# Patient Record
Sex: FEMALE | Race: OTHER | HISPANIC OR LATINO | URBAN - METROPOLITAN AREA
[De-identification: names, ages, dates, MRNs, and addresses within clinical notes are randomized per-mention and may not be internally consistent; named-entity substitution may affect disease eponyms.]

---

## 2018-10-27 ENCOUNTER — EMERGENCY (EMERGENCY)
Facility: HOSPITAL | Age: 48
LOS: 1 days | Discharge: HOME | End: 2018-10-27
Attending: EMERGENCY MEDICINE
Payer: MEDICAID

## 2018-10-27 VITALS
SYSTOLIC BLOOD PRESSURE: 110 MMHG | HEART RATE: 103 BPM | RESPIRATION RATE: 22 BRPM | OXYGEN SATURATION: 100 % | DIASTOLIC BLOOD PRESSURE: 58 MMHG | TEMPERATURE: 98 F

## 2018-10-27 LAB
ALBUMIN SERPL ELPH-MCNC: 3.7 G/DL — SIGNIFICANT CHANGE UP (ref 3.3–5)
ALP SERPL-CCNC: 56 U/L — SIGNIFICANT CHANGE UP (ref 40–120)
ALT FLD-CCNC: 17 U/L — SIGNIFICANT CHANGE UP (ref 10–45)
ANION GAP SERPL CALC-SCNC: 13 MMOL/L — SIGNIFICANT CHANGE UP (ref 5–17)
APTT BLD: 30.7 SEC — SIGNIFICANT CHANGE UP (ref 27.5–37.4)
AST SERPL-CCNC: 15 U/L — SIGNIFICANT CHANGE UP (ref 10–40)
BASOPHILS # BLD AUTO: 0 K/UL — SIGNIFICANT CHANGE UP (ref 0–0.2)
BASOPHILS NFR BLD AUTO: 0.3 % — SIGNIFICANT CHANGE UP (ref 0–2)
BILIRUB SERPL-MCNC: 0.2 MG/DL — SIGNIFICANT CHANGE UP (ref 0.2–1.2)
BUN SERPL-MCNC: 9 MG/DL — SIGNIFICANT CHANGE UP (ref 7–23)
CALCIUM SERPL-MCNC: 8.9 MG/DL — SIGNIFICANT CHANGE UP (ref 8.4–10.5)
CHLORIDE SERPL-SCNC: 97 MMOL/L — SIGNIFICANT CHANGE UP (ref 96–108)
CO2 SERPL-SCNC: 21 MMOL/L — LOW (ref 22–31)
CREAT SERPL-MCNC: 0.73 MG/DL — SIGNIFICANT CHANGE UP (ref 0.5–1.3)
EOSINOPHIL # BLD AUTO: 0.3 K/UL — SIGNIFICANT CHANGE UP (ref 0–0.5)
EOSINOPHIL NFR BLD AUTO: 3.3 % — SIGNIFICANT CHANGE UP (ref 0–6)
GLUCOSE SERPL-MCNC: 101 MG/DL — HIGH (ref 70–99)
HCT VFR BLD CALC: 40.2 % — SIGNIFICANT CHANGE UP (ref 34.5–45)
HGB BLD-MCNC: 12.8 G/DL — SIGNIFICANT CHANGE UP (ref 11.5–15.5)
INR BLD: 1.03 RATIO — SIGNIFICANT CHANGE UP (ref 0.88–1.16)
LYMPHOCYTES # BLD AUTO: 2.9 K/UL — SIGNIFICANT CHANGE UP (ref 1–3.3)
LYMPHOCYTES # BLD AUTO: 27.7 % — SIGNIFICANT CHANGE UP (ref 13–44)
MAGNESIUM SERPL-MCNC: 2.9 MG/DL — HIGH (ref 1.6–2.6)
MAGNESIUM SERPL-MCNC: >8 MG/DL — CRITICAL HIGH (ref 1.6–2.6)
MCHC RBC-ENTMCNC: 30.3 PG — SIGNIFICANT CHANGE UP (ref 27–34)
MCHC RBC-ENTMCNC: 31.8 GM/DL — LOW (ref 32–36)
MCV RBC AUTO: 95.3 FL — SIGNIFICANT CHANGE UP (ref 80–100)
MONOCYTES # BLD AUTO: 1.1 K/UL — HIGH (ref 0–0.9)
MONOCYTES NFR BLD AUTO: 10.8 % — SIGNIFICANT CHANGE UP (ref 2–14)
NEUTROPHILS # BLD AUTO: 6 K/UL — SIGNIFICANT CHANGE UP (ref 1.8–7.4)
NEUTROPHILS NFR BLD AUTO: 57.9 % — SIGNIFICANT CHANGE UP (ref 43–77)
PLATELET # BLD AUTO: 270 K/UL — SIGNIFICANT CHANGE UP (ref 150–400)
POTASSIUM SERPL-MCNC: 4 MMOL/L — SIGNIFICANT CHANGE UP (ref 3.5–5.3)
POTASSIUM SERPL-SCNC: 4 MMOL/L — SIGNIFICANT CHANGE UP (ref 3.5–5.3)
PROT SERPL-MCNC: 6.6 G/DL — SIGNIFICANT CHANGE UP (ref 6–8.3)
PROTHROM AB SERPL-ACNC: 11.1 SEC — SIGNIFICANT CHANGE UP (ref 9.8–12.7)
RBC # BLD: 4.22 M/UL — SIGNIFICANT CHANGE UP (ref 3.8–5.2)
RBC # FLD: 14 % — SIGNIFICANT CHANGE UP (ref 10.3–14.5)
SODIUM SERPL-SCNC: 131 MMOL/L — LOW (ref 135–145)
WBC # BLD: 10.4 K/UL — SIGNIFICANT CHANGE UP (ref 3.8–10.5)
WBC # FLD AUTO: 10.4 K/UL — SIGNIFICANT CHANGE UP (ref 3.8–10.5)

## 2018-10-27 PROCEDURE — 71045 X-RAY EXAM CHEST 1 VIEW: CPT | Mod: 26

## 2018-10-27 PROCEDURE — 99220: CPT

## 2018-10-27 RX ORDER — SODIUM CHLORIDE 9 MG/ML
1000 INJECTION INTRAMUSCULAR; INTRAVENOUS; SUBCUTANEOUS ONCE
Qty: 0 | Refills: 0 | Status: COMPLETED | OUTPATIENT
Start: 2018-10-27 | End: 2018-10-27

## 2018-10-27 RX ORDER — MAGNESIUM SULFATE 500 MG/ML
2 VIAL (ML) INJECTION ONCE
Qty: 0 | Refills: 0 | Status: COMPLETED | OUTPATIENT
Start: 2018-10-27 | End: 2018-10-27

## 2018-10-27 RX ORDER — IPRATROPIUM/ALBUTEROL SULFATE 18-103MCG
3 AEROSOL WITH ADAPTER (GRAM) INHALATION ONCE
Qty: 0 | Refills: 0 | Status: COMPLETED | OUTPATIENT
Start: 2018-10-27 | End: 2018-10-27

## 2018-10-27 RX ADMIN — Medication 60 MILLIGRAM(S): at 21:47

## 2018-10-27 RX ADMIN — Medication 3 MILLILITER(S): at 22:32

## 2018-10-27 RX ADMIN — Medication 50 GRAM(S): at 21:24

## 2018-10-27 RX ADMIN — SODIUM CHLORIDE 1000 MILLILITER(S): 9 INJECTION INTRAMUSCULAR; INTRAVENOUS; SUBCUTANEOUS at 21:47

## 2018-10-27 NOTE — ED PROVIDER NOTE - MEDICAL DECISION MAKING DETAILS
LINDEN: 47 y/o female with hx of asthma, never been intubated, multiple exacerbations recently, subject to secondhand smoke, p/w worsening wheezing and SOB over past week, no CP, no fever, on exam pt's lung exam with no movement, tight, pt speaking words with difficulty pulse ox above 95%, 1)duo neb 2)CXR 3)steroids 4)frequent reevalutations

## 2018-10-27 NOTE — ED PROVIDER NOTE - OBJECTIVE STATEMENT
49 y/o female hx of asthma who presents to the ED for SOB. patient reports she has asthma exacerbations at least once per month but is not on chronic steroids. patient states she moved from [place] and has allergies and since that time her asthma has been much worse. on home inhalers. states she has noted an exacerbation over the last week, today became much worse. no recent cough or illness. no sick contacts. patient states she has been hospitalized many times for asthmabut never intubated. has used bipap once before but states it makes her feel worse. 49 y/o female hx of asthma who presents to the ED for SOB. patient reports she has asthma exacerbations at least once per month but is not on chronic steroids. patient states she moved from [place] and has allergies and since that time her asthma has been much worse. on home inhalers. states she has noted an exacerbation over the last week, today became much worse. no recent cough or illness. no sick contacts. patient states she has been hospitalized many times for asthma but never intubated. has used bipap once before but states it makes her feel worse. no chest pain, no hx pe, not on ocps. feels same as all asthma exacerbations. quit smoking but  still smokes

## 2018-10-27 NOTE — ED PROVIDER NOTE - PROGRESS NOTE DETAILS
refusing bipap as well as more nebs, states that mag typically works for her - Nenita Reyes PA-C do not think Mg is accurate, drawn from above the line after magnesium infusion started - Nenita Reyes PA-C

## 2018-10-27 NOTE — ED PROVIDER NOTE - ATTENDING CONTRIBUTION TO CARE
Attending MD Purcell;:   I personally have seen and examined this patient.  Physician assistant note reviewed and agree on plan of care and except where noted.  See MDM for details.

## 2018-10-27 NOTE — ED ADULT NURSE NOTE - OBJECTIVE STATEMENT
48y female presents to ED complaining of SOB. Pt is a/ox3 coming via EMS from urgent care. Pt has hx of asthma, went to urgent care for increasing SOB not relieved with at home albuterol. Pt given 60mg of PO prednisone and 3 Duonebs prior to arrival. PT present tachypneic with audible wheezes. PT states over the last 3 days shes been having cold symptoms, with coughing and sneezing, progressively worsening. Pt breathing spontaneous and labored with wheezes diffuse to auscultation bilaterally. Pt skin is warm, dry and intact with no edema present. Pt abdomen soft, nontender, nondistended. Pt PERRL, with equal strength bilaterally in upper and lower extremities with full sensation. Pt denies chest pain, denies n/v/d, denies fever/chills and cough, denies dysuria, denies numbness/tingling and weakness.

## 2018-10-28 VITALS
RESPIRATION RATE: 18 BRPM | SYSTOLIC BLOOD PRESSURE: 100 MMHG | DIASTOLIC BLOOD PRESSURE: 66 MMHG | TEMPERATURE: 98 F | HEART RATE: 83 BPM | OXYGEN SATURATION: 95 %

## 2018-10-28 PROCEDURE — 99285 EMERGENCY DEPT VISIT HI MDM: CPT | Mod: 25

## 2018-10-28 PROCEDURE — 80053 COMPREHEN METABOLIC PANEL: CPT

## 2018-10-28 PROCEDURE — 99217: CPT

## 2018-10-28 PROCEDURE — 83735 ASSAY OF MAGNESIUM: CPT

## 2018-10-28 PROCEDURE — 87486 CHLMYD PNEUM DNA AMP PROBE: CPT

## 2018-10-28 PROCEDURE — 96375 TX/PRO/DX INJ NEW DRUG ADDON: CPT

## 2018-10-28 PROCEDURE — 96374 THER/PROPH/DIAG INJ IV PUSH: CPT

## 2018-10-28 PROCEDURE — G0378: CPT

## 2018-10-28 PROCEDURE — 87798 DETECT AGENT NOS DNA AMP: CPT

## 2018-10-28 PROCEDURE — 87581 M.PNEUMON DNA AMP PROBE: CPT

## 2018-10-28 PROCEDURE — 94640 AIRWAY INHALATION TREATMENT: CPT

## 2018-10-28 PROCEDURE — 85730 THROMBOPLASTIN TIME PARTIAL: CPT

## 2018-10-28 PROCEDURE — 85610 PROTHROMBIN TIME: CPT

## 2018-10-28 PROCEDURE — 87633 RESP VIRUS 12-25 TARGETS: CPT

## 2018-10-28 PROCEDURE — 85027 COMPLETE CBC AUTOMATED: CPT

## 2018-10-28 PROCEDURE — 71045 X-RAY EXAM CHEST 1 VIEW: CPT

## 2018-10-28 RX ORDER — BUDESONIDE AND FORMOTEROL FUMARATE DIHYDRATE 160; 4.5 UG/1; UG/1
2 AEROSOL RESPIRATORY (INHALATION)
Qty: 0 | Refills: 0 | Status: DISCONTINUED | OUTPATIENT
Start: 2018-10-28 | End: 2018-10-31

## 2018-10-28 RX ORDER — SUCRALFATE 1 G
1 TABLET ORAL ONCE
Qty: 0 | Refills: 0 | Status: COMPLETED | OUTPATIENT
Start: 2018-10-28 | End: 2018-10-28

## 2018-10-28 RX ORDER — SODIUM CHLORIDE 9 MG/ML
3 INJECTION INTRAMUSCULAR; INTRAVENOUS; SUBCUTANEOUS EVERY 8 HOURS
Qty: 0 | Refills: 0 | Status: DISCONTINUED | OUTPATIENT
Start: 2018-10-28 | End: 2018-10-31

## 2018-10-28 RX ORDER — PANTOPRAZOLE SODIUM 20 MG/1
40 TABLET, DELAYED RELEASE ORAL ONCE
Qty: 0 | Refills: 0 | Status: COMPLETED | OUTPATIENT
Start: 2018-10-28 | End: 2018-10-28

## 2018-10-28 RX ORDER — IPRATROPIUM/ALBUTEROL SULFATE 18-103MCG
3 AEROSOL WITH ADAPTER (GRAM) INHALATION EVERY 4 HOURS
Qty: 0 | Refills: 0 | Status: DISCONTINUED | OUTPATIENT
Start: 2018-10-28 | End: 2018-10-31

## 2018-10-28 RX ADMIN — Medication 3 MILLILITER(S): at 02:06

## 2018-10-28 RX ADMIN — Medication 1 GRAM(S): at 04:22

## 2018-10-28 RX ADMIN — Medication 3 MILLILITER(S): at 05:56

## 2018-10-28 RX ADMIN — PANTOPRAZOLE SODIUM 40 MILLIGRAM(S): 20 TABLET, DELAYED RELEASE ORAL at 02:06

## 2018-10-28 RX ADMIN — SODIUM CHLORIDE 3 MILLILITER(S): 9 INJECTION INTRAMUSCULAR; INTRAVENOUS; SUBCUTANEOUS at 05:11

## 2018-10-28 RX ADMIN — Medication 100 MILLIGRAM(S): at 01:08

## 2018-10-28 NOTE — ED CDU PROVIDER INITIAL DAY NOTE - DETAILS
49 y/o female PMHx asthma (never intubated) on Flovent 2puffs BID, GERD, former 1/2ppd x20 year smoker presented to the ED for worsening SOBx6 days.   *ASTHMA EXACERBATION   -continuous monitoring with pulse ox and freq re-evals  -incentive spirometer   -duonebs q4 hours   -discussed case with Dr. Marlene Purcell

## 2018-10-28 NOTE — ED CDU PROVIDER INITIAL DAY NOTE - OBJECTIVE STATEMENT
49 y/o female PMHx asthma (never intubated) on Flovent 2puffs BID, GERD, former 1/2ppd x20 year smoker presented to the ED for worsening SOBx6 days. Patient stated symptoms occurred when she had sinus pressure with green drainage a few days ago and thick white clear sputum. URI type infections typically offset her asthma exacerbations.  had similar URI symptoms 3 weeks ago. Patient was using neb treatments ever 4 hours during the week with some relief of symptoms however today patient was using neb treatments every 2 hours which prompted pt to present to ED. Patient also reports JOHN but denied CP or SOB at rest. Feels feverish intermittently however has not taken temperature. Patient stated she gets monthly asthma exacerbations (not on chronic steroids) and was admitted 1 month ago at [facility]. Has had many admissions for asthma exacerbations and required BiPAp. Patient also reported since she reported from [place] allergies have been much worse and increased freq of asthma exacerbations. Patient former smoker however  currently smokes. Has outpatient pulm appt next month. Patient denied CP, fever, chills, headache, N/V/D, urinary symptoms, SOB rest, intubations, dizziness, lightheadedness, syncope, LOC, fall, OCPs    ED course: Labs unremarkable including negative RVP and CXR was negative. Patient received 1L IVF, duonebs, IV solumedrol 60mg and MagSulFate while in ED. Patient transferred to CDU for further management including duonebs, steroids and incentive spirometry    PMD: Dr. Savage Galena

## 2018-10-28 NOTE — ED CDU PROVIDER INITIAL DAY NOTE - MEDICAL DECISION MAKING DETAILS
47 y/o female with hx of asthma, never been intubated, multiple exacerbations recently, subject to secondhand smoke, p/w worsening wheezing and SOB over past week, no CP, no fever, on exam pt's lung exam with no movement, tight, pt speaking words with difficulty pulse ox above 95%, in the ED pt with improving symptoms, however still with wheezing, pt given Magnesium, and transferred to CDU for further close monitoring, more treatment as necessary.

## 2018-10-28 NOTE — ED CDU PROVIDER DISPOSITION NOTE - CLINICAL COURSE
49 y/o female PMHx asthma (never intubated) on Flovent 2puffs BID, GERD, former 1/2ppd x20 year smoker presented to the ED for worsening SOBx6 days. Patient stated symptoms occurred when she had sinus pressure with green drainage a few days ago and thick white clear sputum. URI type infections typically offset her asthma exacerbations.  had similar URI symptoms 3 weeks ago. Patient was using neb treatments ever 4 hours during the week with some relief of symptoms however today patient was using neb treatments every 2 hours which prompted pt to present to ED. Patient also reports JOHN but denied CP or SOB at rest. Feels feverish intermittently however has not taken temperature. Patient stated she gets monthly asthma exacerbations (not on chronic steroids) and was admitted 1 month ago at [facility]. Has had many admissions for asthma exacerbations and required BiPAp. Patient also reported since she reported from [place] allergies have been much worse and increased freq of asthma exacerbations. Patient former smoker however  currently smokes. Has outpatient pulm appt next month. Patient denied CP, fever, chills, headache, N/V/D, urinary symptoms, SOB rest, intubations, dizziness, lightheadedness, syncope, LOC, fall, OCPs    ED course: Labs unremarkable including negative RVP and CXR was negative. Patient received 1L IVF, duonebs, IV solumedrol 60mg and MagSulFate while in ED. Patient transferred to CDU for further management including duonebs, steroids and incentive spirometry. Patient received q4 duonebs and tessalon pearls for cough with improvement of symptoms. Patient discharged with five day course of prednisone with PMD and pulm f/u per ED attending Dr. Emanuel 49 y/o female PMHx asthma (never intubated) on Flovent 2puffs BID, GERD, former 1/2ppd x20 year smoker presented to the ED for worsening SOBx6 days. Patient stated symptoms occurred when she had sinus pressure with green drainage a few days ago and thick white clear sputum. URI type infections typically offset her asthma exacerbations.  had similar URI symptoms 3 weeks ago. Patient was using neb treatments ever 4 hours during the week with some relief of symptoms however today patient was using neb treatments every 2 hours which prompted pt to present to ED. Patient also reports OJHN but denied CP or SOB at rest. Feels feverish intermittently however has not taken temperature. Patient stated she gets monthly asthma exacerbations (not on chronic steroids) and was admitted 1 month ago at [facility]. Has had many admissions for asthma exacerbations and required BiPAp. Patient also reported since she reported from [place] allergies have been much worse and increased freq of asthma exacerbations. Patient former smoker however  currently smokes. Has outpatient pulm appt next month. Patient denied CP, fever, chills, headache, N/V/D, urinary symptoms, SOB rest, intubations, dizziness, lightheadedness, syncope, LOC, fall, OCPs    ED course: Labs unremarkable including negative RVP and CXR was negative. Patient received 1L IVF, duonebs, IV solumedrol 60mg and MagSulFate while in ED. Patient transferred to CDU for further management including duonebs, steroids and incentive spirometry. Patient received q4 duonebs and tessalon pearls for cough with improvement of symptoms. In AM patient felt significantly improved and discussed with her need to start on long acting b-agonist and combo with inhaled steroid. patient has appointment with her pulmonologist this month and will discuss starting advair again with him. Patient discharged with five day course of prednisone as well as augmentin with PMD and pulm f/u per ED attending Dr. Emanuel

## 2018-10-28 NOTE — ED CDU PROVIDER INITIAL DAY NOTE - PROGRESS NOTE DETAILS
SAVITA Quintero: Patient stated she takes PPI for GERD and missed dose today. Will give protonix for symptoms SAVITA Quintero: At 3:35AM on 10/28/18 patient noted to desat with good wave form to 82% while on 2L nasal cannula. Assessed patient at bedside who was sleeping in NAD. Sat pt upward in bed and increased nasal cannula to 4L with improvement of SPo2 99%. Patient continues to have diffuse expiratory wheezing. Decreased nasal cannula back down to 2L and will reassess. Patient requesting additional antacid as she is experiencing GERD symptoms. Will give maalox and carafate. Will reassess SAVITA Quintero: At 3:35AM on 10/28/18 patient noted to desat with good wave form to 82% while on 2L nasal cannula. Assessed patient at bedside who was sleeping in NAD. Sat pt upward in bed and increased nasal cannula to 4L with improvement of SPo2 99%. Patient continues to have diffuse expiratory wheezing. Decreased nasal cannula back down to 2L and will reassess. Patient requesting additional antacid as she is experiencing GERD symptoms and feels GERD worsens her asthma. Will give maalox and carafate. Will reassess CDU PROGRESS NOTE SAVITA MATHEW: Pt resting comfortably, feeling well without complaint. NAD, VSS. O2 sat 96% on RA while sleeping. SAVITA Hsu: Patient seen and evaluated with Dr. Emanuel. Feeling significantly improved. Reports that she has an appointment with her pulmonologist coming up, and that she had issue with her insurance company and this was why her Advair was discontinued. Reports Advair was very effective for her, and she will discuss with her pulmonologist to get her back on it. Patient feels sinus infection was cause of her symptoms, will rx augmentin for this and prednisone for her asthma exacerbation. will give advair here for patient to take and use until her follow-up. Patient's lungs with very mild diffuse expiratory wheeze and adequately moving air, stating that she feels significant improved. Stable for d/c at this time. -Donna Hsu PA-C pt feeling much better - able to take deep breath and not coughing.  pt states exac by sinusitis. pt was taking Advair previously which controlled asthma, but insurance would not pay when changed plans.  P/E - alert, nad, good air entry bilat w/ faint end expiratory wheezing. Pt improved,  will provide pt with Advair and pt stable for D/C.

## 2018-10-28 NOTE — ED ADULT NURSE REASSESSMENT NOTE - NS ED NURSE REASSESS COMMENT FT1
report taken from Brandy VÁSQUEZ. pt to receive additional nebs and tessalon pearls.
Received pt from FAHAD Carvalho (jose luis), received pt alert and responsive, oriented x4, denies any respiratory distress, SOB, or difficulty breathing. Pt transferred to CDU for asthma exacerbation, +wheezing noted on ascultation. Pt currently states improvement. Placed on continuous o2 monitoring 96% on 2L o2 via nasal cannula. Pending duoneb treatments q4 as per order.  IV in place, patent and free of signs of infiltration, pt denies chest pain or palpitations, V/S stable, pt afebrile, pt denies pain at this time. Pt educated on unit and unit rules, instructed patient to notify RN of any needed assistance, Pt verbalizes understanding, Call bell placed within reach. Safety maintained. Will continue to monitor.

## 2018-10-28 NOTE — ED CDU PROVIDER INITIAL DAY NOTE - PHYSICAL EXAMINATION
GEN: Well Appearing, Nontoxic, NAD  HEENT: NC/AT, Symm Facies. PERRL, EOMI, MMM, posterior pharynx clear  CV: No JVD/Bruits or stridor;  +S1S2, RRR w/o m/g/r  RESP: mild diffuse expiratory wheeze. moving air adequately  ABD: Soft, nt/nd, +BS. No guarding/rebound. No RUQ tender, no CVAT  EXT/MSK: No lower extremity edema or calf tenderness. WWP, palpable pulses. FROMx4  SKIN: No erythema, lesions or rash  Neuro: Grossly intact, AOX3 with normal speech, CN II-XII intact; Sensation intact, motor 5/5 throughout. Gait normal

## 2018-10-28 NOTE — ED CDU PROVIDER DISPOSITION NOTE - PLAN OF CARE
Follow up with your Primary Care Physician within the next 2-3 days  Bring a copy of your test results with you to your appointment  Continue your current medication regimen  Return to the Emergency Room if you experience new or worsening symptoms   Patient to complete course of prednisone (steroids) for asthma exacerbation Follow up with your Primary Care Physician within the next 2-3 days  Bring a copy of your test results with you to your appointment  Continue your current medication regimen along with taking prednisone once daily for 4 more days and augmentin twice daily for 7 days  Return to the Emergency Room if you experience new or worsening symptoms

## 2018-10-28 NOTE — ED ADULT NURSE REASSESSMENT NOTE - COMFORT CARE
darkened lights/warm blanket provided/ambulated to bathroom/plan of care explained/po fluids offered/repositioned

## 2018-11-11 ENCOUNTER — EMERGENCY (EMERGENCY)
Facility: HOSPITAL | Age: 48
LOS: 1 days | Discharge: HOME | End: 2018-11-11
Attending: EMERGENCY MEDICINE
Payer: MEDICAID

## 2018-11-11 VITALS
HEART RATE: 83 BPM | DIASTOLIC BLOOD PRESSURE: 62 MMHG | HEIGHT: 59 IN | RESPIRATION RATE: 20 BRPM | WEIGHT: 149.91 LBS | OXYGEN SATURATION: 97 % | TEMPERATURE: 98 F | SYSTOLIC BLOOD PRESSURE: 105 MMHG

## 2018-11-11 VITALS
SYSTOLIC BLOOD PRESSURE: 112 MMHG | OXYGEN SATURATION: 98 % | DIASTOLIC BLOOD PRESSURE: 76 MMHG | TEMPERATURE: 97 F | HEART RATE: 77 BPM | RESPIRATION RATE: 18 BRPM

## 2018-11-11 PROBLEM — K21.9 GASTRO-ESOPHAGEAL REFLUX DISEASE WITHOUT ESOPHAGITIS: Chronic | Status: ACTIVE | Noted: 2018-10-28

## 2018-11-11 PROBLEM — J45.909 UNSPECIFIED ASTHMA, UNCOMPLICATED: Chronic | Status: ACTIVE | Noted: 2018-10-27

## 2018-11-11 LAB
ALBUMIN SERPL ELPH-MCNC: 4.1 G/DL — SIGNIFICANT CHANGE UP (ref 3.3–5)
ALP SERPL-CCNC: 59 U/L — SIGNIFICANT CHANGE UP (ref 40–120)
ALT FLD-CCNC: 18 U/L — SIGNIFICANT CHANGE UP (ref 10–45)
ANION GAP SERPL CALC-SCNC: 16 MMOL/L — SIGNIFICANT CHANGE UP (ref 5–17)
APPEARANCE UR: CLEAR — SIGNIFICANT CHANGE UP
AST SERPL-CCNC: 17 U/L — SIGNIFICANT CHANGE UP (ref 10–40)
BACTERIA # UR AUTO: NEGATIVE — SIGNIFICANT CHANGE UP
BASOPHILS # BLD AUTO: 0 K/UL — SIGNIFICANT CHANGE UP (ref 0–0.2)
BASOPHILS NFR BLD AUTO: 0.5 % — SIGNIFICANT CHANGE UP (ref 0–2)
BILIRUB SERPL-MCNC: 0.3 MG/DL — SIGNIFICANT CHANGE UP (ref 0.2–1.2)
BILIRUB UR-MCNC: NEGATIVE — SIGNIFICANT CHANGE UP
BUN SERPL-MCNC: 11 MG/DL — SIGNIFICANT CHANGE UP (ref 7–23)
CALCIUM SERPL-MCNC: 10.2 MG/DL — SIGNIFICANT CHANGE UP (ref 8.4–10.5)
CHLORIDE SERPL-SCNC: 100 MMOL/L — SIGNIFICANT CHANGE UP (ref 96–108)
CO2 SERPL-SCNC: 22 MMOL/L — SIGNIFICANT CHANGE UP (ref 22–31)
COLOR SPEC: SIGNIFICANT CHANGE UP
CREAT SERPL-MCNC: 1.13 MG/DL — SIGNIFICANT CHANGE UP (ref 0.5–1.3)
DIFF PNL FLD: NEGATIVE — SIGNIFICANT CHANGE UP
EOSINOPHIL # BLD AUTO: 0.2 K/UL — SIGNIFICANT CHANGE UP (ref 0–0.5)
EOSINOPHIL NFR BLD AUTO: 2.8 % — SIGNIFICANT CHANGE UP (ref 0–6)
EPI CELLS # UR: 1 /HPF — SIGNIFICANT CHANGE UP
GLUCOSE SERPL-MCNC: 90 MG/DL — SIGNIFICANT CHANGE UP (ref 70–99)
GLUCOSE UR QL: NEGATIVE — SIGNIFICANT CHANGE UP
HCT VFR BLD CALC: 40.9 % — SIGNIFICANT CHANGE UP (ref 34.5–45)
HGB BLD-MCNC: 13.5 G/DL — SIGNIFICANT CHANGE UP (ref 11.5–15.5)
HYALINE CASTS # UR AUTO: 1 /LPF — SIGNIFICANT CHANGE UP (ref 0–2)
KETONES UR-MCNC: NEGATIVE — SIGNIFICANT CHANGE UP
LEUKOCYTE ESTERASE UR-ACNC: NEGATIVE — SIGNIFICANT CHANGE UP
LIDOCAIN IGE QN: 49 U/L — SIGNIFICANT CHANGE UP (ref 7–60)
LYMPHOCYTES # BLD AUTO: 2.9 K/UL — SIGNIFICANT CHANGE UP (ref 1–3.3)
LYMPHOCYTES # BLD AUTO: 35.6 % — SIGNIFICANT CHANGE UP (ref 13–44)
MCHC RBC-ENTMCNC: 30.5 PG — SIGNIFICANT CHANGE UP (ref 27–34)
MCHC RBC-ENTMCNC: 33.1 GM/DL — SIGNIFICANT CHANGE UP (ref 32–36)
MCV RBC AUTO: 92.1 FL — SIGNIFICANT CHANGE UP (ref 80–100)
MONOCYTES # BLD AUTO: 1 K/UL — HIGH (ref 0–0.9)
MONOCYTES NFR BLD AUTO: 12.1 % — SIGNIFICANT CHANGE UP (ref 2–14)
NEUTROPHILS # BLD AUTO: 4 K/UL — SIGNIFICANT CHANGE UP (ref 1.8–7.4)
NEUTROPHILS NFR BLD AUTO: 48.9 % — SIGNIFICANT CHANGE UP (ref 43–77)
NITRITE UR-MCNC: NEGATIVE — SIGNIFICANT CHANGE UP
PH UR: 7 — SIGNIFICANT CHANGE UP (ref 5–8)
PLATELET # BLD AUTO: 267 K/UL — SIGNIFICANT CHANGE UP (ref 150–400)
POTASSIUM SERPL-MCNC: 4.1 MMOL/L — SIGNIFICANT CHANGE UP (ref 3.5–5.3)
POTASSIUM SERPL-SCNC: 4.1 MMOL/L — SIGNIFICANT CHANGE UP (ref 3.5–5.3)
PROT SERPL-MCNC: 7.3 G/DL — SIGNIFICANT CHANGE UP (ref 6–8.3)
PROT UR-MCNC: NEGATIVE — SIGNIFICANT CHANGE UP
RBC # BLD: 4.44 M/UL — SIGNIFICANT CHANGE UP (ref 3.8–5.2)
RBC # FLD: 14.3 % — SIGNIFICANT CHANGE UP (ref 10.3–14.5)
RBC CASTS # UR COMP ASSIST: 3 /HPF — SIGNIFICANT CHANGE UP (ref 0–4)
SODIUM SERPL-SCNC: 138 MMOL/L — SIGNIFICANT CHANGE UP (ref 135–145)
SP GR SPEC: 1.01 — SIGNIFICANT CHANGE UP (ref 1.01–1.02)
UROBILINOGEN FLD QL: NEGATIVE — SIGNIFICANT CHANGE UP
WBC # BLD: 8.2 K/UL — SIGNIFICANT CHANGE UP (ref 3.8–10.5)
WBC # FLD AUTO: 8.2 K/UL — SIGNIFICANT CHANGE UP (ref 3.8–10.5)
WBC UR QL: 0 /HPF — SIGNIFICANT CHANGE UP (ref 0–5)

## 2018-11-11 PROCEDURE — 74177 CT ABD & PELVIS W/CONTRAST: CPT | Mod: 26

## 2018-11-11 PROCEDURE — 85027 COMPLETE CBC AUTOMATED: CPT

## 2018-11-11 PROCEDURE — 96376 TX/PRO/DX INJ SAME DRUG ADON: CPT

## 2018-11-11 PROCEDURE — 99284 EMERGENCY DEPT VISIT MOD MDM: CPT | Mod: 25

## 2018-11-11 PROCEDURE — 83690 ASSAY OF LIPASE: CPT

## 2018-11-11 PROCEDURE — 99284 EMERGENCY DEPT VISIT MOD MDM: CPT

## 2018-11-11 PROCEDURE — 74177 CT ABD & PELVIS W/CONTRAST: CPT

## 2018-11-11 PROCEDURE — 96374 THER/PROPH/DIAG INJ IV PUSH: CPT | Mod: XU

## 2018-11-11 PROCEDURE — 80053 COMPREHEN METABOLIC PANEL: CPT

## 2018-11-11 PROCEDURE — 81001 URINALYSIS AUTO W/SCOPE: CPT

## 2018-11-11 RX ORDER — IBUPROFEN 200 MG
1 TABLET ORAL
Qty: 60 | Refills: 0 | OUTPATIENT
Start: 2018-11-11 | End: 2018-11-24

## 2018-11-11 RX ORDER — KETOROLAC TROMETHAMINE 30 MG/ML
15 SYRINGE (ML) INJECTION ONCE
Qty: 0 | Refills: 0 | Status: DISCONTINUED | OUTPATIENT
Start: 2018-11-11 | End: 2018-11-11

## 2018-11-11 RX ORDER — ACETAMINOPHEN 500 MG
2 TABLET ORAL
Qty: 120 | Refills: 0 | OUTPATIENT
Start: 2018-11-11 | End: 2018-11-24

## 2018-11-11 RX ORDER — ACETAMINOPHEN 500 MG
975 TABLET ORAL ONCE
Qty: 0 | Refills: 0 | Status: COMPLETED | OUTPATIENT
Start: 2018-11-11 | End: 2018-11-11

## 2018-11-11 RX ORDER — SODIUM CHLORIDE 9 MG/ML
1000 INJECTION INTRAMUSCULAR; INTRAVENOUS; SUBCUTANEOUS ONCE
Qty: 0 | Refills: 0 | Status: COMPLETED | OUTPATIENT
Start: 2018-11-11 | End: 2018-11-11

## 2018-11-11 RX ADMIN — Medication 975 MILLIGRAM(S): at 22:10

## 2018-11-11 RX ADMIN — Medication 15 MILLIGRAM(S): at 18:32

## 2018-11-11 RX ADMIN — Medication 15 MILLIGRAM(S): at 21:33

## 2018-11-11 RX ADMIN — SODIUM CHLORIDE 1000 MILLILITER(S): 9 INJECTION INTRAMUSCULAR; INTRAVENOUS; SUBCUTANEOUS at 18:28

## 2018-11-11 RX ADMIN — SODIUM CHLORIDE 1000 MILLILITER(S): 9 INJECTION INTRAMUSCULAR; INTRAVENOUS; SUBCUTANEOUS at 18:11

## 2018-11-11 RX ADMIN — Medication 1 TABLET(S): at 22:10

## 2018-11-11 NOTE — ED PROVIDER NOTE - ATTENDING CONTRIBUTION TO CARE
VITALS: reviewed  GEN: NAD, A & O x 4  HEAD/EYES: NCAT, PERRL, EOMI, anicteric sclerae, no conjunctival pallor  ENT: mucus membranes moist, oropharynx WNL, trachea midline, no JVD  RESP: lungs CTA with equal breath sounds bilaterally, chest wall nontender and atraumatic  CV: heart with reg rhythm S1, S2, no murmur; distal pulses intact and symmetric bilaterally  GI: normoactive bowel sounds. the abdomen is soft, nondistended, LLQ>LUQ moderate tenderness with mild rebound, no guarding, there are no palpable masses  : no CVAT  MSK: extremities atraumatic and nontender, no edema, no asymmetry. the back is without midline or lateral tenderness, there is no spinal deformity or stepoff and the back is ranged painlessly. the neck has no midline tenderness, deformity, or stepoff, and is ranged painlessly.  SKIN: warm, dry, no rash, no bruising, no cyanosis. color appropriate for ethnicity  PSYCH: Affect appropriate     MDM: Likely recurrent diverticulitis, well appearing. give 2 weeks w/out will get CT to r/o perforation or abscess.

## 2018-11-11 NOTE — ED ADULT NURSE REASSESSMENT NOTE - NS ED NURSE REASSESS COMMENT FT1
pt in no acute distress, asking to eat, awaiting CT results and pt is aware she cant eat until the results are in, VSS.

## 2018-11-11 NOTE — ED ADULT NURSE REASSESSMENT NOTE - NS ED NURSE REASSESS COMMENT FT1
1732 pt given 500 ccc bolus as ordered and temp 100.2  Pt has a wound on the right grat toe unknown if source of infection micaela

## 2018-11-11 NOTE — ED ADULT NURSE NOTE - OBJECTIVE STATEMENT
pt has had abd for 2 weeks pt was constipated and took laxatives with good results pt has divetcultis but has not had flare up in over 20 years Pt has some diarrheas now but pt also to [place] for the pain.  pt states she had fever yesterday IVL placed and bloods  heldo until MD evaluates pt Leonides

## 2018-11-11 NOTE — ED ADULT NURSE REASSESSMENT NOTE - NS ED NURSE REASSESS COMMENT FT1
pt called back and stated she removed her own IV and placed it in bathroom garbage prior to leaving. IV visible in garbage.

## 2018-11-11 NOTE — ED PROVIDER NOTE - OBJECTIVE STATEMENT
48yF with h/o of diverticulitis (last episode ~20 yrs prior) and asthma presents with intermittent sharp abd pain of 2 week duration. Patient states that she presented today due to worsening condition and new onset subjective fevers and nausea. Patient denies chest pain, sob, urinary or bowel dysfunction

## 2018-11-11 NOTE — ED ADULT NURSE REASSESSMENT NOTE - NS ED NURSE REASSESS COMMENT FT1
patient discharged by ED resident Liz Pelletier with RN not present at time of discharged. per MD pt was told to wait for IV to be removed. Pt left before IV could be removed. phone call placed to pts home using number in chart. charge RN aware

## 2018-11-11 NOTE — ED PROVIDER NOTE - PHYSICAL EXAMINATION
Gen: AAOx3, non-toxic  Head: NCAT  HEENT: EOMI, oral mucosa moist, normal conjunctiva  Lung: CTAB, no respiratory distress, speaking in full sentences  CV: RRR, no murmurs, rubs or gallops  Abd: soft, voluntary guarding, LLQ ttp,  no CVA tenderness  MSK: no visible deformities  Neuro: No focal sensory or motor deficits  Skin: Warm, well perfused, no rash  Psych: normal affect.   ~Efraín Ortez M.D. Resident

## 2018-11-11 NOTE — ED PROVIDER NOTE - MEDICAL DECISION MAKING DETAILS
48yF with h/o of diverticulitis and asthma presents with intermittent sharp abd pain of 2 week duration with LLQ ttp on exam concerning for diverticulitis  Plan: Labs, UA, Ct abd/pelvis, lipase, pain management, reassess

## 2018-11-11 NOTE — ED PROVIDER NOTE - PLAN OF CARE
You were seen and evaluated in the emergency department for abdominal pain. You had a thorough evaluation including an exam, labs and imaging. You were given medications for comfort. Your workup showed uncomplicated diverticulitis. You were started on antibiotics. Please read the attached information sheets as they will provide useful information regarding your condition.    It is important to understand that while your workup was reassuring, no workup can completely exclude all concerning conditions. Therfore, please return if you develop worsening or concerning new symptoms including worsening pain, pain that spreads to new places, inability to tolerate an oral diet, new and worsening fevers and chills, weakness, inability to pass stool or flatulent gas, those included on the attached information sheets, or other findings concerning to you. Please take all your medications as prescribed.    Please be sure to follow up with your regular doctor in the next 2 days.

## 2018-11-11 NOTE — ED PROVIDER NOTE - PROGRESS NOTE DETAILS
ATTENDING MD Kyaw: CT with uncompilcated diverticulitis. Pain improved. Tolerating PO. Will Dc w/augmentin and refer for PCP f/u. Liz Pelletier M.D. Resident  The patient was re-examined after interventions and is feeling much better. Vitals signs are within normal limits, pt is at baseline mental status, ambulating, tolerating PO, and pain is well controlled. Patient was given verbal and written discharge instructions and return precautions, and a hard copy of all results from tests completed in the ED. Pt verbalized understanding and agreed to outpatient follow up with her primary care doctor. Patient signed discharge paperwork and was educated about the importance of getting her IV line removed, and was informed that her nurse would come and to remove her IV. Liz Pelletier M.D. Resident  Patient left before nurse removed IV. Nurse called and left message on Pt's cellphone. Liz Pelletier M.D. Resident  Nurse states patient called back and states she removed the IV and discarded in the trashcan near her bed. Nurse checked the specific trashcan pt was referring to and confirmed the presence of a discarded IV.

## 2018-11-11 NOTE — ED PROVIDER NOTE - CARE PLAN
Principal Discharge DX:	Acute diverticulitis  Assessment and plan of treatment:	You were seen and evaluated in the emergency department for abdominal pain. You had a thorough evaluation including an exam, labs and imaging. You were given medications for comfort. Your workup showed uncomplicated diverticulitis. You were started on antibiotics. Please read the attached information sheets as they will provide useful information regarding your condition.    It is important to understand that while your workup was reassuring, no workup can completely exclude all concerning conditions. Therfore, please return if you develop worsening or concerning new symptoms including worsening pain, pain that spreads to new places, inability to tolerate an oral diet, new and worsening fevers and chills, weakness, inability to pass stool or flatulent gas, those included on the attached information sheets, or other findings concerning to you. Please take all your medications as prescribed.    Please be sure to follow up with your regular doctor in the next 2 days.

## 2018-11-11 NOTE — ED PROVIDER NOTE - NS ED ROS FT
GENERAL: +fever or chills, EYES: no change in vision, HEENT: no trouble swallowing or speaking, CARDIAC: no chest pain, PULMONARY: no cough or SOB,   GI: +abdominal pain, no nausea, no vomiting, no diarrhea or constipation, : No changes in urination, SKIN: no rashes, NEURO: no headache,  MSK: No joint pain ~Efraín Ortez M.D. Resident

## 2018-11-13 ENCOUNTER — INPATIENT (INPATIENT)
Facility: HOSPITAL | Age: 48
LOS: 3 days | Discharge: HOME | DRG: 189 | End: 2018-11-17
Attending: HOSPITALIST | Admitting: HOSPITALIST
Payer: MEDICAID

## 2018-11-13 VITALS
HEART RATE: 89 BPM | RESPIRATION RATE: 16 BRPM | TEMPERATURE: 98 F | OXYGEN SATURATION: 97 % | DIASTOLIC BLOOD PRESSURE: 58 MMHG | WEIGHT: 164.91 LBS | HEIGHT: 68 IN | SYSTOLIC BLOOD PRESSURE: 132 MMHG

## 2018-11-13 RX ORDER — IPRATROPIUM/ALBUTEROL SULFATE 18-103MCG
3 AEROSOL WITH ADAPTER (GRAM) INHALATION ONCE
Qty: 0 | Refills: 0 | Status: COMPLETED | OUTPATIENT
Start: 2018-11-13 | End: 2018-11-13

## 2018-11-13 RX ORDER — SODIUM CHLORIDE 9 MG/ML
1000 INJECTION INTRAMUSCULAR; INTRAVENOUS; SUBCUTANEOUS ONCE
Qty: 0 | Refills: 0 | Status: COMPLETED | OUTPATIENT
Start: 2018-11-13 | End: 2018-11-13

## 2018-11-13 RX ORDER — MAGNESIUM SULFATE 500 MG/ML
2 VIAL (ML) INJECTION ONCE
Qty: 0 | Refills: 0 | Status: COMPLETED | OUTPATIENT
Start: 2018-11-13 | End: 2018-11-13

## 2018-11-13 RX ADMIN — Medication 3 MILLILITER(S): at 23:35

## 2018-11-13 RX ADMIN — SODIUM CHLORIDE 1000 MILLILITER(S): 9 INJECTION INTRAMUSCULAR; INTRAVENOUS; SUBCUTANEOUS at 23:55

## 2018-11-13 RX ADMIN — Medication 3 MILLILITER(S): at 22:51

## 2018-11-13 RX ADMIN — SODIUM CHLORIDE 1000 MILLILITER(S): 9 INJECTION INTRAMUSCULAR; INTRAVENOUS; SUBCUTANEOUS at 22:55

## 2018-11-13 RX ADMIN — Medication 0.5 MILLIGRAM(S): at 23:28

## 2018-11-13 RX ADMIN — Medication 50 GRAM(S): at 23:31

## 2018-11-13 RX ADMIN — Medication 125 MILLIGRAM(S): at 22:54

## 2018-11-13 NOTE — ED ADULT TRIAGE NOTE - CHIEF COMPLAINT QUOTE
Asthma exacerbation went to walk in today got a shot of Solumedrol did albutrol at home, [place] duo neb from EMS

## 2018-11-13 NOTE — ED ADULT TRIAGE NOTE - NS ED NURSE BANDS TYPE
Late Entry-    ERP at bedside.    EMS IV found not working.  New IV established- pt very hard stick. Lab called for blood draw.   Lab at bedside.   Name band;

## 2018-11-13 NOTE — ED ADULT NURSE NOTE - OBJECTIVE STATEMENT
48 yr old female BIB EMS from urgent care with asthma exacerbation and cough. Pt received steroid IM prior to arrival with no relief. Pt in respiartory distress; tripod and using accessory muscles.

## 2018-11-13 NOTE — ED PROVIDER NOTE - OBJECTIVE STATEMENT
47yo female bib ems with asthma exacerbation. pt started wheezing and was using her nebulizer at home, was seen at urgent care and given IM shot of steroids with no improvemnet, no hx of intubations, no smoking, no fever, chills, pt has been on augmentin for 2 days

## 2018-11-13 NOTE — ED ADULT NURSE NOTE - CHIEF COMPLAINT QUOTE
Asthma exacerbation went to walk in today got a shot of Solumedrol did albutrol at home, [place] duo neb from EMS

## 2018-11-13 NOTE — ED PROVIDER NOTE - CRITICAL CARE PROVIDED
additional history taking/direct patient care (not related to procedure)/interpretation of diagnostic studies/consultation with other physicians

## 2018-11-13 NOTE — ED ADULT NURSE NOTE - NSIMPLEMENTINTERV_GEN_ALL_ED
Implemented All Universal Safety Interventions:  [place] to call system. Call bell, personal items and telephone within reach. Instruct patient to call for assistance. Room bathroom lighting operational. Non-slip footwear when patient is off stretcher. Physically safe environment: no spills, clutter or unnecessary equipment. Stretcher in lowest position, wheels locked, appropriate side rails in place.

## 2018-11-14 DIAGNOSIS — Z29.9 ENCOUNTER FOR PROPHYLACTIC MEASURES, UNSPECIFIED: ICD-10-CM

## 2018-11-14 DIAGNOSIS — K21.9 GASTRO-ESOPHAGEAL REFLUX DISEASE WITHOUT ESOPHAGITIS: ICD-10-CM

## 2018-11-14 DIAGNOSIS — K57.92 DIVERTICULITIS OF INTESTINE, PART UNSPECIFIED, WITHOUT PERFORATION OR ABSCESS WITHOUT BLEEDING: ICD-10-CM

## 2018-11-14 DIAGNOSIS — J45.901 UNSPECIFIED ASTHMA WITH (ACUTE) EXACERBATION: ICD-10-CM

## 2018-11-14 DIAGNOSIS — J96.00 ACUTE RESPIRATORY FAILURE, UNSPECIFIED WHETHER WITH HYPOXIA OR HYPERCAPNIA: ICD-10-CM

## 2018-11-14 DIAGNOSIS — J20.9 ACUTE BRONCHITIS, UNSPECIFIED: ICD-10-CM

## 2018-11-14 DIAGNOSIS — J45.51 SEVERE PERSISTENT ASTHMA WITH (ACUTE) EXACERBATION: ICD-10-CM

## 2018-11-14 DIAGNOSIS — J45.902 UNSPECIFIED ASTHMA WITH STATUS ASTHMATICUS: ICD-10-CM

## 2018-11-14 LAB
ALBUMIN SERPL ELPH-MCNC: 3.3 G/DL — SIGNIFICANT CHANGE UP (ref 3.3–5)
ALP SERPL-CCNC: 71 U/L — SIGNIFICANT CHANGE UP (ref 30–120)
ALT FLD-CCNC: 35 U/L DA — SIGNIFICANT CHANGE UP (ref 10–60)
ANION GAP SERPL CALC-SCNC: 9 MMOL/L — SIGNIFICANT CHANGE UP (ref 5–17)
AST SERPL-CCNC: 34 U/L — SIGNIFICANT CHANGE UP (ref 10–40)
BASE EXCESS BLDA CALC-SCNC: -2.4 MMOL/L — LOW (ref -2–2)
BASOPHILS # BLD AUTO: 0.02 K/UL — SIGNIFICANT CHANGE UP (ref 0–0.2)
BASOPHILS NFR BLD AUTO: 0.1 % — SIGNIFICANT CHANGE UP (ref 0–2)
BILIRUB SERPL-MCNC: 0.2 MG/DL — SIGNIFICANT CHANGE UP (ref 0.2–1.2)
BLOOD GAS COMMENTS: SIGNIFICANT CHANGE UP
BLOOD GAS COMMENTS: SIGNIFICANT CHANGE UP
BLOOD GAS SOURCE: SIGNIFICANT CHANGE UP
BUN SERPL-MCNC: 4 MG/DL — LOW (ref 7–23)
CALCIUM SERPL-MCNC: 8.6 MG/DL — SIGNIFICANT CHANGE UP (ref 8.4–10.5)
CHLORIDE SERPL-SCNC: 105 MMOL/L — SIGNIFICANT CHANGE UP (ref 96–108)
CO2 SERPL-SCNC: 27 MMOL/L — SIGNIFICANT CHANGE UP (ref 22–31)
CREAT SERPL-MCNC: 0.85 MG/DL — SIGNIFICANT CHANGE UP (ref 0.5–1.3)
EOSINOPHIL # BLD AUTO: 0.03 K/UL — SIGNIFICANT CHANGE UP (ref 0–0.5)
EOSINOPHIL NFR BLD AUTO: 0.2 % — SIGNIFICANT CHANGE UP (ref 0–6)
GLUCOSE SERPL-MCNC: 124 MG/DL — HIGH (ref 70–99)
HCO3 BLDA-SCNC: 22 MMOL/L — SIGNIFICANT CHANGE UP (ref 21–29)
HCT VFR BLD CALC: 37.4 % — SIGNIFICANT CHANGE UP (ref 34.5–45)
HGB BLD-MCNC: 12.1 G/DL — SIGNIFICANT CHANGE UP (ref 11.5–15.5)
HOROWITZ INDEX BLDA+IHG-RTO: 30 — SIGNIFICANT CHANGE UP
IMM GRANULOCYTES NFR BLD AUTO: 0.4 % — SIGNIFICANT CHANGE UP (ref 0–1.5)
LACTATE SERPL-SCNC: 1.9 MMOL/L — SIGNIFICANT CHANGE UP (ref 0.7–2)
LACTATE SERPL-SCNC: 2.3 MMOL/L — HIGH (ref 0.7–2)
LYMPHOCYTES # BLD AUTO: 0.64 K/UL — LOW (ref 1–3.3)
LYMPHOCYTES # BLD AUTO: 4.7 % — LOW (ref 13–44)
MCHC RBC-ENTMCNC: 31.2 PG — SIGNIFICANT CHANGE UP (ref 27–34)
MCHC RBC-ENTMCNC: 32.4 GM/DL — SIGNIFICANT CHANGE UP (ref 32–36)
MCV RBC AUTO: 96.4 FL — SIGNIFICANT CHANGE UP (ref 80–100)
MONOCYTES # BLD AUTO: 0.46 K/UL — SIGNIFICANT CHANGE UP (ref 0–0.9)
MONOCYTES NFR BLD AUTO: 3.4 % — SIGNIFICANT CHANGE UP (ref 2–14)
NEUTROPHILS # BLD AUTO: 12.44 K/UL — HIGH (ref 1.8–7.4)
NEUTROPHILS NFR BLD AUTO: 91.2 % — HIGH (ref 43–77)
PCO2 BLDA: 48 MMHG — HIGH (ref 32–46)
PH BLD: 7.3 — LOW (ref 7.35–7.45)
PLATELET # BLD AUTO: 254 K/UL — SIGNIFICANT CHANGE UP (ref 150–400)
PO2 BLDA: 75 MMHG — SIGNIFICANT CHANGE UP (ref 74–108)
POTASSIUM SERPL-MCNC: 4.4 MMOL/L — SIGNIFICANT CHANGE UP (ref 3.5–5.3)
POTASSIUM SERPL-SCNC: 4.4 MMOL/L — SIGNIFICANT CHANGE UP (ref 3.5–5.3)
PROT SERPL-MCNC: 7.5 G/DL — SIGNIFICANT CHANGE UP (ref 6–8.3)
RAPID RVP RESULT: SIGNIFICANT CHANGE UP
RBC # BLD: 3.88 M/UL — SIGNIFICANT CHANGE UP (ref 3.8–5.2)
RBC # FLD: 14.3 % — SIGNIFICANT CHANGE UP (ref 10.3–14.5)
SAO2 % BLDA: 94 % — SIGNIFICANT CHANGE UP (ref 92–96)
SODIUM SERPL-SCNC: 141 MMOL/L — SIGNIFICANT CHANGE UP (ref 135–145)
WBC # BLD: 13.64 K/UL — HIGH (ref 3.8–10.5)
WBC # FLD AUTO: 13.64 K/UL — HIGH (ref 3.8–10.5)

## 2018-11-14 PROCEDURE — 12345: CPT | Mod: NC

## 2018-11-14 PROCEDURE — 99291 CRITICAL CARE FIRST HOUR: CPT

## 2018-11-14 PROCEDURE — 71045 X-RAY EXAM CHEST 1 VIEW: CPT | Mod: 26

## 2018-11-14 PROCEDURE — 99223 1ST HOSP IP/OBS HIGH 75: CPT | Mod: AI

## 2018-11-14 RX ORDER — ENOXAPARIN SODIUM 100 MG/ML
40 INJECTION SUBCUTANEOUS DAILY
Qty: 0 | Refills: 0 | Status: DISCONTINUED | OUTPATIENT
Start: 2018-11-14 | End: 2018-11-17

## 2018-11-14 RX ORDER — MONTELUKAST 4 MG/1
10 TABLET, CHEWABLE ORAL AT BEDTIME
Qty: 0 | Refills: 0 | Status: DISCONTINUED | OUTPATIENT
Start: 2018-11-14 | End: 2018-11-17

## 2018-11-14 RX ORDER — SODIUM CHLORIDE 9 MG/ML
1000 INJECTION INTRAMUSCULAR; INTRAVENOUS; SUBCUTANEOUS ONCE
Qty: 0 | Refills: 0 | Status: COMPLETED | OUTPATIENT
Start: 2018-11-14 | End: 2018-11-14

## 2018-11-14 RX ORDER — AZITHROMYCIN 500 MG/1
500 TABLET, FILM COATED ORAL EVERY 24 HOURS
Qty: 0 | Refills: 0 | Status: DISCONTINUED | OUTPATIENT
Start: 2018-11-14 | End: 2018-11-16

## 2018-11-14 RX ORDER — IPRATROPIUM/ALBUTEROL SULFATE 18-103MCG
3 AEROSOL WITH ADAPTER (GRAM) INHALATION EVERY 6 HOURS
Qty: 0 | Refills: 0 | Status: DISCONTINUED | OUTPATIENT
Start: 2018-11-14 | End: 2018-11-17

## 2018-11-14 RX ORDER — BUDESONIDE AND FORMOTEROL FUMARATE DIHYDRATE 160; 4.5 UG/1; UG/1
2 AEROSOL RESPIRATORY (INHALATION)
Qty: 0 | Refills: 0 | Status: DISCONTINUED | OUTPATIENT
Start: 2018-11-14 | End: 2018-11-17

## 2018-11-14 RX ORDER — ACETAMINOPHEN 500 MG
650 TABLET ORAL EVERY 6 HOURS
Qty: 0 | Refills: 0 | Status: DISCONTINUED | OUTPATIENT
Start: 2018-11-14 | End: 2018-11-17

## 2018-11-14 RX ORDER — AMPICILLIN SODIUM AND SULBACTAM SODIUM 250; 125 MG/ML; MG/ML
INJECTION, POWDER, FOR SUSPENSION INTRAMUSCULAR; INTRAVENOUS
Qty: 0 | Refills: 0 | Status: DISCONTINUED | OUTPATIENT
Start: 2018-11-14 | End: 2018-11-16

## 2018-11-14 RX ORDER — AMPICILLIN SODIUM AND SULBACTAM SODIUM 250; 125 MG/ML; MG/ML
1.5 INJECTION, POWDER, FOR SUSPENSION INTRAMUSCULAR; INTRAVENOUS ONCE
Qty: 0 | Refills: 0 | Status: COMPLETED | OUTPATIENT
Start: 2018-11-14 | End: 2018-11-14

## 2018-11-14 RX ORDER — ALBUTEROL 90 UG/1
2.5 AEROSOL, METERED ORAL EVERY 4 HOURS
Qty: 0 | Refills: 0 | Status: DISCONTINUED | OUTPATIENT
Start: 2018-11-14 | End: 2018-11-17

## 2018-11-14 RX ORDER — AZITHROMYCIN 500 MG/1
500 TABLET, FILM COATED ORAL ONCE
Qty: 0 | Refills: 0 | Status: COMPLETED | OUTPATIENT
Start: 2018-11-14 | End: 2018-11-14

## 2018-11-14 RX ORDER — FAMOTIDINE 10 MG/ML
20 INJECTION INTRAVENOUS
Qty: 0 | Refills: 0 | Status: DISCONTINUED | OUTPATIENT
Start: 2018-11-14 | End: 2018-11-17

## 2018-11-14 RX ORDER — AMPICILLIN SODIUM AND SULBACTAM SODIUM 250; 125 MG/ML; MG/ML
1.5 INJECTION, POWDER, FOR SUSPENSION INTRAMUSCULAR; INTRAVENOUS EVERY 6 HOURS
Qty: 0 | Refills: 0 | Status: DISCONTINUED | OUTPATIENT
Start: 2018-11-14 | End: 2018-11-16

## 2018-11-14 RX ORDER — LACTOBACILLUS ACIDOPHILUS 100MM CELL
1 CAPSULE ORAL
Qty: 0 | Refills: 0 | Status: DISCONTINUED | OUTPATIENT
Start: 2018-11-14 | End: 2018-11-17

## 2018-11-14 RX ADMIN — Medication 3 MILLILITER(S): at 07:02

## 2018-11-14 RX ADMIN — Medication 650 MILLIGRAM(S): at 13:00

## 2018-11-14 RX ADMIN — AMPICILLIN SODIUM AND SULBACTAM SODIUM 100 GRAM(S): 250; 125 INJECTION, POWDER, FOR SUSPENSION INTRAMUSCULAR; INTRAVENOUS at 15:45

## 2018-11-14 RX ADMIN — BUDESONIDE AND FORMOTEROL FUMARATE DIHYDRATE 2 PUFF(S): 160; 4.5 AEROSOL RESPIRATORY (INHALATION) at 08:28

## 2018-11-14 RX ADMIN — BUDESONIDE AND FORMOTEROL FUMARATE DIHYDRATE 2 PUFF(S): 160; 4.5 AEROSOL RESPIRATORY (INHALATION) at 21:27

## 2018-11-14 RX ADMIN — ALBUTEROL 2.5 MILLIGRAM(S): 90 AEROSOL, METERED ORAL at 02:14

## 2018-11-14 RX ADMIN — Medication 3 MILLILITER(S): at 18:53

## 2018-11-14 RX ADMIN — Medication 650 MILLIGRAM(S): at 12:22

## 2018-11-14 RX ADMIN — MONTELUKAST 10 MILLIGRAM(S): 4 TABLET, CHEWABLE ORAL at 21:28

## 2018-11-14 RX ADMIN — Medication 3 MILLILITER(S): at 12:46

## 2018-11-14 RX ADMIN — Medication 1 TABLET(S): at 16:46

## 2018-11-14 RX ADMIN — Medication 80 MILLIGRAM(S): at 23:13

## 2018-11-14 RX ADMIN — Medication 80 MILLIGRAM(S): at 05:40

## 2018-11-14 RX ADMIN — Medication 2 GRAM(S): at 00:31

## 2018-11-14 RX ADMIN — AZITHROMYCIN 255 MILLIGRAM(S): 500 TABLET, FILM COATED ORAL at 01:38

## 2018-11-14 RX ADMIN — Medication 80 MILLIGRAM(S): at 17:29

## 2018-11-14 RX ADMIN — AMPICILLIN SODIUM AND SULBACTAM SODIUM 100 GRAM(S): 250; 125 INJECTION, POWDER, FOR SUSPENSION INTRAMUSCULAR; INTRAVENOUS at 03:26

## 2018-11-14 RX ADMIN — AMPICILLIN SODIUM AND SULBACTAM SODIUM 100 GRAM(S): 250; 125 INJECTION, POWDER, FOR SUSPENSION INTRAMUSCULAR; INTRAVENOUS at 21:27

## 2018-11-14 RX ADMIN — Medication 3 MILLILITER(S): at 23:56

## 2018-11-14 RX ADMIN — SODIUM CHLORIDE 1000 MILLILITER(S): 9 INJECTION INTRAMUSCULAR; INTRAVENOUS; SUBCUTANEOUS at 02:52

## 2018-11-14 RX ADMIN — Medication 1 TABLET(S): at 08:28

## 2018-11-14 RX ADMIN — Medication 1 TABLET(S): at 12:19

## 2018-11-14 RX ADMIN — Medication 80 MILLIGRAM(S): at 12:19

## 2018-11-14 RX ADMIN — FAMOTIDINE 20 MILLIGRAM(S): 10 INJECTION INTRAVENOUS at 17:30

## 2018-11-14 RX ADMIN — Medication 30 MILLILITER(S): at 23:14

## 2018-11-14 RX ADMIN — AMPICILLIN SODIUM AND SULBACTAM SODIUM 100 GRAM(S): 250; 125 INJECTION, POWDER, FOR SUSPENSION INTRAMUSCULAR; INTRAVENOUS at 08:28

## 2018-11-14 NOTE — H&P ADULT - PROBLEM SELECTOR PLAN 2
IMPROVE VTE Individual Risk Assessment          RISK                                                          Points  [  ] Previous VTE                                                 3  [  ] Thrombophilia                                              2  [  ] Lower limb paralysis                                    2        (unable to hold up >15 seconds)    [  ] Current Cancer                                             2         (within 6 months)  [  ] Immobilization > 24 hrs                              1  [  ] ICU/CCU stay > 24 hours                            1  [  ] Age > 60                                                        1    IMPROVE VTE Score  0    - lovenox for DVT ppx - already resolving, finish course of abx

## 2018-11-14 NOTE — PROVIDER CONTACT NOTE (EICU) - SITUATION
Case discussed with ICU PA, will treat for acute COPD exacerbation with steroids, nebs and zithromax, along with antibiotics to complete a course for diverticulitis.

## 2018-11-14 NOTE — H&P ADULT - NSHPSOCIALHISTORY_GEN_ALL_CORE
+ former smoker (quit 1 month ago, was a 1/2ppd)  + occasional etoh use  - denies any illegal drug use

## 2018-11-14 NOTE — H&P ADULT - NSHPPHYSICALEXAM_GEN_ALL_CORE
PHYSICAL EXAM:  Vital Signs Last 24 Hrs  T(C): 36.7 (13 Nov 2018 22:23), Max: 36.7 (13 Nov 2018 22:23)  T(F): 98.1 (13 Nov 2018 22:23), Max: 98.1 (13 Nov 2018 22:23)  HR: 128 (13 Nov 2018 23:25) (89 - 128)  BP: 130/60 (13 Nov 2018 23:00) (130/60 - 132/58)  BP(mean): --  RR: 24 (13 Nov 2018 23:20) (16 - 26)  SpO2: 99% (13 Nov 2018 23:25) (89% - 99%)    GENERAL:     obese female in NAD, on BiPAP  HEAD:     atraumatic, normocephalic  EYES:     EOMI, conjunctiva and sclera clear  ENMT:     no tonsillar erythema or exudates or enlargement, no oral lesions, moist mucous membranes, good dentition  NECK:     supple, no JVD  RESPIRATORY:     diffuse wheezing, exp > insp  CARDIOVASCULAR:     tachycardic, no murmurs or rubs or gallops, 2+ peripheral pulses  GASTROINTESTINAL:     soft, nontender, nondistended, no hepatosplenomegaly palpated, bowel sounds present  EXTREMITIES:     no clubbing or cyanosis or edema  MUSCULOSKELETAL:     no joint pain or swelling or deformities  NERVOUS SYSTEM:     motor strength intact with 5/5 B/L upper and lower extremities, no gross sensory deficits  SKIN:     no rashes or lesions  PSYCH:     appropriate, alert and orientated x3, good concentration

## 2018-11-14 NOTE — H&P ADULT - HISTORY OF PRESENT ILLNESS
48F with asthma with multiple ICU hospitalizations but no intubations, former smoker (quit a month ago), who presents with SOB.  Patient says her symptoms started about a week ago but has progressively gotten worse.  Associated with a cough and subjective low grade temps.  Denied any sputum production.  Denied any chest pain.  Patient did have some abdominal pain and actually went to [place] ED about 3 days ago for the pain and was diagnosed with diverticulitis and sent home with Augmentin.  Today her breathing worsened and despite using her nebulizer, went to urgent care for evaluation.   At urgent care, patient received steroids but her breathing still did not improve and she came here.  In the ED, she received 3 duonebs, 2mg of magnesium, 125mg of solumedrol and was placed on a BiPAP.  Currently patient says her breathing has improved.  Also her abdomen has also improved as well.  Of note, patient has not received her flu vaccination.

## 2018-11-14 NOTE — H&P ADULT - PROBLEM SELECTOR PLAN 1
- admit to [facility] (patient still wheezing despite 3 nebs and is currently requiring BiPAP)  - gina PRN  - pulm consult  - cont with steroids  - will start empiric abx given recent fevers, cough  - f/u RVP

## 2018-11-14 NOTE — H&P ADULT - NSHPLABSRESULTS_GEN_ALL_CORE
LABS:  pending LABS:                        12.1   13.64<H> )-----------( 254      ( 14 Nov 2018 02:02 )             37.4     141    |  105    |  4<L>   ----------------------------<  124<H>    14 Nov 2018 02:02  4.4     |  27     |  0.85         Ca 8.6           14 Nov 2018 02:02        TPro  7.5    /  Alb  3.3    /  TBili  0.2    /  DBili  x      /  AST  34     /  ALT  35     /  AlkPhos  71     14 Nov 2018 02:02

## 2018-11-14 NOTE — H&P ADULT - PROBLEM SELECTOR PLAN 3
IMPROVE VTE Individual Risk Assessment          RISK                                                          Points  [  ] Previous VTE                                                 3  [  ] Thrombophilia                                              2  [  ] Lower limb paralysis                                    2        (unable to hold up >15 seconds)    [  ] Current Cancer                                             2         (within 6 months)  [  ] Immobilization > 24 hrs                              1  [  ] ICU/CCU stay > 24 hours                            1  [  ] Age > 60                                                        1    IMPROVE VTE Score  0    - lovenox for DVT ppx

## 2018-11-14 NOTE — CONSULT NOTE ADULT - SUBJECTIVE AND OBJECTIVE BOX
PULMONARY/CRITICAL CARE        Patient is a 48y old  Female who presents with a chief complaint of SOB (14 Nov 2018 02:30)    BRIEF HOSPITAL COURSE: ***  PMHx Asthma  since childhood, no intubations, (former smoker 1/2PPD quit 1 month ago), Recent Diverticulitis.  Presents to ED with worsening dyspnea over the past week with productive cough of clear to yellow sputum and low grade fevers 100.2.  Patient was seen at urgent care and received IM steroid injection and worsened prompting ED visit.  Additionally patient was seen 3 days ago at [facility] for abdominal pain which she states was attributed to diverticulitis and was Rx'ed Augmentin.  pain has since resolved.  Patient denies any associated CP, N/V  diarrhea or UTI symptoms.    Events last 24 hours:  In ED found to be in moderate respiratory distress with Wheeze. Recieved Steroids, Multiple nebs and magnesium with some relief. Now on bipap, feels better. Yellow sputum. No fever, chills sweats  Had recent URTI      PAST MEDICAL & SURGICAL HISTORY:  GERD (gastroesophageal reflux disease)  Asthma  No significant past surgical history    Allergies    Levaquin (Short breath)  Morphine IR (Short breath)    Intolerances      FAMILY HISTORY and Social: [employer] , denies etoh. Smoked 1/2 ppd for 15 yrs until recently. No fam hx lung problems, vte      Review of Systems:  CONSTITUTIONAL: No fever, chills, or fatigue  EYES: No eye pain, visual disturbances, or discharge  ENMT:  No difficulty hearing, tinnitus, vertigo; No sinus or throat pain  NECK: No pain or stiffness  RESPIRATORY: has  cough, wheezing, no chills or hemoptysis; has shortness of breath  CARDIOVASCULAR: No chest pain, palpitations, dizziness, or leg swelling  GASTROINTESTINAL: had  abdominal pain. No nausea, vomiting, or hematemesis; No diarrhea or constipation. No melena or hematochezia.  GENITOURINARY: No dysuria, frequency, hematuria, or incontinence  NEUROLOGICAL: No headaches, memory loss, loss of strength, numbness, or tremors  SKIN: No itching, burning, rashes, or lesions   MUSCULOSKELETAL: No joint pain or swelling; No muscle, back, or extremity pain  PSYCHIATRIC: No depression, anxiety, mood swings, or difficulty sleeping      Medications:  ampicillin/sulbactam  IVPB 1.5 Gram(s) IV Intermittent every 6 hours  ampicillin/sulbactam  IVPB      azithromycin  IVPB 500 milliGRAM(s) IV Intermittent every 24 hours      ALBUTerol    0.083% 2.5 milliGRAM(s) Nebulizer every 4 hours PRN  ALBUTerol/ipratropium for Nebulization 3 milliLiter(s) Nebulizer every 6 hours  guaiFENesin    Syrup 200 milliGRAM(s) Oral every 8 hours PRN  montelukast 10 milliGRAM(s) Oral at bedtime    acetaminophen   Tablet .. 650 milliGRAM(s) Oral every 6 hours PRN      enoxaparin Injectable 40 milliGRAM(s) SubCutaneous daily        methylPREDNISolone sodium succinate Injectable 80 milliGRAM(s) IV Push every 6 hours                  ICU Vital Signs Last 24 Hrs  T(C): 37 (14 Nov 2018 04:30), Max: 37 (14 Nov 2018 04:30)  T(F): 98.6 (14 Nov 2018 04:30), Max: 98.6 (14 Nov 2018 04:30)  HR: 95 (14 Nov 2018 07:05) (89 - 128)  BP: 109/56 (14 Nov 2018 06:00) (104/60 - 132/58)  BP(mean): 71 (14 Nov 2018 06:00) (69 - 80)  ABP: --  ABP(mean): --  RR: 26 (14 Nov 2018 06:00) (16 - 31)  SpO2: 98% (14 Nov 2018 07:05) (89% - 99%)    Vital Signs Last 24 Hrs  T(C): 37 (14 Nov 2018 04:30), Max: 37 (14 Nov 2018 04:30)  T(F): 98.6 (14 Nov 2018 04:30), Max: 98.6 (14 Nov 2018 04:30)  HR: 95 (14 Nov 2018 07:05) (89 - 128)  BP: 109/56 (14 Nov 2018 06:00) (104/60 - 132/58)  BP(mean): 71 (14 Nov 2018 06:00) (69 - 80)  RR: 26 (14 Nov 2018 06:00) (16 - 31)  SpO2: 98% (14 Nov 2018 07:05) (89% - 99%)    ABG - ( 14 Nov 2018 01:23 )  pH, Arterial: x     pH, Blood: 7.30  /  pCO2: 48    /  pO2: 75    / HCO3: 22    / Base Excess: -2.4  /  SaO2: 94                  I&O's Detail    13 Nov 2018 07:01  -  14 Nov 2018 07:00  --------------------------------------------------------  IN:    IV PiggyBack: 50 mL    Sodium Chloride 0.9% IV Bolus: 1000 mL  Total IN: 1050 mL    OUT:  Total OUT: 0 mL    Total NET: 1050 mL            LABS:                        12.1   13.64 )-----------( 254      ( 14 Nov 2018 02:02 )             37.4     11-14    141  |  105  |  4<L>  ----------------------------<  124<H>  4.4   |  27  |  0.85    Ca    8.6      14 Nov 2018 02:02    TPro  7.5  /  Alb  3.3  /  TBili  0.2  /  DBili  x   /  AST  34  /  ALT  35  /  AlkPhos  71  11-14          CAPILLARY BLOOD GLUCOSE            CULTURES:      Physical Examination:    General: alert,conversant on bipap    HEENT: Pupils equal, reactive to light.  Symmetric.    PULM: few wheezes, rhonchi bilat good excursion, no change percussion, fremitus    CVS: Regular rate and rhythm, no murmurs, rubs, or gallops    ABD: Soft, nondistended, nontender, normoactive bowel sounds, no masses    EXT: No edema, nontender    SKIN: Warm and well perfused, no rashes noted.    NEURO: Alert, oriented, interactive, nonfocal    RADIOLOGY: *** CXR ok    CRITICAL CARE TIME SPENT: ***

## 2018-11-14 NOTE — CONSULT NOTE ADULT - ASSESSMENT
Pt. with hx stable asthma admitted for Acute Resp. Failure due to status asthmaticus, acute bronchitis. Improved on bipap.  Recent Diverticulitis  Gerd  Was smoker

## 2018-11-14 NOTE — H&P ADULT - ASSESSMENT
48F with asthma with multiple ICU hospitalizations but no intubations, former smoker (quit a month ago), who presents with SOB, consistent with an asthma exacerbation.

## 2018-11-14 NOTE — H&P ADULT - NSHPREVIEWOFSYSTEMS_GEN_ALL_CORE
REVIEW OF SYSTEMS:  CONSTITUTIONAL:    +subjective fevers  EYES:    no eye pain or visual disturbances or discharge  ENMT:     no difficulty hearing or tinnitus or vertigo, no sinus or throat pain  NECK:    no pain or stiffness  RESPIRATORY:    +cough, +wheezing, +SOB  CARDIOVASCULAR:    no chest pain or palpitations or dizziness or leg swelling  GASTROINTESTINAL:    +abdominal pain, no nausea or vomiting or hematemesis, no diarrhea or constipation. no melena or hematochezia.  GENITOURINARY:    no dysuria or frequency or hematuria or incontinence  NEUROLOGICAL:    no headaches or memory loss or loss of strength or numbness or tremors  SKIN:    no itching or burning or rashes or lesions   LYMPH NODES:    no enlarged glands  ENDOCRINE:    no heat or cold intolerance, no hair loss, no polydipsia or polyuria  MUSCULOSKELETAL:    no joint pain or swelling, no muscle or back or extremity pain  PSYCHIATRIC:    no depression or anxiety or mood swings or difficulty sleeping  HEME/LYMPH:    no easy bruising or bleeding gums  ALLERGY AND IMMUNOLOGIC:    no hives or eczema

## 2018-11-15 DIAGNOSIS — N94.9 UNSPECIFIED CONDITION ASSOCIATED WITH FEMALE GENITAL ORGANS AND MENSTRUAL CYCLE: ICD-10-CM

## 2018-11-15 DIAGNOSIS — K42.9 UMBILICAL HERNIA WITHOUT OBSTRUCTION OR GANGRENE: ICD-10-CM

## 2018-11-15 LAB
ANION GAP SERPL CALC-SCNC: 11 MMOL/L — SIGNIFICANT CHANGE UP (ref 5–17)
BUN SERPL-MCNC: 8 MG/DL — SIGNIFICANT CHANGE UP (ref 7–23)
CALCIUM SERPL-MCNC: 8.8 MG/DL — SIGNIFICANT CHANGE UP (ref 8.4–10.5)
CHLORIDE SERPL-SCNC: 108 MMOL/L — SIGNIFICANT CHANGE UP (ref 96–108)
CO2 SERPL-SCNC: 23 MMOL/L — SIGNIFICANT CHANGE UP (ref 22–31)
CREAT SERPL-MCNC: 0.84 MG/DL — SIGNIFICANT CHANGE UP (ref 0.5–1.3)
GLUCOSE SERPL-MCNC: 130 MG/DL — HIGH (ref 70–99)
HCT VFR BLD CALC: 32.1 % — LOW (ref 34.5–45)
HGB BLD-MCNC: 10.4 G/DL — LOW (ref 11.5–15.5)
MAGNESIUM SERPL-MCNC: 2.2 MG/DL — SIGNIFICANT CHANGE UP (ref 1.6–2.6)
MCHC RBC-ENTMCNC: 30.4 PG — SIGNIFICANT CHANGE UP (ref 27–34)
MCHC RBC-ENTMCNC: 32.4 GM/DL — SIGNIFICANT CHANGE UP (ref 32–36)
MCV RBC AUTO: 93.9 FL — SIGNIFICANT CHANGE UP (ref 80–100)
NRBC # BLD: 0 /100 WBCS — SIGNIFICANT CHANGE UP (ref 0–0)
PLATELET # BLD AUTO: 234 K/UL — SIGNIFICANT CHANGE UP (ref 150–400)
POTASSIUM SERPL-MCNC: 3.9 MMOL/L — SIGNIFICANT CHANGE UP (ref 3.5–5.3)
POTASSIUM SERPL-SCNC: 3.9 MMOL/L — SIGNIFICANT CHANGE UP (ref 3.5–5.3)
RBC # BLD: 3.42 M/UL — LOW (ref 3.8–5.2)
RBC # FLD: 14.5 % — SIGNIFICANT CHANGE UP (ref 10.3–14.5)
SODIUM SERPL-SCNC: 142 MMOL/L — SIGNIFICANT CHANGE UP (ref 135–145)
WBC # BLD: 16.81 K/UL — HIGH (ref 3.8–10.5)
WBC # FLD AUTO: 16.81 K/UL — HIGH (ref 3.8–10.5)

## 2018-11-15 PROCEDURE — 99233 SBSQ HOSP IP/OBS HIGH 50: CPT

## 2018-11-15 RX ADMIN — FAMOTIDINE 20 MILLIGRAM(S): 10 INJECTION INTRAVENOUS at 17:28

## 2018-11-15 RX ADMIN — Medication 3 MILLILITER(S): at 20:29

## 2018-11-15 RX ADMIN — BUDESONIDE AND FORMOTEROL FUMARATE DIHYDRATE 2 PUFF(S): 160; 4.5 AEROSOL RESPIRATORY (INHALATION) at 05:11

## 2018-11-15 RX ADMIN — AMPICILLIN SODIUM AND SULBACTAM SODIUM 100 GRAM(S): 250; 125 INJECTION, POWDER, FOR SUSPENSION INTRAMUSCULAR; INTRAVENOUS at 09:11

## 2018-11-15 RX ADMIN — MONTELUKAST 10 MILLIGRAM(S): 4 TABLET, CHEWABLE ORAL at 21:32

## 2018-11-15 RX ADMIN — AZITHROMYCIN 255 MILLIGRAM(S): 500 TABLET, FILM COATED ORAL at 01:38

## 2018-11-15 RX ADMIN — Medication 3 MILLILITER(S): at 07:12

## 2018-11-15 RX ADMIN — Medication 3 MILLILITER(S): at 13:34

## 2018-11-15 RX ADMIN — FAMOTIDINE 20 MILLIGRAM(S): 10 INJECTION INTRAVENOUS at 05:07

## 2018-11-15 RX ADMIN — Medication 650 MILLIGRAM(S): at 01:15

## 2018-11-15 RX ADMIN — BUDESONIDE AND FORMOTEROL FUMARATE DIHYDRATE 2 PUFF(S): 160; 4.5 AEROSOL RESPIRATORY (INHALATION) at 17:28

## 2018-11-15 RX ADMIN — Medication 80 MILLIGRAM(S): at 05:08

## 2018-11-15 RX ADMIN — Medication 30 MILLILITER(S): at 19:57

## 2018-11-15 RX ADMIN — Medication 650 MILLIGRAM(S): at 00:41

## 2018-11-15 RX ADMIN — Medication 1 TABLET(S): at 12:26

## 2018-11-15 RX ADMIN — AMPICILLIN SODIUM AND SULBACTAM SODIUM 100 GRAM(S): 250; 125 INJECTION, POWDER, FOR SUSPENSION INTRAMUSCULAR; INTRAVENOUS at 15:33

## 2018-11-15 RX ADMIN — AMPICILLIN SODIUM AND SULBACTAM SODIUM 100 GRAM(S): 250; 125 INJECTION, POWDER, FOR SUSPENSION INTRAMUSCULAR; INTRAVENOUS at 21:32

## 2018-11-15 RX ADMIN — Medication 1 TABLET(S): at 17:28

## 2018-11-15 RX ADMIN — Medication 1 TABLET(S): at 09:11

## 2018-11-15 RX ADMIN — AMPICILLIN SODIUM AND SULBACTAM SODIUM 100 GRAM(S): 250; 125 INJECTION, POWDER, FOR SUSPENSION INTRAMUSCULAR; INTRAVENOUS at 03:06

## 2018-11-16 DIAGNOSIS — K63.5 POLYP OF COLON: ICD-10-CM

## 2018-11-16 LAB
ANION GAP SERPL CALC-SCNC: 8 MMOL/L — SIGNIFICANT CHANGE UP (ref 5–17)
BUN SERPL-MCNC: 13 MG/DL — SIGNIFICANT CHANGE UP (ref 7–23)
CALCIUM SERPL-MCNC: 8.9 MG/DL — SIGNIFICANT CHANGE UP (ref 8.4–10.5)
CHLORIDE SERPL-SCNC: 107 MMOL/L — SIGNIFICANT CHANGE UP (ref 96–108)
CO2 SERPL-SCNC: 28 MMOL/L — SIGNIFICANT CHANGE UP (ref 22–31)
CREAT SERPL-MCNC: 0.86 MG/DL — SIGNIFICANT CHANGE UP (ref 0.5–1.3)
GLUCOSE SERPL-MCNC: 84 MG/DL — SIGNIFICANT CHANGE UP (ref 70–99)
HCT VFR BLD CALC: 31.2 % — LOW (ref 34.5–45)
HGB BLD-MCNC: 10.1 G/DL — LOW (ref 11.5–15.5)
MCHC RBC-ENTMCNC: 30.2 PG — SIGNIFICANT CHANGE UP (ref 27–34)
MCHC RBC-ENTMCNC: 32.4 GM/DL — SIGNIFICANT CHANGE UP (ref 32–36)
MCV RBC AUTO: 93.4 FL — SIGNIFICANT CHANGE UP (ref 80–100)
NRBC # BLD: 0 /100 WBCS — SIGNIFICANT CHANGE UP (ref 0–0)
PLATELET # BLD AUTO: 228 K/UL — SIGNIFICANT CHANGE UP (ref 150–400)
POTASSIUM SERPL-MCNC: 3.9 MMOL/L — SIGNIFICANT CHANGE UP (ref 3.5–5.3)
POTASSIUM SERPL-SCNC: 3.9 MMOL/L — SIGNIFICANT CHANGE UP (ref 3.5–5.3)
RBC # BLD: 3.34 M/UL — LOW (ref 3.8–5.2)
RBC # FLD: 14.7 % — HIGH (ref 10.3–14.5)
SODIUM SERPL-SCNC: 143 MMOL/L — SIGNIFICANT CHANGE UP (ref 135–145)
WBC # BLD: 12.79 K/UL — HIGH (ref 3.8–10.5)
WBC # FLD AUTO: 12.79 K/UL — HIGH (ref 3.8–10.5)

## 2018-11-16 PROCEDURE — 99233 SBSQ HOSP IP/OBS HIGH 50: CPT

## 2018-11-16 RX ORDER — FLUTICASONE PROPIONATE 50 MCG
1 SPRAY, SUSPENSION NASAL
Qty: 0 | Refills: 0 | Status: DISCONTINUED | OUTPATIENT
Start: 2018-11-16 | End: 2018-11-17

## 2018-11-16 RX ORDER — AZITHROMYCIN 500 MG/1
500 TABLET, FILM COATED ORAL DAILY
Qty: 0 | Refills: 0 | Status: DISCONTINUED | OUTPATIENT
Start: 2018-11-17 | End: 2018-11-17

## 2018-11-16 RX ADMIN — Medication 1 TABLET(S): at 08:26

## 2018-11-16 RX ADMIN — AMPICILLIN SODIUM AND SULBACTAM SODIUM 100 GRAM(S): 250; 125 INJECTION, POWDER, FOR SUSPENSION INTRAMUSCULAR; INTRAVENOUS at 08:26

## 2018-11-16 RX ADMIN — Medication 1 TABLET(S): at 13:08

## 2018-11-16 RX ADMIN — Medication 1 SPRAY(S): at 10:14

## 2018-11-16 RX ADMIN — Medication 3 MILLILITER(S): at 06:16

## 2018-11-16 RX ADMIN — BUDESONIDE AND FORMOTEROL FUMARATE DIHYDRATE 2 PUFF(S): 160; 4.5 AEROSOL RESPIRATORY (INHALATION) at 18:06

## 2018-11-16 RX ADMIN — Medication 1 TABLET(S): at 17:12

## 2018-11-16 RX ADMIN — FAMOTIDINE 20 MILLIGRAM(S): 10 INJECTION INTRAVENOUS at 06:04

## 2018-11-16 RX ADMIN — Medication 50 MILLIGRAM(S): at 06:04

## 2018-11-16 RX ADMIN — AZITHROMYCIN 255 MILLIGRAM(S): 500 TABLET, FILM COATED ORAL at 01:00

## 2018-11-16 RX ADMIN — AMPICILLIN SODIUM AND SULBACTAM SODIUM 100 GRAM(S): 250; 125 INJECTION, POWDER, FOR SUSPENSION INTRAMUSCULAR; INTRAVENOUS at 03:10

## 2018-11-16 RX ADMIN — Medication 650 MILLIGRAM(S): at 07:15

## 2018-11-16 RX ADMIN — FAMOTIDINE 20 MILLIGRAM(S): 10 INJECTION INTRAVENOUS at 17:12

## 2018-11-16 RX ADMIN — MONTELUKAST 10 MILLIGRAM(S): 4 TABLET, CHEWABLE ORAL at 21:36

## 2018-11-16 RX ADMIN — Medication 3 MILLILITER(S): at 20:18

## 2018-11-16 RX ADMIN — BUDESONIDE AND FORMOTEROL FUMARATE DIHYDRATE 2 PUFF(S): 160; 4.5 AEROSOL RESPIRATORY (INHALATION) at 06:05

## 2018-11-16 RX ADMIN — Medication 3 MILLILITER(S): at 13:17

## 2018-11-16 RX ADMIN — Medication 1 SPRAY(S): at 17:14

## 2018-11-16 RX ADMIN — Medication 650 MILLIGRAM(S): at 06:17

## 2018-11-16 NOTE — DIETITIAN INITIAL EVALUATION ADULT. - NS AS NUTRI INTERV ED CONTENT
Nutrition relationship to health/disease/fiber in relation to diverticulitis/diverticulosis/Purpose of the nutrition education

## 2018-11-16 NOTE — DIETITIAN INITIAL EVALUATION ADULT. - OTHER INFO
49 yo F seen as per [facility] LOS policy. Pt presents c cough, low grade fever, SOB consistent c asthma exacerbation c PMHx of asthma and GERD. As per hosptalist note today, pt still c wheezing although improved. Pt recently [facility] she was dx c diverticulitis and sent home on Augmentin.  Pt reports appetite PTA was good. States consumes a southern diet (pig feet, tripe, fresh vegetables). Denies recent weight changes or constipation. Pt reports she has not had a diverticulitis flare up in 20 years. Discussed high fiber foods to further prevent flare ups; pt states foods like oatmeal and other whole grains trigger GERD which exacerbates her asthma so she stays away from them. Pt currently tolerating regular diet consuming >75% of meals/snacks provided. Pt offers no nutrition related concerns at this time. Skin intact, NKFA.

## 2018-11-17 VITALS
OXYGEN SATURATION: 94 % | DIASTOLIC BLOOD PRESSURE: 47 MMHG | SYSTOLIC BLOOD PRESSURE: 109 MMHG | RESPIRATION RATE: 22 BRPM | HEART RATE: 73 BPM

## 2018-11-17 LAB
ANION GAP SERPL CALC-SCNC: 9 MMOL/L — SIGNIFICANT CHANGE UP (ref 5–17)
BUN SERPL-MCNC: 11 MG/DL — SIGNIFICANT CHANGE UP (ref 7–23)
CALCIUM SERPL-MCNC: 8.6 MG/DL — SIGNIFICANT CHANGE UP (ref 8.4–10.5)
CHLORIDE SERPL-SCNC: 108 MMOL/L — SIGNIFICANT CHANGE UP (ref 96–108)
CO2 SERPL-SCNC: 28 MMOL/L — SIGNIFICANT CHANGE UP (ref 22–31)
CREAT SERPL-MCNC: 0.84 MG/DL — SIGNIFICANT CHANGE UP (ref 0.5–1.3)
GLUCOSE SERPL-MCNC: 81 MG/DL — SIGNIFICANT CHANGE UP (ref 70–99)
HCT VFR BLD CALC: 31.7 % — LOW (ref 34.5–45)
HGB BLD-MCNC: 10.3 G/DL — LOW (ref 11.5–15.5)
MCHC RBC-ENTMCNC: 30.3 PG — SIGNIFICANT CHANGE UP (ref 27–34)
MCHC RBC-ENTMCNC: 32.5 GM/DL — SIGNIFICANT CHANGE UP (ref 32–36)
MCV RBC AUTO: 93.2 FL — SIGNIFICANT CHANGE UP (ref 80–100)
NRBC # BLD: 0 /100 WBCS — SIGNIFICANT CHANGE UP (ref 0–0)
PLATELET # BLD AUTO: 242 K/UL — SIGNIFICANT CHANGE UP (ref 150–400)
POTASSIUM SERPL-MCNC: 3.5 MMOL/L — SIGNIFICANT CHANGE UP (ref 3.5–5.3)
POTASSIUM SERPL-SCNC: 3.5 MMOL/L — SIGNIFICANT CHANGE UP (ref 3.5–5.3)
RBC # BLD: 3.4 M/UL — LOW (ref 3.8–5.2)
RBC # FLD: 14.6 % — HIGH (ref 10.3–14.5)
SODIUM SERPL-SCNC: 145 MMOL/L — SIGNIFICANT CHANGE UP (ref 135–145)
WBC # BLD: 10.15 K/UL — SIGNIFICANT CHANGE UP (ref 3.8–10.5)
WBC # FLD AUTO: 10.15 K/UL — SIGNIFICANT CHANGE UP (ref 3.8–10.5)

## 2018-11-17 PROCEDURE — 99239 HOSP IP/OBS DSCHRG MGMT >30: CPT

## 2018-11-17 RX ORDER — AZITHROMYCIN 500 MG/1
1 TABLET, FILM COATED ORAL
Qty: 1 | Refills: 0 | OUTPATIENT
Start: 2018-11-17

## 2018-11-17 RX ORDER — BUDESONIDE AND FORMOTEROL FUMARATE DIHYDRATE 160; 4.5 UG/1; UG/1
2 AEROSOL RESPIRATORY (INHALATION)
Qty: 10.2 | Refills: 0 | OUTPATIENT
Start: 2018-11-17

## 2018-11-17 RX ORDER — MONTELUKAST 4 MG/1
1 TABLET, CHEWABLE ORAL
Qty: 30 | Refills: 0 | OUTPATIENT
Start: 2018-11-17

## 2018-11-17 RX ORDER — FAMOTIDINE 10 MG/ML
1 INJECTION INTRAVENOUS
Qty: 30 | Refills: 0 | OUTPATIENT
Start: 2018-11-17

## 2018-11-17 RX ORDER — ACETAMINOPHEN 500 MG
2 TABLET ORAL
Qty: 84 | Refills: 0 | OUTPATIENT
Start: 2018-11-17 | End: 2018-11-30

## 2018-11-17 RX ORDER — ALBUTEROL 90 UG/1
2 AEROSOL, METERED ORAL
Qty: 7 | Refills: 0 | OUTPATIENT
Start: 2018-11-17 | End: 2018-12-16

## 2018-11-17 RX ADMIN — Medication 1 TABLET(S): at 08:09

## 2018-11-17 RX ADMIN — Medication 50 MILLIGRAM(S): at 06:09

## 2018-11-17 RX ADMIN — BUDESONIDE AND FORMOTEROL FUMARATE DIHYDRATE 2 PUFF(S): 160; 4.5 AEROSOL RESPIRATORY (INHALATION) at 06:10

## 2018-11-17 RX ADMIN — Medication 1 TABLET(S): at 06:09

## 2018-11-17 RX ADMIN — Medication 3 MILLILITER(S): at 00:34

## 2018-11-17 RX ADMIN — Medication 1 SPRAY(S): at 06:09

## 2018-11-17 RX ADMIN — Medication 3 MILLILITER(S): at 07:52

## 2018-11-17 RX ADMIN — FAMOTIDINE 20 MILLIGRAM(S): 10 INJECTION INTRAVENOUS at 06:12

## 2018-11-17 NOTE — PROGRESS NOTE ADULT - ASSESSMENT
48 year old female with Acute hypoxic respiratory failure requiring NIV with Bipap.    Asthma / COPD exacerbation - Recent Diverticulitis  Flare    NEURO -  Stable    PULM - NIV with Bipap for WOB               Steroids / Nebs               Leukotrienen Antagonist              ABX               Pul to consult in AM     CV - HDS     GI - NPO while on Bipap         Then Regular diet     RENAL - Recieved IVF in ED                lactate elevated secondary to mod degree of resp distress    ID - F/U Cultures          Azithro  will add Unasyn to finish Augmentin course for divertic     DVT - PPX       Case D/W admitting Hospitalist and eICU Dr Potter
48F with asthma with multiple ICU hospitalizations but no intubations, former smoker (quit a month ago), who presents with SOB, consistent with an asthma exacerbation.
Pt. with hx stable asthma admitted for Acute Resp. Failure due to status asthmaticus, acute bronchitis. Improved today, still wheezing today.   Recent Diverticulitis  Gerd  Was smoker
Pt. with hx stable asthma admitted for Acute Resp. Failure due to status asthmaticus, acute bronchitis. Improved today, still wheezing today.   Recent Diverticulitis  Gerd  Was smoker  Can discharge pt. to see me in office Tuesday.  Taper steroids slowly.
Pt. with hx stable asthma admitted for Acute Resp. Failure due to status asthmaticus, acute bronchitis. Improved today, off bipap.  Recent Diverticulitis  Gerd  Was smoker

## 2018-11-17 NOTE — PROGRESS NOTE ADULT - PROBLEM SELECTOR PLAN 5
Antibiotics
- VTE PPx - Lovenox
- hx of multiple polyps 5 years ago on colonscopy, she mentioned this to me today  - advised patient to f/u with her PCP to schedule repeat colonoscopy as soon as possible, she voiced understanding and noted that she is due for one
Antibiotics--po
Antibiotics--po

## 2018-11-17 NOTE — DISCHARGE NOTE ADULT - CARE PLAN
Principal Discharge DX:	Acute bronchitis  Goal:	get better  Assessment and plan of treatment:	finish abx course  Secondary Diagnosis:	Diverticulitis  Assessment and plan of treatment:	finish Augmentin course last dose 11/22/18  Secondary Diagnosis:	Severe persistent asthma with exacerbation  Assessment and plan of treatment:	f/up PULM within 3-5 days of discharge  Secondary Diagnosis:	GERD (gastroesophageal reflux disease)

## 2018-11-17 NOTE — PROGRESS NOTE ADULT - PROBLEM SELECTOR PROBLEM 1
Acute respiratory failure
Severe persistent asthma with exacerbation

## 2018-11-17 NOTE — PROGRESS NOTE ADULT - PROBLEM SELECTOR PLAN 3
ENOCHN
- VTE PPx - Lovenox
- patient was told about this a few years ago  - was already planning to see Gynecologist next month  - informed patient of findings from CT and potential need for ultrasound or MRI, she voiced understanding and agreed to get referral from her PCP for a gynecologist as soon as possible
- patient was told about this a few years ago  - was already planning to see Gynecologist next month  - informed patient of findings from CT and potential need for ultrasound or MRI, she voiced understanding and agreed to get referral from her PCP for a gynecologist as soon as possible
ENOCHN
ENOCHN

## 2018-11-17 NOTE — DISCHARGE NOTE ADULT - MEDICATION SUMMARY - MEDICATIONS TO TAKE
I will START or STAY ON the medications listed below when I get home from the hospital:    predniSONE 10 mg oral tablet  -- 4 tab(s) oral - by mouth once a day x 4 days  2 tab(s) oral - by mouth once a day x 4 days  1 tab(s) oral - by mouth once a day x 4 days  -- It is very important that you take or use this exactly as directed.  Do not skip doses or discontinue unless directed by your doctor.  Obtain medical advice before taking any non-prescription drugs as some may affect the action of this medication.  Take with food or milk.    -- Indication: For Asthma with acute exacerbation    acetaminophen 500 mg oral tablet  -- 2 tab(s) by mouth every 8 hours, As Needed  for fever or pain   -- This product contains acetaminophen.  Do not use  with any other product containing acetaminophen to prevent possible liver damage.    -- Indication: For Pain    budesonide-formoterol 160 mcg-4.5 mcg/inh inhalation aerosol  -- 2 puff(s) inhaled 2 times a day   -- Indication: For Asthma      Proventil HFA 90 mcg/inh inhalation aerosol  -- 2 puff(s) inhaled 4 times a day, As Needed -for shortness of breath and/or wheezing   -- For inhalation only.  It is very important that you take or use this exactly as directed.  Do not skip doses or discontinue unless directed by your doctor.  Obtain medical advice before taking any non-prescription drugs as some may affect the action of this medication.  Shake well before use.    -- Indication: For Asthma       famotidine 20 mg oral tablet  -- 1 tab(s) by mouth 2 times a day  -- Indication: For GERD (gastroesophageal reflux disease)    montelukast 10 mg oral tablet  -- 1 tab(s) by mouth once a day (at bedtime)  -- Indication: For Asthma      azithromycin 500 mg oral tablet  -- 1 tab(s) by mouth once a day. last dose 11/18/18  -- Indication: For bronchitis    amoxicillin-clavulanate 875 mg-125 mg oral tablet  -- 1 tab(s) by mouth 2 times a day. Last dose 11/22/2018  -- Indication: For Diverticulitis

## 2018-11-17 NOTE — DISCHARGE NOTE ADULT - CARE PROVIDER_API CALL
Bryan Triana), Critical Care Medicine; Internal Medicine; Pulmonary Disease  [address] [place] [address]  Phone: [phone]  Fax: [phone]    Kevyn Spann), Gastroenterology  [address] [place] [address]  Phone: [phone]  Fax: [phone]    irma shah  PCP in [place]  Phone: (   )    -  Fax: (   )    -

## 2018-11-17 NOTE — PROGRESS NOTE ADULT - PROBLEM SELECTOR PROBLEM 2
Status asthmaticus
Diverticulitis
Status asthmaticus
Status asthmaticus

## 2018-11-17 NOTE — PROGRESS NOTE ADULT - PROBLEM SELECTOR PROBLEM 4
GERD (gastroesophageal reflux disease)
Umbilical hernia without obstruction and without gangrene
Umbilical hernia without obstruction and without gangrene

## 2018-11-17 NOTE — DISCHARGE NOTE ADULT - PROVIDER TOKENS
TOKEN:'[id number]',TOKEN:'[id number]',FREE:[LAST:[pablo],FIRST:[ed],PHONE:[(   )    -],FAX:[(   )    -],ADDRESS:[PCP in [place]]]

## 2018-11-17 NOTE — DISCHARGE NOTE ADULT - PATIENT PORTAL LINK FT
You can access the [facility] Patient Portal, offered by [facility], by registering with the following website: [email or web address]

## 2018-11-17 NOTE — DISCHARGE NOTE ADULT - MEDICATION SUMMARY - MEDICATIONS TO STOP TAKING
I will STOP taking the medications listed below when I get home from the hospital:    ibuprofen 600 mg oral tablet  -- 1 tab(s) by mouth every 6 hours, As Needed for pain  -- Do not take this drug if you are pregnant.  It is very important that you take or use this exactly as directed.  Do not skip doses or discontinue unless directed by your doctor.  May cause drowsiness or dizziness.  Obtain medical advice before taking any non-prescription drugs as some may affect the action of this medication.  Take with food or milk.

## 2018-11-17 NOTE — PROGRESS NOTE ADULT - NSHPATTENDINGPLANDISCUSS_GEN_ALL_CORE
patient at bedside
nursing and PA in detail
patient at bedside
nursing  in detail
nursing and PA in detail
patient at bedside

## 2018-11-17 NOTE — PROGRESS NOTE ADULT - PROBLEM SELECTOR PLAN 1
Transfer to 1 E
- Acute Resp Failure requiring BIPAP, now being tried on nasal cannula  - still with significant dyspnea with speaking and wheezing on physical exam  - gina q6  - pulm consult appreciated  - cont with steroids  - empiric Azithro given recent fevers, cough  - RVP neg  - Needs outpatient f/u with Pulm upon discharge  - quit smoking 1 month ago
- Acute Resp Failure requiring BIPAP, stable on nasal cannula  - still with significant dyspnea with speaking and wheezing on physical exam, but improved vs. yesterday  - duonebs q6  - pulm f/u appreciated  - taper steroids  - empiric Azithro given recent fevers, cough  - RVP neg  - Needs outpatient f/u with Pulm upon discharge  - quit smoking 1 month ago
- Acute Resp Failure requiring BIPAP, stable on nasal cannula  - still with wheezing, planning for discharge tomorrow  - duonebs q6  - pulm f/u appreciated  - taper steroids  - empiric Azithro given recent fevers, cough - change to PO starting tomorrow, 2 more doses for total 5 days  - RVP neg  - Needs outpatient f/u with Pulm upon discharge  - quit smoking 1 month ago - encouraged abstinence  - patient notes that her nebulizer machine is 10years old, gave her printed Rx for a new nebulizer machine  - added trial of flonase for possible UACS
Discharge home
Transfer to 1 E

## 2018-11-17 NOTE — DISCHARGE NOTE ADULT - HOSPITAL COURSE
48F with asthma with multiple ICU hospitalizations but no intubations, former smoker (quit a month ago), who presents with SOB.  Patient says her symptoms started about a week ago but has progressively gotten worse.  Associated with a cough and subjective low grade temps.  Denied any sputum production.  Denied any chest pain.  Patient did have some abdominal pain and actually went to [place] ED about 3 days ago for the pain and was diagnosed with diverticulitis and sent home with Augmentin.  Today her breathing worsened and despite using her nebulizer, went to urgent care for evaluation.   At urgent care, patient received steroids but her breathing still did not improve and she came here.  In the ED, she received 3 duonebs, 2mg of magnesium, 125mg of solumedrol and was placed on a BiPAP.  Currently patient says her breathing has improved.  Also her abdomen has also improved as well.  Of note, patient has not received her flu vaccination.      Clinically improved during hospital stay.  No abdominal or diarrhea.  Able to walk without SOB.  Has environmental triggers for asthma such as dust, mold etc.    No SOB or accessory muscle use  lungs - scant expiratory wheeze  RRR  Abdomen soft  no edema    Counseled to avoid asthma triggers.  Improve compliance with preventive measures.

## 2018-11-17 NOTE — PROGRESS NOTE ADULT - ATTENDING COMMENTS
Can transfer to 1 E.  If stable, dc Friday, to followup with me in office  Reviewed all xrays and labs.  Will need GYN eval of CT abd.
Can transfer to 1 E.  If stable, dc Saturday, to followup with me in office  Reviewed all xrays and labs.  Will need GYN eval of CT abd.
Discharge Saturday, to followup with me in office  Reviewed all xrays and labs.  Will need GYN eval of CT abd.

## 2018-11-17 NOTE — PROGRESS NOTE ADULT - PROBLEM SELECTOR PROBLEM 5
Diverticulitis
Polyp of colon, unspecified part of colon, unspecified type
Preventive measure

## 2018-11-17 NOTE — PROGRESS NOTE ADULT - SUBJECTIVE AND OBJECTIVE BOX
INTERVAL HPI/OVERNIGHT EVENTS:   Patient seen and examined.  Feeling better overall, but notes that she is still wheezing.  C/O dry cough, notes that she has post-nasal drip and she usually lets it "run its course".  LLQ pain now minimal, tolerating diet, soft stools.  No blood in stool or black stools recently or currently.  Good appetite, no changes in weight.  No fevers, chills, sweats, dizziness, HA, changes in vision, cp, palpitations, nausea, vomiting, dysuria, focal weakness, or calf pain.     REVIEW OF SYSTEMS:  See HPI,  all others negative    PHYSICAL EXAM:  Vital Signs Last 24 Hrs  T(C): 36.5 (16 Nov 2018 07:43), Max: 37 (15 Nov 2018 20:01)  T(F): 97.7 (16 Nov 2018 07:43), Max: 98.6 (15 Nov 2018 20:01)  HR: 88 (16 Nov 2018 08:45) (74 - 107)  BP: 103/51 (16 Nov 2018 08:45) (100/58 - 121/59)  BP(mean): 66 (16 Nov 2018 08:45) (60 - 81)  RR: 24 (16 Nov 2018 08:45) (20 - 26)  SpO2: 95% (16 Nov 2018 08:45) (94% - 100%)    GENERAL:  well-groomed, well-developed, awake, alert, oriented x 3, fluent and coherent speech, can speak in full sentences  EYES: EOMI, PERRLA, conjunctiva and sclera clear  ENMT: No tonsillar erythema, exudates, or enlargement; Moist mucous membranes, Good dentition, No lesions  NECK: Supple, No JVD, No Cervical LAD   NERVOUS SYSTEM:  Good concentration; Moving all 4 extremities; No gross sensory deficits, No facial droop  CHEST/LUNG: Decreased air entry b/l with diffuse wheezing - decreased wheezing vs. yesterday and improved air entry  HEART: Regular rate and rhythm; No murmurs, rubs, or gallops  ABDOMEN: Soft, Nontender, Nondistended, Bowel sounds present, No palpable masses or organomegaly, No bruits  EXTREMITIES:  2+ Peripheral Pulses, No clubbing, cyanosis, trace b/l LE edema, no calf tenderness    LABS:                                   10.1   12.79 )-----------( 228      ( 16 Nov 2018 07:02 )             31.2     11-16    143  |  107  |  13  ----------------------------<  84  3.9   |  28  |  0.86    Ca    8.9      16 Nov 2018 07:02  Mg     2.2     11-15
INTERVAL HPI/OVERNIGHT EVENTS:   Patient seen and examined.  Feeling better.  LLQ pain now minimal, tolerating diet, soft stools.  No blood in stool or black stools recently or currently.  Good appetite, no changes in weight.  No fevers, chills, sweats, dizziness, HA, changes in vision, cp, palpitations, nausea, vomiting, dysuria, focal weakness, or calf pain.     REVIEW OF SYSTEMS:  See HPI,  all others negative    PHYSICAL EXAM:  Vital Signs Last 24 Hrs  T(C): 36.7 (15 Nov 2018 08:35), Max: 36.9 (14 Nov 2018 20:03)  T(F): 98.1 (15 Nov 2018 08:35), Max: 98.5 (14 Nov 2018 20:03)  HR: 101 (15 Nov 2018 08:00) (83 - 107)  BP: 97/71 (15 Nov 2018 08:00) (97/71 - 126/52)  BP(mean): 77 (15 Nov 2018 08:00) (68 - 90)  RR: 19 (15 Nov 2018 08:00) (19 - 29)  SpO2: 94% (15 Nov 2018 08:00) (94% - 100%)    GENERAL:  well-groomed, well-developed, awake, alert, oriented x 3, fluent and coherent speech, can speak in longer sentences today  EYES: EOMI, PERRLA, conjunctiva and sclera clear  ENMT: No tonsillar erythema, exudates, or enlargement; Moist mucous membranes, Good dentition, No lesions  NECK: Supple, No JVD, No Cervical LAD   NERVOUS SYSTEM:  Good concentration; Moving all 4 extremities; No gross sensory deficits, No facial droop  CHEST/LUNG: Decreased air entry b/l with diffuse wheezing - decreased wheezing vs. yesterday and improved air entry  HEART: Regular rate and rhythm; No murmurs, rubs, or gallops  ABDOMEN: Soft, Nontender, Nondistended, Bowel sounds present, No palpable masses or organomegaly, No bruits  EXTREMITIES:  2+ Peripheral Pulses, No clubbing, cyanosis, or edema    LABS:                                   10.4   16.81 )-----------( 234      ( 15 Nov 2018 06:53 )             32.1     11-15    142  |  108  |  8   ----------------------------<  130<H>  3.9   |  23  |  0.84    Ca    8.8      15 Nov 2018 06:53  Mg     2.2     11-15    TPro  7.5  /  Alb  3.3  /  TBili  0.2  /  DBili  x   /  AST  34  /  ALT  35  /  AlkPhos  71  11-14    < from: CT Abdomen and Pelvis w/ IV Cont (11.11.18 @ 19:18) >  ABDOMINAL WALL: Tiny fat-containing umbilical hernia.    < end of copied text >  < from: CT Abdomen and Pelvis w/ IV Cont (11.11.18 @ 19:18) >  cute mid to distal descending colonic diverticulitis. No collection.    Enlarged cystic left adnexa may represent hydrosalpinx versus a cystic   ovarian lesion. Nonemergent ultrasound or pelvic MRI recommended if there   are no contraindications.    < end of copied text >
INTERVAL HPI/OVERNIGHT EVENTS:   Patient seen and examined.  Reports feeling slightly better but still short of breath.  Still has mild LLQ pain and soft brown stools (recent diverticulitis) - but improving.  No fevers, chills, sweats, dizziness, HA, changes in vision, cp, palpitations, nausea, vomiting, dysuria, focal weakness, or calf pain.   No recent long drives or flights (>= 3 hours).  No known sick contacts.    REVIEW OF SYSTEMS:  See HPI,  all others negative    PHYSICAL EXAM:  Vital Signs Last 24 Hrs  T(C): 36.8 (14 Nov 2018 08:11), Max: 37 (14 Nov 2018 04:30)  T(F): 98.2 (14 Nov 2018 08:11), Max: 98.6 (14 Nov 2018 04:30)  HR: 89 (14 Nov 2018 08:00) (89 - 128)  BP: 114/57 (14 Nov 2018 08:00) (104/60 - 132/58)  BP(mean): 73 (14 Nov 2018 08:00) (69 - 80)  RR: 22 (14 Nov 2018 08:00) (16 - 31)  SpO2: 96% (14 Nov 2018 08:00) (89% - 99%)    GENERAL:  well-groomed, well-developed, awake, alert, oriented x 3, fluent and coherent speech, short of breath after speaking a few words  EYES: EOMI, PERRLA, conjunctiva and sclera clear  ENMT: No tonsillar erythema, exudates, or enlargement; Moist mucous membranes, Good dentition, No lesions  NECK: Supple, No JVD, No Cervical LAD, No thyromegaly, No thyroid nodules felt  NERVOUS SYSTEM:  Good concentration; Moving all 4 extremities; No gross sensory deficits, No facial droop  CHEST/LUNG: Decreased air entry b/l with diffuse wheezing  HEART: Regular rate and rhythm; No murmurs, rubs, or gallops  ABDOMEN: Soft, Nontender, Nondistended, Bowel sounds present, No palpable masses or organomegaly, No bruits  EXTREMITIES:  2+ Peripheral Pulses, No clubbing, cyanosis, or edema  LYMPH: No lymphadenopathy    LABS:                        12.1   13.64 )-----------( 254      ( 14 Nov 2018 02:02 )             37.4     14 Nov 2018 02:02    141    |  105    |  4      ----------------------------<  124    4.4     |  27     |  0.85     Ca    8.6        14 Nov 2018 02:02    TPro  7.5    /  Alb  3.3    /  TBili  0.2    /  DBili  x      /  AST  34     /  ALT  35     /  AlkPhos  71     14 Nov 2018 02:02      Rapid RVP Result: [facility]: The [employer] RVP Rapid uses polymerase chain reaction (PCR) and melt  curve analysis to screen for adenovirus; coronavirus HKU1, NL63, 229E,  OC43; human metapneumovirus (hMPV); human enterovirus/rhinovirus  (Entero/RV); influenza A; influenza A/H1;influenza A/H3; influenza  A/H1-2009; influenza B; parainfluenza viruses 1, 2, 3, 4; respiratory  syncytial virus; Bordetella pertussis; Mycoplasma pneumoniae; and  Chlamydophila pneumoniae. (11.14.18 @ 06:11)
PULMONARY/CRITICAL CARE      INTERVAL HPI/OVERNIGHT EVENTS:  Doing better, less cough, wheeze. Ambulated. No fever. .  48y FemaleHPI:  48F with asthma with multiple ICU hospitalizations but no intubations, former smoker (quit a month ago), who presents with SOB.  Patient says her symptoms started about a week ago but has progressively gotten worse.  Associated with a cough and subjective low grade temps.  Denied any sputum production.  Denied any chest pain.  Patient did have some abdominal pain and actually went to [place] ED about 3 days ago for the pain and was diagnosed with diverticulitis and sent home with Augmentin.  Today her breathing worsened and despite using her nebulizer, went to urgent care for evaluation.   At urgent care, patient received steroids but her breathing still did not improve and she came here.  In the ED, she received 3 duonebs, 2mg of magnesium, 125mg of solumedrol and was placed on a BiPAP.  Currently patient says her breathing has improved.  Also her abdomen has also improved as well.  Of note, patient has not received her flu vaccination. (14 Nov 2018 00:59)        PAST MEDICAL & SURGICAL HISTORY:  GERD (gastroesophageal reflux disease)  Asthma  No significant past surgical history        ICU Vital Signs Last 24 Hrs  T(C): 36.7 (17 Nov 2018 08:01), Max: 36.9 (17 Nov 2018 04:02)  T(F): 98 (17 Nov 2018 08:01), Max: 98.5 (17 Nov 2018 04:02)  HR: 73 (17 Nov 2018 08:12) (71 - 88)  BP: 109/47 (17 Nov 2018 08:12) (97/44 - 116/45)  BP(mean): 65 (17 Nov 2018 08:12) (65 - 66)  ABP: --  ABP(mean): --  RR: 22 (17 Nov 2018 08:12) (15 - 24)  SpO2: 94% (17 Nov 2018 08:12) (94% - 99%)        LABS:                        10.3   10.15 )-----------( 242      ( 17 Nov 2018 06:35 )             31.7     11-17    145  |  108  |  11  ----------------------------<  81  3.5   |  28  |  0.84    Ca    8.6      17 Nov 2018 06:35          REVIEW OF SYSTEMS:    CONSTITUTIONAL: No fever, weight loss, or fatigue  EYES: No eye pain, visual disturbances, or discharge  ENMT:  No difficulty hearing, tinnitus, vertigo; No sinus or throat pain  NECK: No pain or stiffness  BREASTS: No pain, masses, or nipple discharge  RESPIRATORY: some cough, wheezing, no chills or hemoptysis; mild shortness of breath  CARDIOVASCULAR: No chest pain, palpitations, dizziness, or leg swelling  GASTROINTESTINAL: No abdominal or epigastric pain. No nausea, vomiting, or hematemesis; No diarrhea or constipation. No melena or hematochezia. Has reflux        PHYSICAL EXAM:    GENERAL: NAD, well-groomed, well-developed, NAD  HEAD:  Atraumatic, Normocephalic  EYES: EOMI, PERRLA, conjunctiva and sclera clear  ENMT: No tonsillar erythema, exudates, or enlargement; Moist mucous membranes, Good dentition, No lesions  NECK: Supple, No JVD, Normal thyroid  NERVOUS SYSTEM:  Alert & Oriented X3, Good concentration; Motor Strength 5/5 B/L upper and lower extremities  CHEST/LUNG: few bilat, rhonchi, wheezing, good excursion, no rubs  HEART: Regular rate and rhythm; No murmurs, rubs, or gallops  ABDOMEN: Soft, Nontender, Nondistended; Bowel sounds present  EXTREMITIES:  2+ Peripheral Pulses, No clubbing, cyanosis, or edema  LYMPH: No lymphadenopathy noted  SKIN: No rashes or lesions    vanco through     RADIOLOGY & ADDITIONAL STUDIES:      CRITICAL CARE TIME SPENT:
PULMONARY/CRITICAL CARE      INTERVAL HPI/OVERNIGHT EVENTS: Still wheezing, congested. . No fever.    48y FemaleHPI:  48F with asthma with multiple ICU hospitalizations but no intubations, former smoker (quit a month ago), who presents with SOB.  Patient says her symptoms started about a week ago but has progressively gotten worse.  Associated with a cough and subjective low grade temps.  Denied any sputum production.  Denied any chest pain.  Patient did have some abdominal pain and actually went to [place] ED about 3 days ago for the pain and was diagnosed with diverticulitis and sent home with Augmentin.  Today her breathing worsened and despite using her nebulizer, went to urgent care for evaluation.   At urgent care, patient received steroids but her breathing still did not improve and she came here.  In the ED, she received 3 duonebs, 2mg of magnesium, 125mg of solumedrol and was placed on a BiPAP.  Currently patient says her breathing has improved.  Also her abdomen has also improved as well.  Of note, patient has not received her flu vaccination. (14 Nov 2018 00:59)        PAST MEDICAL & SURGICAL HISTORY:  GERD (gastroesophageal reflux disease)  Asthma  No significant past surgical history        ICU Vital Signs Last 24 Hrs  T(C): 36.9 (16 Nov 2018 04:00), Max: 37 (15 Nov 2018 20:01)  T(F): 98.5 (16 Nov 2018 04:00), Max: 98.6 (15 Nov 2018 20:01)  HR: 74 (16 Nov 2018 06:39) (74 - 107)  BP: 101/44 (16 Nov 2018 06:39) (97/71 - 121/59)  BP(mean): 60 (16 Nov 2018 06:39) (60 - 81)  ABP: --  ABP(mean): --  RR: 20 (16 Nov 2018 06:39) (19 - 26)  SpO2: 98% (16 Nov 2018 06:39) (94% - 100%)    Qtts:     I&O's Summary    14 Nov 2018 07:01  -  15 Nov 2018 07:00  --------------------------------------------------------  IN: 1080 mL / OUT: 0 mL / NET: 1080 mL    15 Nov 2018 07:01  -  16 Nov 2018 06:57  --------------------------------------------------------  IN: 1920 mL / OUT: 0 mL / NET: 1920 mL        REVIEW OF SYSTEMS:    CONSTITUTIONAL: No fever, weight loss, or fatigue  EYES: No eye pain, visual disturbances, or discharge  ENMT:  No difficulty hearing, tinnitus, vertigo; No sinus or throat pain  NECK: No pain or stiffness  BREASTS: No pain, masses, or nipple discharge  RESPIRATORY: some cough, wheezing, no chills or hemoptysis; mild shortness of breath  CARDIOVASCULAR: No chest pain, palpitations, dizziness, or leg swelling  GASTROINTESTINAL: No abdominal or epigastric pain. No nausea, vomiting, or hematemesis; No diarrhea or constipation. No melena or hematochezia. Has reflux        PHYSICAL EXAM:    GENERAL: NAD, well-groomed, well-developed, NAD  HEAD:  Atraumatic, Normocephalic  EYES: EOMI, PERRLA, conjunctiva and sclera clear  ENMT: No tonsillar erythema, exudates, or enlargement; Moist mucous membranes, Good dentition, No lesions  NECK: Supple, No JVD, Normal thyroid  NERVOUS SYSTEM:  Alert & Oriented X3, Good concentration; Motor Strength 5/5 B/L upper and lower extremities  CHEST/LUNG: few bilat, rhonchi, wheezing, good excursion, no rubs  HEART: Regular rate and rhythm; No murmurs, rubs, or gallops  ABDOMEN: Soft, Nontender, Nondistended; Bowel sounds present  EXTREMITIES:  2+ Peripheral Pulses, No clubbing, cyanosis, or edema  LYMPH: No lymphadenopathy noted  SKIN: No rashes or lesions          LABS:                        10.4   16.81 )-----------( 234      ( 15 Nov 2018 06:53 )             32.1     11-15    142  |  108  |  8   ----------------------------<  130<H>  3.9   |  23  |  0.84    Ca    8.8      15 Nov 2018 06:53  Mg     2.2     11-15            vanco through     RADIOLOGY & ADDITIONAL STUDIES:< from: Xray Chest 1 View- PORTABLE-Urgent (11.14.18 @ 00:12) >  EXAM:  XR CHEST PORTABLE URGENT 1V                                  PROCEDURE DATE:  11/14/2018          INTERPRETATION:  CLINICAL STATEMENT: Chest pain.    TECHNIQUE: AP view of the chest.    COMPARISON: 10/27/2018    FINDINGS/  IMPRESSION:  No new consolidation or pleural effusion.    Heart size within normal limits.          < end of copied text >        CRITICAL CARE TIME SPENT:
Patient is a 48y old  Female who presents with a chief complaint of SOB (14 Nov 2018 00:59)      BRIEF HOSPITAL COURSE: 48 year old female PMHx Asthma (former smoker 1/2PPD quit 1 month ago), Diverticulitis.  Presents to ED with worsening dyspnea over the past week with productive cough of clear to yellow sputum and low grade fevers 100.2.  Patient was seen at urgent care and received IM steroid injection and worsened prompting ED visit.  Additionally patient was seen 3 days ago at [facility] for abdominal pain which she states was attributed to diverticulitis and was Rx'ed Augmentin.  pain has since resolved.  Patient denies any associated CP, N/V  diarrhea or UTI symptoms.    Events last 24 hours:  In ED found to be in moderate respiratory distress with Wheeze. Recieved Steroids, Multiple nebs and magnesium with some relief.    PAST MEDICAL & SURGICAL HISTORY:  GERD (gastroesophageal reflux disease)  Asthma  No significant past surgical history      Review of Systems:  CONSTITUTIONAL: (+)  fever, (+) chills, No fatigue  EYES: No eye pain, visual disturbances, or discharge  ENMT:  No difficulty hearing, tinnitus, vertigo; No sinus or throat pain  NECK: No pain or stiffness  RESPIRATORY: (+)  cough, (+) wheezing, No  hemoptysis; (+) shortness of breath  CARDIOVASCULAR: No chest pain, palpitations, dizziness, or leg swelling  GASTROINTESTINAL: No abdominal or epigastric pain. No nausea, vomiting, or hematemesis; No diarrhea or constipation. No melena or hematochezia.  GENITOURINARY: No dysuria, frequency, hematuria, or incontinence  NEUROLOGICAL: No headaches, memory loss, loss of strength, numbness, or tremors  SKIN: No itching, burning, rashes, or lesions   MUSCULOSKELETAL: No joint pain or swelling; No muscle, back, or extremity pain  PSYCHIATRIC: No depression, anxiety, mood swings, or difficulty sleeping      Medications:  ampicillin/sulbactam  IVPB 1.5 Gram(s) IV Intermittent every 6 hours  ampicillin/sulbactam  IVPB      azithromycin  IVPB 500 milliGRAM(s) IV Intermittent every 24 hours  ALBUTerol    0.083% 2.5 milliGRAM(s) Nebulizer every 4 hours PRN  ALBUTerol/ipratropium for Nebulization 3 milliLiter(s) Nebulizer every 6 hours  guaiFENesin    Syrup 200 milliGRAM(s) Oral every 8 hours PRN  montelukast 10 milliGRAM(s) Oral at bedtime  acetaminophen   Tablet .. 650 milliGRAM(s) Oral every 6 hours PRN  enoxaparin Injectable 40 milliGRAM(s) SubCutaneous daily  methylPREDNISolone sodium succinate Injectable 80 milliGRAM(s) IV Push every 6 hours            ICU Vital Signs Last 24 Hrs  T(C): 36.8 (14 Nov 2018 02:58), Max: 36.8 (14 Nov 2018 02:58)  T(F): 98.2 (14 Nov 2018 02:58), Max: 98.2 (14 Nov 2018 02:58)  HR: 105 (14 Nov 2018 04:00) (89 - 128)  BP: 114/55 (14 Nov 2018 04:00) (114/55 - 132/58)  BP(mean): 69 (14 Nov 2018 04:00) (69 - 80)  ABP: --  ABP(mean): --  RR: 26 (14 Nov 2018 04:00) (16 - 31)  SpO2: 98% (14 Nov 2018 04:00) (89% - 99%)      ABG - ( 14 Nov 2018 01:23 )  pH, Arterial: x     pH, Blood: 7.30  /  pCO2: 48    /  pO2: 75    / HCO3: 22    / Base Excess: -2.4  /  SaO2: 94                  I&O's Detail    13 Nov 2018 07:01  -  14 Nov 2018 04:56  --------------------------------------------------------  IN:    IV PiggyBack: 50 mL    Sodium Chloride 0.9% IV Bolus: 1000 mL  Total IN: 1050 mL    OUT:  Total OUT: 0 mL    Total NET: 1050 mL            LABS:                        12.1   13.64 )-----------( 254      ( 14 Nov 2018 02:02 )             37.4     11-14    141  |  105  |  4<L>  ----------------------------<  124<H>  4.4   |  27  |  0.85    Ca    8.6      14 Nov 2018 02:02    TPro  7.5  /  Alb  3.3  /  TBili  0.2  /  DBili  x   /  AST  34  /  ALT  35  /  AlkPhos  71  11-14          CAPILLARY BLOOD GLUCOSE                Physical Examination:    General:  Awake.  Alert, oriented, interactive, nonfocal Tolerating Bipap well     HEENT: Pupils equal, reactive to light.  Symmetric. No JVD.    PULM: Good AE on Bipap, + Wheeze bilaterally, no significant sputum production    CVS: S1, S2 tachy, no murmurs, rubs, or gallops    ABD: Soft, nondistended, nontender, normoactive bowel sounds, no masses    EXT: No edema, nontender    SKIN: Warm and well perfused, no rashes noted.        RADIOLOGY:  Cxr clear - official read pend     CRITICAL CARE TIME SPENT: 40 minutes   (Assessing presenting problems of acute illness, which pose high probability of life threatening deterioration or end organ damage/dysfunction, as well as medical decision making including initiating plan of care, reviewing data, reviewing radiologic exams, discussing with multidisciplinary team,  discussing goals of care with patient/family, and writing this note.  Non-inclusive of procedures performed)
PULMONARY/CRITICAL CARE      INTERVAL HPI/OVERNIGHT EVENTS: Pt. much improved, less sob, wheeze. On nasal O2. Ambulating.    48y FemaleHPI:  48F with asthma with multiple ICU hospitalizations but no intubations, former smoker (quit a month ago), who presents with SOB.  Patient says her symptoms started about a week ago but has progressively gotten worse.  Associated with a cough and subjective low grade temps.  Denied any sputum production.  Denied any chest pain.  Patient did have some abdominal pain and actually went to [place] ED about 3 days ago for the pain and was diagnosed with diverticulitis and sent home with Augmentin.  Today her breathing worsened and despite using her nebulizer, went to urgent care for evaluation.   At urgent care, patient received steroids but her breathing still did not improve and she came here.  In the ED, she received 3 duonebs, 2mg of magnesium, 125mg of solumedrol and was placed on a BiPAP.  Currently patient says her breathing has improved.  Also her abdomen has also improved as well.  Of note, patient has not received her flu vaccination. (14 Nov 2018 00:59)        PAST MEDICAL & SURGICAL HISTORY:  GERD (gastroesophageal reflux disease)  Asthma  No significant past surgical history        ICU Vital Signs Last 24 Hrs  T(C): 36.6 (15 Nov 2018 04:03), Max: 36.9 (14 Nov 2018 20:03)  T(F): 97.8 (15 Nov 2018 04:03), Max: 98.5 (14 Nov 2018 20:03)  HR: 92 (15 Nov 2018 07:37) (83 - 107)  BP: 119/53 (15 Nov 2018 06:00) (107/57 - 126/52)  BP(mean): 73 (15 Nov 2018 06:00) (68 - 90)  ABP: --  ABP(mean): --  RR: 29 (15 Nov 2018 06:00) (19 - 29)  SpO2: 94% (15 Nov 2018 07:37) (94% - 100%)    Qtts:     I&O's Summary    14 Nov 2018 07:01  -  15 Nov 2018 07:00  --------------------------------------------------------  IN: 1080 mL / OUT: 0 mL / NET: 1080 mL            REVIEW OF SYSTEMS:    CONSTITUTIONAL: No fever, weight loss, or fatigue  EYES: No eye pain, visual disturbances, or discharge  ENMT:  No difficulty hearing, tinnitus, vertigo; No sinus or throat pain  NECK: No pain or stiffness  BREASTS: No pain, masses, or nipple discharge  RESPIRATORY: some cough, wheezing, no chills or hemoptysis; mild shortness of breath  CARDIOVASCULAR: No chest pain, palpitations, dizziness, or leg swelling  GASTROINTESTINAL: No abdominal or epigastric pain. No nausea, vomiting, or hematemesis; No diarrhea or constipation. No melena or hematochezia.  GENITOURINARY: No dysuria, frequency, hematuria, or incontinence  NEUROLOGICAL: No headaches, memory loss, loss of strength, numbness, or tremors  SKIN: No itching, burning, rashes, or lesions   LYMPH NODES: No enlarged glands  ENDOCRINE: No heat or cold intolerance; No hair loss  MUSCULOSKELETAL: No joint pain or swelling; No muscle, back, or extremity pain, no calf tenderness  PSYCHIATRIC: No depression, anxiety, mood swings, or difficulty sleeping  HEME/LYMPH: No easy bruising, or bleeding gums  ALLERGY AND IMMUNOLOGIC: No hives or eczema      PHYSICAL EXAM:    GENERAL: NAD, well-groomed, well-developed, NAD  HEAD:  Atraumatic, Normocephalic  EYES: EOMI, PERRLA, conjunctiva and sclera clear  ENMT: No tonsillar erythema, exudates, or enlargement; Moist mucous membranes, Good dentition, No lesions  NECK: Supple, No JVD, Normal thyroid  NERVOUS SYSTEM:  Alert & Oriented X3, Good concentration; Motor Strength 5/5 B/L upper and lower extremities  CHEST/LUNG: few bilat, rhonchi, wheezing, good excursion, no rubs  HEART: Regular rate and rhythm; No murmurs, rubs, or gallops  ABDOMEN: Soft, Nontender, Nondistended; Bowel sounds present  EXTREMITIES:  2+ Peripheral Pulses, No clubbing, cyanosis, or edema  LYMPH: No lymphadenopathy noted  SKIN: No rashes or lesions        LABS:                        10.4   16.81 )-----------( 234      ( 15 Nov 2018 06:53 )             32.1     11-14    141  |  105  |  4<L>  ----------------------------<  124<H>  4.4   |  27  |  0.85    Ca    8.6      14 Nov 2018 02:02    TPro  7.5  /  Alb  3.3  /  TBili  0.2  /  DBili  x   /  AST  34  /  ALT  35  /  AlkPhos  71  11-14        ABG - ( 14 Nov 2018 01:23 )  pH, Arterial: x     pH, Blood: 7.30  /  pCO2: 48    /  pO2: 75    / HCO3: 22    / Base Excess: -2.4  /  SaO2: 94                vanco through     RADIOLOGY & ADDITIONAL STUDIES:    < from: Xray Chest 1 View- PORTABLE-Urgent (11.14.18 @ 00:12) >  EXAM:  XR CHEST PORTABLE URGENT 1V                                  PROCEDURE DATE:  11/14/2018          INTERPRETATION:  CLINICAL STATEMENT: Chest pain.    TECHNIQUE: AP view of the chest.    COMPARISON: 10/27/2018    FINDINGS/  IMPRESSION:  No new consolidation or pleural effusion.    Heart size within normal limits.                    ROSA MARIA NELSON M.D., ATTENDING RADIOLOGIST  This document has been electronically signed. Nov 14 2018  7:17AM              < end of copied text >  < from: CT Abdomen and Pelvis w/ IV Cont (11.11.18 @ 19:18) >  EXAM:  CT ABDOMEN AND PELVIS IC                            PROCEDURE DATE:  11/11/2018            INTERPRETATION:  CLINICAL INFORMATION: Left lower quadrant abdominal   pain. Evaluate for diverticulitis.    COMPARISON: None.    PROCEDURE:   CT of the Abdomen and Pelvis was performed with intravenous contrast.   Intravenous contrast: 90 ml Omnipaque 350. 10 ml discarded.  Oral contrast: None.  Sagittal and coronal reformats were performed.    FINDINGS:    LOWER CHEST: Within normal limits.    LIVER: Within normal limits.  BILE DUCTS: Normal caliber.  GALLBLADDER: Contracted  SPLEEN: Within normal limits.  PANCREAS: Within normal limits.  ADRENALS: Within normal limits.  KIDNEYS/URETERS: Within normal limits.    BLADDER: Within normal limits.  REPRODUCTIVE ORGANS: Enlarged cystic left adnexa measuring 3.4 x 7.1 x   3.4 cm    BOWEL: Diffuse colonic diverticulosis. Mid to distal descending colonic   wall thickening adjacent to a diverticuli with surrounding fat stranding   consistent with acute diverticulitis. No bowel obstruction. Appendix is   normal.  PERITONEUM: No ascites. No intra-abdominal abscess formation or   perforation.  VESSELS:  Within normal limits.  RETROPERITONEUM: No lymphadenopathy.    ABDOMINAL WALL: Tiny fat-containing umbilical hernia.  BONES: Disc space narrowing at L5-S1    IMPRESSION:     Acute mid to distal descending colonic diverticulitis. No collection.    Enlarged cystic left adnexa may represent hydrosalpinx versus a cystic   ovarian lesion. Nonemergent ultrasound or pelvic MRI recommended if there   are no contraindications.                EDI LOPEZ M.D., RADIOLOGY RESIDENT  This document has been electronically signed.  JANE FAUST M.D., ATTENDING RADIOLOGIST  This document has been electronically signed. Nov 11 2018  9:22PM              < end of copied text >    CRITICAL CARE TIME SPENT:

## 2018-11-17 NOTE — DISCHARGE NOTE ADULT - PLAN OF CARE
get better finish abx course finish Augmentin course last dose 11/22/18 f/up PULM within 3-5 days of discharge

## 2018-11-17 NOTE — DISCHARGE NOTE ADULT - MEDICATION SUMMARY - MEDICATIONS TO CHANGE
I will SWITCH the dose or number of times a day I take the medications listed below when I get home from the hospital:    Augmentin 875 mg-125 mg oral tablet  -- 1 tab(s) by mouth 3 times a day x 10 days   -- Finish all this medication unless otherwise directed by prescriber.  Take with food or milk.    acetaminophen 500 mg oral tablet  -- 2 tab(s) by mouth every 6 hours, As Needed for fever or pain  -- This product contains acetaminophen.  Do not use  with any other product containing acetaminophen to prevent possible liver damage.

## 2018-11-17 NOTE — PROGRESS NOTE ADULT - PROBLEM SELECTOR PLAN 2
Taper steroids  Symbicort  Check pf
- improving  - Day 3/10 today of antibiotics (was on augmentin at home started on 11/13)  - IV Unasyn
- improving  - Day 4/10 today of antibiotics (was on augmentin at home started on 11/13)  - IV Unasyn
- improving  - Day 5/10 today of antibiotics (was on augmentin at home started on 11/13)  - change to PO Augmentin - 9 more doses
Taper steroids  Symbicort  Check pf  Would consider observing until Saturday since still wheezing
Taper steroids  Symbicort  Check pf  Would consider observing until Saturday since still wheezing

## 2018-11-17 NOTE — PROGRESS NOTE ADULT - PROBLEM SELECTOR PLAN 4
pepcid
- tiny umbilical hernia containing fat  - asymptomatic
- tiny umbilical hernia containing fat  - asymptomatic  - she is planning to have liposuction in December, called and left message with her surgeon's office Dr. Kenn Wyman informing them of this finding
pepcid
pepcid

## 2018-11-17 NOTE — DISCHARGE NOTE ADULT - ADDITIONAL INSTRUCTIONS
f/up PULM within 3-5 days of discharge.  See GI doctor within 10-14 days to schedule colonoscopy.  See GYN doctor for routine eval of adnexal cyst

## 2018-11-19 LAB
CULTURE RESULTS: SIGNIFICANT CHANGE UP
CULTURE RESULTS: SIGNIFICANT CHANGE UP
SPECIMEN SOURCE: SIGNIFICANT CHANGE UP
SPECIMEN SOURCE: SIGNIFICANT CHANGE UP

## 2019-01-09 PROCEDURE — 96365 THER/PROPH/DIAG IV INF INIT: CPT

## 2019-01-09 PROCEDURE — 96375 TX/PRO/DX INJ NEW DRUG ADDON: CPT

## 2019-01-09 PROCEDURE — 87581 M.PNEUMON DNA AMP PROBE: CPT

## 2019-01-09 PROCEDURE — 71045 X-RAY EXAM CHEST 1 VIEW: CPT

## 2019-01-09 PROCEDURE — 96361 HYDRATE IV INFUSION ADD-ON: CPT

## 2019-01-09 PROCEDURE — 83605 ASSAY OF LACTIC ACID: CPT

## 2019-01-09 PROCEDURE — 87633 RESP VIRUS 12-25 TARGETS: CPT

## 2019-01-09 PROCEDURE — 94660 CPAP INITIATION&MGMT: CPT

## 2019-01-09 PROCEDURE — 87798 DETECT AGENT NOS DNA AMP: CPT

## 2019-01-09 PROCEDURE — 87486 CHLMYD PNEUM DNA AMP PROBE: CPT

## 2019-01-09 PROCEDURE — 87040 BLOOD CULTURE FOR BACTERIA: CPT

## 2019-01-09 PROCEDURE — 80048 BASIC METABOLIC PNL TOTAL CA: CPT

## 2019-01-09 PROCEDURE — 85027 COMPLETE CBC AUTOMATED: CPT

## 2019-01-09 PROCEDURE — 83735 ASSAY OF MAGNESIUM: CPT

## 2019-01-09 PROCEDURE — 36415 COLL VENOUS BLD VENIPUNCTURE: CPT

## 2019-01-09 PROCEDURE — 99291 CRITICAL CARE FIRST HOUR: CPT | Mod: 25

## 2019-01-09 PROCEDURE — 82803 BLOOD GASES ANY COMBINATION: CPT

## 2019-01-09 PROCEDURE — 94760 N-INVAS EAR/PLS OXIMETRY 1: CPT

## 2019-01-09 PROCEDURE — 80053 COMPREHEN METABOLIC PANEL: CPT

## 2019-01-09 PROCEDURE — 94640 AIRWAY INHALATION TREATMENT: CPT

## 2019-01-25 ENCOUNTER — OUTPATIENT (OUTPATIENT)
Dept: OUTPATIENT SERVICES | Facility: HOSPITAL | Age: 49
LOS: 1 days | End: 2019-01-25
Payer: MEDICAID

## 2019-01-25 VITALS
TEMPERATURE: 98 F | HEART RATE: 88 BPM | OXYGEN SATURATION: 95 % | SYSTOLIC BLOOD PRESSURE: 99 MMHG | WEIGHT: 190.04 LBS | HEIGHT: 60 IN | DIASTOLIC BLOOD PRESSURE: 65 MMHG | RESPIRATION RATE: 14 BRPM

## 2019-01-25 DIAGNOSIS — Z01.818 ENCOUNTER FOR OTHER PREPROCEDURAL EXAMINATION: ICD-10-CM

## 2019-01-25 DIAGNOSIS — K42.0 UMBILICAL HERNIA WITH OBSTRUCTION, WITHOUT GANGRENE: ICD-10-CM

## 2019-01-25 DIAGNOSIS — Z98.890 OTHER SPECIFIED POSTPROCEDURAL STATES: Chronic | ICD-10-CM

## 2019-01-25 DIAGNOSIS — Z41.9 ENCOUNTER FOR PROCEDURE FOR PURPOSES OTHER THAN REMEDYING HEALTH STATE, UNSPECIFIED: Chronic | ICD-10-CM

## 2019-01-25 LAB
ALBUMIN SERPL ELPH-MCNC: 3.6 G/DL — SIGNIFICANT CHANGE UP (ref 3.3–5)
ALP SERPL-CCNC: 91 U/L — SIGNIFICANT CHANGE UP (ref 40–120)
ALT FLD-CCNC: 40 U/L — SIGNIFICANT CHANGE UP (ref 12–78)
ANION GAP SERPL CALC-SCNC: 6 MMOL/L — SIGNIFICANT CHANGE UP (ref 5–17)
APPEARANCE UR: CLEAR — SIGNIFICANT CHANGE UP
AST SERPL-CCNC: 22 U/L — SIGNIFICANT CHANGE UP (ref 15–37)
BILIRUB SERPL-MCNC: 0.2 MG/DL — SIGNIFICANT CHANGE UP (ref 0.2–1.2)
BILIRUB UR-MCNC: NEGATIVE — SIGNIFICANT CHANGE UP
BUN SERPL-MCNC: 12 MG/DL — SIGNIFICANT CHANGE UP (ref 7–23)
CALCIUM SERPL-MCNC: 9.2 MG/DL — SIGNIFICANT CHANGE UP (ref 8.5–10.1)
CHLORIDE SERPL-SCNC: 105 MMOL/L — SIGNIFICANT CHANGE UP (ref 96–108)
CO2 SERPL-SCNC: 30 MMOL/L — SIGNIFICANT CHANGE UP (ref 22–31)
COLOR SPEC: YELLOW — SIGNIFICANT CHANGE UP
CREAT SERPL-MCNC: 1 MG/DL — SIGNIFICANT CHANGE UP (ref 0.5–1.3)
DIFF PNL FLD: ABNORMAL
GLUCOSE SERPL-MCNC: 98 MG/DL — SIGNIFICANT CHANGE UP (ref 70–99)
GLUCOSE UR QL: NEGATIVE — SIGNIFICANT CHANGE UP
HCG UR QL: NEGATIVE — SIGNIFICANT CHANGE UP
HCT VFR BLD CALC: 38.3 % — SIGNIFICANT CHANGE UP (ref 34.5–45)
HGB BLD-MCNC: 12.2 G/DL — SIGNIFICANT CHANGE UP (ref 11.5–15.5)
KETONES UR-MCNC: NEGATIVE — SIGNIFICANT CHANGE UP
LEUKOCYTE ESTERASE UR-ACNC: NEGATIVE — SIGNIFICANT CHANGE UP
MCHC RBC-ENTMCNC: 29.9 PG — SIGNIFICANT CHANGE UP (ref 27–34)
MCHC RBC-ENTMCNC: 31.9 GM/DL — LOW (ref 32–36)
MCV RBC AUTO: 93.9 FL — SIGNIFICANT CHANGE UP (ref 80–100)
NITRITE UR-MCNC: NEGATIVE — SIGNIFICANT CHANGE UP
NRBC # BLD: 0 /100 WBCS — SIGNIFICANT CHANGE UP (ref 0–0)
PH UR: 6 — SIGNIFICANT CHANGE UP (ref 5–8)
PLATELET # BLD AUTO: 287 K/UL — SIGNIFICANT CHANGE UP (ref 150–400)
POTASSIUM SERPL-MCNC: 4.2 MMOL/L — SIGNIFICANT CHANGE UP (ref 3.5–5.3)
POTASSIUM SERPL-SCNC: 4.2 MMOL/L — SIGNIFICANT CHANGE UP (ref 3.5–5.3)
PROT SERPL-MCNC: 7.8 G/DL — SIGNIFICANT CHANGE UP (ref 6–8.3)
PROT UR-MCNC: NEGATIVE — SIGNIFICANT CHANGE UP
RBC # BLD: 4.08 M/UL — SIGNIFICANT CHANGE UP (ref 3.8–5.2)
RBC # FLD: 15.7 % — HIGH (ref 10.3–14.5)
SODIUM SERPL-SCNC: 141 MMOL/L — SIGNIFICANT CHANGE UP (ref 135–145)
SP GR SPEC: 1.02 — SIGNIFICANT CHANGE UP (ref 1.01–1.02)
UROBILINOGEN FLD QL: NEGATIVE — SIGNIFICANT CHANGE UP
WBC # BLD: 9.26 K/UL — SIGNIFICANT CHANGE UP (ref 3.8–10.5)
WBC # FLD AUTO: 9.26 K/UL — SIGNIFICANT CHANGE UP (ref 3.8–10.5)

## 2019-01-25 PROCEDURE — 81025 URINE PREGNANCY TEST: CPT

## 2019-01-25 PROCEDURE — G0463: CPT

## 2019-01-25 PROCEDURE — 80053 COMPREHEN METABOLIC PANEL: CPT

## 2019-01-25 PROCEDURE — 85027 COMPLETE CBC AUTOMATED: CPT

## 2019-01-25 PROCEDURE — 36415 COLL VENOUS BLD VENIPUNCTURE: CPT

## 2019-01-25 PROCEDURE — 93005 ELECTROCARDIOGRAM TRACING: CPT

## 2019-01-25 PROCEDURE — 81001 URINALYSIS AUTO W/SCOPE: CPT

## 2019-01-25 PROCEDURE — 93010 ELECTROCARDIOGRAM REPORT: CPT | Mod: NC

## 2019-01-25 PROCEDURE — 87086 URINE CULTURE/COLONY COUNT: CPT

## 2019-01-25 RX ORDER — RANITIDINE HYDROCHLORIDE 150 MG/1
1 TABLET, FILM COATED ORAL
Qty: 0 | Refills: 0 | COMMUNITY

## 2019-01-25 NOTE — H&P PST ADULT - ASSESSMENT
48 year old female with Umbilical Hernia with obstruction without gangrene - scheduled for Repair Umbilical Hernia with Dr Ulises Garcia on 2/1/19 -

## 2019-01-25 NOTE — H&P PST ADULT - PROBLEM SELECTOR PLAN 1
PST Labs; CBC< CMP, UcG, U/A, Urine Culture, EKG - Pt has appointment for medical clearance this afternoon@ 3:30 - Pt instructed to stop any NSAIDS/Herbal Supplements one week prior to procedure - May take Tylenol as needed for pain - Pre-OP Instructions as well as pre-op wash instructions given to pt with understanding verbalized

## 2019-01-25 NOTE — H&P PST ADULT - HISTORY OF PRESENT ILLNESS
48 year old female presents for PST prior to Repair Umbilical Hernia with Dr Ulises Garcia on 2/1/19 - pt notes "I have been having tenderness around my naval for past few months." Pt notes she had gone to hospital for Diverticulitis they did a CT scan and founf the hernia - pt was then sent for consult with Dr Ulises Garcia - sukhjinder

## 2019-01-25 NOTE — H&P PST ADULT - PMH
Asthma    GERD (gastroesophageal reflux disease)    Umbilical hernia with obstruction, without gangrene Asthma    Diverticulitis    Dry eyes, bilateral    GERD (gastroesophageal reflux disease)    Umbilical hernia with obstruction, without gangrene

## 2019-01-25 NOTE — H&P PST ADULT - NSANTHOSAYNRD_GEN_A_CORE
No. ROSALINA screening performed.  STOP BANG Legend: 0-2 = LOW Risk; 3-4 = INTERMEDIATE Risk; 5-8 = HIGH Risk

## 2019-01-26 LAB
CULTURE RESULTS: SIGNIFICANT CHANGE UP
SPECIMEN SOURCE: SIGNIFICANT CHANGE UP

## 2019-12-09 NOTE — CONSULT NOTE ADULT - PROBLEM SELECTOR PROBLEM 4
"Chief Complaint   Patient presents with     RECHECK     Follow up weight management.       Vitals:    12/09/19 1120   BP: (!) 159/59   BP Location: Left arm   Patient Position: Sitting   Cuff Size: Adult Large   Pulse: 75   Temp: 97.8  F (36.6  C)   TempSrc: Oral   SpO2: 98%   Weight: 104.5 kg (230 lb 6.4 oz)   Height: 1.632 m (5' 4.25\")       Body mass index is 39.24 kg/m .                            MARIO OJEDA, EMT    " GERD (gastroesophageal reflux disease)

## 2020-02-21 NOTE — ED ADULT TRIAGE NOTE - SPO2 (%)
Impression: Blepharochalasis left lower eyelid: H02.35. OS. Plan: Discussed diagnosis in detail with patient. Discussed treatment options, recommend lid wipes and AFT's as needed.
Impression: Nexdtve age-related mclr degn, bilateral, intermed dry stage: H35.3132. OU. Plan: OCT ordered and performed today. Discussed diagnosis with patient, OCT demonstrates the lack of SRF or CME. Treatment is not recommended at this time but we will continue to monitor frequently. The patient was advised to continue AREDS multi-vitamins and monitor vision one eye at a time. Call immediately with any vision changes. Patient agrees with plan.
Impression: Vitreous degeneration, right eye: H43.811. OD. Plan: OCT ordered and performed today. Discussed diagnosis with patient. The clinical exam is consistent with Posterior Vitreous Detachment. Fortunately, no retinal breaks were identified. The warning signs and symptoms of retinal breaks/detachment, including worsening flashes and new onset of floaters and development of a shadow/curtain in the peripheral field were reviewed. The patient understands to call immediately if any of these symptoms are experienced.
97

## 2022-02-15 ENCOUNTER — HISTORICAL (OUTPATIENT)
Dept: ADMINISTRATIVE | Facility: HOSPITAL | Age: 52
End: 2022-02-15

## 2022-02-22 ENCOUNTER — HISTORICAL (OUTPATIENT)
Dept: ADMINISTRATIVE | Facility: HOSPITAL | Age: 52
End: 2022-02-22

## 2022-02-22 ENCOUNTER — HOSPITAL ENCOUNTER (OUTPATIENT)
Dept: EMERGENCY MEDICINE | Facility: HOSPITAL | Age: 52
End: 2022-02-23
Attending: FAMILY MEDICINE

## 2022-02-22 LAB
ABS NEUT (OLG): 9.78 (ref 2.1–9.2)
ALBUMIN SERPL-MCNC: 3.5 G/DL (ref 3.5–5)
ALBUMIN/GLOB SERPL: 1 {RATIO} (ref 1.1–2)
ALP SERPL-CCNC: 63 U/L (ref 40–150)
ALT SERPL-CCNC: 18 U/L (ref 0–55)
AST SERPL-CCNC: 16 U/L (ref 5–34)
BASOPHILS # BLD AUTO: 0 10*3/UL (ref 0–0.2)
BASOPHILS NFR BLD AUTO: 0 %
BILIRUB SERPL-MCNC: 0.1 MG/DL
BILIRUBIN DIRECT+TOT PNL SERPL-MCNC: 0 (ref 0–0.8)
BILIRUBIN DIRECT+TOT PNL SERPL-MCNC: 0.1 (ref 0–0.5)
BUN SERPL-MCNC: 10.4 MG/DL (ref 9.8–20.1)
CALCIUM SERPL-MCNC: 9.9 MG/DL (ref 8.7–10.5)
CHLORIDE SERPL-SCNC: 105 MMOL/L (ref 98–107)
CO2 SERPL-SCNC: 29 MMOL/L (ref 22–29)
CREAT SERPL-MCNC: 0.88 MG/DL (ref 0.55–1.02)
ERYTHROCYTE [DISTWIDTH] IN BLOOD BY AUTOMATED COUNT: 15.1 % (ref 11.5–14.5)
FLAG2 (OHS): 80
FLAG3 (OHS): 80
FLAGS (OHS): 70
GLOBULIN SER-MCNC: 3.5 G/DL (ref 2.4–3.5)
GLUCOSE SERPL-MCNC: 180 MG/DL (ref 74–100)
HCT VFR BLD AUTO: 39.2 % (ref 35–46)
HEMOLYSIS INTERF INDEX SERPL-ACNC: 17
HGB BLD-MCNC: 12.8 G/DL (ref 12–16)
ICTERIC INTERF INDEX SERPL-ACNC: 1
IMM GRANULOCYTES # BLD AUTO: 0.07 10*3/UL
IMM GRANULOCYTES NFR BLD AUTO: 1 %
LIPEMIC INTERF INDEX SERPL-ACNC: 9
LOW EVENT # SUSPECT FLAG (OHS): 70
LYMPHOCYTES # BLD AUTO: 0.5 10*3/UL (ref 0.6–4.6)
LYMPHOCYTES NFR BLD AUTO: 5 %
MANUAL DIFF? (OHS): NO
MCH RBC QN AUTO: 32.7 PG (ref 26–34)
MCHC RBC AUTO-ENTMCNC: 32.7 G/DL (ref 31–37)
MCV RBC AUTO: 100 FL (ref 80–100)
MO BLASTS SUSPECT FLAG (OHS): 40
MONOCYTES # BLD AUTO: 0.2 10*3/UL (ref 0.1–1.3)
MONOCYTES NFR BLD AUTO: 2 %
NEUTROPHILS # BLD AUTO: 9.78 10*3/UL (ref 2.1–9.2)
NEUTROPHILS NFR BLD AUTO: 92 %
NRBC BLD AUTO-RTO: 0 % (ref 0–0.2)
PLATELET # BLD AUTO: 216 10*3/UL (ref 130–400)
PLATELET CLUMPS SUSPECT FLAG (OHS): 150
PMV BLD AUTO: 11 FL (ref 7.4–10.4)
POTASSIUM SERPL-SCNC: 3.9 MMOL/L (ref 3.5–5.1)
PROT SERPL-MCNC: 7 G/DL (ref 6.4–8.3)
RBC # BLD AUTO: 3.92 10*6/UL (ref 4–5.2)
SARS-COV-2 AG RESP QL IA.RAPID: NEGATIVE
SODIUM SERPL-SCNC: 141 MMOL/L (ref 136–145)
WBC # SPEC AUTO: 10.6 10*3/UL (ref 4.5–11)

## 2022-03-15 ENCOUNTER — HISTORICAL (OUTPATIENT)
Dept: ADMINISTRATIVE | Facility: HOSPITAL | Age: 52
End: 2022-03-15

## 2022-03-30 ENCOUNTER — HISTORICAL (OUTPATIENT)
Dept: ADMINISTRATIVE | Facility: HOSPITAL | Age: 52
End: 2022-03-30

## 2022-04-04 NOTE — ED ADULT NURSE NOTE - CHPI ED NUR SYMPTOMS POS
DC Summary faxed to [facility] at this time.      WHEEZING/COUGH/SHORTNESS OF BREATH/DYSPNEA ON EXERTION/CHEST CONGESTION/DIFFICULTY BREATHING

## 2022-04-16 ENCOUNTER — HOSPITAL ENCOUNTER (OUTPATIENT)
Dept: MEDSURG UNIT | Facility: HOSPITAL | Age: 52
End: 2022-04-17
Attending: STUDENT IN AN ORGANIZED HEALTH CARE EDUCATION/TRAINING PROGRAM | Admitting: STUDENT IN AN ORGANIZED HEALTH CARE EDUCATION/TRAINING PROGRAM

## 2022-04-16 ENCOUNTER — HISTORICAL (OUTPATIENT)
Dept: ADMINISTRATIVE | Facility: HOSPITAL | Age: 52
End: 2022-04-16

## 2022-04-16 LAB
ABS NEUT (OLG): 2.33 (ref 2.1–9.2)
ALBUMIN SERPL-MCNC: 3.8 G/DL (ref 3.5–5)
ALBUMIN/GLOB SERPL: 0.9 {RATIO} (ref 1.1–2)
ALP SERPL-CCNC: 58 U/L (ref 40–150)
ALT SERPL-CCNC: 18 U/L (ref 0–55)
AMYLASE SERPL-CCNC: 30 U/L (ref 25–125)
APPEARANCE, UA: CLEAR
AST SERPL-CCNC: 22 U/L (ref 5–34)
BACTERIA SPEC CULT: NORMAL
BASOPHILS # BLD AUTO: 0 10*3/UL (ref 0–0.2)
BASOPHILS NFR BLD AUTO: 0 %
BILIRUB SERPL-MCNC: 0.6 MG/DL
BILIRUB UR QL STRIP: NEGATIVE
BILIRUBIN DIRECT+TOT PNL SERPL-MCNC: 0.2 (ref 0–0.5)
BILIRUBIN DIRECT+TOT PNL SERPL-MCNC: 0.4 (ref 0–0.8)
BUN SERPL-MCNC: 4.5 MG/DL (ref 9.8–20.1)
CALCIUM SERPL-MCNC: 10.4 MG/DL (ref 8.7–10.5)
CHLORIDE SERPL-SCNC: 106 MMOL/L (ref 98–107)
CO2 SERPL-SCNC: 24 MMOL/L (ref 22–29)
COLOR UR: NORMAL
CREAT SERPL-MCNC: 0.82 MG/DL (ref 0.55–1.02)
EOSINOPHIL # BLD AUTO: 0.4 10*3/UL (ref 0–0.9)
EOSINOPHIL NFR BLD AUTO: 6 %
ERYTHROCYTE [DISTWIDTH] IN BLOOD BY AUTOMATED COUNT: 13.2 % (ref 11.5–14.5)
FLAG2 (OHS): 70
FLAG3 (OHS): 80
FLAGS (OHS): 70
GLOBULIN SER-MCNC: 4.2 G/DL (ref 2.4–3.5)
GLUCOSE (UA): NORMAL
GLUCOSE SERPL-MCNC: 95 MG/DL (ref 74–100)
HCT VFR BLD AUTO: 46.8 % (ref 35–46)
HEMOLYSIS INTERF INDEX SERPL-ACNC: 28
HGB BLD-MCNC: 15 G/DL (ref 12–16)
HGB UR QL STRIP: NORMAL
HYALINE CASTS #/AREA URNS LPF: NORMAL /[LPF]
ICTERIC INTERF INDEX SERPL-ACNC: 1
IMM GRANULOCYTES # BLD AUTO: 0.01 10*3/UL
IMM GRANULOCYTES NFR BLD AUTO: 0 %
IMM. NE 2 SUSPECT FLAG (OHS): 60
KETONES UR QL STRIP: NORMAL
LEUKOCYTE ESTERASE UR QL STRIP: NEGATIVE
LIPASE SERPL-CCNC: 11 U/L
LIPEMIC INTERF INDEX SERPL-ACNC: 5
LOW EVENT # SUSPECT FLAG (OHS): 70
LYMPHOCYTES # BLD AUTO: 3.4 10*3/UL (ref 0.6–4.6)
LYMPHOCYTES NFR BLD AUTO: 49 %
MANUAL DIFF? (OHS): NO
MCH RBC QN AUTO: 31.4 PG (ref 26–34)
MCHC RBC AUTO-ENTMCNC: 32.1 G/DL (ref 31–37)
MCV RBC AUTO: 97.9 FL (ref 80–100)
MO BLASTS SUSPECT FLAG (OHS): 70
MONOCYTES # BLD AUTO: 0.8 10*3/UL (ref 0.1–1.3)
MONOCYTES NFR BLD AUTO: 12 %
MUCOUS THREADS URNS QL MICRO: NORMAL
NEUTROPHILS # BLD AUTO: 2.33 10*3/UL (ref 2.1–9.2)
NEUTROPHILS NFR BLD AUTO: 33 %
NITRITE UR QL STRIP: NEGATIVE
NRBC BLD AUTO-RTO: 0 % (ref 0–0.2)
PH UR STRIP: 5 [PH] (ref 4.5–8)
PLATELET # BLD AUTO: 230 10*3/UL (ref 130–400)
PLATELET CLUMPS SUSPECT FLAG (OHS): 60
PMV BLD AUTO: 11 FL (ref 7.4–10.4)
POTASSIUM SERPL-SCNC: 3.9 MMOL/L (ref 3.5–5.1)
PROT SERPL-MCNC: 8 G/DL (ref 6.4–8.3)
PROT UR QL STRIP: NEGATIVE
RBC # BLD AUTO: 4.78 10*6/UL (ref 4–5.2)
RBC #/AREA URNS HPF: NORMAL /[HPF] (ref 0–5)
SARS-COV-2 AG RESP QL IA.RAPID: NEGATIVE
SODIUM SERPL-SCNC: 142 MMOL/L (ref 136–145)
SP GR UR STRIP: 1.02 (ref 1–1.03)
SQUAMOUS EPITHELIAL, UA: NORMAL
UROBILINOGEN UR STRIP-ACNC: NORMAL
WBC # SPEC AUTO: 7.1 10*3/UL (ref 4.5–11)
WBC #/AREA URNS HPF: NORMAL /[HPF] (ref 0–5)

## 2022-04-17 LAB
ABS NEUT (OLG): 13.31 (ref 2.1–9.2)
ALBUMIN SERPL-MCNC: 3.5 G/DL (ref 3.5–5)
ALBUMIN/GLOB SERPL: 0.9 {RATIO} (ref 1.1–2)
ALP SERPL-CCNC: 51 U/L (ref 40–150)
ALT SERPL-CCNC: 17 U/L (ref 0–55)
AST SERPL-CCNC: 17 U/L (ref 5–34)
BASOPHILS # BLD AUTO: 0 10*3/UL (ref 0–0.2)
BASOPHILS NFR BLD AUTO: 0 %
BILIRUB SERPL-MCNC: 0.3 MG/DL
BILIRUBIN DIRECT+TOT PNL SERPL-MCNC: 0.1 (ref 0–0.5)
BILIRUBIN DIRECT+TOT PNL SERPL-MCNC: 0.2 (ref 0–0.8)
BUN SERPL-MCNC: 5.6 MG/DL (ref 9.8–20.1)
CALCIUM SERPL-MCNC: 10.1 MG/DL (ref 8.7–10.5)
CHLORIDE SERPL-SCNC: 107 MMOL/L (ref 98–107)
CO2 SERPL-SCNC: 22 MMOL/L (ref 22–29)
CREAT SERPL-MCNC: 0.79 MG/DL (ref 0.55–1.02)
ERYTHROCYTE [DISTWIDTH] IN BLOOD BY AUTOMATED COUNT: 13.2 % (ref 11.5–14.5)
FLAG2 (OHS): 70
FLAG3 (OHS): 80
FLAGS (OHS): 70
GLOBULIN SER-MCNC: 4.1 G/DL (ref 2.4–3.5)
GLUCOSE SERPL-MCNC: 170 MG/DL (ref 74–100)
HCT VFR BLD AUTO: 41.8 % (ref 35–46)
HEMOLYSIS INTERF INDEX SERPL-ACNC: 20
HGB BLD-MCNC: 13.5 G/DL (ref 12–16)
ICTERIC INTERF INDEX SERPL-ACNC: 0
IMM GRANULOCYTES # BLD AUTO: 0.07 10*3/UL
IMM GRANULOCYTES NFR BLD AUTO: 0 %
IMM. NE 1 SUSPECT FLAG (OHS): 20
IMM. NE 2 SUSPECT FLAG (OHS): 10
LIPEMIC INTERF INDEX SERPL-ACNC: 7
LOW EVENT # SUSPECT FLAG (OHS): 70
LYMPHOCYTES # BLD AUTO: 1.1 10*3/UL (ref 0.6–4.6)
LYMPHOCYTES NFR BLD AUTO: 7 %
MANUAL DIFF? (OHS): NO
MCH RBC QN AUTO: 31.7 PG (ref 26–34)
MCHC RBC AUTO-ENTMCNC: 32.3 G/DL (ref 31–37)
MCV RBC AUTO: 98.1 FL (ref 80–100)
MO BLASTS SUSPECT FLAG (OHS): 40
MONOCYTES # BLD AUTO: 1 10*3/UL (ref 0.1–1.3)
MONOCYTES NFR BLD AUTO: 6 %
NEUTROPHILS # BLD AUTO: 13.31 10*3/UL (ref 2.1–9.2)
NEUTROPHILS NFR BLD AUTO: 86 %
NRBC BLD AUTO-RTO: 0 % (ref 0–0.2)
PLATELET # BLD AUTO: 232 10*3/UL (ref 130–400)
PLATELET CLUMPS SUSPECT FLAG (OHS): 60
PMV BLD AUTO: 11 FL (ref 7.4–10.4)
POTASSIUM SERPL-SCNC: 3.9 MMOL/L (ref 3.5–5.1)
PROT SERPL-MCNC: 7.6 G/DL (ref 6.4–8.3)
RBC # BLD AUTO: 4.26 10*6/UL (ref 4–5.2)
SODIUM SERPL-SCNC: 138 MMOL/L (ref 136–145)
WBC # SPEC AUTO: 15.5 10*3/UL (ref 4.5–11)

## 2022-04-27 ENCOUNTER — HISTORICAL (OUTPATIENT)
Dept: ADMINISTRATIVE | Facility: HOSPITAL | Age: 52
End: 2022-04-27
Payer: COMMERCIAL

## 2022-05-05 ENCOUNTER — HOSPITAL ENCOUNTER (EMERGENCY)
Facility: HOSPITAL | Age: 52
Discharge: HOME | End: 2022-05-05
Attending: FAMILY MEDICINE
Payer: MEDICAID

## 2022-05-05 VITALS
DIASTOLIC BLOOD PRESSURE: 73 MMHG | SYSTOLIC BLOOD PRESSURE: 124 MMHG | OXYGEN SATURATION: 96 % | RESPIRATION RATE: 20 BRPM | HEART RATE: 107 BPM

## 2022-05-05 DIAGNOSIS — R06.00 DYSPNEA: Primary | ICD-10-CM

## 2022-05-05 DIAGNOSIS — J45.51 SEVERE PERSISTENT ASTHMA WITH ACUTE EXACERBATION: ICD-10-CM

## 2022-05-05 LAB
ALBUMIN SERPL-MCNC: 3.4 GM/DL (ref 3.5–5)
ALBUMIN/GLOB SERPL: 1 RATIO (ref 1.1–2)
ALP SERPL-CCNC: 59 UNIT/L (ref 40–150)
ALT SERPL-CCNC: 11 UNIT/L (ref 0–55)
AST SERPL-CCNC: 12 UNIT/L (ref 5–34)
BASOPHILS # BLD AUTO: 0.02 X10(3)/MCL (ref 0–0.2)
BASOPHILS NFR BLD AUTO: 0.2 %
BILIRUBIN DIRECT+TOT PNL SERPL-MCNC: 0.2 MG/DL (ref 0–0.5)
BILIRUBIN DIRECT+TOT PNL SERPL-MCNC: 0.3 MG/DL (ref 0–0.8)
BILIRUBIN DIRECT+TOT PNL SERPL-MCNC: 0.5 MG/DL
BUN SERPL-MCNC: 9.7 MG/DL (ref 9.8–20.1)
CALCIUM SERPL-MCNC: 9.4 MG/DL (ref 8.4–10.2)
CHLORIDE SERPL-SCNC: 107 MMOL/L (ref 98–107)
CO2 SERPL-SCNC: 26 MMOL/L (ref 22–29)
CREAT SERPL-MCNC: 0.76 MG/DL (ref 0.55–1.02)
EOSINOPHIL # BLD AUTO: 0.2 X10(3)/MCL (ref 0–0.9)
EOSINOPHIL NFR BLD AUTO: 2.3 %
ERYTHROCYTE [DISTWIDTH] IN BLOOD BY AUTOMATED COUNT: 12.8 % (ref 11.5–17)
GLOBULIN SER-MCNC: 3.4 GM/DL (ref 2.4–3.5)
GLUCOSE SERPL-MCNC: 132 MG/DL (ref 74–100)
HCT VFR BLD AUTO: 39.1 % (ref 37–47)
HGB BLD-MCNC: 12.3 GM/DL (ref 12–16)
IMM GRANULOCYTES # BLD AUTO: 0.02 X10(3)/MCL (ref 0–0.02)
IMM GRANULOCYTES NFR BLD AUTO: 0.2 % (ref 0–0.43)
LYMPHOCYTES # BLD AUTO: 2.89 X10(3)/MCL (ref 0.6–4.6)
LYMPHOCYTES NFR BLD AUTO: 32.8 %
MCH RBC QN AUTO: 31.5 PG (ref 27–31)
MCHC RBC AUTO-ENTMCNC: 31.5 MG/DL (ref 33–36)
MCV RBC AUTO: 100 FL (ref 80–94)
MONOCYTES # BLD AUTO: 0.63 X10(3)/MCL (ref 0.1–1.3)
MONOCYTES NFR BLD AUTO: 7.2 %
NEUTROPHILS # BLD AUTO: 5 X10(3)/MCL (ref 2.1–9.2)
NEUTROPHILS NFR BLD AUTO: 57.3 %
NRBC BLD AUTO-RTO: 0 %
PLATELET # BLD AUTO: 236 X10(3)/MCL (ref 130–400)
PMV BLD AUTO: 11.4 FL (ref 9.4–12.4)
POTASSIUM SERPL-SCNC: 3.6 MMOL/L (ref 3.5–5.1)
PROT SERPL-MCNC: 6.8 GM/DL (ref 6.4–8.3)
RBC # BLD AUTO: 3.91 X10(6)/MCL (ref 4.2–5.4)
SODIUM SERPL-SCNC: 140 MMOL/L (ref 136–145)
TROPONIN I SERPL-MCNC: <0.01 NG/ML (ref 0–0.04)
WBC # SPEC AUTO: 8.8 X10(3)/MCL (ref 4.5–11.5)

## 2022-05-05 PROCEDURE — 94640 AIRWAY INHALATION TREATMENT: CPT

## 2022-05-05 PROCEDURE — 80053 COMPREHEN METABOLIC PANEL: CPT | Performed by: STUDENT IN AN ORGANIZED HEALTH CARE EDUCATION/TRAINING PROGRAM

## 2022-05-05 PROCEDURE — 25000242 PHARM REV CODE 250 ALT 637 W/ HCPCS: Performed by: FAMILY MEDICINE

## 2022-05-05 PROCEDURE — 99284 EMERGENCY DEPT VISIT MOD MDM: CPT | Mod: 25

## 2022-05-05 PROCEDURE — 84484 ASSAY OF TROPONIN QUANT: CPT | Performed by: STUDENT IN AN ORGANIZED HEALTH CARE EDUCATION/TRAINING PROGRAM

## 2022-05-05 PROCEDURE — 63600175 PHARM REV CODE 636 W HCPCS: Performed by: STUDENT IN AN ORGANIZED HEALTH CARE EDUCATION/TRAINING PROGRAM

## 2022-05-05 PROCEDURE — 36415 COLL VENOUS BLD VENIPUNCTURE: CPT | Performed by: STUDENT IN AN ORGANIZED HEALTH CARE EDUCATION/TRAINING PROGRAM

## 2022-05-05 PROCEDURE — 93005 ELECTROCARDIOGRAM TRACING: CPT

## 2022-05-05 PROCEDURE — 85025 COMPLETE CBC W/AUTO DIFF WBC: CPT | Performed by: STUDENT IN AN ORGANIZED HEALTH CARE EDUCATION/TRAINING PROGRAM

## 2022-05-05 PROCEDURE — 96372 THER/PROPH/DIAG INJ SC/IM: CPT | Performed by: STUDENT IN AN ORGANIZED HEALTH CARE EDUCATION/TRAINING PROGRAM

## 2022-05-05 RX ORDER — IPRATROPIUM BROMIDE AND ALBUTEROL SULFATE 2.5; .5 MG/3ML; MG/3ML
3 SOLUTION RESPIRATORY (INHALATION) EVERY 6 HOURS PRN
Qty: 75 ML | Refills: 2 | Status: ON HOLD | OUTPATIENT
Start: 2022-05-05 | End: 2022-06-29 | Stop reason: HOSPADM

## 2022-05-05 RX ORDER — TERBUTALINE SULFATE 1 MG/ML
0.25 INJECTION SUBCUTANEOUS EVERY 20 MIN
Status: COMPLETED | OUTPATIENT
Start: 2022-05-05 | End: 2022-05-05

## 2022-05-05 RX ORDER — IPRATROPIUM BROMIDE AND ALBUTEROL SULFATE 2.5; .5 MG/3ML; MG/3ML
3 SOLUTION RESPIRATORY (INHALATION)
Status: COMPLETED | OUTPATIENT
Start: 2022-05-05 | End: 2022-05-05

## 2022-05-05 RX ORDER — PREDNISONE 20 MG/1
60 TABLET ORAL DAILY
Qty: 15 TABLET | Refills: 0 | Status: SHIPPED | OUTPATIENT
Start: 2022-05-05 | End: 2022-05-10

## 2022-05-05 RX ADMIN — IPRATROPIUM BROMIDE AND ALBUTEROL SULFATE 3 ML: 2.5; .5 SOLUTION RESPIRATORY (INHALATION) at 05:05

## 2022-05-05 RX ADMIN — TERBUTALINE SULFATE 0.25 MG: 1 INJECTION SUBCUTANEOUS at 05:05

## 2022-05-05 RX ADMIN — TERBUTALINE SULFATE 0.25 MG: 1 INJECTION SUBCUTANEOUS at 04:05

## 2022-05-05 NOTE — ED PROVIDER NOTES
Encounter Date: 5/5/2022       History     Chief Complaint   Patient presents with    Shortness of Breath     Reports via [facility]. Patient reports SOB for the last few hours. Hx of asthma. [facility] gave duo neb, solumedrol and 2g of mag enroute. Arrived on 15L via NRB. Audible wheezing. Patient speaking in short sentences.      This is a 52-year-old  female with past medical history of asthma, COPD, diverticulitis presents for shortness of breaths and dyspnea.  Patient stated that this started approximately in the afternoon yesterday, that despite the use of her nebulized albuterol dyspnea did not get better.  Patient states that her asthma is not controlled patient nightly awakenings due to shortness breath and asthma.  Patient says she is compliant medications, she takes Symbicort, Singulair, and nebulized albuterol as needed.  Patient's recent transplant for [place], patient states that the humidity has made her asthma worse.  Patient says she moved here in November, and has required a hospitalization for asthma exacerbation.  Patient is a half a pack a day smoker.  Patient was brought to the ED, given Solu-Medrol, and 2 g of magnesium drip.        Review of patient's allergies indicates:   Allergen Reactions    Flagyl [metronidazole]     Opioids - morphine analogues      History reviewed. No pertinent past medical history.  History reviewed. No pertinent surgical history.  History reviewed. No pertinent family history.     Review of Systems   Constitutional: Negative for chills, diaphoresis and fever.   Respiratory: Positive for cough, shortness of breath and wheezing.    Cardiovascular: Negative for chest pain and palpitations.   Gastrointestinal: Negative for constipation, diarrhea, nausea and vomiting.   Genitourinary: Negative for dysuria and hematuria.   Musculoskeletal: Negative for back pain.   Skin: Negative for rash.   Neurological: Negative for weakness.   Hematological: Does not  bruise/bleed easily.       Physical Exam     Initial Vitals [05/05/22 0347]   BP Pulse Resp Temp SpO2   124/73 101 (!) 34 -- (!) 93 %      MAP       --         Physical Exam    Constitutional: She appears well-developed and well-nourished. No distress.   HENT:   Mouth/Throat: Oropharynx is clear and moist and mucous membranes are normal.   Eyes: Conjunctivae and EOM are normal. Pupils are equal, round, and reactive to light.   Neck: No stridor present. No JVD present.   Cardiovascular: Normal rate, regular rhythm, S1 normal, S2 normal, normal heart sounds, intact distal pulses and normal pulses.   Pulmonary/Chest: Accessory muscle usage present. Tachypnea noted. She has wheezes in the right upper field, the right middle field, the right lower field, the left upper field, the left middle field and the left lower field.     Neurological: She is alert and oriented to person, place, and time. She has normal strength. No cranial nerve deficit.   Skin: Skin is warm, dry and intact.   Psychiatric: She has a normal mood and affect. Her speech is normal.         ED Course   Procedures  Labs Reviewed   COMPREHENSIVE METABOLIC PANEL - Abnormal; Notable for the following components:       Result Value    Glucose Level 132 (*)     Blood Urea Nitrogen 9.7 (*)     Albumin Level 3.4 (*)     Albumin/Globulin Ratio 1.0 (*)     All other components within normal limits   CBC WITH DIFFERENTIAL - Abnormal; Notable for the following components:    RBC 3.91 (*)     .0 (*)     MCH 31.5 (*)     MCHC 31.5 (*)     IG# 0.02 (*)     All other components within normal limits   TROPONIN I - Normal   CBC W/ AUTO DIFFERENTIAL    Narrative:     The following orders were created for panel order CBC auto differential.  Procedure                               Abnormality         Status                     ---------                               -----------         ------                     CBC with Differential[[id number]]        Abnormal             Final result                 Please view results for these tests on the individual orders.     EKG Readings: (Independently Interpreted)   Rhythm: Normal Sinus Rhythm.     ECG Results          EKG 12-lead (Final result)  Result time 05/06/22 08:54:19    Final result by Interface, Lab In [facility] (05/06/22 08:54:19)                 Narrative:    Test Reason : R06.00,    Vent. Rate : 092 BPM     Atrial Rate : 092 BPM     P-R Int : 132 ms          QRS Dur : 064 ms      QT Int : 364 ms       P-R-T Axes : 071 061 044 degrees     QTc Int : 450 ms    Normal sinus rhythm  Normal ECG  No previous ECGs available  Confirmed by Sarah Portillo MD ([id number]) on 5/6/2022 8:54:07 AM    Referred By:             Confirmed By:Sarah Portillo MD                            Imaging Results    None          Medications   terbutaline injection 0.25 mg (0.25 mg Subcutaneous Given 5/5/22 4546)   albuterol-ipratropium 2.5 mg-0.5 mg/3 mL nebulizer solution 3 mL (3 mLs Nebulization Given 5/5/22 1424)     Medical Decision Making:   Initial Assessment:   Patient was given duo nebs and put on a NRB. Terbutaline will be given and labs along with EKG will be ordered  Differential Diagnosis:   COPD   Asthma Exacerbation   Clinical Tests:   Lab Tests: Reviewed       <> Summary of Lab: Benign labs   ED Management:  Patient had clinical response to the terbutaline.   Patient is stable for discharge with a prednisone 60mg for 5 days, along with a LAMA in addition to her symbicort.               Attending Attestation:   Physician Attestation Statement for Resident:  As the supervising MD   Physician Attestation Statement: I have personally seen and examined this patient.   I agree with the above history. -:   As the supervising MD I agree with the above PE.    As the supervising MD I agree with the above treatment, course, plan, and disposition.   -: 52-year-old male presents emergency room with complaints of asthma with associated shortness breath that  began this afternoon.  Patient arrives via ambulance, and received Solu-Medrol 125 IV and 2 g of magnesium sulfate by EMS.    On physical examination, patient has moderate expiratory wheezing, slightly tachypneic, no acute distress.  Cardiovascular:  S1-S2 regular rate and rhythm.  On laboratory evaluation, no acute abnormalities appreciated.    After receiving additional treatment in the emergency room, patient was feeling significantly improved.  Patient will be discharged on prednisone, and will follow up with primary care physician.  ER precautions given for any acute worsening.                         Clinical Impression:       Asthma exacerbation  COPD        ED Disposition Condition    Discharge Stable        ED Prescriptions     Medication Sig Dispense Start Date End Date Auth. Provider    umeclidinium (INCRUSE ELLIPTA) 62.5 mcg/actuation inhalation capsule Inhale 62.5 mcg into the lungs once daily. Controller 30 each 2022  Kelly Miranda MD    predniSONE (DELTASONE) 20 MG tablet () Take 3 tablets (60 mg total) by mouth once daily. for 5 days 15 tablet 2022 5/10/2022 Kelly Miranda MD    albuterol-ipratropium (DUO-NEB) 2.5 mg-0.5 mg/3 mL nebulizer solution Take 3 mLs by nebulization every 6 (six) hours as needed for Wheezing. Rescue 75 mL 2022 Kelly Miranda MD        Follow-up Information     Follow up With Specialties Details Why Contact Info    [facility] - Emergency Dept Emergency Medicine  If symptoms worsen [address] [facility] [id number] [phone]           Kelly Miranda MD  Resident  [phone]       Alek Garcia MD  [phone]

## 2022-05-05 NOTE — Clinical Note
"Niya Justicee" Sajan was seen and treated in our emergency department on 5/5/2022.  She may return to work on 05/09/2022.       If you have any questions or concerns, please don't hesitate to call.      Kelly Miranda MD"

## 2022-05-14 NOTE — ED PROVIDER NOTES
Patient:   Niya Moeller             MRN: [id number]            FIN: [id number]               Age:   51 years     Sex:  Female     :  1970   Associated Diagnoses:   Acute asthma exacerbation   Author:   Arvind Crowley MD      Basic Information   Additional information: Chief Complaint from Nursing Triage Note : Chief Complaint   2022 17:28 CST      Chief Complaint           Pt reports productive cough with SOB x 2 days. Pt has wheezing bilaterally.  .      History of Present Illness   Chief complaint: Shortness of breath  Patient arrived with: Self  History obtained from: Patient    Subjective:  51-year-old female with a history of asthma, GERD, diverticulosis the presents to the emergency department for shortness of breath.  Patient says she has been having shortness of breath for approximately 2 days, this is very similar to previous episodes of asthma exacerbation.  She said this was triggered at home when the heater was turned up too high.  Complains of mild cough.  Denies chest pain.  Denies fever, chills, sweats, sore throat, runny nose, abdominal pain, nausea, vomiting, diarrhea, dysuria, hematuria.  Patient normally takes albuterol and Symbicort which help control her symptoms but she recently moved to [place] and has been having worsening symptoms because of weather fluctuations.  Patient has never needed to be intubated in the past.    PMH: listed above  PSH: None  SH: Smokes 1 pack/day.  Denies EtOH or recreational drug use.    Allergies: Flagyl, Levaquin, morphine    Review of Symptoms:  General: No fevers. No sweats.  ENT: No ear pain. No cough.  Eyes: No vision changes. No eye pain.   Cardiovascular: No chest pain. No heart palpitations.   Respiratory: + shortness of breath. No labored respiration.  Gastrointestinal: No vomiting. No diarrhea.  Genitourinary: No urinary frequency. No abnormal discharge.   Musculoskeletal: No muscle pain. No back pain  Skin: No rash. No color  changes.   Neurological: No headache. No weakness.     Objective:  Vital Signs (last 24 hrs)_____  Last Charted___________  Temp Oral     36.8 DegC  (FEB 22 17:28)  Heart Rate Peripheral   H 106bpm  (FEB 22 18:56)  Resp Rate         H 28br/min  (FEB 22 18:56)  SBP      116 mmHg  (FEB 22 17:30)  DBP      78 mmHg  (FEB 22 17:30)  SpO2      94 %  (FEB 22 18:56)      Physical exam:  General: Awake. Oriented. Non-toxic appearance.  HENT: No obvious scalp deformities. Normal neck ROM.  Eyes: Pupils are equal, round, and reactive. Clear conjunctiva.   Cardiovascular: Normal rhythm. No lower extremity edema.   Respiratory: Diffuse wheezing bilaterally.  Non-labored respiration.  Gastrointestinal: Abdomen nontender to palpation. No distention.  Musculoskeletal: No signs of obvious deformity. Normal range of motion.  Skin: Warm. Dry.  Neurological: Patient at baseline. Normal speech and comprehension.   Psychological: Normal mood. Normal behavior.     Images:  Reviewed radiology report  Accession: [id number]  Order: XR Chest 2 Views  Report Dt/Tm: 02/22/2022 18:34  Report:      History:  Cough     Reference:  15 February 2022     Findings:  PA and lateral views of the chest were obtained. Heart and mediastinum  within normal limits. The lungs are clear. No pneumothorax or  significant effusion.     Impression:   No acute cardiopulmonary abnormality.      Records:  Nursing records and triage records reviewed  Prior records reviewed    Critical Care time:  Upon my evaluation, this patient had a high probability of imminent or life-threatening deterioration due to asthma exacerbation, which required my direct attention, intervention, and personal management.    I have personally provided 35 minutes of critical care time exclusive of time spent on separately billed procedures. Time includes review of chart, history and physical exam of the patient, review of laboratory data, review of radiology results, discussion with  consultants, and monitoring for potential decompensation. Interventions were performed as documented above.     MDM:  Presents to the emergency department for shortness of breath similar to previous episodes of asthma exacerbation.  Patient has nontoxic-appearing.  Afebrile, tachycardic to 106, normotensive, saturating well on room air.  Patient had diffuse wheezing bilaterally with no signs of respiratory distress.  Will get DuoNeb and steroids for symptomatic management.  Will get chest x-ray for evaluation of infiltrates.    On reevaluation patient says she was starting to feel better but not back to normal.  Patient's lung sounds were improved with continued to have diffuse wheezing.  Patient will be started on magnesium and a continuous albuterol nebulizer.    Patient will be admitted to the hospital for further management secondary to asthma exacerbation.  Discussed with internal medicine team.    Diagnosis:  Asthma    Arvind Crowley M.D.  Emergency Medicine Physician     (Please note that this chart was completed via voice to text dictation. There may be typographical errors or substitutions that are unintentional, or uncorrected. Every attempt was made to proofread the chart prior to completion. If there are any questions, please contact the physician for final clarification).        Health Status   Allergies:    Allergic Reactions (All)  Severity Not Documented  Flagyl- Hives.  Levaquin Leva-Riccardo- Hives.  LevoFLOXacin- Itching..      Past Medical/ Family/ Social History   Medical history:    No active or resolved past medical history items have been selected or recorded..   Surgical history:    denies..   Family history:    No family history items have been selected or recorded..   Social history:    Social & Psychosocial Habits    Tobacco  12/26/2021  Use: 5-9 cigarettes (between 1    Patient Wants Consult For Cessation Counseling No    01/07/2022  Use: 5-9 cigarettes (between 1    Patient Wants Consult For  Cessation Counseling No    01/15/2022  Use: 4 or less cigarettes(less    Patient Wants Consult For Cessation Counseling No    01/15/2022  Use: 5-9 cigarettes (between 1    Patient Wants Consult For Cessation Counseling No    01/19/2022  Use: Former smoker, quit more    Patient Wants Consult For Cessation Counseling N/A    02/15/2022  Use: 5-9 cigarettes (between 1    Type: Cigars    Patient Wants Consult For Cessation Counseling No    02/22/2022  Use: 10 or more cigarettes (1/    Patient Wants Consult For Cessation Counseling No    Abuse/Neglect  12/26/2021  SHX Any signs of abuse or neglect No    01/07/2022  SHX Any signs of abuse or neglect No    01/15/2022  SHX Any signs of abuse or neglect No    01/19/2022  SHX Any signs of abuse or neglect No    02/15/2022  SHX Any signs of abuse or neglect No    Feels unsafe at home: No    Safe place to go: Yes    02/22/2022  SHX Any signs of abuse or neglect No    Feels unsafe at home: No    Safe place to go: Yes  .   Problem list:    Active Problems (2)  Obesity   Tobacco user   .      Physical Examination               Vital Signs      Vital Signs (last 24 hrs)_____  Last Charted___________  Temp Oral     36.8 DegC  (FEB 22 17:28)  Heart Rate Peripheral   H 106bpm  (FEB 22 18:56)  Resp Rate         H 28br/min  (FEB 22 18:56)  SBP      116 mmHg  (FEB 22 17:30)  DBP      78 mmHg  (FEB 22 17:30)  SpO2      94 %  (FEB 22 18:56)  .      Impression and Plan   Diagnosis   Acute asthma exacerbation ([id number] J45.901)   Plan   Condition: Stable.    Disposition: Admit to Inpatient Unit.

## 2022-05-14 NOTE — DISCHARGE SUMMARY
Admit and Discharge Dates  Admit Date: 04/16/2022  Discharge Date: 04/17/2022  Physicians  Attending Physician - Isha Jimenez DO  Admitting Physician - Isha Jimenez DO  Primary Care Physician - No PCP, No  Discharge Diagnosis  1.?Asthma with acute exacerbation?J45.901  2.?Acute diverticulitis?K57.92  3.?Diverticulosis?K57.90  4.?Tobacco user?Z72.0  Immunizations  No immunizations recorded for this visit.  Admission Information  Ms. Moeller?is a?52-year-old lady with a past medical history significant for diverticulitis,?asthma, and GERD, who presents to the ED?with?increased SOB, DUMONT,?and wheezing x2 weeks that acutely worsened yesterday despite home albuterol neb and inhaler therapy. She reports that her SOB started about 2 weeks ago, prompting her to use ~4?duoneb or albuterol inhaler txs per day, bringing relief until yesterday when she was unable to get?attain relief. She also endorses chills, nausea, and fatigue; Denies headache, diarrhea, abd pain, melena, hematochezia, hemoptysis, ?Of note, she denies ever having been intubated and states that she has multiple triggers including but not limited to weather changes, pet?dander, pollen, dust, and her GERD. She reports her last hospitalization was about 6 months for asthma.?Aside from this complaint, patient also endorses having?longstanding hx of diverticulitis and has recently had diverticulitis that has been resistant to antibiotic therapy including Augmentin, Flagyl and Cipro, and recently?cefdinir. She has been complaining of LLQ tenderness and epigastric pain she describes as GERD, and was recently here on 3/30 to be treated with?Zosyn for diverticulitis but left?AMA.? Patient states she?has had?several flares of her diverticulitis over the last several years while she was living in [place], with several hospitalizations?requiring long?courses of IV antibiotics.? She reports she had?3 bowel perforations, but had refused surgery on several occasions.?  Patient was referred to surgery here after her initial ED visit on the 13th, has an appointment with them on May 10. ?For her?acute asthma exacerbation, the patient was?given?Solu-Medrol IM by EMS?along with a breathing treatment.  Hospital Course  While Under care at [facility], Ms. Niya Moeller was treated for acute asthma exacerbation, acute on chronic diverticulitis, and GERD.?For acute asthma exacerbation, patient was treated with DuoNeb, Magnesium, Solu-Medrol 80mg TID x2 days, and x3 terbutaline treatments. She initially required 2L nasal canula while saturating 91% and over x2 days progressed to room air saturating 96-98% at rest and upon ambulation. Initially, breath sounds resulted in poor air movement and expiratory wheezes however prior to discharge this was dramatically improved. In regards to her chronic diverticulitis, CT abdomen (3/30) showed acute sigmoid diverticulitis and failed OPAT abx. During this hospitalizations, patient was treated for x2 days w/ IV Zosyn with tremendous of LLQ pain. During her stay, she did not experience any fever, hematochezia, or melena. A leukocytosis ensued on hospitalization day #2, though likely s/t steroid administration. D/t?epigastric pain, lipase was obtained and was negative.?Patient endorsed chronic GERD and dysphagia to solids for many years. She was prescribed Omeprazole and instructed to follow up w/ GI clinic for both chronic diverticulitis and upper GI symptoms. She was prescribed?Augmentin 875-125mg every 8 hours for x8 days. Patient was also provided w/ prescription for Symbicort and Prednisone 40mg Daily x5 days.  Time Spent on discharge  > 30 minutes  Objective  Vitals & Measurements  T: 36.6 ??C (Oral)  HR: 90 bpm (Peripheral)?  RR: 18 r/min?  BP: 112/68S  pO2: 95%, on room air  Physical Exam  General: pleasant, well-developed -American female, in no acute distress  Eye: PERRL, EOMI  HENT: NC/AT, moist mucus membranes  Neck: Supple, nontender, no  JVD  Respiratory:?Improved air?movement throughout posterior auscultation, no appreciated end-expiratory wheezes.  Cardiovascular: Regular rate and rhythm, no murmurs, rubs, or gallops. No peripheral edema. 2+ radial and DP pulses  Gastrointestinal:?Mild tenderness to epigastric/LLQ regions, improved in interval. No guarding or rebound, nondistended, normoactive bowel sounds  Musculoskeletal: full range of motion of all extremities,?no obvious deformities  Integumentary: no rashes or skin lesions present  Neurologic: Alert and oriented x3. Normal speech. No focal neurological deficits appreciated.  Psychiatric:?Calm, cooperative  Patient Discharge Condition  Patient was clinically, medically, and hemodynamically stable at time of discharge.  Discharge Disposition  Ms. Niya Moeller?is being discharged?home?.  ?   Activity:?Return to normal activity as tolerated.  Diet:?Regular diet, with emphasis on avoiding foods that may flare diverticulosis/diverticulitis  ?   Medications:  -Rx provided for?Augmentin 875-125mg, every 8 hours,?for?x8 days?  -Rx provided for?Prednisone 40 mg, daily,?for?5 days?  -Rx provided for?Symbicort 160-4.5mcg, x2 puffs BID  -Rx provided for Omeprazole 40 mg, daily  ?  -Continue other medications as previously prescribed  ?   Follow Ups:  -To be seen in [facility] with [facility]?4 in?1.5 weeks  -Referred to GI Clinic  -Referred to Pulmonary Clinic  ?  ?   The above information was discussed with?Ms. Moeller?in clear terms. She?was?able to repeat the instructions to me in?her own words. All questions answered. ED precautions provided.  ?  Attending Physician Addendum  Pt discussed?on rounds?with medicine resident  Agree with above Assessment and Plan   Discharge Medication Reconciliation  Prescribed  amoxicillin-clavulanate (Augmentin 875 mg-125 mg oral tablet)?1 tab(s), Oral, q8hr  budesonide-formoterol (Symbicort 160 mcg-4.5 mcg/inh inhalation aerosol)?2 puff(s), Oral, BID  cetirizine  (cetirizine 10 mg oral tablet)?10 mg, Oral, Daily  omeprazole (omeprazole 40 mg oral DR capsule)?40 mg, Oral,  predniSONE (prednisONE 20 mg oral tablet)?40 mg, Oral, Once  Continue  albuterol (ProAir HFA 90 mcg/inh inhalation aerosol with adapter)?2 puff(s), INH, q4hr, PRN for wheezing  albuterol (albuterol 0.083% inhalation solution)?2.5 mg, INH, q6hr, PRN for wheezing  dicyclomine (dicyclomine 10 mg oral capsule)?10 mg, Oral, QID  Discontinue  acetaminophen-HYDROcodone (acetaminophen-hydrocodone 325 mg-10 mg oral tablet)?1 tab(s), Oral, TID  amoxicillin-clavulanate (amoxicillin-clavulanate 875 mg-125 mg oral tablet)?875 mg, Oral, q12hr  azithromycin (azithromycin 250 mg oral tablet)?250 mg, Oral, As Directed  bacitracin-polymyxin B topical (Polysporin 500 units-10,000 units/g topical ointment), TOP, BID  cefdinir (cefdinir 300 mg oral capsule)?300 mg, Oral, BID  cetirizine ophthalmic (Zerviate 0.24% ophthalmic solution)?1 drop(s), OPTH, BID  ciprofloxacin (ciprofloxacin 500 mg oral tablet)?500 mg, Oral, BID  diclofenac (diclofenac sodium 75 mg oral delayed release tablet)?75 mg, Oral, BID  dicyclomine (dicyclomine 20 mg oral tablet)?20 mg, Oral, QID  diphenhydrAMINE (diphenhydrAMINE 25 mg oral tablet)?25 mg, Oral, TID  fluticasone nasal (fluticasone 50 mcg/inh nasal spray)?1 spray(s), Both Nostrils, BID  ketorolac (ketorolac 10 mg oral tablet)?10 mg, Oral, QID  loratadine (loratadine 10 mg oral tablet)?10 mg, Oral, Daily  metroNIDAZOLE (metronidazole 500 mg oral tablet)?500 mg, Oral, TID  montelukast (Singulair 10 mg oral TABLET)?See Instructions  omeprazole (omeprazole 10 mg oral DR capsule (pt. own))?10 mg, Oral, Daily  ondansetron (ondansetron 4 mg oral tablet, disintegrating)?4 mg, Oral, q6hr  polymyxin B-trimethoprim ophthalmic (polymyxin B-trimethoprim 10,000 units-1 mg/mL ophthalmic solution)?1 drop(s), OPTH, q3hr  predniSONE (prednisONE 10 mg oral tablet)?10 mg, Oral, Daily  prednisoLONE ophthalmic  (prednisoLONE acetate 1% ophthalmic suspension)?1 drop(s), QID  Education and Orders Provided  Asthma Attack  Asthma, Adult, Easy-to-Read  Diverticulitis, Easy-to-Read  Gastroesophageal Reflux Disease, Adult, Easy-to-Read  Discharge - 04/17/22 12:08:00 CDT, Home?  Follow up  Follow-up with PCP in 3 to 5 days  Report to Emergency Department if symptoms return or worsen  [facility]  ????Office will call w/follow up appointment  [facility]  ????Office will call w/follow up appointment  Keep scheduled appointment  ????Follow up with [facility]  Car Seat Challenge  No Qualifying Data

## 2022-05-14 NOTE — ED PROVIDER NOTES
Patient:   Niya Moeller             MRN: [id number]            FIN: [id number]               Age:   52 years     Sex:  Female     :  1970   Associated Diagnoses:   COPD with exacerbation   Author:   Caleb Jefferson MD      Basic Information   Time seen: Date & time 2022 02:45:00.   History source: Patient.   Arrival mode: Ambulance.   History limitation: None.   Additional information: Chief Complaint from Nursing Triage Note : Chief Complaint   2022 2:24 CDT       Chief Complaint           To ED per EMS states asthma worsening despite use of home meds.  Presents to ED on 2lnc with Sats 99%.  .      History of Present Illness   The patient presents with difficulty breathing, wheezing and respiratory problem.  The onset was 6  hours ago and gradual.  The course/duration of symptoms is improving.  Degree at onset mild.  Degree at present moderate.  There are Relieving factorss including oxygen and beta-agonist.  Risk factors consist of asthma, smoking and Unquantified COPD.  Prior episodes: occasional.  Therapy today: beta-agonist (albuterol, inhaler, nebulizer).  Associated symptoms: cough, nausea, vomiting, abdominal pain, denies fever, denies chills and denies hemoptysis.        Review of Systems   Constitutional symptoms:  No fever, no chills.    Eye symptoms:  Vision unchanged.   Respiratory symptoms:  Shortness of breath, cough, wheezing, No hemoptysis,    Cardiovascular symptoms:  Tachycardia.      Health Status   Allergies:    Allergic Reactions (Selected)  Severity Not Documented  Flagyl- Hives.  Levaquin Leva-Riccardo- Hives.  Morphine- Swelling.  .   Medications:  (Selected)   Inpatient Medications  Ordered  Fioricet oral tablet: 1 tab(s), form: Tab, Oral, Once-NOW, first dose 21 18:43:00 CST, stop date 21 18:43:00 CST, STAT  Protonix: 40 mg, form: Injection, IV Slow, Now, first dose 22 2:54:00 CDT, stop date 22 2:54:00 CDT, STAT  Singulair 10 mg oral TABLET:  See Instructions, form: Tab, Oral, Once, first dose 02/22/22 22:20:00 CST, stop date 02/22/22 22:20:00 CST, STAT  Solumedrol IV push / IM: 125 mg, form: Injection, IM, Once-NOW, first dose 02/15/22 11:45:00 CST, stop date 02/15/22 11:45:00 CST, STAT  magnesium sulfate (for CRI): 2 gm, form: Infusion, IV Piggyback, Once, Infuse over: 20 minute(s), first dose 04/16/22 2:49:00 CDT, stop date 04/16/22 2:49:00 CDT, STAT  terbutaline 1 mg/mL injectable solution: 0.25 mg, form: Injection, Subcutaneous, q20min PRN for shortness of breath or wheezing, Order duration: 3 dose(s), first dose 04/16/22 2:50:00 CDT, stop date Limited # of times, STAT  Prescriptions  Prescribed  ProAir HFA 90 mcg/inh inhalation aerosol with adapter: 2 puff(s), INH, q4hr, PRN PRN for wheezing, # 1 EA, 4 Refill(s), Pharmacy: [facility], 149, cm, Height/Length Dosing, 03/30/22 13:49:00 CDT, 73, kg, Weight Dosing, 03/30/22 13:49:00 CDT  Singulair 10 mg oral TABLET: See Instructions, 1 tab(s) Oral Daily, # 30 tab(s), 2 Refill(s), Pharmacy: [id number], 150, cm, Height/Length Dosing, 01/15/22 18:11:00 CST, 67, kg, Weight Dosing, 01/15/22 18:11:00 CST  albuterol 0.083% inhalation solution: 2.5 mg = 3 mL, INH, q6hr, PRN PRN for wheezing, # 30 EA, 3 Refill(s), Pharmacy: [facility], 149, cm, Height/Length Dosing, 03/30/22 13:49:00 CDT, 73, kg, Weight Dosing, 03/30/22 13:49:00 CDT  .      Past Medical/ Family/ Social History   Family history: Not significant.   Social history: Alcohol use: Occasionally, Tobacco use: Regularly, Drug use: Denies.      Physical Examination               Vital Signs   Vital Signs   4/16/2022 2:24 CDT       Temperature Temporal Artery               36.7 DegC                             Peripheral Pulse Rate     118 bpm  HI                             Respiratory Rate          28 br/min  HI                             SpO2                      95 %                              Oxygen Therapy            Nasal cannula                             Oxygen Flow Rate          2 L/min                             Systolic Blood Pressure   155 mmHg  HI                             Diastolic Blood Pressure  105 mmHg  HI  .   General:  Alert, mild distress.    Skin:  Warm, dry, intact.    Head:  Normocephalic, atraumatic.    Eye:  Extraocular movements are intact, vision unchanged.    Cardiovascular:  Tachycardia.   Respiratory:  Symmetrical chest wall expansion, Respirations: Tachypneic, labored, Breath sounds: Wheezes present, Cough: Dry, Retractions: None.    Chest wall:  No tenderness.   Musculoskeletal:  Normal ROM, normal strength.    Neurological:  Alert and oriented to person, place, time, and situation.      Medical Decision Making   Differential Diagnosis:  Pneumonia, chronic obstructive pulmonary disease, asthma, influenza, anxiety.    Documents reviewed:  Emergency department nurses' notes.   Orders  Launch Order Profile (Selected)   Inpatient Orders  Completed  Aerosol Treatment: 04/16/22 2:37:00 CDT, 04/16/22 2:37:00 CDT, [id number]  Automated Diff: Stat collect, 04/16/22 3:06:00 CDT, Blood, Collected, Once, Stop date 04/16/22 3:06:00 CDT, Lab Collect, Print Label By Order Location, 04/16/22 2:47:00 CDT  CBC w/ Auto Diff: Stat collect, 04/16/22 2:47:00 CDT, Blood, Once, Stop date 04/16/22 2:48:00 CDT, Lab Collect, Print Label By Order Location, 04/16/22 2:47:00 CDT  CMP: Stat collect, 04/16/22 2:47:00 CDT, Blood, Stop date 04/16/22 2:48:00 CDT, Lab Collect, 04/16/22 2:47:00 CDT  DuoNeb 0.5 mg-2.5 mg/3 mL inhalation solution: 9 mL, form: Soln-Inh, NEB, Now, first dose 04/16/22 2:37:00 CDT, stop date 04/16/22 2:37:00 CDT, STAT  EKG Adult 12 Lead: 04/16/22 2:52:00 CDT, Stat, Once, 04/16/22 2:52:00 CDT, -1, -1, 04/16/22 2:52:00 CDT  Estimated Glomerular Filtration Rate: Stat collect, 04/16/22 3:06:00 CDT, Blood, Collected, Stop date 04/16/22 3:06:00 CDT, Lab Collect, 04/16/22 2:47:00  CDT  Peripheral IV Insertion: 04/16/22 2:48:00 CDT  Protonix: 40 mg, form: Injection, IV Slow, Now, first dose 04/16/22 2:54:00 CDT, stop date 04/16/22 2:54:00 CDT, STAT  magnesium sulfate (for CRI): 2 gm, form: Infusion, IV Piggyback, Once, Infuse over: 20 minute(s), first dose 04/16/22 2:49:00 CDT, stop date 04/16/22 2:49:00 CDT, STAT  terbutaline 1 mg/mL injectable solution: 0.25 mg, form: Injection, Subcutaneous, q20min PRN for shortness of breath or wheezing, Order duration: 3 dose(s), first dose 04/16/22 2:50:00 CDT, stop date Limited # of times, STAT  .   Electrocardiogram:  Time 4/16/2022 03:13:00, rate 115, normal FL & QRS intervals, EP Interp.    Results review:  Lab results : Lab View   4/16/2022 7:34 CDT       COVID-19 Rapid            NEGATIVE    4/16/2022 5:01 CDT       UA Appear                 Clear                             UA Color                  Light-Yellow                             UA Spec Grav              1.021                             UA Bili                   Negative                             UA pH                     5.0 /uL                             UA Urobilinogen           Normal /HPF                             UA Blood                  2+ /HPF                             UA Glucose                1+ /uL                             UA Ketones                Trace /uL                             UA Protein                Negative /uL                             UA Nitrite                Negative                             UA Leuk Est               Negative                             UA WBC                    0-5 /HPF                             UA RBC                    0-5 /HPF                             UA Mucous                 Occ                             UA Bacteria               None /HPF                             UA Squam Epithelial       Few /HPF                             UA Hyal Cast              None /LPF    4/16/2022 3:06 CDT       Sodium Lvl                 142 mmol/L                             Potassium Lvl             3.9 mmol/L                             Chloride                  106 mmol/L                             CO2                       24 mmol/L                             Calcium Lvl               10.4 mg/dL                             Glucose Lvl               95 mg/dL                             BUN                       4.5 mg/dL  LOW                             Creatinine                0.82 mg/dL                             eGFR-AA                   93                             eGFR-ELIZA                  77 mL/min/1.73 m2  LOW                             Amylase Lvl               30 unit/L                             Bili Total                0.6 mg/dL                             Bili Direct               0.2 mg/dL                             Bili Indirect             0.40 mg/dL                             AST                       22 unit/L                             ALT                       18 unit/L                             Alk Phos                  58 unit/L                             Total Protein             8.0 gm/dL                             Albumin Lvl               3.8 gm/dL                             Globulin                  4.2 gm/dL  HI                             A/G Ratio                 0.9 ratio  LOW                             Lipase Lvl                11 U/L                             WBC                       7.1 x10(3)/mcL                             RBC                       4.78 x10(6)/mcL                             Hgb                       15.0 gm/dL                             Hct                       46.8 %  HI                             Platelet                  230 x10(3)/mcL                             MCV                       97.9 fL                             MCH                       31.4 pg                             MCHC                      32.1 gm/dL                             RDW                        13.2 %                             MPV                       11.0 fL  HI                             Abs Neut                  2.33 x10(3)/mcL                             Neutro Auto               33 %  NA                             Lymph Auto                49 %  NA                             Mono Auto                 12 %  NA                             Eos Auto                  6 %  NA                             Abs Eos                   0.4 x10(3)/mcL                             Basophil Auto             0 %  NA                             Abs Neutro                2.33 x10(3)/mcL                             Abs Lymph                 3.4 x10(3)/mcL                             Abs Mono                  0.8 x10(3)/mcL                             Abs Baso                  0.0 x10(3)/mcL                             NRBC%                     0.0 %                             IG%                       0 %  NA                             IG#                       0.010  NA        Results review::     .       Chest X-Ray:  Time reported 4/16/2022 04:00:00, no acute disease process, interpretation by Emergency Physician.       Reexamination/ Reevaluation   Vital signs   results included from flowsheet : Vital Signs   4/16/2022 5:07 CDT       Peripheral Pulse Rate     124 bpm  HI                             Respiratory Rate          24 br/min                             SpO2                      95 %                             Oxygen Therapy            Nasal cannula                             Oxygen Flow Rate          2 L/min     Course: Stable., Tolerating food and drink..   Assessment: exam unchanged.   Interventions: Order Profile (Selected)   Inpatient Orders  Completed  Aerosol Treatment: 04/16/22 2:37:00 CDT, 04/16/22 2:37:00 CDT, [id number]  Automated Diff: Stat collect, 04/16/22 3:06:00 CDT, Blood, Collected, Once, Stop date 04/16/22 3:06:00 CDT, Lab Collect, Print Label By Order Location,  04/16/22 2:47:00 CDT  CBC w/ Auto Diff: Stat collect, 04/16/22 2:47:00 CDT, Blood, Once, Stop date 04/16/22 2:48:00 CDT, Lab Collect, Print Label By Order Location, 04/16/22 2:47:00 CDT  CMP: Stat collect, 04/16/22 2:47:00 CDT, Blood, Stop date 04/16/22 2:48:00 CDT, Lab Collect, 04/16/22 2:47:00 CDT  DuoNeb 0.5 mg-2.5 mg/3 mL inhalation solution: 9 mL, form: Soln-Inh, NEB, Now, first dose 04/16/22 2:37:00 CDT, stop date 04/16/22 2:37:00 CDT, STAT  EKG Adult 12 Lead: 04/16/22 2:52:00 CDT, Stat, Once, 04/16/22 2:52:00 CDT, -1, -1, 04/16/22 2:52:00 CDT  Estimated Glomerular Filtration Rate: Stat collect, 04/16/22 3:06:00 CDT, Blood, Collected, Stop date 04/16/22 3:06:00 CDT, Lab Collect, 04/16/22 2:47:00 CDT  Peripheral IV Insertion: 04/16/22 2:48:00 CDT  Protonix: 40 mg, form: Injection, IV Slow, Now, first dose 04/16/22 2:54:00 CDT, stop date 04/16/22 2:54:00 CDT, STAT  magnesium sulfate (for CRI): 2 gm, form: Infusion, IV Piggyback, Once, Infuse over: 20 minute(s), first dose 04/16/22 2:49:00 CDT, stop date 04/16/22 2:49:00 CDT, STAT  terbutaline 1 mg/mL injectable solution: 0.25 mg, form: Injection, Subcutaneous, q20min PRN for shortness of breath or wheezing, Order duration: 3 dose(s), first dose 04/16/22 2:50:00 CDT, stop date Limited # of times, STAT  .      Impression and Plan   Diagnosis   COPD with exacerbation (LTC60-KN J44.1)      Calls-Consults   -  4/16/2022 05:09:00 , Internal Medicine, consult.    Plan   Condition: Stable.    Disposition: Admit time  4/16/2022 05:09:00, Place in Observation Telemetry Unit,  place in Observation Unit    Counseled    Patient was given the following educational materials: Asthma Attack, Chronic Obstructive Pulmonary Disease.        Addendum      Teaching-Supervisory Addendum-Brief   I participated in the following activities of this patients care: the medical history, the physical exam, medical decision making.   I personally performed: the medical history, the physical  exam, the medical decision making.   The case was discussed with: the resident.   Evaluation and management service: I agree with the evaluation and management decisions made in this patient's care.   Results interpretation: I agree with the study interpretation in this patient's care.   Notes: I have seen and evaluated the patient and performed my own independent history and physical examination.  I have reviewed the residents documentation and agree with above.  Patient is a 52-year-old female presented emergency room by ambulance with complaints of shortness of breath that have been ongoing for the last 6 hours.  History of asthma and COPD.  Patient also reports having nonspecific abdominal cramping concerning her that her diverticulitis is acting up as well as having gastroesophageal reflux.  She denies fever chills.  Denies productive sputum.    On physical examination, mild distress, tachypneic, expiratory wheezing bilaterally without crackles.  S1-S2 tachycardia with regular rhythm, no murmurs.  No peripheral edema.  Abdomen soft, mild generalized tenderness without rebound or guarding.    Medical decision making: Laboratory evaluation, white blood cell count was 7.1, no acute laboratory abnormalities appreciated.  On chest x-ray, no acute abnormalities appreciated.  On evaluation of patient, she has received 3 duo nebs here in the emergency room along with 3 doses of terbutaline, and 2 g of magnesium sulfate IV.  Patient had received a DuoNeb via EMS in route as well as 125 of IM Solu-Medrol.  Patient is jittery from the medication, but still remains tachypneic with expiratory wheezing.  Currently saturating 95 to 96% on 2 L of oxygen.  Trial of removing oxygen resulted in patient becoming more tachypneic.  Due to persistent wheezing, patient will be placed in observation for COPD exacerbation.  Discussed with patient and agreeable to admission.    - Alek Garcia MD.

## 2022-05-20 NOTE — HISTORICAL [facility] CERNER
This is a historical note converted from aJmilah. Formatting and pictures may have been removed.  Please reference Jamilah for original formatting and attached multimedia. Chief Complaint  To ED per EMS states asthma worsening despite use of home meds. ?Presents to ED on 2lnc with Sats 99%.  History of Present Illness  Ms. Moeller?is a?52-year-old lady with a past medical history significant for diverticulitis,?asthma, and GERD, who presents to the ED?with?increased SOB, DUMONT,?and wheezing x2 weeks that acutely worsened yesterday despite home albuterol neb and inhaler therapy. She reports that her SOB started about 2 weeks ago, prompting her to use ~4?duoneb or albuterol inhaler txs per day, bringing relief until yesterday when she was unable to get?attain relief. She also endorses chills, nausea, and fatigue; Denies headache, diarrhea, abd pain, melena, hematochezia, hemoptysis, ?Of note, she denies ever having been intubated and states that she has multiple triggers including but not limited to weather changes, pet?dander, pollen, dust, and her GERD. She reports her last hospitalization was about 6 months for asthma.?Aside from this complaint, patient also endorses having?longstanding hx of diverticulitis and has recently had diverticulitis that has been resistant to antibiotic therapy including Augmentin, Flagyl and Cipro, and recently?cefdinir. She has been complaining of LLQ tenderness and epigastric pain she describes as GERD, and was recently here on 3/30 to be treated with?Zosyn for diverticulitis but left?AMA.? Patient states she?has had?several flares of her diverticulitis over the last several years while she was living in [place], with several hospitalizations?requiring long?courses of IV antibiotics.? She reports she had?3 bowel perforations, but had refused surgery on several occasions.? Patient was referred to surgery here after her initial ED visit on the 13th, has an appointment with them on May 10.  ?For her?acute asthma exacerbation, the patient was?given?Solu-Medrol IM by EMS?along with a breathing treatment.  ?   In the ED, the patient was found to be?tachypneic?and tachycardic?on 2 L nasal cannula. ?She received?DuoNeb,?terbutaline, magnesium, and Singulair in the ED, when attempted weening of 2L NC she decompensated and became very tachypneic and tachycardic. She was placed back on 2L NC and when I examined her she was found to be tachypneic, tachycardic and exhibiting insp/expiratory wheezes. We will be admitting her for acute asthma exacerbation as well as diverticulitis.  ?  ?   PMH:?Diverticulitis, asthma, GERD  PSH:?Liposuction  Allergies:?Flagyl?(rash),?Levaquin (rash),?morphine (airway swelling)  Family history: Gout, diabetes, hypertension  Social history: Smokes 1/2 ppd x 26 y/o, ETOH drinks 4-5 drinks/month, no illicit drugs  ?   Home Medications (25) Active  acetaminophen-hydrocodone 325 mg-10 mg oral tablet?1 tab(s), Oral, TID  albuterol 0.083% inhalation solution?2.5 mg = 3 mL, PRN, INH, q6hr  amoxicillin-clavulanate 875 mg-125 mg oral tablet?875 mg, Oral, q12hr  azithromycin 250 mg oral tablet?250 mg = 1 tab(s), Oral, As Directed  cefdinir 300 mg oral capsule?300 mg = 1 cap(s), Oral, BID  ciprofloxacin 500 mg oral tablet?500 mg = 1 tab(s), Oral, BID  diclofenac sodium 75 mg oral delayed release tablet?75 mg = 1 tab(s), Oral, BID  dicyclomine 10 mg oral capsule?10 mg = 1 cap(s), Oral, QID  dicyclomine 20 mg oral tablet?20 mg = 1 tab(s), Oral, QID  diphenhydrAMINE 25 mg oral tablet?25 mg = 1 tab(s), Oral, TID  fluticasone 50 mcg/inh nasal spray?1 spray(s), Both Nostrils, BID  ketorolac 10 mg oral tablet?10 mg = 1 tab(s), Oral, QID  loratadine 10 mg oral tablet?10 mg = 1 tab(s), Oral, Daily  metronidazole 500 mg oral tablet?500 mg = 1 tab(s), Oral, TID  omeprazole 10 mg oral DR capsule (pt. own)?10 mg = 1 cap(s), Oral, Daily  omeprazole 40 mg oral DR capsule?40 mg = 1 cap(s), Oral,  BID  ondansetron 4 mg oral tablet, disintegrating?4 mg = 1 tab(s), Oral, q6hr  polymyxin B-trimethoprim 10,000 units-1 mg/mL ophthalmic solution?1 drop(s), OPTH, q3hr  Polysporin 500 units-10,000 units/g topical ointment?, TOP, BID  prednisoLONE acetate 1% ophthalmic suspension?1 drop(s), QID  prednisONE 10 mg oral tablet?10 mg = 1 tab(s), Oral, Daily  ProAir HFA 90 mcg/inh inhalation aerosol with adapter?2 puff(s), PRN, INH, q4hr  Singulair 10 mg oral TABLET?See Instructions  Symbicort 160 mcg-4.5 mcg/inh inhalation aerosol?2 puff(s), Oral, BID  Zerviate 0.24% ophthalmic solution?1 drop(s), OPTH, BID  Review of Systems  14 pt ROS negative unless mentioned above  Physical Exam  Vitals & Measurements  T:?36.8? ?C (Oral)? TMIN:?36.7? ?C (Temporal Artery)? TMAX:?36.8? ?C (Oral)? HR:?105(Peripheral)? RR:?26? BP:?138/73? SpO2:?95%?  General:?Ill-appearing?-American lady in mild respiratory distress  Eye: PERRL, EOMI  HENT: NC/AT, moist mucus membranes  Neck: Supple, nontender, no JVD  Respiratory:?Diffuse inspiratory/expiratory wheezes bilaterally, mild respiratory distress with accessory muscle use on 2L NC  Cardiovascular:?Regular rate and rhythm, no murmurs, rubs, or gallops. No peripheral edema?2+ radial and DP pulses  Gastrointestinal: Moderate diffuse tenderness to palpation (worse in LLQ) with no guarding or rebound, nondistended, normal active BS  Musculoskeletal: full range of motion of all extremities/spine without limitation or discomfort, no obvious deformities  Integumentary: no rashes or skin lesions present  Neurologic:?Alert and oriented x3. Normal speech. No gross deficits  Psychiatric:?Anxious, cooperative  ?  Assessment/Plan  Acute Asthma Exacerbation  -Patient having?worsening shortness of breath?x2 weeks, acutely worsened x1 day  -Home regimen:?Singulair?10 mg,?Symbicort 160-4.5?twice daily,?albuterol nebs and?inhaler?PRN SOB/wheezing  -Duonebs q4h scheduled, Solu-Medrol 80 mg TID, cetirizine  10 mg daily  -Titrate O2?to keep sats?>?92%  ?  Diverticulitis?with failed OPAT  -Patient has had?diverticulitis?resistant to?Augmentin,?Cipro and Flagyl,?most recently cefdinir  -Left lower quadrant pain?worse with palpation,?denies?hematochezia, melena, fever; no leukocytosis on CBC  -Recently and made from[facility]on 3/31 after 1 day of admission for diverticulitis  -Start Zosyn?4.5 g?every 8 hour  -CT abdomen from 4/30 showed: Findings of acute sigmoid diverticulitis with mild surrounding inflammatory changes. No organized fluid collection or definite free air  -Consider repeat imaging?today,?defer to primary team  ?  GERD  -Home regimen of omeprazole 40?mg daily is nonformulary  -Start Protonix?40 mg PO daily  ?  Lines: PIV  O2: 2 L nasal cannula  Diet: Clears  DVT PPx: Lovenox  GI PPx:?Protonix  ABx:?Zosyn?day 1  ?  Dispo: Admit to tele obs w/pulse ox for acute asthma exacerbation and diverticulitis requiring IV abx.  ?  ?  Upper Level Addendum: Patient seen and examined, agree with plan above. This is a 52 year AA female who presents with SOB which she attributes to asthma. States she gets hospitalized usually 4 times per year for asthma attacks but has never been intubated for an exacerbation, however, is open to being on a ventilator should she succumb to respiratory Failure. Also complains of abdominal discomfort in epigastric and left lower quadrant area, that?s been ongoing since 03/22 secondary to diverticulitis which was treated outpatient with Augmentin and other oral regimens which she states didn?t help.?In the ED, the patient was found to be tachypneic and tachycardic on 2 L nasal cannula. She received DuoNeb, terbutaline, magnesium, and Singulair in the ED, when attempted weening of 2L NC she decompensated and became very tachypneic and tachycardic.?XR chest unremarkable. Medicine consulted, will admit for asthma exacerbation and acute on chronic diverticulitis. Started on Solumedrol, DuoNebs and  Zosyn for broad coverage. Lipase wnl, amylase pending. On exam AOx3, wheezing throughout both lung fields, no crackles heard, S1/S2 RRR, abdomen TTP in epigastric and LLQ, soft, non-distended, no peripheral edema.?  ?  Jose Hung MD, [facility] IM Resident?  ?  Attending Physician Addendum  Pt seen and Discussed with medicine residents on morning rounds  I am familiar with the pt  On ausc this morning- minimal wheezing- air entry is good  Agree with above Assessment and Plan  ?   Problem List/Past Medical History  Ongoing  Acute diverticulitis  Asthma  Obesity  Historical  No qualifying data  Procedure/Surgical History  brazilian butt lift  denies   Medications  Inpatient  cetirizine 10 mg oral tablet, 10 mg= 1 tab(s), Oral, Daily  DuoNeb 0.5 mg-2.5 mg/3 mL inhalation solution, 3 mL, NEB, [id number]  Fioricet oral tablet, 1 tab(s), Oral, Once-NOW  Lovenox, 40 mg= 0.4 mL, Subcutaneous, Daily  Protonix, 40 mg= 1 tab(s), Oral, Daily  Singulair 10 mg oral TABLET, See Instructions, Oral, Once  Solumedrol IV push / IM, 80 mg= 2 mL, IV Push, [id number]  Solumedrol IV push / IM, 125 mg= 2 mL, IM, Once-NOW  Zosyn 4.5 gm (for IVPB), 4.5 gm= 100 mL, IV Piggyback, q8hr  Home  acetaminophen-hydrocodone 325 mg-10 mg oral tablet, 1 tab(s), Oral, TID  albuterol 0.083% inhalation solution, 2.5 mg= 3 mL, INH, q6hr, PRN, 3 refills  amoxicillin-clavulanate 875 mg-125 mg oral tablet, 875 mg, Oral, q12hr,? ?Not Taking, Completed Rx  azithromycin 250 mg oral tablet, 250 mg= 1 tab(s), Oral, As Directed,? ?Not Taking, Completed Rx  cefdinir 300 mg oral capsule, 300 mg= 1 cap(s), Oral, BID  ciprofloxacin 500 mg oral tablet, 500 mg= 1 tab(s), Oral, BID,? ?Not Taking, Completed Rx  diclofenac sodium 75 mg oral delayed release tablet, 75 mg= 1 tab(s), Oral, BID,? ?Not Taking, Completed Rx  dicyclomine 10 mg oral capsule, 10 mg= 1 cap(s), Oral, QID  dicyclomine 20 mg oral tablet, 20 mg= 1 tab(s), Oral, QID  diphenhydrAMINE 25 mg oral tablet, 25 mg= 1  tab(s), Oral, TID,? ?Not Taking, Completed Rx  fluticasone 50 mcg/inh nasal spray, 1 spray(s), Both Nostrils, BID,? ?Not Taking, Completed Rx  ketorolac 10 mg oral tablet, 10 mg= 1 tab(s), Oral, QID,? ?Not Taking, Completed Rx  loratadine 10 mg oral tablet, 10 mg= 1 tab(s), Oral, Daily,? ?Not Taking, Completed Rx  metronidazole 500 mg oral tablet, 500 mg= 1 tab(s), Oral, TID,? ?Not Taking, Completed Rx  omeprazole 10 mg oral DR capsule (pt. own), 10 mg= 1 cap(s), Oral, Daily,? ?Not Taking, Completed Rx  omeprazole 40 mg oral DR capsule, 40 mg= 1 cap(s), Oral, BID,? ?Not Taking, Completed Rx  ondansetron 4 mg oral tablet, disintegrating, 4 mg= 1 tab(s), Oral, q6hr,? ?Not Taking, Completed Rx  polymyxin B-trimethoprim 10,000 units-1 mg/mL ophthalmic solution, 1 drop(s), OPTH, q3hr,? ?Not Taking, Completed Rx  Polysporin 500 units-10,000 units/g topical ointment, TOP, BID,? ?Not Taking, Completed Rx  prednisoLONE acetate 1% ophthalmic suspension, 1 drop(s), QID,? ?Not Taking, Completed Rx  prednisONE 10 mg oral tablet, 10 mg= 1 tab(s), Oral, Daily  ProAir HFA 90 mcg/inh inhalation aerosol with adapter, 2 puff(s), INH, q4hr, PRN, 4 refills  Singulair 10 mg oral TABLET, See Instructions, 2 refills  Symbicort 160 mcg-4.5 mcg/inh inhalation aerosol, 2 puff(s), Oral, BID  Zerviate 0.24% ophthalmic solution, 1 drop(s), OPTH, BID,? ?Not Taking, Completed Rx  Allergies  Flagyl?(Hives)  Levaquin Leva-Riccardo?(Hives)  levoFLOXacin?(Itching)  morphine?(SWELLING)  Social History  Abuse/Neglect  No, No, Yes, 04/16/2022  Alcohol  Current, Liquor, 1-2 times per month, Alcohol use interferes with work or home: No. Others hurt by drinking: No. Household alcohol concerns: No., 03/30/2022  Employment/School  Employed, Highest education level: University degree(s)., 03/30/2022  Exercise  Exercise duration: 75. Exercise frequency: Daily. Exercise type: Weight lifting, Cardio., 03/30/2022  Home/Environment  Lives with Alone. Living situation:  Home/Independent. Respiratory treatments, Single family house, 03/30/2022  Nutrition/Health  Regular, Good, 03/30/2022  Sexual  Sexually active: No. Gender Identity Identifies as female., 03/30/2022  Spiritual/Cultural  None, Yes, 03/30/2022  Substance Use  Never, 03/13/2022  Tobacco  Former smoker, quit more than 30 days ago, No, 04/16/2022  Lab Results  Labs Last 24 Hours?  ?Chemistry? Hematology/Coagulation?   Sodium Lvl: 142 mmol/L (04/16/22 03:06:00) WBC: 7.1 x10(3)/mcL (04/16/22 03:06:00)   Potassium Lvl: 3.9 mmol/L (04/16/22 03:06:00) RBC: 4.78 x10(6)/mcL (04/16/22 03:06:00)   Chloride: 106 mmol/L (04/16/22 03:06:00) Hgb: 15 gm/dL (04/16/22 03:06:00)   CO2: 24 mmol/L (04/16/22 03:06:00) Hct:?46.8 %?High (04/16/22 03:06:00)   Calcium Lvl: 10.4 mg/dL (04/16/22 03:06:00) Platelet: 230 x10(3)/mcL (04/16/22 03:06:00)   Glucose Lvl: 95 mg/dL (04/16/22 03:06:00) MCV: 97.9 fL (04/16/22 03:06:00)   BUN:?4.5 mg/dL?Low (04/16/22 03:06:00) MCH: 31.4 pg (04/16/22 03:06:00)   Creatinine: 0.82 mg/dL (04/16/22 03:06:00) MCHC: 32.1 gm/dL (04/16/22 03:06:00)   eGFR-AA: 93 (04/16/22 03:06:00) RDW: 13.2 % (04/16/22 03:06:00)   eGFR-ELIZA:?77 mL/min/1.73 m2?Low (04/16/22 03:06:00) MPV:?11 fL?High (04/16/22 03:06:00)   Amylase Lvl: 30 unit/L (04/16/22 03:06:00) Abs Neut: 2.33 x10(3)/mcL (04/16/22 03:06:00)   Bili Total: 0.6 mg/dL (04/16/22 03:06:00) Neutro Auto: 33 % (04/16/22 03:06:00)   Bili Direct: 0.2 mg/dL (04/16/22 03:06:00) Lymph Auto: 49 % (04/16/22 03:06:00)   Bili Indirect: 0.4 mg/dL (04/16/22 03:06:00) Mono Auto: 12 % (04/16/22 03:06:00)   AST: 22 unit/L (04/16/22 03:06:00) Eos Auto: 6 % (04/16/22 03:06:00)   ALT: 18 unit/L (04/16/22 03:06:00) Abs Eos: 0.4 x10(3)/mcL (04/16/22 03:06:00)   Alk Phos: 58 unit/L (04/16/22 03:06:00) Basophil Auto: 0 % (04/16/22 03:06:00)   Total Protein: 8 gm/dL (04/16/22 03:06:00) Abs Neutro: 2.33 x10(3)/mcL (04/16/22 03:06:00)   Albumin Lvl: 3.8 gm/dL (04/16/22 03:06:00) Abs Lymph: 3.4  x10(3)/mcL (04/16/22 03:06:00)   Globulin:?4.2 gm/dL?High (04/16/22 03:06:00) Abs Mono: 0.8 x10(3)/mcL (04/16/22 03:06:00)   A/G Ratio:?0.9 ratio?Low (04/16/22 03:06:00) Abs Baso: 0 x10(3)/mcL (04/16/22 03:06:00)   Lipase Lvl: 11 U/L (04/16/22 03:06:00) NRBC%: 0.0 (04/16/22 03:06:00)    IG%: 0 % (04/16/22 03:06:00)    IG#: 0.01 (04/16/22 03:06:00)

## 2022-05-20 NOTE — HISTORICAL [facility] CERNER
This is a historical note converted from Jamilah. Formatting and pictures may have been removed.  Please reference Jamilah for original formatting and attached multimedia. Chief Complaint  Pt reports productive cough with SOB x 2 days. Pt has wheezing bilaterally.  History of Present Illness  Ms. Niya Moeller is a 51-year-old?female?with PMH of asthma, tobacco use, and anxiety?who presented for?productive cough and worsening shortness of breath x2 days.?She was recently prescribed ciprofloxacin?which she has been taking and reports the cough has been improving.?However she noticed?that her shortness of breath was worsening.?Reports that she had been out of her?asthma medications as she recently moved from [place]?and?has an upcoming appointment with?PCP Dr. Carlisle will?Sylvia on 3/30/2022. She also reports a history?of anxiety?which she takes?Xanax for and has been out of that as well.?Upon my examination patient reports that she feels great after the breathing treatment was given?duo nebs and prednisone 50 in the ED along with?magnesium sulfate.?She reports that?she gets?more anxious and tachycardic?after DuoNeb treatment?although it does make her feel?like she can breathe better.?She was also asking if she could get a?prednisone taper?because that has helped her in the past.?Reports that she feels great?and that she would like?to go home at this time. Advised. Advised that she should not drive home,?she reports that she will take an Uber?back home. She lives with her roommate?and understands that if there is any worsening of the breathing?to come back?to the ED.  Review of Systems  14 pt ROSnegative unless mentioned above. She denies any associated chest pain, palpitation. Just reports baseline?increase in anxiety after DuoNeb treatment which causes her heart rate to be elevated.  Physical Exam  Vitals & Measurements  T:?36.8? ?C (Oral)? HR:?102(Peripheral)? HR:?102(Monitored)? RR:?26? BP:?120/65?  SpO2:?96%?  HT:?160?cm? WT:?70?kg?  General:?AAO x 4  Eye:??Pupils are equal, Normal conjunctiva.??  HENT:??Normocephalic, atraumatic,? no rhinorrhea, PERRLA  Neck:??Supple, no thyromegaly, no LAD  Cardiovascular: Sinus tachycardia per EKG, + S1/S2,?no murmurs, rubs or gallops.?No peripheral edema  Pulmonary: CTAB, normal work of breathing, bilateral wheezing much improved,?no obstruction noted in the upper airway  Gastrointestinal:??Soft, NTTP  Musculoskeletal:??No swelling, No deformity  Integumentary:??Warm, No rash,?no clubbing??  Psychiatric: Anxious appearing  Neuro: No FND  ?   Labs Last 24 Hours?  ?Chemistry? Hematology/Coagulation?   Sodium Lvl: 141 mmol/L (02/22/22 21:30:00) WBC: 10.6 x10(3)/mcL (02/22/22 21:30:00)   Potassium Lvl: 3.9 mmol/L (02/22/22 21:30:00) RBC:?3.92 x10(6)/mcL?Low (02/22/22 21:30:00)   Chloride: 105 mmol/L (02/22/22 21:30:00) Hgb: 12.8 gm/dL (02/22/22 21:30:00)   CO2: 29 mmol/L (02/22/22 21:30:00) Hct: 39.2 % (02/22/22 21:30:00)   Calcium Lvl: 9.9 mg/dL (02/22/22 21:30:00) Platelet: 216 x10(3)/mcL (02/22/22 21:30:00)   Glucose Lvl:?180 mg/dL?High (02/22/22 21:30:00) MCV: 100 fL (02/22/22 21:30:00)   BUN: 10.4 mg/dL (02/22/22 21:30:00) MCH: 32.7 pg (02/22/22 21:30:00)   Creatinine: 0.88 mg/dL (02/22/22 21:30:00) MCHC: 32.7 gm/dL (02/22/22 21:30:00)   Est Creat Clearance Ser: 61.14 mL/min (02/22/22 22:14:59) RDW:?15.1 %?High (02/22/22 21:30:00)   eGFR-AA:?86?Low (02/22/22 21:30:00) MPV:?11 fL?High (02/22/22 21:30:00)   eGFR-ELIZA:?71 mL/min/1.73 m2?Low (02/22/22 21:30:00) Abs Neut:?9.78 x10(3)/mcL?High (02/22/22 21:30:00)   Bili Total: 0.1 mg/dL (02/22/22 21:30:00) Neutro Auto: 92 % (02/22/22 21:30:00)   Bili Direct: 0.1 mg/dL (02/22/22 21:30:00) Lymph Auto: 5 % (02/22/22 21:30:00)   Bili Indirect: 0 mg/dL (02/22/22 21:30:00) Mono Auto: 2 % (02/22/22 21:30:00)   AST: 16 unit/L (02/22/22 21:30:00) Basophil Auto: 0 % (02/22/22 21:30:00)   ALT: 18 unit/L (02/22/22 21:30:00) Abs Neutro:?9.78  x10(3)/mcL?High (02/22/22 21:30:00)   Alk Phos: 63 unit/L (02/22/22 21:30:00) Abs Lymph:?0.5 x10(3)/mcL?Low (02/22/22 21:30:00)   Total Protein: 7 gm/dL (02/22/22 21:30:00) Abs Mono: 0.2 x10(3)/mcL (02/22/22 21:30:00)   Albumin Lvl: 3.5 gm/dL (02/22/22 21:30:00) Abs Baso: 0 x10(3)/mcL (02/22/22 21:30:00)   Globulin: 3.5 gm/dL (02/22/22 21:30:00) NRBC%: 0.0 (02/22/22 21:30:00)   A/G Ratio:?1 ratio?Low (02/22/22 21:30:00) IG%: 1 % (02/22/22 21:30:00)   ? IG#: 0.07 (02/22/22 21:30:00)   ?  ?   Accession:?[id number]  Order:?XR Chest 2 Views  Report Dt/Tm:?02/22/2022 18:34  Report:?  ?  History:  Cough  ?  Reference:  15 February 2022  ?  Findings:  PA and lateral views of the chest were obtained. Heart and mediastinum  within normal limits. The lungs are clear. No pneumothorax or  significant effusion.  ?  Impression:?  No acute cardiopulmonary abnormality.  ?  ?  ?  ?  ?  Assessment/Plan  Acute asthma exacerbation secondary to?running out of medications  -CXR without any acute abnormalities or concern for pneumonia, satting 97% on room air  -Given albuterol nebulizer 10 mg and DuoNeb treatment 9 mL, prednisone 50 mg and magnesium sulfate 2 g.  -Patient reports she feels great especially after getting magnesium sulfate treatment, and her wheezing did improve  -She reports that she feels good enough to go home and requesting refills at this time  -Refills provided to [facility] for albuterol inhaler, Symbicort 2 puffs twice daily 160 mg, duo nebs treatment 3 mL every 6 hours, prednisone taper  -Advised to complete the remaining ciprofloxacin tablets that she has brought with her  -Advised to return to the ED if she experiencing worsening of symptoms  -Advised to keep appointment with PCP 3/30/2022, enough refills provided until that visit  -Gave?1?Xanax 0.25 mg?tablet?which improved improved her anxiety and heart rate  ?  Anxiety  -Advised to follow-up with her PCP outpatient. Patient reports that she does not  use?anxiety medication daily and only uses as needed  -Previously following PCP in [place]  ?  Chronic tobacco use  -Advise?cessation as this may be contributing to her anxiety and worsening shortness of breath   Problem List/Past Medical History  Ongoing  Obesity  Historical  No qualifying data  Procedure/Surgical History  denies   Medications  albuterol 0.083% inhalation solution, 2.5 mg= 3 mL, INH, q6hr, PRN  albuterol 0.083% inhalation solution, 2.5 mg= 3 mL, INH, q6hr, PRN, 3 refills  ciprofloxacin 500 mg oral tablet, 500 mg= 1 tab(s), Oral, BID  Claritin 24 Hour Allergy 10 mg oral tablet, 10 mg= 1 tab(s), Oral, Daily,? ?Not Taking, Completed Rx  Dextrose 50% and Water (50 mL vial/syringe), 12.5 gm= 25 mL, IV Push, Once, PRN  Dextrose 50% and Water (50 mL vial/syringe), 12.5 gm= 25 mL, IV Push, As Directed, PRN  Dextrose 50% and Water (50 mL vial/syringe), 25 gm= 50 mL, IV Push, As Directed, PRN  Dextrose 50% in Water intravenous solution, 12.5 gm= 25 mL, IV Push, As Directed, PRN  dicyclomine 20 mg oral tablet, 20 mg= 1 tab(s), Oral, QID  DuoNeb 0.5 mg-2.5 mg/3 mL inhalation solution, 3 mL, NEB, q6hr Resp  Fioricet oral tablet, 1 tab(s), Oral, Once-NOW  Flonase 50 mcg/inh nasal spray, 1 spray(s), Nasal, BID,? ?Not Taking, Completed Rx  glucagon recombinant 1 mg injection, 1 mg= 1 EA, IM, q10min, PRN  glucagon recombinant 1 mg injection, 1 mg= 1 EA, IM, q10min, PRN  glucose 40% oral gel, 15 gm= 1 tube(s), Oral, As Directed, PRN  insulin lispro 100 units/mL subcutaneous injection, 2-14 units, Subcutaneous, As Directed, PRN  omeprazole 10 mg oral DR capsule (pt. own), 10 mg= 1 cap(s), Oral, Daily  predniSONE, 50 mg= 1 tab(s), Oral, Daily  prednisONE 10 mg oral tablet, 20 mg= 2 tab(s), Oral, Daily  prednisONE 20 mg oral tablet, 10 mg= 0.5 tab(s), Oral, BID  ProAir HFA 90 mcg/inh inhalation aerosol with adapter, 2 puff(s), INH, q4hr, PRN, 4 refills  Protonix, 40 mg= 1 EA, IV Slow, Daily  Singulair 10 mg oral  TABLET, See Instructions, 2 refills,? ?Not taking  Singulair 10 mg oral TABLET, See Instructions, Oral, Once  Solumedrol IV push / IM, 125 mg= 2 mL, IM, Once-NOW  Symbicort 160 mcg-4.5 mcg/inh inhalation aerosol, 2 puff(s), INH, BID, 3 refills  Allergies  Flagyl?(Hives)  Levaquin Leva-Riccardo?(Hives)  levoFLOXacin?(Itching)  Social History  Abuse/Neglect  No, No, Yes, 02/22/2022  No, No, Yes, 02/15/2022  No, 01/19/2022  No, 01/15/2022  No, 01/07/2022  No, 12/26/2021  Tobacco  10 or more cigarettes (1/2 pack or more)/day in last 30 days, No, 02/22/2022  5-9 cigarettes (between 1/4 to 1/2 pack)/day in last 30 days, Cigars, No, 02/15/2022  Former smoker, quit more than 30 days ago, N/A, 01/19/2022  5-9 cigarettes (between 1/4 to 1/2 pack)/day in last 30 days, No, 01/15/2022  4 or less cigarettes(less than 1/4 pack)/day in last 30 days, No, 01/15/2022  5-9 cigarettes (between 1/4 to 1/2 pack)/day in last 30 days, No, 01/07/2022  5-9 cigarettes (between 1/4 to 1/2 pack)/day in last 30 days, No, 12/26/2021  Health Maintenance  Health Maintenance  ???Pending?(in the next year)  ??? ??OverDue  ??? ? ? ?Smoking Cessation due??01/01/22??Variable frequency  ??? ??Due?  ??? ? ? ?Alcohol Misuse Screening due??01/02/22??and every 1??year(s)  ??? ? ? ?ADL Screening due??02/22/22??and every 1??year(s)  ??? ? ? ?Tetanus Vaccine due??02/22/22??and every 10??year(s)  ??? ??Due In Future?  ??? ? ? ?Obesity Screening not due until??01/01/23??and every 1??year(s)  ???Satisfied?(in the past 1 year)  ??? ??Satisfied?  ??? ? ? ?Blood Pressure Screening on??02/22/22.??Satisfied by Cesar Lawson RN  ??? ? ? ?Body Mass Index Check on??01/19/22.??Satisfied by Sid Paulson RN.  ??? ? ? ?Diabetes Screening on??02/22/22.??Satisfied by Lunaa Aguayo  ??? ? ? ?Obesity Screening on??01/19/22.??Satisfied by Sid Paulson RN  ?      Case discussed with the resident. Chart reviewed. Care provided is reasonable and necessary, agree with plan as  outlined above. Follow up with PCP in March.

## 2022-05-29 ENCOUNTER — HOSPITAL ENCOUNTER (EMERGENCY)
Facility: HOSPITAL | Age: 52
Discharge: HOME | End: 2022-05-29
Attending: STUDENT IN AN ORGANIZED HEALTH CARE EDUCATION/TRAINING PROGRAM
Payer: MEDICAID

## 2022-05-29 VITALS
DIASTOLIC BLOOD PRESSURE: 64 MMHG | SYSTOLIC BLOOD PRESSURE: 115 MMHG | HEART RATE: 97 BPM | RESPIRATION RATE: 20 BRPM | TEMPERATURE: 98 F | OXYGEN SATURATION: 100 %

## 2022-05-29 DIAGNOSIS — J45.901 MODERATE ASTHMA WITH EXACERBATION, UNSPECIFIED WHETHER PERSISTENT: Primary | ICD-10-CM

## 2022-05-29 DIAGNOSIS — R07.9 CHEST PAIN: ICD-10-CM

## 2022-05-29 PROCEDURE — 94761 N-INVAS EAR/PLS OXIMETRY MLT: CPT

## 2022-05-29 PROCEDURE — 94640 AIRWAY INHALATION TREATMENT: CPT | Mod: XB

## 2022-05-29 PROCEDURE — 96374 THER/PROPH/DIAG INJ IV PUSH: CPT

## 2022-05-29 PROCEDURE — 96372 THER/PROPH/DIAG INJ SC/IM: CPT | Mod: 59 | Performed by: STUDENT IN AN ORGANIZED HEALTH CARE EDUCATION/TRAINING PROGRAM

## 2022-05-29 PROCEDURE — 25000242 PHARM REV CODE 250 ALT 637 W/ HCPCS: Performed by: STUDENT IN AN ORGANIZED HEALTH CARE EDUCATION/TRAINING PROGRAM

## 2022-05-29 PROCEDURE — 96375 TX/PRO/DX INJ NEW DRUG ADDON: CPT

## 2022-05-29 PROCEDURE — 63600175 PHARM REV CODE 636 W HCPCS: Performed by: STUDENT IN AN ORGANIZED HEALTH CARE EDUCATION/TRAINING PROGRAM

## 2022-05-29 PROCEDURE — 99285 EMERGENCY DEPT VISIT HI MDM: CPT | Mod: 25

## 2022-05-29 RX ORDER — IPRATROPIUM BROMIDE AND ALBUTEROL SULFATE 2.5; .5 MG/3ML; MG/3ML
3 SOLUTION RESPIRATORY (INHALATION)
Status: COMPLETED | OUTPATIENT
Start: 2022-05-29 | End: 2022-05-29

## 2022-05-29 RX ORDER — PREDNISONE 20 MG/1
40 TABLET ORAL DAILY
Qty: 10 TABLET | Refills: 0 | Status: SHIPPED | OUTPATIENT
Start: 2022-05-29 | End: 2022-06-03

## 2022-05-29 RX ORDER — TERBUTALINE SULFATE 1 MG/ML
0.25 INJECTION SUBCUTANEOUS ONCE
Status: COMPLETED | OUTPATIENT
Start: 2022-05-29 | End: 2022-05-29

## 2022-05-29 RX ORDER — MAGNESIUM SULFATE HEPTAHYDRATE 40 MG/ML
2 INJECTION, SOLUTION INTRAVENOUS
Status: COMPLETED | OUTPATIENT
Start: 2022-05-29 | End: 2022-05-29

## 2022-05-29 RX ORDER — METHYLPREDNISOLONE SOD SUCC 125 MG
125 VIAL (EA) INJECTION
Status: COMPLETED | OUTPATIENT
Start: 2022-05-29 | End: 2022-05-29

## 2022-05-29 RX ORDER — IPRATROPIUM BROMIDE AND ALBUTEROL SULFATE 2.5; .5 MG/3ML; MG/3ML
3 SOLUTION RESPIRATORY (INHALATION) EVERY 4 HOURS PRN
Qty: 75 ML | Refills: 0 | Status: ON HOLD | OUTPATIENT
Start: 2022-05-29 | End: 2022-06-29 | Stop reason: SDUPTHER

## 2022-05-29 RX ADMIN — METHYLPREDNISOLONE SODIUM SUCCINATE 125 MG: 125 INJECTION, POWDER, FOR SOLUTION INTRAMUSCULAR; INTRAVENOUS at 02:05

## 2022-05-29 RX ADMIN — TERBUTALINE SULFATE 0.25 MG: 1 INJECTION SUBCUTANEOUS at 02:05

## 2022-05-29 RX ADMIN — IPRATROPIUM BROMIDE AND ALBUTEROL SULFATE 3 ML: 2.5; .5 SOLUTION RESPIRATORY (INHALATION) at 03:05

## 2022-05-29 RX ADMIN — MAGNESIUM SULFATE 2 G: 2 INJECTION INTRAVENOUS at 02:05

## 2022-05-30 NOTE — ED PROVIDER NOTES
Encounter Date: 5/29/2022       History     Chief Complaint   Patient presents with    Shortness of Breath     Patient presents with c/o SOB since last pm. Hx. asthma. Audible wheezing. Reports no relief from treatments at .     52-year-old female presents to ED for shortness of breath.  reports history of asthma.  states she has had to come to the ER previous and didn't want her symptoms to get this bad before presenting so came early. She is requesting her typical cocktail of magnesium, albuterol and steroids.  states this typically improves her symptoms which she usually feels improved enough to go home with a steroid taper.  denies any chest pain.  No pleuritic component.  No fever. endorses significant wheezing.  No HEENT complaints or recent sick contacts.  No other complaints at this time.        Review of patient's allergies indicates:   Allergen Reactions    Levofloxacin Hives and Itching    Metronidazole Hives    Codeine     Latex     Morphine     Opioids - morphine analogues      History reviewed. No pertinent past medical history.  History reviewed. No pertinent surgical history.  History reviewed. No pertinent family history.     Review of Systems   Constitutional: Negative for chills, diaphoresis and fever.   HENT: Negative for congestion, rhinorrhea, sinus pain and sore throat.    Eyes: Negative for pain, discharge and itching.   Respiratory: Positive for shortness of breath and wheezing. Negative for cough, chest tightness and stridor.    Cardiovascular: Negative for chest pain and palpitations.   Gastrointestinal: Negative for abdominal pain, nausea and vomiting.   Genitourinary: Negative for dysuria, flank pain and hematuria.   Musculoskeletal: Negative for back pain and myalgias.   Skin: Negative for color change and rash.   Neurological: Negative for dizziness, weakness and headaches.   Psychiatric/Behavioral: Negative for confusion. The patient is not hyperactive.        Physical Exam      Initial Vitals [05/29/22 1352]   BP Pulse Resp Temp SpO2   113/60 86 (!) 22 98.1 °F (36.7 °C) 98 %      MAP       --         Physical Exam    Vitals reviewed.  Constitutional: She appears well-developed and well-nourished. She is not diaphoretic. No distress.   HENT:   Head: Normocephalic and atraumatic.   Eyes: Conjunctivae and EOM are normal. Pupils are equal, round, and reactive to light.   Neck: Neck supple. No tracheal deviation present.   Normal range of motion.  Cardiovascular: Normal rate, regular rhythm, normal heart sounds and intact distal pulses.   Pulmonary/Chest: No accessory muscle usage. Tachypnea noted. No respiratory distress. She has no decreased breath sounds. She has wheezes. She has no rhonchi. She has no rales. She exhibits no tenderness.   Abdominal: Abdomen is soft. There is no abdominal tenderness. There is no rebound and no guarding.   Musculoskeletal:         General: Normal range of motion.      Cervical back: Normal range of motion and neck supple.     Neurological: She is alert and oriented to person, place, and time. She has normal strength. GCS score is 15. GCS eye subscore is 4. GCS verbal subscore is 5. GCS motor subscore is 6.   Skin: Skin is warm and dry. Capillary refill takes less than 2 seconds. No rash noted.   Psychiatric: She has a normal mood and affect. Her behavior is normal. Judgment and thought content normal.         ED Course   Procedures  Labs Reviewed - No data to display  EKG Readings: (Independently Interpreted)   Initial Reading: No STEMI. Rhythm: Normal Sinus Rhythm. Ectopy: No Ectopy. Conduction: Normal. Axis: Normal. Clinical Impression: Normal Sinus Rhythm       Imaging Results          X-Ray Chest AP Portable (Final result)  Result time 05/29/22 15:02:03    Final result by Genesis Iraheta MD (05/29/22 15:02:03)                 Impression:      No acute cardiopulmonary abnormality.      Electronically signed by: Genesis  Esequiel  Date:    05/29/2022  Time:    15:02             Narrative:    EXAMINATION:  XR CHEST AP PORTABLE    CLINICAL HISTORY:  Chest Pain;    TECHNIQUE:  Single frontal view of the chest was performed.    COMPARISON:  04/16/2022    FINDINGS:  LINES AND TUBES: None    MEDIASTINUM AND LIDIA: The cardiac silhouette is normal.    LUNGS: No lobar consolidation. No edema.    PLEURA:No pleural effusion. No pneumothorax.    BONES: No acute osseous abnormality.                                 Medications   methylPREDNISolone sodium succinate injection 125 mg (125 mg Intravenous Given 5/29/22 1419)   albuterol-ipratropium 2.5 mg-0.5 mg/3 mL nebulizer solution 3 mL (3 mLs Nebulization Given 5/29/22 1511)   magnesium sulfate 2g in water 50mL IVPB (premix) (2 g Intravenous New Bag 5/29/22 1450)   terbutaline injection 0.25 mg (0.25 mg Subcutaneous Given 5/29/22 1450)     Medical Decision Making:   Clinical Tests:   Radiological Study: Reviewed and Ordered  Medical Tests: Reviewed and Ordered  ED Management:  Pt with hx of what appears to be poorly controlled asthma presents in gradual onset exacerbation. Reports compliance with her meds. Here moderate diffuse wheeze otherwise vitals stable. Mild tachypnea but no O2 requirement / tachycardia. Otherwise exam benign. No labs drawn but CXR obtained and neg acute. Given asthma cocktail and pt felt complete resolution. Observed for period of time and remained stable. Will d/c with short course steroids and pt requires close PCP follow up for medication adjustment and possible pulm referral if unable to control exacerbations. D/c stable with strict return precautions. (zmora)                      Clinical Impression:   Final diagnoses:  [R07.9] Chest pain  [J45.901] Moderate asthma with exacerbation, unspecified whether persistent (Primary)          ED Disposition Condition    Discharge Stable        ED Prescriptions     Medication Sig Dispense Start Date End Date Auth. Provider     albuterol-ipratropium (DUO-NEB) 2.5 mg-0.5 mg/3 mL nebulizer solution Take 3 mLs by nebulization every 4 (four) hours as needed for Wheezing. Rescue 75 mL 2022 Wilver Ibrahim MD    predniSONE (DELTASONE) 20 MG tablet () Take 2 tablets (40 mg total) by mouth once daily. for 5 days 10 tablet 2022 6/3/2022 Wilver Ibrahim MD        Follow-up Information     Follow up With Specialties Details Why Contact Info    [facility] - Emergency Dept Emergency Medicine  As needed, If symptoms worsen [address] [facility] [address] [phone]    PCP  Schedule an appointment as soon as possible for a visit in 3 days             Wilver Ibrahim MD  [phone]

## 2022-06-02 DIAGNOSIS — Z12.4 SCREENING FOR CERVICAL CANCER: Primary | ICD-10-CM

## 2022-06-13 ENCOUNTER — HOSPITAL ENCOUNTER (EMERGENCY)
Facility: HOSPITAL | Age: 52
Discharge: HOME | End: 2022-06-13
Attending: INTERNAL MEDICINE
Payer: MEDICAID

## 2022-06-13 VITALS
OXYGEN SATURATION: 97 % | WEIGHT: 161.25 LBS | HEART RATE: 88 BPM | RESPIRATION RATE: 20 BRPM | TEMPERATURE: 98 F | BODY MASS INDEX: 31.66 KG/M2 | SYSTOLIC BLOOD PRESSURE: 125 MMHG | HEIGHT: 60 IN | DIASTOLIC BLOOD PRESSURE: 53 MMHG

## 2022-06-13 DIAGNOSIS — K57.92 DIVERTICULITIS: ICD-10-CM

## 2022-06-13 DIAGNOSIS — R06.2 WHEEZING: ICD-10-CM

## 2022-06-13 DIAGNOSIS — J45.909 ASTHMA, UNSPECIFIED ASTHMA SEVERITY, UNSPECIFIED WHETHER COMPLICATED, UNSPECIFIED WHETHER PERSISTENT: Primary | ICD-10-CM

## 2022-06-13 LAB
ALBUMIN SERPL-MCNC: 3.7 GM/DL (ref 3.5–5)
ALBUMIN/GLOB SERPL: 0.9 RATIO (ref 1.1–2)
ALP SERPL-CCNC: 70 UNIT/L (ref 40–150)
ALT SERPL-CCNC: 22 UNIT/L (ref 0–55)
AST SERPL-CCNC: 18 UNIT/L (ref 5–34)
BASOPHILS # BLD AUTO: 0.01 X10(3)/MCL (ref 0–0.2)
BASOPHILS NFR BLD AUTO: 0.1 %
BILIRUBIN DIRECT+TOT PNL SERPL-MCNC: 0.3 MG/DL
BUN SERPL-MCNC: 11.5 MG/DL (ref 9.8–20.1)
CALCIUM SERPL-MCNC: 10.4 MG/DL (ref 8.4–10.2)
CHLORIDE SERPL-SCNC: 103 MMOL/L (ref 98–107)
CO2 SERPL-SCNC: 27 MMOL/L (ref 22–29)
CREAT SERPL-MCNC: 0.77 MG/DL (ref 0.55–1.02)
EOSINOPHIL # BLD AUTO: 0.01 X10(3)/MCL (ref 0–0.9)
EOSINOPHIL NFR BLD AUTO: 0.1 %
ERYTHROCYTE [DISTWIDTH] IN BLOOD BY AUTOMATED COUNT: 13.6 % (ref 11.5–17)
GLOBULIN SER-MCNC: 4 GM/DL (ref 2.4–3.5)
GLUCOSE SERPL-MCNC: 148 MG/DL (ref 74–100)
HCT VFR BLD AUTO: 45.4 % (ref 37–47)
HGB BLD-MCNC: 14.5 GM/DL (ref 12–16)
IMM GRANULOCYTES # BLD AUTO: 0.03 X10(3)/MCL (ref 0–0.02)
IMM GRANULOCYTES NFR BLD AUTO: 0.4 % (ref 0–0.43)
LYMPHOCYTES # BLD AUTO: 1.33 X10(3)/MCL (ref 0.6–4.6)
LYMPHOCYTES NFR BLD AUTO: 16.4 %
MCH RBC QN AUTO: 32 PG (ref 27–31)
MCHC RBC AUTO-ENTMCNC: 31.9 MG/DL (ref 33–36)
MCV RBC AUTO: 100.2 FL (ref 80–94)
MONOCYTES # BLD AUTO: 0.21 X10(3)/MCL (ref 0.1–1.3)
MONOCYTES NFR BLD AUTO: 2.6 %
NEUTROPHILS # BLD AUTO: 6.5 X10(3)/MCL (ref 2.1–9.2)
NEUTROPHILS NFR BLD AUTO: 80.4 %
NRBC BLD AUTO-RTO: 0 %
PLATELET # BLD AUTO: 228 X10(3)/MCL (ref 130–400)
PMV BLD AUTO: 10.7 FL (ref 9.4–12.4)
POTASSIUM SERPL-SCNC: 4.6 MMOL/L (ref 3.5–5.1)
PROT SERPL-MCNC: 7.7 GM/DL (ref 6.4–8.3)
RBC # BLD AUTO: 4.53 X10(6)/MCL (ref 4.2–5.4)
SODIUM SERPL-SCNC: 141 MMOL/L (ref 136–145)
WBC # SPEC AUTO: 8.1 X10(3)/MCL (ref 4.5–11.5)

## 2022-06-13 PROCEDURE — 94640 AIRWAY INHALATION TREATMENT: CPT | Mod: XB

## 2022-06-13 PROCEDURE — 80053 COMPREHEN METABOLIC PANEL: CPT | Performed by: STUDENT IN AN ORGANIZED HEALTH CARE EDUCATION/TRAINING PROGRAM

## 2022-06-13 PROCEDURE — 63600175 PHARM REV CODE 636 W HCPCS: Performed by: INTERNAL MEDICINE

## 2022-06-13 PROCEDURE — 36415 COLL VENOUS BLD VENIPUNCTURE: CPT | Performed by: STUDENT IN AN ORGANIZED HEALTH CARE EDUCATION/TRAINING PROGRAM

## 2022-06-13 PROCEDURE — 25000003 PHARM REV CODE 250: Performed by: STUDENT IN AN ORGANIZED HEALTH CARE EDUCATION/TRAINING PROGRAM

## 2022-06-13 PROCEDURE — 25000242 PHARM REV CODE 250 ALT 637 W/ HCPCS: Performed by: INTERNAL MEDICINE

## 2022-06-13 PROCEDURE — 96374 THER/PROPH/DIAG INJ IV PUSH: CPT

## 2022-06-13 PROCEDURE — 25000242 PHARM REV CODE 250 ALT 637 W/ HCPCS: Performed by: STUDENT IN AN ORGANIZED HEALTH CARE EDUCATION/TRAINING PROGRAM

## 2022-06-13 PROCEDURE — 85025 COMPLETE CBC W/AUTO DIFF WBC: CPT | Performed by: STUDENT IN AN ORGANIZED HEALTH CARE EDUCATION/TRAINING PROGRAM

## 2022-06-13 PROCEDURE — 63600175 PHARM REV CODE 636 W HCPCS: Performed by: STUDENT IN AN ORGANIZED HEALTH CARE EDUCATION/TRAINING PROGRAM

## 2022-06-13 PROCEDURE — 99285 EMERGENCY DEPT VISIT HI MDM: CPT | Mod: 25

## 2022-06-13 PROCEDURE — 87040 BLOOD CULTURE FOR BACTERIA: CPT | Performed by: STUDENT IN AN ORGANIZED HEALTH CARE EDUCATION/TRAINING PROGRAM

## 2022-06-13 RX ORDER — IPRATROPIUM BROMIDE AND ALBUTEROL SULFATE 2.5; .5 MG/3ML; MG/3ML
3 SOLUTION RESPIRATORY (INHALATION)
Status: DISCONTINUED | OUTPATIENT
Start: 2022-06-13 | End: 2022-06-13

## 2022-06-13 RX ORDER — AMOXICILLIN AND CLAVULANATE POTASSIUM 875; 125 MG/1; MG/1
1 TABLET, FILM COATED ORAL 2 TIMES DAILY
Qty: 14 TABLET | Refills: 0 | Status: ON HOLD | OUTPATIENT
Start: 2022-06-13 | End: 2022-06-29 | Stop reason: HOSPADM

## 2022-06-13 RX ORDER — IPRATROPIUM BROMIDE AND ALBUTEROL SULFATE 2.5; .5 MG/3ML; MG/3ML
3 SOLUTION RESPIRATORY (INHALATION)
Status: DISCONTINUED | OUTPATIENT
Start: 2022-06-13 | End: 2022-06-13 | Stop reason: HOSPADM

## 2022-06-13 RX ORDER — PSYLLIUM SEED
1.6 PACKET (EA) ORAL 2 TIMES DAILY
Qty: 283 G | Refills: 0 | Status: ON HOLD | OUTPATIENT
Start: 2022-06-13 | End: 2022-06-29 | Stop reason: HOSPADM

## 2022-06-13 RX ORDER — KETOROLAC TROMETHAMINE 30 MG/ML
30 INJECTION, SOLUTION INTRAMUSCULAR; INTRAVENOUS
Status: DISCONTINUED | OUTPATIENT
Start: 2022-06-13 | End: 2022-06-13 | Stop reason: HOSPADM

## 2022-06-13 RX ORDER — KETOROLAC TROMETHAMINE 30 MG/ML
30 INJECTION, SOLUTION INTRAMUSCULAR; INTRAVENOUS
Status: DISCONTINUED | OUTPATIENT
Start: 2022-06-13 | End: 2022-06-13

## 2022-06-13 RX ORDER — METHYLPREDNISOLONE SOD SUCC 125 MG
125 VIAL (EA) INJECTION
Status: COMPLETED | OUTPATIENT
Start: 2022-06-13 | End: 2022-06-13

## 2022-06-13 RX ADMIN — IPRATROPIUM BROMIDE AND ALBUTEROL SULFATE 3 ML: 2.5; .5 SOLUTION RESPIRATORY (INHALATION) at 04:06

## 2022-06-13 RX ADMIN — IPRATROPIUM BROMIDE AND ALBUTEROL SULFATE 3 ML: 2.5; .5 SOLUTION RESPIRATORY (INHALATION) at 05:06

## 2022-06-13 RX ADMIN — METHYLPREDNISOLONE SODIUM SUCCINATE 125 MG: 125 INJECTION, POWDER, FOR SOLUTION INTRAMUSCULAR; INTRAVENOUS at 07:06

## 2022-06-13 RX ADMIN — PIPERACILLIN AND TAZOBACTAM 4.5 G: 4; .5 INJECTION, POWDER, LYOPHILIZED, FOR SOLUTION INTRAVENOUS; PARENTERAL at 06:06

## 2022-06-13 NOTE — ED PROVIDER NOTES
Encounter Date: 6/13/2022       History     Chief Complaint   Patient presents with    Abdominal Pain    Asthma     Pt presents to ed with c/o abd pain and asthma flare-up. Pt states Hx diverticulitis and asthma.      52 year old female with pmh of asthma, diverticulitis presented tot her ED complainig of sob ,wheezing and generalized abdominal pain which started 3 days ago. She stated that it has been progressively getting worse. She has been using her inhaler at home continously all day. Denies any fever, chills,cough,  nausea, vomiting, blood in the stool, blood in the urine, at this time. Patient stated she also had  bowel perforations in the past and she refused any surgical intervention.     The history is provided by the patient.     Review of patient's allergies indicates:   Allergen Reactions    Levofloxacin Hives and Itching    Metronidazole Hives    Codeine     Latex     Morphine     Opioids - morphine analogues      History reviewed. No pertinent past medical history.  History reviewed. No pertinent surgical history.  History reviewed. No pertinent family history.     Review of Systems   Constitutional: Negative for activity change, chills, diaphoresis, fatigue and fever.   Respiratory: Positive for apnea, shortness of breath and wheezing. Negative for cough, choking, chest tightness and stridor.    Cardiovascular: Negative for chest pain, palpitations and leg swelling.   Gastrointestinal: Positive for abdominal pain and constipation. Negative for abdominal distention, anal bleeding, blood in stool, diarrhea, nausea and vomiting.   Endocrine: Negative for polyphagia and polyuria.   Genitourinary: Negative for dyspareunia, flank pain and menstrual problem.   Musculoskeletal: Negative for arthralgias.   Neurological: Negative for dizziness, syncope, weakness, light-headedness, numbness and headaches.   Psychiatric/Behavioral: Negative for agitation and confusion.       Physical Exam     Initial  Vitals [06/13/22 0204]   BP Pulse Resp Temp SpO2   127/64 90 18 98.2 °F (36.8 °C) 100 %      MAP       --         Physical Exam    Nursing note and vitals reviewed.  Constitutional: She appears well-developed and well-nourished. She is not diaphoretic. No distress.   HENT:   Head: Normocephalic and atraumatic.   Eyes: Conjunctivae are normal.   Neck:   Normal range of motion.  Cardiovascular: Normal rate, regular rhythm and normal heart sounds. Exam reveals no friction rub.    No murmur heard.  Pulmonary/Chest: Tachypnea noted. No apnea and no bradypnea. No respiratory distress. She has wheezes. She has no rhonchi. She has no rales. She exhibits no tenderness.   Abdominal: Abdomen is soft. Bowel sounds are normal. She exhibits no distension and no mass. There is abdominal tenderness. There is no rebound and no guarding.   Musculoskeletal:         General: Normal range of motion.      Cervical back: Normal range of motion.     Neurological: She is alert and oriented to person, place, and time.   Psychiatric: She has a normal mood and affect.         ED Course   Procedures  Labs Reviewed   COMPREHENSIVE METABOLIC PANEL - Abnormal; Notable for the following components:       Result Value    Glucose Level 148 (*)     Calcium Level Total 10.4 (*)     Globulin 4.0 (*)     Albumin/Globulin Ratio 0.9 (*)     All other components within normal limits   CBC WITH DIFFERENTIAL - Abnormal; Notable for the following components:    .2 (*)     MCH 32.0 (*)     MCHC 31.9 (*)     IG# 0.03 (*)     All other components within normal limits   BLOOD CULTURE OLG   BLOOD CULTURE OLG   CBC W/ AUTO DIFFERENTIAL    Narrative:     The following orders were created for panel order CBC auto differential.  Procedure                               Abnormality         Status                     ---------                               -----------         ------                     CBC with Differential[[id number]]        Abnormal             Final result                 Please view results for these tests on the individual orders.          Imaging Results          CT Abdomen Pelvis  Without Contrast (Preliminary result)  Result time 06/13/22 05:51:41    Preliminary result by Gregor Pritchett MD (06/13/22 05:51:41)                 Narrative:    START OF REPORT:  Technique: CT of the abdomen and pelvis was performed with axial images as well as sagittal and coronal reconstruction images without intravenous contrast.    Comparison: Comparison is with study dated â2022-03-30 17:04:02â.    Clinical History: Pt presents to ed with c/o abd pain and asthma flare-up. Pt states Hx diverticulitis and asthma.    Dosage Information: Automated Exposure Control was utilized.    Findings:  Lines and Tubes: None.  Thorax:  Lungs: The visualized lung bases appear unremarkable.  Pleura: No effusions or thickening. No pneumothorax is seen in the visualized lung bases.  Abdomen:  Abdominal Wall: There is a subtle uncomplicated umbilical hernia which contains mesenteric fat.  Liver: The liver appears unremarkable.  Biliary System: No extrahepatic biliary duct dilatation is seen.  Gallbladder: The gallbladder appears unremarkable.  Pancreas: The pancreas appears unremarkable.  Spleen: The spleen appears unremarkable.  Adrenals: The adrenal glands appear unremarkable.  Kidneys: The kidneys appear unremarkable with no stones cysts masses or hydronephrosis.  Aorta: The abdominal aorta appears unremarkable.  IVC: Unremarkable.  Bowel:  Esophagus: The visualized esophagus appears unremarkable.  Stomach: The stomach appears unremarkable.  Duodenum: Unremarkable appearing duodenum.  Small Bowel: The small bowel appears unremarkable.  Colon: Nondistended. There are numerous scattered diverticula scattered throughout the colon most pronounced in the distal descending and proximal sigmoid colon where there is also suggestion of wall thickening and some subtle pericolonic fat  stranding best seen on images 37 through 45 of coronal series 6.  Appendix: The appendix appears unremarkable and is seen on âImage 57, Series 2â through âImage 54, Series 2â.  Peritoneum: No intraperitoneal free air or ascites is seen.    Pelvis:  Bladder: The bladder appears unremarkable.  Female:  Uterus: The uterus appears unremarkable.  Ovaries: No adnexal masses are seen.    Bony structures:  Dorsal Spine: There is spondylosis of the visualized spine.  Bony Pelvis: The visualized bony structures of the pelvis appear unremarkable.      Impression:  1. There are numerous scattered diverticula scattered throughout the colon most pronounced in the distal descending and proximal sigmoid colon where there is also suggestion of wall thickening and some subtle pericolonic fat stranding best seen on images 37 through 45 of coronal series 6. These findings are consistent with diverticulitis affecting the colon at the descending sigmoid junction somewhat similar to the prior study. Correlate with clinical and laboratory findings as regards additional evaluation and follow-up.  2. Details and other findings as discussed above.                                 X-Ray Chest PA And Lateral (In process)                  Medications   albuterol-ipratropium 2.5 mg-0.5 mg/3 mL nebulizer solution 3 mL (3 mLs Nebulization Given 6/13/22 0554)   albuterol-ipratropium 2.5 mg-0.5 mg/3 mL nebulizer solution 3 mL (3 mLs Nebulization Given 6/13/22 0553)   ketorolac injection 30 mg (30 mg Intravenous Not Given 6/13/22 0545)   piperacillin-tazobactam (ZOSYN) 4.5 g in sodium chloride 0.9 % 100 mL IVPB (MB+) (4.5 g Intravenous New Bag 6/13/22 0646)   methylPREDNISolone sodium succinate injection 125 mg (125 mg Intravenous Given 6/13/22 0733)                          Clinical Impression:   Final diagnoses:  [J45.909] Asthma, unspecified asthma severity, unspecified whether complicated, unspecified whether persistent (Primary)  [K57.92]  Diverticulitis     Disposition: Discharge with Augmentin for diverticulitis, metamucil for constipation             ED Disposition Condition    Discharge Stable        ED Prescriptions     Medication Sig Dispense Start Date End Date Auth. Provider    amoxicillin-clavulanate 875-125mg (AUGMENTIN) 875-125 mg per tablet Take 1 tablet by mouth 2 (two) times daily. 14 tablet 6/13/2022  Alvaro Gaston DO    psyllium husk (METAMUCIL) 3.4 gram/5.4 gram Powd Take 1.6 g by mouth 2 (two) times a day. 283 g 6/13/2022  Alvaro Gaston DO        Follow-up Information     Follow up With Specialties Details Why Contact [facility] - ED  In 1 week If symptoms worsen [address] [facility] [address]    [facility] - Emergency Dept Emergency Medicine  If symptoms worsen [address] [facility] [address] [phone]           Alvaro Gaston DO  Resident  06/13/22 0658       Alfonso Reyes MD  06/13/22 0776

## 2022-06-13 NOTE — ED PROVIDER NOTES
Encounter Date: 6/13/2022       History     Chief Complaint   Patient presents with    Abdominal Pain    Asthma     Pt presents to ed with c/o abd pain and asthma flare-up. Pt states Hx diverticulitis and asthma.      HPI  Review of patient's allergies indicates:   Allergen Reactions    Levofloxacin Hives and Itching    Metronidazole Hives    Codeine     Latex     Morphine     Opioids - morphine analogues      History reviewed. No pertinent past medical history.  History reviewed. No pertinent surgical history.  History reviewed. No pertinent family history.     Review of Systems    Physical Exam     Initial Vitals [06/13/22 0204]   BP Pulse Resp Temp SpO2   127/64 90 18 98.2 °F (36.8 °C) 100 %      MAP       --         Physical Exam    ED Course   Procedures  Labs Reviewed   COMPREHENSIVE METABOLIC PANEL - Abnormal; Notable for the following components:       Result Value    Glucose Level 148 (*)     Calcium Level Total 10.4 (*)     Globulin 4.0 (*)     Albumin/Globulin Ratio 0.9 (*)     All other components within normal limits   CBC WITH DIFFERENTIAL - Abnormal; Notable for the following components:    .2 (*)     MCH 32.0 (*)     MCHC 31.9 (*)     IG# 0.03 (*)     All other components within normal limits   BLOOD CULTURE [facility]LG   BLOOD CULTURE [facility]LG   CBC W/ AUTO DIFFERENTIAL    Narrative:     The following orders were created for panel order CBC auto differential.  Procedure                               Abnormality         Status                     ---------                               -----------         ------                     CBC with Differential[[id number]]        Abnormal            Final result                 Please view results for these tests on the individual orders.          Imaging Results          CT Abdomen Pelvis  Without Contrast (Preliminary result)  Result time 06/13/22 05:51:41    Preliminary result by Gregor Pritchett MD (06/13/22 05:51:41)                 Narrative:    START  OF REPORT:  Technique: CT of the abdomen and pelvis was performed with axial images as well as sagittal and coronal reconstruction images without intravenous contrast.    Comparison: Comparison is with study dated â2022-03-30 17:04:02â.    Clinical History: Pt presents to ed with c/o abd pain and asthma flare-up. Pt states Hx diverticulitis and asthma.    Dosage Information: Automated Exposure Control was utilized.    Findings:  Lines and Tubes: None.  Thorax:  Lungs: The visualized lung bases appear unremarkable.  Pleura: No effusions or thickening. No pneumothorax is seen in the visualized lung bases.  Abdomen:  Abdominal Wall: There is a subtle uncomplicated umbilical hernia which contains mesenteric fat.  Liver: The liver appears unremarkable.  Biliary System: No extrahepatic biliary duct dilatation is seen.  Gallbladder: The gallbladder appears unremarkable.  Pancreas: The pancreas appears unremarkable.  Spleen: The spleen appears unremarkable.  Adrenals: The adrenal glands appear unremarkable.  Kidneys: The kidneys appear unremarkable with no stones cysts masses or hydronephrosis.  Aorta: The abdominal aorta appears unremarkable.  IVC: Unremarkable.  Bowel:  Esophagus: The visualized esophagus appears unremarkable.  Stomach: The stomach appears unremarkable.  Duodenum: Unremarkable appearing duodenum.  Small Bowel: The small bowel appears unremarkable.  Colon: Nondistended. There are numerous scattered diverticula scattered throughout the colon most pronounced in the distal descending and proximal sigmoid colon where there is also suggestion of wall thickening and some subtle pericolonic fat stranding best seen on images 37 through 45 of coronal series 6.  Appendix: The appendix appears unremarkable and is seen on âImage 57, Series 2â through âImage 54, Series 2â.  Peritoneum: No intraperitoneal free air or ascites is seen.    Pelvis:  Bladder: The bladder appears unremarkable.  Female:  Uterus:  The uterus appears unremarkable.  Ovaries: No adnexal masses are seen.    Bony structures:  Dorsal Spine: There is spondylosis of the visualized spine.  Bony Pelvis: The visualized bony structures of the pelvis appear unremarkable.      Impression:  1. There are numerous scattered diverticula scattered throughout the colon most pronounced in the distal descending and proximal sigmoid colon where there is also suggestion of wall thickening and some subtle pericolonic fat stranding best seen on images 37 through 45 of coronal series 6. These findings are consistent with diverticulitis affecting the colon at the descending sigmoid junction somewhat similar to the prior study. Correlate with clinical and laboratory findings as regards additional evaluation and follow-up.  2. Details and other findings as discussed above.                                 X-Ray Chest PA And Lateral (In process)                  Medications   albuterol-ipratropium 2.5 mg-0.5 mg/3 mL nebulizer solution 3 mL (3 mLs Nebulization Given 6/13/22 0554)   albuterol-ipratropium 2.5 mg-0.5 mg/3 mL nebulizer solution 3 mL (3 mLs Nebulization Given 6/13/22 0553)   ketorolac injection 30 mg (30 mg Intravenous Not Given 6/13/22 0545)   methylPREDNISolone sodium succinate injection 125 mg (has no administration in time range)   piperacillin-tazobactam (ZOSYN) 4.5 g in sodium chloride 0.9 % 100 mL IVPB (MB+) (4.5 g Intravenous New Bag 6/13/22 0646)      7:20 AM:  At this time pt stable, she is requesting steroids for her asthma. I explained to her that the use of systemic steroids with an ongoing bowel infection may worse her infection due to the immunosuppression steroids enhance. Pt still requesting steroids because make her asthma better, despite understanding her diverticulitis may worsen, I will provide 1 dose in the ED due to her demands, she understood and agree to receive a dose.          Attending Attestation:   Physician Attestation Statement for  Resident:  As the supervising MD   Physician Attestation Statement: I have personally seen and examined this patient.   I agree with the above history. -:   As the supervising MD I agree with the above PE.    As the supervising MD I agree with the above treatment, course, plan, and disposition.  I have reviewed and agree with the residents interpretation of the following: lab data, x-rays and CT scans.            Attending ED Notes:   I performed a face to face evaluation of the patient Niya Moeller and my history reveal diverticulosis and asthma, presents with wheezing and LLQ abdominal pain the physical exam was remarkable for B/L wheezing and LLQ tenderness with guarding.  I reviewed the history, physical exam and diagnostic studies performed by the resident Dr. JAS Gaston and I concur with the diagnosis and assessment. This case was initially evaluated by Dr. Gaston  under my supervision and I agree with the documentation and plan.    Total teaching time: 15 min                 Clinical Impression:   Final diagnoses:  [J45.909] Asthma, unspecified asthma severity, unspecified whether complicated, unspecified whether persistent (Primary)  [K57.92] Diverticulitis          ED Disposition Condition    Discharge Stable        ED Prescriptions     Medication Sig Dispense Start Date End Date Auth. Provider    amoxicillin-clavulanate 875-125mg (AUGMENTIN) 875-125 mg per tablet Take 1 tablet by mouth 2 (two) times daily. 14 tablet 6/13/2022  Alvaor Gaston DO    psyllium husk (METAMUCIL) 3.4 gram/5.4 gram Powd Take 1.6 g by mouth 2 (two) times a day. 283 g 6/13/2022  Alvaro Gaston DO        Follow-up Information     Follow up With Specialties Details Why Contact [facility] - ED  In 1 week If symptoms worsen [address] [facility] [address]    [facility] - Emergency Dept Emergency Medicine  If symptoms worsen [address] [facility]  [address]  [phone]           Alfonso Reyes MD  06/13/22 0729

## 2022-06-18 LAB
BACTERIA BLD CULT: NORMAL
BACTERIA BLD CULT: NORMAL

## 2022-06-20 ENCOUNTER — HOSPITAL ENCOUNTER (EMERGENCY)
Facility: HOSPITAL | Age: 52
Discharge: LEFT BEFORE DISCHARGE | End: 2022-06-20
Payer: MEDICAID

## 2022-06-20 ENCOUNTER — HOSPITAL ENCOUNTER (OUTPATIENT)
Facility: HOSPITAL | Age: 52
Discharge: LEFT BEFORE DISCHARGE | End: 2022-06-20
Attending: FAMILY MEDICINE | Admitting: INTERNAL MEDICINE
Payer: MEDICAID

## 2022-06-20 VITALS
BODY MASS INDEX: 31.12 KG/M2 | DIASTOLIC BLOOD PRESSURE: 81 MMHG | OXYGEN SATURATION: 100 % | SYSTOLIC BLOOD PRESSURE: 124 MMHG | WEIGHT: 158.5 LBS | HEART RATE: 102 BPM | RESPIRATION RATE: 20 BRPM | TEMPERATURE: 98 F | HEIGHT: 60 IN

## 2022-06-20 VITALS
BODY MASS INDEX: 32.25 KG/M2 | DIASTOLIC BLOOD PRESSURE: 60 MMHG | WEIGHT: 160 LBS | HEART RATE: 103 BPM | RESPIRATION RATE: 22 BRPM | HEIGHT: 59 IN | OXYGEN SATURATION: 92 % | TEMPERATURE: 98 F | SYSTOLIC BLOOD PRESSURE: 93 MMHG

## 2022-06-20 DIAGNOSIS — R07.9 CHEST PAIN: ICD-10-CM

## 2022-06-20 DIAGNOSIS — R06.2 WHEEZING: Primary | ICD-10-CM

## 2022-06-20 DIAGNOSIS — R06.02 SOB (SHORTNESS OF BREATH): ICD-10-CM

## 2022-06-20 DIAGNOSIS — J45.902: ICD-10-CM

## 2022-06-20 LAB
ALBUMIN SERPL-MCNC: 3.9 GM/DL (ref 3.5–5)
ALBUMIN/GLOB SERPL: 1.1 RATIO (ref 1.1–2)
ALP SERPL-CCNC: 60 UNIT/L (ref 40–150)
ALT SERPL-CCNC: 21 UNIT/L (ref 0–55)
AST SERPL-CCNC: 21 UNIT/L (ref 5–34)
BASOPHILS # BLD AUTO: 0.03 X10(3)/MCL (ref 0–0.2)
BASOPHILS NFR BLD AUTO: 0.4 %
BILIRUBIN DIRECT+TOT PNL SERPL-MCNC: 0.2 MG/DL
BNP BLD-MCNC: <10 PG/ML
BUN SERPL-MCNC: 11.5 MG/DL (ref 9.8–20.1)
CALCIUM SERPL-MCNC: 10.5 MG/DL (ref 8.4–10.2)
CHLORIDE SERPL-SCNC: 105 MMOL/L (ref 98–107)
CO2 SERPL-SCNC: 25 MMOL/L (ref 22–29)
CREAT SERPL-MCNC: 0.89 MG/DL (ref 0.55–1.02)
EOSINOPHIL # BLD AUTO: 0.24 X10(3)/MCL (ref 0–0.9)
EOSINOPHIL NFR BLD AUTO: 3.2 %
ERYTHROCYTE [DISTWIDTH] IN BLOOD BY AUTOMATED COUNT: 13.4 % (ref 11.5–17)
FLUAV AG UPPER RESP QL IA.RAPID: NOT DETECTED
FLUBV AG UPPER RESP QL IA.RAPID: NOT DETECTED
GLOBULIN SER-MCNC: 3.7 GM/DL (ref 2.4–3.5)
GLUCOSE SERPL-MCNC: 76 MG/DL (ref 74–100)
HCT VFR BLD AUTO: 47.2 % (ref 37–47)
HGB BLD-MCNC: 14.9 GM/DL (ref 12–16)
IMM GRANULOCYTES # BLD AUTO: 0.02 X10(3)/MCL (ref 0–0.02)
IMM GRANULOCYTES NFR BLD AUTO: 0.3 % (ref 0–0.43)
LYMPHOCYTES # BLD AUTO: 3.51 X10(3)/MCL (ref 0.6–4.6)
LYMPHOCYTES NFR BLD AUTO: 46.2 %
MCH RBC QN AUTO: 32 PG (ref 27–31)
MCHC RBC AUTO-ENTMCNC: 31.6 MG/DL (ref 33–36)
MCV RBC AUTO: 101.3 FL (ref 80–94)
MONOCYTES # BLD AUTO: 0.75 X10(3)/MCL (ref 0.1–1.3)
MONOCYTES NFR BLD AUTO: 9.9 %
NEUTROPHILS # BLD AUTO: 3.1 X10(3)/MCL (ref 2.1–9.2)
NEUTROPHILS NFR BLD AUTO: 40 %
NRBC BLD AUTO-RTO: 0 %
PLATELET # BLD AUTO: 261 X10(3)/MCL (ref 130–400)
PMV BLD AUTO: 11 FL (ref 9.4–12.4)
POTASSIUM SERPL-SCNC: 4.4 MMOL/L (ref 3.5–5.1)
PROT SERPL-MCNC: 7.6 GM/DL (ref 6.4–8.3)
RBC # BLD AUTO: 4.66 X10(6)/MCL (ref 4.2–5.4)
SARS-COV-2 RNA RESP QL NAA+PROBE: NOT DETECTED
SODIUM SERPL-SCNC: 144 MMOL/L (ref 136–145)
TROPONIN I SERPL-MCNC: <0.01 NG/ML (ref 0–0.04)
WBC # SPEC AUTO: 7.6 X10(3)/MCL (ref 4.5–11.5)

## 2022-06-20 PROCEDURE — 63600175 PHARM REV CODE 636 W HCPCS

## 2022-06-20 PROCEDURE — 96376 TX/PRO/DX INJ SAME DRUG ADON: CPT

## 2022-06-20 PROCEDURE — 85025 COMPLETE CBC W/AUTO DIFF WBC: CPT | Performed by: FAMILY MEDICINE

## 2022-06-20 PROCEDURE — 25000242 PHARM REV CODE 250 ALT 637 W/ HCPCS: Performed by: INTERNAL MEDICINE

## 2022-06-20 PROCEDURE — G0378 HOSPITAL OBSERVATION PER HR: HCPCS

## 2022-06-20 PROCEDURE — 96375 TX/PRO/DX INJ NEW DRUG ADDON: CPT

## 2022-06-20 PROCEDURE — 63600175 PHARM REV CODE 636 W HCPCS: Performed by: FAMILY MEDICINE

## 2022-06-20 PROCEDURE — 25000003 PHARM REV CODE 250: Performed by: STUDENT IN AN ORGANIZED HEALTH CARE EDUCATION/TRAINING PROGRAM

## 2022-06-20 PROCEDURE — 94640 AIRWAY INHALATION TREATMENT: CPT | Mod: XB

## 2022-06-20 PROCEDURE — 93005 ELECTROCARDIOGRAM TRACING: CPT

## 2022-06-20 PROCEDURE — 84484 ASSAY OF TROPONIN QUANT: CPT | Performed by: FAMILY MEDICINE

## 2022-06-20 PROCEDURE — 83880 ASSAY OF NATRIURETIC PEPTIDE: CPT | Performed by: FAMILY MEDICINE

## 2022-06-20 PROCEDURE — 99283 EMERGENCY DEPT VISIT LOW MDM: CPT | Mod: 25,27

## 2022-06-20 PROCEDURE — 99285 EMERGENCY DEPT VISIT HI MDM: CPT | Mod: 25

## 2022-06-20 PROCEDURE — 63600175 PHARM REV CODE 636 W HCPCS: Performed by: STUDENT IN AN ORGANIZED HEALTH CARE EDUCATION/TRAINING PROGRAM

## 2022-06-20 PROCEDURE — 25000242 PHARM REV CODE 250 ALT 637 W/ HCPCS: Performed by: STUDENT IN AN ORGANIZED HEALTH CARE EDUCATION/TRAINING PROGRAM

## 2022-06-20 PROCEDURE — 87636 SARSCOV2 & INF A&B AMP PRB: CPT | Performed by: INTERNAL MEDICINE

## 2022-06-20 PROCEDURE — 96365 THER/PROPH/DIAG IV INF INIT: CPT

## 2022-06-20 PROCEDURE — 96372 THER/PROPH/DIAG INJ SC/IM: CPT

## 2022-06-20 PROCEDURE — 80053 COMPREHEN METABOLIC PANEL: CPT | Performed by: FAMILY MEDICINE

## 2022-06-20 PROCEDURE — 36415 COLL VENOUS BLD VENIPUNCTURE: CPT | Performed by: FAMILY MEDICINE

## 2022-06-20 RX ORDER — FLUTICASONE FUROATE AND VILANTEROL 100; 25 UG/1; UG/1
1 POWDER RESPIRATORY (INHALATION) DAILY
Status: DISCONTINUED | OUTPATIENT
Start: 2022-06-20 | End: 2022-06-21 | Stop reason: HOSPADM

## 2022-06-20 RX ORDER — PREDNISONE 10 MG/1
30 TABLET ORAL 2 TIMES DAILY
Status: DISCONTINUED | OUTPATIENT
Start: 2022-06-21 | End: 2022-06-20

## 2022-06-20 RX ORDER — ONDANSETRON 4 MG/1
8 TABLET, ORALLY DISINTEGRATING ORAL EVERY 8 HOURS PRN
Status: DISCONTINUED | OUTPATIENT
Start: 2022-06-20 | End: 2022-06-21 | Stop reason: HOSPADM

## 2022-06-20 RX ORDER — METHYLPREDNISOLONE SOD SUCC 125 MG
125 VIAL (EA) INJECTION
Status: COMPLETED | OUTPATIENT
Start: 2022-06-20 | End: 2022-06-20

## 2022-06-20 RX ORDER — ENOXAPARIN SODIUM 100 MG/ML
40 INJECTION SUBCUTANEOUS EVERY 24 HOURS
Status: DISCONTINUED | OUTPATIENT
Start: 2022-06-20 | End: 2022-06-21 | Stop reason: HOSPADM

## 2022-06-20 RX ORDER — SODIUM CHLORIDE 0.9 % (FLUSH) 0.9 %
10 SYRINGE (ML) INJECTION
Status: DISCONTINUED | OUTPATIENT
Start: 2022-06-20 | End: 2022-06-21 | Stop reason: HOSPADM

## 2022-06-20 RX ORDER — IPRATROPIUM BROMIDE AND ALBUTEROL SULFATE 2.5; .5 MG/3ML; MG/3ML
3 SOLUTION RESPIRATORY (INHALATION)
Status: COMPLETED | OUTPATIENT
Start: 2022-06-20 | End: 2022-06-20

## 2022-06-20 RX ORDER — TALC
6 POWDER (GRAM) TOPICAL NIGHTLY PRN
Status: DISCONTINUED | OUTPATIENT
Start: 2022-06-20 | End: 2022-06-21 | Stop reason: HOSPADM

## 2022-06-20 RX ORDER — NICOTINE 7MG/24HR
1 PATCH, TRANSDERMAL 24 HOURS TRANSDERMAL
Status: DISCONTINUED | OUTPATIENT
Start: 2022-06-20 | End: 2022-06-21 | Stop reason: HOSPADM

## 2022-06-20 RX ORDER — FLUTICASONE FUROATE AND VILANTEROL 100; 25 UG/1; UG/1
1 POWDER RESPIRATORY (INHALATION) DAILY
Status: DISCONTINUED | OUTPATIENT
Start: 2022-06-21 | End: 2022-06-20

## 2022-06-20 RX ORDER — TERBUTALINE SULFATE 1 MG/ML
INJECTION SUBCUTANEOUS
Status: COMPLETED
Start: 2022-06-20 | End: 2022-06-20

## 2022-06-20 RX ORDER — MONTELUKAST SODIUM 5 MG/1
10 TABLET, CHEWABLE ORAL DAILY
Status: DISCONTINUED | OUTPATIENT
Start: 2022-06-20 | End: 2022-06-21 | Stop reason: HOSPADM

## 2022-06-20 RX ORDER — PANTOPRAZOLE SODIUM 40 MG/1
40 TABLET, DELAYED RELEASE ORAL DAILY
Status: DISCONTINUED | OUTPATIENT
Start: 2022-06-20 | End: 2022-06-21 | Stop reason: HOSPADM

## 2022-06-20 RX ORDER — DICYCLOMINE HYDROCHLORIDE 10 MG/1
10 CAPSULE ORAL EVERY 6 HOURS PRN
Status: DISCONTINUED | OUTPATIENT
Start: 2022-06-20 | End: 2022-06-21 | Stop reason: HOSPADM

## 2022-06-20 RX ORDER — IPRATROPIUM BROMIDE AND ALBUTEROL SULFATE 2.5; .5 MG/3ML; MG/3ML
3 SOLUTION RESPIRATORY (INHALATION) EVERY 4 HOURS
Status: DISCONTINUED | OUTPATIENT
Start: 2022-06-20 | End: 2022-06-21 | Stop reason: HOSPADM

## 2022-06-20 RX ORDER — IPRATROPIUM BROMIDE AND ALBUTEROL SULFATE 2.5; .5 MG/3ML; MG/3ML
9 SOLUTION RESPIRATORY (INHALATION)
Status: DISPENSED | OUTPATIENT
Start: 2022-06-20 | End: 2022-06-20

## 2022-06-20 RX ORDER — ACETAMINOPHEN 325 MG/1
650 TABLET ORAL EVERY 6 HOURS PRN
Status: DISCONTINUED | OUTPATIENT
Start: 2022-06-20 | End: 2022-06-21 | Stop reason: HOSPADM

## 2022-06-20 RX ORDER — MAGNESIUM SULFATE HEPTAHYDRATE 40 MG/ML
2 INJECTION, SOLUTION INTRAVENOUS
Status: COMPLETED | OUTPATIENT
Start: 2022-06-20 | End: 2022-06-20

## 2022-06-20 RX ORDER — TERBUTALINE SULFATE 1 MG/ML
0.25 INJECTION SUBCUTANEOUS ONCE
Status: COMPLETED | OUTPATIENT
Start: 2022-06-20 | End: 2022-06-20

## 2022-06-20 RX ORDER — BENZONATATE 100 MG/1
100 CAPSULE ORAL 3 TIMES DAILY PRN
Status: DISCONTINUED | OUTPATIENT
Start: 2022-06-20 | End: 2022-06-21 | Stop reason: HOSPADM

## 2022-06-20 RX ORDER — METHYLPREDNISOLONE SOD SUCC 125 MG
60 VIAL (EA) INJECTION EVERY 8 HOURS
Status: DISCONTINUED | OUTPATIENT
Start: 2022-06-20 | End: 2022-06-21 | Stop reason: HOSPADM

## 2022-06-20 RX ADMIN — IPRATROPIUM BROMIDE AND ALBUTEROL SULFATE 3 ML: 2.5; .5 SOLUTION RESPIRATORY (INHALATION) at 08:06

## 2022-06-20 RX ADMIN — PANTOPRAZOLE SODIUM 40 MG: 40 TABLET, DELAYED RELEASE ORAL at 11:06

## 2022-06-20 RX ADMIN — IPRATROPIUM BROMIDE AND ALBUTEROL SULFATE 3 ML: 2.5; .5 SOLUTION RESPIRATORY (INHALATION) at 11:06

## 2022-06-20 RX ADMIN — FLUTICASONE FUROATE AND VILANTEROL TRIFENATATE 1 PUFF: 100; 25 POWDER RESPIRATORY (INHALATION) at 11:06

## 2022-06-20 RX ADMIN — TERBUTALINE SULFATE 0.25 MG: 1 INJECTION SUBCUTANEOUS at 07:06

## 2022-06-20 RX ADMIN — DICYCLOMINE HYDROCHLORIDE 10 MG: 10 CAPSULE ORAL at 05:06

## 2022-06-20 RX ADMIN — MONTELUKAST SODIUM 10 MG: 5 TABLET, CHEWABLE ORAL at 11:06

## 2022-06-20 RX ADMIN — IPRATROPIUM BROMIDE AND ALBUTEROL SULFATE 3 ML: 2.5; .5 SOLUTION RESPIRATORY (INHALATION) at 07:06

## 2022-06-20 RX ADMIN — METHYLPREDNISOLONE SODIUM SUCCINATE 60 MG: 125 INJECTION, POWDER, FOR SOLUTION INTRAMUSCULAR; INTRAVENOUS at 01:06

## 2022-06-20 RX ADMIN — MAGNESIUM SULFATE HEPTAHYDRATE 2 G: 40 INJECTION, SOLUTION INTRAVENOUS at 06:06

## 2022-06-20 RX ADMIN — TERBUTALINE SULFATE 0.25 MG: 1 INJECTION, SOLUTION SUBCUTANEOUS at 07:06

## 2022-06-20 RX ADMIN — IPRATROPIUM BROMIDE AND ALBUTEROL SULFATE 3 ML: 2.5; .5 SOLUTION RESPIRATORY (INHALATION) at 03:06

## 2022-06-20 RX ADMIN — METHYLPREDNISOLONE SODIUM SUCCINATE 125 MG: 125 INJECTION, POWDER, FOR SOLUTION INTRAMUSCULAR; INTRAVENOUS at 05:06

## 2022-06-20 NOTE — H&P
[facility] Medicine Wards History & Physical Note     Resident Team: [facility] Medicine List 1  Attending Physician: Deepti Hernandez MD  Resident: Dr. Regan      Date of Admit: 6/20/2022    Chief Complaint     Shortness of Breath      Subjective:      History of Present Illness:  Niya Moeller is a 52 y.o.  female who with a history of Asthma, GERD, Diverticulitis, and Tobacco user who presented to the ED on 6/20/2022  with a primary complaint of Shortness of Breath  She reports she has been dealing with SOB for the past month but in the last few days, has felt like her SOB has been worsening. She states she has used her rescue inhaler and breathing treatment without any relief. She states she had a similar episode around 6/13 and was seen in the ED. Of note patient does not have a PCP. She denies fever, chills, cough, chest pain, abdominal pain, and N/V.       Past Medical History:  Past Medical History:   Diagnosis Date    Asthma     Diverticulosis     GERD (gastroesophageal reflux disease)        Past Surgical History:  No past surgical history on file.    Family History:  No family history on file.    Social History:       Allergies:  Review of patient's allergies indicates:   Allergen Reactions    Levofloxacin Hives and Itching    Metronidazole Hives    Codeine     Latex     Morphine     Opioids - morphine analogues        Home Medications:  Prior to Admission medications    Medication Sig Start Date End Date Taking? Authorizing Provider   albuterol-ipratropium (DUO-NEB) 2.5 mg-0.5 mg/3 mL nebulizer solution Take 3 mLs by nebulization every 6 (six) hours as needed for Wheezing. Rescue 5/5/22 5/5/23  Kelly Miranda MD   albuterol-ipratropium (DUO-NEB) 2.5 mg-0.5 mg/3 mL nebulizer solution Take 3 mLs by nebulization every 4 (four) hours as needed for Wheezing. Rescue 5/29/22 5/29/23  Wilver Ibrahim MD   amoxicillin-clavulanate 875-125mg (AUGMENTIN) 875-125 mg per tablet Take 1 tablet by mouth 2 (two) times  "daily. 22   Alvaro Alek, DO   psyllium husk (METAMUCIL) 3.4 gram/5.4 gram Powd Take 1.6 g by mouth 2 (two) times a day. 22   Alvaro Alek, DO   umeclidinium (INCRUSE ELLIPTA) 62.5 mcg/actuation inhalation capsule Inhale 62.5 mcg into the lungs once daily. Controller 22   Kelly Miranda MD         Review of Systems:  See HPI.         Objective:   Last 24 Hour Vital Signs:  BP  Min: 93/60  Max: 118/81  Temp  Av.1 °F (36.7 °C)  Min: 97.7 °F (36.5 °C)  Max: 98.4 °F (36.9 °C)  Pulse  Av.4  Min: 100  Max: 103  Resp  Av.9  Min: 18  Max: 22  SpO2  Av.5 %  Min: 92 %  Max: 95 %  Height  Av' 11.5" (151.1 cm)  Min: 4' 11.06" (150 cm)  Max: 5' (152.4 cm)  Weight  Av.2 kg (159 lb 4.1 oz)  Min: 71.9 kg (158 lb 8.2 oz)  Max: 72.6 kg (160 lb)  Body mass index is 30.96 kg/m².  No intake/output data recorded.    Physical Examination:  General: The patient is pleasant and cooperative. Laying comfortably in bed. Prefers for the room to be dark. Lights were kept off. On RA satting at 92%.  Head: Skull is normocephalic and atraumatic.  Eyes: Pupils are equal, round, and reactive to light. Extraocular movements are intact and equal. Anicteric sclera.  Chest: Respirations are non-labored. Expiratory wheezing heard throughout lung fields.   Cardiovascular: Regular rate and rhythm. +2 peripheral pulses.  Abdomen: Soft, non-distended, non-tender, with positive bowel sounds.   Extremities: Moves all extremities equally and symmetrically. BUE and BLE strength: 5/5.  Neurological: Alert and oriented x 3. No focal neurologic deficits. Answered all questions appropriately. No slurred speech. No sensory deficits.   Skin: Warm to touch. Normal turgor.       Laboratory:  Most Recent Data:  CBC:   Lab Results   Component Value Date    WBC 7.6 2022    HGB 14.9 2022    HCT 47.2 (H) 2022     2022    .3 (H) 2022    RDW 13.4 2022     WBC Differential:   Recent " Labs   Lab 06/20/22  0607   WBC 7.6   HGB 14.9   HCT 47.2*      .3*     BMP:   Lab Results   Component Value Date     06/20/2022    K 4.4 06/20/2022    CO2 25 06/20/2022    BUN 11.5 06/20/2022    CREATININE 0.89 06/20/2022    CALCIUM 10.5 (H) 06/20/2022    MG 2.10 03/31/2022    PHOS 3.3 03/31/2022     LFTs:   Lab Results   Component Value Date    ALBUMIN 3.9 06/20/2022    BILITOT 0.2 06/20/2022    AST 21 06/20/2022    ALKPHOS 60 06/20/2022    ALT 21 06/20/2022     Coags: No results found for: INR, PROTIME, PTT  FLP:   Lab Results   Component Value Date    CHOL 147 03/31/2022    HDL 51 03/31/2022    TRIG 61 03/31/2022     DM:   Lab Results   Component Value Date    HGBA1C 5.4 03/31/2022    CREATININE 0.89 06/20/2022     Thyroid:   Lab Results   Component Value Date    TSH 0.1176 03/31/2022      Anemia: No results found for: IRON, TIBC, FERRITIN, SATURATEDIRO  No results found for: EYFXINSI06  No results found for: FOLATE     Cardiac:   Lab Results   Component Value Date    TROPONINI <0.010 06/20/2022    BNP <10.0 06/20/2022     Urinalysis:   Lab Results   Component Value Date    COLORU Light-Yellow 04/16/2022    PHUA 6.0 03/30/2022    SPECGRAV 1.034 (H) 08/21/2008    NITRITE Negative 04/16/2022    KETONESU Trace 04/16/2022    UROBILINOGEN Normal 04/16/2022    WBCUA 0-5 04/16/2022       Trended Lab Data:  Recent Labs   Lab 06/20/22  0607   WBC 7.6   HGB 14.9   HCT 47.2*      .3*   RDW 13.4      K 4.4   CO2 25   BUN 11.5   CREATININE 0.89   ALBUMIN 3.9   BILITOT 0.2   AST 21   ALKPHOS 60   ALT 21       Trended Cardiac Data:  Recent Labs   Lab 06/20/22  0606 06/20/22  0607   TROPONINI <0.010  --    BNP  --  <10.0       Microbiology Data:  Microbiology Results (last 7 days)     ** No results found for the last 168 hours. **             Radiology:  Imaging Results          X-Ray Chest PA And Lateral (Final result)  Result time 06/20/22 06:43:00    Final result by Juan Leavitt,  MD (06/20/22 06:43:00)                 Impression:      No acute cardiopulmonary process.      Electronically signed by: Juan Leavitt  Date:    06/20/2022  Time:    06:43             Narrative:    EXAMINATION:  XR CHEST PA AND LATERAL    CLINICAL HISTORY:  Chest pain, unspecified    TECHNIQUE:  Two views of the chest    COMPARISON:  06/13/2022    FINDINGS:  No focal opacification, pleural effusion, or pneumothorax.    The cardiomediastinal silhouette is within normal limits.    No acute osseous abnormality.                              EKG: Sinus tachycardia   Otherwise normal ECG   When compared with ECG of 29-MAY-2022 13:56,   Nonspecific T wave abnormality now evident in Inferior leads   Nonspecific T wave abnormality now evident in Lateral leads      Assessment & Plan:     Asthma exacerbation  -Received Solumedrol 125 mg while in ED, Duo-neb, and IV magnesium  -Continue Solu-medrol 60 mg TID  -During prior encounters, it was recommended for patient to start either Advair or Symbicort. Patient had not been compliant with either medication  -Will start trial of Breo to see how patient tolerated medications while inpatient    -Contin pulse ox; keep o2 sats >92%  -Benzonatate PRN for cough   -Incentive spirometry   -Nursing communication regarding CBG level. If CBG >180, will start low dose insulin sliding scale     Tobacco use  -Spent extensive time educating patient on the harmful effects on tobacco on the body. Recommend tobacco cessation going further   -Nicotine patch PRN as needed        CODE STATUS: FULL  Access: Peripheral IV   Antibiotics: None   Diet: Regular   DVT Prophylaxis: Lovenox  GI Prophylaxis: PPI  Fluids: None       Disposition: Admitted for observation. Continue to treat asthma exacerbation. Started on Breo, and if patient tolerated medicine well will send home with medication at time of discharge.         Lana Regan MD  [facility] HO-2

## 2022-06-20 NOTE — ED PROVIDER NOTES
Name: Niya Moeller   Age: 52 y.o.  Sex: female    Chief complaint:   Chief Complaint   Patient presents with    Shortness of Breath      Patient arrived with: Private  History obtained from: Patient    Subjective:   52-year-old female a past medical history of asthma, GERD, diverticulosis that presented to the emergency department for shortness of breath.  Patient said her symptoms started approximately 1 week ago and have gotten progressively worse since that time.  The symptoms are very typical for her asthma exacerbation.  Patient also started to have chest pain which was atypical for her asthma exacerbation.  Chest pain is midsternal, tightness like sensation, waxes and wanes, nonradiating.  She is using albuterol, DuoNeb, Symbicort which is been minimally helpful.  Denies fever, chills, sweats, cough, sore throat, runny nose, abdominal pain, nausea, vomiting, diarrhea, dysuria    Past Medical History:   Diagnosis Date    Asthma     Diverticulosis     GERD (gastroesophageal reflux disease)      No past surgical history on file.  Social History     Socioeconomic History    Marital status: Single     Review of patient's allergies indicates:   Allergen Reactions    Levofloxacin Hives and Itching    Metronidazole Hives    Codeine     Latex     Morphine     Opioids - morphine analogues         Review of Systems   Constitutional: Positive for chills. Negative for diaphoresis and fever.   HENT: Negative for congestion and sore throat.    Eyes: Negative for pain and discharge.   Respiratory: Positive for shortness of breath. Negative for cough.    Cardiovascular: Positive for chest pain. Negative for palpitations.   Gastrointestinal: Negative for diarrhea and vomiting.   Genitourinary: Negative for dysuria and hematuria.   Musculoskeletal: Negative for back pain and myalgias.   Skin: Negative for itching and rash.   Neurological: Negative for weakness and headaches.          Objective:     Vitals:     06/20/22 0513   BP: (!) 110/59   Pulse: 103   Resp: 20   Temp: 97.7 °F (36.5 °C)        Physical Exam  Constitutional:       Appearance: She is not toxic-appearing.   HENT:      Head: Normocephalic and atraumatic.   Cardiovascular:      Rate and Rhythm: Regular rhythm.      Pulses: Normal pulses.   Pulmonary:      Effort: Pulmonary effort is normal.      Breath sounds: Examination of the right-upper field reveals decreased breath sounds and wheezing. Examination of the left-upper field reveals decreased breath sounds and wheezing. Examination of the right-middle field reveals decreased breath sounds and wheezing. Examination of the left-middle field reveals decreased breath sounds and wheezing. Examination of the right-lower field reveals decreased breath sounds and wheezing. Examination of the left-lower field reveals decreased breath sounds and wheezing. Decreased breath sounds and wheezing present.   Abdominal:      General: Abdomen is flat. Bowel sounds are normal.      Palpations: Abdomen is soft.   Musculoskeletal:         General: No deformity. Normal range of motion.      Cervical back: Normal range of motion and neck supple.      Right lower leg: No edema.      Left lower leg: No edema.   Skin:     General: Skin is warm and dry.   Neurological:      General: No focal deficit present.      Mental Status: She is alert and oriented to person, place, and time.   Psychiatric:         Mood and Affect: Mood normal.         Behavior: Behavior normal.          Records:  Nursing records and triage records reviewed  Prior records reviewed    Medical decision making:   Presented to the emergency department for shortness of breath and chest pain.  Patient is stable and nontoxic appearing.  Afebrile, tachycardic to 103, normotensive, saturating 92% on room air.  Patient decreased breath sounds bilaterally with minimal amount of wheezing.  There is no signs of respiratory distress, patient was able to speak in full  sentences with no signs of active retractions.  Will get cardiac labs for further evaluation.  Patient will receive IV methylprednisone, DuoNeb x3, IV magnesium for symptomatic management.    ED Course as of 06/20/22 0702 Mon Jun 20, 2022   0700 CBC and CMP or thin normal limits.    Patient will be signed out to Dr. Healy for re-evaluation and troponin.  [RK]      ED Course User Index  [RK] Arvind Crowley MD          EKG:  ECG Results          EKG 12-lead (Preliminary result)  Result time 06/20/22 06:30:30    ED Interpretation by Arvind Crowley MD (06/20/22 06:30:30, [facility], Emergency Medicine)    Normal sinus rhythm at a rate of 96, no signs of ST elevation or depression, normal axis, normal intervals with a QTC of 469                               Procedures       Diagnosis:  Final diagnoses:  [R07.9] Chest pain  [R06.02] SOB (shortness of breath)          Arvind Crowley M.D.  Emergency Medicine Physician     (Please note that this chart was completed via voice to text dictation. There may be typographical errors or substitutions that are unintentional, or uncorrected. Every attempt was made to proofread the chart prior to completion. If there are any questions, please contact the physician for final clarification).       Arvind Crowley MD  06/20/22 0702      07:35 AM:  Evaluation at this time reveal moderate B/L wheezing, will order terbutaline and 2 more duo nebs, CXR and labs unremarkable, will F/U for final dispo     Alfonso Reyes MD  06/20/22 0798    08:40:  At this time pt with mild improvement, still in bronchospasm after multiple nebs, mag and terbutaline for which will admit.       Alfonso Reyes MD  06/20/22 0846    EKG:  Sinus tachycardia at 106 bpm, Normal axis, Normal intervals, No ischemia       Alfonso Reyes MD  06/20/22 0857

## 2022-06-21 NOTE — DISCHARGE SUMMARY
On Call Note/ AMA     MD called at 2140 about patient wish to leave AMA. Patient reports that she has bills to pay. Nursing explained risks of leaving. Patient signed AMA form. Reported that she would be back through ED after taking care of business. Patient informed that she can return to ED at any time.   MD arrived on floor at 2150 where patient had already left signing AMA form.     Carolina Bailey MD  [facility] HO-2

## 2022-06-21 NOTE — PT/OT/SLP PROGRESS
Occupational Therapy      Patient Name:  Niya Moeller   MRN:  [id number]    Patient not seen for OT eval secondary to pt leaving AMA.    6/21/2022

## 2022-06-28 ENCOUNTER — HOSPITAL ENCOUNTER (OUTPATIENT)
Facility: HOSPITAL | Age: 52
Discharge: HOME | End: 2022-06-29
Attending: INTERNAL MEDICINE | Admitting: INTERNAL MEDICINE
Payer: MEDICAID

## 2022-06-28 DIAGNOSIS — J45.909 ASTHMA: ICD-10-CM

## 2022-06-28 DIAGNOSIS — R06.02 SOB (SHORTNESS OF BREATH): ICD-10-CM

## 2022-06-28 LAB
EST. AVERAGE GLUCOSE BLD GHB EST-MCNC: 105.4 MG/DL
HBA1C MFR BLD: 5.3 %
SARS-COV-2 RDRP RESP QL NAA+PROBE: NEGATIVE

## 2022-06-28 PROCEDURE — 63600175 PHARM REV CODE 636 W HCPCS: Performed by: INTERNAL MEDICINE

## 2022-06-28 PROCEDURE — G0378 HOSPITAL OBSERVATION PER HR: HCPCS

## 2022-06-28 PROCEDURE — 96375 TX/PRO/DX INJ NEW DRUG ADDON: CPT

## 2022-06-28 PROCEDURE — 87635 SARS-COV-2 COVID-19 AMP PRB: CPT | Performed by: STUDENT IN AN ORGANIZED HEALTH CARE EDUCATION/TRAINING PROGRAM

## 2022-06-28 PROCEDURE — 94761 N-INVAS EAR/PLS OXIMETRY MLT: CPT

## 2022-06-28 PROCEDURE — 36415 COLL VENOUS BLD VENIPUNCTURE: CPT | Performed by: INTERNAL MEDICINE

## 2022-06-28 PROCEDURE — 25000003 PHARM REV CODE 250: Performed by: STUDENT IN AN ORGANIZED HEALTH CARE EDUCATION/TRAINING PROGRAM

## 2022-06-28 PROCEDURE — 99285 EMERGENCY DEPT VISIT HI MDM: CPT | Mod: 25

## 2022-06-28 PROCEDURE — 96372 THER/PROPH/DIAG INJ SC/IM: CPT | Performed by: INTERNAL MEDICINE

## 2022-06-28 PROCEDURE — 25000242 PHARM REV CODE 250 ALT 637 W/ HCPCS: Performed by: STUDENT IN AN ORGANIZED HEALTH CARE EDUCATION/TRAINING PROGRAM

## 2022-06-28 PROCEDURE — 27000221 HC OXYGEN, UP TO 24 HOURS

## 2022-06-28 PROCEDURE — 83036 HEMOGLOBIN GLYCOSYLATED A1C: CPT | Performed by: STUDENT IN AN ORGANIZED HEALTH CARE EDUCATION/TRAINING PROGRAM

## 2022-06-28 PROCEDURE — 25000242 PHARM REV CODE 250 ALT 637 W/ HCPCS: Performed by: INTERNAL MEDICINE

## 2022-06-28 PROCEDURE — 96365 THER/PROPH/DIAG IV INF INIT: CPT

## 2022-06-28 PROCEDURE — 94640 AIRWAY INHALATION TREATMENT: CPT | Mod: XB

## 2022-06-28 PROCEDURE — 94640 AIRWAY INHALATION TREATMENT: CPT

## 2022-06-28 RX ORDER — SODIUM CHLORIDE 0.9 % (FLUSH) 0.9 %
10 SYRINGE (ML) INJECTION
Status: DISCONTINUED | OUTPATIENT
Start: 2022-06-28 | End: 2022-06-29 | Stop reason: HOSPADM

## 2022-06-28 RX ORDER — PANTOPRAZOLE SODIUM 40 MG/1
40 TABLET, DELAYED RELEASE ORAL DAILY
Status: DISCONTINUED | OUTPATIENT
Start: 2022-06-28 | End: 2022-06-29

## 2022-06-28 RX ORDER — PANTOPRAZOLE SODIUM 40 MG/1
40 TABLET, DELAYED RELEASE ORAL DAILY
Status: DISCONTINUED | OUTPATIENT
Start: 2022-06-29 | End: 2022-06-28

## 2022-06-28 RX ORDER — TERBUTALINE SULFATE 1 MG/ML
0.25 INJECTION SUBCUTANEOUS ONCE
Status: COMPLETED | OUTPATIENT
Start: 2022-06-28 | End: 2022-06-28

## 2022-06-28 RX ORDER — DOCUSATE SODIUM 100 MG/1
100 CAPSULE, LIQUID FILLED ORAL DAILY
Status: DISCONTINUED | OUTPATIENT
Start: 2022-06-29 | End: 2022-06-29 | Stop reason: HOSPADM

## 2022-06-28 RX ORDER — IPRATROPIUM BROMIDE AND ALBUTEROL SULFATE 2.5; .5 MG/3ML; MG/3ML
3 SOLUTION RESPIRATORY (INHALATION)
Status: COMPLETED | OUTPATIENT
Start: 2022-06-28 | End: 2022-06-28

## 2022-06-28 RX ORDER — TALC
6 POWDER (GRAM) TOPICAL NIGHTLY PRN
Status: DISCONTINUED | OUTPATIENT
Start: 2022-06-28 | End: 2022-06-29 | Stop reason: HOSPADM

## 2022-06-28 RX ORDER — IPRATROPIUM BROMIDE AND ALBUTEROL SULFATE 2.5; .5 MG/3ML; MG/3ML
3 SOLUTION RESPIRATORY (INHALATION) EVERY 4 HOURS
Status: DISCONTINUED | OUTPATIENT
Start: 2022-06-28 | End: 2022-06-29 | Stop reason: HOSPADM

## 2022-06-28 RX ORDER — MONTELUKAST SODIUM 5 MG/1
5 TABLET, CHEWABLE ORAL DAILY
Status: DISCONTINUED | OUTPATIENT
Start: 2022-06-28 | End: 2022-06-29

## 2022-06-28 RX ORDER — IPRATROPIUM BROMIDE AND ALBUTEROL SULFATE 2.5; .5 MG/3ML; MG/3ML
9 SOLUTION RESPIRATORY (INHALATION)
Status: COMPLETED | OUTPATIENT
Start: 2022-06-28 | End: 2022-06-28

## 2022-06-28 RX ORDER — METHYLPREDNISOLONE SOD SUCC 125 MG
80 VIAL (EA) INJECTION EVERY 6 HOURS
Status: DISCONTINUED | OUTPATIENT
Start: 2022-06-29 | End: 2022-06-29 | Stop reason: HOSPADM

## 2022-06-28 RX ORDER — MAGNESIUM SULFATE 1 G/100ML
1 INJECTION INTRAVENOUS
Status: COMPLETED | OUTPATIENT
Start: 2022-06-28 | End: 2022-06-28

## 2022-06-28 RX ORDER — ACETAMINOPHEN 325 MG/1
650 TABLET ORAL EVERY 8 HOURS PRN
Status: DISCONTINUED | OUTPATIENT
Start: 2022-06-28 | End: 2022-06-29 | Stop reason: HOSPADM

## 2022-06-28 RX ORDER — DICYCLOMINE HYDROCHLORIDE 10 MG/1
10 CAPSULE ORAL EVERY 6 HOURS PRN
Status: DISCONTINUED | OUTPATIENT
Start: 2022-06-28 | End: 2022-06-29 | Stop reason: HOSPADM

## 2022-06-28 RX ORDER — ENOXAPARIN SODIUM 100 MG/ML
40 INJECTION SUBCUTANEOUS EVERY 24 HOURS
Status: DISCONTINUED | OUTPATIENT
Start: 2022-06-28 | End: 2022-06-29 | Stop reason: HOSPADM

## 2022-06-28 RX ORDER — METHYLPREDNISOLONE SOD SUCC 125 MG
125 VIAL (EA) INJECTION
Status: DISCONTINUED | OUTPATIENT
Start: 2022-06-28 | End: 2022-06-28

## 2022-06-28 RX ADMIN — DICYCLOMINE HYDROCHLORIDE 10 MG: 10 CAPSULE ORAL at 09:06

## 2022-06-28 RX ADMIN — IPRATROPIUM BROMIDE AND ALBUTEROL SULFATE 3 ML: 2.5; .5 SOLUTION RESPIRATORY (INHALATION) at 05:06

## 2022-06-28 RX ADMIN — MONTELUKAST SODIUM 5 MG: 5 TABLET, CHEWABLE ORAL at 06:06

## 2022-06-28 RX ADMIN — IPRATROPIUM BROMIDE AND ALBUTEROL SULFATE 3 ML: 2.5; .5 SOLUTION RESPIRATORY (INHALATION) at 07:06

## 2022-06-28 RX ADMIN — MAGNESIUM SULFATE IN DEXTROSE 1 G: 10 INJECTION, SOLUTION INTRAVENOUS at 03:06

## 2022-06-28 RX ADMIN — IPRATROPIUM BROMIDE AND ALBUTEROL SULFATE 3 ML: 2.5; .5 SOLUTION RESPIRATORY (INHALATION) at 11:06

## 2022-06-28 RX ADMIN — Medication 6 MG: at 09:06

## 2022-06-28 RX ADMIN — METHYLPREDNISOLONE SODIUM SUCCINATE 80 MG: 125 INJECTION, POWDER, FOR SOLUTION INTRAMUSCULAR; INTRAVENOUS at 11:06

## 2022-06-28 RX ADMIN — IPRATROPIUM BROMIDE AND ALBUTEROL SULFATE 9 ML: 2.5; .5 SOLUTION RESPIRATORY (INHALATION) at 03:06

## 2022-06-28 RX ADMIN — TERBUTALINE SULFATE 0.25 MG: 1 INJECTION, SOLUTION SUBCUTANEOUS at 05:06

## 2022-06-28 RX ADMIN — PANTOPRAZOLE SODIUM 40 MG: 40 TABLET, DELAYED RELEASE ORAL at 10:06

## 2022-06-28 NOTE — ED PROVIDER NOTES
Encounter Date: 6/28/2022       History     Chief Complaint   Patient presents with    Shortness of Breath     PT IN /AASI W CO SOB W COUGH AND WHEEZING SINCE THIS AM.  PT HX OF ASTHMA. RECEIVED NEB.  AND SOLUMEDRAL IN ROUTE. PT UPSETIN TRIAGE, FUSSING OVER WAIT.  REFUSED EKG OBTAINED.       Presents with asthma. States recently admitted but signed out AMA. Stated with her shortness of breath at home today. No fever or sick contacts    The history is provided by the patient.   Shortness of Breath  This is a recurrent problem. The average episode lasts 1 day. The problem occurs frequently.The current episode started yesterday. The problem has not changed since onset.Associated symptoms include wheezing. Pertinent negatives include no fever, no headaches, no rhinorrhea, no sore throat, no ear pain, no cough, no sputum production, no hemoptysis, no orthopnea, no chest pain, no vomiting, no abdominal pain, no rash and no leg swelling. The problem's precipitants include pollens. She has tried leukotriene antagonists, inhaled steroids and beta-agonist inhalers for the symptoms. The treatment provided mild relief. She has had prior hospitalizations. She has had prior ED visits. She has had no prior ICU admissions. Associated medical issues include asthma.     Review of patient's allergies indicates:   Allergen Reactions    Levofloxacin Hives and Itching    Metronidazole Hives    Codeine     Latex     Morphine     Opioids - morphine analogues      Past Medical History:   Diagnosis Date    Asthma     Diverticulosis     GERD (gastroesophageal reflux disease)      No past surgical history on file.  No family history on file.  Social History     Tobacco Use    Smoking status: Never Smoker    Smokeless tobacco: Never Used     Review of Systems   Constitutional: Negative for appetite change, chills and fever.   HENT: Negative for dental problem, ear pain, rhinorrhea and sore throat.    Eyes: Negative for visual  disturbance.   Respiratory: Positive for shortness of breath and wheezing. Negative for cough, hemoptysis and sputum production.    Cardiovascular: Negative for chest pain, palpitations, orthopnea and leg swelling.   Gastrointestinal: Negative for abdominal pain, blood in stool, diarrhea and vomiting.   Genitourinary: Negative for dysuria, pelvic pain and vaginal discharge.   Musculoskeletal: Negative for back pain and myalgias.   Skin: Negative for rash.   Neurological: Negative for weakness and headaches.   Psychiatric/Behavioral: Negative for sleep disturbance.   All other systems reviewed and are negative.      Physical Exam     Initial Vitals [06/28/22 1506]   BP Pulse Resp Temp SpO2   (!) 103/40 102 (!) 22 98.1 °F (36.7 °C) (!) 94 %      MAP       --         Physical Exam    Nursing note and vitals reviewed.  Constitutional: She appears well-developed and well-nourished. She appears distressed (Mild).   HENT:   Head: Normocephalic and atraumatic.   Mouth/Throat: Oropharynx is clear and moist.   Eyes: Conjunctivae are normal. Pupils are equal, round, and reactive to light.   Neck: Neck supple.   Normal range of motion.  Cardiovascular: Normal rate, regular rhythm, normal heart sounds and intact distal pulses.   Pulmonary/Chest: She is in respiratory distress (Mild). She has wheezes.   Abdominal: Abdomen is soft. Bowel sounds are normal. She exhibits no distension. There is no abdominal tenderness. There is no rebound and no guarding.   Musculoskeletal:         General: No edema. Normal range of motion.      Cervical back: Normal range of motion and neck supple.     Neurological: She is alert and oriented to person, place, and time. She has normal strength.   Skin: Skin is warm and dry. No rash noted.   Psychiatric: Her behavior is normal.         ED Course   Procedures  Labs Reviewed   SARS-COV-2 RNA AMPLIFICATION, QUAL - Normal   RESPIRATORY CULTURE (OLG)   HEMOGLOBIN A1C   EXTRA TUBES    Narrative:     The  following orders were created for panel order EXTRA TUBES.  Procedure                               Abnormality         Status                     ---------                               -----------         ------                     Light Blue Top Hold[[id number]]                              In process                 Light Green Top Hold[[id number]]                             In process                 Gold Top Hold[[id number]]                                    In process                   Please view results for these tests on the individual orders.   LIGHT BLUE TOP HOLD   LIGHT GREEN TOP HOLD   GOLD TOP HOLD          Imaging Results          X-Ray Chest 1 View (Final result)  Result time 06/28/22 15:45:37    Final result by Genesis Iraheta MD (06/28/22 15:45:37)                 Impression:      No acute cardiopulmonary abnormality.      Electronically signed by: Genesis Iraheta  Date:    06/28/2022  Time:    15:45             Narrative:    EXAMINATION:  XR CHEST 1 VIEW    CLINICAL HISTORY:  Unspecified asthma, uncomplicated    TECHNIQUE:  Single frontal view of the chest was performed.    COMPARISON:  06/20/2022    FINDINGS:  LINES AND TUBES: EKG/telemetry leads overlie the chest.    MEDIASTINUM AND LIDIA: The cardiac silhouette is normal.    LUNGS: No lobar consolidation. No edema.    PLEURA:No pleural effusion. No pneumothorax.    BONES: No acute osseous abnormality.                                 Medications   albuterol-ipratropium 2.5 mg-0.5 mg/3 mL nebulizer solution 9 mL (9 mLs Nebulization Given 6/28/22 1515)   magnesium sulfate in dextrose IVPB (premix) 1 g (0 g Intravenous Stopped 6/28/22 1633)   albuterol-ipratropium 2.5 mg-0.5 mg/3 mL nebulizer solution 3 mL (3 mLs Nebulization Given 6/28/22 1706)   terbutaline injection 0.25 mg (0.25 mg Subcutaneous Given 6/28/22 1706)      16:45:  At this time pt with mild wheezing, pulse Ox at RA 88%; pt eating in the room in no distress. Will continue  treatment and will F/U for final dispo                    Clinical Impression:   Final diagnoses:  [J45.909] Asthma  [R06.02] SOB (shortness of breath)          ED Disposition Condition    Observation               Alfonso Reyes MD  06/29/22 4758

## 2022-06-28 NOTE — H&P
[facility]   History & Physical Note     Resident Team: [facility] Medicine List 2  Attending Physician: Alfonso Haney*  Resident: Dr. Natalee Carrero     Date of Admit: 6/28/2022    Chief Complaint     Shortness of Breath (PT IN /AASI W CO SOB W COUGH AND WHEEZING SINCE THIS AM.  PT HX OF ASTHMA. RECEIVED NEB.  AND SOLUMEDRAL IN ROUTE. PT UPSETIN TRIAGE, FUSSING OVER WAIT.  REFUSED EKG OBTAINED.  )       Subjective:      History of Present Illness:  Niya Moeller is a 52 y.o.  female who with a history of Asthma, undiagnosed Psychiatric disorder, GERD, Diverticulitis, and Tobacco user who presented to the ED on 6/28/2022  with a primary complaint of Shortness of Breath. She typically gets asthma exacerbations very often during the summer time. Patient has noticed that her asthma symptoms has worsened in the past week with no relief with use of home rescue inhalers. Denies URI symptoms, fevers, chills. However, she stated having lower quadrant pain that has been ongoing and hasn't had a bowel movement in 5 days. Denies hematochezia. Patient is a chronic smoker whom smokes about 1-2 packs per day.     In the ED. She required 1 liter of supplemental oxygen, and received several rounds of Duoneb treatments and magnesium sulfate. Patient has improved wheezing after breathing treatments.     Past Medical History:  Past Medical History:   Diagnosis Date    Asthma     Diverticulosis     GERD (gastroesophageal reflux disease)        Past Surgical History:  No past surgical history on file.    Family History:  No family history on file.    Social History:  Social History     Tobacco Use    Smoking status: Never Smoker    Smokeless tobacco: Never Used       Allergies:  Review of patient's allergies indicates:   Allergen Reactions    Levofloxacin Hives and Itching    Metronidazole Hives    Codeine     Latex     Morphine     Opioids - morphine analogues        Home Medications:  Prior to Admission  medications    Medication Sig Start Date End Date Taking? Authorizing Provider   albuterol-ipratropium (DUO-NEB) 2.5 mg-0.5 mg/3 mL nebulizer solution Take 3 mLs by nebulization every 6 (six) hours as needed for Wheezing. Rescue 5/5/22 5/5/23  Kelly Miranda MD   albuterol-ipratropium (DUO-NEB) 2.5 mg-0.5 mg/3 mL nebulizer solution Take 3 mLs by nebulization every 4 (four) hours as needed for Wheezing. Rescue 5/29/22 5/29/23  Wilver Ibrahim MD   amoxicillin-clavulanate 875-125mg (AUGMENTIN) 875-125 mg per tablet Take 1 tablet by mouth 2 (two) times daily. 6/13/22   Alvaro Gaston DO   psyllium husk (METAMUCIL) 3.4 gram/5.4 gram Powd Take 1.6 g by mouth 2 (two) times a day. 6/13/22   Alvaro Gaston DO   umeclidinium (INCRUSE ELLIPTA) 62.5 mcg/actuation inhalation capsule Inhale 62.5 mcg into the lungs once daily. Controller 5/5/22   Kelly Miranda MD         Review of Systems:  Pertinent items are noted in HPI.       Objective:     Vital Signs (Most Recent):  Temp: 98.1 °F (36.7 °C) (06/28/22 1506)  Pulse: (!) 118 (06/28/22 1730)  Resp: (!) 21 (06/28/22 1730)  BP: (!) 139/52 (06/28/22 1730)  SpO2: (!) 91 % (06/28/22 1730) Vital Signs (24h Range):  Temp:  [98.1 °F (36.7 °C)] 98.1 °F (36.7 °C)  Pulse:  [] 118  Resp:  [18-22] 21  SpO2:  [88 %-96 %] 91 %  BP: (103-139)/(40-74) 139/52       Physical Examination:  Gen appearance: Resting comfortably with head of bed elevated.   CV: S1/S2, No murmurs, rubs, or gallops  Resp: + Nasal canula in place, Diffused wheezing throughout upper, mid, and lower lung fields  Abdomen: Soft, non -distended. Left lower quadrant tenderness.   Ext: No edema, 2 + DP and PT pulses bilaterally.   Neuro: CN II-XII intact, no focal deficits, EOMI  Skin: No rash    Laboratory:  Most Recent Data:  Recent Lab Results       06/28/22  1755        ID NOW COVID-19, (ELIZA) Negative               Microbiology Data:  Collected Respiratory culture    Radiology:  Imaging Results          X-Ray  Chest 1 View (Final result)  Result time 06/28/22 15:45:37    Final result by Genesis Iraheta MD (06/28/22 15:45:37)                 Impression:      No acute cardiopulmonary abnormality.      Electronically signed by: Genesis Iraheta  Date:    06/28/2022  Time:    15:45             Narrative:    EXAMINATION:  XR CHEST 1 VIEW    CLINICAL HISTORY:  Unspecified asthma, uncomplicated    TECHNIQUE:  Single frontal view of the chest was performed.    COMPARISON:  06/20/2022    FINDINGS:  LINES AND TUBES: EKG/telemetry leads overlie the chest.    MEDIASTINUM AND LIDIA: The cardiac silhouette is normal.    LUNGS: No lobar consolidation. No edema.    PLEURA:No pleural effusion. No pneumothorax.    BONES: No acute osseous abnormality.                                   Lines/Drains/Airways     Peripheral Intravenous Line  Duration                Peripheral IV - Single Lumen 06/28/22 1526 20 G Left Hand <1 day                 Assessment & Plan:   Asthma Exacerbation  - Duonebs x2 and IV Magnesium given  - Ordered scheduled Duonebs q 4 hour and breathing treatments  - Ordered Montelukast 10 mg daily  - Bedside pulmonary spirometry once RT  - To collect respiratory culture through sputum  - Monitor vital signs closely including O2 sats. Continuous oxygen status.    Hx of Diverticulosis  Diverticulitis  Constipation  - Docusate 100 mg daily  - Dicyclomine 10 mg capsule q 6 hours PRN for abdominal cramps/pain    GERD  - Protonix 40 mg PO daily       CODE STATUS: Full Code  Access: Peripheral  Antibiotics: none  Diet: Regular  DVT Prophylaxis: Lovenox  GI Prophylaxis: proton pump inhibitor per orders  Fluids: none      Disposition: Admit to medicine floors for management of Asthma Exacerbation

## 2022-06-29 ENCOUNTER — TELEPHONE (OUTPATIENT)
Dept: HEPATOLOGY | Facility: HOSPITAL | Age: 52
End: 2022-06-29
Payer: MEDICAID

## 2022-06-29 VITALS
BODY MASS INDEX: 31.55 KG/M2 | RESPIRATION RATE: 20 BRPM | WEIGHT: 160.69 LBS | TEMPERATURE: 98 F | HEIGHT: 60 IN | OXYGEN SATURATION: 99 % | DIASTOLIC BLOOD PRESSURE: 64 MMHG | HEART RATE: 91 BPM | SYSTOLIC BLOOD PRESSURE: 127 MMHG

## 2022-06-29 PROBLEM — J45.51 SEVERE PERSISTENT ASTHMA WITH EXACERBATION: Status: ACTIVE | Noted: 2022-06-29

## 2022-06-29 PROBLEM — K21.9 GERD (GASTROESOPHAGEAL REFLUX DISEASE): Chronic | Status: ACTIVE | Noted: 2022-06-29

## 2022-06-29 PROBLEM — E66.9 OBESITY: Status: ACTIVE | Noted: 2022-06-29

## 2022-06-29 LAB
ALBUMIN SERPL-MCNC: 3.6 GM/DL (ref 3.5–5)
ALBUMIN/GLOB SERPL: 1 RATIO (ref 1.1–2)
ALP SERPL-CCNC: 71 UNIT/L (ref 40–150)
ALT SERPL-CCNC: 24 UNIT/L (ref 0–55)
AST SERPL-CCNC: 18 UNIT/L (ref 5–34)
BASOPHILS # BLD AUTO: 0.01 X10(3)/MCL (ref 0–0.2)
BASOPHILS NFR BLD AUTO: 0.2 %
BILIRUBIN DIRECT+TOT PNL SERPL-MCNC: 0.3 MG/DL
BUN SERPL-MCNC: 12.7 MG/DL (ref 9.8–20.1)
CALCIUM SERPL-MCNC: 9.9 MG/DL (ref 8.4–10.2)
CHLORIDE SERPL-SCNC: 104 MMOL/L (ref 98–107)
CO2 SERPL-SCNC: 24 MMOL/L (ref 22–29)
CREAT SERPL-MCNC: 0.87 MG/DL (ref 0.55–1.02)
EOSINOPHIL # BLD AUTO: 0 X10(3)/MCL (ref 0–0.9)
EOSINOPHIL NFR BLD AUTO: 0 %
ERYTHROCYTE [DISTWIDTH] IN BLOOD BY AUTOMATED COUNT: 13.6 % (ref 11.5–17)
GLOBULIN SER-MCNC: 3.7 GM/DL (ref 2.4–3.5)
GLUCOSE SERPL-MCNC: 151 MG/DL (ref 74–100)
HCT VFR BLD AUTO: 42.1 % (ref 37–47)
HGB BLD-MCNC: 13.7 GM/DL (ref 12–16)
IMM GRANULOCYTES # BLD AUTO: 0.04 X10(3)/MCL (ref 0–0.02)
IMM GRANULOCYTES NFR BLD AUTO: 1 % (ref 0–0.43)
LYMPHOCYTES # BLD AUTO: 1 X10(3)/MCL (ref 0.6–4.6)
LYMPHOCYTES NFR BLD AUTO: 24.3 %
MCH RBC QN AUTO: 32 PG (ref 27–31)
MCHC RBC AUTO-ENTMCNC: 32.5 MG/DL (ref 33–36)
MCV RBC AUTO: 98.4 FL (ref 80–94)
MONOCYTES # BLD AUTO: 0.21 X10(3)/MCL (ref 0.1–1.3)
MONOCYTES NFR BLD AUTO: 5.1 %
NEUTROPHILS # BLD AUTO: 2.9 X10(3)/MCL (ref 2.1–9.2)
NEUTROPHILS NFR BLD AUTO: 69.4 %
NRBC BLD AUTO-RTO: 0 %
PLATELET # BLD AUTO: 236 X10(3)/MCL (ref 130–400)
PMV BLD AUTO: 11.3 FL (ref 9.4–12.4)
POTASSIUM SERPL-SCNC: 4.2 MMOL/L (ref 3.5–5.1)
PROT SERPL-MCNC: 7.3 GM/DL (ref 6.4–8.3)
RBC # BLD AUTO: 4.28 X10(6)/MCL (ref 4.2–5.4)
SODIUM SERPL-SCNC: 138 MMOL/L (ref 136–145)
WBC # SPEC AUTO: 4.1 X10(3)/MCL (ref 4.5–11.5)

## 2022-06-29 PROCEDURE — 94640 AIRWAY INHALATION TREATMENT: CPT | Mod: XB

## 2022-06-29 PROCEDURE — 99900035 HC TECH TIME PER 15 MIN (STAT)

## 2022-06-29 PROCEDURE — 87070 CULTURE OTHR SPECIMN AEROBIC: CPT | Performed by: STUDENT IN AN ORGANIZED HEALTH CARE EDUCATION/TRAINING PROGRAM

## 2022-06-29 PROCEDURE — G0378 HOSPITAL OBSERVATION PER HR: HCPCS

## 2022-06-29 PROCEDURE — 25000242 PHARM REV CODE 250 ALT 637 W/ HCPCS: Performed by: STUDENT IN AN ORGANIZED HEALTH CARE EDUCATION/TRAINING PROGRAM

## 2022-06-29 PROCEDURE — 96376 TX/PRO/DX INJ SAME DRUG ADON: CPT

## 2022-06-29 PROCEDURE — 94761 N-INVAS EAR/PLS OXIMETRY MLT: CPT

## 2022-06-29 PROCEDURE — 80053 COMPREHEN METABOLIC PANEL: CPT | Performed by: STUDENT IN AN ORGANIZED HEALTH CARE EDUCATION/TRAINING PROGRAM

## 2022-06-29 PROCEDURE — 85025 COMPLETE CBC W/AUTO DIFF WBC: CPT | Performed by: STUDENT IN AN ORGANIZED HEALTH CARE EDUCATION/TRAINING PROGRAM

## 2022-06-29 PROCEDURE — 25000003 PHARM REV CODE 250: Performed by: STUDENT IN AN ORGANIZED HEALTH CARE EDUCATION/TRAINING PROGRAM

## 2022-06-29 PROCEDURE — 63600175 PHARM REV CODE 636 W HCPCS: Performed by: STUDENT IN AN ORGANIZED HEALTH CARE EDUCATION/TRAINING PROGRAM

## 2022-06-29 PROCEDURE — 27000221 HC OXYGEN, UP TO 24 HOURS

## 2022-06-29 PROCEDURE — 94799 UNLISTED PULMONARY SVC/PX: CPT

## 2022-06-29 PROCEDURE — 36415 COLL VENOUS BLD VENIPUNCTURE: CPT | Performed by: STUDENT IN AN ORGANIZED HEALTH CARE EDUCATION/TRAINING PROGRAM

## 2022-06-29 RX ORDER — METHYLPREDNISOLONE 4 MG/1
TABLET ORAL
Qty: 21 EACH | Refills: 0 | Status: ON HOLD | OUTPATIENT
Start: 2022-06-29 | End: 2022-07-06 | Stop reason: HOSPADM

## 2022-06-29 RX ORDER — FLUTICASONE FUROATE AND VILANTEROL 200; 25 UG/1; UG/1
1 POWDER RESPIRATORY (INHALATION) DAILY
Qty: 60 EACH | Refills: 5 | Status: ON HOLD | OUTPATIENT
Start: 2022-06-29 | End: 2022-07-03 | Stop reason: CLARIF

## 2022-06-29 RX ORDER — METHYLPREDNISOLONE 4 MG/1
TABLET ORAL
Qty: 21 EACH | Refills: 0 | OUTPATIENT
Start: 2022-06-29 | End: 2022-07-20

## 2022-06-29 RX ORDER — MONTELUKAST SODIUM 10 MG/1
10 TABLET ORAL DAILY
Qty: 30 TABLET | Refills: 5 | Status: SHIPPED | OUTPATIENT
Start: 2022-06-29 | End: 2023-02-15

## 2022-06-29 RX ORDER — PANTOPRAZOLE SODIUM 40 MG/1
40 TABLET, DELAYED RELEASE ORAL DAILY
Qty: 30 TABLET | Refills: 5 | Status: SHIPPED | OUTPATIENT
Start: 2022-06-30 | End: 2023-01-04

## 2022-06-29 RX ORDER — METHYLPREDNISOLONE 4 MG/1
TABLET ORAL
Qty: 5 EACH | Refills: 0 | Status: SHIPPED | OUTPATIENT
Start: 2022-06-29 | End: 2022-06-29

## 2022-06-29 RX ORDER — MONTELUKAST SODIUM 10 MG/1
10 TABLET ORAL DAILY
Status: ON HOLD | COMMUNITY
End: 2022-06-29 | Stop reason: SDUPTHER

## 2022-06-29 RX ORDER — IPRATROPIUM BROMIDE AND ALBUTEROL SULFATE 2.5; .5 MG/3ML; MG/3ML
3 SOLUTION RESPIRATORY (INHALATION) EVERY 4 HOURS PRN
Qty: 75 ML | Refills: 0 | Status: ON HOLD | OUTPATIENT
Start: 2022-06-29 | End: 2022-09-04 | Stop reason: SDUPTHER

## 2022-06-29 RX ORDER — PANTOPRAZOLE SODIUM 40 MG/1
40 TABLET, DELAYED RELEASE ORAL DAILY
Status: DISCONTINUED | OUTPATIENT
Start: 2022-06-30 | End: 2022-06-29 | Stop reason: HOSPADM

## 2022-06-29 RX ORDER — MONTELUKAST SODIUM 5 MG/1
10 TABLET, CHEWABLE ORAL DAILY
Status: DISCONTINUED | OUTPATIENT
Start: 2022-06-29 | End: 2022-06-29 | Stop reason: HOSPADM

## 2022-06-29 RX ORDER — ALBUTEROL SULFATE 90 UG/1
2 AEROSOL, METERED RESPIRATORY (INHALATION) EVERY 4 HOURS PRN
Qty: 18 G | Refills: 0 | Status: SHIPPED | OUTPATIENT
Start: 2022-06-29 | End: 2022-08-15 | Stop reason: SDUPTHER

## 2022-06-29 RX ORDER — CALCIUM CARBONATE 200(500)MG
500 TABLET,CHEWABLE ORAL DAILY PRN
Status: DISCONTINUED | OUTPATIENT
Start: 2022-06-29 | End: 2022-06-29 | Stop reason: HOSPADM

## 2022-06-29 RX ORDER — MONTELUKAST SODIUM 5 MG/1
10 TABLET, CHEWABLE ORAL DAILY
Qty: 60 TABLET | Refills: 0 | Status: ON HOLD | OUTPATIENT
Start: 2022-06-30 | End: 2022-07-03 | Stop reason: CLARIF

## 2022-06-29 RX ADMIN — ANTACID TABLETS 500 MG: 500 TABLET, CHEWABLE ORAL at 01:06

## 2022-06-29 RX ADMIN — METHYLPREDNISOLONE SODIUM SUCCINATE 80 MG: 125 INJECTION, POWDER, FOR SOLUTION INTRAMUSCULAR; INTRAVENOUS at 04:06

## 2022-06-29 RX ADMIN — IPRATROPIUM BROMIDE AND ALBUTEROL SULFATE 3 ML: 2.5; .5 SOLUTION RESPIRATORY (INHALATION) at 07:06

## 2022-06-29 RX ADMIN — DOCUSATE SODIUM 100 MG: 100 CAPSULE, LIQUID FILLED ORAL at 09:06

## 2022-06-29 RX ADMIN — ACETAMINOPHEN 650 MG: 325 TABLET ORAL at 12:06

## 2022-06-29 RX ADMIN — IPRATROPIUM BROMIDE AND ALBUTEROL SULFATE 3 ML: 2.5; .5 SOLUTION RESPIRATORY (INHALATION) at 03:06

## 2022-06-29 RX ADMIN — MONTELUKAST SODIUM 10 MG: 5 TABLET, CHEWABLE ORAL at 09:06

## 2022-06-29 RX ADMIN — METHYLPREDNISOLONE SODIUM SUCCINATE 80 MG: 125 INJECTION, POWDER, FOR SOLUTION INTRAMUSCULAR; INTRAVENOUS at 12:06

## 2022-06-29 RX ADMIN — IPRATROPIUM BROMIDE AND ALBUTEROL SULFATE 3 ML: 2.5; .5 SOLUTION RESPIRATORY (INHALATION) at 11:06

## 2022-06-29 NOTE — PLAN OF CARE
Problem: Adult Inpatient Plan of Care  Goal: Plan of Care Review  6/28/2022 2228 by Maryse Reyna RN  Outcome: Ongoing, Progressing  6/28/2022 2225 by Maryse Reyna RN  Outcome: Ongoing, Progressing  Goal: Patient-Specific Goal (Individualized)  6/28/2022 2228 by Maryse Reyna RN  Outcome: Ongoing, Progressing  6/28/2022 2225 by Maryse Reyna RN  Outcome: Ongoing, Progressing  Goal: Absence of Hospital-Acquired Illness or Injury  6/28/2022 2228 by Maryse Reyna RN  Outcome: Ongoing, Progressing  6/28/2022 2225 by Maryse Reyna RN  Outcome: Ongoing, Progressing  Goal: Optimal Comfort and Wellbeing  6/28/2022 2228 by Maryse Reyna RN  Outcome: Ongoing, Progressing  6/28/2022 2225 by Maryse Reyna RN  Outcome: Ongoing, Progressing  Goal: Readiness for Transition of Care  6/28/2022 2228 by Maryse Reyna RN  Outcome: Ongoing, Progressing  6/28/2022 2225 by Maryse Reyna RN  Outcome: Ongoing, Progressing     Problem: Asthma Exacerbation  Goal: Asthma Symptom Relief  6/28/2022 2228 by Maryse Reyna RN  Outcome: Ongoing, Progressing  6/28/2022 2225 by Maryse Reyna RN  Outcome: Ongoing, Progressing     Problem: Gas Exchange Impaired  Goal: Optimal Gas Exchange  6/28/2022 2228 by Maryse Reyna RN  Outcome: Ongoing, Progressing  6/28/2022 2225 by Maryse Reyna RN  Outcome: Ongoing, Progressing

## 2022-06-29 NOTE — TELEPHONE ENCOUNTER
06.29.22    Meds to bed prior to discharge    Discharge medications sent to [facility]    JYZ

## 2022-06-29 NOTE — DISCHARGE SUMMARY
[facility] Medicine Discharge Summary    Admitting Physician: Hayes Thompson MD  Attending Physician: Hayes Thompson MD  Date of Admit: 6/28/2022  Date of Discharge: 6/29/2022    Condition: Stable  Outcome: Condition has improved and patient is now ready for discharge.  DISPOSITION: Home or Self Care    Discharge Diagnoses     Patient Active Problem List   Diagnosis    Obesity    Severe persistent asthma with exacerbation    GERD (gastroesophageal reflux disease)       Principal Problem:  Severe persistent asthma with exacerbation    Consultants and Procedures     Consultants:  IP CONSULT TO RESPIRATORY CARE    Procedures:   * No surgery found *     Brief Admission History   Niya Moeller is a 52 y.o.  female who with a history of Asthma, undiagnosed Psychiatric disorder, GERD, Diverticuliosis, and Tobacco user who presented to the ED on 6/28/2022  with a primary complaint of Shortness of Breath. She typically gets asthma exacerbations very often during the summer time. Patient has noticed that her asthma symptoms has worsened in the past week with no relief with use of home rescue inhalers. Denies URI symptoms, fevers, chills. However, she stated having lower quadrant pain that has been ongoing and hasn't had a bowel movement in 5 days. Denies hematochezia. Patient is a chronic smoker whom smokes about 1-2 packs per day. Patient recently moved here from [place] so she hasn't established a PCP yet. No known Pulmonary function test in our system.      In the ED. She required 1 liter of supplemental oxygen, and received several rounds of Duoneb treatments and magnesium sulfate. Patient has improved wheezing after breathing treatments.     Hospital Course with Pertinent Findings   Patient admitted to Medicine for management of Asthma Exacerbation. She was started on Scheduled Duoneb and breathing treatments. Patient received 1x IV Magnesium Sulfate.Patient was also started on Montelukast 10 mg daily. On  second hospital day, she stated that her symptoms returned back to baseline. Patient was off of supplemental oxygen prior to discharge. Patient was also managed for her GERD symptoms.     Discharge physical exam:  Vitals  BP: 127/64  Temp: 98.1 °F (36.7 °C)  Temp src: Oral  Pulse: 91  Resp: 20  SpO2: 99 %  Height: 5' (152.4 cm)  Weight: 72.9 kg (160 lb 11.5 oz)    Gen appearance: Resting comfortably with head of bed elevated breathing comfortably on room air.   CV: S1/S2, No murmurs, rubs, or gallops  Resp: Improved - diffuse wheezing throughout upper, mid, and lower lung fields  Abdomen: Soft, non -distended.   Ext: No edema, 2 + DP and PT pulses bilaterally.   Neuro: CN II-XII intact, no focal deficits, EOMI  Skin: No rash    TIME SPENT ON DISCHARGE: 60 minutes    Discharge Medications        Medication List      START taking these medications    albuterol 90 mcg/actuation inhaler  Commonly known as: PROVENTIL HFA  Inhale 2 puffs into the lungs every 4 (four) hours as needed for Wheezing. Rescue     fluticasone furoate-vilanteroL 200-25 mcg/dose Dsdv diskus inhaler  Commonly known as: BREO ELLIPTA  Inhale 1 puff into the lungs once daily. Controller     pantoprazole 40 MG tablet  Commonly known as: PROTONIX  Take 1 tablet (40 mg total) by mouth once daily.  Start taking on: June 30, 2022        CHANGE how you take these medications    albuterol-ipratropium 2.5 mg-0.5 mg/3 mL nebulizer solution  Commonly known as: DUO-NEB  Take 3 mLs by nebulization every 4 (four) hours as needed for Wheezing or Shortness of Breath. Rescue  What changed:   · reasons to take this  · Another medication with the same name was removed. Continue taking this medication, and follow the directions you see here.     * montelukast 10 mg tablet  Commonly known as: SINGULAIR  Take 1 tablet (10 mg total) by mouth once daily at 6am.  What changed: Another medication with the same name was added. Make sure you understand how and when to take  each.     * montelukast 5 MG chewable tablet  Commonly known as: SINGULAIR  Take 2 tablets (10 mg total) by mouth once daily.  Start taking on: June 30, 2022  What changed: You were already taking a medication with the same name, and this prescription was added. Make sure you understand how and when to take each.         * This list has 2 medication(s) that are the same as other medications prescribed for you. Read the directions carefully, and ask your doctor or other care provider to review them with you.            STOP taking these medications    amoxicillin-clavulanate 875-125mg 875-125 mg per tablet  Commonly known as: AUGMENTIN     MetamuciL 3.4 gram/5.4 gram Powd  Generic drug: psyllium husk     umeclidinium 62.5 mcg/actuation inhalation capsule  Commonly known as: INCRUSE ELLIPTA           Where to Get Your Medications      These medications were sent to [address] [address]    Phone: [phone]   · albuterol 90 mcg/actuation inhaler  · albuterol-ipratropium 2.5 mg-0.5 mg/3 mL nebulizer solution  · fluticasone furoate-vilanteroL 200-25 mcg/dose Dsdv diskus inhaler  · montelukast 10 mg tablet  · montelukast 5 MG chewable tablet  · pantoprazole 40 MG tablet         Discharge Information:   Patient was hemodynamically stable for discharge on Hospital day 2    - Sending patient home on Proventil HFA 90 mcg/actuation inhaler. 2 puffs every 4 hours PRN, Brep-Ellipta 200-25 mcg/dose DxDV diskus inhaler 1 puff once daily, Montelukast 10 mg daily, Medrol-dose Riccardo and Protonix 40 mg once daily for GERD.   - Patient will follow-up with Dr. Zackary Boyce in [facility]  - Please consider to order a Pulmonary Function test

## 2022-06-29 NOTE — PLAN OF CARE
Problem: Adult Inpatient Plan of Care  Goal: Plan of Care Review  Outcome: Ongoing, Progressing  Goal: Patient-Specific Goal (Individualized)  Outcome: Ongoing, Progressing  Goal: Absence of Hospital-Acquired Illness or Injury  Outcome: Ongoing, Progressing  Goal: Optimal Comfort and Wellbeing  Outcome: Ongoing, Progressing  Goal: Readiness for Transition of Care  Outcome: Ongoing, Progressing     Problem: Asthma Exacerbation  Goal: Asthma Symptom Relief  Outcome: Ongoing, Progressing     Problem: Gas Exchange Impaired  Goal: Optimal Gas Exchange  Outcome: Ongoing, Progressing

## 2022-07-01 ENCOUNTER — TELEPHONE (OUTPATIENT)
Dept: HEPATOLOGY | Facility: HOSPITAL | Age: 52
End: 2022-07-01
Payer: MEDICAID

## 2022-07-01 LAB
BACTERIA SPEC CULT: ABNORMAL
GRAM STN SPEC: ABNORMAL
GRAM STN SPEC: ABNORMAL

## 2022-07-01 NOTE — TELEPHONE ENCOUNTER
07/01/22    Resp culture grew gram positive cocci  Plan to treat with Z-taylor x 5 days    Attempted to call x 2 with no answer   contact guard

## 2022-07-02 ENCOUNTER — HOSPITAL ENCOUNTER (EMERGENCY)
Facility: HOSPITAL | Age: 52
Discharge: HOME | End: 2022-07-02
Attending: FAMILY MEDICINE
Payer: MEDICAID

## 2022-07-02 VITALS
WEIGHT: 158.75 LBS | DIASTOLIC BLOOD PRESSURE: 59 MMHG | HEIGHT: 59 IN | TEMPERATURE: 99 F | RESPIRATION RATE: 16 BRPM | SYSTOLIC BLOOD PRESSURE: 106 MMHG | HEART RATE: 75 BPM | BODY MASS INDEX: 32 KG/M2 | OXYGEN SATURATION: 96 %

## 2022-07-02 DIAGNOSIS — R10.31 ABDOMINAL PAIN, BILATERAL LOWER QUADRANT: Primary | ICD-10-CM

## 2022-07-02 DIAGNOSIS — R10.32 ABDOMINAL PAIN, BILATERAL LOWER QUADRANT: Primary | ICD-10-CM

## 2022-07-02 LAB
ALBUMIN SERPL-MCNC: 3.4 GM/DL (ref 3.5–5)
ALBUMIN/GLOB SERPL: 0.9 RATIO (ref 1.1–2)
ALP SERPL-CCNC: 69 UNIT/L (ref 40–150)
ALT SERPL-CCNC: 25 UNIT/L (ref 0–55)
APPEARANCE UR: CLEAR
AST SERPL-CCNC: 17 UNIT/L (ref 5–34)
BACTERIA #/AREA URNS AUTO: ABNORMAL /HPF
BASOPHILS # BLD AUTO: 0.02 X10(3)/MCL (ref 0–0.2)
BASOPHILS NFR BLD AUTO: 0.2 %
BILIRUB UR QL STRIP.AUTO: NEGATIVE MG/DL
BILIRUBIN DIRECT+TOT PNL SERPL-MCNC: 0.2 MG/DL
BUN SERPL-MCNC: 11.4 MG/DL (ref 9.8–20.1)
CALCIUM SERPL-MCNC: 10 MG/DL (ref 8.4–10.2)
CHLORIDE SERPL-SCNC: 106 MMOL/L (ref 98–107)
CO2 SERPL-SCNC: 26 MMOL/L (ref 22–29)
COLOR UR AUTO: ABNORMAL
CREAT SERPL-MCNC: 0.77 MG/DL (ref 0.55–1.02)
EOSINOPHIL # BLD AUTO: 0.1 X10(3)/MCL (ref 0–0.9)
EOSINOPHIL NFR BLD AUTO: 1.1 %
ERYTHROCYTE [DISTWIDTH] IN BLOOD BY AUTOMATED COUNT: 13.4 % (ref 11.5–17)
GLOBULIN SER-MCNC: 3.7 GM/DL (ref 2.4–3.5)
GLUCOSE SERPL-MCNC: 108 MG/DL (ref 74–100)
GLUCOSE UR QL STRIP.AUTO: NORMAL MG/DL
HCT VFR BLD AUTO: 44.8 % (ref 37–47)
HGB BLD-MCNC: 14.4 GM/DL (ref 12–16)
HYALINE CASTS #/AREA URNS LPF: ABNORMAL /LPF
IMM GRANULOCYTES # BLD AUTO: 0.03 X10(3)/MCL (ref 0–0.04)
IMM GRANULOCYTES NFR BLD AUTO: 0.3 %
KETONES UR QL STRIP.AUTO: NEGATIVE MG/DL
LEUKOCYTE ESTERASE UR QL STRIP.AUTO: NEGATIVE UNIT/L
LIPASE SERPL-CCNC: 22 U/L
LYMPHOCYTES # BLD AUTO: 2.29 X10(3)/MCL (ref 0.6–4.6)
LYMPHOCYTES NFR BLD AUTO: 25.8 %
MAGNESIUM SERPL-MCNC: 2.2 MG/DL (ref 1.6–2.6)
MCH RBC QN AUTO: 31.9 PG (ref 27–31)
MCHC RBC AUTO-ENTMCNC: 32.1 MG/DL (ref 33–36)
MCV RBC AUTO: 99.1 FL (ref 80–94)
MONOCYTES # BLD AUTO: 0.78 X10(3)/MCL (ref 0.1–1.3)
MONOCYTES NFR BLD AUTO: 8.8 %
MUCOUS THREADS URNS QL MICRO: ABNORMAL /LPF
NEUTROPHILS # BLD AUTO: 5.7 X10(3)/MCL (ref 2.1–9.2)
NEUTROPHILS NFR BLD AUTO: 63.8 %
NITRITE UR QL STRIP.AUTO: NEGATIVE
NRBC BLD AUTO-RTO: 0 %
PH UR STRIP.AUTO: 5.5 [PH]
PLATELET # BLD AUTO: 226 X10(3)/MCL (ref 130–400)
PMV BLD AUTO: 11 FL (ref 7.4–10.4)
POTASSIUM SERPL-SCNC: 4.1 MMOL/L (ref 3.5–5.1)
PROT SERPL-MCNC: 7.1 GM/DL (ref 6.4–8.3)
PROT UR QL STRIP.AUTO: NEGATIVE MG/DL
RBC # BLD AUTO: 4.52 X10(6)/MCL (ref 4.2–5.4)
RBC #/AREA URNS AUTO: ABNORMAL /HPF
RBC UR QL AUTO: NEGATIVE UNIT/L
SODIUM SERPL-SCNC: 140 MMOL/L (ref 136–145)
SP GR UR STRIP.AUTO: 1.01
SQUAMOUS #/AREA URNS LPF: ABNORMAL /HPF
UROBILINOGEN UR STRIP-ACNC: NORMAL MG/DL
WBC # SPEC AUTO: 8.9 X10(3)/MCL (ref 4.5–11.5)
WBC #/AREA URNS AUTO: ABNORMAL /HPF

## 2022-07-02 PROCEDURE — 81001 URINALYSIS AUTO W/SCOPE: CPT | Performed by: FAMILY MEDICINE

## 2022-07-02 PROCEDURE — 25000003 PHARM REV CODE 250: Performed by: FAMILY MEDICINE

## 2022-07-02 PROCEDURE — 36415 COLL VENOUS BLD VENIPUNCTURE: CPT | Performed by: FAMILY MEDICINE

## 2022-07-02 PROCEDURE — 96375 TX/PRO/DX INJ NEW DRUG ADDON: CPT

## 2022-07-02 PROCEDURE — 83690 ASSAY OF LIPASE: CPT | Performed by: FAMILY MEDICINE

## 2022-07-02 PROCEDURE — 96361 HYDRATE IV INFUSION ADD-ON: CPT

## 2022-07-02 PROCEDURE — 99284 EMERGENCY DEPT VISIT MOD MDM: CPT | Mod: 25

## 2022-07-02 PROCEDURE — 83735 ASSAY OF MAGNESIUM: CPT | Performed by: FAMILY MEDICINE

## 2022-07-02 PROCEDURE — 85025 COMPLETE CBC W/AUTO DIFF WBC: CPT | Performed by: FAMILY MEDICINE

## 2022-07-02 PROCEDURE — 96374 THER/PROPH/DIAG INJ IV PUSH: CPT

## 2022-07-02 PROCEDURE — 63600175 PHARM REV CODE 636 W HCPCS: Performed by: FAMILY MEDICINE

## 2022-07-02 PROCEDURE — 80053 COMPREHEN METABOLIC PANEL: CPT | Performed by: FAMILY MEDICINE

## 2022-07-02 RX ORDER — HYDROCODONE BITARTRATE AND ACETAMINOPHEN 5; 325 MG/1; MG/1
1 TABLET ORAL EVERY 6 HOURS PRN
Qty: 12 TABLET | Refills: 0 | Status: ON HOLD | OUTPATIENT
Start: 2022-07-02 | End: 2022-07-30 | Stop reason: HOSPADM

## 2022-07-02 RX ORDER — KETOROLAC TROMETHAMINE 30 MG/ML
30 INJECTION, SOLUTION INTRAMUSCULAR; INTRAVENOUS
Status: COMPLETED | OUTPATIENT
Start: 2022-07-02 | End: 2022-07-02

## 2022-07-02 RX ORDER — HYDROMORPHONE HYDROCHLORIDE 1 MG/ML
1 INJECTION, SOLUTION INTRAMUSCULAR; INTRAVENOUS; SUBCUTANEOUS
Status: COMPLETED | OUTPATIENT
Start: 2022-07-02 | End: 2022-07-02

## 2022-07-02 RX ORDER — ONDANSETRON 4 MG/1
8 TABLET, ORALLY DISINTEGRATING ORAL 2 TIMES DAILY
Qty: 10 TABLET | Refills: 0 | Status: SHIPPED | OUTPATIENT
Start: 2022-07-02 | End: 2023-03-08

## 2022-07-02 RX ORDER — ONDANSETRON 2 MG/ML
4 INJECTION INTRAMUSCULAR; INTRAVENOUS
Status: COMPLETED | OUTPATIENT
Start: 2022-07-02 | End: 2022-07-02

## 2022-07-02 RX ADMIN — ONDANSETRON 4 MG: 2 INJECTION INTRAMUSCULAR; INTRAVENOUS at 05:07

## 2022-07-02 RX ADMIN — KETOROLAC TROMETHAMINE 30 MG: 30 INJECTION, SOLUTION INTRAMUSCULAR at 03:07

## 2022-07-02 RX ADMIN — HYDROMORPHONE HYDROCHLORIDE 1 MG: 1 INJECTION, SOLUTION INTRAMUSCULAR; INTRAVENOUS; SUBCUTANEOUS at 03:07

## 2022-07-02 RX ADMIN — SODIUM CHLORIDE 1000 ML: 9 INJECTION, SOLUTION INTRAVENOUS at 02:07

## 2022-07-02 NOTE — ED PROVIDER NOTES
Encounter Date: 7/2/2022       History     Chief Complaint   Patient presents with    Abdominal Pain     Co diverticulitis typw low abd pain and nausea and vomiting started last night      52-year-old female presents to ED with complaint of lower abdominal pain.Onset yesterday.  Denies radiation. symptoms include nausea vomiting.  Denies chest pain, shortness of breath, headache, fever, chills, weakness, dizziness.  Patient reports history of diverticulitis.      The history is provided by the patient. No  was used.     Review of patient's allergies indicates:   Allergen Reactions    Levofloxacin Hives and Itching    Metronidazole Hives    Codeine     Latex     Morphine     Opioids - morphine analogues      Past Medical History:   Diagnosis Date    Asthma     Diverticulosis     GERD (gastroesophageal reflux disease)      No past surgical history on file.  No family history on file.  Social History     Tobacco Use    Smoking status: Never Smoker    Smokeless tobacco: Never Used     Review of Systems   Constitutional: Negative for chills, fatigue and fever.   HENT: Negative for congestion.    Respiratory: Negative for cough, shortness of breath and wheezing.    Gastrointestinal: Positive for abdominal pain, nausea and vomiting. Negative for constipation.   Genitourinary: Negative for dysuria.   Musculoskeletal: Negative for myalgias.   Skin: Negative for rash.   Neurological: Negative for dizziness, weakness and headaches.   All other systems reviewed and are negative.      Physical Exam     Initial Vitals [07/02/22 1304]   BP Pulse Resp Temp SpO2   101/70 75 18 98.6 °F (37 °C) 98 %      MAP       --         Physical Exam    Nursing note and vitals reviewed.  Constitutional: She appears well-developed and well-nourished.   HENT:   Head: Normocephalic and atraumatic.   Eyes: Conjunctivae are normal.   Cardiovascular: Normal heart sounds and intact distal pulses.   Pulmonary/Chest: Breath  sounds normal.   Abdominal: Abdomen is soft. Bowel sounds are normal. She exhibits no distension. There is abdominal tenderness (LLQ ). There is no rebound and no guarding.   Musculoskeletal:         General: Normal range of motion.     Neurological: She is alert and oriented to person, place, and time. Gait normal.   Skin: Skin is warm and dry.   Psychiatric: She has a normal mood and affect. Her behavior is normal. Judgment and thought content normal.         ED Course   Procedures  Labs Reviewed   COMPREHENSIVE METABOLIC PANEL - Abnormal; Notable for the following components:       Result Value    Glucose Level 108 (*)     Albumin Level 3.4 (*)     Globulin 3.7 (*)     Albumin/Globulin Ratio 0.9 (*)     All other components within normal limits   URINALYSIS - Abnormal; Notable for the following components:    Color, UA Light-Yellow (*)     Bacteria, UA Trace (*)     Squamous Epithelial Cells, UA Trace (*)     Mucous, UA Trace (*)     All other components within normal limits   CBC WITH DIFFERENTIAL - Abnormal; Notable for the following components:    MCV 99.1 (*)     MCH 31.9 (*)     MCHC 32.1 (*)     MPV 11.0 (*)     All other components within normal limits   MAGNESIUM - Normal   LIPASE - Normal   CBC W/ AUTO DIFFERENTIAL    Narrative:     The following orders were created for panel order CBC auto differential.  Procedure                               Abnormality         Status                     ---------                               -----------         ------                     CBC with Differential[[id number]]        Abnormal            Final result                 Please view results for these tests on the individual orders.   EXTRA TUBES    Narrative:     The following orders were created for panel order EXTRA TUBES.  Procedure                               Abnormality         Status                     ---------                               -----------         ------                     Light Blue Top  Hold[[id number]]                              In process                 Gold Top Hold[[id number]]                                    In process                   Please view results for these tests on the individual orders.   LIGHT BLUE TOP HOLD   GOLD TOP HOLD          Imaging Results          X-Ray Chest AP Portable (Final result)  Result time 07/02/22 14:33:18    Final result by Genesis Iraheta MD (07/02/22 14:33:18)                 Impression:      No acute cardiopulmonary abnormality.      Electronically signed by: Genesis Iraheta  Date:    07/02/2022  Time:    14:33             Narrative:    EXAMINATION:  XR CHEST AP PORTABLE    CLINICAL HISTORY:  Abdominal pain;    TECHNIQUE:  Single frontal view of the chest was performed.    COMPARISON:  06/28/2022    FINDINGS:  LINES AND TUBES: None    MEDIASTINUM AND LIDIA: The cardiac silhouette is normal.    LUNGS: No lobar consolidation. No edema.    PLEURA:No pleural effusion. No pneumothorax.    BONES: No acute osseous abnormality.                               X-Ray Abdomen Flat And Erect (Final result)  Result time 07/02/22 14:33:21    Final result by Marilou Brown MD (07/02/22 14:33:21)                 Impression:      Nonobstructive, nonspecific bowel gas pattern.      Electronically signed by: Marilou Brown  Date:    07/02/2022  Time:    14:33             Narrative:    EXAMINATION:  XR ABDOMEN FLAT AND ERECT    CLINICAL HISTORY:  Abdominal pain;;    COMPARISON:  03/30/2022    FINDINGS:  No dilated loops of bowel or air-fluid levels.    No evidence of free intraperitoneal air.    No acute bony pathology.    Soft tissues within normal limits.    Lung bases clear.                                 Medications   ondansetron injection 4 mg (has no administration in time range)   sodium chloride 0.9% bolus 1,000 mL (0 mLs Intravenous Stopped 7/2/22 1525)   HYDROmorphone injection 1 mg (1 mg Intravenous Given 7/2/22 1502)   ketorolac injection 30 mg (30 mg  Intravenous Given 7/2/22 1502)     Medical Decision Making:   ED Management:  Notes- patient tolerated p.o. challenge with no difficulty.  Patient with no vomiting in the ED.  Patient resting comfortably in hospital bed.  No acute abdomen on physical exam.    Manual Vital signs done by MD upon discharge-blood pressure 125/65, heart rate 96, respiratory-20 100% oxygen on room air,             ED Course as of 07/02/22 1732   Sat Jul 02, 2022   1539 Reviewed labs. Reexamined patient.   Pt resting comfortably in hospital bed.  [AK]   1720 Reviewed medical records- pt with recent CT scan in 06/13/22.  Pt currently not ill or septic appearing. Pt with no acute abdomen. Pt reports she is feeling better with treatment in ED.  shared medical decision-making with patient regarding CT scan.  Patient declines at this time. [AK]   1728 Reviewed allergy with patient at bedside.  Patient reports taking Norco with no allergic reaction or side effect.  Patient received Dilaudid in the ED with no allergic reaction or side effect. [AK]      ED Course User Index  [AK] Franco Brown MD             Clinical Impression:   Final diagnoses:  [R10.31, R10.32] Abdominal pain, bilateral lower quadrant (Primary)          ED Disposition Condition    Discharge Stable        ED Prescriptions     Medication Sig Dispense Start Date End Date Auth. Provider    ondansetron (ZOFRAN-ODT) 4 MG TbDL Take 2 tablets (8 mg total) by mouth 2 (two) times daily. 10 tablet 7/2/2022  Franco Brown MD    HYDROcodone-acetaminophen (NORCO) 5-325 mg per tablet Take 1 tablet by mouth every 6 (six) hours as needed for Pain. 12 tablet 7/2/2022  Franco Brown MD        Follow-up Information     Follow up With Specialties Details Why Contact [facility] - ED   As needed, If symptoms worsen [address] [facility] [id number]    Follow-up with your PCP in 2-3 days for further evaluation.  It is your responsibility to call and  make an appointment as soon as possible               Franco Bronw MD  07/02/22 8044

## 2022-07-03 ENCOUNTER — HOSPITAL ENCOUNTER (EMERGENCY)
Facility: HOSPITAL | Age: 52
Discharge: HOME | End: 2022-07-03
Attending: INTERNAL MEDICINE
Payer: MEDICAID

## 2022-07-03 ENCOUNTER — HOSPITAL ENCOUNTER (OUTPATIENT)
Facility: HOSPITAL | Age: 52
Discharge: HOME | End: 2022-07-06
Attending: STUDENT IN AN ORGANIZED HEALTH CARE EDUCATION/TRAINING PROGRAM | Admitting: INTERNAL MEDICINE
Payer: MEDICAID

## 2022-07-03 VITALS
WEIGHT: 143.31 LBS | TEMPERATURE: 98 F | BODY MASS INDEX: 28.13 KG/M2 | RESPIRATION RATE: 18 BRPM | HEART RATE: 79 BPM | OXYGEN SATURATION: 95 % | HEIGHT: 60 IN | DIASTOLIC BLOOD PRESSURE: 81 MMHG | SYSTOLIC BLOOD PRESSURE: 147 MMHG

## 2022-07-03 DIAGNOSIS — R10.9 ABDOMINAL PAIN, UNSPECIFIED ABDOMINAL LOCATION: ICD-10-CM

## 2022-07-03 DIAGNOSIS — K52.9 COLITIS: Primary | ICD-10-CM

## 2022-07-03 DIAGNOSIS — R19.7 DIARRHEA, UNSPECIFIED TYPE: ICD-10-CM

## 2022-07-03 DIAGNOSIS — K57.92 DIVERTICULITIS: Primary | ICD-10-CM

## 2022-07-03 DIAGNOSIS — R11.2 NAUSEA AND VOMITING, INTRACTABILITY OF VOMITING NOT SPECIFIED, UNSPECIFIED VOMITING TYPE: ICD-10-CM

## 2022-07-03 LAB
ALBUMIN SERPL-MCNC: 3.6 GM/DL (ref 3.5–5)
ALBUMIN/GLOB SERPL: 1.1 RATIO (ref 1.1–2)
ALP SERPL-CCNC: 68 UNIT/L (ref 40–150)
ALT SERPL-CCNC: 24 UNIT/L (ref 0–55)
APPEARANCE UR: CLEAR
AST SERPL-CCNC: 26 UNIT/L (ref 5–34)
BACTERIA #/AREA URNS AUTO: NORMAL /HPF
BASOPHILS # BLD AUTO: 0.01 X10(3)/MCL (ref 0–0.2)
BASOPHILS NFR BLD AUTO: 0.1 %
BILIRUB UR QL STRIP.AUTO: NEGATIVE MG/DL
BILIRUBIN DIRECT+TOT PNL SERPL-MCNC: 0.4 MG/DL
BUN SERPL-MCNC: 7.5 MG/DL (ref 9.8–20.1)
CALCIUM SERPL-MCNC: 9.6 MG/DL (ref 8.4–10.2)
CHLORIDE SERPL-SCNC: 107 MMOL/L (ref 98–107)
CO2 SERPL-SCNC: 25 MMOL/L (ref 22–29)
COLOR UR AUTO: YELLOW
CREAT SERPL-MCNC: 0.79 MG/DL (ref 0.55–1.02)
CRP SERPL-MCNC: 15.6 MG/L
EOSINOPHIL # BLD AUTO: 0.24 X10(3)/MCL (ref 0–0.9)
EOSINOPHIL NFR BLD AUTO: 3 %
ERYTHROCYTE [DISTWIDTH] IN BLOOD BY AUTOMATED COUNT: 13.6 % (ref 11.5–17)
ERYTHROCYTE [SEDIMENTATION RATE] IN BLOOD: 19 MM/HR (ref 0–20)
GLOBULIN SER-MCNC: 3.2 GM/DL (ref 2.4–3.5)
GLUCOSE SERPL-MCNC: 82 MG/DL (ref 74–100)
GLUCOSE UR QL STRIP.AUTO: NEGATIVE MG/DL
HCT VFR BLD AUTO: 44.1 % (ref 37–47)
HGB BLD-MCNC: 14.5 GM/DL (ref 12–16)
IMM GRANULOCYTES # BLD AUTO: 0.02 X10(3)/MCL (ref 0–0.04)
IMM GRANULOCYTES NFR BLD AUTO: 0.3 %
KETONES UR QL STRIP.AUTO: NEGATIVE MG/DL
LEUKOCYTE ESTERASE UR QL STRIP.AUTO: NEGATIVE UNIT/L
LIPASE SERPL-CCNC: 15 U/L
LYMPHOCYTES # BLD AUTO: 2.72 X10(3)/MCL (ref 0.6–4.6)
LYMPHOCYTES NFR BLD AUTO: 34 %
MCH RBC QN AUTO: 32.4 PG (ref 27–31)
MCHC RBC AUTO-ENTMCNC: 32.9 MG/DL (ref 33–36)
MCV RBC AUTO: 98.7 FL (ref 80–94)
MONOCYTES # BLD AUTO: 0.73 X10(3)/MCL (ref 0.1–1.3)
MONOCYTES NFR BLD AUTO: 9.1 %
NEUTROPHILS # BLD AUTO: 4.3 X10(3)/MCL (ref 2.1–9.2)
NEUTROPHILS NFR BLD AUTO: 53.5 %
NITRITE UR QL STRIP.AUTO: NEGATIVE
NRBC BLD AUTO-RTO: 0 %
PH UR STRIP.AUTO: 5.5 [PH]
PLATELET # BLD AUTO: 228 X10(3)/MCL (ref 130–400)
PMV BLD AUTO: 11.7 FL (ref 7.4–10.4)
POTASSIUM SERPL-SCNC: 4.8 MMOL/L (ref 3.5–5.1)
PROT SERPL-MCNC: 6.8 GM/DL (ref 6.4–8.3)
PROT UR QL STRIP.AUTO: NEGATIVE MG/DL
RBC # BLD AUTO: 4.47 X10(6)/MCL (ref 4.2–5.4)
RBC #/AREA URNS AUTO: <5 /HPF
RBC UR QL AUTO: NEGATIVE UNIT/L
SODIUM SERPL-SCNC: 141 MMOL/L (ref 136–145)
SP GR UR STRIP.AUTO: >=1.04 (ref 1–1.03)
SQUAMOUS #/AREA URNS AUTO: <5 /HPF
UROBILINOGEN UR STRIP-ACNC: 0.2 MG/DL
WBC # SPEC AUTO: 8 X10(3)/MCL (ref 4.5–11.5)
WBC #/AREA URNS AUTO: <5 /HPF

## 2022-07-03 PROCEDURE — 63600175 PHARM REV CODE 636 W HCPCS: Performed by: PHYSICIAN ASSISTANT

## 2022-07-03 PROCEDURE — 94640 AIRWAY INHALATION TREATMENT: CPT

## 2022-07-03 PROCEDURE — 96372 THER/PROPH/DIAG INJ SC/IM: CPT | Performed by: INTERNAL MEDICINE

## 2022-07-03 PROCEDURE — 36415 COLL VENOUS BLD VENIPUNCTURE: CPT | Performed by: STUDENT IN AN ORGANIZED HEALTH CARE EDUCATION/TRAINING PROGRAM

## 2022-07-03 PROCEDURE — 99283 EMERGENCY DEPT VISIT LOW MDM: CPT | Mod: 25,27

## 2022-07-03 PROCEDURE — 25000003 PHARM REV CODE 250: Performed by: STUDENT IN AN ORGANIZED HEALTH CARE EDUCATION/TRAINING PROGRAM

## 2022-07-03 PROCEDURE — 96375 TX/PRO/DX INJ NEW DRUG ADDON: CPT

## 2022-07-03 PROCEDURE — 81001 URINALYSIS AUTO W/SCOPE: CPT | Performed by: STUDENT IN AN ORGANIZED HEALTH CARE EDUCATION/TRAINING PROGRAM

## 2022-07-03 PROCEDURE — 99285 EMERGENCY DEPT VISIT HI MDM: CPT | Mod: 25

## 2022-07-03 PROCEDURE — 96361 HYDRATE IV INFUSION ADD-ON: CPT

## 2022-07-03 PROCEDURE — G0378 HOSPITAL OBSERVATION PER HR: HCPCS

## 2022-07-03 PROCEDURE — 96365 THER/PROPH/DIAG IV INF INIT: CPT

## 2022-07-03 PROCEDURE — 25000003 PHARM REV CODE 250: Performed by: INTERNAL MEDICINE

## 2022-07-03 PROCEDURE — 63600175 PHARM REV CODE 636 W HCPCS: Performed by: INTERNAL MEDICINE

## 2022-07-03 PROCEDURE — 96366 THER/PROPH/DIAG IV INF ADDON: CPT

## 2022-07-03 PROCEDURE — 96376 TX/PRO/DX INJ SAME DRUG ADON: CPT

## 2022-07-03 PROCEDURE — 63600175 PHARM REV CODE 636 W HCPCS: Performed by: STUDENT IN AN ORGANIZED HEALTH CARE EDUCATION/TRAINING PROGRAM

## 2022-07-03 PROCEDURE — 85651 RBC SED RATE NONAUTOMATED: CPT | Performed by: PHYSICIAN ASSISTANT

## 2022-07-03 PROCEDURE — 25000003 PHARM REV CODE 250: Performed by: PHYSICIAN ASSISTANT

## 2022-07-03 PROCEDURE — 25000242 PHARM REV CODE 250 ALT 637 W/ HCPCS: Performed by: STUDENT IN AN ORGANIZED HEALTH CARE EDUCATION/TRAINING PROGRAM

## 2022-07-03 PROCEDURE — 83690 ASSAY OF LIPASE: CPT | Performed by: STUDENT IN AN ORGANIZED HEALTH CARE EDUCATION/TRAINING PROGRAM

## 2022-07-03 PROCEDURE — 85025 COMPLETE CBC W/AUTO DIFF WBC: CPT | Performed by: STUDENT IN AN ORGANIZED HEALTH CARE EDUCATION/TRAINING PROGRAM

## 2022-07-03 PROCEDURE — 25500020 PHARM REV CODE 255: Performed by: STUDENT IN AN ORGANIZED HEALTH CARE EDUCATION/TRAINING PROGRAM

## 2022-07-03 PROCEDURE — 25000242 PHARM REV CODE 250 ALT 637 W/ HCPCS: Performed by: NURSE PRACTITIONER

## 2022-07-03 PROCEDURE — 36415 COLL VENOUS BLD VENIPUNCTURE: CPT | Performed by: PHYSICIAN ASSISTANT

## 2022-07-03 PROCEDURE — 80053 COMPREHEN METABOLIC PANEL: CPT | Performed by: STUDENT IN AN ORGANIZED HEALTH CARE EDUCATION/TRAINING PROGRAM

## 2022-07-03 PROCEDURE — 11000001 HC ACUTE MED/SURG PRIVATE ROOM

## 2022-07-03 PROCEDURE — 86140 C-REACTIVE PROTEIN: CPT | Performed by: PHYSICIAN ASSISTANT

## 2022-07-03 RX ORDER — HYDROMORPHONE HYDROCHLORIDE 2 MG/ML
0.5 INJECTION, SOLUTION INTRAMUSCULAR; INTRAVENOUS; SUBCUTANEOUS EVERY 6 HOURS PRN
Status: DISCONTINUED | OUTPATIENT
Start: 2022-07-03 | End: 2022-07-03

## 2022-07-03 RX ORDER — ACETAMINOPHEN 325 MG/1
650 TABLET ORAL EVERY 6 HOURS PRN
Status: DISCONTINUED | OUTPATIENT
Start: 2022-07-03 | End: 2022-07-06 | Stop reason: HOSPADM

## 2022-07-03 RX ORDER — IPRATROPIUM BROMIDE AND ALBUTEROL SULFATE 2.5; .5 MG/3ML; MG/3ML
3 SOLUTION RESPIRATORY (INHALATION)
Status: COMPLETED | OUTPATIENT
Start: 2022-07-03 | End: 2022-07-03

## 2022-07-03 RX ORDER — KETOROLAC TROMETHAMINE 30 MG/ML
30 INJECTION, SOLUTION INTRAMUSCULAR; INTRAVENOUS
Status: ACTIVE | OUTPATIENT
Start: 2022-07-03 | End: 2022-07-03

## 2022-07-03 RX ORDER — DICYCLOMINE HYDROCHLORIDE 10 MG/ML
20 INJECTION INTRAMUSCULAR 4 TIMES DAILY PRN
Status: DISCONTINUED | OUTPATIENT
Start: 2022-07-03 | End: 2022-07-04

## 2022-07-03 RX ORDER — AMOXICILLIN AND CLAVULANATE POTASSIUM 875; 125 MG/1; MG/1
1 TABLET, FILM COATED ORAL 2 TIMES DAILY
Qty: 14 TABLET | Refills: 0 | Status: SHIPPED | OUTPATIENT
Start: 2022-07-03 | End: 2022-07-03 | Stop reason: ALTCHOICE

## 2022-07-03 RX ORDER — ONDANSETRON 2 MG/ML
4 INJECTION INTRAMUSCULAR; INTRAVENOUS EVERY 6 HOURS PRN
Status: DISCONTINUED | OUTPATIENT
Start: 2022-07-03 | End: 2022-07-06 | Stop reason: HOSPADM

## 2022-07-03 RX ORDER — KETOROLAC TROMETHAMINE 10 MG/1
10 TABLET, FILM COATED ORAL EVERY 6 HOURS
Qty: 20 TABLET | Refills: 0 | Status: SHIPPED | OUTPATIENT
Start: 2022-07-03 | End: 2022-07-08

## 2022-07-03 RX ORDER — AMOXICILLIN AND CLAVULANATE POTASSIUM 875; 125 MG/1; MG/1
1 TABLET, FILM COATED ORAL 2 TIMES DAILY
Qty: 28 TABLET | Refills: 0 | Status: SHIPPED | OUTPATIENT
Start: 2022-07-03 | End: 2022-07-06 | Stop reason: SDUPTHER

## 2022-07-03 RX ORDER — HYDROMORPHONE HYDROCHLORIDE 2 MG/ML
1 INJECTION, SOLUTION INTRAMUSCULAR; INTRAVENOUS; SUBCUTANEOUS EVERY 4 HOURS PRN
Status: DISCONTINUED | OUTPATIENT
Start: 2022-07-03 | End: 2022-07-05

## 2022-07-03 RX ORDER — KETOROLAC TROMETHAMINE 30 MG/ML
15 INJECTION, SOLUTION INTRAMUSCULAR; INTRAVENOUS EVERY 6 HOURS
Status: DISCONTINUED | OUTPATIENT
Start: 2022-07-04 | End: 2022-07-05

## 2022-07-03 RX ORDER — IPRATROPIUM BROMIDE AND ALBUTEROL SULFATE 2.5; .5 MG/3ML; MG/3ML
3 SOLUTION RESPIRATORY (INHALATION) EVERY 6 HOURS PRN
Status: DISCONTINUED | OUTPATIENT
Start: 2022-07-03 | End: 2022-07-04

## 2022-07-03 RX ORDER — KETOROLAC TROMETHAMINE 30 MG/ML
30 INJECTION, SOLUTION INTRAMUSCULAR; INTRAVENOUS
Status: COMPLETED | OUTPATIENT
Start: 2022-07-03 | End: 2022-07-03

## 2022-07-03 RX ORDER — SODIUM CHLORIDE, SODIUM LACTATE, POTASSIUM CHLORIDE, CALCIUM CHLORIDE 600; 310; 30; 20 MG/100ML; MG/100ML; MG/100ML; MG/100ML
INJECTION, SOLUTION INTRAVENOUS CONTINUOUS
Status: DISCONTINUED | OUTPATIENT
Start: 2022-07-03 | End: 2022-07-06 | Stop reason: HOSPADM

## 2022-07-03 RX ORDER — KETOROLAC TROMETHAMINE 30 MG/ML
30 INJECTION, SOLUTION INTRAMUSCULAR; INTRAVENOUS
Status: DISCONTINUED | OUTPATIENT
Start: 2022-07-03 | End: 2022-07-03 | Stop reason: HOSPADM

## 2022-07-03 RX ADMIN — IOPAMIDOL 100 ML: 755 INJECTION, SOLUTION INTRAVENOUS at 06:07

## 2022-07-03 RX ADMIN — PIPERACILLIN AND TAZOBACTAM 4.5 G: 4; .5 INJECTION, POWDER, LYOPHILIZED, FOR SOLUTION INTRAVENOUS; PARENTERAL at 11:07

## 2022-07-03 RX ADMIN — IPRATROPIUM BROMIDE AND ALBUTEROL SULFATE 3 ML: 2.5; .5 SOLUTION RESPIRATORY (INHALATION) at 09:07

## 2022-07-03 RX ADMIN — IPRATROPIUM BROMIDE AND ALBUTEROL SULFATE 3 ML: 2.5; .5 SOLUTION RESPIRATORY (INHALATION) at 08:07

## 2022-07-03 RX ADMIN — ONDANSETRON 4 MG: 2 INJECTION INTRAMUSCULAR; INTRAVENOUS at 10:07

## 2022-07-03 RX ADMIN — HYDROMORPHONE HYDROCHLORIDE 1 MG: 2 INJECTION, SOLUTION INTRAMUSCULAR; INTRAVENOUS; SUBCUTANEOUS at 08:07

## 2022-07-03 RX ADMIN — SODIUM CHLORIDE, POTASSIUM CHLORIDE, SODIUM LACTATE AND CALCIUM CHLORIDE: 600; 310; 30; 20 INJECTION, SOLUTION INTRAVENOUS at 10:07

## 2022-07-03 RX ADMIN — PIPERACILLIN SODIUM AND TAZOBACTAM SODIUM 4.5 G: 4; .5 INJECTION, POWDER, LYOPHILIZED, FOR SOLUTION INTRAVENOUS at 08:07

## 2022-07-03 RX ADMIN — HYDROMORPHONE HYDROCHLORIDE 0.5 MG: 2 INJECTION, SOLUTION INTRAMUSCULAR; INTRAVENOUS; SUBCUTANEOUS at 03:07

## 2022-07-03 RX ADMIN — KETOROLAC TROMETHAMINE 30 MG: 30 INJECTION, SOLUTION INTRAMUSCULAR; INTRAVENOUS at 07:07

## 2022-07-03 RX ADMIN — SODIUM CHLORIDE 1000 ML: 9 INJECTION, SOLUTION INTRAVENOUS at 07:07

## 2022-07-03 RX ADMIN — SODIUM CHLORIDE, POTASSIUM CHLORIDE, SODIUM LACTATE AND CALCIUM CHLORIDE: 600; 310; 30; 20 INJECTION, SOLUTION INTRAVENOUS at 11:07

## 2022-07-03 NOTE — ED PROVIDER NOTES
Encounter Date: 7/3/2022       History     Chief Complaint   Patient presents with    Abdominal Pain     Pt presents to ed with reports of ab pain x2 weeks. Pt states Hx of diverticulitis. Pt states was seen in Ed yesterday morning for same complaint.       Presents with abdominal pain, states Hx of diverticulitis. Evaluated yesterday with same symptoms and discharge home.    The history is provided by the patient.   Abdominal Pain  The current episode started yesterday. The onset of the illness was gradual. The problem has been gradually worsening. The abdominal pain is located in the LLQ. The abdominal pain does not radiate. The severity of the abdominal pain is 8/10. The abdominal pain is relieved by nothing. The other symptoms of the illness do not include fever, melena, shortness of breath, nausea, vomiting or dysuria.   The patient states that she believes she is currently not pregnant. The patient has not had a change in bowel habit. Symptoms associated with the illness do not include chills or back pain.     Review of patient's allergies indicates:   Allergen Reactions    Levofloxacin Hives and Itching    Metronidazole Hives    Codeine     Latex     Morphine     Opioids - morphine analogues      Past Medical History:   Diagnosis Date    Asthma     Diverticulosis     GERD (gastroesophageal reflux disease)      History reviewed. No pertinent surgical history.  History reviewed. No pertinent family history.  Social History     Tobacco Use    Smoking status: Never Smoker    Smokeless tobacco: Never Used     Review of Systems   Constitutional: Negative for chills and fever.   HENT: Negative for sore throat.    Respiratory: Positive for wheezing. Negative for shortness of breath.    Cardiovascular: Negative for chest pain.   Gastrointestinal: Positive for abdominal pain. Negative for melena, nausea and vomiting.   Genitourinary: Negative for dysuria.   Musculoskeletal: Negative for back pain.   Skin:  Negative for rash.   Neurological: Negative for weakness.   Hematological: Does not bruise/bleed easily.   All other systems reviewed and are negative.      Physical Exam     Initial Vitals [07/03/22 0317]   BP Pulse Resp Temp SpO2   (!) 160/85 83 18 98.2 °F (36.8 °C) 97 %      MAP       --         Physical Exam    Nursing note and vitals reviewed.  Constitutional: She appears well-developed and well-nourished.   HENT:   Head: Normocephalic and atraumatic.   Eyes: Pupils are equal, round, and reactive to light.   Neck:   Normal range of motion.  Cardiovascular: Normal rate, regular rhythm, normal heart sounds and intact distal pulses.   Pulmonary/Chest: Breath sounds normal.   Abdominal: Abdomen is soft. Bowel sounds are normal. She exhibits no distension. There is abdominal tenderness (LLQ). There is no rebound and no guarding.   Musculoskeletal:         General: No edema. Normal range of motion.      Cervical back: Normal range of motion.     Neurological: She is alert and oriented to person, place, and time. She has normal strength.   Skin: Skin is warm and dry. No rash noted.   Psychiatric: Her behavior is normal.         ED Course   Procedures  Labs Reviewed - No data to display       Imaging Results    None          Medications   ketorolac injection 30 mg (has no administration in time range)     Medical Decision Making:   History:   Old Medical Records: I decided to obtain old medical records.  Old Records Summarized: records from previous admission(s).       <> Summary of Records: CBC, CM, UA, CXR and Abd X ray normal                      Clinical Impression:   Final diagnoses:  [K57.92] Diverticulitis (Primary)          ED Disposition Condition    Discharge Stable        ED Prescriptions     Medication Sig Dispense Start Date End Date Auth. Provider    amoxicillin-clavulanate 875-125mg (AUGMENTIN) 875-125 mg per tablet Take 1 tablet by mouth 2 (two) times daily. 14 tablet 7/3/2022  Alfonso Healy  MD Amy        Follow-up Information     Follow up With Specialties Details Why Contact Info    [facility] Emergency Medicine  If symptoms worsen [address] [address] [phone]    [facility]  In 1 week  [address] [id number]           Alfonso Reyes MD  07/03/22 7060

## 2022-07-03 NOTE — H&P
[facility]  Hospital Medicine History & Physical Examination       Patient Name: Niya Moeller  MRN: [id number]  Patient Class: IP- Inpatient   Admission Date: 7/3/2022   Admitting Physician: Dr. Turner  Length of Stay: 0  Primary Care Provider: Primary Doctor Judy  Attending Physician: Dr. Turner  Face-to-Face encounter date: 07/03/2022  Code Status:  Full   Chief Complaint: abd, N/V/D      Patient information was obtained from patient, patient's family, past medical records and ER records.     HISTORY OF PRESENT ILLNESS:   Niya Moeller is a 52 y.o. female with a past medical history of recurrent diverticulitis, GERD, and asthma who presented to [facility] on 7/3/2022 with complaints of progressively worsening lower abdominal pain, nausea, multiple episodes of nonbloody emesis, and multiple episodes of nonbloody diarrhea exacerbated with eating over the past x3 days. She went to [facility] approximately x2 weeks ago where she was diagnosed with diverticulitis and prescribed amoxicillin which she took with improvement for about a week and half, but her symptoms returned which prompted her to go to [facility] yesterday on 07/02/2022 where she was diagnosed with diverticulitis again and discharged home with a prescription for Augmentin which she never received. She states that she has been experiencing recurrent diverticulitis for several years and does not currently follow up with a gastroenterologist.  Her last colonoscopy was x5 years ago which revealed some polyps and she has not undergone an EGD.  She denies fever, shortness of breath, chest pain, or dysuria.    Of note, she was admitted to the hospital in April 2022 for diverticulitis and was seen by GI as well as General surgery during the admission who did not recommend any intervention at that time.    Her vital signs are currently stable and she is afebrile.  Her labs showed a CRP 15.6 otherwise was unremarkable. CT abdomen/pelvis showed a long  segment inflammatory change involving the left hemicolon suggest nonspecific colitis. Component of subtle acute diverticulitis at the junction of the descending and sigmoid colon is not excluded.  No perforation or abscess.  PAST MEDICAL HISTORY:   Recurrent diverticulitis  GERD  Asthma    PAST SURGICAL HISTORY:   Cosmetic surgery    ALLERGIES:   Levofloxacin, Metronidazole, Codeine, Latex, Morphine, and Opioids - morphine analogues    FAMILY HISTORY:   Reviewed and negative    SOCIAL HISTORY:   Tobacco- half a pack daily  Alcohol- socially  Illicit Drugs- none  HOME MEDICATIONS:     Prior to Admission medications    Medication Sig Start Date End Date Taking? Authorizing Provider   albuterol (PROVENTIL HFA) 90 mcg/actuation inhaler Inhale 2 puffs into the lungs every 4 (four) hours as needed for Wheezing. Rescue 6/29/22   Natalee Carrero MD   albuterol-ipratropium (DUO-NEB) 2.5 mg-0.5 mg/3 mL nebulizer solution Take 3 mLs by nebulization every 4 (four) hours as needed for Wheezing or Shortness of Breath. Rescue 6/29/22   Natalee Carrero MD   amoxicillin-clavulanate 875-125mg (AUGMENTIN) 875-125 mg per tablet Take 1 tablet by mouth 2 (two) times daily. for 14 days 7/3/22 7/17/22  Amaury B [address]RE[address] MD   fluticasone furoate-vilanteroL (BREO ELLIPTA) 200-25 mcg/dose DsDv diskus inhaler Inhale 1 puff into the lungs once daily. Controller 6/29/22   Natalee Carrero MD   HYDROcodone-acetaminophen (NORCO) 5-325 mg per tablet Take 1 tablet by mouth every 6 (six) hours as needed for Pain. 7/2/22   Franco Brown MD   ketorolac (TORADOL) 10 mg tablet Take 1 tablet (10 mg total) by mouth every 6 (six) hours. for 5 days 7/3/22 7/8/22  Amaury B [address] MD   methylPREDNISolone (MEDROL DOSEPACK) 4 mg tablet use as directed 6/29/22 7/20/22  Lewis Barr DO   montelukast (SINGULAIR) 10 mg tablet Take 1 tablet (10 mg total) by mouth once daily at 6am. 6/29/22   Natalee Carrero MD   montelukast (SINGULAIR) 5 MG chewable tablet  Take 2 tablets (10 mg total) by mouth once daily. 6/30/22 7/30/22  Natalee Carrero MD   ondansetron (ZOFRAN-ODT) 4 MG TbDL Take 2 tablets (8 mg total) by mouth 2 (two) times daily. 7/2/22   Franco Brown MD   pantoprazole (PROTONIX) 40 MG tablet Take 1 tablet (40 mg total) by mouth once daily. 6/30/22   Natalee Carrero MD   amoxicillin-clavulanate 875-125mg (AUGMENTIN) 875-125 mg per tablet Take 1 tablet by mouth 2 (two) times daily. 7/3/22 7/3/22  Alfonso Reyes MD       REVIEW OF SYSTEMS:   Except as documented, all other systems reviewed and negative     PHYSICAL EXAM:     VITAL SIGNS: 24 HRS MIN & MAX LAST   Temp  Min: 98.2 °F (36.8 °C)  Max: 98.6 °F (37 °C) 98.4 °F (36.9 °C)   BP  Min: 96/44  Max: 160/85 (!) 127/55   Pulse  Min: 59  Max: 90  71   Resp  Min: 16  Max: 20 18   SpO2  Min: 86 %  Max: 98 % 96 %   Vital signs reviewed.    General: well-developed well-nourished mild acute distress, anxious appearing, teary item  Eye: clear conjunctiva, eyelids normal  HENT: oropharynx and nasal mucosal surfaces moist  Neck: full range of motion  Respiratory: clear to auscultation bilaterally  Cardiovascular: regular rate and rhythm without gallops or rubs  Gastrointestinal: soft, non-distended with normal bowel sounds, without masses to palpation, moderate tenderness to palpation in lower abdomen  Musculoskeletal: full range of motion of all extremities  without limitation or discomfort  Integumentary: warm, dry  Neurologic: cranial nerves intact    LABS AND IMAGING:     Recent Labs   Lab 06/29/22  0448 07/02/22  1353 07/03/22  0622   WBC 4.1* 8.9 8.0   RBC 4.28 4.52 4.47   HGB 13.7 14.4 14.5   HCT 42.1 44.8 44.1   MCV 98.4* 99.1* 98.7*   MCH 32.0* 31.9* 32.4*   MCHC 32.5* 32.1* 32.9*   RDW 13.6 13.4 13.6    226 228   MPV 11.3 11.0* 11.7*       Recent Labs   Lab 06/29/22  0448 07/02/22  1353 07/03/22  0622    140 141   K 4.2 4.1 4.8   CO2 24 26 25   BUN 12.7 11.4 7.5*   CREATININE 0.87 0.77  0.79   CALCIUM 9.9 10.0 9.6   MG  --  2.20  --    ALBUMIN 3.6 3.4* 3.6   ALKPHOS 71 69 68   ALT 24 25 24   AST 18 17 26   BILITOT 0.3 0.2 0.4       Microbiology Results (last 7 days)       Procedure Component Value Units Date/Time    Stool Culture [[id number]]     Order Status: Sent Specimen: Stool     Clostridium Diff Toxin, A & B, EIA [[id number]]     Order Status: Sent Specimen: Stool     Stool Culture [[id number]]     Order Status: Canceled Specimen: Stool     Clostridium Diff Toxin, A & B, EIA [[id number]]     Order Status: Canceled Specimen: Stool              CT Abdomen Pelvis With Contrast  Narrative: EXAMINATION:  CT ABDOMEN PELVIS WITH CONTRAST    CLINICAL HISTORY:  Abdominal abscess/infection suspected;Abdominal pain, acute, nonlocalized;    TECHNIQUE:  Helically acquired images with axial, sagittal and coronal reformations were obtained from the lung bases to the pubic symphysis after the IV administration of contrast.    Automated tube current modulation, weight-based exposure dosing, and/or iterative reconstruction technique utilized to reach lowest reasonably achievable exposure rate.    DLP: 422 mGy*cm    COMPARISON:  CT abdomen pelvis 06/13/2022    FINDINGS:  HEART: Normal in size. No pericardial effusion.    LUNG BASES: Subtle ground-glass densities at the lung bases, similar to prior.    LIVER: Normal attenuation. No appreciable focal hepatic lesion.    BILIARY: No calcified gallstones.    PANCREAS: No inflammatory change.    SPLEEN: Normal in size    ADRENALS: No mass.    KIDNEYS/URETERS: The kidneys enhance symmetrically.  No hydronephrosis.    GI TRACT/MESENTERY: Stomach has a normal CT appearance.  No evidence of bowel obstruction or inflammation. The appendix is normal. There is diffuse colonic diverticulosis.  There is submucosal edema and pericolonic inflammatory change at the descending and sigmoid colon.  There is persistent inflammatory change at the junction of the descending and sigmoid  colon.  No maria a perforation or abscess.    PERITONEUM: No free fluid.No free air.    LYMPH NODES: No enlarged lymph nodes by size criteria.    VASCULATURE: No significant atherosclerosis or aneurysm.    BLADDER: Normal appearance given degree of distention.    REPRODUCTIVE ORGANS: There is an enhancing fibroid at the anterior lower uterine segment.    SOFT TISSUES: Small fat containing umbilical hernia.    BONES: No acute osseous abnormality.  Impression: Long segment inflammatory change involving the left hemicolon suggest nonspecific colitis.  Component of subtle acute diverticulitis at the junction of the descending and sigmoid colon is not excluded.  No perforation or abscess.    Please ensure patient is up-to-date with routine colon cancer screening.    The preliminary and final reports are concordant.    Electronically signed by: Genesis Iraheta  Date:    07/03/2022  Time:    09:03        ASSESSMENT & PLAN:   Recurrent diverticulitis  Colitis  History of GERD  History of asthma    Plan:  -control pain and nausea p.r.n.  -Continued IV hydration, clear liquid diet and advanced as tolerated  -continue Zosyn for colitis/recurrent diverticulitis, consider deescalating if she continues to remain afebrile with no leukocytosis  -consult GI for recurrent diverticulitis  -ESR, stool culture, fecal leukocytes, C diff, GI PCR to rule out any other causes of colitis and diarrhea  -repeat labs in a.m.      VTE Prophylaxis: will be placed on SCD for DVT prophylaxis and will be advised to be as mobile as possible and sit in a chair as tolerated  __________________________________________________________________________  INPATIENT LIST OF MEDICATIONS     Current Facility-Administered Medications:     acetaminophen tablet 650 mg, 650 mg, Oral, Q6H PRN, FAMILIA Thomas    dicyclomine injection 20 mg, 20 mg, Intramuscular, QID PRN, FAMILIA Thomas    ketorolac injection 30 mg, 30 mg, Intravenous, ED 1 Time, Amaury Schmitt  IV, MD    lactated ringers infusion, , Intravenous, Continuous, FAMILIA Thomas    ondansetron injection 4 mg, 4 mg, Intravenous, Q6H PRN, FAMILIA Thomas    piperacillin-tazobactam (ZOSYN) 4.5 g in dextrose 5 % in water (D5W) 5 % 100 mL IVPB (MB+), 4.5 g, Intravenous, Q8H, FAMILIA Thomas    piperacillin-tazobactam (ZOSYN) 4.5 g in dextrose 5 % in water (D5W) 5 % 100 mL IVPB (MB+), 4.5 g, Intravenous, Once, Dustin Turner MD    Current Outpatient Medications:     albuterol (PROVENTIL HFA) 90 mcg/actuation inhaler, Inhale 2 puffs into the lungs every 4 (four) hours as needed for Wheezing. Rescue, Disp: 18 g, Rfl: 0    albuterol-ipratropium (DUO-NEB) 2.5 mg-0.5 mg/3 mL nebulizer solution, Take 3 mLs by nebulization every 4 (four) hours as needed for Wheezing or Shortness of Breath. Rescue, Disp: 75 mL, Rfl: 0    amoxicillin-clavulanate 875-125mg (AUGMENTIN) 875-125 mg per tablet, Take 1 tablet by mouth 2 (two) times daily. for 14 days, Disp: 28 tablet, Rfl: 0    fluticasone furoate-vilanteroL (BREO ELLIPTA) 200-25 mcg/dose DsDv diskus inhaler, Inhale 1 puff into the lungs once daily. Controller, Disp: 60 each, Rfl: 5    HYDROcodone-acetaminophen (NORCO) 5-325 mg per tablet, Take 1 tablet by mouth every 6 (six) hours as needed for Pain., Disp: 12 tablet, Rfl: 0    ketorolac (TORADOL) 10 mg tablet, Take 1 tablet (10 mg total) by mouth every 6 (six) hours. for 5 days, Disp: 20 tablet, Rfl: 0    methylPREDNISolone (MEDROL DOSEPACK) 4 mg tablet, use as directed, Disp: 21 each, Rfl: 0    montelukast (SINGULAIR) 10 mg tablet, Take 1 tablet (10 mg total) by mouth once daily at 6am., Disp: 30 tablet, Rfl: 5    montelukast (SINGULAIR) 5 MG chewable tablet, Take 2 tablets (10 mg total) by mouth once daily., Disp: 60 tablet, Rfl: 0    ondansetron (ZOFRAN-ODT) 4 MG TbDL, Take 2 tablets (8 mg total) by mouth 2 (two) times daily., Disp: 10 tablet, Rfl: 0    pantoprazole (PROTONIX) 40 MG tablet, Take 1 tablet (40 mg total)  by mouth once daily., Disp: 30 tablet, Rfl: 5      Scheduled Meds:   ketorolac  30 mg Intravenous ED 1 Time    piperacillin-tazobactam (ZOSYN) IVPB  4.5 g Intravenous Q8H    piperacillin-tazobactam (ZOSYN) IVPB  4.5 g Intravenous Once     Continuous Infusions:   lactated ringers       PRN Meds:.acetaminophen, dicyclomine, ondansetron      Discharge Planning and Disposition/Anticipated discharge: TBMAGGIE WASSERMAN, Shen Cabrera, NP/PA have reviewed and discussed the case with Dr. Turner   Please see the following addendum for further assessment and plan from there attending MD.  07/03/2022    ________________________________________________________________________________    MD Addendum:  I, Dr carlee cervantes _ assumed care of this patient today   For the patient encounter, I performed the substantive portion of the visit, I reviewed the NP/PA documentation, treatment plan, and medical decision making.  I had face to face time with this patient     History:  52 y.o. female with a past medical history of recurrent diverticulitis, GERD, and asthma who presented to [facility] on 7/3/2022 with complaints of progressively worsening lower abdominal pain, nausea, multiple episodes of nonbloody emesis, and multiple episodes of nonbloody diarrhea exacerbated with eating over the past x3 days. She went to [facility] approximately x2 weeks ago where she was diagnosed with diverticulitis and prescribed amoxicillin which she took with improvement for about a week and half, but her symptoms returned which prompted her to go to [facility] yesterday on 07/02/2022 where she was diagnosed with diverticulitis again and discharged home with a prescription for Augmentin which she never received. She states that she has been experiencing recurrent diverticulitis for several years and does not currently follow up with a gastroenterologist.  Her last colonoscopy was x5 years ago which revealed some polyps and she has not undergone an EGD.  She denies fever, shortness of  breath, chest pain, or dysuria.    Of note, she was admitted to the hospital in April 2022 for diverticulitis and was seen by GI as well as General surgery during the admission who did not recommend any intervention at that time.    Her vital signs are currently stable and she is afebrile.  Her labs showed a CRP 15.6 otherwise was unremarkable. CT abdomen/pelvis showed a long segment inflammatory change involving the left hemicolon suggest nonspecific colitis. Component of subtle acute diverticulitis at the junction of the descending and sigmoid colon is not excluded.  No perforation or abscess.      Exam  General: well-developed well-nourished mild acute distress, anxious appearing, in tears   Eye: clear conjunctiva, eyelids normal  HENT: oropharynx and nasal mucosal surfaces moist  Respiratory: clear to auscultation bilaterally  Cardiovascular: regular rate and rhythm without gallops or rubs  Gastrointestinal: soft, non-distended with normal bowel sounds, without masses to palpation, moderate tenderness to palpation in lower abdomen      Assessment/plan  Recurrent diverticulitis  Colitis  Abdominal pain d/t above   History of GERD  History of asthma    Plan:  admit  -control pain and nausea p.r.n.  -Continued IV hydration, clear liquid diet and advanced as tolerated  -continue Zosyn   -consult GI for recurrent diverticulitis  -ESR, stool culture, fecal leukocytes, C diff, GI PCR to rule out any other causes of colitis and diarrhea  -repeat labs in a.m.    All diagnosis and differential diagnosis have been reviewed; assessment and plan has been documented; I have personally reviewed the labs and test results that are presently available; I have reviewed the patients medication list; I have reviewed the consulting providers response and recommendations. I have reviewed or attempted to review medical records based upon their availability.    All of the patient and family questions have been addressed and answered.  Patient's is agreeable to the above stated plan. I will continue to monitor closely and make adjustments to medical management as needed.    FAMILIA Thomas   07/03/2022        166.8

## 2022-07-03 NOTE — ED PROVIDER NOTES
"Encounter Date: 7/3/2022    SCRIBE #1 NOTE: I, Dominique Angel, am scribing for, and in the presence of,  Amaury Schmitt. I have scribed the following portions of the note - Other sections scribed: HPI,ROS,PE.       History     Chief Complaint   Patient presents with    Abdominal Pain    Vomiting    Diarrhea     Complaint of abdominal pain, diarrhea, vomiting, chills, diaphoresis.   Was seen at [facility] yesterday, requesting CT scan.     Nausea     51 y/o female with a history of diverticulosis, asthma and GERD presents to the ED c/o "excrutiating" abdominal pain associated with diarrhea and vomiting onset 2 days ago. Pt went to [facility] 2 weeks ago for the same symptoms and was treated with antibiotics. She said her pain had subsided but came back. Pt notes she felt like she was not healing properly and had severe pain. She went to [facility] again yesterday requesting CT scan.     The history is provided by the patient.   Abdominal Pain  The current episode started two days ago. The problem has not changed since onset.The abdominal pain is generalized. The abdominal pain does not radiate. The other symptoms of the illness include nausea, vomiting and diarrhea. The other symptoms of the illness do not include fever, shortness of breath, dysuria, vaginal discharge or vaginal bleeding.   Symptoms associated with the illness do not include chills or hematuria.   Vomiting   Associated symptoms include abdominal pain and diarrhea. Pertinent negatives include no chills, no cough and no fever.   Diarrhea   Associated symptoms include abdominal pain and vomiting. Pertinent negatives include no chills, coughing or fever.   Nausea  Associated symptoms include abdominal pain. Pertinent negatives include no chest pain and no shortness of breath.     Review of patient's allergies indicates:   Allergen Reactions    Levofloxacin Hives and Itching    Metronidazole Hives    Codeine     Latex     Morphine     Opioids - morphine analogues  "     Past Medical History:   Diagnosis Date    Asthma     Diverticulosis     GERD (gastroesophageal reflux disease)      No past surgical history on file.  No family history on file.  Social History     Tobacco Use    Smoking status: Never Smoker    Smokeless tobacco: Never Used     Review of Systems   Constitutional: Negative for chills and fever.   HENT: Negative for congestion, rhinorrhea and sore throat.    Eyes: Negative for visual disturbance.   Respiratory: Negative for cough and shortness of breath.    Cardiovascular: Negative for chest pain and leg swelling.   Gastrointestinal: Positive for abdominal pain, diarrhea, nausea and vomiting.   Genitourinary: Negative for dysuria, hematuria, vaginal bleeding and vaginal discharge.   Musculoskeletal: Negative for joint swelling.   Skin: Negative for rash.   Neurological: Negative for weakness.   Psychiatric/Behavioral: Negative for confusion.       Physical Exam     Initial Vitals [07/03/22 0419]   BP Pulse Resp Temp SpO2   (!) 127/55 90 20 98.4 °F (36.9 °C) 95 %      MAP       --         Physical Exam    Nursing note and vitals reviewed.  Constitutional: She is not diaphoretic.  Non-toxic appearance. No distress.   HENT:   Head: Normocephalic and atraumatic.   Eyes: EOM are normal. Pupils are equal, round, and reactive to light.   Neck: Neck supple.   Normal range of motion.  Cardiovascular: Normal rate and regular rhythm.   No murmur heard.  Pulmonary/Chest: No respiratory distress. She has wheezes (diffused inspiratory and expiratory wheezing. ). She has no rales.   Abdominal: Abdomen is soft. She exhibits no distension. There is abdominal tenderness (mild diffused abdominal tenderness).   Musculoskeletal:         General: Normal range of motion.      Cervical back: Normal range of motion and neck supple.     Neurological: She is alert and oriented to person, place, and time. She has normal strength.   Skin: Skin is warm. No rash noted.   Psychiatric: Her  mood appears anxious.         ED Course   Procedures  Labs Reviewed   COMPREHENSIVE METABOLIC PANEL - Abnormal; Notable for the following components:       Result Value    Blood Urea Nitrogen 7.5 (*)     All other components within normal limits   URINALYSIS, REFLEX TO URINE CULTURE - Abnormal; Notable for the following components:    Specific Gravity, UA >=1.040 (*)     All other components within normal limits   CBC WITH DIFFERENTIAL - Abnormal; Notable for the following components:    MCV 98.7 (*)     MCH 32.4 (*)     MCHC 32.9 (*)     MPV 11.7 (*)     All other components within normal limits   C-REACTIVE PROTEIN - Abnormal; Notable for the following components:    C-Reactive Protein 15.60 (*)     All other components within normal limits   LIPASE - Normal   URINALYSIS, MICROSCOPIC - Normal   SEDIMENTATION RATE - Normal   STOOL CULTURE [facility]   CLOSTRIDIUM DIFFICILE TOXIN A AND B, EIA   CBC W/ AUTO DIFFERENTIAL    Narrative:     The following orders were created for panel order CBC auto differential.  Procedure                               Abnormality         Status                     ---------                               -----------         ------                     CBC with Differential[[id number]]        Abnormal            Final result                 Please view results for these tests on the individual orders.   GI PROFILE, STOOL, PCR   FECAL LEUKOCYTES - LACTOFERRIN ON  STOOL          Imaging Results          CT Abdomen Pelvis With Contrast (Final result)  Result time 07/03/22 09:03:26    Final result by Genesis Iraheta MD (07/03/22 09:03:26)                 Impression:      Long segment inflammatory change involving the left hemicolon suggest nonspecific colitis.  Component of subtle acute diverticulitis at the junction of the descending and sigmoid colon is not excluded.  No perforation or abscess.    Please ensure patient is up-to-date with routine colon cancer screening.    The preliminary and  final reports are concordant.      Electronically signed by: Genesis Iraheta  Date:    07/03/2022  Time:    09:03             Narrative:    EXAMINATION:  CT ABDOMEN PELVIS WITH CONTRAST    CLINICAL HISTORY:  Abdominal abscess/infection suspected;Abdominal pain, acute, nonlocalized;    TECHNIQUE:  Helically acquired images with axial, sagittal and coronal reformations were obtained from the lung bases to the pubic symphysis after the IV administration of contrast.    Automated tube current modulation, weight-based exposure dosing, and/or iterative reconstruction technique utilized to reach lowest reasonably achievable exposure rate.    DLP: 422 mGy*cm    COMPARISON:  CT abdomen pelvis 06/13/2022    FINDINGS:  HEART: Normal in size. No pericardial effusion.    LUNG BASES: Subtle ground-glass densities at the lung bases, similar to prior.    LIVER: Normal attenuation. No appreciable focal hepatic lesion.    BILIARY: No calcified gallstones.    PANCREAS: No inflammatory change.    SPLEEN: Normal in size    ADRENALS: No mass.    KIDNEYS/URETERS: The kidneys enhance symmetrically.  No hydronephrosis.    GI TRACT/MESENTERY: Stomach has a normal CT appearance.  No evidence of bowel obstruction or inflammation. The appendix is normal. There is diffuse colonic diverticulosis.  There is submucosal edema and pericolonic inflammatory change at the descending and sigmoid colon.  There is persistent inflammatory change at the junction of the descending and sigmoid colon.  No maria a perforation or abscess.    PERITONEUM: No free fluid.No free air.    LYMPH NODES: No enlarged lymph nodes by size criteria.    VASCULATURE: No significant atherosclerosis or aneurysm.    BLADDER: Normal appearance given degree of distention.    REPRODUCTIVE ORGANS: There is an enhancing fibroid at the anterior lower uterine segment.    SOFT TISSUES: Small fat containing umbilical hernia.    BONES: No acute osseous abnormality.                     Preliminary result by Genesis Iraheta MD (07/03/22 06:53:06)                 Narrative:    START OF REPORT:  Technique: CT of the abdomen and pelvis was performed with axial images as well as sagittal and coronal reconstruction images with intravenous contrast.    Comparison: Comparison is with study dated â2022-06-13 05:46:41â.    Clinical History: Complaint of abdominal pain, diarrhea, vomiting, chills, diaphoresis.    Dosage Information: Automated Exposure Control was utilized.    Findings:  Lines and Tubes: None.  Thorax:  Lungs: There is mild nonspecific dependent change at the lung bases. Linear opacities are also present at the visualized lung bases, consistent with scarring or atelectasis.  Pleura: No effusions or thickening.  Heart: The heart size is within normal limits.  Abdomen:  Abdominal Wall: There is a subtle uncomplicated umbilical hernia.  Liver: The liver appears unremarkable.  Biliary System: No intrahepatic or extrahepatic biliary duct dilatation is seen.  Gallbladder: The gallbladder appears unremarkable.  Pancreas: The pancreas appears unremarkable.  Spleen: The spleen appears unremarkable.  Adrenals: The adrenal glands appear unremarkable.  Kidneys: The kidneys appear unremarkable with no stones cysts masses or hydronephrosis.  Aorta: Unremarkable abdominal aorta without specific evidence of aneurysm or dissection.  IVC: Unremarkable.  Bowel:  Esophagus: The visualized distal esophagus appears unremarkable.  Stomach: The stomach appears unremarkable.  Duodenum: Unremarkable appearing duodenum.  Small Bowel: The small bowel appears unremarkable.  Colon: Multiple diverticula are again seen throughout the colon. Colon wall thickening is now seen along the splenic flexure through to the mid sigmoid colon with mild adjacent fat stranding (series 2 image 17 through 30). Wall thickening along the sigmoid colon is still seen, relatively unchanged from the previous study. There is some focal  more pronounced wall thickening with possible pericolonic fat stranding involving the distal descending and proximal sigmoid colon on images 50 - 60 of axial series 2. These findings suggest colitis involving the splenic flexure through to the mid sigmoid colon with an element of diverticulitis involving the proximal sigmoid colon not entirely excluded.  Appendix: The appendix appears unremarkable and is seen on âImage 49, Series 2â through âImage 52, Series 2â.  Peritoneum: No intraperitoneal free air or ascites is seen.    Pelvis:  Bladder: The bladder appears unremarkable.  Female:  Uterus: The uterus appears unremarkable.  Ovaries: No adnexal masses are seen.    Bony structures:  Dorsal Spine: There is mild multilevel spondylosis of the visualized dorsal spine. Degenerative disc disease at L5-S1 is also noted.      Impression:  1. Multiple diverticula are again seen throughout the colon. Colon wall thickening is now seen along the splenic flexure through to the mid sigmoid colon with mild adjacent fat stranding (series 2 image 17 through 30). Wall thickening along the sigmoid colon is still seen, relatively unchanged from the previous study. There is some focal more pronounced wall thickening with possible pericolonic fat stranding involving the distal descending and proximal sigmoid colon on images 50 - 60 of axial series 2. These findings suggest colitis involving the splenic flexure through to the mid sigmoid colon with an element of diverticulitis involving the proximal sigmoid colon not entirely excluded. Correlate with clinical and laboratory findings as regards additional evaluation and follow-up.  2. Details and other findings as discussed above.                                   Medications   ketorolac injection 30 mg (has no administration in time range)   piperacillin-tazobactam (ZOSYN) 4.5 g in dextrose 5 % in water (D5W) 5 % 100 mL IVPB (MB+) (has no administration in time range)    piperacillin-tazobactam (ZOSYN) 4.5 g in dextrose 5 % in water (D5W) 5 % 100 mL IVPB (MB+) (has no administration in time range)   ondansetron injection 4 mg (has no administration in time range)   dicyclomine injection 20 mg (has no administration in time range)   lactated ringers infusion (has no administration in time range)   acetaminophen tablet 650 mg (has no administration in time range)   iopamidoL (ISOVUE-370) injection 100 mL (100 mLs Intravenous Given 7/3/22 0656)   albuterol-ipratropium 2.5 mg-0.5 mg/3 mL nebulizer solution 3 mL (3 mLs Nebulization Given 7/3/22 0945)   ketorolac injection 30 mg (30 mg Intravenous Given 7/3/22 0745)   sodium chloride 0.9% bolus 1,000 mL (0 mLs Intravenous Stopped 7/3/22 0845)     Medical Decision Making:   History:   Old Medical Records: I decided to obtain old medical records.  Initial Assessment:   52-year-old female with a history of recurrent colitis diverticulitis presenting for diffuse abdominal pain with nausea vomiting diarrhea.  States she was seen at [facility] 2 weeks ago given course of antibiotics all her symptoms resolved but then came back couple days ago -  she went again to [facility] she was prescribed 7 days of an antibiotic is telling me that this is not long enough of a treatment duration states that it usually takes 14 days antibiotics to resolve her symptoms.  Patient is well-appearing with normal vital signs she has some mild diffuse abdominal tenderness her CT scan here shows diffuse pancolitis possible diverticulitis no evidence of abscess perforation obstruction or any other surgical complications.  Will treat her pain hydrate she is also complaining of some shortness of breath has some diffuse wheezing consistent with her history of asthma will give her a neb we will reassess. anticipate she will be discharged with outpatient antibiotics and outpatient follow-up  Differential Diagnosis:   Intrabdominal abcess, retroperitoneal abcess, appendicitis, biliary  "disease, diverticulitis, gastritis, gastroenteritis, hepatitis, hernia, pancreatitis, inflammatory bowel disease PUD, SBP, PID, nephrolithiasis, DKA, sickle cell crisis, consitpation, GERD, IBS    Clinical Tests:   Lab Tests: Ordered and Reviewed  Radiological Study: Reviewed and Ordered          Scribe Attestation:   Scribe #1: I performed the above scribed service and the documentation accurately describes the services I performed. I attest to the accuracy of the note.    Attending Attestation:           Physician Attestation for Scribe:  Physician Attestation Statement for Scribe #1: I, Amaury Schmitt, reviewed documentation, as scribed by Dominique Ortiz in my presence, and it is both accurate and complete.             ED Course as of 07/03/22 1342   Sun Jul 03, 2022   1006 Patient reported to me her pain was improved, CT with colitis/possible diverticulitis without complications. Labs unremarkable. Patient noted to be ambulating without difficulty to bathroom. On discharge, patient became upset with me and staff - saying that we aren't treating her pain and that we are "treating her like a dog". Saying that the combination of toradol and dialudid is the only thing that controls her pain. Given that patient's exam and clinical status has now changed will offer her admission for antibiotics and pain control.  [AC]   1023 Pt admitted to Dr. Turner.  [SK]      ED Course User Index  [AC] Amaury Schmitt IV, MD  [SK] Dominique Ortiz             Clinical Impression:   Final diagnoses:  [R10.9] Abdominal pain, unspecified abdominal location  [R19.7] Diarrhea, unspecified type  [R11.2] Nausea and vomiting, intractability of vomiting not specified, unspecified vomiting type  [K52.9] Colitis (Primary)          ED Disposition Condition    Admit       Amaury WASSERMAN MD personally performed the history, PE, MDM, and procedures as documented above and agree with the scribe's documentation.           Amaury Schmitt IV, MD  07/03/22 " [id number]

## 2022-07-04 PROCEDURE — 25000003 PHARM REV CODE 250: Performed by: NURSE PRACTITIONER

## 2022-07-04 PROCEDURE — 11000001 HC ACUTE MED/SURG PRIVATE ROOM

## 2022-07-04 PROCEDURE — 96376 TX/PRO/DX INJ SAME DRUG ADON: CPT

## 2022-07-04 PROCEDURE — 27000207 HC ISOLATION

## 2022-07-04 PROCEDURE — G0378 HOSPITAL OBSERVATION PER HR: HCPCS

## 2022-07-04 PROCEDURE — 96366 THER/PROPH/DIAG IV INF ADDON: CPT

## 2022-07-04 PROCEDURE — 94640 AIRWAY INHALATION TREATMENT: CPT

## 2022-07-04 PROCEDURE — 25000242 PHARM REV CODE 250 ALT 637 W/ HCPCS: Performed by: NURSE PRACTITIONER

## 2022-07-04 PROCEDURE — 99900035 HC TECH TIME PER 15 MIN (STAT)

## 2022-07-04 PROCEDURE — 63600175 PHARM REV CODE 636 W HCPCS: Performed by: PHYSICIAN ASSISTANT

## 2022-07-04 PROCEDURE — 94761 N-INVAS EAR/PLS OXIMETRY MLT: CPT

## 2022-07-04 PROCEDURE — 63600175 PHARM REV CODE 636 W HCPCS: Performed by: INTERNAL MEDICINE

## 2022-07-04 PROCEDURE — 96361 HYDRATE IV INFUSION ADD-ON: CPT

## 2022-07-04 PROCEDURE — 25000003 PHARM REV CODE 250: Performed by: PHYSICIAN ASSISTANT

## 2022-07-04 RX ORDER — IPRATROPIUM BROMIDE AND ALBUTEROL SULFATE 2.5; .5 MG/3ML; MG/3ML
3 SOLUTION RESPIRATORY (INHALATION) EVERY 4 HOURS PRN
Status: DISCONTINUED | OUTPATIENT
Start: 2022-07-04 | End: 2022-07-06 | Stop reason: HOSPADM

## 2022-07-04 RX ORDER — HYOSCYAMINE SULFATE 0.12 MG/1
0.12 TABLET SUBLINGUAL EVERY 4 HOURS PRN
Status: DISCONTINUED | OUTPATIENT
Start: 2022-07-04 | End: 2022-07-06 | Stop reason: HOSPADM

## 2022-07-04 RX ORDER — PANTOPRAZOLE SODIUM 40 MG/1
40 TABLET, DELAYED RELEASE ORAL DAILY
Status: DISCONTINUED | OUTPATIENT
Start: 2022-07-04 | End: 2022-07-06 | Stop reason: HOSPADM

## 2022-07-04 RX ADMIN — HYDROMORPHONE HYDROCHLORIDE 1 MG: 2 INJECTION, SOLUTION INTRAMUSCULAR; INTRAVENOUS; SUBCUTANEOUS at 09:07

## 2022-07-04 RX ADMIN — PIPERACILLIN SODIUM AND TAZOBACTAM SODIUM 4.5 G: 4; .5 INJECTION, POWDER, LYOPHILIZED, FOR SOLUTION INTRAVENOUS at 09:07

## 2022-07-04 RX ADMIN — IPRATROPIUM BROMIDE AND ALBUTEROL SULFATE 3 ML: 2.5; .5 SOLUTION RESPIRATORY (INHALATION) at 12:07

## 2022-07-04 RX ADMIN — ONDANSETRON 4 MG: 2 INJECTION INTRAMUSCULAR; INTRAVENOUS at 07:07

## 2022-07-04 RX ADMIN — ONDANSETRON 4 MG: 2 INJECTION INTRAMUSCULAR; INTRAVENOUS at 06:07

## 2022-07-04 RX ADMIN — PANTOPRAZOLE SODIUM 40 MG: 40 TABLET, DELAYED RELEASE ORAL at 10:07

## 2022-07-04 RX ADMIN — PIPERACILLIN SODIUM AND TAZOBACTAM SODIUM 4.5 G: 4; .5 INJECTION, POWDER, LYOPHILIZED, FOR SOLUTION INTRAVENOUS at 02:07

## 2022-07-04 RX ADMIN — KETOROLAC TROMETHAMINE 15 MG: 30 INJECTION, SOLUTION INTRAMUSCULAR at 12:07

## 2022-07-04 RX ADMIN — PANTOPRAZOLE SODIUM 40 MG: 40 TABLET, DELAYED RELEASE ORAL at 12:07

## 2022-07-04 RX ADMIN — IPRATROPIUM BROMIDE AND ALBUTEROL SULFATE 3 ML: 2.5; .5 SOLUTION RESPIRATORY (INHALATION) at 10:07

## 2022-07-04 RX ADMIN — SODIUM CHLORIDE, POTASSIUM CHLORIDE, SODIUM LACTATE AND CALCIUM CHLORIDE: 600; 310; 30; 20 INJECTION, SOLUTION INTRAVENOUS at 09:07

## 2022-07-04 RX ADMIN — IPRATROPIUM BROMIDE AND ALBUTEROL SULFATE 3 ML: 2.5; .5 SOLUTION RESPIRATORY (INHALATION) at 03:07

## 2022-07-04 RX ADMIN — PIPERACILLIN SODIUM AND TAZOBACTAM SODIUM 4.5 G: 4; .5 INJECTION, POWDER, LYOPHILIZED, FOR SOLUTION INTRAVENOUS at 04:07

## 2022-07-04 RX ADMIN — HYDROMORPHONE HYDROCHLORIDE 1 MG: 2 INJECTION, SOLUTION INTRAMUSCULAR; INTRAVENOUS; SUBCUTANEOUS at 03:07

## 2022-07-04 RX ADMIN — HYDROMORPHONE HYDROCHLORIDE 1 MG: 2 INJECTION, SOLUTION INTRAMUSCULAR; INTRAVENOUS; SUBCUTANEOUS at 04:07

## 2022-07-04 RX ADMIN — KETOROLAC TROMETHAMINE 15 MG: 30 INJECTION, SOLUTION INTRAMUSCULAR at 06:07

## 2022-07-04 RX ADMIN — SODIUM CHLORIDE, POTASSIUM CHLORIDE, SODIUM LACTATE AND CALCIUM CHLORIDE: 600; 310; 30; 20 INJECTION, SOLUTION INTRAVENOUS at 10:07

## 2022-07-04 RX ADMIN — HYDROMORPHONE HYDROCHLORIDE 1 MG: 2 INJECTION, SOLUTION INTRAMUSCULAR; INTRAVENOUS; SUBCUTANEOUS at 12:07

## 2022-07-04 RX ADMIN — IPRATROPIUM BROMIDE AND ALBUTEROL SULFATE 3 ML: 2.5; .5 SOLUTION RESPIRATORY (INHALATION) at 05:07

## 2022-07-04 RX ADMIN — KETOROLAC TROMETHAMINE 15 MG: 30 INJECTION, SOLUTION INTRAMUSCULAR at 07:07

## 2022-07-04 RX ADMIN — IPRATROPIUM BROMIDE AND ALBUTEROL SULFATE 3 ML: 2.5; .5 SOLUTION RESPIRATORY (INHALATION) at 08:07

## 2022-07-04 RX ADMIN — HYDROMORPHONE HYDROCHLORIDE 1 MG: 2 INJECTION, SOLUTION INTRAMUSCULAR; INTRAVENOUS; SUBCUTANEOUS at 10:07

## 2022-07-04 NOTE — CONSULTS
"Gastroenterology     CC: diverticulitis    HPI 52 y.o. female known to Dr. TAMARA Jones from previous admission at [facility] with a h/o recurrent diverticulitis presents for evaluation due to recurrent acute diverticulitis associated with abdominal pain, nausea, and non-bloody diarrhea.  Per chart review - Patient moved to [place] in November 2021.  She was treated with augmentin from [facility] ED for uncomplicated diverticulitis in 12/2021.  Patient presented again in March 2022 with recurrent diverticulitis and failed outpatient treatment with augmentin.  She was hospitalized for 2 days and treated with IV Zosyn and then left AMA.  She reports multiple episodes of diverticulitis in [place] and at one point was planning surgical intervention.  She had a pre-op colonoscopy in which she reports some type of microperforation with the bowel prep that did not require surgery.  She did not end up getting a colonoscopy.  She was treated with IV zosyn when she was admitted in April for diverticulitis and responded well.  Surgery elected not to intervene during that admission.    She was admitted with progressively worsening lower abdominal pain for the last 3 days.  CT with left sided colitis.    Past Medical History:   Diagnosis Date    Asthma     Diverticulosis     GERD (gastroesophageal reflux disease)        Surgical history - No GI surgeries reported    Social History     Tobacco Use    Smoking status: Never Smoker    Smokeless tobacco: Never Used       FH - neg for GI malignancy    Review of Systems  As per HPI and is otherwise negative    Physical Examination  BP (!) 145/77   Pulse 63   Temp 97.6 °F (36.4 °C) (Oral)   Resp 17   Ht 4' 11" (1.499 m)   Wt 68.4 kg (150 lb 12.7 oz)   SpO2 96%   BMI 30.46 kg/m²   General appearance: alert, cooperative, no distress  HENT: Normocephalic, atraumatic, neck symmetrical, no nasal discharge   Eyes: conjunctivae/corneas clear, PERRL, EOM's intact  Lungs: clear to " auscultation bilaterally, no dullness to percussion bilaterally  Heart: regular rate and rhythm without rub; no displacement of the PMI   Abdomen: moderate TTP the the LLQ; no r/g; bs present; no organomegaly  Extremities: extremities symmetric; no clubbing, cyanosis, or edema  Integument: Skin color, texture, turgor normal; no rashes; hair distrubution normal  Neurologic: Alert and oriented X 3, normal strength, normal coordination and gait  Psychiatric: no pressured speech; normal affect; no evidence of impaired cognition     Labs:  Recent Results (from the past 48 hour(s))   Urinalysis Only    Collection Time: 07/02/22  1:34 PM   Result Value Ref Range    Color, UA Light-Yellow (A) Yellow, Colorless, Other, Clear    Appearance, UA Clear Clear    Specific Gravity, UA 1.011     pH, UA 5.5 5.0, 5.5, 6.0, 6.5, 7.0, 7.5, 8.0, 8.5    Protein, UA Negative Negative, 300  mg/dL    Glucose, UA Normal Negative, Normal mg/dL    Ketones, UA Negative Negative, +1, +2, +3, +4, +5, >=160, >=80 mg/dL    Blood, UA Negative Negative unit/L    Bilirubin, UA Negative Negative mg/dL    Urobilinogen, UA Normal 0.2, 1.0, Normal mg/dL    Nitrites, UA Negative Negative    Leukocyte Esterase, UA Negative Negative, 75  unit/L    WBC, UA 0-5 None Seen, 0-2, 3-5, 0-5 /HPF    Bacteria, UA Trace (A) None Seen /HPF    Squamous Epithelial Cells, UA Trace (A) None Seen /HPF    Mucous, UA Trace (A) None Seen /LPF    Hyaline Casts, UA None Seen None Seen /lpf    RBC, UA None Seen None Seen, 0-2, 3-5, 0-5 /HPF   Comprehensive metabolic panel    Collection Time: 07/02/22  1:53 PM   Result Value Ref Range    Sodium Level 140 136 - 145 mmol/L    Potassium Level 4.1 3.5 - 5.1 mmol/L    Chloride 106 98 - 107 mmol/L    Carbon Dioxide 26 22 - 29 mmol/L    Glucose Level 108 (H) 74 - 100 mg/dL    Blood Urea Nitrogen 11.4 9.8 - 20.1 mg/dL    Creatinine 0.77 0.55 - 1.02 mg/dL    Calcium Level Total 10.0 8.4 - 10.2 mg/dL    Protein Total 7.1 6.4 - 8.3 gm/dL     Albumin Level 3.4 (L) 3.5 - 5.0 gm/dL    Globulin 3.7 (H) 2.4 - 3.5 gm/dL    Albumin/Globulin Ratio 0.9 (L) 1.1 - 2.0 ratio    Bilirubin Total 0.2 <=1.5 mg/dL    Alkaline Phosphatase 69 40 - 150 unit/L    Alanine Aminotransferase 25 0 - 55 unit/L    Aspartate Aminotransferase 17 5 - 34 unit/L    Estimated GFR- >60 mls/min/1.73/m2   Magnesium    Collection Time: 07/02/22  1:53 PM   Result Value Ref Range    Magnesium Level 2.20 1.60 - 2.60 mg/dL   Lipase    Collection Time: 07/02/22  1:53 PM   Result Value Ref Range    Lipase Level 22 <=60 U/L   CBC with Differential    Collection Time: 07/02/22  1:53 PM   Result Value Ref Range    WBC 8.9 4.5 - 11.5 x10(3)/mcL    RBC 4.52 4.20 - 5.40 x10(6)/mcL    Hgb 14.4 12.0 - 16.0 gm/dL    Hct 44.8 37.0 - 47.0 %    MCV 99.1 (H) 80.0 - 94.0 fL    MCH 31.9 (H) 27.0 - 31.0 pg    MCHC 32.1 (L) 33.0 - 36.0 mg/dL    RDW 13.4 11.5 - 17.0 %    Platelet 226 130 - 400 x10(3)/mcL    MPV 11.0 (H) 7.4 - 10.4 fL    Neut % 63.8 %    Lymph % 25.8 %    Mono % 8.8 %    Eos % 1.1 %    Basophil % 0.2 %    Lymph # 2.29 0.6 - 4.6 x10(3)/mcL    Neut # 5.7 2.1 - 9.2 x10(3)/mcL    Mono # 0.78 0.1 - 1.3 x10(3)/mcL    Eos # 0.10 0 - 0.9 x10(3)/mcL    Baso # 0.02 0 - 0.2 x10(3)/mcL    IG# 0.03 0 - 0.04 x10(3)/mcL    IG% 0.3 %    NRBC% 0.0 %   Lipase    Collection Time: 07/03/22  6:22 AM   Result Value Ref Range    Lipase Level 15 <=60 U/L   Comprehensive metabolic panel    Collection Time: 07/03/22  6:22 AM   Result Value Ref Range    Sodium Level 141 136 - 145 mmol/L    Potassium Level 4.8 3.5 - 5.1 mmol/L    Chloride 107 98 - 107 mmol/L    Carbon Dioxide 25 22 - 29 mmol/L    Glucose Level 82 74 - 100 mg/dL    Blood Urea Nitrogen 7.5 (L) 9.8 - 20.1 mg/dL    Creatinine 0.79 0.55 - 1.02 mg/dL    Calcium Level Total 9.6 8.4 - 10.2 mg/dL    Protein Total 6.8 6.4 - 8.3 gm/dL    Albumin Level 3.6 3.5 - 5.0 gm/dL    Globulin 3.2 2.4 - 3.5 gm/dL    Albumin/Globulin Ratio 1.1 1.1 - 2.0 ratio     Bilirubin Total 0.4 <=1.5 mg/dL    Alkaline Phosphatase 68 40 - 150 unit/L    Alanine Aminotransferase 24 0 - 55 unit/L    Aspartate Aminotransferase 26 5 - 34 unit/L    Estimated GFR- >60 mls/min/1.73/m2   CBC with Differential    Collection Time: 07/03/22  6:22 AM   Result Value Ref Range    WBC 8.0 4.5 - 11.5 x10(3)/mcL    RBC 4.47 4.20 - 5.40 x10(6)/mcL    Hgb 14.5 12.0 - 16.0 gm/dL    Hct 44.1 37.0 - 47.0 %    MCV 98.7 (H) 80.0 - 94.0 fL    MCH 32.4 (H) 27.0 - 31.0 pg    MCHC 32.9 (L) 33.0 - 36.0 mg/dL    RDW 13.6 11.5 - 17.0 %    Platelet 228 130 - 400 x10(3)/mcL    MPV 11.7 (H) 7.4 - 10.4 fL    Neut % 53.5 %    Lymph % 34.0 %    Mono % 9.1 %    Eos % 3.0 %    Basophil % 0.1 %    Lymph # 2.72 0.6 - 4.6 x10(3)/mcL    Neut # 4.3 2.1 - 9.2 x10(3)/mcL    Mono # 0.73 0.1 - 1.3 x10(3)/mcL    Eos # 0.24 0 - 0.9 x10(3)/mcL    Baso # 0.01 0 - 0.2 x10(3)/mcL    IG# 0.02 0 - 0.04 x10(3)/mcL    IG% 0.3 %    NRBC% 0.0 %   C-Reactive Protein    Collection Time: 07/03/22  6:22 AM   Result Value Ref Range    C-Reactive Protein 15.60 (H) <5.00 mg/L   Sedimentation rate    Collection Time: 07/03/22  6:22 AM   Result Value Ref Range    Sed Rate 19 0 - 20 mm/hr   Urinalysis, Reflex to Urine Culture Urine, Clean Catch    Collection Time: 07/03/22  7:33 AM    Specimen: Urine   Result Value Ref Range    Color, UA Yellow Yellow, Colorless, Other, Clear    Appearance, UA Clear Clear    Specific Gravity, UA >=1.040 (H) 1.001 - 1.030    pH, UA 5.5 5.0, 5.5, 6.0, 6.5, 7.0, 7.5, 8.0, 8.5    Protein, UA Negative Negative, 300  mg/dL    Glucose, UA Negative Negative, Normal mg/dL    Ketones, UA Negative Negative, +1, +2, +3, +4, +5, >=160, >=80 mg/dL    Blood, UA Negative Negative unit/L    Bilirubin, UA Negative Negative mg/dL    Urobilinogen, UA 0.2 0.2, 1.0, Normal mg/dL    Nitrites, UA Negative Negative    Leukocyte Esterase, UA Negative Negative, 75  unit/L   Urinalysis, Microscopic    Collection Time: 07/03/22  7:33 AM    Result Value Ref Range    RBC, UA <5 <=5 /HPF    WBC, UA <5 <=5 /HPF    Squamous Epithelial Cells, UA <5 <=5 /HPF    Bacteria, UA None Seen None Seen, Rare, Occasional /HPF         Imaging:  CT abdomen/pelvis -   Long segment inflammatory change involving the left hemicolon suggest nonspecific colitis.  Component of subtle acute diverticulitis at the junction of the descending and sigmoid colon is not excluded.  No perforation or abscess.    I have personally reviewed these images    Assessment:   53 yo female with recurrent episodes of diverticulitis with a reported h/o perforation with bowel prep.  I suspect that she needs surgery at this time given her recurrent diverticulitis.    Plan:  - Continue IV zosyn  - Change antispasmodics to Levsin 0.125 mg SL q4 PRN pain  - Consult surgery for evaluation for partial colectomy  - No plans for colonoscopy at the moment given her reported h/o perforation with bowel prep.

## 2022-07-04 NOTE — PROGRESS NOTES
[facility]  Hospital Medicine Progress Note        Chief Complaint: Inpatient Follow-up for Diverticulitis    HPI:   Niya Moeller is a 52 y.o. female with a past medical history of recurrent diverticulitis, GERD, and asthma who presented to [facility] on 7/3/2022 with complaints of progressively worsening lower abdominal pain, nausea, multiple episodes of nonbloody emesis, and multiple episodes of nonbloody diarrhea exacerbated with eating over the past x3 days. She went to [facility] approximately x2 weeks ago where she was diagnosed with diverticulitis and prescribed amoxicillin which she took with improvement for about a week and half, but her symptoms returned which prompted her to go to [facility] yesterday on 07/02/2022 where she was diagnosed with diverticulitis again and discharged home with a prescription for Augmentin which she never received. She states that she has been experiencing recurrent diverticulitis for several years and does not currently follow up with a gastroenterologist.  Her last colonoscopy was x5 years ago which revealed some polyps and she has not undergone an EGD.  She denies fever, shortness of breath, chest pain, or dysuria. Of note, she was admitted to the hospital in April 2022 for diverticulitis and was seen by GI as well as General surgery during the admission who did not recommend any intervention at that time.     Her vital signs were stable and she is afebrile.  Her labs showed a CRP 15.6 otherwise was unremarkable. CT abdomen/pelvis showed a long segment inflammatory change involving the left hemicolon suggest nonspecific colitis. Component of subtle acute diverticulitis at the junction of the descending and sigmoid colon is not excluded.  No perforation or abscess.    Interval Hx:   She was afebrile and hemodynamically stable overnight.  When seen examined bedside she was alert, comfortably resting in the bed.  Jesus pain improving.  Denies any nausea or  vomiting.  Tolerating p.o. intake.  No labs were available for this morning.  CT abdomen revealed left hemicolon nonspecific colitis with subtle acute diverticulitis at the junction of sigmoid and descending colon.  Patient has history of abdominal perforation.  Stool sample for cultures and C. difficile yet to be collected.    Objective/physical exam:  Vitals:    07/04/22 0041 07/04/22 0423 07/04/22 0445 07/04/22 0532   BP:  120/77     BP Location:       Patient Position:       Pulse: 70 72  68   Resp: 20 18 16 18   Temp:  97.7 °F (36.5 °C)     TempSrc:  Oral     SpO2: 95% 95%  (!) 94%   Weight:       Height:         General: In no acute distress, afebrile  Respiratory: Clear to auscultation bilaterally  Cardiovascular: S1, S2, no appreciable murmur  Abdomen: Soft, tender, BS +  MSK: Warm, no lower extremity edema, no clubbing or cyanosis  Neurologic: Alert and oriented x4, moving all extremities with good strength     Lab Results   Component Value Date     07/03/2022    K 4.8 07/03/2022    CO2 25 07/03/2022    BUN 7.5 (L) 07/03/2022    CREATININE 0.79 07/03/2022    CALCIUM 9.6 07/03/2022    EGFRNONAA 81 04/17/2022      Lab Results   Component Value Date    ALT 24 07/03/2022    AST 26 07/03/2022    ALKPHOS 68 07/03/2022    BILITOT 0.4 07/03/2022      Lab Results   Component Value Date    WBC 8.0 07/03/2022    HGB 14.5 07/03/2022    HCT 44.1 07/03/2022    MCV 98.7 (H) 07/03/2022     07/03/2022           Medications:   ketorolac  15 mg Intravenous Q6H    pantoprazole  40 mg Oral Daily    piperacillin-tazobactam (ZOSYN) IVPB  4.5 g Intravenous Q8H      acetaminophen, albuterol-ipratropium, dicyclomine, HYDROmorphone, ondansetron     Assessment/Plan:    Recurrent diverticulitis  Left hemicolon colitis   Abdominal pain due to above  History of abdominal micro perforation after bowel prep  Tobacco use    Chest: Asthma, GERD, history of super abdominal perforation    Plan:  - Continue Zosyn.  - GI  consulted, Awaiting recs.  Abdomen continues to remain tender.  May not be candidate for colonoscopy due to risk of perforation.  Stool sample yet to be collected.  - Continue IV hydration.  P.o. intake as tolerated follow-up on-other home medications will be reviewed and urination  - Counseled on tobacco cessation.  Patient verbalized understanding.    Assistance with smoking cessation was offered, including:  []  Medications  [x]  Counseling  []  Printed Information on Smoking Cessation  []  Referral to a Smoking Cessation Program    Patient was counseled regarding smoking for >10 minutes.      Labs for tomorrow  Full code  Protonix        Asael Calloway MD

## 2022-07-04 NOTE — CONSULTS
"HISTORY & PHYSICAL  Trauma and Acute Care Surgery    Patient Name: Niya Moeller  YOB: 1970    Date: 07/04/2022                     PRESENTING HISTORY     Chief Complaint/Reason for Admission: <principal problem not specified>    History of Present Illness:  Ms. Niya Moeller is a 52 y.o. female w/pmhx of GERD, diverticulosis, and asthma presenting for diverticulitis. Patient reports having several days of worsening nausea, emesis, and and diarrhea 2 weeks ago and went to [facility] where she was presecribed a 1wk course of amoxicillin. This initially relieved her symptoms but they then returned by 7/2 and she once again went to [facility] ED for evaluation and was discharged on outpatient antibiotics. She then came to [facility] requesting further evaluation. Sicne admission she has tolerated PO intake and continues to have loose bowel movements. She has had several episodes of diverticulitis over the past 20yrs, all of which have been managed non-operatively. She reports one episode had a "perforation", but did not require surgery. Upon evaluation today she states her pain is improving, and she has been without nausea and vomiting.    Review of Systems:  12 point ROS negative except as stated in HPI    PAST HISTORY:     Past Medical History:   Diagnosis Date    Asthma     Diverticulosis     GERD (gastroesophageal reflux disease)        No past surgical history on file.    No family history on file.    Social History     Socioeconomic History    Marital status: Single   Tobacco Use    Smoking status: Never Smoker    Smokeless tobacco: Never Used       MEDICATIONS & ALLERGIES:     No current facility-administered medications on file prior to encounter.     Current Outpatient Medications on File Prior to Encounter   Medication Sig    albuterol (PROVENTIL HFA) 90 mcg/actuation inhaler Inhale 2 puffs into the lungs every 4 (four) hours as needed for Wheezing. Rescue    albuterol-ipratropium (DUO-NEB) 2.5 mg-0.5 " mg/3 mL nebulizer solution Take 3 mLs by nebulization every 4 (four) hours as needed for Wheezing or Shortness of Breath. Rescue    montelukast (SINGULAIR) 10 mg tablet Take 1 tablet (10 mg total) by mouth once daily at 6am.    ondansetron (ZOFRAN-ODT) 4 MG TbDL Take 2 tablets (8 mg total) by mouth 2 (two) times daily.    pantoprazole (PROTONIX) 40 MG tablet Take 1 tablet (40 mg total) by mouth once daily.    HYDROcodone-acetaminophen (NORCO) 5-325 mg per tablet Take 1 tablet by mouth every 6 (six) hours as needed for Pain.    methylPREDNISolone (MEDROL DOSEPACK) 4 mg tablet use as directed       Review of patient's allergies indicates:   Allergen Reactions    Levofloxacin Hives and Itching    Metronidazole Hives    Codeine     Latex     Morphine     Opioids - morphine analogues        OBJECTIVE:     Vital Signs:  Temp:  [97 °F (36.1 °C)-98.4 °F (36.9 °C)] 97.8 °F (36.6 °C)  Pulse:  [63-89] 71  Resp:  [16-20] 18  SpO2:  [91 %-98 %] 91 %  BP: (115-145)/(64-89) 115/78  Body mass index is 30.46 kg/m².     Physical Exam:  General:  Well developed, well nourished, no acute distress  HEENT:  Normocephalic, atraumatic.  CVS: RRR  Resp:  NWOB on room air  GI:  Abdomen soft, non-distended, Slight ttp in LLQ  :  Deferred  MSK:  No muscle atrophy, cyanosis, peripheral edema, full range of motion  Skin:  No rashes, ulcers, erythema  Neuro:  CNII-XII grossly intact  Psych:  Alert and oriented to person, place, and time    Laboratory  Troponin:  No results for input(s): TROPONINI in the last 72 hours.  CBC:  Recent Labs     07/02/22  1353 07/03/22  0622   WBC 8.9 8.0   RBC 4.52 4.47   HGB 14.4 14.5   HCT 44.8 44.1    228   MCV 99.1* 98.7*   MCH 31.9* 32.4*   MCHC 32.1* 32.9*     CMP:  Recent Labs     07/02/22  1353 07/03/22  0622   CALCIUM 10.0 9.6   ALBUMIN 3.4* 3.6    141   K 4.1 4.8   CO2 26 25   BUN 11.4 7.5*   CREATININE 0.77 0.79   ALKPHOS 69 68   ALT 25 24   AST 17 26   BILITOT 0.2 0.4     Lactic  Acid:  Invalid input(s): LACTATIC  Etoh:  No results for input(s): ALCOHOLMEDIC in the last 72 hours.  Drug Screen:  No results for input(s): PCDSCOMETHA, COCAINEMETAB, OPIATESCREEN, BARBITURATES, AMPHETAMINES, MARIJUANATHC, PCDSOPHENCYN, CREATRANDUR, TOXINFO in the last 72 hours.      ABG:  No results for input(s): PH, PCO2, PO2, HCO3, BE, POCSATURATED in the last 72 hours.    Diagnostic Results:  Imaging Results          CT Abdomen Pelvis With Contrast (Final result)  Result time 07/03/22 09:03:26    Final result by Genesis Iraheta MD (07/03/22 09:03:26)                 Impression:      Long segment inflammatory change involving the left hemicolon suggest nonspecific colitis.  Component of subtle acute diverticulitis at the junction of the descending and sigmoid colon is not excluded.  No perforation or abscess.    Please ensure patient is up-to-date with routine colon cancer screening.    The preliminary and final reports are concordant.      Electronically signed by: Genesis Iraheta  Date:    07/03/2022  Time:    09:03             Narrative:    EXAMINATION:  CT ABDOMEN PELVIS WITH CONTRAST    CLINICAL HISTORY:  Abdominal abscess/infection suspected;Abdominal pain, acute, nonlocalized;    TECHNIQUE:  Helically acquired images with axial, sagittal and coronal reformations were obtained from the lung bases to the pubic symphysis after the IV administration of contrast.    Automated tube current modulation, weight-based exposure dosing, and/or iterative reconstruction technique utilized to reach lowest reasonably achievable exposure rate.    DLP: 422 mGy*cm    COMPARISON:  CT abdomen pelvis 06/13/2022    FINDINGS:  HEART: Normal in size. No pericardial effusion.    LUNG BASES: Subtle ground-glass densities at the lung bases, similar to prior.    LIVER: Normal attenuation. No appreciable focal hepatic lesion.    BILIARY: No calcified gallstones.    PANCREAS: No inflammatory change.    SPLEEN: Normal in  size    ADRENALS: No mass.    KIDNEYS/URETERS: The kidneys enhance symmetrically.  No hydronephrosis.    GI TRACT/MESENTERY: Stomach has a normal CT appearance.  No evidence of bowel obstruction or inflammation. The appendix is normal. There is diffuse colonic diverticulosis.  There is submucosal edema and pericolonic inflammatory change at the descending and sigmoid colon.  There is persistent inflammatory change at the junction of the descending and sigmoid colon.  No maria a perforation or abscess.    PERITONEUM: No free fluid.No free air.    LYMPH NODES: No enlarged lymph nodes by size criteria.    VASCULATURE: No significant atherosclerosis or aneurysm.    BLADDER: Normal appearance given degree of distention.    REPRODUCTIVE ORGANS: There is an enhancing fibroid at the anterior lower uterine segment.    SOFT TISSUES: Small fat containing umbilical hernia.    BONES: No acute osseous abnormality.                                  ASSESSMENT & PLAN:   Ms. Niya Moeller is a 52 y.o. female w/pmhx of GERD, diverticulitis, and asthma presenting with acute diverticulitis.    Plan:  - No acute surgical intervention at this time and patient would not wish for surgery if there were chance for an ostomy, which given the acute nature of her condition there is an increased likelihood for.  - Patient amenable to continuing with antibiotic treatment and following-up outpatient to be evaluated for sigmoidectomy.      Adithya Owens  Trauma/Acute Care Surgery   c - [phone]    7/4/2022  2:48 PM

## 2022-07-05 LAB
ALBUMIN SERPL-MCNC: 3.2 GM/DL (ref 3.5–5)
ALBUMIN/GLOB SERPL: 1.1 RATIO (ref 1.1–2)
ALP SERPL-CCNC: 57 UNIT/L (ref 40–150)
ALT SERPL-CCNC: 24 UNIT/L (ref 0–55)
AST SERPL-CCNC: 23 UNIT/L (ref 5–34)
BASOPHILS # BLD AUTO: 0.01 X10(3)/MCL (ref 0–0.2)
BASOPHILS NFR BLD AUTO: 0.2 %
BILIRUBIN DIRECT+TOT PNL SERPL-MCNC: 0.4 MG/DL
BUN SERPL-MCNC: 4.7 MG/DL (ref 9.8–20.1)
CALCIUM SERPL-MCNC: 9.3 MG/DL (ref 8.4–10.2)
CHLORIDE SERPL-SCNC: 108 MMOL/L (ref 98–107)
CO2 SERPL-SCNC: 25 MMOL/L (ref 22–29)
CREAT SERPL-MCNC: 1.15 MG/DL (ref 0.55–1.02)
EOSINOPHIL # BLD AUTO: 0.3 X10(3)/MCL (ref 0–0.9)
EOSINOPHIL NFR BLD AUTO: 5.7 %
ERYTHROCYTE [DISTWIDTH] IN BLOOD BY AUTOMATED COUNT: 13.3 % (ref 11.5–17)
GLOBULIN SER-MCNC: 3 GM/DL (ref 2.4–3.5)
GLUCOSE SERPL-MCNC: 113 MG/DL (ref 74–100)
HCT VFR BLD AUTO: 40.7 % (ref 37–47)
HGB BLD-MCNC: 13.2 GM/DL (ref 12–16)
IMM GRANULOCYTES # BLD AUTO: 0.01 X10(3)/MCL (ref 0–0.04)
IMM GRANULOCYTES NFR BLD AUTO: 0.2 %
LYMPHOCYTES # BLD AUTO: 2.57 X10(3)/MCL (ref 0.6–4.6)
LYMPHOCYTES NFR BLD AUTO: 48.7 %
MAGNESIUM SERPL-MCNC: 2.1 MG/DL (ref 1.6–2.6)
MCH RBC QN AUTO: 32.4 PG (ref 27–31)
MCHC RBC AUTO-ENTMCNC: 32.4 MG/DL (ref 33–36)
MCV RBC AUTO: 99.8 FL (ref 80–94)
MONOCYTES # BLD AUTO: 0.57 X10(3)/MCL (ref 0.1–1.3)
MONOCYTES NFR BLD AUTO: 10.8 %
NEUTROPHILS # BLD AUTO: 1.8 X10(3)/MCL (ref 2.1–9.2)
NEUTROPHILS NFR BLD AUTO: 34.4 %
NRBC BLD AUTO-RTO: 0 %
PLATELET # BLD AUTO: 223 X10(3)/MCL (ref 130–400)
PMV BLD AUTO: 11.5 FL (ref 7.4–10.4)
POTASSIUM SERPL-SCNC: 4.2 MMOL/L (ref 3.5–5.1)
PROT SERPL-MCNC: 6.2 GM/DL (ref 6.4–8.3)
RBC # BLD AUTO: 4.08 X10(6)/MCL (ref 4.2–5.4)
SODIUM SERPL-SCNC: 143 MMOL/L (ref 136–145)
WBC # SPEC AUTO: 5.3 X10(3)/MCL (ref 4.5–11.5)

## 2022-07-05 PROCEDURE — 25000242 PHARM REV CODE 250 ALT 637 W/ HCPCS: Performed by: INTERNAL MEDICINE

## 2022-07-05 PROCEDURE — 85025 COMPLETE CBC W/AUTO DIFF WBC: CPT | Performed by: INTERNAL MEDICINE

## 2022-07-05 PROCEDURE — 27000207 HC ISOLATION

## 2022-07-05 PROCEDURE — 25000003 PHARM REV CODE 250: Performed by: INTERNAL MEDICINE

## 2022-07-05 PROCEDURE — 96376 TX/PRO/DX INJ SAME DRUG ADON: CPT

## 2022-07-05 PROCEDURE — 96366 THER/PROPH/DIAG IV INF ADDON: CPT

## 2022-07-05 PROCEDURE — 80053 COMPREHEN METABOLIC PANEL: CPT | Performed by: INTERNAL MEDICINE

## 2022-07-05 PROCEDURE — 63600175 PHARM REV CODE 636 W HCPCS: Performed by: INTERNAL MEDICINE

## 2022-07-05 PROCEDURE — 99900035 HC TECH TIME PER 15 MIN (STAT)

## 2022-07-05 PROCEDURE — 96365 THER/PROPH/DIAG IV INF INIT: CPT | Mod: 59

## 2022-07-05 PROCEDURE — 83735 ASSAY OF MAGNESIUM: CPT | Performed by: INTERNAL MEDICINE

## 2022-07-05 PROCEDURE — G0378 HOSPITAL OBSERVATION PER HR: HCPCS

## 2022-07-05 PROCEDURE — 36415 COLL VENOUS BLD VENIPUNCTURE: CPT | Performed by: INTERNAL MEDICINE

## 2022-07-05 PROCEDURE — 96375 TX/PRO/DX INJ NEW DRUG ADDON: CPT

## 2022-07-05 PROCEDURE — 25000242 PHARM REV CODE 250 ALT 637 W/ HCPCS: Performed by: NURSE PRACTITIONER

## 2022-07-05 PROCEDURE — 63600175 PHARM REV CODE 636 W HCPCS: Performed by: PHYSICIAN ASSISTANT

## 2022-07-05 PROCEDURE — 94640 AIRWAY INHALATION TREATMENT: CPT | Mod: XB

## 2022-07-05 PROCEDURE — 25000003 PHARM REV CODE 250: Performed by: PHYSICIAN ASSISTANT

## 2022-07-05 PROCEDURE — 94761 N-INVAS EAR/PLS OXIMETRY MLT: CPT

## 2022-07-05 PROCEDURE — 25000003 PHARM REV CODE 250: Performed by: NURSE PRACTITIONER

## 2022-07-05 PROCEDURE — 11000001 HC ACUTE MED/SURG PRIVATE ROOM

## 2022-07-05 RX ORDER — KETOROLAC TROMETHAMINE 10 MG/1
10 TABLET, FILM COATED ORAL EVERY 6 HOURS PRN
Status: DISCONTINUED | OUTPATIENT
Start: 2022-07-05 | End: 2022-07-06 | Stop reason: HOSPADM

## 2022-07-05 RX ORDER — METRONIDAZOLE 500 MG/1
500 TABLET ORAL EVERY 8 HOURS
Status: DISCONTINUED | OUTPATIENT
Start: 2022-07-05 | End: 2022-07-05

## 2022-07-05 RX ORDER — CIPROFLOXACIN 500 MG/1
500 TABLET ORAL EVERY 12 HOURS
Status: DISCONTINUED | OUTPATIENT
Start: 2022-07-05 | End: 2022-07-06 | Stop reason: HOSPADM

## 2022-07-05 RX ORDER — POLYETHYLENE GLYCOL 3350 17 G/17G
17 POWDER, FOR SOLUTION ORAL DAILY
Status: DISCONTINUED | OUTPATIENT
Start: 2022-07-05 | End: 2022-07-06 | Stop reason: HOSPADM

## 2022-07-05 RX ORDER — DOCUSATE SODIUM 100 MG/1
100 CAPSULE, LIQUID FILLED ORAL 2 TIMES DAILY
Status: DISCONTINUED | OUTPATIENT
Start: 2022-07-05 | End: 2022-07-06 | Stop reason: HOSPADM

## 2022-07-05 RX ORDER — MONTELUKAST SODIUM 10 MG/1
10 TABLET ORAL DAILY
Status: DISCONTINUED | OUTPATIENT
Start: 2022-07-05 | End: 2022-07-06 | Stop reason: HOSPADM

## 2022-07-05 RX ORDER — IPRATROPIUM BROMIDE AND ALBUTEROL SULFATE 2.5; .5 MG/3ML; MG/3ML
3 SOLUTION RESPIRATORY (INHALATION) EVERY 4 HOURS PRN
Status: DISCONTINUED | OUTPATIENT
Start: 2022-07-05 | End: 2022-07-06 | Stop reason: HOSPADM

## 2022-07-05 RX ORDER — ALBUTEROL SULFATE 90 UG/1
2 AEROSOL, METERED RESPIRATORY (INHALATION) EVERY 4 HOURS PRN
Status: DISCONTINUED | OUTPATIENT
Start: 2022-07-05 | End: 2022-07-06 | Stop reason: HOSPADM

## 2022-07-05 RX ADMIN — HYOSCYAMINE SULFATE 0.12 MG: 0.12 TABLET ORAL; SUBLINGUAL at 08:07

## 2022-07-05 RX ADMIN — HYDROMORPHONE HYDROCHLORIDE 1 MG: 2 INJECTION, SOLUTION INTRAMUSCULAR; INTRAVENOUS; SUBCUTANEOUS at 05:07

## 2022-07-05 RX ADMIN — KETOROLAC TROMETHAMINE 15 MG: 30 INJECTION, SOLUTION INTRAMUSCULAR at 05:07

## 2022-07-05 RX ADMIN — MONTELUKAST 10 MG: 10 TABLET, FILM COATED ORAL at 12:07

## 2022-07-05 RX ADMIN — HYDROMORPHONE HYDROCHLORIDE 1 MG: 2 INJECTION, SOLUTION INTRAMUSCULAR; INTRAVENOUS; SUBCUTANEOUS at 01:07

## 2022-07-05 RX ADMIN — IPRATROPIUM BROMIDE AND ALBUTEROL SULFATE 3 ML: 2.5; .5 SOLUTION RESPIRATORY (INHALATION) at 04:07

## 2022-07-05 RX ADMIN — IPRATROPIUM BROMIDE AND ALBUTEROL SULFATE 3 ML: 2.5; .5 SOLUTION RESPIRATORY (INHALATION) at 07:07

## 2022-07-05 RX ADMIN — PANTOPRAZOLE SODIUM 40 MG: 40 TABLET, DELAYED RELEASE ORAL at 10:07

## 2022-07-05 RX ADMIN — DOCUSATE SODIUM 100 MG: 100 CAPSULE, LIQUID FILLED ORAL at 12:07

## 2022-07-05 RX ADMIN — PIPERACILLIN SODIUM AND TAZOBACTAM SODIUM 4.5 G: 4; .5 INJECTION, POWDER, LYOPHILIZED, FOR SOLUTION INTRAVENOUS at 05:07

## 2022-07-05 RX ADMIN — KETOROLAC TROMETHAMINE 10 MG: 10 TABLET, FILM COATED ORAL at 08:07

## 2022-07-05 RX ADMIN — CIPROFLOXACIN 500 MG: 500 TABLET, FILM COATED ORAL at 12:07

## 2022-07-05 RX ADMIN — KETOROLAC TROMETHAMINE 15 MG: 30 INJECTION, SOLUTION INTRAMUSCULAR at 12:07

## 2022-07-05 RX ADMIN — ONDANSETRON 4 MG: 2 INJECTION INTRAMUSCULAR; INTRAVENOUS at 01:07

## 2022-07-05 RX ADMIN — PIPERACILLIN SODIUM AND TAZOBACTAM SODIUM 4.5 G: 4; .5 INJECTION, POWDER, LYOPHILIZED, FOR SOLUTION INTRAVENOUS at 08:07

## 2022-07-05 RX ADMIN — IPRATROPIUM BROMIDE AND ALBUTEROL SULFATE 3 ML: 2.5; .5 SOLUTION RESPIRATORY (INHALATION) at 08:07

## 2022-07-05 RX ADMIN — DOCUSATE SODIUM 100 MG: 100 CAPSULE, LIQUID FILLED ORAL at 08:07

## 2022-07-05 RX ADMIN — IPRATROPIUM BROMIDE AND ALBUTEROL SULFATE 3 ML: 2.5; .5 SOLUTION RESPIRATORY (INHALATION) at 12:07

## 2022-07-05 RX ADMIN — IPRATROPIUM BROMIDE AND ALBUTEROL SULFATE 3 ML: 2.5; .5 SOLUTION RESPIRATORY (INHALATION) at 11:07

## 2022-07-05 NOTE — PROGRESS NOTES
"Gastroenterology Progress Note    Subjective:  Patient states she is tolerating solid food diet without issue. Abdominal pain improving. She is not ready to go home just yet, but wants to go home tomorrow.      Objective:    ROS:    ROS      Vital Signs:  /77   Pulse 72   Temp 97.9 °F (36.6 °C) (Oral)   Resp 18   Ht 4' 11" (1.499 m)   Wt 68.4 kg (150 lb 12.7 oz)   SpO2 95%   BMI 30.46 kg/m²   Body mass index is 30.46 kg/m².    Physical Exam:    Physical Exam  Constitutional:       General: She is not in acute distress.  Cardiovascular:      Rate and Rhythm: Normal rate and regular rhythm.   Pulmonary:      Effort: Pulmonary effort is normal. No respiratory distress.   Abdominal:      General: Bowel sounds are normal. There is no distension.      Palpations: Abdomen is soft.      Tenderness: There is no abdominal tenderness.   Skin:     General: Skin is warm and dry.   Neurological:      Mental Status: She is alert and oriented to person, place, and time.         Labs:  Recent Results (from the past 48 hour(s))   Comprehensive Metabolic Panel    Collection Time: 07/05/22  4:00 AM   Result Value Ref Range    Sodium Level 143 136 - 145 mmol/L    Potassium Level 4.2 3.5 - 5.1 mmol/L    Chloride 108 (H) 98 - 107 mmol/L    Carbon Dioxide 25 22 - 29 mmol/L    Glucose Level 113 (H) 74 - 100 mg/dL    Blood Urea Nitrogen 4.7 (L) 9.8 - 20.1 mg/dL    Creatinine 1.15 (H) 0.55 - 1.02 mg/dL    Calcium Level Total 9.3 8.4 - 10.2 mg/dL    Protein Total 6.2 (L) 6.4 - 8.3 gm/dL    Albumin Level 3.2 (L) 3.5 - 5.0 gm/dL    Globulin 3.0 2.4 - 3.5 gm/dL    Albumin/Globulin Ratio 1.1 1.1 - 2.0 ratio    Bilirubin Total 0.4 <=1.5 mg/dL    Alkaline Phosphatase 57 40 - 150 unit/L    Alanine Aminotransferase 24 0 - 55 unit/L    Aspartate Aminotransferase 23 5 - 34 unit/L    Estimated GFR- >60 mls/min/1.73/m2   CBC with Differential    Collection Time: 07/05/22  4:00 AM   Result Value Ref Range    WBC 5.3 4.5 - 11.5 " x10(3)/mcL    RBC 4.08 (L) 4.20 - 5.40 x10(6)/mcL    Hgb 13.2 12.0 - 16.0 gm/dL    Hct 40.7 37.0 - 47.0 %    MCV 99.8 (H) 80.0 - 94.0 fL    MCH 32.4 (H) 27.0 - 31.0 pg    MCHC 32.4 (L) 33.0 - 36.0 mg/dL    RDW 13.3 11.5 - 17.0 %    Platelet 223 130 - 400 x10(3)/mcL    MPV 11.5 (H) 7.4 - 10.4 fL    Neut % 34.4 %    Lymph % 48.7 %    Mono % 10.8 %    Eos % 5.7 %    Basophil % 0.2 %    Lymph # 2.57 0.6 - 4.6 x10(3)/mcL    Neut # 1.8 (L) 2.1 - 9.2 x10(3)/mcL    Mono # 0.57 0.1 - 1.3 x10(3)/mcL    Eos # 0.30 0 - 0.9 x10(3)/mcL    Baso # 0.01 0 - 0.2 x10(3)/mcL    IG# 0.01 0 - 0.04 x10(3)/mcL    IG% 0.2 %    NRBC% 0.0 %   Magnesium    Collection Time: 07/05/22  4:00 AM   Result Value Ref Range    Magnesium Level 2.10 1.60 - 2.60 mg/dL       Imaging: No new radiological images to review.        Assessment/Plan:  1. Colitis    2. Abdominal pain, unspecified abdominal location    3. Diarrhea, unspecified type    4. Nausea and vomiting, intractability of vomiting not specified, unspecified vomiting type         51 yo female with recurrent episodes of diverticulitis with a reported h/o perforation with bowel prep.  I suspect that she needs surgery at this time given her recurrent diverticulitis.     Plan:  - Diet as tolerated  - Continue IV abx while in house, then transition to PO for total of 10 regimen  - Levsin 0.125 mg SL q4 PRN pain  - Appreciate surgery seeing the patient. She will f/u with Dr. Stewart as outpatient to discuss elective colon resection  - No plans for colonoscopy at the moment given her reported h/o perforation with bowel prep  - Expect she could head home tomorrow if she continues to improve  - No further recommendations from a GI standpoint. Please call us back with questions and concerns    Adithya Richards PA-C

## 2022-07-05 NOTE — PROGRESS NOTES
[facility]  Hospital Medicine Progress Note        Chief Complaint: Inpatient Follow-up for Diverticulitis    HPI:   Niya Moeller is a 52 y.o. female with a past medical history of recurrent diverticulitis, GERD, and asthma who presented to [facility] on 7/3/2022 with complaints of progressively worsening lower abdominal pain, nausea, multiple episodes of nonbloody emesis, and multiple episodes of nonbloody diarrhea exacerbated with eating over the past x3 days. She went to [facility] approximately x2 weeks ago where she was diagnosed with diverticulitis and prescribed amoxicillin which she took with improvement for about a week and half, but her symptoms returned which prompted her to go to [facility] yesterday on 07/02/2022 where she was diagnosed with diverticulitis again and discharged home with a prescription for Augmentin which she never received. She states that she has been experiencing recurrent diverticulitis for several years and does not currently follow up with a gastroenterologist.  Her last colonoscopy was x5 years ago which revealed some polyps and she has not undergone an EGD.  She denies fever, shortness of breath, chest pain, or dysuria. Of note, she was admitted to the hospital in April 2022 for diverticulitis and was seen by GI as well as General surgery during the admission who did not recommend any intervention at that time.     Her vital signs were stable and she is afebrile.  Her labs showed a CRP 15.6 otherwise was unremarkable. CT abdomen/pelvis showed a long segment inflammatory change involving the left hemicolon suggest nonspecific colitis. Component of subtle acute diverticulitis at the junction of the descending and sigmoid colon is not excluded.  No perforation or abscess.    Interval Hx:   Afebrile.  Hemodynamically stable.  Comfortably resting in the bed.  Tolerating p.o. diet.  Yet to have a bowel movement since admission    Objective/physical exam:  Vitals:     07/05/22 0125 07/05/22 0415 07/05/22 0439 07/05/22 0531   BP:   118/66    Pulse:  65 71    Resp: 19 18 18 18   Temp:   97.9 °F (36.6 °C)    TempSrc:   Oral    SpO2:  (!) 94% 96%    Weight:       Height:         General: In no acute distress, afebrile  Respiratory: Clear to auscultation bilaterally  Cardiovascular: S1, S2, no appreciable murmur  Abdomen: Soft, tender, BS +  MSK: Warm, no lower extremity edema, no clubbing or cyanosis  Neurologic: Alert and oriented x4, moving all extremities with good strength     Lab Results   Component Value Date     07/05/2022    K 4.2 07/05/2022    CO2 25 07/05/2022    BUN 4.7 (L) 07/05/2022    CREATININE 1.15 (H) 07/05/2022    CALCIUM 9.3 07/05/2022    EGFRNONAA 81 04/17/2022      Lab Results   Component Value Date    ALT 24 07/05/2022    AST 23 07/05/2022    ALKPHOS 57 07/05/2022    BILITOT 0.4 07/05/2022      Lab Results   Component Value Date    WBC 5.3 07/05/2022    HGB 13.2 07/05/2022    HCT 40.7 07/05/2022    MCV 99.8 (H) 07/05/2022     07/05/2022           Medications:   ketorolac  15 mg Intravenous Q6H    pantoprazole  40 mg Oral Daily    piperacillin-tazobactam (ZOSYN) IVPB  4.5 g Intravenous Q8H      acetaminophen, albuterol-ipratropium, HYDROmorphone, hyoscyamine, ondansetron     Assessment/Plan:    Recurrent diverticulitis  Left hemicolon colitis   Abdominal pain due to above - improving  Mild MADDY  History of abdominal micro perforation after bowel prep  Tobacco use    Chest: Asthma, GERD, history of super abdominal perforation    Plan:  - GI was consulted, recommended surgical evaluation for colectomy.  General surgery recommended continuation of antibiotics and outpatient follow-up for elective colon surgery  - disontinue Zosyn. And switch to Cipro. Awaiting bowel movement for stool sample.  Will give one-time laxative  - Continue gentle IV hydration.  Encourage p.o. intake  - Other home medications were reviewed and renewed  -Counseled on tobacco  cessation    Potential dc tomorrow  Full code  Protonix      Asael Calloway MD

## 2022-07-05 NOTE — PROGRESS NOTES
Trauma/Acute Care Surgery   Daily Progress Note     HD#2  POD#* No surgery found *    Subjective  No acute events overnight. Tolerates PO intake. Reports improvement in pain. Having bowel function.    Scheduled Meds:   ketorolac  15 mg Intravenous Q6H    pantoprazole  40 mg Oral Daily    piperacillin-tazobactam (ZOSYN) IVPB  4.5 g Intravenous Q8H       Continuous Infusions:   lactated ringers 100 mL/hr at 07/04/22 2130       PRN Meds:acetaminophen, albuterol-ipratropium, HYDROmorphone, hyoscyamine, ondansetron     Objective  Temp:  [97.8 °F (36.6 °C)-98 °F (36.7 °C)] 97.9 °F (36.6 °C)  Pulse:  [65-88] 72  Resp:  [17-21] 18  SpO2:  [91 %-96 %] 95 %  BP: (115-128)/(66-85) 120/77     I/O last 3 completed shifts:  In: 1440 [P.O.:1440]  Out: -   No intake/output data recorded.      Gen: NAD, AAOx3  HEENT: EOMI, NCAT  CV: RRR  Resp: no shortness of breath, normal WOB   Abd: soft, non-distended, tenderness to deep palpation in LLQ, no overlying skin changes        Labs  Recent Labs     07/02/22  1353 07/03/22  0622 07/05/22  0400   WBC 8.9 8.0 5.3   HGB 14.4 14.5 13.2   HCT 44.8 44.1 40.7    228 223     Recent Labs     07/02/22  1353 07/03/22  0622 07/05/22  0400    141 143   K 4.1 4.8 4.2   CO2 26 25 25   BUN 11.4 7.5* 4.7*   CREATININE 0.77 0.79 1.15*   CALCIUM 10.0 9.6 9.3   MG 2.20  --  2.10   ALBUMIN 3.4* 3.6 3.2*   BILITOT 0.2 0.4 0.4   AST 17 26 23   ALKPHOS 69 68 57   ALT 25 24 24        Assessment/Plan  51y/o female with PMH GERD and asthma admitted for recurrent diverticulitis     - No acute surgery at this time. Pt will FU outpatient   -Continue abx  -Continue IV hydration. P.O. as tolerated.     Rabia Massey  [facility], PGY 1    7/5/2022  8:39 AM

## 2022-07-06 VITALS
TEMPERATURE: 98 F | WEIGHT: 150.81 LBS | BODY MASS INDEX: 30.4 KG/M2 | HEIGHT: 59 IN | RESPIRATION RATE: 16 BRPM | SYSTOLIC BLOOD PRESSURE: 108 MMHG | OXYGEN SATURATION: 96 % | DIASTOLIC BLOOD PRESSURE: 55 MMHG | HEART RATE: 74 BPM

## 2022-07-06 PROBLEM — K57.92 DIVERTICULITIS: Status: ACTIVE | Noted: 2022-07-06

## 2022-07-06 PROCEDURE — 25000003 PHARM REV CODE 250: Performed by: INTERNAL MEDICINE

## 2022-07-06 PROCEDURE — 25000242 PHARM REV CODE 250 ALT 637 W/ HCPCS: Performed by: INTERNAL MEDICINE

## 2022-07-06 PROCEDURE — G0378 HOSPITAL OBSERVATION PER HR: HCPCS

## 2022-07-06 PROCEDURE — 94761 N-INVAS EAR/PLS OXIMETRY MLT: CPT

## 2022-07-06 PROCEDURE — 25000003 PHARM REV CODE 250: Performed by: NURSE PRACTITIONER

## 2022-07-06 PROCEDURE — 94640 AIRWAY INHALATION TREATMENT: CPT

## 2022-07-06 PROCEDURE — 63600175 PHARM REV CODE 636 W HCPCS: Performed by: INTERNAL MEDICINE

## 2022-07-06 PROCEDURE — 96366 THER/PROPH/DIAG IV INF ADDON: CPT

## 2022-07-06 RX ORDER — AMOXICILLIN AND CLAVULANATE POTASSIUM 875; 125 MG/1; MG/1
1 TABLET, FILM COATED ORAL 2 TIMES DAILY
Qty: 20 TABLET | Refills: 0 | Status: SHIPPED | OUTPATIENT
Start: 2022-07-06 | End: 2022-07-16

## 2022-07-06 RX ADMIN — HYOSCYAMINE SULFATE 0.12 MG: 0.12 TABLET ORAL; SUBLINGUAL at 08:07

## 2022-07-06 RX ADMIN — IPRATROPIUM BROMIDE AND ALBUTEROL SULFATE 3 ML: 2.5; .5 SOLUTION RESPIRATORY (INHALATION) at 12:07

## 2022-07-06 RX ADMIN — MONTELUKAST 10 MG: 10 TABLET, FILM COATED ORAL at 08:07

## 2022-07-06 RX ADMIN — KETOROLAC TROMETHAMINE 10 MG: 10 TABLET, FILM COATED ORAL at 02:07

## 2022-07-06 RX ADMIN — IPRATROPIUM BROMIDE AND ALBUTEROL SULFATE 3 ML: 2.5; .5 SOLUTION RESPIRATORY (INHALATION) at 08:07

## 2022-07-06 RX ADMIN — KETOROLAC TROMETHAMINE 10 MG: 10 TABLET, FILM COATED ORAL at 08:07

## 2022-07-06 RX ADMIN — PIPERACILLIN SODIUM AND TAZOBACTAM SODIUM 4.5 G: 4; .5 INJECTION, POWDER, LYOPHILIZED, FOR SOLUTION INTRAVENOUS at 04:07

## 2022-07-06 RX ADMIN — DOCUSATE SODIUM 100 MG: 100 CAPSULE, LIQUID FILLED ORAL at 08:07

## 2022-07-06 RX ADMIN — CIPROFLOXACIN 500 MG: 500 TABLET, FILM COATED ORAL at 12:07

## 2022-07-06 RX ADMIN — PANTOPRAZOLE SODIUM 40 MG: 40 TABLET, DELAYED RELEASE ORAL at 08:07

## 2022-07-06 NOTE — PLAN OF CARE
Pt is ambulatory, independent no needs id at this time.  Utilizes [facility].  Sec. To arrange f/u appt.   Uses [facility] on [address]

## 2022-07-06 NOTE — DISCHARGE SUMMARY
[facility] Hospital Medicine  Discharge Summary      Patient Name: Niya Moeller  MRN: [id number]  Patient Class: IP- Inpatient  Admission Date: 7/3/2022  Hospital Length of Stay: 3 days  Discharge Date and Time:  07/06/2022 10:41 AM  Attending Physician: Dustin Turner MD   Discharging Provider: Asael Calloway MD  Primary Care Provider: Primary Doctor Judy    Niya Moeller is a 52 y.o. female with a past medical history of recurrent diverticulitis, GERD, and asthma who presented to [facility] on 7/3/2022 with complaints of progressively worsening lower abdominal pain, nausea, multiple episodes of nonbloody emesis, and multiple episodes of nonbloody diarrhea exacerbated with eating over the past x3 days. She went to [facility] approximately x2 weeks ago where she was diagnosed with diverticulitis and prescribed amoxicillin which she took with improvement for about a week and half, but her symptoms returned which prompted her to go to [facility] on 07/02/2022 where she was diagnosed with diverticulitis again and discharged home with a prescription for Augmentin which she never received. She states that she has been experiencing recurrent diverticulitis for several years and does not currently follow up with a gastroenterologist.  Her last colonoscopy was x5 years ago which revealed some polyps and she has not undergone an EGD.  She denies fever, shortness of breath, chest pain, or dysuria. Of note, she was admitted to the hospital in April 2022 for diverticulitis and was seen by GI as well as General surgery during the admission who did not recommend any intervention at that time.     Her vital signs were stable.  Her labs showed a CRP 15.6 otherwise was unremarkable. CT abdomen/pelvis showed a long segment inflammatory change involving the left hemicolon suggest nonspecific colitis. Component of subtle acute diverticulitis at the junction of the descending and sigmoid colon is not excluded.  No  perforation or abscess. GI was consulted and the recommended surgical evaluation for colectomy.  General surgery recommended continuation of antibiotics and outpatient follow-up for elective colon surgery.  Zosyn was continued while inpatient and she will be prescribed a course of Augmentin.  Encourage patient to follow-up with PCP, GI and general surgery.  She verbalized understanding further recommendations and agreed to follow.  She received counseling regarding smoking cessation.  Will be discharged to home today.  Prior to discharge all her medications were reconciled, necessary prescriptions were provided.    Recurrent diverticulitis  Left hemicolon colitis   Abdominal pain due to above - improving  Mild MADDY - resolving  History of abdominal micro perforation after bowel prep  Tobacco use     Chest: Asthma, GERD, history of super abdominal perforation    Goals of Care Treatment Preferences:  Code Status: Full Code      Consults:   Consults (From admission, onward)        Status Ordering Provider     Inpatient consult to Social Work/Case Management  Once        Provider:  (Not yet assigned)    PHOEBE Arce IV     Inpatient consult to General Surgery  Once        Provider:  Adithya Owens MD    Completed SHERITA LUO     Inpatient consult to Gastroenterology  Once        Provider:  Justin Jones MD    Completed PAUL MEYERS          No new Assessment & Plan notes have been filed under this hospital service since the last note was generated.  Service: Hospital Medicine    Final Active Diagnoses:    Diagnosis Date Noted POA    PRINCIPAL PROBLEM:  Diverticulitis [K57.92] 07/06/2022 Yes      Problems Resolved During this Admission:       Discharged Condition: good    Disposition: home    Follow Up:   Follow-up Information     Cecilio Carrasco MD Follow up.    Specialty: Internal Medicine  Why: FOLLOW UP APPT WITH DR ROSENBAUM ON JULY 19TH @ 10:00  Contact information:  [id number] [address]  [phone]             [facility] Follow up.    Why: Referral to Surgery  Clinic at [facility] for outpatient follow up regarding diverticulitis  Contact information:  [address] [facility] [id number] [phone]                       Patient Instructions:   No discharge procedures on file.    Significant Diagnostic Studies: Labs:   CMP   Recent Labs   Lab 07/05/22  0400      K 4.2   CO2 25   BUN 4.7*   CREATININE 1.15*   CALCIUM 9.3   ALBUMIN 3.2*   BILITOT 0.4   ALKPHOS 57   AST 23   ALT 24       Pending Diagnostic Studies:     Procedure Component Value Units Date/Time    Basic Metabolic Panel [[id number]]     Order Status: Sent Lab Status: No result     Specimen: Blood     FECAL LEUKOCYTES - LACTOFERRIN ON  STOOL [[id number]]     Order Status: Sent Lab Status: No result     Specimen: Stool     GI Profile, Stool, PCR [[id number]]     Order Status: Sent Lab Status: No result     Specimen: Stool     Magnesium [[id number]]     Order Status: Sent Lab Status: No result     Specimen: Blood          Medications:  Reconciled Home Medications:      Medication List      START taking these medications    amoxicillin-clavulanate 875-125mg 875-125 mg per tablet  Commonly known as: AUGMENTIN  Take 1 tablet by mouth 2 (two) times daily. for 10 days     ketorolac 10 mg tablet  Commonly known as: TORADOL  Take 1 tablet (10 mg total) by mouth every 6 (six) hours. for 5 days        CONTINUE taking these medications    albuterol 90 mcg/actuation inhaler  Commonly known as: PROVENTIL HFA  Inhale 2 puffs into the lungs every 4 (four) hours as needed for Wheezing. Rescue     albuterol-ipratropium 2.5 mg-0.5 mg/3 mL nebulizer solution  Commonly known as: DUO-NEB  Take 3 mLs by nebulization every 4 (four) hours as needed for Wheezing or Shortness of Breath. Rescue     HYDROcodone-acetaminophen 5-325 mg per tablet  Commonly known as: NORCO  Take 1 tablet by mouth  every 6 (six) hours as needed for Pain.     montelukast 10 mg tablet  Commonly known as: SINGULAIR  Take 1 tablet (10 mg total) by mouth once daily at 6am.     ondansetron 4 MG Tbdl  Commonly known as: ZOFRAN-ODT  Take 2 tablets (8 mg total) by mouth 2 (two) times daily.     pantoprazole 40 MG tablet  Commonly known as: PROTONIX  Take 1 tablet (40 mg total) by mouth once daily.        STOP taking these medications    methylPREDNISolone 4 mg tablet  Commonly known as: MEDROL DOSEPACK            Indwelling Lines/Drains at time of discharge:   Lines/Drains/Airways     None                 Time spent on the discharge of patient: 35 minutes         Asael Calloway MD  Department of Hospital Medicine  [facility]

## 2022-07-06 NOTE — CLINICAL REVIEW
52-year-old female admitted on 07/03/2022 with abdominal pain and nausea, emesis, diarrhea.  Recent diagnosis of diverticulitis which was amenable to amoxicillin.  She presented yesterday to an outlying facility and was diagnosed with again diverticulitis.  She was hemodynamically stable, afebrile.  Placed on Zosyn.  GI were consulted who in turn consulted surgery.  Surgery recommended no intervention.  Given the lack hemodynamic instability, microperforation, abscess, the need for surgical intervention, or failure of observation level of care time frame (as of 07/05/2022, she was tolerating oral foods/medications), the patient remains appropriate for an observation setting    Ramonita Smith MD  Utilization Management  Physician Advisor

## 2022-07-06 NOTE — NURSING
"When I went to give pt her evening medications, she was upset that her plan for her care was changed by her   Dr Calloway.She said he didn't have the right to make medication changes without her permission. "She's the only one can that can decide on her medication." She said he's had a meeting with her family today and they told him the plan of care. ( No family had been here today) . She wants her IV medication continue, especially her Zosyn. It's the only medication that works. She was irrated and yelling. She wanted the doctor to come in now and speak to her. She said she's an  and had something to do with the invention of the CD Player. Look her up." She felt that I had no "business in her medical care or information as her nurse". I explained that as a nurse I carry out orders given by her physician  . I don't dictate her care . She had requested certain things earlier which I had notified Dr Calloway of.I notified MD of pt.'s concern and the changes she wanted. I called house supervisor per pt's request. She'd like to report me and the MD to our respective boards.  "

## 2022-07-27 ENCOUNTER — HOSPITAL ENCOUNTER (OUTPATIENT)
Facility: HOSPITAL | Age: 52
Discharge: HOME | End: 2022-07-30
Attending: STUDENT IN AN ORGANIZED HEALTH CARE EDUCATION/TRAINING PROGRAM | Admitting: INTERNAL MEDICINE
Payer: MEDICAID

## 2022-07-27 DIAGNOSIS — K57.92 DIVERTICULITIS: ICD-10-CM

## 2022-07-27 DIAGNOSIS — G89.29 CHRONIC ABDOMINAL PAIN: ICD-10-CM

## 2022-07-27 DIAGNOSIS — J45.51 SEVERE PERSISTENT ASTHMA WITH EXACERBATION: ICD-10-CM

## 2022-07-27 DIAGNOSIS — K52.9 COLITIS: ICD-10-CM

## 2022-07-27 DIAGNOSIS — R10.9 CHRONIC ABDOMINAL PAIN: ICD-10-CM

## 2022-07-27 DIAGNOSIS — J44.1 COPD EXACERBATION: Primary | ICD-10-CM

## 2022-07-27 DIAGNOSIS — J45.901 ASTHMA EXACERBATION: ICD-10-CM

## 2022-07-27 LAB
ALBUMIN SERPL-MCNC: 3.4 GM/DL (ref 3.5–5)
ALBUMIN/GLOB SERPL: 0.9 RATIO (ref 1.1–2)
ALP SERPL-CCNC: 72 UNIT/L (ref 40–150)
ALT SERPL-CCNC: 19 UNIT/L (ref 0–55)
AST SERPL-CCNC: 17 UNIT/L (ref 5–34)
BASOPHILS # BLD AUTO: 0.04 X10(3)/MCL (ref 0–0.2)
BASOPHILS NFR BLD AUTO: 0.5 %
BILIRUBIN DIRECT+TOT PNL SERPL-MCNC: 0.3 MG/DL
BUN SERPL-MCNC: 13.8 MG/DL (ref 9.8–20.1)
CALCIUM SERPL-MCNC: 9.7 MG/DL (ref 8.4–10.2)
CHLORIDE SERPL-SCNC: 110 MMOL/L (ref 98–107)
CO2 SERPL-SCNC: 27 MMOL/L (ref 22–29)
CREAT SERPL-MCNC: 0.88 MG/DL (ref 0.55–1.02)
EOSINOPHIL # BLD AUTO: 0.48 X10(3)/MCL (ref 0–0.9)
EOSINOPHIL NFR BLD AUTO: 5.8 %
ERYTHROCYTE [DISTWIDTH] IN BLOOD BY AUTOMATED COUNT: 12.7 % (ref 11.5–17)
FLUAV AG UPPER RESP QL IA.RAPID: NOT DETECTED
FLUBV AG UPPER RESP QL IA.RAPID: NOT DETECTED
GLOBULIN SER-MCNC: 3.6 GM/DL (ref 2.4–3.5)
GLUCOSE SERPL-MCNC: 116 MG/DL (ref 74–100)
HCT VFR BLD AUTO: 42.1 % (ref 37–47)
HGB BLD-MCNC: 13.6 GM/DL (ref 12–16)
IMM GRANULOCYTES # BLD AUTO: 0.01 X10(3)/MCL (ref 0–0.04)
IMM GRANULOCYTES NFR BLD AUTO: 0.1 %
LIPASE SERPL-CCNC: 30 U/L
LYMPHOCYTES # BLD AUTO: 4.37 X10(3)/MCL (ref 0.6–4.6)
LYMPHOCYTES NFR BLD AUTO: 52.4 %
MCH RBC QN AUTO: 31.6 PG (ref 27–31)
MCHC RBC AUTO-ENTMCNC: 32.3 MG/DL (ref 33–36)
MCV RBC AUTO: 97.7 FL (ref 80–94)
MONOCYTES # BLD AUTO: 0.79 X10(3)/MCL (ref 0.1–1.3)
MONOCYTES NFR BLD AUTO: 9.5 %
NEUTROPHILS # BLD AUTO: 2.7 X10(3)/MCL (ref 2.1–9.2)
NEUTROPHILS NFR BLD AUTO: 31.7 %
NRBC BLD AUTO-RTO: 0 %
PLATELET # BLD AUTO: 234 X10(3)/MCL (ref 130–400)
PMV BLD AUTO: 10.9 FL (ref 7.4–10.4)
POTASSIUM SERPL-SCNC: 3.5 MMOL/L (ref 3.5–5.1)
PROT SERPL-MCNC: 7 GM/DL (ref 6.4–8.3)
RBC # BLD AUTO: 4.31 X10(6)/MCL (ref 4.2–5.4)
SARS-COV-2 RNA RESP QL NAA+PROBE: NOT DETECTED
SODIUM SERPL-SCNC: 147 MMOL/L (ref 136–145)
WBC # SPEC AUTO: 8.3 X10(3)/MCL (ref 4.5–11.5)

## 2022-07-27 PROCEDURE — 63600175 PHARM REV CODE 636 W HCPCS: Performed by: STUDENT IN AN ORGANIZED HEALTH CARE EDUCATION/TRAINING PROGRAM

## 2022-07-27 PROCEDURE — 87636 SARSCOV2 & INF A&B AMP PRB: CPT | Performed by: STUDENT IN AN ORGANIZED HEALTH CARE EDUCATION/TRAINING PROGRAM

## 2022-07-27 PROCEDURE — 85025 COMPLETE CBC W/AUTO DIFF WBC: CPT | Performed by: FAMILY MEDICINE

## 2022-07-27 PROCEDURE — 25000242 PHARM REV CODE 250 ALT 637 W/ HCPCS

## 2022-07-27 PROCEDURE — 36415 COLL VENOUS BLD VENIPUNCTURE: CPT | Performed by: STUDENT IN AN ORGANIZED HEALTH CARE EDUCATION/TRAINING PROGRAM

## 2022-07-27 PROCEDURE — 27000221 HC OXYGEN, UP TO 24 HOURS

## 2022-07-27 PROCEDURE — 80053 COMPREHEN METABOLIC PANEL: CPT | Performed by: FAMILY MEDICINE

## 2022-07-27 PROCEDURE — 96372 THER/PROPH/DIAG INJ SC/IM: CPT | Performed by: STUDENT IN AN ORGANIZED HEALTH CARE EDUCATION/TRAINING PROGRAM

## 2022-07-27 PROCEDURE — 96375 TX/PRO/DX INJ NEW DRUG ADDON: CPT

## 2022-07-27 PROCEDURE — 94761 N-INVAS EAR/PLS OXIMETRY MLT: CPT

## 2022-07-27 PROCEDURE — 94640 AIRWAY INHALATION TREATMENT: CPT | Mod: XB

## 2022-07-27 PROCEDURE — 25000242 PHARM REV CODE 250 ALT 637 W/ HCPCS: Performed by: STUDENT IN AN ORGANIZED HEALTH CARE EDUCATION/TRAINING PROGRAM

## 2022-07-27 PROCEDURE — 99285 EMERGENCY DEPT VISIT HI MDM: CPT | Mod: 25

## 2022-07-27 PROCEDURE — 83690 ASSAY OF LIPASE: CPT | Performed by: STUDENT IN AN ORGANIZED HEALTH CARE EDUCATION/TRAINING PROGRAM

## 2022-07-27 PROCEDURE — 25000003 PHARM REV CODE 250: Performed by: STUDENT IN AN ORGANIZED HEALTH CARE EDUCATION/TRAINING PROGRAM

## 2022-07-27 PROCEDURE — 25000242 PHARM REV CODE 250 ALT 637 W/ HCPCS: Performed by: FAMILY MEDICINE

## 2022-07-27 PROCEDURE — 36415 COLL VENOUS BLD VENIPUNCTURE: CPT | Performed by: FAMILY MEDICINE

## 2022-07-27 RX ORDER — TERBUTALINE SULFATE 1 MG/ML
0.25 INJECTION SUBCUTANEOUS ONCE
Status: DISCONTINUED | OUTPATIENT
Start: 2022-07-27 | End: 2022-07-27

## 2022-07-27 RX ORDER — IPRATROPIUM BROMIDE AND ALBUTEROL SULFATE 2.5; .5 MG/3ML; MG/3ML
SOLUTION RESPIRATORY (INHALATION)
Status: COMPLETED
Start: 2022-07-27 | End: 2022-07-27

## 2022-07-27 RX ORDER — TERBUTALINE SULFATE 1 MG/ML
0.25 INJECTION SUBCUTANEOUS ONCE
Status: COMPLETED | OUTPATIENT
Start: 2022-07-27 | End: 2022-07-27

## 2022-07-27 RX ORDER — MAGNESIUM SULFATE HEPTAHYDRATE 40 MG/ML
2 INJECTION, SOLUTION INTRAVENOUS
Status: COMPLETED | OUTPATIENT
Start: 2022-07-27 | End: 2022-07-28

## 2022-07-27 RX ORDER — IPRATROPIUM BROMIDE AND ALBUTEROL SULFATE 2.5; .5 MG/3ML; MG/3ML
3 SOLUTION RESPIRATORY (INHALATION)
Status: COMPLETED | OUTPATIENT
Start: 2022-07-27 | End: 2022-07-27

## 2022-07-27 RX ORDER — LIDOCAINE HYDROCHLORIDE 20 MG/ML
15 SOLUTION OROPHARYNGEAL ONCE
Status: DISCONTINUED | OUTPATIENT
Start: 2022-07-27 | End: 2022-07-30 | Stop reason: HOSPADM

## 2022-07-27 RX ORDER — KETOROLAC TROMETHAMINE 30 MG/ML
15 INJECTION, SOLUTION INTRAMUSCULAR; INTRAVENOUS
Status: COMPLETED | OUTPATIENT
Start: 2022-07-28 | End: 2022-07-28

## 2022-07-27 RX ORDER — MAG HYDROX/ALUMINUM HYD/SIMETH 200-200-20
30 SUSPENSION, ORAL (FINAL DOSE FORM) ORAL ONCE
Status: DISCONTINUED | OUTPATIENT
Start: 2022-07-27 | End: 2022-07-30 | Stop reason: HOSPADM

## 2022-07-27 RX ORDER — IPRATROPIUM BROMIDE AND ALBUTEROL SULFATE 2.5; .5 MG/3ML; MG/3ML
3 SOLUTION RESPIRATORY (INHALATION)
Status: ACTIVE | OUTPATIENT
Start: 2022-07-27 | End: 2022-07-27

## 2022-07-27 RX ADMIN — IPRATROPIUM BROMIDE AND ALBUTEROL SULFATE 3 ML: .5; 3 SOLUTION RESPIRATORY (INHALATION) at 09:07

## 2022-07-27 RX ADMIN — IPRATROPIUM BROMIDE AND ALBUTEROL SULFATE: 2.5; .5 SOLUTION RESPIRATORY (INHALATION) at 11:07

## 2022-07-27 RX ADMIN — SODIUM CHLORIDE 1000 ML: 9 INJECTION, SOLUTION INTRAVENOUS at 10:07

## 2022-07-27 RX ADMIN — MAGNESIUM SULFATE 2 G: 2 INJECTION INTRAVENOUS at 10:07

## 2022-07-27 RX ADMIN — TERBUTALINE SULFATE 0.25 MG: 1 INJECTION, SOLUTION SUBCUTANEOUS at 10:07

## 2022-07-27 RX ADMIN — IPRATROPIUM BROMIDE AND ALBUTEROL SULFATE 3 ML: .5; 3 SOLUTION RESPIRATORY (INHALATION) at 11:07

## 2022-07-28 PROBLEM — J45.901 ASTHMA EXACERBATION: Status: ACTIVE | Noted: 2022-06-29

## 2022-07-28 LAB
ALBUMIN SERPL-MCNC: 3.3 GM/DL (ref 3.5–5)
ALBUMIN/GLOB SERPL: 0.9 RATIO (ref 1.1–2)
ALP SERPL-CCNC: 68 UNIT/L (ref 40–150)
ALT SERPL-CCNC: 21 UNIT/L (ref 0–55)
APPEARANCE UR: CLEAR
AST SERPL-CCNC: 16 UNIT/L (ref 5–34)
BACTERIA #/AREA URNS AUTO: ABNORMAL /HPF
BASOPHILS # BLD AUTO: 0.01 X10(3)/MCL (ref 0–0.2)
BASOPHILS NFR BLD AUTO: 0.2 %
BILIRUB UR QL STRIP.AUTO: NEGATIVE MG/DL
BILIRUBIN DIRECT+TOT PNL SERPL-MCNC: 0.2 MG/DL
BUN SERPL-MCNC: 11.8 MG/DL (ref 9.8–20.1)
CALCIUM SERPL-MCNC: 9.4 MG/DL (ref 8.4–10.2)
CHLORIDE SERPL-SCNC: 106 MMOL/L (ref 98–107)
CO2 SERPL-SCNC: 28 MMOL/L (ref 22–29)
COLOR UR AUTO: ABNORMAL
CREAT SERPL-MCNC: 0.81 MG/DL (ref 0.55–1.02)
EOSINOPHIL # BLD AUTO: 0 X10(3)/MCL (ref 0–0.9)
EOSINOPHIL NFR BLD AUTO: 0 %
ERYTHROCYTE [DISTWIDTH] IN BLOOD BY AUTOMATED COUNT: 12.6 % (ref 11.5–17)
GLOBULIN SER-MCNC: 3.6 GM/DL (ref 2.4–3.5)
GLUCOSE SERPL-MCNC: 207 MG/DL (ref 74–100)
GLUCOSE UR QL STRIP.AUTO: NORMAL MG/DL
HCT VFR BLD AUTO: 38.4 % (ref 37–47)
HGB BLD-MCNC: 12.8 GM/DL (ref 12–16)
HYALINE CASTS #/AREA URNS LPF: ABNORMAL /LPF
IMM GRANULOCYTES # BLD AUTO: 0.02 X10(3)/MCL (ref 0–0.04)
IMM GRANULOCYTES NFR BLD AUTO: 0.4 %
KETONES UR QL STRIP.AUTO: NEGATIVE MG/DL
LEUKOCYTE ESTERASE UR QL STRIP.AUTO: NEGATIVE UNIT/L
LYMPHOCYTES # BLD AUTO: 0.7 X10(3)/MCL (ref 0.6–4.6)
LYMPHOCYTES NFR BLD AUTO: 12.9 %
MCH RBC QN AUTO: 32.1 PG (ref 27–31)
MCHC RBC AUTO-ENTMCNC: 33.3 MG/DL (ref 33–36)
MCV RBC AUTO: 96.2 FL (ref 80–94)
MONOCYTES # BLD AUTO: 0.06 X10(3)/MCL (ref 0.1–1.3)
MONOCYTES NFR BLD AUTO: 1.1 %
MUCOUS THREADS URNS QL MICRO: ABNORMAL /LPF
NEUTROPHILS # BLD AUTO: 4.6 X10(3)/MCL (ref 2.1–9.2)
NEUTROPHILS NFR BLD AUTO: 85.4 %
NITRITE UR QL STRIP.AUTO: NEGATIVE
NRBC BLD AUTO-RTO: 0 %
PH UR STRIP.AUTO: 6.5 [PH]
PLATELET # BLD AUTO: 221 X10(3)/MCL (ref 130–400)
PMV BLD AUTO: 11.1 FL (ref 7.4–10.4)
POTASSIUM SERPL-SCNC: 3.7 MMOL/L (ref 3.5–5.1)
PROT SERPL-MCNC: 6.9 GM/DL (ref 6.4–8.3)
PROT UR QL STRIP.AUTO: NEGATIVE MG/DL
RBC # BLD AUTO: 3.99 X10(6)/MCL (ref 4.2–5.4)
RBC #/AREA URNS AUTO: ABNORMAL /HPF
RBC UR QL AUTO: NEGATIVE UNIT/L
SODIUM SERPL-SCNC: 142 MMOL/L (ref 136–145)
SP GR UR STRIP.AUTO: 1.02
SQUAMOUS #/AREA URNS LPF: ABNORMAL /HPF
UROBILINOGEN UR STRIP-ACNC: NORMAL MG/DL
WBC # SPEC AUTO: 5.4 X10(3)/MCL (ref 4.5–11.5)
WBC #/AREA URNS AUTO: ABNORMAL /HPF

## 2022-07-28 PROCEDURE — 25000003 PHARM REV CODE 250: Performed by: STUDENT IN AN ORGANIZED HEALTH CARE EDUCATION/TRAINING PROGRAM

## 2022-07-28 PROCEDURE — 63600175 PHARM REV CODE 636 W HCPCS: Performed by: STUDENT IN AN ORGANIZED HEALTH CARE EDUCATION/TRAINING PROGRAM

## 2022-07-28 PROCEDURE — 25000242 PHARM REV CODE 250 ALT 637 W/ HCPCS: Performed by: STUDENT IN AN ORGANIZED HEALTH CARE EDUCATION/TRAINING PROGRAM

## 2022-07-28 PROCEDURE — 94640 AIRWAY INHALATION TREATMENT: CPT | Mod: XB

## 2022-07-28 PROCEDURE — 25500020 PHARM REV CODE 255: Performed by: STUDENT IN AN ORGANIZED HEALTH CARE EDUCATION/TRAINING PROGRAM

## 2022-07-28 PROCEDURE — 96375 TX/PRO/DX INJ NEW DRUG ADDON: CPT

## 2022-07-28 PROCEDURE — G0378 HOSPITAL OBSERVATION PER HR: HCPCS

## 2022-07-28 PROCEDURE — 96367 TX/PROPH/DG ADDL SEQ IV INF: CPT

## 2022-07-28 PROCEDURE — 96366 THER/PROPH/DIAG IV INF ADDON: CPT

## 2022-07-28 PROCEDURE — 96365 THER/PROPH/DIAG IV INF INIT: CPT | Mod: 59

## 2022-07-28 PROCEDURE — 85025 COMPLETE CBC W/AUTO DIFF WBC: CPT | Performed by: STUDENT IN AN ORGANIZED HEALTH CARE EDUCATION/TRAINING PROGRAM

## 2022-07-28 PROCEDURE — 36415 COLL VENOUS BLD VENIPUNCTURE: CPT | Performed by: STUDENT IN AN ORGANIZED HEALTH CARE EDUCATION/TRAINING PROGRAM

## 2022-07-28 PROCEDURE — 63600175 PHARM REV CODE 636 W HCPCS: Performed by: INTERNAL MEDICINE

## 2022-07-28 PROCEDURE — 80053 COMPREHEN METABOLIC PANEL: CPT | Performed by: STUDENT IN AN ORGANIZED HEALTH CARE EDUCATION/TRAINING PROGRAM

## 2022-07-28 PROCEDURE — 27000221 HC OXYGEN, UP TO 24 HOURS

## 2022-07-28 PROCEDURE — 96376 TX/PRO/DX INJ SAME DRUG ADON: CPT

## 2022-07-28 PROCEDURE — 94761 N-INVAS EAR/PLS OXIMETRY MLT: CPT

## 2022-07-28 PROCEDURE — 81001 URINALYSIS AUTO W/SCOPE: CPT | Performed by: STUDENT IN AN ORGANIZED HEALTH CARE EDUCATION/TRAINING PROGRAM

## 2022-07-28 RX ORDER — IPRATROPIUM BROMIDE AND ALBUTEROL SULFATE 2.5; .5 MG/3ML; MG/3ML
3 SOLUTION RESPIRATORY (INHALATION) EVERY 4 HOURS
Status: DISCONTINUED | OUTPATIENT
Start: 2022-07-28 | End: 2022-07-30 | Stop reason: HOSPADM

## 2022-07-28 RX ORDER — DICYCLOMINE HYDROCHLORIDE 10 MG/1
10 CAPSULE ORAL EVERY 6 HOURS PRN
Status: DISCONTINUED | OUTPATIENT
Start: 2022-07-28 | End: 2022-07-30 | Stop reason: HOSPADM

## 2022-07-28 RX ORDER — ACETAMINOPHEN 325 MG/1
650 TABLET ORAL EVERY 6 HOURS PRN
Status: DISCONTINUED | OUTPATIENT
Start: 2022-07-28 | End: 2022-07-30 | Stop reason: HOSPADM

## 2022-07-28 RX ORDER — FLUTICASONE FUROATE AND VILANTEROL 100; 25 UG/1; UG/1
1 POWDER RESPIRATORY (INHALATION) DAILY
Status: DISCONTINUED | OUTPATIENT
Start: 2022-07-28 | End: 2022-07-28 | Stop reason: ALTCHOICE

## 2022-07-28 RX ORDER — METHYLPREDNISOLONE SOD SUCC 125 MG
60 VIAL (EA) INJECTION
Status: DISCONTINUED | OUTPATIENT
Start: 2022-07-28 | End: 2022-07-30 | Stop reason: HOSPADM

## 2022-07-28 RX ORDER — MONTELUKAST SODIUM 5 MG/1
10 TABLET, CHEWABLE ORAL DAILY
Refills: 5 | Status: DISCONTINUED | OUTPATIENT
Start: 2022-07-28 | End: 2022-07-30 | Stop reason: HOSPADM

## 2022-07-28 RX ORDER — ONDANSETRON 2 MG/ML
4 INJECTION INTRAMUSCULAR; INTRAVENOUS ONCE
Status: COMPLETED | OUTPATIENT
Start: 2022-07-28 | End: 2022-07-28

## 2022-07-28 RX ORDER — METHYLPREDNISOLONE SOD SUCC 125 MG
60 VIAL (EA) INJECTION
Status: DISCONTINUED | OUTPATIENT
Start: 2022-07-29 | End: 2022-07-28

## 2022-07-28 RX ORDER — KETOROLAC TROMETHAMINE 30 MG/ML
15 INJECTION, SOLUTION INTRAMUSCULAR; INTRAVENOUS ONCE
Status: COMPLETED | OUTPATIENT
Start: 2022-07-28 | End: 2022-07-28

## 2022-07-28 RX ORDER — TRAMADOL HYDROCHLORIDE 50 MG/1
50 TABLET ORAL ONCE
Status: COMPLETED | OUTPATIENT
Start: 2022-07-28 | End: 2022-07-28

## 2022-07-28 RX ORDER — METHYLPREDNISOLONE SOD SUCC 125 MG
125 VIAL (EA) INJECTION ONCE
Status: DISCONTINUED | OUTPATIENT
Start: 2022-07-28 | End: 2022-07-28

## 2022-07-28 RX ORDER — SODIUM CHLORIDE 0.9 % (FLUSH) 0.9 %
10 SYRINGE (ML) INJECTION
Status: DISCONTINUED | OUTPATIENT
Start: 2022-07-28 | End: 2022-07-30 | Stop reason: HOSPADM

## 2022-07-28 RX ORDER — PANTOPRAZOLE SODIUM 40 MG/1
40 TABLET, DELAYED RELEASE ORAL DAILY
Status: DISCONTINUED | OUTPATIENT
Start: 2022-07-28 | End: 2022-07-30 | Stop reason: HOSPADM

## 2022-07-28 RX ORDER — BUDESONIDE 0.5 MG/2ML
0.25 INHALANT ORAL DAILY
Status: DISCONTINUED | OUTPATIENT
Start: 2022-07-29 | End: 2022-07-30 | Stop reason: HOSPADM

## 2022-07-28 RX ORDER — DICYCLOMINE HYDROCHLORIDE 10 MG/1
20 CAPSULE ORAL 4 TIMES DAILY
Status: DISCONTINUED | OUTPATIENT
Start: 2022-07-28 | End: 2022-07-30 | Stop reason: HOSPADM

## 2022-07-28 RX ORDER — IPRATROPIUM BROMIDE AND ALBUTEROL SULFATE 2.5; .5 MG/3ML; MG/3ML
3 SOLUTION RESPIRATORY (INHALATION) EVERY 8 HOURS
Status: DISCONTINUED | OUTPATIENT
Start: 2022-07-28 | End: 2022-07-28

## 2022-07-28 RX ORDER — DOCUSATE SODIUM 100 MG/1
100 CAPSULE, LIQUID FILLED ORAL DAILY
Status: DISCONTINUED | OUTPATIENT
Start: 2022-07-28 | End: 2022-07-30 | Stop reason: HOSPADM

## 2022-07-28 RX ORDER — ENOXAPARIN SODIUM 100 MG/ML
40 INJECTION SUBCUTANEOUS EVERY 24 HOURS
Status: DISCONTINUED | OUTPATIENT
Start: 2022-07-28 | End: 2022-07-30 | Stop reason: HOSPADM

## 2022-07-28 RX ORDER — ONDANSETRON 2 MG/ML
4 INJECTION INTRAMUSCULAR; INTRAVENOUS EVERY 8 HOURS PRN
Status: DISCONTINUED | OUTPATIENT
Start: 2022-07-28 | End: 2022-07-30 | Stop reason: HOSPADM

## 2022-07-28 RX ADMIN — IPRATROPIUM BROMIDE AND ALBUTEROL SULFATE 3 ML: .5; 3 SOLUTION RESPIRATORY (INHALATION) at 04:07

## 2022-07-28 RX ADMIN — KETOROLAC TROMETHAMINE 15 MG: 30 INJECTION, SOLUTION INTRAMUSCULAR; INTRAVENOUS at 07:07

## 2022-07-28 RX ADMIN — IOPAMIDOL 100 ML: 755 INJECTION, SOLUTION INTRAVENOUS at 01:07

## 2022-07-28 RX ADMIN — MONTELUKAST SODIUM 10 MG: 5 TABLET, CHEWABLE ORAL at 09:07

## 2022-07-28 RX ADMIN — AZITHROMYCIN MONOHYDRATE 500 MG: 500 INJECTION, POWDER, LYOPHILIZED, FOR SOLUTION INTRAVENOUS at 01:07

## 2022-07-28 RX ADMIN — PIPERACILLIN SODIUM AND TAZOBACTAM SODIUM 4.5 G: 4; .5 INJECTION, POWDER, LYOPHILIZED, FOR SOLUTION INTRAVENOUS at 06:07

## 2022-07-28 RX ADMIN — ONDANSETRON 4 MG: 2 INJECTION INTRAMUSCULAR; INTRAVENOUS at 02:07

## 2022-07-28 RX ADMIN — DICYCLOMINE HYDROCHLORIDE 20 MG: 10 CAPSULE ORAL at 04:07

## 2022-07-28 RX ADMIN — ONDANSETRON 4 MG: 2 INJECTION INTRAMUSCULAR; INTRAVENOUS at 09:07

## 2022-07-28 RX ADMIN — IPRATROPIUM BROMIDE AND ALBUTEROL SULFATE 3 ML: .5; 3 SOLUTION RESPIRATORY (INHALATION) at 07:07

## 2022-07-28 RX ADMIN — KETOROLAC TROMETHAMINE 15 MG: 30 INJECTION, SOLUTION INTRAMUSCULAR; INTRAVENOUS at 12:07

## 2022-07-28 RX ADMIN — METHYLPREDNISOLONE SODIUM SUCCINATE 60 MG: 125 INJECTION, POWDER, FOR SOLUTION INTRAMUSCULAR; INTRAVENOUS at 04:07

## 2022-07-28 RX ADMIN — IPRATROPIUM BROMIDE AND ALBUTEROL SULFATE 3 ML: .5; 3 SOLUTION RESPIRATORY (INHALATION) at 03:07

## 2022-07-28 RX ADMIN — IPRATROPIUM BROMIDE AND ALBUTEROL SULFATE 3 ML: .5; 3 SOLUTION RESPIRATORY (INHALATION) at 11:07

## 2022-07-28 RX ADMIN — DOCUSATE SODIUM 100 MG: 100 CAPSULE ORAL at 09:07

## 2022-07-28 RX ADMIN — TRAMADOL HYDROCHLORIDE 50 MG: 50 TABLET, FILM COATED ORAL at 09:07

## 2022-07-28 RX ADMIN — FLUTICASONE FUROATE AND VILANTEROL TRIFENATATE 1 PUFF: 100; 25 POWDER RESPIRATORY (INHALATION) at 07:07

## 2022-07-28 RX ADMIN — TAZOBACTAM SODIUM AND PIPERACILLIN SODIUM 4.5 G: 500; 4 INJECTION, SOLUTION INTRAVENOUS at 09:07

## 2022-07-28 RX ADMIN — PANTOPRAZOLE SODIUM 40 MG: 40 TABLET, DELAYED RELEASE ORAL at 09:07

## 2022-07-28 RX ADMIN — DICYCLOMINE HYDROCHLORIDE 20 MG: 10 CAPSULE ORAL at 09:07

## 2022-07-28 NOTE — H&P
[facility] Medicine Wards History & Physical Note     Resident Team: [facility], [facility] Medicine List 3  Attending Physician: Darren Bennett MD  Resident: Dr. Hawa Corbin     Date of Admit: 7/27/2022    Chief Complaint     Shortness of Breath (Multiple uses of albuterol today and still SOB.  Pt received 1 duoneb 1 albuterol and 125 Solumederol on the ambulance en route and is still SOB)    Subjective:      History of Present Illness:    52-year-old female with significant past medical history of asthma, GERD, history of diverticulitis and tobacco use presents to the ED for complaints of shortness of breath by EMS.  Patient states that she is compliant with her albuterol inhaler and her Symbicort at home.  She has been getting progressively worsening shortness of breast.  States that she had to use her albuterol inhaler about 20 times today without improvement in her symptoms.  Patient has had multiple admissions for asthma exacerbations.  She currently smokes 1-2 packs of cigarettes daily.  Denies any chest pain, increased chest pressure, cough, fever.  Also complains of abdominal pain and states that she had diverticulitis in March and had since then has been treated with several rounds of antibiotics with no relief.  Denies nausea/vomiting, hematuria, dysuria.  Patient states that she was treated previously in [place] for diverticulitis several years ago and the only antibiotic that cleared the infection was piperacillin.      In route to the ED patient was given Solu-Medrol 125 and DuoNeb.  In the ED she received another round of DuoNebs and IV magnesium.  She was still requiring supplemental oxygen (2 L nasal cannula) therefore Internal Medicine was consulted for asthma exacerbation admission.      Past Medical History:  Past Medical History:   Diagnosis Date    Asthma     Diverticulosis     GERD (gastroesophageal reflux disease)        Past Surgical History:  No past surgical history on file.    Family History:  No  family history on file.    Social History:  Social History     Tobacco Use    Smoking status: Never Smoker    Smokeless tobacco: Never Used       Allergies:  Review of patient's allergies indicates:   Allergen Reactions    Levofloxacin Hives and Itching    Metronidazole Hives    Codeine     Latex     Morphine     Opioids - morphine analogues        Home Medications:  Prior to Admission medications    Medication Sig Start Date End Date Taking? Authorizing Provider   albuterol (PROVENTIL HFA) 90 mcg/actuation inhaler Inhale 2 puffs into the lungs every 4 (four) hours as needed for Wheezing. Rescue 6/29/22   Natalee Carrero MD   albuterol-ipratropium (DUO-NEB) 2.5 mg-0.5 mg/3 mL nebulizer solution Take 3 mLs by nebulization every 4 (four) hours as needed for Wheezing or Shortness of Breath. Rescue 6/29/22   Natalee Carrero MD   HYDROcodone-acetaminophen (NORCO) 5-325 mg per tablet Take 1 tablet by mouth every 6 (six) hours as needed for Pain. 7/2/22   Franco Brown MD   montelukast (SINGULAIR) 10 mg tablet Take 1 tablet (10 mg total) by mouth once daily at 6am. 6/29/22   Natalee Carrero MD   ondansetron (ZOFRAN-ODT) 4 MG TbDL Take 2 tablets (8 mg total) by mouth 2 (two) times daily. 7/2/22   Franco Brown MD   pantoprazole (PROTONIX) 40 MG tablet Take 1 tablet (40 mg total) by mouth once daily. 6/30/22   Natalee Carrero MD         Review of Systems:  CONSTITUTIONAL: No weight loss, fever, chills, or weakness.    HEENT: Eyes: No visual loss or blurred vision.   Ears, Nose, Throat: No congestion, runny nose or sore throat.    SKIN: No rash or itching.    CARDIOVASCULAR: No chest pain, chest pressure, or chest discomfort. No palpitations.    RESPIRATORY: + shortness of breath. No cough or sputum.    GASTROINTESTINAL: No nausea, vomiting or diarrhea. + abdominal pain.  GENITOURINARY: No pain, burning, or increased frequency or urgency on urination.   NEUROLOGICAL: No headache, dizziness, numbness, or tingling in the  extremities.   MUSCULOSKELETAL: No muscle, back pain, joint pain, or stiffness.       Objective:   Last 24 Hour Vital Signs:  BP  Min: 126/73  Max: 157/80  Pulse  Av.7  Min: 99  Max: 109  Resp  Av.5  Min: 19  Max: 30  SpO2  Av %  Min: 91 %  Max: 98 %  There is no height or weight on file to calculate BMI.  No intake/output data recorded.    Physical Examination:  General: appears well, speaking in full sentences mildly dyspneic, on 2 L nasal cannula  Eye: no scleral icterus   HENT: MMM  Respiratory:  Wheezing heard throughout all lung fields with decreased air movement  Cardiovascular: regular rate and rhythm without murmurs or gallops, no edema in bilateral lower extremities   Gastrointestinal: soft, non-tender, non-distended, bowel sounds present   Genitourinary: no suprapubic tenderness   Musculoskeletal: no gross deformities observed   Integumentary: no acute rashes or skin lesions observed    Neuro: No focal lesions observed     Laboratory:  Most Recent Data:  CBC:   Lab Results   Component Value Date    WBC 8.3 2022    HGB 13.6 2022    HCT 42.1 2022     2022    MCV 97.7 (H) 2022    RDW 12.7 2022     WBC Differential:   Recent Labs   Lab 22   WBC 8.3   HGB 13.6   HCT 42.1      MCV 97.7*     BMP:   Lab Results   Component Value Date     (H) 2022    K 3.5 2022    CO2 27 2022    BUN 13.8 2022    CREATININE 0.88 2022    CALCIUM 9.7 2022    MG 2.10 2022    PHOS 3.3 2022     LFTs:   Lab Results   Component Value Date    ALBUMIN 3.4 (L) 2022    BILITOT 0.3 2022    AST 17 2022    ALKPHOS 72 2022    ALT 19 2022     Microbiology Data:  Microbiology Results (last 7 days)     ** No results found for the last 168 hours. **           Radiology:  Imaging Results          CT Abdomen Pelvis With Contrast (Preliminary result)  Result time 22 01:24:12     Preliminary result by Interface, Rad Results In (07/28/22 01:24:12)                 Narrative:    START OF REPORT:  Technique: CT of the abdomen and pelvis was performed with axial images as well as sagittal and coronal reconstruction images with intravenous contrast.    Comparison: Comparison is with study dated 2022-07-03 06:57:20.    Clinical History: Multiple uses of albuterol today and still SOB. Pt received 1 duoneb 1 albuterol and 125 Solumederol on the ambulance en route and is still SOB.    Dosage Information: Automated Exposure Control was utilized.    Findings:  Thorax:  Lungs: There is mild nonspecific dependent change at the lung bases. Increase in mild ground glass infiltrate at the anterior right lung base (series 4 image 7 and series 8 image 50) suggesting worsening mild pneumonia.  Pleura: No effusions or thickening. No pneumothorax is seen in the visualized lung bases.  Heart: The heart size is within normal limits.  Abdomen:  Abdominal Wall: No abdominal wall pathology is seen.  Liver: The liver appears unremarkable.  Biliary System: No intrahepatic or extrahepatic biliary duct dilatation is seen.  Gallbladder: The gallbladder appears unremarkable with no stones wall thickening or pericholecystic inflammatory changes or fluid.  Pancreas: The pancreas appears unremarkable. No pancreatic mass, or, ductal dilatation is seen.  Spleen: The spleen appears unremarkable.  Adrenals: The adrenal glands appear unremarkable.  Kidneys: The kidneys appear unremarkable with no stones cysts masses or hydronephrosis.  Aorta: The abdominal aorta appears unremarkable.  Bowel:  Esophagus: The visualized esophagus appears unremarkable.  Stomach: The stomach appears unremarkable.  Duodenum: Unremarkable appearing duodenum.  Small Bowel: The small bowel appears unremarkable.  Colon: Multiple colonic diverticula are seen. There is minimal wall thickening of distal descending colon and sigmoid colon, decreased compared to  the prior study suggesting improving colitis and/or diverticulitis. There is significant reduction in colonic wall thickening and pericolonic fat stranding of splenic flexure and descending colon compared to the prior study suggesting improving colitis.  Appendix: The appendix appears unremarkable and is. Seen on series 2 image 56.  Peritoneum: No intraperitoneal free air or ascites is seen.    Pelvis:  Bladder: The bladder appears unremarkable.  Female:  Uterus: The uterus appears unremarkable.  Ovaries: The ovaries appear unremarkable with probable bilateral physiologic cysts.    Bony structures:  Dorsal Spine: There is mild multilevel spondylosis of the visualized dorsal spine.  Bony Pelvis: The visualized bony structures of the pelvis appear unremarkable.      Impression:  1. Multiple colonic diverticula are seen. There is minimal wall thickening of distal descending colon and sigmoid colon, decreased compared to the prior study suggesting improving colitis and/or diverticulitis. There is significant reduction in colonic wall thickening and pericolonic fat stranding of splenic flexure and descending colon compared to the prior study suggesting improving colitis.  2. Increase in mild ground glass infiltrate at the anterior right lung base (series 4 image 7 and series 8 image 50) suggesting worsening mild pneumonia.  3. Details and other findings as discussed above.                      Preliminary result by Kevin Barreto Jr., MD (07/28/22 01:24:12)                 Narrative:    START OF REPORT:  Technique: CT of the abdomen and pelvis was performed with axial images as well as sagittal and coronal reconstruction images with intravenous contrast.    Comparison: Comparison is with study dated 2022-07-03 06:57:20.    Clinical History: Multiple uses of albuterol today and still SOB. Pt received 1 duoneb 1 albuterol and 125 Solumederol on the ambulance en route and is still SOB.    Dosage Information: Automated  Exposure Control was utilized.    Findings:  Thorax:  Lungs: There is mild nonspecific dependent change at the lung bases. Increase in mild ground glass infiltrate at the anterior right lung base (series 4 image 7 and series 8 image 50) suggesting worsening mild pneumonia.  Pleura: No effusions or thickening. No pneumothorax is seen in the visualized lung bases.  Heart: The heart size is within normal limits.  Abdomen:  Abdominal Wall: No abdominal wall pathology is seen.  Liver: The liver appears unremarkable.  Biliary System: No intrahepatic or extrahepatic biliary duct dilatation is seen.  Gallbladder: The gallbladder appears unremarkable with no stones wall thickening or pericholecystic inflammatory changes or fluid.  Pancreas: The pancreas appears unremarkable. No pancreatic mass, or, ductal dilatation is seen.  Spleen: The spleen appears unremarkable.  Adrenals: The adrenal glands appear unremarkable.  Kidneys: The kidneys appear unremarkable with no stones cysts masses or hydronephrosis.  Aorta: The abdominal aorta appears unremarkable.  Bowel:  Esophagus: The visualized esophagus appears unremarkable.  Stomach: The stomach appears unremarkable.  Duodenum: Unremarkable appearing duodenum.  Small Bowel: The small bowel appears unremarkable.  Colon: Multiple colonic diverticula are seen. There is minimal wall thickening of distal descending colon and sigmoid colon, decreased compared to the prior study suggesting improving colitis and/or diverticulitis. There is significant reduction in colonic wall thickening and pericolonic fat stranding of splenic flexure and descending colon compared to the prior study suggesting improving colitis.  Appendix: The appendix appears unremarkable and is. Seen on series 2 image 56.  Peritoneum: No intraperitoneal free air or ascites is seen.    Pelvis:  Bladder: The bladder appears unremarkable.  Female:  Uterus: The uterus appears unremarkable.  Ovaries: The ovaries appear  unremarkable with probable bilateral physiologic cysts.    Bony structures:  Dorsal Spine: There is mild multilevel spondylosis of the visualized dorsal spine.  Bony Pelvis: The visualized bony structures of the pelvis appear unremarkable.      Impression:  1. Multiple colonic diverticula are seen. There is minimal wall thickening of distal descending colon and sigmoid colon, decreased compared to the prior study suggesting improving colitis and/or diverticulitis. There is significant reduction in colonic wall thickening and pericolonic fat stranding of splenic flexure and descending colon compared to the prior study suggesting improving colitis.  2. Increase in mild ground glass infiltrate at the anterior right lung base (series 4 image 7 and series 8 image 50) suggesting worsening mild pneumonia.  3. Details and other findings as discussed above.                                 X-Ray Chest AP Portable (In process)                  Assessment & Plan:     Asthma exacerbation  -in ED patient received DuoNebs and IV magnesium  -will give 1 dose of Solu-Medrol 125 mg now, then start Solu-Medrol 60 mg b.i.d.  -DuoNebs q4h  -patient on Symbicort at home, will start Breo while in house  -continue home montelukast 10 mg daily  -incentive spirometry    History of diverticulitis  Abdominal pain  GERD  -received multiple rounds of antibiotics at this time  -dicyclomine 10 mg q.6 hours p.r.n. for abdominal cramps/pain  -continue home Protonix 40 mg daily  -CT abdomen and pelvis with contrast: Multiple colonic diverticula are seen. There is minimal wall thickening of distal descending colon and sigmoid colon, decreased compared to the prior study suggesting improving colitis and/or diverticulitis. There is significant reduction in colonic wall thickening and pericolonic fat stranding of splenic flexure and descending colon compared to the prior study suggesting improving colitis.    Tobacco use  -tobacco cessation education  needed upon discharge    CODE STATUS: Full Code  Access: PIV  Antibiotics: none  Diet: Regular  DVT Prophylaxis: Lovenox 40  GI Prophylaxis: none  Fluids: none      Disposition:  Admit to medicine for management of asthma exacerbation.      Hawa Corbin MD  [facility] -3

## 2022-07-28 NOTE — PROGRESS NOTES
"Nutrition   Progress Note      Recommendations:  1. Continue Regular diet as tolerated  2. Will continue to monitor po intake, wt, labs      Reason for Evaluation:  Dx: COPD exac    Diagnosis:    1. COPD exacerbation    2. Diverticulitis    3. Colitis    4. Chronic abdominal pain    5. Asthma exacerbation    6. Severe persistent asthma with exacerbation        Relevant Medical History:    Past Medical History:   Diagnosis Date    Asthma     Diverticulosis     GERD (gastroesophageal reflux disease)          Nutrition Narrative:  7/28: Pt sitting on bedside commode during rounds. Pt reports good appetite; requesting nurse. Per MST no decreased appetite or recent wt loss pta; current wt consistent with previous wts.        Nutrition Diet History:    Factors affecting nutritional intake: none identified at this time    Food / Taoism / Culture Preferences:  N/A      Nutrition Prescription Ordered:    Current Diet Order: Regular    Appetite:  Good (> 75% - 100% po intake)    PO intake: 75 - 100 %      Labs / Medications / Procedures:    Nutrition Related Medications: docusate, linaclotide, zofran, protonix    Nutrition Related Labs:  7/28-Gluc 207, Alb 3.3, GFR >60      Anthropometrics:  Height: 4' 11" (1.499 m)  Admit Weight:  Weight: 72.8 kg (160 lb 9.6 oz)  Latest Weight:  72.8 kg (160 lb 9.6 oz)    Wt Readings from Last 5 Encounters:   07/28/22 72.8 kg (160 lb 9.6 oz)   07/03/22 68.4 kg (150 lb 12.7 oz)   07/03/22 65 kg (143 lb 4.8 oz)   07/02/22 72 kg (158 lb 11.7 oz)   06/28/22 72.9 kg (160 lb 11.5 oz)     IBW: 95#  %IBW: 169.05%  UBW: Unable to obtain  %Weight Change: UTD  Body mass index is 32.44 kg/m².  BMI classification:  Obese Grade I (BMI 30 - 34.9)      Monitoring and Evaluation:    Nutrition Monitoring and Evaluation:  food and beverage intake and weight change    Nutrition Risk:  Level of Nutrition Risk:  Low  Frequency of Follow up:  Dietitian will f/up within 7 days.          Kathi Santamaria RD    "

## 2022-07-28 NOTE — PLAN OF CARE
52-year-old  female, with a significant past medical history of diverticulitis and colitis with bowel perforations in the past.  Patient has imaging earlier this month which shows colitis/diverticulitis repeat imaging shows improvement.  Patient presented to the ED with asthma exacerbation, patient says this is concurrent effect of her micro aspiration versus acid reflux.  Patient says she is allergic to Flagyl and fluoroquinolones, will treat with Zosyn.  For her asthma is aspiration will do DuoNebs with budesonide nebulizers with IV Solu-Medrol.  Patient on examination is tender to the touch with abdominal cramps will give bentyl.  Patient examination is still has coarse wheezes and decreased air flow bilaterally.  She is oxygenating well however.

## 2022-07-28 NOTE — ED PROVIDER NOTES
"Encounter Date: 7/27/2022       History     Chief Complaint   Patient presents with    Shortness of Breath     Multiple uses of albuterol today and still SOB.  Pt received 1 duoneb 1 albuterol and 125 Solumederol on the ambulance en route and is still SOB     52-year-old female presents to ED for shortness of breath and abdominal pain. reports significant history of COPD. felt mild relief EN route with breathing treatments and steroids.  Still reporting significant shortness of breath with both conversational and exertional dyspnea. denies any chest pain or pressure, no pleuritic component, no recent fever cough congestion.  States this feels like her previous COPD exacerbations.  Additional reports chronic intermittent abdominal pain.  Has had multiple recent rounds of diverticulitis and just finished multiple rounds of antibiotics.  States "her belly is all sorts of messed up".  states the abdominal pain worsened yesterday however in the department states has improved again.  Reporting normal stools, no nausea or vomiting, no trauma, no urinary symptoms such as dysuria or hematuria. No pelvic complaints. She is very concerned since she has had multiple family members die from perforations.  No other complaints or concerns at this time.        Review of patient's allergies indicates:   Allergen Reactions    Levofloxacin Hives and Itching    Metronidazole Hives    Codeine     Latex     Morphine     Opioids - morphine analogues      Past Medical History:   Diagnosis Date    Asthma     Diverticulosis     GERD (gastroesophageal reflux disease)      No past surgical history on file.  No family history on file.  Social History     Tobacco Use    Smoking status: Never Smoker    Smokeless tobacco: Never Used     Review of Systems   Constitutional: Negative for chills, diaphoresis and fever.   HENT: Negative for congestion, rhinorrhea, sinus pain and sore throat.    Eyes: Negative for pain, discharge and " itching.   Respiratory: Positive for shortness of breath. Negative for cough, chest tightness, wheezing and stridor.    Cardiovascular: Negative for chest pain and palpitations.   Gastrointestinal: Positive for abdominal pain. Negative for abdominal distention, anal bleeding, blood in stool, constipation, diarrhea, nausea, rectal pain and vomiting.   Genitourinary: Negative for dysuria, flank pain and hematuria.   Musculoskeletal: Negative for back pain and myalgias.   Skin: Negative for color change and rash.   Neurological: Negative for dizziness, weakness and headaches.   Psychiatric/Behavioral: Negative for confusion. The patient is nervous/anxious. The patient is not hyperactive.        Physical Exam     Initial Vitals   BP Pulse Resp Temp SpO2   07/27/22 2048 07/27/22 2048 07/27/22 2048 07/28/22 0536 07/27/22 2047   (!) 157/80 107 (!) 30 98.4 °F (36.9 °C) (!) 91 %      MAP       --                Physical Exam    Vitals reviewed.  Constitutional: She appears well-developed and well-nourished. She is not diaphoretic. No distress.   HENT:   Head: Normocephalic and atraumatic.   Eyes: Conjunctivae and EOM are normal. Pupils are equal, round, and reactive to light.   Neck: Neck supple. No tracheal deviation present.   Normal range of motion.  Cardiovascular: Normal rate, regular rhythm, normal heart sounds and intact distal pulses.   Pulmonary/Chest: Accessory muscle usage present. Tachypnea noted. She is in respiratory distress. She has decreased breath sounds. She has wheezes. She has no rhonchi. She has no rales.   Abdominal: Abdomen is soft and flat. Bowel sounds are normal. There is abdominal tenderness in the left lower quadrant.   No right CVA tenderness.  No left CVA tenderness. There is no rebound, no guarding, no tenderness at McBurney's point and negative Parnell's sign. negative Rovsing's sign  Musculoskeletal:         General: Normal range of motion.      Cervical back: Normal range of motion and neck  supple.     Neurological: She is alert and oriented to person, place, and time. She has normal strength. GCS score is 15. GCS eye subscore is 4. GCS verbal subscore is 5. GCS motor subscore is 6.   Skin: Skin is warm and dry. Capillary refill takes less than 2 seconds. No rash noted.   Psychiatric: She has a normal mood and affect. Her behavior is normal. Judgment and thought content normal.         ED Course   Procedures  Labs Reviewed   COMPREHENSIVE METABOLIC PANEL - Abnormal; Notable for the following components:       Result Value    Sodium Level 147 (*)     Chloride 110 (*)     Glucose Level 116 (*)     Albumin Level 3.4 (*)     Globulin 3.6 (*)     Albumin/Globulin Ratio 0.9 (*)     All other components within normal limits   CBC WITH DIFFERENTIAL - Abnormal; Notable for the following components:    MCV 97.7 (*)     MCH 31.6 (*)     MCHC 32.3 (*)     MPV 10.9 (*)     All other components within normal limits   URINALYSIS, REFLEX TO URINE CULTURE - Abnormal; Notable for the following components:    Color, UA Light-Yellow (*)     Squamous Epithelial Cells, UA Trace (*)     Mucous, UA Trace (*)     All other components within normal limits   COVID/FLU A&B PCR - Normal   LIPASE - Normal   CBC W/ AUTO DIFFERENTIAL    Narrative:     The following orders were created for panel order CBC Auto Differential.  Procedure                               Abnormality         Status                     ---------                               -----------         ------                     CBC with Differential[[id number]]        Abnormal            Final result                 Please view results for these tests on the individual orders.   EXTRA TUBES    Narrative:     The following orders were created for panel order EXTRA TUBES.  Procedure                               Abnormality         Status                     ---------                               -----------         ------                     Light Blue Top  Hold[[id number]]                              In process                 Gold Top Hold[[id number]]                                    In process                   Please view results for these tests on the individual orders.   LIGHT BLUE TOP HOLD   GOLD TOP HOLD          Imaging Results          CT Abdomen Pelvis With Contrast (Final result)  Result time 07/28/22 07:36:00    Final result by Michael Mendoza MD (07/28/22 07:36:00)                 Impression:    Impression:    1. Multiple colonic diverticula are seen. There is minimal wall thickening of distal descending colon and sigmoid colon, decreased compared to the prior study suggesting improving colitis and/or diverticulitis. There is reduction in colonic wall thickening and pericolonic fat stranding of splenic flexure and descending colon compared to the prior study suggesting improving colitis.    2. Increase in mild ground glass infiltrate at the anterior right lung base (series 4 image 7 and series 8 image 50) suggesting worsening mild pneumonia.    3. Details and other findings as discussed above.    No significant discrepancy with overnight report      Electronically signed by: Michael Mendoza  Date:    07/28/2022  Time:    07:36             Narrative:      Technique:CT of the abdomen and pelvis was performed with axial images as well as sagittal and coronal reconstruction images with intravenous contrast.    Comparison:Comparison is with study dated 2022-07-03 06:57:20.    Clinical History:Multiple uses of albuterol today and still SOB. Pt received 1 duoneb 1 albuterol and 125 Solumederol on the ambulance en route and is still SOB.    Dosage Information:Automated Exposure Control was utilized.    Findings:    Thorax:    Lungs:There is mild nonspecific dependent change at the lung bases. Increase in mild ground glass infiltrate at the anterior right lung base (series 4 image 7 and series 8 image 50) suggesting worsening mild pneumonia.    Pleura:No effusions  or thickening. No pneumothorax is seen in the visualized lung bases.    Heart:The heart size is within normal limits.    Abdomen:    Abdominal Wall:No abdominal wall pathology is seen.    Liver:The liver appears unremarkable.    Biliary System:No intrahepatic or extrahepatic biliary duct dilatation is seen.    Gallbladder:The gallbladder appears unremarkable with no stones wall thickening or pericholecystic inflammatory changes or fluid.    Pancreas:The pancreas appears unremarkable. No pancreatic mass, or, ductal dilatation is seen.    Spleen:The spleen appears unremarkable.    Adrenals:The adrenal glands appear unremarkable.    Kidneys:The kidneys appear unremarkable with no stones cysts masses or hydronephrosis.    Aorta:The abdominal aorta appears unremarkable.    Bowel:    Esophagus:The visualized esophagus appears unremarkable.    Stomach:The stomach appears unremarkable.    Duodenum:Unremarkable appearing duodenum.    Small Bowel:The small bowel appears unremarkable.    Colon:Multiple colonic diverticula are seen. There is minimal wall thickening of distal descending colon and sigmoid colon, decreased compared to the prior study suggesting improving colitis and/or diverticulitis. There is reduction in colonic wall thickening and pericolonic fat stranding of splenic flexure and descending colon compared to the prior study suggesting improving colitis.    Appendix:The appendix appears unremarkable and is. Seen on series 2 image 56.    Peritoneum:No intraperitoneal free air or ascites is seen.    Pelvis:    Bladder:The bladder appears unremarkable.    Female:    Uterus:The uterus appears unremarkable.    Ovaries:The ovaries appear unremarkable with probable bilateral physiologic cysts.    Bony structures:    Dorsal Spine:There is mild multilevel spondylosis of the visualized dorsal spine.    Bony Pelvis:The visualized bony structures of the pelvis appear unremarkable.                    Preliminary result by  Interface, Rad Results In (07/28/22 01:24:12)                 Narrative:    START OF REPORT:  Technique: CT of the abdomen and pelvis was performed with axial images as well as sagittal and coronal reconstruction images with intravenous contrast.    Comparison: Comparison is with study dated 2022-07-03 06:57:20.    Clinical History: Multiple uses of albuterol today and still SOB. Pt received 1 duoneb 1 albuterol and 125 Solumederol on the ambulance en route and is still SOB.    Dosage Information: Automated Exposure Control was utilized.    Findings:  Thorax:  Lungs: There is mild nonspecific dependent change at the lung bases. Increase in mild ground glass infiltrate at the anterior right lung base (series 4 image 7 and series 8 image 50) suggesting worsening mild pneumonia.  Pleura: No effusions or thickening. No pneumothorax is seen in the visualized lung bases.  Heart: The heart size is within normal limits.  Abdomen:  Abdominal Wall: No abdominal wall pathology is seen.  Liver: The liver appears unremarkable.  Biliary System: No intrahepatic or extrahepatic biliary duct dilatation is seen.  Gallbladder: The gallbladder appears unremarkable with no stones wall thickening or pericholecystic inflammatory changes or fluid.  Pancreas: The pancreas appears unremarkable. No pancreatic mass, or, ductal dilatation is seen.  Spleen: The spleen appears unremarkable.  Adrenals: The adrenal glands appear unremarkable.  Kidneys: The kidneys appear unremarkable with no stones cysts masses or hydronephrosis.  Aorta: The abdominal aorta appears unremarkable.  Bowel:  Esophagus: The visualized esophagus appears unremarkable.  Stomach: The stomach appears unremarkable.  Duodenum: Unremarkable appearing duodenum.  Small Bowel: The small bowel appears unremarkable.  Colon: Multiple colonic diverticula are seen. There is minimal wall thickening of distal descending colon and sigmoid colon, decreased compared to the prior study  suggesting improving colitis and/or diverticulitis. There is significant reduction in colonic wall thickening and pericolonic fat stranding of splenic flexure and descending colon compared to the prior study suggesting improving colitis.  Appendix: The appendix appears unremarkable and is. Seen on series 2 image 56.  Peritoneum: No intraperitoneal free air or ascites is seen.    Pelvis:  Bladder: The bladder appears unremarkable.  Female:  Uterus: The uterus appears unremarkable.  Ovaries: The ovaries appear unremarkable with probable bilateral physiologic cysts.    Bony structures:  Dorsal Spine: There is mild multilevel spondylosis of the visualized dorsal spine.  Bony Pelvis: The visualized bony structures of the pelvis appear unremarkable.      Impression:  1. Multiple colonic diverticula are seen. There is minimal wall thickening of distal descending colon and sigmoid colon, decreased compared to the prior study suggesting improving colitis and/or diverticulitis. There is significant reduction in colonic wall thickening and pericolonic fat stranding of splenic flexure and descending colon compared to the prior study suggesting improving colitis.  2. Increase in mild ground glass infiltrate at the anterior right lung base (series 4 image 7 and series 8 image 50) suggesting worsening mild pneumonia.  3. Details and other findings as discussed above.                      Preliminary result by Michael Mendoza MD (07/28/22 01:24:12)                 Narrative:    START OF REPORT:  Technique: CT of the abdomen and pelvis was performed with axial images as well as sagittal and coronal reconstruction images with intravenous contrast.    Comparison: Comparison is with study dated 2022-07-03 06:57:20.    Clinical History: Multiple uses of albuterol today and still SOB. Pt received 1 duoneb 1 albuterol and 125 Solumederol on the ambulance en route and is still SOB.    Dosage Information: Automated Exposure Control was  utilized.    Findings:  Thorax:  Lungs: There is mild nonspecific dependent change at the lung bases. Increase in mild ground glass infiltrate at the anterior right lung base (series 4 image 7 and series 8 image 50) suggesting worsening mild pneumonia.  Pleura: No effusions or thickening. No pneumothorax is seen in the visualized lung bases.  Heart: The heart size is within normal limits.  Abdomen:  Abdominal Wall: No abdominal wall pathology is seen.  Liver: The liver appears unremarkable.  Biliary System: No intrahepatic or extrahepatic biliary duct dilatation is seen.  Gallbladder: The gallbladder appears unremarkable with no stones wall thickening or pericholecystic inflammatory changes or fluid.  Pancreas: The pancreas appears unremarkable. No pancreatic mass, or, ductal dilatation is seen.  Spleen: The spleen appears unremarkable.  Adrenals: The adrenal glands appear unremarkable.  Kidneys: The kidneys appear unremarkable with no stones cysts masses or hydronephrosis.  Aorta: The abdominal aorta appears unremarkable.  Bowel:  Esophagus: The visualized esophagus appears unremarkable.  Stomach: The stomach appears unremarkable.  Duodenum: Unremarkable appearing duodenum.  Small Bowel: The small bowel appears unremarkable.  Colon: Multiple colonic diverticula are seen. There is minimal wall thickening of distal descending colon and sigmoid colon, decreased compared to the prior study suggesting improving colitis and/or diverticulitis. There is significant reduction in colonic wall thickening and pericolonic fat stranding of splenic flexure and descending colon compared to the prior study suggesting improving colitis.  Appendix: The appendix appears unremarkable and is. Seen on series 2 image 56.  Peritoneum: No intraperitoneal free air or ascites is seen.    Pelvis:  Bladder: The bladder appears unremarkable.  Female:  Uterus: The uterus appears unremarkable.  Ovaries: The ovaries appear unremarkable with  probable bilateral physiologic cysts.    Bony structures:  Dorsal Spine: There is mild multilevel spondylosis of the visualized dorsal spine.  Bony Pelvis: The visualized bony structures of the pelvis appear unremarkable.      Impression:  1. Multiple colonic diverticula are seen. There is minimal wall thickening of distal descending colon and sigmoid colon, decreased compared to the prior study suggesting improving colitis and/or diverticulitis. There is significant reduction in colonic wall thickening and pericolonic fat stranding of splenic flexure and descending colon compared to the prior study suggesting improving colitis.  2. Increase in mild ground glass infiltrate at the anterior right lung base (series 4 image 7 and series 8 image 50) suggesting worsening mild pneumonia.  3. Details and other findings as discussed above.                                 X-Ray Chest AP Portable (Final result)  Result time 07/28/22 07:16:56    Final result by Michael Mendoza MD (07/28/22 07:16:56)                 Impression:      NO ACUTE CARDIOPULMONARY PROCESS IDENTIFIED.      Electronically signed by: Michael Mendoza  Date:    07/28/2022  Time:    07:16             Narrative:    EXAMINATION:  XR CHEST AP PORTABLE    CLINICAL HISTORY:  Shortness of breath    TECHNIQUE:  One view    COMPARISON:  July 2, 2022.    FINDINGS:  Cardiopericardial silhouette is within normal limits. Lungs are without dense focal or segmental consolidation, congestive process, pleural effusions or pneumothorax.                                 Medications   albuterol-ipratropium 2.5 mg-0.5 mg/3 mL nebulizer solution 3 mL (3 mLs Nebulization Given 7/27/22 2307)   albuterol-ipratropium 2.5 mg-0.5 mg/3 mL nebulizer solution 3 mL (3 mLs Nebulization Given by Other 7/27/22 2135)   albuterol-ipratropium 2.5 mg-0.5 mg/3 mL nebulizer solution 3 mL (3 mLs Nebulization Given by Other 7/27/22 2115)   albuterol-ipratropium 2.5 mg-0.5 mg/3 mL nebulizer solution 3 mL  (3 mLs Nebulization Given by Other 7/27/22 2125)   magnesium sulfate 2g in water 50mL IVPB (premix) (2 g Intravenous New Bag 7/27/22 2224)   terbutaline injection 0.25 mg (0.25 mg Subcutaneous Given 7/27/22 2223)   sodium chloride 0.9% bolus 1,000 mL (1,000 mLs Intravenous New Bag 7/27/22 2236)   albuterol-ipratropium (DUO-NEB) 2.5 mg-0.5 mg/3 mL nebulizer solution (  Given by Other 7/27/22 2315)   ketorolac injection 15 mg (15 mg Intravenous Given 7/28/22 0026)   iopamidoL (ISOVUE-370) injection 100 mL (100 mLs Intravenous Given 7/28/22 0129)   ondansetron injection 4 mg (4 mg Intravenous Given 7/28/22 0935)   ketorolac injection 15 mg (15 mg Intravenous Given 7/28/22 1908)   traMADoL tablet 50 mg (50 mg Oral Given 7/28/22 2148)   calcium carbonate 200 mg calcium (500 mg) chewable tablet 1,000 mg (1,000 mg Oral Given 7/29/22 0154)   potassium chloride SA CR tablet 20 mEq (20 mEq Oral Given 7/29/22 0835)     Medical Decision Making:   Clinical Tests:   Lab Tests: Reviewed and Ordered  Radiological Study: Reviewed and Ordered  ED Management:  52-year-old female presents to ED chronic recurring abdominal pain with multiple bouts of diverticulitis and shortness of breath consistent with prior COPD exacerbations. Felt relief following breathing treatments en route by EMS. On arrival she was tachypneic with mild retractions.  Not moving a lot of air with wheezing present.  Given multiple breathing treatments and supplemental meds which improved her sats and significantly decreased her work of breathing. Conversational dyspnea resolved. Otherwise workup demonstrated no acute findings and chest x-ray clear. She voiced multiple times over concern for dying of a perforation (multiple family members had). Her exam only had minimal localized LLQ tenderness w/o R/G/R. Had extensive bedside conversation with her about minimizing radiation and that I would avoid scanning her belly if her workup lab wise was otherwise  unremarkable. She persistently requested I scan her abd and stated the pain was far worse than before. Her image showed improvement at all sites of recent diverticular infections.  Ultimately unable to wean the patient off oxygen.  On no baseline O2.  For this reason she required admission for continued treatments and monitoring. voiced understanding, medicine paged and presented bedside. care transitioned. (geo)                      Clinical Impression:   Final diagnoses:  [J44.1] COPD exacerbation (Primary)  [K57.92] Diverticulitis  [K52.9] Colitis  [R10.9, G89.29] Chronic abdominal pain  [J45.901] Asthma exacerbation          ED Disposition Condition    Observation               Wilver Ibrahim MD  08/01/22 0384

## 2022-07-29 LAB — POCT GLUCOSE: 167 MG/DL (ref 70–110)

## 2022-07-29 PROCEDURE — 96375 TX/PRO/DX INJ NEW DRUG ADDON: CPT

## 2022-07-29 PROCEDURE — 25000242 PHARM REV CODE 250 ALT 637 W/ HCPCS: Performed by: STUDENT IN AN ORGANIZED HEALTH CARE EDUCATION/TRAINING PROGRAM

## 2022-07-29 PROCEDURE — 96367 TX/PROPH/DG ADDL SEQ IV INF: CPT

## 2022-07-29 PROCEDURE — 94640 AIRWAY INHALATION TREATMENT: CPT

## 2022-07-29 PROCEDURE — 25000003 PHARM REV CODE 250: Performed by: STUDENT IN AN ORGANIZED HEALTH CARE EDUCATION/TRAINING PROGRAM

## 2022-07-29 PROCEDURE — 96376 TX/PRO/DX INJ SAME DRUG ADON: CPT

## 2022-07-29 PROCEDURE — 96366 THER/PROPH/DIAG IV INF ADDON: CPT

## 2022-07-29 PROCEDURE — 94761 N-INVAS EAR/PLS OXIMETRY MLT: CPT

## 2022-07-29 PROCEDURE — 63600175 PHARM REV CODE 636 W HCPCS: Performed by: STUDENT IN AN ORGANIZED HEALTH CARE EDUCATION/TRAINING PROGRAM

## 2022-07-29 PROCEDURE — G0378 HOSPITAL OBSERVATION PER HR: HCPCS

## 2022-07-29 RX ORDER — TRAMADOL HYDROCHLORIDE 50 MG/1
50 TABLET ORAL 2 TIMES DAILY
Status: DISCONTINUED | OUTPATIENT
Start: 2022-07-29 | End: 2022-07-30 | Stop reason: HOSPADM

## 2022-07-29 RX ORDER — CALCIUM CARBONATE 200(500)MG
1000 TABLET,CHEWABLE ORAL ONCE
Status: COMPLETED | OUTPATIENT
Start: 2022-07-29 | End: 2022-07-29

## 2022-07-29 RX ORDER — GLUCAGON 1 MG
1 KIT INJECTION
Status: DISCONTINUED | OUTPATIENT
Start: 2022-07-29 | End: 2022-07-30 | Stop reason: HOSPADM

## 2022-07-29 RX ORDER — POTASSIUM CHLORIDE 20 MEQ/1
20 TABLET, EXTENDED RELEASE ORAL ONCE
Status: COMPLETED | OUTPATIENT
Start: 2022-07-29 | End: 2022-07-29

## 2022-07-29 RX ORDER — INSULIN ASPART 100 [IU]/ML
0-5 INJECTION, SOLUTION INTRAVENOUS; SUBCUTANEOUS
Status: DISCONTINUED | OUTPATIENT
Start: 2022-07-29 | End: 2022-07-30 | Stop reason: HOSPADM

## 2022-07-29 RX ORDER — IBUPROFEN 200 MG
24 TABLET ORAL
Status: DISCONTINUED | OUTPATIENT
Start: 2022-07-29 | End: 2022-07-30 | Stop reason: HOSPADM

## 2022-07-29 RX ORDER — IBUPROFEN 200 MG
16 TABLET ORAL
Status: DISCONTINUED | OUTPATIENT
Start: 2022-07-29 | End: 2022-07-30 | Stop reason: HOSPADM

## 2022-07-29 RX ORDER — TRAMADOL HYDROCHLORIDE 50 MG/1
50 TABLET ORAL EVERY 6 HOURS PRN
Status: DISCONTINUED | OUTPATIENT
Start: 2022-07-29 | End: 2022-07-29

## 2022-07-29 RX ADMIN — DICYCLOMINE HYDROCHLORIDE 20 MG: 10 CAPSULE ORAL at 08:07

## 2022-07-29 RX ADMIN — TRAMADOL HYDROCHLORIDE 50 MG: 50 TABLET, FILM COATED ORAL at 11:07

## 2022-07-29 RX ADMIN — TRAMADOL HYDROCHLORIDE 50 MG: 50 TABLET ORAL at 09:07

## 2022-07-29 RX ADMIN — PIPERACILLIN SODIUM AND TAZOBACTAM SODIUM 4.5 G: 4; .5 INJECTION, POWDER, LYOPHILIZED, FOR SOLUTION INTRAVENOUS at 04:07

## 2022-07-29 RX ADMIN — IPRATROPIUM BROMIDE AND ALBUTEROL SULFATE 3 ML: .5; 3 SOLUTION RESPIRATORY (INHALATION) at 03:07

## 2022-07-29 RX ADMIN — METHYLPREDNISOLONE SODIUM SUCCINATE 60 MG: 125 INJECTION, POWDER, FOR SOLUTION INTRAMUSCULAR; INTRAVENOUS at 06:07

## 2022-07-29 RX ADMIN — IPRATROPIUM BROMIDE AND ALBUTEROL SULFATE 3 ML: .5; 3 SOLUTION RESPIRATORY (INHALATION) at 11:07

## 2022-07-29 RX ADMIN — PIPERACILLIN SODIUM AND TAZOBACTAM SODIUM 4.5 G: 4; .5 INJECTION, POWDER, LYOPHILIZED, FOR SOLUTION INTRAVENOUS at 06:07

## 2022-07-29 RX ADMIN — IPRATROPIUM BROMIDE AND ALBUTEROL SULFATE 3 ML: .5; 3 SOLUTION RESPIRATORY (INHALATION) at 07:07

## 2022-07-29 RX ADMIN — AZITHROMYCIN MONOHYDRATE 500 MG: 500 INJECTION, POWDER, LYOPHILIZED, FOR SOLUTION INTRAVENOUS at 12:07

## 2022-07-29 RX ADMIN — DOCUSATE SODIUM 100 MG: 100 CAPSULE ORAL at 08:07

## 2022-07-29 RX ADMIN — METHYLPREDNISOLONE SODIUM SUCCINATE 60 MG: 125 INJECTION, POWDER, FOR SOLUTION INTRAMUSCULAR; INTRAVENOUS at 04:07

## 2022-07-29 RX ADMIN — DICYCLOMINE HYDROCHLORIDE 20 MG: 10 CAPSULE ORAL at 12:07

## 2022-07-29 RX ADMIN — MONTELUKAST SODIUM 10 MG: 5 TABLET, CHEWABLE ORAL at 08:07

## 2022-07-29 RX ADMIN — LINACLOTIDE 144 MCG: 72 CAPSULE, GELATIN COATED ORAL at 06:07

## 2022-07-29 RX ADMIN — DICYCLOMINE HYDROCHLORIDE 20 MG: 10 CAPSULE ORAL at 09:07

## 2022-07-29 RX ADMIN — DICYCLOMINE HYDROCHLORIDE 20 MG: 10 CAPSULE ORAL at 04:07

## 2022-07-29 RX ADMIN — POTASSIUM CHLORIDE 20 MEQ: 20 TABLET, EXTENDED RELEASE ORAL at 08:07

## 2022-07-29 RX ADMIN — ANTACID TABLETS 1000 MG: 500 TABLET, CHEWABLE ORAL at 01:07

## 2022-07-29 RX ADMIN — PIPERACILLIN SODIUM AND TAZOBACTAM SODIUM 4.5 G: 4; .5 INJECTION, POWDER, LYOPHILIZED, FOR SOLUTION INTRAVENOUS at 10:07

## 2022-07-29 RX ADMIN — PANTOPRAZOLE SODIUM 40 MG: 40 TABLET, DELAYED RELEASE ORAL at 08:07

## 2022-07-29 NOTE — PROGRESS NOTES
.[facility] Medicine Wards History & Physical Note     Resident Team: [facility] Medicine List 3  Attending Physician: Darren Bennett MD  Resident: Dr. Hawa Corbin     Date of Admit: 7/27/2022    Chief Complaint     Shortness of Breath (Multiple uses of albuterol today and still SOB.  Pt received 1 duoneb 1 albuterol and 125 Solumederol on the ambulance en route and is still SOB)    Subjective:      History of Present Illness:    52-year-old female with significant past medical history of asthma, GERD, history of diverticulitis and tobacco use presents to the ED for complaints of shortness of breath by EMS.  Patient states that she is compliant with her albuterol inhaler and her Symbicort at home.  She has been getting progressively worsening shortness of breast.  States that she had to use her albuterol inhaler about 20 times today without improvement in her symptoms.  Patient has had multiple admissions for asthma exacerbations.  She currently smokes 1-2 packs of cigarettes daily.  Denies any chest pain, increased chest pressure, cough, fever.  Also complains of abdominal pain and states that she had diverticulitis in March and had since then has been treated with several rounds of antibiotics with no relief.  Denies nausea/vomiting, hematuria, dysuria.  Patient states that she was treated previously in [place] for diverticulitis several years ago and the only antibiotic that cleared the infection was piperacillin.  In route to the ED patient was given Solu-Medrol 125 and DuoNeb.  In the ED she received another round of DuoNebs and IV magnesium.  She was still requiring supplemental oxygen (2 L nasal cannula) therefore Internal Medicine was consulted for asthma exacerbation admission.      Interval History : Patient is feeling better this morning and is less anxious. Patient is switched from 2 L NC to RA. She stated that belly pain does not get resolved with bentyl. Otherwise no acute complaint this morning.      Past Medical History:  Past Medical History:   Diagnosis Date    Asthma     Diverticulosis     GERD (gastroesophageal reflux disease)        Past Surgical History:  No past surgical history on file.    Family History:  No family history on file.    Social History:  Social History     Tobacco Use    Smoking status: Never Smoker    Smokeless tobacco: Never Used       Allergies:  Review of patient's allergies indicates:   Allergen Reactions    Levofloxacin Hives and Itching    Metronidazole Hives    Codeine     Latex     Morphine     Opioids - morphine analogues        Home Medications:  Prior to Admission medications    Medication Sig Start Date End Date Taking? Authorizing Provider   albuterol (PROVENTIL HFA) 90 mcg/actuation inhaler Inhale 2 puffs into the lungs every 4 (four) hours as needed for Wheezing. Rescue 22   Natalee Carrero MD   albuterol-ipratropium (DUO-NEB) 2.5 mg-0.5 mg/3 mL nebulizer solution Take 3 mLs by nebulization every 4 (four) hours as needed for Wheezing or Shortness of Breath. Rescue 22   Natalee Carrero MD   HYDROcodone-acetaminophen (NORCO) 5-325 mg per tablet Take 1 tablet by mouth every 6 (six) hours as needed for Pain. 22   Franco Brown MD   montelukast (SINGULAIR) 10 mg tablet Take 1 tablet (10 mg total) by mouth once daily at 6am. 22   Natalee Carrero MD   ondansetron (ZOFRAN-ODT) 4 MG TbDL Take 2 tablets (8 mg total) by mouth 2 (two) times daily. 22   Franco Brown MD   pantoprazole (PROTONIX) 40 MG tablet Take 1 tablet (40 mg total) by mouth once daily. 22   Natalee Carrero MD         Review of Systems:  ROS completed and negative except as indicated above.       Objective:   Last 24 Hour Vital Signs:  BP  Min: 97/52  Max: 136/60  Temp  Av.3 °F (36.8 °C)  Min: 98 °F (36.7 °C)  Max: 98.4 °F (36.9 °C)  Pulse  Av.2  Min: 75  Max: 95  Resp  Av.5  Min: 16  Max: 20  SpO2  Av.9 %  Min: 94 %  Max: 98 %  Body mass index is 32.44  kg/m².  I/O last 3 completed shifts:  In: 840 [P.O.:840]  Out: 860 [Urine:860]    Physical Examination:  General: appears well, speaking in full sentences, on RA   Respiratory:  Wheezing heard throughout all lung fields with decreased air movement  Cardiovascular: regular rate and rhythm without murmurs or gallops, no edema in bilateral lower extremities   Gastrointestinal: soft, mild tenderness on abdominal palpitation , non-distended, bowel sounds present   Genitourinary: no suprapubic tenderness   Musculoskeletal: no gross deformities observed   Integumentary: no acute rashes or skin lesions observed    Neuro: No focal lesions observed     Laboratory:  Most Recent Data:  CBC:   Lab Results   Component Value Date    WBC 5.4 07/28/2022    HGB 12.8 07/28/2022    HCT 38.4 07/28/2022     07/28/2022    MCV 96.2 (H) 07/28/2022    RDW 12.6 07/28/2022     WBC Differential:   Recent Labs   Lab 07/27/22 2108 07/28/22 0512   WBC 8.3 5.4   HGB 13.6 12.8   HCT 42.1 38.4    221   MCV 97.7* 96.2*     BMP:   Lab Results   Component Value Date     07/28/2022    K 3.7 07/28/2022    CO2 28 07/28/2022    BUN 11.8 07/28/2022    CREATININE 0.81 07/28/2022    CALCIUM 9.4 07/28/2022    MG 2.10 07/05/2022    PHOS 3.3 03/31/2022     LFTs:   Lab Results   Component Value Date    ALBUMIN 3.3 (L) 07/28/2022    BILITOT 0.2 07/28/2022    AST 16 07/28/2022    ALKPHOS 68 07/28/2022    ALT 21 07/28/2022     Microbiology Data:  Microbiology Results (last 7 days)     ** No results found for the last 168 hours. **           Radiology:  Imaging Results          CT Abdomen Pelvis With Contrast (Final result)  Result time 07/28/22 07:36:00    Final result by Michael Mendoza MD (07/28/22 07:36:00)                 Impression:    Impression:    1. Multiple colonic diverticula are seen. There is minimal wall thickening of distal descending colon and sigmoid colon, decreased compared to the prior study suggesting improving colitis  and/or diverticulitis. There is reduction in colonic wall thickening and pericolonic fat stranding of splenic flexure and descending colon compared to the prior study suggesting improving colitis.    2. Increase in mild ground glass infiltrate at the anterior right lung base (series 4 image 7 and series 8 image 50) suggesting worsening mild pneumonia.    3. Details and other findings as discussed above.    No significant discrepancy with overnight report      Electronically signed by: Michael Mendoza  Date:    07/28/2022  Time:    07:36             Narrative:      Technique:CT of the abdomen and pelvis was performed with axial images as well as sagittal and coronal reconstruction images with intravenous contrast.    Comparison:Comparison is with study dated 2022-07-03 06:57:20.    Clinical History:Multiple uses of albuterol today and still SOB. Pt received 1 duoneb 1 albuterol and 125 Solumederol on the ambulance en route and is still SOB.    Dosage Information:Automated Exposure Control was utilized.    Findings:    Thorax:    Lungs:There is mild nonspecific dependent change at the lung bases. Increase in mild ground glass infiltrate at the anterior right lung base (series 4 image 7 and series 8 image 50) suggesting worsening mild pneumonia.    Pleura:No effusions or thickening. No pneumothorax is seen in the visualized lung bases.    Heart:The heart size is within normal limits.    Abdomen:    Abdominal Wall:No abdominal wall pathology is seen.    Liver:The liver appears unremarkable.    Biliary System:No intrahepatic or extrahepatic biliary duct dilatation is seen.    Gallbladder:The gallbladder appears unremarkable with no stones wall thickening or pericholecystic inflammatory changes or fluid.    Pancreas:The pancreas appears unremarkable. No pancreatic mass, or, ductal dilatation is seen.    Spleen:The spleen appears unremarkable.    Adrenals:The adrenal glands appear unremarkable.    Kidneys:The kidneys appear  unremarkable with no stones cysts masses or hydronephrosis.    Aorta:The abdominal aorta appears unremarkable.    Bowel:    Esophagus:The visualized esophagus appears unremarkable.    Stomach:The stomach appears unremarkable.    Duodenum:Unremarkable appearing duodenum.    Small Bowel:The small bowel appears unremarkable.    Colon:Multiple colonic diverticula are seen. There is minimal wall thickening of distal descending colon and sigmoid colon, decreased compared to the prior study suggesting improving colitis and/or diverticulitis. There is reduction in colonic wall thickening and pericolonic fat stranding of splenic flexure and descending colon compared to the prior study suggesting improving colitis.    Appendix:The appendix appears unremarkable and is. Seen on series 2 image 56.    Peritoneum:No intraperitoneal free air or ascites is seen.    Pelvis:    Bladder:The bladder appears unremarkable.    Female:    Uterus:The uterus appears unremarkable.    Ovaries:The ovaries appear unremarkable with probable bilateral physiologic cysts.    Bony structures:    Dorsal Spine:There is mild multilevel spondylosis of the visualized dorsal spine.    Bony Pelvis:The visualized bony structures of the pelvis appear unremarkable.                    Preliminary result by [facility], Rad Results In (07/28/22 01:24:12)                 Narrative:    START OF REPORT:  Technique: CT of the abdomen and pelvis was performed with axial images as well as sagittal and coronal reconstruction images with intravenous contrast.    Comparison: Comparison is with study dated 2022-07-03 06:57:20.    Clinical History: Multiple uses of albuterol today and still SOB. Pt received 1 duoneb 1 albuterol and 125 Solumederol on the ambulance en route and is still SOB.    Dosage Information: Automated Exposure Control was utilized.    Findings:  Thorax:  Lungs: There is mild nonspecific dependent change at the lung bases. Increase in mild ground glass  infiltrate at the anterior right lung base (series 4 image 7 and series 8 image 50) suggesting worsening mild pneumonia.  Pleura: No effusions or thickening. No pneumothorax is seen in the visualized lung bases.  Heart: The heart size is within normal limits.  Abdomen:  Abdominal Wall: No abdominal wall pathology is seen.  Liver: The liver appears unremarkable.  Biliary System: No intrahepatic or extrahepatic biliary duct dilatation is seen.  Gallbladder: The gallbladder appears unremarkable with no stones wall thickening or pericholecystic inflammatory changes or fluid.  Pancreas: The pancreas appears unremarkable. No pancreatic mass, or, ductal dilatation is seen.  Spleen: The spleen appears unremarkable.  Adrenals: The adrenal glands appear unremarkable.  Kidneys: The kidneys appear unremarkable with no stones cysts masses or hydronephrosis.  Aorta: The abdominal aorta appears unremarkable.  Bowel:  Esophagus: The visualized esophagus appears unremarkable.  Stomach: The stomach appears unremarkable.  Duodenum: Unremarkable appearing duodenum.  Small Bowel: The small bowel appears unremarkable.  Colon: Multiple colonic diverticula are seen. There is minimal wall thickening of distal descending colon and sigmoid colon, decreased compared to the prior study suggesting improving colitis and/or diverticulitis. There is significant reduction in colonic wall thickening and pericolonic fat stranding of splenic flexure and descending colon compared to the prior study suggesting improving colitis.  Appendix: The appendix appears unremarkable and is. Seen on series 2 image 56.  Peritoneum: No intraperitoneal free air or ascites is seen.    Pelvis:  Bladder: The bladder appears unremarkable.  Female:  Uterus: The uterus appears unremarkable.  Ovaries: The ovaries appear unremarkable with probable bilateral physiologic cysts.    Bony structures:  Dorsal Spine: There is mild multilevel spondylosis of the visualized dorsal  spine.  Bony Pelvis: The visualized bony structures of the pelvis appear unremarkable.      Impression:  1. Multiple colonic diverticula are seen. There is minimal wall thickening of distal descending colon and sigmoid colon, decreased compared to the prior study suggesting improving colitis and/or diverticulitis. There is significant reduction in colonic wall thickening and pericolonic fat stranding of splenic flexure and descending colon compared to the prior study suggesting improving colitis.  2. Increase in mild ground glass infiltrate at the anterior right lung base (series 4 image 7 and series 8 image 50) suggesting worsening mild pneumonia.  3. Details and other findings as discussed above.                      Preliminary result by Michael Mendoza MD (07/28/22 01:24:12)                 Narrative:    START OF REPORT:  Technique: CT of the abdomen and pelvis was performed with axial images as well as sagittal and coronal reconstruction images with intravenous contrast.    Comparison: Comparison is with study dated 2022-07-03 06:57:20.    Clinical History: Multiple uses of albuterol today and still SOB. Pt received 1 duoneb 1 albuterol and 125 Solumederol on the ambulance en route and is still SOB.    Dosage Information: Automated Exposure Control was utilized.    Findings:  Thorax:  Lungs: There is mild nonspecific dependent change at the lung bases. Increase in mild ground glass infiltrate at the anterior right lung base (series 4 image 7 and series 8 image 50) suggesting worsening mild pneumonia.  Pleura: No effusions or thickening. No pneumothorax is seen in the visualized lung bases.  Heart: The heart size is within normal limits.  Abdomen:  Abdominal Wall: No abdominal wall pathology is seen.  Liver: The liver appears unremarkable.  Biliary System: No intrahepatic or extrahepatic biliary duct dilatation is seen.  Gallbladder: The gallbladder appears unremarkable with no stones wall thickening or  pericholecystic inflammatory changes or fluid.  Pancreas: The pancreas appears unremarkable. No pancreatic mass, or, ductal dilatation is seen.  Spleen: The spleen appears unremarkable.  Adrenals: The adrenal glands appear unremarkable.  Kidneys: The kidneys appear unremarkable with no stones cysts masses or hydronephrosis.  Aorta: The abdominal aorta appears unremarkable.  Bowel:  Esophagus: The visualized esophagus appears unremarkable.  Stomach: The stomach appears unremarkable.  Duodenum: Unremarkable appearing duodenum.  Small Bowel: The small bowel appears unremarkable.  Colon: Multiple colonic diverticula are seen. There is minimal wall thickening of distal descending colon and sigmoid colon, decreased compared to the prior study suggesting improving colitis and/or diverticulitis. There is significant reduction in colonic wall thickening and pericolonic fat stranding of splenic flexure and descending colon compared to the prior study suggesting improving colitis.  Appendix: The appendix appears unremarkable and is. Seen on series 2 image 56.  Peritoneum: No intraperitoneal free air or ascites is seen.    Pelvis:  Bladder: The bladder appears unremarkable.  Female:  Uterus: The uterus appears unremarkable.  Ovaries: The ovaries appear unremarkable with probable bilateral physiologic cysts.    Bony structures:  Dorsal Spine: There is mild multilevel spondylosis of the visualized dorsal spine.  Bony Pelvis: The visualized bony structures of the pelvis appear unremarkable.      Impression:  1. Multiple colonic diverticula are seen. There is minimal wall thickening of distal descending colon and sigmoid colon, decreased compared to the prior study suggesting improving colitis and/or diverticulitis. There is significant reduction in colonic wall thickening and pericolonic fat stranding of splenic flexure and descending colon compared to the prior study suggesting improving colitis.  2. Increase in mild ground glass  infiltrate at the anterior right lung base (series 4 image 7 and series 8 image 50) suggesting worsening mild pneumonia.  3. Details and other findings as discussed above.                                 X-Ray Chest AP Portable (Final result)  Result time 07/28/22 07:16:56    Final result by Michael Mendoza MD (07/28/22 07:16:56)                 Impression:      NO ACUTE CARDIOPULMONARY PROCESS IDENTIFIED.      Electronically signed by: Michael Mendoza  Date:    07/28/2022  Time:    07:16             Narrative:    EXAMINATION:  XR CHEST AP PORTABLE    CLINICAL HISTORY:  Shortness of breath    TECHNIQUE:  One view    COMPARISON:  July 2, 2022.    FINDINGS:  Cardiopericardial silhouette is within normal limits. Lungs are without dense focal or segmental consolidation, congestive process, pleural effusions or pneumothorax.                                 Assessment & Plan:     Asthma exacerbation  - On RA sating at 95%  -in ED patient received DuoNebs and IV magnesium  - CXR shows no perihilar infiltrates but no consolidation, no pleural effusion or pneumothorax   - Continue IV solumedrol 60 (day 2), azithromycin (day 2) and budesonide neb  - will switch her to oral prednisone ezio   - Continue  duonebs q4 hrs for wheezing   -continue home montelukast 10 mg daily  -incentive spirometry      History of diverticulitis and colitis   Abdominal pain  GERD  -multiple ED visits in the past with abx usage  -dicyclomine does not work for  abdominal cramps/pain; will add tramadol PRN for pain at this time   -continue home Protonix 40 mg daily  -CT abdomen and pelvis with contrast: Multiple colonic diverticula with minimal wall thickening of distal descending colon and sigmoid colon, improving colitis and/or diverticulitis.  - Continue zosyn to cover for any intraabdominal infections; pt states that this is the only abx that works for her   - PT/OT ordered   - Needs GI referral and pain management referral on discharge     Tobacco  use  -tobacco cessation education needed upon discharge    Hyperglycemia    likely due to steroids     Started on Low sliding scale     CODE STATUS: Full Code  Access: PIV  Antibiotics: azithromycin, zosyn   Diet: Regular  DVT Prophylaxis: Lovenox 40  GI Prophylaxis: protonix 40  Fluids: none      Disposition:  Continue abx, duonebs and steriods, pending discharge ezio Gaston, DO

## 2022-07-30 VITALS
BODY MASS INDEX: 32.38 KG/M2 | DIASTOLIC BLOOD PRESSURE: 83 MMHG | TEMPERATURE: 98 F | WEIGHT: 160.63 LBS | OXYGEN SATURATION: 98 % | HEIGHT: 59 IN | HEART RATE: 73 BPM | RESPIRATION RATE: 16 BRPM | SYSTOLIC BLOOD PRESSURE: 128 MMHG

## 2022-07-30 LAB
ALBUMIN SERPL-MCNC: 3.4 GM/DL (ref 3.5–5)
ALBUMIN/GLOB SERPL: 0.9 RATIO (ref 1.1–2)
ALP SERPL-CCNC: 65 UNIT/L (ref 40–150)
ALT SERPL-CCNC: 18 UNIT/L (ref 0–55)
AST SERPL-CCNC: 13 UNIT/L (ref 5–34)
BILIRUBIN DIRECT+TOT PNL SERPL-MCNC: 0.2 MG/DL
BUN SERPL-MCNC: 11.2 MG/DL (ref 9.8–20.1)
CALCIUM SERPL-MCNC: 9.9 MG/DL (ref 8.4–10.2)
CHLORIDE SERPL-SCNC: 107 MMOL/L (ref 98–107)
CO2 SERPL-SCNC: 28 MMOL/L (ref 22–29)
CREAT SERPL-MCNC: 0.8 MG/DL (ref 0.55–1.02)
GLOBULIN SER-MCNC: 3.6 GM/DL (ref 2.4–3.5)
GLUCOSE SERPL-MCNC: 154 MG/DL (ref 74–100)
POCT GLUCOSE: 121 MG/DL (ref 70–110)
POTASSIUM SERPL-SCNC: 3.9 MMOL/L (ref 3.5–5.1)
PROT SERPL-MCNC: 7 GM/DL (ref 6.4–8.3)
SODIUM SERPL-SCNC: 143 MMOL/L (ref 136–145)

## 2022-07-30 PROCEDURE — 94761 N-INVAS EAR/PLS OXIMETRY MLT: CPT

## 2022-07-30 PROCEDURE — 97161 PT EVAL LOW COMPLEX 20 MIN: CPT

## 2022-07-30 PROCEDURE — 25000242 PHARM REV CODE 250 ALT 637 W/ HCPCS: Performed by: STUDENT IN AN ORGANIZED HEALTH CARE EDUCATION/TRAINING PROGRAM

## 2022-07-30 PROCEDURE — 96366 THER/PROPH/DIAG IV INF ADDON: CPT

## 2022-07-30 PROCEDURE — 80053 COMPREHEN METABOLIC PANEL: CPT | Performed by: STUDENT IN AN ORGANIZED HEALTH CARE EDUCATION/TRAINING PROGRAM

## 2022-07-30 PROCEDURE — 63600175 PHARM REV CODE 636 W HCPCS: Performed by: STUDENT IN AN ORGANIZED HEALTH CARE EDUCATION/TRAINING PROGRAM

## 2022-07-30 PROCEDURE — 25000003 PHARM REV CODE 250: Performed by: STUDENT IN AN ORGANIZED HEALTH CARE EDUCATION/TRAINING PROGRAM

## 2022-07-30 PROCEDURE — 36415 COLL VENOUS BLD VENIPUNCTURE: CPT | Performed by: STUDENT IN AN ORGANIZED HEALTH CARE EDUCATION/TRAINING PROGRAM

## 2022-07-30 PROCEDURE — G0378 HOSPITAL OBSERVATION PER HR: HCPCS

## 2022-07-30 PROCEDURE — 94640 AIRWAY INHALATION TREATMENT: CPT

## 2022-07-30 PROCEDURE — 96365 THER/PROPH/DIAG IV INF INIT: CPT | Mod: 59

## 2022-07-30 PROCEDURE — 96376 TX/PRO/DX INJ SAME DRUG ADON: CPT

## 2022-07-30 RX ORDER — PREDNISONE 20 MG/1
20 TABLET ORAL 2 TIMES DAILY
Qty: 2 TABLET | Refills: 0 | Status: SHIPPED | OUTPATIENT
Start: 2022-07-30 | End: 2022-08-15 | Stop reason: ALTCHOICE

## 2022-07-30 RX ORDER — METRONIDAZOLE 500 MG/1
500 TABLET ORAL EVERY 12 HOURS
Qty: 20 TABLET | Refills: 0 | Status: ON HOLD | OUTPATIENT
Start: 2022-07-30 | End: 2022-09-03 | Stop reason: ALTCHOICE

## 2022-07-30 RX ORDER — AMOXICILLIN AND CLAVULANATE POTASSIUM 875; 125 MG/1; MG/1
1 TABLET, FILM COATED ORAL EVERY 12 HOURS
Qty: 14 TABLET | Refills: 0 | Status: SHIPPED | OUTPATIENT
Start: 2022-07-30 | End: 2022-08-06

## 2022-07-30 RX ORDER — CIPROFLOXACIN 500 MG/1
500 TABLET ORAL EVERY 12 HOURS
Qty: 20 TABLET | Refills: 0 | Status: ON HOLD | OUTPATIENT
Start: 2022-07-30 | End: 2022-09-03 | Stop reason: ALTCHOICE

## 2022-07-30 RX ORDER — BUDESONIDE AND FORMOTEROL FUMARATE DIHYDRATE 160; 4.5 UG/1; UG/1
2 AEROSOL RESPIRATORY (INHALATION) EVERY 12 HOURS
Qty: 6 G | Refills: 10 | Status: SHIPPED | OUTPATIENT
Start: 2022-07-30 | End: 2023-07-30

## 2022-07-30 RX ORDER — ACETAMINOPHEN 325 MG/1
650 TABLET ORAL EVERY 6 HOURS PRN
Qty: 30 TABLET | Refills: 2 | Status: ON HOLD | OUTPATIENT
Start: 2022-07-30 | End: 2023-02-11 | Stop reason: ALTCHOICE

## 2022-07-30 RX ORDER — TALC
3 POWDER (GRAM) TOPICAL NIGHTLY PRN
Status: DISCONTINUED | OUTPATIENT
Start: 2022-07-30 | End: 2022-07-30 | Stop reason: HOSPADM

## 2022-07-30 RX ADMIN — MONTELUKAST SODIUM 10 MG: 5 TABLET, CHEWABLE ORAL at 08:07

## 2022-07-30 RX ADMIN — DICYCLOMINE HYDROCHLORIDE 20 MG: 10 CAPSULE ORAL at 08:07

## 2022-07-30 RX ADMIN — IPRATROPIUM BROMIDE AND ALBUTEROL SULFATE 3 ML: .5; 3 SOLUTION RESPIRATORY (INHALATION) at 03:07

## 2022-07-30 RX ADMIN — AZITHROMYCIN MONOHYDRATE 500 MG: 500 INJECTION, POWDER, LYOPHILIZED, FOR SOLUTION INTRAVENOUS at 01:07

## 2022-07-30 RX ADMIN — LINACLOTIDE 144 MCG: 72 CAPSULE, GELATIN COATED ORAL at 06:07

## 2022-07-30 RX ADMIN — DICYCLOMINE HYDROCHLORIDE 10 MG: 10 CAPSULE ORAL at 06:07

## 2022-07-30 RX ADMIN — PANTOPRAZOLE SODIUM 40 MG: 40 TABLET, DELAYED RELEASE ORAL at 08:07

## 2022-07-30 RX ADMIN — BUDESONIDE 0.25 MG: 0.5 INHALANT RESPIRATORY (INHALATION) at 07:07

## 2022-07-30 RX ADMIN — DOCUSATE SODIUM 100 MG: 100 CAPSULE ORAL at 08:07

## 2022-07-30 RX ADMIN — MELATONIN TAB 3 MG 3 MG: 3 TAB at 12:07

## 2022-07-30 RX ADMIN — TRAMADOL HYDROCHLORIDE 50 MG: 50 TABLET ORAL at 09:07

## 2022-07-30 RX ADMIN — IPRATROPIUM BROMIDE AND ALBUTEROL SULFATE 3 ML: .5; 3 SOLUTION RESPIRATORY (INHALATION) at 07:07

## 2022-07-30 RX ADMIN — IPRATROPIUM BROMIDE AND ALBUTEROL SULFATE 3 ML: .5; 3 SOLUTION RESPIRATORY (INHALATION) at 11:07

## 2022-07-30 RX ADMIN — METHYLPREDNISOLONE SODIUM SUCCINATE 60 MG: 125 INJECTION, POWDER, FOR SOLUTION INTRAMUSCULAR; INTRAVENOUS at 05:07

## 2022-07-30 RX ADMIN — PIPERACILLIN SODIUM AND TAZOBACTAM SODIUM 4.5 G: 4; .5 INJECTION, POWDER, LYOPHILIZED, FOR SOLUTION INTRAVENOUS at 06:07

## 2022-07-30 RX ADMIN — DICYCLOMINE HYDROCHLORIDE 20 MG: 10 CAPSULE ORAL at 01:07

## 2022-07-30 NOTE — PT/OT/SLP DISCHARGE
Physical Therapy Discharge Summary    Name: Niya Moeller  MRN: [id number]   Principal Problem: <principal problem not specified>     Patient Discharged from acute Physical Therapy on 22.  Please refer to prior PT noted date on 22 for functional status.     Assessment:     Patient has met all goals and is not appropriate for therapy.    Objective:     GOALS:   Multidisciplinary Problems     Physical Therapy Goals     Not on file          Multidisciplinary Problems (Resolved)        Problem: Physical Therapy    Goal Priority Disciplines Outcome Goal Variances Interventions   Physical Therapy Goal   (Resolved)     PT, PT/OT Met     Description: Goals to be met by: 22    Patient will increase functional independence with mobility by performin. Supine to sit with Navarro  2. Sit to supine with Navarro  3. Sit to stand transfer with Navarro  4. Gait  x 260 feet with Navarro using No Assistive Device.                      Reasons for Discontinuation of Therapy Services  Pt is I with mobility and is at prior level of function with no deficits currently requiring PT services, pt agrees with discharge from acute care PT      Plan:     Patient Discharged to: Home no PT services needed.      2022

## 2022-07-30 NOTE — DISCHARGE SUMMARY
[facility] Discharge Summary    Admitting Physician: Darren Bennett MD  Attending Physician: Darren Bennett MD  Date of Admit: 7/27/2022  Date of Discharge: 7/30/2022    Discharge to:  Home   Condition: stable     Discharge Diagnoses     Patient Active Problem List   Diagnosis    Obesity    Asthma exacerbation    GERD (gastroesophageal reflux disease)    Diverticulitis    Colitis       Consultants and Procedures     Consultants:  Consults (From admission, onward)        Status Ordering Provider     Inpatient consult to Internal Medicine  Once        Provider:  (Not yet assigned)    Acknowledged SHELBY MANNING A               Brief History of Present Illness         52-year-old female with significant past medical history of asthma, GERD, history of diverticulitis and tobacco use presents to the ED for complaints of shortness of breath by EMS.  Patient states that she is compliant with her albuterol inhaler and her Symbicort at home.  She has been getting progressively worsening shortness of breast.  States that she had to use her albuterol inhaler about 20 times today without improvement in her symptoms.  Patient has had multiple admissions for asthma exacerbations.  She currently smokes 1-2 packs of cigarettes daily.  Denies any chest pain, increased chest pressure, cough, fever.  Also complains of abdominal pain and states that she had diverticulitis in March and had since then has been treated with several rounds of antibiotics with no relief.  Denies nausea/vomiting, hematuria, dysuria.  Patient states that she was treated previously in [place] for diverticulitis several years ago and the only antibiotic that cleared the infection was piperacillin.  In route to the ED patient was given Solu-Medrol 125 and DuoNeb.  In the ED she received another round of DuoNebs and IV magnesium.  She was still requiring supplemental oxygen (2 L nasal cannula) therefore Internal Medicine was consulted for  asthma exacerbation admission.    Hospital Course with Pertinent Findings     Admitted for asthma exacerbation and diverticulitis. Patient's SOB improved with duonebs, abx  and steriods.  Treated for diverticulitis inpatient with zosyn. Repleted electrolytes in the hospital stay.  At the time of discharge, Patient denied any  SOB, fever, chills, nausea, vomiting, abd pain. Patient stated she gets hives and throat itchiness with cipro and flagyl. Will prescribe augmentin 875 bid for 7 days for outpatient treatment.  Referral sent to GI for a possible colonoscopy. Patient is to f/u with PCP in 1-2 weeks. Patient is to continue symbicort, Singulair and   prednisone 40 for 2 days to complete the course. Strict ED precautions given at the time of discharge.     Discharge physical exam:  Vitals:    07/30/22 1149   BP:    Pulse:    Resp:    Temp: 98 °F (36.7 °C)     Physical Examination:  General: appears well, speaking in full sentences, on RA   Respiratory:  mild Wheezing heard throughout all lung fields with decreased air movement  Cardiovascular: regular rate and rhythm without murmurs or gallops, no edema in bilateral lower extremities   Gastrointestinal: soft, mild tenderness on abdominal palpitation , non-distended, bowel sounds present   Genitourinary: no suprapubic tenderness   Musculoskeletal: no gross deformities observed   Integumentary: no acute rashes or skin lesions observed    Neuro: No focal lesions observed       TIME SPENT ON DISCHARGE: 60 minutes    Discharge Medications        Medication List      START taking these medications    acetaminophen 325 MG tablet  Commonly known as: TYLENOL  Take 2 tablets (650 mg total) by mouth every 6 (six) hours as needed.     budesonide-formoterol 160-4.5 mcg 160-4.5 mcg/actuation Hfaa  Commonly known as: SYMBICORT  Inhale 2 puffs into the lungs every 12 (twelve) hours. Controller     ciprofloxacin HCl 500 MG tablet  Commonly known as: CIPRO  Take 1 tablet (500 mg  total) by mouth every 12 (twelve) hours.     metroNIDAZOLE 500 MG tablet  Commonly known as: FLAGYL  Take 1 tablet (500 mg total) by mouth every 12 (twelve) hours.     predniSONE 20 MG tablet  Commonly known as: DELTASONE  Take 1 tablet (20 mg total) by mouth 2 (two) times daily.        CONTINUE taking these medications    albuterol 90 mcg/actuation inhaler  Commonly known as: PROVENTIL HFA  Inhale 2 puffs into the lungs every 4 (four) hours as needed for Wheezing. Rescue     albuterol-ipratropium 2.5 mg-0.5 mg/3 mL nebulizer solution  Commonly known as: DUO-NEB  Take 3 mLs by nebulization every 4 (four) hours as needed for Wheezing or Shortness of Breath. Rescue     montelukast 10 mg tablet  Commonly known as: SINGULAIR  Take 1 tablet (10 mg total) by mouth once daily at 6am.     ondansetron 4 MG Tbdl  Commonly known as: ZOFRAN-ODT  Take 2 tablets (8 mg total) by mouth 2 (two) times daily.     pantoprazole 40 MG tablet  Commonly known as: PROTONIX  Take 1 tablet (40 mg total) by mouth once daily.        STOP taking these medications    HYDROcodone-acetaminophen 5-325 mg per tablet  Commonly known as: NORCO           Where to Get Your Medications      These medications were sent to [id number] [address] AT [facility] & [address] [address]    Hours: 24-hours Phone: [phone]   · acetaminophen 325 MG tablet  · budesonide-formoterol 160-4.5 mcg 160-4.5 mcg/actuation Hfaa  · ciprofloxacin HCl 500 MG tablet  · metroNIDAZOLE 500 MG tablet  · predniSONE 20 MG tablet         Discharge Information:     Stable on discharge   F/U with PCP in 1-2 weeks   Referral to GI sent   Start Augmentin 875 for 7 days for outpatient abx   Continue prednisone 20 for 2 days to complete treatment   Continue Symbicort, Singulair and rescue inhaler PRN for asthma     Alvaro Gaston D.O

## 2022-07-30 NOTE — PT/OT/SLP EVAL
Physical Therapy Evaluation    Patient Name:  Niya Moeller   MRN:  [id number]    Recommendations:     Discharge Recommendations:  home   Discharge Equipment Recommendations: none   Barriers to discharge: None    Assessment:     Niya Moeller is a 52 y.o. female admitted with a medical diagnosis of Asthma Exacerbation.  She presents with the following impairments/functional limitations:    no apparent functional impairments.    Rehab Prognosis: Good and Pt appears to be at prior I level of function and does not currently require skilled PT services;  Recent Surgery: * No surgery found *      Plan:     During this hospitalization, patient to be seen  (Discharge acute PT services due to pt I with mobility, pt agrees with discharge.) to address the identified rehab impairments via   and progress toward the following goals:    · Plan of Care Expires:   07/30/22    Subjective     Chief Complaint: No current c/o  Patient/Family Comments/goals: return home  Pain/Comfort:  · Pain Rating 1: 0/10    Patients cultural, spiritual, [employer]ec[employer]alism conflicts given the current situation: no    Living Environment:  Ss home, no steps  Prior to admission, patients level of function was I.  Equipment used at home: none.  DME owned (not currently used): none.  Upon discharge, patient will have assistance from self.    Objective:     Communicated with nurse  prior to session.  Patient found supine with peripheral IV  upon PT entry to room.    General Precautions: Standard,     Orthopedic Precautions:    Braces:    Respiratory Status: Room air    Exams:  · Cognitive Exam:  Patient is oriented to Person, Place, Time and Situation  · Gross Motor Coordination:  WFL  · Sensation:    · -       Intact  · RUE ROM: WFL  · RUE Strength: WFL  · LUE ROM: WFL  · LUE Strength: WFL  · RLE ROM: WFL  · RLE Strength: WFL  · LLE ROM: WFL  · LLE Strength: WFL    Functional Mobility:  · Bed Mobility:     · Supine to Sit: independence  · Sit to Supine:  independence  · Transfers:     · Sit to Stand:  independence with no AD  · Gait: 260'  · Balance: WFL    Therapeutic Activities and Exercises:       AM-PAC 6 CLICK MOBILITY  Total Score:21     Patient left sitting in long sit in bed with all lines intact and call button in reach.    GOALS:   Multidisciplinary Problems     Physical Therapy Goals        Problem: Physical Therapy    Goal Priority Disciplines Outcome Goal Variances Interventions   Physical Therapy Goal     PT, PT/OT      Description: Goals to be met by: 22    Patient will increase functional independence with mobility by performin. Supine to sit with [place]  2. Sit to supine with [place]  3. Sit to stand transfer with [place]  4. Gait  x 260 feet with [place] using No Assistive Device.                      History:     Past Medical History:   Diagnosis Date    Asthma     Diverticulosis     GERD (gastroesophageal reflux disease)        No past surgical history on file.    Time Tracking:     PT Received On: 22  PT Start Time: 1120     PT Stop Time: 1145  PT Total Time (min): 25 min     Billable Minutes: Evaluation 2022

## 2022-08-15 ENCOUNTER — HOSPITAL ENCOUNTER (EMERGENCY)
Facility: HOSPITAL | Age: 52
Discharge: HOME | End: 2022-08-16
Attending: FAMILY MEDICINE
Payer: MEDICAID

## 2022-08-15 DIAGNOSIS — R06.02 SOB (SHORTNESS OF BREATH): ICD-10-CM

## 2022-08-15 DIAGNOSIS — J45.909 ASTHMA: ICD-10-CM

## 2022-08-15 PROCEDURE — 99285 EMERGENCY DEPT VISIT HI MDM: CPT | Mod: 25

## 2022-08-15 PROCEDURE — 94761 N-INVAS EAR/PLS OXIMETRY MLT: CPT

## 2022-08-15 PROCEDURE — 94640 AIRWAY INHALATION TREATMENT: CPT

## 2022-08-15 PROCEDURE — 93005 ELECTROCARDIOGRAM TRACING: CPT

## 2022-08-15 PROCEDURE — 25000242 PHARM REV CODE 250 ALT 637 W/ HCPCS: Performed by: NURSE PRACTITIONER

## 2022-08-15 PROCEDURE — 63600175 PHARM REV CODE 636 W HCPCS: Performed by: NURSE PRACTITIONER

## 2022-08-15 PROCEDURE — 96374 THER/PROPH/DIAG INJ IV PUSH: CPT

## 2022-08-15 RX ORDER — IPRATROPIUM BROMIDE AND ALBUTEROL SULFATE 2.5; .5 MG/3ML; MG/3ML
3 SOLUTION RESPIRATORY (INHALATION)
Status: COMPLETED | OUTPATIENT
Start: 2022-08-15 | End: 2022-08-15

## 2022-08-15 RX ORDER — METHYLPREDNISOLONE SOD SUCC 125 MG
125 VIAL (EA) INJECTION
Status: COMPLETED | OUTPATIENT
Start: 2022-08-15 | End: 2022-08-15

## 2022-08-15 RX ORDER — PREDNISONE 20 MG/1
40 TABLET ORAL DAILY
Qty: 10 TABLET | Refills: 0 | Status: SHIPPED | OUTPATIENT
Start: 2022-08-15 | End: 2022-08-20

## 2022-08-15 RX ORDER — ALBUTEROL SULFATE 90 UG/1
2 AEROSOL, METERED RESPIRATORY (INHALATION) EVERY 4 HOURS PRN
Qty: 18 G | Refills: 0 | Status: ON HOLD | OUTPATIENT
Start: 2022-08-15 | End: 2022-09-04 | Stop reason: SDUPTHER

## 2022-08-15 RX ADMIN — IPRATROPIUM BROMIDE AND ALBUTEROL SULFATE 3 ML: .5; 3 SOLUTION RESPIRATORY (INHALATION) at 10:08

## 2022-08-15 RX ADMIN — METHYLPREDNISOLONE SODIUM SUCCINATE 125 MG: 125 INJECTION, POWDER, FOR SOLUTION INTRAMUSCULAR; INTRAVENOUS at 10:08

## 2022-08-16 VITALS
HEART RATE: 87 BPM | SYSTOLIC BLOOD PRESSURE: 120 MMHG | OXYGEN SATURATION: 95 % | WEIGHT: 157.63 LBS | HEIGHT: 59 IN | TEMPERATURE: 99 F | BODY MASS INDEX: 31.78 KG/M2 | DIASTOLIC BLOOD PRESSURE: 70 MMHG | RESPIRATION RATE: 29 BRPM

## 2022-08-16 NOTE — ED PROVIDER NOTES
Encounter Date: 8/15/2022       History     Chief Complaint   Patient presents with    Asthma                   Pt. Arrived stating her asthma is acting up. She states she used her nebulizer and her inhaler and they are not working. She says she feels like her lungs are tight at that bottom. Also reports 5/10 chest pain     The patient presents with shortness of breath due to an asthma exacerbation.  The onset was 2 days ago.  The course/duration of symptoms is constant.  The degree at present is moderate.  The exacerbating factor is none.  The relieving factor is none.  Risk factors consist of asthma.  Prior episodes: occasional.  Associated symptoms: denies fever, denies chest pain and denies shortness of breath.  Additional history: none.  She requests IV solumedrol and resp tx's which she reports have helped her in the past.        Review of patient's allergies indicates:   Allergen Reactions    Levofloxacin Hives and Itching    Metronidazole Hives    Codeine     Latex     Morphine     Opioids - morphine analogues      Past Medical History:   Diagnosis Date    Asthma     Diverticulosis     GERD (gastroesophageal reflux disease)      History reviewed. No pertinent surgical history.  History reviewed. No pertinent family history.  Social History     Tobacco Use    Smoking status: Never Smoker    Smokeless tobacco: Never Used     Review of Systems   Constitutional: Negative for fever.   HENT: Negative for sore throat.    Respiratory: Positive for shortness of breath.    Cardiovascular: Negative for chest pain.   Gastrointestinal: Negative for nausea.   Genitourinary: Negative for dysuria.   Musculoskeletal: Negative for back pain.   Skin: Negative for rash.   Neurological: Negative for weakness.   Hematological: Does not bruise/bleed easily.   All other systems reviewed and are negative.      Physical Exam     Initial Vitals [08/15/22 2011]   BP Pulse Resp Temp SpO2   111/61 92 20 98.6 °F (37 °C) 96 %       MAP       --         Physical Exam    Nursing note and vitals reviewed.  Constitutional: She appears well-developed and well-nourished.   HENT:   Head: Normocephalic and atraumatic.   Neck: Neck supple.   Normal range of motion.  Cardiovascular: Normal rate, regular rhythm, normal heart sounds and intact distal pulses.   Pulmonary/Chest: Effort normal and breath sounds normal.   Abdominal: Abdomen is soft. Bowel sounds are normal.   Musculoskeletal:         General: Normal range of motion.      Cervical back: Normal range of motion and neck supple.     Neurological: She is alert. She has normal strength.   Skin: Skin is warm and dry.   Psychiatric: She has a normal mood and affect.         ED Course   Procedures  Labs Reviewed - No data to display       Imaging Results          X-Ray Chest AP Portable (Preliminary result)  Result time 08/15/22 23:49:30    ED Interpretation by BARON Clarke (08/15/22 23:49:30, [facility], Emergency Medicine)    Nothing acute                               Medications   albuterol-ipratropium 2.5 mg-0.5 mg/3 mL nebulizer solution 3 mL (3 mLs Nebulization Given 8/15/22 2225)   methylPREDNISolone sodium succinate injection 125 mg (125 mg Intravenous Given 8/15/22 2241)     Medical Decision Making:   History:   Old Records Summarized: records from clinic visits and records from previous admission(s).  Clinical Tests:   Radiological Study: Ordered and Reviewed  11:48 PM DISPOSITION: The patient is resting comfortably in no acute distress.  She is hemodynamically stable and is without objective evidence for acute process requiring urgent intervention or hospitalization. I provided counseling to patient with regard to condition, the treatment plan, specific conditions for return, and the importance of follow up. Detailed written and verbal instructions provided to patient and she expressed a verbal understanding of the discharge instructions and overall  management plan. Reiterated the importance of medication administration and safety and advised patient to follow up with primary care provider in 3-5 days or sooner if needed.  Answered questions at this time. The patient is stable for discharge.                         Clinical Impression:   Final diagnoses:  [R06.02] SOB (shortness of breath)  [J45.909] Asthma          ED Disposition Condition    Discharge Stable        ED Prescriptions     Medication Sig Dispense Start Date End Date Auth. Provider    predniSONE (DELTASONE) 20 MG tablet Take 2 tablets (40 mg total) by mouth once daily. Start tomorrow 8/16, first dose was given in the ER for 5 days 10 tablet 8/15/2022 8/20/2022 BARON Clarke    albuterol (PROVENTIL HFA) 90 mcg/actuation inhaler Inhale 2 puffs into the lungs every 4 (four) hours as needed for Wheezing. Rescue 18 g 8/15/2022  BARON Clarke        Follow-up Information     Follow up With Specialties Details Why Contact Info    follow up with your pcp in 3-5 days        [facility] Emergency Medicine  If symptoms worsen [address] [facility] [id number] [phone]           BARON Clarke  08/15/22 6615

## 2022-08-22 DIAGNOSIS — K57.92 DIVERTICULITIS: Primary | ICD-10-CM

## 2022-09-02 ENCOUNTER — HOSPITAL ENCOUNTER (INPATIENT)
Facility: HOSPITAL | Age: 52
LOS: 2 days | Discharge: HOME | DRG: 203 | End: 2022-09-04
Attending: STUDENT IN AN ORGANIZED HEALTH CARE EDUCATION/TRAINING PROGRAM | Admitting: INTERNAL MEDICINE
Payer: MEDICAID

## 2022-09-02 DIAGNOSIS — R06.02 SOB (SHORTNESS OF BREATH): ICD-10-CM

## 2022-09-02 DIAGNOSIS — J45.901 EXACERBATION OF ASTHMA, UNSPECIFIED ASTHMA SEVERITY, UNSPECIFIED WHETHER PERSISTENT: Primary | ICD-10-CM

## 2022-09-02 PROCEDURE — 99285 EMERGENCY DEPT VISIT HI MDM: CPT | Mod: 25

## 2022-09-02 PROCEDURE — 11000001 HC ACUTE MED/SURG PRIVATE ROOM

## 2022-09-02 RX ORDER — IPRATROPIUM BROMIDE AND ALBUTEROL SULFATE 2.5; .5 MG/3ML; MG/3ML
3 SOLUTION RESPIRATORY (INHALATION)
Status: DISPENSED | OUTPATIENT
Start: 2022-09-02 | End: 2022-09-02

## 2022-09-02 RX ORDER — METHYLPREDNISOLONE SOD SUCC 125 MG
125 VIAL (EA) INJECTION
Status: COMPLETED | OUTPATIENT
Start: 2022-09-02 | End: 2022-09-03

## 2022-09-03 LAB
ALBUMIN SERPL-MCNC: 3.6 GM/DL (ref 3.5–5)
ALBUMIN/GLOB SERPL: 1 RATIO (ref 1.1–2)
ALP SERPL-CCNC: 73 UNIT/L (ref 40–150)
ALT SERPL-CCNC: 19 UNIT/L (ref 0–55)
AST SERPL-CCNC: 16 UNIT/L (ref 5–34)
BASOPHILS # BLD AUTO: 0.02 X10(3)/MCL (ref 0–0.2)
BASOPHILS NFR BLD AUTO: 0.2 %
BILIRUBIN DIRECT+TOT PNL SERPL-MCNC: 0.4 MG/DL
BUN SERPL-MCNC: 12.8 MG/DL (ref 9.8–20.1)
CALCIUM SERPL-MCNC: 10 MG/DL (ref 8.4–10.2)
CHLORIDE SERPL-SCNC: 105 MMOL/L (ref 98–107)
CO2 SERPL-SCNC: 24 MMOL/L (ref 22–29)
CREAT SERPL-MCNC: 0.82 MG/DL (ref 0.55–1.02)
D DIMER PPP IA.FEU-MCNC: 0.39 UG/ML FEU (ref 0–0.5)
EOSINOPHIL # BLD AUTO: 0.19 X10(3)/MCL (ref 0–0.9)
EOSINOPHIL NFR BLD AUTO: 2.1 %
ERYTHROCYTE [DISTWIDTH] IN BLOOD BY AUTOMATED COUNT: 13.3 % (ref 11.5–17)
GFR SERPLBLD CREATININE-BSD FMLA CKD-EPI: >60 MLS/MIN/1.73/M2
GLOBULIN SER-MCNC: 3.5 GM/DL (ref 2.4–3.5)
GLUCOSE SERPL-MCNC: 111 MG/DL (ref 74–100)
HCT VFR BLD AUTO: 42.8 % (ref 37–47)
HGB BLD-MCNC: 14 GM/DL (ref 12–16)
IMM GRANULOCYTES # BLD AUTO: 0.03 X10(3)/MCL (ref 0–0.04)
IMM GRANULOCYTES NFR BLD AUTO: 0.3 %
LYMPHOCYTES # BLD AUTO: 3.7 X10(3)/MCL (ref 0.6–4.6)
LYMPHOCYTES NFR BLD AUTO: 40.3 %
MCH RBC QN AUTO: 32.8 PG (ref 27–31)
MCHC RBC AUTO-ENTMCNC: 32.7 MG/DL (ref 33–36)
MCV RBC AUTO: 100.2 FL (ref 80–94)
MONOCYTES # BLD AUTO: 0.77 X10(3)/MCL (ref 0.1–1.3)
MONOCYTES NFR BLD AUTO: 8.4 %
NEUTROPHILS # BLD AUTO: 4.5 X10(3)/MCL (ref 2.1–9.2)
NEUTROPHILS NFR BLD AUTO: 48.7 %
NRBC BLD AUTO-RTO: 0 %
PLATELET # BLD AUTO: 225 X10(3)/MCL (ref 130–400)
PMV BLD AUTO: 10.9 FL (ref 7.4–10.4)
POTASSIUM SERPL-SCNC: 4.2 MMOL/L (ref 3.5–5.1)
PROT SERPL-MCNC: 7.1 GM/DL (ref 6.4–8.3)
RBC # BLD AUTO: 4.27 X10(6)/MCL (ref 4.2–5.4)
SARS-COV-2 RDRP RESP QL NAA+PROBE: NEGATIVE
SODIUM SERPL-SCNC: 141 MMOL/L (ref 136–145)
TROPONIN I SERPL-MCNC: <0.01 NG/ML (ref 0–0.04)
WBC # SPEC AUTO: 9.2 X10(3)/MCL (ref 4.5–11.5)

## 2022-09-03 PROCEDURE — 85025 COMPLETE CBC W/AUTO DIFF WBC: CPT | Performed by: STUDENT IN AN ORGANIZED HEALTH CARE EDUCATION/TRAINING PROGRAM

## 2022-09-03 PROCEDURE — 94761 N-INVAS EAR/PLS OXIMETRY MLT: CPT

## 2022-09-03 PROCEDURE — 63600175 PHARM REV CODE 636 W HCPCS: Performed by: STUDENT IN AN ORGANIZED HEALTH CARE EDUCATION/TRAINING PROGRAM

## 2022-09-03 PROCEDURE — 36415 COLL VENOUS BLD VENIPUNCTURE: CPT | Performed by: STUDENT IN AN ORGANIZED HEALTH CARE EDUCATION/TRAINING PROGRAM

## 2022-09-03 PROCEDURE — 25000242 PHARM REV CODE 250 ALT 637 W/ HCPCS: Performed by: STUDENT IN AN ORGANIZED HEALTH CARE EDUCATION/TRAINING PROGRAM

## 2022-09-03 PROCEDURE — 93005 ELECTROCARDIOGRAM TRACING: CPT

## 2022-09-03 PROCEDURE — 85379 FIBRIN DEGRADATION QUANT: CPT | Performed by: STUDENT IN AN ORGANIZED HEALTH CARE EDUCATION/TRAINING PROGRAM

## 2022-09-03 PROCEDURE — 87635 SARS-COV-2 COVID-19 AMP PRB: CPT | Performed by: STUDENT IN AN ORGANIZED HEALTH CARE EDUCATION/TRAINING PROGRAM

## 2022-09-03 PROCEDURE — 25000003 PHARM REV CODE 250: Performed by: STUDENT IN AN ORGANIZED HEALTH CARE EDUCATION/TRAINING PROGRAM

## 2022-09-03 PROCEDURE — 96374 THER/PROPH/DIAG INJ IV PUSH: CPT

## 2022-09-03 PROCEDURE — 80053 COMPREHEN METABOLIC PANEL: CPT | Performed by: STUDENT IN AN ORGANIZED HEALTH CARE EDUCATION/TRAINING PROGRAM

## 2022-09-03 PROCEDURE — 84484 ASSAY OF TROPONIN QUANT: CPT | Performed by: STUDENT IN AN ORGANIZED HEALTH CARE EDUCATION/TRAINING PROGRAM

## 2022-09-03 PROCEDURE — 94640 AIRWAY INHALATION TREATMENT: CPT

## 2022-09-03 PROCEDURE — 11000001 HC ACUTE MED/SURG PRIVATE ROOM

## 2022-09-03 PROCEDURE — 94640 AIRWAY INHALATION TREATMENT: CPT | Mod: XB

## 2022-09-03 RX ORDER — IPRATROPIUM BROMIDE AND ALBUTEROL SULFATE 2.5; .5 MG/3ML; MG/3ML
3 SOLUTION RESPIRATORY (INHALATION) EVERY 4 HOURS
Status: DISCONTINUED | OUTPATIENT
Start: 2022-09-03 | End: 2022-09-04 | Stop reason: HOSPADM

## 2022-09-03 RX ORDER — CALCIUM CARBONATE 200(500)MG
1000 TABLET,CHEWABLE ORAL ONCE
Status: COMPLETED | OUTPATIENT
Start: 2022-09-03 | End: 2022-09-03

## 2022-09-03 RX ORDER — DOXYCYCLINE 100 MG/1
100 CAPSULE ORAL 2 TIMES DAILY
Status: ON HOLD | COMMUNITY
End: 2022-09-04 | Stop reason: HOSPADM

## 2022-09-03 RX ORDER — ENOXAPARIN SODIUM 100 MG/ML
40 INJECTION SUBCUTANEOUS EVERY 24 HOURS
Status: DISCONTINUED | OUTPATIENT
Start: 2022-09-03 | End: 2022-09-04 | Stop reason: HOSPADM

## 2022-09-03 RX ORDER — IPRATROPIUM BROMIDE AND ALBUTEROL SULFATE 2.5; .5 MG/3ML; MG/3ML
3 SOLUTION RESPIRATORY (INHALATION)
Status: DISCONTINUED | OUTPATIENT
Start: 2022-09-03 | End: 2022-09-03

## 2022-09-03 RX ORDER — PREDNISONE 20 MG/1
40 TABLET ORAL DAILY
Status: DISCONTINUED | OUTPATIENT
Start: 2022-09-03 | End: 2022-09-03

## 2022-09-03 RX ORDER — TALC
6 POWDER (GRAM) TOPICAL NIGHTLY PRN
Status: DISCONTINUED | OUTPATIENT
Start: 2022-09-03 | End: 2022-09-04 | Stop reason: HOSPADM

## 2022-09-03 RX ORDER — SODIUM CHLORIDE 0.9 % (FLUSH) 0.9 %
10 SYRINGE (ML) INJECTION
Status: DISCONTINUED | OUTPATIENT
Start: 2022-09-03 | End: 2022-09-04 | Stop reason: HOSPADM

## 2022-09-03 RX ORDER — PANTOPRAZOLE SODIUM 40 MG/1
40 TABLET, DELAYED RELEASE ORAL DAILY
Status: DISCONTINUED | OUTPATIENT
Start: 2022-09-03 | End: 2022-09-04 | Stop reason: HOSPADM

## 2022-09-03 RX ADMIN — IPRATROPIUM BROMIDE AND ALBUTEROL SULFATE 3 ML: .5; 3 SOLUTION RESPIRATORY (INHALATION) at 12:09

## 2022-09-03 RX ADMIN — IPRATROPIUM BROMIDE AND ALBUTEROL SULFATE 3 ML: .5; 3 SOLUTION RESPIRATORY (INHALATION) at 03:09

## 2022-09-03 RX ADMIN — MELATONIN TAB 3 MG 6 MG: 3 TAB at 08:09

## 2022-09-03 RX ADMIN — IPRATROPIUM BROMIDE AND ALBUTEROL SULFATE 3 ML: .5; 3 SOLUTION RESPIRATORY (INHALATION) at 07:09

## 2022-09-03 RX ADMIN — IPRATROPIUM BROMIDE AND ALBUTEROL SULFATE 3 ML: .5; 3 SOLUTION RESPIRATORY (INHALATION) at 06:09

## 2022-09-03 RX ADMIN — METHYLPREDNISOLONE SODIUM SUCCINATE 125 MG: 125 INJECTION, POWDER, FOR SOLUTION INTRAMUSCULAR; INTRAVENOUS at 12:09

## 2022-09-03 RX ADMIN — IPRATROPIUM BROMIDE AND ALBUTEROL SULFATE 3 ML: .5; 3 SOLUTION RESPIRATORY (INHALATION) at 11:09

## 2022-09-03 RX ADMIN — ANTACID TABLETS 1000 MG: 500 TABLET, CHEWABLE ORAL at 09:09

## 2022-09-03 RX ADMIN — PREDNISONE 40 MG: 20 TABLET ORAL at 08:09

## 2022-09-03 RX ADMIN — PANTOPRAZOLE SODIUM 40 MG: 40 TABLET, DELAYED RELEASE ORAL at 01:09

## 2022-09-03 RX ADMIN — METHYLPREDNISOLONE SODIUM SUCCINATE 40 MG: 40 INJECTION, POWDER, FOR SOLUTION INTRAMUSCULAR; INTRAVENOUS at 08:09

## 2022-09-03 NOTE — ED PROVIDER NOTES
Encounter Date: 9/2/2022       History     Chief Complaint   Patient presents with    Asthma     Pt states she has asthma and her treatments aren't working she has increased shortness of breathe today. Pt talking freely in triage, skin w/d.      [age] old female with past medical history of asthma presenting today with shortness of breath.  She says earlier today she started having shortness of breath was having difficulty breathing and wheezing.  She says this is exactly how our other asthma exacerbations have been in the past.  She denies any chest pain or other symptoms at this time.    The history is provided by the patient. No  was used.   Illness   The current episode started just prior to arrival. The problem occurs continuously. The problem has been unchanged. Nothing relieves the symptoms. Nothing aggravates the symptoms. Associated symptoms include shortness of breath and wheezing. Pertinent negatives include no fever, no abdominal pain, no diarrhea, no nausea, no vomiting, no congestion, no headaches, no cough, no discharge, no pain and no eye redness.   Review of patient's allergies indicates:   Allergen Reactions    Levofloxacin Hives and Itching    Metronidazole Hives    Codeine     Latex     Morphine     Opioids - morphine analogues      Past Medical History:   Diagnosis Date    Asthma     Diverticulosis     GERD (gastroesophageal reflux disease)      History reviewed. No pertinent surgical history.  History reviewed. No pertinent family history.  Social History     Tobacco Use    Smoking status: Every Day     Packs/day: 0.50     Years: 20.00     Pack years: 10.00     Types: Cigarettes    Smokeless tobacco: Never   Substance Use Topics    Drug use: Not Currently     Review of Systems   Constitutional:  Negative for activity change, chills and fever.   HENT:  Negative for congestion and facial swelling.    Eyes:  Negative for pain, discharge and redness.   Respiratory:   Positive for shortness of breath and wheezing. Negative for cough.    Cardiovascular:  Negative for chest pain and leg swelling.   Gastrointestinal:  Negative for abdominal pain, diarrhea, nausea and vomiting.   Genitourinary:  Negative for difficulty urinating, vaginal bleeding and vaginal discharge.   Musculoskeletal:  Negative for gait problem and neck stiffness.   Skin:  Negative for color change and pallor.   Neurological:  Negative for dizziness and headaches.     Physical Exam     Initial Vitals [09/02/22 2212]   BP Pulse Resp Temp SpO2   (!) 101/56 105 (!) 22 97.9 °F (36.6 °C) 100 %      MAP       --         Physical Exam    Nursing note and vitals reviewed.  Constitutional: She appears well-developed. She is not diaphoretic. No distress.   HENT:   Head: Normocephalic and atraumatic.   Eyes: Conjunctivae and EOM are normal. Right eye exhibits no discharge. Left eye exhibits no discharge.   Neck: Neck supple. No tracheal deviation present.   Normal range of motion.  Cardiovascular:  Normal rate and regular rhythm.     Exam reveals no friction rub.       No murmur heard.  Pulmonary/Chest: No respiratory distress. She has wheezes (Inspiratory wheezing).   Abdominal: Abdomen is soft. She exhibits no distension. There is no abdominal tenderness.   Musculoskeletal:         General: Normal range of motion.      Cervical back: Normal range of motion and neck supple.     Neurological: She is alert and oriented to person, place, and time. She has normal strength.   Skin: Skin is warm and dry.       ED Course   Procedures  Labs Reviewed   COMPREHENSIVE METABOLIC PANEL - Abnormal; Notable for the following components:       Result Value    Glucose Level 111 (*)     Albumin/Globulin Ratio 1.0 (*)     All other components within normal limits   CBC WITH DIFFERENTIAL - Abnormal; Notable for the following components:    .2 (*)     MCH 32.8 (*)     MCHC 32.7 (*)     MPV 10.9 (*)     All other components within normal  limits   TROPONIN I - Normal   D DIMER, QUANTITATIVE - Normal   SARS-COV-2 RNA AMPLIFICATION, QUAL - Normal   CBC W/ AUTO DIFFERENTIAL    Narrative:     The following orders were created for panel order CBC auto differential.  Procedure                               Abnormality         Status                     ---------                               -----------         ------                     CBC with Differential[[id number]]        Abnormal            Final result                 Please view results for these tests on the individual orders.          Imaging Results              X-Ray Chest PA And Lateral (In process)                      Medications   albuterol-ipratropium 2.5 mg-0.5 mg/3 mL nebulizer solution 3 mL (3 mLs Nebulization Given 9/3/22 0027)   methylPREDNISolone sodium succinate injection 125 mg (125 mg Intravenous Given 9/3/22 0018)     Medical Decision Making:   ED Management:  Pt presents to the ED with complaints of Shortness of breath     Differential considerations include: ACS, PE, PTX, PNA, asthma, COPD exacerbation, anemia   Evaluated patient emergency department.  She was stable well-appearing her chest x-ray is unremarkable she was given steroids and breathing treatment.  She still felt short of breath so expanded the workup.  Her EKG was unremarkable.  Troponin was negative.  The rest her blood work unremarkable.  Due to her still feel short of breath on oxygen and the on-call Hospital Medicine. She is being admitted the hospital stable condition at this time.           ED Course as of 09/03/22 0252   Sat Sep 03, 2022   0100 Re-evaluated patient after breathing treatments and steroids.  She states she still feels incredibly short of breath.  I will expand my differential and workup in order blood work and an EKG at this time. [CO]      ED Course User Index  [CO] Justin Womack MD             Clinical Impression:   Final diagnoses:  [R06.02] SOB (shortness of breath)  [J45.901]  Exacerbation of asthma, unspecified asthma severity, unspecified whether persistent (Primary)      ED Disposition Condition    Admit                 Justin Womack MD  09/03/22 0256

## 2022-09-03 NOTE — H&P
[facility] Medicine Wards History & Physical Note     Resident Team: [facility] Medicine List 2  Attending Physician: Rell Beasley MD  Resident: Justin Elizondo PGY 3  Intern: Jasvir Briggs    Date of Admit: 9/2/2022    Chief Complaint     Asthma (Pt states she has asthma and her treatments aren't working she has increased shortness of breathe today. Pt talking freely in triage, skin w/d. )      Subjective:      History of Present Illness:  Niya Moeller is a 52 y.o.  female with a history of Diverticulitis, GERD, Asthma, seasonal allergies who presented to [facility] ED on 9/2/2022  with a primary complaint of acute on chronic SOB/Wheezing over the past week. Ever since moving here in November, she has had worsening control of her asthma compared to when she lived in [place]. She attributes this to the wet weather. On average she uses her albuterol inhaler 7x/week and her nebulizer 7x/week. Since the onset of this exacerbation last week, she has been using her albuterol inhaler 14x/week and her DuoNeb 20x/week. She does get chronic recurrent allergies and sinusitis but says her asthma does not only act up when she has allergies/sinusitis. Denies current fevers/chills, dysuria, leg pain/swelling      Past Medical History:  Past Medical History:   Diagnosis Date    Asthma     Diverticulosis     GERD (gastroesophageal reflux disease)        Past Surgical History:  History reviewed. No pertinent surgical history.    Family History:  History reviewed. No pertinent family history.    Social History:  Social History     Tobacco Use    Smoking status: Every Day     Packs/day: 0.50     Years: 20.00     Pack years: 10.00     Types: Cigarettes    Smokeless tobacco: Never   Substance Use Topics    Drug use: Not Currently       Allergies:  Review of patient's allergies indicates:   Allergen Reactions    Levofloxacin Hives and Itching    Metronidazole Hives    Codeine     Latex     Morphine     Opioids - morphine analogues        Home  "Medications:  Prior to Admission medications    Medication Sig Start Date End Date Taking? Authorizing Provider   acetaminophen (TYLENOL) 325 MG tablet Take 2 tablets (650 mg total) by mouth every 6 (six) hours as needed. 22   Alvaro Gaston DO   albuterol (PROVENTIL HFA) 90 mcg/actuation inhaler Inhale 2 puffs into the lungs every 4 (four) hours as needed for Wheezing. Rescue 8/15/22   BARON Clarke   albuterol-ipratropium (DUO-NEB) 2.5 mg-0.5 mg/3 mL nebulizer solution Take 3 mLs by nebulization every 4 (four) hours as needed for Wheezing or Shortness of Breath. Rescue 22   Natalee Carrero MD   budesonide-formoterol 160-4.5 mcg (SYMBICORT) 160-4.5 mcg/actuation HFAA Inhale 2 puffs into the lungs every 12 (twelve) hours. Controller 22  Alvaro Gaston DO   ciprofloxacin HCl (CIPRO) 500 MG tablet Take 1 tablet (500 mg total) by mouth every 12 (twelve) hours. 22   Alvaro Gaston DO   metroNIDAZOLE (FLAGYL) 500 MG tablet Take 1 tablet (500 mg total) by mouth every 12 (twelve) hours. 22   Alvaro Gaston DO   montelukast (SINGULAIR) 10 mg tablet Take 1 tablet (10 mg total) by mouth once daily at 6am. 22   Natalee Carrero MD   ondansetron (ZOFRAN-ODT) 4 MG TbDL Take 2 tablets (8 mg total) by mouth 2 (two) times daily. 22   Franco Brown MD   pantoprazole (PROTONIX) 40 MG tablet Take 1 tablet (40 mg total) by mouth once daily. 22   Natalee Carrero MD         Review of Systems:  As above           Objective:   Last 24 Hour Vital Signs:  BP  Min: 101/56  Max: 140/78  Temp  Av °F (36.7 °C)  Min: 97.8 °F (36.6 °C)  Max: 98.3 °F (36.8 °C)  Pulse  Av.9  Min: 18  Max: 105  Resp  Av.9  Min: 16  Max: 22  SpO2  Av.5 %  Min: 95 %  Max: 100 %  Height  Av' 11" (149.9 cm)  Min: 4' 11" (149.9 cm)  Max: 4' 11" (149.9 cm)  Weight  Av.5 kg (157 lb 10.1 oz)  Min: 71.5 kg (157 lb 10.1 oz)  Max: 71.5 kg (157 lb 10.1 oz)  Body mass index is 31.84 kg/m².  No " intake/output data recorded.    Physical Examination:  General - Appears comfortable, appropriatley conversive   Mental Status - alert and oriented x 3, speaking in logical, relevant sentences   HEENT - no rhinorrhea   Cardiac - RRR, no murmurs, rubs, or gallops; no edema in LE   Respiratory - not in distress but does have to pause occasionally when talking; has audible wheezes without stethoscope; expiratory wheezes heard on ascultation; clear to ascultation bilaterally   Abdominal - nondistended, soft, nontender to palpation   Extremities - LE, UE, and joints are nonerythematous and nonswollen   Skin - no rashes or bruises seen on skin      Laboratory:  Most Recent Data:  CBC:   Lab Results   Component Value Date    WBC 9.2 09/03/2022    HGB 14.0 09/03/2022    HCT 42.8 09/03/2022     09/03/2022    .2 (H) 09/03/2022    RDW 13.3 09/03/2022     WBC Differential:   Recent Labs   Lab 09/03/22  0126   WBC 9.2   HGB 14.0   HCT 42.8      .2*     BMP:   Lab Results   Component Value Date     09/03/2022    K 4.2 09/03/2022    CO2 24 09/03/2022    BUN 12.8 09/03/2022    CREATININE 0.82 09/03/2022    CALCIUM 10.0 09/03/2022    MG 2.10 07/05/2022    PHOS 3.3 03/31/2022     LFTs:   Lab Results   Component Value Date    ALBUMIN 3.6 09/03/2022    BILITOT 0.4 09/03/2022    AST 16 09/03/2022    ALKPHOS 73 09/03/2022    ALT 19 09/03/2022     Coags: No results found for: INR, PROTIME, PTT  FLP:   Lab Results   Component Value Date    CHOL 147 03/31/2022    HDL 51 03/31/2022    TRIG 61 03/31/2022     DM:   Lab Results   Component Value Date    HGBA1C 5.3 06/28/2022    HGBA1C 5.4 03/31/2022    CREATININE 0.82 09/03/2022     Thyroid:   Lab Results   Component Value Date    TSH 0.1176 03/31/2022      Anemia: No results found for: IRON, TIBC, FERRITIN, SATURATEDIRO  No results found for: VSGOOCVY65  No results found for: FOLATE     Cardiac:   Lab Results   Component Value Date    TROPONINI <0.010  09/03/2022    BNP <10.0 06/20/2022     Urinalysis:   Lab Results   Component Value Date    COLORU Light-Yellow 04/16/2022    PHUA 6.5 07/28/2022    SPECGRAV 1.034 (H) 08/21/2008    NITRITE Negative 04/16/2022    KETONESU Trace 04/16/2022    UROBILINOGEN Normal 07/28/2022    WBCUA 0-5 07/28/2022       Trended Lab Data:  Recent Labs   Lab 09/03/22 0126   WBC 9.2   HGB 14.0   HCT 42.8      .2*   RDW 13.3      K 4.2   CO2 24   BUN 12.8   CREATININE 0.82   ALBUMIN 3.6   BILITOT 0.4   AST 16   ALKPHOS 73   ALT 19       Trended Cardiac Data:  Recent Labs   Lab 09/03/22 0126   TROPONINI <0.010       Microbiology Data:  Microbiology Results (last 7 days)       ** No results found for the last 168 hours. **             Radiology:  Imaging Results              X-Ray Chest PA And Lateral (Final result)  Result time 09/03/22 08:49:17      Final result by Claudio Rdz MD (09/03/22 08:49:17)                   Impression:      No acute cardiopulmonary abnormality.      Electronically signed by: Claudio Rdz MD  Date:    09/03/2022  Time:    08:49               Narrative:    EXAMINATION:  Two view chest radiograph.    CLINICAL HISTORY:  Asthma;    TECHNIQUE:  Two view of the chest.    COMPARISON:  Chest radiograph 08/15/2022.    FINDINGS:  The lungs are clear without consolidation or effusion.  There is no pneumothorax.  The cardiac silhouette is normal in size.  There is no acute osseous abnormality.                                           Assessment & Plan:     Asthma Exacerbation  Chronic Asthma since childhood  -Giving DuoNeb q4h scheduled + Solumedrol 40mg q12h IV  -Takes Symbicort, Singulair at home. Will likely need addition of LAMA on discharge.  -Would benefit from referral to [facility] ENT on discharge for chronic allergies and sinusitis which are probably worsening her asthma  -was desaturating to the upper 80's on room air, so required admission    GERD  -continue home protonix    History of  Diverticulitis  -follow up with PCP    Discharge Planning  -will likely need uptitration of her asthma meds  -would benefit from [facility] ENT referral  -Has outside PCP who she can f/u with after discharge      CODE STATUS: full  Access: PIV  Antibiotics: None  Diet: Regular  DVT Prophylaxis: Lovenox 40mg daily  GI Prophylaxis: omeprazole PO  Fluids: none      Disposition: Likely discharge tomorrow or the next day pending clinical improvement      Justin Elizondo MD  [facility] HO-3

## 2022-09-04 VITALS
WEIGHT: 157.63 LBS | DIASTOLIC BLOOD PRESSURE: 65 MMHG | OXYGEN SATURATION: 96 % | BODY MASS INDEX: 31.78 KG/M2 | SYSTOLIC BLOOD PRESSURE: 97 MMHG | HEIGHT: 59 IN | RESPIRATION RATE: 19 BRPM | HEART RATE: 70 BPM | TEMPERATURE: 98 F

## 2022-09-04 LAB
ALBUMIN SERPL-MCNC: 3.7 GM/DL (ref 3.5–5)
ALBUMIN/GLOB SERPL: 1.1 RATIO (ref 1.1–2)
ALP SERPL-CCNC: 83 UNIT/L (ref 40–150)
ALT SERPL-CCNC: 17 UNIT/L (ref 0–55)
AST SERPL-CCNC: 14 UNIT/L (ref 5–34)
BASOPHILS # BLD AUTO: 0.01 X10(3)/MCL (ref 0–0.2)
BASOPHILS NFR BLD AUTO: 0.1 %
BILIRUBIN DIRECT+TOT PNL SERPL-MCNC: 0.2 MG/DL
BUN SERPL-MCNC: 12.7 MG/DL (ref 9.8–20.1)
CALCIUM SERPL-MCNC: 10.4 MG/DL (ref 8.4–10.2)
CHLORIDE SERPL-SCNC: 105 MMOL/L (ref 98–107)
CO2 SERPL-SCNC: 24 MMOL/L (ref 22–29)
CREAT SERPL-MCNC: 0.72 MG/DL (ref 0.55–1.02)
EOSINOPHIL # BLD AUTO: 0 X10(3)/MCL (ref 0–0.9)
EOSINOPHIL NFR BLD AUTO: 0 %
ERYTHROCYTE [DISTWIDTH] IN BLOOD BY AUTOMATED COUNT: 13.3 % (ref 11.5–17)
GFR SERPLBLD CREATININE-BSD FMLA CKD-EPI: >60 MLS/MIN/1.73/M2
GLOBULIN SER-MCNC: 3.3 GM/DL (ref 2.4–3.5)
GLUCOSE SERPL-MCNC: 157 MG/DL (ref 74–100)
HCT VFR BLD AUTO: 42 % (ref 37–47)
HGB BLD-MCNC: 13.8 GM/DL (ref 12–16)
IMM GRANULOCYTES # BLD AUTO: 0.09 X10(3)/MCL (ref 0–0.04)
IMM GRANULOCYTES NFR BLD AUTO: 0.9 %
LYMPHOCYTES # BLD AUTO: 1.32 X10(3)/MCL (ref 0.6–4.6)
LYMPHOCYTES NFR BLD AUTO: 12.9 %
MCH RBC QN AUTO: 32.4 PG (ref 27–31)
MCHC RBC AUTO-ENTMCNC: 32.9 MG/DL (ref 33–36)
MCV RBC AUTO: 98.6 FL (ref 80–94)
MONOCYTES # BLD AUTO: 0.65 X10(3)/MCL (ref 0.1–1.3)
MONOCYTES NFR BLD AUTO: 6.3 %
NEUTROPHILS # BLD AUTO: 8.2 X10(3)/MCL (ref 2.1–9.2)
NEUTROPHILS NFR BLD AUTO: 79.8 %
NRBC BLD AUTO-RTO: 0 %
PLATELET # BLD AUTO: 238 X10(3)/MCL (ref 130–400)
PMV BLD AUTO: 11.2 FL (ref 7.4–10.4)
POTASSIUM SERPL-SCNC: 4.3 MMOL/L (ref 3.5–5.1)
PROT SERPL-MCNC: 7 GM/DL (ref 6.4–8.3)
RBC # BLD AUTO: 4.26 X10(6)/MCL (ref 4.2–5.4)
SODIUM SERPL-SCNC: 140 MMOL/L (ref 136–145)
WBC # SPEC AUTO: 10.2 X10(3)/MCL (ref 4.5–11.5)

## 2022-09-04 PROCEDURE — 25000003 PHARM REV CODE 250: Performed by: STUDENT IN AN ORGANIZED HEALTH CARE EDUCATION/TRAINING PROGRAM

## 2022-09-04 PROCEDURE — 25000242 PHARM REV CODE 250 ALT 637 W/ HCPCS: Performed by: STUDENT IN AN ORGANIZED HEALTH CARE EDUCATION/TRAINING PROGRAM

## 2022-09-04 PROCEDURE — 94640 AIRWAY INHALATION TREATMENT: CPT

## 2022-09-04 PROCEDURE — 36415 COLL VENOUS BLD VENIPUNCTURE: CPT | Performed by: STUDENT IN AN ORGANIZED HEALTH CARE EDUCATION/TRAINING PROGRAM

## 2022-09-04 PROCEDURE — 80053 COMPREHEN METABOLIC PANEL: CPT | Performed by: STUDENT IN AN ORGANIZED HEALTH CARE EDUCATION/TRAINING PROGRAM

## 2022-09-04 PROCEDURE — 85025 COMPLETE CBC W/AUTO DIFF WBC: CPT | Performed by: STUDENT IN AN ORGANIZED HEALTH CARE EDUCATION/TRAINING PROGRAM

## 2022-09-04 PROCEDURE — 63600175 PHARM REV CODE 636 W HCPCS: Performed by: STUDENT IN AN ORGANIZED HEALTH CARE EDUCATION/TRAINING PROGRAM

## 2022-09-04 RX ORDER — ALBUTEROL SULFATE 90 UG/1
2 AEROSOL, METERED RESPIRATORY (INHALATION) EVERY 4 HOURS PRN
Qty: 18 G | Refills: 0 | Status: ON HOLD | OUTPATIENT
Start: 2022-09-04 | End: 2023-02-14

## 2022-09-04 RX ORDER — IPRATROPIUM BROMIDE AND ALBUTEROL SULFATE 2.5; .5 MG/3ML; MG/3ML
3 SOLUTION RESPIRATORY (INHALATION) EVERY 4 HOURS PRN
Qty: 75 ML | Refills: 0 | Status: ON HOLD | OUTPATIENT
Start: 2022-09-04 | End: 2023-02-14 | Stop reason: SDUPTHER

## 2022-09-04 RX ORDER — PREDNISONE 20 MG/1
20 TABLET ORAL DAILY
Qty: 10 TABLET | Refills: 0 | Status: SHIPPED | OUTPATIENT
Start: 2022-09-04 | End: 2022-09-14

## 2022-09-04 RX ADMIN — PANTOPRAZOLE SODIUM 40 MG: 40 TABLET, DELAYED RELEASE ORAL at 08:09

## 2022-09-04 RX ADMIN — METHYLPREDNISOLONE SODIUM SUCCINATE 40 MG: 40 INJECTION, POWDER, FOR SOLUTION INTRAMUSCULAR; INTRAVENOUS at 08:09

## 2022-09-04 RX ADMIN — IPRATROPIUM BROMIDE AND ALBUTEROL SULFATE 3 ML: .5; 3 SOLUTION RESPIRATORY (INHALATION) at 07:09

## 2022-09-04 NOTE — PLAN OF CARE
Problem: Adult Inpatient Plan of Care  Goal: Plan of Care Review  Outcome: Ongoing, Progressing  Goal: Patient-Specific Goal (Individualized)  Outcome: Ongoing, Progressing  Goal: Absence of Hospital-Acquired Illness or Injury  Outcome: Ongoing, Progressing  Goal: Optimal Comfort and Wellbeing  Outcome: Ongoing, Progressing  Goal: Readiness for Transition of Care  Outcome: Ongoing, Progressing     Problem: Airway Clearance Ineffective  Goal: Effective Airway Clearance  Outcome: Ongoing, Progressing

## 2022-09-04 NOTE — DISCHARGE SUMMARY
[facility] Internal Medicine Discharge Summary    Admitting Physician: Rell Beasley MD  Attending Physician: No att. providers found  Date of Admit: 9/2/2022  Date of Discharge: 9/4/2022    Condition: Stable  Outcome: Condition has improved and patient is now ready for discharge.  DISPOSITION: Home or Self Care          Discharge Diagnoses     Problem List Items Addressed This Visit          Unprioritized    * (Principal) Asthma exacerbation - Primary     Other Visit Diagnoses       SOB (shortness of breath)        Relevant Orders    EKG 12-lead (Completed)            Principal Problem:  Asthma exacerbation    Consultants and Procedures     Consultants:  None    Procedures:   * No surgery found *     Brief Admission History      53 yo F with a history of Diverticulitis, GERD, Asthma, seasonal allergies who presented to [facility] ED on 9/2/2022  with a primary complaint of acute on chronic SOB/Wheezing over the past week. Ever since moving here in November, she has had worsening control of her asthma compared to when she lived in [place]. She attributes this to the wet weather. On average she uses her albuterol inhaler 7x/week and her nebulizer 7x/week. Since the onset of this exacerbation last week, she has been using her albuterol inhaler 14x/week and her DuoNeb 20x/week. She does get chronic recurrent allergies and sinusitis but says her asthma does not only act up when she has allergies/sinusitis. Denies current fevers/chills, dysuria, leg pain/swelling    Hospital Course with Pertinent Findings     On admission patient was found to be in an asthma exacerbation with audible wheezing. She was started on DuoNebs q4Hrs scheduled with Solumedrol 40mg BID. She was also continued on home Symbicort and Singulair. Patient had significant improvement in condition throughout the day on hospital day 1. On hospital day 2 patient felt back to baseline and requested discharge home. Breathing had significantly improved and audible  "wheezing no longer heard without stethoscope. Patient discharged home with Prednisone x1 week, advised to speak with PCP about prolonged steroid taper.     Discharge physical exam:  Vitals  BP: 97/65  Temp: 97.9 °F (36.6 °C)  Temp src: Oral  Pulse: 70  Resp: 19  SpO2: 96 %  Height: 4' 11" (149.9 cm)  Weight: 71.5 kg (157 lb 10.1 oz)    General - Appears comfortable, speaking in complete sentences   HEENT - no rhinorrhea   Cardiac - RRR, no murmurs, rubs, or gallops; no edema in LE   Respiratory - non labored respirations; mild diffuse expiratory wheezes heard on ascultation  Abdominal - nondistended, soft, nontender to palpation   Extremities - LE, UE, and joints are nonerythematous and nonswollen   Skin - no rashes or bruises seen on skin  Neuro: alert and oriented x 3    TIME SPENT ON DISCHARGE: 60 minutes    Discharge Medications        Medication List        START taking these medications      predniSONE 20 MG tablet  Commonly known as: DELTASONE  Take 1 tablet (20 mg total) by mouth once daily. for 10 days            CONTINUE taking these medications      acetaminophen 325 MG tablet  Commonly known as: TYLENOL  Take 2 tablets (650 mg total) by mouth every 6 (six) hours as needed.     albuterol 90 mcg/actuation inhaler  Commonly known as: PROVENTIL HFA  Inhale 2 puffs into the lungs every 4 (four) hours as needed for Wheezing. Rescue     albuterol-ipratropium 2.5 mg-0.5 mg/3 mL nebulizer solution  Commonly known as: DUO-NEB  Take 3 mLs by nebulization every 4 (four) hours as needed for Wheezing or Shortness of Breath. Rescue     budesonide-formoterol 160-4.5 mcg 160-4.5 mcg/actuation Hfaa  Commonly known as: SYMBICORT  Inhale 2 puffs into the lungs every 12 (twelve) hours. Controller     montelukast 10 mg tablet  Commonly known as: SINGULAIR  Take 1 tablet (10 mg total) by mouth once daily at 6am.     ondansetron 4 MG Tbdl  Commonly known as: ZOFRAN-ODT  Take 2 tablets (8 mg total) by mouth 2 (two) times " daily.     pantoprazole 40 MG tablet  Commonly known as: PROTONIX  Take 1 tablet (40 mg total) by mouth once daily.            STOP taking these medications      doxycycline 100 MG Cap  Commonly known as: VIBRAMYCIN               Where to Get Your Medications        These medications were sent to [id number] - [address] AT [facility] & [address] [address]      Hours: 24-hours Phone: [phone]   albuterol 90 mcg/actuation inhaler  albuterol-ipratropium 2.5 mg-0.5 mg/3 mL nebulizer solution  predniSONE 20 MG tablet         Discharge Information:     Patient discharged home in stable condition. Will follow up in [facility] in 1-2 weeks. Advised to also have close follow up with PCP for further management of Mild Persistent Asthma.    Audrey Hurtado MD  [facility] FM  PGY-3

## 2022-09-06 ENCOUNTER — PATIENT OUTREACH (OUTPATIENT)
Dept: ADMINISTRATIVE | Facility: CLINIC | Age: 52
End: 2022-09-06
Payer: MEDICAID

## 2022-09-06 NOTE — PROGRESS NOTES
C3 nurse attempted to contact Niya Moeller   for a TCC post hospital discharge follow up call. No answer at phone number listed.

## 2022-09-07 NOTE — PROGRESS NOTES
C3 nurse attempted to contact Niyasuzette Moeller  for a TCC post hospital discharge follow up call. Number listed for patient invalid. Called emergency contact number for patient's son. No answer. Left voicemail with callback information. The patient does not have a scheduled HOSFU appointment noted.

## 2022-09-08 NOTE — PROGRESS NOTES
C3 nurse spoke with patient's son Ty regarding TCC post hospital discharge follow up call. Son stated patient has a new phone number and he has not spoken with her in 3 days. Stated will give mother message follow up call and call back number if he speaks to her.The patient does not have a scheduled HOSFU appointment noted.

## 2022-09-13 ENCOUNTER — HOSPITAL ENCOUNTER (EMERGENCY)
Facility: HOSPITAL | Age: 52
Discharge: HOME | End: 2022-09-13
Attending: EMERGENCY MEDICINE
Payer: MEDICAID

## 2022-09-13 VITALS
HEIGHT: 59 IN | BODY MASS INDEX: 33.34 KG/M2 | RESPIRATION RATE: 14 BRPM | OXYGEN SATURATION: 96 % | HEART RATE: 86 BPM | WEIGHT: 165.38 LBS | DIASTOLIC BLOOD PRESSURE: 67 MMHG | SYSTOLIC BLOOD PRESSURE: 111 MMHG | TEMPERATURE: 99 F

## 2022-09-13 DIAGNOSIS — F17.200 NEEDS SMOKING CESSATION EDUCATION: ICD-10-CM

## 2022-09-13 DIAGNOSIS — K57.92 DIVERTICULITIS: Primary | ICD-10-CM

## 2022-09-13 LAB
ALBUMIN SERPL-MCNC: 3.4 GM/DL (ref 3.5–5)
ALBUMIN/GLOB SERPL: 1.1 RATIO (ref 1.1–2)
ALP SERPL-CCNC: 62 UNIT/L (ref 40–150)
ALT SERPL-CCNC: 20 UNIT/L (ref 0–55)
APPEARANCE UR: CLEAR
AST SERPL-CCNC: 18 UNIT/L (ref 5–34)
BACTERIA #/AREA URNS AUTO: ABNORMAL /HPF
BASOPHILS # BLD AUTO: 0.02 X10(3)/MCL (ref 0–0.2)
BASOPHILS NFR BLD AUTO: 0.2 %
BILIRUB UR QL STRIP.AUTO: NEGATIVE MG/DL
BILIRUBIN DIRECT+TOT PNL SERPL-MCNC: 0.4 MG/DL
BUN SERPL-MCNC: 11.3 MG/DL (ref 9.8–20.1)
CALCIUM SERPL-MCNC: 9.6 MG/DL (ref 8.4–10.2)
CHLORIDE SERPL-SCNC: 105 MMOL/L (ref 98–107)
CO2 SERPL-SCNC: 24 MMOL/L (ref 22–29)
COLOR UR AUTO: ABNORMAL
CREAT SERPL-MCNC: 0.81 MG/DL (ref 0.55–1.02)
EOSINOPHIL # BLD AUTO: 0.1 X10(3)/MCL (ref 0–0.9)
EOSINOPHIL NFR BLD AUTO: 0.9 %
ERYTHROCYTE [DISTWIDTH] IN BLOOD BY AUTOMATED COUNT: 14.3 % (ref 11.5–17)
GFR SERPLBLD CREATININE-BSD FMLA CKD-EPI: >60 MLS/MIN/1.73/M2
GLOBULIN SER-MCNC: 3.2 GM/DL (ref 2.4–3.5)
GLUCOSE SERPL-MCNC: 98 MG/DL (ref 74–100)
GLUCOSE UR QL STRIP.AUTO: NORMAL MG/DL
HCT VFR BLD AUTO: 42.5 % (ref 37–47)
HGB BLD-MCNC: 13.7 GM/DL (ref 12–16)
HYALINE CASTS #/AREA URNS LPF: ABNORMAL /LPF
IMM GRANULOCYTES # BLD AUTO: 0.05 X10(3)/MCL (ref 0–0.04)
IMM GRANULOCYTES NFR BLD AUTO: 0.5 %
KETONES UR QL STRIP.AUTO: NEGATIVE MG/DL
LEUKOCYTE ESTERASE UR QL STRIP.AUTO: NEGATIVE UNIT/L
LIPASE SERPL-CCNC: 35 U/L
LYMPHOCYTES # BLD AUTO: 3.2 X10(3)/MCL (ref 0.6–4.6)
LYMPHOCYTES NFR BLD AUTO: 30.3 %
MCH RBC QN AUTO: 32.4 PG (ref 27–31)
MCHC RBC AUTO-ENTMCNC: 32.2 MG/DL (ref 33–36)
MCV RBC AUTO: 100.5 FL (ref 80–94)
MONOCYTES # BLD AUTO: 1.37 X10(3)/MCL (ref 0.1–1.3)
MONOCYTES NFR BLD AUTO: 13 %
NEUTROPHILS # BLD AUTO: 5.8 X10(3)/MCL (ref 2.1–9.2)
NEUTROPHILS NFR BLD AUTO: 55.1 %
NITRITE UR QL STRIP.AUTO: NEGATIVE
NRBC BLD AUTO-RTO: 0 %
PH UR STRIP.AUTO: 6.5 [PH]
PLATELET # BLD AUTO: 209 X10(3)/MCL (ref 130–400)
PMV BLD AUTO: 11.1 FL (ref 7.4–10.4)
POTASSIUM SERPL-SCNC: 4.2 MMOL/L (ref 3.5–5.1)
PROT SERPL-MCNC: 6.6 GM/DL (ref 6.4–8.3)
PROT UR QL STRIP.AUTO: NEGATIVE MG/DL
RBC # BLD AUTO: 4.23 X10(6)/MCL (ref 4.2–5.4)
RBC #/AREA URNS AUTO: ABNORMAL /HPF
RBC UR QL AUTO: ABNORMAL UNIT/L
SODIUM SERPL-SCNC: 139 MMOL/L (ref 136–145)
SP GR UR STRIP.AUTO: 1.02
SQUAMOUS #/AREA URNS LPF: ABNORMAL /HPF
UROBILINOGEN UR STRIP-ACNC: NORMAL MG/DL
WBC # SPEC AUTO: 10.6 X10(3)/MCL (ref 4.5–11.5)
WBC #/AREA URNS AUTO: ABNORMAL /HPF

## 2022-09-13 PROCEDURE — 25500020 PHARM REV CODE 255: Performed by: EMERGENCY MEDICINE

## 2022-09-13 PROCEDURE — 85025 COMPLETE CBC W/AUTO DIFF WBC: CPT | Performed by: EMERGENCY MEDICINE

## 2022-09-13 PROCEDURE — 96361 HYDRATE IV INFUSION ADD-ON: CPT

## 2022-09-13 PROCEDURE — 87040 BLOOD CULTURE FOR BACTERIA: CPT | Performed by: EMERGENCY MEDICINE

## 2022-09-13 PROCEDURE — 96365 THER/PROPH/DIAG IV INF INIT: CPT | Mod: 59

## 2022-09-13 PROCEDURE — 94761 N-INVAS EAR/PLS OXIMETRY MLT: CPT

## 2022-09-13 PROCEDURE — 25000003 PHARM REV CODE 250: Performed by: EMERGENCY MEDICINE

## 2022-09-13 PROCEDURE — 83690 ASSAY OF LIPASE: CPT | Performed by: EMERGENCY MEDICINE

## 2022-09-13 PROCEDURE — 63600175 PHARM REV CODE 636 W HCPCS: Performed by: EMERGENCY MEDICINE

## 2022-09-13 PROCEDURE — 80053 COMPREHEN METABOLIC PANEL: CPT | Performed by: EMERGENCY MEDICINE

## 2022-09-13 PROCEDURE — 36415 COLL VENOUS BLD VENIPUNCTURE: CPT | Performed by: EMERGENCY MEDICINE

## 2022-09-13 PROCEDURE — 94640 AIRWAY INHALATION TREATMENT: CPT

## 2022-09-13 PROCEDURE — 99285 EMERGENCY DEPT VISIT HI MDM: CPT | Mod: 25

## 2022-09-13 PROCEDURE — 25000242 PHARM REV CODE 250 ALT 637 W/ HCPCS: Performed by: EMERGENCY MEDICINE

## 2022-09-13 PROCEDURE — 81001 URINALYSIS AUTO W/SCOPE: CPT | Performed by: EMERGENCY MEDICINE

## 2022-09-13 PROCEDURE — 96375 TX/PRO/DX INJ NEW DRUG ADDON: CPT

## 2022-09-13 RX ORDER — ONDANSETRON 2 MG/ML
4 INJECTION INTRAMUSCULAR; INTRAVENOUS
Status: COMPLETED | OUTPATIENT
Start: 2022-09-13 | End: 2022-09-13

## 2022-09-13 RX ORDER — AMOXICILLIN AND CLAVULANATE POTASSIUM 875; 125 MG/1; MG/1
1 TABLET, FILM COATED ORAL 2 TIMES DAILY
Qty: 14 TABLET | Refills: 0 | Status: SHIPPED | OUTPATIENT
Start: 2022-09-13 | End: 2022-09-23

## 2022-09-13 RX ORDER — IPRATROPIUM BROMIDE AND ALBUTEROL SULFATE 2.5; .5 MG/3ML; MG/3ML
3 SOLUTION RESPIRATORY (INHALATION)
Status: COMPLETED | OUTPATIENT
Start: 2022-09-13 | End: 2022-09-13

## 2022-09-13 RX ORDER — KETOROLAC TROMETHAMINE 10 MG/1
10 TABLET, FILM COATED ORAL EVERY 6 HOURS PRN
Qty: 20 TABLET | Refills: 0 | Status: SHIPPED | OUTPATIENT
Start: 2022-09-13 | End: 2022-09-18

## 2022-09-13 RX ORDER — MORPHINE SULFATE 2 MG/ML
4 INJECTION, SOLUTION INTRAMUSCULAR; INTRAVENOUS
Status: CANCELLED | OUTPATIENT
Start: 2022-09-13 | End: 2022-09-13

## 2022-09-13 RX ORDER — HYDROMORPHONE HYDROCHLORIDE 1 MG/ML
1 INJECTION, SOLUTION INTRAMUSCULAR; INTRAVENOUS; SUBCUTANEOUS
Status: COMPLETED | OUTPATIENT
Start: 2022-09-13 | End: 2022-09-13

## 2022-09-13 RX ORDER — KETOROLAC TROMETHAMINE 30 MG/ML
15 INJECTION, SOLUTION INTRAMUSCULAR; INTRAVENOUS
Status: COMPLETED | OUTPATIENT
Start: 2022-09-13 | End: 2022-09-13

## 2022-09-13 RX ADMIN — IOPAMIDOL 100 ML: 755 INJECTION, SOLUTION INTRAVENOUS at 05:09

## 2022-09-13 RX ADMIN — ONDANSETRON 4 MG: 2 INJECTION INTRAMUSCULAR; INTRAVENOUS at 05:09

## 2022-09-13 RX ADMIN — SODIUM CHLORIDE 1000 ML: 9 INJECTION, SOLUTION INTRAVENOUS at 03:09

## 2022-09-13 RX ADMIN — IPRATROPIUM BROMIDE AND ALBUTEROL SULFATE 3 ML: 2.5; .5 SOLUTION RESPIRATORY (INHALATION) at 06:09

## 2022-09-13 RX ADMIN — KETOROLAC TROMETHAMINE 15 MG: 30 INJECTION, SOLUTION INTRAMUSCULAR; INTRAVENOUS at 03:09

## 2022-09-13 RX ADMIN — HYDROMORPHONE HYDROCHLORIDE 1 MG: 1 INJECTION, SOLUTION INTRAMUSCULAR; INTRAVENOUS; SUBCUTANEOUS at 05:09

## 2022-09-13 RX ADMIN — PIPERACILLIN AND TAZOBACTAM 4.5 G: 4; .5 INJECTION, POWDER, LYOPHILIZED, FOR SOLUTION INTRAVENOUS; PARENTERAL at 06:09

## 2022-09-13 NOTE — ED PROVIDER NOTES
Encounter Date: 9/13/2022       History     Chief Complaint   Patient presents with    Abdominal Pain     Pt c/o LLQ abd pain x 2 days, reports she woke up with a fever of 101.9 and took an ibuprofen and called the ambulance, in triage temp 99.7, vss.     52-year-old female presents with moderate to severe left-sided abdominal pain and fevers for the past 2 days.  She states she feels the same as when she had a perforated diverticuli.    Review of patient's allergies indicates:   Allergen Reactions    Levofloxacin Hives and Itching    Metronidazole Hives    Codeine     Latex     Morphine     Opioids - morphine analogues      Past Medical History:   Diagnosis Date    Asthma     Diverticulosis     GERD (gastroesophageal reflux disease)      History reviewed. No pertinent surgical history.  History reviewed. No pertinent family history.  Social History     Tobacco Use    Smoking status: Every Day     Packs/day: 0.50     Years: 20.00     Pack years: 10.00     Types: Cigarettes    Smokeless tobacco: Never   Substance Use Topics    Drug use: Not Currently     Review of Systems   Constitutional:  Negative for chills and fever.   HENT:  Negative for congestion, rhinorrhea and sore throat.    Respiratory:  Negative for chest tightness, shortness of breath and wheezing.    Cardiovascular:  Negative for chest pain, palpitations and leg swelling.   Gastrointestinal:  Positive for abdominal pain. Negative for blood in stool, diarrhea and vomiting.   Endocrine: Negative for polyuria.   Genitourinary:  Negative for dysuria and flank pain.   Musculoskeletal:  Negative for myalgias.   Skin:  Negative for rash.   Neurological:  Negative for headaches.   All other systems reviewed and are negative.    Physical Exam     Initial Vitals [09/13/22 0214]   BP Pulse Resp Temp SpO2   123/76 94 19 99.7 °F (37.6 °C) 95 %      MAP       --         Physical Exam    Nursing note and vitals reviewed.  Constitutional: She appears well-developed and  well-nourished.   HENT:   Head: Normocephalic and atraumatic.   Eyes: Conjunctivae are normal. Pupils are equal, round, and reactive to light.   Neck: Neck supple.   Normal range of motion.  Cardiovascular:  Normal rate, regular rhythm, normal heart sounds and intact distal pulses.           Pulmonary/Chest: Breath sounds normal.   Abdominal: Abdomen is soft. Bowel sounds are normal. There is abdominal tenderness.   Moderate left-sided abdominal tenderness There is no rebound and no guarding.   Musculoskeletal:         General: No tenderness or edema. Normal range of motion.      Cervical back: Normal range of motion and neck supple.      Comments: Bilateral calves are symmetric and appearance with no significant skin abnormality; normal by palpation with no tenderness or palpable abnormality.     Neurological: She is alert and oriented to person, place, and time.   Skin: Skin is warm and dry.   Psychiatric: She has a normal mood and affect.       ED Course   Procedures  Labs Reviewed   COMPREHENSIVE METABOLIC PANEL - Abnormal; Notable for the following components:       Result Value    Albumin Level 3.4 (*)     All other components within normal limits   URINALYSIS, REFLEX TO URINE CULTURE - Abnormal; Notable for the following components:    Color, UA Light-Yellow (*)     Blood, UA 1+ (*)     All other components within normal limits   CBC WITH DIFFERENTIAL - Abnormal; Notable for the following components:    .5 (*)     MCH 32.4 (*)     MCHC 32.2 (*)     MPV 11.1 (*)     Mono # 1.37 (*)     IG# 0.05 (*)     All other components within normal limits   LIPASE - Normal   BLOOD CULTURE [facility]   BLOOD CULTURE [facility]   CBC W/ AUTO DIFFERENTIAL    Narrative:     The following orders were created for panel order CBC Auto Differential.  Procedure                               Abnormality         Status                     ---------                               -----------         ------                     CBC with  Differential[[id number]]        Abnormal            Final result                 Please view results for these tests on the individual orders.          Imaging Results              CT Abdomen Pelvis With Contrast (Preliminary result)  Result time 09/13/22 05:11:20      Preliminary result by Kevin Barreto Jr., MD (09/13/22 05:11:20)                   Narrative:    START OF REPORT:  Technique: CT of the abdomen and pelvis was performed with axial images as well as sagittal and coronal reconstruction images with intravenous contrast.    Comparison: Comparison is with study dated2022-07-28 01:10:49.    Clinical History: Abd pain.    Dosage Information: Automated Exposure Control was utilized.    Findings:  Thorax:  Lungs: Again noted are ill-defined opacities in the right middle lobe, lingula and bilateral lower lobes, grossly unchanged since the prior examination and may reflect subsegmental atelectasis and / or parenchymal scarring.  Pleura: No effusions or thickening.  Heart: The heart size is within normal limits.  Abdomen:  Abdominal Wall: There is a small umbilical hernia which contains mesenteric fat.  Liver: The liver appears unremarkable.  Biliary System: No intrahepatic or extrahepatic biliary duct dilatation is seen.  Gallbladder: The gallbladder is partially contracted, otherwise appears unremarkable.  Pancreas: The pancreas appears unremarkable.  Spleen: The spleen appears unremarkable.  Adrenals: The adrenal glands appear unremarkable.  Kidneys: The kidneys appear unremarkable with no stones cysts masses or hydronephrosis.  Aorta: The abdominal aorta appears unremarkable.  IVC: Unremarkable.  Bowel:  Esophagus: The visualized esophagus appears unremarkable.  Stomach: The stomach appears unremarkable.  Duodenum: Unremarkable appearing duodenum.  Small Bowel: The small bowel appears unremarkable.  Colon: There are multiple colonic diverticula. There is circumferential wall thickening of the proximal  sigmoid colon with pericolonic fat stranding. This is consistent with recurrent diverticulitis. No evidence of perforation or abscess.  Appendix: The appendix appears unremarkable.  Peritoneum: No intraperitoneal free air or ascites is seen.    Pelvis:  Bladder: The bladder appears unremarkable.  Female:  Uterus: There is a 1.3 x 1.4 cm mildly enhancing hypodense lesion in the anterior myometrium (series 8, image 74 and series 2, image 65), unchanged since the prior examination. This may reflect a fibroid.  Ovaries: No adnexal masses are seen.    Bony structures:  Dorsal Spine: There is mild spondylosis of the visualized dorsal spine.      Impression:  1. There are multiple colonic diverticula. There is circumferential wall thickening of the proximal sigmoid colon with pericolonic fat stranding. This is consistent with recurrent diverticulitis. No evidence of perforation or abscess. Correlate with clinical and laboratory findings.  2. Details and findings as discussed.                          Preliminary result by Pikimal, Rad Results In (09/13/22 05:11:20)                   Narrative:    START OF REPORT:  Technique: CT of the abdomen and pelvis was performed with axial images as well as sagittal and coronal reconstruction images with intravenous contrast.    Comparison: Comparison is with study [id number] 01:10:49.    Clinical History: Abd pain.    Dosage Information: Automated Exposure Control was utilized.    Findings:  Thorax:  Lungs: Again noted are ill-defined opacities in the right middle lobe, lingula and bilateral lower lobes, grossly unchanged since the prior examination and may reflect subsegmental atelectasis and / or parenchymal scarring.  Pleura: No effusions or thickening.  Heart: The heart size is within normal limits.  Abdomen:  Abdominal Wall: There is a small umbilical hernia which contains mesenteric fat.  Liver: The liver appears unremarkable.  Biliary System: No intrahepatic or  extrahepatic biliary duct dilatation is seen.  Gallbladder: The gallbladder is partially contracted, otherwise appears unremarkable.  Pancreas: The pancreas appears unremarkable.  Spleen: The spleen appears unremarkable.  Adrenals: The adrenal glands appear unremarkable.  Kidneys: The kidneys appear unremarkable with no stones cysts masses or hydronephrosis.  Aorta: The abdominal aorta appears unremarkable.  IVC: Unremarkable.  Bowel:  Esophagus: The visualized esophagus appears unremarkable.  Stomach: The stomach appears unremarkable.  Duodenum: Unremarkable appearing duodenum.  Small Bowel: The small bowel appears unremarkable.  Colon: There are multiple colonic diverticula. There is circumferential wall thickening of the proximal sigmoid colon with pericolonic fat stranding. This is consistent with recurrent diverticulitis. No evidence of perforation or abscess.  Appendix: The appendix appears unremarkable.  Peritoneum: No intraperitoneal free air or ascites is seen.    Pelvis:  Bladder: The bladder appears unremarkable.  Female:  Uterus: There is a 1.3 x 1.4 cm mildly enhancing hypodense lesion in the anterior myometrium (series 8, image 74 and series 2, image 65), unchanged since the prior examination. This may reflect a fibroid.  Ovaries: No adnexal masses are seen.    Bony structures:  Dorsal Spine: There is mild spondylosis of the visualized dorsal spine.      Impression:  1. There are multiple colonic diverticula. There is circumferential wall thickening of the proximal sigmoid colon with pericolonic fat stranding. This is consistent with recurrent diverticulitis. No evidence of perforation or abscess. Correlate with clinical and laboratory findings.  2. Details and findings as discussed.                                         Medications   piperacillin-tazobactam (ZOSYN) 4.5 g in sodium chloride 0.9 % 100 mL IVPB (MB+) (has no administration in time range)   ketorolac injection 15 mg (15 mg Intravenous  Given 9/13/22 0341)   sodium chloride 0.9% bolus 1,000 mL (0 mLs Intravenous Stopped 9/13/22 0441)   iopamidoL (ISOVUE-370) injection 100 mL (100 mLs Intravenous Given 9/13/22 0521)   albuterol-ipratropium 2.5 mg-0.5 mg/3 mL nebulizer solution 3 mL (3 mLs Nebulization Given 9/13/22 0603)   HYDROmorphone injection 1 mg (1 mg Intravenous Given 9/13/22 0552)   ondansetron injection 4 mg (4 mg Intravenous Given 9/13/22 0552)     Medical Decision Making:   Initial Assessment:   52-year-old female with history of diverticulosis and passed perforated diverticulitis presenting with left-sided abdominal pain and fever, found to have acute diverticulitis without perforation.  Labs including blood counts chemistries reassuring, patient received antibiotics and pain meds and significant release of symptoms here in the ER, will be prescribed the same and given return precautions, patient is agreeable to this plan.                        Clinical Impression:   Final diagnoses:  [K57.92] Diverticulitis (Primary)      ED Disposition Condition    Discharge Stable          ED Prescriptions       Medication Sig Dispense Start Date End Date Auth. Provider    amoxicillin-clavulanate 875-125mg (AUGMENTIN) 875-125 mg per tablet Take 1 tablet by mouth 2 (two) times daily. for 10 days 14 tablet 9/13/2022 9/23/2022 Glen Roe MD    ketorolac (TORADOL) 10 mg tablet Take 1 tablet (10 mg total) by mouth every 6 (six) hours as needed for Pain (pain). 20 tablet 9/13/2022 9/18/2022 Glen Roe MD          Follow-up Information       Follow up With Specialties Details Why Contact Info    [facility] Dept Emergency Medicine  if not improving daily [address] [place] [address] [phone]             Glen Roe MD  09/13/22 6014

## 2022-09-14 ENCOUNTER — PATIENT OUTREACH (OUTPATIENT)
Dept: EMERGENCY MEDICINE | Facility: HOSPITAL | Age: 52
End: 2022-09-14
Payer: MEDICAID

## 2022-09-14 NOTE — PROGRESS NOTES
Identity verified.  Pt states her abdominal pain has improved, but has not resolved completely.  She states she has filled and is taking the medications prescribed.  She states she had to pay out of pocket for the Toradol prescription because Medicaid will only pay for 5 pills because of the way the prescription was written. Instructed pt in the future to contact the ED to find out if the prescription can be written differently.  Pt states she has no questions about the medications or the ED discharge instructions.  Educated the pt on the benefits of having a PCP, when/where to go when ill, eating a healthy diet, drinking plenty of water and oral health.  Pt states she would like to get some counseling.  Offered to mail [facility] mental health education and resources, pt accepted.  Pt states she is having trouble paying her utility bill.  Offered to text and mail utility resources, pt accepted.  Pt states she has issues getting home from the hospital when she goes there by ambulance.  She states the nurses on the unit state there are no resources.  Instructed pt to ask for Case Management in the future.  Instructed pt that Medicaid does provide transportation to and from healthcare appointments.  Pt requested the phone number be sent via text and mail.  Informed pt that [facility] has been trying to contact her.  Pt requested the phone number be sent via text.  Pt requested personal care assistance.  Informed that she would have to find out if she qualifies.  She requested the phone number be sent via text.  Discussed with patient that she cancelled one [facility] appt and was a NO SHOW for the other.  She states she want to discuss her case with the GI doctor before she has a surgery appointment.  Pt c/o mild SOB.  Pt states she is moving to [place] next week, but she is keeping her house locally.  Pt states she went to the dentist 1 year ago.  Stressed the importance of medication compliance,  keeping appointments and Instructed on the use of urgent care clinic for non emergent issues when PCP unavailable.  Patient verbalized understanding to all instructions.   1638 Sent text message with utility resources, phone number for [facility] [phone], Medicaid transportation [phone]. Personal Care Assistance [phone].    Follow up 10/12/2022    Appointments   PCP Visit Upcoming :   No  Appointment Date/Time :    PCP Visit Upcoming Reason :    PCP Visit Within Year :   Yes   PCP Visit Within Year Reason:   Regular visit   PCP Visit One Year Date :   8/2022 CST   Follow-Up Specialist Appt Scheduled :   No  Type of Specialist :     Follow-Up Lab Appt Scheduled :   No   Follow-Up Date :    CST   Follow-Up Radiology Appt Scheduled :    Radiology Orders :      Providers Patient Visited Last Year :     Dr. Cecilio Carrasco

## 2022-09-14 NOTE — PATIENT INSTRUCTIONS
If you have any questions call Amanda [phone].     Why Should I Have My Own Doctor or Nurse Practitioner (PCP) to Take Care of Me  What is a PCP (Primary Care Provider)?    A primary care provider is a doctor or nurse practitioner who you can call for an appointment and will see you when you are sick.    You will also be seen at scheduled appointment times during the year to check on your diabetes, or high blood pressure, or heart disease.    Why see the same PCP (doctor/nurse practitioner)?    You can be seen faster when you are sick           You, the PCP (doctor/nurse practitioner) and the office staff get to know each other; you begin to trust them to care for you. You take part in your health choices.   All of you together are a team.    Your medicine is looked at every time you visit, to be sure you are taking the medicine, as the PCP (doctor/nurse practitioner) ordered.    Your PCP (doctor/nurse practitioner) and their staff help keep you healthy and out of the hospital.  They can catch sicknesses earlier by ordering tests once a year to stop or prevent the sickness from getting worse.      Your PCP (doctor/nurse practitioner) can send you to providers who specialize (heart/bone/lung) if you need.  They and their office staff help keep track of your seeing other providers (doctors/nurse practitioners) and tests (CT/ MRIs/ X Rays)) taken.        PCPs want you to stay healthy.  Let us care for you.                     How does drug/alcohol abuse affect your health?          Drug and alcohol abuse can cause your breathing to slow down enough for you to stop breathing. It can cause you to see things that are not there. Keep you from sleeping for days.  Continued use of drugs and alcohol will weaken your heart, liver, and kidneys.    Mental Health/Substance Abuse  [facility]:  [phone]  [facility]: [phone]  [facility] Human Services: [phone]    [facility]  [address] [place] [address]  Phone: [phone]  Fax:  [phone]    [facility]  [address]  [address]  Phone:  [phone]  Fax:  [phone]          [facility]  [address] [place]  [address]  Phone:  [phone]  Fax:  [phone]    [facility]  [address] [place]  [address]  Phone:  [phone]  Fax:  [phone]    [facility]  [address] [place] [address]  Phone: [phone]  Fax:  [phone]    [facility]  [address] [place]  [address]  Phone:  [phone]  Fax: [phone]    [facility]  [address]        [place], [place]  Phone:  [phone]    [facility]       [address]        [place], [place]  [id number] [phone]      [facility]   [address] [place] [address]   Phone: [phone]   Medicaid Accepted Plans  Healthy Blue   [facility] on hold at present                                                      Medicaid Accepted Plans    [facility] Healthy Blue   [address] [place] [address] [facility]   Phone:[phone]     Healthy Blue   [address] [place] [address] [facility]    Phone:[phone]      [facility] Healthy Blue   [address]   [address] [place] [address] [facility]   Phone:  [phone]      [facility]   [address] [facility] [place] [address]    Phone: [phone]      [facility] All   [address] [place] [address]    Phone: [phone]        [facility] All   [address] [place] [address]     Phone: [phone]         All     [address] [facility] [place] [address]    Phone: [phone]     All   [address] [facility] [place] [address]    Phone: [phone]        [facility] All   [address] [place] [address]    Phone: [phone]        [facility] All   [address]    [address]    [place] [address]    Phone: [phone]        [facility] All   [address] [facility]   [address]    [place] [address]    Phone: [phone]        [facility] All   [address] [place] [address]    Phone: [phone]        [address] [place] [address]    Phone: [phone]            [facility] All   [address] [place] [address]    Phone: [phone]              [employer]       [employer]   [address] Healthy Blue   [address] [facility] [place] [address]    Phone: [phone]        [address] [facility]   [place] [address] Healthy Blue   Phone: [phone] [facility]       [address] [facility]   [address] Healthy [place] [address] [facility]   Phone: [phone]        [facility] All   [address] [facility]    [place] [address]    Phone: [phone]        [address] [facility] [place] [address]    [phone]        [address] [place] [address]    Phone: [phone]        [facility] All   [address]    [address]    [place] [address]    Phone: [phone]    Counseling only can be self-referral        [address] [place] [address]    [phone]              [address] [facility]   [address]    [place] [address]    Phone: [phone]          [facility]   [address] [facility] [place] [address] [facility]   Phone: [phone]            [facility]     All   [address]    [address]    [place] [address]    Phone: [phone]   "  Emilee Sagastume/Linda Corey               Budget-Friendly Healthy Eating    Save money at the grocery store and eat healthy.   Buy groceries keeping your budget in mind  Make a grocery list and only buy what you have on the list  Eat food you cook or have at home; limit fast food or eating out    Compare food labels  Look at store brand food labels and compare to brand names, often food value is the same and the store brand is cheaper      Look for products that do not have sugar, fat, or salt (sodium) added.  These often cost the same but are healthier for you.  They may be labeled as:  ?Sugar-free.  ?Nonfat.              ?Low-fat.  ?Sodium-free.  ?Low sodium.  Look for lean ground beef labeled as at least 92% lean and 8% fat.        Shopping    Buy only the items on your grocery list and go only to the areas of the store that have the items on your list.  Use coupons only for foods and brands you normally buy. Avoid buying items you wouldn't normally buy simply because they are on sale.  Check online and in newspapers for weekly deals.  Buy healthy items from the bulk bins when available, such as herbs, spices, flour, pasta, nuts, and dried fruit.  Buy fruits and vegetables that are in season. Prices are usually lower on in-season produce.  Look at the unit price on the price tag. Use it to compare different brands and sizes to find out which item is the best deal.  Choose healthy items that are often low-cost, such as carrots, potatoes, apples, bananas, and oranges. Dried or canned beans are a low-cost protein source.      Buy in bulk and freeze extra food. Items you can buy in bulk include meats, fish, poultry, frozen fruits, and frozen vegetables.  Avoid buying "ready-to-eat" foods, such as pre-cut fruits and vegetables and pre-made salads.  If possible, shop around to discover where you can find the best prices. Consider other retailers such as dollar stores, larger wholesale stores, local fruit " "and vegetable stands, and [employer].  Do not shop when you are hungry. If you shop while hungry, it may be hard to stick to your list and budget.      Resist impulse buying. Use your grocery list as your official plan for the week.      Buy a variety of vegetables and fruits by purchasing fresh, frozen, and canned items.  Look at the top and bottom shelves for deals. Foods at eye level (eye level of an adult or child) are usually more expensive.  Be efficient with your time when shopping. The more time you spend at the store, the more money you are likely to spend.  To save money when choosing more expensive foods like meats and dairy:  ?Choose cheaper cuts of meat, such as bone-in chicken thighs and drumsticks instead of skinless and boneless chicken. When you are ready to prepare the chicken, you can remove the skin yourself to make it healthier.  ?Choose lean meats like chicken or turkey instead of beef.  ?Choose canned seafood, such as tuna, salmon, or sardines.  ?Buy eggs as a low-cost source of protein.  ?Buy dried beans and peas, such as lentils, split peas, or kidney beans instead of meats. Dried beans and peas are a good alternative source of protein.  ?Buy the larger tubs of yogurt instead of individual-sized containers.                        Choose water instead of sodas and other sweetened beverages.  Avoid buying chips, cookies, and other "junk food." These items are usually expensive and not healthy.  Meal planning  Do not eat out or get fast food. Prepare food at home.  Make a grocery list and make sure to bring it with you to the store.   Plan meals and snacks according to a grocery list and budget you create.  Use leftovers in your meal plan for the week.  Look for recipes where you can cook once and make enough food for two meals.  Include budget-friendly meals like stews, casseroles, and stir-valdes dishes.  Try some meatless meals or try "no cook" meals like salads.  Make sure that half your " plate is filled with fruits or vegetables. Choose from fresh, frozen, or canned fruits and vegetables. If eating canned, remember to rinse them before eating. This will remove any excess salt added for packaging.            Summary  Eating healthy on a budget is possible if you plan your meals according to your budget, buy according to your budget and grocery list, and prepare food yourself.   Tips for buying more food on a limited budget include buying generic brands, using coupons only for foods you normally buy, and buying healthy items from the bulk bins when available.  Tips for buying cheaper food to replace expensive food include choosing cheaper, lean cuts of meat, and buying dried beans and peas.    Discuss any question you have with your doctor.                What Do I Do If I Wake Up Sick                                                     If you wake up sick, or you start to fill sick during the day, try these tips to get care and start to feel better soon.                                                                                                                              As soon as you start to feel bad, call your doctor's office and ask for same day or next day visit appointment.        If you cannot be seen with your doctor's office within 24 hours, you can go to an urgent care and be seen.          How to stay well:     Take your medicine as ordered by your doctor      Fill your Medicine before you run out      Exercise       Enjoy walking in the sunlight daily  Keep your scheduled clinic appointments      Keep you scheduled yearly wellness visits            Why is taking care of your mouth/teeth/gums important?     Your mouth is the opening to your body.  If not kept clean, it can let in sickness to the rest of your body.     Oral Health Care (Dentists)  [facility].  [address] [place] [address], [phone]  [facility]  [place],    [address] [place] [address] [phone]  [facility]  [address] [place] [address], [phone]  [facility]  [address] [facility] [address], [phone]  [facility]  [address] [facility] [address] [phone]  [facility]  [address] [place] [address] [phone]  [facility]  [address] [address] [phone]  [facility], [address] [facility] [address] [phone]  [facility]           [id number] [phone]  [facility] [employer], [address] [facility] [address], [phone]  Accepts [facility], HB and Adan PRICE DDS;[address] [place] [address]; [phone]             ACCEPTS [facility],  AND SONAMALMA  [facility]; [place]: [phone]  [facility]; [place]: [phone]      Why is it important to your health to get help paying electricity and gas bills if you cannot pay on your own?  Assisting patients finding help with paying electric and gas bills can keep patients healthy.  Using your money paying for electricity/gas takes away from paying for your medication or food or rent.  Not being able to pay utility can make your illness worse.                                                                                                          Assistance with Utilities:  [facility] Operation Round-up:  [phone]  [facility] Outreach:  [phone]  [facility]:  [phone]  [facility]:  [phone] see below:  LIHEAP is a federally funded program that helps low-income households with their home energy bills. The LIHEAP program may provide bill payment assistance and/or energy crisis  assistance.  https://www.[facility].[place].gov/energy-assistance (to apply)

## 2022-09-18 LAB
BACTERIA BLD CULT: NORMAL
BACTERIA BLD CULT: NORMAL

## 2022-10-18 ENCOUNTER — PATIENT OUTREACH (OUTPATIENT)
Dept: EMERGENCY MEDICINE | Facility: HOSPITAL | Age: 52
End: 2022-10-18
Payer: MEDICAID

## 2022-10-19 NOTE — PROGRESS NOTES
"10/18/2022 0827 Left voicemail requesting return call.  10/19/2022 [id number] Patient declined to verify identity. She states "I am in [place] now and I will see my doctors there." Patient refused further navigation.  Encounter closed.    "

## 2022-12-12 ENCOUNTER — HOSPITAL ENCOUNTER (EMERGENCY)
Facility: HOSPITAL | Age: 52
Discharge: HOME | End: 2022-12-12
Attending: INTERNAL MEDICINE
Payer: MEDICAID

## 2022-12-12 VITALS
RESPIRATION RATE: 26 BRPM | TEMPERATURE: 98 F | WEIGHT: 140 LBS | HEART RATE: 98 BPM | OXYGEN SATURATION: 92 % | SYSTOLIC BLOOD PRESSURE: 112 MMHG | DIASTOLIC BLOOD PRESSURE: 56 MMHG | BODY MASS INDEX: 28.28 KG/M2

## 2022-12-12 DIAGNOSIS — J45.31 MILD PERSISTENT ASTHMA WITH EXACERBATION: Primary | ICD-10-CM

## 2022-12-12 LAB
ALBUMIN SERPL-MCNC: 3.2 GM/DL (ref 3.5–5)
ALBUMIN/GLOB SERPL: 0.9 RATIO (ref 1.1–2)
ALP SERPL-CCNC: 67 UNIT/L (ref 40–150)
ALT SERPL-CCNC: 34 UNIT/L (ref 0–55)
AST SERPL-CCNC: 29 UNIT/L (ref 5–34)
BASOPHILS # BLD AUTO: 0.02 X10(3)/MCL (ref 0–0.2)
BASOPHILS NFR BLD AUTO: 0.3 %
BILIRUBIN DIRECT+TOT PNL SERPL-MCNC: 0.4 MG/DL
BUN SERPL-MCNC: 7.7 MG/DL (ref 9.8–20.1)
CALCIUM SERPL-MCNC: 9.3 MG/DL (ref 8.4–10.2)
CHLORIDE SERPL-SCNC: 112 MMOL/L (ref 98–107)
CO2 SERPL-SCNC: 21 MMOL/L (ref 22–29)
CREAT SERPL-MCNC: 0.73 MG/DL (ref 0.55–1.02)
EOSINOPHIL # BLD AUTO: 0.33 X10(3)/MCL (ref 0–0.9)
EOSINOPHIL NFR BLD AUTO: 4.6 %
ERYTHROCYTE [DISTWIDTH] IN BLOOD BY AUTOMATED COUNT: 12.9 % (ref 11.5–17)
GFR SERPLBLD CREATININE-BSD FMLA CKD-EPI: >60 MLS/MIN/1.73/M2
GLOBULIN SER-MCNC: 3.6 GM/DL (ref 2.4–3.5)
GLUCOSE SERPL-MCNC: 87 MG/DL (ref 74–100)
HCT VFR BLD AUTO: 41 % (ref 37–47)
HGB BLD-MCNC: 13.7 GM/DL (ref 12–16)
IMM GRANULOCYTES # BLD AUTO: 0.03 X10(3)/MCL (ref 0–0.04)
IMM GRANULOCYTES NFR BLD AUTO: 0.4 %
LACTATE SERPL-SCNC: 1.2 MMOL/L (ref 0.5–2.2)
LACTATE SERPL-SCNC: 2.9 MMOL/L (ref 0.5–2.2)
LYMPHOCYTES # BLD AUTO: 2.26 X10(3)/MCL (ref 0.6–4.6)
LYMPHOCYTES NFR BLD AUTO: 31.3 %
MCH RBC QN AUTO: 31.8 PG (ref 27–31)
MCHC RBC AUTO-ENTMCNC: 33.4 MG/DL (ref 33–36)
MCV RBC AUTO: 95.1 FL (ref 80–94)
MONOCYTES # BLD AUTO: 1.01 X10(3)/MCL (ref 0.1–1.3)
MONOCYTES NFR BLD AUTO: 14 %
NEUTROPHILS # BLD AUTO: 3.6 X10(3)/MCL (ref 2.1–9.2)
NEUTROPHILS NFR BLD AUTO: 49.4 %
NRBC BLD AUTO-RTO: 0 %
PLATELET # BLD AUTO: 217 X10(3)/MCL (ref 130–400)
PMV BLD AUTO: 11.3 FL (ref 7.4–10.4)
POTASSIUM SERPL-SCNC: 3.3 MMOL/L (ref 3.5–5.1)
PROT SERPL-MCNC: 6.8 GM/DL (ref 6.4–8.3)
RBC # BLD AUTO: 4.31 X10(6)/MCL (ref 4.2–5.4)
SODIUM SERPL-SCNC: 143 MMOL/L (ref 136–145)
WBC # SPEC AUTO: 7.2 X10(3)/MCL (ref 4.5–11.5)

## 2022-12-12 PROCEDURE — 83605 ASSAY OF LACTIC ACID: CPT | Performed by: INTERNAL MEDICINE

## 2022-12-12 PROCEDURE — 25000003 PHARM REV CODE 250: Performed by: INTERNAL MEDICINE

## 2022-12-12 PROCEDURE — 87040 BLOOD CULTURE FOR BACTERIA: CPT | Performed by: INTERNAL MEDICINE

## 2022-12-12 PROCEDURE — 94640 AIRWAY INHALATION TREATMENT: CPT | Mod: XB

## 2022-12-12 PROCEDURE — 96361 HYDRATE IV INFUSION ADD-ON: CPT

## 2022-12-12 PROCEDURE — 96372 THER/PROPH/DIAG INJ SC/IM: CPT | Mod: 59 | Performed by: INTERNAL MEDICINE

## 2022-12-12 PROCEDURE — 63600175 PHARM REV CODE 636 W HCPCS: Performed by: INTERNAL MEDICINE

## 2022-12-12 PROCEDURE — 25000242 PHARM REV CODE 250 ALT 637 W/ HCPCS: Performed by: INTERNAL MEDICINE

## 2022-12-12 PROCEDURE — 99285 EMERGENCY DEPT VISIT HI MDM: CPT | Mod: 25

## 2022-12-12 PROCEDURE — 85025 COMPLETE CBC W/AUTO DIFF WBC: CPT | Performed by: INTERNAL MEDICINE

## 2022-12-12 PROCEDURE — 80053 COMPREHEN METABOLIC PANEL: CPT | Performed by: INTERNAL MEDICINE

## 2022-12-12 PROCEDURE — 96374 THER/PROPH/DIAG INJ IV PUSH: CPT

## 2022-12-12 RX ORDER — METHYLPREDNISOLONE SOD SUCC 125 MG
125 VIAL (EA) INJECTION
Status: COMPLETED | OUTPATIENT
Start: 2022-12-12 | End: 2022-12-12

## 2022-12-12 RX ORDER — IPRATROPIUM BROMIDE AND ALBUTEROL SULFATE 2.5; .5 MG/3ML; MG/3ML
3 SOLUTION RESPIRATORY (INHALATION)
Status: COMPLETED | OUTPATIENT
Start: 2022-12-12 | End: 2022-12-12

## 2022-12-12 RX ORDER — TERBUTALINE SULFATE 1 MG/ML
0.25 INJECTION SUBCUTANEOUS ONCE
Status: COMPLETED | OUTPATIENT
Start: 2022-12-12 | End: 2022-12-12

## 2022-12-12 RX ORDER — METHYLPREDNISOLONE 4 MG/1
TABLET ORAL
Qty: 21 TABLET | Refills: 0 | Status: SHIPPED | OUTPATIENT
Start: 2022-12-12 | End: 2023-01-02

## 2022-12-12 RX ADMIN — TERBUTALINE SULFATE 0.25 MG: 1 INJECTION, SOLUTION SUBCUTANEOUS at 12:12

## 2022-12-12 RX ADMIN — METHYLPREDNISOLONE SODIUM SUCCINATE 125 MG: 125 INJECTION, POWDER, FOR SOLUTION INTRAMUSCULAR; INTRAVENOUS at 01:12

## 2022-12-12 RX ADMIN — IPRATROPIUM BROMIDE AND ALBUTEROL SULFATE 3 ML: 2.5; .5 SOLUTION RESPIRATORY (INHALATION) at 12:12

## 2022-12-12 RX ADMIN — IPRATROPIUM BROMIDE AND ALBUTEROL SULFATE 3 ML: 2.5; .5 SOLUTION RESPIRATORY (INHALATION) at 01:12

## 2022-12-12 RX ADMIN — IPRATROPIUM BROMIDE AND ALBUTEROL SULFATE 3 ML: .5; 3 SOLUTION RESPIRATORY (INHALATION) at 12:12

## 2022-12-12 RX ADMIN — SODIUM CHLORIDE 500 ML: 9 INJECTION, SOLUTION INTRAVENOUS at 12:12

## 2022-12-12 NOTE — ED PROVIDER NOTES
Encounter Date: 12/12/2022       History     Chief Complaint   Patient presents with    Shortness of Breath     Asthma symptoms, +wheezing, hypotension, picc line for abx for diverticulitis     Presents with wheezing and shortness of breath since last night. States Hx of asthma. In IV Zosyn for diverticulitis with an abscess Dx in [place]. Have F/U for possible surgery next week. Denies fever, vomiting, diarrhea, sick contacts    The history is provided by the patient.   Review of patient's allergies indicates:   Allergen Reactions    Levofloxacin Hives and Itching    Metronidazole Hives    Codeine     Latex     Morphine     Opioids - morphine analogues      Past Medical History:   Diagnosis Date    Asthma     Diverticulosis     GERD (gastroesophageal reflux disease)      No past surgical history on file.  No family history on file.  Social History     Tobacco Use    Smoking status: Every Day     Packs/day: 0.50     Years: 20.00     Pack years: 10.00     Types: Cigarettes    Smokeless tobacco: Never   Substance Use Topics    Drug use: Not Currently     Review of Systems   Constitutional:  Negative for fever.   HENT:  Negative for sore throat.    Respiratory:  Positive for shortness of breath and wheezing.    Cardiovascular:  Negative for chest pain.   Gastrointestinal:  Positive for abdominal pain. Negative for nausea.   Genitourinary:  Negative for dysuria.   Musculoskeletal:  Negative for back pain.   Skin:  Negative for rash.   Neurological:  Negative for weakness.   Hematological:  Does not bruise/bleed easily.   All other systems reviewed and are negative.    Physical Exam     Initial Vitals [12/12/22 1110]   BP Pulse Resp Temp SpO2   (!) 80/54 (!) 122 (!) 24 97.7 °F (36.5 °C) 98 %      MAP       --         Physical Exam    Nursing note and vitals reviewed.  Constitutional: She appears well-developed and well-nourished. No distress.   HENT:   Head: Normocephalic and atraumatic.   Mouth/Throat: Oropharynx is clear  and moist.   Eyes: Conjunctivae are normal. Pupils are equal, round, and reactive to light.   Neck: Neck supple.   Normal range of motion.  Cardiovascular:  Normal rate, regular rhythm, normal heart sounds and intact distal pulses.           Pulmonary/Chest: She has wheezes.   Abdominal: Abdomen is soft. Bowel sounds are normal. She exhibits no distension. There is no abdominal tenderness. There is no rebound and no guarding.   Musculoskeletal:         General: No tenderness or edema. Normal range of motion.      Cervical back: Normal range of motion and neck supple.      Comments: PICC Line in Left arm with associated ecchymosis     Neurological: She is alert and oriented to person, place, and time. She has normal strength. GCS score is 15. GCS eye subscore is 4. GCS verbal subscore is 5. GCS motor subscore is 6.   Skin: Skin is warm and dry. No rash noted.   Psychiatric: Her behavior is normal.       ED Course   Procedures  Labs Reviewed   COMPREHENSIVE METABOLIC PANEL - Abnormal; Notable for the following components:       Result Value    Potassium Level 3.3 (*)     Chloride 112 (*)     Carbon Dioxide 21 (*)     Blood Urea Nitrogen 7.7 (*)     Albumin Level 3.2 (*)     Globulin 3.6 (*)     Albumin/Globulin Ratio 0.9 (*)     All other components within normal limits   CBC WITH DIFFERENTIAL - Abnormal; Notable for the following components:    MCV 95.1 (*)     MCH 31.8 (*)     MPV 11.3 (*)     All other components within normal limits   LACTIC ACID, PLASMA - Abnormal; Notable for the following components:    Lactic Acid Level 2.9 (*)     All other components within normal limits   LACTIC ACID, PLASMA - Normal   BLOOD CULTURE OLG   BLOOD CULTURE OLG   CBC W/ AUTO DIFFERENTIAL    Narrative:     The following orders were created for panel order CBC auto differential.  Procedure                               Abnormality         Status                     ---------                               -----------         ------                      CBC with Differential[[id number]]        Abnormal            Final result                 Please view results for these tests on the individual orders.   EXTRA TUBES    Narrative:     The following orders were created for panel order EXTRA TUBES.  Procedure                               Abnormality         Status                     ---------                               -----------         ------                     Light Blue Top Hold[[id number]]                              In process                 Red Top Hold[[id number]]                                     In process                 Light Green Top Hold[[id number]]                             In process                 Pink Top Hold[[id number]]                                    In process                   Please view results for these tests on the individual orders.   LIGHT BLUE TOP HOLD   RED TOP HOLD   LIGHT GREEN TOP HOLD   PINK TOP HOLD          Imaging Results              X-Ray Chest 1 View (Final result)  Result time 12/12/22 13:04:10      Final result by John Bowens MD (12/12/22 13:04:10)                   Impression:      No acute pulmonary process identified.      Electronically signed by: John Bowens  Date:    12/12/2022  Time:    13:04               Narrative:    EXAMINATION:  XR CHEST 1 VIEW    CLINICAL HISTORY:  shortness of breath;    TECHNIQUE:  Frontal view(s) of the chest.    COMPARISON:  Radiography 09/02/2022    FINDINGS:  Left PICC tip over the lower SVC.  Normal cardiac silhouette.  The lungs are well-inflated.  No consolidation identified.  No significant pleural effusion or discernible pneumothorax.                                       Medications   albuterol-ipratropium 2.5 mg-0.5 mg/3 mL nebulizer solution 3 mL (3 mLs Nebulization Given 12/12/22 1210)   sodium chloride 0.9% bolus 500 mL (0 mLs Intravenous Stopped 12/12/22 1331)   sodium chloride 0.9% bolus 500 mL (0 mLs Intravenous Stopped 12/12/22 1331)    albuterol-ipratropium 2.5 mg-0.5 mg/3 mL nebulizer solution 3 mL (3 mLs Nebulization Given 12/12/22 1200)   terbutaline injection 0.25 mg (0.25 mg Subcutaneous Given 12/12/22 1207)   albuterol-ipratropium 2.5 mg-0.5 mg/3 mL nebulizer solution 3 mL (3 mLs Nebulization Given 12/12/22 1348)   albuterol-ipratropium 2.5 mg-0.5 mg/3 mL nebulizer solution 3 mL (3 mLs Nebulization Given 12/12/22 1350)   methylPREDNISolone sodium succinate injection 125 mg (125 mg Intravenous Given 12/12/22 1359)                       1:34 PM    Pt with mild wheezing, BP normal after fluids; /51 mmHg,  bpm, RR 24 rpm, Pulse Ox: 96% at RA.      Pt requesting solumedrol for her asthma, I explained to her the risk of worsening her intra abdominal infection, she understood but states that is the only way she could breath better.    3:34 PM    At this time pt resting, in no distress. States feeling better and asking for discharge on medrol dose pack.        Clinical Impression:   Final diagnoses:  [J45.31] Mild persistent asthma with exacerbation (Primary)        ED Disposition Condition    Discharge Stable          ED Prescriptions       Medication Sig Dispense Start Date End Date Auth. Provider    methylPREDNISolone (MEDROL DOSEPACK) 4 mg tablet Follow instructions on package 21 tablet 12/12/2022 1/2/2023 Alfonso Reyes MD          Follow-up Information       Follow up With Specialties Details Why Contact Info    Cecilio Carrasco MD Internal Medicine In 1 week  [address] [address]  [phone]      [facility] Emergency Medicine  If symptoms worsen [address] [facility] [address] [phone]             Alfonso Reyes MD  12/12/22 9672

## 2022-12-17 LAB
BACTERIA BLD CULT: NORMAL
BACTERIA BLD CULT: NORMAL

## 2022-12-25 ENCOUNTER — HOSPITAL ENCOUNTER (EMERGENCY)
Facility: HOSPITAL | Age: 52
Discharge: HOME | End: 2022-12-25
Attending: EMERGENCY MEDICINE
Payer: MEDICAID

## 2022-12-25 VITALS
SYSTOLIC BLOOD PRESSURE: 117 MMHG | OXYGEN SATURATION: 94 % | DIASTOLIC BLOOD PRESSURE: 77 MMHG | TEMPERATURE: 99 F | RESPIRATION RATE: 20 BRPM | BODY MASS INDEX: 30.67 KG/M2 | WEIGHT: 152.13 LBS | HEIGHT: 59 IN | HEART RATE: 100 BPM

## 2022-12-25 DIAGNOSIS — R00.0 TACHYCARDIA: ICD-10-CM

## 2022-12-25 DIAGNOSIS — R06.00 DYSPNEA: ICD-10-CM

## 2022-12-25 DIAGNOSIS — R07.9 CHEST PAIN: Primary | ICD-10-CM

## 2022-12-25 DIAGNOSIS — J45.901 MODERATE ASTHMA WITH EXACERBATION, UNSPECIFIED WHETHER PERSISTENT: ICD-10-CM

## 2022-12-25 LAB
ALBUMIN SERPL-MCNC: 3.6 G/DL (ref 3.5–5)
ALBUMIN/GLOB SERPL: 0.8 RATIO (ref 1.1–2)
ALP SERPL-CCNC: 75 UNIT/L (ref 40–150)
ALT SERPL-CCNC: 45 UNIT/L (ref 0–55)
AST SERPL-CCNC: 27 UNIT/L (ref 5–34)
BASOPHILS # BLD AUTO: 0.02 X10(3)/MCL (ref 0–0.2)
BASOPHILS NFR BLD AUTO: 0.3 %
BILIRUBIN DIRECT+TOT PNL SERPL-MCNC: 0.3 MG/DL
BNP BLD-MCNC: 56.3 PG/ML
BUN SERPL-MCNC: 5.5 MG/DL (ref 9.8–20.1)
CALCIUM SERPL-MCNC: 10.6 MG/DL (ref 8.4–10.2)
CHLORIDE SERPL-SCNC: 107 MMOL/L (ref 98–107)
CO2 SERPL-SCNC: 25 MMOL/L (ref 22–29)
CREAT SERPL-MCNC: 0.77 MG/DL (ref 0.55–1.02)
EOSINOPHIL # BLD AUTO: 0.94 X10(3)/MCL (ref 0–0.9)
EOSINOPHIL NFR BLD AUTO: 12.5 %
ERYTHROCYTE [DISTWIDTH] IN BLOOD BY AUTOMATED COUNT: 12.9 % (ref 11–14.5)
FLUAV AG UPPER RESP QL IA.RAPID: NOT DETECTED
FLUBV AG UPPER RESP QL IA.RAPID: NOT DETECTED
GFR SERPLBLD CREATININE-BSD FMLA CKD-EPI: >60 MLS/MIN/1.73/M2
GLOBULIN SER-MCNC: 4.5 GM/DL (ref 2.4–3.5)
GLUCOSE SERPL-MCNC: 102 MG/DL (ref 74–100)
HCT VFR BLD AUTO: 40.8 % (ref 37–47)
HGB BLD-MCNC: 13.1 GM/DL (ref 12–16)
IMM GRANULOCYTES # BLD AUTO: 0.02 X10(3)/MCL (ref 0–0.04)
IMM GRANULOCYTES NFR BLD AUTO: 0.3 %
LACTATE SERPL-SCNC: 2 MMOL/L (ref 0.5–2.2)
LACTATE SERPL-SCNC: 2.6 MMOL/L (ref 0.5–2.2)
LYMPHOCYTES # BLD AUTO: 3.42 X10(3)/MCL (ref 0.6–4.6)
LYMPHOCYTES NFR BLD AUTO: 45.4 %
MCH RBC QN AUTO: 31.1 PG
MCHC RBC AUTO-ENTMCNC: 32.1 MG/DL (ref 33–36)
MCV RBC AUTO: 96.9 FL (ref 80–94)
MONOCYTES # BLD AUTO: 0.65 X10(3)/MCL (ref 0.1–1.3)
MONOCYTES NFR BLD AUTO: 8.6 %
NEUTROPHILS # BLD AUTO: 2.48 X10(3)/MCL (ref 2.1–9.2)
NEUTROPHILS NFR BLD AUTO: 32.9 %
NRBC BLD AUTO-RTO: 0 % (ref 0–1)
PLATELET # BLD AUTO: 264 X10(3)/MCL (ref 140–371)
PMV BLD AUTO: 11.3 FL (ref 9.4–12.4)
POCT GLUCOSE: 107 MG/DL (ref 70–110)
POTASSIUM SERPL-SCNC: 3.3 MMOL/L (ref 3.5–5.1)
PROT SERPL-MCNC: 8.1 GM/DL (ref 6.4–8.3)
RBC # BLD AUTO: 4.21 X10(6)/MCL (ref 4.2–5.4)
SARS-COV-2 RNA RESP QL NAA+PROBE: NOT DETECTED
SODIUM SERPL-SCNC: 144 MMOL/L (ref 136–145)
TROPONIN I SERPL-MCNC: <0.01 NG/ML (ref 0–0.04)
WBC # SPEC AUTO: 7.5 X10(3)/MCL (ref 4.5–11.5)

## 2022-12-25 PROCEDURE — 93005 ELECTROCARDIOGRAM TRACING: CPT

## 2022-12-25 PROCEDURE — 96374 THER/PROPH/DIAG INJ IV PUSH: CPT | Mod: 59

## 2022-12-25 PROCEDURE — 82962 GLUCOSE BLOOD TEST: CPT

## 2022-12-25 PROCEDURE — 99285 EMERGENCY DEPT VISIT HI MDM: CPT | Mod: 25

## 2022-12-25 PROCEDURE — 83605 ASSAY OF LACTIC ACID: CPT | Performed by: EMERGENCY MEDICINE

## 2022-12-25 PROCEDURE — 87040 BLOOD CULTURE FOR BACTERIA: CPT | Performed by: EMERGENCY MEDICINE

## 2022-12-25 PROCEDURE — 25000003 PHARM REV CODE 250: Performed by: EMERGENCY MEDICINE

## 2022-12-25 PROCEDURE — 25000242 PHARM REV CODE 250 ALT 637 W/ HCPCS: Performed by: EMERGENCY MEDICINE

## 2022-12-25 PROCEDURE — 25500020 PHARM REV CODE 255: Performed by: EMERGENCY MEDICINE

## 2022-12-25 PROCEDURE — 84484 ASSAY OF TROPONIN QUANT: CPT | Performed by: EMERGENCY MEDICINE

## 2022-12-25 PROCEDURE — 63600175 PHARM REV CODE 636 W HCPCS: Performed by: EMERGENCY MEDICINE

## 2022-12-25 PROCEDURE — 85025 COMPLETE CBC W/AUTO DIFF WBC: CPT | Performed by: EMERGENCY MEDICINE

## 2022-12-25 PROCEDURE — 84145 PROCALCITONIN (PCT): CPT | Performed by: EMERGENCY MEDICINE

## 2022-12-25 PROCEDURE — 0240U COVID/FLU A&B PCR: CPT | Performed by: EMERGENCY MEDICINE

## 2022-12-25 PROCEDURE — 80053 COMPREHEN METABOLIC PANEL: CPT | Performed by: EMERGENCY MEDICINE

## 2022-12-25 PROCEDURE — 83880 ASSAY OF NATRIURETIC PEPTIDE: CPT | Performed by: EMERGENCY MEDICINE

## 2022-12-25 RX ORDER — ALBUTEROL SULFATE 2.5 MG/.5ML
2.5 SOLUTION RESPIRATORY (INHALATION) EVERY 4 HOURS PRN
Qty: 30 EACH | Refills: 1 | OUTPATIENT
Start: 2022-12-25 | End: 2023-01-04

## 2022-12-25 RX ORDER — ALBUTEROL SULFATE 90 UG/1
1-2 AEROSOL, METERED RESPIRATORY (INHALATION) EVERY 6 HOURS PRN
Qty: 18 G | Refills: 1 | Status: ON HOLD | OUTPATIENT
Start: 2022-12-25 | End: 2023-02-14 | Stop reason: SDUPTHER

## 2022-12-25 RX ORDER — IPRATROPIUM BROMIDE AND ALBUTEROL SULFATE 2.5; .5 MG/3ML; MG/3ML
3 SOLUTION RESPIRATORY (INHALATION)
Status: DISPENSED | OUTPATIENT
Start: 2022-12-25 | End: 2022-12-25

## 2022-12-25 RX ORDER — METHYLPREDNISOLONE 4 MG/1
TABLET ORAL
Qty: 1 EACH | Refills: 0 | Status: SHIPPED | OUTPATIENT
Start: 2022-12-25 | End: 2023-01-04

## 2022-12-25 RX ORDER — ACETAMINOPHEN 325 MG/1
650 TABLET ORAL
Status: DISCONTINUED | OUTPATIENT
Start: 2022-12-25 | End: 2022-12-25 | Stop reason: HOSPADM

## 2022-12-25 RX ORDER — METHYLPREDNISOLONE SOD SUCC 125 MG
125 VIAL (EA) INJECTION
Status: COMPLETED | OUTPATIENT
Start: 2022-12-25 | End: 2022-12-25

## 2022-12-25 RX ADMIN — IOHEXOL 100 ML: 350 INJECTION, SOLUTION INTRAVENOUS at 06:12

## 2022-12-25 RX ADMIN — IPRATROPIUM BROMIDE AND ALBUTEROL SULFATE 3 ML: 2.5; .5 SOLUTION RESPIRATORY (INHALATION) at 04:12

## 2022-12-25 RX ADMIN — METHYLPREDNISOLONE SODIUM SUCCINATE 125 MG: 125 INJECTION, POWDER, FOR SOLUTION INTRAMUSCULAR; INTRAVENOUS at 05:12

## 2022-12-25 NOTE — ED PROVIDER NOTES
Encounter Date: 12/25/2022       History     Chief Complaint   Patient presents with    Shortness of Breath     Pt c/o sob since 12/20. At the time she was hospitalized at [facility] for diverticulitis and is still c/o abd pain. Vss. Pt currently on abc for her diverticulitis.     52-year-old female presents with concern for shortness of breath, fever, and worsening abdominal pain.  She reports shortness of breath for the past 5 days, fever and worsening abdominal pain for the past 3 days.  She states she was [place] about 2 weeks ago due to an abdominal abscess.  She did not have surgery, but was discharged with a PICC line and does home administer duration of piperacillin tazobactam.  Abdominal pain is moderate to severe in intensity.    Review of patient's allergies indicates:   Allergen Reactions    Levofloxacin Hives and Itching    Metronidazole Hives    Latex      Past Medical History:   Diagnosis Date    Asthma     Diverticulosis     GERD (gastroesophageal reflux disease)      No past surgical history on file.  No family history on file.  Social History     Tobacco Use    Smoking status: Every Day     Packs/day: 0.50     Years: 20.00     Pack years: 10.00     Types: Cigarettes    Smokeless tobacco: Never   Substance Use Topics    Alcohol use: Not Currently    Drug use: Not Currently     Review of Systems   Constitutional:  Negative for chills and fever.   HENT:  Negative for congestion, rhinorrhea and sore throat.    Respiratory:  Positive for shortness of breath. Negative for chest tightness and wheezing.    Cardiovascular:  Negative for chest pain, palpitations and leg swelling.   Gastrointestinal:  Positive for abdominal pain and nausea. Negative for blood in stool, diarrhea and vomiting.   Endocrine: Negative for polyuria.   Genitourinary:  Negative for dysuria and flank pain.   Musculoskeletal:  Negative for myalgias.   Skin:  Negative for rash.   Neurological:   Negative for headaches.   All other systems reviewed and are negative.    Physical Exam     Initial Vitals [12/25/22 0103]   BP Pulse Resp Temp SpO2   (!) 100/59 (!) 111 20 99.2 °F (37.3 °C) 99 %      MAP       --         Physical Exam    Nursing note and vitals reviewed.  Constitutional: She appears well-developed and well-nourished.   Patient is on 2 L nasal cannula upon my evaluation, O2 sat 99%, states she is not on oxygen at home, no knowledge of prior lung issues, is a cigarette smoker.   HENT:   Head: Normocephalic and atraumatic.   Eyes: Conjunctivae are normal. Pupils are equal, round, and reactive to light.   Neck: Neck supple.   Normal range of motion.  Cardiovascular:  Normal rate, regular rhythm, normal heart sounds and intact distal pulses.           Pulmonary/Chest: She has wheezes. She has rhonchi.   Moderate wheezing with decreased air movement diffusely.   Abdominal: Abdomen is soft. Bowel sounds are normal. There is no abdominal tenderness.   Musculoskeletal:         General: No tenderness or edema. Normal range of motion.      Cervical back: Normal range of motion and neck supple.      Comments: Bilateral calves are symmetric and appearance with no significant skin abnormality; normal by palpation with no tenderness or palpable abnormality.     Neurological: She is alert and oriented to person, place, and time.   Skin: Skin is warm and dry.   Psychiatric: She has a normal mood and affect.       ED Course   Procedures  Labs Reviewed   COMPREHENSIVE METABOLIC PANEL - Abnormal; Notable for the following components:       Result Value    Potassium Level 3.3 (*)     Glucose Level 102 (*)     Blood Urea Nitrogen 5.5 (*)     Calcium Level Total 10.6 (*)     Globulin 4.5 (*)     Albumin/Globulin Ratio 0.8 (*)     All other components within normal limits   LACTIC ACID, PLASMA - Abnormal; Notable for the following components:    Lactic Acid Level 2.6 (*)     All other components within normal limits   CBC  WITH DIFFERENTIAL - Abnormal; Notable for the following components:    MCV 96.9 (*)     MCHC 32.1 (*)     Eos # 0.94 (*)     All other components within normal limits   COVID/FLU A&B PCR - Normal    Narrative:     The Xpert Xpress SARS-CoV-2/FLU/RSV plus is a rapid, multiplexed real-time PCR test intended for the simultaneous qualitative detection and differentiation of SARS-CoV-2, Influenza A, Influenza B, and respiratory syncytial virus (RSV) viral RNA in either nasopharyngeal swab or nasal swab specimens.         TROPONIN I - Normal   B-TYPE NATRIURETIC PEPTIDE - Normal   LACTIC ACID, PLASMA - Normal   BLOOD CULTURE OLG   BLOOD CULTURE OLG   CBC W/ AUTO DIFFERENTIAL    Narrative:     The following orders were created for panel order CBC Auto Differential.  Procedure                               Abnormality         Status                     ---------                               -----------         ------                     CBC with Differential[[id number]]        Abnormal            Final result                 Please view results for these tests on the individual orders.   URINALYSIS, REFLEX TO URINE CULTURE   PROCALCITONIN   EXTRA TUBES    Narrative:     The following orders were created for panel order EXTRA TUBES.  Procedure                               Abnormality         Status                     ---------                               -----------         ------                     Light Blue Top Hold[[id number]]                              In process                 Lavender Top Hold[[id number]]                                In process                   Please view results for these tests on the individual orders.   LIGHT BLUE TOP HOLD   LAVENDER TOP HOLD          Imaging Results              CT Abdomen Pelvis With Contrast (Preliminary result)  Result time 12/25/22 07:09:04      Preliminary result by Gregor Pritchett MD (12/25/22 07:09:04)                   Narrative:    START OF REPORT:  Technique:  CT of the abdomen and pelvis was performed with axial images as well as sagittal and coronal reconstruction images with intravenous contrast.    Comparison: Comparison is with study dated â2022-09-13 04:51:37â.    Clinical History: Pt c/o sob since 12/20. At the time she was hospitalized at [facility] for diverticulitis and is still c/o abd pain. Vss. Pt currently on abc for her diverticulitis.    Dosage Information: Automated Exposure Control was utilized.    Findings:  Lines and Tubes: None.  Thorax:  Lungs: Linear opacities are again seen in the right middle lobe, lingula and bilateral lower lobes, unchanged from prior. These probably represents fibrosis.  Pleura: No effusions or thickening.  Heart: The heart size is within normal limits.  Abdomen:  Abdominal Wall: No abdominal wall pathology is seen.  Liver: The liver appears unremarkable.  Biliary System: No intrahepatic or extrahepatic biliary duct dilatation is seen.  Gallbladder: The gallbladder is non-distended and appears otherwise unremarkable.  Pancreas: The pancreas appears unremarkable.  Spleen: The spleen appears unremarkable.  Adrenals: The adrenal glands appear unremarkable.  Kidneys: The kidneys appear unremarkable with no stones cysts masses or hydronephrosis.  Aorta: The abdominal aorta appears unremarkable.  IVC: Unremarkable.  Bowel:  Esophagus: The visualized esophagus appears unremarkable.  Stomach: The stomach appears unremarkable.  Duodenum: Unremarkable appearing duodenum.  Small Bowel: The small bowel appears unremarkable.  Colon: Multiple diverticula are again seen throughout the colon. No significant change is seen in the circumferential wall thickening of the proximal sigmoid colon with pericolonic fat stranding. No evidence of perforation or abscess noted.  Appendix: The appendix appears unremarkable and is seen on âImage 57, Series 2â through âImage 55, Series 2â.  Peritoneum: No intraperitoneal free air or ascites  is seen.    Pelvis:  Bladder: The bladder appears unremarkable.  Female:  Uterus: The uterus appears unremarkable. The previously noted hypodense lesion in the anterior myometrium is not well visualized in this study.  Ovaries: No adnexal masses are seen.    Bony structures:  Dorsal Spine: There is mild spondylosis of the visualized dorsal spine. Degenerative disc disease at L5-S1 is again seen.      Impression:  1. Multiple diverticula are again seen throughout the colon. No significant change is seen in the circumferential wall thickening of the proximal sigmoid colon with pericolonic fat stranding. No evidence of perforation or abscess noted. Again this likely reflects diverticulitis with neoplasm at this point also a consideration. Clinical correlation is suggested in regard to further evaluation and follow-up.  2. Details and other findings as discussed above.                          Preliminary result by Interface, Rad Results In (12/25/22 07:09:04)                   Narrative:    START OF REPORT:  Technique: CT of the abdomen and pelvis was performed with axial images as well as sagittal and coronal reconstruction images with intravenous contrast.    Comparison: Comparison is with study dated â2022-09-13 04:51:37â.    Clinical History: Pt c/o sob since 12/20. At the time she was hospitalized at [facility] for diverticulitis and is still c/o abd pain. Vss. Pt currently on abc for her diverticulitis.    Dosage Information: Automated Exposure Control was utilized.    Findings:  Lines and Tubes: None.  Thorax:  Lungs: Linear opacities are again seen in the right middle lobe, lingula and bilateral lower lobes, unchanged from prior. These probably represents fibrosis.  Pleura: No effusions or thickening.  Heart: The heart size is within normal limits.  Abdomen:  Abdominal Wall: No abdominal wall pathology is seen.  Liver: The liver appears unremarkable.  Biliary System: No intrahepatic or extrahepatic  biliary duct dilatation is seen.  Gallbladder: The gallbladder is non-distended and appears otherwise unremarkable.  Pancreas: The pancreas appears unremarkable.  Spleen: The spleen appears unremarkable.  Adrenals: The adrenal glands appear unremarkable.  Kidneys: The kidneys appear unremarkable with no stones cysts masses or hydronephrosis.  Aorta: The abdominal aorta appears unremarkable.  IVC: Unremarkable.  Bowel:  Esophagus: The visualized esophagus appears unremarkable.  Stomach: The stomach appears unremarkable.  Duodenum: Unremarkable appearing duodenum.  Small Bowel: The small bowel appears unremarkable.  Colon: Multiple diverticula are again seen throughout the colon. No significant change is seen in the circumferential wall thickening of the proximal sigmoid colon with pericolonic fat stranding. No evidence of perforation or abscess noted.  Appendix: The appendix appears unremarkable and is seen on âImage 57, Series 2â through âImage 55, Series 2â.  Peritoneum: No intraperitoneal free air or ascites is seen.    Pelvis:  Bladder: The bladder appears unremarkable.  Female:  Uterus: The uterus appears unremarkable. The previously noted hypodense lesion in the anterior myometrium is not well visualized in this study.  Ovaries: No adnexal masses are seen.    Bony structures:  Dorsal Spine: There is mild spondylosis of the visualized dorsal spine. Degenerative disc disease at L5-S1 is again seen.      Impression:  1. Multiple diverticula are again seen throughout the colon. No significant change is seen in the circumferential wall thickening of the proximal sigmoid colon with pericolonic fat stranding. No evidence of perforation or abscess noted. Again this likely reflects diverticulitis with neoplasm at this point also a consideration. Clinical correlation is suggested in regard to further evaluation and follow-up.  2. Details and other findings as discussed above.                                          X-Ray Chest AP Portable (In process)  Result time 12/25/22 04:00:59                     Medications   lactated ringers bolus 2,070 mL (2,070 mLs Intravenous Not Given 12/25/22 0400)   albuterol-ipratropium 2.5 mg-0.5 mg/3 mL nebulizer solution 3 mL (3 mLs Nebulization Given 12/25/22 0414)   acetaminophen tablet 650 mg (650 mg Oral Not Given 12/25/22 0514)   methylPREDNISolone sodium succinate injection 125 mg (125 mg Intravenous Given 12/25/22 0514)   iohexoL (OMNIPAQUE 350) injection 100 mL (100 mLs Intravenous Given 12/25/22 0610)     Medical Decision Making:   Initial Assessment:   52-year-old cigarette smoker presenting for shortness of breath, fever, and worsening abdominal pain for the past 3 days.  Patient is found to have very definite wheezing and decreased air movement on auscultation, so suspect COPD.  Also concern for worsening abdominal pain due to recent diverticulitis with abscess.  Obtaining CT abdomen and pelvis to reassess.    Medical record review: CT scan from the 16th of this month abdomen and pelvis with IV contrast:  Moderate colonic wall thickening at level sigmoid colon with adjacent small pericolonic abscess measuring about 2 x 1.1 cm. Appearance is suggestive of diverticulitis with adjacent pericolonic abscess.    Repeat CT scanning shows no worsening patient's abdominal pathology.  Laboratory studies reassuring.  Repeat auscultation shows only mild end expiratory wheeze.  Patient requests refill of her nebulized albuterol.  Will also give a Medrol Dosepak.  As she is already on Zosyn IV at home, it is unlikely she requires additional antibiotic coverage.                        Clinical Impression:   Final diagnoses:  [R07.9] Chest pain (Primary)  [R06.00] Dyspnea  [R00.0] Tachycardia  [J45.901] Moderate asthma with exacerbation, unspecified whether persistent        ED Disposition Condition    Discharge Stable          ED Prescriptions       Medication Sig Dispense Start Date End  Date Auth. Provider    methylPREDNISolone (MEDROL DOSEPACK) 4 mg tablet Per package instructions 1 each 12/25/2022 1/15/2023 Glen Roe MD    albuterol sulfate 2.5 mg/0.5 mL Nebu Take 2.5 mg by nebulization every 4 (four) hours as needed. Rescue 30 each 12/25/2022 12/25/2023 Glen Roe MD    albuterol (PROVENTIL/VENTOLIN HFA) 90 mcg/actuation inhaler Inhale 1-2 puffs into the lungs every 6 (six) hours as needed for Wheezing. Rescue 18 g 12/25/2022 12/25/2023 Glen Roe MD          Follow-up Information    None          Glen Roe MD  12/25/22 [id number]

## 2022-12-30 LAB
BACTERIA BLD CULT: NORMAL
BACTERIA BLD CULT: NORMAL

## 2022-12-31 ENCOUNTER — NURSE TRIAGE (OUTPATIENT)
Dept: ADMINISTRATIVE | Facility: CLINIC | Age: 52
End: 2022-12-31
Payer: MEDICAID

## 2023-01-01 NOTE — TELEPHONE ENCOUNTER
Reason for Disposition   Cracked or broken central line or PICC Line    Additional Information   Negative: SEVERE difficulty breathing (e.g., struggling for each breath, speaks in single words)   Negative: Shock suspected (e.g., cold/pale/clammy skin, too weak to stand, low BP, rapid pulse)    Protocols used: IV Site and Other Symptoms-A-AH  pt called re PICC line and port. Phone muffled throughout call. pt states her port site is bleeding. Pt states sx started suddenly this evening. port may have shifted. Pt states picc line/port is broken. Offered protocol advice. Rec EMS. Pt agrees.

## 2023-01-04 ENCOUNTER — HOSPITAL ENCOUNTER (EMERGENCY)
Facility: HOSPITAL | Age: 53
Discharge: HOME | End: 2023-01-04
Attending: EMERGENCY MEDICINE
Payer: MEDICAID

## 2023-01-04 VITALS
OXYGEN SATURATION: 95 % | HEART RATE: 98 BPM | HEIGHT: 59 IN | DIASTOLIC BLOOD PRESSURE: 58 MMHG | RESPIRATION RATE: 22 BRPM | BODY MASS INDEX: 28.22 KG/M2 | SYSTOLIC BLOOD PRESSURE: 115 MMHG | TEMPERATURE: 99 F | WEIGHT: 140 LBS

## 2023-01-04 DIAGNOSIS — R06.02 SOB (SHORTNESS OF BREATH): ICD-10-CM

## 2023-01-04 DIAGNOSIS — K21.9 GASTROESOPHAGEAL REFLUX DISEASE, UNSPECIFIED WHETHER ESOPHAGITIS PRESENT: ICD-10-CM

## 2023-01-04 DIAGNOSIS — J45.21 MILD INTERMITTENT ASTHMA WITH EXACERBATION: Primary | ICD-10-CM

## 2023-01-04 LAB
ALBUMIN SERPL BCP-MCNC: 4.3 G/DL (ref 3.5–5.2)
ALP SERPL-CCNC: 89 U/L (ref 38–126)
ALT SERPL W/O P-5'-P-CCNC: 34 U/L (ref 10–44)
ANION GAP SERPL CALC-SCNC: 6 MMOL/L (ref 8–16)
AST SERPL-CCNC: 29 U/L (ref 15–46)
BASOPHILS # BLD AUTO: 0.01 K/UL (ref 0–0.2)
BASOPHILS NFR BLD: 0.2 % (ref 0–1.9)
BILIRUB SERPL-MCNC: 0.3 MG/DL (ref 0.1–1)
CALCIUM SERPL-MCNC: 9.9 MG/DL (ref 8.7–10.5)
CHLORIDE SERPL-SCNC: 108 MMOL/L (ref 95–110)
CO2 SERPL-SCNC: 27 MMOL/L (ref 23–29)
CREAT SERPL-MCNC: 0.58 MG/DL (ref 0.5–1.4)
DIFFERENTIAL METHOD: ABNORMAL
EOSINOPHIL # BLD AUTO: 0.1 K/UL (ref 0–0.5)
EOSINOPHIL NFR BLD: 0.8 % (ref 0–8)
ERYTHROCYTE [DISTWIDTH] IN BLOOD BY AUTOMATED COUNT: 12.8 % (ref 11.5–14.5)
EST. GFR  (NO RACE VARIABLE): >60 ML/MIN/1.73 M^2
GLUCOSE SERPL-MCNC: 128 MG/DL (ref 70–110)
HCT VFR BLD AUTO: 36.7 % (ref 37–48.5)
HGB BLD-MCNC: 12 G/DL (ref 12–16)
IMM GRANULOCYTES # BLD AUTO: 0.01 K/UL (ref 0–0.04)
IMM GRANULOCYTES NFR BLD AUTO: 0.2 % (ref 0–0.5)
LYMPHOCYTES # BLD AUTO: 2.5 K/UL (ref 1–4.8)
LYMPHOCYTES NFR BLD: 38.4 % (ref 18–48)
MAGNESIUM SERPL-MCNC: 1.8 MG/DL (ref 1.6–2.6)
MCH RBC QN AUTO: 30.8 PG (ref 27–31)
MCHC RBC AUTO-ENTMCNC: 32.7 G/DL (ref 32–36)
MCV RBC AUTO: 94 FL (ref 82–98)
MONOCYTES # BLD AUTO: 0.8 K/UL (ref 0.3–1)
MONOCYTES NFR BLD: 12.3 % (ref 4–15)
NEUTROPHILS # BLD AUTO: 3.1 K/UL (ref 1.8–7.7)
NEUTROPHILS NFR BLD: 48.1 % (ref 38–73)
NRBC BLD-RTO: 0 /100 WBC
NT-PROBNP SERPL-MCNC: 163 PG/ML (ref 5–900)
PLATELET # BLD AUTO: 209 K/UL (ref 150–450)
PMV BLD AUTO: 10.5 FL (ref 9.2–12.9)
POTASSIUM SERPL-SCNC: 4 MMOL/L (ref 3.5–5.1)
PROT SERPL-MCNC: 7.3 G/DL (ref 6–8.4)
RBC # BLD AUTO: 3.89 M/UL (ref 4–5.4)
SODIUM SERPL-SCNC: 141 MMOL/L (ref 136–145)
TROPONIN I SERPL-MCNC: <0.012 NG/ML (ref 0.01–0.03)
UUN UR-MCNC: 9 MG/DL (ref 7–17)
WBC # BLD AUTO: 6.43 K/UL (ref 3.9–12.7)

## 2023-01-04 PROCEDURE — 25000003 PHARM REV CODE 250: Mod: ER | Performed by: EMERGENCY MEDICINE

## 2023-01-04 PROCEDURE — 85025 COMPLETE CBC W/AUTO DIFF WBC: CPT | Mod: ER | Performed by: EMERGENCY MEDICINE

## 2023-01-04 PROCEDURE — 99900035 HC TECH TIME PER 15 MIN (STAT): Mod: ER

## 2023-01-04 PROCEDURE — 94640 AIRWAY INHALATION TREATMENT: CPT | Mod: ER,XB

## 2023-01-04 PROCEDURE — 93005 ELECTROCARDIOGRAM TRACING: CPT | Mod: ER

## 2023-01-04 PROCEDURE — 25000242 PHARM REV CODE 250 ALT 637 W/ HCPCS: Mod: ER | Performed by: EMERGENCY MEDICINE

## 2023-01-04 PROCEDURE — 83735 ASSAY OF MAGNESIUM: CPT | Mod: ER | Performed by: EMERGENCY MEDICINE

## 2023-01-04 PROCEDURE — 93010 ELECTROCARDIOGRAM REPORT: CPT | Mod: ,,, | Performed by: INTERNAL MEDICINE

## 2023-01-04 PROCEDURE — 80053 COMPREHEN METABOLIC PANEL: CPT | Mod: ER | Performed by: EMERGENCY MEDICINE

## 2023-01-04 PROCEDURE — 63600175 PHARM REV CODE 636 W HCPCS: Mod: ER | Performed by: EMERGENCY MEDICINE

## 2023-01-04 PROCEDURE — 83880 ASSAY OF NATRIURETIC PEPTIDE: CPT | Mod: ER | Performed by: EMERGENCY MEDICINE

## 2023-01-04 PROCEDURE — 99285 EMERGENCY DEPT VISIT HI MDM: CPT | Mod: 25,ER

## 2023-01-04 PROCEDURE — 84484 ASSAY OF TROPONIN QUANT: CPT | Mod: ER | Performed by: EMERGENCY MEDICINE

## 2023-01-04 PROCEDURE — 93010 EKG 12-LEAD: ICD-10-PCS | Mod: ,,, | Performed by: INTERNAL MEDICINE

## 2023-01-04 RX ORDER — PREDNISONE 20 MG/1
40 TABLET ORAL DAILY
Qty: 10 TABLET | Refills: 0 | Status: SHIPPED | OUTPATIENT
Start: 2023-01-04 | End: 2023-01-09

## 2023-01-04 RX ORDER — IPRATROPIUM BROMIDE AND ALBUTEROL SULFATE 2.5; .5 MG/3ML; MG/3ML
3 SOLUTION RESPIRATORY (INHALATION)
Status: COMPLETED | OUTPATIENT
Start: 2023-01-04 | End: 2023-01-04

## 2023-01-04 RX ORDER — ALBUTEROL SULFATE 2.5 MG/.5ML
2.5 SOLUTION RESPIRATORY (INHALATION) EVERY 4 HOURS PRN
Qty: 30 EACH | Refills: 0 | Status: ON HOLD | OUTPATIENT
Start: 2023-01-04 | End: 2023-02-14 | Stop reason: SDUPTHER

## 2023-01-04 RX ORDER — OMEPRAZOLE 40 MG/1
40 CAPSULE, DELAYED RELEASE ORAL DAILY
Qty: 30 CAPSULE | Refills: 0 | Status: SHIPPED | OUTPATIENT
Start: 2023-01-04 | End: 2023-01-04 | Stop reason: SDUPTHER

## 2023-01-04 RX ORDER — IPRATROPIUM BROMIDE AND ALBUTEROL SULFATE 2.5; .5 MG/3ML; MG/3ML
3 SOLUTION RESPIRATORY (INHALATION)
Status: DISCONTINUED | OUTPATIENT
Start: 2023-01-04 | End: 2023-01-04

## 2023-01-04 RX ORDER — PREDNISONE 20 MG/1
40 TABLET ORAL DAILY
Qty: 10 TABLET | Refills: 0 | Status: SHIPPED | OUTPATIENT
Start: 2023-01-04 | End: 2023-01-04 | Stop reason: SDUPTHER

## 2023-01-04 RX ORDER — LIDOCAINE HYDROCHLORIDE 20 MG/ML
15 SOLUTION OROPHARYNGEAL ONCE
Status: DISCONTINUED | OUTPATIENT
Start: 2023-01-04 | End: 2023-01-04

## 2023-01-04 RX ORDER — MAG HYDROX/ALUMINUM HYD/SIMETH 200-200-20
30 SUSPENSION, ORAL (FINAL DOSE FORM) ORAL ONCE
Status: DISCONTINUED | OUTPATIENT
Start: 2023-01-04 | End: 2023-01-04

## 2023-01-04 RX ORDER — MAG HYDROX/ALUMINUM HYD/SIMETH 200-200-20
30 SUSPENSION, ORAL (FINAL DOSE FORM) ORAL ONCE
Status: COMPLETED | OUTPATIENT
Start: 2023-01-04 | End: 2023-01-04

## 2023-01-04 RX ORDER — OMEPRAZOLE 40 MG/1
40 CAPSULE, DELAYED RELEASE ORAL DAILY
Qty: 30 CAPSULE | Refills: 0 | Status: SHIPPED | OUTPATIENT
Start: 2023-01-04 | End: 2023-02-15 | Stop reason: SDUPTHER

## 2023-01-04 RX ORDER — PREDNISONE 20 MG/1
60 TABLET ORAL
Status: COMPLETED | OUTPATIENT
Start: 2023-01-04 | End: 2023-01-04

## 2023-01-04 RX ORDER — ALBUTEROL SULFATE 2.5 MG/.5ML
2.5 SOLUTION RESPIRATORY (INHALATION) EVERY 4 HOURS PRN
Qty: 30 EACH | Refills: 0 | Status: SHIPPED | OUTPATIENT
Start: 2023-01-04 | End: 2023-01-04 | Stop reason: SDUPTHER

## 2023-01-04 RX ORDER — LIDOCAINE HYDROCHLORIDE 20 MG/ML
15 SOLUTION OROPHARYNGEAL ONCE
Status: COMPLETED | OUTPATIENT
Start: 2023-01-04 | End: 2023-01-04

## 2023-01-04 RX ADMIN — IPRATROPIUM BROMIDE AND ALBUTEROL SULFATE 3 ML: .5; 3 SOLUTION RESPIRATORY (INHALATION) at 05:01

## 2023-01-04 RX ADMIN — PREDNISONE 60 MG: 20 TABLET ORAL at 05:01

## 2023-01-04 RX ADMIN — LIDOCAINE HYDROCHLORIDE 15 ML: 20 SOLUTION ORAL at 07:01

## 2023-01-04 RX ADMIN — ALUMINUM HYDROXIDE, MAGNESIUM HYDROXIDE, AND SIMETHICONE 30 ML: 200; 200; 20 SUSPENSION ORAL at 07:01

## 2023-01-05 NOTE — ED PROVIDER NOTES
"The patient was received from the off-going emergency room physician Dr TOAN Nicholas at 6:00PM.  All pertinent details presently available from the patient encounter were discussed along with the expected plan for disposition.  Patient originally presented with concerns regarding shortness of breath that she attributes to asthma.  Patient notes that she is currently without her omeprazole and thinks that her esophageal reflux may triggered some bronchospasm.  Other differential diagnoses would include acute bronchitis and by represent as well as pneumonia.    Vitals:    01/04/23 1711 01/04/23 1743 01/04/23 1748 01/04/23 1757   BP: (!) 115/58      Pulse: 107 100 99 98   Resp: 18 (!) 24 (!) 22 (!) 22   Temp: 98.6 °F (37 °C)      TempSrc: Oral      SpO2: 95% (!) 94%     Weight: 63.5 kg (140 lb)      Height: 4' 11" (1.499 m)       01/04/23 1820 01/04/23 1824   BP:     Pulse:     Resp:     Temp:     TempSrc:     SpO2: 96% 95%   Weight:     Height:       Labs Reviewed   CBC W/ AUTO DIFFERENTIAL - Abnormal; Notable for the following components:       Result Value    RBC 3.89 (*)     Hematocrit 36.7 (*)     All other components within normal limits   COMPREHENSIVE METABOLIC PANEL - Abnormal; Notable for the following components:    Glucose 128 (*)     Anion Gap 6 (*)     All other components within normal limits   TROPONIN I   MAGNESIUM   NT-PRO NATRIURETIC PEPTIDE     X-Ray Chest AP Portable   Final Result      Satisfactory left-sided PICC line catheter         Electronically signed by: Andrés Parson   Date:    01/04/2023   Time:    17:40        All findings were reviewed with the patient/family in detail.  My review of the labs including the CBC and CMP have been largely unremarkable.  Troponin, magnesium, and BNP likewise are unremarkable.  X-ray of the chest shows a left-sided PICC line catheter but an otherwise normal chest.  I see no indication of an emergent process beyond that addressed during our encounter but have " duly counseled the patient/family regarding the need for prompt follow-up as well as the indications that should prompt immediate return to the emergency room should new or worrisome developments occur.  The patient has additionally been provided with printed information regarding diagnosis as well as instructions regarding follow up and any medications intended to manage the patient's aforementioned conditions.  The patient/family communicates understanding of all this information and all remaining questions and concerns were addressed at this time.      1. Mild intermittent asthma with exacerbation    2. SOB (shortness of breath)    3. Gastroesophageal reflux disease, unspecified whether esophagitis present           Jose Espinoza MD  01/05/23 5353

## 2023-01-21 ENCOUNTER — NURSE TRIAGE (OUTPATIENT)
Dept: ADMINISTRATIVE | Facility: CLINIC | Age: 53
End: 2023-01-21
Payer: MEDICAID

## 2023-01-21 NOTE — TELEPHONE ENCOUNTER
Patient c/o a bounding a rapid heartbeat. States that she is also experiencing tremors but believes it is from her heartbeat. She also feels like a knot is in her throat. Patient has been confused and disoriented. Advised per protocol to call 911 now. Patient VU. Advised the patient to call back with any further questions or if symptoms worsen.          Reason for Disposition   Difficult to awaken or acting confused (e.g., disoriented, slurred speech)    Additional Information   Negative: Passed out (i.e., lost consciousness, collapsed and was not responding)   Negative: Shock suspected (e.g., cold/pale/clammy skin, too weak to stand, low BP, rapid pulse)    Protocols used: Heart Rate and Heartbeat [id number]-VC

## 2023-01-22 ENCOUNTER — HOSPITAL ENCOUNTER (EMERGENCY)
Facility: HOSPITAL | Age: 53
Discharge: HOME | End: 2023-01-22
Attending: EMERGENCY MEDICINE
Payer: MEDICAID

## 2023-01-22 VITALS
WEIGHT: 135 LBS | HEIGHT: 59 IN | RESPIRATION RATE: 21 BRPM | TEMPERATURE: 99 F | DIASTOLIC BLOOD PRESSURE: 52 MMHG | BODY MASS INDEX: 27.21 KG/M2 | SYSTOLIC BLOOD PRESSURE: 104 MMHG | OXYGEN SATURATION: 95 % | HEART RATE: 113 BPM

## 2023-01-22 DIAGNOSIS — Z78.9 ALCOHOL USE: Primary | ICD-10-CM

## 2023-01-22 DIAGNOSIS — R00.2 PALPITATIONS: ICD-10-CM

## 2023-01-22 LAB
ALBUMIN SERPL BCP-MCNC: 3.8 G/DL (ref 3.5–5.2)
ALP SERPL-CCNC: 76 U/L (ref 38–126)
ALT SERPL W/O P-5'-P-CCNC: 56 U/L (ref 10–44)
AMPHET+METHAMPHET UR QL: NEGATIVE
ANION GAP SERPL CALC-SCNC: 11 MMOL/L (ref 8–16)
AST SERPL-CCNC: 42 U/L (ref 15–46)
BARBITURATES UR QL SCN>200 NG/ML: NEGATIVE
BASOPHILS # BLD AUTO: 0.01 K/UL (ref 0–0.2)
BASOPHILS NFR BLD: 0.2 % (ref 0–1.9)
BENZODIAZ UR QL SCN>200 NG/ML: NEGATIVE
BILIRUB SERPL-MCNC: 0.3 MG/DL (ref 0.1–1)
BILIRUB UR QL STRIP: NEGATIVE
BZE UR QL SCN: NEGATIVE
CALCIUM SERPL-MCNC: 9.5 MG/DL (ref 8.7–10.5)
CANNABINOIDS UR QL SCN: NEGATIVE
CHLORIDE SERPL-SCNC: 107 MMOL/L (ref 95–110)
CLARITY UR REFRACT.AUTO: CLEAR
CO2 SERPL-SCNC: 26 MMOL/L (ref 23–29)
COLOR UR AUTO: YELLOW
CREAT SERPL-MCNC: 0.62 MG/DL (ref 0.5–1.4)
CREAT UR-MCNC: 60.7 MG/DL (ref 15–325)
DIFFERENTIAL METHOD: ABNORMAL
EOSINOPHIL # BLD AUTO: 0.1 K/UL (ref 0–0.5)
EOSINOPHIL NFR BLD: 2 % (ref 0–8)
ERYTHROCYTE [DISTWIDTH] IN BLOOD BY AUTOMATED COUNT: 12.5 % (ref 11.5–14.5)
EST. GFR  (NO RACE VARIABLE): >60 ML/MIN/1.73 M^2
ETHANOL SERPL-MCNC: 105 MG/DL
GLUCOSE SERPL-MCNC: 112 MG/DL (ref 70–110)
GLUCOSE UR QL STRIP: NEGATIVE
HCT VFR BLD AUTO: 37.5 % (ref 37–48.5)
HGB BLD-MCNC: 12.4 G/DL (ref 12–16)
HGB UR QL STRIP: NEGATIVE
IMM GRANULOCYTES # BLD AUTO: 0.02 K/UL (ref 0–0.04)
IMM GRANULOCYTES NFR BLD AUTO: 0.3 % (ref 0–0.5)
KETONES UR QL STRIP: NEGATIVE
LEUKOCYTE ESTERASE UR QL STRIP: NEGATIVE
LYMPHOCYTES # BLD AUTO: 2.8 K/UL (ref 1–4.8)
LYMPHOCYTES NFR BLD: 47 % (ref 18–48)
MCH RBC QN AUTO: 30.6 PG (ref 27–31)
MCHC RBC AUTO-ENTMCNC: 33.1 G/DL (ref 32–36)
MCV RBC AUTO: 93 FL (ref 82–98)
METHADONE UR QL SCN>300 NG/ML: NEGATIVE
MONOCYTES # BLD AUTO: 1 K/UL (ref 0.3–1)
MONOCYTES NFR BLD: 17.7 % (ref 4–15)
NEUTROPHILS # BLD AUTO: 1.9 K/UL (ref 1.8–7.7)
NEUTROPHILS NFR BLD: 32.8 % (ref 38–73)
NITRITE UR QL STRIP: NEGATIVE
NRBC BLD-RTO: 0 /100 WBC
OPIATES UR QL SCN: NEGATIVE
PCP UR QL SCN>25 NG/ML: NEGATIVE
PH UR STRIP: 6 [PH] (ref 5–8)
PLATELET # BLD AUTO: 181 K/UL (ref 150–450)
PMV BLD AUTO: 11.1 FL (ref 9.2–12.9)
POTASSIUM SERPL-SCNC: 4 MMOL/L (ref 3.5–5.1)
PROT SERPL-MCNC: 6.6 G/DL (ref 6–8.4)
PROT UR QL STRIP: NEGATIVE
RBC # BLD AUTO: 4.05 M/UL (ref 4–5.4)
SODIUM SERPL-SCNC: 144 MMOL/L (ref 136–145)
SP GR UR STRIP: 1.02 (ref 1–1.03)
T4 FREE SERPL-MCNC: 3.09 NG/DL (ref 0.71–1.51)
TOXICOLOGY INFORMATION: NORMAL
TSH SERPL DL<=0.005 MIU/L-ACNC: <0.026 UIU/ML (ref 0.4–4)
URN SPEC COLLECT METH UR: NORMAL
UROBILINOGEN UR STRIP-ACNC: NEGATIVE EU/DL
UUN UR-MCNC: 9 MG/DL (ref 7–17)
WBC # BLD AUTO: 5.87 K/UL (ref 3.9–12.7)

## 2023-01-22 PROCEDURE — 96361 HYDRATE IV INFUSION ADD-ON: CPT | Mod: ER

## 2023-01-22 PROCEDURE — 63600175 PHARM REV CODE 636 W HCPCS: Mod: ER | Performed by: EMERGENCY MEDICINE

## 2023-01-22 PROCEDURE — 84439 ASSAY OF FREE THYROXINE: CPT | Performed by: EMERGENCY MEDICINE

## 2023-01-22 PROCEDURE — 85025 COMPLETE CBC W/AUTO DIFF WBC: CPT | Mod: ER | Performed by: EMERGENCY MEDICINE

## 2023-01-22 PROCEDURE — 81003 URINALYSIS AUTO W/O SCOPE: CPT | Mod: ER,59 | Performed by: EMERGENCY MEDICINE

## 2023-01-22 PROCEDURE — 99284 EMERGENCY DEPT VISIT MOD MDM: CPT | Mod: 25,ER

## 2023-01-22 PROCEDURE — 84443 ASSAY THYROID STIM HORMONE: CPT | Mod: ER | Performed by: EMERGENCY MEDICINE

## 2023-01-22 PROCEDURE — 93010 EKG 12-LEAD: ICD-10-PCS | Mod: ,,, | Performed by: INTERNAL MEDICINE

## 2023-01-22 PROCEDURE — 25000003 PHARM REV CODE 250: Mod: ER | Performed by: EMERGENCY MEDICINE

## 2023-01-22 PROCEDURE — 93010 ELECTROCARDIOGRAM REPORT: CPT | Mod: ,,, | Performed by: INTERNAL MEDICINE

## 2023-01-22 PROCEDURE — 80307 DRUG TEST PRSMV CHEM ANLYZR: CPT | Mod: ER | Performed by: EMERGENCY MEDICINE

## 2023-01-22 PROCEDURE — 82077 ASSAY SPEC XCP UR&BREATH IA: CPT | Mod: ER | Performed by: EMERGENCY MEDICINE

## 2023-01-22 PROCEDURE — 80053 COMPREHEN METABOLIC PANEL: CPT | Mod: ER | Performed by: EMERGENCY MEDICINE

## 2023-01-22 PROCEDURE — 93005 ELECTROCARDIOGRAM TRACING: CPT | Mod: ER

## 2023-01-22 PROCEDURE — 96374 THER/PROPH/DIAG INJ IV PUSH: CPT | Mod: ER

## 2023-01-22 RX ORDER — ONDANSETRON 2 MG/ML
4 INJECTION INTRAMUSCULAR; INTRAVENOUS
Status: COMPLETED | OUTPATIENT
Start: 2023-01-22 | End: 2023-01-22

## 2023-01-22 RX ORDER — PROPRANOLOL HYDROCHLORIDE 10 MG/1
20 TABLET ORAL
Status: COMPLETED | OUTPATIENT
Start: 2023-01-22 | End: 2023-01-22

## 2023-01-22 RX ADMIN — ONDANSETRON HYDROCHLORIDE 4 MG: 2 SOLUTION INTRAMUSCULAR; INTRAVENOUS at 12:01

## 2023-01-22 RX ADMIN — SODIUM CHLORIDE 1000 ML: 0.9 INJECTION, SOLUTION INTRAVENOUS at 12:01

## 2023-01-22 RX ADMIN — SODIUM CHLORIDE 1000 ML: 0.9 INJECTION, SOLUTION INTRAVENOUS at 02:01

## 2023-01-22 RX ADMIN — PROPRANOLOL HYDROCHLORIDE 20 MG: 10 TABLET ORAL at 12:01

## 2023-01-22 NOTE — ED PROVIDER NOTES
"Encounter Date: 1/22/2023       History     Chief Complaint   Patient presents with    Insomnia     Pt to the ER states "I have not slept for 2 days." Pt reports I am shaky and my heart is beating really fast. "My thyroid is acting up, I have tried everything, I even drank a shot of Vodka and I cannot sleep."     HPI  52 y.o.   Co palpitations and insomnia x 2 days   Worried her thyroid is acting up  Had a shot of  vodka  Smoker      Review of patient's allergies indicates:   Allergen Reactions    Levofloxacin Hives and Itching    Metronidazole Hives    Latex      Past Medical History:   Diagnosis Date    Asthma     Diverticulosis     GERD (gastroesophageal reflux disease)      History reviewed. No pertinent surgical history.  History reviewed. No pertinent family history.  Social History     Tobacco Use    Smoking status: Every Day     Packs/day: 0.50     Years: 20.00     Pack years: 10.00     Types: Cigarettes    Smokeless tobacco: Never   Substance Use Topics    Alcohol use: Not Currently    Drug use: Not Currently     Review of Systems  All systems were reviewed/examined and were negative except as noted in the HPI.    Physical Exam     Initial Vitals [01/22/23 0023]   BP Pulse Resp Temp SpO2   112/65 (!) 114 18 98.5 °F (36.9 °C) 100 %      MAP       --         Physical Exam    General: the patient is awake, alert, and in no apparent distress.  Head: normocephalic and atraumatic, sclera are clear  Neck: supple without meningismus    Thyroid nt np  Chest: scattered wheeze no respiratory distress  Heart: tachy r rr  Extremities: warm and well perfused  Skin: warm and dry  Psych conversant  Neuro: awake, alert, moving all extremities    ED Course   Procedures  Labs Reviewed   CBC W/ AUTO DIFFERENTIAL - Abnormal; Notable for the following components:       Result Value    Gran % 32.8 (*)     Mono % 17.7 (*)     All other components within normal limits   COMPREHENSIVE METABOLIC PANEL - Abnormal; Notable for the " following components:    Glucose 112 (*)     ALT 56 (*)     All other components within normal limits   TSH - Abnormal; Notable for the following components:    TSH <0.026 (*)     All other components within normal limits   T4, FREE - Abnormal; Notable for the following components:    Free T4 3.09 (*)     All other components within normal limits   ALCOHOL,MEDICAL (ETHANOL) - Abnormal; Notable for the following components:    Alcohol, Serum 105 (*)     All other components within normal limits   URINALYSIS, REFLEX TO URINE CULTURE    Narrative:     Preferred Collection Type->Urine, Clean Catch  Specimen Source->Urine   DRUG SCREEN PANEL, URINE EMERGENCY    Narrative:     Preferred Collection Type->Urine, Clean Catch  Specimen Source->Urine          Imaging Results    None          Medications   propranoloL tablet 20 mg (20 mg Oral Given 1/22/23 0032)   sodium chloride 0.9% bolus 1,000 mL 1,000 mL (0 mLs Intravenous Stopped 1/22/23 0153)   ondansetron injection 4 mg (4 mg Intravenous Given 1/22/23 0053)   sodium chloride 0.9% bolus 1,000 mL 1,000 mL (0 mLs Intravenous Stopped 1/22/23 0314)         Medical Decision Making:    This is an emergent evaluation of a patient presenting to the ED.  Nursing notes were reviewed.  I personally reviewed, read, and interpreted the ECG and any monitoring strips.  ECG: normal EKG, normal sinus rhythm, unchanged from previous tracings, sinus tachycardia Compared with prior if available.  Read and interpreted by me independently.    Elev etoh, borderline thyroid studiesI personally reviewed and interpreted the laboratory results.  I decided to obtain and review old medical records, which showed: h/o palpitations    Evaluation for Emergency Medical Condition  The patient received a medical screening exam and within a reasonable degree of clinical confidence an emergency medical condition has not been identified.  The patient is instructed on proper follow up and return precautions to  the ED.    Allowed to sit in ED until ETOH level would be well under 80    The patient was encouraged strongly to get the COVID-19 vaccine either after asymptomatic (if COVID positive) or offered it here in the ED is COVID negative.  The patient was also encouraged to obtain an influenza vaccination if available once asymptomatic (if positive) or if testing negative in the ED.    The patient was encouraged and counseled to quit smoking and use of tobacco products, including the effect on their health.  Smoking cessation resources were provided.    Outpatient fu  Endo ref        Harmeet Reyna MD, GRACE                       Clinical Impression:   Final diagnoses:  [R00.2] Palpitations  [Z78.9] Alcohol use (Primary)        ED Disposition Condition    Discharge Stable          ED Prescriptions    None       Follow-up Information       Follow up With Specialties Details Why Contact Info Additional Information    Cecilio Carrasco MD Internal Medicine Schedule an appointment as soon as possible for a visit   [address] [address] [phone]       [facility] Endocr[facility]ology Endocrinology Schedule an appointment as soon as possible for a visit   [address] [facility] [id number] [phone] Please park in surface lot and check in at the .          Discharged to home in stable condition, return to ED warnings given, follow up and patient care instructions given.      Harmeet Reyna MD, GRACE, FACEP  Department of Emergency Medicine       Serge Reyna MD  01/23/23 0256

## 2023-01-22 NOTE — ED NOTES
After giving PO propanolol patient reports nausea. BP recycled currently 97/54. Md notified. Zofran and Saline bolus ordered. BP cycling at q15. Side rails up X2. WCTM.

## 2023-01-22 NOTE — ED NOTES
Pt AAOx4. Ambulatory from ED stretcher to ED hallway and back without difficulty or need for assistance. Pt answering all questions appropriately. NAD noted.

## 2023-01-22 NOTE — ED NOTES
Pt comfortably lying in bed sleeping. Respirations even and unlabored. NAD noted. Vital signs stable. Call light placed in reach.

## 2023-01-22 NOTE — ED NOTES
Patient reports shaking in her legs and a racing heart. She states she was diagnosed with hyperthyroidism and was seen at  [facility] and an [facility] facility. Patient is scheduled to see endocrinology. Patient neurologically intact and answers questions appropriately. Sinus tach on EKG. Denies CP or SOB.

## 2023-01-31 ENCOUNTER — HOSPITAL ENCOUNTER (EMERGENCY)
Facility: HOSPITAL | Age: 53
Discharge: HOME | End: 2023-01-31
Attending: EMERGENCY MEDICINE
Payer: MEDICAID

## 2023-01-31 VITALS
OXYGEN SATURATION: 95 % | SYSTOLIC BLOOD PRESSURE: 114 MMHG | TEMPERATURE: 99 F | DIASTOLIC BLOOD PRESSURE: 54 MMHG | HEART RATE: 114 BPM | RESPIRATION RATE: 16 BRPM

## 2023-01-31 DIAGNOSIS — J45.901 MILD ASTHMA WITH EXACERBATION, UNSPECIFIED WHETHER PERSISTENT: Primary | ICD-10-CM

## 2023-01-31 DIAGNOSIS — R06.02 SOB (SHORTNESS OF BREATH): ICD-10-CM

## 2023-01-31 LAB
ALBUMIN SERPL BCP-MCNC: 4 G/DL (ref 3.5–5.2)
ALP SERPL-CCNC: 85 U/L (ref 38–126)
ALT SERPL W/O P-5'-P-CCNC: 78 U/L (ref 10–44)
ANION GAP SERPL CALC-SCNC: 5 MMOL/L (ref 8–16)
AST SERPL-CCNC: 54 U/L (ref 15–46)
BASOPHILS # BLD AUTO: 0.01 K/UL (ref 0–0.2)
BASOPHILS NFR BLD: 0.1 % (ref 0–1.9)
BILIRUB SERPL-MCNC: 0.5 MG/DL (ref 0.1–1)
CALCIUM SERPL-MCNC: 9.9 MG/DL (ref 8.7–10.5)
CHLORIDE SERPL-SCNC: 109 MMOL/L (ref 95–110)
CO2 SERPL-SCNC: 28 MMOL/L (ref 23–29)
CREAT SERPL-MCNC: 0.57 MG/DL (ref 0.5–1.4)
DIFFERENTIAL METHOD: NORMAL
EOSINOPHIL # BLD AUTO: 0.3 K/UL (ref 0–0.5)
EOSINOPHIL NFR BLD: 3.5 % (ref 0–8)
ERYTHROCYTE [DISTWIDTH] IN BLOOD BY AUTOMATED COUNT: 12.4 % (ref 11.5–14.5)
EST. GFR  (NO RACE VARIABLE): >60 ML/MIN/1.73 M^2
ETHANOL SERPL-MCNC: <10 MG/DL
GLUCOSE SERPL-MCNC: 106 MG/DL (ref 70–110)
HCT VFR BLD AUTO: 38.8 % (ref 37–48.5)
HGB BLD-MCNC: 12.8 G/DL (ref 12–16)
IMM GRANULOCYTES # BLD AUTO: 0.01 K/UL (ref 0–0.04)
IMM GRANULOCYTES NFR BLD AUTO: 0.1 % (ref 0–0.5)
INFLUENZA A, MOLECULAR: NEGATIVE
INFLUENZA B, MOLECULAR: NEGATIVE
LYMPHOCYTES # BLD AUTO: 3.1 K/UL (ref 1–4.8)
LYMPHOCYTES NFR BLD: 40.3 % (ref 18–48)
MCH RBC QN AUTO: 30.5 PG (ref 27–31)
MCHC RBC AUTO-ENTMCNC: 33 G/DL (ref 32–36)
MCV RBC AUTO: 93 FL (ref 82–98)
MONOCYTES # BLD AUTO: 1 K/UL (ref 0.3–1)
MONOCYTES NFR BLD: 12.8 % (ref 4–15)
NEUTROPHILS # BLD AUTO: 3.4 K/UL (ref 1.8–7.7)
NEUTROPHILS NFR BLD: 43.2 % (ref 38–73)
NRBC BLD-RTO: 0 /100 WBC
NT-PROBNP SERPL-MCNC: 65 PG/ML (ref 5–900)
PLATELET # BLD AUTO: 212 K/UL (ref 150–450)
PMV BLD AUTO: 10.5 FL (ref 9.2–12.9)
POTASSIUM SERPL-SCNC: 3.8 MMOL/L (ref 3.5–5.1)
PROT SERPL-MCNC: 7 G/DL (ref 6–8.4)
RBC # BLD AUTO: 4.19 M/UL (ref 4–5.4)
SARS-COV-2 RDRP RESP QL NAA+PROBE: NEGATIVE
SODIUM SERPL-SCNC: 142 MMOL/L (ref 136–145)
SPECIMEN SOURCE: NORMAL
TROPONIN I SERPL-MCNC: <0.012 NG/ML (ref 0.01–0.03)
UUN UR-MCNC: 8 MG/DL (ref 7–17)
WBC # BLD AUTO: 7.75 K/UL (ref 3.9–12.7)

## 2023-01-31 PROCEDURE — 84484 ASSAY OF TROPONIN QUANT: CPT | Mod: ER | Performed by: EMERGENCY MEDICINE

## 2023-01-31 PROCEDURE — 96375 TX/PRO/DX INJ NEW DRUG ADDON: CPT | Mod: ER

## 2023-01-31 PROCEDURE — 25000242 PHARM REV CODE 250 ALT 637 W/ HCPCS: Mod: ER | Performed by: EMERGENCY MEDICINE

## 2023-01-31 PROCEDURE — 63600175 PHARM REV CODE 636 W HCPCS: Mod: ER | Performed by: EMERGENCY MEDICINE

## 2023-01-31 PROCEDURE — 87502 INFLUENZA DNA AMP PROBE: CPT | Mod: ER | Performed by: EMERGENCY MEDICINE

## 2023-01-31 PROCEDURE — 99900035 HC TECH TIME PER 15 MIN (STAT): Mod: ER

## 2023-01-31 PROCEDURE — 93010 ELECTROCARDIOGRAM REPORT: CPT | Mod: ,,, | Performed by: INTERNAL MEDICINE

## 2023-01-31 PROCEDURE — 94760 N-INVAS EAR/PLS OXIMETRY 1: CPT | Mod: ER

## 2023-01-31 PROCEDURE — 25000003 PHARM REV CODE 250: Mod: ER | Performed by: EMERGENCY MEDICINE

## 2023-01-31 PROCEDURE — U0002 COVID-19 LAB TEST NON-CDC: HCPCS | Mod: ER | Performed by: EMERGENCY MEDICINE

## 2023-01-31 PROCEDURE — 82077 ASSAY SPEC XCP UR&BREATH IA: CPT | Mod: ER | Performed by: EMERGENCY MEDICINE

## 2023-01-31 PROCEDURE — 94640 AIRWAY INHALATION TREATMENT: CPT | Mod: ER,XB

## 2023-01-31 PROCEDURE — 85025 COMPLETE CBC W/AUTO DIFF WBC: CPT | Mod: ER | Performed by: EMERGENCY MEDICINE

## 2023-01-31 PROCEDURE — 99285 EMERGENCY DEPT VISIT HI MDM: CPT | Mod: 25,ER

## 2023-01-31 PROCEDURE — 83880 ASSAY OF NATRIURETIC PEPTIDE: CPT | Mod: ER | Performed by: EMERGENCY MEDICINE

## 2023-01-31 PROCEDURE — 80053 COMPREHEN METABOLIC PANEL: CPT | Mod: ER | Performed by: EMERGENCY MEDICINE

## 2023-01-31 PROCEDURE — 96374 THER/PROPH/DIAG INJ IV PUSH: CPT | Mod: ER

## 2023-01-31 PROCEDURE — 93010 EKG 12-LEAD: ICD-10-PCS | Mod: ,,, | Performed by: INTERNAL MEDICINE

## 2023-01-31 PROCEDURE — 93005 ELECTROCARDIOGRAM TRACING: CPT | Mod: ER

## 2023-01-31 RX ORDER — PREDNISONE 20 MG/1
40 TABLET ORAL DAILY
Qty: 10 TABLET | Refills: 0 | Status: SHIPPED | OUTPATIENT
Start: 2023-01-31 | End: 2023-01-31 | Stop reason: CLARIF

## 2023-01-31 RX ORDER — PREDNISONE 20 MG/1
40 TABLET ORAL DAILY
Qty: 10 TABLET | Refills: 0 | Status: SHIPPED | OUTPATIENT
Start: 2023-01-31 | End: 2023-02-05

## 2023-01-31 RX ORDER — METHYLPREDNISOLONE SOD SUCC 125 MG
125 VIAL (EA) INJECTION
Status: COMPLETED | OUTPATIENT
Start: 2023-01-31 | End: 2023-01-31

## 2023-01-31 RX ORDER — LEVALBUTEROL 1.25 MG/.5ML
1.25 SOLUTION, CONCENTRATE RESPIRATORY (INHALATION)
Status: COMPLETED | OUTPATIENT
Start: 2023-01-31 | End: 2023-01-31

## 2023-01-31 RX ORDER — ONDANSETRON 2 MG/ML
4 INJECTION INTRAMUSCULAR; INTRAVENOUS
Status: COMPLETED | OUTPATIENT
Start: 2023-01-31 | End: 2023-01-31

## 2023-01-31 RX ORDER — BENZONATATE 100 MG/1
200 CAPSULE ORAL
Status: COMPLETED | OUTPATIENT
Start: 2023-01-31 | End: 2023-01-31

## 2023-01-31 RX ORDER — ALBUTEROL SULFATE 0.83 MG/ML
2.5 SOLUTION RESPIRATORY (INHALATION)
Status: COMPLETED | OUTPATIENT
Start: 2023-01-31 | End: 2023-01-31

## 2023-01-31 RX ADMIN — LEVALBUTEROL 1.25 MG: 1.25 SOLUTION, CONCENTRATE RESPIRATORY (INHALATION) at 08:01

## 2023-01-31 RX ADMIN — ALBUTEROL SULFATE 2.5 MG: 2.5 SOLUTION RESPIRATORY (INHALATION) at 05:01

## 2023-01-31 RX ADMIN — BENZONATATE 200 MG: 100 CAPSULE ORAL at 05:01

## 2023-01-31 RX ADMIN — ONDANSETRON HYDROCHLORIDE 4 MG: 2 SOLUTION INTRAMUSCULAR; INTRAVENOUS at 05:01

## 2023-01-31 RX ADMIN — METHYLPREDNISOLONE SODIUM SUCCINATE 125 MG: 125 INJECTION, POWDER, FOR SOLUTION INTRAMUSCULAR; INTRAVENOUS at 05:01

## 2023-01-31 NOTE — PROVIDER PROGRESS NOTES - EMERGENCY DEPT.
Encounter Date: 1/31/2023    ED Physician Progress Notes        Physician Note:   52-year-old with asthma here with shortness of breath.  After DuoNeb treatments, patient still with persistent wheezing.  Patient also reports feeling shaky.  Xopenex given additionally.  Labs and imaging unremarkable.  Following Xopenex treatments, patient's wheezing has improved.  She says she feels better with regard to her breathing.  O2 sats 93-94% on room air.  Will discharge home with prednisone and PCP follow up as soon as possible.

## 2023-01-31 NOTE — Clinical Note
"Niya Moeller (Catherine) was seen and treated in our emergency department on 1/31/2023.  She may return to work on 02/03/2023.       If you have any questions or concerns, please don't hesitate to call.      Jonelle SHAH RN    "

## 2023-01-31 NOTE — Clinical Note
"Niya Moeller (Catherine) was seen and treated in our emergency department on 1/31/2023.  She may return to work on 02/01/2023.       If you have any questions or concerns, please don't hesitate to call.      Jonelle SHAH RN    "

## 2023-01-31 NOTE — ED NOTES
Pt resting on stretcher, remains on monitor and given cranberry juice and batool crackers. OK per MD. She states she is feeling better but still shaky.

## 2023-01-31 NOTE — Clinical Note
"Niya Byers" Sajan was seen and treated in our emergency department on 1/31/2023.  She may return to work on 01/31/2023.       If you have any questions or concerns, please don't hesitate to call.      Jonelle SHAH RN    "

## 2023-01-31 NOTE — ED PROVIDER NOTES
"Encounter Date: 1/31/2023       History     Chief Complaint   Patient presents with    Asthma     Asthma, SOB flare x "a couple of days." No relief with home albuterol neb tx. 1 duo neb tx given en route by EMS.    Shortness of Breath     HPI  52 y.o.   BIBEMS for SOB, wheezing. Cough, congestion typical of her asthma x few days  States weather triggered    Review of patient's allergies indicates:   Allergen Reactions    Levofloxacin Hives and Itching    Metronidazole Hives    Latex      Past Medical History:   Diagnosis Date    Asthma     Diverticulosis     GERD (gastroesophageal reflux disease)      History reviewed. No pertinent surgical history.  History reviewed. No pertinent family history.  Social History     Tobacco Use    Smoking status: Every Day     Packs/day: 0.50     Years: 20.00     Pack years: 10.00     Types: Cigarettes    Smokeless tobacco: Never   Substance Use Topics    Alcohol use: Not Currently    Drug use: Not Currently     Review of Systems  All systems were reviewed/examined and were negative except as noted in the HPI.    Physical Exam     Initial Vitals   BP Pulse Resp Temp SpO2   01/31/23 0636 01/31/23 0507 01/31/23 0507 01/31/23 0507 01/31/23 0507   (!) 114/55 (!) 133 (!) 28 98.7 °F (37.1 °C) 96 %      MAP       --                Physical Exam    General: the patient is awake, alert, and in no apparent distress. Speaking in full sentences  Head: normocephalic and atraumatic, sclera are clear  Neck: supple without meningismus  Chest: diffuse exp wheeze no respiratory distress  Heart: regular rate and rhythm  ABD soft, nontender, nondistended, no peritoneal signs  Back nt in the midline  Extremities: warm and well perfused    No calf t no edema  Skin: warm and dry  Psych conversant  Neuro: awake, alert, moving all extremities    ED Course   Procedures  Labs Reviewed   COMPREHENSIVE METABOLIC PANEL - Abnormal; Notable for the following components:       Result Value    AST 54 (*)     ALT 78 " (*)     Anion Gap 5 (*)     All other components within normal limits   INFLUENZA A & B BY MOLECULAR   CBC W/ AUTO DIFFERENTIAL   TROPONIN I   ALCOHOL,MEDICAL (ETHANOL)   NT-PRO NATRIURETIC PEPTIDE   SARS-COV-2 RNA AMPLIFICATION, QUAL    Narrative:     Is the patient symptomatic?->No        ECG Results              EKG 12-lead (Final result)  Result time 01/31/23 14:59:44      Final result by Interface, Lab In [facility] (01/31/23 14:59:44)                   Narrative:    Test Reason : R06.02,    Vent. Rate : 132 BPM     Atrial Rate : 132 BPM     P-R Int : 126 ms          QRS Dur : 068 ms      QT Int : 294 ms       P-R-T Axes : 071 074 031 degrees     QTc Int : 435 ms    Sinus tachycardia  Otherwise normal ECG  When compared with ECG of 22-JAN-2023 00:39,  Nonspecific T wave abnormality now evident in Inferior leads  Confirmed by Bhupinder Watson MD ([id number]) on 1/31/2023 2:59:32 PM    Referred By: [email or web address]   SELF           Confirmed By:Bhupinder Watson MD                                  Imaging Results              X-Ray Chest AP Portable (Final result)  Result time 01/31/23 07:44:17      Final result by Marciano Pascal MD (01/31/23 07:44:17)                   Impression:      Emphysema/COPD.      Electronically signed by: Marciano Pascal  Date:    01/31/2023  Time:    07:44               Narrative:    EXAMINATION:  XR CHEST AP PORTABLE    CLINICAL HISTORY:  sob;    FINDINGS:  Comparison is made to January 4, 2023.    Mediastinal silhouette is within normal limits.  Lungs are hyperexpanded.  No pneumothorax or pleural effusion.  No acute osseous finding.                                       Medications   albuterol nebulizer solution 2.5 mg (2.5 mg Nebulization Given 1/31/23 0535)   methylPREDNISolone sodium succinate injection 125 mg (125 mg Intravenous Given 1/31/23 0514)   ondansetron injection 4 mg (4 mg Intravenous Given 1/31/23 0533)   benzonatate capsule 200 mg (200 mg Oral Given 1/31/23 0534)   levalbuterol  nebulizer solution 1.25 mg (1.25 mg Nebulization Given 1/31/23 0843)        Medical Decision Making:    This is an emergent evaluation of a patient presenting to the ED.  Nursing notes were reviewed.  I personally reviewed, read, and interpreted the ECG and any monitoring strips.  ECG: sinus tachycardia Compared with prior if available.  Read and interpreted by me independently.      I reviewed radiology images personally along with interpretations.  Imaging reviewed by me (chest x-ray), personally and independently, and prelims if available.  No acute/emergent pathologic findings noted on radiologic studies ordered.    I personally reviewed and interpreted the laboratory results.  Communicated with another physician regarding patient's care: Dr. Nicholas  I decided to obtain and review old medical records, which showed: h/o asthma    Critical Care Time    Critical care time was provided for 30 minutes exclusive of other billable procedures and teaching time for the treatment of  asthma/pulm   where the potential for death, shock, or further decline was possible.  Critical care time can include documentation, discussion with consultants, developing a care plan, as well as direct patient care.    Serge Reyna MD                            Clinical Impression:   Final diagnoses:  [R06.02] SOB (shortness of breath)  [J45.901] Mild asthma with exacerbation, unspecified whether persistent (Primary)        ED Disposition Condition    Discharge Stable          ED Prescriptions       Medication Sig Dispense Start Date End Date Auth. Provider    predniSONE (DELTASONE) 20 MG tablet  (Status: Discontinued) Take 2 tablets (40 mg total) by mouth once daily. for 5 days 10 tablet 1/31/2023 1/31/2023 Santy Nicholas MD    predniSONE (DELTASONE) 20 MG tablet Take 2 tablets (40 mg total) by mouth once daily. for 5 days 10 tablet 1/31/2023 2/5/2023 Satny Nicholas MD          Follow-up Information       Follow up With Specialties  Details Why Contact Info    Cecilio Carrasco MD Internal Medicine Schedule an appointment as soon as possible for a visit   [address] [address]  [phone]              Harmeet Reyna MD, [facility], [facility]  Department of Emergency Medicine       Serge Reyna MD  02/01/23 1013

## 2023-02-11 ENCOUNTER — HOSPITAL ENCOUNTER (INPATIENT)
Facility: HOSPITAL | Age: 53
LOS: 3 days | Discharge: HOME | DRG: 203 | End: 2023-02-14
Attending: FAMILY MEDICINE | Admitting: FAMILY MEDICINE
Payer: MEDICAID

## 2023-02-11 DIAGNOSIS — R00.0 TACHYCARDIA: ICD-10-CM

## 2023-02-11 DIAGNOSIS — J45.41 MODERATE PERSISTENT ASTHMA WITH (ACUTE) EXACERBATION: ICD-10-CM

## 2023-02-11 DIAGNOSIS — E06.9 THYROIDITIS: ICD-10-CM

## 2023-02-11 DIAGNOSIS — K57.92 DIVERTICULITIS: ICD-10-CM

## 2023-02-11 DIAGNOSIS — E05.90 HYPERTHYROIDISM: ICD-10-CM

## 2023-02-11 DIAGNOSIS — R07.9 CHEST PAIN: ICD-10-CM

## 2023-02-11 DIAGNOSIS — J45.52 SEVERE PERSISTENT ASTHMA WITH STATUS ASTHMATICUS: Primary | ICD-10-CM

## 2023-02-11 DIAGNOSIS — R07.89 OTHER CHEST PAIN: ICD-10-CM

## 2023-02-11 LAB
ALBUMIN SERPL BCP-MCNC: 3.7 G/DL (ref 3.5–5.2)
ALP SERPL-CCNC: 63 U/L (ref 38–126)
ALT SERPL W/O P-5'-P-CCNC: 39 U/L (ref 10–44)
ANION GAP SERPL CALC-SCNC: 4 MMOL/L (ref 8–16)
AST SERPL-CCNC: 34 U/L (ref 15–46)
BASOPHILS # BLD AUTO: 0.01 K/UL (ref 0–0.2)
BASOPHILS NFR BLD: 0.1 % (ref 0–1.9)
BILIRUB SERPL-MCNC: 0.5 MG/DL (ref 0.1–1)
CALCIUM SERPL-MCNC: 9.8 MG/DL (ref 8.7–10.5)
CHLORIDE SERPL-SCNC: 109 MMOL/L (ref 95–110)
CO2 SERPL-SCNC: 28 MMOL/L (ref 23–29)
CREAT SERPL-MCNC: 0.61 MG/DL (ref 0.5–1.4)
DIFFERENTIAL METHOD: NORMAL
EOSINOPHIL # BLD AUTO: 0.2 K/UL (ref 0–0.5)
EOSINOPHIL NFR BLD: 2.5 % (ref 0–8)
ERYTHROCYTE [DISTWIDTH] IN BLOOD BY AUTOMATED COUNT: 12.3 % (ref 11.5–14.5)
EST. GFR  (NO RACE VARIABLE): >60 ML/MIN/1.73 M^2
GLUCOSE SERPL-MCNC: 136 MG/DL (ref 70–110)
HCT VFR BLD AUTO: 38.6 % (ref 37–48.5)
HGB BLD-MCNC: 12.5 G/DL (ref 12–16)
IMM GRANULOCYTES # BLD AUTO: 0.03 K/UL (ref 0–0.04)
IMM GRANULOCYTES NFR BLD AUTO: 0.3 % (ref 0–0.5)
LYMPHOCYTES # BLD AUTO: 2.2 K/UL (ref 1–4.8)
LYMPHOCYTES NFR BLD: 25 % (ref 18–48)
MAGNESIUM SERPL-MCNC: 1.7 MG/DL (ref 1.6–2.6)
MCH RBC QN AUTO: 29.6 PG (ref 27–31)
MCHC RBC AUTO-ENTMCNC: 32.4 G/DL (ref 32–36)
MCV RBC AUTO: 91 FL (ref 82–98)
MONOCYTES # BLD AUTO: 1 K/UL (ref 0.3–1)
MONOCYTES NFR BLD: 11.3 % (ref 4–15)
NEUTROPHILS # BLD AUTO: 5.5 K/UL (ref 1.8–7.7)
NEUTROPHILS NFR BLD: 60.8 % (ref 38–73)
NRBC BLD-RTO: 0 /100 WBC
NT-PROBNP SERPL-MCNC: 130 PG/ML (ref 5–900)
PLATELET # BLD AUTO: 197 K/UL (ref 150–450)
PMV BLD AUTO: 10.8 FL (ref 9.2–12.9)
POTASSIUM SERPL-SCNC: 4.1 MMOL/L (ref 3.5–5.1)
PROT SERPL-MCNC: 6.7 G/DL (ref 6–8.4)
RBC # BLD AUTO: 4.23 M/UL (ref 4–5.4)
SODIUM SERPL-SCNC: 141 MMOL/L (ref 136–145)
UUN UR-MCNC: 10 MG/DL (ref 7–17)
WBC # BLD AUTO: 8.97 K/UL (ref 3.9–12.7)

## 2023-02-11 PROCEDURE — 63600175 PHARM REV CODE 636 W HCPCS: Performed by: INTERNAL MEDICINE

## 2023-02-11 PROCEDURE — 25000003 PHARM REV CODE 250: Mod: ER | Performed by: EMERGENCY MEDICINE

## 2023-02-11 PROCEDURE — 25000003 PHARM REV CODE 250: Performed by: INTERNAL MEDICINE

## 2023-02-11 PROCEDURE — 25000242 PHARM REV CODE 250 ALT 637 W/ HCPCS: Mod: ER | Performed by: FAMILY MEDICINE

## 2023-02-11 PROCEDURE — 11000001 HC ACUTE MED/SURG PRIVATE ROOM

## 2023-02-11 PROCEDURE — 25000242 PHARM REV CODE 250 ALT 637 W/ HCPCS

## 2023-02-11 PROCEDURE — 83735 ASSAY OF MAGNESIUM: CPT | Mod: ER | Performed by: FAMILY MEDICINE

## 2023-02-11 PROCEDURE — 63600175 PHARM REV CODE 636 W HCPCS: Mod: ER | Performed by: FAMILY MEDICINE

## 2023-02-11 PROCEDURE — 99900035 HC TECH TIME PER 15 MIN (STAT)

## 2023-02-11 PROCEDURE — 94644 CONT INHLJ TX 1ST HOUR: CPT | Mod: ER

## 2023-02-11 PROCEDURE — G0378 HOSPITAL OBSERVATION PER HR: HCPCS

## 2023-02-11 PROCEDURE — 80053 COMPREHEN METABOLIC PANEL: CPT | Mod: ER | Performed by: FAMILY MEDICINE

## 2023-02-11 PROCEDURE — 25000003 PHARM REV CODE 250: Mod: ER | Performed by: FAMILY MEDICINE

## 2023-02-11 PROCEDURE — 27000221 HC OXYGEN, UP TO 24 HOURS: Mod: ER

## 2023-02-11 PROCEDURE — 94640 AIRWAY INHALATION TREATMENT: CPT

## 2023-02-11 PROCEDURE — 25000242 PHARM REV CODE 250 ALT 637 W/ HCPCS: Mod: ER | Performed by: EMERGENCY MEDICINE

## 2023-02-11 PROCEDURE — 96375 TX/PRO/DX INJ NEW DRUG ADDON: CPT | Mod: ER

## 2023-02-11 PROCEDURE — 85025 COMPLETE CBC W/AUTO DIFF WBC: CPT | Mod: ER | Performed by: FAMILY MEDICINE

## 2023-02-11 PROCEDURE — 96365 THER/PROPH/DIAG IV INF INIT: CPT | Mod: ER

## 2023-02-11 PROCEDURE — 25000003 PHARM REV CODE 250: Performed by: CLINICAL NURSE SPECIALIST

## 2023-02-11 PROCEDURE — 99285 EMERGENCY DEPT VISIT HI MDM: CPT | Mod: 25,ER

## 2023-02-11 PROCEDURE — 83880 ASSAY OF NATRIURETIC PEPTIDE: CPT | Mod: ER | Performed by: FAMILY MEDICINE

## 2023-02-11 RX ORDER — ONDANSETRON 2 MG/ML
4 INJECTION INTRAMUSCULAR; INTRAVENOUS EVERY 8 HOURS PRN
Status: DISCONTINUED | OUTPATIENT
Start: 2023-02-11 | End: 2023-02-14 | Stop reason: HOSPADM

## 2023-02-11 RX ORDER — SUCRALFATE 1 G/10ML
1 SUSPENSION ORAL EVERY 6 HOURS
Status: DISCONTINUED | OUTPATIENT
Start: 2023-02-12 | End: 2023-02-14 | Stop reason: HOSPADM

## 2023-02-11 RX ORDER — LEVALBUTEROL 1.25 MG/.5ML
SOLUTION, CONCENTRATE RESPIRATORY (INHALATION)
Status: COMPLETED
Start: 2023-02-11 | End: 2023-02-11

## 2023-02-11 RX ORDER — LEVALBUTEROL INHALATION SOLUTION 0.63 MG/3ML
0.63 SOLUTION RESPIRATORY (INHALATION)
Status: DISCONTINUED | OUTPATIENT
Start: 2023-02-11 | End: 2023-02-11

## 2023-02-11 RX ORDER — ACETAMINOPHEN 325 MG/1
650 TABLET ORAL EVERY 8 HOURS PRN
Status: DISCONTINUED | OUTPATIENT
Start: 2023-02-11 | End: 2023-02-14 | Stop reason: HOSPADM

## 2023-02-11 RX ORDER — IPRATROPIUM BROMIDE AND ALBUTEROL SULFATE 2.5; .5 MG/3ML; MG/3ML
3 SOLUTION RESPIRATORY (INHALATION)
Status: COMPLETED | OUTPATIENT
Start: 2023-02-11 | End: 2023-02-11

## 2023-02-11 RX ORDER — NALOXONE HCL 0.4 MG/ML
0.02 VIAL (ML) INJECTION
Status: DISCONTINUED | OUTPATIENT
Start: 2023-02-11 | End: 2023-02-14 | Stop reason: HOSPADM

## 2023-02-11 RX ORDER — BENZONATATE 100 MG/1
200 CAPSULE ORAL
Status: COMPLETED | OUTPATIENT
Start: 2023-02-11 | End: 2023-02-11

## 2023-02-11 RX ORDER — MAGNESIUM SULFATE 1 G/100ML
1 INJECTION INTRAVENOUS
Status: COMPLETED | OUTPATIENT
Start: 2023-02-11 | End: 2023-02-11

## 2023-02-11 RX ORDER — LEVALBUTEROL 1.25 MG/.5ML
1.25 SOLUTION, CONCENTRATE RESPIRATORY (INHALATION)
Status: DISCONTINUED | OUTPATIENT
Start: 2023-02-12 | End: 2023-02-14 | Stop reason: HOSPADM

## 2023-02-11 RX ORDER — POLYETHYLENE GLYCOL 3350 17 G/17G
17 POWDER, FOR SOLUTION ORAL DAILY PRN
Status: DISCONTINUED | OUTPATIENT
Start: 2023-02-11 | End: 2023-02-14 | Stop reason: HOSPADM

## 2023-02-11 RX ORDER — PANTOPRAZOLE SODIUM 40 MG/1
40 TABLET, DELAYED RELEASE ORAL DAILY
Status: DISCONTINUED | OUTPATIENT
Start: 2023-02-11 | End: 2023-02-13

## 2023-02-11 RX ORDER — ALBUTEROL SULFATE 0.83 MG/ML
2.5 SOLUTION RESPIRATORY (INHALATION) EVERY 4 HOURS
Status: DISCONTINUED | OUTPATIENT
Start: 2023-02-11 | End: 2023-02-11

## 2023-02-11 RX ORDER — MAG HYDROX/ALUMINUM HYD/SIMETH 200-200-20
30 SUSPENSION, ORAL (FINAL DOSE FORM) ORAL
Status: DISCONTINUED | OUTPATIENT
Start: 2023-02-12 | End: 2023-02-14 | Stop reason: HOSPADM

## 2023-02-11 RX ORDER — TALC
6 POWDER (GRAM) TOPICAL NIGHTLY PRN
Status: DISCONTINUED | OUTPATIENT
Start: 2023-02-11 | End: 2023-02-14 | Stop reason: HOSPADM

## 2023-02-11 RX ORDER — LORAZEPAM 1 MG/1
1 TABLET ORAL
Status: COMPLETED | OUTPATIENT
Start: 2023-02-11 | End: 2023-02-11

## 2023-02-11 RX ORDER — ALBUTEROL SULFATE 0.83 MG/ML
7.5 SOLUTION RESPIRATORY (INHALATION) CONTINUOUS
Status: DISCONTINUED | OUTPATIENT
Start: 2023-02-11 | End: 2023-02-11

## 2023-02-11 RX ORDER — METHYLPREDNISOLONE SOD SUCC 125 MG
125 VIAL (EA) INJECTION
Status: COMPLETED | OUTPATIENT
Start: 2023-02-11 | End: 2023-02-11

## 2023-02-11 RX ADMIN — ALBUTEROL SULFATE 7.5 MG: 2.5 SOLUTION RESPIRATORY (INHALATION) at 05:02

## 2023-02-11 RX ADMIN — MAGNESIUM SULFATE 1 G: 1 INJECTION INTRAVENOUS at 06:02

## 2023-02-11 RX ADMIN — Medication 6 MG: at 11:02

## 2023-02-11 RX ADMIN — LEVALBUTEROL HYDROCHLORIDE 1.25 MG: 1.25 SOLUTION, CONCENTRATE RESPIRATORY (INHALATION) at 10:02

## 2023-02-11 RX ADMIN — PANTOPRAZOLE SODIUM 40 MG: 40 TABLET, DELAYED RELEASE ORAL at 11:02

## 2023-02-11 RX ADMIN — METHYLPREDNISOLONE SODIUM SUCCINATE 125 MG: 125 INJECTION, POWDER, FOR SOLUTION INTRAMUSCULAR; INTRAVENOUS at 05:02

## 2023-02-11 RX ADMIN — METHYLPREDNISOLONE SODIUM SUCCINATE 60 MG: 40 INJECTION, POWDER, FOR SOLUTION INTRAMUSCULAR; INTRAVENOUS at 11:02

## 2023-02-11 RX ADMIN — SUCRALFATE 1 G: 1 SUSPENSION ORAL at 11:02

## 2023-02-11 RX ADMIN — IPRATROPIUM BROMIDE AND ALBUTEROL SULFATE 3 ML: .5; 3 SOLUTION RESPIRATORY (INHALATION) at 05:02

## 2023-02-11 RX ADMIN — LORAZEPAM 1 MG: 1 TABLET ORAL at 08:02

## 2023-02-11 RX ADMIN — BENZONATATE 200 MG: 100 CAPSULE ORAL at 05:02

## 2023-02-11 NOTE — LETTER
February 14, 2023         [address]  [place] [address]  Phone: [phone]  Fax: [phone]       February 14, 2023         [address]  [place] [address]  Phone: [phone]  Fax: [phone]       Patient: Niya Moeller   YOB: 1970  Date of Visit: 02/14/2023    To Whom It May Concern:    Alie Moeller  was at [facility] on 02/11/2023. The patient may return to work/school on 2/16/2023 with no restrictions. If you have any questions or concerns, or if I can be of further assistance, please do not hesitate to contact me.    Sincerely,    Sol Aguirre, NP

## 2023-02-11 NOTE — LETTER
February 14, 2023         [address] [place] [address] [place] [address]  Phone: [phone]  Fax: [phone]       Patient: Niya Moeller   YOB: 1970  Date of Visit: 02/14/2023    To Whom It May Concern:    Alie Moeller  was at [facility] on 02/14/2023. The patient may return to work/school on 2/16/2023 with no restrictions. If you have any questions or concerns, or if I can be of further assistance, please do not hesitate to contact me.    Sincerely,    Sol Aguirre NP

## 2023-02-11 NOTE — ED PROVIDER NOTES
"Encounter Date: 2/11/2023       History     Chief Complaint   Patient presents with    Wheezing     Reports of having an asthma attack. Patient has tried taking her neb machine q4h since last night. Patient does not have a rescue inhaler. Patient states "I need steroids that's the only thing that will fix this."     52-year-old female presents to ER with respiratory distress.  Has been diagnosed with asthma and has been here 10 days ago for similar reason and was sent home on steroid.  Patient claims her shortness of breath started last night and has been doing albuterol every 4 hours with no improvement.  Mild to moderate cough.  Current smoker.  Denies having fever.  Patient is requesting steroids and claims that only steroids help her.    The history is provided by the patient.   Review of patient's allergies indicates:   Allergen Reactions    Levofloxacin Hives and Itching    Metronidazole Hives    Latex      Past Medical History:   Diagnosis Date    Asthma     Diverticulosis     GERD (gastroesophageal reflux disease)      No past surgical history on file.  No family history on file.  Social History     Tobacco Use    Smoking status: Every Day     Packs/day: 0.50     Years: 20.00     Pack years: 10.00     Types: Cigarettes    Smokeless tobacco: Never   Substance Use Topics    Alcohol use: Not Currently    Drug use: Not Currently     Review of Systems   Constitutional:  Negative for activity change, appetite change, chills and fever.   HENT:  Negative for congestion, ear discharge, rhinorrhea, sinus pressure, sinus pain, sore throat and trouble swallowing.    Eyes:  Negative for photophobia, pain, discharge, redness, itching and visual disturbance.   Respiratory:  Positive for chest tightness, shortness of breath and wheezing. Negative for cough.    Cardiovascular:  Negative for chest pain, palpitations and leg swelling.   Gastrointestinal:  Negative for abdominal distention, abdominal pain, " constipation, diarrhea, nausea and vomiting.   Genitourinary:  Negative for dysuria, flank pain, frequency and hematuria.   Musculoskeletal:  Negative for back pain, gait problem, neck pain and neck stiffness.   Skin:  Negative for rash and wound.   Neurological:  Negative for dizziness, tremors, seizures, syncope, speech difficulty, weakness, light-headedness, numbness and headaches.   Psychiatric/Behavioral:  Negative for behavioral problems, confusion, hallucinations and sleep disturbance. The patient is not nervous/anxious.    All other systems reviewed and are negative.    Physical Exam     Initial Vitals [02/11/23 1643]   BP Pulse Resp Temp SpO2   129/60 (!) 122 (!) 38 98.5 °F (36.9 °C) (!) 94 %      MAP       --         Physical Exam    Nursing note and vitals reviewed.  Constitutional: Vital signs are normal. She appears well-developed and well-nourished. She is active. No distress.   HENT:   Head: Normocephalic.   Nose: Nose normal.   Mouth/Throat: Oropharynx is clear and moist and mucous membranes are normal.   Eyes: Conjunctivae, EOM and lids are normal.   Neck: Neck supple.   Normal range of motion.  Cardiovascular:  Normal rate, regular rhythm, S1 normal, S2 normal and normal heart sounds.           Pulmonary/Chest: She is in respiratory distress. She has wheezes. She has rhonchi.   Abdominal: Abdomen is soft. Bowel sounds are normal.   Musculoskeletal:         General: Normal range of motion.      Right upper arm: Normal.      Left upper arm: Normal.      Cervical back: Normal range of motion and neck supple.      Right lower leg: Normal.      Left lower leg: Normal.     Neurological: She is alert and oriented to person, place, and time. She has normal strength. GCS score is 15. GCS eye subscore is 4. GCS verbal subscore is 5. GCS motor subscore is 6.   Skin: Skin is warm. Capillary refill takes less than 2 seconds.   Psychiatric: She has a normal mood and affect. Her speech is normal and behavior is  normal. Thought content normal. Cognition and memory are normal.       ED Course   Procedures  Labs Reviewed   COMPREHENSIVE METABOLIC PANEL - Abnormal; Notable for the following components:       Result Value    Glucose 136 (*)     Anion Gap 4 (*)     All other components within normal limits   CBC W/ AUTO DIFFERENTIAL   NT-PRO NATRIURETIC PEPTIDE   MAGNESIUM          Imaging Results    None          Medications   albuterol nebulizer solution 7.5 mg (7.5 mg Nebulization New Bag 2/11/23 1700)   magnesium sulfate in dextrose IVPB (premix) 1 g (has no administration in time range)   albuterol-ipratropium 2.5 mg-0.5 mg/3 mL nebulizer solution 3 mL (3 mLs Nebulization Given 2/11/23 1700)   methylPREDNISolone sodium succinate injection 125 mg (125 mg Intravenous Given 2/11/23 1732)   benzonatate capsule 200 mg (200 mg Oral Given 2/11/23 1759)     Medical Decision Making:   History:   Old Medical Records: I decided to obtain old medical records.  Old Records Summarized: records from previous admission(s).       <> Summary of Records: Recent exacerbation about 11 days ago.  Initial Assessment:   Acute asthma exacerbation with history of asthma.  Tachypneic, hypoxic and tachycardia noted.  Bilateral significant wheezing.    Patient is started on oxygen, continuous albuterol nebulization.  Solu-Medrol IV.  Differential Diagnosis:   Acute asthma exacerbation, COPD.  Allergic reaction.  Respiratory distress.  CHF.  Clinical Tests:   Lab Tests: Ordered and Reviewed  ED Management:  Acute asthma exacerbation since yesterday not getting better with albuterol at home.  Albuterol continuous treatment for 1 hour, Solu-Medrol 125 mg IV.  Magnesium 1 g IV.  Patient stopped albuterol inhalation - as per RT and would like to restart after eating.  Pending improvement and further evaluation patient is turned over to Dr. Espinoza at shift change.           ED Course as of 02/12/23 0643   Sat Feb 11, 2023 1918 Page has been placed to [facility].  We are currently awaiting callback to discuss options for hospitalization.   [ST]   1921 Callback received from [facility] internal medicine.  It appears that they are not on-call for un referred patient this evening.  We have placed a page to the appropriate team [facility]. [ST]      ED Course User Index  [ST] Jose Espinoza MD                 Clinical Impression:   Final diagnoses:  [J45.51] Severe persistent asthma with acute exacerbation (Primary)               Juan R Hopper MD  02/12/23 0644

## 2023-02-11 NOTE — ED NOTES
"IV attempt unsuccessful X 2, pt requesting "IV team." Pt refusing BP cuff and heart monitor, allowing pulse ox.   "

## 2023-02-12 PROBLEM — E05.90 HYPERTHYROIDISM: Status: ACTIVE | Noted: 2023-02-12

## 2023-02-12 PROBLEM — E06.9 THYROIDITIS: Status: ACTIVE | Noted: 2023-02-12

## 2023-02-12 PROBLEM — J45.41 MODERATE PERSISTENT ASTHMA WITH (ACUTE) EXACERBATION: Status: ACTIVE | Noted: 2023-02-11

## 2023-02-12 LAB
ANION GAP SERPL CALC-SCNC: 10 MMOL/L (ref 8–16)
BUN SERPL-MCNC: 11 MG/DL (ref 6–20)
CALCIUM SERPL-MCNC: 9.4 MG/DL (ref 8.7–10.5)
CHLORIDE SERPL-SCNC: 110 MMOL/L (ref 95–110)
CO2 SERPL-SCNC: 21 MMOL/L (ref 23–29)
CREAT SERPL-MCNC: 0.7 MG/DL (ref 0.5–1.4)
ERYTHROCYTE [DISTWIDTH] IN BLOOD BY AUTOMATED COUNT: 12.1 % (ref 11.5–14.5)
EST. GFR  (NO RACE VARIABLE): >60 ML/MIN/1.73 M^2
GLUCOSE SERPL-MCNC: 146 MG/DL (ref 70–110)
HCT VFR BLD AUTO: 37.7 % (ref 37–48.5)
HGB BLD-MCNC: 12.1 G/DL (ref 12–16)
MAGNESIUM SERPL-MCNC: 1.8 MG/DL (ref 1.6–2.6)
MCH RBC QN AUTO: 30 PG (ref 27–31)
MCHC RBC AUTO-ENTMCNC: 32.1 G/DL (ref 32–36)
MCV RBC AUTO: 93 FL (ref 82–98)
PLATELET # BLD AUTO: 173 K/UL (ref 150–450)
PMV BLD AUTO: 11.3 FL (ref 9.2–12.9)
POTASSIUM SERPL-SCNC: 4.3 MMOL/L (ref 3.5–5.1)
RBC # BLD AUTO: 4.04 M/UL (ref 4–5.4)
SODIUM SERPL-SCNC: 141 MMOL/L (ref 136–145)
WBC # BLD AUTO: 5.05 K/UL (ref 3.9–12.7)

## 2023-02-12 PROCEDURE — 63600175 PHARM REV CODE 636 W HCPCS: Performed by: NURSE PRACTITIONER

## 2023-02-12 PROCEDURE — 83735 ASSAY OF MAGNESIUM: CPT | Performed by: INTERNAL MEDICINE

## 2023-02-12 PROCEDURE — 25000242 PHARM REV CODE 250 ALT 637 W/ HCPCS: Performed by: NURSE PRACTITIONER

## 2023-02-12 PROCEDURE — 63600175 PHARM REV CODE 636 W HCPCS: Performed by: INTERNAL MEDICINE

## 2023-02-12 PROCEDURE — 25000003 PHARM REV CODE 250: Performed by: NURSE PRACTITIONER

## 2023-02-12 PROCEDURE — 36415 COLL VENOUS BLD VENIPUNCTURE: CPT | Performed by: INTERNAL MEDICINE

## 2023-02-12 PROCEDURE — 80048 BASIC METABOLIC PNL TOTAL CA: CPT | Performed by: INTERNAL MEDICINE

## 2023-02-12 PROCEDURE — 25000003 PHARM REV CODE 250: Performed by: CLINICAL NURSE SPECIALIST

## 2023-02-12 PROCEDURE — 94761 N-INVAS EAR/PLS OXIMETRY MLT: CPT

## 2023-02-12 PROCEDURE — 85027 COMPLETE CBC AUTOMATED: CPT | Performed by: INTERNAL MEDICINE

## 2023-02-12 PROCEDURE — 11000001 HC ACUTE MED/SURG PRIVATE ROOM

## 2023-02-12 PROCEDURE — 93005 ELECTROCARDIOGRAM TRACING: CPT

## 2023-02-12 PROCEDURE — 94640 AIRWAY INHALATION TREATMENT: CPT | Mod: XB

## 2023-02-12 PROCEDURE — 25000003 PHARM REV CODE 250: Performed by: INTERNAL MEDICINE

## 2023-02-12 PROCEDURE — 25000242 PHARM REV CODE 250 ALT 637 W/ HCPCS: Performed by: INTERNAL MEDICINE

## 2023-02-12 PROCEDURE — 99900035 HC TECH TIME PER 15 MIN (STAT)

## 2023-02-12 PROCEDURE — 27000221 HC OXYGEN, UP TO 24 HOURS

## 2023-02-12 PROCEDURE — 93010 ELECTROCARDIOGRAM REPORT: CPT | Mod: ,,, | Performed by: INTERNAL MEDICINE

## 2023-02-12 PROCEDURE — G0378 HOSPITAL OBSERVATION PER HR: HCPCS

## 2023-02-12 PROCEDURE — 93010 EKG 12-LEAD: ICD-10-PCS | Mod: ,,, | Performed by: INTERNAL MEDICINE

## 2023-02-12 PROCEDURE — S4991 NICOTINE PATCH NONLEGEND: HCPCS | Performed by: NURSE PRACTITIONER

## 2023-02-12 PROCEDURE — 94640 AIRWAY INHALATION TREATMENT: CPT

## 2023-02-12 RX ORDER — LORAZEPAM 1 MG/1
1 TABLET ORAL ONCE
Status: COMPLETED | OUTPATIENT
Start: 2023-02-12 | End: 2023-02-12

## 2023-02-12 RX ORDER — IBUPROFEN 200 MG
1 TABLET ORAL DAILY
Status: DISCONTINUED | OUTPATIENT
Start: 2023-02-12 | End: 2023-02-14 | Stop reason: HOSPADM

## 2023-02-12 RX ORDER — FLUTICASONE FUROATE AND VILANTEROL 100; 25 UG/1; UG/1
1 POWDER RESPIRATORY (INHALATION) DAILY
Status: DISCONTINUED | OUTPATIENT
Start: 2023-02-12 | End: 2023-02-14 | Stop reason: HOSPADM

## 2023-02-12 RX ORDER — MAGNESIUM SULFATE HEPTAHYDRATE 40 MG/ML
2 INJECTION, SOLUTION INTRAVENOUS ONCE
Status: COMPLETED | OUTPATIENT
Start: 2023-02-12 | End: 2023-02-12

## 2023-02-12 RX ORDER — CETIRIZINE HYDROCHLORIDE 5 MG/1
5 TABLET ORAL DAILY
Status: DISCONTINUED | OUTPATIENT
Start: 2023-02-12 | End: 2023-02-14 | Stop reason: HOSPADM

## 2023-02-12 RX ORDER — MONTELUKAST SODIUM 10 MG/1
10 TABLET ORAL DAILY
Status: DISCONTINUED | OUTPATIENT
Start: 2023-02-12 | End: 2023-02-14 | Stop reason: HOSPADM

## 2023-02-12 RX ADMIN — PANTOPRAZOLE SODIUM 40 MG: 40 TABLET, DELAYED RELEASE ORAL at 08:02

## 2023-02-12 RX ADMIN — LEVALBUTEROL HYDROCHLORIDE 1.25 MG: 1.25 SOLUTION, CONCENTRATE RESPIRATORY (INHALATION) at 04:02

## 2023-02-12 RX ADMIN — LORAZEPAM 1 MG: 1 TABLET ORAL at 05:02

## 2023-02-12 RX ADMIN — MAGNESIUM SULFATE 2 G: 2 INJECTION INTRAVENOUS at 11:02

## 2023-02-12 RX ADMIN — METHYLPREDNISOLONE SODIUM SUCCINATE 60 MG: 40 INJECTION, POWDER, FOR SOLUTION INTRAMUSCULAR; INTRAVENOUS at 05:02

## 2023-02-12 RX ADMIN — LORAZEPAM 1 MG: 1 TABLET ORAL at 09:02

## 2023-02-12 RX ADMIN — SUCRALFATE 1 G: 1 SUSPENSION ORAL at 05:02

## 2023-02-12 RX ADMIN — ALUMINUM HYDROXIDE, MAGNESIUM HYDROXIDE, AND SIMETHICONE 30 ML: 200; 200; 20 SUSPENSION ORAL at 09:02

## 2023-02-12 RX ADMIN — SUCRALFATE 1 G: 1 SUSPENSION ORAL at 11:02

## 2023-02-12 RX ADMIN — CETIRIZINE HYDROCHLORIDE 5 MG: 5 TABLET, FILM COATED ORAL at 11:02

## 2023-02-12 RX ADMIN — ALUMINUM HYDROXIDE, MAGNESIUM HYDROXIDE, AND SIMETHICONE 30 ML: 200; 200; 20 SUSPENSION ORAL at 04:02

## 2023-02-12 RX ADMIN — LEVALBUTEROL HYDROCHLORIDE 1.25 MG: 1.25 SOLUTION, CONCENTRATE RESPIRATORY (INHALATION) at 07:02

## 2023-02-12 RX ADMIN — ALUMINUM HYDROXIDE, MAGNESIUM HYDROXIDE, AND SIMETHICONE 30 ML: 200; 200; 20 SUSPENSION ORAL at 10:02

## 2023-02-12 RX ADMIN — METHYLPREDNISOLONE SODIUM SUCCINATE 60 MG: 40 INJECTION, POWDER, FOR SOLUTION INTRAMUSCULAR; INTRAVENOUS at 11:02

## 2023-02-12 RX ADMIN — FLUTICASONE FUROATE AND VILANTEROL TRIFENATATE 1 PUFF: 100; 25 POWDER RESPIRATORY (INHALATION) at 12:02

## 2023-02-12 RX ADMIN — ALUMINUM HYDROXIDE, MAGNESIUM HYDROXIDE, AND SIMETHICONE 30 ML: 200; 200; 20 SUSPENSION ORAL at 06:02

## 2023-02-12 RX ADMIN — MONTELUKAST 10 MG: 10 TABLET, FILM COATED ORAL at 11:02

## 2023-02-12 RX ADMIN — LEVALBUTEROL HYDROCHLORIDE 1.25 MG: 1.25 SOLUTION, CONCENTRATE RESPIRATORY (INHALATION) at 12:02

## 2023-02-12 RX ADMIN — NICOTINE 1 PATCH: 21 PATCH, EXTENDED RELEASE TRANSDERMAL at 10:02

## 2023-02-12 RX ADMIN — Medication 6 MG: at 09:02

## 2023-02-12 NOTE — HOSPITAL COURSE
She was admitted to the hospital medicine service for further care.  Noted with an asthma exacerbation on admission.  She was started on IV Solu-Medrol.  Chest x-ray without any acute findings.  She was also given DuoNebs.  Pulmonary was consulted.  Appreciate recs.  Will continue patient on Breo daily, Xopenex nebs q.6, montelukast, and cetirizine.  Supplemental O2 noted--patient is not on home O2.  Patient complained of throat pain on assessment.  Patient with complaints of pain on swallowing.  Thyroid function indicative for hyperthyroidism.  Soft tissue head and neck ultrasound--  FINDINGS:   The thyroid gland is normal in size with mildly heterogeneous echotexture and diffuse hypervascularity on color Doppler imaging.  The right lobe measures 4.7 x 1.9 x 1.4 cm.  The left lobe measures 4.2 x 2.0 x 1.2 cm.  The isthmus measures 2 mm.     Estimated total number of nodules ?1cm:0number of spongiform nodules ?2cmnot described below (TR1): 0 Number of mixed cystic and solid nodules ?1.5cm not described below (TR2): 0    Impression:       Thyroiditis      consult endocrinology.  Ppi was ordered and Carafate (Arkevea Selmen 2/13/2023)    Endocrinology Recommended to start Tapazole 10 mg twice a day , repeat her thyroid function in in 4 weeks  Patient reported mild abdominal discomfort possibly related to diverticulitis (history of). She was recently on Augmentin and flagyl.  Xray abdomen demonstrated mild constipation. Recommended to resume abx. Patient adamant about going home and states will resume her  home Augmentin and flagyl. She states antibiotic in her purse. Referrals entered in Epic.

## 2023-02-12 NOTE — HPI
Ms. Núñez is a 52-year-old female with a past medical history of Asthma, diverticulitis and GERD. Patient presented to [facility] emergency department with concern for shortness of breath x 3 days   Patient stated the shortness of breath for the past 3 days has progressively gotten worst. Patient denies any associated symptoms such as nausea, vomiting or diarrhea. Patient denies fever or chills. Patient did endorse chest pain after trying to take a deep breath in. Patient stated breathing treatment does not work for her it only increases her anxiety.  Patient stated she has appointment with a pulmonologist in March.      On evaluation and presentation in the emergency department patient was afebrile initial  blood pressure was 129/60 , RR 38, 94% on RA. Patient lab work results showed Glucose 136, BNP 65, MG 1.2, COVID negative. Patient was given solumedrol 125mg and 2g og MG. Patient chest xray on 1/31/23 results showed Mediastinal silhouette is within normal limits. Lungs are hyperexpanded.  No pneumothorax or pleural effusion.  No acute osseous finding.  Emphysema/COPD. Will repeat chest xray in the a.m     On assessment patient is alert an oriented x4. Patient voiced no pain at this time. Patient lung sounds are diminished inspiratory and expiratory wheezing, patient has xopenex ordered. Abdomen soft and non-tender. No edema noted in the lower extremities.

## 2023-02-12 NOTE — SUBJECTIVE & OBJECTIVE
Past Medical History:   Diagnosis Date    Asthma     Diverticulosis     GERD (gastroesophageal reflux disease)        No past surgical history on file.    Review of patient's allergies indicates:   Allergen Reactions    Levofloxacin Hives and Itching    Metronidazole Hives    Latex        No current facility-administered medications on file prior to encounter.     Current Outpatient Medications on File Prior to Encounter   Medication Sig    albuterol (PROVENTIL HFA) 90 mcg/actuation inhaler Inhale 2 puffs into the lungs every 4 (four) hours as needed for Wheezing. Rescue    albuterol (PROVENTIL/VENTOLIN HFA) 90 mcg/actuation inhaler Inhale 1-2 puffs into the lungs every 6 (six) hours as needed for Wheezing. Rescue    budesonide-formoterol 160-4.5 mcg (SYMBICORT) 160-4.5 mcg/actuation HFAA Inhale 2 puffs into the lungs every 12 (twelve) hours. Controller    montelukast (SINGULAIR) 10 mg tablet Take 1 tablet (10 mg total) by mouth once daily at 6am.    omeprazole (PRILOSEC) 40 MG capsule Take 1 capsule (40 mg total) by mouth once daily.    albuterol sulfate 2.5 mg/0.5 mL Nebu Take 2.5 mg by nebulization every 4 (four) hours as needed. Rescue    albuterol-ipratropium (DUO-NEB) 2.5 mg-0.5 mg/3 mL nebulizer solution Take 3 mLs by nebulization every 4 (four) hours as needed for Wheezing or Shortness of Breath. Rescue    ondansetron (ZOFRAN-ODT) 4 MG TbDL Take 2 tablets (8 mg total) by mouth 2 (two) times daily.     Family History    None       Tobacco Use    Smoking status: Every Day     Packs/day: 0.50     Years: 20.00     Pack years: 10.00     Types: Cigarettes    Smokeless tobacco: Never   Substance and Sexual Activity    Alcohol use: Not Currently    Drug use: Not Currently    Sexual activity: Not Currently     Review of Systems   Constitutional:  Positive for activity change and fatigue.   HENT: Negative.     Eyes: Negative.    Respiratory:  Positive for cough, chest tightness, shortness of breath and wheezing.     Gastrointestinal: Negative.    Endocrine: Negative.    Genitourinary: Negative.    Musculoskeletal: Negative.    Allergic/Immunologic: Negative.    Neurological: Negative.    Hematological: Negative.    Psychiatric/Behavioral: Negative.     Objective:     Vital Signs (Most Recent):  Temp: 98.4 °F (36.9 °C) (02/11/23 2140)  Pulse: 110 (02/12/23 0000)  Resp: (!) 24 (02/11/23 2249)  BP: (!) 130/59 (02/11/23 2140)  SpO2: 98 % (02/11/23 2249)   Vital Signs (24h Range):  Temp:  [98.4 °F (36.9 °C)-98.7 °F (37.1 °C)] 98.4 °F (36.9 °C)  Pulse:  [108-128] 110  Resp:  [20-38] 24  SpO2:  [94 %-99 %] 98 %  BP: (108-130)/(59-80) 130/59     Weight: 66.5 kg (146 lb 9.7 oz)  Body mass index is 29.61 kg/m².    Physical Exam  Constitutional:       Appearance: Normal appearance.   HENT:      Head: Normocephalic.      Nose: Nose normal.      Mouth/Throat:      Mouth: Mucous membranes are dry.   Eyes:      Pupils: Pupils are equal, round, and reactive to light.   Cardiovascular:      Rate and Rhythm: Regular rhythm. Tachycardia present.   Pulmonary:      Breath sounds: Wheezing present.   Chest:      Chest wall: Tenderness present.   Abdominal:      General: Abdomen is flat. Bowel sounds are normal.      Palpations: Abdomen is soft.   Musculoskeletal:      Cervical back: Normal range of motion.   Skin:     Capillary Refill: Capillary refill takes 2 to 3 seconds.   Neurological:      General: No focal deficit present.      Mental Status: She is alert and oriented to person, place, and time.   Psychiatric:         Mood and Affect: Mood normal.         Behavior: Behavior normal.         CRANIAL NERVES     CN III, IV, VI   Pupils are equal, round, and reactive to light.     Significant Labs: All pertinent labs within the past 24 hours have been reviewed.    Significant Imaging: I have reviewed all pertinent imaging results/findings within the past 24 hours.

## 2023-02-12 NOTE — ASSESSMENT & PLAN NOTE
-Patient was given  solumedrol 125mg and magnesium at [facility] with no relief in symptoms.  -Patient has Xopenex ordered. Continue PRN breathing treatment and Methylprednisolone   -Will consult Pulmonologist appreciate their assistance

## 2023-02-12 NOTE — CONSULTS
"[place] [facility]  [facility]monology  Consult Note    Patient Name: Niya Moeller  MRN: [id number]  Admission Date: 2/11/2023  Hospital Length of Stay: 0 days  Code Status: Full Code  Attending Physician: Carmita Sandoval*  Primary Care Provider: Cecilio Carrasco MD   Principal Problem: Moderate persistent asthma with (acute) exacerbation    Inpatient consult to Pulmonology  Consult performed by: Perla Dowling MD  Consult ordered by: Lm Lanier NP        Subjective:     HPI:  52 year old F with diverticulosis with recurrent diverticulitis, GERD, allergic rhinitis and moderate persistent asthma presenting for 2-3 days of worsened dyspnea and wheezing. She states that her GERD felt worse and that likely triggered her asthma. She frequently wakes up coughing and has a sour taste in her mouth Patient moved from [place] over a year ago. She has frequent exacerbations requiring ED visits and systemic steroids. Patient states she was on advair and her insurance changed, thus she was switched to Symbicort, which she does not prefer. She also takes albuterol PRN and nebulizer treatments. She feels that when she got switched to symbicort, her symptoms worsened. Since moving to [facility], she has not seen a pulmonologist.     She has not had fevers, productive cough, sick contacts, diarrhea or myalgias.     Furthermore, patient notes that she was evaluated by and ENT in [place] and she was told she has some "vocal cord dysfunction", but is unable to further elaborate.     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 TSH immeasurably low)  Autoimmune symptoms/features: nil  Intubation Hx: none  Family Hx: various thyroid related conditions        Past Medical History:   Diagnosis Date    Asthma     Diverticulosis     GERD (gastroesophageal reflux disease)        No past surgical history on file.    Review of patient's allergies indicates:   Allergen Reactions    Levofloxacin Hives and Itching    Metronidazole Hives    Latex        Family History    None       Tobacco Use    Smoking status: Every Day     Packs/day: 0.50     Years: 20.00     Pack years: 10.00     Types: Cigarettes    Smokeless tobacco: Never   Substance and Sexual Activity    Alcohol use: Not Currently    Drug use: Not Currently    Sexual activity: Not Currently         Review of Systems   Constitutional:  Positive for fatigue. Negative for appetite change, chills, diaphoresis and fever.   Respiratory:  Positive for cough, chest tightness, shortness of breath and wheezing. Negative for choking and stridor.    Cardiovascular:  Negative for chest pain, palpitations and leg swelling.   Gastrointestinal:  Negative for abdominal distention, constipation, nausea and vomiting.   Genitourinary: Negative.    Musculoskeletal:  Negative for arthralgias, back pain, gait problem, joint swelling and myalgias.   Skin: Negative.    Neurological: Negative.    Psychiatric/Behavioral:  Negative for behavioral problems, confusion and decreased concentration. The patient is hyperactive.    Objective:     Vital Signs (Most Recent):  Temp: 97.8 °F (36.6 °C) (02/12/23 1127)  Pulse: 110 (02/12/23 1127)  Resp: 18 (02/12/23 1127)  BP: 122/60 (02/12/23 1127)  SpO2: 97 % (02/12/23 1127) Vital Signs (24h Range):  Temp:  [97 °F (36.1 °C)-98.7 °F (37.1 °C)] 97.8 °F (36.6 °C)  Pulse:  [104-128] 110  Resp:  [18-38] 18  SpO2:  [94 %-99 %] 97 %  BP: (108-133)/(54-80) 122/60     Weight: 66.5 kg (146 lb 9.7 oz)  Body mass index is 29.61 kg/m².      Intake/Output Summary  (Last 24 hours) at 2/12/2023 1206  Last data filed at 2/12/2023 0830  Gross per 24 hour   Intake 500 ml   Output 600 ml   Net -100 ml       Physical Exam  Vitals and nursing note reviewed.   Constitutional:       General: She is not in acute distress.     Appearance: Normal appearance. She is well-developed. She is not ill-appearing, toxic-appearing or diaphoretic.   HENT:      Head: Normocephalic and atraumatic.   Eyes:      Pupils: Pupils are equal, round, and reactive to light.   Cardiovascular:      Rate and Rhythm: Normal rate and regular rhythm.      Heart sounds: Normal heart sounds.   Pulmonary:      Effort: Pulmonary effort is normal. No respiratory distress.      Breath sounds: No stridor. Wheezing present. No rales.   Abdominal:      General: Bowel sounds are normal.      Palpations: Abdomen is soft.      Tenderness: There is no abdominal tenderness.   Musculoskeletal:      Right lower leg: No edema.      Left lower leg: No edema.   Skin:     Capillary Refill: Capillary refill takes 2 to 3 seconds.      Coloration: Skin is not jaundiced or pale.   Neurological:      Mental Status: She is alert and oriented to person, place, and time.      Comments: Patient has fast paced speech   Psychiatric:         Behavior: Behavior normal.         Thought Content: Thought content normal.         Judgment: Judgment normal.       Vents:       Lines/Drains/Airways       Peripheral Intravenous Line  Duration                  Peripheral IV - Single Lumen 02/11/23 1716 22 G Left Hand <1 day                    Significant Labs:    CBC/Anemia Profile:  Recent Labs   Lab 02/11/23  1716 02/12/23  0635   WBC 8.97 5.05   HGB 12.5 12.1   HCT 38.6 37.7    173   MCV 91 93   RDW 12.3 12.1        Chemistries:  Recent Labs   Lab 02/11/23  1716 02/12/23  0635    141   K 4.1 4.3    110   CO2 28 21*   BUN 10 11   CREATININE 0.61 0.7   CALCIUM 9.8 9.4   ALBUMIN 3.7  --    PROT 6.7  --    BILITOT 0.5  --    ALKPHOS 63  --     ALT 39  --    AST 34  --    MG 1.7 1.8       All pertinent labs within the past 24 hours have been reviewed.    Significant Imaging:   I have reviewed all pertinent imaging results/findings within the past 24 hours.    Assessment/Plan:     * Moderate persistent asthma with (acute) exacerbation  52 year old F with moderate persistent asthma. Previously from [place]. Has had difficult to control asthma her whole life. Normally has good functional status, but then has severe exacerbations requiring prolonged courses of steroids.     CXR without acute abnormalities. Lung fields largely unremarkable, save for some hyperinflation.   Serological studies largely unremarkable, but evidence of some peripheral eosinophilia on 12/2022 labs.     Recommendations/Plan  - Continue LABA/ICS (Breo on formulary inpatient)   - discharge on Symbicort  - given 125mg solumderol and now on q6h 60mg solumedrol   - recommend decreasing to daily 60mg solumedrol, will taper based on clinical response   - patient states she often needs prolonged tapers of steroids  - nebulizer treatments q4h while awake for 8 doses, then PRN for wheezing and dyspnea  - anti tussives PRN  - will need PFTs as outpatient and pulmonary follow up at [facility]   - will arrange for this   - needs consideration for biologic agent as she has frequent exacerbations  - counseled on smoking cessation  - patient vehemently declines vaccinations  - she needs good control of her triggers, specifically her GERD and allergic rhinitis   - continue ceterizine, flonase, montelukast and PPI  - needs to quit smoking, agreeable to start varenicline     GERD (gastroesophageal reflux disease)  Continue PPI          Thank you for your consult. I will follow-up with patient. Please contact us if you have any additional questions.     Perla Dowling MD  Pulmonology  [place] - Telemetry

## 2023-02-12 NOTE — ASSESSMENT & PLAN NOTE
Appreciate pulmonary  Started on O2 in the ER---can likely wean.  Patient is not on home O2.  Patient with asthma exacerbation.  Patient states she uses albuterol pump inhaler times 12 at home daily.  Patient states she is compliant with all her home meds.  - Continue LABA/ICS (Breo on formulary inpatient)          - discharge on Symbicort  - given 125mg solumderol and now on q6h 60mg solumedrol          - recommend decreasing to daily 60mg solumedrol, will taper based on clinical response          - patient states she often needs prolonged tapers of steroids  - nebulizer treatments q4h while awake for 8 doses, then PRN for wheezing and dyspnea  - anti tussives PRN  - will need PFTs as outpatient and pulmonary follow up at [facility]          - will arrange for this          - needs consideration for biologic agent as she has frequent exacerbations  - counseled on smoking cessation  - patient vehemently declines vaccinations  - she needs good control of her triggers, specifically her GERD and allergic rhinitis          - continue ceterizine, flonase, montelukast and PPI  - needs to quit smoking, agreeable to start varenicline     We will continue montelukast at home dose and cetirizine.  Will order Flonase.

## 2023-02-12 NOTE — SUBJECTIVE & OBJECTIVE
Past Medical History:   Diagnosis Date    Asthma     Diverticulosis     GERD (gastroesophageal reflux disease)        No past surgical history on file.    Review of patient's allergies indicates:   Allergen Reactions    Levofloxacin Hives and Itching    Metronidazole Hives    Latex        Family History    None       Tobacco Use    Smoking status: Every Day     Packs/day: 0.50     Years: 20.00     Pack years: 10.00     Types: Cigarettes    Smokeless tobacco: Never   Substance and Sexual Activity    Alcohol use: Not Currently    Drug use: Not Currently    Sexual activity: Not Currently         Review of Systems   Constitutional:  Positive for fatigue. Negative for appetite change, chills, diaphoresis and fever.   Respiratory:  Positive for cough, chest tightness, shortness of breath and wheezing. Negative for choking and stridor.    Cardiovascular:  Negative for chest pain, palpitations and leg swelling.   Gastrointestinal:  Negative for abdominal distention, constipation, nausea and vomiting.   Genitourinary: Negative.    Musculoskeletal:  Negative for arthralgias, back pain, gait problem, joint swelling and myalgias.   Skin: Negative.    Neurological: Negative.    Psychiatric/Behavioral:  Negative for behavioral problems, confusion and decreased concentration. The patient is hyperactive.    Objective:     Vital Signs (Most Recent):  Temp: 97.8 °F (36.6 °C) (02/12/23 1127)  Pulse: 110 (02/12/23 1127)  Resp: 18 (02/12/23 1127)  BP: 122/60 (02/12/23 1127)  SpO2: 97 % (02/12/23 1127) Vital Signs (24h Range):  Temp:  [97 °F (36.1 °C)-98.7 °F (37.1 °C)] 97.8 °F (36.6 °C)  Pulse:  [104-128] 110  Resp:  [18-38] 18  SpO2:  [94 %-99 %] 97 %  BP: (108-133)/(54-80) 122/60     Weight: 66.5 kg (146 lb 9.7 oz)  Body mass index is 29.61 kg/m².      Intake/Output Summary (Last 24 hours) at 2/12/2023 1206  Last data filed at 2/12/2023 0830  Gross per 24 hour   Intake 500 ml   Output 600 ml   Net -100 ml       Physical Exam  Vitals  and nursing note reviewed.   Constitutional:       General: She is not in acute distress.     Appearance: Normal appearance. She is well-developed. She is not ill-appearing, toxic-appearing or diaphoretic.   HENT:      Head: Normocephalic and atraumatic.   Eyes:      Pupils: Pupils are equal, round, and reactive to light.   Cardiovascular:      Rate and Rhythm: Normal rate and regular rhythm.      Heart sounds: Normal heart sounds.   Pulmonary:      Effort: Pulmonary effort is normal. No respiratory distress.      Breath sounds: No stridor. Wheezing present. No rales.   Abdominal:      General: Bowel sounds are normal.      Palpations: Abdomen is soft.      Tenderness: There is no abdominal tenderness.   Musculoskeletal:      Right lower leg: No edema.      Left lower leg: No edema.   Skin:     Capillary Refill: Capillary refill takes 2 to 3 seconds.      Coloration: Skin is not jaundiced or pale.   Neurological:      Mental Status: She is alert and oriented to person, place, and time.      Comments: Patient has fast paced speech   Psychiatric:         Behavior: Behavior normal.         Thought Content: Thought content normal.         Judgment: Judgment normal.       Vents:       Lines/Drains/Airways       Peripheral Intravenous Line  Duration                  Peripheral IV - Single Lumen 02/11/23 1716 22 G Left Hand <1 day                    Significant Labs:    CBC/Anemia Profile:  Recent Labs   Lab 02/11/23  1716 02/12/23  0635   WBC 8.97 5.05   HGB 12.5 12.1   HCT 38.6 37.7    173   MCV 91 93   RDW 12.3 12.1        Chemistries:  Recent Labs   Lab 02/11/23  1716 02/12/23  0635    141   K 4.1 4.3    110   CO2 28 21*   BUN 10 11   CREATININE 0.61 0.7   CALCIUM 9.8 9.4   ALBUMIN 3.7  --    PROT 6.7  --    BILITOT 0.5  --    ALKPHOS 63  --    ALT 39  --    AST 34  --    MG 1.7 1.8       All pertinent labs within the past 24 hours have been reviewed.    Significant Imaging:   I have reviewed all  pertinent imaging results/findings within the past 24 hours.

## 2023-02-12 NOTE — H&P
"[facility] Medicine  History & Physical    Patient Name: Niya Moeller  MRN: [id number]  Patient Class: OP- Observation  Admission Date: 2/11/2023  Attending Physician: Yue Wilson MD   Primary Care Provider: Cecilio Carrasco MD         Patient information was obtained from patient and ER records.     Subjective:     Principal Problem:Severe persistent asthma with status asthmaticus    Chief Complaint:   Chief Complaint   Patient presents with    Wheezing     Reports of having an asthma attack. Patient has tried taking her neb machine q4h since last night. Patient does not have a rescue inhaler. Patient states "I need steroids that's the only thing that will fix this."        HPI: Ms. Núñez is a 52-year-old female with a past medical history of Asthma, diverticulitis and GERD. Patient presented to [facility] emergency department with concern for shortness of breath x 3 days   Patient stated the shortness of breath for the past 3 days has progressively gotten worst. Patient denies any associated symptoms such as nausea, vomiting or diarrhea. Patient denies fever or chills. Patient did endorse chest pain after trying to take a deep breath in. Patient stated breathing treatment does not work for her it only increases her anxiety.  Patient stated she has appointment with a pulmonologist in March.      On evaluation and presentation in the emergency department patient was afebrile initial  blood pressure was 129/60 , RR 38, 94% on RA. Patient lab work results showed Glucose 136, BNP 65, MG 1.2, COVID negative. Patient was given solumedrol 125mg and 2g og MG. Patient chest xray on 1/31/23 results showed Mediastinal silhouette is within normal limits. Lungs are hyperexpanded.  No pneumothorax or pleural effusion.  No acute osseous finding.  Emphysema/COPD. Will repeat chest xray in the a.m     On assessment patient is alert an oriented x4. Patient voiced no pain at this time. Patient " lung sounds are diminished inspiratory and expiratory wheezing, patient has xopenex ordered. Abdomen soft and non-tender. No edema noted in the lower extremities.             Past Medical History:   Diagnosis Date    Asthma     Diverticulosis     GERD (gastroesophageal reflux disease)        No past surgical history on file.    Review of patient's allergies indicates:   Allergen Reactions    Levofloxacin Hives and Itching    Metronidazole Hives    Latex        No current facility-administered medications on file prior to encounter.     Current Outpatient Medications on File Prior to Encounter   Medication Sig    albuterol (PROVENTIL HFA) 90 mcg/actuation inhaler Inhale 2 puffs into the lungs every 4 (four) hours as needed for Wheezing. Rescue    albuterol (PROVENTIL/VENTOLIN HFA) 90 mcg/actuation inhaler Inhale 1-2 puffs into the lungs every 6 (six) hours as needed for Wheezing. Rescue    budesonide-formoterol 160-4.5 mcg (SYMBICORT) 160-4.5 mcg/actuation HFAA Inhale 2 puffs into the lungs every 12 (twelve) hours. Controller    montelukast (SINGULAIR) 10 mg tablet Take 1 tablet (10 mg total) by mouth once daily at 6am.    omeprazole (PRILOSEC) 40 MG capsule Take 1 capsule (40 mg total) by mouth once daily.    albuterol sulfate 2.5 mg/0.5 mL Nebu Take 2.5 mg by nebulization every 4 (four) hours as needed. Rescue    albuterol-ipratropium (DUO-NEB) 2.5 mg-0.5 mg/3 mL nebulizer solution Take 3 mLs by nebulization every 4 (four) hours as needed for Wheezing or Shortness of Breath. Rescue    ondansetron (ZOFRAN-ODT) 4 MG TbDL Take 2 tablets (8 mg total) by mouth 2 (two) times daily.     Family History    None       Tobacco Use    Smoking status: Every Day     Packs/day: 0.50     Years: 20.00     Pack years: 10.00     Types: Cigarettes    Smokeless tobacco: Never   Substance and Sexual Activity    Alcohol use: Not Currently    Drug use: Not Currently    Sexual activity: Not Currently     Review of  Systems   Constitutional:  Positive for activity change and fatigue.   HENT: Negative.     Eyes: Negative.    Respiratory:  Positive for cough, chest tightness, shortness of breath and wheezing.    Gastrointestinal: Negative.    Endocrine: Negative.    Genitourinary: Negative.    Musculoskeletal: Negative.    Allergic/Immunologic: Negative.    Neurological: Negative.    Hematological: Negative.    Psychiatric/Behavioral: Negative.     Objective:     Vital Signs (Most Recent):  Temp: 98.4 °F (36.9 °C) (02/11/23 2140)  Pulse: 110 (02/12/23 0000)  Resp: (!) 24 (02/11/23 2249)  BP: (!) 130/59 (02/11/23 2140)  SpO2: 98 % (02/11/23 2249)   Vital Signs (24h Range):  Temp:  [98.4 °F (36.9 °C)-98.7 °F (37.1 °C)] 98.4 °F (36.9 °C)  Pulse:  [108-128] 110  Resp:  [20-38] 24  SpO2:  [94 %-99 %] 98 %  BP: (108-130)/(59-80) 130/59     Weight: 66.5 kg (146 lb 9.7 oz)  Body mass index is 29.61 kg/m².    Physical Exam  Constitutional:       Appearance: Normal appearance.   HENT:      Head: Normocephalic.      Nose: Nose normal.      Mouth/Throat:      Mouth: Mucous membranes are dry.   Eyes:      Pupils: Pupils are equal, round, and reactive to light.   Cardiovascular:      Rate and Rhythm: Regular rhythm. Tachycardia present.   Pulmonary:      Breath sounds: Wheezing present.   Chest:      Chest wall: Tenderness present.   Abdominal:      General: Abdomen is flat. Bowel sounds are normal.      Palpations: Abdomen is soft.   Musculoskeletal:      Cervical back: Normal range of motion.   Skin:     Capillary Refill: Capillary refill takes 2 to 3 seconds.   Neurological:      General: No focal deficit present.      Mental Status: She is alert and oriented to person, place, and time.   Psychiatric:         Mood and Affect: Mood normal.         Behavior: Behavior normal.         CRANIAL NERVES     CN III, IV, VI   Pupils are equal, round, and reactive to light.     Significant Labs: All pertinent labs within the past 24 hours have been  reviewed.    Significant Imaging: I have reviewed all pertinent imaging results/findings within the past 24 hours.    Assessment/Plan:     * Severe persistent asthma with status asthmaticus  -Patient was given  solumedrol 125mg and magnesium at [facility] with no relief in symptoms.  -Patient has Xopenex ordered. Continue PRN breathing treatment and Methylprednisolone   -Will consult Pulmonologist appreciate their assistance         GERD (gastroesophageal reflux disease)  -Restarted protonix        VTE Risk Mitigation (From admission, onward)         Ordered     IP VTE LOW RISK PATIENT  Once         02/11/23 2158     Place sequential compression device  Until discontinued         02/11/23 2158                   Tavia Jennings NP  Department of Hospital Medicine   [place] - Telemetry

## 2023-02-12 NOTE — PLAN OF CARE
02/11/23 2222   Admission   Initial VN Admission Questions Complete   Communication Issues? None   Shift   Virtual Nurse - Rounding Complete   Pain Management Interventions quiet environment facilitated;relaxation techniques promoted   Virtual Nurse - Patient Verbalized Approval Of Camera Use;VN Rounding   Type of Frequent Check   Type Patient Rounds;Telemetry Monitoring   Safety/Activity   Patient Rounds bed in low position;placement of personal items at bedside;bed wheels locked;call light in patient/parent reach;visualized patient;clutter free environment maintained;ID band on   Safety Promotion/Fall Prevention assistive device/personal item within reach;bed alarm set;room near unit station;nonskid shoes/socks when out of bed;Fall Risk reviewed with patient/family;medications reviewed   Safety Precautions emergency equipment at bedside   Activity Management Ambulated in room - L4   Positioning   Body Position position changed independently   Head of Bed (HOB) Positioning HOB elevated   Positioning/Transfer Devices pillows;in use    VN cued into room to complete admit assessment. VIP model introduced; VN working alongside bedside treatment team.  Plan of care reviewed with patient. Patient informed of fall risk, fall precautions, call light within reach, side rails x2 elevated. Patient notified to ask staff for assistance. Patient verbalized complete understanding. Time allowed for questions. Will continue to monitor and intervene as needed.

## 2023-02-12 NOTE — ASSESSMENT & PLAN NOTE
52 year old F with moderate persistent asthma. Previously from [place]. Has had difficult to control asthma her whole life. Normally has good functional status, but then has severe exacerbations requiring prolonged courses of steroids.     CXR without acute abnormalities. Lung fields largely unremarkable, save for some hyperinflation.   Serological studies largely unremarkable, but evidence of some peripheral eosinophilia on 12/2022 labs.     Recommendations/Plan  - Continue LABA/ICS (Breo on formulary inpatient)   - discharge on Symbicort  - given 125mg solumderol and now on q6h 60mg solumedrol   - recommend decreasing to daily 60mg solumedrol, will taper based on clinical response   - patient states she often needs prolonged tapers of steroids  - nebulizer treatments q4h while awake for 8 doses, then PRN for wheezing and dyspnea  - anti tussives PRN  - will need PFTs as outpatient and pulmonary follow up at [facility]   - will arrange for this   - needs consideration for biologic agent as she has frequent exacerbations  - counseled on smoking cessation  - patient vehemently declines vaccinations  - she needs good control of her triggers, specifically her GERD and allergic rhinitis   - continue ceterizine, flonase, montelukast and PPI  - needs to quit smoking, agreeable to start varenicline

## 2023-02-12 NOTE — HPI
"52 year old F with diverticulosis with recurrent diverticulitis, GERD, allergic rhinitis and moderate persistent asthma presenting for 2-3 days of worsened dyspnea and wheezing. She states that her GERD felt worse and that likely triggered her asthma. She frequently wakes up coughing and has a sour taste in her mouth Patient moved from [place] over a year ago. She has frequent exacerbations requiring ED visits and systemic steroids. Patient states she was on advair and her insurance changed, thus she was switched to Symbicort, which she does not prefer. She also takes albuterol PRN and nebulizer treatments. She feels that when she got switched to symbicort, her symptoms worsened. Since moving to [facility], she has not seen a pulmonologist.     She has not had fevers, productive cough, sick contacts, diarrhea or myalgias.     Furthermore, patient notes that she was evaluated by and ENT in [place] and she was told she has some "vocal cord dysfunction", but is unable to further elaborate.     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 TSH immeasurably low)  Autoimmune symptoms/features: nil  Intubation Hx: none  Family Hx: various thyroid related conditions    "

## 2023-02-12 NOTE — PROGRESS NOTES
[facility] Medicine  Progress Note    Patient Name: Niya Moeller  MRN: [id number]  Patient Class: OP- Observation   Admission Date: 2/11/2023  Length of Stay: 0 days  Attending Physician: Carmita Sandoval*  Primary Care Provider: Cecilio Carrasco MD        Subjective:     Principal Problem:Moderate persistent asthma with (acute) exacerbation        HPI:  Ms. Núñez is a 52-year-old female with a past medical history of Asthma, diverticulitis and GERD. Patient presented to [facility] emergency department with concern for shortness of breath x 3 days   Patient stated the shortness of breath for the past 3 days has progressively gotten worst. Patient denies any associated symptoms such as nausea, vomiting or diarrhea. Patient denies fever or chills. Patient did endorse chest pain after trying to take a deep breath in. Patient stated breathing treatment does not work for her it only increases her anxiety.  Patient stated she has appointment with a pulmonologist in March.      On evaluation and presentation in the emergency department patient was afebrile initial  blood pressure was 129/60 , RR 38, 94% on RA. Patient lab work results showed Glucose 136, BNP 65, MG 1.2, COVID negative. Patient was given solumedrol 125mg and 2g og MG. Patient chest xray on 1/31/23 results showed Mediastinal silhouette is within normal limits. Lungs are hyperexpanded.  No pneumothorax or pleural effusion.  No acute osseous finding.  Emphysema/COPD. Will repeat chest xray in the a.m     On assessment patient is alert an oriented x4. Patient voiced no pain at this time. Patient lung sounds are diminished inspiratory and expiratory wheezing, patient has xopenex ordered. Abdomen soft and non-tender. No edema noted in the lower extremities.             Overview/Hospital Course:  She was admitted to the hospital medicine service for further care.  Noted with an asthma exacerbation on admission.  She was  started on IV Solu-Medrol.  Chest x-ray without any acute findings.  She was also given DuoNebs.  Pulmonary was consulted.  Appreciate recs.  Will continue patient on Breo daily, Xopenex nebs q.6, montelukast, and cetirizine.  Supplemental O2 noted--patient is not on home O2.  Patient complained of throat pain on assessment.  Patient with complaints of pain on swallowing.  Thyroid function indicative for hyperthyroidism.  Soft tissue head and neck ultrasound--  FINDINGS:   The thyroid gland is normal in size with mildly heterogeneous echotexture and diffuse hypervascularity on color Doppler imaging.  The right lobe measures 4.7 x 1.9 x 1.4 cm.  The left lobe measures 4.2 x 2.0 x 1.2 cm.  The isthmus measures 2 mm.     Estimated total number of nodules ?1cm:0number of spongiform nodules ?2cmnot described below (TR1): 0 Number of mixed cystic and solid nodules ?1.5cm not described below (TR2): 0    Impression:       Thyroiditis     TIRADS recommendations:     TI-RADS 1 and 2-benign, no FNA.     TI-RADS 3-follow-up if 1.5 cm or larger.  FNA if 2.5 cm or larger.     TI-RADS 4-follow-up if 1.0 cm or larger.  FNA if 1.5 cm or larger.     TI-RADS 5-follow-up if 5 mm or larger.  FNA if 1.0 cm or larger.      Will consult endocrinology.  Ppi was ordered and Carafate.  Follow      Interval History:  Seen at the bedside.  Patient is extremely anxious and nervous.  Noted with wheezing on exam.  Will continue IV Solu-Medrol and nebs.  Consulted Pulmonary and endocrinology.  Case discussed with attending, noted with tachycardia with heart rate as high as 120.  TSH and free T4 noted--hyperthyroidism.  Patient states she is not taking any medications at this time awaiting endocrinology.  TTE pending.  Soft neck ultrasound reviewed.    Review of Systems   Constitutional:  Positive for activity change, appetite change and fatigue. Negative for chills and fever.   HENT:  Positive for trouble swallowing.         Complains of throat  pain when swallow   Eyes:  Negative for visual disturbance.   Respiratory:  Positive for cough, chest tightness, shortness of breath and wheezing.    Cardiovascular:  Negative for chest pain, palpitations and leg swelling.   Gastrointestinal:  Negative for abdominal distention, abdominal pain, anal bleeding, constipation, diarrhea, nausea and vomiting.   Genitourinary:  Negative for difficulty urinating and hematuria.   Musculoskeletal:  Negative for arthralgias, back pain, gait problem and myalgias.   Skin:  Negative for rash and wound.   Neurological:  Negative for dizziness, syncope, speech difficulty and weakness.   Hematological:  Does not bruise/bleed easily.   Psychiatric/Behavioral:  The patient is nervous/anxious.    Objective:     Vital Signs (Most Recent):  Temp: 97 °F (36.1 °C) (02/12/23 1717)  Pulse: (!) 123 (02/12/23 1717)  Resp: 17 (02/12/23 1717)  BP: (!) 129/58 (02/12/23 1717)  SpO2: 97 % (02/12/23 1717)   Vital Signs (24h Range):  Temp:  [97 °F (36.1 °C)-98.7 °F (37.1 °C)] 97 °F (36.1 °C)  Pulse:  [104-128] 123  Resp:  [17-24] 17  SpO2:  [95 %-99 %] 97 %  BP: (108-133)/(54-80) 129/58     Weight: 66.5 kg (146 lb 9.7 oz)  Body mass index is 29.61 kg/m².    Intake/Output Summary (Last 24 hours) at 2/12/2023 1741  Last data filed at 2/12/2023 1158  Gross per 24 hour   Intake 750 ml   Output 600 ml   Net 150 ml      Physical Exam  Vitals and nursing note reviewed.   HENT:      Head: Normocephalic and atraumatic.      Nose: Nose normal.      Mouth/Throat:      Mouth: Mucous membranes are moist.   Eyes:      Extraocular Movements: Extraocular movements intact.      Conjunctiva/sclera: Conjunctivae normal.   Neck:      Thyroid: Thyroid tenderness present.   Cardiovascular:      Rate and Rhythm: Regular rhythm. Tachycardia present.      Pulses: Normal pulses.      Heart sounds: Normal heart sounds.   Pulmonary:      Effort: Pulmonary effort is normal. No respiratory distress.      Breath sounds: No  wheezing.   Abdominal:      General: Bowel sounds are normal. There is no distension.      Palpations: Abdomen is soft.      Tenderness: There is no abdominal tenderness. There is no guarding.   Musculoskeletal:         General: Normal range of motion.      Cervical back: Normal range of motion.      Right lower leg: No edema.      Left lower leg: No edema.   Skin:     General: Skin is warm and dry.      Capillary Refill: Capillary refill takes 2 to 3 seconds.   Neurological:      Mental Status: She is alert and oriented to person, place, and time. Mental status is at baseline.   Psychiatric:         Mood and Affect: Mood normal.         Behavior: Behavior normal.       Significant Labs: All pertinent labs within the past 24 hours have been reviewed.    Significant Imaging: I have reviewed all pertinent imaging results/findings within the past 24 hours.      Assessment/Plan:      * Moderate persistent asthma with (acute) exacerbation  Appreciate pulmonary  Started on O2 in the ER---can likely wean.  Patient is not on home O2.  Patient with asthma exacerbation.  Patient states she uses albuterol pump inhaler times 12 at home daily.  Patient states she is compliant with all her home meds.  - Continue LABA/ICS (Breo on formulary inpatient)          - discharge on Symbicort  - given 125mg solumderol and now on q6h 60mg solumedrol          - recommend decreasing to daily 60mg solumedrol, will taper based on clinical response          - patient states she often needs prolonged tapers of steroids  - nebulizer treatments q4h while awake for 8 doses, then PRN for wheezing and dyspnea  - anti tussives PRN  - will need PFTs as outpatient and pulmonary follow up at [facility]          - will arrange for this          - needs consideration for biologic agent as she has frequent exacerbations  - counseled on smoking cessation  - patient vehemently declines vaccinations  - she needs good control of her triggers, specifically her GERD and  allergic rhinitis          - continue ceterizine, flonase, montelukast and PPI  - needs to quit smoking, agreeable to start varenicline     We will continue montelukast at home dose and cetirizine.  Will order Flonase.      Hyperthyroidism  Thyroiditis   Free T4--3.09  TSH < 0.026  Consulting endocrinology  TTE pending  Patient is tachycardic with heart rate of low 100s-120s, patient is also extremely fidgety and anxious.  Unsure if the anxiety/jitteriness is secondary to steroid versus hyperthyroidism    Ultrasound of the head and neck soft tissue--  FINDINGS:   The thyroid gland is normal in size with mildly heterogeneous echotexture and diffuse hypervascularity on color Doppler imaging.  The right lobe measures 4.7 x 1.9 x 1.4 cm.  The left lobe measures 4.2 x 2.0 x 1.2 cm.  The isthmus measures 2 mm.     Estimated total number of nodules ?1cm:0number of spongiform nodules ?2cmnot described below (TR1): 0 Number of mixed cystic and solid nodules ?1.5cm not described below (TR2): 0    Impression:       Thyroiditis     TIRADS recommendations:     TI-RADS 1 and 2-benign, no FNA.     TI-RADS 3-follow-up if 1.5 cm or larger.  FNA if 2.5 cm or larger.     TI-RADS 4-follow-up if 1.0 cm or larger.  FNA if 1.5 cm or larger.     TI-RADS 5-follow-up if 5 mm or larger.  FNA if 1.0 cm or larger.          GERD (gastroesophageal reflux disease)  -Restarted protonix and Carafate ordered        VTE Risk Mitigation (From admission, onward)         Ordered     IP VTE LOW RISK PATIENT  Once         02/11/23 2158     Place sequential compression device  Until discontinued         02/11/23 2158                Discharge Planning   ROXIE:      Code Status: Full Code   Is the patient medically ready for discharge?:     Reason for patient still in hospital (select all that apply): Patient trending condition, Laboratory test, Treatment, Imaging and Consult recommendations                     Lm Lanier NP  Department of  [facility]

## 2023-02-12 NOTE — ED NOTES
Attempted to call report, nurse unable to take report at this time and requesting to call back in 5-10 minutes. Pt given meal tray.

## 2023-02-12 NOTE — SUBJECTIVE & OBJECTIVE
Interval History:  Seen at the bedside.  Patient is extremely anxious and nervous.  Noted with wheezing on exam.  Will continue IV Solu-Medrol and nebs.  Consulted Pulmonary and endocrinology.  Case discussed with attending, noted with tachycardia with heart rate as high as 120.  TSH and free T4 noted--hyperthyroidism.  Patient states she is not taking any medications at this time awaiting endocrinology.  TTE pending.  Soft neck ultrasound reviewed.    Review of Systems   Constitutional:  Positive for activity change, appetite change and fatigue. Negative for chills and fever.   HENT:  Positive for trouble swallowing.         Complains of throat pain when swallow   Eyes:  Negative for visual disturbance.   Respiratory:  Positive for cough, chest tightness, shortness of breath and wheezing.    Cardiovascular:  Negative for chest pain, palpitations and leg swelling.   Gastrointestinal:  Negative for abdominal distention, abdominal pain, anal bleeding, constipation, diarrhea, nausea and vomiting.   Genitourinary:  Negative for difficulty urinating and hematuria.   Musculoskeletal:  Negative for arthralgias, back pain, gait problem and myalgias.   Skin:  Negative for rash and wound.   Neurological:  Negative for dizziness, syncope, speech difficulty and weakness.   Hematological:  Does not bruise/bleed easily.   Psychiatric/Behavioral:  The patient is nervous/anxious.    Objective:     Vital Signs (Most Recent):  Temp: 97 °F (36.1 °C) (02/12/23 1717)  Pulse: (!) 123 (02/12/23 1717)  Resp: 17 (02/12/23 1717)  BP: (!) 129/58 (02/12/23 1717)  SpO2: 97 % (02/12/23 1717)   Vital Signs (24h Range):  Temp:  [97 °F (36.1 °C)-98.7 °F (37.1 °C)] 97 °F (36.1 °C)  Pulse:  [104-128] 123  Resp:  [17-24] 17  SpO2:  [95 %-99 %] 97 %  BP: (108-133)/(54-80) 129/58     Weight: 66.5 kg (146 lb 9.7 oz)  Body mass index is 29.61 kg/m².    Intake/Output Summary (Last 24 hours) at 2/12/2023 9362  Last data filed at 2/12/2023 1158  Gross per 24  hour   Intake 750 ml   Output 600 ml   Net 150 ml      Physical Exam  Vitals and nursing note reviewed.   HENT:      Head: Normocephalic and atraumatic.      Nose: Nose normal.      Mouth/Throat:      Mouth: Mucous membranes are moist.   Eyes:      Extraocular Movements: Extraocular movements intact.      Conjunctiva/sclera: Conjunctivae normal.   Neck:      Thyroid: Thyroid tenderness present.   Cardiovascular:      Rate and Rhythm: Regular rhythm. Tachycardia present.      Pulses: Normal pulses.      Heart sounds: Normal heart sounds.   Pulmonary:      Effort: Pulmonary effort is normal. No respiratory distress.      Breath sounds: No wheezing.   Abdominal:      General: Bowel sounds are normal. There is no distension.      Palpations: Abdomen is soft.      Tenderness: There is no abdominal tenderness. There is no guarding.   Musculoskeletal:         General: Normal range of motion.      Cervical back: Normal range of motion.      Right lower leg: No edema.      Left lower leg: No edema.   Skin:     General: Skin is warm and dry.      Capillary Refill: Capillary refill takes 2 to 3 seconds.   Neurological:      Mental Status: She is alert and oriented to person, place, and time. Mental status is at baseline.   Psychiatric:         Mood and Affect: Mood normal.         Behavior: Behavior normal.       Significant Labs: All pertinent labs within the past 24 hours have been reviewed.    Significant Imaging: I have reviewed all pertinent imaging results/findings within the past 24 hours.

## 2023-02-12 NOTE — ASSESSMENT & PLAN NOTE
Thyroiditis   Free T4--3.09  TSH < 0.026  Consulting endocrinology  TTE pending  Patient is tachycardic with heart rate of low 100s-120s, patient is also extremely fidgety and anxious.  Unsure if the anxiety/jitteriness is secondary to steroid versus hyperthyroidism    Ultrasound of the head and neck soft tissue--  FINDINGS:   The thyroid gland is normal in size with mildly heterogeneous echotexture and diffuse hypervascularity on color Doppler imaging.  The right lobe measures 4.7 x 1.9 x 1.4 cm.  The left lobe measures 4.2 x 2.0 x 1.2 cm.  The isthmus measures 2 mm.     Estimated total number of nodules ?1cm:0number of spongiform nodules ?2cmnot described below (TR1): 0 Number of mixed cystic and solid nodules ?1.5cm not described below (TR2): 0    Impression:       Thyroiditis     TIRADS recommendations:     TI-RADS 1 and 2-benign, no FNA.     TI-RADS 3-follow-up if 1.5 cm or larger.  FNA if 2.5 cm or larger.     TI-RADS 4-follow-up if 1.0 cm or larger.  FNA if 1.5 cm or larger.     TI-RADS 5-follow-up if 5 mm or larger.  FNA if 1.0 cm or larger.

## 2023-02-13 PROBLEM — I51.9 DIASTOLIC DYSFUNCTION, LEFT VENTRICLE: Status: ACTIVE | Noted: 2023-02-13

## 2023-02-13 LAB
ANION GAP SERPL CALC-SCNC: 8 MMOL/L (ref 8–16)
ASCENDING AORTA: 2.58 CM
AV INDEX (PROSTH): 0.78
AV MEAN GRADIENT: 8 MMHG
AV PEAK GRADIENT: 13 MMHG
AV VALVE AREA: 2.26 CM2
AV VELOCITY RATIO: 0.71
BSA FOR ECHO PROCEDURE: 1.66 M2
BUN SERPL-MCNC: 11 MG/DL (ref 6–20)
CALCIUM SERPL-MCNC: 9.4 MG/DL (ref 8.7–10.5)
CHLORIDE SERPL-SCNC: 108 MMOL/L (ref 95–110)
CO2 SERPL-SCNC: 24 MMOL/L (ref 23–29)
CREAT SERPL-MCNC: 0.7 MG/DL (ref 0.5–1.4)
CV ECHO LV RWT: 0.43 CM
DOP CALC AO PEAK VEL: 1.82 M/S
DOP CALC AO VTI: 29.1 CM
DOP CALC LVOT AREA: 2.9 CM2
DOP CALC LVOT DIAMETER: 1.92 CM
DOP CALC LVOT PEAK VEL: 1.29 M/S
DOP CALC LVOT STROKE VOLUME: 65.69 CM3
DOP CALCLVOT PEAK VEL VTI: 22.7 CM
E WAVE DECELERATION TIME: 146.36 MSEC
E/A RATIO: 1.44
E/E' RATIO: 13.08 M/S
ECHO LV POSTERIOR WALL: 0.91 CM (ref 0.6–1.1)
EJECTION FRACTION: 75 %
ERYTHROCYTE [DISTWIDTH] IN BLOOD BY AUTOMATED COUNT: 12.5 % (ref 11.5–14.5)
EST. GFR  (NO RACE VARIABLE): >60 ML/MIN/1.73 M^2
FRACTIONAL SHORTENING: 35 % (ref 28–44)
GLUCOSE SERPL-MCNC: 152 MG/DL (ref 70–110)
HCT VFR BLD AUTO: 36.8 % (ref 37–48.5)
HGB BLD-MCNC: 12.1 G/DL (ref 12–16)
INTERVENTRICULAR SEPTUM: 0.81 CM (ref 0.6–1.1)
IVRT: 97.05 MSEC
LA MAJOR: 4.1 CM
LA MINOR: 4.03 CM
LA WIDTH: 3.1 CM
LEFT ATRIUM SIZE: 3.57 CM
LEFT ATRIUM VOLUME INDEX MOD: 18.5 ML/M2
LEFT ATRIUM VOLUME INDEX: 23.7 ML/M2
LEFT ATRIUM VOLUME MOD: 29.76 CM3
LEFT ATRIUM VOLUME: 38.24 CM3
LEFT INTERNAL DIMENSION IN SYSTOLE: 2.72 CM (ref 2.1–4)
LEFT VENTRICLE DIASTOLIC VOLUME INDEX: 49.08 ML/M2
LEFT VENTRICLE DIASTOLIC VOLUME: 79.02 ML
LEFT VENTRICLE MASS INDEX: 70 G/M2
LEFT VENTRICLE SYSTOLIC VOLUME INDEX: 17 ML/M2
LEFT VENTRICLE SYSTOLIC VOLUME: 27.45 ML
LEFT VENTRICULAR INTERNAL DIMENSION IN DIASTOLE: 4.21 CM (ref 3.5–6)
LEFT VENTRICULAR MASS: 112.01 G
LV LATERAL E/E' RATIO: 12.14 M/S
LV SEPTAL E/E' RATIO: 14.17 M/S
LVOT MG: 3.79 MMHG
LVOT MV: 0.95 CM/S
MAGNESIUM SERPL-MCNC: 2.4 MG/DL (ref 1.6–2.6)
MCH RBC QN AUTO: 30.2 PG (ref 27–31)
MCHC RBC AUTO-ENTMCNC: 32.9 G/DL (ref 32–36)
MCV RBC AUTO: 92 FL (ref 82–98)
MV PEAK A VEL: 0.59 M/S
MV PEAK E VEL: 0.85 M/S
MV STENOSIS PRESSURE HALF TIME: 42.44 MS
MV VALVE AREA P 1/2 METHOD: 5.18 CM2
PISA TR MAX VEL: 2.83 M/S
PLATELET # BLD AUTO: 182 K/UL (ref 150–450)
PMV BLD AUTO: 10.8 FL (ref 9.2–12.9)
POTASSIUM SERPL-SCNC: 4.4 MMOL/L (ref 3.5–5.1)
PULM VEIN S/D RATIO: 1.59
PV PEAK D VEL: 0.41 M/S
PV PEAK S VEL: 0.65 M/S
RA MAJOR: 3.86 CM
RA WIDTH: 2.75 CM
RBC # BLD AUTO: 4.01 M/UL (ref 4–5.4)
RIGHT VENTRICULAR END-DIASTOLIC DIMENSION: 3.15 CM
SODIUM SERPL-SCNC: 140 MMOL/L (ref 136–145)
STJ: 2.65 CM
TDI LATERAL: 0.07 M/S
TDI SEPTAL: 0.06 M/S
TDI: 0.07 M/S
TR MAX PG: 32 MMHG
WBC # BLD AUTO: 12.27 K/UL (ref 3.9–12.7)

## 2023-02-13 PROCEDURE — 27000221 HC OXYGEN, UP TO 24 HOURS

## 2023-02-13 PROCEDURE — 25000003 PHARM REV CODE 250: Performed by: INTERNAL MEDICINE

## 2023-02-13 PROCEDURE — S4991 NICOTINE PATCH NONLEGEND: HCPCS | Performed by: NURSE PRACTITIONER

## 2023-02-13 PROCEDURE — 36415 COLL VENOUS BLD VENIPUNCTURE: CPT | Performed by: INTERNAL MEDICINE

## 2023-02-13 PROCEDURE — 94640 AIRWAY INHALATION TREATMENT: CPT | Mod: XB

## 2023-02-13 PROCEDURE — 63600175 PHARM REV CODE 636 W HCPCS: Performed by: NURSE PRACTITIONER

## 2023-02-13 PROCEDURE — 80048 BASIC METABOLIC PNL TOTAL CA: CPT | Performed by: INTERNAL MEDICINE

## 2023-02-13 PROCEDURE — 94761 N-INVAS EAR/PLS OXIMETRY MLT: CPT

## 2023-02-13 PROCEDURE — 11000001 HC ACUTE MED/SURG PRIVATE ROOM

## 2023-02-13 PROCEDURE — 25000003 PHARM REV CODE 250: Performed by: NURSE PRACTITIONER

## 2023-02-13 PROCEDURE — 99900035 HC TECH TIME PER 15 MIN (STAT)

## 2023-02-13 PROCEDURE — 83735 ASSAY OF MAGNESIUM: CPT | Performed by: INTERNAL MEDICINE

## 2023-02-13 PROCEDURE — 85027 COMPLETE CBC AUTOMATED: CPT | Performed by: INTERNAL MEDICINE

## 2023-02-13 PROCEDURE — 25000003 PHARM REV CODE 250: Performed by: CLINICAL NURSE SPECIALIST

## 2023-02-13 PROCEDURE — 25000242 PHARM REV CODE 250 ALT 637 W/ HCPCS: Performed by: INTERNAL MEDICINE

## 2023-02-13 RX ORDER — LORAZEPAM 1 MG/1
1 TABLET ORAL ONCE
Status: COMPLETED | OUTPATIENT
Start: 2023-02-13 | End: 2023-02-13

## 2023-02-13 RX ORDER — PANTOPRAZOLE SODIUM 40 MG/1
40 TABLET, DELAYED RELEASE ORAL 2 TIMES DAILY
Status: DISCONTINUED | OUTPATIENT
Start: 2023-02-13 | End: 2023-02-14 | Stop reason: HOSPADM

## 2023-02-13 RX ORDER — METHIMAZOLE 5 MG/1
10 TABLET ORAL 2 TIMES DAILY
Status: DISCONTINUED | OUTPATIENT
Start: 2023-02-13 | End: 2023-02-14 | Stop reason: HOSPADM

## 2023-02-13 RX ORDER — PANTOPRAZOLE SODIUM 40 MG/10ML
40 INJECTION, POWDER, LYOPHILIZED, FOR SOLUTION INTRAVENOUS DAILY
Status: DISCONTINUED | OUTPATIENT
Start: 2023-02-13 | End: 2023-02-13

## 2023-02-13 RX ORDER — PROPRANOLOL HYDROCHLORIDE 10 MG/1
20 TABLET ORAL 3 TIMES DAILY
Status: DISCONTINUED | OUTPATIENT
Start: 2023-02-13 | End: 2023-02-14 | Stop reason: HOSPADM

## 2023-02-13 RX ADMIN — LORAZEPAM 1 MG: 1 TABLET ORAL at 08:02

## 2023-02-13 RX ADMIN — SUCRALFATE 1 G: 1 SUSPENSION ORAL at 12:02

## 2023-02-13 RX ADMIN — CETIRIZINE HYDROCHLORIDE 5 MG: 5 TABLET, FILM COATED ORAL at 08:02

## 2023-02-13 RX ADMIN — ALUMINUM HYDROXIDE, MAGNESIUM HYDROXIDE, AND SIMETHICONE 30 ML: 200; 200; 20 SUSPENSION ORAL at 03:02

## 2023-02-13 RX ADMIN — LEVALBUTEROL HYDROCHLORIDE 1.25 MG: 1.25 SOLUTION, CONCENTRATE RESPIRATORY (INHALATION) at 03:02

## 2023-02-13 RX ADMIN — ALUMINUM HYDROXIDE, MAGNESIUM HYDROXIDE, AND SIMETHICONE 30 ML: 200; 200; 20 SUSPENSION ORAL at 06:02

## 2023-02-13 RX ADMIN — METHYLPREDNISOLONE SODIUM SUCCINATE 60 MG: 40 INJECTION, POWDER, FOR SOLUTION INTRAMUSCULAR; INTRAVENOUS at 06:02

## 2023-02-13 RX ADMIN — ALUMINUM HYDROXIDE, MAGNESIUM HYDROXIDE, AND SIMETHICONE 30 ML: 200; 200; 20 SUSPENSION ORAL at 08:02

## 2023-02-13 RX ADMIN — LEVALBUTEROL HYDROCHLORIDE 1.25 MG: 1.25 SOLUTION, CONCENTRATE RESPIRATORY (INHALATION) at 07:02

## 2023-02-13 RX ADMIN — ALUMINUM HYDROXIDE, MAGNESIUM HYDROXIDE, AND SIMETHICONE 30 ML: 200; 200; 20 SUSPENSION ORAL at 11:02

## 2023-02-13 RX ADMIN — SUCRALFATE 1 G: 1 SUSPENSION ORAL at 06:02

## 2023-02-13 RX ADMIN — PANTOPRAZOLE SODIUM 40 MG: 40 TABLET, DELAYED RELEASE ORAL at 08:02

## 2023-02-13 RX ADMIN — MONTELUKAST 10 MG: 10 TABLET, FILM COATED ORAL at 08:02

## 2023-02-13 RX ADMIN — LEVALBUTEROL HYDROCHLORIDE 1.25 MG: 1.25 SOLUTION, CONCENTRATE RESPIRATORY (INHALATION) at 11:02

## 2023-02-13 RX ADMIN — PROPRANOLOL HYDROCHLORIDE 20 MG: 10 TABLET ORAL at 09:02

## 2023-02-13 RX ADMIN — NICOTINE 1 PATCH: 21 PATCH, EXTENDED RELEASE TRANSDERMAL at 08:02

## 2023-02-13 RX ADMIN — METHYLPREDNISOLONE SODIUM SUCCINATE 60 MG: 40 INJECTION, POWDER, FOR SOLUTION INTRAMUSCULAR; INTRAVENOUS at 12:02

## 2023-02-13 RX ADMIN — FLUTICASONE FUROATE AND VILANTEROL TRIFENATATE 1 PUFF: 100; 25 POWDER RESPIRATORY (INHALATION) at 07:02

## 2023-02-13 RX ADMIN — PROPRANOLOL HYDROCHLORIDE 20 MG: 10 TABLET ORAL at 03:02

## 2023-02-13 RX ADMIN — METHIMAZOLE 10 MG: 5 TABLET ORAL at 09:02

## 2023-02-13 RX ADMIN — PANTOPRAZOLE SODIUM 40 MG: 40 TABLET, DELAYED RELEASE ORAL at 09:02

## 2023-02-13 RX ADMIN — SUCRALFATE 1 G: 1 SUSPENSION ORAL at 08:02

## 2023-02-13 RX ADMIN — SUCRALFATE 1 G: 1 SUSPENSION ORAL at 11:02

## 2023-02-13 RX ADMIN — LEVALBUTEROL HYDROCHLORIDE 1.25 MG: 1.25 SOLUTION, CONCENTRATE RESPIRATORY (INHALATION) at 12:02

## 2023-02-13 RX ADMIN — METHYLPREDNISOLONE SODIUM SUCCINATE 60 MG: 40 INJECTION, POWDER, FOR SOLUTION INTRAMUSCULAR; INTRAVENOUS at 11:02

## 2023-02-13 NOTE — PROGRESS NOTES
"Progress Note  [facility] Pulmonary & Critical Care Medicine    Attending: Edgar Anthony MD  Resident: Ronald Hinson MD   Admit Date: 2/11/2023  Today's Date: 02/13/2023    SUBJECTIVE:     Patient seen and examined this morning. NAEON. Tachycardic to 100's, rest of Vitals stable. Endorses improvement in respiratory symptoms. Endorses tremulousness and anxiety. Denies any chest pain, abdominal pain, N/V, or diarrhea.    Review of Systems   Constitutional:  Negative for chills and fever.   HENT:  Negative for congestion and sore throat.    Respiratory:  Positive for shortness of breath and wheezing. Negative for cough.    Cardiovascular:  Negative for chest pain and leg swelling.   Gastrointestinal:  Negative for abdominal pain, nausea and vomiting.   Neurological:  Positive for tremors. Negative for dizziness and headaches.     OBJECTIVE:     Vital Signs Trends/Hx Reviewed  Vitals:    02/13/23 0750 02/13/23 0943 02/13/23 1115 02/13/23 1123   BP:   (!) 118/57    BP Location:   Right arm    Patient Position:   Lying    Pulse:   107 106   Resp:   18 18   Temp:   98.5 °F (36.9 °C)    TempSrc:   Oral    SpO2: 100%  98% 99%   Weight:  66.2 kg (146 lb)     Height:  4' 11" (1.499 m)              Physical Exam  Constitutional:       Appearance: Normal appearance.      Comments: Tremulousness and dysphonia noted.   HENT:      Head: Normocephalic and atraumatic.      Mouth/Throat:      Mouth: Mucous membranes are moist.      Pharynx: Oropharynx is clear.   Eyes:      Extraocular Movements: Extraocular movements intact.      Pupils: Pupils are equal, round, and reactive to light.      Comments: Proptosis. Slight lid lag noted.   Cardiovascular:      Rate and Rhythm: Regular rhythm. Tachycardia present.   Pulmonary:      Effort: Pulmonary effort is normal.      Breath sounds: Normal breath sounds.   Abdominal:      General: Abdomen is flat. Bowel sounds are normal.      Palpations: Abdomen is soft.   Musculoskeletal:    "      General: Normal range of motion.      Cervical back: Normal range of motion and neck supple.   Skin:     General: Skin is warm and dry.   Neurological:      General: No focal deficit present.      Mental Status: She is alert and oriented to person, place, and time.   Psychiatric:         Mood and Affect: Mood normal.         Behavior: Behavior normal.       Laboratory:  Recent Labs   Lab 02/13/23  0619   WBC 12.27   RBC 4.01   HGB 12.1   HCT 36.8*      MCV 92   MCH 30.2   MCHC 32.9     Recent Labs   Lab 02/13/23  0619      K 4.4      CO2 24   BUN 11   CREATININE 0.7   MG 2.4     No results for input(s): PH, PCO2, PO2, HCO3, POCSATURATED, BE in the last 24 hours.      Chest Imaging:   No new chest imaging.     ASSESSMENT & RECOMMENDATIONS        Pulm  #Moderate persistent asthma with acute exacerbation  -History of moderate persistent asthma with poor control, requiring prolonged course of steroids. No PFT's on record.   CXR notable for hyperinflation  25 Pack year smoking history  On Symbicort daily and Albuterol PRN at home  S/p Solumedrol 125 mg   Currently on Methylprednisolone 60 mg daily. Taper down steroids upon clinical improvement  Declined vaccinations  Patient counseled on smoking cessation  Hyperthyroidism. No nodules noted on POCUS.  Diagnosed with vocal chord dysfunction. Likely 2/2 to hyperthyroid symptoms.      Plan  -Continue Breo inhaler and Lev albuterol nebulizer   -Continue Montelukast 10 mg  -Recommend outpatient smoking cessation program   -Recommend control hyperthyroidism thyrotoxicosis for appropriate asthma control as this may be a potential trigger. Other potential triggers include GERD and allergic rhinitis,   -Recommend Selective Beta one Blocker for treatment of Thyrotoxicosis. Continue cetrizine, flonase, montelukast and PPI  -PFTs as outpatient and pulmonary follow up at [facility]    GI  #GERD   Continue Pantoprazole 40 mg BID    Endo    #Hyperthyroidism  TSH <0.026,  T 4 3.09 on admit  Prior admission TSH 0.00  Family history of Thyroid issues.  Evidence of thyrotoxicosis on exam.  Recommend Selective Beta one Blocker for treatment of Thyrotoxicosis  Recommend control hyperthyroidism thyrotoxicosis for appropriate asthma control as this may be a potential trigger.    Thank you for your consult. I will follow-up with patient. Please contact us if you have any additional questions    Ronald Hinson M.D.  [facility] PGY-1  [facility] Pulmonary/Critical Care   02/13/2023 2:25 PM     Pt seen and examined with Pulmonary/Critical Care team and this note reviewed and validated with the following additional comments:    It is likely that Mrs Moeller's previously untreated thyrotoxicosis is complicating her asthma control.  Her squeaky voice, tremor, 3+ reflexes, and tachycardia support her laboratory diagnosis of thyrotoxicosis. Control of her thyroid disease is key to asthma control.    Edgar Ramon MD  Phone [phone]

## 2023-02-13 NOTE — ASSESSMENT & PLAN NOTE
Thyroiditis    Free T4--3.09  TSH < 0.026  Consulting endocrinology  TTE pending  Patient is tachycardic with heart rate of low 100s-120s, patient is also extremely fidgety and anxious.  Unsure if the anxiety/jitteriness is secondary to steroid versus hyperthyroidism    Ultrasound of the head and neck soft tissue--  FINDINGS:   The thyroid gland is normal in size with mildly heterogeneous echotexture and diffuse hypervascularity on color Doppler imaging.  The right lobe measures 4.7 x 1.9 x 1.4 cm.  The left lobe measures 4.2 x 2.0 x 1.2 cm.  The isthmus measures 2 mm.     Estimated total number of nodules ?1cm:0number of spongiform nodules ?2cmnot described below (TR1): 0 Number of mixed cystic and solid nodules ?1.5cm not described below (TR2): 0    Impression:       Thyroiditis     TIRADS recommendations:     TI-RADS 1 and 2-benign, no FNA.     TI-RADS 3-follow-up if 1.5 cm or larger.  FNA if 2.5 cm or larger.     TI-RADS 4-follow-up if 1.0 cm or larger.  FNA if 1.5 cm or larger.     TI-RADS 5-follow-up if 5 mm or larger.  FNA if 1.0 cm or larger.    Will add Propanolol 20 mg tid

## 2023-02-13 NOTE — SUBJECTIVE & OBJECTIVE
Interval History:  Seen at the bedside.  Patient is extremely anxious and nervous.  She was given Solu-Medrol and nebs.  Consulted Pulmonary and endocrinology.  Case discussed with attending, noted with tachycardia with heart rate as high as 120.  TSH and free T4 noted--hyperthyroidism.  TTE pending.  Soft neck ultrasound reviewed.    Review of Systems   Constitutional:  Positive for activity change and appetite change. Negative for chills, fatigue and fever.   HENT:  Positive for trouble swallowing.         Complains of throat pain when swallowing   Eyes:  Negative for visual disturbance.   Respiratory:  Positive for cough, chest tightness, shortness of breath (on exertion) and wheezing (while ambulating).    Cardiovascular:  Negative for chest pain, palpitations and leg swelling.   Gastrointestinal:  Negative for abdominal distention, abdominal pain, anal bleeding, constipation, diarrhea, nausea and vomiting.   Genitourinary:  Negative for difficulty urinating and hematuria.   Musculoskeletal:  Negative for arthralgias, back pain, gait problem and myalgias.   Skin:  Negative for rash and wound.   Neurological:  Negative for dizziness, syncope, speech difficulty and weakness.   Hematological:  Does not bruise/bleed easily.   Psychiatric/Behavioral:  The patient is nervous/anxious.    Objective:     Vital Signs (Most Recent):  Temp: 98.5 °F (36.9 °C) (02/13/23 1115)  Pulse: 106 (02/13/23 1123)  Resp: 18 (02/13/23 1123)  BP: (!) 118/57 (02/13/23 1115)  SpO2: 99 % (02/13/23 1123)   Vital Signs (24h Range):  Temp:  [97 °F (36.1 °C)-98.5 °F (36.9 °C)] 98.5 °F (36.9 °C)  Pulse:  [101-123] 106  Resp:  [14-22] 18  SpO2:  [96 %-100 %] 99 %  BP: (106-129)/(51-61) 118/57     Weight: 66.2 kg (146 lb)  Body mass index is 29.49 kg/m².    Intake/Output Summary (Last 24 hours) at 2/13/2023 1140  Last data filed at 2/12/2023 1158  Gross per 24 hour   Intake 250 ml   Output --   Net 250 ml        Physical Exam  Vitals and nursing  note reviewed.   Constitutional:       Appearance: She is ill-appearing.   HENT:      Head: Normocephalic and atraumatic.      Nose: Nose normal.      Mouth/Throat:      Mouth: Mucous membranes are moist.   Eyes:      Extraocular Movements: Extraocular movements intact.      Conjunctiva/sclera: Conjunctivae normal.   Neck:      Thyroid: Thyroid tenderness present.   Cardiovascular:      Rate and Rhythm: Regular rhythm. Tachycardia present.      Pulses: Normal pulses.      Heart sounds: Normal heart sounds.   Pulmonary:      Effort: Pulmonary effort is normal. No respiratory distress.      Breath sounds: No wheezing.   Abdominal:      General: Bowel sounds are normal. There is no distension.      Palpations: Abdomen is soft.      Tenderness: There is no abdominal tenderness. There is no guarding.   Musculoskeletal:         General: Normal range of motion.      Cervical back: Normal range of motion.      Right lower leg: No edema.      Left lower leg: No edema.   Skin:     General: Skin is warm and dry.      Capillary Refill: Capillary refill takes 2 to 3 seconds.   Neurological:      Mental Status: She is alert and oriented to person, place, and time. Mental status is at baseline.   Psychiatric:         Mood and Affect: Mood normal.         Behavior: Behavior normal.       Significant Labs: All pertinent labs within the past 24 hours have been reviewed.    Significant Imaging: I have reviewed all pertinent imaging results/findings within the past 24 hours.

## 2023-02-13 NOTE — PROGRESS NOTES
[facility] Medicine  Progress Note    Patient Name: Niya Moeller  MRN: [id number]  Patient Class: OP- Observation   Admission Date: 2/11/2023  Length of Stay: 0 days  Attending Physician: Carmita Sandoval*  Primary Care Provider: Cecilio Carrasco MD        Subjective:     Principal Problem:Moderate persistent asthma with (acute) exacerbation        HPI:  Ms. Núñez is a 52-year-old female with a past medical history of Asthma, diverticulitis and GERD. Patient presented to [facility] emergency department with concern for shortness of breath x 3 days   Patient stated the shortness of breath for the past 3 days has progressively gotten worst. Patient denies any associated symptoms such as nausea, vomiting or diarrhea. Patient denies fever or chills. Patient did endorse chest pain after trying to take a deep breath in. Patient stated breathing treatment does not work for her it only increases her anxiety.  Patient stated she has appointment with a pulmonologist in March.      On evaluation and presentation in the emergency department patient was afebrile initial  blood pressure was 129/60 , RR 38, 94% on RA. Patient lab work results showed Glucose 136, BNP 65, MG 1.2, COVID negative. Patient was given solumedrol 125mg and 2g og MG. Patient chest xray on 1/31/23 results showed Mediastinal silhouette is within normal limits. Lungs are hyperexpanded.  No pneumothorax or pleural effusion.  No acute osseous finding.  Emphysema/COPD. Will repeat chest xray in the a.m     On assessment patient is alert an oriented x4. Patient voiced no pain at this time. Patient lung sounds are diminished inspiratory and expiratory wheezing, patient has xopenex ordered. Abdomen soft and non-tender. No edema noted in the lower extremities.             Overview/Hospital Course:  She was admitted to the hospital medicine service for further care.  Noted with an asthma exacerbation on admission.  She was  started on IV Solu-Medrol.  Chest x-ray without any acute findings.  She was also given DuoNebs.  Pulmonary was consulted.  Appreciate recs.  Will continue patient on Breo daily, Xopenex nebs q.6, montelukast, and cetirizine.  Supplemental O2 noted--patient is not on home O2.  Patient complained of throat pain on assessment.  Patient with complaints of pain on swallowing.  Thyroid function indicative for hyperthyroidism.  Soft tissue head and neck ultrasound--  FINDINGS:   The thyroid gland is normal in size with mildly heterogeneous echotexture and diffuse hypervascularity on color Doppler imaging.  The right lobe measures 4.7 x 1.9 x 1.4 cm.  The left lobe measures 4.2 x 2.0 x 1.2 cm.  The isthmus measures 2 mm.     Estimated total number of nodules ?1cm:0number of spongiform nodules ?2cmnot described below (TR1): 0 Number of mixed cystic and solid nodules ?1.5cm not described below (TR2): 0    Impression:       Thyroiditis     TIRADS recommendations:     TI-RADS 1 and 2-benign, no FNA.     TI-RADS 3-follow-up if 1.5 cm or larger.  FNA if 2.5 cm or larger.     TI-RADS 4-follow-up if 1.0 cm or larger.  FNA if 1.5 cm or larger.     TI-RADS 5-follow-up if 5 mm or larger.  FNA if 1.0 cm or larger.      Will consult endocrinology.  Ppi was ordered and Carafate.  Follow      Interval History:  Seen at the bedside.  Patient is extremely anxious and nervous.  She was given Solu-Medrol and nebs.  Consulted Pulmonary and endocrinology.  Case discussed with attending, noted with tachycardia with heart rate as high as 120.  TSH and free T4 noted--hyperthyroidism.  TTE pending.  Soft neck ultrasound reviewed.    Review of Systems   Constitutional:  Positive for activity change and appetite change. Negative for chills, fatigue and fever.   HENT:  Positive for trouble swallowing.         Complains of throat pain when swallowing   Eyes:  Negative for visual disturbance.   Respiratory:  Positive for cough, chest tightness,  shortness of breath (on exertion) and wheezing (while ambulating).    Cardiovascular:  Negative for chest pain, palpitations and leg swelling.   Gastrointestinal:  Negative for abdominal distention, abdominal pain, anal bleeding, constipation, diarrhea, nausea and vomiting.   Genitourinary:  Negative for difficulty urinating and hematuria.   Musculoskeletal:  Negative for arthralgias, back pain, gait problem and myalgias.   Skin:  Negative for rash and wound.   Neurological:  Negative for dizziness, syncope, speech difficulty and weakness.   Hematological:  Does not bruise/bleed easily.   Psychiatric/Behavioral:  The patient is nervous/anxious.    Objective:     Vital Signs (Most Recent):  Temp: 98.5 °F (36.9 °C) (02/13/23 1115)  Pulse: 106 (02/13/23 1123)  Resp: 18 (02/13/23 1123)  BP: (!) 118/57 (02/13/23 1115)  SpO2: 99 % (02/13/23 1123)   Vital Signs (24h Range):  Temp:  [97 °F (36.1 °C)-98.5 °F (36.9 °C)] 98.5 °F (36.9 °C)  Pulse:  [101-123] 106  Resp:  [14-22] 18  SpO2:  [96 %-100 %] 99 %  BP: (106-129)/(51-61) 118/57     Weight: 66.2 kg (146 lb)  Body mass index is 29.49 kg/m².    Intake/Output Summary (Last 24 hours) at 2/13/2023 1140  Last data filed at 2/12/2023 1158  Gross per 24 hour   Intake 250 ml   Output --   Net 250 ml        Physical Exam  Vitals and nursing note reviewed.   Constitutional:       Appearance: She is ill-appearing.   HENT:      Head: Normocephalic and atraumatic.      Nose: Nose normal.      Mouth/Throat:      Mouth: Mucous membranes are moist.   Eyes:      Extraocular Movements: Extraocular movements intact.      Conjunctiva/sclera: Conjunctivae normal.   Neck:      Thyroid: Thyroid tenderness present.   Cardiovascular:      Rate and Rhythm: Regular rhythm. Tachycardia present.      Pulses: Normal pulses.      Heart sounds: Normal heart sounds.   Pulmonary:      Effort: Pulmonary effort is normal. No respiratory distress.      Breath sounds: No wheezing.   Abdominal:      General:  Bowel sounds are normal. There is no distension.      Palpations: Abdomen is soft.      Tenderness: There is no abdominal tenderness. There is no guarding.   Musculoskeletal:         General: Normal range of motion.      Cervical back: Normal range of motion.      Right lower leg: No edema.      Left lower leg: No edema.   Skin:     General: Skin is warm and dry.      Capillary Refill: Capillary refill takes 2 to 3 seconds.   Neurological:      Mental Status: She is alert and oriented to person, place, and time. Mental status is at baseline.   Psychiatric:         Mood and Affect: Mood normal.         Behavior: Behavior normal.       Significant Labs: All pertinent labs within the past 24 hours have been reviewed.    Significant Imaging: I have reviewed all pertinent imaging results/findings within the past 24 hours.      Assessment/Plan:      * Moderate persistent asthma with (acute) exacerbation  Appreciate pulmonary  Started on O2 in the ER---can likely wean.  Patient is not on home O2.  Patient with asthma exacerbation.  Patient states she uses albuterol pump inhaler times 12 at home daily.  Patient states she is compliant with all her home meds.  - Continue LABA/ICS (Breo on formulary inpatient)          - discharge on Symbicort  - given 125mg solumderol and now on q6h 60mg solumedrol          - recommend decreasing to daily 60mg solumedrol, will taper based on clinical response          - patient states she often needs prolonged tapers of steroids  - nebulizer treatments q4h while awake for 8 doses, then PRN for wheezing and dyspnea  - anti tussives PRN  - will need PFTs as outpatient and pulmonary follow up at [facility]          - will arrange for this          - needs consideration for biologic agent as she has frequent exacerbations  - counseled on smoking cessation  - patient vehemently declines vaccinations  - she needs good control of her triggers, specifically her GERD and allergic rhinitis          -  continue ceterizine, flonase, montelukast and PPI  - needs to quit smoking, agreeable to start varenicline     We will continue montelukast at home dose and cetirizine.  Will order Flonase.      Hyperthyroidism  Thyroiditis    Free T4--3.09  TSH < 0.026  Consulting endocrinology  TTE pending  Patient is tachycardic with heart rate of low 100s-120s, patient is also extremely fidgety and anxious.  Unsure if the anxiety/jitteriness is secondary to steroid versus hyperthyroidism    Ultrasound of the head and neck soft tissue--  FINDINGS:   The thyroid gland is normal in size with mildly heterogeneous echotexture and diffuse hypervascularity on color Doppler imaging.  The right lobe measures 4.7 x 1.9 x 1.4 cm.  The left lobe measures 4.2 x 2.0 x 1.2 cm.  The isthmus measures 2 mm.     Estimated total number of nodules ?1cm:0number of spongiform nodules ?2cmnot described below (TR1): 0 Number of mixed cystic and solid nodules ?1.5cm not described below (TR2): 0    Impression:       Thyroiditis     TIRADS recommendations:     TI-RADS 1 and 2-benign, no FNA.     TI-RADS 3-follow-up if 1.5 cm or larger.  FNA if 2.5 cm or larger.     TI-RADS 4-follow-up if 1.0 cm or larger.  FNA if 1.5 cm or larger.     TI-RADS 5-follow-up if 5 mm or larger.  FNA if 1.0 cm or larger.    Will add Propanolol 20 mg tid    Colitis  Chronic.       Diverticulitis  Chronic.  Patient reports completion of this treatment on last year.      GERD (gastroesophageal reflux disease)  -Protonix and Carafate ordered        VTE Risk Mitigation (From admission, onward)           Ordered     IP VTE LOW RISK PATIENT  Once         02/11/23 2158     Place sequential compression device  Until discontinued         02/11/23 2158                    Discharge Planning   ROXIE:      Code Status: Full Code   Is the patient medically ready for discharge?:     Reason for patient still in hospital (select all that apply): Laboratory test, Treatment, Imaging and Consult  recommendations                     Lm Lanier NP  Department of Hospital Medicine   [facility] - Telemetry

## 2023-02-13 NOTE — ASSESSMENT & PLAN NOTE
Appreciate pulmonary  Started on O2 in the ER---can likely wean.  Patient is not on home O2.  Patient with asthma exacerbation.  Patient states she uses albuterol pump inhaler times 12 at home daily.  Patient states she is compliant with all her home meds.  - Continue LABA/ICS (Breo on formulary inpatient)          - discharge on Symbicort  - given 125mg solumderol and now on q6h 60mg solumedrol          - recommend decreasing to daily 60mg solumedrol, will taper based on clinical response          - patient states she often needs prolonged tapers of steroids  - nebulizer treatments q4h while awake for 8 doses, then PRN for wheezing and dyspnea  - anti tussives PRN  - will need PFTs as outpatient and pulmonary follow up at [facility]          - will arrange for this          - needs consideration for biologic agent as she has frequent exacerbations  - counseled on smoking cessation  - patient vehemently declines vaccinations  - she needs good control of her triggers, specifically her GERD and allergic rhinitis          - continue ceterizine, flonase, montelukast and PPI  - needs to quit smoking, agreeable to start varenicline     We will continue montelukast at home dose and cetirizine.  Will order Flonase.

## 2023-02-14 ENCOUNTER — TELEPHONE (OUTPATIENT)
Dept: PULMONOLOGY | Facility: CLINIC | Age: 53
End: 2023-02-14
Payer: MEDICAID

## 2023-02-14 ENCOUNTER — PATIENT MESSAGE (OUTPATIENT)
Dept: PULMONOLOGY | Facility: CLINIC | Age: 53
End: 2023-02-14
Payer: MEDICAID

## 2023-02-14 VITALS
DIASTOLIC BLOOD PRESSURE: 52 MMHG | BODY MASS INDEX: 29.43 KG/M2 | SYSTOLIC BLOOD PRESSURE: 103 MMHG | WEIGHT: 146 LBS | RESPIRATION RATE: 20 BRPM | TEMPERATURE: 97 F | HEART RATE: 77 BPM | OXYGEN SATURATION: 99 % | HEIGHT: 59 IN

## 2023-02-14 PROBLEM — R07.89 OTHER CHEST PAIN: Status: ACTIVE | Noted: 2023-02-14

## 2023-02-14 PROBLEM — R00.0 TACHYCARDIA: Status: ACTIVE | Noted: 2023-02-14

## 2023-02-14 LAB
ANION GAP SERPL CALC-SCNC: 8 MMOL/L (ref 8–16)
BUN SERPL-MCNC: 13 MG/DL (ref 6–20)
CALCIUM SERPL-MCNC: 9.2 MG/DL (ref 8.7–10.5)
CHLORIDE SERPL-SCNC: 111 MMOL/L (ref 95–110)
CO2 SERPL-SCNC: 22 MMOL/L (ref 23–29)
CREAT SERPL-MCNC: 0.7 MG/DL (ref 0.5–1.4)
ERYTHROCYTE [DISTWIDTH] IN BLOOD BY AUTOMATED COUNT: 12.4 % (ref 11.5–14.5)
EST. GFR  (NO RACE VARIABLE): >60 ML/MIN/1.73 M^2
GLUCOSE SERPL-MCNC: 115 MG/DL (ref 70–110)
HCT VFR BLD AUTO: 36.1 % (ref 37–48.5)
HGB BLD-MCNC: 11.6 G/DL (ref 12–16)
MAGNESIUM SERPL-MCNC: 2.3 MG/DL (ref 1.6–2.6)
MCH RBC QN AUTO: 30 PG (ref 27–31)
MCHC RBC AUTO-ENTMCNC: 32.1 G/DL (ref 32–36)
MCV RBC AUTO: 93 FL (ref 82–98)
PLATELET # BLD AUTO: 178 K/UL (ref 150–450)
PMV BLD AUTO: 11.3 FL (ref 9.2–12.9)
POTASSIUM SERPL-SCNC: 4.4 MMOL/L (ref 3.5–5.1)
RBC # BLD AUTO: 3.87 M/UL (ref 4–5.4)
SODIUM SERPL-SCNC: 141 MMOL/L (ref 136–145)
WBC # BLD AUTO: 12.84 K/UL (ref 3.9–12.7)

## 2023-02-14 PROCEDURE — 85027 COMPLETE CBC AUTOMATED: CPT | Performed by: INTERNAL MEDICINE

## 2023-02-14 PROCEDURE — 94761 N-INVAS EAR/PLS OXIMETRY MLT: CPT

## 2023-02-14 PROCEDURE — 94640 AIRWAY INHALATION TREATMENT: CPT

## 2023-02-14 PROCEDURE — 25000003 PHARM REV CODE 250: Performed by: INTERNAL MEDICINE

## 2023-02-14 PROCEDURE — 99223 1ST HOSP IP/OBS HIGH 75: CPT | Mod: ,,, | Performed by: INTERNAL MEDICINE

## 2023-02-14 PROCEDURE — 83735 ASSAY OF MAGNESIUM: CPT | Performed by: INTERNAL MEDICINE

## 2023-02-14 PROCEDURE — 80048 BASIC METABOLIC PNL TOTAL CA: CPT | Performed by: INTERNAL MEDICINE

## 2023-02-14 PROCEDURE — 36415 COLL VENOUS BLD VENIPUNCTURE: CPT | Performed by: INTERNAL MEDICINE

## 2023-02-14 PROCEDURE — 99223 PR INITIAL HOSPITAL CARE,LEVL III: ICD-10-PCS | Mod: ,,, | Performed by: INTERNAL MEDICINE

## 2023-02-14 PROCEDURE — S4991 NICOTINE PATCH NONLEGEND: HCPCS | Performed by: NURSE PRACTITIONER

## 2023-02-14 PROCEDURE — 63600175 PHARM REV CODE 636 W HCPCS: Performed by: NURSE PRACTITIONER

## 2023-02-14 PROCEDURE — 25000242 PHARM REV CODE 250 ALT 637 W/ HCPCS: Performed by: INTERNAL MEDICINE

## 2023-02-14 PROCEDURE — 25000003 PHARM REV CODE 250: Performed by: NURSE PRACTITIONER

## 2023-02-14 RX ORDER — ALBUTEROL SULFATE 90 UG/1
1-2 AEROSOL, METERED RESPIRATORY (INHALATION) EVERY 6 HOURS PRN
Qty: 18 G | Refills: 1 | Status: SHIPPED | OUTPATIENT
Start: 2023-02-14 | End: 2023-02-14 | Stop reason: SDUPTHER

## 2023-02-14 RX ORDER — METHIMAZOLE 10 MG/1
10 TABLET ORAL 2 TIMES DAILY
Qty: 60 TABLET | Refills: 0 | Status: ON HOLD | OUTPATIENT
Start: 2023-02-14 | End: 2023-08-19 | Stop reason: HOSPADM

## 2023-02-14 RX ORDER — ALBUTEROL SULFATE 2.5 MG/.5ML
2.5 SOLUTION RESPIRATORY (INHALATION) EVERY 4 HOURS PRN
Qty: 30 EACH | Refills: 0 | Status: SHIPPED | OUTPATIENT
Start: 2023-02-14 | End: 2023-02-15

## 2023-02-14 RX ORDER — IPRATROPIUM BROMIDE AND ALBUTEROL SULFATE 2.5; .5 MG/3ML; MG/3ML
3 SOLUTION RESPIRATORY (INHALATION) EVERY 4 HOURS PRN
Qty: 90 ML | Refills: 0 | Status: SHIPPED | OUTPATIENT
Start: 2023-02-14 | End: 2023-02-14 | Stop reason: SDUPTHER

## 2023-02-14 RX ORDER — PROPRANOLOL HYDROCHLORIDE 60 MG/1
60 CAPSULE, EXTENDED RELEASE ORAL DAILY
Qty: 30 CAPSULE | Refills: 1 | Status: ON HOLD | OUTPATIENT
Start: 2023-02-14 | End: 2023-08-19 | Stop reason: HOSPADM

## 2023-02-14 RX ORDER — PREDNISONE 20 MG/1
40 TABLET ORAL DAILY
Qty: 8 TABLET | Refills: 0 | Status: SHIPPED | OUTPATIENT
Start: 2023-02-14 | End: 2023-02-14 | Stop reason: SDUPTHER

## 2023-02-14 RX ORDER — ALBUTEROL SULFATE 90 UG/1
1-2 AEROSOL, METERED RESPIRATORY (INHALATION) EVERY 6 HOURS PRN
Qty: 18 G | Refills: 1 | Status: ON HOLD | OUTPATIENT
Start: 2023-02-14 | End: 2023-03-15 | Stop reason: SDUPTHER

## 2023-02-14 RX ORDER — PREDNISONE 20 MG/1
40 TABLET ORAL DAILY
Qty: 12 TABLET | Refills: 0 | Status: ON HOLD | OUTPATIENT
Start: 2023-02-14 | End: 2023-02-19 | Stop reason: HOSPADM

## 2023-02-14 RX ORDER — PREDNISONE 20 MG/1
60 TABLET ORAL DAILY
Status: DISCONTINUED | OUTPATIENT
Start: 2023-02-14 | End: 2023-02-14 | Stop reason: HOSPADM

## 2023-02-14 RX ORDER — IPRATROPIUM BROMIDE AND ALBUTEROL SULFATE 2.5; .5 MG/3ML; MG/3ML
3 SOLUTION RESPIRATORY (INHALATION) EVERY 4 HOURS PRN
Qty: 90 ML | Refills: 0 | Status: SHIPPED | OUTPATIENT
Start: 2023-02-14 | End: 2023-02-15

## 2023-02-14 RX ADMIN — LEVALBUTEROL HYDROCHLORIDE 1.25 MG: 1.25 SOLUTION, CONCENTRATE RESPIRATORY (INHALATION) at 12:02

## 2023-02-14 RX ADMIN — NICOTINE 1 PATCH: 21 PATCH, EXTENDED RELEASE TRANSDERMAL at 08:02

## 2023-02-14 RX ADMIN — SUCRALFATE 1 G: 1 SUSPENSION ORAL at 01:02

## 2023-02-14 RX ADMIN — PREDNISONE 60 MG: 20 TABLET ORAL at 08:02

## 2023-02-14 RX ADMIN — FLUTICASONE FUROATE AND VILANTEROL TRIFENATATE 1 PUFF: 100; 25 POWDER RESPIRATORY (INHALATION) at 09:02

## 2023-02-14 RX ADMIN — LEVALBUTEROL HYDROCHLORIDE 1.25 MG: 1.25 SOLUTION, CONCENTRATE RESPIRATORY (INHALATION) at 09:02

## 2023-02-14 RX ADMIN — PANTOPRAZOLE SODIUM 40 MG: 40 TABLET, DELAYED RELEASE ORAL at 08:02

## 2023-02-14 RX ADMIN — Medication 6 MG: at 12:02

## 2023-02-14 RX ADMIN — METHIMAZOLE 10 MG: 5 TABLET ORAL at 08:02

## 2023-02-14 RX ADMIN — ALUMINUM HYDROXIDE, MAGNESIUM HYDROXIDE, AND SIMETHICONE 30 ML: 200; 200; 20 SUSPENSION ORAL at 06:02

## 2023-02-14 RX ADMIN — SUCRALFATE 1 G: 1 SUSPENSION ORAL at 06:02

## 2023-02-14 RX ADMIN — MONTELUKAST 10 MG: 10 TABLET, FILM COATED ORAL at 08:02

## 2023-02-14 RX ADMIN — PROPRANOLOL HYDROCHLORIDE 20 MG: 10 TABLET ORAL at 08:02

## 2023-02-14 RX ADMIN — LEVALBUTEROL HYDROCHLORIDE 1.25 MG: 1.25 SOLUTION, CONCENTRATE RESPIRATORY (INHALATION) at 04:02

## 2023-02-14 RX ADMIN — CETIRIZINE HYDROCHLORIDE 5 MG: 5 TABLET, FILM COATED ORAL at 08:02

## 2023-02-14 NOTE — CARE UPDATE
Patient to be discharged today, agree with 10 day steroid course and tx for her hypothyroidism. Needs OP PFTs, can follow up with Dr Brown, please place OP pulm referral, I have messaged him about this. Otherwise recs as per last note.    Jessie Hoffman MD  Pulmonary and Critical Care Fellow  02/14/2023  12:58 PM

## 2023-02-14 NOTE — H&P
ENDOCRINE CONSULT    Date: 02/13/2023      REFERRING PROVIDER: Self, Aaareferral    REASON FOR VISIT: Hyperthyroidism    HPI: Niya Moeller is a 52 y.o. female patient with a history notable for 2 years history of thyroid disease on no medication. who presents in consultation for management of hyperthyroidism     PAST MEDICAL HISTORY:  Patient Active Problem List   Diagnosis    Obesity    Asthma exacerbation    GERD (gastroesophageal reflux disease)    Diverticulitis    Colitis    Moderate persistent asthma with (acute) exacerbation    Hyperthyroidism    Thyroiditis    Diastolic dysfunction, left ventricle        PAST SURGICAL HISTORY:  No past surgical history on file.     MEDICATIONS:  Current Facility-Administered Medications   Medication Dose Route Frequency Provider Last Rate Last Admin    acetaminophen tablet 650 mg  650 mg Oral Q8H PRN Diaz Queen MD        aluminum-magnesium hydroxide-simethicone 200-200-20 mg/5 mL suspension 30 mL  30 mL Oral QID (AC & HS) Diaz Queen MD   30 mL at 02/13/23 1551    cetirizine tablet 5 mg  5 mg Oral Daily Lm Lanier NP   5 mg at 02/13/23 0830    fluticasone furoate-vilanteroL 100-25 mcg/dose diskus inhaler 1 puff  1 puff Inhalation Daily Lm Lanier NP   1 puff at 02/13/23 0742    levalbuterol nebulizer solution 1.25 mg  1.25 mg Nebulization 6 times per day Diaz Queen MD   1.25 mg at 02/13/23 1553    melatonin tablet 6 mg  6 mg Oral Nightly PRN Diaz Queen MD   6 mg at 02/12/23 2100    [START ON 2/14/2023] methylPREDNISolone sodium succinate injection 60 mg  60 mg Intravenous Daily Lm Lanier NP        montelukast tablet 10 mg  10 mg Oral Daily Lm Lanier NP   10 mg at 02/13/23 0830    naloxone 0.4 mg/mL injection 0.02 mg  0.02 mg Intravenous PRN Diaz Queen MD        nicotine 21 mg/24 hr 1 patch  1 patch Transdermal Daily Lm Lanier NP   1 patch at 02/13/23 0831  "   ondansetron injection 4 mg  4 mg Intravenous Q8H PRN Diaz Queen MD        pantoprazole EC tablet 40 mg  40 mg Oral BID Lm Lanier NP        polyethylene glycol packet 17 g  17 g Oral Daily PRN Diaz Queen MD        propranoloL tablet 20 mg  20 mg Oral TID Lm Lanier NP   20 mg at 02/13/23 1551    sucralfate 100 mg/mL suspension 1 g  1 g Oral Q6H Diaz Queen MD   1 g at 02/13/23 1133       ALLERGIES:  Review of patient's allergies indicates:   Allergen Reactions    Levofloxacin Hives and Itching    Metronidazole Hives    Latex        SOCIAL HISTORY:  Social History     Socioeconomic History    Marital status: Single   Tobacco Use    Smoking status: Every Day     Packs/day: 0.50     Years: 20.00     Pack years: 10.00     Types: Cigarettes    Smokeless tobacco: Never   Substance and Sexual Activity    Alcohol use: Not Currently    Drug use: Not Currently    Sexual activity: Not Currently     Social Determinants of Health     Food Insecurity: No Food Insecurity    Worried About Running Out of Food in the Last Year: Never true    Ran Out of Food in the Last Year: Never true   Transportation Needs: Unmet Transportation Needs    Lack of Transportation (Medical): Yes    Lack of Transportation (Non-Medical): Yes   Housing Stability: Unknown    Unable to Pay for Housing in the Last Year: No    Unstable Housing in the Last Year: No         FAMILY HISTORY:  No family history on file.      REVIEW OF SYSTEMS:  ALICIA Bourgeois present illness.    PHYSICAL EXAMINATION:  Vital Signs: /68 (BP Location: Right arm, Patient Position: Lying)   Pulse 104   Temp 97.4 °F (36.3 °C) (Oral)   Resp 18   Ht 4' 11" (1.499 m)   Wt 66.2 kg (146 lb)   SpO2 99%   BMI 29.49 kg/m²     Physical Exam  Vitals and nursing note reviewed.   Constitutional:       Appearance: She is obese.   HENT:      Head: Normocephalic and atraumatic.      Nose: Nose normal.      Mouth/Throat:      Mouth: Mucous " membranes are moist.      Pharynx: Oropharynx is clear.   Eyes:      Extraocular Movements: Extraocular movements intact.      Conjunctiva/sclera: Conjunctivae normal.      Pupils: Pupils are equal, round, and reactive to light.   Neck:      Comments: Thyroid normal no bruits  Cardiovascular:      Rate and Rhythm: Normal rate and regular rhythm.      Pulses: Normal pulses.      Heart sounds: Normal heart sounds.   Pulmonary:      Effort: Pulmonary effort is normal.      Breath sounds: Normal breath sounds.   Abdominal:      General: Abdomen is flat. Bowel sounds are normal.      Palpations: Abdomen is soft.   Musculoskeletal:         General: Normal range of motion.      Cervical back: Normal range of motion and neck supple.   Skin:     General: Skin is warm and dry.   Neurological:      General: No focal deficit present.      Mental Status: She is alert and oriented to person, place, and time.   Psychiatric:         Behavior: Behavior normal.          LABS  Admission on 02/11/2023   Component Date Value Ref Range Status    WBC 02/11/2023 8.97  3.90 - 12.70 K/uL Final    RBC 02/11/2023 4.23  4.00 - 5.40 M/uL Final    Hemoglobin 02/11/2023 12.5  12.0 - 16.0 g/dL Final    Hematocrit 02/11/2023 38.6  37.0 - 48.5 % Final    MCV 02/11/2023 91  82 - 98 fL Final    MCH 02/11/2023 29.6  27.0 - 31.0 pg Final    MCHC 02/11/2023 32.4  32.0 - 36.0 g/dL Final    RDW 02/11/2023 12.3  11.5 - 14.5 % Final    Platelets 02/11/2023 197  150 - 450 K/uL Final    MPV 02/11/2023 10.8  9.2 - 12.9 fL Final    Immature Granulocytes 02/11/2023 0.3  0.0 - 0.5 % Final    Gran # (ANC) 02/11/2023 5.5  1.8 - 7.7 K/uL Final    Immature Grans (Abs) 02/11/2023 0.03  0.00 - 0.04 K/uL Final    Comment: Mild elevation in immature granulocytes is non specific and   can be seen in a variety of conditions including stress response,   acute inflammation, trauma and pregnancy. Correlation with other   laboratory and clinical findings is essential.      Lymph  # 02/11/2023 2.2  1.0 - 4.8 K/uL Final    Mono # 02/11/2023 1.0  0.3 - 1.0 K/uL Final    Eos # 02/11/2023 0.2  0.0 - 0.5 K/uL Final    Baso # 02/11/2023 0.01  0.00 - 0.20 K/uL Final    nRBC 02/11/2023 0  0 /100 WBC Final    Gran % 02/11/2023 60.8  38.0 - 73.0 % Final    Lymph % 02/11/2023 25.0  18.0 - 48.0 % Final    Mono % 02/11/2023 11.3  4.0 - 15.0 % Final    Eosinophil % 02/11/2023 2.5  0.0 - 8.0 % Final    Basophil % 02/11/2023 0.1  0.0 - 1.9 % Final    Differential Method 02/11/2023 Automated   Final    Sodium 02/11/2023 141  136 - 145 mmol/L Final    Potassium 02/11/2023 4.1  3.5 - 5.1 mmol/L Final    Chloride 02/11/2023 109  95 - 110 mmol/L Final    CO2 02/11/2023 28  23 - 29 mmol/L Final    Glucose 02/11/2023 136 (H)  70 - 110 mg/dL Final    BUN 02/11/2023 10  7 - 17 mg/dL Final    Creatinine 02/11/2023 0.61  0.50 - 1.40 mg/dL Final    Calcium 02/11/2023 9.8  8.7 - 10.5 mg/dL Final    Total Protein 02/11/2023 6.7  6.0 - 8.4 g/dL Final    Albumin 02/11/2023 3.7  3.5 - 5.2 g/dL Final    Total Bilirubin 02/11/2023 0.5  0.1 - 1.0 mg/dL Final    Comment: For infants and newborns, interpretation of results should be based  on gestational age, weight and in agreement with clinical  observations.    Premature Infant recommended reference ranges:  Up to 24 hours.............<8.0 mg/dL  Up to 48 hours............<12.0 mg/dL  3-5 days..................<15.0 mg/dL  6-29 days.................<15.0 mg/dL      Alkaline Phosphatase 02/11/2023 63  38 - 126 U/L Final    AST 02/11/2023 34  15 - 46 U/L Final    ALT 02/11/2023 39  10 - 44 U/L Final    Anion Gap 02/11/2023 4 (L)  8 - 16 mmol/L Final    eGFR 02/11/2023 >60.0  >60 mL/min/1.73 m^2 Final    NT-proBNP 02/11/2023 130  5 - 900 pg/mL Final    Magnesium 02/11/2023 1.7  1.6 - 2.6 mg/dL Final    Sodium 02/12/2023 141  136 - 145 mmol/L Final    Potassium 02/12/2023 4.3  3.5 - 5.1 mmol/L Final    Chloride 02/12/2023 110  95 - 110 mmol/L Final    CO2 02/12/2023 21 (L)  23 -  29 mmol/L Final    Glucose 02/12/2023 146 (H)  70 - 110 mg/dL Final    BUN 02/12/2023 11  6 - 20 mg/dL Final    Creatinine 02/12/2023 0.7  0.5 - 1.4 mg/dL Final    Calcium 02/12/2023 9.4  8.7 - 10.5 mg/dL Final    Anion Gap 02/12/2023 10  8 - 16 mmol/L Final    eGFR 02/12/2023 >60  >60 mL/min/1.73 m^2 Final    Magnesium 02/12/2023 1.8  1.6 - 2.6 mg/dL Final    WBC 02/12/2023 5.05  3.90 - 12.70 K/uL Final    RBC 02/12/2023 4.04  4.00 - 5.40 M/uL Final    Hemoglobin 02/12/2023 12.1  12.0 - 16.0 g/dL Final    Hematocrit 02/12/2023 37.7  37.0 - 48.5 % Final    MCV 02/12/2023 93  82 - 98 fL Final    MCH 02/12/2023 30.0  27.0 - 31.0 pg Final    MCHC 02/12/2023 32.1  32.0 - 36.0 g/dL Final    RDW 02/12/2023 12.1  11.5 - 14.5 % Final    Platelets 02/12/2023 173  150 - 450 K/uL Final    MPV 02/12/2023 11.3  9.2 - 12.9 fL Final    BSA 02/13/2023 1.66  m2 Final    TDI SEPTAL 02/13/2023 0.06  m/s Final    LV LATERAL E/E' RATIO 02/13/2023 12.14  m/s Final    LV SEPTAL E/E' RATIO 02/13/2023 14.17  m/s Final    LA WIDTH 02/13/2023 3.10  cm Final    Left Ventricular Outflow Tract Amalia* 02/13/2023 0.95  cm/s Final    Left Ventricular Outflow Tract Amalia* 02/13/2023 3.79  mmHg Final    TDI LATERAL 02/13/2023 0.07  m/s Final    LVIDd 02/13/2023 4.21  3.5 - 6.0 cm Final    IVS 02/13/2023 0.81  0.6 - 1.1 cm Final    Posterior Wall 02/13/2023 0.91  0.6 - 1.1 cm Final    LVIDs 02/13/2023 2.72  2.1 - 4.0 cm Final    FS 02/13/2023 35  28 - 44 % Final    LA volume 02/13/2023 38.24  cm3 Final    STJ 02/13/2023 2.65  cm Final    Ascending aorta 02/13/2023 2.58  cm Final    LV mass 02/13/2023 112.01  g Final    LA size 02/13/2023 3.57  cm Final    RVDD 02/13/2023 3.15  cm Final    Left Ventricle Relative Wall Thick* 02/13/2023 0.43  cm Final    AV mean gradient 02/13/2023 8  mmHg Final    AV valve area 02/13/2023 2.26  cm2 Final    AV Velocity Ratio 02/13/2023 0.71   Final    AV index (prosthetic) 02/13/2023 0.78   Final    MV valve area p 1/2  method 02/13/2023 5.18  cm2 Final    E/A ratio 02/13/2023 1.44   Final    Mean e' 02/13/2023 0.07  m/s Final    E wave deceleration time 02/13/2023 146.36  msec Final    IVRT 02/13/2023 97.05  msec Final    Pulm vein S/D ratio 02/13/2023 1.59   Final    LVOT diameter 02/13/2023 1.92  cm Final    LVOT area 02/13/2023 2.9  cm2 Final    LVOT peak sudhakar 02/13/2023 1.29  m/s Final    LVOT peak VTI 02/13/2023 22.70  cm Final    Ao peak sudhakar 02/13/2023 1.82  m/s Final    Ao VTI 02/13/2023 29.1  cm Final    LVOT stroke volume 02/13/2023 65.69  cm3 Final    AV peak gradient 02/13/2023 13  mmHg Final    E/E' ratio 02/13/2023 13.08  m/s Final    MV Peak E Sudhakar 02/13/2023 0.85  m/s Final    TR Max Sudhakar 02/13/2023 2.83  m/s Final    MV stenosis pressure 1/2 time 02/13/2023 42.44  ms Final    MV Peak A Sudhakar 02/13/2023 0.59  m/s Final    PV Peak S Sudhakar 02/13/2023 0.65  m/s Final    PV Peak D Sudhakar 02/13/2023 0.41  m/s Final    LV Systolic Volume 02/13/2023 27.45  mL Final    LV Systolic Volume Index 02/13/2023 17.0  mL/m2 Final    LV Diastolic Volume 02/13/2023 79.02  mL Final    LV Diastolic Volume Index 02/13/2023 49.08  mL/m2 Final    LA Volume Index 02/13/2023 23.7  mL/m2 Final    LV Mass Index 02/13/2023 70  g/m2 Final    RA Major Axis 02/13/2023 3.86  cm Final    Left Atrium Minor Axis 02/13/2023 4.03  cm Final    Left Atrium Major Axis 02/13/2023 4.10  cm Final    Triscuspid Valve Regurgitation Pea* 02/13/2023 32  mmHg Final    LA Volume Index (Mod) 02/13/2023 18.5  mL/m2 Final    LA volume (mod) 02/13/2023 29.76  cm3 Final    RA Width 02/13/2023 2.75  cm Final    EF 02/13/2023 75  % Final    Sodium 02/13/2023 140  136 - 145 mmol/L Final    Potassium 02/13/2023 4.4  3.5 - 5.1 mmol/L Final    Chloride 02/13/2023 108  95 - 110 mmol/L Final    CO2 02/13/2023 24  23 - 29 mmol/L Final    Glucose 02/13/2023 152 (H)  70 - 110 mg/dL Final    BUN 02/13/2023 11  6 - 20 mg/dL Final    Creatinine 02/13/2023 0.7  0.5 - 1.4 mg/dL Final    Calcium  02/13/2023 9.4  8.7 - 10.5 mg/dL Final    Anion Gap 02/13/2023 8  8 - 16 mmol/L Final    eGFR 02/13/2023 >60  >60 mL/min/1.73 m^2 Final    Magnesium 02/13/2023 2.4  1.6 - 2.6 mg/dL Final    WBC 02/13/2023 12.27  3.90 - 12.70 K/uL Final    RBC 02/13/2023 4.01  4.00 - 5.40 M/uL Final    Hemoglobin 02/13/2023 12.1  12.0 - 16.0 g/dL Final    Hematocrit 02/13/2023 36.8 (L)  37.0 - 48.5 % Final    MCV 02/13/2023 92  82 - 98 fL Final    MCH 02/13/2023 30.2  27.0 - 31.0 pg Final    MCHC 02/13/2023 32.9  32.0 - 36.0 g/dL Final    RDW 02/13/2023 12.5  11.5 - 14.5 % Final    Platelets 02/13/2023 182  150 - 450 K/uL Final    MPV 02/13/2023 10.8  9.2 - 12.9 fL Final              Echo  · Concentric remodeling and hyperdynamic systolic function.  · The estimated ejection fraction is 75%.  · Indeterminate left ventricular diastolic function.  · Normal right ventricular size with normal right ventricular systolic   function.             Problem List Items Addressed This Visit          Pulmonary    * (Principal) Moderate persistent asthma with (acute) exacerbation - Primary       Endocrine    Hyperthyroidism     Thyroiditis    Free T4--3.09  TSH < 0.026  Consulting endocrinology  TTE pending  Patient is tachycardic with heart rate of low 100s-120s, patient is also extremely fidgety and anxious.  Unsure if the anxiety/jitteriness is secondary to steroid versus hyperthyroidism    Ultrasound of the head and neck soft tissue--  FINDINGS:   The thyroid gland is normal in size with mildly heterogeneous echotexture and diffuse hypervascularity on color Doppler imaging.  The right lobe measures 4.7 x 1.9 x 1.4 cm.  The left lobe measures 4.2 x 2.0 x 1.2 cm.  The isthmus measures 2 mm.     Estimated total number of nodules ?1cm:0number of spongiform nodules ?2cmnot described below (TR1): 0 Number of mixed cystic and solid nodules ?1.5cm not described below (TR2): 0    Impression:       Thyroiditis     TIRADS recommendations:     TI-RADS 1 and  2-benign, no FNA.     TI-RADS 3-follow-up if 1.5 cm or larger.  FNA if 2.5 cm or larger.     TI-RADS 4-follow-up if 1.0 cm or larger.  FNA if 1.5 cm or larger.     TI-RADS 5-follow-up if 5 mm or larger.  FNA if 1.0 cm or larger.    Will add Propanolol 20 mg tid         Relevant Orders    Echo (Completed)     Other Visit Diagnoses       Chest pain        Relevant Orders    EKG 12-lead    Tachycardia        Relevant Orders    EKG 12-lead (Completed)             RECOMMENDATION: 52 years old black female with history of thyroid disorder , now hyperthyroid.plan start Tapazole 10 mg twice a day , repeat her thyroid function in in 4 weeks ,thanks for the consult.

## 2023-02-14 NOTE — CONSULTS
[facility] - Telemetry  Cardiology  Consult Note    Patient Name: Niya Moeller  MRN: [id number]  Admission Date: 2/11/2023  Hospital Length of Stay: 1 days  Code Status: Full Code   Attending Provider: Ingrid Pittman MD   Consulting Provider: Jon Porter NP  Primary Care Physician: Cecilio Carrasco MD  Principal Problem:Moderate persistent asthma with (acute) exacerbation    Patient information was obtained from patient, past medical records and ER records.     Inpatient consult to [facility]iology[facility]  Consult performed by: Jon Porter NP  Consult ordered by: Lm Lanier NP        Subjective:     Chief Complaint:  SOB     HPI:   Niya Moeller is a 52-year-old female with  Asthma, tobacco abuse, diverticulitis and GERD. Patient presented to the ED with shortness of breath x 3 days. She has asthma that is poorly controlled and has a pulm appointment next month. Despite her asthma, she smokes. Patient denies chest pain, diaphoresis, NV, or other cardiac complaint. Her symptoms have improved with medication adjustments.    TTE:    Concentric remodeling and hyperdynamic systolic function.   The estimated ejection fraction is 75%.   Indeterminate left ventricular diastolic function.   Normal right ventricular size with normal right ventricular systolic function.      Past Medical History:   Diagnosis Date    Asthma     Diverticulosis     GERD (gastroesophageal reflux disease)        No past surgical history on file.    Review of patient's allergies indicates:   Allergen Reactions    Levofloxacin Hives and Itching    Metronidazole Hives    Latex        No current facility-administered medications on file prior to encounter.     Current Outpatient Medications on File Prior to Encounter   Medication Sig    budesonide-formoterol 160-4.5 mcg (SYMBICORT) 160-4.5 mcg/actuation HFAA Inhale 2 puffs into the lungs every 12 (twelve) hours. Controller    montelukast (SINGULAIR) 10 mg  tablet Take 1 tablet (10 mg total) by mouth once daily at 6am.    omeprazole (PRILOSEC) 40 MG capsule Take 1 capsule (40 mg total) by mouth once daily.    [DISCONTINUED] albuterol (PROVENTIL HFA) 90 mcg/actuation inhaler Inhale 2 puffs into the lungs every 4 (four) hours as needed for Wheezing. Rescue    [DISCONTINUED] albuterol (PROVENTIL/VENTOLIN HFA) 90 mcg/actuation inhaler Inhale 1-2 puffs into the lungs every 6 (six) hours as needed for Wheezing. Rescue    ondansetron (ZOFRAN-ODT) 4 MG TbDL Take 2 tablets (8 mg total) by mouth 2 (two) times daily.    [DISCONTINUED] albuterol sulfate 2.5 mg/0.5 mL Nebu Take 2.5 mg by nebulization every 4 (four) hours as needed. Rescue    [DISCONTINUED] albuterol-ipratropium (DUO-NEB) 2.5 mg-0.5 mg/3 mL nebulizer solution Take 3 mLs by nebulization every 4 (four) hours as needed for Wheezing or Shortness of Breath. Rescue     Family History    None       Tobacco Use    Smoking status: Every Day     Packs/day: 0.50     Years: 20.00     Pack years: 10.00     Types: Cigarettes    Smokeless tobacco: Never   Substance and Sexual Activity    Alcohol use: Not Currently    Drug use: Not Currently    Sexual activity: Not Currently     Review of Systems   Constitutional: Negative. Negative for diaphoresis.   HENT: Negative.     Eyes: Negative.    Cardiovascular:  Positive for dyspnea on exertion. Negative for chest pain, irregular heartbeat, leg swelling, near-syncope, orthopnea, paroxysmal nocturnal dyspnea and syncope.   Respiratory:  Positive for cough and shortness of breath.    Endocrine: Negative.    Hematologic/Lymphatic: Negative.    Skin: Negative.    Musculoskeletal: Negative.    Gastrointestinal:  Negative for nausea and vomiting.   Genitourinary: Negative.    Neurological: Negative.    Psychiatric/Behavioral: Negative.     Allergic/Immunologic: Positive for environmental allergies.   Objective:     Vital Signs (Most Recent):  Temp: 96.9 °F (36.1 °C) (02/14/23  0852)  Pulse: 77 (02/14/23 0948)  Resp: 20 (02/14/23 0948)  BP: (!) 103/52 (02/14/23 0852)  SpO2: 99 % (02/14/23 0948)   Vital Signs (24h Range):  Temp:  [96.8 °F (36 °C)-98.5 °F (36.9 °C)] 96.9 °F (36.1 °C)  Pulse:  [] 77  Resp:  [14-20] 20  SpO2:  [97 %-100 %] 99 %  BP: (103-126)/(52-68) 103/52     Weight: 66.2 kg (146 lb)  Body mass index is 29.49 kg/m².    SpO2: 99 %       No intake or output data in the 24 hours ending 02/14/23 1052    Lines/Drains/Airways       Peripheral Intravenous Line  Duration                  Peripheral IV - Single Lumen 02/11/23 1716 22 G Left Hand 2 days                    Physical Exam  Constitutional:       General: She is not in acute distress.     Appearance: She is not diaphoretic.   HENT:      Head: Atraumatic.   Eyes:      General:         Right eye: No discharge.         Left eye: No discharge.   Cardiovascular:      Rate and Rhythm: Normal rate and regular rhythm.      Heart sounds: No murmur heard.    No friction rub. No gallop.   Pulmonary:      Effort: Pulmonary effort is normal.      Breath sounds: Wheezing present. No rales.   Abdominal:      General: Bowel sounds are normal.      Palpations: Abdomen is soft.   Musculoskeletal:      Right lower leg: No edema.      Left lower leg: No edema.   Skin:     General: Skin is warm and dry.   Neurological:      Mental Status: She is alert and oriented to person, place, and time.       Significant Labs: BMP:   Recent Labs   Lab 02/13/23  0619 02/14/23 0428   * 115*    141   K 4.4 4.4    111*   CO2 24 22*   BUN 11 13   CREATININE 0.7 0.7   CALCIUM 9.4 9.2   MG 2.4 2.3   , CMP   Recent Labs   Lab 02/13/23  0619 02/14/23  0428    141   K 4.4 4.4    111*   CO2 24 22*   * 115*   BUN 11 13   CREATININE 0.7 0.7   CALCIUM 9.4 9.2   ANIONGAP 8 8   , CBC   Recent Labs   Lab 02/13/23  0619 02/14/23  0428   WBC 12.27 12.84*   HGB 12.1 11.6*   HCT 36.8* 36.1*    178   , INR No results for  input(s): INR, PROTIME in the last 48 hours., Lipid Panel No results for input(s): CHOL, HDL, LDLCALC, TRIG, CHOLHDL in the last 48 hours., Troponin No results for input(s): TROPONINI in the last 48 hours., and All pertinent lab results from the last 24 hours have been reviewed.    Significant Imaging: Echocardiogram: Transthoracic echo (TTE) complete ([facility] Only):   Results for orders placed or performed during the hospital encounter of 02/11/23   Echo   Result Value Ref Range    BSA 1.66 m2    TDI SEPTAL 0.06 m/s    LV LATERAL E/E' RATIO 12.14 m/s    LV SEPTAL E/E' RATIO 14.17 m/s    LA WIDTH 3.10 cm    Left Ventricular Outflow Tract Mean Velocity 0.95 cm/s    Left Ventricular Outflow Tract Mean Gradient 3.79 mmHg    TDI LATERAL 0.07 m/s    LVIDd 4.21 3.5 - 6.0 cm    IVS 0.81 0.6 - 1.1 cm    Posterior Wall 0.91 0.6 - 1.1 cm    LVIDs 2.72 2.1 - 4.0 cm    FS 35 28 - 44 %    LA volume 38.24 cm3    STJ 2.65 cm    Ascending aorta 2.58 cm    LV mass 112.01 g    LA size 3.57 cm    RVDD 3.15 cm    Left Ventricle Relative Wall Thickness 0.43 cm    AV mean gradient 8 mmHg    AV valve area 2.26 cm2    AV Velocity Ratio 0.71     AV index (prosthetic) 0.78     MV valve area p 1/2 method 5.18 cm2    E/A ratio 1.44     Mean e' 0.07 m/s    E wave deceleration time 146.36 msec    IVRT 97.05 msec    Pulm vein S/D ratio 1.59     LVOT diameter 1.92 cm    LVOT area 2.9 cm2    LVOT peak sudhakar 1.29 m/s    LVOT peak VTI 22.70 cm    Ao peak sudhakar 1.82 m/s    Ao VTI 29.1 cm    LVOT stroke volume 65.69 cm3    AV peak gradient 13 mmHg    E/E' ratio 13.08 m/s    MV Peak E Sudhakar 0.85 m/s    TR Max Sudhakar 2.83 m/s    MV stenosis pressure 1/2 time 42.44 ms    MV Peak A Sudhakar 0.59 m/s    PV Peak S Sudhakar 0.65 m/s    PV Peak D Sudhakar 0.41 m/s    LV Systolic Volume 27.45 mL    LV Systolic Volume Index 17.0 mL/m2    LV Diastolic Volume 79.02 mL    LV Diastolic Volume Index 49.08 mL/m2    LA Volume Index 23.7 mL/m2    LV Mass Index 70 g/m2    RA Major Axis 3.86 cm     Left Atrium Minor Axis 4.03 cm    Left Atrium Major Axis 4.10 cm    Triscuspid Valve Regurgitation Peak Gradient 32 mmHg    LA Volume Index (Mod) 18.5 mL/m2    LA volume (mod) 29.76 cm3    RA Width 2.75 cm    EF 75 %    Narrative    · Concentric remodeling and hyperdynamic systolic function.  · The estimated ejection fraction is 75%.  · Indeterminate left ventricular diastolic function.  · Normal right ventricular size with normal right ventricular systolic   function.        Assessment and Plan:     * Moderate persistent asthma with (acute) exacerbation  Management per primary team  OP pulm appointment up coming     Diastolic dysfunction, left ventricle  TTE  Concentric remodeling and hyperdynamic systolic function.  · The estimated ejection fraction is 75%.  · Indeterminate left ventricular diastolic function.  · Normal right ventricular size with normal right ventricular systolic   Function.    No volume overload on exam  Continue Propanolol as tolerated  No further work up needed from a CV perspective     Obesity  Weight loss encouraged         VTE Risk Mitigation (From admission, onward)         Ordered     IP VTE LOW RISK PATIENT  Once         02/11/23 2158     Place sequential compression device  Until discontinued         02/11/23 2158                Thank you for your consult. I will follow-up with patient. Please contact us if you have any additional questions.    Jon Porter, YOLANDA  Cardiology   [facility] - Telemetry

## 2023-02-14 NOTE — SUBJECTIVE & OBJECTIVE
Past Medical History:   Diagnosis Date    Asthma     Diverticulosis     GERD (gastroesophageal reflux disease)        No past surgical history on file.    Review of patient's allergies indicates:   Allergen Reactions    Levofloxacin Hives and Itching    Metronidazole Hives    Latex        No current facility-administered medications on file prior to encounter.     Current Outpatient Medications on File Prior to Encounter   Medication Sig    budesonide-formoterol 160-4.5 mcg (SYMBICORT) 160-4.5 mcg/actuation HFAA Inhale 2 puffs into the lungs every 12 (twelve) hours. Controller    montelukast (SINGULAIR) 10 mg tablet Take 1 tablet (10 mg total) by mouth once daily at 6am.    omeprazole (PRILOSEC) 40 MG capsule Take 1 capsule (40 mg total) by mouth once daily.    [DISCONTINUED] albuterol (PROVENTIL HFA) 90 mcg/actuation inhaler Inhale 2 puffs into the lungs every 4 (four) hours as needed for Wheezing. Rescue    [DISCONTINUED] albuterol (PROVENTIL/VENTOLIN HFA) 90 mcg/actuation inhaler Inhale 1-2 puffs into the lungs every 6 (six) hours as needed for Wheezing. Rescue    ondansetron (ZOFRAN-ODT) 4 MG TbDL Take 2 tablets (8 mg total) by mouth 2 (two) times daily.    [DISCONTINUED] albuterol sulfate 2.5 mg/0.5 mL Nebu Take 2.5 mg by nebulization every 4 (four) hours as needed. Rescue    [DISCONTINUED] albuterol-ipratropium (DUO-NEB) 2.5 mg-0.5 mg/3 mL nebulizer solution Take 3 mLs by nebulization every 4 (four) hours as needed for Wheezing or Shortness of Breath. Rescue     Family History    None       Tobacco Use    Smoking status: Every Day     Packs/day: 0.50     Years: 20.00     Pack years: 10.00     Types: Cigarettes    Smokeless tobacco: Never   Substance and Sexual Activity    Alcohol use: Not Currently    Drug use: Not Currently    Sexual activity: Not Currently     Review of Systems   Constitutional: Negative. Negative for diaphoresis.   HENT: Negative.     Eyes: Negative.    Cardiovascular:  Positive for dyspnea  on exertion. Negative for chest pain, irregular heartbeat, leg swelling, near-syncope, orthopnea, paroxysmal nocturnal dyspnea and syncope.   Respiratory:  Positive for cough and shortness of breath.    Endocrine: Negative.    Hematologic/Lymphatic: Negative.    Skin: Negative.    Musculoskeletal: Negative.    Gastrointestinal:  Negative for nausea and vomiting.   Genitourinary: Negative.    Neurological: Negative.    Psychiatric/Behavioral: Negative.     Allergic/Immunologic: Positive for environmental allergies.   Objective:     Vital Signs (Most Recent):  Temp: 96.9 °F (36.1 °C) (02/14/23 0852)  Pulse: 77 (02/14/23 0948)  Resp: 20 (02/14/23 0948)  BP: (!) 103/52 (02/14/23 0852)  SpO2: 99 % (02/14/23 0948)   Vital Signs (24h Range):  Temp:  [96.8 °F (36 °C)-98.5 °F (36.9 °C)] 96.9 °F (36.1 °C)  Pulse:  [] 77  Resp:  [14-20] 20  SpO2:  [97 %-100 %] 99 %  BP: (103-126)/(52-68) 103/52     Weight: 66.2 kg (146 lb)  Body mass index is 29.49 kg/m².    SpO2: 99 %       No intake or output data in the 24 hours ending 02/14/23 1052    Lines/Drains/Airways       Peripheral Intravenous Line  Duration                  Peripheral IV - Single Lumen 02/11/23 1716 22 G Left Hand 2 days                    Physical Exam  Constitutional:       General: She is not in acute distress.     Appearance: She is not diaphoretic.   HENT:      Head: Atraumatic.   Eyes:      General:         Right eye: No discharge.         Left eye: No discharge.   Cardiovascular:      Rate and Rhythm: Normal rate and regular rhythm.      Heart sounds: No murmur heard.    No friction rub. No gallop.   Pulmonary:      Effort: Pulmonary effort is normal.      Breath sounds: Wheezing present. No rales.   Abdominal:      General: Bowel sounds are normal.      Palpations: Abdomen is soft.   Musculoskeletal:      Right lower leg: No edema.      Left lower leg: No edema.   Skin:     General: Skin is warm and dry.   Neurological:      Mental Status: She is  alert and oriented to person, place, and time.       Significant Labs: BMP:   Recent Labs   Lab 02/13/23  0619 02/14/23 0428   * 115*    141   K 4.4 4.4    111*   CO2 24 22*   BUN 11 13   CREATININE 0.7 0.7   CALCIUM 9.4 9.2   MG 2.4 2.3   , CMP   Recent Labs   Lab 02/13/23  0619 02/14/23  0428    141   K 4.4 4.4    111*   CO2 24 22*   * 115*   BUN 11 13   CREATININE 0.7 0.7   CALCIUM 9.4 9.2   ANIONGAP 8 8   , CBC   Recent Labs   Lab 02/13/23  0619 02/14/23 0428   WBC 12.27 12.84*   HGB 12.1 11.6*   HCT 36.8* 36.1*    178   , INR No results for input(s): INR, PROTIME in the last 48 hours., Lipid Panel No results for input(s): CHOL, HDL, LDLCALC, TRIG, CHOLHDL in the last 48 hours., Troponin No results for input(s): TROPONINI in the last 48 hours., and All pertinent lab results from the last 24 hours have been reviewed.    Significant Imaging: Echocardiogram: Transthoracic echo (TTE) complete (Cupid Only):   Results for orders placed or performed during the hospital encounter of 02/11/23   Echo   Result Value Ref Range    BSA 1.66 m2    TDI SEPTAL 0.06 m/s    LV LATERAL E/E' RATIO 12.14 m/s    LV SEPTAL E/E' RATIO 14.17 m/s    LA WIDTH 3.10 cm    Left Ventricular Outflow Tract Mean Velocity 0.95 cm/s    Left Ventricular Outflow Tract Mean Gradient 3.79 mmHg    TDI LATERAL 0.07 m/s    LVIDd 4.21 3.5 - 6.0 cm    IVS 0.81 0.6 - 1.1 cm    Posterior Wall 0.91 0.6 - 1.1 cm    LVIDs 2.72 2.1 - 4.0 cm    FS 35 28 - 44 %    LA volume 38.24 cm3    STJ 2.65 cm    Ascending aorta 2.58 cm    LV mass 112.01 g    LA size 3.57 cm    RVDD 3.15 cm    Left Ventricle Relative Wall Thickness 0.43 cm    AV mean gradient 8 mmHg    AV valve area 2.26 cm2    AV Velocity Ratio 0.71     AV index (prosthetic) 0.78     MV valve area p 1/2 method 5.18 cm2    E/A ratio 1.44     Mean e' 0.07 m/s    E wave deceleration time 146.36 msec    IVRT 97.05 msec    Pulm vein S/D ratio 1.59     LVOT diameter  1.92 cm    LVOT area 2.9 cm2    LVOT peak sudhakar 1.29 m/s    LVOT peak VTI 22.70 cm    Ao peak sudhakar 1.82 m/s    Ao VTI 29.1 cm    LVOT stroke volume 65.69 cm3    AV peak gradient 13 mmHg    E/E' ratio 13.08 m/s    MV Peak E Sudhakar 0.85 m/s    TR Max Sudhakar 2.83 m/s    MV stenosis pressure 1/2 time 42.44 ms    MV Peak A Sudhakar 0.59 m/s    PV Peak S Sudhakar 0.65 m/s    PV Peak D Sudhakar 0.41 m/s    LV Systolic Volume 27.45 mL    LV Systolic Volume Index 17.0 mL/m2    LV Diastolic Volume 79.02 mL    LV Diastolic Volume Index 49.08 mL/m2    LA Volume Index 23.7 mL/m2    LV Mass Index 70 g/m2    RA Major Axis 3.86 cm    Left Atrium Minor Axis 4.03 cm    Left Atrium Major Axis 4.10 cm    Triscuspid Valve Regurgitation Peak Gradient 32 mmHg    LA Volume Index (Mod) 18.5 mL/m2    LA volume (mod) 29.76 cm3    RA Width 2.75 cm    EF 75 %    Narrative    · Concentric remodeling and hyperdynamic systolic function.  · The estimated ejection fraction is 75%.  · Indeterminate left ventricular diastolic function.  · Normal right ventricular size with normal right ventricular systolic   function.

## 2023-02-14 NOTE — PLAN OF CARE
Pt on RA with documented sats. The proper method of use, as well as anticipated side effects, of this aerosol treatment are discussed and demonstrated to the patient.  The proper method of use, as well as anticipated side effects, of this metered-dose inhaler are discussed and demonstrated to the patient. Will continue to monitor.

## 2023-02-14 NOTE — PLAN OF CARE
Discharge orders noted. AVS prepared with medication list, importance of medication compliance, follow up appointments, diet, home care instructions, treatment plan, self management, and when to seek medical attention. Detailed clinical reference list attached. AVS printed and handed to patient by bedside nurse. VN reviewed discharge instructions with patient using teachback method.  Allowed time for questions, all questions answered.  Patient verbalized complete understanding of discharge instructions and voices no concerns.      Discharge instructions complete.  Bedside delivery complete.  Pt waiting on transport, bedside nurse notified.

## 2023-02-14 NOTE — PLAN OF CARE
02/14/23 0904   Post-Acute Status   Post-Acute Authorization Other   Other Status Awaiting f/u Appts  (No barrier to DC)     Ambulatory referral[facility]consult to [facility] Complete by: Feb 20, 2023Ambulatory referral/consult to Pulmonology Complete by: Feb 20, 2023What is the reason for visit?: AsthmaAmbulatory referral/consult to Smoking Cessation Program Complete by: Feb 20, 2023Referral to Endocrinology Complete by: Feb 20, 2023Primary reason for referral: ThyroidReferral to Gastroenterology Complete by: Feb 20, 2023Location is [place] or [place]: NoWhat is the reason for visit? (If this is for a Colonoscopy - STOP and place a Case Request (NO CLINIC APPT NEEDED): GERD (Acid Reflux); Dysphagia (Difficulty Swallowing)

## 2023-02-14 NOTE — HPI
Niya Moeller is a 52-year-old female with  Asthma, tobacco abuse, diverticulitis and GERD. Patient presented to the ED with shortness of breath x 3 days. She has asthma that is poorly controlled and has a pulm appointment next month. Despite her asthma, she smokes. Patient denies chest pain, diaphoresis, NV, or other cardiac complaint. Her symptoms have improved with medication adjustments.    TTE:   Concentric remodeling and hyperdynamic systolic function.  The estimated ejection fraction is 75%.  Indeterminate left ventricular diastolic function.  Normal right ventricular size with normal right ventricular systolic function.

## 2023-02-14 NOTE — PLAN OF CARE
SW met with pt at bedside to complete final d/c assessment. Pt requesting that she will need a transport [place] her [place] [address] [place] [place] [address]. SW requested wc van for 10:30am. SW requested f/u appt. Rounds completed on pt. All questions addressed. Bedside nurse to discuss d/c medications. Discussed importance to attend all f/u appts and take medications as prescribed. Verbalized understanding. Case Management to sign off.     JHON Rodriguez  [phone]     02/14/23 0946   Final Note   Assessment Type Final Discharge Note   Anticipated Discharge Disposition Home   What phone number can be called within the next 1-3 days to see how you are doing after discharge? [phone]   Hospital Resources/Appts/Education Provided Appointments scheduled and added to AVS   Post-Acute Status   Discharge Delays None known at this time

## 2023-02-14 NOTE — TELEPHONE ENCOUNTER
----- Message from Estrella Brown MD sent at 2/14/2023  4:25 PM CST -----  Regarding: RE: Pulm f/u  Can we add this patient in the next month as an extra patient please Pam.     Estrella  ----- Message -----  From: Jessie Short MD  Sent: 2/14/2023  12:55 PM CST  To: Estrella Brown MD  Subject: Pulm f/u                                         Hey! This lady we just saw at [place] for asthma, wants to get established with pulmonologist here. Probably her hyperthyroid is factoring in too.    Thanks!  Jessie

## 2023-02-14 NOTE — NURSING
Patient discharging home. All discharge instructions reviewed with VN. IV and telemetry removed, patient tolerated well. Awaiting transport.

## 2023-02-14 NOTE — PLAN OF CARE
"  Problem: Adult Inpatient Plan of Care  Goal: Plan of Care Review  Outcome: Ongoing, Progressing     Problem: Asthma Exacerbation  Goal: Asthma Symptom Relief  Outcome: Ongoing, Progressing     Problem: Gas Exchange Impaired  Goal: Optimal Gas Exchange  Outcome: Ongoing, Progressing     Problem: Adult Inpatient Plan of Care  Goal: Optimal Comfort and Wellbeing  Outcome: Ongoing, Progressing       .Plan of care reviewed with the patient. Scheduled medicines given and the patient tolerated well. Given Melatonin 6 mg for Insomnia. Fall and safety precautions taken and the standard interventions are in place. On Telemetry monitoring with NSR, no true "red alarms' noted, no acute distress reported either in the shift. On Oxygen 2 L and satting well. Advised the patient to call for the assistance. Continued monitoring the patient.   "

## 2023-02-14 NOTE — ASSESSMENT & PLAN NOTE
TTE  Concentric remodeling and hyperdynamic systolic function.  · The estimated ejection fraction is 75%.  · Indeterminate left ventricular diastolic function.  · Normal right ventricular size with normal right ventricular systolic   Function.    No volume overload on exam  Continue Propanolol as tolerated  No further work up needed from a CV perspective

## 2023-02-15 ENCOUNTER — HOSPITAL ENCOUNTER (INPATIENT)
Facility: HOSPITAL | Age: 53
LOS: 4 days | Discharge: LEFT BEFORE DISCHARGE | DRG: 392 | End: 2023-02-19
Attending: EMERGENCY MEDICINE | Admitting: HOSPITALIST
Payer: MEDICAID

## 2023-02-15 DIAGNOSIS — K57.92 DIVERTICULITIS: ICD-10-CM

## 2023-02-15 DIAGNOSIS — R07.9 CHEST PAIN: ICD-10-CM

## 2023-02-15 DIAGNOSIS — K57.20 COLONIC DIVERTICULAR ABSCESS: Primary | ICD-10-CM

## 2023-02-15 LAB
ALBUMIN SERPL BCP-MCNC: 2.9 G/DL (ref 3.5–5.2)
ALP SERPL-CCNC: 59 U/L (ref 55–135)
ALT SERPL W/O P-5'-P-CCNC: 32 U/L (ref 10–44)
ANION GAP SERPL CALC-SCNC: 10 MMOL/L (ref 8–16)
AST SERPL-CCNC: 20 U/L (ref 10–40)
BASOPHILS # BLD AUTO: 0.01 K/UL (ref 0–0.2)
BASOPHILS NFR BLD: 0.1 % (ref 0–1.9)
BILIRUB SERPL-MCNC: 0.7 MG/DL (ref 0.1–1)
BILIRUB UR QL STRIP: NEGATIVE
BUN SERPL-MCNC: 15 MG/DL (ref 6–20)
CALCIUM SERPL-MCNC: 9.3 MG/DL (ref 8.7–10.5)
CHLORIDE SERPL-SCNC: 107 MMOL/L (ref 95–110)
CLARITY UR: CLEAR
CO2 SERPL-SCNC: 24 MMOL/L (ref 23–29)
COLOR UR: YELLOW
CREAT SERPL-MCNC: 0.8 MG/DL (ref 0.5–1.4)
DIFFERENTIAL METHOD: ABNORMAL
EOSINOPHIL # BLD AUTO: 0 K/UL (ref 0–0.5)
EOSINOPHIL NFR BLD: 0.2 % (ref 0–8)
ERYTHROCYTE [DISTWIDTH] IN BLOOD BY AUTOMATED COUNT: 12.1 % (ref 11.5–14.5)
EST. GFR  (NO RACE VARIABLE): >60 ML/MIN/1.73 M^2
GLUCOSE SERPL-MCNC: 77 MG/DL (ref 70–110)
GLUCOSE UR QL STRIP: NEGATIVE
HCT VFR BLD AUTO: 39.1 % (ref 37–48.5)
HGB BLD-MCNC: 12.9 G/DL (ref 12–16)
HGB UR QL STRIP: NEGATIVE
IMM GRANULOCYTES # BLD AUTO: 0.03 K/UL (ref 0–0.04)
IMM GRANULOCYTES NFR BLD AUTO: 0.3 % (ref 0–0.5)
KETONES UR QL STRIP: NEGATIVE
LEUKOCYTE ESTERASE UR QL STRIP: NEGATIVE
LIPASE SERPL-CCNC: 27 U/L (ref 4–60)
LYMPHOCYTES # BLD AUTO: 2.8 K/UL (ref 1–4.8)
LYMPHOCYTES NFR BLD: 23.9 % (ref 18–48)
MCH RBC QN AUTO: 30.1 PG (ref 27–31)
MCHC RBC AUTO-ENTMCNC: 33 G/DL (ref 32–36)
MCV RBC AUTO: 91 FL (ref 82–98)
MONOCYTES # BLD AUTO: 1.8 K/UL (ref 0.3–1)
MONOCYTES NFR BLD: 15.2 % (ref 4–15)
NEUTROPHILS # BLD AUTO: 7 K/UL (ref 1.8–7.7)
NEUTROPHILS NFR BLD: 60.3 % (ref 38–73)
NITRITE UR QL STRIP: NEGATIVE
NRBC BLD-RTO: 0 /100 WBC
PH UR STRIP: 6 [PH] (ref 5–8)
PLATELET # BLD AUTO: 194 K/UL (ref 150–450)
PMV BLD AUTO: 11.2 FL (ref 9.2–12.9)
POTASSIUM SERPL-SCNC: 3.7 MMOL/L (ref 3.5–5.1)
PROT SERPL-MCNC: 6.1 G/DL (ref 6–8.4)
PROT UR QL STRIP: NEGATIVE
RBC # BLD AUTO: 4.29 M/UL (ref 4–5.4)
SODIUM SERPL-SCNC: 141 MMOL/L (ref 136–145)
SP GR UR STRIP: 1.01 (ref 1–1.03)
URN SPEC COLLECT METH UR: NORMAL
UROBILINOGEN UR STRIP-ACNC: NEGATIVE EU/DL
WBC # BLD AUTO: 11.59 K/UL (ref 3.9–12.7)

## 2023-02-15 PROCEDURE — 99285 EMERGENCY DEPT VISIT HI MDM: CPT | Mod: 25

## 2023-02-15 PROCEDURE — 25000003 PHARM REV CODE 250: Performed by: HOSPITALIST

## 2023-02-15 PROCEDURE — 81003 URINALYSIS AUTO W/O SCOPE: CPT | Performed by: PHYSICIAN ASSISTANT

## 2023-02-15 PROCEDURE — G0378 HOSPITAL OBSERVATION PER HR: HCPCS

## 2023-02-15 PROCEDURE — 25000242 PHARM REV CODE 250 ALT 637 W/ HCPCS: Performed by: STUDENT IN AN ORGANIZED HEALTH CARE EDUCATION/TRAINING PROGRAM

## 2023-02-15 PROCEDURE — 80053 COMPREHEN METABOLIC PANEL: CPT | Performed by: PHYSICIAN ASSISTANT

## 2023-02-15 PROCEDURE — 96375 TX/PRO/DX INJ NEW DRUG ADDON: CPT

## 2023-02-15 PROCEDURE — 94640 AIRWAY INHALATION TREATMENT: CPT

## 2023-02-15 PROCEDURE — 63600175 PHARM REV CODE 636 W HCPCS: Performed by: EMERGENCY MEDICINE

## 2023-02-15 PROCEDURE — 25500020 PHARM REV CODE 255: Performed by: EMERGENCY MEDICINE

## 2023-02-15 PROCEDURE — 96374 THER/PROPH/DIAG INJ IV PUSH: CPT

## 2023-02-15 PROCEDURE — 25000003 PHARM REV CODE 250: Performed by: EMERGENCY MEDICINE

## 2023-02-15 PROCEDURE — 63600175 PHARM REV CODE 636 W HCPCS: Performed by: STUDENT IN AN ORGANIZED HEALTH CARE EDUCATION/TRAINING PROGRAM

## 2023-02-15 PROCEDURE — 11000001 HC ACUTE MED/SURG PRIVATE ROOM

## 2023-02-15 PROCEDURE — 85025 COMPLETE CBC W/AUTO DIFF WBC: CPT | Performed by: PHYSICIAN ASSISTANT

## 2023-02-15 PROCEDURE — 83690 ASSAY OF LIPASE: CPT | Performed by: PHYSICIAN ASSISTANT

## 2023-02-15 RX ORDER — LEVOFLOXACIN 750 MG/1
750 TABLET ORAL
COMMUNITY
Start: 2023-01-15 | End: 2023-02-15

## 2023-02-15 RX ORDER — PROPRANOLOL HYDROCHLORIDE 20 MG/1
60 TABLET ORAL 2 TIMES DAILY
Status: DISCONTINUED | OUTPATIENT
Start: 2023-02-15 | End: 2023-02-15

## 2023-02-15 RX ORDER — FENTANYL CITRATE 50 UG/ML
50 INJECTION, SOLUTION INTRAMUSCULAR; INTRAVENOUS
Status: COMPLETED | OUTPATIENT
Start: 2023-02-15 | End: 2023-02-15

## 2023-02-15 RX ORDER — ALBUTEROL SULFATE 90 UG/1
2 AEROSOL, METERED RESPIRATORY (INHALATION) EVERY 6 HOURS PRN
COMMUNITY
Start: 2022-11-23 | End: 2023-02-15

## 2023-02-15 RX ORDER — ALBUTEROL SULFATE 0.83 MG/ML
2.5 SOLUTION RESPIRATORY (INHALATION)
COMMUNITY
Start: 2022-04-17 | End: 2023-02-15

## 2023-02-15 RX ORDER — IPRATROPIUM BROMIDE AND ALBUTEROL SULFATE 2.5; .5 MG/3ML; MG/3ML
3 SOLUTION RESPIRATORY (INHALATION) EVERY 4 HOURS PRN
Status: ON HOLD | COMMUNITY
Start: 2023-01-09 | End: 2023-03-15 | Stop reason: SDUPTHER

## 2023-02-15 RX ORDER — CETIRIZINE HYDROCHLORIDE 10 MG/1
10 TABLET ORAL DAILY
Status: DISCONTINUED | OUTPATIENT
Start: 2023-02-16 | End: 2023-02-19 | Stop reason: HOSPADM

## 2023-02-15 RX ORDER — KETOROLAC TROMETHAMINE 30 MG/ML
15 INJECTION, SOLUTION INTRAMUSCULAR; INTRAVENOUS
Status: COMPLETED | OUTPATIENT
Start: 2023-02-15 | End: 2023-02-15

## 2023-02-15 RX ORDER — IBUPROFEN 200 MG
1 TABLET ORAL
COMMUNITY
Start: 2022-10-24 | End: 2023-02-15

## 2023-02-15 RX ORDER — GLUCAGON 1 MG
1 KIT INJECTION
Status: DISCONTINUED | OUTPATIENT
Start: 2023-02-15 | End: 2023-02-19 | Stop reason: HOSPADM

## 2023-02-15 RX ORDER — PANTOPRAZOLE SODIUM 40 MG/1
40 TABLET, DELAYED RELEASE ORAL DAILY
Status: DISCONTINUED | OUTPATIENT
Start: 2023-02-16 | End: 2023-02-19 | Stop reason: HOSPADM

## 2023-02-15 RX ORDER — ALBUTEROL SULFATE 0.83 MG/ML
SOLUTION RESPIRATORY (INHALATION)
COMMUNITY
Start: 2023-02-14 | End: 2023-02-15

## 2023-02-15 RX ORDER — DICYCLOMINE HYDROCHLORIDE 10 MG/1
10 CAPSULE ORAL 3 TIMES DAILY
COMMUNITY
Start: 2023-01-20 | End: 2023-02-15

## 2023-02-15 RX ORDER — IBUPROFEN 200 MG
TABLET ORAL
COMMUNITY
Start: 2022-10-17 | End: 2023-02-15

## 2023-02-15 RX ORDER — PROPRANOLOL HYDROCHLORIDE 10 MG/1
60 TABLET ORAL 2 TIMES DAILY
Status: DISCONTINUED | OUTPATIENT
Start: 2023-02-15 | End: 2023-02-19 | Stop reason: HOSPADM

## 2023-02-15 RX ORDER — AZITHROMYCIN 250 MG/1
TABLET, FILM COATED ORAL
COMMUNITY
Start: 2023-01-12 | End: 2023-02-15

## 2023-02-15 RX ORDER — CETIRIZINE HYDROCHLORIDE 10 MG/1
10 TABLET ORAL DAILY
Status: ON HOLD | COMMUNITY
Start: 2022-11-14 | End: 2023-03-15 | Stop reason: HOSPADM

## 2023-02-15 RX ORDER — NYSTATIN 100000 [USP'U]/ML
SUSPENSION ORAL
COMMUNITY
Start: 2022-08-22 | End: 2023-02-15

## 2023-02-15 RX ORDER — OMEPRAZOLE 20 MG/1
20 CAPSULE, DELAYED RELEASE ORAL 2 TIMES DAILY
COMMUNITY
Start: 2022-11-04 | End: 2023-02-15

## 2023-02-15 RX ORDER — MONTELUKAST SODIUM 10 MG/1
10 TABLET ORAL DAILY
COMMUNITY
Start: 2022-10-17 | End: 2023-10-17

## 2023-02-15 RX ORDER — FLUTICASONE PROPIONATE 50 MCG
1 SPRAY, SUSPENSION (ML) NASAL DAILY PRN
Status: DISCONTINUED | OUTPATIENT
Start: 2023-02-15 | End: 2023-02-19 | Stop reason: HOSPADM

## 2023-02-15 RX ORDER — PROCHLORPERAZINE EDISYLATE 5 MG/ML
5 INJECTION INTRAMUSCULAR; INTRAVENOUS EVERY 6 HOURS PRN
Status: DISCONTINUED | OUTPATIENT
Start: 2023-02-15 | End: 2023-02-19 | Stop reason: HOSPADM

## 2023-02-15 RX ORDER — PIPERACILLIN SODIUM, TAZOBACTAM SODIUM 36; 4.5 G/152ML; G/152ML
INJECTION, POWDER, LYOPHILIZED, FOR SOLUTION INTRAVENOUS
COMMUNITY
Start: 2022-12-15 | End: 2023-02-15

## 2023-02-15 RX ORDER — AMOXICILLIN 250 MG
1 CAPSULE ORAL
COMMUNITY
Start: 2023-02-03 | End: 2023-02-15

## 2023-02-15 RX ORDER — IBUPROFEN 200 MG
16 TABLET ORAL
Status: DISCONTINUED | OUTPATIENT
Start: 2023-02-15 | End: 2023-02-19 | Stop reason: HOSPADM

## 2023-02-15 RX ORDER — TALC
6 POWDER (GRAM) TOPICAL NIGHTLY PRN
Status: DISCONTINUED | OUTPATIENT
Start: 2023-02-15 | End: 2023-02-19 | Stop reason: HOSPADM

## 2023-02-15 RX ORDER — AMOXICILLIN AND CLAVULANATE POTASSIUM 875; 125 MG/1; MG/1
1 TABLET, FILM COATED ORAL 2 TIMES DAILY
Status: ON HOLD | COMMUNITY
Start: 2023-02-03 | End: 2023-02-19 | Stop reason: SDUPTHER

## 2023-02-15 RX ORDER — IPRATROPIUM BROMIDE AND ALBUTEROL SULFATE 2.5; .5 MG/3ML; MG/3ML
3 SOLUTION RESPIRATORY (INHALATION) EVERY 4 HOURS PRN
Status: DISCONTINUED | OUTPATIENT
Start: 2023-02-15 | End: 2023-02-19 | Stop reason: HOSPADM

## 2023-02-15 RX ORDER — LORATADINE 10 MG/1
TABLET ORAL
COMMUNITY
Start: 2022-09-17 | End: 2023-02-15

## 2023-02-15 RX ORDER — SIMETHICONE 80 MG
1 TABLET,CHEWABLE ORAL 4 TIMES DAILY PRN
Status: DISCONTINUED | OUTPATIENT
Start: 2023-02-15 | End: 2023-02-19 | Stop reason: HOSPADM

## 2023-02-15 RX ORDER — DICYCLOMINE HYDROCHLORIDE 20 MG/1
20 TABLET ORAL 2 TIMES DAILY PRN
COMMUNITY
Start: 2022-08-22 | End: 2023-02-15

## 2023-02-15 RX ORDER — MONTELUKAST SODIUM 10 MG/1
10 TABLET ORAL DAILY
Status: DISCONTINUED | OUTPATIENT
Start: 2023-02-16 | End: 2023-02-19 | Stop reason: HOSPADM

## 2023-02-15 RX ORDER — PREDNISONE 50 MG/1
TABLET ORAL
COMMUNITY
Start: 2022-10-15 | End: 2023-02-15

## 2023-02-15 RX ORDER — MORPHINE SULFATE 2 MG/ML
2 INJECTION, SOLUTION INTRAMUSCULAR; INTRAVENOUS EVERY 4 HOURS PRN
Status: DISCONTINUED | OUTPATIENT
Start: 2023-02-15 | End: 2023-02-18

## 2023-02-15 RX ORDER — OXYCODONE AND ACETAMINOPHEN 10; 325 MG/1; MG/1
1 TABLET ORAL EVERY 8 HOURS PRN
COMMUNITY
Start: 2022-10-18 | End: 2023-02-15

## 2023-02-15 RX ORDER — IPRATROPIUM BROMIDE 0.5 MG/2.5ML
SOLUTION RESPIRATORY (INHALATION)
COMMUNITY
Start: 2022-09-17 | End: 2023-02-15

## 2023-02-15 RX ORDER — NALOXONE HCL 0.4 MG/ML
0.02 VIAL (ML) INJECTION
Status: DISCONTINUED | OUTPATIENT
Start: 2023-02-15 | End: 2023-02-19 | Stop reason: HOSPADM

## 2023-02-15 RX ORDER — FLUTICASONE PROPIONATE 50 MCG
1 SPRAY, SUSPENSION (ML) NASAL DAILY PRN
Status: ON HOLD | COMMUNITY
Start: 2022-11-23 | End: 2023-05-01 | Stop reason: SDUPTHER

## 2023-02-15 RX ORDER — CIPROFLOXACIN 500 MG/1
500 TABLET ORAL 2 TIMES DAILY
Status: ON HOLD | COMMUNITY
Start: 2023-02-03 | End: 2023-02-19 | Stop reason: SDUPTHER

## 2023-02-15 RX ORDER — PROCHLORPERAZINE EDISYLATE 5 MG/ML
2.5 INJECTION INTRAMUSCULAR; INTRAVENOUS ONCE
Status: DISCONTINUED | OUTPATIENT
Start: 2023-02-15 | End: 2023-02-19 | Stop reason: HOSPADM

## 2023-02-15 RX ORDER — IBUPROFEN 200 MG
24 TABLET ORAL
Status: DISCONTINUED | OUTPATIENT
Start: 2023-02-15 | End: 2023-02-19 | Stop reason: HOSPADM

## 2023-02-15 RX ORDER — HYDROMORPHONE HYDROCHLORIDE 1 MG/ML
1 INJECTION, SOLUTION INTRAMUSCULAR; INTRAVENOUS; SUBCUTANEOUS EVERY 4 HOURS PRN
Status: DISCONTINUED | OUTPATIENT
Start: 2023-02-15 | End: 2023-02-18

## 2023-02-15 RX ORDER — PREDNISONE 20 MG/1
40 TABLET ORAL DAILY
Status: COMPLETED | OUTPATIENT
Start: 2023-02-16 | End: 2023-02-19

## 2023-02-15 RX ORDER — FLUCONAZOLE 150 MG/1
150 TABLET ORAL ONCE
COMMUNITY
Start: 2023-02-03 | End: 2023-02-15

## 2023-02-15 RX ORDER — FLUTICASONE PROPIONATE 50 MCG
1 SPRAY, SUSPENSION (ML) NASAL
COMMUNITY
Start: 2022-11-23 | End: 2023-02-15

## 2023-02-15 RX ORDER — HYDROCODONE BITARTRATE AND ACETAMINOPHEN 5; 325 MG/1; MG/1
1 TABLET ORAL EVERY 6 HOURS PRN
COMMUNITY
Start: 2022-12-07 | End: 2023-02-15

## 2023-02-15 RX ORDER — ONDANSETRON 2 MG/ML
4 INJECTION INTRAMUSCULAR; INTRAVENOUS EVERY 8 HOURS PRN
Status: DISCONTINUED | OUTPATIENT
Start: 2023-02-15 | End: 2023-02-19 | Stop reason: HOSPADM

## 2023-02-15 RX ORDER — IPRATROPIUM BROMIDE AND ALBUTEROL SULFATE 2.5; .5 MG/3ML; MG/3ML
3 SOLUTION RESPIRATORY (INHALATION)
Status: DISCONTINUED | OUTPATIENT
Start: 2023-02-15 | End: 2023-02-19 | Stop reason: HOSPADM

## 2023-02-15 RX ORDER — OMEPRAZOLE 40 MG/1
40 CAPSULE, DELAYED RELEASE ORAL DAILY
COMMUNITY
Start: 2022-04-17 | End: 2023-03-08

## 2023-02-15 RX ORDER — DICYCLOMINE HYDROCHLORIDE 10 MG/1
10 CAPSULE ORAL 3 TIMES DAILY PRN
COMMUNITY
Start: 2022-04-16 | End: 2023-02-15

## 2023-02-15 RX ORDER — KETOROLAC TROMETHAMINE 10 MG/1
TABLET, FILM COATED ORAL
COMMUNITY
Start: 2022-09-23 | End: 2023-02-15

## 2023-02-15 RX ORDER — CETIRIZINE HYDROCHLORIDE 10 MG/1
10 TABLET ORAL
COMMUNITY
Start: 2022-04-17 | End: 2023-02-15

## 2023-02-15 RX ORDER — SODIUM CHLORIDE 0.9 % (FLUSH) 0.9 %
10 SYRINGE (ML) INJECTION EVERY 12 HOURS PRN
Status: DISCONTINUED | OUTPATIENT
Start: 2023-02-15 | End: 2023-02-19 | Stop reason: HOSPADM

## 2023-02-15 RX ORDER — ACETAMINOPHEN 325 MG/1
650 TABLET ORAL EVERY 8 HOURS PRN
Status: DISCONTINUED | OUTPATIENT
Start: 2023-02-15 | End: 2023-02-19 | Stop reason: HOSPADM

## 2023-02-15 RX ORDER — TRAMADOL HYDROCHLORIDE 50 MG/1
TABLET ORAL
COMMUNITY
Start: 2022-09-23 | End: 2023-02-15

## 2023-02-15 RX ORDER — PREDNISONE 20 MG/1
40 TABLET ORAL DAILY
Status: DISCONTINUED | OUTPATIENT
Start: 2023-02-16 | End: 2023-02-15

## 2023-02-15 RX ORDER — FLUTICASONE FUROATE AND VILANTEROL 100; 25 UG/1; UG/1
1 POWDER RESPIRATORY (INHALATION) DAILY
Status: DISCONTINUED | OUTPATIENT
Start: 2023-02-16 | End: 2023-02-19 | Stop reason: HOSPADM

## 2023-02-15 RX ADMIN — PIPERACILLIN AND TAZOBACTAM 4.5 G: 4; .5 INJECTION, POWDER, LYOPHILIZED, FOR SOLUTION INTRAVENOUS; PARENTERAL at 05:02

## 2023-02-15 RX ADMIN — KETOROLAC TROMETHAMINE 15 MG: 30 INJECTION, SOLUTION INTRAMUSCULAR; INTRAVENOUS at 03:02

## 2023-02-15 RX ADMIN — PROPRANOLOL HYDROCHLORIDE 60 MG: 10 TABLET ORAL at 09:02

## 2023-02-15 RX ADMIN — FENTANYL CITRATE 50 MCG: 50 INJECTION INTRAMUSCULAR; INTRAVENOUS at 03:02

## 2023-02-15 RX ADMIN — SODIUM CHLORIDE, SODIUM LACTATE, POTASSIUM CHLORIDE, AND CALCIUM CHLORIDE 1000 ML: .6; .31; .03; .02 INJECTION, SOLUTION INTRAVENOUS at 06:02

## 2023-02-15 RX ADMIN — IPRATROPIUM BROMIDE AND ALBUTEROL SULFATE 3 ML: 2.5; .5 SOLUTION RESPIRATORY (INHALATION) at 08:02

## 2023-02-15 RX ADMIN — HYDROMORPHONE HYDROCHLORIDE 1 MG: 1 INJECTION, SOLUTION INTRAMUSCULAR; INTRAVENOUS; SUBCUTANEOUS at 09:02

## 2023-02-15 RX ADMIN — IOHEXOL 75 ML: 350 INJECTION, SOLUTION INTRAVENOUS at 04:02

## 2023-02-15 RX ADMIN — FENTANYL CITRATE 50 MCG: 50 INJECTION INTRAMUSCULAR; INTRAVENOUS at 06:02

## 2023-02-15 NOTE — FIRST PROVIDER EVALUATION
" Emergency Department TeleTriage Encounter Note      CHIEF COMPLAINT    Chief Complaint   Patient presents with    Abdominal Pain     Patient states she has been on antibiotics for diverticulitis which were not continued during a recent admission. States abdominal pain is now severe and it is triggering her asthma as well. Presents awake, alert with audible wheezing and c/o severe abdominal pain. States it feels the same as previous abdominal perforation in the past.        VITAL SIGNS   Initial Vitals [02/15/23 1208]   BP Pulse Resp Temp SpO2   (!) 112/57 86 18 98.4 °F (36.9 °C) 97 %      MAP       --            ALLERGIES    Review of patient's allergies indicates:   Allergen Reactions    Levofloxacin Hives and Itching    Metronidazole Hives    Latex        PROVIDER TRIAGE NOTE  This is a teletriage evaluation of a 52 y.o. female presenting to the ED complaining of abdominal pain. Patient was recently hospitalized for asthma exacerbation and "forgot to tell them she was on antibiotics for diverticulitis." She was discharged yesterday and reports worsening abdominal pain.    Patient is alert and oriented. She speaks in complete sentences. She is sitting upright in the chair, she is crying and states "the pain just hurts."    Initial orders will be placed and care will be transferred to an alternate provider when patient is roomed for a full evaluation. Any additional orders and the final disposition will be determined by that provider.         ORDERS  Labs Reviewed   CBC W/ AUTO DIFFERENTIAL   COMPREHENSIVE METABOLIC PANEL   LIPASE   URINALYSIS, REFLEX TO URINE CULTURE       ED Orders (720h ago, onward)      Start Ordered     Status Ordering Provider    02/15/23 1228 02/15/23 1227  Vital signs  Every 2 hours         Ordered PARKER MORAN    02/15/23 1227 02/15/23 1227  Diet NPO  Diet effective now         Ordered PARKER MORAN    02/15/23 1227 02/15/23 1227  Insert peripheral IV  Once         Ordered LUISA, " PARKER LOPEZ.    02/15/23 1227 02/15/23 1227  CBC W/ AUTO DIFFERENTIAL  STAT         Ordered LUISA, PARKER LOPEZ.    02/15/23 1227 02/15/23 1227  Comp. Metabolic Panel  STAT         Ordered LUISA, PARKER LOPEZ.    02/15/23 1227 02/15/23 1227  Lipase  STAT         Ordered LUISA, PARKER LOPEZ.    02/15/23 1227 02/15/23 1227  Urinalysis, Reflex to Urine Culture Urine, Clean Catch  STAT         Ordered LUISA, PARKER JOHNJihan              Virtual Visit Note: The provider triage portion of this emergency department evaluation and documentation was performed via [employer], a HIPAA-compliant telemedicine application, in concert with a tele-presenter in the room. A face to face patient evaluation with one of my colleagues will occur once the patient is placed in an emergency department room.      DISCLAIMER: This note was prepared with [employer] voice recognition transcription software. Garbled syntax, mangled pronouns, and other bizarre constructions may be attributed to that software system.

## 2023-02-15 NOTE — ED NOTES
Pt presents to ED for abdominal pain, states is being treated for diverticulitis and currently on antibx.    Review of patient's allergies indicates:   Allergen Reactions    Levofloxacin Hives and Itching    Metronidazole Hives    Latex        APPEARANCE: Alert, oriented x 4, and mildly distressed.  NEURO: 5/5 strength major flexors/extensors bilaterally. Sensory intact to light touch bilaterally. Myron coma scale: eyes open spontaneously-4, oriented & converses-5, obeys commands-6. No neurological abnormalities.   CARDIAC: Normal rate and rhythm, S1 and S2 noted, denies CP.   RESPIRATORY:Normal rate and effort, breath sounds clear bilaterally throughout chest anterior and posterior. Respirations are equal and unlabored, no obvious signs of distress. No SOB reported.  PERIPHERAL VASCULAR: +2 peripheral pulses present. Normal cap refill <3sec. No edema. Warm to touch.   GASTRO: +generalized abdominal pain, Abdomen soft, flat, bowel sounds normal and active in all 4 quadrants, no tenderness, no abdominal distention. Denies NVD.  MUSC: Full ROM. No bony tenderness or soft tissue tenderness. No obvious deformity.  SKIN: Skin is warm and dry, normal skin turgor, mucous membranes moist.   GENITOURINARY: Voids spontaneously, denies itching, burning, hematuria.    Patient changed into hospital gown with assistance. Side rails x 2, bed low and locked position, call light in reach and instructed how to use. Pulse ox, and automatic BP cuff applied; alarms set and audible.

## 2023-02-15 NOTE — ED NOTES
Patient upset about plan of care and lack of pain medication administration. Patient called patient advocate from her room to complain about pain meds not being administered. I spoke with patient at length and attempted to discuss the plan of care. Patient continued to speak over me, stating that she is a smart person, educated in [place] state, and that the EDMD did not know anything about how to treat diverticulitis. Through the entire conversation, patient is attempting to make a call to hospital administration to complain that pain is not being addressed. I spoke with Dr. Polk and accompanied her to patient's room. Patient continued to speak over Dr. Polk, stating that she has certain medication protocols for when she has this type of pain and that Toradol and Dilaudid were the only meds that were effective for her pain. Dr. Polk was finally able to get patient to agree to plan of care. Patient made several references to her race and accused ED staff of treating her based on typical patterns for black females and that she was not like that. Medication orders noted and patient agrees to get CT scan.

## 2023-02-15 NOTE — ED PROVIDER NOTES
Emergency Department Encounter  Provider Note  Encounter Date: 2/15/2023    Patient Name: Niya Moeller  MRN: [id number]    History of Present Illness   HPI  History of Present Illness:    Chief Complaint:   Chief Complaint   Patient presents with    Abdominal Pain     Patient states she has been on antibiotics for diverticulitis which were not continued during a recent admission. States abdominal pain is now severe and it is triggering her asthma as well. Presents awake, alert with audible wheezing and c/o severe abdominal pain. States it feels the same as previous abdominal perforation in the past.        52-year-old female with a history asthma, diverticulosis/itis, presenting with left lower quadrant abdominal pain.  Patient states that when she was hospitalized and discharged yesterday for an asthma exacerbation, she did not received antibiotics for diverticulitis.  She is complaining of pain, and wonders if she has an abscess.      The following PMH/PSH/SocHx/FamHx has been reviewed by myself:    Past Medical History:   Diagnosis Date    Asthma     Diverticulosis     GERD (gastroesophageal reflux disease)      No past surgical history on file.  Social History     Tobacco Use    Smoking status: Every Day     Packs/day: 0.50     Years: 20.00     Pack years: 10.00     Types: Cigarettes    Smokeless tobacco: Never   Substance Use Topics    Alcohol use: Not Currently    Drug use: Not Currently     No family history on file.    Allergies reviewed:  Review of patient's allergies indicates:   Allergen Reactions    Levofloxacin Hives and Itching    Metronidazole Hives    Latex        Medications reviewed:  Current Discharge Medication List        CONTINUE these medications which have NOT CHANGED    Details   albuterol-ipratropium (DUO-NEB) 2.5 mg-0.5 mg/3 mL nebulizer solution Inhale 3 mLs into the lungs every 4 (four) hours as needed.      amoxicillin-clavulanate 875-125mg (AUGMENTIN) 875-125 mg per tablet Take 1  tablet by mouth 2 (two) times daily.      budesonide-formoterol 160-4.5 mcg (SYMBICORT) 160-4.5 mcg/actuation HFAA Inhale 2 puffs into the lungs every 12 (twelve) hours. Controller  Qty: 6 g, Refills: 10      cetirizine (ZYRTEC) 10 MG tablet Take 10 mg by mouth once daily.      ciprofloxacin HCl (CIPRO) 500 MG tablet Take 500 mg by mouth 2 (two) times daily.      fluticasone propionate (FLONASE) 50 mcg/actuation nasal spray 1 spray by Nasal route daily as needed.      methIMAzole (TAPAZOLE) 10 MG Tab Take 1 tablet (10 mg total) by mouth 2 (two) times daily.  Qty: 60 tablet, Refills: 0      montelukast (SINGULAIR) 10 mg tablet Take 10 mg by mouth once daily.      omeprazole (PRILOSEC) 40 MG capsule Take 40 mg by mouth once daily.      predniSONE (DELTASONE) 20 MG tablet Take 2 tablets (40 mg total) by mouth once daily. for 6 days  Qty: 12 tablet, Refills: 0      propranoloL (INDERAL LA) 60 MG 24 hr capsule Take 1 capsule (60 mg total) by mouth once daily.  Qty: 30 capsule, Refills: 1    Comments: .      albuterol (PROVENTIL/VENTOLIN HFA) 90 mcg/actuation inhaler Inhale 1-2 puffs into the lungs every 6 (six) hours as needed for Wheezing. Rescue  Qty: 18 g, Refills: 1      ondansetron (ZOFRAN-ODT) 4 MG TbDL Take 2 tablets (8 mg total) by mouth 2 (two) times daily.  Qty: 10 tablet, Refills: 0             ROS  Review of Systems:    Constitutional:  Negative for fever.   HENT:  Negative for sore throat.    Eyes:  Negative for redness.   Respiratory:  Negative for shortness of breath.    Cardiovascular:  Negative for chest pain.   Gastrointestinal:  Negative for nausea.   Genitourinary:  Negative for dysuria.   Musculoskeletal:  Negative for back pain.   Skin:  Negative for rash.   Neurological:  Negative for weakness.   Hematological:  Does not bruise/bleed easily.   Psychiatric/Behavioral:  The patient is not nervous/anxious.      Physical Exam   Physical Exam    Initial Vitals [02/15/23 1208]   BP Pulse Resp Temp SpO2    (!) 112/57 86 18 98.4 °F (36.9 °C) 97 %      MAP       --           Triage vital signs reviewed.    Constitutional: Well-nourished, well-developed. Not in acute distress. Talking on cell phone in NAD.  HENT: Normocephalic, atraumatic. Moist mucous membranes.  Eyes: No conjunctival injection.  Resp: Normal respiratory effort, breathing unlabored.  Cardio: Regular rate and rhythm.  GI: Abdomen non-distended. LLQ pain with light palpation. No RLQ pain.  MSK: Extremities without any deformities noted. No lower extremity edema.  Skin: Warm and dry. No rashes or lesions noted.  Neuro: Awake and alert. Moves all extremities.    ED Course   Procedures    Medical Decision Making    History Acquisition     The history is provided by the patient.     Review of prior external/non ED notes: discharge summary from visit 2/11    Differential Diagnoses   Based on available information and initial assessment, the working differential diagnoses considered during this evaluation include but are not limited to diverticulitis, diverticulosis, appendicitis, uti.    EKG     Labs   Lab tests ordered and independently reviewed by me:    Labs Reviewed   CBC W/ AUTO DIFFERENTIAL - Abnormal; Notable for the following components:       Result Value    Mono # 1.8 (*)     Mono % 15.2 (*)     All other components within normal limits   COMPREHENSIVE METABOLIC PANEL - Abnormal; Notable for the following components:    Albumin 2.9 (*)     All other components within normal limits   LIPASE   URINALYSIS, REFLEX TO URINE CULTURE    Narrative:     Specimen Source->Urine     Cbc, cmp, lipase, ua reviewed and unremarkable     Imaging   Imaging ordered and independently reviewed by me:   Imaging Results               CT Abdomen Pelvis With Contrast (Final result)  Result time 02/15/23 17:18:35      Final result by Caleb Miles MD (02/15/23 17:18:35)                   Impression:      This report was flagged in Epic as abnormal.    1. Findings most  consistent diverticulitis noting adjacent abscess or contained perforation.  Please note, this collection abuts the adjacent pelvic wall, early involvement of the same is not excluded.  Correlation and follow-up is advised.  2. Given the degree of colonic wall thickening, follow-up colonoscopy is recommended to exclude underlying mass.  3. Findings suggesting hepatic steatosis, correlation with LFTs recommended.  4. Please see above for several additional findings.      Electronically signed by: Caleb Miles MD  Date:    02/15/2023  Time:    17:18               Narrative:    EXAMINATION:  CT ABDOMEN PELVIS WITH CONTRAST    CLINICAL HISTORY:  LLQ abdominal pain;    TECHNIQUE:  Low dose axial images, sagittal and coronal reformations were obtained from the lung bases to the pubic symphysis following the IV administration of 75 mL of Omnipaque 350 .  Oral contrast was not given.    COMPARISON:  12/25/2022    FINDINGS:  Images of the lower thorax are remarkable for a 2 mm pulmonary nodule along the periphery of the left lower lobe.    The liver is slightly hypoattenuating, possibly reflecting steatosis, correlation with LFTs recommended.  The spleen, pancreas, gallbladder and adrenal glands are unremarkable.  The portal vein, splenic vein, SMV, celiac axis and SMA all are patent.  The stomach is decompressed without wall thickening.  No significant abdominal lymphadenopathy.    The kidneys enhance symmetrically without hydronephrosis or convincing nephrolithiasis.  The bilateral ureters are unable to be followed in their entirety to the urinary bladder, no definite calculi seen or secondary findings to suggest obstructive uropathy.  The urinary bladder is relatively decompressed noting there may be residual left lateral wall thickening.  The uterus and adnexa are grossly unremarkable.    There is a small amount of fluid in the pelvis.  There are multiple scattered colonic diverticula.  There is diffuse wall  thickening involving the majority of the sigmoid colon in the region of several diverticula most consistent with acute diverticulitis.  There is an air and fluid collection along the left lateral aspect of the affected portion of large bowel measuring approximately 2.7 x 1.8 cm with surrounding induration.  This may be continuous with the sigmoid colon lumen, contained perforation is a differential consideration.  There are scattered diverticula elsewhere along the course of the large bowel without additional regions of inflammation.  The terminal ileum and appendix are unremarkable.  The small bowel is grossly unremarkable.  Several scattered shotty periaortic and paracaval lymph nodes noted.  There are a few scattered prominent lymph nodes in the region of the sigmoid colonic inflammatory process.    There are degenerative changes of the spine.  There is some degree of osteopenia.  No significant inguinal lymphadenopathy.  There is nonspecific induration within the left lateral gluteal soft tissues, may be on the basis of surgical change or injection sites.  There are surgical changes of the anterior abdominal wall.                                       Reviwed radiologist read and note the results     Additional Consideration   Niya Moeller  presents to the emergency Department today with LLQ abd pain. No n/v, fever. Non peritoneal.     Additional testing considered but not indicated during the course of this workup:  Co-morbidities/chronic illness/exacerbation of chronic illness considered which impacted clinical decision making: diverticulosis      The patient's list of active medications and allergies as documented per RN staff has been reviewed.  Medications given in the ED and/or prescribed:   Medications   prochlorperazine injection Soln 2.5 mg (2.5 mg Intravenous Not Given 2/15/23 1500)   sodium chloride 0.9% flush 10 mL (has no administration in time range)   naloxone 0.4 mg/mL injection 0.02 mg (has  no administration in time range)   glucose chewable tablet 16 g (has no administration in time range)   glucose chewable tablet 24 g (has no administration in time range)   glucagon (human recombinant) injection 1 mg (has no administration in time range)   dextrose 10% bolus 125 mL 125 mL (has no administration in time range)   dextrose 10% bolus 250 mL 250 mL (has no administration in time range)   morphine injection 2 mg (2 mg Intravenous Given 2/16/23 0902)   HYDROmorphone injection 1 mg (1 mg Intravenous Given 2/16/23 0556)   ondansetron injection 4 mg (has no administration in time range)   prochlorperazine injection Soln 5 mg (has no administration in time range)   acetaminophen tablet 650 mg (has no administration in time range)   melatonin tablet 6 mg (has no administration in time range)   simethicone chewable tablet 80 mg (has no administration in time range)   fluticasone furoate-vilanteroL 100-25 mcg/dose diskus inhaler 1 puff ( Inhalation Canceled Entry 2/16/23 0900)   cetirizine tablet 10 mg (10 mg Oral Given 2/16/23 0840)   fluticasone propionate 50 mcg/actuation nasal spray 50 mcg (has no administration in time range)   pantoprazole EC tablet 40 mg (40 mg Oral Given 2/16/23 0840)   montelukast tablet 10 mg (10 mg Oral Given 2/16/23 0840)   predniSONE tablet 40 mg (40 mg Oral Given 2/16/23 0840)   piperacillin-tazobactam (ZOSYN) 4.5 g in dextrose 5 % in water (D5W) 5 % 100 mL IVPB (MB+) (4.5 g Intravenous New Bag 2/16/23 0954)   albuterol-ipratropium 2.5 mg-0.5 mg/3 mL nebulizer solution 3 mL (3 mLs Nebulization Given 2/16/23 0037)   albuterol-ipratropium 2.5 mg-0.5 mg/3 mL nebulizer solution 3 mL (3 mLs Nebulization Given 2/16/23 0735)   propranoloL tablet 60 mg (60 mg Oral Given 2/16/23 0839)   ketorolac injection 15 mg (15 mg Intravenous Given 2/15/23 1521)   fentaNYL 50 mcg/mL injection 50 mcg (50 mcg Intravenous Given 2/15/23 1520)   iohexoL (OMNIPAQUE 350) injection 75 mL (75 mLs Intravenous  Given 2/15/23 1611)   piperacillin-tazobactam (ZOSYN) 4.5 g in dextrose 5 % in water (D5W) 5 % 100 mL IVPB (MB+) (0 g Intravenous Stopped 2/15/23 1815)   lactated ringers bolus 1,000 mL (0 mLs Intravenous Stopped 2/15/23 1922)   lactated ringers bolus 1,000 mL (0 mLs Intravenous Stopped 2/15/23 1923)   fentaNYL 50 mcg/mL injection 50 mcg (50 mcg Intravenous Given 2/15/23 1821)                  Explanation of disposition:   Pt awaiting CT. Signed out to Dr Sheets at shift change pending results and disposition. See charge nurse note for discussion regarding IV narcotic pain medications.     Clinical Impression:     1. Diverticulitis    2. Chest pain         ED Disposition Condition    Observation                Hannah Polk MD  02/16/23 1033

## 2023-02-16 ENCOUNTER — TELEPHONE (OUTPATIENT)
Dept: GASTROENTEROLOGY | Facility: CLINIC | Age: 53
End: 2023-02-16
Payer: MEDICAID

## 2023-02-16 ENCOUNTER — CLINICAL SUPPORT (OUTPATIENT)
Dept: SMOKING CESSATION | Facility: CLINIC | Age: 53
End: 2023-02-16

## 2023-02-16 DIAGNOSIS — F17.210 CIGARETTE SMOKER: Primary | ICD-10-CM

## 2023-02-16 DIAGNOSIS — K57.92 DIVERTICULITIS: Primary | ICD-10-CM

## 2023-02-16 PROBLEM — E16.2 HYPOGLYCEMIA: Status: ACTIVE | Noted: 2023-02-16

## 2023-02-16 LAB
ALBUMIN SERPL BCP-MCNC: 2.5 G/DL (ref 3.5–5.2)
ALP SERPL-CCNC: 51 U/L (ref 55–135)
ALT SERPL W/O P-5'-P-CCNC: 33 U/L (ref 10–44)
ANION GAP SERPL CALC-SCNC: 8 MMOL/L (ref 8–16)
AST SERPL-CCNC: 20 U/L (ref 10–40)
BASOPHILS # BLD AUTO: 0.02 K/UL (ref 0–0.2)
BASOPHILS NFR BLD: 0.2 % (ref 0–1.9)
BILIRUB SERPL-MCNC: 0.8 MG/DL (ref 0.1–1)
BUN SERPL-MCNC: 14 MG/DL (ref 6–20)
CALCIUM SERPL-MCNC: 9 MG/DL (ref 8.7–10.5)
CHLORIDE SERPL-SCNC: 105 MMOL/L (ref 95–110)
CO2 SERPL-SCNC: 25 MMOL/L (ref 23–29)
CREAT SERPL-MCNC: 0.7 MG/DL (ref 0.5–1.4)
DIFFERENTIAL METHOD: ABNORMAL
EOSINOPHIL # BLD AUTO: 0.1 K/UL (ref 0–0.5)
EOSINOPHIL NFR BLD: 0.7 % (ref 0–8)
ERYTHROCYTE [DISTWIDTH] IN BLOOD BY AUTOMATED COUNT: 11.9 % (ref 11.5–14.5)
EST. GFR  (NO RACE VARIABLE): >60 ML/MIN/1.73 M^2
GLUCOSE SERPL-MCNC: 59 MG/DL (ref 70–110)
GRAM STN SPEC: NORMAL
GRAM STN SPEC: NORMAL
HCT VFR BLD AUTO: 35.4 % (ref 37–48.5)
HGB BLD-MCNC: 11.7 G/DL (ref 12–16)
IMM GRANULOCYTES # BLD AUTO: 0.04 K/UL (ref 0–0.04)
IMM GRANULOCYTES NFR BLD AUTO: 0.3 % (ref 0–0.5)
LYMPHOCYTES # BLD AUTO: 3.3 K/UL (ref 1–4.8)
LYMPHOCYTES NFR BLD: 27.4 % (ref 18–48)
MCH RBC QN AUTO: 29.8 PG (ref 27–31)
MCHC RBC AUTO-ENTMCNC: 33.1 G/DL (ref 32–36)
MCV RBC AUTO: 90 FL (ref 82–98)
MONOCYTES # BLD AUTO: 1.6 K/UL (ref 0.3–1)
MONOCYTES NFR BLD: 12.9 % (ref 4–15)
NEUTROPHILS # BLD AUTO: 7 K/UL (ref 1.8–7.7)
NEUTROPHILS NFR BLD: 58.5 % (ref 38–73)
NRBC BLD-RTO: 0 /100 WBC
PLATELET # BLD AUTO: 199 K/UL (ref 150–450)
PMV BLD AUTO: 11.1 FL (ref 9.2–12.9)
POCT GLUCOSE: 193 MG/DL (ref 70–110)
POCT GLUCOSE: 197 MG/DL (ref 70–110)
POTASSIUM SERPL-SCNC: 3.8 MMOL/L (ref 3.5–5.1)
PROT SERPL-MCNC: 5.5 G/DL (ref 6–8.4)
RBC # BLD AUTO: 3.93 M/UL (ref 4–5.4)
SODIUM SERPL-SCNC: 138 MMOL/L (ref 136–145)
WBC # BLD AUTO: 11.99 K/UL (ref 3.9–12.7)

## 2023-02-16 PROCEDURE — 87076 CULTURE ANAEROBE IDENT EACH: CPT | Performed by: INTERNAL MEDICINE

## 2023-02-16 PROCEDURE — 25000003 PHARM REV CODE 250: Performed by: INTERNAL MEDICINE

## 2023-02-16 PROCEDURE — 87075 CULTR BACTERIA EXCEPT BLOOD: CPT | Performed by: INTERNAL MEDICINE

## 2023-02-16 PROCEDURE — 11000001 HC ACUTE MED/SURG PRIVATE ROOM

## 2023-02-16 PROCEDURE — 63600175 PHARM REV CODE 636 W HCPCS: Performed by: STUDENT IN AN ORGANIZED HEALTH CARE EDUCATION/TRAINING PROGRAM

## 2023-02-16 PROCEDURE — 80053 COMPREHEN METABOLIC PANEL: CPT | Performed by: STUDENT IN AN ORGANIZED HEALTH CARE EDUCATION/TRAINING PROGRAM

## 2023-02-16 PROCEDURE — 99223 PR INITIAL HOSPITAL CARE,LEVL III: ICD-10-PCS | Mod: ,,, | Performed by: INTERNAL MEDICINE

## 2023-02-16 PROCEDURE — 99223 1ST HOSP IP/OBS HIGH 75: CPT | Mod: ,,, | Performed by: INTERNAL MEDICINE

## 2023-02-16 PROCEDURE — 25000003 PHARM REV CODE 250: Performed by: RADIOLOGY

## 2023-02-16 PROCEDURE — S4991 NICOTINE PATCH NONLEGEND: HCPCS | Performed by: INTERNAL MEDICINE

## 2023-02-16 PROCEDURE — 94761 N-INVAS EAR/PLS OXIMETRY MLT: CPT

## 2023-02-16 PROCEDURE — 25000003 PHARM REV CODE 250: Performed by: STUDENT IN AN ORGANIZED HEALTH CARE EDUCATION/TRAINING PROGRAM

## 2023-02-16 PROCEDURE — 87186 SC STD MICRODIL/AGAR DIL: CPT | Performed by: INTERNAL MEDICINE

## 2023-02-16 PROCEDURE — 85025 COMPLETE CBC W/AUTO DIFF WBC: CPT | Performed by: STUDENT IN AN ORGANIZED HEALTH CARE EDUCATION/TRAINING PROGRAM

## 2023-02-16 PROCEDURE — 25000003 PHARM REV CODE 250: Performed by: HOSPITALIST

## 2023-02-16 PROCEDURE — 87077 CULTURE AEROBIC IDENTIFY: CPT | Performed by: INTERNAL MEDICINE

## 2023-02-16 PROCEDURE — 94640 AIRWAY INHALATION TREATMENT: CPT | Mod: XB

## 2023-02-16 PROCEDURE — 99900035 HC TECH TIME PER 15 MIN (STAT)

## 2023-02-16 PROCEDURE — 63600175 PHARM REV CODE 636 W HCPCS: Performed by: RADIOLOGY

## 2023-02-16 PROCEDURE — 99406 PT REFUSED TOBACCO CESSATION: ICD-10-PCS | Mod: S$GLB,,,

## 2023-02-16 PROCEDURE — 94640 AIRWAY INHALATION TREATMENT: CPT

## 2023-02-16 PROCEDURE — 87102 FUNGUS ISOLATION CULTURE: CPT | Performed by: INTERNAL MEDICINE

## 2023-02-16 PROCEDURE — 87205 SMEAR GRAM STAIN: CPT | Performed by: INTERNAL MEDICINE

## 2023-02-16 PROCEDURE — 99406 BEHAV CHNG SMOKING 3-10 MIN: CPT | Mod: S$GLB,,,

## 2023-02-16 PROCEDURE — 25000242 PHARM REV CODE 250 ALT 637 W/ HCPCS: Performed by: STUDENT IN AN ORGANIZED HEALTH CARE EDUCATION/TRAINING PROGRAM

## 2023-02-16 PROCEDURE — 87070 CULTURE OTHR SPECIMN AEROBIC: CPT | Performed by: INTERNAL MEDICINE

## 2023-02-16 PROCEDURE — 36415 COLL VENOUS BLD VENIPUNCTURE: CPT | Performed by: STUDENT IN AN ORGANIZED HEALTH CARE EDUCATION/TRAINING PROGRAM

## 2023-02-16 RX ORDER — IBUPROFEN 200 MG
1 TABLET ORAL DAILY
Status: DISCONTINUED | OUTPATIENT
Start: 2023-02-16 | End: 2023-02-19 | Stop reason: HOSPADM

## 2023-02-16 RX ORDER — FENTANYL CITRATE 50 UG/ML
INJECTION, SOLUTION INTRAMUSCULAR; INTRAVENOUS
Status: COMPLETED | OUTPATIENT
Start: 2023-02-16 | End: 2023-02-16

## 2023-02-16 RX ORDER — SODIUM CHLORIDE 9 MG/ML
INJECTION, SOLUTION INTRAVENOUS
Status: COMPLETED | OUTPATIENT
Start: 2023-02-16 | End: 2023-02-16

## 2023-02-16 RX ORDER — SODIUM, POTASSIUM,MAG SULFATES 17.5-3.13G
1 SOLUTION, RECONSTITUTED, ORAL ORAL DAILY
Qty: 1 KIT | Refills: 0 | Status: SHIPPED | OUTPATIENT
Start: 2023-02-16 | End: 2023-02-17

## 2023-02-16 RX ORDER — LIDOCAINE HYDROCHLORIDE 10 MG/ML
INJECTION INFILTRATION; PERINEURAL
Status: COMPLETED | OUTPATIENT
Start: 2023-02-16 | End: 2023-02-16

## 2023-02-16 RX ADMIN — HYDROMORPHONE HYDROCHLORIDE 1 MG: 1 INJECTION, SOLUTION INTRAMUSCULAR; INTRAVENOUS; SUBCUTANEOUS at 03:02

## 2023-02-16 RX ADMIN — IPRATROPIUM BROMIDE AND ALBUTEROL SULFATE 3 ML: 2.5; .5 SOLUTION RESPIRATORY (INHALATION) at 01:02

## 2023-02-16 RX ADMIN — LIDOCAINE HYDROCHLORIDE 5 ML: 10 INJECTION, SOLUTION INFILTRATION; PERINEURAL at 12:02

## 2023-02-16 RX ADMIN — IPRATROPIUM BROMIDE AND ALBUTEROL SULFATE 3 ML: 2.5; .5 SOLUTION RESPIRATORY (INHALATION) at 07:02

## 2023-02-16 RX ADMIN — PIPERACILLIN AND TAZOBACTAM 4.5 G: 4; .5 INJECTION, POWDER, LYOPHILIZED, FOR SOLUTION INTRAVENOUS; PARENTERAL at 05:02

## 2023-02-16 RX ADMIN — NICOTINE 1 PATCH: 21 PATCH, EXTENDED RELEASE TRANSDERMAL at 02:02

## 2023-02-16 RX ADMIN — HYDROMORPHONE HYDROCHLORIDE 1 MG: 1 INJECTION, SOLUTION INTRAMUSCULAR; INTRAVENOUS; SUBCUTANEOUS at 01:02

## 2023-02-16 RX ADMIN — SODIUM CHLORIDE 500 ML: 0.9 INJECTION, SOLUTION INTRAVENOUS at 12:02

## 2023-02-16 RX ADMIN — IPRATROPIUM BROMIDE AND ALBUTEROL SULFATE 3 ML: .5; 3 SOLUTION RESPIRATORY (INHALATION) at 12:02

## 2023-02-16 RX ADMIN — MONTELUKAST 10 MG: 10 TABLET, FILM COATED ORAL at 08:02

## 2023-02-16 RX ADMIN — MORPHINE SULFATE 2 MG: 2 INJECTION, SOLUTION INTRAMUSCULAR; INTRAVENOUS at 10:02

## 2023-02-16 RX ADMIN — FENTANYL CITRATE 50 MCG: 50 INJECTION INTRAMUSCULAR; INTRAVENOUS at 12:02

## 2023-02-16 RX ADMIN — CETIRIZINE HYDROCHLORIDE 10 MG: 10 TABLET, FILM COATED ORAL at 08:02

## 2023-02-16 RX ADMIN — PREDNISONE 40 MG: 20 TABLET ORAL at 08:02

## 2023-02-16 RX ADMIN — FLUTICASONE FUROATE AND VILANTEROL TRIFENATATE 1 PUFF: 100; 25 POWDER RESPIRATORY (INHALATION) at 07:02

## 2023-02-16 RX ADMIN — PROPRANOLOL HYDROCHLORIDE 60 MG: 10 TABLET ORAL at 08:02

## 2023-02-16 RX ADMIN — HYDROMORPHONE HYDROCHLORIDE 1 MG: 1 INJECTION, SOLUTION INTRAMUSCULAR; INTRAVENOUS; SUBCUTANEOUS at 08:02

## 2023-02-16 RX ADMIN — MORPHINE SULFATE 2 MG: 2 INJECTION, SOLUTION INTRAMUSCULAR; INTRAVENOUS at 09:02

## 2023-02-16 RX ADMIN — PIPERACILLIN AND TAZOBACTAM 4.5 G: 4; .5 INJECTION, POWDER, LYOPHILIZED, FOR SOLUTION INTRAVENOUS; PARENTERAL at 01:02

## 2023-02-16 RX ADMIN — HYDROMORPHONE HYDROCHLORIDE 1 MG: 1 INJECTION, SOLUTION INTRAMUSCULAR; INTRAVENOUS; SUBCUTANEOUS at 05:02

## 2023-02-16 RX ADMIN — PANTOPRAZOLE SODIUM 40 MG: 40 TABLET, DELAYED RELEASE ORAL at 08:02

## 2023-02-16 RX ADMIN — PIPERACILLIN AND TAZOBACTAM 4.5 G: 4; .5 INJECTION, POWDER, LYOPHILIZED, FOR SOLUTION INTRAVENOUS; PARENTERAL at 09:02

## 2023-02-16 NOTE — ASSESSMENT & PLAN NOTE
With low grade fever, abdominal pain, leukocytosis. Complicated by peridiverticular abscess s/p IR drainage today (purulent material) with improvement in pain  Malignancy cannot be excluded however in the setting of acute diverticulitis hesitant to submit for endoscopy  Plan for colonoscopy in 6 weeks  Advance diet as tolerated

## 2023-02-16 NOTE — ASSESSMENT & PLAN NOTE
No current hypothyroid symptoms    Plan:  May continue methimazole  May continue propranolol 60 mg

## 2023-02-16 NOTE — NURSING
AAOx4, VS, wnl. SAT-98% on room air. Pain-9/10, Dilaudid 1mg given. Bowel sounds hypoactive, abdomen distended. Pt NPO after midnight. POC reviewed w/ pt. Bed in low position, call light within reach, and bed locked. Will continue to monitor.

## 2023-02-16 NOTE — CONSULTS
Today`s Date: 2/16/2023     Admit Date: 2/15/2023    Admitting Physician: Mai Becker MD    Patient`s Name: Niya Moeller , 52 y.o. female    Reason for consultation  Admitted with recurrent left lower quadrant pain , known h/o diverticulitis   Patient Active Problem List:     Obesity     Asthma exacerbation     GERD (gastroesophageal reflux disease)     Diverticulitis     Colitis     Moderate persistent asthma with (acute) exacerbation     Hyperthyroidism     Thyroiditis     Diastolic dysfunction, left ventricle     Tachycardia     Other chest pain      Past Medical History:  No date: Asthma  No date: Diverticulosis  No date: GERD (gastroesophageal reflux disease)    No past surgical history on file.    Prior to Admission medications :  Medication albuterol-ipratropium (DUO-NEB) 2.5 mg-0.5 mg/3 mL nebulizer solution, Sig Inhale 3 mLs into the lungs every 4 (four) hours as needed., Start Date 1/9/23, End Date , Taking? Yes, Authorizing Provider Historical Provider    Medication amoxicillin-clavulanate 875-125mg (AUGMENTIN) 875-125 mg per tablet, Sig Take 1 tablet by mouth 2 (two) times daily., Start Date 2/3/23, End Date , Taking? Yes, Authorizing Provider Historical Provider    Medication budesonide-formoterol 160-4.5 mcg (SYMBICORT) 160-4.5 mcg/actuation HFAA, Sig Inhale 2 puffs into the lungs every 12 (twelve) hours. Controller, Start Date 7/30/22, End Date 7/30/23, Taking? Yes, Authorizing Provider Alvaro Gaston, DO    Medication cetirizine (ZYRTEC) 10 MG tablet, Sig Take 10 mg by mouth once daily., Start Date 11/14/22, End Date , Taking? Yes, Authorizing Provider Historical Provider    Medication ciprofloxacin HCl (CIPRO) 500 MG tablet, Sig Take 500 mg by mouth 2 (two) times daily., Start Date 2/3/23, End Date , Taking? Yes, Authorizing Provider Historical Provider    Medication fluticasone propionate (FLONASE) 50 mcg/actuation nasal spray, Sig 1 spray by Nasal route daily as needed., Start Date  11/23/22, End Date , Taking? Yes, Authorizing Provider Historical Provider    Medication methIMAzole (TAPAZOLE) 10 MG Tab, Sig Take 1 tablet (10 mg total) by mouth 2 (two) times daily., Start Date 2/14/23, End Date 2/14/24, Taking? Yes, Authorizing Provider Sol Aguirre NP    Medication montelukast (SINGULAIR) 10 mg tablet, Sig Take 10 mg by mouth once daily., Start Date 10/17/22, End Date 10/17/23, Taking? Yes, Authorizing Provider Historical Provider    Medication omeprazole (PRILOSEC) 40 MG capsule, Sig Take 40 mg by mouth once daily., Start Date 4/17/22, End Date , Taking? Yes, Authorizing Provider Historical Provider    Medication predniSONE (DELTASONE) 20 MG tablet, Sig Take 2 tablets (40 mg total) by mouth once daily. for 6 days, Start Date 2/14/23, End Date 2/20/23, Taking? Yes, Authorizing Provider Sol Aguirre NP    Medication propranoloL (INDERAL LA) 60 MG 24 hr capsule, Sig Take 1 capsule (60 mg total) by mouth once daily., Start Date 2/14/23, End Date 2/14/24, Taking? Yes, Authorizing Provider Sol Aguirre NP    Medication albuterol (PROVENTIL/VENTOLIN HFA) 90 mcg/actuation inhaler, Sig Inhale 1-2 puffs into the lungs every 6 (six) hours as needed for Wheezing. Rescue, Start Date 2/14/23, End Date 2/14/24, Taking? , Authorizing Provider Sol Aguirre NP    Medication ondansetron (ZOFRAN-ODT) 4 MG TbDL, Sig Take 2 tablets (8 mg total) by mouth 2 (two) times daily., Start Date 7/2/22, End Date , Taking? , Authorizing Provider Franco Brown MD      No current facility-administered medications on file prior to encounter.  Current Outpatient Medications on File Prior to Encounter:  albuterol-ipratropium (DUO-NEB) 2.5 mg-0.5 mg/3 mL nebulizer solution, Inhale 3 mLs into the lungs every 4 (four) hours as needed., Disp: , Rfl:   amoxicillin-clavulanate 875-125mg (AUGMENTIN) 875-125 mg per tablet, Take 1 tablet by mouth 2 (two) times daily., Disp: , Rfl:   budesonide-formoterol 160-4.5  mcg (SYMBICORT) 160-4.5 mcg/actuation HFAA, Inhale 2 puffs into the lungs every 12 (twelve) hours. Controller, Disp: 6 g, Rfl: 10  cetirizine (ZYRTEC) 10 MG tablet, Take 10 mg by mouth once daily., Disp: , Rfl:   ciprofloxacin HCl (CIPRO) 500 MG tablet, Take 500 mg by mouth 2 (two) times daily., Disp: , Rfl:   fluticasone propionate (FLONASE) 50 mcg/actuation nasal spray, 1 spray by Nasal route daily as needed., Disp: , Rfl:   methIMAzole (TAPAZOLE) 10 MG Tab, Take 1 tablet (10 mg total) by mouth 2 (two) times daily., Disp: 60 tablet, Rfl: 0  montelukast (SINGULAIR) 10 mg tablet, Take 10 mg by mouth once daily., Disp: , Rfl:   omeprazole (PRILOSEC) 40 MG capsule, Take 40 mg by mouth once daily., Disp: , Rfl:   predniSONE (DELTASONE) 20 MG tablet, Take 2 tablets (40 mg total) by mouth once daily. for 6 days, Disp: 12 tablet, Rfl: 0  propranoloL (INDERAL LA) 60 MG 24 hr capsule, Take 1 capsule (60 mg total) by mouth once daily., Disp: 30 capsule, Rfl: 1  albuterol (PROVENTIL/VENTOLIN HFA) 90 mcg/actuation inhaler, Inhale 1-2 puffs into the lungs every 6 (six) hours as needed for Wheezing. Rescue, Disp: 18 g, Rfl: 1  ondansetron (ZOFRAN-ODT) 4 MG TbDL, Take 2 tablets (8 mg total) by mouth 2 (two) times daily., Disp: 10 tablet, Rfl: 0         Review of patient's allergies indicates:   -- Levofloxacin -- Hives and Itching   -- Metronidazole -- Hives   -- Latex     Social History:   reports that she has been smoking cigarettes. She started smoking about 40 years ago. She has a 40.00 pack-year smoking history. She has never used smokeless tobacco. She reports that she does not currently use alcohol. She reports that she does not currently use drugs.     No family history on file.      PHYSICAL EXAMINATION  Temp:  [97.4 °F (36.3 °C)-99.1 °F (37.3 °C)] 98.1 °F (36.7 °C)  Pulse:  [] 78  Resp:  [16-20] 16  SpO2:  [92 %-100 %] 95 %  BP: ()/(49-87) 140/70    General Condition:   Alert x 3     Head & Neck  Anemia:  None  Jaundice: None  Neck vein: Not distended  Carotid Bruits: none  Lymph nodes: none palpable  Thyroid: normal    Chest: normal    Heart: normal    Rt. Breast: not examined  Lt. Breast: not examined  Axillary lymph nodes: none    Abdomen: Soft,  None tender with no palpable mass or organ  Hernia: none    Rectal: Defered    Extremities: normal    Vascular: normal    Specific focus Examination    Imp: recurrent diverticulitis, chronic , Asthma    Plan: consult IR for possible drain abscess , consult GI for flex sigmoidoscope for possible malignancy.

## 2023-02-16 NOTE — HPI
52-year-old female with past medical history significant for asthma, diverticulosis, and GERD who presented to the emergency department on 02/15 with complaints of respiratory distress.  She apparently had similar presentation recently and was sent out on p.o. steroids.  In the ED CT was performed which was significant for diverticulitis complicated by peridiverticular abscess.  Patient was admitted to hospital medicine and GI was consulted.  Out of concern for.  Diverticular abscess IR was consulted and drainage of purulent fluid was accomplished today.  Case was discussed with surgery who recommended GI consultation to exclude sigmoid mass.  Of note patient has had multiple ED presentation for diverticulitis treated with antibiotics.  She is never had a colonoscopy. At time of interview today pt reports pain has significantly improved since IR drainage. Eating without issue.

## 2023-02-16 NOTE — SUBJECTIVE & OBJECTIVE
Interval History: Returned from IR-guided abscess drainage. Ordered a pizza to her room. General Surgery aware. Regular diet ordered.    Review of Systems   Constitutional:  Negative for chills and diaphoresis.   HENT:  Negative for ear discharge and ear pain.    Eyes:  Negative for pain and redness.   Respiratory:  Positive for wheezing. Negative for shortness of breath.    Cardiovascular:  Negative for palpitations and leg swelling.   Gastrointestinal:  Positive for abdominal pain. Negative for blood in stool and constipation.   Endocrine: Negative for polydipsia and polyphagia.   Genitourinary:  Negative for dysuria and enuresis.   Musculoskeletal:  Negative for back pain and gait problem.   Skin:  Negative for pallor and rash.   Neurological:  Negative for light-headedness and headaches.   Hematological:  Negative for adenopathy. Does not bruise/bleed easily.   Psychiatric/Behavioral:  Negative for hallucinations. The patient is not hyperactive.    Objective:     Vital Signs (Most Recent):  Temp: 100.1 °F (37.8 °C) (02/16/23 1130)  Pulse: 69 (02/16/23 1353)  Resp: 18 (02/16/23 1528)  BP: (!) 144/68 (02/16/23 1305)  SpO2: 95 % (02/16/23 1353)   Vital Signs (24h Range):  Temp:  [97.4 °F (36.3 °C)-100.1 °F (37.8 °C)] 100.1 °F (37.8 °C)  Pulse:  [] 69  Resp:  [15-24] 18  SpO2:  [92 %-100 %] 95 %  BP: (100-174)/(52-87) 144/68     Weight: 69.5 kg (153 lb 3.5 oz)  Body mass index is 30.95 kg/m².    Intake/Output Summary (Last 24 hours) at 2/16/2023 1620  Last data filed at 2/16/2023 1312  Gross per 24 hour   Intake --   Output 5 ml   Net -5 ml      Physical Exam  Constitutional:       Appearance: Normal appearance. She is not ill-appearing or diaphoretic.   HENT:      Head: Normocephalic and atraumatic.      Right Ear: There is no impacted cerumen.      Left Ear: There is no impacted cerumen.      Nose: No congestion or rhinorrhea.      Mouth/Throat:      Pharynx: No oropharyngeal exudate or posterior  oropharyngeal erythema.   Eyes:      General:         Right eye: No discharge.         Left eye: No discharge.   Cardiovascular:      Rate and Rhythm: Normal rate.      Heart sounds: No murmur heard.    No friction rub.   Pulmonary:      Breath sounds: No rhonchi.   Abdominal:      Tenderness: There is abdominal tenderness. There is no guarding or rebound.   Musculoskeletal:         General: No swelling or deformity.   Skin:     Coloration: Skin is not jaundiced.      Findings: No bruising.   Neurological:      Mental Status: She is alert.      Cranial Nerves: No cranial nerve deficit.      Motor: No weakness.   Psychiatric:         Mood and Affect: Mood normal.       Significant Labs: All pertinent labs within the past 24 hours have been reviewed.  BG 59     Significant Imaging: I have reviewed all pertinent imaging results/findings within the past 24 hours.

## 2023-02-16 NOTE — H&P
[facility] Medicine  History & Physical    Patient Name: Niya Moeller  MRN: [id number]  Patient Class: OP- Observation  Admission Date: 2/15/2023  Attending Physician: Radha Galicia MD  Primary Care Provider: Cecilio Carrasco MD         Patient information was obtained from patient and ER records.     Subjective:     Principal Problem:Diverticulitis    Chief Complaint:   Chief Complaint   Patient presents with    Abdominal Pain     Patient states she has been on antibiotics for diverticulitis which were not continued during a recent admission. States abdominal pain is now severe and it is triggering her asthma as well. Presents awake, alert with audible wheezing and c/o severe abdominal pain. States it feels the same as previous abdominal perforation in the past.         HPI: Niya Moeller is a 52yr with PMH of asthma?, diverticulosis, GERD presents with respiratory distress.    Patient has had increasing shortness of breath over the past day, and she has been using her albuterol every 4 hours with minimal relief.  She presented with a similar presentation and was sent home with steroid.  Patient is a current smoker.  She also has a mild to moderate cough.  She presented for steroids.    In the ED, triage vitals were significant for tachycardia and increased respiratory rate, with desaturations to 94%.  CBC was fairly unremarkable.  CMP was significant only for decreased albumin.  CT abdomen pelvis with contrast reveals potential diverticulitis.    In the ED, patient was given continuous albuterol, Solu-Medrol, and magnesium.  Due to concerning CT findings, General surgery  was consulted.  Patient was also started on IV Zosyn.    Medicine was consulted for asthma exacerbation and management of diverticulitis.      As clarification, on 2/15/2023 , patient should be placed in hospital observation services under my care.  -Radha Galicia MD      Past Medical History:   Diagnosis  Date    Asthma     Diverticulosis     GERD (gastroesophageal reflux disease)        No past surgical history on file.    Review of patient's allergies indicates:   Allergen Reactions    Levofloxacin Hives and Itching    Metronidazole Hives    Latex        No current facility-administered medications on file prior to encounter.     Current Outpatient Medications on File Prior to Encounter   Medication Sig    albuterol-ipratropium (DUO-NEB) 2.5 mg-0.5 mg/3 mL nebulizer solution Inhale 3 mLs into the lungs every 4 (four) hours as needed.    amoxicillin-clavulanate 875-125mg (AUGMENTIN) 875-125 mg per tablet Take 1 tablet by mouth 2 (two) times daily.    budesonide-formoterol 160-4.5 mcg (SYMBICORT) 160-4.5 mcg/actuation HFAA Inhale 2 puffs into the lungs every 12 (twelve) hours. Controller    cetirizine (ZYRTEC) 10 MG tablet Take 10 mg by mouth once daily.    ciprofloxacin HCl (CIPRO) 500 MG tablet Take 500 mg by mouth 2 (two) times daily.    fluticasone propionate (FLONASE) 50 mcg/actuation nasal spray 1 spray by Nasal route daily as needed.    methIMAzole (TAPAZOLE) 10 MG Tab Take 1 tablet (10 mg total) by mouth 2 (two) times daily.    montelukast (SINGULAIR) 10 mg tablet Take 10 mg by mouth once daily.    omeprazole (PRILOSEC) 40 MG capsule Take 40 mg by mouth once daily.    predniSONE (DELTASONE) 20 MG tablet Take 2 tablets (40 mg total) by mouth once daily. for 6 days    propranoloL (INDERAL LA) 60 MG 24 hr capsule Take 1 capsule (60 mg total) by mouth once daily.    [DISCONTINUED] albuterol (PROVENTIL) 2.5 mg /3 mL (0.083 %) nebulizer solution Inhale 2.5 mg into the lungs.    [DISCONTINUED] albuterol (PROVENTIL/VENTOLIN HFA) 90 mcg/actuation inhaler Inhale 2 puffs into the lungs every 6 (six) hours as needed.    [DISCONTINUED] cetirizine (ZYRTEC) 10 MG tablet Take 10 mg by mouth.    [DISCONTINUED] dicyclomine (BENTYL) 10 MG capsule Take 10 mg by mouth 3 (three) times daily as needed.    [DISCONTINUED]  senna-docusate 8.6-50 mg (PERICOLACE) 8.6-50 mg per tablet Take 1 tablet by mouth.    albuterol (PROVENTIL/VENTOLIN HFA) 90 mcg/actuation inhaler Inhale 1-2 puffs into the lungs every 6 (six) hours as needed for Wheezing. Rescue    ondansetron (ZOFRAN-ODT) 4 MG TbDL Take 2 tablets (8 mg total) by mouth 2 (two) times daily.    [DISCONTINUED] albuterol (PROVENTIL) 2.5 mg /3 mL (0.083 %) nebulizer solution Take by nebulization.    [DISCONTINUED] albuterol sulfate 2.5 mg/0.5 mL Nebu Take 2.5 mg by nebulization every 4 (four) hours as needed (sob). Rescue    [DISCONTINUED] albuterol-ipratropium (DUO-NEB) 2.5 mg-0.5 mg/3 mL nebulizer solution Take 3 mLs by nebulization every 4 (four) hours as needed for Wheezing or Shortness of Breath. Rescue    [DISCONTINUED] azithromycin (Z-FRANCISCO JAVIER) 250 MG tablet Take by mouth.    [DISCONTINUED] dicyclomine (BENTYL) 10 MG capsule Take 10 mg by mouth 3 (three) times daily.    [DISCONTINUED] dicyclomine (BENTYL) 20 mg tablet Take 20 mg by mouth 2 (two) times daily as needed.    [DISCONTINUED] fluconazole (DIFLUCAN) 150 MG Tab Take 150 mg by mouth once.    [DISCONTINUED] fluticasone propionate (FLONASE) 50 mcg/actuation nasal spray 1 spray by Each Nostril route.    [DISCONTINUED] HYDROcodone-acetaminophen (NORCO) 5-325 mg per tablet Take 1 tablet by mouth every 6 (six) hours as needed.    [DISCONTINUED] ipratropium (ATROVENT) 0.02 % nebulizer solution USE 1 VIAL VIA NEBULIZER FOUR TIMES DAILY AS NEEDED FOR WHEEZING    [DISCONTINUED] ketorolac (TORADOL) 10 mg tablet TAKE 1 TABLET BY MOUTH EVERY 6 HOURS AS NEEDED FOR PAIN SCALE 1-3 FOR UP TO 5 DAYS    [DISCONTINUED] levoFLOXacin (LEVAQUIN) 750 MG tablet Take 750 mg by mouth.    [DISCONTINUED] loratadine (CLARITIN) 10 mg tablet TAKE 1 TABLET BY MOUTH DAILY AS NEEDED FOR CONGESTION    [DISCONTINUED] montelukast (SINGULAIR) 10 mg tablet Take 1 tablet (10 mg total) by mouth once daily at 6am.    [DISCONTINUED] nicotine (NICODERM CQ) 14 mg/24 hr 1  patch.    [DISCONTINUED] nicotine (NICODERM CQ) 21 mg/24 hr     [DISCONTINUED] nystatin (MYCOSTATIN) 100,000 unit/mL suspension SMARTSI-6 Milliliter(s) By Mouth 4 Times Daily    [DISCONTINUED] omeprazole (PRILOSEC) 20 MG capsule Take 20 mg by mouth 2 (two) times daily.    [DISCONTINUED] omeprazole (PRILOSEC) 40 MG capsule Take 1 capsule (40 mg total) by mouth once daily.    [DISCONTINUED] oxyCODONE-acetaminophen (PERCOCET)  mg per tablet Take 1 tablet by mouth every 8 (eight) hours as needed.    [DISCONTINUED] piperacillin-tazobactam (ZOSYN) 40.5 gram injection Inject into the vein.    [DISCONTINUED] predniSONE (DELTASONE) 50 MG Tab     [DISCONTINUED] traMADoL (ULTRAM) 50 mg tablet TAKE 1 TABLET BY MOUTH EVERY 6 HOURS AS NEEDED FOR PAIN FOR UP TO 2 DAYS.     Family History    None       Tobacco Use    Smoking status: Every Day     Packs/day: 0.50     Years: 20.00     Pack years: 10.00     Types: Cigarettes    Smokeless tobacco: Never   Substance and Sexual Activity    Alcohol use: Not Currently    Drug use: Not Currently    Sexual activity: Not Currently     Review of Systems   Constitutional:  Negative for chills and diaphoresis.   HENT:  Negative for ear discharge and ear pain.    Eyes:  Negative for pain and redness.   Respiratory:  Positive for shortness of breath and wheezing.    Cardiovascular:  Negative for palpitations and leg swelling.   Gastrointestinal:  Negative for blood in stool and constipation.   Endocrine: Negative for polydipsia and polyphagia.   Genitourinary:  Negative for dysuria and enuresis.   Musculoskeletal:  Negative for back pain and gait problem.   Skin:  Negative for pallor and rash.   Neurological:  Negative for light-headedness and headaches.   Hematological:  Negative for adenopathy. Does not bruise/bleed easily.   Psychiatric/Behavioral:  Negative for hallucinations. The patient is not hyperactive.    Objective:     Vital Signs (Most Recent):  Temp: 97.4 °F (36.3 °C)  (02/15/23 2005)  Pulse: 73 (02/15/23 2005)  Resp: 18 (02/15/23 2005)  BP: 139/87 (02/15/23 2005)  SpO2: 98 % (02/15/23 2005) Vital Signs (24h Range):  Temp:  [97.4 °F (36.3 °C)-98.4 °F (36.9 °C)] 97.4 °F (36.3 °C)  Pulse:  [72-86] 73  Resp:  [16-18] 18  SpO2:  [95 %-100 %] 98 %  BP: ()/(49-87) 139/87     Weight: 61.2 kg (135 lb)  Body mass index is 27.27 kg/m².    Physical Exam  Constitutional:       Appearance: Normal appearance. She is not ill-appearing or diaphoretic.   HENT:      Head: Normocephalic and atraumatic.      Right Ear: There is no impacted cerumen.      Left Ear: There is no impacted cerumen.      Nose: No congestion or rhinorrhea.      Mouth/Throat:      Pharynx: No oropharyngeal exudate or posterior oropharyngeal erythema.   Eyes:      General:         Right eye: No discharge.         Left eye: No discharge.   Cardiovascular:      Rate and Rhythm: Tachycardia present.      Heart sounds: No murmur heard.    No friction rub.   Pulmonary:      Breath sounds: No rhonchi.   Abdominal:      Tenderness: There is no guarding or rebound.   Musculoskeletal:         General: No swelling or deformity.   Skin:     Coloration: Skin is not jaundiced.      Findings: No bruising.   Neurological:      Mental Status: She is alert.      Cranial Nerves: No cranial nerve deficit.      Motor: No weakness.   Psychiatric:         Mood and Affect: Mood normal.           Significant Labs: All pertinent labs within the past 24 hours have been reviewed.  A1C:   Recent Labs   Lab 11/23/22  0914   HGBA1C 5.9*     ABGs: No results for input(s): PH, PCO2, HCO3, POCSATURATED, BE, TOTALHB, COHB, METHB, O2HB, POCFIO2, PO2 in the last 48 hours.  Bilirubin:   Recent Labs   Lab 01/22/23  0047 01/31/23  0513 02/11/23  1716 02/15/23  1237   BILITOT 0.3 0.5 0.5 0.7     Blood Culture: No results for input(s): LABBLOO in the last 48 hours.  BMP:   Recent Labs   Lab 02/14/23  0428 02/15/23  1237   * 77    141   K 4.4 3.7    * 107   CO2 22* 24   BUN 13 15   CREATININE 0.7 0.8   CALCIUM 9.2 9.3   MG 2.3  --      CBC:   Recent Labs   Lab 02/14/23  0428 02/15/23  1237   WBC 12.84* 11.59   HGB 11.6* 12.9   HCT 36.1* 39.1    194     CMP:   Recent Labs   Lab 02/14/23  0428 02/15/23  1237    141   K 4.4 3.7   * 107   CO2 22* 24   * 77   BUN 13 15   CREATININE 0.7 0.8   CALCIUM 9.2 9.3   PROT  --  6.1   ALBUMIN  --  2.9*   BILITOT  --  0.7   ALKPHOS  --  59   AST  --  20   ALT  --  32   ANIONGAP 8 10     Coagulation: No results for input(s): PT, INR, APTT in the last 48 hours.  Lipid Panel: No results for input(s): CHOL, HDL, LDLCALC, TRIG, CHOLHDL in the last 48 hours.  Prealbumin: No results for input(s): PREALBUMIN in the last 48 hours.  Respiratory Culture: No results for input(s): GSRESP, RESPIRATORYC in the last 48 hours.  Troponin: No results for input(s): TROPONINI, TROPONINIHS in the last 48 hours.    Significant Imaging: I have reviewed all pertinent imaging results/findings within the past 24 hours.  I have reviewed and interpreted all pertinent imaging results/findings within the past 24 hours.    Assessment/Plan:     * Diverticulitis  CT abdomen pelvis with contrast shows acute diverticulitis.     Plan:  Start IV Zosyn every 8 hours for 10-14 day course  S/p 2 L in the ED  Consult general surgery versus Interventional Radiology for management      Thyroiditis  No current hypothyroid symptoms    Plan:  May continue methimazole  May continue propranolol 60 mg        GERD (gastroesophageal reflux disease)  No current complaints    Plan:  May continue home omeprazole dose      Asthma exacerbation  Presents with shortness of breath, wheezing, hypoxemia.  Likely in a asthma versus COPD exacerbation    Plan:  Continue steroids for 5 day total course  DuoNebs every 4 hours as needed  Continue Symbicort daily dose  Discuss decreasing tobacco use        VTE Risk Mitigation (From admission, onward)            Ordered     IP VTE LOW RISK PATIENT  Once         02/15/23 1845     Place sequential compression device  Until discontinued         02/15/23 1845                       Radha Galicia MD  Department of [facility] Medicine   [place] [facility]

## 2023-02-16 NOTE — SEDATION DOCUMENTATION
10 fr pigtail drain placed to LLQ. 5 mL of bloody purulent drainage removed and sent to lab for processing. Drain sutured in place and site covered with gauze and tegederm. Patient tolerated well. Report called to bedside RN and pt left in her bed via transport.

## 2023-02-16 NOTE — PLAN OF CARE
Patient on room air with documented SpO2 and in no apparent distress. Will continue to monitor. The proper method of use, as well as anticipated side effects, of this metered-dose inhaler are discussed and demonstrated to the patient.

## 2023-02-16 NOTE — ASSESSMENT & PLAN NOTE
CT abdomen pelvis with contrast shows acute diverticulitis.     Plan:  Start IV Zosyn every 8 hours for 10-14 day course  S/p 2 L in the ED  Consult general surgery versus Interventional Radiology for management

## 2023-02-16 NOTE — PROGRESS NOTES
I accepted hand off from Dr. Polk for f/u of pending CT Abd Pelvis and reevaluation.  CT concerning for diverticulitis with possible abscess.  Discussed with Dr. Pratt who will see patient as consultant, requests admission to hospital medicine and that I consult Interventional Radiology.  Discussed with interventional radiology who will see patient in the morning as consultants.  Discussed with the [facility] hospitalist will see and admit the patient.  I have given the patient some more fentanyl for pain as well as some IV fluid and I have ordered Zosyn due to her allergies to Levaquin and Flagyl.

## 2023-02-16 NOTE — PHARMACY MED REC
"  Admission Medication History     The home medication history was taken by Sammie Jackson CPhT.    Medication history obtained from, Patient Verified    You may go to "Admission" then "Reconcile Home Medications" tabs to review and/or act upon these items.     The home medication list has been updated by the Pharmacy department.   Please read ALL comments highlighted in yellow.   Please address this information as you see fit.    Feel free to contact us if you have any questions or require assistance.      The medications listed below were removed from the home medication list.  Please reorder if appropriate:  Patient reports no longer taking the following medication(s):  Azithromycin 250 mg  Dicyclomine 10 mg and 20 mg  Fluconazole 150 mg  Norco 5-325 mg  Atrovent 0.02% Neb solution  Ketorolac 10 mg  Levaquin 750 mg  Loratadine 10 mg  Nicoderm CQ 14 mg/24 and 21 mg/24  Nystatin 100,000 unit/ml suspension   Percocet  mg  Zosyn 40.5 gram injection  Pericolace 8.6-50 mg  Tramadol 50 mg          Sammie Jackson CPhT.  Ext [phone]             .        "

## 2023-02-16 NOTE — PROCEDURES
Interventional Radiology Immediate Post-Procedure Note    Pre-Op Diagnosis: Abscess  Post-Op Diagnosis: Same    Procedure: CT-guided drainage catheter placement    Procedure performed by: Fabien Dow MD  Assistants: None    Estimated Blood Loss: Minimal  Specimen Removed: Yes    Findings/description of procedure:  Under CT guidance, 10-Fr APD catheter placed into a LLQ fluid and gas collection. 5 mL bloody purulent fluid removed. Specimen sent.    No immediate complications. Patient tolerated procedure well. Please see full dictated procedure report for additional details and recommendations.      Fabien Dow MD  [facility] IR  Pager [phone]

## 2023-02-16 NOTE — PROGRESS NOTES
Smoking cessation education provided. Pt is a 1 pk/day cigarette smoker x 40 yrs. Order requested for 21 mg nicotine patch Q day. Pt states ready to quit smoking, but is concerned about weight gain.  Ambulatory referral to Smoking Cessation clinic following hospital discharge.     Virtual appointment scheduled Virtual 3/7/2023 @ 1:30pm with ANCELMO Reyes

## 2023-02-16 NOTE — ASSESSMENT & PLAN NOTE
"CT abdomen pelvis with contrast shows acute diverticulitis.     Per last Gen Surgery clinic note:  51 y/o female known to me. Somewhat of a difficult case due to pt's personality--somewhat manipulative/defiant regarding compliance with clinical recommendations. Pt with a hx of recurrent diverticular dz--multiple recent admission secondary to flares of diverticular dz including complicated consequences--small abscess (too small to drain). Pt completed 6 weeks IV zosyn. Essentially, current clinical course is in stage of waiting for 6-8 week "cool off" from last attack , then colonoscopy followed by planning for sigmoid colectomy due to recurrent attacks/complicated attacks and difficulty clearing infection.     Start IV Zosyn every 8 hours for 10-14 day course  IR drained abscess  F/u GI and IR recs    "

## 2023-02-16 NOTE — CONSULTS
[place] [facility]  [facility]  [facility]ult Note    Patient Name: Niya Moeller  MRN: [id number]  Admission Date: 2/15/2023  Hospital Length of Stay: 0 days  Code Status: Full Code   Attending Provider: Mai Becker MD   Consulting Provider: Justin Pereira MD  Primary Care Physician: YEHUDA Adamson  Principal Problem:Diverticulitis    Inpatient consult to Gastroenterology-[facility]  Consult performed by: Justin Pereira MD  Consult ordered by: Mai Becker MD        Subjective:     HPI:    52-year-old female with past medical history significant for asthma, diverticulosis, and GERD who presented to the emergency department on 02/15 with complaints of respiratory distress.  She apparently had similar presentation recently and was sent out on p.o. steroids.  In the ED CT was performed which was significant for diverticulitis complicated by peridiverticular abscess.  Patient was admitted to hospital medicine and GI was consulted.  Out of concern for.  Diverticular abscess IR was consulted and drainage of purulent fluid was accomplished today.  Case was discussed with surgery who recommended GI consultation to exclude sigmoid mass.  Of note patient has had multiple ED presentation for diverticulitis treated with antibiotics.  She is never had a colonoscopy. At time of interview today pt reports pain has significantly improved since IR drainage. Eating without issue.       Past Medical History:   Diagnosis Date    Asthma     Diverticulosis     GERD (gastroesophageal reflux disease)        No past surgical history on file.    Review of patient's allergies indicates:   Allergen Reactions    Levofloxacin Hives and Itching    Metronidazole Hives    Latex      Family History    None       Tobacco Use    Smoking status: Every Day     Packs/day: 1.00     Years: 40.00     Pack years: 40.00     Types: Cigarettes     Start date: 1983    Smokeless tobacco: Never    Tobacco comments:      Ambulatory referral to Smoking Cessation clinic following hospital discharge.    Substance and Sexual Activity    Alcohol use: Not Currently    Drug use: Not Currently    Sexual activity: Not Currently     Review of Systems   Constitutional:  Positive for fever. Negative for chills and unexpected weight change.   HENT:  Negative for congestion and trouble swallowing.    Eyes:  Negative for photophobia and visual disturbance.   Respiratory:  Negative for cough and shortness of breath.    Cardiovascular:  Negative for chest pain and leg swelling.   Gastrointestinal:  Positive for abdominal pain. Negative for abdominal distention, blood in stool, constipation and diarrhea.   Genitourinary:  Negative for dysuria and hematuria.   Musculoskeletal:  Negative for arthralgias and myalgias.   Skin:  Negative for color change and rash.   Neurological:  Negative for dizziness, light-headedness and numbness.   Psychiatric/Behavioral:  Negative for agitation and confusion.    Objective:     Vital Signs (Most Recent):  Temp: 100.1 °F (37.8 °C) (02/16/23 1130)  Pulse: 69 (02/16/23 1353)  Resp: 18 (02/16/23 1528)  BP: (!) 144/68 (02/16/23 1305)  SpO2: 95 % (02/16/23 1353)   Vital Signs (24h Range):  Temp:  [97.4 °F (36.3 °C)-100.1 °F (37.8 °C)] 100.1 °F (37.8 °C)  Pulse:  [] 69  Resp:  [15-24] 18  SpO2:  [92 %-100 %] 95 %  BP: ()/(49-87) 144/68     Weight: 69.5 kg (153 lb 3.5 oz) (02/15/23 2005)  Body mass index is 30.95 kg/m².      Intake/Output Summary (Last 24 hours) at 2/16/2023 1559  Last data filed at 2/16/2023 1312  Gross per 24 hour   Intake --   Output 5 ml   Net -5 ml       Lines/Drains/Airways       Drain  Duration                  Closed/Suction Drain 02/16/23 1310 Left;Anterior LLQ Bulb 10 Fr. <1 day              Peripheral Intravenous Line  Duration                  Peripheral IV - Single Lumen 02/15/23 1237 20 G Left Antecubital 1 day                    Physical Exam  Vitals and nursing note  reviewed.   Constitutional:       Appearance: She is well-developed.   HENT:      Head: Normocephalic and atraumatic.   Eyes:      General: No scleral icterus.     Pupils: Pupils are equal, round, and reactive to light.   Cardiovascular:      Rate and Rhythm: Normal rate and regular rhythm.      Heart sounds: Normal heart sounds.   Pulmonary:      Effort: Pulmonary effort is normal. No respiratory distress.      Breath sounds: Normal breath sounds.   Abdominal:      General: Bowel sounds are normal. There is no distension.      Palpations: Abdomen is soft.      Tenderness: There is no abdominal tenderness.   Musculoskeletal:         General: Normal range of motion.      Cervical back: Normal range of motion and neck supple.   Skin:     General: Skin is warm and dry.      Findings: No erythema or rash.   Neurological:      Mental Status: She is alert and oriented to person, place, and time.      Comments: No asterixis   Psychiatric:         Behavior: Behavior normal.       Significant Labs:  Recent Lab Results         02/16/23  0305        Albumin 2.5       Alkaline Phosphatase 51       ALT 33       Anion Gap 8       AST 20       Baso # 0.02       Basophil % 0.2       BILIRUBIN TOTAL 0.8  Comment: For infants and newborns, interpretation of results should be based  on gestational age, weight and in agreement with clinical  observations.    Premature Infant recommended reference ranges:  Up to 24 hours.............<8.0 mg/dL  Up to 48 hours............<12.0 mg/dL  3-5 days..................<15.0 mg/dL  6-29 days.................<15.0 mg/dL         BUN 14       Calcium 9.0       Chloride 105       CO2 25       Creatinine 0.7       Differential Method Automated       eGFR >60       Eos # 0.1       Eosinophil % 0.7       Glucose 59       Gran # (ANC) 7.0       Gran % 58.5       Hematocrit 35.4       Hemoglobin 11.7       Immature Grans (Abs) 0.04  Comment: Mild elevation in immature granulocytes is non specific and   can  be seen in a variety of conditions including stress response,   acute inflammation, trauma and pregnancy. Correlation with other   laboratory and clinical findings is essential.         Immature Granulocytes 0.3       Lymph # 3.3       Lymph % 27.4       MCH 29.8       MCHC 33.1       MCV 90       Mono # 1.6       Mono % 12.9       MPV 11.1       nRBC 0       Platelets 199       Potassium 3.8       PROTEIN TOTAL 5.5       RBC 3.93       RDW 11.9       Sodium 138       WBC 11.99               Significant Imaging:  Imaging results within the past 24 hours have been reviewed.    Assessment/Plan:     GI  * Diverticulitis  With low grade fever, abdominal pain, leukocytosis. Complicated by peridiverticular abscess s/p IR drainage today (purulent material) with improvement in pain  Malignancy cannot be excluded however in the setting of acute diverticulitis hesitant to submit for endoscopy  Plan for colonoscopy in 6 weeks  Advance diet as tolerated  Continue antibiotics      Thank you for your consult. I will sign off. Please contact us if you have any additional questions.    Justin Pereira MD  Gastroenterology  [place] [facility]

## 2023-02-16 NOTE — PROGRESS NOTES
[facility] Medicine  Progress Note    Patient Name: Niya Moeller  MRN: [id number]  Patient Class: IP- Inpatient   Admission Date: 2/15/2023  Length of Stay: 0 days  Attending Physician: Mai Becker MD  Primary Care Provider: YEHUDA Adamson        Subjective:     Principal Problem:Diverticulitis        HPI:  Niya Moeller is a 52yr with PMH of asthma?, diverticulosis, GERD presents with respiratory distress.    Patient has had increasing shortness of breath over the past day, and she has been using her albuterol every 4 hours with minimal relief.  She presented with a similar presentation and was sent home with steroid.  Patient is a current smoker.  She also has a mild to moderate cough.  She presented for steroids.    In the ED, triage vitals were significant for tachycardia and increased respiratory rate, with desaturations to 94%.  CBC was fairly unremarkable.  CMP was significant only for decreased albumin.  CT abdomen pelvis with contrast reveals potential diverticulitis.    In the ED, patient was given continuous albuterol, Solu-Medrol, and magnesium.  Due to concerning CT findings, General surgery  was consulted.  Patient was also started on IV Zosyn.    Medicine was consulted for asthma exacerbation and management of diverticulitis.      Overview/Hospital Course:  No notes on file    Interval History: Returned from IR-guided abscess drainage. Ordered a pizza to her room. General Surgery aware. Regular diet ordered.    Review of Systems   Constitutional:  Negative for chills and diaphoresis.   HENT:  Negative for ear discharge and ear pain.    Eyes:  Negative for pain and redness.   Respiratory:  Positive for wheezing. Negative for shortness of breath.    Cardiovascular:  Negative for palpitations and leg swelling.   Gastrointestinal:  Positive for abdominal pain. Negative for blood in stool and constipation.   Endocrine: Negative for polydipsia and polyphagia.    Genitourinary:  Negative for dysuria and enuresis.   Musculoskeletal:  Negative for back pain and gait problem.   Skin:  Negative for pallor and rash.   Neurological:  Negative for light-headedness and headaches.   Hematological:  Negative for adenopathy. Does not bruise/bleed easily.   Psychiatric/Behavioral:  Negative for hallucinations. The patient is not hyperactive.    Objective:     Vital Signs (Most Recent):  Temp: 100.1 °F (37.8 °C) (02/16/23 1130)  Pulse: 69 (02/16/23 1353)  Resp: 18 (02/16/23 1528)  BP: (!) 144/68 (02/16/23 1305)  SpO2: 95 % (02/16/23 1353)   Vital Signs (24h Range):  Temp:  [97.4 °F (36.3 °C)-100.1 °F (37.8 °C)] 100.1 °F (37.8 °C)  Pulse:  [] 69  Resp:  [15-24] 18  SpO2:  [92 %-100 %] 95 %  BP: (100-174)/(52-87) 144/68     Weight: 69.5 kg (153 lb 3.5 oz)  Body mass index is 30.95 kg/m².    Intake/Output Summary (Last 24 hours) at 2/16/2023 1620  Last data filed at 2/16/2023 1312  Gross per 24 hour   Intake --   Output 5 ml   Net -5 ml      Physical Exam  Constitutional:       Appearance: Normal appearance. She is not ill-appearing or diaphoretic.   HENT:      Head: Normocephalic and atraumatic.      Right Ear: There is no impacted cerumen.      Left Ear: There is no impacted cerumen.      Nose: No congestion or rhinorrhea.      Mouth/Throat:      Pharynx: No oropharyngeal exudate or posterior oropharyngeal erythema.   Eyes:      General:         Right eye: No discharge.         Left eye: No discharge.   Cardiovascular:      Rate and Rhythm: Normal rate.      Heart sounds: No murmur heard.    No friction rub.   Pulmonary:      Breath sounds: No rhonchi.   Abdominal:      Tenderness: There is abdominal tenderness. There is no guarding or rebound.   Musculoskeletal:         General: No swelling or deformity.   Skin:     Coloration: Skin is not jaundiced.      Findings: No bruising.   Neurological:      Mental Status: She is alert.      Cranial Nerves: No cranial nerve deficit.       "Motor: No weakness.   Psychiatric:         Mood and Affect: Mood normal.       Significant Labs: All pertinent labs within the past 24 hours have been reviewed.  BG 59     Significant Imaging: I have reviewed all pertinent imaging results/findings within the past 24 hours.      Assessment/Plan:      Pulmonary  Asthma exacerbation  Presents with shortness of breath, wheezing, hypoxemia.  Likely in a asthma versus COPD exacerbation    Plan:  Continue steroids for 5 day total course  DuoNebs every 4 hours as needed  Continue Symbicort daily dose  Discuss decreasing tobacco use      Endocrine  Hypoglycemia  BG 59 this am  Not on insulin  Accucheck QID, advancing diet  Hypoglycemia protocol      Thyroiditis  No current hypothyroid symptoms    Plan:  May continue methimazole  May continue propranolol 60 mg        GI  * Diverticulitis  CT abdomen pelvis with contrast shows acute diverticulitis.     Per last Gen Surgery clinic note:  51 y/o female known to me. Somewhat of a difficult case due to pt's personality--somewhat manipulative/defiant regarding compliance with clinical recommendations. Pt with a hx of recurrent diverticular dz--multiple recent admission secondary to flares of diverticular dz including complicated consequences--small abscess (too small to drain). Pt completed 6 weeks IV zosyn. Essentially, current clinical course is in stage of waiting for 6-8 week "cool off" from last attack , then colonoscopy followed by planning for sigmoid colectomy due to recurrent attacks/complicated attacks and difficulty clearing infection.     Start IV Zosyn every 8 hours for 10-14 day course  IR drained abscess  F/u GI and IR recs      GERD (gastroesophageal reflux disease)  No current complaints    Plan:  May continue home omeprazole dose        VTE Risk Mitigation (From admission, onward)         Ordered     IP VTE LOW RISK PATIENT  Once         02/15/23 6724     Place sequential compression device  Until discontinued     "     02/15/23 1845                Discharge Planning   ROXIE:      Code Status: Full Code   Is the patient medically ready for discharge?:     Reason for patient still in hospital (select all that apply): Laboratory test, Treatment and Consult recommendations  Discharge Plan A: Home                  Mai Becker MD  Department of [facility] Medicine   [facility]

## 2023-02-16 NOTE — ASSESSMENT & PLAN NOTE
Presents with shortness of breath, wheezing, hypoxemia.  Likely in a asthma versus COPD exacerbation    Plan:  Continue steroids for 5 day total course  DuoNebs every 4 hours as needed  Continue Symbicort daily dose  Discuss decreasing tobacco use

## 2023-02-16 NOTE — HPI
Niya Moeller is a 52yr with PMH of asthma?, diverticulosis, GERD presents with respiratory distress.    Patient has had increasing shortness of breath over the past day, and she has been using her albuterol every 4 hours with minimal relief.  She presented with a similar presentation and was sent home with steroid.  Patient is a current smoker.  She also has a mild to moderate cough.  She presented for steroids.    In the ED, triage vitals were significant for tachycardia and increased respiratory rate, with desaturations to 94%.  CBC was fairly unremarkable.  CMP was significant only for decreased albumin.  CT abdomen pelvis with contrast reveals potential diverticulitis.    In the ED, patient was given continuous albuterol, Solu-Medrol, and magnesium.  Due to concerning CT findings, General surgery  was consulted.  Patient was also started on IV Zosyn.    Medicine was consulted for asthma exacerbation and management of diverticulitis.        As clarification, on 2/15/2023 , patient should be placed in hospital observation services under my care.  -Radha Galicia MD

## 2023-02-16 NOTE — NURSING
Pt arrived via bed, VS stable, PIV patent, pt awaiting consent with MD, verified allergies, pt consumed 2 bites of jello and juice this morning, continue to monitor

## 2023-02-16 NOTE — SUBJECTIVE & OBJECTIVE
Past Medical History:   Diagnosis Date    Asthma     Diverticulosis     GERD (gastroesophageal reflux disease)        No past surgical history on file.    Review of patient's allergies indicates:   Allergen Reactions    Levofloxacin Hives and Itching    Metronidazole Hives    Latex      Family History    None       Tobacco Use    Smoking status: Every Day     Packs/day: 1.00     Years: 40.00     Pack years: 40.00     Types: Cigarettes     Start date: 1983    Smokeless tobacco: Never    Tobacco comments:     Ambulatory referral to Smoking Cessation clinic following hospital discharge.    Substance and Sexual Activity    Alcohol use: Not Currently    Drug use: Not Currently    Sexual activity: Not Currently     Review of Systems   Constitutional:  Positive for fever. Negative for chills and unexpected weight change.   HENT:  Negative for congestion and trouble swallowing.    Eyes:  Negative for photophobia and visual disturbance.   Respiratory:  Negative for cough and shortness of breath.    Cardiovascular:  Negative for chest pain and leg swelling.   Gastrointestinal:  Positive for abdominal pain. Negative for abdominal distention, blood in stool, constipation and diarrhea.   Genitourinary:  Negative for dysuria and hematuria.   Musculoskeletal:  Negative for arthralgias and myalgias.   Skin:  Negative for color change and rash.   Neurological:  Negative for dizziness, light-headedness and numbness.   Psychiatric/Behavioral:  Negative for agitation and confusion.    Objective:     Vital Signs (Most Recent):  Temp: 100.1 °F (37.8 °C) (02/16/23 1130)  Pulse: 69 (02/16/23 1353)  Resp: 18 (02/16/23 1528)  BP: (!) 144/68 (02/16/23 1305)  SpO2: 95 % (02/16/23 1353)   Vital Signs (24h Range):  Temp:  [97.4 °F (36.3 °C)-100.1 °F (37.8 °C)] 100.1 °F (37.8 °C)  Pulse:  [] 69  Resp:  [15-24] 18  SpO2:  [92 %-100 %] 95 %  BP: ()/(49-87) 144/68     Weight: 69.5 kg (153 lb 3.5 oz) (02/15/23 2005)  Body mass index is  30.95 kg/m².      Intake/Output Summary (Last 24 hours) at 2/16/2023 1559  Last data filed at 2/16/2023 1312  Gross per 24 hour   Intake --   Output 5 ml   Net -5 ml       Lines/Drains/Airways       Drain  Duration                  Closed/Suction Drain 02/16/23 1310 Left;Anterior LLQ Bulb 10 Fr. <1 day              Peripheral Intravenous Line  Duration                  Peripheral IV - Single Lumen 02/15/23 1237 20 G Left Antecubital 1 day                    Physical Exam  Vitals and nursing note reviewed.   Constitutional:       Appearance: She is well-developed.   HENT:      Head: Normocephalic and atraumatic.   Eyes:      General: No scleral icterus.     Pupils: Pupils are equal, round, and reactive to light.   Cardiovascular:      Rate and Rhythm: Normal rate and regular rhythm.      Heart sounds: Normal heart sounds.   Pulmonary:      Effort: Pulmonary effort is normal. No respiratory distress.      Breath sounds: Normal breath sounds.   Abdominal:      General: Bowel sounds are normal. There is no distension.      Palpations: Abdomen is soft.      Tenderness: There is no abdominal tenderness.   Musculoskeletal:         General: Normal range of motion.      Cervical back: Normal range of motion and neck supple.   Skin:     General: Skin is warm and dry.      Findings: No erythema or rash.   Neurological:      Mental Status: She is alert and oriented to person, place, and time.      Comments: No asterixis   Psychiatric:         Behavior: Behavior normal.       Significant Labs:  Recent Lab Results         02/16/23  0305        Albumin 2.5       Alkaline Phosphatase 51       ALT 33       Anion Gap 8       AST 20       Baso # 0.02       Basophil % 0.2       BILIRUBIN TOTAL 0.8  Comment: For infants and newborns, interpretation of results should be based  on gestational age, weight and in agreement with clinical  observations.    Premature Infant recommended reference ranges:  Up to 24 hours.............<8.0  mg/dL  Up to 48 hours............<12.0 mg/dL  3-5 days..................<15.0 mg/dL  6-29 days.................<15.0 mg/dL         BUN 14       Calcium 9.0       Chloride 105       CO2 25       Creatinine 0.7       Differential Method Automated       eGFR >60       Eos # 0.1       Eosinophil % 0.7       Glucose 59       Gran # (ANC) 7.0       Gran % 58.5       Hematocrit 35.4       Hemoglobin 11.7       Immature Grans (Abs) 0.04  Comment: Mild elevation in immature granulocytes is non specific and   can be seen in a variety of conditions including stress response,   acute inflammation, trauma and pregnancy. Correlation with other   laboratory and clinical findings is essential.         Immature Granulocytes 0.3       Lymph # 3.3       Lymph % 27.4       MCH 29.8       MCHC 33.1       MCV 90       Mono # 1.6       Mono % 12.9       MPV 11.1       nRBC 0       Platelets 199       Potassium 3.8       PROTEIN TOTAL 5.5       RBC 3.93       RDW 11.9       Sodium 138       WBC 11.99               Significant Imaging:  Imaging results within the past 24 hours have been reviewed.

## 2023-02-16 NOTE — PLAN OF CARE
"CM met with pt per [employer]   pt admitted to [facility] 2/11-14/23  -- readmitted 2/15   Pt states "I just answered all those questions.  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 about a year ago to support her sister medical needs. Pt reports sister to now be ok and pt remains. Pt report to live at home alone and confirmed address on file. Pt reports while at home being independent of ADL's until her Asthma flare up. Pt reports when this happen she can be so weak that she is unable to get to bathroom etc. Pt reports no current DME in the home besides a nebulizer. Pt reported seeking assistance with caregivers in the past but not getting far. Pt report thinking it was a service at the hospital. SW confirmed that SW can offer resources however pt would have to follow through to get started. SW to add [employer] , [employer], and medical alert necklace resources to AVS. Pt reports ability to drive."    When asked about transportation to home - pt states she drove here and will drive self home.      dx:  Diverticulitis    Surg, IR consults.      CM requested PCP, GI and Surg f/u per Scheduling Navigators   Future Appointments   Date Time Provider Department Center   3/7/2023  1:30 PM [facility] [address]   3/16/2023  3:20 PM Estrella Brown MD [facility]   4/25/2023  1:10 PM GEORGI Pena [facility] G[place]         02/16/23 1524   Discharge Planning   Assessment Type Discharge Planning Assessment   Support Systems   (Pt declined to answer)   Discharge Plan A Home           "

## 2023-02-17 ENCOUNTER — CLINICAL SUPPORT (OUTPATIENT)
Dept: SMOKING CESSATION | Facility: CLINIC | Age: 53
End: 2023-02-17

## 2023-02-17 DIAGNOSIS — F17.210 CIGARETTE SMOKER: Primary | ICD-10-CM

## 2023-02-17 LAB
ALBUMIN SERPL BCP-MCNC: 2.4 G/DL (ref 3.5–5.2)
ALP SERPL-CCNC: 56 U/L (ref 55–135)
ALT SERPL W/O P-5'-P-CCNC: 22 U/L (ref 10–44)
ANION GAP SERPL CALC-SCNC: 9 MMOL/L (ref 8–16)
AST SERPL-CCNC: 9 U/L (ref 10–40)
BASOPHILS # BLD AUTO: 0.01 K/UL (ref 0–0.2)
BASOPHILS NFR BLD: 0.1 % (ref 0–1.9)
BILIRUB SERPL-MCNC: 0.4 MG/DL (ref 0.1–1)
BUN SERPL-MCNC: 13 MG/DL (ref 6–20)
CALCIUM SERPL-MCNC: 8.8 MG/DL (ref 8.7–10.5)
CHLORIDE SERPL-SCNC: 105 MMOL/L (ref 95–110)
CO2 SERPL-SCNC: 26 MMOL/L (ref 23–29)
CREAT SERPL-MCNC: 0.8 MG/DL (ref 0.5–1.4)
DIFFERENTIAL METHOD: ABNORMAL
EOSINOPHIL # BLD AUTO: 0.1 K/UL (ref 0–0.5)
EOSINOPHIL NFR BLD: 0.7 % (ref 0–8)
ERYTHROCYTE [DISTWIDTH] IN BLOOD BY AUTOMATED COUNT: 11.9 % (ref 11.5–14.5)
EST. GFR  (NO RACE VARIABLE): >60 ML/MIN/1.73 M^2
GLUCOSE SERPL-MCNC: 150 MG/DL (ref 70–110)
HCT VFR BLD AUTO: 34.2 % (ref 37–48.5)
HGB BLD-MCNC: 11.5 G/DL (ref 12–16)
IMM GRANULOCYTES # BLD AUTO: 0.03 K/UL (ref 0–0.04)
IMM GRANULOCYTES NFR BLD AUTO: 0.3 % (ref 0–0.5)
LYMPHOCYTES # BLD AUTO: 3.4 K/UL (ref 1–4.8)
LYMPHOCYTES NFR BLD: 32.9 % (ref 18–48)
MCH RBC QN AUTO: 30 PG (ref 27–31)
MCHC RBC AUTO-ENTMCNC: 33.6 G/DL (ref 32–36)
MCV RBC AUTO: 89 FL (ref 82–98)
MONOCYTES # BLD AUTO: 1.5 K/UL (ref 0.3–1)
MONOCYTES NFR BLD: 14.4 % (ref 4–15)
NEUTROPHILS # BLD AUTO: 5.4 K/UL (ref 1.8–7.7)
NEUTROPHILS NFR BLD: 51.6 % (ref 38–73)
NRBC BLD-RTO: 0 /100 WBC
PLATELET # BLD AUTO: 182 K/UL (ref 150–450)
PMV BLD AUTO: 10.8 FL (ref 9.2–12.9)
POCT GLUCOSE: 111 MG/DL (ref 70–110)
POCT GLUCOSE: 139 MG/DL (ref 70–110)
POTASSIUM SERPL-SCNC: 3.5 MMOL/L (ref 3.5–5.1)
PROT SERPL-MCNC: 5.6 G/DL (ref 6–8.4)
RBC # BLD AUTO: 3.83 M/UL (ref 4–5.4)
SODIUM SERPL-SCNC: 140 MMOL/L (ref 136–145)
WBC # BLD AUTO: 10.46 K/UL (ref 3.9–12.7)

## 2023-02-17 PROCEDURE — 94640 AIRWAY INHALATION TREATMENT: CPT

## 2023-02-17 PROCEDURE — 25000242 PHARM REV CODE 250 ALT 637 W/ HCPCS: Performed by: STUDENT IN AN ORGANIZED HEALTH CARE EDUCATION/TRAINING PROGRAM

## 2023-02-17 PROCEDURE — 99999 PR PBB SHADOW E&M-EST. PATIENT-LVL I: ICD-10-PCS | Mod: PBBFAC,,,

## 2023-02-17 PROCEDURE — 94761 N-INVAS EAR/PLS OXIMETRY MLT: CPT

## 2023-02-17 PROCEDURE — 25000003 PHARM REV CODE 250: Performed by: STUDENT IN AN ORGANIZED HEALTH CARE EDUCATION/TRAINING PROGRAM

## 2023-02-17 PROCEDURE — 11000001 HC ACUTE MED/SURG PRIVATE ROOM

## 2023-02-17 PROCEDURE — 80053 COMPREHEN METABOLIC PANEL: CPT | Performed by: STUDENT IN AN ORGANIZED HEALTH CARE EDUCATION/TRAINING PROGRAM

## 2023-02-17 PROCEDURE — 99406 BEHAV CHNG SMOKING 3-10 MIN: CPT | Mod: S$GLB,,,

## 2023-02-17 PROCEDURE — 25000003 PHARM REV CODE 250: Performed by: NURSE PRACTITIONER

## 2023-02-17 PROCEDURE — 36415 COLL VENOUS BLD VENIPUNCTURE: CPT | Performed by: STUDENT IN AN ORGANIZED HEALTH CARE EDUCATION/TRAINING PROGRAM

## 2023-02-17 PROCEDURE — 99999 PR PBB SHADOW E&M-EST. PATIENT-LVL I: CPT | Mod: PBBFAC,,,

## 2023-02-17 PROCEDURE — 25000003 PHARM REV CODE 250: Performed by: HOSPITALIST

## 2023-02-17 PROCEDURE — 63600175 PHARM REV CODE 636 W HCPCS: Performed by: STUDENT IN AN ORGANIZED HEALTH CARE EDUCATION/TRAINING PROGRAM

## 2023-02-17 PROCEDURE — S4991 NICOTINE PATCH NONLEGEND: HCPCS | Performed by: INTERNAL MEDICINE

## 2023-02-17 PROCEDURE — 85025 COMPLETE CBC W/AUTO DIFF WBC: CPT | Performed by: STUDENT IN AN ORGANIZED HEALTH CARE EDUCATION/TRAINING PROGRAM

## 2023-02-17 PROCEDURE — 25000003 PHARM REV CODE 250: Performed by: INTERNAL MEDICINE

## 2023-02-17 PROCEDURE — 99406 PT REFUSED TOBACCO CESSATION: ICD-10-PCS | Mod: S$GLB,,,

## 2023-02-17 RX ORDER — SOD SULF/POT CHLORIDE/MAG SULF 1.479 G
12 TABLET ORAL DAILY
Qty: 24 TABLET | Refills: 0 | Status: ON HOLD | OUTPATIENT
Start: 2023-02-17 | End: 2023-05-01 | Stop reason: HOSPADM

## 2023-02-17 RX ORDER — DOCUSATE SODIUM 100 MG/1
100 CAPSULE, LIQUID FILLED ORAL DAILY
Status: DISCONTINUED | OUTPATIENT
Start: 2023-02-18 | End: 2023-02-17

## 2023-02-17 RX ORDER — DOCUSATE SODIUM 100 MG/1
100 CAPSULE, LIQUID FILLED ORAL DAILY PRN
Status: DISCONTINUED | OUTPATIENT
Start: 2023-02-17 | End: 2023-02-19 | Stop reason: HOSPADM

## 2023-02-17 RX ORDER — SENNOSIDES 8.6 MG/1
8.6 TABLET ORAL DAILY PRN
Status: DISCONTINUED | OUTPATIENT
Start: 2023-02-17 | End: 2023-02-17

## 2023-02-17 RX ORDER — BISACODYL 5 MG
5 TABLET, DELAYED RELEASE (ENTERIC COATED) ORAL DAILY PRN
Status: CANCELLED | OUTPATIENT
Start: 2023-02-17

## 2023-02-17 RX ADMIN — NICOTINE 1 PATCH: 21 PATCH, EXTENDED RELEASE TRANSDERMAL at 08:02

## 2023-02-17 RX ADMIN — IPRATROPIUM BROMIDE AND ALBUTEROL SULFATE 3 ML: 2.5; .5 SOLUTION RESPIRATORY (INHALATION) at 01:02

## 2023-02-17 RX ADMIN — PANTOPRAZOLE SODIUM 40 MG: 40 TABLET, DELAYED RELEASE ORAL at 08:02

## 2023-02-17 RX ADMIN — MORPHINE SULFATE 2 MG: 2 INJECTION, SOLUTION INTRAMUSCULAR; INTRAVENOUS at 10:02

## 2023-02-17 RX ADMIN — DOCUSATE SODIUM 100 MG: 100 CAPSULE, LIQUID FILLED ORAL at 11:02

## 2023-02-17 RX ADMIN — HYDROMORPHONE HYDROCHLORIDE 1 MG: 1 INJECTION, SOLUTION INTRAMUSCULAR; INTRAVENOUS; SUBCUTANEOUS at 03:02

## 2023-02-17 RX ADMIN — MONTELUKAST 10 MG: 10 TABLET, FILM COATED ORAL at 08:02

## 2023-02-17 RX ADMIN — PIPERACILLIN AND TAZOBACTAM 4.5 G: 4; .5 INJECTION, POWDER, LYOPHILIZED, FOR SOLUTION INTRAVENOUS; PARENTERAL at 06:02

## 2023-02-17 RX ADMIN — PIPERACILLIN AND TAZOBACTAM 4.5 G: 4; .5 INJECTION, POWDER, LYOPHILIZED, FOR SOLUTION INTRAVENOUS; PARENTERAL at 10:02

## 2023-02-17 RX ADMIN — PROPRANOLOL HYDROCHLORIDE 60 MG: 10 TABLET ORAL at 08:02

## 2023-02-17 RX ADMIN — IPRATROPIUM BROMIDE AND ALBUTEROL SULFATE 3 ML: 2.5; .5 SOLUTION RESPIRATORY (INHALATION) at 07:02

## 2023-02-17 RX ADMIN — HYDROMORPHONE HYDROCHLORIDE 1 MG: 1 INJECTION, SOLUTION INTRAMUSCULAR; INTRAVENOUS; SUBCUTANEOUS at 08:02

## 2023-02-17 RX ADMIN — CETIRIZINE HYDROCHLORIDE 10 MG: 10 TABLET, FILM COATED ORAL at 08:02

## 2023-02-17 RX ADMIN — FLUTICASONE FUROATE AND VILANTEROL TRIFENATATE 1 PUFF: 100; 25 POWDER RESPIRATORY (INHALATION) at 07:02

## 2023-02-17 RX ADMIN — PROPRANOLOL HYDROCHLORIDE 60 MG: 10 TABLET ORAL at 10:02

## 2023-02-17 RX ADMIN — HYDROMORPHONE HYDROCHLORIDE 1 MG: 1 INJECTION, SOLUTION INTRAMUSCULAR; INTRAVENOUS; SUBCUTANEOUS at 07:02

## 2023-02-17 RX ADMIN — IPRATROPIUM BROMIDE AND ALBUTEROL SULFATE 3 ML: 2.5; .5 SOLUTION RESPIRATORY (INHALATION) at 08:02

## 2023-02-17 RX ADMIN — PIPERACILLIN AND TAZOBACTAM 4.5 G: 4; .5 INJECTION, POWDER, LYOPHILIZED, FOR SOLUTION INTRAVENOUS; PARENTERAL at 01:02

## 2023-02-17 RX ADMIN — HYDROMORPHONE HYDROCHLORIDE 1 MG: 1 INJECTION, SOLUTION INTRAMUSCULAR; INTRAVENOUS; SUBCUTANEOUS at 01:02

## 2023-02-17 RX ADMIN — PREDNISONE 40 MG: 20 TABLET ORAL at 08:02

## 2023-02-17 NOTE — PROGRESS NOTES
Smoking cessation education follow-up: No reported nicotine withdrawal with 21 mg nicotine patch Q day in use. Reviewed details of pt's initial Ambualtory Smoking Cessation clinic appointment, and she states no conflicts, pending hospital discharge.     Virtual appointment scheduled: 3/7/23 at 1:30 pm with ANCELMO Reyes

## 2023-02-17 NOTE — ASSESSMENT & PLAN NOTE
"CT abdomen pelvis with contrast shows acute diverticulitis.     Per last Gen Surgery clinic note:  51 y/o female known to me. Somewhat of a difficult case due to pt's personality--somewhat manipulative/defiant regarding compliance with clinical recommendations. Pt with a hx of recurrent diverticular dz--multiple recent admission secondary to flares of diverticular dz including complicated consequences--small abscess (too small to drain). Pt completed 6 weeks IV zosyn. Essentially, current clinical course is in stage of waiting for 6-8 week "cool off" from last attack , then colonoscopy followed by planning for sigmoid colectomy due to recurrent attacks/complicated attacks and difficulty clearing infection.     Start IV Zosyn every 8 hours and de-escalate to PO after culture results are available 10-14 day course  IR drained abscess and placed a drain  F/u GI and IR recs    "

## 2023-02-17 NOTE — PLAN OF CARE
Pt on RA with documented sats. The proper method of use, as well as anticipated side effects, of this metered-dose inhaler are discussed and demonstrated to the patient. The proper method of use, as well as anticipated side effects, of this aerosol treatment are discussed and demonstrated to the patient.  Will continue to monitor.

## 2023-02-17 NOTE — PLAN OF CARE
Room air SpO2 = 96% . Patient with no SOB.  CRC will continue to follow.    Patient given nebulized treatment. Patient instructed on proper breathing technique, along with benefits of therapy.

## 2023-02-17 NOTE — PROGRESS NOTES
[facility] Medicine  Progress Note    Patient Name: Niya Moeller  MRN: [id number]  Patient Class: IP- Inpatient   Admission Date: 2/15/2023  Length of Stay: 1 days  Attending Physician: Mai Becker MD  Primary Care Provider: YEHUDA Adamson        Subjective:     Principal Problem:Diverticulitis        HPI:  Niya Moeller is a 52yr with PMH of asthma?, diverticulosis, GERD presents with respiratory distress.    Patient has had increasing shortness of breath over the past day, and she has been using her albuterol every 4 hours with minimal relief.  She presented with a similar presentation and was sent home with steroid.  Patient is a current smoker.  She also has a mild to moderate cough.  She presented for steroids.    In the ED, triage vitals were significant for tachycardia and increased respiratory rate, with desaturations to 94%.  CBC was fairly unremarkable.  CMP was significant only for decreased albumin.  CT abdomen pelvis with contrast reveals potential diverticulitis.    In the ED, patient was given continuous albuterol, Solu-Medrol, and magnesium.  Due to concerning CT findings, General surgery  was consulted.  Patient was also started on IV Zosyn.    Medicine was consulted for asthma exacerbation and management of diverticulitis.      Overview/Hospital Course:  2/17 Stable, having pain at drain site but her feels her pain from the diverticulitis is better      Interval History: Today is having LLQ but feels it around the entry of the percutaneous drain site which has some blood drainage    Review of Systems   Constitutional:  Negative for chills and diaphoresis.   HENT:  Negative for ear discharge and ear pain.    Eyes:  Negative for pain and redness.   Respiratory:  Negative for shortness of breath and wheezing.    Cardiovascular:  Negative for palpitations and leg swelling.   Gastrointestinal:  Positive for abdominal pain. Negative for blood in stool and  constipation.   Endocrine: Negative for polydipsia and polyphagia.   Genitourinary:  Negative for dysuria and enuresis.   Musculoskeletal:  Negative for back pain and gait problem.   Skin:  Negative for pallor and rash.   Neurological:  Negative for light-headedness and headaches.   Hematological:  Negative for adenopathy. Does not bruise/bleed easily.   Psychiatric/Behavioral:  Negative for hallucinations. The patient is not hyperactive.    Objective:     Vital Signs (Most Recent):  Temp: 97.7 °F (36.5 °C) (02/17/23 1515)  Pulse: 68 (02/17/23 1600)  Resp: 17 (02/17/23 1515)  BP: (!) 101/49 (02/17/23 1515)  SpO2: 97 % (02/17/23 1515)   Vital Signs (24h Range):  Temp:  [96.6 °F (35.9 °C)-97.9 °F (36.6 °C)] 97.7 °F (36.5 °C)  Pulse:  [64-77] 68  Resp:  [15-20] 17  SpO2:  [96 %-100 %] 97 %  BP: (100-124)/(49-57) 101/49     Weight: 69.5 kg (153 lb 3.5 oz)  Body mass index is 30.95 kg/m².    Intake/Output Summary (Last 24 hours) at 2/17/2023 1705  Last data filed at 2/17/2023 0138  Gross per 24 hour   Intake 496.48 ml   Output --   Net 496.48 ml      Physical Exam  Constitutional:       Appearance: Normal appearance. She is not ill-appearing or diaphoretic.   HENT:      Head: Normocephalic and atraumatic.      Right Ear: There is no impacted cerumen.      Left Ear: There is no impacted cerumen.      Nose: No congestion or rhinorrhea.      Mouth/Throat:      Pharynx: No oropharyngeal exudate or posterior oropharyngeal erythema.   Eyes:      General:         Right eye: No discharge.         Left eye: No discharge.   Cardiovascular:      Rate and Rhythm: Normal rate.      Heart sounds: No murmur heard.    No friction rub.   Pulmonary:      Breath sounds: No rhonchi.   Abdominal:      Tenderness: There is abdominal tenderness. There is no guarding or rebound.      Comments: JOE drain in place   Musculoskeletal:         General: No swelling or deformity.   Skin:     Coloration: Skin is not jaundiced.      Findings: No  "bruising.   Neurological:      Mental Status: She is alert.      Cranial Nerves: No cranial nerve deficit.      Motor: No weakness.   Psychiatric:         Mood and Affect: Mood normal.       Significant Labs: All pertinent labs within the past 24 hours have been reviewed.    Significant Imaging: I have reviewed all pertinent imaging results/findings within the past 24 hours.      Assessment/Plan:      * Diverticulitis  CT abdomen pelvis with contrast shows acute diverticulitis.     Per last Gen Surgery clinic note:  53 y/o female known to me. Somewhat of a difficult case due to pt's personality--somewhat manipulative/defiant regarding compliance with clinical recommendations. Pt with a hx of recurrent diverticular dz--multiple recent admission secondary to flares of diverticular dz including complicated consequences--small abscess (too small to drain). Pt completed 6 weeks IV zosyn. Essentially, current clinical course is in stage of waiting for 6-8 week "cool off" from last attack , then colonoscopy followed by planning for sigmoid colectomy due to recurrent attacks/complicated attacks and difficulty clearing infection.     Start IV Zosyn every 8 hours and de-escalate to PO after culture results are available 10-14 day course  IR drained abscess and placed a drain  F/u GI and IR recs      Hypoglycemia  BG 59 this am  Not on insulin  Accucheck QID, advancing diet  Hypoglycemia protocol      Thyroiditis  No current hypothyroid symptoms    Plan:  May continue methimazole  May continue propranolol 60 mg        GERD (gastroesophageal reflux disease)  No current complaints    Plan:  May continue home omeprazole dose      Asthma exacerbation  Presents with shortness of breath, wheezing, hypoxemia.  Likely in a asthma versus COPD exacerbation    Plan:  Continue steroids for 5 day total course  DuoNebs every 4 hours as needed  Continue Symbicort daily dose  Discuss decreasing tobacco use        VTE Risk Mitigation (From " admission, onward)         Ordered     IP VTE LOW RISK PATIENT  Once         02/15/23 1845     Place sequential compression device  Until discontinued         02/15/23 1845                Discharge Planning   ROXIE:      Code Status: Full Code   Is the patient medically ready for discharge?:     Reason for patient still in hospital (select all that apply): Treatment and Consult recommendations  Discharge Plan A: Home   Discharge Delays: None known at this time              Mai Becker MD  Department of [facility]

## 2023-02-17 NOTE — PROGRESS NOTES
"Surgery follow up  BP (!) 103/56 (BP Location: Right arm, Patient Position: Sitting)   Pulse 70   Temp 97 °F (36.1 °C) (Oral)   Resp 18   Ht 4' 11" (1.499 m)   Wt 69.5 kg (153 lb 3.5 oz)   SpO2 100%   BMI 30.95 kg/m²   I/O last 3 completed shifts:  In: 496.5 [P.O.:200; IV Piggyback:296.5]  Out: 5 [Other:5]  No intake/output data recorded.    Recent Results (from the past 336 hour(s))   CBC with Automated Differential    Collection Time: 02/17/23  2:57 AM   Result Value Ref Range    WBC 10.46 3.90 - 12.70 K/uL    Hemoglobin 11.5 (L) 12.0 - 16.0 g/dL    Hematocrit 34.2 (L) 37.0 - 48.5 %    Platelets 182 150 - 450 K/uL   CBC with Automated Differential    Collection Time: 02/16/23  3:05 AM   Result Value Ref Range    WBC 11.99 3.90 - 12.70 K/uL    Hemoglobin 11.7 (L) 12.0 - 16.0 g/dL    Hematocrit 35.4 (L) 37.0 - 48.5 %    Platelets 199 150 - 450 K/uL   CBC W/ AUTO DIFFERENTIAL    Collection Time: 02/15/23 12:37 PM   Result Value Ref Range    WBC 11.59 3.90 - 12.70 K/uL    Hemoglobin 12.9 12.0 - 16.0 g/dL    Hematocrit 39.1 37.0 - 48.5 %    Platelets 194 150 - 450 K/uL     S/p IR drainage of pericolonic abscess  Continue iv antibiotics  and present treatment.  "

## 2023-02-17 NOTE — NURSING
AAOx4, VS wnl on room air, SAT-99%. Small sanguineous blood in suction drainage. Bowl sounds normal active x4, tender to touch. Hydromorphone injection 1 mg, alternated w/ Morphine 2 mg given for 9/10 pain. POC reviewed w/ patient. Bed locked, in low position, and call light in reach. Will continue to monitor.

## 2023-02-17 NOTE — SUBJECTIVE & OBJECTIVE
Interval History: Today is having LLQ but feels it around the entry of the percutaneous drain site which has some blood drainage    Review of Systems   Constitutional:  Negative for chills and diaphoresis.   HENT:  Negative for ear discharge and ear pain.    Eyes:  Negative for pain and redness.   Respiratory:  Negative for shortness of breath and wheezing.    Cardiovascular:  Negative for palpitations and leg swelling.   Gastrointestinal:  Positive for abdominal pain. Negative for blood in stool and constipation.   Endocrine: Negative for polydipsia and polyphagia.   Genitourinary:  Negative for dysuria and enuresis.   Musculoskeletal:  Negative for back pain and gait problem.   Skin:  Negative for pallor and rash.   Neurological:  Negative for light-headedness and headaches.   Hematological:  Negative for adenopathy. Does not bruise/bleed easily.   Psychiatric/Behavioral:  Negative for hallucinations. The patient is not hyperactive.    Objective:     Vital Signs (Most Recent):  Temp: 97.7 °F (36.5 °C) (02/17/23 1515)  Pulse: 68 (02/17/23 1600)  Resp: 17 (02/17/23 1515)  BP: (!) 101/49 (02/17/23 1515)  SpO2: 97 % (02/17/23 1515)   Vital Signs (24h Range):  Temp:  [96.6 °F (35.9 °C)-97.9 °F (36.6 °C)] 97.7 °F (36.5 °C)  Pulse:  [64-77] 68  Resp:  [15-20] 17  SpO2:  [96 %-100 %] 97 %  BP: (100-124)/(49-57) 101/49     Weight: 69.5 kg (153 lb 3.5 oz)  Body mass index is 30.95 kg/m².    Intake/Output Summary (Last 24 hours) at 2/17/2023 1705  Last data filed at 2/17/2023 0138  Gross per 24 hour   Intake 496.48 ml   Output --   Net 496.48 ml      Physical Exam  Constitutional:       Appearance: Normal appearance. She is not ill-appearing or diaphoretic.   HENT:      Head: Normocephalic and atraumatic.      Right Ear: There is no impacted cerumen.      Left Ear: There is no impacted cerumen.      Nose: No congestion or rhinorrhea.      Mouth/Throat:      Pharynx: No oropharyngeal exudate or posterior oropharyngeal  erythema.   Eyes:      General:         Right eye: No discharge.         Left eye: No discharge.   Cardiovascular:      Rate and Rhythm: Normal rate.      Heart sounds: No murmur heard.    No friction rub.   Pulmonary:      Breath sounds: No rhonchi.   Abdominal:      Tenderness: There is abdominal tenderness. There is no guarding or rebound.      Comments: JOE drain in place   Musculoskeletal:         General: No swelling or deformity.   Skin:     Coloration: Skin is not jaundiced.      Findings: No bruising.   Neurological:      Mental Status: She is alert.      Cranial Nerves: No cranial nerve deficit.      Motor: No weakness.   Psychiatric:         Mood and Affect: Mood normal.       Significant Labs: All pertinent labs within the past 24 hours have been reviewed.    Significant Imaging: I have reviewed all pertinent imaging results/findings within the past 24 hours.

## 2023-02-17 NOTE — HOSPITAL COURSE
2/17 Stable, having pain at drain site but her feels her pain from the diverticulitis is better  2/18 Drain output 15ml, pain improving. Awaiting culture results

## 2023-02-17 NOTE — PLAN OF CARE
went to meet with patient. Patient confirmed address on facesheet is correct ([place]). Her PCP is in [place]. She is agreeable to PCC appointment if it works with her schedule. I will get PCC nurse to speak with her.     Update: [facility] nurse, stated patient prefers televisit with her PCP. Will request from access navigators.    Endocrine and Pulm follow-up appointments scheduled from previous admission. GI appointment scheduled. Due to patient's location and insurance, she will have to either go to [facility] [place] or [facility].    Patient Contacts    Name Relation Home Work Mobile   Dustin Viveros   [phone]      YEHUDA Adamson FNP-C PCP - General Family Medicine [phone] [facility] [address] [address]     Next Steps: Follow up  Appointment:   Instructions: Tele-Visit requested from access navigators.    Dr. Eun Donaldson     [facility] [place] [address]VF: [phone]     Next Steps: Follow up on 3/8/2023  Appointment:   Instructions: at 10:00 am-Endocrinology Follow-Up This is the soonest appointment due to location/insurance. This is located near [facility].    YOLANDA Pierce NP     [address] [facility] [address] [place] [address] PH: [phone]     Next Steps: Follow up on 3/10/2023  Appointment:   Instructions: at 11:00 am--Gastroenterology Follow-Up     Future Appointments   Date Time Provider Department Center   3/7/2023  1:30 PM Anthony Moeller, RRT [facility] S[facility] [address]   3/16/2023  3:20 PM Esterlla Brown MD [facility] PULMO [facility]   4/25/2023  1:10 PM GEORGI Pena [facility] GYN [place] [facility]        02/17/23 1304   Discharge Reassessment   Assessment Type Discharge Planning Reassessment   Did the patient's condition or plan change since previous assessment? No   Discharge Plan discussed  with: Patient   Discharge Plan A Home   Discharge Plan B Home with family   DME Needed Upon Discharge  none   Discharge Barriers Identified None   Why the patient remains in the hospital Requires continued medical care   Post-Acute Status   Hospital Resources/Appts/Education Provided Appointments scheduled by Navigator/Coordinator   Discharge Delays None known at this time     Perla Dupree RN    [phone]

## 2023-02-17 NOTE — DISCHARGE SUMMARY
[facility] Medicine  Discharge Summary      Patient Name: Niya Moeller  MRN: [id number]  [facility]: [id number]  Patient Class: IP- Inpatient  Admission Date: 2/11/2023  Hospital Length of Stay: 3 days  Discharge Date and Time: 2/14/2023 10:31 AM  Attending Physician: No att. providers found   Discharging Provider: Sol Aguirre NP  Primary Care Provider: YEHUDA Adamson    Primary Care Team: Networked reference to record PCT     HPI:   Ms. Núñez is a 52-year-old female with a past medical history of Asthma, diverticulitis and GERD. Patient presented to [facility] emergency department with concern for shortness of breath x 3 days   Patient stated the shortness of breath for the past 3 days has progressively gotten worst. Patient denies any associated symptoms such as nausea, vomiting or diarrhea. Patient denies fever or chills. Patient did endorse chest pain after trying to take a deep breath in. Patient stated breathing treatment does not work for her it only increases her anxiety.  Patient stated she has appointment with a pulmonologist in March.      On evaluation and presentation in the emergency department patient was afebrile initial  blood pressure was 129/60 , RR 38, 94% on RA. Patient lab work results showed Glucose 136, BNP 65, MG 1.2, COVID negative. Patient was given solumedrol 125mg and 2g og MG. Patient chest xray on 1/31/23 results showed Mediastinal silhouette is within normal limits. Lungs are hyperexpanded.  No pneumothorax or pleural effusion.  No acute osseous finding.  Emphysema/COPD. Will repeat chest xray in the a.m     On assessment patient is alert an oriented x4. Patient voiced no pain at this time. Patient lung sounds are diminished inspiratory and expiratory wheezing, patient has xopenex ordered. Abdomen soft and non-tender. No edema noted in the lower extremities.             * No surgery found *      Hospital Course:   She was admitted to the  hospital medicine service for further care.  Noted with an asthma exacerbation on admission.  She was started on IV Solu-Medrol.  Chest x-ray without any acute findings.  She was also given DuoNebs.  Pulmonary was consulted.  Appreciate recs.  Will continue patient on Breo daily, Xopenex nebs q.6, montelukast, and cetirizine.  Supplemental O2 noted--patient is not on home O2.  Patient complained of throat pain on assessment.  Patient with complaints of pain on swallowing.  Thyroid function indicative for hyperthyroidism.  Soft tissue head and neck ultrasound--  FINDINGS:   The thyroid gland is normal in size with mildly heterogeneous echotexture and diffuse hypervascularity on color Doppler imaging.  The right lobe measures 4.7 x 1.9 x 1.4 cm.  The left lobe measures 4.2 x 2.0 x 1.2 cm.  The isthmus measures 2 mm.     Estimated total number of nodules ?1cm:0number of spongiform nodules ?2cmnot described below (TR1): 0 Number of mixed cystic and solid nodules ?1.5cm not described below (TR2): 0    Impression:       Thyroiditis      consult endocrinology.  Ppi was ordered and Carafate (Arkevea Selmen 2/13/2023)    Endocrinology Recommended to start Tapazole 10 mg twice a day , repeat her thyroid function in in 4 weeks  Patient reported mild abdominal discomfort possibly related to diverticulitis (history of). She was recently on Augmentin and flagyl.  Xray abdomen demonstrated mild constipation. Recommended to resume abx. Patient adamant about going home and states will resume her  home Augmentin and flagyl. She states antibiotic in her purse. Referrals entered in Epic.        Goals of Care Treatment Preferences:  Code Status: Full Code      Consults:   Consults (From admission, onward)        Status Ordering Provider     [facility]  Once        Provider:  (Not yet assigned)    ALEXX Gold     Inpatient consult to Pulmonology  Once        Provider:  (Not yet  assigned)    Completed AVERY-ALEXX BALES          Pulmonary  * Moderate persistent asthma with (acute) exacerbation  Appreciate pulmonary  Started on O2 in the ER---can likely wean.  Patient is not on home O2.  Patient with asthma exacerbation.  Patient states she uses albuterol pump inhaler times 12 at home daily.  Patient states she is compliant with all her home meds.  - Continue LABA/ICS (Breo on formulary inpatient)          - discharge on Symbicort  - given 125mg solumderol and now on q6h 60mg solumedrol          - recommend decreasing to daily 60mg solumedrol, will taper based on clinical response          - patient states she often needs prolonged tapers of steroids  - nebulizer treatments q4h while awake for 8 doses, then PRN for wheezing and dyspnea  - anti tussives PRN  - will need PFTs as outpatient and pulmonary follow up at [facility]          - will arrange for this          - needs consideration for biologic agent as she has frequent exacerbations  - counseled on smoking cessation  - patient vehemently declines vaccinations  - she needs good control of her triggers, specifically her GERD and allergic rhinitis          - continue ceterizine, flonase, montelukast and PPI  - needs to quit smoking, agreeable to start varenicline     We will continue montelukast at home dose and cetirizine.  Will order Flonase.      Endocrine  Hyperthyroidism  Thyroiditis    Free T4--3.09  TSH < 0.026  Consulting endocrinology  TTE pending  Patient is tachycardic with heart rate of low 100s-120s, patient is also extremely fidgety and anxious.  Unsure if the anxiety/jitteriness is secondary to steroid versus hyperthyroidism    Ultrasound of the head and neck soft tissue--  FINDINGS:   The thyroid gland is normal in size with mildly heterogeneous echotexture and diffuse hypervascularity on color Doppler imaging.  The right lobe measures 4.7 x 1.9 x 1.4 cm.  The left lobe measures 4.2 x 2.0 x 1.2 cm.  The isthmus measures  2 mm.     Estimated total number of nodules ?1cm:0number of spongiform nodules ?2cmnot described below (TR1): 0 Number of mixed cystic and solid nodules ?1.5cm not described below (TR2): 0    Impression:       Thyroiditis     TIRADS recommendations:     TI-RADS 1 and 2-benign, no FNA.     TI-RADS 3-follow-up if 1.5 cm or larger.  FNA if 2.5 cm or larger.     TI-RADS 4-follow-up if 1.0 cm or larger.  FNA if 1.5 cm or larger.     TI-RADS 5-follow-up if 5 mm or larger.  FNA if 1.0 cm or larger.    Will add Propanolol 20 mg tid    GI  Colitis  Chronic.       Diverticulitis  Chronic.  Patient reports completion of this treatment on last year.      GERD (gastroesophageal reflux disease)  -Protonix and Carafate ordered        Final Active Diagnoses:    Diagnosis Date Noted POA    PRINCIPAL PROBLEM:  Moderate persistent asthma with (acute) exacerbation [J45.41] 02/11/2023 Yes    Tachycardia [R00.0] 02/14/2023 Yes    Other chest pain [R07.89] 02/14/2023 Yes    Diastolic dysfunction, left ventricle [I51.9] 02/13/2023 Yes    Hyperthyroidism [E05.90] 02/12/2023 Yes    Thyroiditis [E06.9] 02/12/2023 Yes    Colitis [K52.9]  Yes    Diverticulitis [K57.92] 07/06/2022 Yes     Chronic    GERD (gastroesophageal reflux disease) [K21.9] 06/29/2022 Yes     Chronic      Problems Resolved During this Admission:       Discharged Condition: stable    Disposition: Home or Self Care    Follow Up:   Follow-up Information     Cecilio Carrasco MD .    Specialty: Internal Medicine  Contact information:  [address] [address]  [phone]                       Patient Instructions:      TSH   Standing Status: Future Standing Exp. Date: 04/14/24     T3   Standing Status: Future Standing Exp. Date: 04/14/24     T4, FREE   Standing Status: Future Standing Exp. Date: 04/14/24     Ambulatory referral[facility] to [facility]   Standing Status: Future   Referral Priority: Routine Referral Type:  Consultation   Referral Reason: Specialty Services Required   Number of Visits Requested: 1     Ambulatory referral/consult to Pulmonology   Standing Status: Future   Referral Priority: Routine Referral Type: Consultation   Referral Reason: Specialty Services Required   Requested Specialty: Pulmonary Disease   Number of Visits Requested: 1     Ambulatory referral/consult to Gastroenterology   Standing Status: Future   Referral Priority: Routine Referral Type: Consultation   Referral Reason: Specialty Services Required   Requested Specialty: Gastroenterology   Number of Visits Requested: 1     Ambulatory referral/consult to Endocrinology   Standing Status: Future   Referral Priority: Routine Referral Type: Consultation   Requested Specialty: Endocrinology   Number of Visits Requested: 1     Ambulatory referral/consult to Smoking Cessation Program   Standing Status: Future   Referral Priority: Routine Referral Type: Consultation   Referral Reason: Specialty Services Required   Requested Specialty: CTTS   Number of Visits Requested: 1     Ambulatory referral/consult to Gastroenterology   Standing Status: Future   Referral Priority: Routine Referral Type: Consultation   Referral Reason: Specialty Services Required   Requested Specialty: Gastroenterology   Number of Visits Requested: 1     Diet Adult Regular     Notify your health care provider if you experience any of the following:  difficulty breathing or increased cough     Notify your health care provider if you experience any of the following:  persistent nausea and vomiting or diarrhea     Notify your health care provider if you experience any of the following:  temperature >100.4     Complete PFT w/ bronchodilator   Standing Status: Future Standing Exp. Date: 02/13/24     Order Specific Question Answer Comments   Release to patient Immediate      Activity as tolerated       Significant Diagnostic Studies: Labs: All labs within the past 24 hours have been  reviewed    Pending Diagnostic Studies:     None         Medications:  Reconciled Home Medications:      Medication List      START taking these medications    methIMAzole 10 MG Tab  Commonly known as: TAPAZOLE  Take 1 tablet (10 mg total) by mouth 2 (two) times daily.     predniSONE 20 MG tablet  Commonly known as: DELTASONE  Take 2 tablets (40 mg total) by mouth once daily. for 6 days     propranoloL 60 MG 24 hr capsule  Commonly known as: INDERAL LA  Take 1 capsule (60 mg total) by mouth once daily.        CHANGE how you take these medications    albuterol 90 mcg/actuation inhaler  Commonly known as: PROVENTIL/VENTOLIN HFA  Inhale 1-2 puffs into the lungs every 6 (six) hours as needed for Wheezing. Rescue  What changed: Another medication with the same name was removed. Continue taking this medication, and follow the directions you see here.        CONTINUE taking these medications    budesonide-formoterol 160-4.5 mcg 160-4.5 mcg/actuation Hfaa  Commonly known as: SYMBICORT  Inhale 2 puffs into the lungs every 12 (twelve) hours. Controller     ondansetron 4 MG Tbdl  Commonly known as: ZOFRAN-ODT  Take 2 tablets (8 mg total) by mouth 2 (two) times daily.        STOP taking these medications    albuterol-ipratropium 2.5 mg-0.5 mg/3 mL nebulizer solution  Commonly known as: DUO-NEB            Indwelling Lines/Drains at time of discharge:   Lines/Drains/Airways     None                 Time spent on the discharge of patient: 40 minutes         Sol Aguirre NP  Department of [facility] Medicine  [facility]

## 2023-02-18 PROBLEM — D69.1 THROMBOCYTOPATHIA: Status: ACTIVE | Noted: 2023-02-18

## 2023-02-18 LAB
ALBUMIN SERPL BCP-MCNC: 2.7 G/DL (ref 3.5–5.2)
ALP SERPL-CCNC: 71 U/L (ref 55–135)
ALT SERPL W/O P-5'-P-CCNC: 33 U/L (ref 10–44)
ANION GAP SERPL CALC-SCNC: 9 MMOL/L (ref 8–16)
AST SERPL-CCNC: 22 U/L (ref 10–40)
BASOPHILS # BLD AUTO: 0.01 K/UL (ref 0–0.2)
BASOPHILS NFR BLD: 0.1 % (ref 0–1.9)
BILIRUB SERPL-MCNC: 0.2 MG/DL (ref 0.1–1)
BUN SERPL-MCNC: 11 MG/DL (ref 6–20)
CALCIUM SERPL-MCNC: 9.4 MG/DL (ref 8.7–10.5)
CHLORIDE SERPL-SCNC: 108 MMOL/L (ref 95–110)
CO2 SERPL-SCNC: 24 MMOL/L (ref 23–29)
CREAT SERPL-MCNC: 0.7 MG/DL (ref 0.5–1.4)
DIFFERENTIAL METHOD: ABNORMAL
EOSINOPHIL # BLD AUTO: 0 K/UL (ref 0–0.5)
EOSINOPHIL NFR BLD: 0.3 % (ref 0–8)
ERYTHROCYTE [DISTWIDTH] IN BLOOD BY AUTOMATED COUNT: 11.9 % (ref 11.5–14.5)
EST. GFR  (NO RACE VARIABLE): >60 ML/MIN/1.73 M^2
GLUCOSE SERPL-MCNC: 99 MG/DL (ref 70–110)
HCT VFR BLD AUTO: 38.7 % (ref 37–48.5)
HGB BLD-MCNC: 12.8 G/DL (ref 12–16)
IMM GRANULOCYTES # BLD AUTO: 0.03 K/UL (ref 0–0.04)
IMM GRANULOCYTES NFR BLD AUTO: 0.3 % (ref 0–0.5)
LYMPHOCYTES # BLD AUTO: 2.2 K/UL (ref 1–4.8)
LYMPHOCYTES NFR BLD: 24.8 % (ref 18–48)
MCH RBC QN AUTO: 29.7 PG (ref 27–31)
MCHC RBC AUTO-ENTMCNC: 33.1 G/DL (ref 32–36)
MCV RBC AUTO: 90 FL (ref 82–98)
MONOCYTES # BLD AUTO: 1.2 K/UL (ref 0.3–1)
MONOCYTES NFR BLD: 13.9 % (ref 4–15)
NEUTROPHILS # BLD AUTO: 5.3 K/UL (ref 1.8–7.7)
NEUTROPHILS NFR BLD: 60.6 % (ref 38–73)
NRBC BLD-RTO: 0 /100 WBC
PLATELET # BLD AUTO: 89 K/UL (ref 150–450)
PLATELET BLD QL SMEAR: ABNORMAL
PMV BLD AUTO: 12.1 FL (ref 9.2–12.9)
POCT GLUCOSE: 138 MG/DL (ref 70–110)
POTASSIUM SERPL-SCNC: 4.3 MMOL/L (ref 3.5–5.1)
PROT SERPL-MCNC: 6 G/DL (ref 6–8.4)
RBC # BLD AUTO: 4.31 M/UL (ref 4–5.4)
SODIUM SERPL-SCNC: 141 MMOL/L (ref 136–145)
WBC # BLD AUTO: 8.82 K/UL (ref 3.9–12.7)

## 2023-02-18 PROCEDURE — S4991 NICOTINE PATCH NONLEGEND: HCPCS | Performed by: INTERNAL MEDICINE

## 2023-02-18 PROCEDURE — 94640 AIRWAY INHALATION TREATMENT: CPT

## 2023-02-18 PROCEDURE — 63600175 PHARM REV CODE 636 W HCPCS: Performed by: STUDENT IN AN ORGANIZED HEALTH CARE EDUCATION/TRAINING PROGRAM

## 2023-02-18 PROCEDURE — 94761 N-INVAS EAR/PLS OXIMETRY MLT: CPT

## 2023-02-18 PROCEDURE — 80053 COMPREHEN METABOLIC PANEL: CPT | Performed by: STUDENT IN AN ORGANIZED HEALTH CARE EDUCATION/TRAINING PROGRAM

## 2023-02-18 PROCEDURE — 36415 COLL VENOUS BLD VENIPUNCTURE: CPT | Performed by: STUDENT IN AN ORGANIZED HEALTH CARE EDUCATION/TRAINING PROGRAM

## 2023-02-18 PROCEDURE — 25000003 PHARM REV CODE 250: Performed by: STUDENT IN AN ORGANIZED HEALTH CARE EDUCATION/TRAINING PROGRAM

## 2023-02-18 PROCEDURE — 25000003 PHARM REV CODE 250: Performed by: HOSPITALIST

## 2023-02-18 PROCEDURE — 11000001 HC ACUTE MED/SURG PRIVATE ROOM

## 2023-02-18 PROCEDURE — 25000003 PHARM REV CODE 250: Performed by: INTERNAL MEDICINE

## 2023-02-18 PROCEDURE — 25000242 PHARM REV CODE 250 ALT 637 W/ HCPCS: Performed by: STUDENT IN AN ORGANIZED HEALTH CARE EDUCATION/TRAINING PROGRAM

## 2023-02-18 PROCEDURE — 99900035 HC TECH TIME PER 15 MIN (STAT)

## 2023-02-18 PROCEDURE — 85025 COMPLETE CBC W/AUTO DIFF WBC: CPT | Performed by: STUDENT IN AN ORGANIZED HEALTH CARE EDUCATION/TRAINING PROGRAM

## 2023-02-18 RX ORDER — HYDROCODONE BITARTRATE AND ACETAMINOPHEN 7.5; 325 MG/1; MG/1
1 TABLET ORAL EVERY 6 HOURS PRN
Status: DISCONTINUED | OUTPATIENT
Start: 2023-02-18 | End: 2023-02-19 | Stop reason: HOSPADM

## 2023-02-18 RX ORDER — MORPHINE SULFATE 2 MG/ML
2 INJECTION, SOLUTION INTRAMUSCULAR; INTRAVENOUS EVERY 4 HOURS PRN
Status: DISCONTINUED | OUTPATIENT
Start: 2023-02-18 | End: 2023-02-19 | Stop reason: HOSPADM

## 2023-02-18 RX ADMIN — PIPERACILLIN AND TAZOBACTAM 4.5 G: 4; .5 INJECTION, POWDER, LYOPHILIZED, FOR SOLUTION INTRAVENOUS; PARENTERAL at 09:02

## 2023-02-18 RX ADMIN — IPRATROPIUM BROMIDE AND ALBUTEROL SULFATE 3 ML: 2.5; .5 SOLUTION RESPIRATORY (INHALATION) at 03:02

## 2023-02-18 RX ADMIN — IPRATROPIUM BROMIDE AND ALBUTEROL SULFATE 3 ML: 2.5; .5 SOLUTION RESPIRATORY (INHALATION) at 08:02

## 2023-02-18 RX ADMIN — HYDROCODONE BITARTRATE AND ACETAMINOPHEN 1 TABLET: 7.5; 325 TABLET ORAL at 04:02

## 2023-02-18 RX ADMIN — MONTELUKAST 10 MG: 10 TABLET, FILM COATED ORAL at 09:02

## 2023-02-18 RX ADMIN — PREDNISONE 40 MG: 20 TABLET ORAL at 09:02

## 2023-02-18 RX ADMIN — CETIRIZINE HYDROCHLORIDE 10 MG: 10 TABLET, FILM COATED ORAL at 09:02

## 2023-02-18 RX ADMIN — PROPRANOLOL HYDROCHLORIDE 60 MG: 10 TABLET ORAL at 09:02

## 2023-02-18 RX ADMIN — FLUTICASONE FUROATE AND VILANTEROL TRIFENATATE 1 PUFF: 100; 25 POWDER RESPIRATORY (INHALATION) at 07:02

## 2023-02-18 RX ADMIN — MORPHINE SULFATE 2 MG: 2 INJECTION, SOLUTION INTRAMUSCULAR; INTRAVENOUS at 12:02

## 2023-02-18 RX ADMIN — NICOTINE 1 PATCH: 21 PATCH, EXTENDED RELEASE TRANSDERMAL at 09:02

## 2023-02-18 RX ADMIN — PIPERACILLIN AND TAZOBACTAM 4.5 G: 4; .5 INJECTION, POWDER, LYOPHILIZED, FOR SOLUTION INTRAVENOUS; PARENTERAL at 05:02

## 2023-02-18 RX ADMIN — MORPHINE SULFATE 2 MG: 2 INJECTION, SOLUTION INTRAMUSCULAR; INTRAVENOUS at 05:02

## 2023-02-18 RX ADMIN — PIPERACILLIN AND TAZOBACTAM 4.5 G: 4; .5 INJECTION, POWDER, LYOPHILIZED, FOR SOLUTION INTRAVENOUS; PARENTERAL at 01:02

## 2023-02-18 RX ADMIN — PANTOPRAZOLE SODIUM 40 MG: 40 TABLET, DELAYED RELEASE ORAL at 09:02

## 2023-02-18 RX ADMIN — IPRATROPIUM BROMIDE AND ALBUTEROL SULFATE 3 ML: 2.5; .5 SOLUTION RESPIRATORY (INHALATION) at 07:02

## 2023-02-18 RX ADMIN — HYDROCODONE BITARTRATE AND ACETAMINOPHEN 1 TABLET: 7.5; 325 TABLET ORAL at 09:02

## 2023-02-18 RX ADMIN — MORPHINE SULFATE 2 MG: 2 INJECTION, SOLUTION INTRAMUSCULAR; INTRAVENOUS at 09:02

## 2023-02-18 NOTE — NURSING
"RAPID RESPONSE NURSE PROACTIVE ROUNDING NOTE     Time of Visit:     Admit Date: 2/15/2023  LOS: 1  Code Status: Full Code   Date of Visit: 2023  : 1970  Age: 52 y.o.  Sex: female  Race: Black or   Bed: K479/K479 A:   MRN: [id number]  Was the patient discharged from an ICU this admission? no   Was the patient discharged from a PACU within last 24 hours?  no  Did the patient receive conscious sedation/general anesthesia in last 24 hours?  no, IR LLQ drain placement   Was the patient in the ED within the past 24 hours?  no  Was the patient started on NIPPV within the past 24 hours?  no  Attending Physician: Mai Becker MD  Primary Service: Networked reference to record PCT     ASSESSMENT     Notified by bedside RN via phone call.  Reason for alert: PIV needed    Diagnosis: Diverticulitis    Abnormal Vital Signs: /64 (BP Location: Right arm, Patient Position: Lying)   Pulse 66   Temp 97.5 °F (36.4 °C) (Oral)   Resp 18   Ht 4' 11" (1.499 m)   Wt 69.5 kg (153 lb 3.5 oz)   SpO2 96%   BMI 30.95 kg/m²      Clinical Issues:  Diverticulitis    Patient  has a past medical history of Asthma, Diverticulosis, and GERD (gastroesophageal reflux disease).      PIV present to L AC, leaking. Notified for need of new PIV.      INTERVENTIONS/ RECOMMENDATIONS     20g PIV placed to R posterior forearm via US guidance. L AC PIV removed per pt request.     Discussed plan of care with Charge RNPark.    PHYSICIAN ESCALATION     Yes/No  no    Orders received and case discussed with NA.    Disposition: Remain in room 479.    FOLLOW-UP     Call back the Rapid Response Nurse, LACEY FIERRO, RN at [facility] Phone: [phone] for additional questions or concerns.         "

## 2023-02-18 NOTE — SUBJECTIVE & OBJECTIVE
Interval History: Overall feels better. Drain with minimal output.    Review of Systems   Constitutional:  Negative for chills and diaphoresis.   HENT:  Negative for ear discharge and ear pain.    Eyes:  Negative for pain and redness.   Respiratory:  Negative for shortness of breath and wheezing.    Cardiovascular:  Negative for palpitations and leg swelling.   Gastrointestinal:  Positive for abdominal pain. Negative for blood in stool and constipation.   Endocrine: Negative for polydipsia and polyphagia.   Genitourinary:  Negative for dysuria and enuresis.   Musculoskeletal:  Negative for back pain and gait problem.   Skin:  Negative for pallor and rash.   Neurological:  Negative for light-headedness and headaches.   Hematological:  Negative for adenopathy. Does not bruise/bleed easily.   Psychiatric/Behavioral:  Negative for hallucinations. The patient is not hyperactive.    Objective:     Vital Signs (Most Recent):  Temp: 96.5 °F (35.8 °C) (02/18/23 1138)  Pulse: 70 (02/18/23 1138)  Resp: 18 (02/18/23 1250)  BP: (!) 105/52 (02/18/23 1138)  SpO2: 98 % (02/18/23 1138)   Vital Signs (24h Range):  Temp:  [96.3 °F (35.7 °C)-98.7 °F (37.1 °C)] 96.5 °F (35.8 °C)  Pulse:  [62-78] 70  Resp:  [17-20] 18  SpO2:  [96 %-99 %] 98 %  BP: (101-136)/(49-64) 105/52     Weight: 69.5 kg (153 lb 3.5 oz)  Body mass index is 30.95 kg/m².    Intake/Output Summary (Last 24 hours) at 2/18/2023 1324  Last data filed at 2/18/2023 0526  Gross per 24 hour   Intake --   Output 15 ml   Net -15 ml      Physical Exam  Constitutional:       Appearance: Normal appearance. She is not ill-appearing or diaphoretic.   HENT:      Head: Normocephalic and atraumatic.      Right Ear: There is no impacted cerumen.      Left Ear: There is no impacted cerumen.      Nose: No congestion or rhinorrhea.      Mouth/Throat:      Pharynx: No oropharyngeal exudate or posterior oropharyngeal erythema.   Eyes:      General:         Right eye: No discharge.         Left  eye: No discharge.   Cardiovascular:      Rate and Rhythm: Normal rate.      Heart sounds: No murmur heard.    No friction rub.   Pulmonary:      Breath sounds: No rhonchi.   Abdominal:      Tenderness: There is abdominal tenderness. There is no guarding or rebound.      Comments: drain in place with serosanguinous output    Musculoskeletal:         General: No swelling or deformity.   Skin:     Coloration: Skin is not jaundiced.      Findings: No bruising.   Neurological:      Mental Status: She is alert.      Cranial Nerves: No cranial nerve deficit.      Motor: No weakness.   Psychiatric:         Mood and Affect: Mood normal.       Significant Labs: All pertinent labs within the past 24 hours have been reviewed.  Plt 89 down from 182    Significant Imaging: I have reviewed all pertinent imaging results/findings within the past 24 hours.

## 2023-02-18 NOTE — PLAN OF CARE
Problem: Adult Inpatient Plan of Care  Goal: Plan of Care Review  Outcome: Ongoing, Progressing     Pt requested for stool softeners. Notified NP Yolande. Docusate sodium was put in the order. Pt tolerated medications well. Vitals are stable. Will continue to monitor.

## 2023-02-18 NOTE — PROGRESS NOTES
"Surgery follow up  BP (!) 102/51 (Patient Position: Lying)   Pulse 75   Temp 96.3 °F (35.7 °C) (Oral)   Resp 18   Ht 4' 11" (1.499 m)   Wt 69.5 kg (153 lb 3.5 oz)   SpO2 99%   BMI 30.95 kg/m²   I/O last 3 completed shifts:  In: 296.5 [IV Piggyback:296.5]  Out: 15 [Drains:15]  No intake/output data recorded.    Recent Results (from the past 336 hour(s))   CBC with Automated Differential    Collection Time: 02/18/23  2:52 AM   Result Value Ref Range    WBC 8.82 3.90 - 12.70 K/uL    Hemoglobin 12.8 12.0 - 16.0 g/dL    Hematocrit 38.7 37.0 - 48.5 %    Platelets 89 (L) 150 - 450 K/uL   CBC with Automated Differential    Collection Time: 02/17/23  2:57 AM   Result Value Ref Range    WBC 10.46 3.90 - 12.70 K/uL    Hemoglobin 11.5 (L) 12.0 - 16.0 g/dL    Hematocrit 34.2 (L) 37.0 - 48.5 %    Platelets 182 150 - 450 K/uL   CBC with Automated Differential    Collection Time: 02/16/23  3:05 AM   Result Value Ref Range    WBC 11.99 3.90 - 12.70 K/uL    Hemoglobin 11.7 (L) 12.0 - 16.0 g/dL    Hematocrit 35.4 (L) 37.0 - 48.5 %    Platelets 199 150 - 450 K/uL     Drain in place doing better   Wants to go home explained her to keep drain in for few more days and re checking ct scan for resolution of the abscess and continue of the antibiotics after culture reports.  "

## 2023-02-18 NOTE — ASSESSMENT & PLAN NOTE
Platelets are down to 89  Did not receive heparin or anti-platelets  Check HCV status  Review meds  Monitor for bleeding  Daily CBC

## 2023-02-18 NOTE — PROGRESS NOTES
[facility] Medicine  Progress Note    Patient Name: Niya Moeller  MRN: [id number]  Patient Class: IP- Inpatient   Admission Date: 2/15/2023  Length of Stay: 2 days  Attending Physician: Mai Becker MD  Primary Care Provider: YEHUDA Adamson        Subjective:     Principal Problem:Diverticulitis        HPI:  Niya Moeller is a 52yr with PMH of asthma?, diverticulosis, GERD presents with respiratory distress.    Patient has had increasing shortness of breath over the past day, and she has been using her albuterol every 4 hours with minimal relief.  She presented with a similar presentation and was sent home with steroid.  Patient is a current smoker.  She also has a mild to moderate cough.  She presented for steroids.    In the ED, triage vitals were significant for tachycardia and increased respiratory rate, with desaturations to 94%.  CBC was fairly unremarkable.  CMP was significant only for decreased albumin.  CT abdomen pelvis with contrast reveals potential diverticulitis.    In the ED, patient was given continuous albuterol, Solu-Medrol, and magnesium.  Due to concerning CT findings, General surgery  was consulted.  Patient was also started on IV Zosyn.    Medicine was consulted for asthma exacerbation and management of diverticulitis.      Overview/Hospital Course:  2/17 Stable, having pain at drain site but her feels her pain from the diverticulitis is better  2/18 Drain output 15ml, pain improving. Awaiting culture results      Interval History: Overall feels better. Drain with minimal output.    Review of Systems   Constitutional:  Negative for chills and diaphoresis.   HENT:  Negative for ear discharge and ear pain.    Eyes:  Negative for pain and redness.   Respiratory:  Negative for shortness of breath and wheezing.    Cardiovascular:  Negative for palpitations and leg swelling.   Gastrointestinal:  Positive for abdominal pain. Negative for blood in stool and  constipation.   Endocrine: Negative for polydipsia and polyphagia.   Genitourinary:  Negative for dysuria and enuresis.   Musculoskeletal:  Negative for back pain and gait problem.   Skin:  Negative for pallor and rash.   Neurological:  Negative for light-headedness and headaches.   Hematological:  Negative for adenopathy. Does not bruise/bleed easily.   Psychiatric/Behavioral:  Negative for hallucinations. The patient is not hyperactive.    Objective:     Vital Signs (Most Recent):  Temp: 96.5 °F (35.8 °C) (02/18/23 1138)  Pulse: 70 (02/18/23 1138)  Resp: 18 (02/18/23 1250)  BP: (!) 105/52 (02/18/23 1138)  SpO2: 98 % (02/18/23 1138)   Vital Signs (24h Range):  Temp:  [96.3 °F (35.7 °C)-98.7 °F (37.1 °C)] 96.5 °F (35.8 °C)  Pulse:  [62-78] 70  Resp:  [17-20] 18  SpO2:  [96 %-99 %] 98 %  BP: (101-136)/(49-64) 105/52     Weight: 69.5 kg (153 lb 3.5 oz)  Body mass index is 30.95 kg/m².    Intake/Output Summary (Last 24 hours) at 2/18/2023 1324  Last data filed at 2/18/2023 0526  Gross per 24 hour   Intake --   Output 15 ml   Net -15 ml      Physical Exam  Constitutional:       Appearance: Normal appearance. She is not ill-appearing or diaphoretic.   HENT:      Head: Normocephalic and atraumatic.      Right Ear: There is no impacted cerumen.      Left Ear: There is no impacted cerumen.      Nose: No congestion or rhinorrhea.      Mouth/Throat:      Pharynx: No oropharyngeal exudate or posterior oropharyngeal erythema.   Eyes:      General:         Right eye: No discharge.         Left eye: No discharge.   Cardiovascular:      Rate and Rhythm: Normal rate.      Heart sounds: No murmur heard.    No friction rub.   Pulmonary:      Breath sounds: No rhonchi.   Abdominal:      Tenderness: There is abdominal tenderness. There is no guarding or rebound.      Comments: drain in place with serosanguinous output    Musculoskeletal:         General: No swelling or deformity.   Skin:     Coloration: Skin is not jaundiced.       "Findings: No bruising.   Neurological:      Mental Status: She is alert.      Cranial Nerves: No cranial nerve deficit.      Motor: No weakness.   Psychiatric:         Mood and Affect: Mood normal.       Significant Labs: All pertinent labs within the past 24 hours have been reviewed.  Plt 89 down from 182    Significant Imaging: I have reviewed all pertinent imaging results/findings within the past 24 hours.      Assessment/Plan:      * Diverticulitis  CT abdomen pelvis with contrast shows acute diverticulitis.     Per last Gen Surgery clinic note:  53 y/o female known to me. Somewhat of a difficult case due to pt's personality--somewhat manipulative/defiant regarding compliance with clinical recommendations. Pt with a hx of recurrent diverticular dz--multiple recent admission secondary to flares of diverticular dz including complicated consequences--small abscess (too small to drain). Pt completed 6 weeks IV zosyn. Essentially, current clinical course is in stage of waiting for 6-8 week "cool off" from last attack , then colonoscopy followed by planning for sigmoid colectomy due to recurrent attacks/complicated attacks and difficulty clearing infection.     Start IV Zosyn every 8 hours and de-escalate to PO after culture results are available 10-14 day course  IR drained abscess and placed a drain  F/u cultures, will need repeat imaging and likely removal of drain prior to discharge      Thrombocytopathia  Platelets are down to 89  Did not receive heparin or anti-platelets  Check HCV status  Review meds  Monitor for bleeding  Daily CBC        Hypoglycemia  BG 59 this am  Not on insulin  Accucheck QID, advancing diet  Hypoglycemia protocol      Thyroiditis  No current hypothyroid symptoms    Plan:  May continue methimazole  May continue propranolol 60 mg        GERD (gastroesophageal reflux disease)  No current complaints    Plan:  May continue home omeprazole dose      Asthma exacerbation  Presents with shortness " of breath, wheezing, hypoxemia.  Likely in a asthma versus COPD exacerbation    Plan:  Continue steroids for 5 day total course  DuoNebs every 4 hours as needed  Continue Symbicort daily dose  Discuss decreasing tobacco use        VTE Risk Mitigation (From admission, onward)         Ordered     IP VTE LOW RISK PATIENT  Once         02/15/23 1845     Place sequential compression device  Until discontinued         02/15/23 1845                Discharge Planning   ROXIE:      Code Status: Full Code   Is the patient medically ready for discharge?:     Reason for patient still in hospital (select all that apply): Patient trending condition, Laboratory test and Treatment  Discharge Plan A: Home   Discharge Delays: None known at this time              Mai Becker MD  Department of Hospital Medicine   [facility]

## 2023-02-18 NOTE — ASSESSMENT & PLAN NOTE
"CT abdomen pelvis with contrast shows acute diverticulitis.     Per last Gen Surgery clinic note:  53 y/o female known to me. Somewhat of a difficult case due to pt's personality--somewhat manipulative/defiant regarding compliance with clinical recommendations. Pt with a hx of recurrent diverticular dz--multiple recent admission secondary to flares of diverticular dz including complicated consequences--small abscess (too small to drain). Pt completed 6 weeks IV zosyn. Essentially, current clinical course is in stage of waiting for 6-8 week "cool off" from last attack , then colonoscopy followed by planning for sigmoid colectomy due to recurrent attacks/complicated attacks and difficulty clearing infection.     Start IV Zosyn every 8 hours and de-escalate to PO after culture results are available 10-14 day course  IR drained abscess and placed a drain  F/u cultures, will need repeat imaging and likely removal of drain prior to discharge    "

## 2023-02-19 ENCOUNTER — HOSPITAL ENCOUNTER (OUTPATIENT)
Facility: HOSPITAL | Age: 53
DRG: 392 | End: 2023-02-20
Attending: STUDENT IN AN ORGANIZED HEALTH CARE EDUCATION/TRAINING PROGRAM | Admitting: STUDENT IN AN ORGANIZED HEALTH CARE EDUCATION/TRAINING PROGRAM
Payer: MEDICAID

## 2023-02-19 VITALS
HEART RATE: 76 BPM | WEIGHT: 153.25 LBS | HEIGHT: 59 IN | BODY MASS INDEX: 30.89 KG/M2 | DIASTOLIC BLOOD PRESSURE: 51 MMHG | TEMPERATURE: 97 F | OXYGEN SATURATION: 100 % | SYSTOLIC BLOOD PRESSURE: 108 MMHG | RESPIRATION RATE: 18 BRPM

## 2023-02-19 DIAGNOSIS — K57.80 DIVERTICULITIS OF INTESTINE WITH ABSCESS: ICD-10-CM

## 2023-02-19 DIAGNOSIS — R07.9 CHEST PAIN: ICD-10-CM

## 2023-02-19 DIAGNOSIS — A49.9 ESBL (EXTENDED SPECTRUM BETA-LACTAMASE) PRODUCING BACTERIA INFECTION: Primary | ICD-10-CM

## 2023-02-19 DIAGNOSIS — K57.92 DIVERTICULITIS: ICD-10-CM

## 2023-02-19 DIAGNOSIS — K65.1 INTRA-ABDOMINAL ABSCESS: ICD-10-CM

## 2023-02-19 DIAGNOSIS — Z16.12 ESBL (EXTENDED SPECTRUM BETA-LACTAMASE) PRODUCING BACTERIA INFECTION: Primary | ICD-10-CM

## 2023-02-19 PROBLEM — R10.9 ACUTE ABDOMINAL PAIN: Status: ACTIVE | Noted: 2023-02-19

## 2023-02-19 PROBLEM — J45.909 ASTHMA: Status: ACTIVE | Noted: 2023-02-19

## 2023-02-19 LAB
ALBUMIN SERPL BCP-MCNC: 3.3 G/DL (ref 3.5–5.2)
ALP SERPL-CCNC: 75 U/L (ref 55–135)
ALT SERPL W/O P-5'-P-CCNC: 46 U/L (ref 10–44)
ANION GAP SERPL CALC-SCNC: 14 MMOL/L (ref 8–16)
AST SERPL-CCNC: 25 U/L (ref 10–40)
BACTERIA SPEC AEROBE CULT: ABNORMAL
BASOPHILS # BLD AUTO: 0.02 K/UL (ref 0–0.2)
BASOPHILS NFR BLD: 0.2 % (ref 0–1.9)
BILIRUB SERPL-MCNC: 0.2 MG/DL (ref 0.1–1)
BUN SERPL-MCNC: 13 MG/DL (ref 6–20)
CALCIUM SERPL-MCNC: 9.6 MG/DL (ref 8.7–10.5)
CHLORIDE SERPL-SCNC: 108 MMOL/L (ref 95–110)
CO2 SERPL-SCNC: 22 MMOL/L (ref 23–29)
CREAT SERPL-MCNC: 0.8 MG/DL (ref 0.5–1.4)
DIFFERENTIAL METHOD: ABNORMAL
EOSINOPHIL # BLD AUTO: 0 K/UL (ref 0–0.5)
EOSINOPHIL NFR BLD: 0 % (ref 0–8)
ERYTHROCYTE [DISTWIDTH] IN BLOOD BY AUTOMATED COUNT: 12.3 % (ref 11.5–14.5)
EST. GFR  (NO RACE VARIABLE): >60 ML/MIN/1.73 M^2
GLUCOSE SERPL-MCNC: 97 MG/DL (ref 70–110)
HCT VFR BLD AUTO: 41.7 % (ref 37–48.5)
HGB BLD-MCNC: 13.5 G/DL (ref 12–16)
IMM GRANULOCYTES # BLD AUTO: 0.13 K/UL (ref 0–0.04)
IMM GRANULOCYTES NFR BLD AUTO: 1.2 % (ref 0–0.5)
LYMPHOCYTES # BLD AUTO: 2.2 K/UL (ref 1–4.8)
LYMPHOCYTES NFR BLD: 19.8 % (ref 18–48)
MCH RBC QN AUTO: 30 PG (ref 27–31)
MCHC RBC AUTO-ENTMCNC: 32.4 G/DL (ref 32–36)
MCV RBC AUTO: 93 FL (ref 82–98)
MONOCYTES # BLD AUTO: 0.7 K/UL (ref 0.3–1)
MONOCYTES NFR BLD: 6.4 % (ref 4–15)
NEUTROPHILS # BLD AUTO: 8.1 K/UL (ref 1.8–7.7)
NEUTROPHILS NFR BLD: 72.4 % (ref 38–73)
NRBC BLD-RTO: 0 /100 WBC
PLATELET # BLD AUTO: 241 K/UL (ref 150–450)
PMV BLD AUTO: 10.9 FL (ref 9.2–12.9)
POCT GLUCOSE: 111 MG/DL (ref 70–110)
POCT GLUCOSE: 98 MG/DL (ref 70–110)
POTASSIUM SERPL-SCNC: 4.3 MMOL/L (ref 3.5–5.1)
PROT SERPL-MCNC: 7.5 G/DL (ref 6–8.4)
RBC # BLD AUTO: 4.5 M/UL (ref 4–5.4)
SODIUM SERPL-SCNC: 144 MMOL/L (ref 136–145)
WBC # BLD AUTO: 11.15 K/UL (ref 3.9–12.7)

## 2023-02-19 PROCEDURE — 12000002 HC ACUTE/MED SURGE SEMI-PRIVATE ROOM

## 2023-02-19 PROCEDURE — 96375 TX/PRO/DX INJ NEW DRUG ADDON: CPT

## 2023-02-19 PROCEDURE — 25000242 PHARM REV CODE 250 ALT 637 W/ HCPCS: Performed by: INTERNAL MEDICINE

## 2023-02-19 PROCEDURE — 25000242 PHARM REV CODE 250 ALT 637 W/ HCPCS: Performed by: STUDENT IN AN ORGANIZED HEALTH CARE EDUCATION/TRAINING PROGRAM

## 2023-02-19 PROCEDURE — 85025 COMPLETE CBC W/AUTO DIFF WBC: CPT | Performed by: STUDENT IN AN ORGANIZED HEALTH CARE EDUCATION/TRAINING PROGRAM

## 2023-02-19 PROCEDURE — 80053 COMPREHEN METABOLIC PANEL: CPT | Performed by: STUDENT IN AN ORGANIZED HEALTH CARE EDUCATION/TRAINING PROGRAM

## 2023-02-19 PROCEDURE — 96361 HYDRATE IV INFUSION ADD-ON: CPT

## 2023-02-19 PROCEDURE — 94640 AIRWAY INHALATION TREATMENT: CPT

## 2023-02-19 PROCEDURE — 87040 BLOOD CULTURE FOR BACTERIA: CPT | Mod: 59 | Performed by: STUDENT IN AN ORGANIZED HEALTH CARE EDUCATION/TRAINING PROGRAM

## 2023-02-19 PROCEDURE — 25000003 PHARM REV CODE 250: Performed by: HOSPITALIST

## 2023-02-19 PROCEDURE — 94761 N-INVAS EAR/PLS OXIMETRY MLT: CPT

## 2023-02-19 PROCEDURE — 63600175 PHARM REV CODE 636 W HCPCS: Performed by: INTERNAL MEDICINE

## 2023-02-19 PROCEDURE — S4991 NICOTINE PATCH NONLEGEND: HCPCS | Performed by: INTERNAL MEDICINE

## 2023-02-19 PROCEDURE — 96365 THER/PROPH/DIAG IV INF INIT: CPT

## 2023-02-19 PROCEDURE — 25000003 PHARM REV CODE 250: Performed by: STUDENT IN AN ORGANIZED HEALTH CARE EDUCATION/TRAINING PROGRAM

## 2023-02-19 PROCEDURE — 99285 EMERGENCY DEPT VISIT HI MDM: CPT | Mod: 25

## 2023-02-19 PROCEDURE — 25000003 PHARM REV CODE 250: Performed by: INTERNAL MEDICINE

## 2023-02-19 PROCEDURE — 63600175 PHARM REV CODE 636 W HCPCS: Performed by: STUDENT IN AN ORGANIZED HEALTH CARE EDUCATION/TRAINING PROGRAM

## 2023-02-19 PROCEDURE — G0378 HOSPITAL OBSERVATION PER HR: HCPCS

## 2023-02-19 RX ORDER — ONDANSETRON 2 MG/ML
4 INJECTION INTRAMUSCULAR; INTRAVENOUS EVERY 8 HOURS PRN
Status: DISCONTINUED | OUTPATIENT
Start: 2023-02-19 | End: 2023-02-20 | Stop reason: HOSPADM

## 2023-02-19 RX ORDER — MONTELUKAST SODIUM 10 MG/1
10 TABLET ORAL DAILY
Status: DISCONTINUED | OUTPATIENT
Start: 2023-02-20 | End: 2023-02-20 | Stop reason: HOSPADM

## 2023-02-19 RX ORDER — SIMETHICONE 80 MG
1 TABLET,CHEWABLE ORAL 4 TIMES DAILY PRN
Status: DISCONTINUED | OUTPATIENT
Start: 2023-02-19 | End: 2023-02-20 | Stop reason: HOSPADM

## 2023-02-19 RX ORDER — PROCHLORPERAZINE EDISYLATE 5 MG/ML
5 INJECTION INTRAMUSCULAR; INTRAVENOUS EVERY 6 HOURS PRN
Status: DISCONTINUED | OUTPATIENT
Start: 2023-02-19 | End: 2023-02-20 | Stop reason: HOSPADM

## 2023-02-19 RX ORDER — FLUTICASONE FUROATE AND VILANTEROL 100; 25 UG/1; UG/1
1 POWDER RESPIRATORY (INHALATION) DAILY
Status: DISCONTINUED | OUTPATIENT
Start: 2023-02-20 | End: 2023-02-20 | Stop reason: HOSPADM

## 2023-02-19 RX ORDER — MEROPENEM AND SODIUM CHLORIDE 1 G/50ML
1 INJECTION, SOLUTION INTRAVENOUS
Status: COMPLETED | OUTPATIENT
Start: 2023-02-19 | End: 2023-02-19

## 2023-02-19 RX ORDER — AMOXICILLIN AND CLAVULANATE POTASSIUM 875; 125 MG/1; MG/1
1 TABLET, FILM COATED ORAL 2 TIMES DAILY
Qty: 14 TABLET | Refills: 0 | OUTPATIENT
Start: 2023-02-19 | End: 2023-02-20

## 2023-02-19 RX ORDER — ONDANSETRON 2 MG/ML
4 INJECTION INTRAMUSCULAR; INTRAVENOUS
Status: COMPLETED | OUTPATIENT
Start: 2023-02-19 | End: 2023-02-19

## 2023-02-19 RX ORDER — METHIMAZOLE 5 MG/1
10 TABLET ORAL 2 TIMES DAILY
Status: DISCONTINUED | OUTPATIENT
Start: 2023-02-19 | End: 2023-02-20 | Stop reason: HOSPADM

## 2023-02-19 RX ORDER — DEXTROSE 40 %
15 GEL (GRAM) ORAL
Status: DISCONTINUED | OUTPATIENT
Start: 2023-02-19 | End: 2023-02-20 | Stop reason: HOSPADM

## 2023-02-19 RX ORDER — GLUCAGON 1 MG
1 KIT INJECTION
Status: DISCONTINUED | OUTPATIENT
Start: 2023-02-19 | End: 2023-02-20 | Stop reason: HOSPADM

## 2023-02-19 RX ORDER — MORPHINE SULFATE 2 MG/ML
2 INJECTION, SOLUTION INTRAMUSCULAR; INTRAVENOUS EVERY 4 HOURS PRN
Status: DISCONTINUED | OUTPATIENT
Start: 2023-02-19 | End: 2023-02-20 | Stop reason: HOSPADM

## 2023-02-19 RX ORDER — HYDROCODONE BITARTRATE AND ACETAMINOPHEN 7.5; 325 MG/1; MG/1
1 TABLET ORAL EVERY 6 HOURS PRN
Status: DISCONTINUED | OUTPATIENT
Start: 2023-02-19 | End: 2023-02-20 | Stop reason: HOSPADM

## 2023-02-19 RX ORDER — MAG HYDROX/ALUMINUM HYD/SIMETH 200-200-20
30 SUSPENSION, ORAL (FINAL DOSE FORM) ORAL
Status: DISCONTINUED | OUTPATIENT
Start: 2023-02-20 | End: 2023-02-20 | Stop reason: HOSPADM

## 2023-02-19 RX ORDER — CIPROFLOXACIN 500 MG/1
500 TABLET ORAL 2 TIMES DAILY
Qty: 14 TABLET | Refills: 0 | OUTPATIENT
Start: 2023-02-19 | End: 2023-02-20

## 2023-02-19 RX ORDER — ERTAPENEM 1 G/1
1 INJECTION, POWDER, LYOPHILIZED, FOR SOLUTION INTRAMUSCULAR; INTRAVENOUS
Status: DISCONTINUED | OUTPATIENT
Start: 2023-02-20 | End: 2023-02-19

## 2023-02-19 RX ORDER — ERTAPENEM 1 G/1
1 INJECTION, POWDER, LYOPHILIZED, FOR SOLUTION INTRAMUSCULAR; INTRAVENOUS
Status: DISCONTINUED | OUTPATIENT
Start: 2023-02-20 | End: 2023-02-19 | Stop reason: CLARIF

## 2023-02-19 RX ORDER — IPRATROPIUM BROMIDE AND ALBUTEROL SULFATE 2.5; .5 MG/3ML; MG/3ML
3 SOLUTION RESPIRATORY (INHALATION)
Status: DISCONTINUED | OUTPATIENT
Start: 2023-02-19 | End: 2023-02-20 | Stop reason: HOSPADM

## 2023-02-19 RX ORDER — PROPRANOLOL HYDROCHLORIDE 10 MG/1
60 TABLET ORAL 2 TIMES DAILY
Status: DISCONTINUED | OUTPATIENT
Start: 2023-02-19 | End: 2023-02-20 | Stop reason: HOSPADM

## 2023-02-19 RX ORDER — SUCRALFATE 1 G/10ML
1 SUSPENSION ORAL EVERY 6 HOURS
Status: DISCONTINUED | OUTPATIENT
Start: 2023-02-20 | End: 2023-02-20 | Stop reason: HOSPADM

## 2023-02-19 RX ORDER — IBUPROFEN 200 MG
1 TABLET ORAL DAILY
Status: DISCONTINUED | OUTPATIENT
Start: 2023-02-20 | End: 2023-02-20 | Stop reason: HOSPADM

## 2023-02-19 RX ORDER — CETIRIZINE HYDROCHLORIDE 10 MG/1
10 TABLET ORAL DAILY
Status: DISCONTINUED | OUTPATIENT
Start: 2023-02-20 | End: 2023-02-20 | Stop reason: HOSPADM

## 2023-02-19 RX ORDER — NALOXONE HCL 0.4 MG/ML
0.02 VIAL (ML) INJECTION
Status: DISCONTINUED | OUTPATIENT
Start: 2023-02-19 | End: 2023-02-20 | Stop reason: HOSPADM

## 2023-02-19 RX ORDER — AMOXICILLIN AND CLAVULANATE POTASSIUM 875; 125 MG/1; MG/1
1 TABLET, FILM COATED ORAL EVERY 12 HOURS
Status: CANCELLED | OUTPATIENT
Start: 2023-02-19

## 2023-02-19 RX ORDER — SODIUM CHLORIDE 0.9 % (FLUSH) 0.9 %
10 SYRINGE (ML) INJECTION EVERY 12 HOURS PRN
Status: DISCONTINUED | OUTPATIENT
Start: 2023-02-19 | End: 2023-02-20 | Stop reason: HOSPADM

## 2023-02-19 RX ADMIN — METHIMAZOLE 10 MG: 5 TABLET ORAL at 10:02

## 2023-02-19 RX ADMIN — IPRATROPIUM BROMIDE AND ALBUTEROL SULFATE 3 ML: 2.5; .5 SOLUTION RESPIRATORY (INHALATION) at 08:02

## 2023-02-19 RX ADMIN — PIPERACILLIN AND TAZOBACTAM 4.5 G: 4; .5 INJECTION, POWDER, LYOPHILIZED, FOR SOLUTION INTRAVENOUS; PARENTERAL at 01:02

## 2023-02-19 RX ADMIN — MONTELUKAST 10 MG: 10 TABLET, FILM COATED ORAL at 09:02

## 2023-02-19 RX ADMIN — SODIUM CHLORIDE, SODIUM LACTATE, POTASSIUM CHLORIDE, AND CALCIUM CHLORIDE 1000 ML: .6; .31; .03; .02 INJECTION, SOLUTION INTRAVENOUS at 08:02

## 2023-02-19 RX ADMIN — FLUTICASONE FUROATE AND VILANTEROL TRIFENATATE 1 PUFF: 100; 25 POWDER RESPIRATORY (INHALATION) at 08:02

## 2023-02-19 RX ADMIN — MEROPENEM AND SODIUM CHLORIDE 1 G: 1 INJECTION, SOLUTION INTRAVENOUS at 10:02

## 2023-02-19 RX ADMIN — NICOTINE 1 PATCH: 21 PATCH, EXTENDED RELEASE TRANSDERMAL at 09:02

## 2023-02-19 RX ADMIN — CETIRIZINE HYDROCHLORIDE 10 MG: 10 TABLET, FILM COATED ORAL at 09:02

## 2023-02-19 RX ADMIN — PANTOPRAZOLE SODIUM 40 MG: 40 TABLET, DELAYED RELEASE ORAL at 09:02

## 2023-02-19 RX ADMIN — PROPRANOLOL HYDROCHLORIDE 60 MG: 10 TABLET ORAL at 09:02

## 2023-02-19 RX ADMIN — ONDANSETRON 4 MG: 2 INJECTION INTRAMUSCULAR; INTRAVENOUS at 09:02

## 2023-02-19 RX ADMIN — HYDROCODONE BITARTRATE AND ACETAMINOPHEN 1 TABLET: 7.5; 325 TABLET ORAL at 09:02

## 2023-02-19 RX ADMIN — PREDNISONE 40 MG: 20 TABLET ORAL at 09:02

## 2023-02-19 RX ADMIN — PIPERACILLIN AND TAZOBACTAM 4.5 G: 4; .5 INJECTION, POWDER, LYOPHILIZED, FOR SOLUTION INTRAVENOUS; PARENTERAL at 10:02

## 2023-02-19 RX ADMIN — MORPHINE SULFATE 2 MG: 2 INJECTION, SOLUTION INTRAMUSCULAR; INTRAVENOUS at 10:02

## 2023-02-19 RX ADMIN — HYDROCODONE BITARTRATE AND ACETAMINOPHEN 1 TABLET: 7.5; 325 TABLET ORAL at 10:02

## 2023-02-19 RX ADMIN — HYDROCODONE BITARTRATE AND ACETAMINOPHEN 1 TABLET: 7.5; 325 TABLET ORAL at 03:02

## 2023-02-19 RX ADMIN — IPRATROPIUM BROMIDE AND ALBUTEROL SULFATE 3 ML: 2.5; .5 SOLUTION RESPIRATORY (INHALATION) at 09:02

## 2023-02-19 NOTE — NURSING
Pt left AMA. I was in another room when this occurred. She took out her IV and tele monitor. And when I confronted patient about signing out AMA she walked past me and stated she doesn't have time to sign anything and she continued to walk out at a fast pace.

## 2023-02-19 NOTE — Clinical Note
"Niya Byers" Sajan was seen and treated in our emergency department on 2/19/2023.  She may return to work on 02/21/2023.  Pt was admitted for a stomach infection from 2/19 - 2/20.     If you have any questions or concerns, please don't hesitate to call.      Joslyn Pritchard RN    "

## 2023-02-19 NOTE — NURSING
Pt left AMA. I was in another room when this occurred. She took out her IV and tele monitor. And when I confronted patient about signing out AMA she walked past me and stated she doesn't have time to sign anything and she continued to walk out at a fast pace. Pt left with her JOE drain to [facility].

## 2023-02-19 NOTE — PLAN OF CARE
02/19/23 1048   Final Note   Assessment Type Final Discharge Note   Anticipated Discharge Disposition Home   Hospital Resources/Appts/Education Provided Appointments scheduled and added to AVS   Post-Acute Status   Post-Acute Authorization Home Health   Home Health Status Referrals Sent   Other Status See Comments   Discharge Delays (!) Other     Patient states that she is ready for discharge , however the patient has a JOE drain and lives in [place], she is requesting home health , told patient that she could not promise that she will be able to get home health due to both her  insurer and location. Worker educated the patient on follow up with her PCP at a local clinic in addition to encouraging her to speak with her nurse for possible self care as an option. Patient left the hospital AMA.

## 2023-02-19 NOTE — PROGRESS NOTES
"Surgery follow up  BP (!) 124/57 (Patient Position: Lying)   Pulse 76   Temp 97.4 °F (36.3 °C) (Axillary)   Resp 18   Ht 4' 11" (1.499 m)   Wt 69.5 kg (153 lb 3.5 oz)   SpO2 98%   BMI 30.95 kg/m²   I/O last 3 completed shifts:  In: -   Out: 15 [Drains:15]  No intake/output data recorded.    Recent Results (from the past 336 hour(s))   CBC with Automated Differential    Collection Time: 02/18/23  2:52 AM   Result Value Ref Range    WBC 8.82 3.90 - 12.70 K/uL    Hemoglobin 12.8 12.0 - 16.0 g/dL    Hematocrit 38.7 37.0 - 48.5 %    Platelets 89 (L) 150 - 450 K/uL   CBC with Automated Differential    Collection Time: 02/17/23  2:57 AM   Result Value Ref Range    WBC 10.46 3.90 - 12.70 K/uL    Hemoglobin 11.5 (L) 12.0 - 16.0 g/dL    Hematocrit 34.2 (L) 37.0 - 48.5 %    Platelets 182 150 - 450 K/uL   CBC with Automated Differential    Collection Time: 02/16/23  3:05 AM   Result Value Ref Range    WBC 11.99 3.90 - 12.70 K/uL    Hemoglobin 11.7 (L) 12.0 - 16.0 g/dL    Hematocrit 35.4 (L) 37.0 - 48.5 %    Platelets 199 150 - 450 K/uL     Adamant about leaving AMA  Abdomen soft moving bowels , tolerating diet   Can be discharged on po Cipro , flagyl and follow up in office on Thursday.  "

## 2023-02-20 VITALS
SYSTOLIC BLOOD PRESSURE: 104 MMHG | TEMPERATURE: 98 F | RESPIRATION RATE: 17 BRPM | BODY MASS INDEX: 27.21 KG/M2 | DIASTOLIC BLOOD PRESSURE: 56 MMHG | OXYGEN SATURATION: 100 % | HEIGHT: 59 IN | WEIGHT: 135 LBS | HEART RATE: 85 BPM

## 2023-02-20 LAB — BACTERIA SPEC ANAEROBE CULT: ABNORMAL

## 2023-02-20 PROCEDURE — 63600175 PHARM REV CODE 636 W HCPCS: Performed by: INTERNAL MEDICINE

## 2023-02-20 PROCEDURE — 25000242 PHARM REV CODE 250 ALT 637 W/ HCPCS: Performed by: INTERNAL MEDICINE

## 2023-02-20 PROCEDURE — 94640 AIRWAY INHALATION TREATMENT: CPT

## 2023-02-20 PROCEDURE — 36569 INSJ PICC 5 YR+ W/O IMAGING: CPT

## 2023-02-20 PROCEDURE — 25500020 PHARM REV CODE 255: Performed by: INTERNAL MEDICINE

## 2023-02-20 PROCEDURE — A4216 STERILE WATER/SALINE, 10 ML: HCPCS | Performed by: INTERNAL MEDICINE

## 2023-02-20 PROCEDURE — C1751 CATH, INF, PER/CENT/MIDLINE: HCPCS

## 2023-02-20 PROCEDURE — 96375 TX/PRO/DX INJ NEW DRUG ADDON: CPT

## 2023-02-20 PROCEDURE — 94761 N-INVAS EAR/PLS OXIMETRY MLT: CPT

## 2023-02-20 PROCEDURE — 25000003 PHARM REV CODE 250: Performed by: INTERNAL MEDICINE

## 2023-02-20 PROCEDURE — 96367 TX/PROPH/DG ADDL SEQ IV INF: CPT

## 2023-02-20 PROCEDURE — G0378 HOSPITAL OBSERVATION PER HR: HCPCS

## 2023-02-20 PROCEDURE — 96376 TX/PRO/DX INJ SAME DRUG ADON: CPT | Mod: 59

## 2023-02-20 PROCEDURE — S4991 NICOTINE PATCH NONLEGEND: HCPCS | Performed by: INTERNAL MEDICINE

## 2023-02-20 RX ORDER — SODIUM CHLORIDE 0.9 % (FLUSH) 0.9 %
10 SYRINGE (ML) INJECTION
Status: DISCONTINUED | OUTPATIENT
Start: 2023-02-20 | End: 2023-02-20

## 2023-02-20 RX ORDER — PANTOPRAZOLE SODIUM 40 MG/1
40 TABLET, DELAYED RELEASE ORAL DAILY
Status: DISCONTINUED | OUTPATIENT
Start: 2023-02-20 | End: 2023-02-20 | Stop reason: HOSPADM

## 2023-02-20 RX ORDER — SODIUM CHLORIDE 0.9 % (FLUSH) 0.9 %
10 SYRINGE (ML) INJECTION EVERY 6 HOURS
Status: DISCONTINUED | OUTPATIENT
Start: 2023-02-20 | End: 2023-02-20

## 2023-02-20 RX ORDER — HYDROCODONE BITARTRATE AND ACETAMINOPHEN 7.5; 325 MG/1; MG/1
1 TABLET ORAL EVERY 6 HOURS PRN
Qty: 28 TABLET | Refills: 0 | OUTPATIENT
Start: 2023-02-20 | End: 2023-03-08

## 2023-02-20 RX ORDER — SODIUM CHLORIDE 0.9 % (FLUSH) 0.9 %
10 SYRINGE (ML) INJECTION
Status: DISCONTINUED | OUTPATIENT
Start: 2023-02-20 | End: 2023-02-20 | Stop reason: HOSPADM

## 2023-02-20 RX ORDER — MUPIROCIN 20 MG/G
OINTMENT TOPICAL 2 TIMES DAILY
Status: DISCONTINUED | OUTPATIENT
Start: 2023-02-20 | End: 2023-02-20 | Stop reason: HOSPADM

## 2023-02-20 RX ORDER — SODIUM CHLORIDE 0.9 % (FLUSH) 0.9 %
10 SYRINGE (ML) INJECTION EVERY 6 HOURS
Status: DISCONTINUED | OUTPATIENT
Start: 2023-02-20 | End: 2023-02-20 | Stop reason: HOSPADM

## 2023-02-20 RX ORDER — KETOROLAC TROMETHAMINE 30 MG/ML
15 INJECTION, SOLUTION INTRAMUSCULAR; INTRAVENOUS EVERY 6 HOURS PRN
Status: DISCONTINUED | OUTPATIENT
Start: 2023-02-20 | End: 2023-02-20 | Stop reason: HOSPADM

## 2023-02-20 RX ADMIN — ERTAPENEM 1 G: 1 INJECTION, POWDER, LYOPHILIZED, FOR SOLUTION INTRAMUSCULAR; INTRAVENOUS at 05:02

## 2023-02-20 RX ADMIN — NICOTINE 1 PATCH: 21 PATCH, EXTENDED RELEASE TRANSDERMAL at 09:02

## 2023-02-20 RX ADMIN — ALUMINUM HYDROXIDE, MAGNESIUM HYDROXIDE, AND SIMETHICONE 30 ML: 200; 200; 20 SUSPENSION ORAL at 09:02

## 2023-02-20 RX ADMIN — Medication 10 ML: at 12:02

## 2023-02-20 RX ADMIN — SUCRALFATE 1 G: 1 SUSPENSION ORAL at 12:02

## 2023-02-20 RX ADMIN — ALUMINUM HYDROXIDE, MAGNESIUM HYDROXIDE, AND SIMETHICONE 30 ML: 200; 200; 20 SUSPENSION ORAL at 11:02

## 2023-02-20 RX ADMIN — KETOROLAC TROMETHAMINE 15 MG: 30 INJECTION, SOLUTION INTRAMUSCULAR; INTRAVENOUS at 12:02

## 2023-02-20 RX ADMIN — CETIRIZINE HYDROCHLORIDE 10 MG: 10 TABLET, FILM COATED ORAL at 09:02

## 2023-02-20 RX ADMIN — HYDROCODONE BITARTRATE AND ACETAMINOPHEN 1 TABLET: 7.5; 325 TABLET ORAL at 11:02

## 2023-02-20 RX ADMIN — IPRATROPIUM BROMIDE AND ALBUTEROL SULFATE 3 ML: 2.5; .5 SOLUTION RESPIRATORY (INHALATION) at 08:02

## 2023-02-20 RX ADMIN — FLUTICASONE FUROATE AND VILANTEROL TRIFENATATE 1 PUFF: 100; 25 POWDER RESPIRATORY (INHALATION) at 08:02

## 2023-02-20 RX ADMIN — PROPRANOLOL HYDROCHLORIDE 60 MG: 10 TABLET ORAL at 09:02

## 2023-02-20 RX ADMIN — METHIMAZOLE 10 MG: 5 TABLET ORAL at 09:02

## 2023-02-20 RX ADMIN — HYDROCODONE BITARTRATE AND ACETAMINOPHEN 1 TABLET: 7.5; 325 TABLET ORAL at 05:02

## 2023-02-20 RX ADMIN — IPRATROPIUM BROMIDE AND ALBUTEROL SULFATE 3 ML: 2.5; .5 SOLUTION RESPIRATORY (INHALATION) at 02:02

## 2023-02-20 RX ADMIN — SUCRALFATE 1 G: 1 SUSPENSION ORAL at 05:02

## 2023-02-20 RX ADMIN — MORPHINE SULFATE 2 MG: 2 INJECTION, SOLUTION INTRAMUSCULAR; INTRAVENOUS at 09:02

## 2023-02-20 RX ADMIN — PANTOPRAZOLE SODIUM 40 MG: 40 TABLET, DELAYED RELEASE ORAL at 10:02

## 2023-02-20 RX ADMIN — MONTELUKAST 10 MG: 10 TABLET, FILM COATED ORAL at 09:02

## 2023-02-20 RX ADMIN — MUPIROCIN: 20 OINTMENT TOPICAL at 09:02

## 2023-02-20 RX ADMIN — IOHEXOL 100 ML: 350 INJECTION, SOLUTION INTRAVENOUS at 02:02

## 2023-02-20 NOTE — ED NOTES
Pt in bed, resting quietly. Pt arouses with verbal stimuli. Plan of care reviewed, call bell within reach.

## 2023-02-20 NOTE — ED NOTES
Pt c/o sudden increase pain to drain insertion site. Dressing currently clean dry and intact. Drain does not appear dislodged. MD notified.

## 2023-02-20 NOTE — PLAN OF CARE
02/20/23 1507   Post-Acute Status   Post-Acute Authorization IV Infusion   IV Infusion Status Post acute provider reviewing for acceptance

## 2023-02-20 NOTE — PLAN OF CARE
[facility]  Home Health Hub: [phone]    GENERAL HEART FAILURE  HOME HEALTH ORDERS    Patient Name: Niya Moeller  YOB: 1970    PCP: YEHUDA Adamson   PCP Address: [address] / [place] [address]  PCP Phone Number: [phone]  PCP Fax: [phone]    Admit to Home Health    Diagnosis:   Active Hospital Problems    Diagnosis  POA    *Diverticulitis of intestine with abscess [K57.80]  Unknown    Asthma [J45.909]  Unknown    Acute abdominal pain [R10.9]  Unknown    Hyperthyroidism [E05.90]  Yes    GERD (gastroesophageal reflux disease) [K21.9]  Yes     Chronic      Resolved Hospital Problems   No resolved problems to display.       Patient is homebound due to:  Diverticulitis of intestine with abscess    Allergies:   Review of patient's allergies indicates:   Allergen Reactions    Levofloxacin Hives and Itching     Patient states that she recently has been on ciprofloxacin and levofloxacin without adverse reactions    Metronidazole Hives    Latex        Diet: cardiac diet Instruct on diet as prescribed    Activities as tolerated: SN to instruct patient on importance of home exercise program. Utilize tool for education.    Nursing:   Evaluate fall risk and need for Physical Therapy by performing time up and go (TUG) and 30 second chair rise.  If the patient scores at risk on any one of the two tests performed then SN to order PT evaluate and treat.   If PT consulted based on at risk score, then PT to evaluate need for OT in the home on evaluation visit.     SN to complete comprehensive assessment within 24 hours of discharge from hospital or referral from ambulatory clinic:    Perform and record the following vital signs:  BP  Respiratory Rate  Pulse  O2 Sat  If room air O2 sat below 88%, notify physician so they can order an O2 qualifying test.    Weight (deliver scale to patient on admit if patient does not have properly functioning scale)  Skilled Nurse to  record Target Weight = AM weight immediately after discharge unless otherwise specified  Standing Daily Weight:  Instruct patient to take daily AM weights (record on log provided)  Protocol for how to weigh patient:   Nurse to hold all equipment  Shoes off  Early morning with enforcement to patient on timing and method      DIURETIC SLIDING SCALE    If diuretic sliding scale activity, skilled nurse visits daily to instruct and monitor medication adherence until target weight. Then resume prior order frequency.    If weight gain exceeds 5 lbs over target weight, call MD    If weight gain of 3-4 lbs over target weight, then:      Obtain BMP within 24 hours of IV dose administration.    If weight decreased by > 2 lbs. but not yet at target, continue increased oral diuretic dose until target weight is reached.    Repeat BMP every 3 days while on increased diuretic dose.    When target weight is reached have patient resume usual dose of diuretic. Notify provider of outcome.    Repeat BMP one week after achieving target weight.          Cr<1.5, double oral potassium dose while taking increased diuretic dose. Return to baseline Potassium dose when returning to baseline oral diuretic dose.     With repeat BMP, follow the below table with additional supplement based on the labs:     Scr > 1.5 mg/dl Scr ? 1.5 mg/dl     K ? 3.0 - notify provider 40 mEq bid 40 mEq tid     K- 3.1-3.3 20 mEq bid 20 mEq tid     K 3.4-3.7 10 mEq bid 10 mEq tid       Skilled nurse to perform medication reconciliation on admit visit comparing discharged medications with actual medications in the home to include OTCs.  SN to provide a weekly medication reminder (give filled pill box) if warranted and instruct patient on its use.  If a medication discrepancy exists please contact>>>>>>>>>>>>>    Skilled nurse to assess all home equipment such as oxygen, nebulizers, hospital beds etc to determine if the equipment is functioning properly and  patient/caregiver understanding how to use properly.    If equipment is not functioning properly please contact supplier/ to resolve.    On admission visit, skilled nurse to instruct patient on signs and symptoms of HF by educating patient and/or caregiver on Heart Failure Zone Stoplight tool.  SN to instruct patient caregiver on who to contact if symptoms develop.  SN to leave behind tool with contact numbers as developed by [facility].    Skilled nurse to verify patient follow-up appointments have been scheduled and patient/caregiver understands the importance of making all appointments. Determine if any transportation related issues exist. SW consult if needed to assist with coordination of transportation to and from MD offices.    SN to reinforce education as instructed on admit visit and continue education on the below items on subsequent visits:  Instruct on the definition of CHF.  (See definition in [employer] educational packet)  Instruct on the signs/symptoms of CHF to be reported.  Instruct on factors that cause exacerbation.  Instruct on action, dose, schedule, and side effects of medications.  Risk factors for HF  Smoking cessation if patient is a smoker  Triggers of SOB  Coping mechanisms  Importance of diet/fluid restrictions, understanding food labels  Fall prevention and home safety  Plan on self management after HH discharge    CONSULTS:    Aide to provide assistance with personal care, ADLs, and vital signs.    MISCELLANEOUS CARE  Home Infusion Therapy:   SN to perform Infusion Therapy/Central Line Care.  Review Central Line Care & Central Line Flush with patient.    Administer (drug and dose): Ertapenem 1g q24 x 7 days (last dose is on 2/25/2023)  Last dose given: 2/20/23 at 0600                        Home dose due: 2/21/23 0600    Scrub the Hub: Prior to accessing the line, always perform a 30 second alcohol scrub  Each lumen of the central line is to be flushed at least daily with 10 mL  Normal Saline and 3 mL Heparin flush (10 units/mL)  Skilled Nurse (SN) may draw blood from IV access  Blood Draw Procedure:   - Aspirate at least 5 mL of blood   - Discard   - Obtain specimen   - Change injection cap   - Flush with 20 mL Normal Saline followed by a                 3-5 mL Heparin flush (10 units/mL)  Central :   - Sterile dressing changes are done weekly and as needed.   - Use chlor-hexadine scrub to cleanse site, apply Biopatch to insertion site,       apply securement device dressing   - Injection caps are changed weekly and after EVERY lab draw.   - If sterile gauze is under dressing to control oozing,                 dressing change must be performed every 24 hours until gauze is not needed.    WOUND CARE ORDERS  no    Medications: Review discharge medications with patient and family and provide education.      Current Discharge Medication List        CONTINUE these medications which have NOT CHANGED    Details   albuterol (PROVENTIL/VENTOLIN HFA) 90 mcg/actuation inhaler Inhale 1-2 puffs into the lungs every 6 (six) hours as needed for Wheezing. Rescue  Qty: 18 g, Refills: 1      albuterol-ipratropium (DUO-NEB) 2.5 mg-0.5 mg/3 mL nebulizer solution Inhale 3 mLs into the lungs every 4 (four) hours as needed.      budesonide-formoterol 160-4.5 mcg (SYMBICORT) 160-4.5 mcg/actuation HFAA Inhale 2 puffs into the lungs every 12 (twelve) hours. Controller  Qty: 6 g, Refills: 10      cetirizine (ZYRTEC) 10 MG tablet Take 10 mg by mouth once daily.      fluticasone propionate (FLONASE) 50 mcg/actuation nasal spray 1 spray by Nasal route daily as needed.      methIMAzole (TAPAZOLE) 10 MG Tab Take 1 tablet (10 mg total) by mouth 2 (two) times daily.  Qty: 60 tablet, Refills: 0      montelukast (SINGULAIR) 10 mg tablet Take 10 mg by mouth once daily.      omeprazole (PRILOSEC) 40 MG capsule Take 40 mg by mouth once daily.      ondansetron (ZOFRAN-ODT) 4 MG TbDL Take 2 tablets (8 mg total) by  mouth 2 (two) times daily.  Qty: 10 tablet, Refills: 0      propranoloL (INDERAL LA) 60 MG 24 hr capsule Take 1 capsule (60 mg total) by mouth once daily.  Qty: 30 capsule, Refills: 1    Comments: .      amoxicillin-clavulanate 875-125mg (AUGMENTIN) 875-125 mg per tablet Take 1 tablet by mouth 2 (two) times daily. for 7 days  Qty: 14 tablet, Refills: 0      ciprofloxacin HCl (CIPRO) 500 MG tablet Take 1 tablet (500 mg total) by mouth 2 (two) times daily. for 7 days  Qty: 14 tablet, Refills: 0      sod sulf-pot chloride-mag sulf (SUTAB) 1.479-0.188- 0.225 gram tablet Take 12 tablets by mouth once daily. Take according to package instructions with indicated amount of water.  Qty: 24 tablet, Refills: 0             I have seen and examined this patient face to face today. My clinical findings that support the need for the home health skilled services and home bound status are the following:  Patient with medication mismanagement issues requiring home bound status as evidenced by  Poor understanding of medication regimen/dosage and Poor adherence to medication regimen/dosage.    I certify that this patient is confined to her home and needs intermittent skilled nursing care.    Mai Becker MD  02/20/2023

## 2023-02-20 NOTE — ED PROVIDER NOTES
Encounter Date: 2/19/2023       History     Chief Complaint   Patient presents with    Abdominal Pain     Patient had drain placed 2/14 for abdominal abscess associated with diverticulitis. Patient reports she left the hospital today and was told to return by provider due to E Coli found by culture in drainage tube.  Reports abdominal pain intermittently for 6 months.     52 year old female who presents for admission due to MDRO ESBL 2/2 complicated diverticulitis w/ abscess s/p JOE drain. Pt left AMA earlier and was called back for admission. Endorses nausea, abdominal pain but no other complaints.     Review of patient's allergies indicates:   Allergen Reactions    Levofloxacin Hives and Itching     Patient states that she recently has been on ciprofloxacin and levofloxacin without adverse reactions    Metronidazole Hives    Latex      Past Medical History:   Diagnosis Date    Asthma     Diverticulosis     GERD (gastroesophageal reflux disease)      No past surgical history on file.  No family history on file.  Social History     Tobacco Use    Smoking status: Every Day     Packs/day: 1.00     Years: 40.00     Pack years: 40.00     Types: Cigarettes     Start date: 1983    Smokeless tobacco: Never    Tobacco comments:     Ambulatory referral to Smoking Cessation clinic following hospital discharge.    Substance Use Topics    Alcohol use: Not Currently    Drug use: Not Currently     Review of Systems   All other systems reviewed and are negative.    Physical Exam     Initial Vitals [02/19/23 2003]   BP Pulse Resp Temp SpO2   (!) 111/56 76 16 98.5 °F (36.9 °C) 99 %      MAP       --         Physical Exam    Nursing note and vitals reviewed.  Constitutional: She appears well-developed and well-nourished. She is not diaphoretic. No distress.   HENT:   Head: Normocephalic and atraumatic.   Eyes: EOM are normal. Pupils are equal, round, and reactive to light.   Neck: No tracheal deviation present.   Normal range of  motion.  Cardiovascular:  Normal rate, regular rhythm, normal heart sounds and intact distal pulses.     Exam reveals no gallop and no friction rub.       No murmur heard.  Pulmonary/Chest: Breath sounds normal. No stridor. No respiratory distress. She has no wheezes. She has no rhonchi. She has no rales.   Abdominal: Abdomen is soft. Bowel sounds are normal. She exhibits no distension and no mass. There is abdominal tenderness.   JOE drain in place to the LLQ, darkened material in the drain There is no rebound and no guarding.   Musculoskeletal:         General: No edema. Normal range of motion.      Cervical back: Normal range of motion.     Neurological: She is alert and oriented to person, place, and time. GCS score is 15. GCS eye subscore is 4. GCS verbal subscore is 5. GCS motor subscore is 6.   Skin: Skin is warm and dry. Capillary refill takes less than 2 seconds.   Psychiatric: She has a normal mood and affect. Thought content normal.       ED Course   Procedures  Labs Reviewed   CULTURE, BLOOD   CULTURE, BLOOD   CBC W/ AUTO DIFFERENTIAL   COMPREHENSIVE METABOLIC PANEL          Imaging Results    None          Medications   meropenem-0.9% sodium chloride 1 g/50 mL IVPB (has no administration in time range)   lactated ringers bolus 1,000 mL (has no administration in time range)     Medical Decision Making:   Initial Assessment:   Hemodynamically stable. Afebrile. Phonating and protecting the airway spontaneously. No clinical evidence for cardiovascular instability or impending airway compromise. Examination as above. According to culture data, ESBL is sensitive to darrell and erta. Due to latex allergy, will avoid erta and start meropenem. Discussed w/ hospital medicine who was expecting pt. General surgery consulted.     The patient was reassessed frequently and managed aggressively while in the emergency department. Due to the clinical workup and presentation, the patient's condition is concerning and will  require additional evaluation. I discussed the objective findings with her including laboratory studies, diagnostic imaging, and any consultant involvement. She was educated on their clinical presentation and all questions were answered. She acknowledged and verbally agreed to the treatment plan. The patient will be admitted to the hospital for further management.     DISCLAIMER: This note was prepared with [employer] voice recognition transcription software. Garbled syntax, mangled pronouns, and other bizarre constructions may be attributed to that software system.                            Clinical Impression:   Final diagnoses:  [A49.9, Z16.12] ESBL (extended spectrum beta-lactamase) producing bacteria infection (Primary)  [K57.92] Diverticulitis  [K65.1] Intra-abdominal abscess        ED Disposition Condition    Admit Stable                Lester Lopez MD  02/19/23 2011

## 2023-02-20 NOTE — SUBJECTIVE & OBJECTIVE
Past Medical History:   Diagnosis Date    Asthma     Diverticulosis     GERD (gastroesophageal reflux disease)        No past surgical history on file.    Review of patient's allergies indicates:   Allergen Reactions    Levofloxacin Hives and Itching     Patient states that she recently has been on ciprofloxacin and levofloxacin without adverse reactions    Metronidazole Hives    Latex        Current Facility-Administered Medications on File Prior to Encounter   Medication    [COMPLETED] predniSONE tablet 40 mg    [DISCONTINUED] acetaminophen tablet 650 mg    [DISCONTINUED] albuterol-ipratropium 2.5 mg-0.5 mg/3 mL nebulizer solution 3 mL    [DISCONTINUED] albuterol-ipratropium 2.5 mg-0.5 mg/3 mL nebulizer solution 3 mL    [DISCONTINUED] cetirizine tablet 10 mg    [DISCONTINUED] dextrose 10% bolus 125 mL 125 mL    [DISCONTINUED] dextrose 10% bolus 250 mL 250 mL    [DISCONTINUED] docusate sodium capsule 100 mg    [DISCONTINUED] fluticasone furoate-vilanteroL 100-25 mcg/dose diskus inhaler 1 puff    [DISCONTINUED] fluticasone propionate 50 mcg/actuation nasal spray 50 mcg    [DISCONTINUED] glucagon (human recombinant) injection 1 mg    [DISCONTINUED] glucose chewable tablet 16 g    [DISCONTINUED] glucose chewable tablet 24 g    [DISCONTINUED] HYDROcodone-acetaminophen 7.5-325 mg per tablet 1 tablet    [DISCONTINUED] melatonin tablet 6 mg    [DISCONTINUED] montelukast tablet 10 mg    [DISCONTINUED] morphine injection 2 mg    [DISCONTINUED] naloxone 0.4 mg/mL injection 0.02 mg    [DISCONTINUED] nicotine 21 mg/24 hr 1 patch    [DISCONTINUED] ondansetron injection 4 mg    [DISCONTINUED] pantoprazole EC tablet 40 mg    [DISCONTINUED] piperacillin-tazobactam (ZOSYN) 4.5 g in dextrose 5 % in water (D5W) 5 % 100 mL IVPB (MB+)    [DISCONTINUED] prochlorperazine injection Soln 2.5 mg    [DISCONTINUED] prochlorperazine injection Soln 5 mg    [DISCONTINUED] propranoloL tablet 60 mg    [DISCONTINUED] simethicone chewable tablet 80  mg    [DISCONTINUED] sodium chloride 0.9% flush 10 mL     Current Outpatient Medications on File Prior to Encounter   Medication Sig    albuterol (PROVENTIL/VENTOLIN HFA) 90 mcg/actuation inhaler Inhale 1-2 puffs into the lungs every 6 (six) hours as needed for Wheezing. Rescue    albuterol-ipratropium (DUO-NEB) 2.5 mg-0.5 mg/3 mL nebulizer solution Inhale 3 mLs into the lungs every 4 (four) hours as needed.    amoxicillin-clavulanate 875-125mg (AUGMENTIN) 875-125 mg per tablet Take 1 tablet by mouth 2 (two) times daily. for 7 days    budesonide-formoterol 160-4.5 mcg (SYMBICORT) 160-4.5 mcg/actuation HFAA Inhale 2 puffs into the lungs every 12 (twelve) hours. Controller    cetirizine (ZYRTEC) 10 MG tablet Take 10 mg by mouth once daily.    ciprofloxacin HCl (CIPRO) 500 MG tablet Take 1 tablet (500 mg total) by mouth 2 (two) times daily. for 7 days    fluticasone propionate (FLONASE) 50 mcg/actuation nasal spray 1 spray by Nasal route daily as needed.    methIMAzole (TAPAZOLE) 10 MG Tab Take 1 tablet (10 mg total) by mouth 2 (two) times daily.    montelukast (SINGULAIR) 10 mg tablet Take 10 mg by mouth once daily.    omeprazole (PRILOSEC) 40 MG capsule Take 40 mg by mouth once daily.    ondansetron (ZOFRAN-ODT) 4 MG TbDL Take 2 tablets (8 mg total) by mouth 2 (two) times daily.    propranoloL (INDERAL LA) 60 MG 24 hr capsule Take 1 capsule (60 mg total) by mouth once daily.    sod sulf-pot chloride-mag sulf (SUTAB) 1.479-0.188- 0.225 gram tablet Take 12 tablets by mouth once daily. Take according to package instructions with indicated amount of water.    [DISCONTINUED] amoxicillin-clavulanate 875-125mg (AUGMENTIN) 875-125 mg per tablet Take 1 tablet by mouth 2 (two) times daily.    [DISCONTINUED] ciprofloxacin HCl (CIPRO) 500 MG tablet Take 500 mg by mouth 2 (two) times daily.    [DISCONTINUED] predniSONE (DELTASONE) 20 MG tablet Take 2 tablets (40 mg total) by mouth once daily. for 6 days     Family History     None       Tobacco Use    Smoking status: Every Day     Packs/day: 1.00     Years: 40.00     Pack years: 40.00     Types: Cigarettes     Start date: 1983    Smokeless tobacco: Never    Tobacco comments:     Ambulatory referral to Smoking Cessation clinic following hospital discharge.    Substance and Sexual Activity    Alcohol use: Not Currently    Drug use: Not Currently    Sexual activity: Not Currently     Review of Systems   Constitutional:  Negative for chills, diaphoresis, fatigue and fever.   HENT:  Negative for sore throat, tinnitus and trouble swallowing.    Eyes:  Negative for visual disturbance.   Respiratory:  Negative for cough, chest tightness, shortness of breath and wheezing.    Cardiovascular:  Negative for chest pain and palpitations.   Gastrointestinal:  Positive for abdominal pain. Negative for abdominal distention, blood in stool, constipation, diarrhea, nausea and vomiting.        LLQ abdominal pain   Endocrine: Negative for polyuria.   Genitourinary:  Negative for flank pain and pelvic pain.   Musculoskeletal:  Negative for arthralgias and neck stiffness.   Skin:  Negative for color change and pallor.   Allergic/Immunologic: Negative for immunocompromised state.   Neurological:  Negative for dizziness, seizures, weakness and headaches.   Psychiatric/Behavioral:  Negative for agitation, behavioral problems and confusion.    Objective:     Vital Signs (Most Recent):  Temp: 98.5 °F (36.9 °C) (02/19/23 2003)  Pulse: 76 (02/19/23 2003)  Resp: 16 (02/19/23 2003)  BP: (!) 111/56 (02/19/23 2003)  SpO2: 99 % (02/19/23 2003)   Vital Signs (24h Range):  Temp:  [96.9 °F (36.1 °C)-98.5 °F (36.9 °C)] 98.5 °F (36.9 °C)  Pulse:  [67-76] 76  Resp:  [16-20] 16  SpO2:  [92 %-100 %] 99 %  BP: (108-136)/(51-63) 111/56     Weight: 61.2 kg (135 lb)  Body mass index is 27.27 kg/m².    Physical Exam  Constitutional:       Appearance: Normal appearance.      Comments: pleasant   HENT:      Head: Normocephalic and  atraumatic.      Right Ear: External ear normal.      Left Ear: External ear normal.      Nose: Nose normal.      Mouth/Throat:      Mouth: Mucous membranes are dry.      Pharynx: Oropharynx is clear.   Eyes:      Extraocular Movements: Extraocular movements intact.      Conjunctiva/sclera: Conjunctivae normal.      Pupils: Pupils are equal, round, and reactive to light.   Cardiovascular:      Rate and Rhythm: Normal rate and regular rhythm.      Pulses: Normal pulses.      Heart sounds: Normal heart sounds.   Pulmonary:      Effort: Pulmonary effort is normal.      Breath sounds: Normal breath sounds.   Abdominal:      General: Abdomen is flat.      Palpations: Abdomen is soft.      Tenderness: There is no right CVA tenderness, left CVA tenderness, guarding or rebound.      Comments: LLQ tenderness   Musculoskeletal:         General: Normal range of motion.      Cervical back: Normal range of motion and neck supple.   Skin:     General: Skin is warm and dry.   Neurological:      General: No focal deficit present.      Mental Status: She is alert and oriented to person, place, and time. Mental status is at baseline.   Psychiatric:         Mood and Affect: Mood normal.         Behavior: Behavior normal.         CRANIAL NERVES     CN III, IV, VI   Pupils are equal, round, and reactive to light.     Significant Labs: All pertinent labs within the past 24 hours have been reviewed.    Significant Imaging: I have reviewed all pertinent imaging results/findings within the past 24 hours.

## 2023-02-20 NOTE — ED NOTES
Pt presents stating her provider called her to return to the hospital for IV antibiotics. Pt left hospital ama today after being admitted for an abdominal abscess. Per pt, culture of fluid revealed ecoli. Pt reports continued lower abdominal pain. Pt has drain in place to LLQ, dressing currently clean and dry.

## 2023-02-20 NOTE — PLAN OF CARE
SW received IV Home Infusion consult for pt this morning. MD was able to clarify needs during morning CM meeting. SW sent referral to three companies, one being pt's local infusion company previously used for outpatient in [place]. SW will follow.       02/20/23 1055   Post-Acute Status   Post-Acute Authorization IV Infusion;Home Health   IV Infusion Status Referral(s) sent   Discharge Delays (!) Post-Acute Set-up         Leilani Swanson, [facility]  ED Social Work  [phone]

## 2023-02-20 NOTE — ASSESSMENT & PLAN NOTE
-patient is non-toxic, not septic appearing at all, normal WBC   -reviewed abscess cultures  -received meropenem x 1 in ER, will switch to once daily ertapenem IV   -order PICC line placement in anticipation of home IV per plans as was discussed with Dr. Becker, day-time hospitalist   -case management order placed to alert them to prepare/assist with home IV arrangements  -gen surgery consult placed in ER but I cancelled; does not seem necessary based on their last note and given patient is with no changes in clinical condition

## 2023-02-20 NOTE — H&P
"[facility] Medicine  History & Physical    Patient Name: Niya Moeller  MRN: [id number]  Patient Class: IP- Inpatient  Admission Date: 2/19/2023  Attending Physician: Mai Becker MD   Primary Care Provider: YEHUDA Adamson         Patient information was obtained from patient, past medical records and ER records.     Subjective:     Principal Problem:Diverticulitis of intestine with abscess    Chief Complaint:   Chief Complaint   Patient presents with    Abdominal Pain     Patient had drain placed 2/14 for abdominal abscess associated with diverticulitis. Patient reports she left the hospital today and was told to return by provider due to E Coli found by culture in drainage tube.  Reports abdominal pain intermittently for 6 months.        HPI: This is a 52 year-old woman with hx of asthma, GERD, thyroiditis, diverticulosis with history of multiple flare-ups and a previous course of long-term IV abx, who was just recently admitted to hospital and found to have diverticulitis with an abscess s/p IR drainage of pericolonic abscess on 02/16 with JOE drain placement, and then just earlier today patient decided to sign out of the hospital AMA, but was called later by physician and it was strongly recommended that she return to hospital due to findings of serious and resistant bacteria (ESBL resistant) bacteria that was isolated from her abscess drainage culture and need for further medical management.  The patient came back and I visited with her in ER room.  The patient currently only complains of ongoing LLQ discomfort/pain.  She denies any fevers, N/V, diarrhea, no chest pain or shortness of breath.  She admits that she is glad she came back because "she couldn't deal with the pain" without any pain medication.  She is otherwise feeling well and she is munching on a bag of chips in the ER.     In ER, the patient labwork came back unremarkable.  She was provided with a dose of IV " Meropenem based on the abscess culture results and also IV zofran x 1.      Past Medical History:   Diagnosis Date    Asthma     Diverticulosis     GERD (gastroesophageal reflux disease)        No past surgical history on file.    Review of patient's allergies indicates:   Allergen Reactions    Levofloxacin Hives and Itching     Patient states that she recently has been on ciprofloxacin and levofloxacin without adverse reactions    Metronidazole Hives    Latex        Current Facility-Administered Medications on File Prior to Encounter   Medication    [COMPLETED] predniSONE tablet 40 mg    [DISCONTINUED] acetaminophen tablet 650 mg    [DISCONTINUED] albuterol-ipratropium 2.5 mg-0.5 mg/3 mL nebulizer solution 3 mL    [DISCONTINUED] albuterol-ipratropium 2.5 mg-0.5 mg/3 mL nebulizer solution 3 mL    [DISCONTINUED] cetirizine tablet 10 mg    [DISCONTINUED] dextrose 10% bolus 125 mL 125 mL    [DISCONTINUED] dextrose 10% bolus 250 mL 250 mL    [DISCONTINUED] docusate sodium capsule 100 mg    [DISCONTINUED] fluticasone furoate-vilanteroL 100-25 mcg/dose diskus inhaler 1 puff    [DISCONTINUED] fluticasone propionate 50 mcg/actuation nasal spray 50 mcg    [DISCONTINUED] glucagon (human recombinant) injection 1 mg    [DISCONTINUED] glucose chewable tablet 16 g    [DISCONTINUED] glucose chewable tablet 24 g    [DISCONTINUED] HYDROcodone-acetaminophen 7.5-325 mg per tablet 1 tablet    [DISCONTINUED] melatonin tablet 6 mg    [DISCONTINUED] montelukast tablet 10 mg    [DISCONTINUED] morphine injection 2 mg    [DISCONTINUED] naloxone 0.4 mg/mL injection 0.02 mg    [DISCONTINUED] nicotine 21 mg/24 hr 1 patch    [DISCONTINUED] ondansetron injection 4 mg    [DISCONTINUED] pantoprazole EC tablet 40 mg    [DISCONTINUED] piperacillin-tazobactam (ZOSYN) 4.5 g in dextrose 5 % in water (D5W) 5 % 100 mL IVPB (MB+)    [DISCONTINUED] prochlorperazine injection Soln 2.5 mg    [DISCONTINUED] prochlorperazine injection Soln 5 mg     [DISCONTINUED] propranoloL tablet 60 mg    [DISCONTINUED] simethicone chewable tablet 80 mg    [DISCONTINUED] sodium chloride 0.9% flush 10 mL     Current Outpatient Medications on File Prior to Encounter   Medication Sig    albuterol (PROVENTIL/VENTOLIN HFA) 90 mcg/actuation inhaler Inhale 1-2 puffs into the lungs every 6 (six) hours as needed for Wheezing. Rescue    albuterol-ipratropium (DUO-NEB) 2.5 mg-0.5 mg/3 mL nebulizer solution Inhale 3 mLs into the lungs every 4 (four) hours as needed.    amoxicillin-clavulanate 875-125mg (AUGMENTIN) 875-125 mg per tablet Take 1 tablet by mouth 2 (two) times daily. for 7 days    budesonide-formoterol 160-4.5 mcg (SYMBICORT) 160-4.5 mcg/actuation HFAA Inhale 2 puffs into the lungs every 12 (twelve) hours. Controller    cetirizine (ZYRTEC) 10 MG tablet Take 10 mg by mouth once daily.    ciprofloxacin HCl (CIPRO) 500 MG tablet Take 1 tablet (500 mg total) by mouth 2 (two) times daily. for 7 days    fluticasone propionate (FLONASE) 50 mcg/actuation nasal spray 1 spray by Nasal route daily as needed.    methIMAzole (TAPAZOLE) 10 MG Tab Take 1 tablet (10 mg total) by mouth 2 (two) times daily.    montelukast (SINGULAIR) 10 mg tablet Take 10 mg by mouth once daily.    omeprazole (PRILOSEC) 40 MG capsule Take 40 mg by mouth once daily.    ondansetron (ZOFRAN-ODT) 4 MG TbDL Take 2 tablets (8 mg total) by mouth 2 (two) times daily.    propranoloL (INDERAL LA) 60 MG 24 hr capsule Take 1 capsule (60 mg total) by mouth once daily.    sod sulf-pot chloride-mag sulf (SUTAB) 1.479-0.188- 0.225 gram tablet Take 12 tablets by mouth once daily. Take according to package instructions with indicated amount of water.    [DISCONTINUED] amoxicillin-clavulanate 875-125mg (AUGMENTIN) 875-125 mg per tablet Take 1 tablet by mouth 2 (two) times daily.    [DISCONTINUED] ciprofloxacin HCl (CIPRO) 500 MG tablet Take 500 mg by mouth 2 (two) times daily.    [DISCONTINUED] predniSONE (DELTASONE) 20 MG  tablet Take 2 tablets (40 mg total) by mouth once daily. for 6 days     Family History    None       Tobacco Use    Smoking status: Every Day     Packs/day: 1.00     Years: 40.00     Pack years: 40.00     Types: Cigarettes     Start date: 1983    Smokeless tobacco: Never    Tobacco comments:     Ambulatory referral to Smoking Cessation clinic following hospital discharge.    Substance and Sexual Activity    Alcohol use: Not Currently    Drug use: Not Currently    Sexual activity: Not Currently     Review of Systems   Constitutional:  Negative for chills, diaphoresis, fatigue and fever.   HENT:  Negative for sore throat, tinnitus and trouble swallowing.    Eyes:  Negative for visual disturbance.   Respiratory:  Negative for cough, chest tightness, shortness of breath and wheezing.    Cardiovascular:  Negative for chest pain and palpitations.   Gastrointestinal:  Positive for abdominal pain. Negative for abdominal distention, blood in stool, constipation, diarrhea, nausea and vomiting.        LLQ abdominal pain   Endocrine: Negative for polyuria.   Genitourinary:  Negative for flank pain and pelvic pain.   Musculoskeletal:  Negative for arthralgias and neck stiffness.   Skin:  Negative for color change and pallor.   Allergic/Immunologic: Negative for immunocompromised state.   Neurological:  Negative for dizziness, seizures, weakness and headaches.   Psychiatric/Behavioral:  Negative for agitation, behavioral problems and confusion.    Objective:     Vital Signs (Most Recent):  Temp: 98.5 °F (36.9 °C) (02/19/23 2003)  Pulse: 76 (02/19/23 2003)  Resp: 16 (02/19/23 2003)  BP: (!) 111/56 (02/19/23 2003)  SpO2: 99 % (02/19/23 2003)   Vital Signs (24h Range):  Temp:  [96.9 °F (36.1 °C)-98.5 °F (36.9 °C)] 98.5 °F (36.9 °C)  Pulse:  [67-76] 76  Resp:  [16-20] 16  SpO2:  [92 %-100 %] 99 %  BP: (108-136)/(51-63) 111/56     Weight: 61.2 kg (135 lb)  Body mass index is 27.27 kg/m².    Physical Exam  Constitutional:        Appearance: Normal appearance.      Comments: pleasant   HENT:      Head: Normocephalic and atraumatic.      Right Ear: External ear normal.      Left Ear: External ear normal.      Nose: Nose normal.      Mouth/Throat:      Mouth: Mucous membranes are dry.      Pharynx: Oropharynx is clear.   Eyes:      Extraocular Movements: Extraocular movements intact.      Conjunctiva/sclera: Conjunctivae normal.      Pupils: Pupils are equal, round, and reactive to light.   Cardiovascular:      Rate and Rhythm: Normal rate and regular rhythm.      Pulses: Normal pulses.      Heart sounds: Normal heart sounds.   Pulmonary:      Effort: Pulmonary effort is normal.      Breath sounds: Normal breath sounds.   Abdominal:      General: Abdomen is flat.      Palpations: Abdomen is soft.      Tenderness: There is no right CVA tenderness, left CVA tenderness, guarding or rebound.      Comments: LLQ tenderness   Musculoskeletal:         General: Normal range of motion.      Cervical back: Normal range of motion and neck supple.   Skin:     General: Skin is warm and dry.   Neurological:      General: No focal deficit present.      Mental Status: She is alert and oriented to person, place, and time. Mental status is at baseline.   Psychiatric:         Mood and Affect: Mood normal.         Behavior: Behavior normal.         CRANIAL NERVES     CN III, IV, VI   Pupils are equal, round, and reactive to light.     Significant Labs: All pertinent labs within the past 24 hours have been reviewed.    Significant Imaging: I have reviewed all pertinent imaging results/findings within the past 24 hours.    Assessment/Plan:     * Diverticulitis of intestine with abscess  -s/p IR drainage of pericolonic abscess on 02/16 with JOE drain placement on last admission  -patient is non-toxic, not septic appearing at all, normal WBC   -reviewed abscess cultures  -received meropenem x 1 in ER, will switch to once daily ertapenem IV   -order PICC line placement  in anticipation of home IV per plans as was discussed with Dr. Becker, day-time hospitalist   -case management order placed to alert them to prepare/assist with home IV arrangements  -gen surgery consult placed in ER but I cancelled; does not seem necessary based on their last note and given patient is with no changes in clinical condition      Acute abdominal pain  -moderate ongoing LLQ abdominal pain related to her acute disease  -provide pain medication scale PRN       Asthma  -not in acute exacerbation, stable   -provide DuoNebs PRN       GERD (gastroesophageal reflux disease)  -Maalox ordered   -no acute concern      Hyperthyroidism  -no acute concern   -resume methimazole and propranolol      VTE Risk Mitigation (From admission, onward)           Ordered     IP VTE LOW RISK PATIENT  Once         02/19/23 2050     Place sequential compression device  Until discontinued         02/19/23 2050                       Popeye Issa MD  Department of Hospital Medicine   [facility] - Emergency Dept

## 2023-02-20 NOTE — PLAN OF CARE
Pt's insurance is being reviewed by [facility] [phone] and [facility] in [facility] [phone]. Pt denied by four  agencies due to Medicaid insurance. SW sent to [facility] for consideration of short term assist with infusion.    CM will follow.       02/20/23 1256   Post-Acute Status   Post-Acute Authorization IV Infusion;Home Health   Home Health Status Pending Payor Review   IV Infusion Status Post acute provider reviewing for acceptance

## 2023-02-20 NOTE — ASSESSMENT & PLAN NOTE
-moderate ongoing LLQ abdominal pain related to her acute disease  -provide pain medication scale PRN

## 2023-02-20 NOTE — PHARMACY MED REC
"    Admission Medication History     The home medication history was taken by Sammie Jackson CPhT.    Medication history obtained from, Patient Verified     You may go to "Admission" then "Reconcile Home Medications" tabs to review and/or act upon these items.     The home medication list has been updated by the Pharmacy department.   Please read ALL comments highlighted in yellow.   Please address this information as you see fit.    Feel free to contact us if you have any questions or require assistance.            Sammie Jackson CPhT.  Ext [phone]           .        "

## 2023-02-20 NOTE — ASSESSMENT & PLAN NOTE
"This is a 52 year-old woman with hx of asthma, GERD, thyroiditis, diverticulosis with history of multiple flare-ups and a previous course of long-term IV abx, who was just recently admitted to hospital and found to have diverticulitis with an abscess s/p IR drainage of pericolonic abscess on 02/16 with JOE drain placement, and then just earlier today patient decided to sign out of the hospital AMA, but was called later by physician and it was strongly recommended that she return to hospital due to findings of serious and resistant bacteria (ESBL resistant) bacteria that was isolated from her abscess drainage culture and need for further medical management.  The patient came back and I visited with her in ER room.  The patient currently only complains of ongoing LLQ discomfort/pain.  She denies any fevers, N/V, diarrhea, no chest pain or shortness of breath.  She admits that she is glad she came back because "she couldn't deal with the pain" without any pain medication.  She is otherwise feeling well and she is munching on a bag of chips in the ER.      In ER, the patient labwork came back unremarkable.  She was provided with a dose of IV Meropenem based on the abscess culture results and also IV zofran x 1.     Patient with a  long history of diverticular disease and has had IV zosyn for 2 months in the recent past ([place] or [place]). Now left AMA and had IR drainage + for ESBL E coli - ssensitive only to monobactams   Agree with Ertapenem 1 g IV q d for a total of 7 days     OK per ID for discharge - does not need any labs drawn. Does not need ID follow up   Thanks for the consult   "

## 2023-02-20 NOTE — CONSULTS
"[facility] - Emergency Dept  Infectious Disease  Consult Note    Patient Name: Niya Moeller  MRN: [id number]  Admission Date: 2/19/2023  Hospital Length of Stay: 1 days  Attending Physician: Mai Becker MD  Primary Care Provider: YEHUDA Adamson     Isolation Status: No active isolations    Patient information was obtained from patient and ER records.      Consults  Assessment/Plan:     GI  * Diverticulitis of intestine with abscess  This is a 52 year-old woman with hx of asthma, GERD, thyroiditis, diverticulosis with history of multiple flare-ups and a previous course of long-term IV abx, who was just recently admitted to hospital and found to have diverticulitis with an abscess s/p IR drainage of pericolonic abscess on 02/16 with JOE drain placement, and then just earlier today patient decided to sign out of the hospital AMA, but was called later by physician and it was strongly recommended that she return to hospital due to findings of serious and resistant bacteria (ESBL resistant) bacteria that was isolated from her abscess drainage culture and need for further medical management.  The patient came back and I visited with her in ER room.  The patient currently only complains of ongoing LLQ discomfort/pain.  She denies any fevers, N/V, diarrhea, no chest pain or shortness of breath.  She admits that she is glad she came back because "she couldn't deal with the pain" without any pain medication.  She is otherwise feeling well and she is munching on a bag of chips in the ER.      In ER, the patient labwork came back unremarkable.  She was provided with a dose of IV Meropenem based on the abscess culture results and also IV zofran x 1.     Patient with a  long history of diverticular disease and has had IV zosyn for 2 months in the recent past ([place] or [place]). Now left AMA and had IR drainage + for ESBL E coli - ssensitive only to monobactams   Agree with Ertapenem 1 g IV q d for a total of 7 days     OK per " "ID for discharge - does not need any labs drawn. Does not need ID follow up   Thanks for the consult         Thank you for your consult. I will sign off. Please contact us if you have any additional questions.    Serge Hernandez MD  Infectious Disease  [place] [facility] - Emergency Dept    Subjective:     Principal Problem: Diverticulitis of intestine with abscess    HPI: This is a 52 year-old woman with hx of asthma, GERD, thyroiditis, diverticulosis with history of multiple flare-ups and a previous course of long-term IV abx, who was just recently admitted to hospital and found to have diverticulitis with an abscess s/p IR drainage of pericolonic abscess on 02/16 with JOE drain placement, and then just earlier today patient decided to sign out of the hospital AMA, but was called later by physician and it was strongly recommended that she return to hospital due to findings of serious and resistant bacteria (ESBL resistant) bacteria that was isolated from her abscess drainage culture and need for further medical management.  The patient came back and I visited with her in ER room.  The patient currently only complains of ongoing LLQ discomfort/pain.  She denies any fevers, N/V, diarrhea, no chest pain or shortness of breath.  She admits that she is glad she came back because "she couldn't deal with the pain" without any pain medication.  She is otherwise feeling well and she is munching on a bag of chips in the ER.      In ER, the patient labwork came back unremarkable.  She was provided with a dose of IV Meropenem based on the abscess culture results and also IV zofran x 1.     Patient with a  long history of diverticular disease and has had IV zosyn for 2 months in the recent past ([place]). Now left AMA and had IR drainage + for ESBL E coli - ssensitive only to monobactams   Agree with Ertapenem 1 g IV q d for a total of 7 days       Past Medical History:   Diagnosis Date    Asthma     Diverticulosis     GERD " (gastroesophageal reflux disease)        No past surgical history on file.    Review of patient's allergies indicates:   Allergen Reactions    Levofloxacin Hives and Itching     Patient states that she recently has been on ciprofloxacin and levofloxacin without adverse reactions    Metronidazole Hives    Latex        Medications:  (Not in a hospital admission)    Antibiotics (From admission, onward)      Start     Stop Route Frequency Ordered    02/20/23 1000  mupirocin 2 % ointment         02/25 0859 Nasl 2 times daily 02/20/23 0856    02/20/23 0600  ertapenem (INVANZ) 1 g in sodium chloride 0.9 % 100 mL IVPB (MB+)         -- IV Every 24 hours (non-standard times) 02/19/23 2141          Antifungals (From admission, onward)      None          Antivirals (From admission, onward)      None               There is no immunization history on file for this patient.    Family History    None       Social History     Socioeconomic History    Marital status: Single   Tobacco Use    Smoking status: Every Day     Packs/day: 1.00     Years: 40.00     Pack years: 40.00     Types: Cigarettes     Start date: 1983    Smokeless tobacco: Never    Tobacco comments:     Ambulatory referral to Smoking Cessation clinic following hospital discharge.    Substance and Sexual Activity    Alcohol use: Not Currently    Drug use: Not Currently    Sexual activity: Not Currently     Social Determinants of Health     Food Insecurity: No Food Insecurity    Worried About Running Out of Food in the Last Year: Never true    Ran Out of Food in the Last Year: Never true   Transportation Needs: Unmet Transportation Needs    Lack of Transportation (Medical): Yes    Lack of Transportation (Non-Medical): Yes   Housing Stability: Unknown    Unable to Pay for Housing in the Last Year: No    Unstable Housing in the Last Year: No     Review of Systems   Constitutional:  Negative for fatigue and fever.   HENT: Negative.     Eyes: Negative.     Respiratory: Negative.     Cardiovascular: Negative.    Gastrointestinal:  Positive for abdominal pain. Negative for diarrhea, nausea and vomiting.   Endocrine: Negative.    Genitourinary: Negative.    Musculoskeletal: Negative.    Skin: Negative.    Allergic/Immunologic: Negative.    Neurological: Negative.    Hematological: Negative.    Psychiatric/Behavioral: Negative.     Objective:     Vital Signs (Most Recent):  Temp: 98 °F (36.7 °C) (02/20/23 1152)  Pulse: 72 (02/20/23 1457)  Resp: 16 (02/20/23 1457)  BP: (!) 108/53 (02/20/23 1240)  SpO2: 98 % (02/20/23 1457)   Vital Signs (24h Range):  Temp:  [97.8 °F (36.6 °C)-98.5 °F (36.9 °C)] 98 °F (36.7 °C)  Pulse:  [70-98] 72  Resp:  [16-20] 16  SpO2:  [96 %-100 %] 98 %  BP: ()/(50-75) 108/53     Weight: 61.2 kg (135 lb)  Body mass index is 27.27 kg/m².    Estimated Creatinine Clearance: 68.3 mL/min (based on SCr of 0.8 mg/dL).    Physical Exam  Constitutional:       Appearance: Normal appearance.   HENT:      Head: Atraumatic.   Cardiovascular:      Rate and Rhythm: Normal rate and regular rhythm.   Pulmonary:      Effort: Pulmonary effort is normal.      Breath sounds: Normal breath sounds.   Abdominal:      General: There is distension.      Palpations: Abdomen is soft.      Tenderness: There is abdominal tenderness. There is no guarding or rebound.   Musculoskeletal:      Cervical back: Normal range of motion and neck supple.   Neurological:      General: No focal deficit present.      Mental Status: She is alert and oriented to person, place, and time.       Significant Labs: All pertinent labs within the past 24 hours have been reviewed.    Significant Imaging: I have reviewed all pertinent imaging results/findings within the past 24 hours.

## 2023-02-20 NOTE — HPI
"This is a 52 year-old woman with hx of asthma, GERD, thyroiditis, diverticulosis with history of multiple flare-ups and a previous course of long-term IV abx, who was just recently admitted to hospital and found to have diverticulitis with an abscess s/p IR drainage of pericolonic abscess on 02/16 with JOE drain placement, and then just earlier today patient decided to sign out of the hospital AMA, but was called later by physician and it was strongly recommended that she return to hospital due to findings of serious and resistant bacteria (ESBL resistant) bacteria that was isolated from her abscess drainage culture and need for further medical management.  The patient came back and I visited with her in ER room.  The patient currently only complains of ongoing LLQ discomfort/pain.  She denies any fevers, N/V, diarrhea, no chest pain or shortness of breath.  She admits that she is glad she came back because "she couldn't deal with the pain" without any pain medication.  She is otherwise feeling well and she is munching on a bag of chips in the ER.     In ER, the patient labwork came back unremarkable.  She was provided with a dose of IV Meropenem based on the abscess culture results and also IV zofran x 1.                 "

## 2023-02-20 NOTE — SUBJECTIVE & OBJECTIVE
Past Medical History:   Diagnosis Date    Asthma     Diverticulosis     GERD (gastroesophageal reflux disease)        No past surgical history on file.    Review of patient's allergies indicates:   Allergen Reactions    Levofloxacin Hives and Itching     Patient states that she recently has been on ciprofloxacin and levofloxacin without adverse reactions    Metronidazole Hives    Latex        Medications:  (Not in a hospital admission)    Antibiotics (From admission, onward)      Start     Stop Route Frequency Ordered    02/20/23 1000  mupirocin 2 % ointment         02/25 0859 Nasl 2 times daily 02/20/23 0856    02/20/23 0600  ertapenem (INVANZ) 1 g in sodium chloride 0.9 % 100 mL IVPB (MB+)         -- IV Every 24 hours (non-standard times) 02/19/23 2141          Antifungals (From admission, onward)      None          Antivirals (From admission, onward)      None               There is no immunization history on file for this patient.    Family History    None       Social History     Socioeconomic History    Marital status: Single   Tobacco Use    Smoking status: Every Day     Packs/day: 1.00     Years: 40.00     Pack years: 40.00     Types: Cigarettes     Start date: 1983    Smokeless tobacco: Never    Tobacco comments:     Ambulatory referral to Smoking Cessation clinic following hospital discharge.    Substance and Sexual Activity    Alcohol use: Not Currently    Drug use: Not Currently    Sexual activity: Not Currently     Social Determinants of Health     Food Insecurity: No Food Insecurity    Worried About Running Out of Food in the Last Year: Never true    Ran Out of Food in the Last Year: Never true   Transportation Needs: Unmet Transportation Needs    Lack of Transportation (Medical): Yes    Lack of Transportation (Non-Medical): Yes   Housing Stability: Unknown    Unable to Pay for Housing in the Last Year: No    Unstable Housing in the Last Year: No     Review of Systems   Constitutional:  Negative for  fatigue and fever.   HENT: Negative.     Eyes: Negative.    Respiratory: Negative.     Cardiovascular: Negative.    Gastrointestinal:  Positive for abdominal pain. Negative for diarrhea, nausea and vomiting.   Endocrine: Negative.    Genitourinary: Negative.    Musculoskeletal: Negative.    Skin: Negative.    Allergic/Immunologic: Negative.    Neurological: Negative.    Hematological: Negative.    Psychiatric/Behavioral: Negative.     Objective:     Vital Signs (Most Recent):  Temp: 98 °F (36.7 °C) (02/20/23 1152)  Pulse: 72 (02/20/23 1457)  Resp: 16 (02/20/23 1457)  BP: (!) 108/53 (02/20/23 1240)  SpO2: 98 % (02/20/23 1457)   Vital Signs (24h Range):  Temp:  [97.8 °F (36.6 °C)-98.5 °F (36.9 °C)] 98 °F (36.7 °C)  Pulse:  [70-98] 72  Resp:  [16-20] 16  SpO2:  [96 %-100 %] 98 %  BP: ()/(50-75) 108/53     Weight: 61.2 kg (135 lb)  Body mass index is 27.27 kg/m².    Estimated Creatinine Clearance: 68.3 mL/min (based on SCr of 0.8 mg/dL).    Physical Exam  Constitutional:       Appearance: Normal appearance.   HENT:      Head: Atraumatic.   Cardiovascular:      Rate and Rhythm: Normal rate and regular rhythm.   Pulmonary:      Effort: Pulmonary effort is normal.      Breath sounds: Normal breath sounds.   Abdominal:      General: There is distension.      Palpations: Abdomen is soft.      Tenderness: There is abdominal tenderness. There is no guarding or rebound.   Musculoskeletal:      Cervical back: Normal range of motion and neck supple.   Neurological:      General: No focal deficit present.      Mental Status: She is alert and oriented to person, place, and time.       Significant Labs: All pertinent labs within the past 24 hours have been reviewed.    Significant Imaging: I have reviewed all pertinent imaging results/findings within the past 24 hours.

## 2023-02-20 NOTE — HPI
"This is a 52 year-old woman with hx of asthma, GERD, thyroiditis, diverticulosis with history of multiple flare-ups and a previous course of long-term IV abx, who was just recently admitted to hospital and found to have diverticulitis with an abscess s/p IR drainage of pericolonic abscess on 02/16 with JOE drain placement, and then just earlier today patient decided to sign out of the hospital AMA, but was called later by physician and it was strongly recommended that she return to hospital due to findings of serious and resistant bacteria (ESBL resistant) bacteria that was isolated from her abscess drainage culture and need for further medical management.  The patient came back and I visited with her in ER room.  The patient currently only complains of ongoing LLQ discomfort/pain.  She denies any fevers, N/V, diarrhea, no chest pain or shortness of breath.  She admits that she is glad she came back because "she couldn't deal with the pain" without any pain medication.  She is otherwise feeling well and she is munching on a bag of chips in the ER.      In ER, the patient labwork came back unremarkable.  She was provided with a dose of IV Meropenem based on the abscess culture results and also IV zofran x 1.     Patient with a  long history of diverticular disease and has had IV zosyn for 2 months in the recent past ([place] or [place]). Now left AMA and had IR drainage + for ESBL E coli - ssensitive only to monobactams   Agree with Ertapenem 1 g IV q d for a total of 7 days   "

## 2023-02-20 NOTE — ED NOTES
Dr Pratt at bedside to remove drainage tube from left side. Pt tolerated well. Clean, dry dressing in place. Call bell within reach.

## 2023-02-20 NOTE — CONSULTS
Today`s Date: 2/20/2023     Admit Date: 2/19/2023    Admitting Physician: Mai Becker MD    Patient`s Name: Niya Moeller , 52 y.o. female    Reason for consultation  Pateint known from admission earlier , signed out , now back again in er , c/o pain left lower quadrant, minimal drainage , , repeat ct scan , shows resolution of the abscess   Patient Active Problem List:     Obesity     Asthma exacerbation     GERD (gastroesophageal reflux disease)     Diverticulitis     Colitis     Moderate persistent asthma with (acute) exacerbation     Hyperthyroidism     Thyroiditis     Diastolic dysfunction, left ventricle     Tachycardia     Other chest pain     Hypoglycemia     Thrombocytopathia     Diverticulitis of intestine with abscess     Asthma     Acute abdominal pain      Past Medical History:  No date: Asthma  No date: Diverticulosis  No date: GERD (gastroesophageal reflux disease)    No past surgical history on file.    Prior to Admission medications :  Medication albuterol (PROVENTIL/VENTOLIN HFA) 90 mcg/actuation inhaler, Sig Inhale 1-2 puffs into the lungs every 6 (six) hours as needed for Wheezing. Rescue, Start Date 2/14/23, End Date 2/14/24, Taking? , Authorizing Provider Sol Aguirre NP    Medication albuterol-ipratropium (DUO-NEB) 2.5 mg-0.5 mg/3 mL nebulizer solution, Sig Inhale 3 mLs into the lungs every 4 (four) hours as needed., Start Date 1/9/23, End Date , Taking? , Authorizing Provider Historical Provider    Medication amoxicillin-clavulanate 875-125mg (AUGMENTIN) 875-125 mg per tablet, Sig Take 1 tablet by mouth 2 (two) times daily. for 7 days, Start Date 2/19/23, End Date 2/26/23, Taking? , Authorizing Provider Mai Becker MD    Medication budesonide-formoterol 160-4.5 mcg (SYMBICORT) 160-4.5 mcg/actuation HFAA, Sig Inhale 2 puffs into the lungs every 12 (twelve) hours. Controller, Start Date 7/30/22, End Date 7/30/23, Taking? , Authorizing Provider Alvaro Gaston,  DO    Medication cetirizine (ZYRTEC) 10 MG tablet, Sig Take 10 mg by mouth once daily., Start Date 11/14/22, End Date , Taking? , Authorizing Provider Historical Provider    Medication ciprofloxacin HCl (CIPRO) 500 MG tablet, Sig Take 1 tablet (500 mg total) by mouth 2 (two) times daily. for 7 days, Start Date 2/19/23, End Date 2/26/23, Taking? , Authorizing Provider Mai Becker MD    Medication fluticasone propionate (FLONASE) 50 mcg/actuation nasal spray, Sig 1 spray by Nasal route daily as needed., Start Date 11/23/22, End Date , Taking? , Authorizing Provider Historical Provider    Medication methIMAzole (TAPAZOLE) 10 MG Tab, Sig Take 1 tablet (10 mg total) by mouth 2 (two) times daily., Start Date 2/14/23, End Date 2/14/24, Taking? , Authorizing Provider Sol Aguirre NP    Medication montelukast (SINGULAIR) 10 mg tablet, Sig Take 10 mg by mouth once daily., Start Date 10/17/22, End Date 10/17/23, Taking? , Authorizing Provider Historical Provider    Medication omeprazole (PRILOSEC) 40 MG capsule, Sig Take 40 mg by mouth once daily., Start Date 4/17/22, End Date , Taking? , Authorizing Provider Historical Provider    Medication ondansetron (ZOFRAN-ODT) 4 MG TbDL, Sig Take 2 tablets (8 mg total) by mouth 2 (two) times daily., Start Date 7/2/22, End Date , Taking? , Authorizing Provider Franco Brown MD    Medication propranoloL (INDERAL LA) 60 MG 24 hr capsule, Sig Take 1 capsule (60 mg total) by mouth once daily., Start Date 2/14/23, End Date 2/14/24, Taking? , Authorizing Provider Sol Aguirre NP    Medication sod sulf-pot chloride-mag sulf (SUTAB) 1.479-0.188- 0.225 gram tablet, Sig Take 12 tablets by mouth once daily. Take according to package instructions with indicated amount of water., Start Date 2/17/23, End Date , Taking? , Authorizing Provider Justin Pereira MD      Current Facility-Administered Medications on File Prior to Encounter:  [DISCONTINUED] acetaminophen tablet 650  mg, 650 mg, Oral, Q8H PRN, Radha Galicia MD  [DISCONTINUED] albuterol-ipratropium 2.5 mg-0.5 mg/3 mL nebulizer solution 3 mL, 3 mL, Nebulization, Q4H PRN, Radha Galicia MD, 3 mL at 02/16/23 0037  [DISCONTINUED] albuterol-ipratropium 2.5 mg-0.5 mg/3 mL nebulizer solution 3 mL, 3 mL, Nebulization, Q6H WAKE, Radha Galicia MD, 3 mL at 02/19/23 0814  [DISCONTINUED] cetirizine tablet 10 mg, 10 mg, Oral, Daily, Radha Galicia MD, 10 mg at 02/19/23 0908  [DISCONTINUED] dextrose 10% bolus 125 mL 125 mL, 12.5 g, Intravenous, PRN, Radha Galicia MD  [DISCONTINUED] dextrose 10% bolus 250 mL 250 mL, 25 g, Intravenous, PRN, Radha Galicia MD  [DISCONTINUED] docusate sodium capsule 100 mg, 100 mg, Oral, Daily PRN, Lm Lanier, YOLANDA, 100 mg at 02/17/23 2303  [DISCONTINUED] fluticasone furoate-vilanteroL 100-25 mcg/dose diskus inhaler 1 puff, 1 puff, Inhalation, Daily, Radha Galicia MD, 1 puff at 02/19/23 0814  [DISCONTINUED] fluticasone propionate 50 mcg/actuation nasal spray 50 mcg, 1 spray, Each Nostril, Daily PRN, Radha Galicia MD  [DISCONTINUED] glucagon (human recombinant) injection 1 mg, 1 mg, Intramuscular, PRN, Radha Galicia MD  [DISCONTINUED] glucose chewable tablet 16 g, 16 g, Oral, PRN, Radha Galicia MD  [DISCONTINUED] glucose chewable tablet 24 g, 24 g, Oral, PRN, Radha Galicia MD  [DISCONTINUED] HYDROcodone-acetaminophen 7.5-325 mg per tablet 1 tablet, 1 tablet, Oral, Q6H PRN, Mai Becker MD, 1 tablet at 02/19/23 1003  [DISCONTINUED] melatonin tablet 6 mg, 6 mg, Oral, Nightly PRN, Radha Galicia MD  [DISCONTINUED] montelukast tablet 10 mg, 10 mg, Oral, Daily, Radha Galicia MD, 10 mg at 02/19/23 0908  [DISCONTINUED] morphine injection 2 mg, 2 mg, Intravenous, Q4H PRN, Mai Becker MD  [DISCONTINUED] naloxone 0.4 mg/mL injection 0.02 mg, 0.02 mg, Intravenous, PRN, Radha Galicia MD  [DISCONTINUED]  nicotine 21 mg/24 hr 1 patch, 1 patch, Transdermal, Daily, Mai Becker MD, 1 patch at 02/19/23 0912  [DISCONTINUED] ondansetron injection 4 mg, 4 mg, Intravenous, Q8H PRN, Radha Galicia MD  [DISCONTINUED] pantoprazole EC tablet 40 mg, 40 mg, Oral, Daily, Radha Galicia MD, 40 mg at 02/19/23 0908  [DISCONTINUED] prochlorperazine injection Soln 2.5 mg, 2.5 mg, Intravenous, Once, Hannah Polk MD  [DISCONTINUED] prochlorperazine injection Soln 5 mg, 5 mg, Intravenous, Q6H PRN, Radha Galicia MD  [DISCONTINUED] propranoloL tablet 60 mg, 60 mg, Oral, BID, Sudeep Reynolds MD, 60 mg at 02/19/23 0907  [DISCONTINUED] simethicone chewable tablet 80 mg, 1 tablet, Oral, QID PRN, Radha Galicia MD  [DISCONTINUED] sodium chloride 0.9% flush 10 mL, 10 mL, Intravenous, Q12H PRN, Radha Galicia MD    Current Outpatient Medications on File Prior to Encounter:  albuterol (PROVENTIL/VENTOLIN HFA) 90 mcg/actuation inhaler, Inhale 1-2 puffs into the lungs every 6 (six) hours as needed for Wheezing. Rescue, Disp: 18 g, Rfl: 1  albuterol-ipratropium (DUO-NEB) 2.5 mg-0.5 mg/3 mL nebulizer solution, Inhale 3 mLs into the lungs every 4 (four) hours as needed., Disp: , Rfl:   amoxicillin-clavulanate 875-125mg (AUGMENTIN) 875-125 mg per tablet, Take 1 tablet by mouth 2 (two) times daily. for 7 days, Disp: 14 tablet, Rfl: 0  budesonide-formoterol 160-4.5 mcg (SYMBICORT) 160-4.5 mcg/actuation HFAA, Inhale 2 puffs into the lungs every 12 (twelve) hours. Controller, Disp: 6 g, Rfl: 10  cetirizine (ZYRTEC) 10 MG tablet, Take 10 mg by mouth once daily., Disp: , Rfl:   ciprofloxacin HCl (CIPRO) 500 MG tablet, Take 1 tablet (500 mg total) by mouth 2 (two) times daily. for 7 days, Disp: 14 tablet, Rfl: 0  fluticasone propionate (FLONASE) 50 mcg/actuation nasal spray, 1 spray by Nasal route daily as needed., Disp: , Rfl:   methIMAzole (TAPAZOLE) 10 MG Tab, Take 1 tablet (10 mg total) by mouth 2 (two) times  daily., Disp: 60 tablet, Rfl: 0  montelukast (SINGULAIR) 10 mg tablet, Take 10 mg by mouth once daily., Disp: , Rfl:   omeprazole (PRILOSEC) 40 MG capsule, Take 40 mg by mouth once daily., Disp: , Rfl:   ondansetron (ZOFRAN-ODT) 4 MG TbDL, Take 2 tablets (8 mg total) by mouth 2 (two) times daily., Disp: 10 tablet, Rfl: 0  propranoloL (INDERAL LA) 60 MG 24 hr capsule, Take 1 capsule (60 mg total) by mouth once daily., Disp: 30 capsule, Rfl: 1  sod sulf-pot chloride-mag sulf (SUTAB) 1.479-0.188- 0.225 gram tablet, Take 12 tablets by mouth once daily. Take according to package instructions with indicated amount of water., Disp: 24 tablet, Rfl: 0         Review of patient's allergies indicates:   -- Levofloxacin -- Hives and Itching    --  Patient states that she recently has been on             ciprofloxacin and levofloxacin without adverse             reactions   -- Metronidazole -- Hives   -- Latex     Social History:   reports that she has been smoking cigarettes. She started smoking about 40 years ago. She has a 40.00 pack-year smoking history. She has never used smokeless tobacco. She reports that she does not currently use alcohol. She reports that she does not currently use drugs.     No family history on file.      PHYSICAL EXAMINATION  Temp:  [97.4 °F (36.3 °C)-98.5 °F (36.9 °C)] 98.2 °F (36.8 °C)  Pulse:  [70-98] 70  Resp:  [16-20] 18  SpO2:  [96 %-100 %] 99 %  BP: ()/(51-75) 92/52    General Condition:   Alert x 3     Head & Neck  Anemia: None  Jaundice: None  Neck vein: Not distended  Carotid Bruits: none  Lymph nodes: none palpable  Thyroid: normal    Chest: normal    Heart: normal    Rt. Breast: not examined  Lt. Breast: not examined  Axillary lymph nodes: none    Abdomen: Soft,   tender  left ;lower quadrantwith no palpable mass or organ  Hernia: none    Rectal: Defered    Extremities: normal    Vascular: normal    Specific focus Examination      Imp: resolving abscess left abdomen ,   Plan: needs  continue iv antibiotics , drain removed , since abscess resolved.

## 2023-02-20 NOTE — ED NOTES
Patient at nurse's station requesting discharge. Pt reports she has called [facility] and is ready for medication to be set up. Admit team notified of pt's request. Pt updated on plan of care and returned to room.

## 2023-02-20 NOTE — ED NOTES
Pt c/o pain at drain insertion site and stating pain is coming from the inside and requesting imaging. MD notified. Orders to be placed.

## 2023-02-20 NOTE — PROCEDURES
"Niya Moeller is a 52 y.o. female patient.    Temp: 98.2 °F (36.8 °C) (02/20/23 0536)  Pulse: 98 (02/20/23 0823)  Resp: 18 (02/20/23 0823)  BP: 120/73 (02/20/23 0536)  SpO2: 100 % (02/20/23 0823)  Weight: 61.2 kg (135 lb) (02/19/23 2003)  Height: 4' 11" (149.9 cm) (02/19/23 2003)    PICC  Date/Time: 2/20/2023 8:42 AM  Performed by: Ras Russell RN  Consent Done: Yes  Time out: Immediately prior to procedure a time out was called to verify the correct patient, procedure, equipment, support staff and site/side marked as required  Indications: med administration and vascular access  Anesthesia: local infiltration  Local anesthetic: lidocaine 1% without epinephrine  Anesthetic Total (mL): 5  Preparation: skin prepped with ChloraPrep  Skin prep agent dried: skin prep agent completely dried prior to procedure  Sterile barriers: all five maximum sterile barriers used - cap, mask, sterile gown, sterile gloves, and large sterile sheet  Hand hygiene: hand hygiene performed prior to central venous catheter insertion  Location details: right basilic  Catheter type: double lumen  Catheter size: 5 Fr  Catheter Length: 36cm    Ultrasound guidance: yes  Vessel Caliber: medium, compressibility normal  Needle advanced into vessel with real time Ultrasound guidance.  Guidewire confirmed in vessel.  Sterile sheath used.  Number of attempts: 1  Post-procedure: blood return through all ports, chlorhexidine patch and sterile dressing applied          Name ras russell  2/20/2023    "

## 2023-02-21 NOTE — DISCHARGE SUMMARY
[facility]/Discharge Summary    Attending Physician: Eugenio    Date of Admit: 2/15/2023  Date of Discharge: 2/20/2023    Left AMA    Discharge Diagnoses     Acute Diverticular Abscess + MDRO E. Coli  Asthma/COPD Exacerbation  Thrombocytopenia    Consultants and Procedures     Consultants:  Gen Surgery, IR    Procedures:   IR-guided abscess drainage    Brief History of Present Illness      Niya Moeller is a 52yr with PMH of asthma?, diverticulosis, GERD presents with respiratory distress.     Patient has had increasing shortness of breath over the past day, and she has been using her albuterol every 4 hours with minimal relief.  She presented with a similar presentation and was sent home with steroid.  Patient is a current smoker.  She also has a mild to moderate cough.  She presented for steroids.     In the ED, triage vitals were significant for tachycardia and increased respiratory rate, with desaturations to 94%.  CBC was fairly unremarkable.  CMP was significant only for decreased albumin.  CT abdomen pelvis with contrast reveals potential diverticulitis.     In the ED, patient was given continuous albuterol, Solu-Medrol, and magnesium.  Due to concerning CT findings, General surgery  was consulted.  Patient was also started on IV Zosyn.     Medicine was consulted for asthma exacerbation and management of diverticulitis.       For the full HPI please refer to the History & Physical from this admission.    Hospital Course By Problem with Pertinent Findings     Diverticular Abscess  CT abdomen pelvis with contrast shows acute diverticulitis.      Per last Gen Surgery clinic note:  51 y/o female known to me. Somewhat of a difficult case due to pt's personality--somewhat manipulative/defiant regarding compliance with clinical recommendations. Pt with a hx of recurrent diverticular dz--multiple recent admission secondary to flares of diverticular dz including complicated consequences--small abscess (too  "small to drain). Pt completed 6 weeks IV zosyn. Essentially, current clinical course is in stage of waiting for 6-8 week "cool off" from last attack , then colonoscopy followed by planning for sigmoid colectomy due to recurrent attacks/complicated attacks and difficulty clearing infection.    IR drained abscess and placed a drain  Initially treated with IV Pip-tazo, cultures grew MDRO E. Coli only susceptible to penems and patient left AMA prior arranging home IV abx    Thrombocytopenia  Platelets are down to 89  Did not receive heparin or anti-platelets  Monitor CBC as outpatient      Mai Becker MD  [facility]        "

## 2023-02-24 NOTE — DISCHARGE SUMMARY
"[facility] Discharge Summary    Attending Physician: Eugenio    Date of Admit: 2/19/2023  Date of Discharge: 2/20/2023    Discharge to: Home  Condition: Stable    Discharge Diagnoses     Recurrent Diverticulitis s/p  abscess drainage   Wound Cx+ MDRO E. Coli, bacteroides  Asthma  Hyperthyroidsim  Thrombocytopenia    Consultants and Procedures     Consultants:  Gen Surgery, ID    Procedures:   Drain removal    Brief History of Present Illness      This is a 52 year-old woman with hx of asthma, GERD, thyroiditis, diverticulosis with history of multiple flare-ups and a previous course of long-term IV abx, who was just recently admitted to hospital and found to have diverticulitis with an abscess s/p IR drainage of pericolonic abscess on 02/16 with JOE drain placement, and then just earlier today patient decided to sign out of the hospital AMA, but was called later by physician and it was strongly recommended that she return to hospital due to findings of serious and resistant bacteria (ESBL resistant) bacteria that was isolated from her abscess drainage culture and need for further medical management.  The patient came back and I visited with her in ER room.  The patient currently only complains of ongoing LLQ discomfort/pain.  She denies any fevers, N/V, diarrhea, no chest pain or shortness of breath.  She admits that she is glad she came back because "she couldn't deal with the pain" without any pain medication.  She is otherwise feeling well and she is munching on a bag of chips in the ER.      In ER, the patient labwork came back unremarkable.  She was provided with a dose of IV Meropenem based on the abscess culture results and also IV zofran x 1.    For the full HPI please refer to the History & Physical from this admission.    Hospital Course By Problem with Pertinent Findings     Recurrent Diverticulitis of intestine with abscess MDRO E. Coli, Bacteroides thetaiotacomicron  -s/p IR drainage of " "peridiverticular abscess on 02/16 with JOE drain placement on last admission  -once daily ertapenem IV through PICC x 7 days per ID  -case management order placed to alert them to prepare/assist with home IV arrangements  -F/u with Gen Surgery     Acute abdominal pain  -moderate ongoing LLQ abdominal pain related to her acute disease  -provide pain medication scale PRN      Asthma  -not in acute exacerbation, stable   -provide DuoNebs PRN      GERD (gastroesophageal reflux disease)  -Maalox ordered   -no acute concern     Hyperthyroidism  -no acute concern   -resume methimazole and propranolol     Discharge Time: Greater than 30 minutes? Yes    BP (!) 104/56   Pulse 85   Temp 98 °F (36.7 °C) (Oral)   Resp 17   Ht 4' 11" (1.499 m)   Wt 61.2 kg (135 lb)   SpO2 100%   BMI 27.27 kg/m²       Discharge Medications        Medication List        START taking these medications      HYDROcodone-acetaminophen 7.5-325 mg per tablet  Commonly known as: NORCO  Take 1 tablet by mouth every 6 (six) hours as needed for Pain.     sodium chloride 0.9 % PgBk 100 mL with ertapenem 1 gram SolR 1 g  Inject 1 g into the vein once daily. for 7 days            CONTINUE taking these medications      albuterol 90 mcg/actuation inhaler  Commonly known as: PROVENTIL/VENTOLIN HFA  Inhale 1-2 puffs into the lungs every 6 (six) hours as needed for Wheezing. Rescue     albuterol-ipratropium 2.5 mg-0.5 mg/3 mL nebulizer solution  Commonly known as: DUO-NEB     budesonide-formoterol 160-4.5 mcg 160-4.5 mcg/actuation Hfaa  Commonly known as: SYMBICORT  Inhale 2 puffs into the lungs every 12 (twelve) hours. Controller     cetirizine 10 MG tablet  Commonly known as: ZYRTEC     fluticasone propionate 50 mcg/actuation nasal spray  Commonly known as: FLONASE     methIMAzole 10 MG Tab  Commonly known as: TAPAZOLE  Take 1 tablet (10 mg total) by mouth 2 (two) times daily.     montelukast 10 mg tablet  Commonly known as: SINGULAIR     omeprazole 40 MG " capsule  Commonly known as: PRILOSEC     ondansetron 4 MG Tbdl  Commonly known as: ZOFRAN-ODT  Take 2 tablets (8 mg total) by mouth 2 (two) times daily.     propranoloL 60 MG 24 hr capsule  Commonly known as: INDERAL LA  Take 1 capsule (60 mg total) by mouth once daily.     SUTAB 1.479-0.188- 0.225 gram tablet  Generic drug: sod sulf-pot chloride-mag sulf  Take 12 tablets by mouth once daily. Take according to package instructions with indicated amount of water.            STOP taking these medications      amoxicillin-clavulanate 875-125mg 875-125 mg per tablet  Commonly known as: AUGMENTIN     ciprofloxacin HCl 500 MG tablet  Commonly known as: CIPRO               Where to Get Your Medications        You can get these medications from any pharmacy    Bring a paper prescription for each of these medications  HYDROcodone-acetaminophen 7.5-325 mg per tablet       Information about where to get these medications is not yet available    Ask your nurse or doctor about these medications  sodium chloride 0.9 % PgBk 100 mL with ertapenem 1 gram SolR 1 g         Discharge Information:   Diet:  Cardiac    Physical Activity:  As tolerated    Instructions:  1. Take all medications as prescribed  2. Keep all follow-up appointments  3. Return to the hospital or call your primary care physicians if any worsening symptoms such as fevers, chills, sweats, N/V/D, abdominal pain, bloody stool or other concerns occur.      Follow-Up Appointments:  PCP in 1 week  Gen Surgery in 1 week      Follow up items for PCP and tests that have not resulted at time of discharge:   None      Mai Becker MD  [facility]

## 2023-02-25 LAB
BACTERIA BLD CULT: NORMAL
BACTERIA BLD CULT: NORMAL

## 2023-03-07 ENCOUNTER — HOSPITAL ENCOUNTER (OUTPATIENT)
Facility: HOSPITAL | Age: 53
Discharge: LEFT BEFORE DISCHARGE | DRG: 392 | End: 2023-03-08
Attending: EMERGENCY MEDICINE | Admitting: INTERNAL MEDICINE
Payer: MEDICAID

## 2023-03-07 ENCOUNTER — TELEPHONE (OUTPATIENT)
Dept: SMOKING CESSATION | Facility: CLINIC | Age: 53
End: 2023-03-07
Payer: MEDICAID

## 2023-03-07 DIAGNOSIS — K57.92 ACUTE DIVERTICULITIS: Primary | ICD-10-CM

## 2023-03-07 DIAGNOSIS — R07.9 CHEST PAIN: ICD-10-CM

## 2023-03-07 LAB
APPEARANCE UR: CLEAR
BACTERIA #/AREA URNS AUTO: ABNORMAL /HPF
BILIRUB UR QL STRIP.AUTO: NEGATIVE MG/DL
COLOR UR AUTO: YELLOW
GLUCOSE UR QL STRIP.AUTO: NORMAL MG/DL
HYALINE CASTS #/AREA URNS LPF: ABNORMAL /LPF
KETONES UR QL STRIP.AUTO: ABNORMAL MG/DL
LEUKOCYTE ESTERASE UR QL STRIP.AUTO: NEGATIVE UNIT/L
MUCOUS THREADS URNS QL MICRO: ABNORMAL /LPF
NITRITE UR QL STRIP.AUTO: NEGATIVE
PH UR STRIP.AUTO: 5.5 [PH]
PROT UR QL STRIP.AUTO: ABNORMAL MG/DL
RBC #/AREA URNS AUTO: ABNORMAL /HPF
RBC UR QL AUTO: NEGATIVE UNIT/L
SP GR UR STRIP.AUTO: 1.03
SQUAMOUS #/AREA URNS LPF: ABNORMAL /HPF
UROBILINOGEN UR STRIP-ACNC: ABNORMAL MG/DL
WBC #/AREA URNS AUTO: ABNORMAL /HPF

## 2023-03-07 PROCEDURE — 63600175 PHARM REV CODE 636 W HCPCS: Performed by: EMERGENCY MEDICINE

## 2023-03-07 PROCEDURE — 94640 AIRWAY INHALATION TREATMENT: CPT

## 2023-03-07 PROCEDURE — 83735 ASSAY OF MAGNESIUM: CPT | Performed by: EMERGENCY MEDICINE

## 2023-03-07 PROCEDURE — 80053 COMPREHEN METABOLIC PANEL: CPT | Performed by: EMERGENCY MEDICINE

## 2023-03-07 PROCEDURE — 96375 TX/PRO/DX INJ NEW DRUG ADDON: CPT

## 2023-03-07 PROCEDURE — 11000001 HC ACUTE MED/SURG PRIVATE ROOM

## 2023-03-07 PROCEDURE — 99285 EMERGENCY DEPT VISIT HI MDM: CPT | Mod: 25

## 2023-03-07 PROCEDURE — 81001 URINALYSIS AUTO W/SCOPE: CPT | Performed by: EMERGENCY MEDICINE

## 2023-03-07 PROCEDURE — 96374 THER/PROPH/DIAG INJ IV PUSH: CPT

## 2023-03-07 PROCEDURE — 85025 COMPLETE CBC W/AUTO DIFF WBC: CPT | Performed by: EMERGENCY MEDICINE

## 2023-03-07 PROCEDURE — 25000242 PHARM REV CODE 250 ALT 637 W/ HCPCS: Performed by: EMERGENCY MEDICINE

## 2023-03-07 RX ORDER — IPRATROPIUM BROMIDE AND ALBUTEROL SULFATE 2.5; .5 MG/3ML; MG/3ML
3 SOLUTION RESPIRATORY (INHALATION)
Status: COMPLETED | OUTPATIENT
Start: 2023-03-07 | End: 2023-03-07

## 2023-03-07 RX ORDER — KETOROLAC TROMETHAMINE 30 MG/ML
15 INJECTION, SOLUTION INTRAMUSCULAR; INTRAVENOUS
Status: COMPLETED | OUTPATIENT
Start: 2023-03-07 | End: 2023-03-07

## 2023-03-07 RX ORDER — ONDANSETRON 2 MG/ML
4 INJECTION INTRAMUSCULAR; INTRAVENOUS
Status: COMPLETED | OUTPATIENT
Start: 2023-03-07 | End: 2023-03-07

## 2023-03-07 RX ADMIN — ONDANSETRON 4 MG: 2 INJECTION INTRAMUSCULAR; INTRAVENOUS at 11:03

## 2023-03-07 RX ADMIN — KETOROLAC TROMETHAMINE 15 MG: 30 INJECTION, SOLUTION INTRAMUSCULAR; INTRAVENOUS at 11:03

## 2023-03-07 RX ADMIN — IPRATROPIUM BROMIDE AND ALBUTEROL SULFATE 3 ML: 2.5; .5 SOLUTION RESPIRATORY (INHALATION) at 11:03

## 2023-03-07 NOTE — TELEPHONE ENCOUNTER
Patient contacted for a reminder of today's scheduled virtual visit.  Patient confirmed today's visit and stated she would see me online at our scheduled time.

## 2023-03-07 NOTE — Clinical Note
Diagnosis: Acute diverticulitis [654339]   Admitting Provider:: MIKAYLA MILES [[id number]]   Future Attending Provider: MIKAYLA MILES [[id number]]   Reason for IP Medical Treatment  (Clinical interventions that can only be accomplished in the IP setting? ) :: as noted above in the dagnosis   Estimated Length of Stay:: 2 midnights   I certify that Inpatient services for greater than or equal to 2 midnights are medically necessary:: Yes   Plans for Post-Acute care--if anticipated (pick the single best option):: B. Discharge home with medical equipment   Special Needs:: No Special Needs [1]

## 2023-03-07 NOTE — LETTER
Patient: Niya Moeller  YOB: 1970  Date: 3/8/2023 Time: 10:55 AM  Location: [facility]    Leaving the Hospital Against Medical Advice    Chart #:[id number]    This will certify that I, the undersigned,    ______________________________________________________________________    A patient in the above named medical center, having requested discharge and removal from the medical center against the advice of my attending physician(s), hereby release [facility], its physicians, officers and employees, severally and individually, from any and all liability of any nature whatsoever for any injury or harm or complication of any kind that may result directly or indirectly, by reason of my terminating my stay as a patient at [facility] and my departure from [facility], and hereby waive any and all rights of action I may now have or later acquire as a result of my voluntary departure from [facility] and the termination of my stay as a patient therein.    This release is made with the full knowledge of the danger that may result from the action which I am taking.      Date:_______________________                         ___________________________                                                                                    Patient/Legal Representative    Witness:        ____________________________                          ___________________________  Nurse                                                                        Physician

## 2023-03-08 ENCOUNTER — HOSPITAL ENCOUNTER (INPATIENT)
Facility: HOSPITAL | Age: 53
LOS: 7 days | Discharge: HOME | DRG: 391 | End: 2023-03-15
Attending: STUDENT IN AN ORGANIZED HEALTH CARE EDUCATION/TRAINING PROGRAM | Admitting: INTERNAL MEDICINE
Payer: MEDICAID

## 2023-03-08 VITALS
SYSTOLIC BLOOD PRESSURE: 120 MMHG | OXYGEN SATURATION: 96 % | TEMPERATURE: 99 F | WEIGHT: 144.19 LBS | RESPIRATION RATE: 22 BRPM | DIASTOLIC BLOOD PRESSURE: 100 MMHG | BODY MASS INDEX: 29.07 KG/M2 | HEART RATE: 81 BPM | HEIGHT: 59 IN

## 2023-03-08 DIAGNOSIS — R11.2 NAUSEA AND VOMITING, UNSPECIFIED VOMITING TYPE: ICD-10-CM

## 2023-03-08 DIAGNOSIS — R10.32 LEFT LOWER QUADRANT PAIN: ICD-10-CM

## 2023-03-08 DIAGNOSIS — K57.92 DIVERTICULITIS: Primary | ICD-10-CM

## 2023-03-08 DIAGNOSIS — K57.80 DIVERTICULITIS OF INTESTINE WITH ABSCESS, UNSPECIFIED BLEEDING STATUS, UNSPECIFIED PART OF INTESTINAL TRACT: ICD-10-CM

## 2023-03-08 LAB
ALBUMIN SERPL-MCNC: 3 G/DL (ref 3.5–5)
ALBUMIN SERPL-MCNC: 3.1 G/DL (ref 3.5–5)
ALBUMIN/GLOB SERPL: 0.8 RATIO (ref 1.1–2)
ALBUMIN/GLOB SERPL: 0.8 RATIO (ref 1.1–2)
ALP SERPL-CCNC: 88 UNIT/L (ref 40–150)
ALP SERPL-CCNC: 97 UNIT/L (ref 40–150)
ALT SERPL-CCNC: 25 UNIT/L (ref 0–55)
ALT SERPL-CCNC: 25 UNIT/L (ref 0–55)
AST SERPL-CCNC: 23 UNIT/L (ref 5–34)
AST SERPL-CCNC: 24 UNIT/L (ref 5–34)
BASOPHILS # BLD AUTO: 0.01 X10(3)/MCL (ref 0–0.2)
BASOPHILS # BLD AUTO: 0.01 X10(3)/MCL (ref 0–0.2)
BASOPHILS NFR BLD AUTO: 0.1 %
BASOPHILS NFR BLD AUTO: 0.2 %
BILIRUBIN DIRECT+TOT PNL SERPL-MCNC: 0.2 MG/DL
BILIRUBIN DIRECT+TOT PNL SERPL-MCNC: 0.4 MG/DL
BUN SERPL-MCNC: 10.8 MG/DL (ref 9.8–20.1)
BUN SERPL-MCNC: 8.4 MG/DL (ref 9.8–20.1)
CALCIUM SERPL-MCNC: 10.2 MG/DL (ref 8.4–10.2)
CALCIUM SERPL-MCNC: 9.3 MG/DL (ref 8.4–10.2)
CHLORIDE SERPL-SCNC: 107 MMOL/L (ref 98–107)
CHLORIDE SERPL-SCNC: 108 MMOL/L (ref 98–107)
CLOSTRIDIUM DIFFICILE GDH ANTIGEN (OHS): NEGATIVE
CLOSTRIDIUM DIFFICILE TOXIN A/B (OHS): NEGATIVE
CO2 SERPL-SCNC: 24 MMOL/L (ref 22–29)
CO2 SERPL-SCNC: 27 MMOL/L (ref 22–29)
CREAT SERPL-MCNC: 0.73 MG/DL (ref 0.55–1.02)
CREAT SERPL-MCNC: 0.87 MG/DL (ref 0.55–1.02)
EOSINOPHIL # BLD AUTO: 0.26 X10(3)/MCL (ref 0–0.9)
EOSINOPHIL # BLD AUTO: 0.28 X10(3)/MCL (ref 0–0.9)
EOSINOPHIL NFR BLD AUTO: 3.8 %
EOSINOPHIL NFR BLD AUTO: 5.8 %
ERYTHROCYTE [DISTWIDTH] IN BLOOD BY AUTOMATED COUNT: 12.9 % (ref 11.5–17)
ERYTHROCYTE [DISTWIDTH] IN BLOOD BY AUTOMATED COUNT: 13.1 % (ref 11.5–17)
GFR SERPLBLD CREATININE-BSD FMLA CKD-EPI: >60 MLS/MIN/1.73/M2
GFR SERPLBLD CREATININE-BSD FMLA CKD-EPI: >60 MLS/MIN/1.73/M2
GLOBULIN SER-MCNC: 3.8 GM/DL (ref 2.4–3.5)
GLOBULIN SER-MCNC: 4.1 GM/DL (ref 2.4–3.5)
GLUCOSE SERPL-MCNC: 104 MG/DL (ref 74–100)
GLUCOSE SERPL-MCNC: 107 MG/DL (ref 74–100)
HCT VFR BLD AUTO: 39.6 % (ref 37–47)
HCT VFR BLD AUTO: 39.9 % (ref 37–47)
HGB BLD-MCNC: 12.8 G/DL (ref 12–16)
HGB BLD-MCNC: 13.2 G/DL (ref 12–16)
IMM GRANULOCYTES # BLD AUTO: 0.01 X10(3)/MCL (ref 0–0.04)
IMM GRANULOCYTES # BLD AUTO: 0.02 X10(3)/MCL (ref 0–0.04)
IMM GRANULOCYTES NFR BLD AUTO: 0.2 %
IMM GRANULOCYTES NFR BLD AUTO: 0.3 %
LIPASE SERPL-CCNC: 20 U/L
LYMPHOCYTES # BLD AUTO: 1.8 X10(3)/MCL (ref 0.6–4.6)
LYMPHOCYTES # BLD AUTO: 2.44 X10(3)/MCL (ref 0.6–4.6)
LYMPHOCYTES NFR BLD AUTO: 24.3 %
LYMPHOCYTES NFR BLD AUTO: 54.5 %
MAGNESIUM SERPL-MCNC: 1.9 MG/DL (ref 1.6–2.6)
MCH RBC QN AUTO: 29.5 PG
MCH RBC QN AUTO: 30.2 PG
MCHC RBC AUTO-ENTMCNC: 32.1 G/DL (ref 33–36)
MCHC RBC AUTO-ENTMCNC: 33.3 G/DL (ref 33–36)
MCV RBC AUTO: 90.6 FL (ref 80–94)
MCV RBC AUTO: 91.9 FL (ref 80–94)
MONOCYTES # BLD AUTO: 0.93 X10(3)/MCL (ref 0.1–1.3)
MONOCYTES # BLD AUTO: 0.94 X10(3)/MCL (ref 0.1–1.3)
MONOCYTES NFR BLD AUTO: 12.7 %
MONOCYTES NFR BLD AUTO: 20.8 %
NEUTROPHILS # BLD AUTO: 0.83 X10(3)/MCL (ref 2.1–9.2)
NEUTROPHILS # BLD AUTO: 4.37 X10(3)/MCL (ref 2.1–9.2)
NEUTROPHILS NFR BLD AUTO: 18.5 %
NEUTROPHILS NFR BLD AUTO: 58.8 %
NRBC BLD AUTO-RTO: 0 %
NRBC BLD AUTO-RTO: 0 %
PLATELET # BLD AUTO: 177 X10(3)/MCL (ref 130–400)
PLATELET # BLD AUTO: 184 X10(3)/MCL (ref 130–400)
PMV BLD AUTO: 10.7 FL (ref 7.4–10.4)
PMV BLD AUTO: 10.9 FL (ref 7.4–10.4)
POTASSIUM SERPL-SCNC: 4.3 MMOL/L (ref 3.5–5.1)
POTASSIUM SERPL-SCNC: 4.5 MMOL/L (ref 3.5–5.1)
PROT SERPL-MCNC: 6.8 GM/DL (ref 6.4–8.3)
PROT SERPL-MCNC: 7.2 GM/DL (ref 6.4–8.3)
RBC # BLD AUTO: 4.34 X10(6)/MCL (ref 4.2–5.4)
RBC # BLD AUTO: 4.37 X10(6)/MCL (ref 4.2–5.4)
SODIUM SERPL-SCNC: 142 MMOL/L (ref 136–145)
SODIUM SERPL-SCNC: 144 MMOL/L (ref 136–145)
WBC # SPEC AUTO: 4.5 X10(3)/MCL (ref 4.5–11.5)
WBC # SPEC AUTO: 7.4 X10(3)/MCL (ref 4.5–11.5)

## 2023-03-08 PROCEDURE — 96361 HYDRATE IV INFUSION ADD-ON: CPT

## 2023-03-08 PROCEDURE — G0378 HOSPITAL OBSERVATION PER HR: HCPCS

## 2023-03-08 PROCEDURE — 63600175 PHARM REV CODE 636 W HCPCS: Performed by: PHYSICIAN ASSISTANT

## 2023-03-08 PROCEDURE — 96365 THER/PROPH/DIAG IV INF INIT: CPT

## 2023-03-08 PROCEDURE — 85025 COMPLETE CBC W/AUTO DIFF WBC: CPT | Performed by: PHYSICIAN ASSISTANT

## 2023-03-08 PROCEDURE — 63600175 PHARM REV CODE 636 W HCPCS: Performed by: INTERNAL MEDICINE

## 2023-03-08 PROCEDURE — 25000003 PHARM REV CODE 250: Performed by: STUDENT IN AN ORGANIZED HEALTH CARE EDUCATION/TRAINING PROGRAM

## 2023-03-08 PROCEDURE — 96375 TX/PRO/DX INJ NEW DRUG ADDON: CPT

## 2023-03-08 PROCEDURE — 86318 IA INFECTIOUS AGENT ANTIBODY: CPT | Performed by: EMERGENCY MEDICINE

## 2023-03-08 PROCEDURE — 83690 ASSAY OF LIPASE: CPT | Performed by: PHYSICIAN ASSISTANT

## 2023-03-08 PROCEDURE — 96376 TX/PRO/DX INJ SAME DRUG ADON: CPT

## 2023-03-08 PROCEDURE — 63600175 PHARM REV CODE 636 W HCPCS: Performed by: STUDENT IN AN ORGANIZED HEALTH CARE EDUCATION/TRAINING PROGRAM

## 2023-03-08 PROCEDURE — 25000003 PHARM REV CODE 250: Performed by: INTERNAL MEDICINE

## 2023-03-08 PROCEDURE — 11000001 HC ACUTE MED/SURG PRIVATE ROOM

## 2023-03-08 PROCEDURE — 87040 BLOOD CULTURE FOR BACTERIA: CPT | Performed by: PHYSICIAN ASSISTANT

## 2023-03-08 PROCEDURE — 96366 THER/PROPH/DIAG IV INF ADDON: CPT

## 2023-03-08 PROCEDURE — 80053 COMPREHEN METABOLIC PANEL: CPT | Performed by: PHYSICIAN ASSISTANT

## 2023-03-08 PROCEDURE — 25500020 PHARM REV CODE 255

## 2023-03-08 PROCEDURE — 99285 EMERGENCY DEPT VISIT HI MDM: CPT | Mod: 25

## 2023-03-08 PROCEDURE — 87040 BLOOD CULTURE FOR BACTERIA: CPT | Performed by: STUDENT IN AN ORGANIZED HEALTH CARE EDUCATION/TRAINING PROGRAM

## 2023-03-08 PROCEDURE — 63600175 PHARM REV CODE 636 W HCPCS: Performed by: EMERGENCY MEDICINE

## 2023-03-08 RX ORDER — OXYCODONE HYDROCHLORIDE 5 MG/1
5 TABLET ORAL EVERY 4 HOURS PRN
Status: DISCONTINUED | OUTPATIENT
Start: 2023-03-08 | End: 2023-03-15 | Stop reason: HOSPADM

## 2023-03-08 RX ORDER — SODIUM CHLORIDE 0.9 % (FLUSH) 0.9 %
10 SYRINGE (ML) INJECTION
Status: DISCONTINUED | OUTPATIENT
Start: 2023-03-08 | End: 2023-03-15 | Stop reason: HOSPADM

## 2023-03-08 RX ORDER — SODIUM CHLORIDE, SODIUM LACTATE, POTASSIUM CHLORIDE, CALCIUM CHLORIDE 600; 310; 30; 20 MG/100ML; MG/100ML; MG/100ML; MG/100ML
INJECTION, SOLUTION INTRAVENOUS CONTINUOUS
Status: DISCONTINUED | OUTPATIENT
Start: 2023-03-08 | End: 2023-03-08 | Stop reason: HOSPADM

## 2023-03-08 RX ORDER — MORPHINE SULFATE 2 MG/ML
4 INJECTION, SOLUTION INTRAMUSCULAR; INTRAVENOUS
Status: COMPLETED | OUTPATIENT
Start: 2023-03-08 | End: 2023-03-08

## 2023-03-08 RX ORDER — ONDANSETRON 2 MG/ML
4 INJECTION INTRAMUSCULAR; INTRAVENOUS
Status: COMPLETED | OUTPATIENT
Start: 2023-03-08 | End: 2023-03-08

## 2023-03-08 RX ORDER — OMEPRAZOLE 40 MG/1
40 CAPSULE, DELAYED RELEASE ORAL DAILY
Qty: 30 CAPSULE | Refills: 0 | Status: SHIPPED | OUTPATIENT
Start: 2023-03-08 | End: 2023-03-08 | Stop reason: SDUPTHER

## 2023-03-08 RX ORDER — SODIUM CHLORIDE 0.9 % (FLUSH) 0.9 %
10 SYRINGE (ML) INJECTION EVERY 12 HOURS PRN
Status: DISCONTINUED | OUTPATIENT
Start: 2023-03-08 | End: 2023-03-08 | Stop reason: HOSPADM

## 2023-03-08 RX ORDER — MORPHINE SULFATE 4 MG/ML
4 INJECTION, SOLUTION INTRAMUSCULAR; INTRAVENOUS
Status: COMPLETED | OUTPATIENT
Start: 2023-03-08 | End: 2023-03-08

## 2023-03-08 RX ORDER — ALBUTEROL SULFATE 90 UG/1
2 AEROSOL, METERED RESPIRATORY (INHALATION) EVERY 6 HOURS PRN
Status: DISCONTINUED | OUTPATIENT
Start: 2023-03-08 | End: 2023-03-08 | Stop reason: HOSPADM

## 2023-03-08 RX ORDER — ONDANSETRON 4 MG/1
4 TABLET, ORALLY DISINTEGRATING ORAL EVERY 8 HOURS PRN
Status: DISCONTINUED | OUTPATIENT
Start: 2023-03-08 | End: 2023-03-08 | Stop reason: HOSPADM

## 2023-03-08 RX ORDER — ONDANSETRON 4 MG/1
4 TABLET, ORALLY DISINTEGRATING ORAL EVERY 8 HOURS PRN
Qty: 14 TABLET | Refills: 0 | Status: SHIPPED | OUTPATIENT
Start: 2023-03-08 | End: 2023-03-18

## 2023-03-08 RX ORDER — GLUCAGON 1 MG
1 KIT INJECTION
Status: DISCONTINUED | OUTPATIENT
Start: 2023-03-08 | End: 2023-03-08 | Stop reason: HOSPADM

## 2023-03-08 RX ORDER — DICYCLOMINE HYDROCHLORIDE 20 MG/1
20 TABLET ORAL
Qty: 30 TABLET | Refills: 0 | Status: SHIPPED | OUTPATIENT
Start: 2023-03-08 | End: 2023-03-18

## 2023-03-08 RX ORDER — HYDROCODONE BITARTRATE AND ACETAMINOPHEN 7.5; 325 MG/1; MG/1
1 TABLET ORAL EVERY 6 HOURS PRN
Qty: 20 TABLET | Refills: 0 | OUTPATIENT
Start: 2023-03-08 | End: 2023-03-08

## 2023-03-08 RX ORDER — DICYCLOMINE HYDROCHLORIDE 20 MG/1
20 TABLET ORAL
Qty: 30 TABLET | Refills: 0 | Status: SHIPPED | OUTPATIENT
Start: 2023-03-08 | End: 2023-03-08 | Stop reason: SDUPTHER

## 2023-03-08 RX ORDER — KETOROLAC TROMETHAMINE 30 MG/ML
15 INJECTION, SOLUTION INTRAMUSCULAR; INTRAVENOUS
Status: DISCONTINUED | OUTPATIENT
Start: 2023-03-08 | End: 2023-03-08

## 2023-03-08 RX ORDER — METHIMAZOLE 10 MG/1
10 TABLET ORAL 2 TIMES DAILY
Status: DISCONTINUED | OUTPATIENT
Start: 2023-03-08 | End: 2023-03-15 | Stop reason: HOSPADM

## 2023-03-08 RX ORDER — OMEPRAZOLE 40 MG/1
40 CAPSULE, DELAYED RELEASE ORAL DAILY
Qty: 30 CAPSULE | Refills: 0 | Status: ON HOLD | OUTPATIENT
Start: 2023-03-08 | End: 2023-03-15 | Stop reason: SDUPTHER

## 2023-03-08 RX ORDER — ACETAMINOPHEN 325 MG/1
650 TABLET ORAL EVERY 8 HOURS PRN
Status: DISCONTINUED | OUTPATIENT
Start: 2023-03-08 | End: 2023-03-08

## 2023-03-08 RX ORDER — MUPIROCIN 20 MG/G
OINTMENT TOPICAL 2 TIMES DAILY
Status: DISCONTINUED | OUTPATIENT
Start: 2023-03-08 | End: 2023-03-08 | Stop reason: HOSPADM

## 2023-03-08 RX ORDER — ONDANSETRON 4 MG/1
4 TABLET, ORALLY DISINTEGRATING ORAL EVERY 8 HOURS PRN
Qty: 14 TABLET | Refills: 0 | Status: SHIPPED | OUTPATIENT
Start: 2023-03-08 | End: 2023-03-08 | Stop reason: SDUPTHER

## 2023-03-08 RX ORDER — ENOXAPARIN SODIUM 100 MG/ML
40 INJECTION SUBCUTANEOUS EVERY 24 HOURS
Status: DISCONTINUED | OUTPATIENT
Start: 2023-03-08 | End: 2023-03-15 | Stop reason: HOSPADM

## 2023-03-08 RX ORDER — IBUPROFEN 400 MG/1
400 TABLET ORAL EVERY 6 HOURS PRN
Status: DISCONTINUED | OUTPATIENT
Start: 2023-03-08 | End: 2023-03-08 | Stop reason: HOSPADM

## 2023-03-08 RX ORDER — HYDROCODONE BITARTRATE AND ACETAMINOPHEN 7.5; 325 MG/1; MG/1
1 TABLET ORAL EVERY 6 HOURS PRN
Qty: 20 TABLET | Refills: 0 | Status: SHIPPED | OUTPATIENT
Start: 2023-03-08 | End: 2023-03-08 | Stop reason: ALTCHOICE

## 2023-03-08 RX ORDER — HYDROCODONE BITARTRATE AND ACETAMINOPHEN 7.5; 325 MG/1; MG/1
1 TABLET ORAL EVERY 6 HOURS PRN
Status: DISCONTINUED | OUTPATIENT
Start: 2023-03-08 | End: 2023-03-08

## 2023-03-08 RX ORDER — ENOXAPARIN SODIUM 100 MG/ML
40 INJECTION SUBCUTANEOUS EVERY 24 HOURS
Status: DISCONTINUED | OUTPATIENT
Start: 2023-03-08 | End: 2023-03-08 | Stop reason: HOSPADM

## 2023-03-08 RX ORDER — TALC
6 POWDER (GRAM) TOPICAL NIGHTLY PRN
Status: DISCONTINUED | OUTPATIENT
Start: 2023-03-08 | End: 2023-03-15 | Stop reason: HOSPADM

## 2023-03-08 RX ORDER — METHIMAZOLE 10 MG/1
10 TABLET ORAL 2 TIMES DAILY
Status: DISCONTINUED | OUTPATIENT
Start: 2023-03-08 | End: 2023-03-08 | Stop reason: HOSPADM

## 2023-03-08 RX ADMIN — IOHEXOL 100 ML: 350 INJECTION, SOLUTION INTRAVENOUS at 12:03

## 2023-03-08 RX ADMIN — KETOROLAC TROMETHAMINE 15 MG: 30 INJECTION, SOLUTION INTRAMUSCULAR at 10:03

## 2023-03-08 RX ADMIN — METHIMAZOLE 10 MG: 10 TABLET ORAL at 09:03

## 2023-03-08 RX ADMIN — PROPRANOLOL HYDROCHLORIDE 30 MG: 10 TABLET ORAL at 09:03

## 2023-03-08 RX ADMIN — SODIUM CHLORIDE, POTASSIUM CHLORIDE, SODIUM LACTATE AND CALCIUM CHLORIDE 1000 ML: 600; 310; 30; 20 INJECTION, SOLUTION INTRAVENOUS at 04:03

## 2023-03-08 RX ADMIN — MORPHINE SULFATE 4 MG: 2 INJECTION, SOLUTION INTRAMUSCULAR; INTRAVENOUS at 02:03

## 2023-03-08 RX ADMIN — MORPHINE SULFATE 4 MG: 4 INJECTION INTRAVENOUS at 05:03

## 2023-03-08 RX ADMIN — SODIUM CHLORIDE, POTASSIUM CHLORIDE, SODIUM LACTATE AND CALCIUM CHLORIDE: 600; 310; 30; 20 INJECTION, SOLUTION INTRAVENOUS at 09:03

## 2023-03-08 RX ADMIN — MEROPENEM 1 G: 1 INJECTION, POWDER, FOR SOLUTION INTRAVENOUS at 07:03

## 2023-03-08 RX ADMIN — ONDANSETRON 4 MG: 2 INJECTION INTRAMUSCULAR; INTRAVENOUS at 05:03

## 2023-03-08 NOTE — ED PROVIDER NOTES
Encounter Date: 3/7/2023       History     Chief Complaint   Patient presents with    Abdominal Pain    Diarrhea     Pt c/o diarrhea,abdominal pain and dizziness x 2 days. Pt reports recently being treated for E.Coli Pt has extensive history with stomach problems.      Mrs. Niya Moeller is a 51 yo female who presents with chief complaint abdominal pain. Onset was 3 days ago when she began having LLQ abdominal pain that has been constant, severe, aggravated with movement and bowel movements, no alleviating factors noted. Associated symptoms of nausea, tenesmus, and fever Tmax 101. She reports that she just completed IV meropenem for intra-abdominal abscess secondary to diverticulitis. She states that this is the same pain that she had when she had her diverticulitis with abscess. She also reports some wheezing stating that she did not take her breathing treatment tonight.    The history is provided by the patient.   Review of patient's allergies indicates:   Allergen Reactions    Latex      Past Medical History:   Diagnosis Date    Asthma     Diverticulosis     GERD (gastroesophageal reflux disease)      History reviewed. No pertinent surgical history.  History reviewed. No pertinent family history.  Social History     Tobacco Use    Smoking status: Every Day     Packs/day: 1.00     Years: 40.00     Pack years: 40.00     Types: Cigarettes     Start date: 1983    Smokeless tobacco: Never    Tobacco comments:     Ambulatory referral to Smoking Cessation clinic following hospital discharge.    Substance Use Topics    Alcohol use: Not Currently    Drug use: Not Currently     Review of Systems    Physical Exam     Initial Vitals [03/07/23 2113]   BP Pulse Resp Temp SpO2   95/67 89 20 98.5 °F (36.9 °C) 95 %      MAP       --         Physical Exam    Nursing note and vitals reviewed.  Constitutional: Vital signs are normal. She appears well-developed and well-nourished.   HENT:   Head: Normocephalic and atraumatic.    Mouth/Throat: Uvula is midline and mucous membranes are normal.   Eyes: EOM are normal. Pupils are equal, round, and reactive to light.   Neck: Trachea normal. Neck supple.   Cardiovascular:  Normal rate, regular rhythm, intact distal pulses and normal pulses.           Pulmonary/Chest: Effort normal. She has wheezes.   Abdominal: Abdomen is soft. Bowel sounds are normal. There is abdominal tenderness in the left lower quadrant. There is guarding (voluntary).   Musculoskeletal:         General: Normal range of motion.      Cervical back: Neck supple.     Neurological: She is alert and oriented to person, place, and time. GCS score is 15. GCS eye subscore is 4. GCS verbal subscore is 5. GCS motor subscore is 6.   Skin: Skin is warm and dry.   Psychiatric: She has a normal mood and affect. Her speech is normal. Thought content normal.       ED Course   Procedures  Labs Reviewed   COMPREHENSIVE METABOLIC PANEL - Abnormal; Notable for the following components:       Result Value    Glucose Level 104 (*)     Blood Urea Nitrogen 8.4 (*)     Albumin Level 3.1 (*)     Globulin 4.1 (*)     Albumin/Globulin Ratio 0.8 (*)     All other components within normal limits   URINALYSIS, REFLEX TO URINE CULTURE - Abnormal; Notable for the following components:    Protein, UA 1+ (*)     Ketones, UA 1+ (*)     Urobilinogen, UA 1+ (*)     Bacteria, UA Trace (*)     Squamous Epithelial Cells, UA Moderate (*)     Mucous, UA Few (*)     All other components within normal limits   CBC WITH DIFFERENTIAL - Abnormal; Notable for the following components:    MPV 10.9 (*)     Neut # 0.83 (*)     All other components within normal limits   CLOSTRIDIUM DIFFICILE TOXIN A AND B, EIA - Normal   MAGNESIUM - Normal   CBC W/ AUTO DIFFERENTIAL    Narrative:     The following orders were created for panel order CBC auto differential.  Procedure                               Abnormality         Status                     ---------                                -----------         ------                     CBC with Differential[[id number]]        Abnormal            Final result                 Please view results for these tests on the individual orders.   EXTRA TUBES    Narrative:     The following orders were created for panel order EXTRA TUBES.  Procedure                               Abnormality         Status                     ---------                               -----------         ------                     Light Blue Top Hold[[id number]]                              In process                 Gold Top Hold[[id number]]                                    In process                   Please view results for these tests on the individual orders.   LIGHT BLUE TOP HOLD   GOLD TOP HOLD          Imaging Results              CT Abdomen Pelvis With Contrast (Final result)  Result time 03/08/23 06:22:40      Final result by Glen Celestin MD (03/08/23 06:22:40)                   Impression:      Minimal persistent inflammatory changes along the sigmoid with no drainable fluid collection today.  Drainage catheter has been removed.    No significant discrepancy with the preliminary report..      Electronically signed by: Glen Celestin  Date:    03/08/2023  Time:    06:22               Narrative:    EXAMINATION:  CT ABDOMEN PELVIS WITH CONTRAST    CLINICAL HISTORY:  LLQ abdominal pain;    TECHNIQUE:  Helical acquisition through the abdomen and pelvis with IV contrast.  Three plane reconstructions were provided for review.  mGycm. Automatic exposure control, adjustment of mA/kV or iterative reconstruction technique was used to reduce radiation.    COMPARISON:  20 February 2023    FINDINGS:  The limited imaged lung bases are clear.    The liver, spleen, gallbladder, pancreas, adrenals and kidneys are within normal limits.    No bowel obstruction.  Normal appendix.  There is colonic diverticulosis with minimal inflammation along the sigmoid.  No drainable fluid  collection.  The drainage catheter has been removed.    Urinary bladder is unremarkable. No free fluid. Aorta normal in caliber.    Mild degenerative change of the spine                        Preliminary result by Glen Celestin MD (03/08/23 02:02:40)                   Narrative:    START OF REPORT:  TECHNIQUE: CT OF THE ABDOMEN AND PELVIS WAS PERFORMED WITH AXIAL IMAGES AS WELL AS SAGITTAL AND CORONAL RECONSTRUCTION IMAGES WITH INTRAVENOUS CONTRAST.    COMPARISON: COMPARISON IS WITH STUDY DATED 2022-12-25 05:57:51.    CLINICAL HISTORY: ABDOMINAL PAIN DIARRHEA (PT C/O DIARRHEA,ABDOMINAL PAIN AND DIZZINESS X 2 DAYS. PT REPORTS RECENTLY BEING TREATED FOR E.COLI PT HAS EXTENSIVE HISTORY WITH STOMACH PROBLEMS.    DOSAGE INFORMATION: AUTOMATED EXPOSURE CONTROL WAS UTILIZED.    Findings:  Thorax:  Lungs: There is nonspecific dependent change at the lung bases.  Pleura: No effusions or thickening.  Heart: The heart size is within normal limits.  Abdomen:  Abdominal Wall: There is a small umbilical hernia which contains mesenteric fat.  Liver: The liver appears unremarkable.  Biliary System: No intrahepatic or extrahepatic biliary duct dilatation is seen.  Gallbladder: The gallbladder appears unremarkable.  Pancreas: The pancreas appears unremarkable.  Spleen: The spleen appears unremarkable.  Adrenals: The adrenal glands appear unremarkable.  Kidneys: The kidneys appear unremarkable with no stones cysts masses or hydronephrosis.  Aorta: The abdominal aorta appears unremarkable.  IVC: Unremarkable.  Bowel:  Esophagus: The visualized esophagus appears unremarkable.  Stomach: The stomach appears unremarkable.  Duodenum: Unremarkable appearing duodenum.  Small Bowel: The small bowel appears unremarkable.  Colon: Multiple diverticula are seen. There is mild wall thickening of the proximal sigmoid colon (series 2, image 59) with subtle surrounding fat stranding. Similar findings are also seen on the prior examination. These  findings reflect recurrent diverticulitis. No perforation or abscess is identified.  Appendix: The appendix appears unremarkable (series 2, images 55-58).  Peritoneum: No intraperitoneal free air or ascites is seen.    Pelvis:  Bladder: The bladder appears unremarkable.  Female:  Uterus: Again seen is a subserosal fibroid in the anterior myometrium (series 2 and 8, image 70), which measures 2.5 x 2.7 x 1.7 cm, unchanged since the prior examination.  Ovaries: The ovaries appear unremarkable. No adnexal masses are seen.    Bony structures:  Dorsal Spine: Mild degenerative changes are present at L5-S1.  Bony Pelvis: The visualized bony structures of the pelvis appear unremarkable.      Impression:  1. Multiple diverticula are seen. There is mild wall thickening of the proximal sigmoid colon (series 2, image 59) with subtle surrounding fat stranding. Similar findings are also seen on the prior examination. These findings reflect recurrent diverticulitis. No perforation or abscess is identified. Correlate clinically as regards further evaluation and followup.  2. Details and findings as discussed.                          Preliminary result by Kevin Barreto Jr., MD (03/08/23 02:02:40)                   Narrative:    START OF REPORT:  TECHNIQUE: CT OF THE ABDOMEN AND PELVIS WAS PERFORMED WITH AXIAL IMAGES AS WELL AS SAGITTAL AND CORONAL RECONSTRUCTION IMAGES WITH INTRAVENOUS CONTRAST.    COMPARISON: COMPARISON IS WITH STUDY DATED 2022-12-25 05:57:51.    CLINICAL HISTORY: ABDOMINAL PAIN DIARRHEA (PT C/O DIARRHEA,ABDOMINAL PAIN AND DIZZINESS X 2 DAYS. PT REPORTS RECENTLY BEING TREATED FOR E.COLI PT HAS EXTENSIVE HISTORY WITH STOMACH PROBLEMS.    DOSAGE INFORMATION: AUTOMATED EXPOSURE CONTROL WAS UTILIZED.    Findings:  Thorax:  Lungs: There is nonspecific dependent change at the lung bases.  Pleura: No effusions or thickening.  Heart: The heart size is within normal limits.  Abdomen:  Abdominal Wall: There is a small  umbilical hernia which contains mesenteric fat.  Liver: The liver appears unremarkable.  Biliary System: No intrahepatic or extrahepatic biliary duct dilatation is seen.  Gallbladder: The gallbladder appears unremarkable.  Pancreas: The pancreas appears unremarkable.  Spleen: The spleen appears unremarkable.  Adrenals: The adrenal glands appear unremarkable.  Kidneys: The kidneys appear unremarkable with no stones cysts masses or hydronephrosis.  Aorta: The abdominal aorta appears unremarkable.  IVC: Unremarkable.  Bowel:  Esophagus: The visualized esophagus appears unremarkable.  Stomach: The stomach appears unremarkable.  Duodenum: Unremarkable appearing duodenum.  Small Bowel: The small bowel appears unremarkable.  Colon: Multiple diverticula are seen. There is mild wall thickening of the proximal sigmoid colon (series 2, image 59) with subtle surrounding fat stranding. Similar findings are also seen on the prior examination. These findings reflect recurrent diverticulitis. No perforation or abscess is identified.  Appendix: The appendix appears unremarkable (series 2, images 55-58).  Peritoneum: No intraperitoneal free air or ascites is seen.    Pelvis:  Bladder: The bladder appears unremarkable.  Female:  Uterus: Again seen is a subserosal fibroid in the anterior myometrium (series 2 and 8, image 70), which measures 2.5 x 2.7 x 1.7 cm, unchanged since the prior examination.  Ovaries: The ovaries appear unremarkable. No adnexal masses are seen.    Bony structures:  Dorsal Spine: Mild degenerative changes are present at L5-S1.  Bony Pelvis: The visualized bony structures of the pelvis appear unremarkable.      Impression:  1. Multiple diverticula are seen. There is mild wall thickening of the proximal sigmoid colon (series 2, image 59) with subtle surrounding fat stranding. Similar findings are also seen on the prior examination. These findings reflect recurrent diverticulitis. No perforation or abscess is  identified. Correlate clinically as regards further evaluation and followup.  2. Details and findings as discussed.                                         Medications   ketorolac injection 15 mg (has no administration in time range)   ondansetron injection 4 mg (4 mg Intravenous Given 3/7/23 2339)   albuterol-ipratropium 2.5 mg-0.5 mg/3 mL nebulizer solution 3 mL (3 mLs Nebulization Given 3/7/23 5160)   ketorolac injection 15 mg (15 mg Intravenous Given 3/7/23 2339)   iohexoL (OMNIPAQUE 350) 350 mg iodine/mL injection (100 mLs Intravenous Given 3/8/23 0045)   morphine injection 4 mg (4 mg Intravenous Given 3/8/23 0214)     Medical Decision Making:   Initial Assessment:   52-year-old female who presents with left lower quadrant abdominal pain associated with diarrhea, tenesmus, nausea, and fever T-max 101° that she states began about 4 days ago.  She was recently admitted for diverticulitis and was found to have an abscess which grew out ESBL E coli sensitive only to Merrem imipenem and ertapenem for which she had completed a course of meropenem outpatient via PICC line.  Patient states that she had felt better until 4 days ago when her symptoms recurred.  Labs showed no leukocytosis or leukopenia, chemistry panel grossly unremarkable.  CT imaging showed persistent inflammatory changes along the sigmoid, no drainable fluid collection.  Given her recurrence of symptoms after completing a course of antibiotics as would indicate that this is more of an acute process rather than just a resolving process, so was felt that she needed to be admitted for further medical evaluation and treatment.  Internal Medicine came and evaluated the patient and felt that she did not require admission and had recommended discharging her with Cipro and Flagyl and having her follow up outpatient with GI.  I did not exactly agree with this as we already have evidence of her having ESBL and did not feel that Cipro and Flagyl were  appropriate.  Since she just completed a course of antibiotics, I felt it was important to rule out C diff as a etiology, so stool specimen was obtained which was negative for C diff toxin.  GI was then consulted for guidance on how to manage this patient, and they have graciously come down and evaluated her and agreed that it would be best to admit her for now until a definitive plan of care could be devised.  I have spoken with the patient and/or caregivers. I have explained the patient's condition, diagnoses and treatment plan based on the information available to me at this time. I have answered the patient's and/or caregiver's questions and addressed any concerns. The patient and/or caregivers have as good an understanding of the patient's diagnosis, condition and treatment plan as can be expected at this point. The patient has been stabilized within the capability of the emergency department. The patient will be transported for further care and management or will be moved to an observation or inpatient service. I have communicated with the staff or medical practitioner taking over this patient's care.  Clinical Tests:   Lab Tests: Ordered and Reviewed  Radiological Study: Ordered and Reviewed           ED Course as of 03/08/23 0848   Wed Mar 08, 2023   0420 Internal Medicine has seen and evaluated the patient.  They do not feel that she meets criteria for admission and recommend outpatient treatment with Cipro and Flagyl she already has an appointment scheduled next week to see GI.  Given her recent antibiotic use I do want to make sure to exclude C diff as etiology for her current symptoms prior to discharging her home to make sure that we are giving her appropriate antibiotics.  Patient is given a cup to provide a stool specimen which we are still waiting on. [IB]   0801 I spoke with GI regarding the patient and concern for starting her on Cipro and Flagyl when we have evidence of her having ESBL E. Coli  sensitive only to meropenem and ertapenem. GI will come down and evaluate patient and provide guidance on how to proceed with her management outpatient. [IB]   0812 GI, Dr. Jones, has evaluated the patient and agrees that admission would be best so that a plan of care can be determined. [IB]      ED Course User Index  [IB] Pedro Sagastume DO            Medical Decision Making  Problems Addressed:  Acute diverticulitis: complicated acute illness or injury with systemic symptoms that poses a threat to life or bodily functions    Amount and/or Complexity of Data Reviewed  External Data Reviewed: labs.            Clinical Impression:   Final diagnoses:  [K57.92] Acute diverticulitis (Primary)        ED Disposition Condition    Observation Stable                Pedro Sagastume DO  03/08/23 0869

## 2023-03-08 NOTE — ED PROVIDER NOTES
Encounter Date: 3/8/2023    SCRIBE #1 NOTE: I, Jessica Moeller, am scribing for, and in the presence of,  Bran Wilson MD. I have scribed the following portions of the note - Other sections scribed: HPI, ROS, Physical exam.     History     Chief Complaint   Patient presents with    Fever     C/o nausea, diarrhea, abdominal pain, and weakness that began 3 days ago. Pt has PICC line and states she was self infusing, she states the doctor only gave her Toradol q8 hours. Pt states she is a direct admit to [facility] for diverticulitis, however, the doctor would not Rx narcotics therefore she came here.        Patient is a 52 y.o. female, with a PMHx of asthma, diverticulosis with recent diverticular abscess and IR drain placed completed antibiotics via PICC line in the right upper extremity, and GERD, who presents with complaint of abdominal pain with onset 3 days ago. Patient has also been experiencing nausea, diarrhea, and generalized weakness. She notes that she was supposed to be a direct admit to [facility] for diverticulitis, but she did not receive pain medications so she came to this ED for admission here. Patient states that she has been infusing her PICC line with antibiotics.    PCP is YEHUDA Cruz.      The history is provided by the patient.   Fever  Primary symptoms of the febrile illness include fever, abdominal pain, nausea and diarrhea. Primary symptoms do not include shortness of breath, dysuria or rash. This is a new problem.   The abdominal pain began today. The abdominal pain is located in the LLQ. The abdominal pain does not radiate. The severity of the abdominal pain is 5/10. The abdominal pain is relieved by nothing.   Nausea began today.   The diarrhea began today.   Review of patient's allergies indicates:   Allergen Reactions    Latex      Past Medical History:   Diagnosis Date    Asthma     Diverticulosis     GERD (gastroesophageal reflux disease)      History reviewed. No pertinent surgical  history.  History reviewed. No pertinent family history.  Social History     Tobacco Use    Smoking status: Every Day     Packs/day: 0.50     Years: 40.00     Pack years: 20.00     Types: Cigarettes     Start date: 1983    Smokeless tobacco: Never    Tobacco comments:     Ambulatory referral to Smoking Cessation clinic following hospital discharge.    Substance Use Topics    Alcohol use: Not Currently    Drug use: Not Currently     Review of Systems   Constitutional:  Positive for fever.   HENT:  Negative for sore throat.    Eyes:  Negative for visual disturbance.   Respiratory:  Negative for shortness of breath.    Cardiovascular:  Negative for chest pain.   Gastrointestinal:  Positive for abdominal pain, diarrhea and nausea.   Genitourinary:  Negative for dysuria.   Musculoskeletal:  Negative for joint swelling.   Skin:  Negative for rash.   Neurological:  Positive for weakness (generalized).   Psychiatric/Behavioral:  Negative for confusion.      Physical Exam     Initial Vitals [03/08/23 1401]   BP Pulse Resp Temp SpO2   (!) 83/45 88 19 98.3 °F (36.8 °C) 96 %      MAP       --         Physical Exam    Nursing note and vitals reviewed.  Constitutional: She appears well-developed and well-nourished.   HENT:   Head: Normocephalic and atraumatic.   Eyes: EOM are normal. Pupils are equal, round, and reactive to light.   Neck:   Normal range of motion.  Cardiovascular:  Normal rate, regular rhythm, normal heart sounds and intact distal pulses.           No murmur heard.  PICC line to RUE   Pulmonary/Chest: Breath sounds normal. No respiratory distress. She has no wheezes. She has no rales.   Abdominal: Abdomen is soft. She exhibits no distension. There is abdominal tenderness in the left lower quadrant. There is no rebound.   Musculoskeletal:         General: No tenderness or edema. Normal range of motion.      Cervical back: Normal range of motion.     Neurological: She is alert. She has normal strength. No cranial  nerve deficit. GCS score is 15. GCS eye subscore is 4. GCS verbal subscore is 5. GCS motor subscore is 6.   Skin: Skin is warm and dry. Capillary refill takes less than 2 seconds. No rash noted. No erythema.   Psychiatric: She has a normal mood and affect.       ED Course   Procedures  Labs Reviewed   COMPREHENSIVE METABOLIC PANEL - Abnormal; Notable for the following components:       Result Value    Chloride 108 (*)     Glucose Level 107 (*)     Albumin Level 3.0 (*)     Globulin 3.8 (*)     Albumin/Globulin Ratio 0.8 (*)     All other components within normal limits   CBC WITH DIFFERENTIAL - Abnormal; Notable for the following components:    MCHC 32.1 (*)     MPV 10.7 (*)     All other components within normal limits   LIPASE - Normal   BLOOD CULTURE [facility]   BLOOD CULTURE [facility]   CBC W/ AUTO DIFFERENTIAL    Narrative:     The following orders were created for panel order CBC auto differential.  Procedure                               Abnormality         Status                     ---------                               -----------         ------                     CBC with Differential[[id number]]        Abnormal            Final result                 Please view results for these tests on the individual orders.          Imaging Results    None          Medications   meropenem (MERREM) 1 g in sodium chloride 0.9 % 100 mL IVPB (MB+) (1 g Intravenous New Bag 3/8/23 1948)   lactated ringers bolus 1,000 mL (0 mLs Intravenous Stopped 3/8/23 1922)   ondansetron injection 4 mg (4 mg Intravenous Given 3/8/23 1730)   morphine injection 4 mg (4 mg Intravenous Given 3/8/23 1730)     Medical Decision Making:   History:   I obtained history from: primary care / consultant.       <> Summary of History: Discussed case with Hospital Medicine who will evaluate the patient.  Old Medical Records: I decided to obtain old medical records.  Old Records Summarized: records from clinic visits and records from previous admission(s).        <> Summary of Records: Reviewed old records.  Patient had a IR drain placed on 02/16/2023.  E coli bacteremia.  Is on antibiotics, Merrem through her PICC line.  Resumed.  Initial Assessment:   Left lower quadrant tenderness to palpation  Differential Diagnosis:   Judging by the patient's chief complaint and pertinent history, the patient has the following possible differential diagnoses, including but not limited to the following.  Some of these are deemed to be lower likelihood and some more likely based on my physical exam and history combined with possible lab work and/or imaging studies.   Please see the pertinent studies, and refer to the HPI.  Some of these diagnoses will take further evaluation to fully rule out, perhaps as an outpatient and the patient was encouraged to follow up when discharged for more comprehensive evaluation.    appendicitis, biliary disease, diverticulitis,  AAA, ACS, mesenteric ischemia, intraabdominal abcess, retroperitoneal abcess, gastritis, gastroenteritis, hepatitis, hernia, pancreatitis, inflammatory bowel disease, PUD, SBP, nephrolithiasis, DKA, sickle cell crisis, consitpation, GERD, IBS    Clinical Tests:   Lab Tests: Ordered and Reviewed  Radiological Study: Ordered and Reviewed  ED Management:  Patient is a 52-year-old female who presents to emergency room for left lower quadrant pain.  See HPI.  See physical exam.  Recent admission and diagnosis of diverticular abscess status post IR drain placement and subsequently removed.  She was seen at [facility] earlier today found to have diverticulitis.  She continues to be on Merrem through a PICC line in her right upper extremity.  She left AMA after not receiving pain medication.  Labs and imaging as noted and are reviewed.  No repeat imaging ordered as CT is visible.  Blood cultures have been obtained the patient afebrile and does not appear septic, reports fever at home.  Answered all questions this time.  Discussed with medicine  who will admit the patient.  EXAMINATION:  CT ABDOMEN PELVIS WITH CONTRAST     CLINICAL HISTORY:  LLQ abdominal pain;     TECHNIQUE:  Helical acquisition through the abdomen and pelvis with IV contrast.  Three plane reconstructions were provided for review.  mGycm. Automatic exposure control, adjustment of mA/kV or iterative reconstruction technique was used to reduce radiation.     COMPARISON:  20 February 2023     FINDINGS:  The limited imaged lung bases are clear.     The liver, spleen, gallbladder, pancreas, adrenals and kidneys are within normal limits.     No bowel obstruction.  Normal appendix.  There is colonic diverticulosis with minimal inflammation along the sigmoid.  No drainable fluid collection.  The drainage catheter has been removed.     Urinary bladder is unremarkable. No free fluid. Aorta normal in caliber.     Mild degenerative change of the spine     Impression:     Minimal persistent inflammatory changes along the sigmoid with no drainable fluid collection today.  Drainage catheter has been removed.     No significant discrepancy with the preliminary report        Scribe Attestation:   Scribe #1: I performed the above scribed service and the documentation accurately describes the services I performed. I attest to the accuracy of the note.    Attending Attestation:           Physician Attestation for Scribe:  Physician Attestation Statement for Scribe #1: I, Bran Wilson MD, reviewed documentation, as scribed by Jessica Moeller in my presence, and it is both accurate and complete.       Medical Decision Making  Problems Addressed:  Diverticulitis: acute illness or injury that poses a threat to life or bodily functions  Left lower quadrant pain: acute illness or injury that poses a threat to life or bodily functions  Nausea and vomiting, unspecified vomiting type: acute illness or injury that poses a threat to life or bodily functions    Amount and/or Complexity of Data Reviewed  External  Data Reviewed: labs and radiology.  Labs: ordered.  Radiology: ordered and independent interpretation performed.    Risk  Prescription drug management.  Parenteral controlled substances.  Decision regarding hospitalization.            ED Course as of 03/08/23 2025   Wed Mar 08, 2023   1827 Marcial hospitalist [AB]   1832 Patient was recently admitted at [facility] and found to have diverticulitis and abscess.  She is status post IR drainage of pericolonic abscess on 2/16/2023 which was culture positive for ESBL E. coli sensitive to ertapenem and meropenem.  She completed 7 days of IV ertapenem on 03/02/2023.  She is scheduled to see her endocrinologist on 03/10/2023 and has an appointment with GI on 03/14/2023 with a scheduled colonoscopy on 04/14/2023. [RP]   1849 Spoke to hospitalist and will admit to Dr. Wilson. [AB]      ED Course User Index  [AB] Jessica Sajan  [RP] Bran Wilson MD                 Clinical Impression:   Final diagnoses:  [K57.92] Diverticulitis (Primary)  [R10.32] Left lower quadrant pain  [R11.2] Nausea and vomiting, unspecified vomiting type        ED Disposition Condition    Admit Stable                Bran Wilson MD  03/08/23 2028

## 2023-03-08 NOTE — CONSULTS
[facility] Medicine Wards Consult Note     Resident Team: [facility] Medicine List 2  Attending Physician: Pedro Sagastume DO  Resident: Melisa Rodney MD      Chief Complaint     Abdominal pain    Subjective:      History of Present Illness:  Niya Moeller is a 52 y.o. female who with a history of asthma, GERD, hyperthyroidism, nicotine dependence, history of diverticulosis with multiple flare-ups who presented to [facility] ED on 3/7/2023  with a primary complaint of left lower quadrant abdominal pain the past 2 days.  Abdominal pain described as constant cramping, and feels like the beginning of previous diverticulitis flares. She reports associated with nausea without vomiting, constant urge to move her bowels but unable to. She also had a fever 2 days ago with a T-max of 101° 2.  Patient reports tolerating p.o. and drinking adequately.  She reports compliance with all her medications.  Denies new or unusual foods, headache, lightheaded, dizziness, chest pain, palpitations, shortness of breath, bloating, dysuria, constipation, bleeding from any sites.  Patient was recently admitted at [facility] and found to have diverticulitis and abscess.  She is status post IR drainage of pericolonic abscess on 2/16/2023 which was culture positive for ESBL E. coli sensitive to ertapenem and meropenem.  She completed 7 days of IV ertapenem on 03/02/2023.  She is scheduled to see her endocrinologist on 03/10/2023 and has an appointment with GI on 03/14/2023 with a scheduled colonoscopy on 04/14/2023. Her PCP is Sade Goodman NP.    In the ED afebrile, hemodynamically stable.  Labs unremarkable.   C diff pending. CT abdomen and pelvis obtained. Given DuoNeb x1, Toradol, morphine and Zofran. Internal medicine consulted for acute diverticulitis.      Past Medical History:  Past Medical History:   Diagnosis Date    Asthma     Diverticulosis     GERD (gastroesophageal reflux disease)        Past Surgical History:  History reviewed.  No pertinent surgical history.    Family History:  History reviewed. No pertinent family history.    Social History:  Social History     Tobacco Use    Smoking status: Every Day     Packs/day: 1.00     Years: 40.00     Pack years: 40.00     Types: Cigarettes     Start date: 1983    Smokeless tobacco: Never    Tobacco comments:     Ambulatory referral to Smoking Cessation clinic following hospital discharge.    Substance Use Topics    Alcohol use: Not Currently    Drug use: Not Currently       Allergies:  Review of patient's allergies indicates:   Allergen Reactions    Latex        Home Medications:  Prior to Admission medications    Medication Sig Start Date End Date Taking? Authorizing Provider   albuterol (PROVENTIL/VENTOLIN HFA) 90 mcg/actuation inhaler Inhale 1-2 puffs into the lungs every 6 (six) hours as needed for Wheezing. Rescue 2/14/23 2/14/24  Sol Aguirre NP   albuterol-ipratropium (DUO-NEB) 2.5 mg-0.5 mg/3 mL nebulizer solution Inhale 3 mLs into the lungs every 4 (four) hours as needed. 1/9/23   Historical Provider   budesonide-formoterol 160-4.5 mcg (SYMBICORT) 160-4.5 mcg/actuation HFAA Inhale 2 puffs into the lungs every 12 (twelve) hours. Controller 7/30/22 7/30/23  Alvaro Gaston DO   cetirizine (ZYRTEC) 10 MG tablet Take 10 mg by mouth once daily. 11/14/22   Historical Provider   fluticasone propionate (FLONASE) 50 mcg/actuation nasal spray 1 spray by Nasal route daily as needed. 11/23/22   Historical Provider   HYDROcodone-acetaminophen (NORCO) 7.5-325 mg per tablet Take 1 tablet by mouth every 6 (six) hours as needed for Pain. 2/20/23   Mai Becker MD   methIMAzole (TAPAZOLE) 10 MG Tab Take 1 tablet (10 mg total) by mouth 2 (two) times daily. 2/14/23 2/14/24  Sol Aguirre NP   montelukast (SINGULAIR) 10 mg tablet Take 10 mg by mouth once daily. 10/17/22 10/17/23  Historical Provider   omeprazole (PRILOSEC) 40 MG capsule Take 40 mg by mouth once daily. 4/17/22   Historical  "Provider   ondansetron (ZOFRAN-ODT) 4 MG TbDL Take 2 tablets (8 mg total) by mouth 2 (two) times daily. 22   Franco Brown MD   propranoloL (INDERAL LA) 60 MG 24 hr capsule Take 1 capsule (60 mg total) by mouth once daily. 23  Sol Aguirre NP   sod sulf-pot chloride-mag sulf (SUTAB) 1.479-0.188- 0.225 gram tablet Take 12 tablets by mouth once daily. Take according to package instructions with indicated amount of water. 23   Justin Pereira MD         Review of Systems:  Per HPI       Objective:   Last 24 Hour Vital Signs:  BP  Min: 95/67  Max: 97/60  Temp  Av.5 °F (36.9 °C)  Min: 98.5 °F (36.9 °C)  Max: 98.5 °F (36.9 °C)  Pulse  Av.3  Min: 71  Max: 89  Resp  Av.8  Min: 18  Max: 22  SpO2  Av %  Min: 95 %  Max: 99 %  Height  Av' 11" (149.9 cm)  Min: 4' 11" (149.9 cm)  Max: 4' 11" (149.9 cm)  Weight  Av.4 kg (144 lb 2.9 oz)  Min: 65.4 kg (144 lb 2.9 oz)  Max: 65.4 kg (144 lb 2.9 oz)  Body mass index is 29.12 kg/m².  No intake/output data recorded.    Physical Examination:  General: Alert and oriented, No acute distress.  Sitting up on the side of bed eating some crackers and drinking water  HEENT: Normocephalic, Oral mucosa is moist. Pupils are equal, round and reactive to light, Extraocular movements are intact, anicteric sclera  Neck: Supple, Non-tender, No lymphadenopathy, No thyromegaly.   Respiratory: Lungs are clear to auscultation, Respirations are non-labored, Breath sounds are equal, Symmetrical chest wall expansion.   Cardiovascular: Normal rate, Regular rhythm, No edema, brisk capillary refill.   Gastrointestinal: Soft, non-distended, hyperactive bowel sounds, tenderness in left lower quadrant, no guarding or rebound.   Musculoskeletal: Symmetric movement of all extremities with no weakness appreciated. PICC line in RUE  Integumentary: Warm, dry, intact.   Neurologic: No focal deficits, Cranial Nerves II-XII are grossly intact.   Cognition and " Speech: Oriented, Speech clear and coherent.   Psychiatric: Cooperative, Appropriate mood & affect.       Laboratory:  Labs  Lab Results   Component Value Date    WBC 4.5 03/07/2023    HGB 13.2 03/07/2023    HCT 39.6 03/07/2023    MCV 90.6 03/07/2023     03/07/2023       Lab Results   Component Value Date     03/07/2023    K 4.3 03/07/2023     02/19/2023    CO2 27 03/07/2023    BUN 8.4 (L) 03/07/2023    CREATININE 0.73 03/07/2023    CALCIUM 10.2 03/07/2023    ANIONGAP 14 02/19/2023    EGFRNORACEVR >60 03/07/2023       Microbiology Data:  Microbiology Results (last 7 days)       Procedure Component Value Units Date/Time    Clostridium Diff Toxin, A & B, EIA [[id number]]     Order Status: Sent Specimen: Stool            Radiology:  Imaging Results              CT Abdomen Pelvis With Contrast (Preliminary result)  Result time 03/08/23 02:02:40      Preliminary result by Kevin Barreto Jr., MD (03/08/23 02:02:40)                   Narrative:    START OF REPORT:  TECHNIQUE: CT OF THE ABDOMEN AND PELVIS WAS PERFORMED WITH AXIAL IMAGES AS WELL AS SAGITTAL AND CORONAL RECONSTRUCTION IMAGES WITH INTRAVENOUS CONTRAST.    COMPARISON: COMPARISON IS WITH STUDY DATED 2022-12-25 05:57:51.    CLINICAL HISTORY: ABDOMINAL PAIN DIARRHEA (PT C/O DIARRHEA,ABDOMINAL PAIN AND DIZZINESS X 2 DAYS. PT REPORTS RECENTLY BEING TREATED FOR E.COLI PT HAS EXTENSIVE HISTORY WITH STOMACH PROBLEMS.    DOSAGE INFORMATION: AUTOMATED EXPOSURE CONTROL WAS UTILIZED.    Findings:  Thorax:  Lungs: There is nonspecific dependent change at the lung bases.  Pleura: No effusions or thickening.  Heart: The heart size is within normal limits.  Abdomen:  Abdominal Wall: There is a small umbilical hernia which contains mesenteric fat.  Liver: The liver appears unremarkable.  Biliary System: No intrahepatic or extrahepatic biliary duct dilatation is seen.  Gallbladder: The gallbladder appears unremarkable.  Pancreas: The pancreas appears  unremarkable.  Spleen: The spleen appears unremarkable.  Adrenals: The adrenal glands appear unremarkable.  Kidneys: The kidneys appear unremarkable with no stones cysts masses or hydronephrosis.  Aorta: The abdominal aorta appears unremarkable.  IVC: Unremarkable.  Bowel:  Esophagus: The visualized esophagus appears unremarkable.  Stomach: The stomach appears unremarkable.  Duodenum: Unremarkable appearing duodenum.  Small Bowel: The small bowel appears unremarkable.  Colon: Multiple diverticula are seen. There is mild wall thickening of the proximal sigmoid colon (series 2, image 59) with subtle surrounding fat stranding. Similar findings are also seen on the prior examination. These findings reflect recurrent diverticulitis. No perforation or abscess is identified.  Appendix: The appendix appears unremarkable (series 2, images 55-58).  Peritoneum: No intraperitoneal free air or ascites is seen.    Pelvis:  Bladder: The bladder appears unremarkable.  Female:  Uterus: Again seen is a subserosal fibroid in the anterior myometrium (series 2 and 8, image 70), which measures 2.5 x 2.7 x 1.7 cm, unchanged since the prior examination.  Ovaries: The ovaries appear unremarkable. No adnexal masses are seen.    Bony structures:  Dorsal Spine: Mild degenerative changes are present at L5-S1.  Bony Pelvis: The visualized bony structures of the pelvis appear unremarkable.      Impression:  1. Multiple diverticula are seen. There is mild wall thickening of the proximal sigmoid colon (series 2, image 59) with subtle surrounding fat stranding. Similar findings are also seen on the prior examination. These findings reflect recurrent diverticulitis. No perforation or abscess is identified. Correlate clinically as regards further evaluation and followup.  2. Details and findings as discussed.                          Preliminary result by Interface, Rad Results In (03/08/23 02:02:40)                   Narrative:    START OF  REPORT:  TECHNIQUE: CT OF THE ABDOMEN AND PELVIS WAS PERFORMED WITH AXIAL IMAGES AS WELL AS SAGITTAL AND CORONAL RECONSTRUCTION IMAGES WITH INTRAVENOUS CONTRAST.    COMPARISON: COMPARISON IS WITH STUDY DATED 2022-12-25 05:57:51.    CLINICAL HISTORY: ABDOMINAL PAIN DIARRHEA (PT C/O DIARRHEA,ABDOMINAL PAIN AND DIZZINESS X 2 DAYS. PT REPORTS RECENTLY BEING TREATED FOR E.COLI PT HAS EXTENSIVE HISTORY WITH STOMACH PROBLEMS.    DOSAGE INFORMATION: AUTOMATED EXPOSURE CONTROL WAS UTILIZED.    Findings:  Thorax:  Lungs: There is nonspecific dependent change at the lung bases.  Pleura: No effusions or thickening.  Heart: The heart size is within normal limits.  Abdomen:  Abdominal Wall: There is a small umbilical hernia which contains mesenteric fat.  Liver: The liver appears unremarkable.  Biliary System: No intrahepatic or extrahepatic biliary duct dilatation is seen.  Gallbladder: The gallbladder appears unremarkable.  Pancreas: The pancreas appears unremarkable.  Spleen: The spleen appears unremarkable.  Adrenals: The adrenal glands appear unremarkable.  Kidneys: The kidneys appear unremarkable with no stones cysts masses or hydronephrosis.  Aorta: The abdominal aorta appears unremarkable.  IVC: Unremarkable.  Bowel:  Esophagus: The visualized esophagus appears unremarkable.  Stomach: The stomach appears unremarkable.  Duodenum: Unremarkable appearing duodenum.  Small Bowel: The small bowel appears unremarkable.  Colon: Multiple diverticula are seen. There is mild wall thickening of the proximal sigmoid colon (series 2, image 59) with subtle surrounding fat stranding. Similar findings are also seen on the prior examination. These findings reflect recurrent diverticulitis. No perforation or abscess is identified.  Appendix: The appendix appears unremarkable (series 2, images 55-58).  Peritoneum: No intraperitoneal free air or ascites is seen.    Pelvis:  Bladder: The bladder appears unremarkable.  Female:  Uterus: Again  seen is a subserosal fibroid in the anterior myometrium (series 2 and 8, image 70), which measures 2.5 x 2.7 x 1.7 cm, unchanged since the prior examination.  Ovaries: The ovaries appear unremarkable. No adnexal masses are seen.    Bony structures:  Dorsal Spine: Mild degenerative changes are present at L5-S1.  Bony Pelvis: The visualized bony structures of the pelvis appear unremarkable.      Impression:  1. Multiple diverticula are seen. There is mild wall thickening of the proximal sigmoid colon (series 2, image 59) with subtle surrounding fat stranding. Similar findings are also seen on the prior examination. These findings reflect recurrent diverticulitis. No perforation or abscess is identified. Correlate clinically as regards further evaluation and followup.  2. Details and findings as discussed.                                             Assessment & Plan:     Acute diverticulitis  Patient is tolerating p.o. intake, afebrile and hemodynamically stable.  Labs revealed no leukocytosis.  She is nontoxic-appearing.  CT scan consistent with recurrent diverticulitis without perforation or abscess  Patient is stable for discharge as she has close outpatient follow  Recommend Cipro 500mg bid x 10days and Flagyl 500mg q8hr x 10days  Can give Bentyl for abdominal cramps and course of Norco for pain management.  Regular diet as tolerated  Follow up with PCP in 3 days  Advised patient to keep follow up with gastroenterologist on 03/14/2023 and colonoscopy on 04/14/2023  Patient verbalized understanding and agreement with plan      Case discussed with Attending, Dez Jones DO      Faculty addendum:     I have reviewed the patients history, residents  findings on physical examination, diagnosis and treatment plan. Care provided was reasonable and necessary.     08:19 am, 3/8/23:  Contacted by our head of GI, emergency room physician, not yet discharge patient, concern for persistent diverticulitis after official read  on CT abdomen pelvis showed that this was in fact persistent, inflammatory changes from previous episode diverticulitis with minimal resolution, however still no abscess formation.    Went to examined the patient personally in the emergency room, she is now nauseous, apparently had an episode of emesis as well prior to my arrival, and is having significant abdominal tenderness in her left lower quadrant, worse than when she had been seen by the residents earlier this morning.  Endorsed further information, and as it seems patient's symptoms have worsened since her last dose of meropenem on 03/02, had been going on for several days along with fevers.    PICC line still in place.  After deliberation with emergency room, and GI, will admit in reinitiate meropenem at this time, as patient may need a prolonged course given recurrence of her symptoms and positive ESBL on previous cultures.    Have contacted Medicine team on-call today, and they are in agreement to come evaluate patient in the emergency room.  Relayed this information to the ER.      Melisa Rodney MD  [facility] HO-2

## 2023-03-08 NOTE — CLINICAL REVIEW
Reviewed on 3/8/2023 by Ramonita MAN MD       Created Using Review Status Review Entered   Epic In Primary 3/8/2023 0949       Created By   Ramonita MAN MD       Criteria Set Name - Subset   PA review      Criteria Review   52-year-old female admitted on 03/07/2023 with left lower quadrant pain.  Past medical history includes diverticulosis.  Recent admission at an outlying facility for diverticulitis.  She had an abscess.  Underwent IR drainage of pericolonic abscess.  Positive for ESBL E coli.  She completed 7 days of anti-infective therapy on 03/02/2023.  She was admitted with an episode of acute diverticulitis.  Findings on imaging demonstrated recurrent diverticulitis.  Since admission, the patient has regressed, nauseated, emesis, lack of oral intake.  She has been restarted on broad-spectrum anti-infective therapy with meropenem.  She remains on aggressive intravenous hydration, NPO.     Based on the Inpatient & Surgical Care > Optimal Recovery Guidelines > Gastroenterology >Diverticulitis, Acute (M-150), admission is appropriate for Inability to maintain oral hydration (eg, needs IV fluid support) that persists despite observation care.       I reached out to Dr. Dez Jones and Dr. Brown on 3/8/23 at 9:40 AM CT who agreed to IP LOC     [facility]

## 2023-03-08 NOTE — CARE UPDATE
"[facility] INTERNAL MEDICINE  ADMISSION HISTORY AND PHYSICAL  RESIDENT'S ATTESTATION    Resident Team: [facility] Medicine List 2  Attending Physician: Renita Brown MD    Date of Admit: 3/7/2023    SUBJECTIVE:     Note made in conjunction with Dr. Josee Gonzales, agree with assessment and plan as indicated in the history and physical. Of note, Niya Moeller is a 52 y.o. female with PMH of asthma, GERD, hyperthyroidism, nicotine dependence, history of diverticulosis with multiple flare-ups, presented to the ED with a CC of LLQ pain of 6 out of 10 that started 3 days ago; pain is intermittent, dull, achy, and localized; it is aggravated by movement and coughing and partially relieved by pain medicine. Other associated symptoms include subjective fever, nausea, tenesmus, diarrhea. Patient states tolerating PO intake w/o vomiting and she had a loose bowel movement one time overnight.    Of note, this patient had multiple ED visits and hospitalizations; she was recently admitted at [facility] and found to have diverticulitis and abscess.  She is status post IR drainage of pericolonic abscess on 2/16/2023 which was culture positive for ESBL E. coli sensitive to ertapenem and meropenem.  She completed 7 days of IV ertapenem on 03/02/2023 and is scheduled to have a colonoscopy on 4/11/2023. Upon chart review, her repeat blood cultures on 2/19/2023 was negative.     ROS:   (+) LLQ pain, subjective fever, nausea, tenesmus, diarrhea  (-) Chest pain, SOB, palpitations, night sweat, chills, vomiting, constipation, dizziness       OBJECTIVE:     Vital signs:   BP (!) 120/100   Pulse 81   Temp 98.5 °F (36.9 °C) (Oral)   Resp (!) 22   Ht 4' 11" (1.499 m)   Wt 65.4 kg (144 lb 2.9 oz)   SpO2 96%   BMI 29.12 kg/m²      Physical Examination:  General: Well nourished; appears anxious  HEENT: NC/AT; PERRL; nasal and oral mucosa moist and clear  Pulm: CTA bilaterally, normal work of breathing on room air  CV: S1, S2 w/o murmurs or " gallops; no edema noted  GI: Soft with normal bowel sounds in all quadrants; patient was guarding during palpation  MSK: Full ROM of all extremities and spine w/o limitation or discomfort  Neuro: AAOx4; motor/sensory function intact  Psych: Anxious     ASSESSMENT & PLAN:     Pericolonic abscess s/p IR drainage on 2/16/2023  HX of diverticulosis with multiple flare-ups  Asthma  GERD  Hyperthyroidism  Anxiety  Active tobacco use     -Admit to inpatient unit for ongoing monitoring  -Will obtain blood cultures x2 and plan to initiate ABX therapy; C-diff studies on admission negative    Disposition (03/08/2023): Admitted to inpatient unit for ongoing monitoring. Discharge plan: Will be discharged home when medically stable.     Vincenzo Cisneros,    [employer] [facility], PGY-II

## 2023-03-08 NOTE — NURSING
Patient states she is unhappy with pain medication regimen and continues to state that she has Norco in her purse that she will take if the doctors do not prescribe medication to get her pain under control. Dr. Jaffe notified and seen at bedside educating patient on avoiding use of opioids as it is contraindicated with patient's diagnosis. Patient states she is being refused proper care and would like to leave. Patient refused to sign paperwork. Dr. Jaffe at bedside as witness.

## 2023-03-08 NOTE — H&P
[facility] History and Physical     Date of Admit: 3/7/2023    Chief Complaint     Abdominal Pain and Diarrhea (Pt c/o diarrhea,abdominal pain and dizziness x 2 days. Pt reports recently being treated for E.Coli Pt has extensive history with stomach problems. )     Subjective:      History of Present Illness:  Niya Moeller is a 52 y.o. female who with a history of asthma, GERD, hyperthyroidism, nicotine dependence, history of diverticulosis with multiple flare-ups who presented to [facility] ED on 3/7/2023  with a primary complaint of left lower quadrant abdominal pain the past 2 days.  Abdominal pain described as constant cramping, and feels like the beginning of previous diverticulitis flares. She reports associated with nausea without vomiting, constant urge to move her bowels but unable to. She also had a fever 2 days ago with a T-max of 101° 2.  Patient reports tolerating p.o. and drinking adequately.  She reports compliance with all her medications.  Denies new or unusual foods, headache, lightheaded, dizziness, chest pain, palpitations, shortness of breath, bloating, dysuria, constipation, bleeding from any sites.  Patient was recently admitted at [facility] and found to have diverticulitis and abscess.  She is status post IR drainage of pericolonic abscess on 2/16/2023 which was culture positive for ESBL E. coli sensitive to ertapenem and meropenem.  She completed 7 days of IV ertapenem on 03/02/2023.  She is scheduled to see her endocrinologist on 03/10/2023 and has an appointment with GI on 03/14/2023 with a scheduled colonoscopy on 04/14/2023. Her PCP is Sade Goodman NP.     In the ED afebrile, hemodynamically stable.  Labs unremarkable.   C diff pending. CT abdomen and pelvis obtained. Given DuoNeb x1, Toradol, morphine and Zofran. Internal medicine consulted for acute diverticulitis. Decision ultimately made to admit for acute diverticulitis given persistent fever in setting of  recent abscess.    Past Medical History:  Past Medical History:   Diagnosis Date    Asthma     Diverticulosis     GERD (gastroesophageal reflux disease)        Past Surgical History:  History reviewed. No pertinent surgical history.    Allergies:  Review of patient's allergies indicates:   Allergen Reactions    Latex        Home Medications:  Prior to Admission medications    Medication Sig Start Date End Date Taking? Authorizing Provider   albuterol (PROVENTIL/VENTOLIN HFA) 90 mcg/actuation inhaler Inhale 1-2 puffs into the lungs every 6 (six) hours as needed for Wheezing. Rescue 2/14/23 2/14/24  Sol Aguirre, YOLANDA   albuterol-ipratropium (DUO-NEB) 2.5 mg-0.5 mg/3 mL nebulizer solution Inhale 3 mLs into the lungs every 4 (four) hours as needed. 1/9/23   Historical Provider   budesonide-formoterol 160-4.5 mcg (SYMBICORT) 160-4.5 mcg/actuation HFAA Inhale 2 puffs into the lungs every 12 (twelve) hours. Controller 7/30/22 7/30/23  Alvaro Gaston, DO   cetirizine (ZYRTEC) 10 MG tablet Take 10 mg by mouth once daily. 11/14/22   Historical Provider   dicyclomine (BENTYL) 20 mg tablet Take 1 tablet (20 mg total) by mouth every 6 to 8 hours as needed (abdominal cramping). 3/8/23 3/18/23  Pedro Sagastume DO   fluticasone propionate (FLONASE) 50 mcg/actuation nasal spray 1 spray by Nasal route daily as needed. 11/23/22   Historical Provider   methIMAzole (TAPAZOLE) 10 MG Tab Take 1 tablet (10 mg total) by mouth 2 (two) times daily. 2/14/23 2/14/24  Slo Aguirer, NP   montelukast (SINGULAIR) 10 mg tablet Take 10 mg by mouth once daily. 10/17/22 10/17/23  Historical Provider   omeprazole (PRILOSEC) 40 MG capsule Take 1 capsule (40 mg total) by mouth once daily. 3/8/23 4/7/23  Pedro Sagastume DO   ondansetron (ZOFRAN-ODT) 4 MG TbDL Take 1 tablet (4 mg total) by mouth every 8 (eight) hours as needed. 3/8/23 3/18/23  Pedro Sagastume DO   propranoloL (INDERAL LA) 60 MG 24 hr capsule Take 1 capsule (60 mg total) by mouth once  daily. 2/14/23 2/14/24  Sol Aguirre NP   sod sulf-pot chloride-mag sulf (SUTAB) 1.479-0.188- 0.225 gram tablet Take 12 tablets by mouth once daily. Take according to package instructions with indicated amount of water. 2/17/23   Justin Pereira MD   dicyclomine (BENTYL) 20 mg tablet Take 1 tablet (20 mg total) by mouth every 6 to 8 hours as needed (abdominal cramping). 3/8/23 3/8/23  Pedro Sagastume DO   HYDROcodone-acetaminophen (NORCO) 7.5-325 mg per tablet Take 1 tablet by mouth every 6 (six) hours as needed for Pain. 2/20/23 3/8/23  Mai Becker MD   HYDROcodone-acetaminophen (NORCO) 7.5-325 mg per tablet Take 1 tablet by mouth every 6 (six) hours as needed for Pain. 3/8/23 3/8/23  Pedro Sagastume DO   HYDROcodone-acetaminophen (NORCO) 7.5-325 mg per tablet Take 1 tablet by mouth every 6 (six) hours as needed for Pain. 3/8/23 3/8/23  Pedro Sagastume DO   omeprazole (PRILOSEC) 40 MG capsule Take 40 mg by mouth once daily. 4/17/22 3/8/23  Historical Provider   omeprazole (PRILOSEC) 40 MG capsule Take 1 capsule (40 mg total) by mouth once daily. 3/8/23 3/8/23  Pedro Sagastume DO   ondansetron (ZOFRAN-ODT) 4 MG TbDL Take 2 tablets (8 mg total) by mouth 2 (two) times daily. 7/2/22 3/8/23  Franco Brown MD   ondansetron (ZOFRAN-ODT) 4 MG TbDL Take 1 tablet (4 mg total) by mouth every 8 (eight) hours as needed. 3/8/23 3/8/23  Pedro Sagastume DO       Family History:  History reviewed. No pertinent family history.    Social History:  Social History     Tobacco Use    Smoking status: Every Day     Packs/day: 1.00     Years: 40.00     Pack years: 40.00     Types: Cigarettes     Start date: 1983    Smokeless tobacco: Never    Tobacco comments:     Ambulatory referral to Smoking Cessation clinic following hospital discharge.    Substance Use Topics    Alcohol use: Not Currently    Drug use: Not Currently       Review of Systems:  Review of Systems   Constitutional: Negative.    HENT: Negative.     Eyes:  "Negative.    Respiratory: Negative.     Cardiovascular: Negative.    Gastrointestinal:  Positive for abdominal pain, diarrhea and nausea. Negative for blood in stool and vomiting.   Genitourinary: Negative.    Musculoskeletal: Negative.    Skin: Negative.    Neurological: Negative.             Objective:   Last 24 Hour Vital Signs:  Vitals  BP: (!) 120/100  Temp: 98.5 °F (36.9 °C)  Temp Source: Oral  Pulse: 81  Resp: (!) 22  SpO2: 96 %  Height: 4' 11" (149.9 cm)  Weight: 65.4 kg (144 lb 2.9 oz)    Physical Examination:  General: patient resting comfortably initially but became more expressive complaining of pain during interview, in no acute distress   HEENT: Head-normocephalic and atraumatic, EOMI, MMM  Respiratory: clear to auscultation bilaterally with mild expiratory wheezing  Cardiovascular: regular rate and rhythm without murmurs  Gastrointestinal: soft, non-distended, LLQ very tender to palpation with voluntary guarding prior to palpation  Musculoskeletal: no lower extremity edema, full range of motion of all extremities/spine without limitation or discomfort  Neurologic: alert and oriented, no signs of peripheral neurological deficit, motor/sensory function intact  Psychiatric: cognitive function intact, cooperative with exam      Laboratory:  Most Recent Data:  Most Recent Data:  CBC:   Lab Results   Component Value Date    WBC 4.5 03/07/2023    HGB 13.2 03/07/2023    HCT 39.6 03/07/2023     03/07/2023    MCV 90.6 03/07/2023    RDW 12.9 03/07/2023     WBC Differential:   Recent Labs   Lab 03/07/23  2318   WBC 4.5   HGB 13.2   HCT 39.6      MCV 90.6     BMP:   Lab Results   Component Value Date     03/07/2023    K 4.3 03/07/2023     02/19/2023    CO2 27 03/07/2023    BUN 8.4 (L) 03/07/2023    CREATININE 0.73 03/07/2023    GLU 97 02/19/2023    CALCIUM 10.2 03/07/2023    MG 1.90 03/07/2023    PHOS 3.3 03/31/2022     LFTs:   Lab Results   Component Value Date    PROT 7.5 02/19/2023    " ALBUMIN 3.1 (L) 03/07/2023    BILITOT 0.2 03/07/2023    AST 23 03/07/2023    ALKPHOS 97 03/07/2023    ALT 25 03/07/2023     Coags: No results found for: INR, PROTIME, PTT  FLP:   Lab Results   Component Value Date    CHOL 147 03/31/2022    HDL 51 03/31/2022    TRIG 61 03/31/2022     DM:   Lab Results   Component Value Date    HGBA1C 5.9 (H) 11/23/2022    HGBA1C 5.3 06/28/2022    HGBA1C 5.4 03/31/2022    CREATININE 0.73 03/07/2023     Thyroid:   Lab Results   Component Value Date    TSH <0.026 (L) 01/22/2023    FREET4 3.09 (H) 01/22/2023     Anemia: No results found for: IRON, TIBC, FERRITIN, [id number], FOLATE  Cardiac:   Lab Results   Component Value Date    TROPONINI <0.012 01/31/2023    BNP 56.3 12/25/2022     Urinalysis:   Lab Results   Component Value Date    COLORU Yellow 02/15/2023    PHUA 5.5 03/07/2023    SPECGRAV 1.010 02/15/2023    NITRITE Negative 02/15/2023    KETONESU Negative 02/15/2023    UROBILINOGEN 1+ (A) 03/07/2023    WBCUA 0-5 03/07/2023       Radiology:  Imaging Results              CT Abdomen Pelvis With Contrast (Final result)  Result time 03/08/23 06:22:40      Final result by Glen Celestin MD (03/08/23 06:22:40)                   Impression:      Minimal persistent inflammatory changes along the sigmoid with no drainable fluid collection today.  Drainage catheter has been removed.    No significant discrepancy with the preliminary report..      Electronically signed by: Glen Celestin  Date:    03/08/2023  Time:    06:22               Narrative:    EXAMINATION:  CT ABDOMEN PELVIS WITH CONTRAST    CLINICAL HISTORY:  LLQ abdominal pain;    TECHNIQUE:  Helical acquisition through the abdomen and pelvis with IV contrast.  Three plane reconstructions were provided for review.  mGycm. Automatic exposure control, adjustment of mA/kV or iterative reconstruction technique was used to reduce radiation.    COMPARISON:  20 February 2023    FINDINGS:  The limited imaged lung bases are  clear.    The liver, spleen, gallbladder, pancreas, adrenals and kidneys are within normal limits.    No bowel obstruction.  Normal appendix.  There is colonic diverticulosis with minimal inflammation along the sigmoid.  No drainable fluid collection.  The drainage catheter has been removed.    Urinary bladder is unremarkable. No free fluid. Aorta normal in caliber.    Mild degenerative change of the spine                        Preliminary result by Glen Celestin MD (03/08/23 02:02:40)                   Narrative:    START OF REPORT:  TECHNIQUE: CT OF THE ABDOMEN AND PELVIS WAS PERFORMED WITH AXIAL IMAGES AS WELL AS SAGITTAL AND CORONAL RECONSTRUCTION IMAGES WITH INTRAVENOUS CONTRAST.    COMPARISON: COMPARISON IS WITH STUDY DATED 2022-12-25 05:57:51.    CLINICAL HISTORY: ABDOMINAL PAIN DIARRHEA (PT C/O DIARRHEA,ABDOMINAL PAIN AND DIZZINESS X 2 DAYS. PT REPORTS RECENTLY BEING TREATED FOR E.COLI PT HAS EXTENSIVE HISTORY WITH STOMACH PROBLEMS.    DOSAGE INFORMATION: AUTOMATED EXPOSURE CONTROL WAS UTILIZED.    Findings:  Thorax:  Lungs: There is nonspecific dependent change at the lung bases.  Pleura: No effusions or thickening.  Heart: The heart size is within normal limits.  Abdomen:  Abdominal Wall: There is a small umbilical hernia which contains mesenteric fat.  Liver: The liver appears unremarkable.  Biliary System: No intrahepatic or extrahepatic biliary duct dilatation is seen.  Gallbladder: The gallbladder appears unremarkable.  Pancreas: The pancreas appears unremarkable.  Spleen: The spleen appears unremarkable.  Adrenals: The adrenal glands appear unremarkable.  Kidneys: The kidneys appear unremarkable with no stones cysts masses or hydronephrosis.  Aorta: The abdominal aorta appears unremarkable.  IVC: Unremarkable.  Bowel:  Esophagus: The visualized esophagus appears unremarkable.  Stomach: The stomach appears unremarkable.  Duodenum: Unremarkable appearing duodenum.  Small Bowel: The small bowel  appears unremarkable.  Colon: Multiple diverticula are seen. There is mild wall thickening of the proximal sigmoid colon (series 2, image 59) with subtle surrounding fat stranding. Similar findings are also seen on the prior examination. These findings reflect recurrent diverticulitis. No perforation or abscess is identified.  Appendix: The appendix appears unremarkable (series 2, images 55-58).  Peritoneum: No intraperitoneal free air or ascites is seen.    Pelvis:  Bladder: The bladder appears unremarkable.  Female:  Uterus: Again seen is a subserosal fibroid in the anterior myometrium (series 2 and 8, image 70), which measures 2.5 x 2.7 x 1.7 cm, unchanged since the prior examination.  Ovaries: The ovaries appear unremarkable. No adnexal masses are seen.    Bony structures:  Dorsal Spine: Mild degenerative changes are present at L5-S1.  Bony Pelvis: The visualized bony structures of the pelvis appear unremarkable.      Impression:  1. Multiple diverticula are seen. There is mild wall thickening of the proximal sigmoid colon (series 2, image 59) with subtle surrounding fat stranding. Similar findings are also seen on the prior examination. These findings reflect recurrent diverticulitis. No perforation or abscess is identified. Correlate clinically as regards further evaluation and followup.  2. Details and findings as discussed.                          Preliminary result by Kevin Barreto Jr., MD (03/08/23 02:02:40)                   Narrative:    START OF REPORT:  TECHNIQUE: CT OF THE ABDOMEN AND PELVIS WAS PERFORMED WITH AXIAL IMAGES AS WELL AS SAGITTAL AND CORONAL RECONSTRUCTION IMAGES WITH INTRAVENOUS CONTRAST.    COMPARISON: COMPARISON IS WITH STUDY DATED 2022-12-25 05:57:51.    CLINICAL HISTORY: ABDOMINAL PAIN DIARRHEA (PT C/O DIARRHEA,ABDOMINAL PAIN AND DIZZINESS X 2 DAYS. PT REPORTS RECENTLY BEING TREATED FOR E.COLI PT HAS EXTENSIVE HISTORY WITH STOMACH PROBLEMS.    DOSAGE INFORMATION: AUTOMATED  EXPOSURE CONTROL WAS UTILIZED.    Findings:  Thorax:  Lungs: There is nonspecific dependent change at the lung bases.  Pleura: No effusions or thickening.  Heart: The heart size is within normal limits.  Abdomen:  Abdominal Wall: There is a small umbilical hernia which contains mesenteric fat.  Liver: The liver appears unremarkable.  Biliary System: No intrahepatic or extrahepatic biliary duct dilatation is seen.  Gallbladder: The gallbladder appears unremarkable.  Pancreas: The pancreas appears unremarkable.  Spleen: The spleen appears unremarkable.  Adrenals: The adrenal glands appear unremarkable.  Kidneys: The kidneys appear unremarkable with no stones cysts masses or hydronephrosis.  Aorta: The abdominal aorta appears unremarkable.  IVC: Unremarkable.  Bowel:  Esophagus: The visualized esophagus appears unremarkable.  Stomach: The stomach appears unremarkable.  Duodenum: Unremarkable appearing duodenum.  Small Bowel: The small bowel appears unremarkable.  Colon: Multiple diverticula are seen. There is mild wall thickening of the proximal sigmoid colon (series 2, image 59) with subtle surrounding fat stranding. Similar findings are also seen on the prior examination. These findings reflect recurrent diverticulitis. No perforation or abscess is identified.  Appendix: The appendix appears unremarkable (series 2, images 55-58).  Peritoneum: No intraperitoneal free air or ascites is seen.    Pelvis:  Bladder: The bladder appears unremarkable.  Female:  Uterus: Again seen is a subserosal fibroid in the anterior myometrium (series 2 and 8, image 70), which measures 2.5 x 2.7 x 1.7 cm, unchanged since the prior examination.  Ovaries: The ovaries appear unremarkable. No adnexal masses are seen.    Bony structures:  Dorsal Spine: Mild degenerative changes are present at L5-S1.  Bony Pelvis: The visualized bony structures of the pelvis appear unremarkable.      Impression:  1. Multiple diverticula are seen. There is mild  wall thickening of the proximal sigmoid colon (series 2, image 59) with subtle surrounding fat stranding. Similar findings are also seen on the prior examination. These findings reflect recurrent diverticulitis. No perforation or abscess is identified. Correlate clinically as regards further evaluation and followup.  2. Details and findings as discussed.                                             Assessment & Plan:     Acute diverticulitis  -H/o of recent hospitalization for diverticulitis complicated by ESBL positive abscess which was drained s/p drain removal, treated with 7 days of Merrem, repeat blood cultures negative on 2/19/2023  -CT Abdomen Pelvis 3/8: inflammatory changes along sigmoid without drainable fluid collected  -C Diff negative  -Follow up blood cultures  -NPO, IVF, pain control    Asthma  -PRN albuterol inhaler for wheezing    Hyperthyroidism  -Hold propranolol in setting of hypotension  -Continue methimazole       CODE STATUS: Full Code  Access: Peripheral  Antibiotics: Merrem  Diet: NPO  DVT Prophylaxis: Lovenox  GI Prophylaxis: None  Fluids:  ml/hr.     Dispo: admit to internal medicine for observation      Josee Gonzales MD  [facility]ospit[facility], -  3/8/2023

## 2023-03-08 NOTE — FIRST PROVIDER EVALUATION
"Medical screening examination initiated.  I have conducted a focused provider triage encounter, findings are as follows:    Brief history of present illness:  51 yo female presents to ED for evaluation of diverticulitis. Patient reports to having increasing pain. Patient reports has been treated for the past week with IV antibiotics through PICC line. Patient seen at [facility] yesterday and admitted to hospital. States left due to no receiving any pain medication other than toradol.     Vitals:    03/08/23 1401   Pulse: 88   Resp: 19   Temp: 98.3 °F (36.8 °C)   SpO2: 96%   Weight: 61.2 kg (135 lb)   Height: 4' 11" (1.499 m)       Pertinent physical exam:  Patient is awake and alert and oriented.  Sitting in wheelchair.     Brief workup plan:  Labs    Preliminary workup initiated; this workup will be continued and followed by the physician or advanced practice provider that is assigned to the patient when roomed.  "

## 2023-03-09 LAB
ABS NEUT (OLG): 1.43 X10(3)/MCL (ref 2.1–9.2)
ALBUMIN SERPL-MCNC: 2.7 G/DL (ref 3.5–5)
ALBUMIN/GLOB SERPL: 0.9 RATIO (ref 1.1–2)
ALP SERPL-CCNC: 81 UNIT/L (ref 40–150)
ALT SERPL-CCNC: 24 UNIT/L (ref 0–55)
AST SERPL-CCNC: 18 UNIT/L (ref 5–34)
BASOPHILS # BLD AUTO: 0.01 X10(3)/MCL (ref 0–0.2)
BASOPHILS NFR BLD AUTO: 0.2 %
BILIRUBIN DIRECT+TOT PNL SERPL-MCNC: 0.4 MG/DL
BUN SERPL-MCNC: 8.2 MG/DL (ref 9.8–20.1)
CALCIUM SERPL-MCNC: 8.8 MG/DL (ref 8.4–10.2)
CHLORIDE SERPL-SCNC: 108 MMOL/L (ref 98–107)
CO2 SERPL-SCNC: 26 MMOL/L (ref 22–29)
CREAT SERPL-MCNC: 0.71 MG/DL (ref 0.55–1.02)
CRP SERPL-MCNC: 43.8 MG/L
EOSINOPHIL # BLD AUTO: 0.36 X10(3)/MCL (ref 0–0.9)
EOSINOPHIL NFR BLD AUTO: 7.8 %
EOSINOPHIL NFR BLD MANUAL: 0.23 X10(3)/MCL (ref 0–0.9)
EOSINOPHIL NFR BLD MANUAL: 5 %
ERYTHROCYTE [DISTWIDTH] IN BLOOD BY AUTOMATED COUNT: 13.1 % (ref 11.5–17)
GFR SERPLBLD CREATININE-BSD FMLA CKD-EPI: >60 MLS/MIN/1.73/M2
GLOBULIN SER-MCNC: 3 GM/DL (ref 2.4–3.5)
GLUCOSE SERPL-MCNC: 100 MG/DL (ref 74–100)
HCT VFR BLD AUTO: 36.3 % (ref 37–47)
HGB BLD-MCNC: 11.7 G/DL (ref 12–16)
IMM GRANULOCYTES # BLD AUTO: 0.01 X10(3)/MCL (ref 0–0.04)
IMM GRANULOCYTES NFR BLD AUTO: 0.2 %
INSTRUMENT WBC (OLG): 4.6 X10(3)/MCL
LACTATE SERPL-SCNC: 0.7 MMOL/L (ref 0.5–2.2)
LYMPHOCYTES # BLD AUTO: 2.28 X10(3)/MCL (ref 0.6–4.6)
LYMPHOCYTES NFR BLD AUTO: 49.1 %
LYMPHOCYTES NFR BLD MANUAL: 2.25 X10(3)/MCL
LYMPHOCYTES NFR BLD MANUAL: 49 %
MCH RBC QN AUTO: 29.4 PG
MCHC RBC AUTO-ENTMCNC: 32.2 G/DL (ref 33–36)
MCV RBC AUTO: 91.2 FL (ref 80–94)
MONOCYTES # BLD AUTO: 0.85 X10(3)/MCL (ref 0.1–1.3)
MONOCYTES NFR BLD AUTO: 18.3 %
MONOCYTES NFR BLD MANUAL: 0.74 X10(3)/MCL (ref 0.1–1.3)
MONOCYTES NFR BLD MANUAL: 16 %
NEUTROPHILS # BLD AUTO: 1.13 X10(3)/MCL (ref 2.1–9.2)
NEUTROPHILS NFR BLD MANUAL: 31 %
NRBC BLD AUTO-RTO: 0 %
PLATELET # BLD AUTO: 151 X10(3)/MCL (ref 130–400)
PLATELET # BLD EST: NORMAL 10*3/UL
PMV BLD AUTO: 10.9 FL (ref 7.4–10.4)
POTASSIUM SERPL-SCNC: 4 MMOL/L (ref 3.5–5.1)
PROT SERPL-MCNC: 5.7 GM/DL (ref 6.4–8.3)
RBC # BLD AUTO: 3.98 X10(6)/MCL (ref 4.2–5.4)
RBC MORPH BLD: NORMAL
SODIUM SERPL-SCNC: 142 MMOL/L (ref 136–145)
WBC # SPEC AUTO: 4.6 X10(3)/MCL (ref 4.5–11.5)

## 2023-03-09 PROCEDURE — 25000242 PHARM REV CODE 250 ALT 637 W/ HCPCS: Performed by: NURSE PRACTITIONER

## 2023-03-09 PROCEDURE — 94640 AIRWAY INHALATION TREATMENT: CPT

## 2023-03-09 PROCEDURE — 25000003 PHARM REV CODE 250: Performed by: NURSE PRACTITIONER

## 2023-03-09 PROCEDURE — 63600175 PHARM REV CODE 636 W HCPCS: Performed by: NURSE PRACTITIONER

## 2023-03-09 PROCEDURE — 94761 N-INVAS EAR/PLS OXIMETRY MLT: CPT

## 2023-03-09 PROCEDURE — 85027 COMPLETE CBC AUTOMATED: CPT | Performed by: INTERNAL MEDICINE

## 2023-03-09 PROCEDURE — 86140 C-REACTIVE PROTEIN: CPT | Performed by: GENERAL PRACTICE

## 2023-03-09 PROCEDURE — 85025 COMPLETE CBC W/AUTO DIFF WBC: CPT | Performed by: INTERNAL MEDICINE

## 2023-03-09 PROCEDURE — 99900035 HC TECH TIME PER 15 MIN (STAT)

## 2023-03-09 PROCEDURE — 25000003 PHARM REV CODE 250: Performed by: INTERNAL MEDICINE

## 2023-03-09 PROCEDURE — 80053 COMPREHEN METABOLIC PANEL: CPT | Performed by: INTERNAL MEDICINE

## 2023-03-09 PROCEDURE — 63600175 PHARM REV CODE 636 W HCPCS: Performed by: INTERNAL MEDICINE

## 2023-03-09 PROCEDURE — 11000001 HC ACUTE MED/SURG PRIVATE ROOM

## 2023-03-09 PROCEDURE — 99223 1ST HOSP IP/OBS HIGH 75: CPT | Mod: FS,,, | Performed by: GENERAL PRACTICE

## 2023-03-09 PROCEDURE — 99223 PR INITIAL HOSPITAL CARE,LEVL III: ICD-10-PCS | Mod: FS,,, | Performed by: GENERAL PRACTICE

## 2023-03-09 PROCEDURE — 83605 ASSAY OF LACTIC ACID: CPT | Performed by: INTERNAL MEDICINE

## 2023-03-09 PROCEDURE — 25000003 PHARM REV CODE 250: Performed by: GENERAL PRACTICE

## 2023-03-09 PROCEDURE — 63600175 PHARM REV CODE 636 W HCPCS: Performed by: GENERAL PRACTICE

## 2023-03-09 RX ORDER — PREDNISONE 20 MG/1
60 TABLET ORAL DAILY
Status: DISCONTINUED | OUTPATIENT
Start: 2023-03-09 | End: 2023-03-12

## 2023-03-09 RX ORDER — PROPRANOLOL HYDROCHLORIDE 20 MG/1
20 TABLET ORAL 3 TIMES DAILY
Status: DISCONTINUED | OUTPATIENT
Start: 2023-03-09 | End: 2023-03-09

## 2023-03-09 RX ORDER — IPRATROPIUM BROMIDE AND ALBUTEROL SULFATE 2.5; .5 MG/3ML; MG/3ML
3 SOLUTION RESPIRATORY (INHALATION) EVERY 6 HOURS PRN
Status: DISCONTINUED | OUTPATIENT
Start: 2023-03-09 | End: 2023-03-15 | Stop reason: HOSPADM

## 2023-03-09 RX ORDER — SODIUM CHLORIDE, SODIUM LACTATE, POTASSIUM CHLORIDE, CALCIUM CHLORIDE 600; 310; 30; 20 MG/100ML; MG/100ML; MG/100ML; MG/100ML
INJECTION, SOLUTION INTRAVENOUS CONTINUOUS
Status: DISCONTINUED | OUTPATIENT
Start: 2023-03-09 | End: 2023-03-10

## 2023-03-09 RX ADMIN — OXYCODONE HYDROCHLORIDE 5 MG: 5 TABLET ORAL at 08:03

## 2023-03-09 RX ADMIN — SODIUM CHLORIDE, POTASSIUM CHLORIDE, SODIUM LACTATE AND CALCIUM CHLORIDE: 600; 310; 30; 20 INJECTION, SOLUTION INTRAVENOUS at 09:03

## 2023-03-09 RX ADMIN — IPRATROPIUM BROMIDE AND ALBUTEROL SULFATE 3 ML: 2.5; .5 SOLUTION RESPIRATORY (INHALATION) at 07:03

## 2023-03-09 RX ADMIN — MEROPENEM 1 G: 1 INJECTION, POWDER, FOR SOLUTION INTRAVENOUS at 09:03

## 2023-03-09 RX ADMIN — MEROPENEM 2 G: 1 INJECTION, POWDER, FOR SOLUTION INTRAVENOUS at 08:03

## 2023-03-09 RX ADMIN — IPRATROPIUM BROMIDE AND ALBUTEROL SULFATE 3 ML: 2.5; .5 SOLUTION RESPIRATORY (INHALATION) at 02:03

## 2023-03-09 RX ADMIN — OXYCODONE HYDROCHLORIDE 5 MG: 5 TABLET ORAL at 01:03

## 2023-03-09 RX ADMIN — IPRATROPIUM BROMIDE AND ALBUTEROL SULFATE 3 ML: 2.5; .5 SOLUTION RESPIRATORY (INHALATION) at 12:03

## 2023-03-09 RX ADMIN — OXYCODONE HYDROCHLORIDE 5 MG: 5 TABLET ORAL at 10:03

## 2023-03-09 RX ADMIN — METHIMAZOLE 10 MG: 10 TABLET ORAL at 08:03

## 2023-03-09 RX ADMIN — PREDNISONE 60 MG: 20 TABLET ORAL at 08:03

## 2023-03-09 RX ADMIN — VANCOMYCIN HYDROCHLORIDE 1250 MG: 1.25 INJECTION, POWDER, LYOPHILIZED, FOR SOLUTION INTRAVENOUS at 11:03

## 2023-03-09 RX ADMIN — OXYCODONE HYDROCHLORIDE 5 MG: 5 TABLET ORAL at 06:03

## 2023-03-09 NOTE — NURSING
Nurses Note -- 4 Eyes      3/9/2023   6:01 AM      Skin assessed during: Admit      [x] No Pressure Injuries Present    []Prevention Measures Documented      [] Yes- Altered Skin Integrity Present or Discovered   [] LDA Added if Not in Epic (Describe Wound)   [] New Altered Skin Integrity was Present on Admit and Documented in LDA   [] Wound Image Taken    Wound Care Consulted? No    Attending Nurse:  ORION Headley RN/Staff Member:  Margot Lepe RN

## 2023-03-09 NOTE — PLAN OF CARE
03/09/23 0933   Discharge Assessment   Assessment Type Discharge Planning Assessment   Confirmed/corrected address, phone number and insurance Yes   Confirmed Demographics Correct on Facesheet   Source of Information patient   When was your last doctors appointment?   (last appointment was 2 weeks ago)   Reason For Admission diverticulitis   People in Home alone   Do you expect to return to your current living situation? Yes   Do you have help at home or someone to help you manage your care at home? No   Current cognitive status: Alert/Oriented;No Deficits   Equipment Currently Used at Home nebulizer   Do you currently have service(s) that help you manage your care at home? No   Who is going to help you get home at discharge? Pt states she plans to drive herself home   How do you get to doctors appointments? car, drives self   Are you on dialysis? No   Do you take coumadin? No   Discharge Plan A Home   Discharge Plan B Home Health   Discharge Plan discussed with: Patient   Discharge Barriers Identified None     Pt states she lives with alone in a single level home with no steps to enter. Pt states she is independent with ADL's prior to admission. She is able to drive. She states she completed IV antibiotics with [facility] approximately 1 week ago. She still has the PICC line. She states she was going to [facility] for dressing changes and lab draws. She has now developed new symptoms. Her pharmacy is [facility] at [address] [place] [address]. Will follow for discharge needs.

## 2023-03-09 NOTE — PROGRESS NOTES
Pharmacokinetic Initial Assessment: IV Vancomycin    Assessment/Plan:    Initiate intravenous vancomycin with loading dose of 1250 mg once followed by a maintenance dose of vancomycin 750 mg IV every 12 hours  Desired empiric serum trough concentration is 15 to 20 mcg/mL  Draw vancomycin trough level 60 min prior to fourth dose on 03/10 at approximately 2100  Pharmacy will continue to follow and monitor vancomycin.      Please contact pharmacy at extension [phone] with any questions regarding this assessment.     Thank you for the consult,   Jake Munoz, PharmD       Patient brief summary:  Niya Moeller is a 52 y.o. female initiated on antimicrobial therapy with IV Vancomycin for treatment of suspected sepsis    Drug Allergies:   Review of patient's allergies indicates:   Allergen Reactions    Latex        Actual Body Weight:   67.8 kg    Renal Function:   Estimated Creatinine Clearance: 80.7 mL/min (based on SCr of 0.71 mg/dL).,     Dialysis Method (if applicable):  N/A    CBC (last 72 hours):  Recent Labs   Lab Result Units 03/07/23 2318 03/08/23  1453 03/09/23  0434   WBC x10(3)/mcL 4.5 7.4 4.6   Hgb g/dL 13.2 12.8 11.7*   Hct % 39.6 39.9 36.3*   Platelet x10(3)/mcL 184 177 151   Mono % % 20.8 12.7 18.3   Eos % % 5.8 3.8 7.8   Basophil % % 0.2 0.1 0.2       Metabolic Panel (last 72 hours):  Recent Labs   Lab Result Units 03/07/23 2218 03/07/23 2318 03/08/23  1454 03/09/23  0434   Sodium Level mmol/L  --  144 142 142   Potassium Level mmol/L  --  4.3 4.5 4.0   Chloride mmol/L  --  107 108* 108*   Carbon Dioxide mmol/L  --  27 24 26   Glucose Level mg/dL  --  104* 107* 100   Glucose, UA mg/dL Normal  --   --   --    Blood Urea Nitrogen mg/dL  --  8.4* 10.8 8.2*   Creatinine mg/dL  --  0.73 0.87 0.71   Albumin Level g/dL  --  3.1* 3.0* 2.7*   Bilirubin Total mg/dL  --  0.2 0.4 0.4   Alkaline Phosphatase unit/L  --  97 88 81   Aspartate Aminotransferase unit/L  --  23 24 18   Alanine Aminotransferase unit/L  --   25 25 24   Magnesium Level mg/dL  --  1.90  --   --        Drug levels (last 3 results):  No results for input(s): VANCOMYCINRA, VANCORANDOM, VANCOMYCINPE, VANCOPEAK, VANCOMYCINTR, VANCOTROUGH in the last 72 hours.    Microbiologic Results:  Microbiology Results (last 7 days)       Procedure Component Value Units Date/Time    Blood culture #2 **CANNOT BE ORDERED STAT** [[id number]]     Order Status: Sent Specimen: Blood     Blood Culture #1 **CANNOT BE ORDERED STAT** [[id number]] Collected: 03/08/23 1654    Order Status: Resulted Specimen: Blood from PICC Line Updated: 03/08/23 7840

## 2023-03-09 NOTE — PROGRESS NOTES
[facility]  Hospital Medicine Progress Note        Chief Complaint: Inpatient Follow-up for     HPI: 52 y.o. female who  PMH includes hypothyroidism, GERD, asthma, diverticulosis; presents to the ED at [facility] on 3/8/2023 with a primary complaint of Abdominal pain with associated nausea and diarrhea which started 3 days ago.  PT also reported dizziness. Patient reports she initially went to [facility] yesterday which she was admitted secondary to diverticulitis. .She reports she has had this in the past. She reported fever 2 days ago as well. Pt subsequently left [facility] AMA as it was reported she became upset as she was not getting narcotic pain medication. Pt presented here for further evaluation. Of note per [facility] provider: patient was recently admitted at [facility] and found to have diverticulitis and abscess.  She is status post IR drainage of pericolonic abscess on 2/16/2023 which was culture positive for ESBL E. coli sensitive to ertapenem and meropenem.  She completed 7 days of IV ertapenem on 03/02/2023.  She is scheduled to see her endocrinologist on 03/10/2023 and has an appointment with GI on 03/14/2023 with a scheduled colonoscopy on 04/14/2023.  CT of the abdomen and pelvis  with contrast demonstrated persistent inflammatory changes along the sigmoid with no drainable fluid collection today. Labs done unremarkable.   Initial VS BP  83/45 pulse 88 respirations  19 temperature 98.3° F O2 saturation 96% on room air.  Patient was given IV hydration which her blood pressure improved to 122/64. Pt is admitted to hospital medicine services for further management.     Interval Hx:   Patient seen and examined blood pressure better remained afebrile still complains of pain in the left lower quadrant  Objective/physical exam:  General: In no acute distress, afebrile  Chest: Clear to auscultation bilaterally  Heart: RRR, +S1, S2, no appreciable murmur  Abdomen: Soft, mild tenderness to  palpate in the left lower quadrant   MSK: Warm, no lower extremity edema, no clubbing or cyanosis  Neurologic: Alert and oriented x4,   VITAL SIGNS: 24 HRS MIN & MAX LAST   Temp  Min: 97.9 °F (36.6 °C)  Max: 98.8 °F (37.1 °C) 98.8 °F (37.1 °C)   BP  Min: 72/62  Max: 124/59 (!) 94/55     Pulse  Min: 83  Max: 106  85   Resp  Min: 18  Max: 21 18   SpO2  Min: 92 %  Max: 100 % (!) 94 %         Recent Labs   Lab 03/07/23 2318 03/08/23  1453 03/09/23  0434   WBC 4.5 7.4 4.6   RBC 4.37 4.34 3.98*   HGB 13.2 12.8 11.7*   HCT 39.6 39.9 36.3*   MCV 90.6 91.9 91.2   MCH 30.2 29.5 29.4   MCHC 33.3 32.1* 32.2*   RDW 12.9 13.1 13.1    177 151   MPV 10.9* 10.7* 10.9*       Recent Labs   Lab 03/07/23 2318 03/08/23  1454 03/09/23  0434    142 142   K 4.3 4.5 4.0   CO2 27 24 26   BUN 8.4* 10.8 8.2*   CREATININE 0.73 0.87 0.71   CALCIUM 10.2 9.3 8.8   MG 1.90  --   --    ALBUMIN 3.1* 3.0* 2.7*   ALKPHOS 97 88 81   ALT 25 25 24   AST 23 24 18   BILITOT 0.2 0.4 0.4          Microbiology Results (last 7 days)       Procedure Component Value Units Date/Time    Blood culture #2 **CANNOT BE ORDERED STAT** [[id number]]     Order Status: Sent Specimen: Blood     Blood Culture #1 **CANNOT BE ORDERED STAT** [[id number]] Collected: 03/08/23 1654    Order Status: Resulted Specimen: Blood from PICC Line Updated: 03/08/23 4201             See below for Radiology    Scheduled Med:   enoxaparin  40 mg Subcutaneous Daily    meropenem (MERREM) IVPB  1 g Intravenous Q8H    methIMAzole  10 mg Oral BID        Continuous Infusions:   lactated ringers          PRN Meds:  albuterol-ipratropium, melatonin, oxyCODONE, sodium chloride 0.9%       Assessment/Plan:  Hypotension   History of recurrent diverticulitis  Recent history of ESBL peridiverticular abscess abscess  status post IR drainage in February   COPD in mild exacerbation  GERD  Asthma   History of diverticulosis    Since patient was found to be hypotensive CT was ordered and that showed  still some residual inflammation so for now I will treated as diverticulitis   Started on meropenem blood cultures x2 ordered and that has been pending   Once blood cultures negative I think we can start deescalating the antibiotics  C diff was negative UA negative chest x-ray negative  Continue with pain control  Blood pressure slightly on the lower side but better than yesterday started on LR at 75 cc an hour  Started on prednisone and DuoNebs q.6 hours for mild COPD exacerbation    VTE prophylaxis: lovenox    Patient condition:  Stable/Fair/Guarded/ Serious/ Critical    Anticipated discharge and Disposition:         All diagnosis and differential diagnosis have been reviewed; assessment and plan has been documented; I have personally reviewed the labs and test results that are presently available; I have reviewed the patients medication list; I have reviewed the consulting providers response and recommendations. I have reviewed or attempted to review medical records based upon their availability    All of the patient's questions have been  addressed and answered. Patient's is agreeable to the above stated plan. I will continue to monitor closely and make adjustments to medical management as needed.  _____________________________________________________________________    Nutrition Status:    Radiology:  CT Abdomen Pelvis With Contrast  Narrative: EXAMINATION:  CT ABDOMEN PELVIS WITH CONTRAST    CLINICAL HISTORY:  LLQ abdominal pain;    TECHNIQUE:  Helical acquisition through the abdomen and pelvis with IV contrast.  Three plane reconstructions were provided for review.  mGycm. Automatic exposure control, adjustment of mA/kV or iterative reconstruction technique was used to reduce radiation.    COMPARISON:  20 February 2023    FINDINGS:  The limited imaged lung bases are clear.    The liver, spleen, gallbladder, pancreas, adrenals and kidneys are within normal limits.    No bowel obstruction.  Normal  appendix.  There is colonic diverticulosis with minimal inflammation along the sigmoid.  No drainable fluid collection.  The drainage catheter has been removed.    Urinary bladder is unremarkable. No free fluid. Aorta normal in caliber.    Mild degenerative change of the spine  Impression: Minimal persistent inflammatory changes along the sigmoid with no drainable fluid collection today.  Drainage catheter has been removed.    No significant discrepancy with the preliminary report..    Electronically signed by: Glen Celestin  Date:    03/08/2023  Time:    06:22      Anamaria Ruffin MD   03/09/2023

## 2023-03-09 NOTE — H&P
[facility]  Hospital Medicine History & Physical Examination       Patient Name: Niya Moeller  MRN: [id number]  Patient Class: IP- Inpatient   Admission Date: 3/8/2023   Admitting Physician: LOUIE Service   Length of Stay: 0  Attending Physician: Dr. Justin Hargrove  Primary Care Provider: YEHUDA Adamson  Face-to-Face encounter date: 03/08/2023  Code Status:Full code  Chief Complaint: Fever (C/o nausea, diarrhea, abdominal pain, and weakness that began 3 days ago. Pt has PICC line and states she was self infusing, she states the doctor only gave her Toradol q8 hours. Pt states she is a direct admit to [facility] for diverticulitis, however, the doctor would not Rx narcotics therefore she came here. )        Patient information was obtained from patient, patient's family, past medical records and ER records.     HISTORY OF PRESENT ILLNESS:   Niya Moeller is a 52 y.o. female who  PMH includes hypothyroidism, GERD, asthma, diverticulosis; presents to the ED at [facility] on 3/8/2023 with a primary complaint of Abdominal pain with associated nausea and diarrhea which started 3 days ago.  PT also reported dizziness. Patient reports she initially went to [facility] yesterday which she was admitted secondary to diverticulitis. .She reports she has had this in the past. She reported fever 2 days ago as well. Pt subsequently left [facility] AMA as it was reported she became upset as she was not getting narcotic pain medication. Pt presented here for further evaluation. Of note per [facility] provider: patient was recently admitted at [facility] and found to have diverticulitis and abscess.  She is status post IR drainage of pericolonic abscess on 2/16/2023 which was culture positive for ESBL E. coli sensitive to ertapenem and meropenem.  She completed 7 days of IV ertapenem on 03/02/2023.  She is scheduled to see her endocrinologist on 03/10/2023 and has an appointment with GI on 03/14/2023 with a  scheduled colonoscopy on 04/14/2023.  CT of the abdomen and pelvis  with contrast demonstrated persistent inflammatory changes along the sigmoid with no drainable fluid collection today. Labs done unremarkable.   Initial VS BP  83/45 pulse 88 respirations  19 temperature 98.3° F O2 saturation 96% on room air.  Patient was given IV hydration which her blood pressure improved to 122/64. Pt is admitted to hospital medicine services for further management.        PAST MEDICAL HISTORY:     Past Medical History:   Diagnosis Date    Asthma     Diverticulosis     GERD (gastroesophageal reflux disease)        PAST SURGICAL HISTORY:   History reviewed. No pertinent surgical history.    ALLERGIES:   Latex    FAMILY HISTORY:   Reviewed and negative    SOCIAL HISTORY:     Social History     Tobacco Use    Smoking status: Every Day     Packs/day: 0.50     Years: 40.00     Pack years: 20.00     Types: Cigarettes     Start date: 1983    Smokeless tobacco: Never    Tobacco comments:     Ambulatory referral to Smoking Cessation clinic following hospital discharge.    Substance Use Topics    Alcohol use: Not Currently        HOME MEDICATIONS:   As documented  Prior to Admission medications    Medication Sig Start Date End Date Taking? Authorizing Provider   albuterol (PROVENTIL/VENTOLIN HFA) 90 mcg/actuation inhaler Inhale 1-2 puffs into the lungs every 6 (six) hours as needed for Wheezing. Rescue 2/14/23 2/14/24 Yes Sol Aguirre NP   budesonide-formoterol 160-4.5 mcg (SYMBICORT) 160-4.5 mcg/actuation HFAA Inhale 2 puffs into the lungs every 12 (twelve) hours. Controller 7/30/22 7/30/23 Yes Alvaro Gaston DO   methIMAzole (TAPAZOLE) 10 MG Tab Take 1 tablet (10 mg total) by mouth 2 (two) times daily. 2/14/23 2/14/24 Yes Sol Aguirre NP   montelukast (SINGULAIR) 10 mg tablet Take 10 mg by mouth once daily. 10/17/22 10/17/23 Yes Historical Provider   omeprazole (PRILOSEC) 40 MG capsule Take 1 capsule (40 mg total) by mouth  once daily. 3/8/23 4/7/23 Yes Pedro Sagastume DO   propranoloL (INDERAL LA) 60 MG 24 hr capsule Take 1 capsule (60 mg total) by mouth once daily. 2/14/23 2/14/24 Yes Sol Aguirre NP   albuterol-ipratropium (DUO-NEB) 2.5 mg-0.5 mg/3 mL nebulizer solution Inhale 3 mLs into the lungs every 4 (four) hours as needed. 1/9/23   Historical Provider   cetirizine (ZYRTEC) 10 MG tablet Take 10 mg by mouth once daily. 11/14/22   Historical Provider   dicyclomine (BENTYL) 20 mg tablet Take 1 tablet (20 mg total) by mouth every 6 to 8 hours as needed (abdominal cramping). 3/8/23 3/18/23  Pedro Sagastume DO   fluticasone propionate (FLONASE) 50 mcg/actuation nasal spray 1 spray by Nasal route daily as needed. 11/23/22   Historical Provider   ondansetron (ZOFRAN-ODT) 4 MG TbDL Take 1 tablet (4 mg total) by mouth every 8 (eight) hours as needed. 3/8/23 3/18/23  Pedro Sagastume DO   sod sulf-pot chloride-mag sulf (SUTAB) 1.479-0.188- 0.225 gram tablet Take 12 tablets by mouth once daily. Take according to package instructions with indicated amount of water. 2/17/23   Justin Pereira MD   dicyclomine (BENTYL) 20 mg tablet Take 1 tablet (20 mg total) by mouth every 6 to 8 hours as needed (abdominal cramping). 3/8/23 3/8/23  Pedro Sagastume DO   HYDROcodone-acetaminophen (NORCO) 7.5-325 mg per tablet Take 1 tablet by mouth every 6 (six) hours as needed for Pain. 2/20/23 3/8/23  Mai Becker MD   HYDROcodone-acetaminophen (NORCO) 7.5-325 mg per tablet Take 1 tablet by mouth every 6 (six) hours as needed for Pain. 3/8/23 3/8/23  Pedro Sagastume DO   HYDROcodone-acetaminophen (NORCO) 7.5-325 mg per tablet Take 1 tablet by mouth every 6 (six) hours as needed for Pain. 3/8/23 3/8/23  Pedro Sagastume DO   omeprazole (PRILOSEC) 40 MG capsule Take 40 mg by mouth once daily. 4/17/22 3/8/23  Historical Provider   omeprazole (PRILOSEC) 40 MG capsule Take 1 capsule (40 mg total) by mouth once daily. 3/8/23 3/8/23  Pedro Sagastume DO    ondansetron (ZOFRAN-ODT) 4 MG TbDL Take 2 tablets (8 mg total) by mouth 2 (two) times daily. 7/2/22 3/8/23  Franco Brown MD   ondansetron (ZOFRAN-ODT) 4 MG TbDL Take 1 tablet (4 mg total) by mouth every 8 (eight) hours as needed. 3/8/23 3/8/23  Pedro Sagastume DO       REVIEW OF SYSTEMS:   Except as documented, all other systems reviewed and negative     PHYSICAL EXAM:     VITAL SIGNS: 24 HRS MIN & MAX LAST   Temp  Min: 98.3 °F (36.8 °C)  Max: 98.8 °F (37.1 °C) 98.5 °F (36.9 °C)   BP  Min: 72/62  Max: 124/59 96/63   Pulse  Min: 71  Max: 106  91   Resp  Min: 18  Max: 25 18   SpO2  Min: 92 %  Max: 100 % (!) 94 %         General appearance:  chronically ill-appearing looks older than stated age, nontoxic; in bed  sleeping easily arouses for examination,  no family at the bedside  HENT: Atraumatic head. Moist mucous membranes of oral cavity.  Eyes: PERRL  Neck: Supple.   Lungs: diminished bilaterally. No wheezing present.   Heart: Regular rate and rhythm. S1 and S2 present with no murmurs/gallop/rub. No pedal edema.   Abdomen: Soft, non-distended,mild generalized tenderness, no rebound tenderness/guarding. Bowel sounds are normal.   Extremities: No cyanosis, clubbing,  Skin: warm and dry  Neuro: drowsy, wakes for examination, oriented  Psych/mental status: Appropriate mood and affect. Responds appropriately to questions.     LABS AND IMAGING:     Recent Labs   Lab 03/07/23 2318 03/08/23  1453   WBC 4.5 7.4   RBC 4.37 4.34   HGB 13.2 12.8   HCT 39.6 39.9   MCV 90.6 91.9   MCH 30.2 29.5   MCHC 33.3 32.1*   RDW 12.9 13.1    177   MPV 10.9* 10.7*       Recent Labs   Lab 03/07/23 2318 03/08/23  1454    142   K 4.3 4.5   CO2 27 24   BUN 8.4* 10.8   CREATININE 0.73 0.87   CALCIUM 10.2 9.3   MG 1.90  --    ALBUMIN 3.1* 3.0*   ALKPHOS 97 88   ALT 25 25   AST 23 24   BILITOT 0.2 0.4       Microbiology Results (last 7 days)       Procedure Component Value Units Date/Time    Blood culture #2 **CANNOT BE ORDERED STAT**  [[id number]]     Order Status: Sent Specimen: Blood     Blood Culture #1 **CANNOT BE ORDERED STAT** [[id number]] Collected: 03/08/23 1654    Order Status: Resulted Specimen: Blood from PICC Line Updated: 03/08/23 1701             CT Abdomen Pelvis With Contrast  Narrative: EXAMINATION:  CT ABDOMEN PELVIS WITH CONTRAST    CLINICAL HISTORY:  LLQ abdominal pain;    TECHNIQUE:  Helical acquisition through the abdomen and pelvis with IV contrast.  Three plane reconstructions were provided for review.  mGycm. Automatic exposure control, adjustment of mA/kV or iterative reconstruction technique was used to reduce radiation.    COMPARISON:  20 February 2023    FINDINGS:  The limited imaged lung bases are clear.    The liver, spleen, gallbladder, pancreas, adrenals and kidneys are within normal limits.    No bowel obstruction.  Normal appendix.  There is colonic diverticulosis with minimal inflammation along the sigmoid.  No drainable fluid collection.  The drainage catheter has been removed.    Urinary bladder is unremarkable. No free fluid. Aorta normal in caliber.    Mild degenerative change of the spine  Impression: Minimal persistent inflammatory changes along the sigmoid with no drainable fluid collection today.  Drainage catheter has been removed.    No significant discrepancy with the preliminary report..    Electronically signed by: Glen Celestin  Date:    03/08/2023  Time:    06:22        ASSESSMENT & PLAN:   ASSESSMENT:  Acute diverticulitis- POA, s/p ESBL abscess with I/R drainage 2/16/2023  Hypovolemic hypotensive shock- POA, resolving  Abdominal pain- generalized, POA  Nausea without vomiting  Diarrhea  Asthma- chronic with no present exacerbation  GERD  Hyperthyroidism  Tobacco abuse    PLAN:   IV hydration  Continue with IV meropenem therapy   Accurate I&O  PRN pain management  PRN antiemetic therapy  Nicotine patch if desires   Labs in the am       VTE Prophylaxis: Lovenox for DVT prophylaxis and will be  advised to be as mobile as possible and sit in a chair as tolerated    Patient condition:  Stable    I, JUVENAL Womack have reviewed and discussed the case with Dr. Justin Hargrove.  Please see the following addendum for further assessment and plan from there attending MD.  JUVENAL Zamora   03/08/2023    ___________________________________________________________________  I, Dr. Justin Hargrove assumed care of this patient.  For the patient encounter, I performed the substantive portion of the visit, I reviewed the NPPA documentation, treatment plan, and medical decision making.  I had face to face time with this patient.  I have personally reviewed the labs and test results that are presently available. I have reviewed or attempted to review medical records based upon their availability. If patient was admitted under observational status it is with my approval.      Patient is a 52-year-old female with complicated diverticulitis including abscess that required drainage 02/16/2023, cultures growing ESBL E coli sensitive to carbapenems.  She had a PICC line and completed a 7 day course of home antibiotics on 03/02/2023 but shortly thereafter developed pain and fevers.  She presented to an outside ER 1st and then left against AMA and came to our ER for further management.    On exam on exam she has significant tenderness to palpation in the left lower quadrant, nontoxic appearing, no focal deficits and agree with remainder of above exam.  She would a PICC line in her left upper extremity without signs of infection.    CT shows no evidence of abscess but only notes mild inflammatory changes in the sigmoid.  Blood cultures obtained she then placed back on meropenem.  Continue IV fluids, antibiotics, PICC line needs to be removed at time of discharge.    Time seen: 11 PM 3/8/23  Justin Hargrove MD

## 2023-03-09 NOTE — PLAN OF CARE
Problem: Adult Inpatient Plan of Care  Goal: Plan of Care Review  Outcome: Ongoing, Progressing  Goal: Patient-Specific Goal (Individualized)  Outcome: Ongoing, Progressing  Goal: Absence of Hospital-Acquired Illness or Injury  Outcome: Ongoing, Progressing  Goal: Optimal Comfort and Wellbeing  Outcome: Ongoing, Progressing  Goal: Readiness for Transition of Care  Outcome: Ongoing, Progressing     Problem: Infection  Goal: Absence of Infection Signs and Symptoms  Outcome: Ongoing, Progressing     Problem: Pain Acute  Goal: Acceptable Pain Control and Functional Ability  Outcome: Ongoing, Progressing     Problem: Asthma Exacerbation  Goal: Asthma Symptom Relief  Outcome: Ongoing, Progressing

## 2023-03-10 LAB
ANION GAP SERPL CALC-SCNC: 7 MEQ/L
BASOPHILS # BLD AUTO: 0.01 X10(3)/MCL (ref 0–0.2)
BASOPHILS NFR BLD AUTO: 0.2 %
BUN SERPL-MCNC: 7 MG/DL (ref 9.8–20.1)
CALCIUM SERPL-MCNC: 10.1 MG/DL (ref 8.4–10.2)
CHLORIDE SERPL-SCNC: 108 MMOL/L (ref 98–107)
CO2 SERPL-SCNC: 29 MMOL/L (ref 22–29)
CREAT SERPL-MCNC: 0.68 MG/DL (ref 0.55–1.02)
CREAT/UREA NIT SERPL: 10
EOSINOPHIL # BLD AUTO: 0.01 X10(3)/MCL (ref 0–0.9)
EOSINOPHIL NFR BLD AUTO: 0.2 %
ERYTHROCYTE [DISTWIDTH] IN BLOOD BY AUTOMATED COUNT: 12.6 % (ref 11.5–17)
GFR SERPLBLD CREATININE-BSD FMLA CKD-EPI: >60 MLS/MIN/1.73/M2
GLUCOSE SERPL-MCNC: 80 MG/DL (ref 74–100)
HBV CORE AB SERPL QL IA: NONREACTIVE
HBV CORE IGM SERPL QL IA: NONREACTIVE
HBV SURFACE AB SER-ACNC: 0.22 MIU/ML
HBV SURFACE AB SERPL IA-ACNC: NONREACTIVE M[IU]/ML
HBV SURFACE AG SERPL QL IA: NONREACTIVE
HCT VFR BLD AUTO: 40.4 % (ref 37–47)
HCV AB SERPL QL IA: NONREACTIVE
HGB BLD-MCNC: 12.7 G/DL (ref 12–16)
HIV 1+2 AB+HIV1 P24 AG SERPL QL IA: NONREACTIVE
IMM GRANULOCYTES # BLD AUTO: 0.01 X10(3)/MCL (ref 0–0.04)
IMM GRANULOCYTES NFR BLD AUTO: 0.2 %
LYMPHOCYTES # BLD AUTO: 1.87 X10(3)/MCL (ref 0.6–4.6)
LYMPHOCYTES NFR BLD AUTO: 32.6 %
MCH RBC QN AUTO: 29.1 PG
MCHC RBC AUTO-ENTMCNC: 31.4 G/DL (ref 33–36)
MCV RBC AUTO: 92.4 FL (ref 80–94)
MONOCYTES # BLD AUTO: 1.2 X10(3)/MCL (ref 0.1–1.3)
MONOCYTES NFR BLD AUTO: 20.9 %
NEUTROPHILS # BLD AUTO: 2.64 X10(3)/MCL (ref 2.1–9.2)
NEUTROPHILS NFR BLD AUTO: 45.9 %
NRBC BLD AUTO-RTO: 0 %
PLATELET # BLD AUTO: 174 X10(3)/MCL (ref 130–400)
PMV BLD AUTO: 10.9 FL (ref 7.4–10.4)
POTASSIUM SERPL-SCNC: 4.5 MMOL/L (ref 3.5–5.1)
RBC # BLD AUTO: 4.37 X10(6)/MCL (ref 4.2–5.4)
SODIUM SERPL-SCNC: 144 MMOL/L (ref 136–145)
T PALLIDUM AB SER QL: NONREACTIVE
WBC # SPEC AUTO: 5.7 X10(3)/MCL (ref 4.5–11.5)

## 2023-03-10 PROCEDURE — 25000003 PHARM REV CODE 250: Performed by: NURSE PRACTITIONER

## 2023-03-10 PROCEDURE — 87340 HEPATITIS B SURFACE AG IA: CPT | Performed by: NURSE PRACTITIONER

## 2023-03-10 PROCEDURE — 85025 COMPLETE CBC W/AUTO DIFF WBC: CPT | Performed by: INTERNAL MEDICINE

## 2023-03-10 PROCEDURE — 94761 N-INVAS EAR/PLS OXIMETRY MLT: CPT

## 2023-03-10 PROCEDURE — 86780 TREPONEMA PALLIDUM: CPT | Performed by: NURSE PRACTITIONER

## 2023-03-10 PROCEDURE — 86704 HEP B CORE ANTIBODY TOTAL: CPT | Performed by: NURSE PRACTITIONER

## 2023-03-10 PROCEDURE — 99900031 HC PATIENT EDUCATION (STAT)

## 2023-03-10 PROCEDURE — 99232 PR SUBSEQUENT HOSPITAL CARE,LEVL II: ICD-10-PCS | Mod: ,,, | Performed by: GENERAL PRACTICE

## 2023-03-10 PROCEDURE — 25000003 PHARM REV CODE 250: Performed by: INTERNAL MEDICINE

## 2023-03-10 PROCEDURE — 80048 BASIC METABOLIC PNL TOTAL CA: CPT | Performed by: INTERNAL MEDICINE

## 2023-03-10 PROCEDURE — 25000003 PHARM REV CODE 250: Performed by: GENERAL PRACTICE

## 2023-03-10 PROCEDURE — 11000001 HC ACUTE MED/SURG PRIVATE ROOM

## 2023-03-10 PROCEDURE — 63600175 PHARM REV CODE 636 W HCPCS: Performed by: GENERAL PRACTICE

## 2023-03-10 PROCEDURE — 86803 HEPATITIS C AB TEST: CPT | Performed by: NURSE PRACTITIONER

## 2023-03-10 PROCEDURE — 63600175 PHARM REV CODE 636 W HCPCS: Performed by: INTERNAL MEDICINE

## 2023-03-10 PROCEDURE — 25000242 PHARM REV CODE 250 ALT 637 W/ HCPCS: Performed by: NURSE PRACTITIONER

## 2023-03-10 PROCEDURE — 99232 SBSQ HOSP IP/OBS MODERATE 35: CPT | Mod: ,,, | Performed by: GENERAL PRACTICE

## 2023-03-10 PROCEDURE — 86709 HEPATITIS A IGM ANTIBODY: CPT | Performed by: NURSE PRACTITIONER

## 2023-03-10 PROCEDURE — 94640 AIRWAY INHALATION TREATMENT: CPT

## 2023-03-10 PROCEDURE — 86706 HEP B SURFACE ANTIBODY: CPT | Performed by: NURSE PRACTITIONER

## 2023-03-10 PROCEDURE — 87389 HIV-1 AG W/HIV-1&-2 AB AG IA: CPT | Performed by: NURSE PRACTITIONER

## 2023-03-10 RX ORDER — MUPIROCIN 20 MG/G
OINTMENT TOPICAL 2 TIMES DAILY
Status: DISPENSED | OUTPATIENT
Start: 2023-03-10 | End: 2023-03-15

## 2023-03-10 RX ORDER — MAG HYDROX/ALUMINUM HYD/SIMETH 200-200-20
30 SUSPENSION, ORAL (FINAL DOSE FORM) ORAL EVERY 4 HOURS PRN
Status: DISCONTINUED | OUTPATIENT
Start: 2023-03-10 | End: 2023-03-15 | Stop reason: HOSPADM

## 2023-03-10 RX ADMIN — PREDNISONE 60 MG: 20 TABLET ORAL at 10:03

## 2023-03-10 RX ADMIN — METHIMAZOLE 10 MG: 10 TABLET ORAL at 10:03

## 2023-03-10 RX ADMIN — IPRATROPIUM BROMIDE AND ALBUTEROL SULFATE 3 ML: 2.5; .5 SOLUTION RESPIRATORY (INHALATION) at 05:03

## 2023-03-10 RX ADMIN — SODIUM CHLORIDE, POTASSIUM CHLORIDE, SODIUM LACTATE AND CALCIUM CHLORIDE: 600; 310; 30; 20 INJECTION, SOLUTION INTRAVENOUS at 02:03

## 2023-03-10 RX ADMIN — IPRATROPIUM BROMIDE AND ALBUTEROL SULFATE 3 ML: 2.5; .5 SOLUTION RESPIRATORY (INHALATION) at 03:03

## 2023-03-10 RX ADMIN — ALUMINUM HYDROXIDE, MAGNESIUM HYDROXIDE, AND SIMETHICONE 30 ML: 200; 200; 20 SUSPENSION ORAL at 01:03

## 2023-03-10 RX ADMIN — MUPIROCIN: 20 OINTMENT TOPICAL at 08:03

## 2023-03-10 RX ADMIN — ALUMINUM HYDROXIDE, MAGNESIUM HYDROXIDE, AND SIMETHICONE 30 ML: 200; 200; 20 SUSPENSION ORAL at 07:03

## 2023-03-10 RX ADMIN — METHIMAZOLE 10 MG: 10 TABLET ORAL at 08:03

## 2023-03-10 RX ADMIN — MEROPENEM 2 G: 1 INJECTION, POWDER, FOR SOLUTION INTRAVENOUS at 02:03

## 2023-03-10 RX ADMIN — MEROPENEM 2 G: 1 INJECTION, POWDER, FOR SOLUTION INTRAVENOUS at 04:03

## 2023-03-10 RX ADMIN — OXYCODONE HYDROCHLORIDE 5 MG: 5 TABLET ORAL at 03:03

## 2023-03-10 RX ADMIN — OXYCODONE HYDROCHLORIDE 5 MG: 5 TABLET ORAL at 09:03

## 2023-03-10 RX ADMIN — MEROPENEM 2 G: 1 INJECTION, POWDER, FOR SOLUTION INTRAVENOUS at 09:03

## 2023-03-10 NOTE — CONSULTS
CONSULTATION DATE: 3/9/2023          CONSULTING PHYSICIAN: Dr. Adrien Wilson     REASON FOR CONSULTATION: Use of meropenem per [employer] policy                  HISTORY OF PRESENT ILLNESS:  She is a 52-year-old female with a past medical history of hyperthyroidism, asthma, and diverticulosis who was recently hospitalized in [place] with complicated diverticulitis and peridiverticular abscess.  She originally presented there on 02/15 with reports of worsening abdominal pain and fever despite oral antibiotics which she had been on for several days prior for suspected diverticulitis.  She was subsequently found to have.  Diverticular abscess and underwent IR drainage on 02/16.  Cultures with ESBL E coli, only sensitive to carbapenems, and Bacteroides.  On admit prior to culture results, she had been initiated on Zosyn.  She left AMA several days later on 2/19 prior to culture results and was given ciprofloxacin and Flagyl, however given cultures with no oral options, patient was instructed to return to the emergency room which she did later that same day.  She was initiated on meropenem and seen by ID the following day where it was recommended she continue antimicrobials with ertapenem to complete a 7 day course and she was subsequently discharged home on 02/20.  She reports completing the antibiotics on 02/27, however several days later she reports developing worsening pain as well as fever.  She presented at another facility on 03/07, however left AMA again secondary to unacceptable pain control.  She then presented here the following day and underwent a CT of the abdomen and pelvis which did show minimal persistent inflammatory changes around the sigmoid was no drainable fluid collection.  She was placed back on empiric antimicrobials with meropenem on admit.  She is been afebrile and hemodynamically stable.  Labs have been unremarkable.  At present, she continues to report ongoing abdominal pain.  States she is having  bowel movements which appeared normal.  Denies any other complaints other than some wheezing which she attributes to her asthma.  Denies any shortness of breath, chest pain, cough, or sputum production.  Of note, patient does still have PICC line in place despite completing antimicrobials.  Admit blood cultures are negative thus far.  We have been consulted per [employer] policy for meropenem use.        PAST MEDICAL HISTORY:   Past Medical History:   Diagnosis Date    Asthma     Diverticulosis     GERD (gastroesophageal reflux disease)             PAST SURGICAL HISTORY: History reviewed. No pertinent surgical history.         SOCIAL HISTORY:   Social History     Socioeconomic History    Marital status: Single   Tobacco Use    Smoking status: Every Day     Packs/day: 0.50     Years: 40.00     Pack years: 20.00     Types: Cigarettes     Start date: 1983    Smokeless tobacco: Never    Tobacco comments:     Ambulatory referral to Smoking Cessation clinic following hospital discharge.    Substance and Sexual Activity    Alcohol use: Not Currently    Drug use: Not Currently    Sexual activity: Not Currently     Social Determinants of Health     Food Insecurity: No Food Insecurity    Worried About Running Out of Food in the Last Year: Never true    Ran Out of Food in the Last Year: Never true   Transportation Needs: Unmet Transportation Needs    Lack of Transportation (Medical): Yes    Lack of Transportation (Non-Medical): Yes   Housing Stability: Unknown    Unable to Pay for Housing in the Last Year: No    Unstable Housing in the Last Year: No            FAMILY HISTORY: History reviewed. No pertinent family history.         ALLERGIES:   Review of patient's allergies indicates:   Allergen Reactions    Latex             REVIEW OF SYSTEMS: Negative unless stated above         MEDICATIONS:   Reviewed in EMR        PHYSICAL EXAM:   Vitals:    03/09/23 1524   BP: 101/62   Pulse: 83   Resp: 16   Temp: 98.5 °F (36.9 °C)      GENERAL:  NAD; does not appear toxic  SKIN: no rash  HEENT: sclera non-icteric; PERRL  NECK: supple; no LAD  CHEST: Audible wheezing; nonlabored, equal expansion   CARDIOVASCULAR: RRR, S1S2; no murmur; strong, equal peripheral pulses; no edema  ABDOMEN:  active bowel sounds; abdomen soft, nondistended, some nonspecific TTP  GENITOURINARY: no suprapubic tenderness; no CVA tenderness   EXTREMITIES: no cyanosis or clubbing  NEURO: AAO x4; CN II-XII grossly intact  PSYCH: Mentation and affect appropriate          LABORATORY DATA: Reviewed         RADIOLOGICAL DATA: Reviewed         IMPRESSION:   52-year-old female with history of asthma and diverticulosis, recently hospitalized with complicated diverticulitis and abscess, status post IR drainage.  Cultures with ESBL E coli and Bacteroides.  She completed a 7 day course of antimicrobials with ertapenem on 02/27.  Now presenting with worse abdominal pain and reports a fever at home.  CT with minimal residual inflammation around the sigmoid colon, however no residual abscess noted.  Patient resumed on meropenem, id consulted per [employer] policy for its use.    Recent complicated diverticulitis / abscess, s/p IR drainage on 2/16 and treatment   Abdominal pain  Asthma / ?COPD with concern for mild exacerbation      PLAN:  Completed a resaonable abx course.  No evidence of residual abscess on current CT, resolving inflammation.  Afebrile and labs unremarkable.   DC abx and monitor off.   STI screening with am labs.   Noted prednisone started for possible mild COPD exacerbation.  Discussed with patient.

## 2023-03-10 NOTE — PROGRESS NOTES
[facility]  Hospital Medicine Progress Note        Chief Complaint: Inpatient Follow-up for     HPI: 52 y.o. female who  PMH includes hypothyroidism, GERD, asthma, diverticulosis; presents to the ED at [facility] on 3/8/2023 with a primary complaint of Abdominal pain with associated nausea and diarrhea which started 3 days ago.  PT also reported dizziness. Patient reports she initially went to [facility] yesterday which she was admitted secondary to diverticulitis. .She reports she has had this in the past. She reported fever 2 days ago as well. Pt subsequently left [facility] AMA as it was reported she became upset as she was not getting narcotic pain medication. Pt presented here for further evaluation. Of note per [facility] provider: patient was recently admitted at [facility] and found to have diverticulitis and abscess.  She is status post IR drainage of pericolonic abscess on 2/16/2023 which was culture positive for ESBL E. coli sensitive to ertapenem and meropenem.  She completed 7 days of IV ertapenem on 03/02/2023.  She is scheduled to see her endocrinologist on 03/10/2023 and has an appointment with GI on 03/14/2023 with a scheduled colonoscopy on 04/14/2023.  CT of the abdomen and pelvis  with contrast demonstrated persistent inflammatory changes along the sigmoid with no drainable fluid collection today. Labs done unremarkable.   Initial VS BP  83/45 pulse 88 respirations  19 temperature 98.3° F O2 saturation 96% on room air.  Patient was given IV hydration which her blood pressure improved to 122/64. Pt is admitted to hospital medicine services for further management.     Interval Hx:   Patient seen and examined blood pressure actually on the higher side pain is much better controlled patient in very good spirits afebrile    Objective/physical exam:  General: In no acute distress, afebrile  Chest: Clear to auscultation bilaterally  Heart: RRR, +S1, S2, no appreciable murmur  Abdomen: Soft,  mild tenderness to palpate in the left lower quadrant   MSK: Warm, no lower extremity edema, no clubbing or cyanosis  Neurologic: Alert and oriented x4,   VITAL SIGNS: 24 HRS MIN & MAX LAST   Temp  Min: 98.1 °F (36.7 °C)  Max: 98.8 °F (37.1 °C) 98.8 °F (37.1 °C)   BP  Min: 94/41  Max: 148/85 (!) 140/87     Pulse  Min: 78  Max: 103  86   Resp  Min: 16  Max: 20 18   SpO2  Min: 93 %  Max: 98 % 96 %         Recent Labs   Lab 03/08/23  1453 03/09/23  0434 03/10/23  0526   WBC 7.4 4.6 5.7   RBC 4.34 3.98* 4.37   HGB 12.8 11.7* 12.7   HCT 39.9 36.3* 40.4   MCV 91.9 91.2 92.4   MCH 29.5 29.4 29.1   MCHC 32.1* 32.2* 31.4*   RDW 13.1 13.1 12.6    151 174   MPV 10.7* 10.9* 10.9*       Recent Labs   Lab 03/07/23  2318 03/08/23  1454 03/09/23  0434 03/10/23  0526    142 142 144   K 4.3 4.5 4.0 4.5   CO2 27 24 26 29   BUN 8.4* 10.8 8.2* 7.0*   CREATININE 0.73 0.87 0.71 0.68   CALCIUM 10.2 9.3 8.8 10.1   MG 1.90  --   --   --    ALBUMIN 3.1* 3.0* 2.7*  --    ALKPHOS 97 88 81  --    ALT 25 25 24  --    AST 23 24 18  --    BILITOT 0.2 0.4 0.4  --           Microbiology Results (last 7 days)       Procedure Component Value Units Date/Time    Blood Culture #1 **CANNOT BE ORDERED STAT** [[id number]]  (Normal) Collected: 03/08/23 1654    Order Status: Completed Specimen: Blood from PICC Line Updated: 03/09/23 1800     CULTURE, BLOOD ([facility]) No Growth At 24 Hours    Blood culture #2 **CANNOT BE ORDERED STAT** [[id number]]     Order Status: Sent Specimen: Blood              See below for Radiology    Scheduled Med:   enoxaparin  40 mg Subcutaneous Daily    meropenem (MERREM) IVPB  2 g Intravenous Q8H    methIMAzole  10 mg Oral BID    mupirocin   Nasal BID    predniSONE  60 mg Oral Daily        Continuous Infusions:   lactated ringers 75 mL/hr at 03/10/23 0236        PRN Meds:  albuterol-ipratropium, aluminum-magnesium hydroxide-simethicone, melatonin, oxyCODONE, sodium chloride 0.9%       Assessment/Plan:  Hypotension   History  of recurrent diverticulitis  Recent history of ESBL peridiverticular abscess abscess  status post IR drainage in February   COPD in mild exacerbation  GERD  Asthma   History of diverticulosis    Blood cultures x2 have been pending appreciate ID recommendations continue with meropenem for total of 7 days most likely  Symptomatically improving  C diff was negative UA negative chest x-ray negative  Continue with pain control  Will stop IV fluids  Continue prednisone and DuoNebs q.6 hours for mild COPD exacerbation    VTE prophylaxis: lovenox    Patient condition:  Stable/Fair/Guarded/ Serious/ Critical    Anticipated discharge and Disposition:         All diagnosis and differential diagnosis have been reviewed; assessment and plan has been documented; I have personally reviewed the labs and test results that are presently available; I have reviewed the patients medication list; I have reviewed the consulting providers response and recommendations. I have reviewed or attempted to review medical records based upon their availability    All of the patient's questions have been  addressed and answered. Patient's is agreeable to the above stated plan. I will continue to monitor closely and make adjustments to medical management as needed.  _____________________________________________________________________    Nutrition Status:    Radiology:  X-Ray Chest 1 View  Addendum: PICC line has been inserted the from a right approach tip projecting over  the region of the superior vena cava     Electronically signed by: Pillo Stein   Date:    03/09/2023   Time:    10:32  Narrative: EXAMINATION:  XR CHEST 1 VIEW    CLINICAL HISTORY:  picc placement;, .    COMPARISON:  February 20, 2023    FINDINGS:  No alveolar consolidation, effusion, or pneumothorax is seen.   The thoracic aorta is normal  cardiac silhouette, central pulmonary vessels and mediastinum are normal in size and are grossly unremarkable.   visualized osseous  structures are grossly unremarkable.  Impression: No acute chest disease is identified.    Electronically signed by: Pillo Stein  Date:    03/09/2023  Time:    08:58      Anamaria Ruffin MD   03/10/2023                 No

## 2023-03-11 LAB
BASOPHILS # BLD AUTO: 0 X10(3)/MCL (ref 0–0.2)
BASOPHILS NFR BLD AUTO: 0 %
EOSINOPHIL # BLD AUTO: 0.01 X10(3)/MCL (ref 0–0.9)
EOSINOPHIL NFR BLD AUTO: 0.2 %
ERYTHROCYTE [DISTWIDTH] IN BLOOD BY AUTOMATED COUNT: 12.5 % (ref 11.5–17)
HCT VFR BLD AUTO: 39.8 % (ref 37–47)
HGB BLD-MCNC: 12.8 G/DL (ref 12–16)
IMM GRANULOCYTES # BLD AUTO: 0.02 X10(3)/MCL (ref 0–0.04)
IMM GRANULOCYTES NFR BLD AUTO: 0.3 %
LYMPHOCYTES # BLD AUTO: 1.63 X10(3)/MCL (ref 0.6–4.6)
LYMPHOCYTES NFR BLD AUTO: 25.5 %
MCH RBC QN AUTO: 29.3 PG
MCHC RBC AUTO-ENTMCNC: 32.2 G/DL (ref 33–36)
MCV RBC AUTO: 91.1 FL (ref 80–94)
MONOCYTES # BLD AUTO: 0.45 X10(3)/MCL (ref 0.1–1.3)
MONOCYTES NFR BLD AUTO: 7 %
NEUTROPHILS # BLD AUTO: 4.29 X10(3)/MCL (ref 2.1–9.2)
NEUTROPHILS NFR BLD AUTO: 67 %
NRBC BLD AUTO-RTO: 0 %
PLATELET # BLD AUTO: 181 X10(3)/MCL (ref 130–400)
PMV BLD AUTO: 10.6 FL (ref 7.4–10.4)
RBC # BLD AUTO: 4.37 X10(6)/MCL (ref 4.2–5.4)
WBC # SPEC AUTO: 6.4 X10(3)/MCL (ref 4.5–11.5)

## 2023-03-11 PROCEDURE — 63600175 PHARM REV CODE 636 W HCPCS: Performed by: INTERNAL MEDICINE

## 2023-03-11 PROCEDURE — 25000003 PHARM REV CODE 250: Performed by: INTERNAL MEDICINE

## 2023-03-11 PROCEDURE — 63600175 PHARM REV CODE 636 W HCPCS: Performed by: GENERAL PRACTICE

## 2023-03-11 PROCEDURE — 25000003 PHARM REV CODE 250: Performed by: NURSE PRACTITIONER

## 2023-03-11 PROCEDURE — 11000001 HC ACUTE MED/SURG PRIVATE ROOM

## 2023-03-11 PROCEDURE — 25000003 PHARM REV CODE 250: Performed by: GENERAL PRACTICE

## 2023-03-11 PROCEDURE — 85025 COMPLETE CBC W/AUTO DIFF WBC: CPT | Performed by: INTERNAL MEDICINE

## 2023-03-11 RX ADMIN — ALUMINUM HYDROXIDE, MAGNESIUM HYDROXIDE, AND SIMETHICONE 30 ML: 200; 200; 20 SUSPENSION ORAL at 09:03

## 2023-03-11 RX ADMIN — MEROPENEM 2 G: 1 INJECTION, POWDER, FOR SOLUTION INTRAVENOUS at 03:03

## 2023-03-11 RX ADMIN — MUPIROCIN: 20 OINTMENT TOPICAL at 07:03

## 2023-03-11 RX ADMIN — DOCUSATE SODIUM 50 MG: 50 CAPSULE, LIQUID FILLED ORAL at 02:03

## 2023-03-11 RX ADMIN — Medication 6 MG: at 09:03

## 2023-03-11 RX ADMIN — METHIMAZOLE 10 MG: 10 TABLET ORAL at 09:03

## 2023-03-11 RX ADMIN — MEROPENEM 2 G: 1 INJECTION, POWDER, FOR SOLUTION INTRAVENOUS at 05:03

## 2023-03-11 RX ADMIN — MEROPENEM 2 G: 1 INJECTION, POWDER, FOR SOLUTION INTRAVENOUS at 09:03

## 2023-03-11 RX ADMIN — MUPIROCIN: 20 OINTMENT TOPICAL at 09:03

## 2023-03-11 RX ADMIN — OXYCODONE HYDROCHLORIDE 5 MG: 5 TABLET ORAL at 09:03

## 2023-03-11 RX ADMIN — ALUMINUM HYDROXIDE, MAGNESIUM HYDROXIDE, AND SIMETHICONE 30 ML: 200; 200; 20 SUSPENSION ORAL at 06:03

## 2023-03-11 RX ADMIN — METHIMAZOLE 10 MG: 10 TABLET ORAL at 07:03

## 2023-03-11 RX ADMIN — OXYCODONE HYDROCHLORIDE 5 MG: 5 TABLET ORAL at 04:03

## 2023-03-11 RX ADMIN — OXYCODONE HYDROCHLORIDE 5 MG: 5 TABLET ORAL at 07:03

## 2023-03-11 RX ADMIN — PREDNISONE 60 MG: 20 TABLET ORAL at 07:03

## 2023-03-11 NOTE — PROGRESS NOTES
Infectious Disease  Progress Note    Patient Name: Niya Moeller   MRN: [id number]   Admission Date: 3/8/2023   Hospital Length of Stay: 2 days  Attending Physician: Adrien Wilson MD   Primary Care Provider: YEHUDA Adamson     Isolation Status: No active isolations       Subjective:     Principal Problem: Diverticulitis     Interval History:   Feeling much improved, no more pain, has not been on any pain medication today and remains with minimal tenderness ot the LLQ    Review of Systems   Review of Systems   All other systems reviewed and are negative.     Objective:     Vital Signs (Most Recent):  Temp: 98.3 °F (36.8 °C) (03/10/23 2101)  Pulse: 75 (03/10/23 2101)  Resp: 18 (03/10/23 2104)  BP: 136/82 (03/10/23 2101)  SpO2: 99 % (03/10/23 2101)  Vital Signs (24h Range):  Temp:  [98.1 °F (36.7 °C)-98.8 °F (37.1 °C)] 98.3 °F (36.8 °C)  Pulse:  [] 75  Resp:  [18] 18  SpO2:  [93 %-99 %] 99 %  BP: (120-148)/(69-87) 136/82      Weight:   Wt Readings from Last 1 Encounters:   03/08/23 67.8 kg (149 lb 7.6 oz)      Body mass index is Body mass index is 30.19 kg/m².     Estimated Creatinine Clearance: Estimated Creatinine Clearance: 84.2 mL/min (based on SCr of 0.68 mg/dL).     Lines/Drains/Airways       Peripherally Inserted Central Catheter Line  Duration             PICC Double Lumen 02/20/23 0842 right basilic 18 days              Drain  Duration                  Closed/Suction Drain 02/16/23 1310 Left;Anterior LLQ Bulb 10 Fr. 22 days                     Physical Exam  Physical Exam  Constitutional:       Appearance: Normal appearance.   HENT:      Head: Normocephalic and atraumatic.      Mouth/Throat:      Pharynx: No oropharyngeal exudate or posterior oropharyngeal erythema.   Eyes:      Extraocular Movements: Extraocular movements intact.      Pupils: Pupils are equal, round, and reactive to light.   Cardiovascular:      Rate and Rhythm: Normal rate and regular rhythm.      Heart sounds: No murmur  heard.  Pulmonary:      Effort: No respiratory distress.      Breath sounds: Wheezing present. No rhonchi or rales.   Abdominal:      General: Bowel sounds are normal. There is no distension.      Palpations: Abdomen is soft.      Tenderness: There is abdominal tenderness (minimal residual tenderness).   Musculoskeletal:         General: No swelling or tenderness.      Cervical back: Neck supple. No rigidity or tenderness.   Lymphadenopathy:      Cervical: No cervical adenopathy.   Skin:     Findings: No lesion or rash.   Neurological:      General: No focal deficit present.      Mental Status: She is alert and oriented to person, place, and time. Mental status is at baseline.      Cranial Nerves: No cranial nerve deficit.      Motor: No weakness.   Psychiatric:         Mood and Affect: Mood normal.         Behavior: Behavior normal.        Significant Labs: CBC:   Recent Labs   Lab 03/09/23  0434 03/10/23  0526   WBC 4.6 5.7   HGB 11.7* 12.7   HCT 36.3* 40.4    174     CMP:   Recent Labs   Lab 03/09/23  0434 03/10/23  0526    144   K 4.0 4.5   CO2 26 29   BUN 8.2* 7.0*   CREATININE 0.71 0.68   CALCIUM 8.8 10.1   ALBUMIN 2.7*  --    BILITOT 0.4  --    ALKPHOS 81  --    AST 18  --    ALT 24  --        Microbiology Results (last 7 days)       Procedure Component Value Units Date/Time    Blood Culture #1 **CANNOT BE ORDERED STAT** [[id number]]  (Normal) Collected: 03/08/23 1654    Order Status: Completed Specimen: Blood from PICC Line Updated: 03/10/23 1801     CULTURE, BLOOD ([facility]) No Growth At 48 Hours    Blood culture #2 **CANNOT BE ORDERED STAT** [[id number]]     Order Status: Sent Specimen: Blood              Significant Imaging: I have reviewed all pertinent imaging results/findings within the past 24 hours.    Assessment/Plan:        52-year-old female with history of asthma and diverticulosis, recently hospitalized with complicated diverticulitis and abscess, status post IR drainage.  Cultures with  ESBL E coli and Bacteroides.  She completed a 7 day course of antimicrobials with ertapenem on 02/27.  Now presenting with worse abdominal pain and reports a fever at home.  CT with minimal residual inflammation around the sigmoid colon, however no residual abscess noted.  Patient resumed on meropenem, id consulted per [employer] policy for its use.    Recent complicated diverticulitis / abscess, s/p IR drainage on 2/16 and treatment   Abdominal pain  Asthma / ?COPD with concern for mild exacerbation      PLAN:  -Continue Meropenem 1g IV q8h   -Plan for additional 7 day course   -Repeat CRP on Monday  -Would be concerned about home PICC line for OPAT given frequent AMA, she would likely benefit from completing antibiotics therapy under monitored conditions  -Discussed with patient.       ID will sign off. Please contact us with any questions or concerns.    Nelson Adams MD  Infectious Disease  [facility]

## 2023-03-11 NOTE — PROGRESS NOTES
[facility]  Hospital Medicine Progress Note        Chief Complaint: Inpatient Follow-up for     HPI: 52 y.o. female who  PMH includes hypothyroidism, GERD, asthma, diverticulosis; presents to the ED at [facility] on 3/8/2023 with a primary complaint of Abdominal pain with associated nausea and diarrhea which started 3 days ago.  PT also reported dizziness. Patient reports she initially went to [facility] yesterday which she was admitted secondary to diverticulitis. .She reports she has had this in the past. She reported fever 2 days ago as well. Pt subsequently left [facility] AMA as it was reported she became upset as she was not getting narcotic pain medication. Pt presented here for further evaluation. Of note per [facility] provider: patient was recently admitted at [facility] and found to have diverticulitis and abscess.  She is status post IR drainage of pericolonic abscess on 2/16/2023 which was culture positive for ESBL E. coli sensitive to ertapenem and meropenem.  She completed 7 days of IV ertapenem on 03/02/2023.  She is scheduled to see her endocrinologist on 03/10/2023 and has an appointment with GI on 03/14/2023 with a scheduled colonoscopy on 04/14/2023.  CT of the abdomen and pelvis  with contrast demonstrated persistent inflammatory changes along the sigmoid with no drainable fluid collection today. Labs done unremarkable.   Initial VS BP  83/45 pulse 88 respirations  19 temperature 98.3° F O2 saturation 96% on room air.  Patient was given IV hydration which her blood pressure improved to 122/64. Pt is admitted to hospital medicine services for further management.     Interval Hx:   Patient seen and examined blood pressure much better pain is controlled no other issues    Objective/physical exam:  General: In no acute distress, afebrile  Chest: Clear to auscultation bilaterally  Heart: RRR, +S1, S2, no appreciable murmur  Abdomen: Soft, mild tenderness to palpate in the left lower  quadrant   MSK: Warm, no lower extremity edema, no clubbing or cyanosis  Neurologic: Alert and oriented x4,   VITAL SIGNS: 24 HRS MIN & MAX LAST   Temp  Min: 97.7 °F (36.5 °C)  Max: 98.6 °F (37 °C) 98.6 °F (37 °C)   BP  Min: 109/63  Max: 136/82 129/66     Pulse  Min: 65  Max: 87  76   Resp  Min: 17  Max: 20 18   SpO2  Min: 95 %  Max: 100 % 97 %         Recent Labs   Lab 03/09/23  0434 03/10/23  0526 03/11/23  0708   WBC 4.6 5.7 6.4   RBC 3.98* 4.37 4.37   HGB 11.7* 12.7 12.8   HCT 36.3* 40.4 39.8   MCV 91.2 92.4 91.1   MCH 29.4 29.1 29.3   MCHC 32.2* 31.4* 32.2*   RDW 13.1 12.6 12.5    174 181   MPV 10.9* 10.9* 10.6*       Recent Labs   Lab 03/07/23  2318 03/08/23  1454 03/09/23  0434 03/10/23  0526    142 142 144   K 4.3 4.5 4.0 4.5   CO2 27 24 26 29   BUN 8.4* 10.8 8.2* 7.0*   CREATININE 0.73 0.87 0.71 0.68   CALCIUM 10.2 9.3 8.8 10.1   MG 1.90  --   --   --    ALBUMIN 3.1* 3.0* 2.7*  --    ALKPHOS 97 88 81  --    ALT 25 25 24  --    AST 23 24 18  --    BILITOT 0.2 0.4 0.4  --           Microbiology Results (last 7 days)       Procedure Component Value Units Date/Time    Blood Culture #1 **CANNOT BE ORDERED STAT** [[id number]]  (Normal) Collected: 03/08/23 1654    Order Status: Completed Specimen: Blood from PICC Line Updated: 03/10/23 1801     CULTURE, BLOOD ([facility]) No Growth At 48 Hours    Blood culture #2 **CANNOT BE ORDERED STAT** [[id number]]     Order Status: Sent Specimen: Blood              See below for Radiology    Scheduled Med:   docusate sodium  50 mg Oral Daily    enoxaparin  40 mg Subcutaneous Daily    meropenem (MERREM) IVPB  2 g Intravenous Q8H    methIMAzole  10 mg Oral BID    mupirocin   Nasal BID    predniSONE  60 mg Oral Daily        Continuous Infusions:         PRN Meds:  albuterol-ipratropium, aluminum-magnesium hydroxide-simethicone, melatonin, oxyCODONE, sodium chloride 0.9%       Assessment/Plan:  Hypotension   History of recurrent diverticulitis  Recent history of ESBL  peridiverticular abscess abscess  status post IR drainage in February   COPD in mild exacerbation  GERD  Asthma   History of diverticulosis      Id is recommending total of 7 days of IV meropenem  Symptomatically improving  C diff was negative UA negative chest x-ray negative  Continue with pain control  Will stop IV fluids  Continue prednisone and DuoNebs q.6 hours for mild COPD exacerbation    VTE prophylaxis: lovenox    Patient condition:  Stable/Fair/Guarded/ Serious/ Critical    Anticipated discharge and Disposition:         All diagnosis and differential diagnosis have been reviewed; assessment and plan has been documented; I have personally reviewed the labs and test results that are presently available; I have reviewed the patients medication list; I have reviewed the consulting providers response and recommendations. I have reviewed or attempted to review medical records based upon their availability    All of the patient's questions have been  addressed and answered. Patient's is agreeable to the above stated plan. I will continue to monitor closely and make adjustments to medical management as needed.  _____________________________________________________________________    Nutrition Status:    Radiology:  X-Ray Chest 1 View  Addendum: PICC line has been inserted the from a right approach tip projecting over  the region of the superior vena cava     Electronically signed by: Pillo Stein   Date:    03/09/2023   Time:    10:32  Narrative: EXAMINATION:  XR CHEST 1 VIEW    CLINICAL HISTORY:  picc placement;, .    COMPARISON:  February 20, 2023    FINDINGS:  No alveolar consolidation, effusion, or pneumothorax is seen.   The thoracic aorta is normal  cardiac silhouette, central pulmonary vessels and mediastinum are normal in size and are grossly unremarkable.   visualized osseous structures are grossly unremarkable.  Impression: No acute chest disease is identified.    Electronically signed by: Pillo  Sarita  Date:    03/09/2023  Time:    08:58      Anamaria Ruffin MD   03/11/2023

## 2023-03-12 PROCEDURE — 63600175 PHARM REV CODE 636 W HCPCS: Performed by: INTERNAL MEDICINE

## 2023-03-12 PROCEDURE — 25000003 PHARM REV CODE 250: Performed by: GENERAL PRACTICE

## 2023-03-12 PROCEDURE — 25000003 PHARM REV CODE 250: Performed by: INTERNAL MEDICINE

## 2023-03-12 PROCEDURE — 25000242 PHARM REV CODE 250 ALT 637 W/ HCPCS: Performed by: NURSE PRACTITIONER

## 2023-03-12 PROCEDURE — 63600175 PHARM REV CODE 636 W HCPCS: Performed by: GENERAL PRACTICE

## 2023-03-12 PROCEDURE — 25000003 PHARM REV CODE 250: Performed by: NURSE PRACTITIONER

## 2023-03-12 PROCEDURE — 94761 N-INVAS EAR/PLS OXIMETRY MLT: CPT

## 2023-03-12 PROCEDURE — 94640 AIRWAY INHALATION TREATMENT: CPT

## 2023-03-12 PROCEDURE — 11000001 HC ACUTE MED/SURG PRIVATE ROOM

## 2023-03-12 RX ORDER — PREDNISONE 20 MG/1
20 TABLET ORAL DAILY
Status: DISCONTINUED | OUTPATIENT
Start: 2023-03-13 | End: 2023-03-14

## 2023-03-12 RX ORDER — PANTOPRAZOLE SODIUM 40 MG/1
40 TABLET, DELAYED RELEASE ORAL DAILY
Status: DISCONTINUED | OUTPATIENT
Start: 2023-03-12 | End: 2023-03-15 | Stop reason: HOSPADM

## 2023-03-12 RX ADMIN — ALUMINUM HYDROXIDE, MAGNESIUM HYDROXIDE, AND SIMETHICONE 30 ML: 200; 200; 20 SUSPENSION ORAL at 05:03

## 2023-03-12 RX ADMIN — PANTOPRAZOLE SODIUM 40 MG: 40 TABLET, DELAYED RELEASE ORAL at 09:03

## 2023-03-12 RX ADMIN — OXYCODONE HYDROCHLORIDE 5 MG: 5 TABLET ORAL at 07:03

## 2023-03-12 RX ADMIN — METHIMAZOLE 10 MG: 10 TABLET ORAL at 08:03

## 2023-03-12 RX ADMIN — IPRATROPIUM BROMIDE AND ALBUTEROL SULFATE 3 ML: 2.5; .5 SOLUTION RESPIRATORY (INHALATION) at 05:03

## 2023-03-12 RX ADMIN — OXYCODONE HYDROCHLORIDE 5 MG: 5 TABLET ORAL at 02:03

## 2023-03-12 RX ADMIN — OXYCODONE HYDROCHLORIDE 5 MG: 5 TABLET ORAL at 10:03

## 2023-03-12 RX ADMIN — ALUMINUM HYDROXIDE, MAGNESIUM HYDROXIDE, AND SIMETHICONE 30 ML: 200; 200; 20 SUSPENSION ORAL at 10:03

## 2023-03-12 RX ADMIN — DOCUSATE SODIUM 50 MG: 50 CAPSULE, LIQUID FILLED ORAL at 08:03

## 2023-03-12 RX ADMIN — MEROPENEM 2 G: 1 INJECTION, POWDER, FOR SOLUTION INTRAVENOUS at 04:03

## 2023-03-12 RX ADMIN — MUPIROCIN: 20 OINTMENT TOPICAL at 08:03

## 2023-03-12 RX ADMIN — MEROPENEM 2 G: 1 INJECTION, POWDER, FOR SOLUTION INTRAVENOUS at 02:03

## 2023-03-12 RX ADMIN — ALUMINUM HYDROXIDE, MAGNESIUM HYDROXIDE, AND SIMETHICONE 30 ML: 200; 200; 20 SUSPENSION ORAL at 04:03

## 2023-03-12 RX ADMIN — OXYCODONE HYDROCHLORIDE 5 MG: 5 TABLET ORAL at 04:03

## 2023-03-12 RX ADMIN — Medication 6 MG: at 08:03

## 2023-03-12 RX ADMIN — MEROPENEM 2 G: 1 INJECTION, POWDER, FOR SOLUTION INTRAVENOUS at 09:03

## 2023-03-12 RX ADMIN — PREDNISONE 60 MG: 20 TABLET ORAL at 08:03

## 2023-03-12 NOTE — PROGRESS NOTES
[facility]  Hospital Medicine Progress Note        Chief Complaint: Inpatient Follow-up for     HPI: 52 y.o. female who  PMH includes hypothyroidism, GERD, asthma, diverticulosis; presents to the ED at [facility] on 3/8/2023 with a primary complaint of Abdominal pain with associated nausea and diarrhea which started 3 days ago.  PT also reported dizziness. Patient reports she initially went to [facility] yesterday which she was admitted secondary to diverticulitis. .She reports she has had this in the past. She reported fever 2 days ago as well. Pt subsequently left [facility] AMA as it was reported she became upset as she was not getting narcotic pain medication. Pt presented here for further evaluation. Of note per [facility] provider: patient was recently admitted at [facility] and found to have diverticulitis and abscess.  She is status post IR drainage of pericolonic abscess on 2/16/2023 which was culture positive for ESBL E. coli sensitive to ertapenem and meropenem.  She completed 7 days of IV ertapenem on 03/02/2023.  She is scheduled to see her endocrinologist on 03/10/2023 and has an appointment with GI on 03/14/2023 with a scheduled colonoscopy on 04/14/2023.  CT of the abdomen and pelvis  with contrast demonstrated persistent inflammatory changes along the sigmoid with no drainable fluid collection today. Labs done unremarkable.   Initial VS BP  83/45 pulse 88 respirations  19 temperature 98.3° F O2 saturation 96% on room air.  Patient was given IV hydration which her blood pressure improved to 122/64. Pt is admitted to hospital medicine services for further management.     Interval Hx:   Patient seen and examined blood pressure much better pain is controlled no other issues no more wheezing   Objective/physical exam:  General: In no acute distress, afebrile  Chest: Clear to auscultation bilaterally  Heart: RRR, +S1, S2, no appreciable murmur  Abdomen: Soft, mild tenderness to palpate in the  left lower quadrant   MSK: Warm, no lower extremity edema, no clubbing or cyanosis  Neurologic: Alert and oriented x4,   VITAL SIGNS: 24 HRS MIN & MAX LAST   Temp  Min: 97.7 °F (36.5 °C)  Max: 98.6 °F (37 °C) 98.4 °F (36.9 °C)   BP  Min: 106/62  Max: 129/66 112/74     Pulse  Min: 70  Max: 85  76   Resp  Min: 16  Max: 20 20   SpO2  Min: 96 %  Max: 99 % 99 %         Recent Labs   Lab 03/09/23  0434 03/10/23  0526 03/11/23  0708   WBC 4.6 5.7 6.4   RBC 3.98* 4.37 4.37   HGB 11.7* 12.7 12.8   HCT 36.3* 40.4 39.8   MCV 91.2 92.4 91.1   MCH 29.4 29.1 29.3   MCHC 32.2* 31.4* 32.2*   RDW 13.1 12.6 12.5    174 181   MPV 10.9* 10.9* 10.6*       Recent Labs   Lab 03/07/23  2318 03/08/23  1454 03/09/23  0434 03/10/23  0526    142 142 144   K 4.3 4.5 4.0 4.5   CO2 27 24 26 29   BUN 8.4* 10.8 8.2* 7.0*   CREATININE 0.73 0.87 0.71 0.68   CALCIUM 10.2 9.3 8.8 10.1   MG 1.90  --   --   --    ALBUMIN 3.1* 3.0* 2.7*  --    ALKPHOS 97 88 81  --    ALT 25 25 24  --    AST 23 24 18  --    BILITOT 0.2 0.4 0.4  --           Microbiology Results (last 7 days)       Procedure Component Value Units Date/Time    Blood Culture #1 **CANNOT BE ORDERED STAT** [[id number]]  (Normal) Collected: 03/08/23 1654    Order Status: Completed Specimen: Blood from PICC Line Updated: 03/11/23 1800     CULTURE, BLOOD ([facility]) No Growth At 72 Hours    Blood culture #2 **CANNOT BE ORDERED STAT** [[id number]]     Order Status: Sent Specimen: Blood              See below for Radiology    Scheduled Med:   docusate sodium  50 mg Oral Daily    enoxaparin  40 mg Subcutaneous Daily    meropenem (MERREM) IVPB  2 g Intravenous Q8H    methIMAzole  10 mg Oral BID    mupirocin   Nasal BID    pantoprazole  40 mg Oral Daily    predniSONE  60 mg Oral Daily        Continuous Infusions:         PRN Meds:  albuterol-ipratropium, aluminum-magnesium hydroxide-simethicone, melatonin, oxyCODONE, sodium chloride 0.9%       Assessment/Plan:  Hypotension   History of recurrent  diverticulitis  Recent history of ESBL peridiverticular abscess abscess  status post IR drainage in February   COPD in mild exacerbation  GERD  Asthma   History of diverticulosis      Id is recommending total of 7 days of IV meropenem(started on March 9th)  Symptomatically improving  C diff was negative UA negative chest x-ray negative  Continue with pain control  Reduce the dose of prednisone to 20 mg and continue with DuoNebs   VTE prophylaxis: lovenox    Patient condition:  Stable/Fair/Guarded/ Serious/ Critical    Anticipated discharge and Disposition:         All diagnosis and differential diagnosis have been reviewed; assessment and plan has been documented; I have personally reviewed the labs and test results that are presently available; I have reviewed the patients medication list; I have reviewed the consulting providers response and recommendations. I have reviewed or attempted to review medical records based upon their availability    All of the patient's questions have been  addressed and answered. Patient's is agreeable to the above stated plan. I will continue to monitor closely and make adjustments to medical management as needed.  _____________________________________________________________________    Nutrition Status:    Radiology:  X-Ray Chest 1 View  Addendum: PICC line has been inserted the from a right approach tip projecting over  the region of the superior vena cava     Electronically signed by: Pillo Stein   Date:    03/09/2023   Time:    10:32  Narrative: EXAMINATION:  XR CHEST 1 VIEW    CLINICAL HISTORY:  picc placement;, .    COMPARISON:  February 20, 2023    FINDINGS:  No alveolar consolidation, effusion, or pneumothorax is seen.   The thoracic aorta is normal  cardiac silhouette, central pulmonary vessels and mediastinum are normal in size and are grossly unremarkable.   visualized osseous structures are grossly unremarkable.  Impression: No acute chest disease is  identified.    Electronically signed by: Pillo Stein  Date:    03/09/2023  Time:    08:58      Anamaria Ruffin MD   03/12/2023

## 2023-03-13 LAB
ANION GAP SERPL CALC-SCNC: 7 MEQ/L
BACTERIA BLD CULT: NORMAL
BASOPHILS # BLD AUTO: 0.01 X10(3)/MCL (ref 0–0.2)
BASOPHILS NFR BLD AUTO: 0.1 %
BUN SERPL-MCNC: 8.2 MG/DL (ref 9.8–20.1)
CALCIUM SERPL-MCNC: 9.3 MG/DL (ref 8.4–10.2)
CHLORIDE SERPL-SCNC: 106 MMOL/L (ref 98–107)
CO2 SERPL-SCNC: 29 MMOL/L (ref 22–29)
CREAT SERPL-MCNC: 0.62 MG/DL (ref 0.55–1.02)
CREAT/UREA NIT SERPL: 13
EOSINOPHIL # BLD AUTO: 0.04 X10(3)/MCL (ref 0–0.9)
EOSINOPHIL NFR BLD AUTO: 0.5 %
ERYTHROCYTE [DISTWIDTH] IN BLOOD BY AUTOMATED COUNT: 12.4 % (ref 11.5–17)
GFR SERPLBLD CREATININE-BSD FMLA CKD-EPI: >60 MLS/MIN/1.73/M2
GLUCOSE SERPL-MCNC: 76 MG/DL (ref 74–100)
HCT VFR BLD AUTO: 38.5 % (ref 37–47)
HGB BLD-MCNC: 12.6 G/DL (ref 12–16)
IMM GRANULOCYTES # BLD AUTO: 0.02 X10(3)/MCL (ref 0–0.04)
IMM GRANULOCYTES NFR BLD AUTO: 0.3 %
LYMPHOCYTES # BLD AUTO: 3.4 X10(3)/MCL (ref 0.6–4.6)
LYMPHOCYTES NFR BLD AUTO: 45.7 %
MCH RBC QN AUTO: 29.6 PG
MCHC RBC AUTO-ENTMCNC: 32.7 G/DL (ref 33–36)
MCV RBC AUTO: 90.4 FL (ref 80–94)
MONOCYTES # BLD AUTO: 1.03 X10(3)/MCL (ref 0.1–1.3)
MONOCYTES NFR BLD AUTO: 13.8 %
NEUTROPHILS # BLD AUTO: 2.94 X10(3)/MCL (ref 2.1–9.2)
NEUTROPHILS NFR BLD AUTO: 39.6 %
NRBC BLD AUTO-RTO: 0 %
PLATELET # BLD AUTO: 224 X10(3)/MCL (ref 130–400)
PMV BLD AUTO: 10.7 FL (ref 7.4–10.4)
POTASSIUM SERPL-SCNC: 4.2 MMOL/L (ref 3.5–5.1)
RBC # BLD AUTO: 4.26 X10(6)/MCL (ref 4.2–5.4)
SODIUM SERPL-SCNC: 142 MMOL/L (ref 136–145)
WBC # SPEC AUTO: 7.4 X10(3)/MCL (ref 4.5–11.5)

## 2023-03-13 PROCEDURE — 25000003 PHARM REV CODE 250: Performed by: INTERNAL MEDICINE

## 2023-03-13 PROCEDURE — 11000001 HC ACUTE MED/SURG PRIVATE ROOM

## 2023-03-13 PROCEDURE — 25000242 PHARM REV CODE 250 ALT 637 W/ HCPCS: Performed by: NURSE PRACTITIONER

## 2023-03-13 PROCEDURE — 85025 COMPLETE CBC W/AUTO DIFF WBC: CPT | Performed by: INTERNAL MEDICINE

## 2023-03-13 PROCEDURE — 63600175 PHARM REV CODE 636 W HCPCS: Performed by: GENERAL PRACTICE

## 2023-03-13 PROCEDURE — 25000003 PHARM REV CODE 250: Performed by: GENERAL PRACTICE

## 2023-03-13 PROCEDURE — 80048 BASIC METABOLIC PNL TOTAL CA: CPT | Performed by: INTERNAL MEDICINE

## 2023-03-13 PROCEDURE — 94640 AIRWAY INHALATION TREATMENT: CPT

## 2023-03-13 PROCEDURE — 63600175 PHARM REV CODE 636 W HCPCS: Performed by: INTERNAL MEDICINE

## 2023-03-13 PROCEDURE — 25000003 PHARM REV CODE 250: Performed by: NURSE PRACTITIONER

## 2023-03-13 RX ADMIN — DOCUSATE SODIUM 50 MG: 50 CAPSULE, LIQUID FILLED ORAL at 09:03

## 2023-03-13 RX ADMIN — MEROPENEM 2 G: 1 INJECTION, POWDER, FOR SOLUTION INTRAVENOUS at 02:03

## 2023-03-13 RX ADMIN — Medication 6 MG: at 09:03

## 2023-03-13 RX ADMIN — OXYCODONE HYDROCHLORIDE 5 MG: 5 TABLET ORAL at 10:03

## 2023-03-13 RX ADMIN — OXYCODONE HYDROCHLORIDE 5 MG: 5 TABLET ORAL at 05:03

## 2023-03-13 RX ADMIN — METHIMAZOLE 10 MG: 10 TABLET ORAL at 09:03

## 2023-03-13 RX ADMIN — IPRATROPIUM BROMIDE AND ALBUTEROL SULFATE 3 ML: 2.5; .5 SOLUTION RESPIRATORY (INHALATION) at 12:03

## 2023-03-13 RX ADMIN — PREDNISONE 20 MG: 20 TABLET ORAL at 09:03

## 2023-03-13 RX ADMIN — IPRATROPIUM BROMIDE AND ALBUTEROL SULFATE 3 ML: 2.5; .5 SOLUTION RESPIRATORY (INHALATION) at 10:03

## 2023-03-13 RX ADMIN — MUPIROCIN: 20 OINTMENT TOPICAL at 09:03

## 2023-03-13 RX ADMIN — MEROPENEM 2 G: 1 INJECTION, POWDER, FOR SOLUTION INTRAVENOUS at 09:03

## 2023-03-13 RX ADMIN — MUPIROCIN: 20 OINTMENT TOPICAL at 10:03

## 2023-03-13 RX ADMIN — ALUMINUM HYDROXIDE, MAGNESIUM HYDROXIDE, AND SIMETHICONE 30 ML: 200; 200; 20 SUSPENSION ORAL at 09:03

## 2023-03-13 RX ADMIN — MEROPENEM 2 G: 1 INJECTION, POWDER, FOR SOLUTION INTRAVENOUS at 05:03

## 2023-03-13 RX ADMIN — PANTOPRAZOLE SODIUM 40 MG: 40 TABLET, DELAYED RELEASE ORAL at 09:03

## 2023-03-13 NOTE — PLAN OF CARE
Problem: Adult Inpatient Plan of Care  Goal: Plan of Care Review  Outcome: Ongoing, Progressing  Flowsheets (Taken 3/12/2023 2229)  Plan of Care Reviewed With: patient  Goal: Patient-Specific Goal (Individualized)  Outcome: Ongoing, Progressing  Goal: Absence of Hospital-Acquired Illness or Injury  Outcome: Ongoing, Progressing  Goal: Optimal Comfort and Wellbeing  Outcome: Ongoing, Progressing  Goal: Readiness for Transition of Care  Outcome: Ongoing, Progressing     Problem: Infection  Goal: Absence of Infection Signs and Symptoms  Outcome: Ongoing, Progressing     Problem: Pain Acute  Goal: Acceptable Pain Control and Functional Ability  Outcome: Ongoing, Progressing     Problem: Asthma Exacerbation  Goal: Asthma Symptom Relief  Outcome: Ongoing, Progressing

## 2023-03-13 NOTE — PROGRESS NOTES
[facility]  Hospital Medicine Progress Note        Chief Complaint: Inpatient Follow-up for     HPI: 52 y.o. female who  PMH includes hypothyroidism, GERD, asthma, diverticulosis; presents to the ED at [facility] on 3/8/2023 with a primary complaint of Abdominal pain with associated nausea and diarrhea which started 3 days ago.  PT also reported dizziness. Patient reports she initially went to [facility] yesterday which she was admitted secondary to diverticulitis. .She reports she has had this in the past. She reported fever 2 days ago as well. Pt subsequently left [facility] AMA as it was reported she became upset as she was not getting narcotic pain medication. Pt presented here for further evaluation. Of note per [facility] provider: patient was recently admitted at [facility] and found to have diverticulitis and abscess.  She is status post IR drainage of pericolonic abscess on 2/16/2023 which was culture positive for ESBL E. coli sensitive to ertapenem and meropenem.  She completed 7 days of IV ertapenem on 03/02/2023.  She is scheduled to see her endocrinologist on 03/10/2023 and has an appointment with GI on 03/14/2023 with a scheduled colonoscopy on 04/14/2023.  CT of the abdomen and pelvis  with contrast demonstrated persistent inflammatory changes along the sigmoid with no drainable fluid collection today. Labs done unremarkable.   Initial VS BP  83/45 pulse 88 respirations  19 temperature 98.3° F O2 saturation 96% on room air.  Patient was given IV hydration which her blood pressure improved to 122/64. Pt is admitted to hospital medicine services for further management.     Interval Hx:   Patient seen and examined blood pressure much better no other complains  Objective/physical exam:  General: In no acute distress, afebrile  Chest: Clear to auscultation bilaterally  Heart: RRR, +S1, S2, no appreciable murmur  Abdomen: Soft, mild tenderness to palpate in the left lower quadrant   MSK: Warm,  no lower extremity edema, no clubbing or cyanosis  Neurologic: Alert and oriented x4,   VITAL SIGNS: 24 HRS MIN & MAX LAST   Temp  Min: 97.9 °F (36.6 °C)  Max: 98.6 °F (37 °C) 97.9 °F (36.6 °C)   BP  Min: 106/69  Max: 143/90 (!) 141/69     Pulse  Min: 64  Max: 73  72   Resp  Min: 16  Max: 20 20   SpO2  Min: 97 %  Max: 99 % 99 %         Recent Labs   Lab 03/10/23  0526 03/11/23  0708 03/13/23  0530   WBC 5.7 6.4 7.4   RBC 4.37 4.37 4.26   HGB 12.7 12.8 12.6   HCT 40.4 39.8 38.5   MCV 92.4 91.1 90.4   MCH 29.1 29.3 29.6   MCHC 31.4* 32.2* 32.7*   RDW 12.6 12.5 12.4    181 224   MPV 10.9* 10.6* 10.7*       Recent Labs   Lab 03/07/23  2318 03/08/23  1454 03/09/23  0434 03/10/23  0526 03/13/23  0530    142 142 144 142   K 4.3 4.5 4.0 4.5 4.2   CO2 27 24 26 29 29   BUN 8.4* 10.8 8.2* 7.0* 8.2*   CREATININE 0.73 0.87 0.71 0.68 0.62   CALCIUM 10.2 9.3 8.8 10.1 9.3   MG 1.90  --   --   --   --    ALBUMIN 3.1* 3.0* 2.7*  --   --    ALKPHOS 97 88 81  --   --    ALT 25 25 24  --   --    AST 23 24 18  --   --    BILITOT 0.2 0.4 0.4  --   --           Microbiology Results (last 7 days)       Procedure Component Value Units Date/Time    Blood Culture #1 **CANNOT BE ORDERED STAT** [[id number]]  (Normal) Collected: 03/08/23 6012    Order Status: Completed Specimen: Blood from PICC Line Updated: 03/12/23 1901     CULTURE, BLOOD ([facility]) No Growth At 96 Hours    Blood culture #2 **CANNOT BE ORDERED STAT** [[id number]]     Order Status: Sent Specimen: Blood              See below for Radiology    Scheduled Med:   docusate sodium  50 mg Oral Daily    enoxaparin  40 mg Subcutaneous Daily    meropenem (MERREM) IVPB  2 g Intravenous Q8H    methIMAzole  10 mg Oral BID    mupirocin   Nasal BID    pantoprazole  40 mg Oral Daily    predniSONE  20 mg Oral Daily        Continuous Infusions:         PRN Meds:  albuterol-ipratropium, aluminum-magnesium hydroxide-simethicone, melatonin, oxyCODONE, sodium chloride 0.9%        Assessment/Plan:  Hypotension   History of recurrent diverticulitis  Recent history of ESBL peridiverticular abscess abscess  status post IR drainage in February   COPD in mild exacerbation  GERD  Asthma   History of diverticulosis      Id is recommending total of 7 days of IV meropenem(started on March 9th)  Symptomatically improving  C diff was negative UA negative chest x-ray negative  Continue with pain control  Reduce the dose of prednisone to 20 mg and continue with DuoNebs   VTE prophylaxis: lovenox    Patient condition:  Stable/Fair/Guarded/ Serious/ Critical    Anticipated discharge and Disposition:         All diagnosis and differential diagnosis have been reviewed; assessment and plan has been documented; I have personally reviewed the labs and test results that are presently available; I have reviewed the patients medication list; I have reviewed the consulting providers response and recommendations. I have reviewed or attempted to review medical records based upon their availability    All of the patient's questions have been  addressed and answered. Patient's is agreeable to the above stated plan. I will continue to monitor closely and make adjustments to medical management as needed.  _____________________________________________________________________    Nutrition Status:    Radiology:  X-Ray Chest 1 View  Addendum: PICC line has been inserted the from a right approach tip projecting over  the region of the superior vena cava     Electronically signed by: Pillo Stein   Date:    03/09/2023   Time:    10:32  Narrative: EXAMINATION:  XR CHEST 1 VIEW    CLINICAL HISTORY:  picc placement;, .    COMPARISON:  February 20, 2023    FINDINGS:  No alveolar consolidation, effusion, or pneumothorax is seen.   The thoracic aorta is normal  cardiac silhouette, central pulmonary vessels and mediastinum are normal in size and are grossly unremarkable.   visualized osseous structures are grossly  unremarkable.  Impression: No acute chest disease is identified.    Electronically signed by: Pillo Stein  Date:    03/09/2023  Time:    08:58      Anamaria Ruffin MD   03/13/2023

## 2023-03-14 LAB — FUNGUS SPEC CULT: NORMAL

## 2023-03-14 PROCEDURE — 25000003 PHARM REV CODE 250: Performed by: GENERAL PRACTICE

## 2023-03-14 PROCEDURE — 94760 N-INVAS EAR/PLS OXIMETRY 1: CPT

## 2023-03-14 PROCEDURE — 94761 N-INVAS EAR/PLS OXIMETRY MLT: CPT

## 2023-03-14 PROCEDURE — 94640 AIRWAY INHALATION TREATMENT: CPT

## 2023-03-14 PROCEDURE — 63600175 PHARM REV CODE 636 W HCPCS: Performed by: INTERNAL MEDICINE

## 2023-03-14 PROCEDURE — 25000003 PHARM REV CODE 250: Performed by: INTERNAL MEDICINE

## 2023-03-14 PROCEDURE — 11000001 HC ACUTE MED/SURG PRIVATE ROOM

## 2023-03-14 PROCEDURE — 25000242 PHARM REV CODE 250 ALT 637 W/ HCPCS: Performed by: NURSE PRACTITIONER

## 2023-03-14 PROCEDURE — 25000003 PHARM REV CODE 250: Performed by: NURSE PRACTITIONER

## 2023-03-14 PROCEDURE — 63600175 PHARM REV CODE 636 W HCPCS: Performed by: GENERAL PRACTICE

## 2023-03-14 RX ORDER — PREDNISONE 10 MG/1
10 TABLET ORAL DAILY
Status: DISCONTINUED | OUTPATIENT
Start: 2023-03-15 | End: 2023-03-15 | Stop reason: HOSPADM

## 2023-03-14 RX ADMIN — ALUMINUM HYDROXIDE, MAGNESIUM HYDROXIDE, AND SIMETHICONE 30 ML: 200; 200; 20 SUSPENSION ORAL at 06:03

## 2023-03-14 RX ADMIN — METHIMAZOLE 10 MG: 10 TABLET ORAL at 09:03

## 2023-03-14 RX ADMIN — OXYCODONE HYDROCHLORIDE 5 MG: 5 TABLET ORAL at 01:03

## 2023-03-14 RX ADMIN — IPRATROPIUM BROMIDE AND ALBUTEROL SULFATE 3 ML: 2.5; .5 SOLUTION RESPIRATORY (INHALATION) at 05:03

## 2023-03-14 RX ADMIN — METHIMAZOLE 10 MG: 10 TABLET ORAL at 08:03

## 2023-03-14 RX ADMIN — PANTOPRAZOLE SODIUM 40 MG: 40 TABLET, DELAYED RELEASE ORAL at 08:03

## 2023-03-14 RX ADMIN — DOCUSATE SODIUM 50 MG: 50 CAPSULE, LIQUID FILLED ORAL at 08:03

## 2023-03-14 RX ADMIN — MEROPENEM 2 G: 1 INJECTION, POWDER, FOR SOLUTION INTRAVENOUS at 04:03

## 2023-03-14 RX ADMIN — PREDNISONE 20 MG: 20 TABLET ORAL at 08:03

## 2023-03-14 RX ADMIN — OXYCODONE HYDROCHLORIDE 5 MG: 5 TABLET ORAL at 06:03

## 2023-03-14 RX ADMIN — MUPIROCIN: 20 OINTMENT TOPICAL at 09:03

## 2023-03-14 RX ADMIN — OXYCODONE HYDROCHLORIDE 5 MG: 5 TABLET ORAL at 04:03

## 2023-03-14 RX ADMIN — MEROPENEM 2 G: 1 INJECTION, POWDER, FOR SOLUTION INTRAVENOUS at 12:03

## 2023-03-14 RX ADMIN — OXYCODONE HYDROCHLORIDE 5 MG: 5 TABLET ORAL at 10:03

## 2023-03-14 RX ADMIN — OXYCODONE HYDROCHLORIDE 5 MG: 5 TABLET ORAL at 08:03

## 2023-03-14 RX ADMIN — Medication 6 MG: at 10:03

## 2023-03-14 RX ADMIN — MEROPENEM 2 G: 1 INJECTION, POWDER, FOR SOLUTION INTRAVENOUS at 09:03

## 2023-03-14 NOTE — PROGRESS NOTES
[facility]  Hospital Medicine Progress Note        Chief Complaint: Inpatient Follow-up for     HPI: 52 y.o. female who  PMH includes hypothyroidism, GERD, asthma, diverticulosis; presents to the ED at [facility] on 3/8/2023 with a primary complaint of Abdominal pain with associated nausea and diarrhea which started 3 days ago.  PT also reported dizziness. Patient reports she initially went to [facility] yesterday which she was admitted secondary to diverticulitis. .She reports she has had this in the past. She reported fever 2 days ago as well. Pt subsequently left [facility] AMA as it was reported she became upset as she was not getting narcotic pain medication. Pt presented here for further evaluation. Of note per [facility] provider: patient was recently admitted at [facility] and found to have diverticulitis and abscess.  She is status post IR drainage of pericolonic abscess on 2/16/2023 which was culture positive for ESBL E. coli sensitive to ertapenem and meropenem.  She completed 7 days of IV ertapenem on 03/02/2023.  She is scheduled to see her endocrinologist on 03/10/2023 and has an appointment with GI on 03/14/2023 with a scheduled colonoscopy on 04/14/2023.  CT of the abdomen and pelvis  with contrast demonstrated persistent inflammatory changes along the sigmoid with no drainable fluid collection today. Labs done unremarkable.   Initial VS BP  83/45 pulse 88 respirations  19 temperature 98.3° F O2 saturation 96% on room air.  Patient was given IV hydration which her blood pressure improved to 122/64. Pt is admitted to hospital medicine services for further management.     Interval Hx:   Patient seen and examined blood pressure much better no other complains        Objective/physical exam:  General: In no acute distress, afebrile  Chest: Clear to auscultation bilaterally  Heart: RRR, +S1, S2, no appreciable murmur  Abdomen: Soft, mild tenderness to palpate in the left lower quadrant   MSK:  Warm, no lower extremity edema, no clubbing or cyanosis  Neurologic: Alert and oriented x4,   VITAL SIGNS: 24 HRS MIN & MAX LAST   Temp  Min: 97.8 °F (36.6 °C)  Max: 98.3 °F (36.8 °C) 98.2 °F (36.8 °C)   BP  Min: 100/72  Max: 118/69 100/72     Pulse  Min: 61  Max: 78  78   Resp  Min: 16  Max: 20 20   SpO2  Min: 95 %  Max: 99 % 95 %         Recent Labs   Lab 03/10/23  0526 03/11/23  0708 03/13/23  0530   WBC 5.7 6.4 7.4   RBC 4.37 4.37 4.26   HGB 12.7 12.8 12.6   HCT 40.4 39.8 38.5   MCV 92.4 91.1 90.4   MCH 29.1 29.3 29.6   MCHC 31.4* 32.2* 32.7*   RDW 12.6 12.5 12.4    181 224   MPV 10.9* 10.6* 10.7*       Recent Labs   Lab 03/07/23  2318 03/08/23  1454 03/09/23  0434 03/10/23  0526 03/13/23  0530    142 142 144 142   K 4.3 4.5 4.0 4.5 4.2   CO2 27 24 26 29 29   BUN 8.4* 10.8 8.2* 7.0* 8.2*   CREATININE 0.73 0.87 0.71 0.68 0.62   CALCIUM 10.2 9.3 8.8 10.1 9.3   MG 1.90  --   --   --   --    ALBUMIN 3.1* 3.0* 2.7*  --   --    ALKPHOS 97 88 81  --   --    ALT 25 25 24  --   --    AST 23 24 18  --   --    BILITOT 0.2 0.4 0.4  --   --           Microbiology Results (last 7 days)       Procedure Component Value Units Date/Time    Blood Culture #1 **CANNOT BE ORDERED STAT** [[id number]]  (Normal) Collected: 03/08/23 1653    Order Status: Completed Specimen: Blood from PICC Line Updated: 03/13/23 1900     CULTURE, BLOOD ([facility]) No Growth at 5 days    Blood culture #2 **CANNOT BE ORDERED STAT** [[id number]]     Order Status: Canceled Specimen: Blood              See below for Radiology    Scheduled Med:   docusate sodium  50 mg Oral Daily    enoxaparin  40 mg Subcutaneous Daily    meropenem (MERREM) IVPB  2 g Intravenous Q8H    methIMAzole  10 mg Oral BID    mupirocin   Nasal BID    pantoprazole  40 mg Oral Daily    predniSONE  20 mg Oral Daily        Continuous Infusions:         PRN Meds:  albuterol-ipratropium, aluminum-magnesium hydroxide-simethicone, melatonin, oxyCODONE, sodium chloride 0.9%        Assessment/Plan:  Hypotension   History of recurrent diverticulitis  Recent history of ESBL peridiverticular abscess abscess  status post IR drainage in February   COPD in mild exacerbation  GERD  Asthma   History of diverticulosis      Id is recommending total of 7 days of IV meropenem tomorrow will be day 7 of IV antibiotics    Symptomatically improving  C diff was negative UA negative chest x-ray negative  Continue with pain control  Reduce the dose of prednisone to 10 mg and continue with DuoNebs   Discharge tomorrow once patient finishes IV antibiotics  VTE prophylaxis: lovenox    Patient condition:  Stable/Fair/Guarded/ Serious/ Critical    Anticipated discharge and Disposition:         All diagnosis and differential diagnosis have been reviewed; assessment and plan has been documented; I have personally reviewed the labs and test results that are presently available; I have reviewed the patients medication list; I have reviewed the consulting providers response and recommendations. I have reviewed or attempted to review medical records based upon their availability    All of the patient's questions have been  addressed and answered. Patient's is agreeable to the above stated plan. I will continue to monitor closely and make adjustments to medical management as needed.  _____________________________________________________________________    Nutrition Status:    Radiology:  X-Ray Chest 1 View  Addendum: PICC line has been inserted the from a right approach tip projecting over  the region of the superior vena cava     Electronically signed by: Pillo Stein   Date:    03/09/2023   Time:    10:32  Narrative: EXAMINATION:  XR CHEST 1 VIEW    CLINICAL HISTORY:  picc placement;, .    COMPARISON:  February 20, 2023    FINDINGS:  No alveolar consolidation, effusion, or pneumothorax is seen.   The thoracic aorta is normal  cardiac silhouette, central pulmonary vessels and mediastinum are normal in size and  are grossly unremarkable.   visualized osseous structures are grossly unremarkable.  Impression: No acute chest disease is identified.    Electronically signed by: Pillo Stein  Date:    03/09/2023  Time:    08:58      Anamaria Ruffin MD   03/14/2023

## 2023-03-14 NOTE — PLAN OF CARE
Problem: Adult Inpatient Plan of Care  Goal: Plan of Care Review  Outcome: Ongoing, Progressing  Flowsheets (Taken 3/13/2023 9063)  Plan of Care Reviewed With: patient  Goal: Patient-Specific Goal (Individualized)  Outcome: Ongoing, Progressing  Goal: Absence of Hospital-Acquired Illness or Injury  Outcome: Ongoing, Progressing  Goal: Optimal Comfort and Wellbeing  Outcome: Ongoing, Progressing  Goal: Readiness for Transition of Care  Outcome: Ongoing, Progressing     Problem: Infection  Goal: Absence of Infection Signs and Symptoms  Outcome: Ongoing, Progressing     Problem: Pain Acute  Goal: Acceptable Pain Control and Functional Ability  Outcome: Ongoing, Progressing     Problem: Asthma Exacerbation  Goal: Asthma Symptom Relief  Outcome: Ongoing, Progressing

## 2023-03-14 NOTE — PROGRESS NOTES
"Inpatient Nutrition Evaluation    Admit Date: 3/8/2023   Total duration of encounter: 6 days    Nutrition Recommendation/Prescription     Continue regular diet as tolerated  RD to monitor po intake and weight changes    Nutrition Assessment     Chart Review    Reason Seen: length of stay    Malnutrition Screening Tool Results   Have you recently lost weight without trying?: No  Have you been eating poorly because of a decreased appetite?: No   MST Score: 0     Diagnosis:  Hypotension   History of recurrent diverticulitis  Recent history of ESBL peridiverticular abscess abscess  status post IR drainage in February   COPD in mild exacerbation  GERD  Asthma   History of diverticulosis    Relevant Medical History: hypothyroidism, GERD, asthma, diverticulosis    Nutrition-Related Medications: colace, Protonix, prednisone     Nutrition-Related Labs: 3/13: BUN-8.2      Diet Order: Diet Adult Regular  Oral Supplement Order: none  Appetite/Oral Intake: good/% of meals  Factors Affecting Nutritional Intake: nausea  Food/Jehovah's witness/Cultural Preferences: none reported  Food Allergies: none reported       Wound(s):       Comments    3/14: pt reports good appetite, with nausea, however resolved with meds. Pt refusing to provide weight history or take weight; pt eating lunch and on the phone with son at time of rounds. Weight of 67.8 kg (149 lb) on 3/8 and previous weight of 135 lb on 2/20. Weight fluctuations noted per EMR weights; current weight stable at this time.    Anthropometrics    Height: 4' 11" (149.9 cm) Height Method: Stated  Last Weight: 67.8 kg (149 lb 7.6 oz) (03/08/23 2316) Weight Method: Bed Scale  BMI (Calculated): 30.2  BMI Classification: obese grade I (BMI 30-34.9)     Ideal Body Weight (IBW), Female: 95 lb     % Ideal Body Weight, Female (lb): 157.34 %                             Usual Weight Provided By: EMR weight history    Wt Readings from Last 3 Encounters:   03/08/23 2316 67.8 kg (149 lb 7.6 oz) "   03/08/23 1401 61.2 kg (135 lb)   03/07/23 2113 65.4 kg (144 lb 2.9 oz)   02/20/23 2128 61.2 kg (135 lb)      Weight Change(s) Since Admission:  Admit Weight: 61.2 kg (135 lb) (03/08/23 1401)      Patient Education    Not applicable.    Monitoring & Evaluation     Dietitian will monitor food and beverage intake and weight change.  Nutrition Risk/Follow-Up: low (follow-up in 5-7 days)  Patients assigned 'low nutrition risk' status do not qualify for a full nutritional assessment but will be monitored and re-evaluated in a 5-7 day time period. Please consult if re-evaluation needed sooner.    Adilene Juan, Registration Eligible, Provisional LDN

## 2023-03-15 ENCOUNTER — TELEPHONE (OUTPATIENT)
Dept: PULMONOLOGY | Facility: CLINIC | Age: 53
End: 2023-03-15
Payer: MEDICAID

## 2023-03-15 ENCOUNTER — TELEPHONE (OUTPATIENT)
Dept: SMOKING CESSATION | Facility: CLINIC | Age: 53
End: 2023-03-15
Payer: MEDICAID

## 2023-03-15 ENCOUNTER — PATIENT MESSAGE (OUTPATIENT)
Dept: CARDIOLOGY | Facility: CLINIC | Age: 53
End: 2023-03-15
Payer: MEDICAID

## 2023-03-15 VITALS
HEIGHT: 59 IN | RESPIRATION RATE: 16 BRPM | OXYGEN SATURATION: 98 % | WEIGHT: 149.5 LBS | DIASTOLIC BLOOD PRESSURE: 70 MMHG | HEART RATE: 61 BPM | TEMPERATURE: 98 F | SYSTOLIC BLOOD PRESSURE: 109 MMHG | BODY MASS INDEX: 30.14 KG/M2

## 2023-03-15 LAB
ABS NEUT (OLG): 2.7 X10(3)/MCL (ref 2.1–9.2)
ANION GAP SERPL CALC-SCNC: 7 MEQ/L
BUN SERPL-MCNC: 11.3 MG/DL (ref 9.8–20.1)
CALCIUM SERPL-MCNC: 9.7 MG/DL (ref 8.4–10.2)
CHLORIDE SERPL-SCNC: 104 MMOL/L (ref 98–107)
CO2 SERPL-SCNC: 30 MMOL/L (ref 22–29)
CREAT SERPL-MCNC: 0.67 MG/DL (ref 0.55–1.02)
CREAT/UREA NIT SERPL: 17
EOSINOPHIL NFR BLD MANUAL: 0.21 X10(3)/MCL (ref 0–0.9)
EOSINOPHIL NFR BLD MANUAL: 2 %
ERYTHROCYTE [DISTWIDTH] IN BLOOD BY AUTOMATED COUNT: 12.5 % (ref 11.5–17)
GFR SERPLBLD CREATININE-BSD FMLA CKD-EPI: >60 MLS/MIN/1.73/M2
GLUCOSE SERPL-MCNC: 73 MG/DL (ref 74–100)
HCT VFR BLD AUTO: 43 % (ref 37–47)
HGB BLD-MCNC: 13.9 G/DL (ref 12–16)
INSTRUMENT WBC (OLG): 10.4 X10(3)/MCL
LYMPHOCYTES NFR BLD MANUAL: 6.55 X10(3)/MCL
LYMPHOCYTES NFR BLD MANUAL: 63 %
MCH RBC QN AUTO: 29 PG
MCHC RBC AUTO-ENTMCNC: 32.3 G/DL (ref 33–36)
MCV RBC AUTO: 89.8 FL (ref 80–94)
MONOCYTES NFR BLD MANUAL: 1.04 X10(3)/MCL (ref 0.1–1.3)
MONOCYTES NFR BLD MANUAL: 10 %
NEUTROPHILS NFR BLD MANUAL: 26 %
NRBC BLD AUTO-RTO: 0 %
PLATELET # BLD AUTO: 269 X10(3)/MCL (ref 130–400)
PLATELET # BLD EST: NORMAL 10*3/UL
PMV BLD AUTO: 10.5 FL (ref 7.4–10.4)
POTASSIUM SERPL-SCNC: 4.7 MMOL/L (ref 3.5–5.1)
RBC # BLD AUTO: 4.79 X10(6)/MCL (ref 4.2–5.4)
RBC MORPH BLD: NORMAL
SODIUM SERPL-SCNC: 141 MMOL/L (ref 136–145)
WBC # SPEC AUTO: 10.4 X10(3)/MCL (ref 4.5–11.5)

## 2023-03-15 PROCEDURE — 63600175 PHARM REV CODE 636 W HCPCS: Performed by: GENERAL PRACTICE

## 2023-03-15 PROCEDURE — 63600175 PHARM REV CODE 636 W HCPCS: Performed by: INTERNAL MEDICINE

## 2023-03-15 PROCEDURE — 25000003 PHARM REV CODE 250: Performed by: INTERNAL MEDICINE

## 2023-03-15 PROCEDURE — 80048 BASIC METABOLIC PNL TOTAL CA: CPT | Performed by: INTERNAL MEDICINE

## 2023-03-15 PROCEDURE — 85027 COMPLETE CBC AUTOMATED: CPT | Performed by: INTERNAL MEDICINE

## 2023-03-15 PROCEDURE — 25000003 PHARM REV CODE 250: Performed by: GENERAL PRACTICE

## 2023-03-15 PROCEDURE — 85025 COMPLETE CBC W/AUTO DIFF WBC: CPT | Performed by: INTERNAL MEDICINE

## 2023-03-15 RX ORDER — ALBUTEROL SULFATE 90 UG/1
1-2 AEROSOL, METERED RESPIRATORY (INHALATION) EVERY 6 HOURS PRN
Qty: 0.025 G | Refills: 0 | Status: SHIPPED | OUTPATIENT
Start: 2023-03-15 | End: 2023-03-24 | Stop reason: SDUPTHER

## 2023-03-15 RX ORDER — IPRATROPIUM BROMIDE AND ALBUTEROL SULFATE 2.5; .5 MG/3ML; MG/3ML
3 SOLUTION RESPIRATORY (INHALATION) EVERY 4 HOURS PRN
Qty: 75 ML | Refills: 0 | Status: SHIPPED | OUTPATIENT
Start: 2023-03-15 | End: 2023-03-24 | Stop reason: SDUPTHER

## 2023-03-15 RX ORDER — OMEPRAZOLE 40 MG/1
40 CAPSULE, DELAYED RELEASE ORAL DAILY
Qty: 30 CAPSULE | Refills: 0 | Status: SHIPPED | OUTPATIENT
Start: 2023-03-15 | End: 2023-11-30

## 2023-03-15 RX ADMIN — MEROPENEM 2 G: 1 INJECTION, POWDER, FOR SOLUTION INTRAVENOUS at 04:03

## 2023-03-15 RX ADMIN — MEROPENEM 2 G: 1 INJECTION, POWDER, FOR SOLUTION INTRAVENOUS at 09:03

## 2023-03-15 RX ADMIN — PREDNISONE 10 MG: 10 TABLET ORAL at 08:03

## 2023-03-15 RX ADMIN — METHIMAZOLE 10 MG: 10 TABLET ORAL at 08:03

## 2023-03-15 RX ADMIN — DOCUSATE SODIUM 50 MG: 50 CAPSULE, LIQUID FILLED ORAL at 08:03

## 2023-03-15 RX ADMIN — PANTOPRAZOLE SODIUM 40 MG: 40 TABLET, DELAYED RELEASE ORAL at 08:03

## 2023-03-15 RX ADMIN — OXYCODONE HYDROCHLORIDE 5 MG: 5 TABLET ORAL at 12:03

## 2023-03-15 RX ADMIN — OXYCODONE HYDROCHLORIDE 5 MG: 5 TABLET ORAL at 08:03

## 2023-03-15 NOTE — TELEPHONE ENCOUNTER
Patient contacted after not checking in for today's virtual visit.  Patient is currently admitted to the hospital and cancelled today's visit.  Patient stated she would call me a later time to reschedule.  Contact information given.

## 2023-03-15 NOTE — TELEPHONE ENCOUNTER
Noticed patient was hospitalized. Called and spoke with her about appointment tomorrow afternoon. Patient asked to cancel and she will call back once she has been discharged.

## 2023-03-15 NOTE — DISCHARGE SUMMARY
[facility] Medicine Discharge Summary    Admit Date: 3/8/2023  Discharge Date and Time: 3/15/58306:11 PM  Admitting Physician: LOUIE Team  Discharging Physician: Anamaria Ruffin MD.  Primary Care Physician: YEHUDA Adamson  Consults: Infectious Disease    Discharge Diagnoses:  Hypotension   History of recurrent diverticulitis  Recent history of ESBL peridiverticular abscess abscess  status post IR drainage in February   COPD in mild exacerbation  GERD  Asthma   History of diverticulosis    Hospital Course:    52 y.o. female who  PMH includes hypothyroidism, GERD, asthma, diverticulosis; presents to the ED at [facility] on 3/8/2023 with a primary complaint of Abdominal pain with associated nausea and diarrhea which started 3 days ago.  PT also reported dizziness. Patient reports she initially went to [facility] yesterday which she was admitted secondary to diverticulitis. .She reports she has had this in the past. She reported fever 2 days ago as well. Pt subsequently left [facility] AMA as it was reported she became upset as she was not getting narcotic pain medication. Pt presented here for further evaluation. Of note per [facility] provider: patient was recently admitted at [facility] and found to have diverticulitis and abscess.  She is status post IR drainage of pericolonic abscess on 2/16/2023 which was culture positive for ESBL E. coli sensitive to ertapenem and meropenem.  She completed 7 days of IV ertapenem on 03/02/2023.  She is scheduled to see her endocrinologist on 03/10/2023 and has an appointment with GI on 03/14/2023 with a scheduled colonoscopy on 04/14/2023.  CT of the abdomen and pelvis  with contrast demonstrated persistent inflammatory changes along the sigmoid with no drainable fluid collection today. Labs done unremarkable.   Initial VS BP  83/45 pulse 88 respirations  19 temperature 98.3° F O2 saturation 96% on room air.  Patient was given IV hydration which her  blood pressure improved to 122/64. Pt is admitted to hospital medicine services for further management.   CT of the abdomen showed minimal residual inflammation around the sigmoid colon however no residual abscess was noted.  Patient was started back on meropenem ID was consulted and they recommended 7 days of IV antibiotics patient was found to be hypotensive initially was given IV fluids blood pressure improved patient completed 7 days of IV antibiotic course we could not do PICC line with outpatient antibiotics because of frequent AMA is so a decision was made that she would benefit from completing antibiotics under the monitored conditions so patient was discharged home after 7 days of IV antibiotics in stable condition    Pt was seen and examined on the day of discharge  Vitals:  VITAL SIGNS: 24 HRS MIN & MAX LAST   Temp  Min: 97.8 °F (36.6 °C)  Max: 98.4 °F (36.9 °C) 97.8 °F (36.6 °C)   BP  Min: 99/64  Max: 115/80 114/73   Pulse  Min: 60  Max: 80  60   Resp  Min: 16  Max: 20 18   SpO2  Min: 96 %  Max: 98 % 98 %       Physical Exam:  General: In no acute distress, afebrile  Chest: Clear to auscultation bilaterally  Heart: RRR, +S1, S2, no appreciable murmur  Abdomen: Soft, mild tenderness to palpate in the left lower quadrant   MSK: Warm, no lower extremity edema, no clubbing or cyanosis  Neurologic: Alert and oriented x4    Procedures Performed: No admission procedures for hospital encounter.     Significant Diagnostic Studies: See Full reports for all details    Recent Labs   Lab 03/11/23  0708 03/13/23  0530 03/15/23  0853   WBC 6.4 7.4 10.4   RBC 4.37 4.26 4.79   HGB 12.8 12.6 13.9   HCT 39.8 38.5 43.0   MCV 91.1 90.4 89.8   MCH 29.3 29.6 29.0   MCHC 32.2* 32.7* 32.3*   RDW 12.5 12.4 12.5    224 269   MPV 10.6* 10.7* 10.5*       Recent Labs   Lab 03/08/23  1454 03/09/23  0434 03/10/23  0526 03/13/23  0530 03/15/23  0853    142 144 142 141   K 4.5 4.0 4.5 4.2 4.7   CO2 24 26 29 29 30*   BUN 10.8  8.2* 7.0* 8.2* 11.3   CREATININE 0.87 0.71 0.68 0.62 0.67   CALCIUM 9.3 8.8 10.1 9.3 9.7   ALBUMIN 3.0* 2.7*  --   --   --    ALKPHOS 88 81  --   --   --    ALT 25 24  --   --   --    AST 24 18  --   --   --    BILITOT 0.4 0.4  --   --   --         Microbiology Results (last 7 days)       Procedure Component Value Units Date/Time    Blood Culture #1 **CANNOT BE ORDERED STAT** [[id number]]  (Normal) Collected: 03/08/23 1654    Order Status: Completed Specimen: Blood from PICC Line Updated: 03/13/23 1900     CULTURE, BLOOD ([facility]) No Growth at 5 days    Blood culture #2 **CANNOT BE ORDERED STAT** [[id number]]     Order Status: Canceled Specimen: Blood              X-Ray Chest 1 View  Addendum: PICC line has been inserted the from a right approach tip projecting over  the region of the superior vena cava     Electronically signed by: Pillo Stein   Date:    03/09/2023   Time:    10:32  Narrative: EXAMINATION:  XR CHEST 1 VIEW    CLINICAL HISTORY:  picc placement;, .    COMPARISON:  February 20, 2023    FINDINGS:  No alveolar consolidation, effusion, or pneumothorax is seen.   The thoracic aorta is normal  cardiac silhouette, central pulmonary vessels and mediastinum are normal in size and are grossly unremarkable.   visualized osseous structures are grossly unremarkable.  Impression: No acute chest disease is identified.    Electronically signed by: Pillo Stein  Date:    03/09/2023  Time:    08:58         Medication List        CHANGE how you take these medications      albuterol-ipratropium 2.5 mg-0.5 mg/3 mL nebulizer solution  Commonly known as: DUO-NEB  Take 3 mLs by nebulization every 4 (four) hours as needed for Shortness of Breath.  What changed:   how to take this  reasons to take this            CONTINUE taking these medications      albuterol 90 mcg/actuation inhaler  Commonly known as: PROVENTIL/VENTOLIN HFA  Inhale 1-2 puffs into the lungs every 6 (six) hours as needed for Wheezing. Rescue      budesonide-formoterol 160-4.5 mcg 160-4.5 mcg/actuation Hfaa  Commonly known as: SYMBICORT  Inhale 2 puffs into the lungs every 12 (twelve) hours. Controller     dicyclomine 20 mg tablet  Commonly known as: BENTYL  Take 1 tablet (20 mg total) by mouth every 6 to 8 hours as needed (abdominal cramping).     fluticasone propionate 50 mcg/actuation nasal spray  Commonly known as: FLONASE     methIMAzole 10 MG Tab  Commonly known as: TAPAZOLE  Take 1 tablet (10 mg total) by mouth 2 (two) times daily.     montelukast 10 mg tablet  Commonly known as: SINGULAIR     omeprazole 40 MG capsule  Commonly known as: PRILOSEC  Take 1 capsule (40 mg total) by mouth once daily.     ondansetron 4 MG Tbdl  Commonly known as: ZOFRAN-ODT  Take 1 tablet (4 mg total) by mouth every 8 (eight) hours as needed.     propranoloL 60 MG 24 hr capsule  Commonly known as: INDERAL LA  Take 1 capsule (60 mg total) by mouth once daily.     SUTAB 1.479-0.188- 0.225 gram tablet  Generic drug: sod sulf-pot chloride-mag sulf  Take 12 tablets by mouth once daily. Take according to package instructions with indicated amount of water.            STOP taking these medications      cetirizine 10 MG tablet  Commonly known as: ZYRTEC               Where to Get Your Medications        These medications were sent to [id number] [address] AT [facility] & [address] [address]      Hours: 24-hours Phone: [phone]   albuterol 90 mcg/actuation inhaler  albuterol-ipratropium 2.5 mg-0.5 mg/3 mL nebulizer solution  omeprazole 40 MG capsule          Explained in detail to the patient about the discharge plan, medications, and follow-up visits. Pt understands and agrees with the treatment plan  Discharge Disposition: Left Against Medical Advice   Discharged Condition: stable  Diet-   Dietary Orders (From admission, onward)       Start     Ordered    03/08/23 2056  Diet Adult Regular   (Diet/Nutrition [facility])  Diet effective now         03/08/23 2056                   Medications Per DC med rec  Activities as tolerated   Follow-up Information       [facility] Follow up.    Specialty: Gastroenterology  Why: Referral has been sent, office will call you for an appointment  Contact information:  [address] [facility] [address] [phone]  Additional information:  Entrance 1             YEHUDA Adamson Follow up in 1 week(s).    Specialty: Family Medicine  Contact information:  [address] [facility] [address] [phone]                           For further questions contact hospitalist office    Discharge time 33 minutes    For worsening symptoms, chest pain, shortness of breath, increased abdominal pain, high grade fever, stroke or stroke like symptoms, immediately go to the nearest Emergency Room or call 911 as soon as possible.      Anamaria Rojas M.D, on 3/15/2023. at 3:11 PM.

## 2023-03-16 ENCOUNTER — PATIENT OUTREACH (OUTPATIENT)
Dept: ADMINISTRATIVE | Facility: CLINIC | Age: 53
End: 2023-03-16
Payer: MEDICAID

## 2023-03-16 NOTE — PROGRESS NOTES
C3 nurse spoke with Niya Moeller for a TCC post hospital discharge follow up call. The patient does not have a scheduled HOSFU appointment with YEHUDA Adamson within 5-7 days post hospital discharge date 3/15/23. C3 nurse was unable to schedule HOSFU appointment in [facility].

## 2023-03-24 ENCOUNTER — HOSPITAL ENCOUNTER (EMERGENCY)
Facility: HOSPITAL | Age: 53
Discharge: HOME | End: 2023-03-24
Attending: STUDENT IN AN ORGANIZED HEALTH CARE EDUCATION/TRAINING PROGRAM
Payer: MEDICAID

## 2023-03-24 VITALS
RESPIRATION RATE: 22 BRPM | TEMPERATURE: 98 F | SYSTOLIC BLOOD PRESSURE: 115 MMHG | BODY MASS INDEX: 29.95 KG/M2 | OXYGEN SATURATION: 99 % | WEIGHT: 148.56 LBS | HEART RATE: 89 BPM | DIASTOLIC BLOOD PRESSURE: 75 MMHG | HEIGHT: 59 IN

## 2023-03-24 DIAGNOSIS — J44.1 COPD EXACERBATION: Primary | ICD-10-CM

## 2023-03-24 DIAGNOSIS — Z86.19 HISTORY OF THRUSH: ICD-10-CM

## 2023-03-24 LAB
ALBUMIN SERPL-MCNC: 3.3 G/DL (ref 3.5–5)
ALBUMIN/GLOB SERPL: 0.9 RATIO (ref 1.1–2)
ALP SERPL-CCNC: 84 UNIT/L (ref 40–150)
ALT SERPL-CCNC: 25 UNIT/L (ref 0–55)
AST SERPL-CCNC: 20 UNIT/L (ref 5–34)
BASOPHILS # BLD AUTO: 0.01 X10(3)/MCL (ref 0–0.2)
BASOPHILS NFR BLD AUTO: 0.2 %
BILIRUBIN DIRECT+TOT PNL SERPL-MCNC: 0.4 MG/DL
BNP BLD-MCNC: 20.9 PG/ML
BUN SERPL-MCNC: 5.1 MG/DL (ref 9.8–20.1)
CALCIUM SERPL-MCNC: 10 MG/DL (ref 8.4–10.2)
CHLORIDE SERPL-SCNC: 111 MMOL/L (ref 98–107)
CO2 SERPL-SCNC: 23 MMOL/L (ref 22–29)
CREAT SERPL-MCNC: 0.87 MG/DL (ref 0.55–1.02)
EOSINOPHIL # BLD AUTO: 0.34 X10(3)/MCL (ref 0–0.9)
EOSINOPHIL NFR BLD AUTO: 5.7 %
ERYTHROCYTE [DISTWIDTH] IN BLOOD BY AUTOMATED COUNT: 13.8 % (ref 11.5–17)
FLUAV AG UPPER RESP QL IA.RAPID: NOT DETECTED
FLUBV AG UPPER RESP QL IA.RAPID: NOT DETECTED
GFR SERPLBLD CREATININE-BSD FMLA CKD-EPI: >60 MLS/MIN/1.73/M2
GLOBULIN SER-MCNC: 3.6 GM/DL (ref 2.4–3.5)
GLUCOSE SERPL-MCNC: 94 MG/DL (ref 74–100)
HCT VFR BLD AUTO: 39.4 % (ref 37–47)
HGB BLD-MCNC: 13.1 G/DL (ref 12–16)
IMM GRANULOCYTES # BLD AUTO: 0.01 X10(3)/MCL (ref 0–0.04)
IMM GRANULOCYTES NFR BLD AUTO: 0.2 %
LYMPHOCYTES # BLD AUTO: 2.86 X10(3)/MCL (ref 0.6–4.6)
LYMPHOCYTES NFR BLD AUTO: 48.2 %
MCH RBC QN AUTO: 29.8 PG (ref 27–31)
MCHC RBC AUTO-ENTMCNC: 33.2 G/DL (ref 33–36)
MCV RBC AUTO: 89.5 FL (ref 80–94)
MONOCYTES # BLD AUTO: 0.62 X10(3)/MCL (ref 0.1–1.3)
MONOCYTES NFR BLD AUTO: 10.5 %
NEUTROPHILS # BLD AUTO: 2.09 X10(3)/MCL (ref 2.1–9.2)
NEUTROPHILS NFR BLD AUTO: 35.2 %
NRBC BLD AUTO-RTO: 0 %
PLATELET # BLD AUTO: 264 X10(3)/MCL (ref 130–400)
PMV BLD AUTO: 10.7 FL (ref 7.4–10.4)
POTASSIUM SERPL-SCNC: 3.9 MMOL/L (ref 3.5–5.1)
PROT SERPL-MCNC: 6.9 GM/DL (ref 6.4–8.3)
RBC # BLD AUTO: 4.4 X10(6)/MCL (ref 4.2–5.4)
RSV A 5' UTR RNA NPH QL NAA+PROBE: NOT DETECTED
SARS-COV-2 RNA RESP QL NAA+PROBE: NOT DETECTED
SODIUM SERPL-SCNC: 144 MMOL/L (ref 136–145)
TROPONIN I SERPL-MCNC: <0.01 NG/ML (ref 0–0.04)
WBC # SPEC AUTO: 5.9 X10(3)/MCL (ref 4.5–11.5)

## 2023-03-24 PROCEDURE — 94640 AIRWAY INHALATION TREATMENT: CPT

## 2023-03-24 PROCEDURE — 84484 ASSAY OF TROPONIN QUANT: CPT | Performed by: STUDENT IN AN ORGANIZED HEALTH CARE EDUCATION/TRAINING PROGRAM

## 2023-03-24 PROCEDURE — 63600175 PHARM REV CODE 636 W HCPCS: Performed by: STUDENT IN AN ORGANIZED HEALTH CARE EDUCATION/TRAINING PROGRAM

## 2023-03-24 PROCEDURE — 25000242 PHARM REV CODE 250 ALT 637 W/ HCPCS: Performed by: STUDENT IN AN ORGANIZED HEALTH CARE EDUCATION/TRAINING PROGRAM

## 2023-03-24 PROCEDURE — 96365 THER/PROPH/DIAG IV INF INIT: CPT

## 2023-03-24 PROCEDURE — 85025 COMPLETE CBC W/AUTO DIFF WBC: CPT | Performed by: STUDENT IN AN ORGANIZED HEALTH CARE EDUCATION/TRAINING PROGRAM

## 2023-03-24 PROCEDURE — 0241U COVID/RSV/FLU A&B PCR: CPT | Performed by: STUDENT IN AN ORGANIZED HEALTH CARE EDUCATION/TRAINING PROGRAM

## 2023-03-24 PROCEDURE — 96375 TX/PRO/DX INJ NEW DRUG ADDON: CPT

## 2023-03-24 PROCEDURE — 80053 COMPREHEN METABOLIC PANEL: CPT | Performed by: STUDENT IN AN ORGANIZED HEALTH CARE EDUCATION/TRAINING PROGRAM

## 2023-03-24 PROCEDURE — 83880 ASSAY OF NATRIURETIC PEPTIDE: CPT | Performed by: STUDENT IN AN ORGANIZED HEALTH CARE EDUCATION/TRAINING PROGRAM

## 2023-03-24 PROCEDURE — 99285 EMERGENCY DEPT VISIT HI MDM: CPT | Mod: 25

## 2023-03-24 PROCEDURE — 96372 THER/PROPH/DIAG INJ SC/IM: CPT | Performed by: STUDENT IN AN ORGANIZED HEALTH CARE EDUCATION/TRAINING PROGRAM

## 2023-03-24 PROCEDURE — 93005 ELECTROCARDIOGRAM TRACING: CPT

## 2023-03-24 RX ORDER — NYSTATIN 100000 [USP'U]/ML
500000 SUSPENSION ORAL 4 TIMES DAILY
Qty: 60 ML | Refills: 0 | Status: SHIPPED | OUTPATIENT
Start: 2023-03-24 | End: 2023-03-27

## 2023-03-24 RX ORDER — TERBUTALINE SULFATE 1 MG/ML
0.25 INJECTION SUBCUTANEOUS ONCE
Status: COMPLETED | OUTPATIENT
Start: 2023-03-24 | End: 2023-03-24

## 2023-03-24 RX ORDER — METHYLPREDNISOLONE SOD SUCC 125 MG
125 VIAL (EA) INJECTION
Status: COMPLETED | OUTPATIENT
Start: 2023-03-24 | End: 2023-03-24

## 2023-03-24 RX ORDER — MAGNESIUM SULFATE HEPTAHYDRATE 40 MG/ML
2 INJECTION, SOLUTION INTRAVENOUS
Status: COMPLETED | OUTPATIENT
Start: 2023-03-24 | End: 2023-03-24

## 2023-03-24 RX ORDER — PREDNISONE 20 MG/1
60 TABLET ORAL DAILY
Qty: 12 TABLET | Refills: 0 | Status: SHIPPED | OUTPATIENT
Start: 2023-03-24 | End: 2023-03-28

## 2023-03-24 RX ORDER — IPRATROPIUM BROMIDE AND ALBUTEROL SULFATE 2.5; .5 MG/3ML; MG/3ML
3 SOLUTION RESPIRATORY (INHALATION) EVERY 4 HOURS PRN
Qty: 30 ML | Refills: 0 | Status: SHIPPED | OUTPATIENT
Start: 2023-03-24 | End: 2023-04-25

## 2023-03-24 RX ORDER — IPRATROPIUM BROMIDE AND ALBUTEROL SULFATE 2.5; .5 MG/3ML; MG/3ML
9 SOLUTION RESPIRATORY (INHALATION) ONCE
Status: COMPLETED | OUTPATIENT
Start: 2023-03-24 | End: 2023-03-24

## 2023-03-24 RX ORDER — ALBUTEROL SULFATE 90 UG/1
1-2 AEROSOL, METERED RESPIRATORY (INHALATION) EVERY 6 HOURS PRN
Qty: 18 G | Refills: 0 | Status: ON HOLD | OUTPATIENT
Start: 2023-03-24 | End: 2023-05-01 | Stop reason: HOSPADM

## 2023-03-24 RX ADMIN — MAGNESIUM SULFATE HEPTAHYDRATE 2 G: 40 INJECTION, SOLUTION INTRAVENOUS at 09:03

## 2023-03-24 RX ADMIN — METHYLPREDNISOLONE SODIUM SUCCINATE 125 MG: 125 INJECTION, POWDER, FOR SOLUTION INTRAMUSCULAR; INTRAVENOUS at 09:03

## 2023-03-24 RX ADMIN — TERBUTALINE SULFATE 0.25 MG: 1 INJECTION, SOLUTION SUBCUTANEOUS at 10:03

## 2023-03-24 RX ADMIN — IPRATROPIUM BROMIDE AND ALBUTEROL SULFATE 9 ML: .5; 3 SOLUTION RESPIRATORY (INHALATION) at 09:03

## 2023-03-25 NOTE — ED PROVIDER NOTES
Encounter Date: 3/24/2023       History     Chief Complaint   Patient presents with    Shortness of Breath     Sob since yesterday. Also states asthma machine and inhaler aren't working. Sp02=96% on room air. Madeline jason     52-year-old female presents to ED for asthma exacerbation.  States she has had multiple exacerbations in the past and like to present early before her symptoms get too severe.  Reports wheezing, shortness of breath, and her typical chest tightness.  Denies specifically any chest pain, no pleuritic component, no fevers chills or extremity swelling.  Also believes she was getting a thrush infection.  Hasq had in the past from excessive inhaler use.  No other complaints or concerns at this time.    Review of patient's allergies indicates:   Allergen Reactions    Latex      Past Medical History:   Diagnosis Date    Asthma     Diverticulosis     GERD (gastroesophageal reflux disease)      No past surgical history on file.  No family history on file.  Social History     Tobacco Use    Smoking status: Every Day     Packs/day: 0.50     Years: 40.00     Pack years: 20.00     Types: Cigarettes     Start date: 1983    Smokeless tobacco: Never    Tobacco comments:     Ambulatory referral to Smoking Cessation clinic following hospital discharge.    Substance Use Topics    Alcohol use: Not Currently    Drug use: Not Currently     Review of Systems   Constitutional:  Negative for chills, diaphoresis and fever.   HENT:  Negative for congestion, rhinorrhea, sinus pain and sore throat.         Suspected thrust infection   Eyes:  Negative for pain, discharge and itching.   Respiratory:  Positive for shortness of breath and wheezing. Negative for cough, chest tightness and stridor.    Cardiovascular:  Negative for chest pain and palpitations.   Gastrointestinal:  Negative for abdominal pain, nausea and vomiting.   Genitourinary:  Negative for dysuria, flank pain and hematuria.   Musculoskeletal:  Negative  for back pain and myalgias.   Skin:  Negative for color change and rash.   Neurological:  Negative for dizziness, weakness and headaches.   Psychiatric/Behavioral:  Negative for confusion. The patient is not hyperactive.      Physical Exam     Initial Vitals [03/24/23 2034]   BP Pulse Resp Temp SpO2   115/75 86 (!) 24 97.9 °F (36.6 °C) 96 %      MAP       --         Physical Exam    Vitals reviewed.  Constitutional: She appears well-developed and well-nourished. She is not diaphoretic. No distress.   HENT:   Head: Normocephalic and atraumatic.   Mouth/Throat: Uvula is midline and mucous membranes are normal. No oral lesions. No uvula swelling. Posterior oropharyngeal erythema present. No oropharyngeal exudate, posterior oropharyngeal edema or tonsillar abscesses.   No obvious thrush   Eyes: Conjunctivae and EOM are normal. Pupils are equal, round, and reactive to light.   Neck: Neck supple. No tracheal deviation present.   Normal range of motion.  Cardiovascular:  Normal rate, regular rhythm, normal heart sounds and intact distal pulses.           Pulmonary/Chest: No accessory muscle usage. Tachypnea noted. No respiratory distress. She has decreased breath sounds. She has wheezes. She has no rhonchi. She has no rales.   Abdominal: Abdomen is soft. There is no abdominal tenderness. There is no rebound and no guarding.   Musculoskeletal:         General: Normal range of motion.      Cervical back: Normal range of motion and neck supple.     Neurological: She is alert and oriented to person, place, and time. She has normal strength. GCS score is 15. GCS eye subscore is 4. GCS verbal subscore is 5. GCS motor subscore is 6.   Skin: Skin is warm and dry. Capillary refill takes less than 2 seconds. No rash noted.   Psychiatric: She has a normal mood and affect. Her behavior is normal. Judgment and thought content normal.       ED Course   Procedures  Labs Reviewed   COMPREHENSIVE METABOLIC PANEL - Abnormal; Notable for the  following components:       Result Value    Chloride 111 (*)     Blood Urea Nitrogen 5.1 (*)     Albumin Level 3.3 (*)     Globulin 3.6 (*)     Albumin/Globulin Ratio 0.9 (*)     All other components within normal limits   CBC WITH DIFFERENTIAL - Abnormal; Notable for the following components:    MPV 10.7 (*)     Neut # 2.09 (*)     All other components within normal limits   TROPONIN I - Normal   B-TYPE NATRIURETIC PEPTIDE - Normal   COVID/RSV/FLU A&B PCR - Normal    Narrative:     The [employer] Xpress SARS-CoV-2/FLU/RSV plus is a rapid, multiplexed real-time PCR test intended for the simultaneous qualitative detection and differentiation of SARS-CoV-2, Influenza A, Influenza B, and respiratory syncytial virus (RSV) viral RNA in either nasopharyngeal swab or nasal swab specimens.         CBC W/ AUTO DIFFERENTIAL    Narrative:     The following orders were created for panel order CBC auto differential.  Procedure                               Abnormality         Status                     ---------                               -----------         ------                     CBC with Differential[[id number]]        Abnormal            Final result                 Please view results for these tests on the individual orders.   EXTRA TUBES    Narrative:     The following orders were created for panel order EXTRA TUBES.  Procedure                               Abnormality         Status                     ---------                               -----------         ------                     Light Blue Top Hold[[id number]]                              In process                   Please view results for these tests on the individual orders.   LIGHT BLUE TOP HOLD     EKG Readings: (Independently Interpreted)   Initial Reading: No STEMI. Rhythm: Normal Sinus Rhythm. Ectopy: No Ectopy. Conduction: Normal. Axis: Normal. Clinical Impression: Normal Sinus Rhythm   ECG Results              EKG 12-lead (Final result)  Result time  03/27/23 08:57:12      Final result by Interface, Lab In [facility] (03/27/23 08:57:12)                   Narrative:    Test Reason : R07.9,    Vent. Rate : 069 BPM     Atrial Rate : 069 BPM     P-R Int : 134 ms          QRS Dur : 066 ms      QT Int : 412 ms       P-R-T Axes : 071 061 050 degrees     QTc Int : 441 ms    Normal sinus rhythm  Normal ECG  When compared with ECG of 12-FEB-2023 16:53,  Vent. rate has decreased BY  56 BPM  Nonspecific T wave abnormality no longer evident in Lateral leads  Confirmed by Claudio Clifton MD ([id number]) on 3/27/2023 8:57:01 AM    Referred By: [email or web address]   SELF           Confirmed By:Claudio Clifton MD                                  Imaging Results              X-Ray Chest AP Portable (Final result)  Result time 03/24/23 22:36:16      Final result by Michael Mendoza MD (03/24/23 22:36:16)                   Impression:      NO ACUTE CARDIOPULMONARY PROCESS IDENTIFIED.      Electronically signed by: Michael Mendoza  Date:    03/24/2023  Time:    22:36               Narrative:    EXAMINATION:  XR CHEST AP PORTABLE    CLINICAL HISTORY:  Chest Pain;    TECHNIQUE:  One    COMPARISON:  March 9, 2023.    FINDINGS:  Cardiopericardial silhouette is within normal limits. Lungs are without dense focal or segmental consolidation, congestive process, pleural effusions or pneumothorax.                                       Medications   albuterol-ipratropium 2.5 mg-0.5 mg/3 mL nebulizer solution 9 mL (9 mLs Nebulization Given 3/24/23 2142)   methylPREDNISolone sodium succinate injection 125 mg (125 mg Intravenous Given 3/24/23 2157)   magnesium sulfate 2g in water 50mL IVPB (premix) (0 g Intravenous Stopped 3/24/23 2241)   terbutaline injection 0.25 mg (0.25 mg Subcutaneous Given 3/24/23 2209)     Medical Decision Making:   History:   Old Medical Records: I decided to obtain old medical records.  Initial Assessment:   Asthma exacerbation similar to previous flares  Differential Diagnosis:   Asthma  exacerbation  COPD exacerbation  Myocardial ischemia  Pneumonia  Bronchitis  URI  COVID  Influenza  Clinical Tests:   Lab Tests: Ordered and Reviewed  Radiological Study: Reviewed and Ordered  Medical Tests: Reviewed and Ordered  ED Management:  Patient in no acute distress however did have mildly elevated respiratory rate and audible wheezing also identified on pulmonary auscultation.  Mild diminished breath sounds bilaterally.  Erythema to the posterior pharynx as well.  Viral panel negative.  Patient reporting symptoms consistent with previous thrush.  No obvious white plaque present however Nystatin prescribed secondary to an increased inhaler use and consistent story.  Chest x-ray clear, EKG nonischemic demonstrated normal sinus rhythm.  Patient provided multiple medications both IV subQ and inhaled to patient who ultimately was sitting comfortably on phone reporting resolution of her symptoms.  Will prescribe by short course steroids the patient who is to follow up closely in outpatient setting.  Asked if she required medication refills of which she requested two.  Ultimately stable for discharge and provided very strict return precautions.  Released. (Patrice)                        Clinical Impression:   Final diagnoses:  [J44.1] COPD exacerbation (Primary)  [Z86.19] History of thrush        ED Disposition Condition    Discharge Stable          ED Prescriptions       Medication Sig Dispense Start Date End Date Auth. Provider    albuterol (PROVENTIL/VENTOLIN HFA) 90 mcg/actuation inhaler Inhale 1-2 puffs into the lungs every 6 (six) hours as needed for Wheezing or Shortness of Breath. Rescue 18 g 3/24/2023 2023 Wilver Ibrahim MD    albuterol-ipratropium (DUO-NEB) 2.5 mg-0.5 mg/3 mL nebulizer solution Take 3 mLs by nebulization every 4 (four) hours as needed for Shortness of Breath or Wheezing. 30 mL 3/24/2023 2023 Wilver Ibrahim MD    nystatin (MYCOSTATIN) 100,000 unit/mL suspension () Take 5 mLs  (500,000 Units total) by mouth 4 (four) times daily. for 3 days 60 mL 3/24/2023 3/27/2023 Wilver Ibrahim MD    predniSONE (DELTASONE) 20 MG tablet (Expires today) Take 3 tablets (60 mg total) by mouth once daily. for 4 days 12 tablet 3/24/2023 3/28/2023 Wilver Ibrahim MD          Follow-up Information       Follow up With Specialties Details Why Contact Info    [facility] Schedule an appointment as soon as possible for a visit in 3 days  [address] [facility] [id number] [phone]      [facility] Emergency Medicine  As needed, If symptoms worsen [address] [facility] [address] [phone]             Wilver Ibrahim MD  03/28/23 6412

## 2023-03-27 ENCOUNTER — TELEPHONE (OUTPATIENT)
Dept: INTERNAL MEDICINE | Facility: CLINIC | Age: 53
End: 2023-03-27

## 2023-03-28 NOTE — ED NOTES
PT REFUSED  
Detail Level: Detailed
General Sunscreen Counseling: I recommended a broad spectrum sunscreen with a SPF of 30 or higher.\\nPatient recommended to wear daily.

## 2023-04-06 ENCOUNTER — TELEPHONE (OUTPATIENT)
Dept: ENDOSCOPY | Facility: HOSPITAL | Age: 53
End: 2023-04-06
Payer: MEDICAID

## 2023-04-06 NOTE — TELEPHONE ENCOUNTER
Left message instructing patient to call dept @ [phone] between 8am-4pm.    Arrival time to be given @ 0700  (Message sent via [facility] portal)       SUTAB Instructions    [facility] [address]  Clinic Office [phone]  Endoscopy Lab [phone]    You are scheduled for a Colonoscopy with Dr. Pereira on 4/11/2023 at [facility] in [place].    Check in at the Hospital -1st floor, Information desk. A covid test will be required 3 days before your procedure.  Call [phone] to reschedule.    An adult friend/family member must come with you to drive you home.  You cannot drive, take a taxi, Uber/Lyft or bus to leave the Endoscopy Center alone.  If you do not have someone to drive you home, your test will be cancelled.     Please follow the directions of your doctor if you take any pills that thin your blood. If you take these meds: Aggrenox, Brilinta, Effient, Eliquis, Lovenox, Plavix, Pletal, Pradaxa, Ticilid, Xarelto or Coumadin, let the doctor's office know.    DON'T: On the morning of the test do not take insulin or pills for diabetes.     DO: On the morning of the test, do take any pills for blood pressure, heart, anti-rejection and or seizures with a small sip of water. Bring any inhalers with you.    To have a good prep, you must follow these instructions - please do not use the directions from the pharmacy.    The doctor will send a prescription for the SUTAB.    The Day Before the test:    You can only drink CLEAR LIQUIDS the whole day before your test.  You can't eat any food for the whole day.    You CAN have:  Water, Coffee or decaf coffee (no milk or cream)  Tea  Soft drinks - regular and sugar free  Jell-O (green or yellow)  Apple Juice, grape juice, white cranberry juice  Gatorade, Power Aid, Crystal Light, Dale Aid  Lemonade and Limeade  Bouillon, clear soup  Snowball, popsicles  YOU CAN'T DRINK ANYTHING RED, PURPLE ORANGE OR BLUE   YOU CAN'T DRINK  ALCOHOL  ONLY DRINK WHAT IS ON THE LIST      At 5 pm the night before your test:    Open the bottle of 12 tablets. Fill the provided cup with water up to the line = 16 oz. Swallow each pill with water. Drink the rest of the water in the cup in 20 minutes.    At 6 pm:  Fill the cup with water up to the line = 16 oz. Drink the cup of water in 30 minutes.    At 7 pm:  Fill the cup with water up to the line = 16 oz. Drink the cup of water in 30 minutes.     Continue to drink water or clear liquids until you go to sleep.      The Day of the test - We will call you 2 days before your test to tell you what time to get to the hospital. We will also tell you when to do the next steps.    5 hours before you come to the hospital (this may be in the middle of the night): ________2:00 am___________= time  Open the bottle of 12 tablets. Fill the cup with water up to the line = 16 oz. Swallow each pill with water. Drink the rest of the water in the cup in 20 minutes.    At 4 hours before you come to the hospital: ____3:00 am__________= time  Fill the cup with water up to the line = 16 oz. Drink the cup of water in 30 minutes.     At 3 hours before you come to the hospital: _______4:00 am_______= time    YOU CAN'T EAT OR DRINK ANYTHING ELSE ONCE YOU FINISH THE WATER AT ______4:00 am_______ = TIME    Leave all valuables and jewelry at home. You will be at the hospital for 2-4 hours.    Call the Endoscopy department at [phone] with any questions about your procedure.          Please give this Coupon Code to your pharmacist.    BIN: [id number]  ANGELITAN: ABEL  GROUP: [id number]  MEMBER ID: [id number]

## 2023-04-10 ENCOUNTER — TELEPHONE (OUTPATIENT)
Dept: ENDOSCOPY | Facility: HOSPITAL | Age: 53
End: 2023-04-10
Payer: MEDICAID

## 2023-04-10 DIAGNOSIS — J45.909 MODERATE ASTHMA, UNSPECIFIED WHETHER COMPLICATED, UNSPECIFIED WHETHER PERSISTENT: Primary | ICD-10-CM

## 2023-04-10 DIAGNOSIS — E05.90 HYPERTHYROIDISM: Primary | ICD-10-CM

## 2023-04-10 NOTE — TELEPHONE ENCOUNTER
Left message instructing patient to call dept @ [phone] between 8am-4pm.    Arrival time to be given @ 0730 tuesday  (Message sent via [facility] portal)

## 2023-04-11 ENCOUNTER — HOSPITAL ENCOUNTER (EMERGENCY)
Facility: HOSPITAL | Age: 53
Discharge: HOME | End: 2023-04-12
Attending: INTERNAL MEDICINE
Payer: MEDICAID

## 2023-04-11 ENCOUNTER — PATIENT MESSAGE (OUTPATIENT)
Dept: RESEARCH | Facility: HOSPITAL | Age: 53
End: 2023-04-11
Payer: MEDICAID

## 2023-04-11 DIAGNOSIS — R06.02 SHORTNESS OF BREATH: ICD-10-CM

## 2023-04-11 DIAGNOSIS — R06.02 SOB (SHORTNESS OF BREATH): ICD-10-CM

## 2023-04-11 LAB
ALBUMIN SERPL-MCNC: 3.8 G/DL (ref 3.5–5)
ALBUMIN/GLOB SERPL: 0.9 RATIO (ref 1.1–2)
ALP SERPL-CCNC: 80 UNIT/L (ref 40–150)
ALT SERPL-CCNC: 10 UNIT/L (ref 0–55)
APPEARANCE UR: CLEAR
AST SERPL-CCNC: 10 UNIT/L (ref 5–34)
BACTERIA #/AREA URNS AUTO: ABNORMAL /HPF
BASOPHILS # BLD AUTO: 0.03 X10(3)/MCL (ref 0–0.2)
BASOPHILS NFR BLD AUTO: 0.2 %
BILIRUB UR QL STRIP.AUTO: NEGATIVE MG/DL
BILIRUBIN DIRECT+TOT PNL SERPL-MCNC: 0.7 MG/DL
BUN SERPL-MCNC: 10.9 MG/DL (ref 9.8–20.1)
CALCIUM SERPL-MCNC: 10 MG/DL (ref 8.4–10.2)
CHLORIDE SERPL-SCNC: 101 MMOL/L (ref 98–107)
CO2 SERPL-SCNC: 26 MMOL/L (ref 22–29)
COLOR UR AUTO: ABNORMAL
CREAT SERPL-MCNC: 0.97 MG/DL (ref 0.55–1.02)
EOSINOPHIL # BLD AUTO: 0.07 X10(3)/MCL (ref 0–0.9)
EOSINOPHIL NFR BLD AUTO: 0.5 %
ERYTHROCYTE [DISTWIDTH] IN BLOOD BY AUTOMATED COUNT: 16.1 % (ref 11.5–17)
GFR SERPLBLD CREATININE-BSD FMLA CKD-EPI: >60 MLS/MIN/1.73/M2
GLOBULIN SER-MCNC: 4.2 GM/DL (ref 2.4–3.5)
GLUCOSE SERPL-MCNC: 91 MG/DL (ref 74–100)
GLUCOSE UR QL STRIP.AUTO: NORMAL MG/DL
HCT VFR BLD AUTO: 47.8 % (ref 37–47)
HGB BLD-MCNC: 15.2 G/DL (ref 12–16)
HYALINE CASTS #/AREA URNS LPF: ABNORMAL /LPF
IMM GRANULOCYTES # BLD AUTO: 0.05 X10(3)/MCL (ref 0–0.04)
IMM GRANULOCYTES NFR BLD AUTO: 0.4 %
KETONES UR QL STRIP.AUTO: NEGATIVE MG/DL
LACTATE SERPL-SCNC: 1.9 MMOL/L (ref 0.5–2.2)
LACTATE SERPL-SCNC: 2.1 MMOL/L (ref 0.5–2.2)
LACTATE SERPL-SCNC: 2.8 MMOL/L (ref 0.5–2.2)
LEUKOCYTE ESTERASE UR QL STRIP.AUTO: NEGATIVE UNIT/L
LYMPHOCYTES # BLD AUTO: 2.2 X10(3)/MCL (ref 0.6–4.6)
LYMPHOCYTES NFR BLD AUTO: 15.7 %
MCH RBC QN AUTO: 29.6 PG (ref 27–31)
MCHC RBC AUTO-ENTMCNC: 31.8 G/DL (ref 33–36)
MCV RBC AUTO: 93 FL (ref 80–94)
MONOCYTES # BLD AUTO: 1.4 X10(3)/MCL (ref 0.1–1.3)
MONOCYTES NFR BLD AUTO: 10 %
NEUTROPHILS # BLD AUTO: 10.27 X10(3)/MCL (ref 2.1–9.2)
NEUTROPHILS NFR BLD AUTO: 73.2 %
NITRITE UR QL STRIP.AUTO: NEGATIVE
NRBC BLD AUTO-RTO: 0 %
PH UR STRIP.AUTO: 7 [PH]
PLATELET # BLD AUTO: 174 X10(3)/MCL (ref 130–400)
PMV BLD AUTO: 10.9 FL (ref 7.4–10.4)
POTASSIUM SERPL-SCNC: 3.9 MMOL/L (ref 3.5–5.1)
PROT SERPL-MCNC: 8 GM/DL (ref 6.4–8.3)
PROT UR QL STRIP.AUTO: NEGATIVE MG/DL
RBC # BLD AUTO: 5.14 X10(6)/MCL (ref 4.2–5.4)
RBC #/AREA URNS AUTO: ABNORMAL /HPF
RBC UR QL AUTO: NEGATIVE UNIT/L
SODIUM SERPL-SCNC: 138 MMOL/L (ref 136–145)
SP GR UR STRIP.AUTO: 1.01
SQUAMOUS #/AREA URNS LPF: ABNORMAL /HPF
UROBILINOGEN UR STRIP-ACNC: NORMAL MG/DL
WBC # SPEC AUTO: 14 X10(3)/MCL (ref 4.5–11.5)
WBC #/AREA URNS AUTO: ABNORMAL /HPF

## 2023-04-11 PROCEDURE — 96375 TX/PRO/DX INJ NEW DRUG ADDON: CPT | Mod: 59

## 2023-04-11 PROCEDURE — 83605 ASSAY OF LACTIC ACID: CPT | Performed by: INTERNAL MEDICINE

## 2023-04-11 PROCEDURE — 25000242 PHARM REV CODE 250 ALT 637 W/ HCPCS

## 2023-04-11 PROCEDURE — 93005 ELECTROCARDIOGRAM TRACING: CPT

## 2023-04-11 PROCEDURE — 63600175 PHARM REV CODE 636 W HCPCS

## 2023-04-11 PROCEDURE — 81001 URINALYSIS AUTO W/SCOPE: CPT | Performed by: INTERNAL MEDICINE

## 2023-04-11 PROCEDURE — 94640 AIRWAY INHALATION TREATMENT: CPT

## 2023-04-11 PROCEDURE — 25500020 PHARM REV CODE 255

## 2023-04-11 PROCEDURE — 94761 N-INVAS EAR/PLS OXIMETRY MLT: CPT

## 2023-04-11 PROCEDURE — 96372 THER/PROPH/DIAG INJ SC/IM: CPT

## 2023-04-11 PROCEDURE — 85025 COMPLETE CBC W/AUTO DIFF WBC: CPT | Performed by: INTERNAL MEDICINE

## 2023-04-11 PROCEDURE — 25000003 PHARM REV CODE 250

## 2023-04-11 PROCEDURE — 80053 COMPREHEN METABOLIC PANEL: CPT | Performed by: INTERNAL MEDICINE

## 2023-04-11 PROCEDURE — 99285 EMERGENCY DEPT VISIT HI MDM: CPT | Mod: 25

## 2023-04-11 PROCEDURE — 87040 BLOOD CULTURE FOR BACTERIA: CPT | Performed by: INTERNAL MEDICINE

## 2023-04-11 PROCEDURE — 63600175 PHARM REV CODE 636 W HCPCS: Performed by: INTERNAL MEDICINE

## 2023-04-11 PROCEDURE — 96365 THER/PROPH/DIAG IV INF INIT: CPT

## 2023-04-11 PROCEDURE — 96361 HYDRATE IV INFUSION ADD-ON: CPT

## 2023-04-11 RX ORDER — AMOXICILLIN AND CLAVULANATE POTASSIUM 875; 125 MG/1; MG/1
1 TABLET, FILM COATED ORAL 2 TIMES DAILY
Qty: 14 TABLET | Refills: 0 | Status: SHIPPED | OUTPATIENT
Start: 2023-04-11 | End: 2023-04-11 | Stop reason: SDUPTHER

## 2023-04-11 RX ORDER — GUAIFENESIN 600 MG/1
600 TABLET, EXTENDED RELEASE ORAL ONCE
Status: COMPLETED | OUTPATIENT
Start: 2023-04-11 | End: 2023-04-11

## 2023-04-11 RX ORDER — AMOXICILLIN AND CLAVULANATE POTASSIUM 875; 125 MG/1; MG/1
1 TABLET, FILM COATED ORAL 2 TIMES DAILY
Qty: 14 TABLET | Refills: 0 | Status: ON HOLD | OUTPATIENT
Start: 2023-04-11 | End: 2023-05-01 | Stop reason: HOSPADM

## 2023-04-11 RX ORDER — DEXAMETHASONE SODIUM PHOSPHATE 4 MG/ML
4 INJECTION, SOLUTION INTRA-ARTICULAR; INTRALESIONAL; INTRAMUSCULAR; INTRAVENOUS; SOFT TISSUE
Status: COMPLETED | OUTPATIENT
Start: 2023-04-11 | End: 2023-04-11

## 2023-04-11 RX ORDER — TERBUTALINE SULFATE 1 MG/ML
0.25 INJECTION SUBCUTANEOUS ONCE
Status: COMPLETED | OUTPATIENT
Start: 2023-04-11 | End: 2023-04-11

## 2023-04-11 RX ORDER — LEVALBUTEROL INHALATION SOLUTION 0.63 MG/3ML
0.63 SOLUTION RESPIRATORY (INHALATION) ONCE
Status: COMPLETED | OUTPATIENT
Start: 2023-04-12 | End: 2023-04-12

## 2023-04-11 RX ORDER — LEVALBUTEROL INHALATION SOLUTION 0.63 MG/3ML
0.63 SOLUTION RESPIRATORY (INHALATION) ONCE
Status: COMPLETED | OUTPATIENT
Start: 2023-04-11 | End: 2023-04-11

## 2023-04-11 RX ADMIN — PIPERACILLIN AND TAZOBACTAM 4.5 G: 4; .5 INJECTION, POWDER, LYOPHILIZED, FOR SOLUTION INTRAVENOUS; PARENTERAL at 06:04

## 2023-04-11 RX ADMIN — SODIUM CHLORIDE, POTASSIUM CHLORIDE, SODIUM LACTATE AND CALCIUM CHLORIDE 1905 ML: 600; 310; 30; 20 INJECTION, SOLUTION INTRAVENOUS at 05:04

## 2023-04-11 RX ADMIN — IOHEXOL 100 ML: 350 INJECTION, SOLUTION INTRAVENOUS at 07:04

## 2023-04-11 RX ADMIN — DEXAMETHASONE SODIUM PHOSPHATE 4 MG: 4 INJECTION, SOLUTION INTRA-ARTICULAR; INTRALESIONAL; INTRAMUSCULAR; INTRAVENOUS; SOFT TISSUE at 07:04

## 2023-04-11 RX ADMIN — LEVALBUTEROL HYDROCHLORIDE 0.63 MG: 0.63 SOLUTION RESPIRATORY (INHALATION) at 06:04

## 2023-04-11 RX ADMIN — GUAIFENESIN 600 MG: 600 TABLET ORAL at 08:04

## 2023-04-11 RX ADMIN — TERBUTALINE SULFATE 0.25 MG: 1 INJECTION, SOLUTION SUBCUTANEOUS at 09:04

## 2023-04-11 NOTE — ED PROVIDER NOTES
Name: Niya Moeller   Age: 53 y.o.  Sex: female    Chief complaint:   Chief Complaint   Patient presents with    Shortness of Breath    Abdominal Pain    Dysuria     PT W CO SOB/CP/WHEEZING W ABD/FLAN/BACK PAIN AND DYSURIA/HEMATURIA  AND FEVER >101 X 2 DAYS.  EKG OBTAINED.       Patient arrived with: Private  History obtained from: Patient    Subjective:   52 yo female with past medical history of asthma, diverticulosis, GERD who presents to the emergency department with complaints of shortness of breath and abdominal pain. Patient reports difficulty breathing that began last night. Associated symptoms include wheezing, chest tightness, congestion, and productive cough. Cough has been productive of green sputum and blood tinged x1 which she states has never happened before. Patient also complains of lower abdominal pain that radiates to her back which she believes is related to diverticulosis. Reports fever 101 last night, chills, disorientation, and constipation. She denies nausea, vomiting, diarrhea, melena, hematochezia or loss of appetite. She did not try any medications at home before coming to ED.     Past Medical History:   Diagnosis Date    Asthma     Diverticulosis     GERD (gastroesophageal reflux disease)      History reviewed. No pertinent surgical history.  Social History     Socioeconomic History    Marital status: Single   Tobacco Use    Smoking status: Every Day     Packs/day: 0.50     Years: 40.00     Pack years: 20.00     Types: Cigarettes     Start date: 1983    Smokeless tobacco: Never    Tobacco comments:     Ambulatory referral to Smoking Cessation clinic following hospital discharge.    Substance and Sexual Activity    Alcohol use: Not Currently    Drug use: Not Currently    Sexual activity: Not Currently     Social Determinants of Health     Food Insecurity: No Food Insecurity    Worried About Running Out of Food in the Last Year: Never true    Ran Out of Food in the Last Year: Never true    Transportation Needs: Unmet Transportation Needs    Lack of Transportation (Medical): Yes    Lack of Transportation (Non-Medical): Yes   Housing Stability: Unknown    Unable to Pay for Housing in the Last Year: No    Unstable Housing in the Last Year: No     Review of patient's allergies indicates:   Allergen Reactions    Latex         Review of Systems   Constitutional:  Positive for chills and malaise/fatigue. Negative for fever.   HENT:  Positive for congestion and sore throat.    Eyes: Negative.    Respiratory:  Positive for cough, sputum production, shortness of breath and wheezing.    Cardiovascular:  Negative for chest pain, palpitations and leg swelling.   Gastrointestinal:  Positive for abdominal pain and constipation. Negative for blood in stool, diarrhea, melena, nausea and vomiting.   Genitourinary:  Negative for dysuria.   Musculoskeletal:  Positive for back pain.   Skin: Negative.    Neurological:  Negative for dizziness, tremors, focal weakness and headaches.   Endo/Heme/Allergies: Negative.    All other systems reviewed and are negative.       Objective:     Vitals:    04/11/23 2300   BP: (!) 109/34   Pulse: 86   Resp:    Temp:         Physical Exam  Vitals and nursing note reviewed.   Constitutional:       General: She is not in acute distress.  HENT:      Head: Normocephalic and atraumatic.      Mouth/Throat:      Pharynx: Oropharynx is clear.   Eyes:      Extraocular Movements: Extraocular movements intact.      Pupils: Pupils are equal, round, and reactive to light.   Cardiovascular:      Rate and Rhythm: Regular rhythm. Tachycardia present.      Heart sounds: No murmur heard.  Pulmonary:      Effort: No respiratory distress.      Breath sounds: Wheezing present.   Chest:      Chest wall: Tenderness present.   Abdominal:      General: Abdomen is flat. There is no distension.      Palpations: Abdomen is soft.      Tenderness: There is abdominal tenderness (diffuse lower abdomen). There is no  guarding or rebound.   Musculoskeletal:         General: No swelling. Normal range of motion.      Cervical back: Normal range of motion.   Skin:     General: Skin is warm and dry.   Neurological:      Mental Status: She is alert and oriented to person, place, and time. Mental status is at baseline.        Records:  Nursing records and triage records reviewed  Prior records reviewed    Medical decision making:       ED Course as of 04/11/23 2336   Tue Apr 11, 2023   1846 Patient alert and oriented x 3   Cardiac exam regular rate and rhythm, no murmurs. Lung exam mild diffuse wheezing on auscultation   Fluid resuscitation with LR with adequate response. BP and HR improved  [CR]   1908 Patient refusing Xray, agrees to CT if she can get solumedrol. Explained this medication is unavailable and should not be given in the setting of possible abdominal infection. Patient insists, will give decadron  [CR]   2233 CT chest abdomen pelvis revealed ill-defined lung base findings (right > left) and .similar/slightly improved diverticular inflammation  [CR]   2237 Patient is afebrile and vital signs are stable. Labs largely unremarkable. She will be sent home with augmentin to complete outpatient therapy.  [CR]      ED Course User Index  [CR] Josselin Downey MD          EKG:  ECG Results              EKG 12-lead (Shortness of Breath) Age > 50 (In process)  Result time 04/11/23 16:51:15      In process by Interface, Lab In [facility] (04/11/23 16:51:15)                   Narrative:    Test Reason : R06.02,    Vent. Rate : 111 BPM     Atrial Rate : 111 BPM     P-R Int : 124 ms          QRS Dur : 066 ms      QT Int : 314 ms       P-R-T Axes : 087 088 083 degrees     QTc Int : 427 ms    Sinus tachycardia  Right atrial enlargement  Nonspecific ST abnormality  Abnormal ECG  When compared with ECG of 24-MAR-2023 21:45,  Vent. rate has increased BY  42 BPM  ST now depressed in Inferior leads  Nonspecific T wave abnormality now evident in  Lateral leads    Referred By:             Confirmed By:                                    1846 Patient alert and oriented x 3   Cardiac exam regular rate and rhythm, no murmurs. Lung exam mild diffuse wheezing on auscultation   Fluid resuscitation with LR with adequate response. BP and HR improved       Diagnosis:  Final diagnoses:  [R06.02] SOB (shortness of breath)  [R06.02] Shortness of breath     ED Prescriptions       Medication Sig Dispense Start Date End Date Auth. Provider    amoxicillin-clavulanate 875-125mg (AUGMENTIN) 875-125 mg per tablet  (Status: Discontinued) Take 1 tablet by mouth 2 (two) times daily. 14 tablet 4/11/2023 4/11/2023 Josselin Downey MD    amoxicillin-clavulanate 875-125mg (AUGMENTIN) 875-125 mg per tablet Take 1 tablet by mouth 2 (two) times daily. 14 tablet 4/11/2023 -- Josselin Downey MD          Follow-up Information    None             Alfonso Reyes MD  [facility] Internal Medicine, HO-1         Josselin Downey MD  Resident  04/11/23 5295      I performed a face to face evaluation of the patient Niya Moeller and my history reveal recurrent diverticulitis, Hx of asthma presents with shortness of breath and abdominal pain for few days the physical exam was remarkable for mild bilateral wheezing and bilateral lower abdominal area tenderness without guarding or rebound.  I reviewed the history, physical exam and diagnostic studies performed by the resident Dr. Downey and I concur with the diagnosis and assessment. This case was initially evaluated by Dr. Downey  under my supervision and I agree with the documentation and plan.    Total teaching time: 15 min         Alfonso Reyes MD  04/11/23 0735

## 2023-04-12 VITALS
DIASTOLIC BLOOD PRESSURE: 59 MMHG | HEART RATE: 82 BPM | TEMPERATURE: 99 F | HEIGHT: 59 IN | WEIGHT: 140 LBS | OXYGEN SATURATION: 96 % | SYSTOLIC BLOOD PRESSURE: 109 MMHG | BODY MASS INDEX: 28.22 KG/M2 | RESPIRATION RATE: 20 BRPM

## 2023-04-12 DIAGNOSIS — J45.909 ASTHMA, UNSPECIFIED ASTHMA SEVERITY, UNSPECIFIED WHETHER COMPLICATED, UNSPECIFIED WHETHER PERSISTENT: Primary | ICD-10-CM

## 2023-04-12 PROCEDURE — 25000242 PHARM REV CODE 250 ALT 637 W/ HCPCS: Performed by: INTERNAL MEDICINE

## 2023-04-12 RX ADMIN — LEVALBUTEROL HYDROCHLORIDE 0.63 MG: 0.63 SOLUTION RESPIRATORY (INHALATION) at 12:04

## 2023-04-17 LAB
BACTERIA BLD CULT: NORMAL
BACTERIA BLD CULT: NORMAL

## 2023-04-19 ENCOUNTER — PATIENT MESSAGE (OUTPATIENT)
Dept: RESEARCH | Facility: HOSPITAL | Age: 53
End: 2023-04-19
Payer: MEDICAID

## 2023-04-25 ENCOUNTER — OFFICE VISIT (OUTPATIENT)
Dept: GYNECOLOGY | Facility: CLINIC | Age: 53
End: 2023-04-25
Payer: MEDICAID

## 2023-04-25 ENCOUNTER — PATIENT MESSAGE (OUTPATIENT)
Dept: RESEARCH | Facility: HOSPITAL | Age: 53
End: 2023-04-25
Payer: MEDICAID

## 2023-04-25 VITALS
BODY MASS INDEX: 30.32 KG/M2 | WEIGHT: 150.38 LBS | DIASTOLIC BLOOD PRESSURE: 60 MMHG | OXYGEN SATURATION: 99 % | TEMPERATURE: 98 F | HEIGHT: 59 IN | RESPIRATION RATE: 20 BRPM | SYSTOLIC BLOOD PRESSURE: 96 MMHG | HEART RATE: 65 BPM

## 2023-04-25 DIAGNOSIS — Z12.31 VISIT FOR SCREENING MAMMOGRAM: ICD-10-CM

## 2023-04-25 DIAGNOSIS — N89.8 VAGINAL ITCHING: ICD-10-CM

## 2023-04-25 DIAGNOSIS — Z12.4 SCREENING FOR CERVICAL CANCER: Primary | ICD-10-CM

## 2023-04-25 LAB
CLUE CELLS VAG QL WET PREP: ABNORMAL
T VAGINALIS VAG QL WET PREP: ABNORMAL
WBC #/AREA VAG WET PREP: ABNORMAL
YEAST SPEC QL WET PREP: ABNORMAL

## 2023-04-25 PROCEDURE — 3008F PR BODY MASS INDEX (BMI) DOCUMENTED: ICD-10-PCS | Mod: CPTII,,, | Performed by: NURSE PRACTITIONER

## 2023-04-25 PROCEDURE — 3078F DIAST BP <80 MM HG: CPT | Mod: CPTII,,, | Performed by: NURSE PRACTITIONER

## 2023-04-25 PROCEDURE — 3074F SYST BP LT 130 MM HG: CPT | Mod: CPTII,,, | Performed by: NURSE PRACTITIONER

## 2023-04-25 PROCEDURE — 99386 PREV VISIT NEW AGE 40-64: CPT | Mod: S$PBB,,, | Performed by: NURSE PRACTITIONER

## 2023-04-25 PROCEDURE — 88174 CYTOPATH C/V AUTO IN FLUID: CPT | Performed by: NURSE PRACTITIONER

## 2023-04-25 PROCEDURE — 99215 OFFICE O/P EST HI 40 MIN: CPT | Mod: PBBFAC | Performed by: NURSE PRACTITIONER

## 2023-04-25 PROCEDURE — 3078F PR MOST RECENT DIASTOLIC BLOOD PRESSURE < 80 MM HG: ICD-10-PCS | Mod: CPTII,,, | Performed by: NURSE PRACTITIONER

## 2023-04-25 PROCEDURE — 87210 SMEAR WET MOUNT SALINE/INK: CPT | Performed by: NURSE PRACTITIONER

## 2023-04-25 PROCEDURE — 1160F RVW MEDS BY RX/DR IN RCRD: CPT | Mod: CPTII,,, | Performed by: NURSE PRACTITIONER

## 2023-04-25 PROCEDURE — 1159F PR MEDICATION LIST DOCUMENTED IN MEDICAL RECORD: ICD-10-PCS | Mod: CPTII,,, | Performed by: NURSE PRACTITIONER

## 2023-04-25 PROCEDURE — 3074F PR MOST RECENT SYSTOLIC BLOOD PRESSURE < 130 MM HG: ICD-10-PCS | Mod: CPTII,,, | Performed by: NURSE PRACTITIONER

## 2023-04-25 PROCEDURE — 1159F MED LIST DOCD IN RCRD: CPT | Mod: CPTII,,, | Performed by: NURSE PRACTITIONER

## 2023-04-25 PROCEDURE — 87624 HPV HI-RISK TYP POOLED RSLT: CPT

## 2023-04-25 PROCEDURE — 99386 PR PREVENTIVE VISIT,NEW,40-64: ICD-10-PCS | Mod: S$PBB,,, | Performed by: NURSE PRACTITIONER

## 2023-04-25 PROCEDURE — 3008F BODY MASS INDEX DOCD: CPT | Mod: CPTII,,, | Performed by: NURSE PRACTITIONER

## 2023-04-25 PROCEDURE — 1160F PR REVIEW ALL MEDS BY PRESCRIBER/CLIN PHARMACIST DOCUMENTED: ICD-10-PCS | Mod: CPTII,,, | Performed by: NURSE PRACTITIONER

## 2023-04-25 RX ORDER — METHYLPREDNISOLONE 4 MG/1
TABLET ORAL
Status: ON HOLD | COMMUNITY
Start: 2023-04-04 | End: 2023-05-01 | Stop reason: HOSPADM

## 2023-04-25 NOTE — PROGRESS NOTES
"  Subjective:       Patient ID: Niya Moeller is a 53 y.o. female.    Chief Complaint:  Gynecologic Exam      History of Present Illness  The patient  here for annual exam. Pt is postmenopausal x2 years. Denies history of abnormal paps. Last pap was ub  in [place]. Pt needs MG. Denies breast or urinary complaints. Denies pelvic pain, abnormal bleeding or discharge. States she was treated for yeast 1 month ago. Pt reports no STIs in the past and no concerns. Admits tobacco use. Dep. screening 0. Denies fly hx of breast, ovarian, uterine or colon cancer. Pending GI referral per PCP d/t diverticulosis.     GYN & OB History  No LMP recorded. Patient is postmenopausal.     Review of patient's allergies indicates:   Allergen Reactions    Latex      Past Medical History:   Diagnosis Date    Asthma     Diverticulosis     GERD (gastroesophageal reflux disease)      OB History    Para Term  AB Living   1 1           SAB IAB Ectopic Multiple Live Births                  # Outcome Date GA Lbr Vickey/2nd Weight Sex Delivery Anes PTL Lv   1 Para                 Review of Systems  Review of Systems    Negative except for pertinent findings for positives per HPI     Objective:    Physical Exam    BP 96/60 (BP Location: Left arm, Patient Position: Sitting, BP Method: Medium (Automatic))   Pulse 65   Temp 98.2 °F (36.8 °C) (Oral)   Resp 20   Ht 4' 11" (1.499 m)   Wt 68.2 kg (150 lb 6.4 oz)   SpO2 99%   BMI 30.38 kg/m²   GENERAL: Well-developed female in no acute distress.  SKIN: Normal to inspection, warm and intact.  BREASTS: No masses, lumps, discharge, tenderness.  VULVA: General appearance normal; external genitalia with no lesions or erythema.  VAGINA: Mucosa atrophic, no abnormal discharge or lesions.  CERVIX: Atrophic, no abnormal discharge.  BIMANUAL EXAM: The uterus is  non tender. Herman adnexa reveal no tenderness.  PSYCHIATRIC: Patient is oriented to person, place, and time. Mood and affect are " normal.    Assessment:       1. Screening for cervical cancer    2. Visit for screening mammogram    3. Vaginal itching       Plan:   Niya was seen today for gynecologic exam.    Diagnoses and all orders for this visit:    Screening for cervical cancer  -     Ambulatory referral/consult to Obstetrics / Gynecology  -     Liquid-Based Pap Smear, Screening Screening    Visit for screening mammogram  -     Mammo Digital Screening Bilat w/ Arash; Future    Vaginal itching  -     Wet Prep, Genital    Pap today  MG ordered  Wet prep   Follow up in about 1 year (around 4/25/2024) for annual exam.

## 2023-04-27 ENCOUNTER — OFFICE VISIT (OUTPATIENT)
Dept: INTERNAL MEDICINE | Facility: CLINIC | Age: 53
End: 2023-04-27
Payer: MEDICAID

## 2023-04-27 VITALS
HEART RATE: 80 BPM | SYSTOLIC BLOOD PRESSURE: 126 MMHG | BODY MASS INDEX: 30.67 KG/M2 | WEIGHT: 152.13 LBS | TEMPERATURE: 98 F | HEIGHT: 59 IN | DIASTOLIC BLOOD PRESSURE: 81 MMHG

## 2023-04-27 DIAGNOSIS — R10.9 ABDOMINAL SPASMS: ICD-10-CM

## 2023-04-27 DIAGNOSIS — K57.90 DIVERTICULOSIS: ICD-10-CM

## 2023-04-27 DIAGNOSIS — J45.909 ASTHMA, UNSPECIFIED ASTHMA SEVERITY, UNSPECIFIED WHETHER COMPLICATED, UNSPECIFIED WHETHER PERSISTENT: Primary | ICD-10-CM

## 2023-04-27 PROCEDURE — 99213 OFFICE O/P EST LOW 20 MIN: CPT | Mod: PBBFAC | Performed by: INTERNAL MEDICINE

## 2023-04-27 RX ORDER — DICYCLOMINE HYDROCHLORIDE 10 MG/1
10 CAPSULE ORAL 3 TIMES DAILY PRN
Qty: 30 CAPSULE | Refills: 0 | Status: SHIPPED | OUTPATIENT
Start: 2023-04-27 | End: 2023-05-27

## 2023-04-27 NOTE — PROGRESS NOTES
[facility] INTERNAL MEDICINE  OUTPATIENT OFFICE VISIT NOTE    SUBJECTIVE:   CC- Order for PFT   HPI: Ms Moeller is a 53 year old lady with a PMH of Asthma, Diverticulitis and Hyperthyroidism. The pt needs to get a PFT completed and since her PCP is out of town- she was added to my schedule today to obtain an order for a PFT. Her asthma meds are as listed in the chart. Today she reports no Shortness of breath.  She is also requesting a referral to Dr Kate and refills on her Bently    ROS:  CONSTITUTIONAL: Denies weight loss, fever and chills.  HEENT: Denies changes in vision and hearing.  ?RESPIRATORY: Denies SOB and cough.?  CV: Denies palpitations and CP. ?  GI: Denies abdominal pain, nausea, vomiting and diarrhea.?  : Denies dysuria and urinary frequency.?  MSK: Denies myalgia and joint pain.?  SKIN: Denies rash and pruritus.  ?NEUROLOGICAL: Denies headache and syncope.?  PSYCHIATRIC: Denies recent changes in mood. Denies anxiety and depression.     OBJECTIVE:     Physical Examination:    Vital signs:     Vitals:    04/27/23 1525   BP: 126/81   Pulse: 80   Temp: 98.2 °F (36.8 °C)        General: Well nourished w/o distress  HEENT: NC/AT; PERRLA;   Neck: Full ROM; no lymphadenopathy  Pulm: CTA bilaterally, normal work of breathing  CV: S1, S2 w/o murmurs or gallops; no edema noted  Derm: No rashes, abnormal bruising, or skin lesions  Neuro: AAOx4; CN II-XII intact; motor/sensory function intact  Psych: Cooperative; appropriate mood and affect         ASSESSMENT & PLAN:     H/O Asthma  Pt is here to obtain an order for Pulmonary Function Testing.   Order placed in chart    2. Diverticulosis  Referred to Dr Kate  Requesting refills for   Pt to f/u with PCP for future appointments             No follow-ups on file.     Renita Brown MD

## 2023-04-28 LAB — PSYCHE PATHOLOGY RESULT: NORMAL

## 2023-04-29 NOTE — DISCHARGE SUMMARY
[facility] INTERNAL MEDICINE  LEAVING AGAINST MEDICAL ADVICE    Admitting Physician: Renita Brown MD  Attending Physician: No att. providers found  Date of Admit: 3/7/2023  Date of AMA: 3/8/2023    Brief History of Present Illness      Niya Moeller is a 52 y.o. female who with a history of asthma, GERD, hyperthyroidism, nicotine dependence, history of diverticulosis with multiple flare-ups who presented to [facility] ED on 3/7/2023  with a primary complaint of left lower quadrant abdominal pain the past 2 days.  Abdominal pain described as constant cramping, and feels like the beginning of previous diverticulitis flares. She reports associated with nausea without vomiting, constant urge to move her bowels but unable to. She also had a fever 2 days ago with a T-max of 101° 2.  Patient reports tolerating p.o. and drinking adequately.  She reports compliance with all her medications.  Denies new or unusual foods, headache, lightheaded, dizziness, chest pain, palpitations, shortness of breath, bloating, dysuria, constipation, bleeding from any sites.  Patient was recently admitted at [facility] and found to have diverticulitis and abscess.  She is status post IR drainage of pericolonic abscess on 2/16/2023 which was culture positive for ESBL E. coli sensitive to ertapenem and meropenem.  She completed 7 days of IV ertapenem on 03/02/2023.  She is scheduled to see her endocrinologist on 03/10/2023 and has an appointment with GI on 03/14/2023 with a scheduled colonoscopy on 04/14/2023. Her PCP is Sade Goodman NP. In the ED afebrile, hemodynamically stable.  Labs unremarkable.   C diff pending. CT abdomen and pelvis obtained. Given DuoNeb x1, Toradol, morphine and Zofran. Internal medicine consulted for acute diverticulitis. Decision ultimately made to admit for acute diverticulitis given persistent fever in setting of recent abscess.    Discharge Information:     This patient is choosing to leave against medical  advice. I have personally explained to patient the risks and that choosing to do so may result in permanent bodily harm or even death. Discussed at great length that without further evaluation and monitoring there may have unforeseen circumstances and/or deterioration causing permanent bodily harm or death as a result of leaving against medical advice. Patient verbalizes these risks back to me in layman terms. Patient still desires to leave against medical advice. Patient is alert, oriented, and shows the mental capacity to make clear decisions regarding his/her health care at this time. In light of patients decision to leave against medical advice, follow up will be arranged and patient is aware of the importance of following up as instructed. Advise patient to return to ED at any time immediately if she changes mind at any time or if condition begins to worsen or change in anyway.     Vincenzo Cisneros, DO  [facility] Internal Medicine PGY-II

## 2023-04-30 ENCOUNTER — HOSPITAL ENCOUNTER (OUTPATIENT)
Facility: HOSPITAL | Age: 53
Discharge: HOME | DRG: 203 | End: 2023-05-01
Attending: FAMILY MEDICINE | Admitting: INTERNAL MEDICINE
Payer: MEDICAID

## 2023-04-30 DIAGNOSIS — J45.901 ASTHMA EXACERBATION: ICD-10-CM

## 2023-04-30 DIAGNOSIS — J45.41 MODERATE PERSISTENT ASTHMA WITH EXACERBATION: ICD-10-CM

## 2023-04-30 DIAGNOSIS — R06.02 SOB (SHORTNESS OF BREATH): ICD-10-CM

## 2023-04-30 DIAGNOSIS — R07.9 CHEST PAIN: Primary | ICD-10-CM

## 2023-04-30 PROCEDURE — 80053 COMPREHEN METABOLIC PANEL: CPT | Performed by: FAMILY MEDICINE

## 2023-04-30 PROCEDURE — 21400001 HC TELEMETRY ROOM

## 2023-04-30 PROCEDURE — G0379 DIRECT REFER HOSPITAL OBSERV: HCPCS

## 2023-04-30 PROCEDURE — 83690 ASSAY OF LIPASE: CPT | Performed by: FAMILY MEDICINE

## 2023-04-30 PROCEDURE — 99285 EMERGENCY DEPT VISIT HI MDM: CPT | Mod: 25

## 2023-04-30 PROCEDURE — 83735 ASSAY OF MAGNESIUM: CPT | Performed by: FAMILY MEDICINE

## 2023-04-30 PROCEDURE — 96365 THER/PROPH/DIAG IV INF INIT: CPT

## 2023-04-30 PROCEDURE — G0378 HOSPITAL OBSERVATION PER HR: HCPCS

## 2023-04-30 PROCEDURE — 63600175 PHARM REV CODE 636 W HCPCS: Performed by: FAMILY MEDICINE

## 2023-04-30 PROCEDURE — 96375 TX/PRO/DX INJ NEW DRUG ADDON: CPT

## 2023-04-30 PROCEDURE — 85025 COMPLETE CBC W/AUTO DIFF WBC: CPT | Performed by: FAMILY MEDICINE

## 2023-04-30 PROCEDURE — 84484 ASSAY OF TROPONIN QUANT: CPT | Performed by: FAMILY MEDICINE

## 2023-04-30 PROCEDURE — 83880 ASSAY OF NATRIURETIC PEPTIDE: CPT | Performed by: FAMILY MEDICINE

## 2023-04-30 PROCEDURE — 93005 ELECTROCARDIOGRAM TRACING: CPT

## 2023-04-30 RX ORDER — MAGNESIUM SULFATE HEPTAHYDRATE 40 MG/ML
2 INJECTION, SOLUTION INTRAVENOUS
Status: COMPLETED | OUTPATIENT
Start: 2023-04-30 | End: 2023-05-01

## 2023-04-30 RX ORDER — MORPHINE SULFATE 2 MG/ML
4 INJECTION, SOLUTION INTRAMUSCULAR; INTRAVENOUS
Status: COMPLETED | OUTPATIENT
Start: 2023-04-30 | End: 2023-04-30

## 2023-04-30 RX ORDER — ALBUTEROL SULFATE 2.5 MG/.5ML
10 SOLUTION RESPIRATORY (INHALATION)
Status: COMPLETED | OUTPATIENT
Start: 2023-04-30 | End: 2023-05-01

## 2023-04-30 RX ORDER — ONDANSETRON 2 MG/ML
4 INJECTION INTRAMUSCULAR; INTRAVENOUS
Status: COMPLETED | OUTPATIENT
Start: 2023-04-30 | End: 2023-04-30

## 2023-04-30 RX ADMIN — ONDANSETRON 4 MG: 2 INJECTION INTRAMUSCULAR; INTRAVENOUS at 11:04

## 2023-04-30 RX ADMIN — MAGNESIUM SULFATE HEPTAHYDRATE 2 G: 40 INJECTION, SOLUTION INTRAVENOUS at 11:04

## 2023-04-30 RX ADMIN — PREDNISONE 60 MG: 50 TABLET ORAL at 11:04

## 2023-04-30 RX ADMIN — MORPHINE SULFATE 4 MG: 2 INJECTION, SOLUTION INTRAMUSCULAR; INTRAVENOUS at 11:04

## 2023-05-01 VITALS
RESPIRATION RATE: 18 BRPM | BODY MASS INDEX: 27.21 KG/M2 | DIASTOLIC BLOOD PRESSURE: 74 MMHG | OXYGEN SATURATION: 99 % | TEMPERATURE: 98 F | HEART RATE: 57 BPM | WEIGHT: 135 LBS | HEIGHT: 59 IN | SYSTOLIC BLOOD PRESSURE: 109 MMHG

## 2023-05-01 PROBLEM — K57.92 ACUTE DIVERTICULITIS: Status: ACTIVE | Noted: 2023-05-01

## 2023-05-01 LAB
ALBUMIN SERPL-MCNC: 3.6 G/DL (ref 3.5–5)
ALBUMIN SERPL-MCNC: 3.9 G/DL (ref 3.5–5)
ALBUMIN/GLOB SERPL: 1.1 RATIO (ref 1.1–2)
ALBUMIN/GLOB SERPL: 1.1 RATIO (ref 1.1–2)
ALP SERPL-CCNC: 82 UNIT/L (ref 40–150)
ALP SERPL-CCNC: 85 UNIT/L (ref 40–150)
ALT SERPL-CCNC: 10 UNIT/L (ref 0–55)
ALT SERPL-CCNC: 11 UNIT/L (ref 0–55)
APPEARANCE UR: CLEAR
AST SERPL-CCNC: 11 UNIT/L (ref 5–34)
AST SERPL-CCNC: 12 UNIT/L (ref 5–34)
BACTERIA #/AREA URNS AUTO: ABNORMAL /HPF
BASOPHILS # BLD AUTO: 0.02 X10(3)/MCL (ref 0–0.2)
BASOPHILS NFR BLD AUTO: 0.3 %
BILIRUB UR QL STRIP.AUTO: NEGATIVE MG/DL
BILIRUBIN DIRECT+TOT PNL SERPL-MCNC: 0.2 MG/DL
BILIRUBIN DIRECT+TOT PNL SERPL-MCNC: 0.3 MG/DL
BNP BLD-MCNC: 18.5 PG/ML
BUN SERPL-MCNC: 12 MG/DL (ref 9.8–20.1)
BUN SERPL-MCNC: 9.2 MG/DL (ref 9.8–20.1)
CALCIUM SERPL-MCNC: 9.4 MG/DL (ref 8.4–10.2)
CALCIUM SERPL-MCNC: 9.6 MG/DL (ref 8.4–10.2)
CHLORIDE SERPL-SCNC: 108 MMOL/L (ref 98–107)
CHLORIDE SERPL-SCNC: 110 MMOL/L (ref 98–107)
CO2 SERPL-SCNC: 23 MMOL/L (ref 22–29)
CO2 SERPL-SCNC: 24 MMOL/L (ref 22–29)
COLOR UR AUTO: ABNORMAL
CREAT SERPL-MCNC: 0.83 MG/DL (ref 0.55–1.02)
CREAT SERPL-MCNC: 0.85 MG/DL (ref 0.55–1.02)
EOSINOPHIL # BLD AUTO: 0.2 X10(3)/MCL (ref 0–0.9)
EOSINOPHIL NFR BLD AUTO: 3.2 %
ERYTHROCYTE [DISTWIDTH] IN BLOOD BY AUTOMATED COUNT: 15.8 % (ref 11.5–17)
GFR SERPLBLD CREATININE-BSD FMLA CKD-EPI: >60 MLS/MIN/1.73/M2
GFR SERPLBLD CREATININE-BSD FMLA CKD-EPI: >60 MLS/MIN/1.73/M2
GLOBULIN SER-MCNC: 3.3 GM/DL (ref 2.4–3.5)
GLOBULIN SER-MCNC: 3.7 GM/DL (ref 2.4–3.5)
GLUCOSE SERPL-MCNC: 101 MG/DL (ref 74–100)
GLUCOSE SERPL-MCNC: 141 MG/DL (ref 74–100)
GLUCOSE UR QL STRIP.AUTO: NORMAL MG/DL
HCT VFR BLD AUTO: 39.2 % (ref 37–47)
HGB BLD-MCNC: 12.9 G/DL (ref 12–16)
HYALINE CASTS #/AREA URNS LPF: ABNORMAL /LPF
IMM GRANULOCYTES # BLD AUTO: 0.01 X10(3)/MCL (ref 0–0.04)
IMM GRANULOCYTES NFR BLD AUTO: 0.2 %
KETONES UR QL STRIP.AUTO: NEGATIVE MG/DL
LEUKOCYTE ESTERASE UR QL STRIP.AUTO: NEGATIVE UNIT/L
LIPASE SERPL-CCNC: 31 U/L
LYMPHOCYTES # BLD AUTO: 3.06 X10(3)/MCL (ref 0.6–4.6)
LYMPHOCYTES NFR BLD AUTO: 48.3 %
MAGNESIUM SERPL-MCNC: 1.9 MG/DL (ref 1.6–2.6)
MCH RBC QN AUTO: 30.3 PG (ref 27–31)
MCHC RBC AUTO-ENTMCNC: 32.9 G/DL (ref 33–36)
MCV RBC AUTO: 92 FL (ref 80–94)
MONOCYTES # BLD AUTO: 0.64 X10(3)/MCL (ref 0.1–1.3)
MONOCYTES NFR BLD AUTO: 10.1 %
MUCOUS THREADS URNS QL MICRO: ABNORMAL /LPF
NEUTROPHILS # BLD AUTO: 2.41 X10(3)/MCL (ref 2.1–9.2)
NEUTROPHILS NFR BLD AUTO: 37.9 %
NITRITE UR QL STRIP.AUTO: NEGATIVE
NRBC BLD AUTO-RTO: 0 %
PH UR STRIP.AUTO: 5.5 [PH]
PLATELET # BLD AUTO: 250 X10(3)/MCL (ref 130–400)
PMV BLD AUTO: 11.2 FL (ref 7.4–10.4)
POTASSIUM SERPL-SCNC: 3.7 MMOL/L (ref 3.5–5.1)
POTASSIUM SERPL-SCNC: 4.5 MMOL/L (ref 3.5–5.1)
PROT SERPL-MCNC: 6.9 GM/DL (ref 6.4–8.3)
PROT SERPL-MCNC: 7.6 GM/DL (ref 6.4–8.3)
PROT UR QL STRIP.AUTO: NEGATIVE MG/DL
RBC # BLD AUTO: 4.26 X10(6)/MCL (ref 4.2–5.4)
RBC #/AREA URNS AUTO: ABNORMAL /HPF
RBC UR QL AUTO: NEGATIVE UNIT/L
SODIUM SERPL-SCNC: 137 MMOL/L (ref 136–145)
SODIUM SERPL-SCNC: 143 MMOL/L (ref 136–145)
SP GR UR STRIP.AUTO: 1.03
SQUAMOUS #/AREA URNS LPF: ABNORMAL /HPF
TROPONIN I SERPL-MCNC: 0.02 NG/ML (ref 0–0.04)
UROBILINOGEN UR STRIP-ACNC: NORMAL MG/DL
WBC # SPEC AUTO: 6.3 X10(3)/MCL (ref 4.5–11.5)
WBC #/AREA URNS AUTO: ABNORMAL /HPF

## 2023-05-01 PROCEDURE — G0378 HOSPITAL OBSERVATION PER HR: HCPCS

## 2023-05-01 PROCEDURE — 80053 COMPREHEN METABOLIC PANEL: CPT | Performed by: STUDENT IN AN ORGANIZED HEALTH CARE EDUCATION/TRAINING PROGRAM

## 2023-05-01 PROCEDURE — 96366 THER/PROPH/DIAG IV INF ADDON: CPT

## 2023-05-01 PROCEDURE — 94640 AIRWAY INHALATION TREATMENT: CPT | Mod: XB

## 2023-05-01 PROCEDURE — 96375 TX/PRO/DX INJ NEW DRUG ADDON: CPT

## 2023-05-01 PROCEDURE — 96367 TX/PROPH/DG ADDL SEQ IV INF: CPT

## 2023-05-01 PROCEDURE — 96365 THER/PROPH/DIAG IV INF INIT: CPT

## 2023-05-01 PROCEDURE — 81001 URINALYSIS AUTO W/SCOPE: CPT | Performed by: FAMILY MEDICINE

## 2023-05-01 PROCEDURE — 94760 N-INVAS EAR/PLS OXIMETRY 1: CPT

## 2023-05-01 PROCEDURE — 25000003 PHARM REV CODE 250: Performed by: FAMILY MEDICINE

## 2023-05-01 PROCEDURE — 25000242 PHARM REV CODE 250 ALT 637 W/ HCPCS: Performed by: FAMILY MEDICINE

## 2023-05-01 PROCEDURE — 94761 N-INVAS EAR/PLS OXIMETRY MLT: CPT

## 2023-05-01 PROCEDURE — 25000242 PHARM REV CODE 250 ALT 637 W/ HCPCS: Performed by: STUDENT IN AN ORGANIZED HEALTH CARE EDUCATION/TRAINING PROGRAM

## 2023-05-01 PROCEDURE — 21400001 HC TELEMETRY ROOM

## 2023-05-01 PROCEDURE — 93005 ELECTROCARDIOGRAM TRACING: CPT

## 2023-05-01 PROCEDURE — 27000221 HC OXYGEN, UP TO 24 HOURS

## 2023-05-01 PROCEDURE — 99900035 HC TECH TIME PER 15 MIN (STAT)

## 2023-05-01 PROCEDURE — 63600175 PHARM REV CODE 636 W HCPCS: Performed by: STUDENT IN AN ORGANIZED HEALTH CARE EDUCATION/TRAINING PROGRAM

## 2023-05-01 PROCEDURE — 25500020 PHARM REV CODE 255: Performed by: FAMILY MEDICINE

## 2023-05-01 PROCEDURE — 25000003 PHARM REV CODE 250: Performed by: STUDENT IN AN ORGANIZED HEALTH CARE EDUCATION/TRAINING PROGRAM

## 2023-05-01 RX ORDER — IPRATROPIUM BROMIDE AND ALBUTEROL SULFATE 2.5; .5 MG/3ML; MG/3ML
3 SOLUTION RESPIRATORY (INHALATION) EVERY 4 HOURS PRN
Qty: 75 ML | Refills: 0 | Status: ON HOLD | OUTPATIENT
Start: 2023-05-01 | End: 2024-02-19 | Stop reason: HOSPADM

## 2023-05-01 RX ORDER — PANTOPRAZOLE SODIUM 40 MG/1
40 TABLET, DELAYED RELEASE ORAL DAILY
Status: DISCONTINUED | OUTPATIENT
Start: 2023-05-01 | End: 2023-05-01 | Stop reason: HOSPADM

## 2023-05-01 RX ORDER — PREDNISONE 20 MG/1
40 TABLET ORAL DAILY
Status: DISCONTINUED | OUTPATIENT
Start: 2023-05-01 | End: 2023-05-01

## 2023-05-01 RX ORDER — METHIMAZOLE 10 MG/1
10 TABLET ORAL 2 TIMES DAILY
Status: DISCONTINUED | OUTPATIENT
Start: 2023-05-01 | End: 2023-05-01 | Stop reason: HOSPADM

## 2023-05-01 RX ORDER — ENOXAPARIN SODIUM 100 MG/ML
40 INJECTION SUBCUTANEOUS EVERY 24 HOURS
Status: DISCONTINUED | OUTPATIENT
Start: 2023-05-01 | End: 2023-05-01 | Stop reason: HOSPADM

## 2023-05-01 RX ORDER — PREDNISONE 20 MG/1
40 TABLET ORAL DAILY
Qty: 10 TABLET | Refills: 0 | Status: SHIPPED | OUTPATIENT
Start: 2023-05-02 | End: 2023-05-07

## 2023-05-01 RX ORDER — DEXTROSE 40 %
30 GEL (GRAM) ORAL
Status: DISCONTINUED | OUTPATIENT
Start: 2023-05-01 | End: 2023-05-01 | Stop reason: HOSPADM

## 2023-05-01 RX ORDER — FLUTICASONE PROPIONATE 50 MCG
1 SPRAY, SUSPENSION (ML) NASAL DAILY PRN
Qty: 18.2 ML | Refills: 0 | Status: SHIPPED | OUTPATIENT
Start: 2023-05-01

## 2023-05-01 RX ORDER — DIPHENHYDRAMINE HCL 25 MG
25 CAPSULE ORAL ONCE
Status: DISCONTINUED | OUTPATIENT
Start: 2023-05-01 | End: 2023-05-01 | Stop reason: HOSPADM

## 2023-05-01 RX ORDER — TALC
6 POWDER (GRAM) TOPICAL NIGHTLY PRN
Status: DISCONTINUED | OUTPATIENT
Start: 2023-05-01 | End: 2023-05-01 | Stop reason: HOSPADM

## 2023-05-01 RX ORDER — PREDNISONE 20 MG/1
40 TABLET ORAL DAILY
Status: DISCONTINUED | OUTPATIENT
Start: 2023-05-02 | End: 2023-05-01 | Stop reason: HOSPADM

## 2023-05-01 RX ORDER — BUDESONIDE AND FORMOTEROL FUMARATE DIHYDRATE 160; 4.5 UG/1; UG/1
2 AEROSOL RESPIRATORY (INHALATION) EVERY 12 HOURS
Qty: 10.2 G | Refills: 0 | Status: SHIPPED | OUTPATIENT
Start: 2023-05-01 | End: 2023-11-06

## 2023-05-01 RX ORDER — SODIUM CHLORIDE 0.9 % (FLUSH) 0.9 %
10 SYRINGE (ML) INJECTION EVERY 12 HOURS PRN
Status: DISCONTINUED | OUTPATIENT
Start: 2023-05-01 | End: 2023-05-01 | Stop reason: HOSPADM

## 2023-05-01 RX ORDER — BISACODYL 5 MG
5 TABLET, DELAYED RELEASE (ENTERIC COATED) ORAL DAILY PRN
Status: DISCONTINUED | OUTPATIENT
Start: 2023-05-01 | End: 2023-05-01 | Stop reason: HOSPADM

## 2023-05-01 RX ORDER — METRONIDAZOLE 500 MG/100ML
500 INJECTION, SOLUTION INTRAVENOUS
Status: DISCONTINUED | OUTPATIENT
Start: 2023-05-01 | End: 2023-05-01

## 2023-05-01 RX ORDER — IPRATROPIUM BROMIDE 0.5 MG/2.5ML
0.5 SOLUTION RESPIRATORY (INHALATION)
Status: DISCONTINUED | OUTPATIENT
Start: 2023-05-01 | End: 2023-05-01

## 2023-05-01 RX ORDER — FLUTICASONE FUROATE AND VILANTEROL 100; 25 UG/1; UG/1
1 POWDER RESPIRATORY (INHALATION) DAILY
Status: DISCONTINUED | OUTPATIENT
Start: 2023-05-01 | End: 2023-05-01 | Stop reason: HOSPADM

## 2023-05-01 RX ORDER — DEXTROSE 40 %
15 GEL (GRAM) ORAL
Status: DISCONTINUED | OUTPATIENT
Start: 2023-05-01 | End: 2023-05-01 | Stop reason: HOSPADM

## 2023-05-01 RX ORDER — GLUCAGON 1 MG
1 KIT INJECTION
Status: DISCONTINUED | OUTPATIENT
Start: 2023-05-01 | End: 2023-05-01 | Stop reason: HOSPADM

## 2023-05-01 RX ORDER — CIPROFLOXACIN 2 MG/ML
400 INJECTION, SOLUTION INTRAVENOUS
Status: DISCONTINUED | OUTPATIENT
Start: 2023-05-01 | End: 2023-05-01

## 2023-05-01 RX ORDER — NALOXONE HCL 0.4 MG/ML
0.02 VIAL (ML) INJECTION
Status: DISCONTINUED | OUTPATIENT
Start: 2023-05-01 | End: 2023-05-01 | Stop reason: HOSPADM

## 2023-05-01 RX ORDER — ALBUTEROL SULFATE 2.5 MG/.5ML
2.5 SOLUTION RESPIRATORY (INHALATION)
Status: DISCONTINUED | OUTPATIENT
Start: 2023-05-01 | End: 2023-05-01 | Stop reason: HOSPADM

## 2023-05-01 RX ORDER — ALBUTEROL SULFATE 2.5 MG/.5ML
2.5 SOLUTION RESPIRATORY (INHALATION) ONCE
Status: DISCONTINUED | OUTPATIENT
Start: 2023-05-01 | End: 2023-05-01 | Stop reason: HOSPADM

## 2023-05-01 RX ORDER — AMOXICILLIN AND CLAVULANATE POTASSIUM 875; 125 MG/1; MG/1
1 TABLET, FILM COATED ORAL
Status: COMPLETED | OUTPATIENT
Start: 2023-05-01 | End: 2023-05-01

## 2023-05-01 RX ORDER — PROPRANOLOL HYDROCHLORIDE 10 MG/1
20 TABLET ORAL 3 TIMES DAILY
Status: DISCONTINUED | OUTPATIENT
Start: 2023-05-01 | End: 2023-05-01 | Stop reason: HOSPADM

## 2023-05-01 RX ORDER — MONTELUKAST SODIUM 5 MG/1
10 TABLET, CHEWABLE ORAL NIGHTLY
Status: DISCONTINUED | OUTPATIENT
Start: 2023-05-01 | End: 2023-05-01 | Stop reason: HOSPADM

## 2023-05-01 RX ORDER — ALBUTEROL SULFATE 2.5 MG/.5ML
10 SOLUTION RESPIRATORY (INHALATION)
Status: COMPLETED | OUTPATIENT
Start: 2023-05-01 | End: 2023-05-01

## 2023-05-01 RX ORDER — DIPHENHYDRAMINE HCL 25 MG
25 CAPSULE ORAL ONCE
Status: DISCONTINUED | OUTPATIENT
Start: 2023-05-01 | End: 2023-05-01

## 2023-05-01 RX ORDER — IPRATROPIUM BROMIDE AND ALBUTEROL SULFATE 2.5; .5 MG/3ML; MG/3ML
3 SOLUTION RESPIRATORY (INHALATION)
Status: DISCONTINUED | OUTPATIENT
Start: 2023-05-01 | End: 2023-05-01

## 2023-05-01 RX ADMIN — ALBUTEROL SULFATE 10 MG: 2.5 SOLUTION RESPIRATORY (INHALATION) at 12:05

## 2023-05-01 RX ADMIN — IOHEXOL 100 ML: 350 INJECTION, SOLUTION INTRAVENOUS at 12:05

## 2023-05-01 RX ADMIN — PANTOPRAZOLE SODIUM 40 MG: 40 TABLET, DELAYED RELEASE ORAL at 08:05

## 2023-05-01 RX ADMIN — METHYLPREDNISOLONE SODIUM SUCCINATE 60 MG: 40 INJECTION, POWDER, FOR SOLUTION INTRAMUSCULAR; INTRAVENOUS at 06:05

## 2023-05-01 RX ADMIN — METHIMAZOLE 10 MG: 10 TABLET ORAL at 10:05

## 2023-05-01 RX ADMIN — FLUTICASONE FUROATE AND VILANTEROL TRIFENATATE 1 PUFF: 100; 25 POWDER RESPIRATORY (INHALATION) at 06:05

## 2023-05-01 RX ADMIN — ALBUTEROL SULFATE 2.5 MG: 2.5 SOLUTION RESPIRATORY (INHALATION) at 06:05

## 2023-05-01 RX ADMIN — ALBUTEROL SULFATE 10 MG: 2.5 SOLUTION RESPIRATORY (INHALATION) at 02:05

## 2023-05-01 RX ADMIN — AMOXICILLIN AND CLAVULANATE POTASSIUM 1 TABLET: 875; 125 TABLET, FILM COATED ORAL at 03:05

## 2023-05-01 RX ADMIN — PROPRANOLOL HYDROCHLORIDE 20 MG: 10 TABLET ORAL at 08:05

## 2023-05-01 RX ADMIN — PIPERACILLIN AND TAZOBACTAM 4.5 G: 4; .5 INJECTION, POWDER, LYOPHILIZED, FOR SOLUTION INTRAVENOUS; PARENTERAL at 05:05

## 2023-05-01 NOTE — SIGNIFICANT EVENT
Called to bedside   10-15 minutes into Zosyn infusion patient started to have generalized pruritis.   On examination at bedside there is diffuse blanchable rash scattered face, torso, back, upper and lower extremities  O2 sat remain >95 on 2 L NC  Patient refused IM Epinephrine due to effects of heart fluttering   Will give stat oral benadryl   Stat nebulizer injection   Move up administration time of prednisone to 5/2/2023  Give 1 STAT dose of Solumedrol at 1mg/kg  Continue to monitor closely   Risk of not receiving epinephrine discussed   Pt still maintaining airway    Magda Singh MD  [employer]  Resident, HO-3

## 2023-05-01 NOTE — PLAN OF CARE
05/01/23 1217   Discharge Assessment   Assessment Type Discharge Planning Assessment   Confirmed/corrected address, phone number and insurance Yes   Confirmed Demographics Correct on Facesheet   Source of Information patient   When was your last doctors appointment?   (Milvia Maxine)   Reason For Admission SOB, Moderate persistent asthma with exacerbation, Chest pain   People in Home alone   Facility Arrived From: Home   Do you expect to return to your current living situation? Yes   Do you have help at home or someone to help you manage your care at home? Yes   Who are your caregiver(s) and their phone number(s)? Dustin Viveros (Son)   [phone]   Prior to hospitilization cognitive status: Alert/Oriented   Current cognitive status: Alert/Oriented   Equipment Currently Used at Home nebulizer   Readmission within 30 days? No   Patient currently being followed by outpatient case management? No   Do you currently have service(s) that help you manage your care at home? No   Do you take prescription medications? Yes  ([facility])   Do you have prescription coverage? Yes   [facility]   Do you have any problems affording any of your prescribed medications? No   Is the patient taking medications as prescribed? yes   Who is going to help you get home at discharge? Family   How do you get to doctors appointments? car, drives self   Are you on dialysis? No   Discharge Plan A Home   DME Needed Upon Discharge  none   Discharge Plan discussed with: Patient   Discharge Barriers Identified None   Physical Activity   On average, how many days per week do you engage in moderate to strenuous exercise (like a brisk walk)? 0 days   On average, how many minutes do you engage in exercise at this level? 0 min   Financial Resource Strain   How hard is it for you to pay for the very basics like food, housing, medical care, and heating? Not very   Housing Stability   In the last 12 months, was there a time  when you were not able to pay the mortgage or rent on time? N   In the last 12 months, how many places have you lived? 2   In the last 12 months, was there a time when you did not have a steady place to sleep or slept in a shelter (including now)? N   Transportation Needs   In the past 12 months, has lack of transportation kept you from medical appointments or from getting medications? no   In the past 12 months, has lack of transportation kept you from meetings, work, or from getting things needed for daily living? No   Food Insecurity   Within the past 12 months, you worried that your food would run out before you got the money to buy more. Never true   Within the past 12 months, the food you bought just didn't last and you didn't have money to get more. Never true   Stress   Do you feel stress - tense, restless, nervous, or anxious, or unable to sleep at night because your mind is troubled all the time - these days? Not at all   Social Connections   In a typical week, how many times do you talk on the phone with family, friends, or neighbors? More than 3   How often do you get together with friends or relatives? Twice   How often do you attend Cheondoism or Cheondoism services? Never   Do you belong to any clubs or organizations such as [employer]ism groups, unions, fraternal or athletic groups, or school groups? No   How often do you attend meetings of the clubs or organizations you belong to? Never   Are you , , , , never , or living with a partner? Never marrie   Alcohol Use   Q1: How often do you have a drink containing alcohol? Never   Q2: How many drinks containing alcohol do you have on a typical day when you are drinking? None   Q3: How often do you have six or more drinks on one occasion? Never     Pt single; Resides alone; Unemployed ; Pt reported her family provides financial support while she seeks employement; Pt denied needs at this time.

## 2023-05-01 NOTE — ED NOTES
"Pt refused repeat labs stating, "my labs haven't changed since I've been here, so I do not need to have them rechecked."  "

## 2023-05-01 NOTE — H&P
"[facility] Internal Medicine History & Physical Note     Resident Team: [facility] Medicine List 1  Attending Physician: Arvind Crowley MD  Date of Admit: 4/30/2023    Chief Complaint     Shortness of Breath (C/o SOB x yesterday. Reports attempted duoneb at home w/ no relief/) and Abdominal Pain (Reports lower abdominal pain x 1 week. Hx diverticulitis/)    Subjective:      History of Present Illness:  Niya Moeller is a 53 y.o.  female who with a history of Asthma, GERD, hyperthyroidism, and diverticulitis presented to the ED on 4/30/2023  with a primary complaint of progressively worsening SOB that started 4/29/2023. Patient states that she was diagnosed with Pneumonia on 4/11/23 and sent home with antibiotics after an ER visit on that day. States that her symptoms were not bad, but acutely worsened around 3 AM on 4/29/2023 after waking up with "acid in my chest". She denies any vomiting, but this prompted her to have repeated bouts of coughing spells. As the day progressed, patient started wheezing. Decided to come to the ED for further evaluation because she could not "catch her breath" on ambulation. She is also having progressively worsening abdominal pain. Started out on her right side, but now it seems like the left side of her abdomen is worse than the right. She has a history of diverticulitis with pericolonic abscess which drained ESBL culture positive e.coli on 2/16/2023. Pt completed 7 days IV antibiotic course with Meropenem, discharged on 3/15/2023. Patient still actively smokes tobacco. Reports smoking 0.5 to 1 pack per day since 1983. Of note she was also scheduled for a colonoscopy on 4/14/2023, but it appears it was not completed. Patient seen in internal medicine clinic for PFT order on 4/27/23. At that time patient had no SOB or wheezing.      On arrival to the ED patient O2 on RA was 94, RR 25, /65. She was started on 2L of supplemental of via NC with improvement in O2 sat's. She was given " multiple albuterol treatments, magnesium, and prednisone. On ED physician re-examination she showed no improvement in wheezing. Internal medicine consulted for asthma exacerbation. Was at bedside when patient reported that she is unable to tolerate Flagyl (secondary to nausea/vomiting). Given Augmentin in the ER, 1x dose.    Past Medical History:  Asthma  GERD  Hyperthyroidism  Diverticulitis with hx of abscess s/p IR drainage on 2/16/2023    Past Surgical History:  denies    Family History:  Denies     Social History:  Social History     Tobacco Use    Smoking status: Every Day     Packs/day: 0.50     Years: 40.00     Pack years: 20.00     Types: Cigarettes     Start date: 1983    Smokeless tobacco: Current    Tobacco comments:     Ambulatory referral to Smoking Cessation clinic following hospital discharge.    Substance Use Topics    Alcohol use: Not Currently    Drug use: Not Currently     Allergies:  Review of patient's allergies indicates:   Allergen Reactions    Latex        Home Medications:  Prior to Admission medications    Medication Sig Start Date End Date Taking? Authorizing Provider   albuterol (PROVENTIL/VENTOLIN HFA) 90 mcg/actuation inhaler Inhale 1-2 puffs into the lungs every 6 (six) hours as needed for Wheezing or Shortness of Breath. Rescue 3/24/23 4/25/23  Wilver Ibrahim MD   albuterol-ipratropium (DUO-NEB) 2.5 mg-0.5 mg/3 mL nebulizer solution Take 3 mLs by nebulization every 4 (four) hours as needed for Shortness of Breath or Wheezing. 3/24/23 4/25/23  Wilver Ibrahim MD   amoxicillin-clavulanate 875-125mg (AUGMENTIN) 875-125 mg per tablet Take 1 tablet by mouth 2 (two) times daily.  Patient not taking: Reported on 4/25/2023 4/11/23   Josselin Downey MD   budesonide-formoterol 160-4.5 mcg (SYMBICORT) 160-4.5 mcg/actuation HFAA Inhale 2 puffs into the lungs every 12 (twelve) hours. Controller 7/30/22 7/30/23  Alvaro Gaston DO   dicyclomine (BENTYL) 10 MG capsule Take 1 capsule (10 mg total) by  "mouth 3 (three) times daily as needed (for spasms). 23  Renita Brown MD   fluticasone propionate (FLONASE) 50 mcg/actuation nasal spray 1 spray by Nasal route daily as needed. 22   Historical Provider   methIMAzole (TAPAZOLE) 10 MG Tab Take 1 tablet (10 mg total) by mouth 2 (two) times daily. 23  Sol Aguirre NP   methylPREDNISolone (MEDROL DOSEPACK) 4 mg tablet TAKE BY MOUTH AS DIRECTED ON INSIDE OF PACKAGE 23   Historical Provider   montelukast (SINGULAIR) 10 mg tablet Take 10 mg by mouth once daily. 10/17/22 10/17/23  Historical Provider   omeprazole (PRILOSEC) 40 MG capsule Take 1 capsule (40 mg total) by mouth once daily. 3/15/23 4/25/23  Anamaria Ruffin MD   propranoloL (INDERAL LA) 60 MG 24 hr capsule Take 1 capsule (60 mg total) by mouth once daily. 23  Sol Aguirre NP   sod sulf-pot chloride-mag sulf (SUTAB) 1.479-0.188- 0.225 gram tablet Take 12 tablets by mouth once daily. Take according to package instructions with indicated amount of water.  Patient not taking: Reported on 3/16/2023 2/17/23   Justin Pereira MD     Review of Systems:  Constitutional: no fever, no chills  CV: no chest pain, no palpitations  Resp: + shortness of breath, + cough, + wheezing  GI: + abdominal pain, no nausea, no vomiting, no constipation, no diarrhea  : no dysuria, no increased frequency, no fowl odor to urine  Neuro: No headache, no dizziness, no lightheadedness  MSK: no joint pain or swelling  Skin: no rash     Objective:   Last 24 Hour Vital Signs:  BP  Min: 101/47  Max: 122/73  Temp  Av.1 °F (36.7 °C)  Min: 98.1 °F (36.7 °C)  Max: 98.1 °F (36.7 °C)  Pulse  Av  Min: 67  Max: 79  Resp  Av.5  Min: 16  Max: 25  SpO2  Av.5 %  Min: 93 %  Max: 100 %  Height  Av' 11" (149.9 cm)  Min: 4' 11" (149.9 cm)  Max: 4' 11" (149.9 cm)  Weight  Av.2 kg (135 lb)  Min: 61.2 kg (135 lb)  Max: 61.2 kg (135 lb)  Body mass index is 27.27 " kg/m².  No intake/output data recorded.    Physical Examination:  General: in no acute distress, on RA  HENT: TMs pearly gray bilaterally, oropharynx without erythema or exudate, oropharynx and nasal mucosal surfaces moist  Neck: full range of motion, no lymphadenopathy  Respiratory: Diffuse wheezing. Diminished breath sounds bilaterally.  Cardiovascular: regular rate and rhythm. S1/S2 present. No murmurs, gallops or rubs appreciated  Gastrointestinal: soft, voluntary guarding, difficult abdominal exam given patients discomfort in lower quadrants. No overlying skin changes. Bowel sounds present.   Musculoskeletal: full range of motion of all extremities and spine without limitation or discomfort  Extremities: No peripheral edema  Neurologic: Alert and oriented x3      Laboratory:  Most Recent Data:  CBC:   Lab Results   Component Value Date    WBC 6.3 04/30/2023    HGB 12.9 04/30/2023    HCT 39.2 04/30/2023     04/30/2023    MCV 92.0 04/30/2023    RDW 15.8 04/30/2023     WBC Differential:   Recent Labs   Lab 04/30/23  2340   WBC 6.3   HGB 12.9   HCT 39.2      MCV 92.0     BMP:   Lab Results   Component Value Date     04/30/2023    K 3.7 04/30/2023     02/19/2023    CO2 24 04/30/2023    BUN 12.0 04/30/2023    CREATININE 0.85 04/30/2023    GLU 97 02/19/2023    CALCIUM 9.4 04/30/2023    MG 1.90 04/30/2023    PHOS 3.3 03/31/2022     LFTs:   Lab Results   Component Value Date    PROT 7.5 02/19/2023    ALBUMIN 3.6 04/30/2023    BILITOT 0.2 04/30/2023    AST 11 04/30/2023    ALKPHOS 82 04/30/2023    ALT 11 04/30/2023     Coags: No results found for: INR, PROTIME, PTT  FLP:   Lab Results   Component Value Date    CHOL 147 03/31/2022    HDL 51 03/31/2022    TRIG 61 03/31/2022     DM:   Lab Results   Component Value Date    HGBA1C 5.9 (H) 11/23/2022    HGBA1C 5.3 06/28/2022    HGBA1C 5.4 03/31/2022    CREATININE 0.85 04/30/2023     Thyroid:   Lab Results   Component Value Date    TSH <0.026 (L)  01/22/2023    FREET4 3.09 (H) 01/22/2023      Anemia: No results found for: IRON, TIBC, FERRITIN, SATURATEDIRO  No results found for: SYSDISRV64  No results found for: FOLATE     Cardiac:   Lab Results   Component Value Date    TROPONINI 0.020 04/30/2023    BNP 18.5 04/30/2023     Urinalysis:   Lab Results   Component Value Date    COLORU Yellow 02/15/2023    PHUA 7.0 04/11/2023    SPECGRAV 1.010 02/15/2023    NITRITE Negative 02/15/2023    KETONESU Negative 02/15/2023    UROBILINOGEN Normal 04/11/2023    WBCUA 0-5 04/11/2023       Trended Lab Data:  Recent Labs   Lab 04/30/23  2340   WBC 6.3   HGB 12.9   HCT 39.2      MCV 92.0   RDW 15.8      K 3.7   CO2 24   BUN 12.0   CREATININE 0.85   ALBUMIN 3.6   BILITOT 0.2   AST 11   ALKPHOS 82   ALT 11       Trended Cardiac Data:  Recent Labs   Lab 04/30/23  2340   TROPONINI 0.020   BNP 18.5       Microbiology Data:  Microbiology Results (last 7 days)       ** No results found for the last 168 hours. **             Other Results:  EKG (my interpretation): pending    Radiology:  Imaging Results              CT Abdomen Pelvis With Contrast (Preliminary result)  Result time 05/01/23 00:51:12      Preliminary result by Gregor Pritchett MD (05/01/23 00:51:12)                   Narrative:    START OF REPORT:  Technique: CT of the abdomen and pelvis was performed with axial images as well as sagittal and coronal reconstruction images with intravenous contrast.    Comparison: Comparison is with study dated â2023-04-11 20:32:15â.    Clinical History: Shortness of Breath (C/o SOB x yesterday. Reports attempted duoneb at home w/ no relief ) Abdominal Pain (Reports lower abdominal pain x 1 week. Hx diverticulitis.    Dosage Information: Automated Exposure Control was utilized.    Findings:  Lines and Tubes: None.  Thorax:  Lungs: There is partial interval clearance of tiny nodular infiltrates in bilateral lower and right middle lobes with streaky ground glass residual  opacities. This likely represents linear atelectasis or scarring.  Pleura: No effusions or thickening.  Heart: The heart size is within normal limits.  Abdomen:  Abdominal Wall: No abdominal wall pathology is seen.  Liver: Pronounced fatty infiltration of the liver is present.  Biliary System: No intrahepatic or extrahepatic biliary duct dilatation is seen.  Gallbladder: The gallbladder is non-distended and appears otherwise unremarkable.  Pancreas: The pancreas appears unremarkable.  Spleen: The spleen appears unremarkable.  Adrenals: The adrenal glands appear unremarkable.  Kidneys: The kidneys appear unremarkable with no stones cysts masses or hydronephrosis.  Aorta: The abdominal aorta appears unremarkable.  IVC: Unremarkable.  Bowel:  Esophagus: The visualized esophagus appears unremarkable.  Stomach: The stomach appears unremarkable.  Duodenum: Unremarkable appearing duodenum.  Small Bowel: The small bowel appears unremarkable.  Colon: There is moderate stool in the colon which could reflect an element of constipation. Numerous scattered diverticula are seen throughout the colon. There is persistent wall thickening with peridiverticular inflammatory fat stranding in the distal descending and sigmoid colon. No free intraperitoneal air or free fluid is noted to suggest perforation.  Appendix: The appendix appears unremarkable and is partially seen on.  Peritoneum: No intraperitoneal free air or ascites is seen.    Pelvis:  Bladder: The bladder appears unremarkable.  Female:  Uterus: Few enhancing intramural fibroids are noted in the fundus and anterior wall of uterus.  Ovaries: The ovaries appear unremarkable.    Bony structures:  Dorsal Spine: There is mild spondylosis of the visualized dorsal spine.  Bony Pelvis: The visualized bony structures of the pelvis appear unremarkable.      Impression:  1. There is persistent wall thickening with peridiverticular inflammatory fat stranding in the distal descending and  sigmoid colon. These findings are consistent with acute diverticulitis. No free intraperitoneal air or free fluid is noted to suggest perforation.  2. Details and other findings as discussed above.                                         X-Ray Chest PA And Lateral (Preliminary result)  Result time 05/01/23 00:44:59      Wet Read by Arvind Crowley MD (05/01/23 00:44:59, [facility], Emergency Medicine)    No consolidation, cardiomegaly, or pneumothorax.  Compared with previous CXR                                     Assessment & Plan:     Asthma Exacerbation   -Given multiple albuterol treatments and Magnesium with no improvement, patient still wheezing and has DUMONT  -Continue Duo Neb q4 hours, may de-escalate to PRN based on AM assessment  -Continue prednisone 40 mg x 5 days   -Continue home Symbicort, converted to formulary equivalent  -Continue to monitor closely    Acute Diverticulitis   -Pt with recent hospitalization of ESBL pericolonic abscess, treated for 7 days with IV meropenem  -Given 1x dose of Augmentin, patient recently completed course of Augmentin that was started on 4/11/2023  -Will place on Zosyn q8hrs, may de-escalate as needed   -Clear liquid diet, advance as tolerated  -Was going to add MiraLax as bowel regiment, however, patient states she's unable to tolerate  -Will start on Dulcolax 5 mg PRN  -Extensive discussion on the importance of smoking cessation given the negative effects of nicotine on her diverticulitis flare recurrence    Hx of GERD  -Continue home dose of Protonix     Hyperthyroidism  Anxiety  -Continue home dose of methimazole  -Patient on Propranolol 60 ER, says its for combination treatment of her thyroid issues and anxiety  -Patient may benefit from initiation of SSRI outpatient     CODE STATUS: FULL  Access: peripheral  Antibiotics: Zosyn q8hr  Diet: Clear liquid  DVT Prophylaxis: Lovenox 40 mg SC  GI Prophylaxis: Protonix 40 mg   Fluids:  none    Disposition: Patient admitted for acute asthma exacerbation. Continue daily glucocorticoids. Continue to monitor closely. IV antibiotics given history of recent antibiotic treatment, may de-escalate per primary. Advance diet as tolerated.    Magda Singh MD  [facility] Medicine HO-3

## 2023-05-01 NOTE — ED PROVIDER NOTES
Name: Niya Moeller   Age: 53 y.o.  Sex: female    Chief complaint:   Chief Complaint   Patient presents with    Shortness of Breath     C/o SOB x yesterday. Reports attempted duoneb at home w/ no relief      Abdominal Pain     Reports lower abdominal pain x 1 week. Hx diverticulitis        Patient arrived with: Private  History obtained from: Patient    Subjective:   53-year-old female with a past medical history of asthma, GERD, diverticulitis that presents to the emergency department for shortness of breath and abdominal pain.  Patient said her shortness of breath that has been going on for a few days.  Yesterday she had some GERD like symptoms and that triggered a asthma exacerbation which is why she feels worse.  Her shortness of breath is very similar to previous episodes of asthma exacerbation.  Also has some mild chest pain which feels like a midsternal tightness, constant, nonradiating.  Said she had a temperature 100.4° last night.  Denies chills, sweats, sore throat, runny nose.  Has a nonproductive cough.      Also having lower abdominal pain similar to previous episodes of diverticulitis.  Patient said approximately 6 weeks ago she was diagnosed with diverticulitis and found to have an abscess.  Needed to have an IR drain placed after which her pain eventually improved.  She started to have pain in the lower abdomen which is worse on the left lower quadrant for about 1 week.  Tolerating p.o. intake.  Complains of nausea with no episodes of vomiting.  Denies diarrhea.  Denies dysuria, hematuria.    Past Medical History:   Diagnosis Date    Asthma     Diverticulosis     GERD (gastroesophageal reflux disease)      No past surgical history on file.  Social History     Socioeconomic History    Marital status: Single   Tobacco Use    Smoking status: Every Day     Packs/day: 0.50     Years: 40.00     Pack years: 20.00     Types: Cigarettes     Start date: 1983    Smokeless tobacco: Current    Tobacco  comments:     Ambulatory referral to Smoking Cessation clinic following hospital discharge.    Substance and Sexual Activity    Alcohol use: Not Currently    Drug use: Not Currently    Sexual activity: Not Currently     Social Determinants of Health     Food Insecurity: No Food Insecurity    Worried About Running Out of Food in the Last Year: Never true    Ran Out of Food in the Last Year: Never true   Transportation Needs: Unmet Transportation Needs    Lack of Transportation (Medical): Yes    Lack of Transportation (Non-Medical): Yes   Housing Stability: Unknown    Unable to Pay for Housing in the Last Year: No    Unstable Housing in the Last Year: No     Review of patient's allergies indicates:   Allergen Reactions    Latex         Review of Systems   Constitutional:  Positive for fever. Negative for diaphoresis.   HENT:  Negative for congestion and sore throat.    Eyes:  Negative for pain and discharge.   Respiratory:  Positive for shortness of breath. Negative for cough.    Cardiovascular:  Positive for chest pain. Negative for palpitations.   Gastrointestinal:  Negative for diarrhea and vomiting.   Genitourinary:  Negative for dysuria and hematuria.   Musculoskeletal:  Negative for back pain and myalgias.   Skin:  Negative for itching and rash.   Neurological:  Negative for weakness and headaches.        Objective:     Vitals:    05/01/23 0208   BP:    Pulse: 70   Resp: 20   Temp:         Physical Exam  Constitutional:       Appearance: She is not toxic-appearing.   HENT:      Head: Normocephalic and atraumatic.   Cardiovascular:      Rate and Rhythm: Regular rhythm.      Pulses: Normal pulses.   Pulmonary:      Effort: Pulmonary effort is normal.      Breath sounds: Wheezing present.   Abdominal:      General: Abdomen is flat. Bowel sounds are normal.      Palpations: Abdomen is soft.      Tenderness: There is abdominal tenderness in the left lower quadrant. There is no right CVA tenderness or left CVA  tenderness.   Musculoskeletal:         General: No deformity. Normal range of motion.      Cervical back: Normal range of motion and neck supple.      Right lower leg: No edema.      Left lower leg: No edema.   Skin:     General: Skin is warm and dry.   Neurological:      General: No focal deficit present.      Mental Status: She is alert and oriented to person, place, and time.   Psychiatric:         Mood and Affect: Mood normal.         Behavior: Behavior normal.        Records:  Nursing records and triage records reviewed  Prior records reviewed    Medical decision making:   Presents to emergency department for shortness of breath, chest pain, left lower quadrant pain.  Patient is nontoxic appearing.  Afebrile, non tachycardic, normotensive, saturating well on room air.  Patient had diffuse wheezing bilaterally with no signs of lower extremity edema.  Had left lower quadrant tenderness to palpation with no CVA tenderness bilaterally.  Will get cardiac and abdominal labs for further evaluation including imaging.  Patient will get prednisone, albuterol, magnesium for asthma exacerbation.  Will get morphine and Zofran for symptomatic management.    ED Course as of 05/01/23 0316   Sun Apr 30, 2023   2333 EKG is nonischemic [RK]   Mon May 01, 2023   0037 Troponin and BNP within normal limits.  No leukocytosis, anemia, thrombocytopenia.  No severe electrolyte abnormality.  No MADDY hyperglycemia.  Liver enzymes and pancreas enzymes are within normal limits. [RK]   0045 My interpretation of chest x-ray shows no acute findings.  I compared with previous chest x-rays. [RK]   0135 On re-evaluation patient continues to have moderate to severe wheezing.  Will give another round of albuterol inhaler. [RK]   0217 CT abdomen pelvis concerning for diverticulitis, no obvious signs of an abscess.  Patient started on Cipro and Flagyl.  Will be admitted to the hospital secondary to asthma exacerbation.  Discussed with internal  medicine team. [RK]   0316 Patient said she can not take Flagyl.  Will changed from Cipro and Flagyl to Augmentin. [RK]      ED Course User Index  [RK] Arvind Crowley MD          EKG:  ECG Results              EKG 12-lead (Preliminary result)  Result time 04/30/23 23:33:23      Wet Read by Arvind Crowley MD (04/30/23 23:33:23, [facility], Emergency Medicine)    Normal sinus rhythm at a rate of 67, no signs of ST elevation or depression, normal axis, normal intervals with a QTC of 443                                     Procedures       Diagnosis:  Final diagnoses:  [R07.9] Chest pain (Primary)  [J45.41] Moderate persistent asthma with exacerbation          Arvind Crowley M.D.  Emergency Medicine Physician     (Please note that this chart was completed via voice to text dictation. There may be typographical errors or substitutions that are unintentional, or uncorrected. Every attempt was made to proofread the chart prior to completion. If there are any questions, please contact the physician for final clarification).         Arvind Crowley MD  05/01/23 0219       Arvind Crowley MD  05/01/23 0316

## 2023-05-01 NOTE — DISCHARGE SUMMARY
"[facility] Discharge Summary    Admitting Physician: Renita Brown MD  Attending Physician: No att. providers found  Date of Admit: 4/30/2023  Date of Discharge: 5/1/2023    Condition: Good  Outcome: Patient tolerated treatment/procedure well without complication and is now ready for discharge.  DISPOSITION: Home or Self Care    Discharge Diagnoses     Patient Active Problem List   Diagnosis    Obesity    Asthma exacerbation    GERD (gastroesophageal reflux disease)    Diverticulitis    Colitis    Moderate persistent asthma with (acute) exacerbation    Hyperthyroidism    Thyroiditis    Diastolic dysfunction, left ventricle    Tachycardia    Other chest pain    Hypoglycemia    Thrombocytopathia    Diverticulitis of intestine with abscess    Asthma    Acute abdominal pain    Acute diverticulitis       Principal Problem:  Asthma exacerbation    Consultants and Procedures     Consultants:  IP CONSULT TO HOSPITAL MEDICINE    Brief Admission History      Niya Moeller is a 53 y.o.  female who with a history of Asthma, GERD, hyperthyroidism, and diverticulosis, presented to the ED on 4/30/2023  with a primary complaint of progressively worsening SOB that started 4/29/2023. Patient states that she was diagnosed with Pneumonia on 4/11/23 and sent home with antibiotics after an ER visit on that day. States that her symptoms were not bad, but acutely worsened around 3 AM on 4/29/2023 after waking up with "acid in my chest". She denies any vomiting, but this prompted her to have repeated bouts of coughing spells. As the day progressed, patient started wheezing. Decided to come to the ED for further evaluation because she could not "catch her breath" on ambulation. She is also having progressively worsening abdominal pain. Started out on her right side, but now it seems like the left side of her abdomen is worse than the right. She has a history of diverticulitis with pericolonic abscess which drained ESBL culture " "positive e.coli on 2/16/2023. Pt completed 7 days IV antibiotic course with Meropenem, discharged on 3/15/2023. Patient still actively smokes tobacco. Reports smoking 0.5 to 1 pack per day since 1983. Of note she was also scheduled for a colonoscopy on 4/14/2023, but it appears it was not completed. Patient seen in internal medicine clinic for PFT order on 4/27/23. At that time patient had no SOB or wheezing.       On arrival to the ED patient O2 on RA was 94, RR 25, /65. She was started on 2L of supplemental of via NC with improvement in O2 sat's. She was given multiple albuterol treatments, magnesium, and prednisone. On ED physician re-examination she showed no improvement in wheezing. Internal medicine consulted for asthma exacerbation. Was at bedside when patient reported that she is unable to tolerate Flagyl (secondary to nausea/vomiting). Given Augmentin in the ER, 1x dose.    Hospital Course with Pertinent Findings     On the floors, the patient was started on DuoNebs, prednisone 40 mg. Patient was given 1x Zosyn due to her history of diverticulosis as she complained of mild LLQ abdominal, which resulted in urticaria and pruritus. Patient refused epinephrine. Patient was then alternatively given 1x Solu-medrol, 25 mg diphenhydramine, and nebulizer injection. Patient maintained airway and was stable. She was placed on  on 2L NC, which patient oxygen saturations were above 95%. Patient was able to tolerate the medication well and complained of no dyspnea and wheezing. Patient is stable for discharge.     Discharge physical exam:  Vitals  BP: 109/74  Temp: 97.5 °F (36.4 °C)  Temp Source: Oral  Pulse: (!) 57  Resp: 18  SpO2: 99 %  Height: 4' 11" (149.9 cm)  Weight: 61.2 kg (135 lb)    General:  Well developed, well nourished, no acute respiratory distress  Head: Normocephalic, atraumatic  Eyes: PERRL, EOMI, anicteric sclera  Throat: No posterior pharyngeal erythema or exudate, no tonsillar exudate  Neck: " supple, normal ROM, no JVD  CVS:  RRR, S1 and S2 normal, no murmurs, no added heart sounds, rubs, gallops, regular peripheral pulses, and no peripheral edema  Resp:  Lungs clear to auscultation bilaterally, no wheezes, rales, or rhonci  GI:  Abdomen soft, non-tender, non-distended, normoactive bowel sounds  MSK:  Full range of motion, no obvious deformities   Skin:  No rashes, ulcers, erythema  Neuro:  Alert and oriented x3, No focal neuro deficits, CNII-XII grossly intact  Psych:  Appropriate mood and affect     TIME SPENT ON DISCHARGE: 60 minutes    Discharge Medications        Medication List        START taking these medications      predniSONE 20 MG tablet  Commonly known as: DELTASONE  Take 2 tablets (40 mg total) by mouth once daily. for 5 days  Start taking on: May 2, 2023            CHANGE how you take these medications      * albuterol-ipratropium 2.5 mg-0.5 mg/3 mL nebulizer solution  Commonly known as: DUO-NEB  Take 3 mLs by nebulization every 4 (four) hours as needed for Shortness of Breath or Wheezing.  What changed: Another medication with the same name was added. Make sure you understand how and when to take each.     * albuterol-ipratropium 2.5 mg-0.5 mg/3 mL nebulizer solution  Commonly known as: DUO-NEB  Take 3 mLs by nebulization every 4 (four) hours as needed for Wheezing. Rescue  What changed: You were already taking a medication with the same name, and this prescription was added. Make sure you understand how and when to take each.     * budesonide-formoterol 160-4.5 mcg 160-4.5 mcg/actuation Hfaa  Commonly known as: SYMBICORT  Inhale 2 puffs into the lungs every 12 (twelve) hours. Controller  What changed: Another medication with the same name was added. Make sure you understand how and when to take each.     * budesonide-formoterol 160-4.5 mcg 160-4.5 mcg/actuation Hfaa  Commonly known as: SYMBICORT  Inhale 2 puffs into the lungs every 12 (twelve) hours. Controller  What changed: You were  already taking a medication with the same name, and this prescription was added. Make sure you understand how and when to take each.     fluticasone propionate 50 mcg/actuation nasal spray  Commonly known as: FLONASE  1 spray (50 mcg total) by Each Nostril route daily as needed for Allergies or Rhinitis.  What changed:   how to take this  reasons to take this           * This list has 4 medication(s) that are the same as other medications prescribed for you. Read the directions carefully, and ask your doctor or other care provider to review them with you.                CONTINUE taking these medications      dicyclomine 10 MG capsule  Commonly known as: BENTYL  Take 1 capsule (10 mg total) by mouth 3 (three) times daily as needed (for spasms).     methIMAzole 10 MG Tab  Commonly known as: TAPAZOLE  Take 1 tablet (10 mg total) by mouth 2 (two) times daily.     montelukast 10 mg tablet  Commonly known as: SINGULAIR     omeprazole 40 MG capsule  Commonly known as: PRILOSEC  Take 1 capsule (40 mg total) by mouth once daily.     propranoloL 60 MG 24 hr capsule  Commonly known as: INDERAL LA  Take 1 capsule (60 mg total) by mouth once daily.            STOP taking these medications      albuterol 90 mcg/actuation inhaler  Commonly known as: PROVENTIL/VENTOLIN HFA     amoxicillin-clavulanate 875-125mg 875-125 mg per tablet  Commonly known as: AUGMENTIN     methylPREDNISolone 4 mg tablet  Commonly known as: MEDROL DOSEPACK     SUTAB 1.479-0.188- 0.225 gram tablet  Generic drug: sod sulf-pot chloride-mag sulf               Where to Get Your Medications        These medications were sent to [place] [id number] [facility] [address]      Phone: [phone]   albuterol-ipratropium 2.5 mg-0.5 mg/3 mL nebulizer solution  budesonide-formoterol 160-4.5 mcg 160-4.5 mcg/actuation Hfaa  fluticasone propionate 50 mcg/actuation nasal spray  predniSONE 20 MG  tablet         Discharge Information:   Niya Moeller is being discharged Home or Self Care.    Follow-Up Appointments:   Follow-up Information       YEHUDA Adamson Follow up in 1 week(s).    Specialty: Family Medicine  Why: for post-hunt follow-up  Contact information:  [address]  [facility] [id number] [phone]                             To address at follow-up:  -The following labs are to be drawn at the Post Hunt visit: none  -The following imaging studies are to be ordered at the post hunt visit: nonoe    The above information was discussed with the patient in clear terms. Patient was able to repeat the instructions to me in their own words. All questions answered. ED precautions provided.    Yeyo Andrews MD   [facility] Family Medicine Resident HO-1  05/01/2023

## 2023-05-01 NOTE — ED NOTES
Pt called saying that she wanted to be put on oxygen. Placed pt on 1.5 L nasal canula. Notified MD Carline.

## 2023-05-02 ENCOUNTER — PATIENT OUTREACH (OUTPATIENT)
Dept: ADMINISTRATIVE | Facility: CLINIC | Age: 53
End: 2023-05-02
Payer: MEDICAID

## 2023-05-02 NOTE — PROGRESS NOTES
C3 nurse spoke with Niya Moeller for a TCC post hospital discharge follow up call. The patient does not have a scheduled HOSFU appointment  within 5-7 days post hospital discharge date 5/1/23. C3 nurse was unable to schedule HOSFU appointment in [facility].  Patient states she is currently working on establishing care with a new PCP.

## 2023-05-03 ENCOUNTER — HOSPITAL ENCOUNTER (OUTPATIENT)
Dept: RADIOLOGY | Facility: HOSPITAL | Age: 53
Discharge: HOME | End: 2023-05-03
Attending: NURSE PRACTITIONER
Payer: MEDICAID

## 2023-05-03 DIAGNOSIS — Z12.31 VISIT FOR SCREENING MAMMOGRAM: ICD-10-CM

## 2023-05-03 PROCEDURE — 77067 SCR MAMMO BI INCL CAD: CPT | Mod: 26,,, | Performed by: RADIOLOGY

## 2023-05-03 PROCEDURE — 77063 BREAST TOMOSYNTHESIS BI: CPT | Mod: 26,,, | Performed by: RADIOLOGY

## 2023-05-03 PROCEDURE — 77067 MAMMO DIGITAL SCREENING BILAT WITH TOMO: ICD-10-PCS | Mod: 26,,, | Performed by: RADIOLOGY

## 2023-05-03 PROCEDURE — 77063 MAMMO DIGITAL SCREENING BILAT WITH TOMO: ICD-10-PCS | Mod: 26,,, | Performed by: RADIOLOGY

## 2023-05-03 PROCEDURE — 77067 SCR MAMMO BI INCL CAD: CPT | Mod: TC

## 2023-05-09 ENCOUNTER — PATIENT MESSAGE (OUTPATIENT)
Dept: RESEARCH | Facility: HOSPITAL | Age: 53
End: 2023-05-09
Payer: MEDICAID

## 2023-05-16 NOTE — PLAN OF CARE
SW met with pt at bedside to complete assessment. Pt is AxO x3  and able to verbally answer assessment questions.  Pt reported moving to [place] about a year ago to support her sister medical needs. Pt reports siter to now be ok and pt remains. Pt report to live at home alone and confirmed address on file. Pt reports while at home being independent of ADL's until her Asthma flare up. Pt reports when this happen she can be so weak that she is unable to get to bathroom etc. Pt reports no current DME in the home besides a nebulizer. Pt reported seeking assistance with caregivers in the past but not getting far. Pt report thinking it was a service at the hospital. SW confirmed that SW can offer resources however pt would have to follow through to get started. SW to add LTM , Attributor, and medical alert necklace resources to AVS. Pt reports ability to drive. Pt reports having her car at previous hospital and at time of discharge will either need a ride home or to hospital to get her car. SW updated whiteboard with [facility] name and contact information. SW confirmed pt understanding of Observation unit and expected discharge plan. SW will continue to follow pt throughout care and assist with any discharge needs.         02/13/23 7371   Discharge Planning   Assessment Type Discharge Planning Brief Assessment   Resource/Environmental Concerns none   Support Systems Children;Family members   Equipment Currently Used at Home nebulizer   Current Living Arrangements home   Patient/Family Anticipates Transition to home   Patient/Family Anticipated Services at Transition none   DME Needed Upon Discharge  none   Discharge Plan A Home       Future Appointments   Date Time Provider Department Center   4/25/2023  1:10 PM GEORGI Pena [facility] G[facility] [place]     Eun Marie, L[facility] Case Management  [phone]     No

## 2023-05-20 ENCOUNTER — HOSPITAL ENCOUNTER (EMERGENCY)
Facility: HOSPITAL | Age: 53
Discharge: HOME | End: 2023-05-20
Attending: EMERGENCY MEDICINE
Payer: MEDICAID

## 2023-05-20 VITALS
BODY MASS INDEX: 30.18 KG/M2 | HEIGHT: 59 IN | HEART RATE: 73 BPM | RESPIRATION RATE: 24 BRPM | OXYGEN SATURATION: 94 % | SYSTOLIC BLOOD PRESSURE: 111 MMHG | DIASTOLIC BLOOD PRESSURE: 64 MMHG | WEIGHT: 149.69 LBS | TEMPERATURE: 99 F

## 2023-05-20 DIAGNOSIS — R06.00 DYSPNEA: ICD-10-CM

## 2023-05-20 DIAGNOSIS — J44.1 COPD WITH ACUTE EXACERBATION: Primary | ICD-10-CM

## 2023-05-20 LAB
ALBUMIN SERPL-MCNC: 4 G/DL (ref 3.5–5)
ALBUMIN/GLOB SERPL: 1.1 RATIO (ref 1.1–2)
ALP SERPL-CCNC: 76 UNIT/L (ref 40–150)
ALT SERPL-CCNC: 14 UNIT/L (ref 0–55)
AST SERPL-CCNC: 15 UNIT/L (ref 5–34)
BASE EXCESS BLD CALC-SCNC: 1.2 MMOL/L
BASOPHILS # BLD AUTO: 0.02 X10(3)/MCL
BASOPHILS NFR BLD AUTO: 0.3 %
BILIRUBIN DIRECT+TOT PNL SERPL-MCNC: 0.5 MG/DL
BLOOD GAS SAMPLE TYPE (OHS): ABNORMAL
BUN SERPL-MCNC: 13.6 MG/DL (ref 9.8–20.1)
CALCIUM SERPL-MCNC: 9.9 MG/DL (ref 8.4–10.2)
CHLORIDE SERPL-SCNC: 108 MMOL/L (ref 98–107)
CO2 BLDA-SCNC: 29.1 MMOL/L
CO2 SERPL-SCNC: 22 MMOL/L (ref 22–29)
COHGB MFR BLDA: 4.6 %
CREAT SERPL-MCNC: 1.2 MG/DL (ref 0.55–1.02)
DRAWN BY BLOOD GAS (OHS): ABNORMAL
EOSINOPHIL # BLD AUTO: 0.18 X10(3)/MCL (ref 0–0.9)
EOSINOPHIL NFR BLD AUTO: 2.5 %
ERYTHROCYTE [DISTWIDTH] IN BLOOD BY AUTOMATED COUNT: 16.2 % (ref 11.5–17)
FIO2 BLOOD GAS (OHS): 21 %
FLUAV AG UPPER RESP QL IA.RAPID: NOT DETECTED
FLUBV AG UPPER RESP QL IA.RAPID: NOT DETECTED
GFR SERPLBLD CREATININE-BSD FMLA CKD-EPI: 54 MLS/MIN/1.73/M2
GLOBULIN SER-MCNC: 3.6 GM/DL (ref 2.4–3.5)
GLUCOSE SERPL-MCNC: 118 MG/DL (ref 74–100)
HCO3 BLDA-SCNC: 27.6 MMOL/L
HCT VFR BLD AUTO: 41 % (ref 37–47)
HGB BLD-MCNC: 13.5 G/DL (ref 12–16)
IMM GRANULOCYTES # BLD AUTO: 0.02 X10(3)/MCL (ref 0–0.04)
IMM GRANULOCYTES NFR BLD AUTO: 0.3 %
LIPASE SERPL-CCNC: 27 U/L
LYMPHOCYTES # BLD AUTO: 2.47 X10(3)/MCL (ref 0.6–4.6)
LYMPHOCYTES NFR BLD AUTO: 34.3 %
MCH RBC QN AUTO: 31.5 PG (ref 27–31)
MCHC RBC AUTO-ENTMCNC: 32.9 G/DL (ref 33–36)
MCV RBC AUTO: 95.8 FL (ref 80–94)
METHGB MFR BLDA: 0.9 %
MONOCYTES # BLD AUTO: 0.8 X10(3)/MCL (ref 0.1–1.3)
MONOCYTES NFR BLD AUTO: 11.1 %
NEUTROPHILS # BLD AUTO: 3.72 X10(3)/MCL (ref 2.1–9.2)
NEUTROPHILS NFR BLD AUTO: 51.5 %
NRBC BLD AUTO-RTO: 0 %
O2 HB BLOOD GAS (OHS): 82.9 %
OXYHGB MFR BLDA: 13 G/DL
PCO2 BLDA: 50 MMHG (ref 40–50)
PH BLDA: 7.35 [PH] (ref 7.3–7.4)
PLATELET # BLD AUTO: 239 X10(3)/MCL (ref 130–400)
PMV BLD AUTO: 11.2 FL (ref 7.4–10.4)
PO2 BLDA: 49 MMHG (ref 30–40)
POTASSIUM SERPL-SCNC: 3.7 MMOL/L (ref 3.5–5.1)
PROT SERPL-MCNC: 7.6 GM/DL (ref 6.4–8.3)
RBC # BLD AUTO: 4.28 X10(6)/MCL (ref 4.2–5.4)
SAO2 % BLDA: 87.7 %
SARS-COV-2 RNA RESP QL NAA+PROBE: NOT DETECTED
SODIUM SERPL-SCNC: 142 MMOL/L (ref 136–145)
WBC # SPEC AUTO: 7.21 X10(3)/MCL (ref 4.5–11.5)

## 2023-05-20 PROCEDURE — 99285 EMERGENCY DEPT VISIT HI MDM: CPT | Mod: 25

## 2023-05-20 PROCEDURE — 99900035 HC TECH TIME PER 15 MIN (STAT)

## 2023-05-20 PROCEDURE — 85025 COMPLETE CBC W/AUTO DIFF WBC: CPT | Performed by: EMERGENCY MEDICINE

## 2023-05-20 PROCEDURE — 63600175 PHARM REV CODE 636 W HCPCS: Performed by: EMERGENCY MEDICINE

## 2023-05-20 PROCEDURE — 94640 AIRWAY INHALATION TREATMENT: CPT

## 2023-05-20 PROCEDURE — 93005 ELECTROCARDIOGRAM TRACING: CPT

## 2023-05-20 PROCEDURE — 25000242 PHARM REV CODE 250 ALT 637 W/ HCPCS: Performed by: EMERGENCY MEDICINE

## 2023-05-20 PROCEDURE — 94761 N-INVAS EAR/PLS OXIMETRY MLT: CPT

## 2023-05-20 PROCEDURE — 80053 COMPREHEN METABOLIC PANEL: CPT | Performed by: EMERGENCY MEDICINE

## 2023-05-20 PROCEDURE — 96375 TX/PRO/DX INJ NEW DRUG ADDON: CPT

## 2023-05-20 PROCEDURE — 0240U COVID/FLU A&B PCR: CPT | Performed by: EMERGENCY MEDICINE

## 2023-05-20 PROCEDURE — 82803 BLOOD GASES ANY COMBINATION: CPT

## 2023-05-20 PROCEDURE — 96374 THER/PROPH/DIAG INJ IV PUSH: CPT

## 2023-05-20 PROCEDURE — 83690 ASSAY OF LIPASE: CPT | Performed by: EMERGENCY MEDICINE

## 2023-05-20 RX ORDER — DIPHENHYDRAMINE HYDROCHLORIDE 50 MG/ML
25 INJECTION INTRAMUSCULAR; INTRAVENOUS
Status: COMPLETED | OUTPATIENT
Start: 2023-05-20 | End: 2023-05-20

## 2023-05-20 RX ORDER — LEVALBUTEROL INHALATION SOLUTION 1.25 MG/3ML
1.25 SOLUTION RESPIRATORY (INHALATION)
Status: COMPLETED | OUTPATIENT
Start: 2023-05-20 | End: 2023-05-20

## 2023-05-20 RX ORDER — PREDNISONE 50 MG/1
50 TABLET ORAL DAILY
Qty: 5 TABLET | Refills: 0 | Status: SHIPPED | OUTPATIENT
Start: 2023-05-20 | End: 2023-05-20 | Stop reason: SDUPTHER

## 2023-05-20 RX ORDER — PREDNISONE 50 MG/1
50 TABLET ORAL DAILY
Qty: 5 TABLET | Refills: 0 | Status: SHIPPED | OUTPATIENT
Start: 2023-05-20 | End: 2023-05-25

## 2023-05-20 RX ORDER — DEXAMETHASONE SODIUM PHOSPHATE 100 MG/10ML
10 INJECTION INTRAMUSCULAR; INTRAVENOUS
Status: COMPLETED | OUTPATIENT
Start: 2023-05-20 | End: 2023-05-20

## 2023-05-20 RX ADMIN — LEVALBUTEROL HYDROCHLORIDE 1.25 MG: 1.25 SOLUTION RESPIRATORY (INHALATION) at 06:05

## 2023-05-20 RX ADMIN — DEXAMETHASONE SODIUM PHOSPHATE 10 MG: 10 INJECTION INTRAMUSCULAR; INTRAVENOUS at 06:05

## 2023-05-20 RX ADMIN — DIPHENHYDRAMINE HYDROCHLORIDE 25 MG: 50 INJECTION INTRAMUSCULAR; INTRAVENOUS at 06:05

## 2023-05-20 NOTE — ED PROVIDER NOTES
Encounter Date: 5/20/2023       History     Chief Complaint   Patient presents with    Shortness of Breath     Asthma, wheezing chronic, worst today     History copd, no htn, no dm, no cardiac disease. Presents this evening wheezy.      Shortness of Breath  This is a recurrent problem. The average episode lasts 1 day. The problem occurs frequently.The current episode started 6 to 12 hours ago. Progression since onset: waxing and waning. Associated symptoms include cough and wheezing. Pertinent negatives include no fever, no headaches, no coryza, no rhinorrhea, no sore throat, no swollen glands, no ear pain, no neck pain, no sputum production, no hemoptysis, no PND, no orthopnea, no chest pain, no syncope, no vomiting, no abdominal pain, no rash, no leg pain, no leg swelling and no claudication. She has tried beta-agonist inhalers for the symptoms. The treatment provided mild relief. She has had Prior hospitalizations. She has had Prior ED visits. She has had No prior ICU admissions. Associated medical issues include COPD and chronic lung disease. Associated medical issues do not include asthma, pneumonia, PE, CAD, heart failure, past MI, DVT or recent surgery.   Review of patient's allergies indicates:   Allergen Reactions    Zosyn [piperacillin-tazobactam] Swelling    Decadron-la Itching    Latex      Past Medical History:   Diagnosis Date    Asthma     Diverticulosis     GERD (gastroesophageal reflux disease)      No past surgical history on file.  No family history on file.  Social History     Tobacco Use    Smoking status: Every Day     Packs/day: 0.50     Years: 40.00     Pack years: 20.00     Types: Cigarettes     Start date: 1983    Smokeless tobacco: Current    Tobacco comments:     Ambulatory referral to Smoking Cessation clinic following hospital discharge.    Substance Use Topics    Alcohol use: Not Currently    Drug use: Not Currently     Review of Systems   Constitutional:  Negative for fever.   HENT:   Negative for ear pain, rhinorrhea and sore throat.    Respiratory:  Positive for cough, shortness of breath and wheezing. Negative for hemoptysis and sputum production.    Cardiovascular:  Negative for chest pain, orthopnea, claudication, leg swelling, syncope and PND.   Gastrointestinal:  Negative for abdominal pain and vomiting.   Musculoskeletal:  Negative for neck pain.   Skin:  Negative for rash.   Neurological:  Negative for headaches.   All other systems reviewed and are negative.    Physical Exam     Initial Vitals [05/20/23 1822]   BP Pulse Resp Temp SpO2   117/81 82 (!) 32 99 °F (37.2 °C) 96 %      MAP       --         Physical Exam    Nursing note and vitals reviewed.  Constitutional: She appears well-developed and well-nourished. She is not diaphoretic. No distress.   HENT:   Head: Normocephalic and atraumatic.   Right Ear: External ear normal.   Left Ear: External ear normal.   Eyes: EOM are normal. Pupils are equal, round, and reactive to light. Right eye exhibits no discharge. Left eye exhibits no discharge. No scleral icterus.   Neck: Neck supple. No thyromegaly present. No tracheal deviation present. No JVD present.   Normal range of motion.  Cardiovascular:  Normal rate, regular rhythm, normal heart sounds and intact distal pulses.     Exam reveals no gallop and no friction rub.       No murmur heard.  Pulmonary/Chest: Breath sounds normal. No stridor. No respiratory distress. She has no rhonchi. She has no rales. She exhibits no tenderness.   Few scattered wheezes; symmetric ;   Abdominal: Abdomen is soft. Bowel sounds are normal. She exhibits no distension. There is no abdominal tenderness. There is no rebound.   Musculoskeletal:      Cervical back: Normal range of motion and neck supple.     Lymphadenopathy:     She has no cervical adenopathy.   Neurological: She is alert and oriented to person, place, and time. She has normal strength. No cranial nerve deficit or sensory deficit. GCS score is  15. GCS eye subscore is 4. GCS verbal subscore is 5. GCS motor subscore is 6.   Skin: Skin is warm and dry. Capillary refill takes less than 2 seconds. No rash noted. No erythema. No pallor.   Psychiatric: She has a normal mood and affect. Her behavior is normal. Judgment and thought content normal.       ED Course   Procedures  Labs Reviewed   COMPREHENSIVE METABOLIC PANEL - Abnormal; Notable for the following components:       Result Value    Chloride 108 (*)     Glucose Level 118 (*)     Creatinine 1.20 (*)     Globulin 3.6 (*)     All other components within normal limits   CBC WITH DIFFERENTIAL - Abnormal; Notable for the following components:    MCV 95.8 (*)     MCH 31.5 (*)     MCHC 32.9 (*)     MPV 11.2 (*)     All other components within normal limits   BLOOD GAS - Abnormal; Notable for the following components:    pO2, Blood gas 49.0 (*)     All other components within normal limits   LIPASE - Normal   COVID/FLU A&B PCR - Normal    Narrative:     The Xpert Xpress SARS-CoV-2/FLU/RSV plus is a rapid, multiplexed real-time PCR test intended for the simultaneous qualitative detection and differentiation of SARS-CoV-2, Influenza A, Influenza B, and respiratory syncytial virus (RSV) viral RNA in either nasopharyngeal swab or nasal swab specimens.         CBC W/ AUTO DIFFERENTIAL    Narrative:     The following orders were created for panel order CBC auto differential.  Procedure                               Abnormality         Status                     ---------                               -----------         ------                     CBC with Differential[[id number]]        Abnormal            Final result                 Please view results for these tests on the individual orders.   EXTRA TUBES    Narrative:     The following orders were created for panel order EXTRA TUBES.  Procedure                               Abnormality         Status                     ---------                                -----------         ------                     Light Blue Top Hold[[id number]]                              In process                 Red Top Hold[[id number]]                                     In process                 Gold Top Hold[[id number]]                                    In process                   Please view results for these tests on the individual orders.   LIGHT BLUE TOP HOLD   RED TOP HOLD   GOLD TOP HOLD     EKG Readings: (Independently Interpreted)   NSR @ 74, non acute and non ischemic appearing ;     Imaging Results              X-Ray Chest AP Portable (Final result)  Result time 05/20/23 19:26:55      Final result by Claudio Rdz MD (05/20/23 19:26:55)                   Impression:      No acute cardiopulmonary abnormality.      Electronically signed by: Claudio Rdz MD  Date:    05/20/2023  Time:    19:26               Narrative:    EXAMINATION:  Single view chest radiograph.    CLINICAL HISTORY:  Dyspnea, unspecified    TECHNIQUE:  Single view of the chest.    COMPARISON:  Chest radiograph 05/01/2023.    FINDINGS:  The lungs are clear without consolidation or effusion.  There is no pneumothorax.  The cardiac silhouette is normal in size.  There is no acute osseous abnormality.                                    X-Rays:   Independently Interpreted Readings:   Chest X-Ray: Compatible with chronic bronchitis; fibrosis; no acute abnormal ;   Medications   levalbuterol nebulizer solution 1.25 mg (1.25 mg Nebulization Given 5/20/23 1858)   dexAMETHasone injection 10 mg (10 mg Intravenous Given 5/20/23 1847)   diphenhydrAMINE injection 25 mg (25 mg Intravenous Given 5/20/23 1847)     Medical Decision Making:   History:   Old Medical Records: I decided to obtain old medical records.  Old Records Summarized: other records.       <> Summary of Records: Confirms history of copd, no history CAD, PE, PNA  Initial Assessment:   Exacerbation of reactive airway disease; risk sufficient to exclude significant  hypercapnea  Differential Diagnosis:   Copd exacerbation, PNA/bronchitis, PE, acs/nstemi, among others  Independently Interpreted Test(s):   I have ordered and independently interpreted X-rays - see prior notes.  I have ordered and independently interpreted EKG Reading(s) - see prior notes  Clinical Tests:   Lab Tests: Ordered and Reviewed  Radiological Study: Ordered and Reviewed  Medical Tests: Ordered and Reviewed  ED Management:  Patient improved with conservative measures in the ED ; patient's objective data reassuring in the ED; she wants to go home and reports she does havae an oximeter at home. She has bronchodilators and we plan to let her dc home with prescribed steroids, strict return precautions anticipatory guidance. She is hoe in improved condition and clear comprehension of plan.           ED Course as of 05/20/23 2144   Sat May 20, 2023   1927 Reassuring hemogram ;  [CT]   2034 Respiratory pathogens negative by pcr ; [CT]   2035 Reassuring chemistries ; [CT]      ED Course User Index  [CT] Sid Jean MD                   Clinical Impression:   Final diagnoses:  [R06.00] Dyspnea  [J44.1] COPD with acute exacerbation (Primary)        ED Disposition Condition    Discharge Stable          ED Prescriptions       Medication Sig Dispense Start Date End Date Auth. Provider    predniSONE (DELTASONE) 50 MG Tab Take 1 tablet (50 mg total) by mouth once daily. for 5 days 5 tablet 5/20/2023 5/25/2023 Sid Jean MD          Follow-up Information       Follow up With Specialties Details Why Contact Info    [facility] Emergency Dept Emergency Medicine  As needed, If symptoms worsen [address] [facility] [address] [phone]             Sid Jean MD  05/20/23 2144

## 2023-05-31 ENCOUNTER — HOSPITAL ENCOUNTER (OUTPATIENT)
Dept: PULMONOLOGY | Facility: HOSPITAL | Age: 53
Discharge: HOME | End: 2023-05-31
Attending: INTERNAL MEDICINE
Payer: MEDICAID

## 2023-05-31 DIAGNOSIS — J45.909 ASTHMA, UNSPECIFIED ASTHMA SEVERITY, UNSPECIFIED WHETHER COMPLICATED, UNSPECIFIED WHETHER PERSISTENT: ICD-10-CM

## 2023-06-15 DIAGNOSIS — J45.909 ASTHMA, UNSPECIFIED ASTHMA SEVERITY, UNSPECIFIED WHETHER COMPLICATED, UNSPECIFIED WHETHER PERSISTENT: Primary | ICD-10-CM

## 2023-07-09 ENCOUNTER — HOSPITAL ENCOUNTER (EMERGENCY)
Facility: HOSPITAL | Age: 53
Discharge: LEFT BEFORE DISCHARGE | End: 2023-07-09
Payer: MEDICAID

## 2023-07-09 VITALS
SYSTOLIC BLOOD PRESSURE: 112 MMHG | DIASTOLIC BLOOD PRESSURE: 77 MMHG | HEIGHT: 59 IN | WEIGHT: 156.5 LBS | HEART RATE: 67 BPM | TEMPERATURE: 98 F | BODY MASS INDEX: 31.55 KG/M2 | OXYGEN SATURATION: 99 % | RESPIRATION RATE: 18 BRPM

## 2023-07-09 PROCEDURE — 99281 EMR DPT VST MAYX REQ PHY/QHP: CPT

## 2023-07-11 ENCOUNTER — PATIENT OUTREACH (OUTPATIENT)
Dept: EMERGENCY MEDICINE | Facility: HOSPITAL | Age: 53
End: 2023-07-11
Payer: MEDICAID

## 2023-07-12 ENCOUNTER — PATIENT OUTREACH (OUTPATIENT)
Dept: EMERGENCY MEDICINE | Facility: HOSPITAL | Age: 53
End: 2023-07-12
Payer: MEDICAID

## 2023-07-12 NOTE — PROGRESS NOTES
Called and spoke with patient,conversation was short cause patient was driving at the time. She reportedly is upset  cause she feels she is not receiving the care she needs. Says she had a PFT test done 05/31/23 but was told by the tech the test was insufficient with the numbers. Still waiting on  appointments to see a Pulmonologist and a GI doctor. Instructed patient to return phone call once she is no longer driving to assist her in getting her needs taken care. Says she will call back.

## 2023-07-12 NOTE — PROGRESS NOTES
Contacted PFT Lab  and was told patient was not able to complete the test. Will contact [facility] to inquire if an appointment could be schedule after patient failed to complete the PFT test. The pulmonary referral was denied 06/26/23 by Dr Temo Hamilton. The clinic requires PFT test result with in one year for referral to be accepted and an appointment granted..

## 2023-07-14 ENCOUNTER — PATIENT OUTREACH (OUTPATIENT)
Dept: EMERGENCY MEDICINE | Facility: HOSPITAL | Age: 53
End: 2023-07-14
Payer: MEDICAID

## 2023-07-14 ENCOUNTER — HOSPITAL ENCOUNTER (EMERGENCY)
Facility: HOSPITAL | Age: 53
Discharge: HOME | End: 2023-07-15
Attending: EMERGENCY MEDICINE
Payer: MEDICAID

## 2023-07-14 DIAGNOSIS — K57.92 DIVERTICULITIS: ICD-10-CM

## 2023-07-14 DIAGNOSIS — R10.32 LEFT LOWER QUADRANT ABDOMINAL PAIN: Primary | ICD-10-CM

## 2023-07-14 DIAGNOSIS — R10.9 ABDOMINAL PAIN: ICD-10-CM

## 2023-07-14 DIAGNOSIS — K57.90 DIVERTICULOSIS: ICD-10-CM

## 2023-07-14 LAB
ALBUMIN SERPL-MCNC: 4.3 G/DL (ref 3.5–5)
ALBUMIN/GLOB SERPL: 1.2 RATIO (ref 1.1–2)
ALP SERPL-CCNC: 65 UNIT/L (ref 40–150)
ALT SERPL-CCNC: 9 UNIT/L (ref 0–55)
AST SERPL-CCNC: 17 UNIT/L (ref 5–34)
BASOPHILS # BLD AUTO: 0.03 X10(3)/MCL
BASOPHILS NFR BLD AUTO: 0.5 %
BILIRUBIN DIRECT+TOT PNL SERPL-MCNC: 0.3 MG/DL
BUN SERPL-MCNC: 12.1 MG/DL (ref 9.8–20.1)
CALCIUM SERPL-MCNC: 9.8 MG/DL (ref 8.4–10.2)
CHLORIDE SERPL-SCNC: 105 MMOL/L (ref 98–107)
CO2 SERPL-SCNC: 25 MMOL/L (ref 22–29)
CREAT SERPL-MCNC: 1.33 MG/DL (ref 0.55–1.02)
EOSINOPHIL # BLD AUTO: 0.14 X10(3)/MCL (ref 0–0.9)
EOSINOPHIL NFR BLD AUTO: 2.2 %
ERYTHROCYTE [DISTWIDTH] IN BLOOD BY AUTOMATED COUNT: 14.5 % (ref 11.5–17)
GFR SERPLBLD CREATININE-BSD FMLA CKD-EPI: 47 MLS/MIN/1.73/M2
GLOBULIN SER-MCNC: 3.7 GM/DL (ref 2.4–3.5)
GLUCOSE SERPL-MCNC: 76 MG/DL (ref 74–100)
HCT VFR BLD AUTO: 45.2 % (ref 37–47)
HGB BLD-MCNC: 15 G/DL (ref 12–16)
IMM GRANULOCYTES # BLD AUTO: 0.01 X10(3)/MCL (ref 0–0.04)
IMM GRANULOCYTES NFR BLD AUTO: 0.2 %
LIPASE SERPL-CCNC: 38 U/L
LYMPHOCYTES # BLD AUTO: 3.1 X10(3)/MCL (ref 0.6–4.6)
LYMPHOCYTES NFR BLD AUTO: 49.4 %
MCH RBC QN AUTO: 33 PG (ref 27–31)
MCHC RBC AUTO-ENTMCNC: 33.2 G/DL (ref 33–36)
MCV RBC AUTO: 99.3 FL (ref 80–94)
MONOCYTES # BLD AUTO: 0.46 X10(3)/MCL (ref 0.1–1.3)
MONOCYTES NFR BLD AUTO: 7.3 %
NEUTROPHILS # BLD AUTO: 2.54 X10(3)/MCL (ref 2.1–9.2)
NEUTROPHILS NFR BLD AUTO: 40.4 %
NRBC BLD AUTO-RTO: 0 %
PLATELET # BLD AUTO: 193 X10(3)/MCL (ref 130–400)
PMV BLD AUTO: 11.3 FL (ref 7.4–10.4)
POTASSIUM SERPL-SCNC: 3.7 MMOL/L (ref 3.5–5.1)
PROT SERPL-MCNC: 8 GM/DL (ref 6.4–8.3)
RBC # BLD AUTO: 4.55 X10(6)/MCL (ref 4.2–5.4)
SODIUM SERPL-SCNC: 143 MMOL/L (ref 136–145)
WBC # SPEC AUTO: 6.28 X10(3)/MCL (ref 4.5–11.5)

## 2023-07-14 PROCEDURE — 63600175 PHARM REV CODE 636 W HCPCS: Performed by: EMERGENCY MEDICINE

## 2023-07-14 PROCEDURE — 99285 EMERGENCY DEPT VISIT HI MDM: CPT | Mod: 25

## 2023-07-14 PROCEDURE — 85025 COMPLETE CBC W/AUTO DIFF WBC: CPT | Performed by: EMERGENCY MEDICINE

## 2023-07-14 PROCEDURE — 83690 ASSAY OF LIPASE: CPT | Performed by: EMERGENCY MEDICINE

## 2023-07-14 PROCEDURE — 80053 COMPREHEN METABOLIC PANEL: CPT | Performed by: EMERGENCY MEDICINE

## 2023-07-14 PROCEDURE — 93005 ELECTROCARDIOGRAM TRACING: CPT

## 2023-07-14 RX ORDER — SODIUM CHLORIDE, SODIUM LACTATE, POTASSIUM CHLORIDE, CALCIUM CHLORIDE 600; 310; 30; 20 MG/100ML; MG/100ML; MG/100ML; MG/100ML
1000 INJECTION, SOLUTION INTRAVENOUS CONTINUOUS
Status: DISCONTINUED | OUTPATIENT
Start: 2023-07-14 | End: 2023-07-15 | Stop reason: HOSPADM

## 2023-07-14 RX ORDER — IPRATROPIUM BROMIDE AND ALBUTEROL SULFATE 2.5; .5 MG/3ML; MG/3ML
3 SOLUTION RESPIRATORY (INHALATION)
Status: COMPLETED | OUTPATIENT
Start: 2023-07-14 | End: 2023-07-15

## 2023-07-14 RX ADMIN — SODIUM CHLORIDE, POTASSIUM CHLORIDE, SODIUM LACTATE AND CALCIUM CHLORIDE 1000 ML: 600; 310; 30; 20 INJECTION, SOLUTION INTRAVENOUS at 10:07

## 2023-07-14 RX ADMIN — IOHEXOL 100 ML: 350 INJECTION, SOLUTION INTRAVENOUS at 11:07

## 2023-07-15 VITALS
HEART RATE: 67 BPM | OXYGEN SATURATION: 99 % | BODY MASS INDEX: 31.34 KG/M2 | SYSTOLIC BLOOD PRESSURE: 121 MMHG | HEIGHT: 59 IN | TEMPERATURE: 98 F | WEIGHT: 155.44 LBS | DIASTOLIC BLOOD PRESSURE: 70 MMHG | RESPIRATION RATE: 18 BRPM

## 2023-07-15 LAB
APPEARANCE UR: CLEAR
BACTERIA #/AREA URNS AUTO: NORMAL /HPF
BILIRUB UR QL STRIP.AUTO: NEGATIVE
COLOR UR: YELLOW
GLUCOSE UR QL STRIP.AUTO: NEGATIVE
KETONES UR QL STRIP.AUTO: NEGATIVE
LEUKOCYTE ESTERASE UR QL STRIP.AUTO: NEGATIVE
NITRITE UR QL STRIP.AUTO: NEGATIVE
PH UR STRIP.AUTO: 7 [PH]
PROT UR QL STRIP.AUTO: NEGATIVE
RBC #/AREA URNS AUTO: NORMAL /HPF
RBC UR QL AUTO: NEGATIVE
SP GR UR STRIP.AUTO: <1.005
SQUAMOUS #/AREA URNS LPF: NORMAL /HPF
UROBILINOGEN UR STRIP-ACNC: 0.2
WBC #/AREA URNS AUTO: NORMAL /HPF

## 2023-07-15 PROCEDURE — 81001 URINALYSIS AUTO W/SCOPE: CPT | Performed by: EMERGENCY MEDICINE

## 2023-07-15 PROCEDURE — 94640 AIRWAY INHALATION TREATMENT: CPT

## 2023-07-15 PROCEDURE — 25000242 PHARM REV CODE 250 ALT 637 W/ HCPCS: Performed by: EMERGENCY MEDICINE

## 2023-07-15 PROCEDURE — 94761 N-INVAS EAR/PLS OXIMETRY MLT: CPT

## 2023-07-15 PROCEDURE — 25500020 PHARM REV CODE 255: Performed by: EMERGENCY MEDICINE

## 2023-07-15 RX ORDER — MOXIFLOXACIN HYDROCHLORIDE 400 MG/1
400 TABLET ORAL DAILY
Qty: 7 TABLET | Refills: 0 | Status: SHIPPED | OUTPATIENT
Start: 2023-07-15 | End: 2023-07-22

## 2023-07-15 RX ADMIN — IPRATROPIUM BROMIDE AND ALBUTEROL SULFATE 3 ML: .5; 3 SOLUTION RESPIRATORY (INHALATION) at 12:07

## 2023-07-15 NOTE — ED PROVIDER NOTES
Encounter Date: 7/14/2023       History     Chief Complaint   Patient presents with    Abdominal Pain     Pt c/o abd pain x1 week with constipation and diarrhea. Hx of diverticulitis      She reports a history of diverticulosis and diverticulitis with abscess formation, at 1 point requiring an extended hospital stay at another [facility] facility for percutaneous drainage of a left lower quadrant abscess and E coli infection.  She now has ongoing symptoms with exacerbation for about a week.  Overall she relates problems with diverticulosis and diverticulitis for years, worse in recent months and years, now worse again for the last week with focal left lower quadrant pain, constipation with intermittent liquid diarrhea that feels as though it is going around harder stools, possible fever, night sweats, and a sense of swelling in her left lower abdomen.  She reports that her symptoms are worse with walking.  She has no urinary symptoms.  She is still eating well.  No recent antibiotics.  No chest pain but abdominal pain does seem to radiate to her low back bilaterally.  No other specific complaints.  Other history notable for asthma, ongoing tobacco abuse, gastroesophageal reflux.    The history is provided by the patient. No  was used.   Review of patient's allergies indicates:   Allergen Reactions    Zosyn [piperacillin-tazobactam] Swelling    Decadron-la Itching    Latex      Past Medical History:   Diagnosis Date    Asthma     Diverticulosis     GERD (gastroesophageal reflux disease)      History reviewed. No pertinent surgical history.  History reviewed. No pertinent family history.  Social History     Tobacco Use    Smoking status: Every Day     Packs/day: 0.50     Years: 40.00     Pack years: 20.00     Types: Cigarettes     Start date: 1983    Smokeless tobacco: Current    Tobacco comments:     Ambulatory referral to Smoking Cessation clinic following hospital discharge.    Substance Use Topics     Alcohol use: Not Currently    Drug use: Not Currently     Review of Systems   Constitutional:  Positive for diaphoresis and fever. Negative for activity change and fatigue.   HENT:  Negative for congestion, ear pain, facial swelling, nosebleeds, sinus pressure and sore throat.    Eyes:  Negative for pain, discharge, redness and visual disturbance.   Respiratory:  Negative for cough, choking, chest tightness, shortness of breath and wheezing.    Cardiovascular:  Negative for chest pain, palpitations and leg swelling.   Gastrointestinal:  Positive for abdominal pain, constipation and diarrhea. Negative for abdominal distention, nausea and vomiting.   Endocrine: Negative for heat intolerance, polydipsia and polyuria.   Genitourinary:  Negative for difficulty urinating, dysuria, flank pain, hematuria and urgency.   Musculoskeletal:  Negative for back pain, gait problem, joint swelling and myalgias.   Skin:  Negative for color change and rash.   Allergic/Immunologic: Negative for environmental allergies and food allergies.   Neurological:  Negative for dizziness, weakness, numbness and headaches.   Hematological:  Negative for adenopathy. Does not bruise/bleed easily.   Psychiatric/Behavioral:  Negative for agitation and behavioral problems. The patient is not nervous/anxious.    All other systems reviewed and are negative.    Physical Exam     Initial Vitals [07/14/23 2156]   BP Pulse Resp Temp SpO2   125/63 67 20 97.5 °F (36.4 °C) 98 %      MAP       --         Physical Exam    Nursing note and vitals reviewed.  Constitutional: She appears well-developed and well-nourished. She is not diaphoretic. She appears distressed.   Mild discomfort   HENT:   Head: Normocephalic and atraumatic.   Mouth/Throat: No oropharyngeal exudate.   Eyes: Conjunctivae and EOM are normal. Pupils are equal, round, and reactive to light. Right eye exhibits no discharge. Left eye exhibits no discharge. No scleral icterus.   Neck: Neck supple.  No thyromegaly present. No tracheal deviation present. No JVD present.   Normal range of motion.  Cardiovascular:  Normal rate, regular rhythm, normal heart sounds and intact distal pulses.     Exam reveals no gallop and no friction rub.       No murmur heard.  Pulmonary/Chest: No stridor. No respiratory distress. She has wheezes. She has no rhonchi. She has no rales. She exhibits no tenderness.   Scattered mild diffuse expiratory wheezing   Abdominal: Abdomen is soft. Bowel sounds are normal. She exhibits no distension and no mass. There is abdominal tenderness.   Tenderness and guarding to the left lower quadrant without definite rebound.  She feels distended but I do not detect overt distention in that area.  No definite mass.  Normal bowel sounds. There is guarding. There is no rebound.   Musculoskeletal:         General: No tenderness or edema. Normal range of motion.      Cervical back: Normal range of motion and neck supple.     Neurological: She is alert and oriented to person, place, and time. She has normal strength.   Skin: Skin is warm and dry. No rash and no abscess noted. No erythema.   Psychiatric: She has a normal mood and affect. Her behavior is normal. Judgment and thought content normal.       ED Course   Procedures  Labs Reviewed   COMPREHENSIVE METABOLIC PANEL - Abnormal; Notable for the following components:       Result Value    Creatinine 1.33 (*)     Globulin 3.7 (*)     All other components within normal limits   CBC WITH DIFFERENTIAL - Abnormal; Notable for the following components:    MCV 99.3 (*)     MCH 33.0 (*)     MPV 11.3 (*)     All other components within normal limits   LIPASE - Normal   CBC W/ AUTO DIFFERENTIAL    Narrative:     The following orders were created for panel order CBC W/ AUTO DIFFERENTIAL.  Procedure                               Abnormality         Status                     ---------                               -----------         ------                     CBC  with Differential[[id number]]        Abnormal            Final result                 Please view results for these tests on the individual orders.   URINALYSIS, REFLEX TO URINE CULTURE   EXTRA TUBES    Narrative:     The following orders were created for panel order EXTRA TUBES.  Procedure                               Abnormality         Status                     ---------                               -----------         ------                     Light Blue Top Hold[[id number]]                              In process                 Gold Top Hold[[id number]]                                    In process                   Please view results for these tests on the individual orders.   LIGHT BLUE TOP HOLD   GOLD TOP HOLD     EKG Readings: (Independently Interpreted)   Initial Reading: No STEMI. Rhythm: Sinus Bradycardia. Heart Rate: 57. Ectopy: No Ectopy.   Sinus bradycardia with sinus arrhythmia, possible old septal infarction, no acute concerning change.  No significant change from previous.     Imaging Results              CT Abdomen Pelvis With Contrast (Preliminary result)  Result time 07/14/23 23:51:51      Preliminary result by Gregor Pritchett MD (07/14/23 23:51:51)                   Narrative:    START OF REPORT:  Technique: CT of the abdomen and pelvis was performed with axial images as well as sagittal and coronal reconstruction images with intravenous contrast but without oral contrast.    Comparison: Comparison is with study dated2023-05-01 00:45:52.    Clinical History: LLQ abdominal pain.    Dosage Information: Automated Exposure Control was utilized 301.94 mGy.cm.    Findings:  Lines and Tubes: None.  Thorax:  Lungs: Streaky opacities are seen in the right middle lobe, lingula and bilateral lower lobes, which may reflect subsegmental atelectasis versus scarring.  Pleura: No effusions or thickening.  Heart: The heart size is within normal limits.  Abdomen:  Abdominal Wall: No abdominal wall pathology  is seen.  Liver: The liver appears unremarkable.  Biliary System: No intrahepatic or extrahepatic biliary duct dilatation is seen.  Gallbladder: The gallbladder is nondistended and demonstrates diffuse mucosal wall enhancement but otherwise appears unremarkable. No radiodense gallstones are identified. Correlate clinically as regards further evaluation and follow-up with ultrasound.  Pancreas: The pancreas appears unremarkable.  Spleen: The spleen appears unremarkable.  Adrenals: The adrenal glands appear unremarkable.  Kidneys: The kidneys appear unremarkable with no stones cysts masses or hydronephrosis.  Aorta: The abdominal aorta appears unremarkable.  IVC: Unremarkable.  Bowel:  Esophagus: The visualized esophagus appears unremarkable.  Stomach: There is mild diffuse circumferential wall thickening of the gastric antrum (series 2 image 43) without perigastric fat stranding.  Duodenum: Unremarkable appearing duodenum.  Small Bowel: The small bowel appears unremarkable.  Colon: Multiple diverticula are seen in the descending and sigmoid colon. There appears to be some mild wall thickening at the descending sigmoid junction with some subtle pericolonic stranding.  Appendix: The appendix appears unremarkable (series 2 image 78-85).  Peritoneum: Trace free fluid is seen in the pelvis.    Pelvis:  Bladder: The bladder appears unremarkable.  Female:  Uterus: Small uterine fibroids are again seen. These are unchanged since the prior examination.  Ovaries: No adnexal masses are seen.    Bony structures:  Dorsal Spine: There is mild spondylosis of the visualized dorsal spine.  Bony Pelvis: The visualized bony structures of the pelvis appear unremarkable.      Impression:  1. There is mild diffuse circumferential wall thickening of the gastric antrum (series 2 image 43) without perigastric fat stranding. These findings are new since the prior examination and suggest gastritis. Correlate with clinical and laboratory  findings as regards inflammatory versus infectious etiology. Follow-up as clinically indicated.  2. Multiple diverticula are seen in the descending and sigmoid colon. There appears to be some mild wall thickening at the descending sigmoid junction with some subtle pericolonic stranding. This may reflect an element of mild diverticulitis without perforation or abscess. Correlate with clinical and laboratory findings.  3. Details and other findings as discussed above.                                         Medications   lactated ringers infusion (1,000 mLs Intravenous New Bag 7/14/23 9087)   albuterol-ipratropium 2.5 mg-0.5 mg/3 mL nebulizer solution 3 mL (3 mLs Nebulization Given 7/15/23 0004)   iohexoL (OMNIPAQUE 350) injection 100 mL (100 mLs Intravenous Given 7/14/23 1587)                           1:03 AM Resting comfortably, exam unchanged.  Reviewed data.  She states significant intolerance to Flagyl, and lists a high degree of reaction to Zosyn.  Alternate antibiotic regimens reviewed, proceed with single agent moxifloxacin, appropriate for outpatient management.  Counseled also about the finding of possible mild gastritis and recommend acid suppression with follow-up with primary care early this coming week.       Clinical Impression:   Final diagnoses:  [R10.9] Abdominal pain  [R10.32] Left lower quadrant abdominal pain (Primary)  [K57.90] Diverticulosis  [K57.92] Diverticulitis        ED Disposition Condition    Discharge Stable          ED Prescriptions       Medication Sig Dispense Start Date End Date Auth. Provider    moxifloxacin (AVELOX) 400 mg tablet Take 1 tablet (400 mg total) by mouth once daily. for 7 days 7 tablet 7/15/2023 7/22/2023 Glen Santos MD          Follow-up Information    None          Glen Santos MD  07/15/23 0104

## 2023-07-15 NOTE — DISCHARGE INSTRUCTIONS
Tests show mild diverticulitis without abscess or other complication.      You may also have a mild degree of irritation of the stomach known as gastritis.  Consider taking Pepcid or similar and discuss with your PCP.    Clear liquids for 2 days, antibiotics as prescribed and follow-up with your primary care provider early this coming week for recheck.

## 2023-07-17 ENCOUNTER — PATIENT OUTREACH (OUTPATIENT)
Dept: EMERGENCY MEDICINE | Facility: HOSPITAL | Age: 53
End: 2023-07-17
Payer: MEDICAID

## 2023-07-17 NOTE — PROGRESS NOTES
Contacted [facility]( Pulmonology) regarding a possible appointment. Message left on clinic voicemail to return call.

## 2023-07-17 NOTE — PATIENT INSTRUCTIONS
Why Should I Have My Own Doctor or Nurse Practitioner (PCP) to Take Care of Me  What is a PCP (Primary Care Provider)?    A primary care provider is a doctor or nurse practitioner who you can call for an appointment and will see you when you are sick.    You will also be seen at scheduled appointment times during the year to check on your diabetes, or high blood pressure, or heart disease.    Why see the same PCP (doctor/nurse practitioner)?    You can be seen faster when you are sick           You, the PCP (doctor/nurse practitioner) and the office staff get to know each other; you begin to trust them to care for you. You take part in your health choices.   All of you together are a team.    Your medicine is looked at every time you visit, to be sure you are taking the medicine, as the PCP (doctor/nurse practitioner) ordered.    Your PCP (doctor/nurse practitioner) and their staff help keep you healthy and out of the hospital.  They can catch sicknesses earlier by ordering tests once a year to stop or prevent the sickness from getting worse.      Your PCP (doctor/nurse practitioner) can send you to providers who specialize (heart/bone/lung) if you need.  They and their office staff help keep track of your seeing other providers (doctors/nurse practitioners) and tests (CT/ MRIs/ X Rays)) taken.        PCPs want you to stay healthy.  Let us care for you.          Budget-Friendly Healthy Eating    Save money at the grocery store and eat healthy.   Buy groceries keeping your budget in mind  Make a grocery list and only buy what you have on the list  Eat food you cook or have at home; limit fast food or eating out    Compare food labels  Look at store brand food labels and compare to brand names, often food value is the same and the store brand is cheaper      Look for products that do not have sugar, fat, or salt (sodium) added.  These often cost the same but are healthier for you.  They may be labeled  "as:  ?Sugar-free.  ?Nonfat.              ?Low-fat.  ?Sodium-free.  ?Low sodium.  Look for lean ground beef labeled as at least 92% lean and 8% fat.        Shopping    Buy only the items on your grocery list and go only to the areas of the store that have the items on your list.  Use coupons only for foods and brands you normally buy. Avoid buying items you wouldn't normally buy simply because they are on sale.  Check online and in newspapers for weekly deals.  Buy healthy items from the bulk bins when available, such as herbs, spices, flour, pasta, nuts, and dried fruit.  Buy fruits and vegetables that are in season. Prices are usually lower on in-season produce.  Look at the unit price on the price tag. Use it to compare different brands and sizes to find out which item is the best deal.  Choose healthy items that are often low-cost, such as carrots, potatoes, apples, bananas, and oranges. Dried or canned beans are a low-cost protein source.      Buy in bulk and freeze extra food. Items you can buy in bulk include meats, fish, poultry, frozen fruits, and frozen vegetables.  Avoid buying "ready-to-eat" foods, such as pre-cut fruits and vegetables and pre-made salads.  If possible, shop around to discover where you can find the best prices. Consider other retailers such as dollar stores, larger wholesale stores, local fruit and vegetable stands, and farmers markets.  Do not shop when you are hungry. If you shop while hungry, it may be hard to stick to your list and budget.      Resist impulse buying. Use your grocery list as your official plan for the week.      Buy a variety of vegetables and fruits by purchasing fresh, frozen, and canned items.  Look at the top and bottom shelves for deals. Foods at eye level (eye level of an adult or child) are usually more expensive.  Be efficient with your time when shopping. The more time you spend at the store, the more money you are likely to spend.  To save money when " "choosing more expensive foods like meats and dairy:  ?Choose cheaper cuts of meat, such as bone-in chicken thighs and drumsticks instead of skinless and boneless chicken. When you are ready to prepare the chicken, you can remove the skin yourself to make it healthier.  ?Choose lean meats like chicken or turkey instead of beef.  ?Choose canned seafood, such as tuna, salmon, or sardines.  ?Buy eggs as a low-cost source of protein.  ?Buy dried beans and peas, such as lentils, split peas, or kidney beans instead of meats. Dried beans and peas are a good alternative source of protein.  ?Buy the larger tubs of yogurt instead of individual-sized containers.                        Choose water instead of sodas and other sweetened beverages.  Avoid buying chips, cookies, and other "junk food." These items are usually expensive and not healthy.  Meal planning  Do not eat out or get fast food. Prepare food at home.  Make a grocery list and make sure to bring it with you to the store.   Plan meals and snacks according to a grocery list and budget you create.  Use leftovers in your meal plan for the week.  Look for recipes where you can cook once and make enough food for two meals.  Include budget-friendly meals like stews, casseroles, and stir-valdes dishes.  Try some meatless meals or try "no cook" meals like salads.  Make sure that half your plate is filled with fruits or vegetables. Choose from fresh, frozen, or canned fruits and vegetables. If eating canned, remember to rinse them before eating. This will remove any excess salt added for packaging.            Summary  Eating healthy on a budget is possible if you plan your meals according to your budget, buy according to your budget and grocery list, and prepare food yourself.   Tips for buying more food on a limited budget include buying generic brands, using coupons only for foods you normally buy, and buying healthy items from the bulk bins when available.  Tips for buying " cheaper food to replace expensive food include choosing cheaper, lean cuts of meat, and buying dried beans and peas.    Discuss any question you have with your doctor.    07/06/2022 custom

## 2023-07-17 NOTE — PROGRESS NOTES
Patient returned phone encounter reopened. Spoke with patient she voiced her unhappiness of not having a scheduled Pulmonologist appointment. She was referred x2 but was denied ,no recent PFT results. Patient had a PFT test 05/31/23 but she was unable to perform the test so results were incomplete. Informed patient ,I will contact the Pulmonology Clinic for any information regarding the next step in getting an appointment in the clinic. She voiced her displeasure in not getting the help she needs several times during our call. She also mentioned she needs a GI referral and was in the ED on Friday 07/14/23 due to abdominal pain. Hx of diverticulosis/diverticulitis requiring hospital admission for a length of time.Explained to patient her PCP would have to place a referral to GI doctor for care. Provided Dr Tae Aguero and Dr Nuno Batista names as resources her PCP can send referrals to for GI care.Informed patient any referrals that are needed for care in which her PCP is unable to address, a referral would have to be sent by the PCP to that provider.. Denies any other issues for now. Advised to utilized urgent care when possible for non acute issues.

## 2023-07-18 ENCOUNTER — PATIENT OUTREACH (OUTPATIENT)
Dept: EMERGENCY MEDICINE | Facility: HOSPITAL | Age: 53
End: 2023-07-18
Payer: MEDICAID

## 2023-07-19 ENCOUNTER — PATIENT OUTREACH (OUTPATIENT)
Dept: EMERGENCY MEDICINE | Facility: HOSPITAL | Age: 53
End: 2023-07-19
Payer: MEDICAID

## 2023-07-19 NOTE — PROGRESS NOTES
"  Spoke with patient, update on her getting a pulmonology appointment dust far has been unsuccessful. The [facility] referral has been denied x2 no PFT results. She was unable to complete the PFT so results were incomplete.Patient is against trying to repeat the test until it"s completion.Informed patient I contacted the [facility] and they stated without a PFT the appointment would not be made and the referral had a diagnosis of asthma which can be treated by her PCP. Patient said she will look for a lung doctor outside the [facility] system. Informed patient I did contact 2 different Pulmonologist in the [place] area and one is on diversion for Medicaid patients and the other only accepts Medicaid patients with lung mass or nodules.Patient said she will be looking for a doctor in [place]. Advised once she finds a lung specialist ,she needs to have her PCP sent a referral to that office. Reminded also to contact her PCP to provide the GI doctor resources for referral which were given to her during our last phone call. Stressed to patient again to also keep in mind that  PFT results will most likely be required for a Pulmonology referral to be accepted and an appointment.made.  "

## 2023-07-22 ENCOUNTER — HOSPITAL ENCOUNTER (EMERGENCY)
Facility: HOSPITAL | Age: 53
Discharge: HOME | End: 2023-07-22
Attending: EMERGENCY MEDICINE
Payer: MEDICAID

## 2023-07-22 VITALS
HEART RATE: 61 BPM | BODY MASS INDEX: 29.23 KG/M2 | TEMPERATURE: 98 F | SYSTOLIC BLOOD PRESSURE: 113 MMHG | WEIGHT: 145 LBS | HEIGHT: 59 IN | DIASTOLIC BLOOD PRESSURE: 69 MMHG | RESPIRATION RATE: 18 BRPM | OXYGEN SATURATION: 98 %

## 2023-07-22 DIAGNOSIS — K57.92 DIVERTICULITIS: Primary | ICD-10-CM

## 2023-07-22 DIAGNOSIS — H10.33 ACUTE CONJUNCTIVITIS OF BOTH EYES, UNSPECIFIED ACUTE CONJUNCTIVITIS TYPE: ICD-10-CM

## 2023-07-22 LAB
ALBUMIN SERPL-MCNC: 3.9 G/DL (ref 3.5–5)
ALBUMIN/GLOB SERPL: 1.2 RATIO (ref 1.1–2)
ALP SERPL-CCNC: 56 UNIT/L (ref 40–150)
ALT SERPL-CCNC: 8 UNIT/L (ref 0–55)
AST SERPL-CCNC: 19 UNIT/L (ref 5–34)
BASOPHILS # BLD AUTO: 0.02 X10(3)/MCL
BASOPHILS NFR BLD AUTO: 0.2 %
BILIRUBIN DIRECT+TOT PNL SERPL-MCNC: 0.6 MG/DL
BUN SERPL-MCNC: 7.1 MG/DL (ref 9.8–20.1)
CALCIUM SERPL-MCNC: 9.4 MG/DL (ref 8.4–10.2)
CHLORIDE SERPL-SCNC: 106 MMOL/L (ref 98–107)
CO2 SERPL-SCNC: 24 MMOL/L (ref 22–29)
CREAT SERPL-MCNC: 1.5 MG/DL (ref 0.55–1.02)
EOSINOPHIL # BLD AUTO: 0.11 X10(3)/MCL (ref 0–0.9)
EOSINOPHIL NFR BLD AUTO: 1.3 %
ERYTHROCYTE [DISTWIDTH] IN BLOOD BY AUTOMATED COUNT: 14.4 % (ref 11.5–17)
GFR SERPLBLD CREATININE-BSD FMLA CKD-EPI: 41 MLS/MIN/1.73/M2
GLOBULIN SER-MCNC: 3.3 GM/DL (ref 2.4–3.5)
GLUCOSE SERPL-MCNC: 85 MG/DL (ref 74–100)
HCT VFR BLD AUTO: 41.8 % (ref 37–47)
HGB BLD-MCNC: 13.9 G/DL (ref 12–16)
IMM GRANULOCYTES # BLD AUTO: 0.01 X10(3)/MCL (ref 0–0.04)
IMM GRANULOCYTES NFR BLD AUTO: 0.1 %
LIPASE SERPL-CCNC: 20 U/L
LYMPHOCYTES # BLD AUTO: 2.39 X10(3)/MCL (ref 0.6–4.6)
LYMPHOCYTES NFR BLD AUTO: 27.5 %
MCH RBC QN AUTO: 32.9 PG (ref 27–31)
MCHC RBC AUTO-ENTMCNC: 33.3 G/DL (ref 33–36)
MCV RBC AUTO: 99.1 FL (ref 80–94)
MONOCYTES # BLD AUTO: 0.62 X10(3)/MCL (ref 0.1–1.3)
MONOCYTES NFR BLD AUTO: 7.1 %
NEUTROPHILS # BLD AUTO: 5.53 X10(3)/MCL (ref 2.1–9.2)
NEUTROPHILS NFR BLD AUTO: 63.8 %
NRBC BLD AUTO-RTO: 0 %
PLATELET # BLD AUTO: 196 X10(3)/MCL (ref 130–400)
PMV BLD AUTO: 11.3 FL (ref 7.4–10.4)
POTASSIUM SERPL-SCNC: 3.3 MMOL/L (ref 3.5–5.1)
PROT SERPL-MCNC: 7.2 GM/DL (ref 6.4–8.3)
RBC # BLD AUTO: 4.22 X10(6)/MCL (ref 4.2–5.4)
SODIUM SERPL-SCNC: 141 MMOL/L (ref 136–145)
WBC # SPEC AUTO: 8.68 X10(3)/MCL (ref 4.5–11.5)

## 2023-07-22 PROCEDURE — 83690 ASSAY OF LIPASE: CPT | Performed by: PHYSICIAN ASSISTANT

## 2023-07-22 PROCEDURE — 25000003 PHARM REV CODE 250: Performed by: EMERGENCY MEDICINE

## 2023-07-22 PROCEDURE — 96374 THER/PROPH/DIAG INJ IV PUSH: CPT | Mod: 59

## 2023-07-22 PROCEDURE — 25500020 PHARM REV CODE 255: Performed by: EMERGENCY MEDICINE

## 2023-07-22 PROCEDURE — 63600175 PHARM REV CODE 636 W HCPCS: Performed by: PHYSICIAN ASSISTANT

## 2023-07-22 PROCEDURE — 25000003 PHARM REV CODE 250: Performed by: PHYSICIAN ASSISTANT

## 2023-07-22 PROCEDURE — 96361 HYDRATE IV INFUSION ADD-ON: CPT

## 2023-07-22 PROCEDURE — 85025 COMPLETE CBC W/AUTO DIFF WBC: CPT | Performed by: PHYSICIAN ASSISTANT

## 2023-07-22 PROCEDURE — 63600175 PHARM REV CODE 636 W HCPCS: Performed by: EMERGENCY MEDICINE

## 2023-07-22 PROCEDURE — 96375 TX/PRO/DX INJ NEW DRUG ADDON: CPT

## 2023-07-22 PROCEDURE — 80053 COMPREHEN METABOLIC PANEL: CPT | Performed by: PHYSICIAN ASSISTANT

## 2023-07-22 PROCEDURE — 99285 EMERGENCY DEPT VISIT HI MDM: CPT | Mod: 25

## 2023-07-22 RX ORDER — AMOXICILLIN AND CLAVULANATE POTASSIUM 875; 125 MG/1; MG/1
1 TABLET, FILM COATED ORAL
Status: COMPLETED | OUTPATIENT
Start: 2023-07-22 | End: 2023-07-22

## 2023-07-22 RX ORDER — KETOROLAC TROMETHAMINE 30 MG/ML
15 INJECTION, SOLUTION INTRAMUSCULAR; INTRAVENOUS
Status: COMPLETED | OUTPATIENT
Start: 2023-07-22 | End: 2023-07-22

## 2023-07-22 RX ORDER — AMOXICILLIN AND CLAVULANATE POTASSIUM 875; 125 MG/1; MG/1
1 TABLET, FILM COATED ORAL 2 TIMES DAILY
Qty: 14 TABLET | Refills: 0 | Status: SHIPPED | OUTPATIENT
Start: 2023-07-22 | End: 2023-07-22 | Stop reason: SDUPTHER

## 2023-07-22 RX ORDER — MORPHINE SULFATE 2 MG/ML
2 INJECTION, SOLUTION INTRAMUSCULAR; INTRAVENOUS
Status: COMPLETED | OUTPATIENT
Start: 2023-07-22 | End: 2023-07-22

## 2023-07-22 RX ORDER — MINERAL OIL AND PETROLATUM 150; 830 MG/G; MG/G
OINTMENT OPHTHALMIC NIGHTLY
Qty: 3.5 G | Refills: 0 | Status: ON HOLD | OUTPATIENT
Start: 2023-07-22 | End: 2023-09-15 | Stop reason: HOSPADM

## 2023-07-22 RX ORDER — AMOXICILLIN AND CLAVULANATE POTASSIUM 875; 125 MG/1; MG/1
1 TABLET, FILM COATED ORAL 2 TIMES DAILY
Qty: 14 TABLET | Refills: 0 | Status: ON HOLD | OUTPATIENT
Start: 2023-07-22 | End: 2023-08-19 | Stop reason: HOSPADM

## 2023-07-22 RX ADMIN — IOHEXOL 100 ML: 350 INJECTION, SOLUTION INTRAVENOUS at 03:07

## 2023-07-22 RX ADMIN — AMOXICILLIN AND CLAVULANATE POTASSIUM 1 TABLET: 875; 125 TABLET, FILM COATED ORAL at 04:07

## 2023-07-22 RX ADMIN — SODIUM CHLORIDE 1000 ML: 9 INJECTION, SOLUTION INTRAVENOUS at 02:07

## 2023-07-22 RX ADMIN — KETOROLAC TROMETHAMINE 15 MG: 30 INJECTION, SOLUTION INTRAMUSCULAR; INTRAVENOUS at 04:07

## 2023-07-22 RX ADMIN — MORPHINE SULFATE 2 MG: 2 INJECTION, SOLUTION INTRAMUSCULAR; INTRAVENOUS at 02:07

## 2023-07-22 NOTE — ED PROVIDER NOTES
"Encounter Date: 7/22/2023       History     Chief Complaint   Patient presents with    Abdominal Pain    Eye Problem     States was here last week for diverticulitis and given meds.  Unable to get antibiotic "because medicaid don't pay for it".  States eyes "swollen".     Niya Moeller is a 53 y.o. female with a history of diverticulosis, diverticulitis with abscess formation, GERD, and asthma who presents to the ED complaining of LLQ abdominal pain. Patient reports being seen in the ED approximately 1 week ago and was diagnosed with diverticulitis. She was prescribed an antibiotic that her insurance wouldn't cover and she has been unable to get in touch with the ED for an alternative. She says the pain has been getting worse and today became unbearable. She reports nausea. No vomiting, constipation, diarrhea, fevers/chills.     She also reports bilateral eye itching, clear drainage, and crusting shut. Thinks she may have pink eye. She denies vision change or eye pain.     Patient states she has tolerated augmentin in the past. Cannot tolerate flagyl.     The history is provided by the patient. No  was used.   Review of patient's allergies indicates:   Allergen Reactions    Zosyn [piperacillin-tazobactam] Swelling    Decadron-la Itching    Latex      Past Medical History:   Diagnosis Date    Asthma     Diverticulosis     GERD (gastroesophageal reflux disease)      History reviewed. No pertinent surgical history.  History reviewed. No pertinent family history.  Social History     Tobacco Use    Smoking status: Every Day     Packs/day: 0.50     Years: 40.00     Pack years: 20.00     Types: Cigarettes     Start date: 1983    Smokeless tobacco: Current    Tobacco comments:     Ambulatory referral to Smoking Cessation clinic following hospital discharge.    Substance Use Topics    Alcohol use: Not Currently    Drug use: Not Currently     Review of Systems   Constitutional:  Positive for chills. " Negative for fever.   HENT:  Negative for congestion and sore throat.    Eyes:  Negative for redness and itching.   Respiratory:  Negative for cough and shortness of breath.    Cardiovascular:  Negative for chest pain.   Gastrointestinal:  Positive for abdominal pain and nausea. Negative for vomiting.   Genitourinary:  Negative for dysuria and frequency.   Musculoskeletal:  Negative for arthralgias and back pain.   Skin:  Negative for rash and wound.   Neurological:  Negative for dizziness and weakness.   Hematological:  Does not bruise/bleed easily.     Physical Exam     Initial Vitals [07/22/23 0048]   BP Pulse Resp Temp SpO2   108/70 78 18 98.2 °F (36.8 °C) 97 %      MAP       --         Physical Exam    Nursing note and vitals reviewed.  Constitutional: She appears well-developed and well-nourished. No distress.   HENT:   Head: Normocephalic and atraumatic.   Mouth/Throat: No oropharyngeal exudate.   Eyes: EOM are normal. Pupils are equal, round, and reactive to light. No scleral icterus.   Mild bilateral conjunctival erythema. Clear drainage   Neck: Neck supple.   Normal range of motion.  Cardiovascular:  Normal rate and regular rhythm.           No murmur heard.  Pulmonary/Chest: Breath sounds normal. No respiratory distress. She has no wheezes.   Abdominal: Abdomen is soft. Bowel sounds are normal. She exhibits no distension. There is abdominal tenderness in the left lower quadrant. There is no rebound and no guarding.   Musculoskeletal:         General: No tenderness. Normal range of motion.      Cervical back: Normal range of motion and neck supple.     Neurological: She is alert and oriented to person, place, and time. No cranial nerve deficit.   Skin: Skin is warm and dry. Capillary refill takes less than 2 seconds. No erythema.   Psychiatric: She has a normal mood and affect. Her behavior is normal. Judgment and thought content normal.       ED Course   Procedures  Labs Reviewed   COMPREHENSIVE METABOLIC  PANEL - Abnormal; Notable for the following components:       Result Value    Potassium Level 3.3 (*)     Blood Urea Nitrogen 7.1 (*)     Creatinine 1.50 (*)     All other components within normal limits   CBC WITH DIFFERENTIAL - Abnormal; Notable for the following components:    MCV 99.1 (*)     MCH 32.9 (*)     MPV 11.3 (*)     All other components within normal limits   LIPASE - Normal   CBC W/ AUTO DIFFERENTIAL    Narrative:     The following orders were created for panel order CBC auto differential.  Procedure                               Abnormality         Status                     ---------                               -----------         ------                     CBC with Differential[[id number]]        Abnormal            Final result                 Please view results for these tests on the individual orders.   EXTRA TUBES    Narrative:     The following orders were created for panel order EXTRA TUBES.  Procedure                               Abnormality         Status                     ---------                               -----------         ------                     Light Blue Top Hold[[id number]]                              In process                 Gold Top Hold[[id number]]                                    In process                   Please view results for these tests on the individual orders.   LIGHT BLUE TOP HOLD   GOLD TOP HOLD          Imaging Results              CT Abdomen Pelvis With Contrast (Preliminary result)  Result time 07/22/23 03:47:56      Preliminary result by Gregor Pritchett MD (07/22/23 03:47:56)                   Narrative:    START OF REPORT:  Technique: CT of the abdomen and pelvis was performed with axial images as well as sagittal and coronal reconstruction images with intravenous contrast.    Comparison: None available.    Clinical History: States was here last week for diverticulitis and given meds. Unable to get antibiotic because medicaid don't pay for  it.    Dosage Information: Automated Exposure Control was utilized.    Findings:  Lines and Tubes: None.  Thorax:  Lungs: The visualized lung bases appear unremarkable.  Pleura: No effusions or thickening.  Heart: The heart size is within normal limits.  Abdomen:  Abdominal Wall: No abdominal wall pathology is seen.  Liver: The liver appears unremarkable.  Biliary System: No extrahepatic biliary duct dilatation is seen.  Gallbladder: The gallbladder appears unremarkable.  Pancreas: The pancreas appears unremarkable.  Spleen: The spleen appears unremarkable.  Adrenals: The adrenal glands appear unremarkable.  Kidneys: The kidneys appear unremarkable with no stones cysts masses or hydronephrosis.  Aorta: The abdominal aorta appears unremarkable.  IVC: Unremarkable.  Bowel:  Esophagus: The visualized esophagus appears unremarkable.  Stomach: The stomach appears unremarkable.  Duodenum: Unremarkable appearing duodenum.  Small Bowel: The small bowel appears unremarkable.  Colon: Multiple scattered diverticula are seen predominantly through to the descending and sigmoid colon. Fat stranding is adjacented to focally thickened colon with diverticula. These findings are consistent with moderate acute diverticulitis. No perforation or abscess is identified although intramural abscess is not entirely excluded.  Appendix: The appendix appears unremarkable and is seen on Image 88, Series 2.  Peritoneum: No intraperitoneal free air or ascites is seen.    Pelvis:  Bladder: The bladder appears unremarkable.  Female:  Uterus: The uterus appears unremarkable.  Ovaries: The ovaries appear unremarkable.    Bony structures:  Dorsal Spine: The visualized dorsal spine appears unremarkable.  Bony Pelvis: The visualized bony structures of the pelvis appear unremarkable.      Impression:  1. Multiple scattered diverticula are seen predominantly through to the descending and sigmoid colon. Fat stranding is adjacented to focally thickened  colon with diverticula. These findings are consistent with moderate acute diverticulitis. No perforation or abscess is identified although intramural abscess is not entirely excluded. Correlate with clinical and laboratory findings as regards additional evaluation and follow-up.                                      X-Rays:   Independently Interpreted Readings:   Abdomen: Ct demonstrates diverticulitis   Medications   ketorolac injection 15 mg (has no administration in time range)   morphine injection 2 mg (2 mg Intravenous Given 7/22/23 0212)   sodium chloride 0.9% bolus 1,000 mL 1,000 mL (0 mLs Intravenous Stopped 7/22/23 0312)   iohexoL (OMNIPAQUE 350) injection 100 mL (100 mLs Intravenous Given 7/22/23 0336)   amoxicillin-clavulanate 875-125mg per tablet 1 tablet (1 tablet Oral Given 7/22/23 0429)     Medical Decision Making:   Initial Assessment:   Uncomfortable appearing. HDS and afebrile. LLQ abdominal tenderness. No rebound or guarding.   Differential Diagnosis:   Diverticulitis, gastroenteritis, constipation, UTI  Clinical Tests:   Lab Tests: Ordered and Reviewed  Radiological Study: Ordered and Reviewed  ED Management:  Patient presents with continuing abdominal pain after recent diagnosis of diverticulitis.  Patient was prescribed moxifloxacin, which she did not fill because pharmacy reported her insurance would not cover it.  Returns to the ED, symptoms unchanged.  In the emergency department today she is found in generally reassuring condition.  Data are once again compatible with diverticulitis.  Patient is known to tolerate Augmentin, is dosed with Augmentin in the ED. we plan to prescribe Augmentin for her diverticulitis on this visit and reinforced importance of compliance with the medication as instructed.  Patient will be discharged home with Augmentin, anticipatory guidance, return precautions, follow-up instructions.  Discharged in stable condition without event           ED Course as of 07/22/23  0456   Sat Jul 22, 2023   0207 Patient transitioned to Dr. Jean at this time pending labs, pain control [KD]   0213 Reassuring hemogram ; [CT]   0229 Normal lipase ; [CT]   0229 Reassuring chemistries ; [CT]      ED Course User Index  [CT] Sid Jean MD  [KD] Arely Oscar PA-C                 Clinical Impression:   Final diagnoses:  [K57.92] Diverticulitis (Primary)  [H10.33] Acute conjunctivitis of both eyes, unspecified acute conjunctivitis type        ED Disposition Condition    Discharge Stable          ED Prescriptions       Medication Sig Dispense Start Date End Date Auth. Provider    amoxicillin-clavulanate 875-125mg (AUGMENTIN) 875-125 mg per tablet Take 1 tablet by mouth 2 (two) times daily. 14 tablet 7/22/2023 -- Sid Jean MD          Follow-up Information       Follow up With Specialties Details Why Contact Info    [facility] Emergency Dept Emergency Medicine  As needed, If symptoms worsen [address] [facility] [id number] [phone]             Sid Jean MD  07/22/23 0457

## 2023-08-16 ENCOUNTER — HOSPITAL ENCOUNTER (INPATIENT)
Facility: HOSPITAL | Age: 53
LOS: 2 days | Discharge: HOME | DRG: 391 | End: 2023-08-18
Attending: STUDENT IN AN ORGANIZED HEALTH CARE EDUCATION/TRAINING PROGRAM | Admitting: STUDENT IN AN ORGANIZED HEALTH CARE EDUCATION/TRAINING PROGRAM
Payer: MEDICAID

## 2023-08-16 DIAGNOSIS — D53.9 MACROCYTIC ANEMIA: ICD-10-CM

## 2023-08-16 DIAGNOSIS — K57.92 DIVERTICULITIS: Primary | Chronic | ICD-10-CM

## 2023-08-16 DIAGNOSIS — E05.90 HYPERTHYROIDISM: ICD-10-CM

## 2023-08-16 DIAGNOSIS — R10.32 LLQ ABDOMINAL PAIN: ICD-10-CM

## 2023-08-16 DIAGNOSIS — E06.9 THYROIDITIS: ICD-10-CM

## 2023-08-16 DIAGNOSIS — K57.20 DIVERTICULITIS OF LARGE INTESTINE WITH ABSCESS WITHOUT BLEEDING: ICD-10-CM

## 2023-08-16 DIAGNOSIS — J45.901 EXACERBATION OF ASTHMA, UNSPECIFIED ASTHMA SEVERITY, UNSPECIFIED WHETHER PERSISTENT: ICD-10-CM

## 2023-08-16 DIAGNOSIS — J96.01 ACUTE HYPOXEMIC RESPIRATORY FAILURE: ICD-10-CM

## 2023-08-16 DIAGNOSIS — R06.02 SHORTNESS OF BREATH: ICD-10-CM

## 2023-08-16 PROBLEM — J30.9 ALLERGIC CONJUNCTIVITIS AND RHINITIS: Status: ACTIVE | Noted: 2023-08-16

## 2023-08-16 PROBLEM — H10.10 ALLERGIC CONJUNCTIVITIS AND RHINITIS: Status: ACTIVE | Noted: 2023-08-16

## 2023-08-16 LAB
ALBUMIN SERPL-MCNC: 3.5 G/DL (ref 3.5–5)
ALBUMIN/GLOB SERPL: 1.1 RATIO (ref 1.1–2)
ALP SERPL-CCNC: 62 UNIT/L (ref 40–150)
ALT SERPL-CCNC: 9 UNIT/L (ref 0–55)
AST SERPL-CCNC: 13 UNIT/L (ref 5–34)
BASOPHILS # BLD AUTO: 0.03 X10(3)/MCL
BASOPHILS NFR BLD AUTO: 0.4 %
BILIRUB SERPL-MCNC: 0.6 MG/DL
BNP BLD-MCNC: 51.9 PG/ML
BUN SERPL-MCNC: 7.8 MG/DL (ref 9.8–20.1)
CALCIUM SERPL-MCNC: 8.9 MG/DL (ref 8.4–10.2)
CHLORIDE SERPL-SCNC: 105 MMOL/L (ref 98–107)
CO2 SERPL-SCNC: 29 MMOL/L (ref 22–29)
CREAT SERPL-MCNC: 1.03 MG/DL (ref 0.55–1.02)
D DIMER PPP IA.FEU-MCNC: 0.96 UG/ML FEU (ref 0–0.5)
EOSINOPHIL # BLD AUTO: 0.17 X10(3)/MCL (ref 0–0.9)
EOSINOPHIL NFR BLD AUTO: 2.2 %
ERYTHROCYTE [DISTWIDTH] IN BLOOD BY AUTOMATED COUNT: 14 % (ref 11.5–17)
FLUAV AG UPPER RESP QL IA.RAPID: NOT DETECTED
FLUBV AG UPPER RESP QL IA.RAPID: NOT DETECTED
GFR SERPLBLD CREATININE-BSD FMLA CKD-EPI: >60 MLS/MIN/1.73/M2
GLOBULIN SER-MCNC: 3.2 GM/DL (ref 2.4–3.5)
GLUCOSE SERPL-MCNC: 77 MG/DL (ref 74–100)
HCT VFR BLD AUTO: 41.2 % (ref 37–47)
HGB BLD-MCNC: 13.3 G/DL (ref 12–16)
IMM GRANULOCYTES # BLD AUTO: 0.01 X10(3)/MCL (ref 0–0.04)
IMM GRANULOCYTES NFR BLD AUTO: 0.1 %
LACTATE SERPL-SCNC: 1.4 MMOL/L (ref 0.5–2.2)
LYMPHOCYTES # BLD AUTO: 2.92 X10(3)/MCL (ref 0.6–4.6)
LYMPHOCYTES NFR BLD AUTO: 38.5 %
MCH RBC QN AUTO: 32.5 PG (ref 27–31)
MCHC RBC AUTO-ENTMCNC: 32.3 G/DL (ref 33–36)
MCV RBC AUTO: 100.7 FL (ref 80–94)
MONOCYTES # BLD AUTO: 0.69 X10(3)/MCL (ref 0.1–1.3)
MONOCYTES NFR BLD AUTO: 9.1 %
NEUTROPHILS # BLD AUTO: 3.77 X10(3)/MCL (ref 2.1–9.2)
NEUTROPHILS NFR BLD AUTO: 49.7 %
NRBC BLD AUTO-RTO: 0 %
PLATELET # BLD AUTO: 182 X10(3)/MCL (ref 130–400)
PMV BLD AUTO: 11.2 FL (ref 7.4–10.4)
POTASSIUM SERPL-SCNC: 3.5 MMOL/L (ref 3.5–5.1)
PROT SERPL-MCNC: 6.7 GM/DL (ref 6.4–8.3)
RBC # BLD AUTO: 4.09 X10(6)/MCL (ref 4.2–5.4)
SARS-COV-2 RNA RESP QL NAA+PROBE: NOT DETECTED
SODIUM SERPL-SCNC: 143 MMOL/L (ref 136–145)
T4 FREE SERPL-MCNC: <0.42 NG/DL (ref 0.7–1.48)
TROPONIN I SERPL-MCNC: <0.01 NG/ML (ref 0–0.04)
TSH SERPL-ACNC: 22.99 UIU/ML (ref 0.35–4.94)
WBC # SPEC AUTO: 7.59 X10(3)/MCL (ref 4.5–11.5)

## 2023-08-16 PROCEDURE — 25000242 PHARM REV CODE 250 ALT 637 W/ HCPCS: Performed by: PHYSICIAN ASSISTANT

## 2023-08-16 PROCEDURE — 25000242 PHARM REV CODE 250 ALT 637 W/ HCPCS: Performed by: INTERNAL MEDICINE

## 2023-08-16 PROCEDURE — 25500020 PHARM REV CODE 255: Performed by: PHYSICIAN ASSISTANT

## 2023-08-16 PROCEDURE — 25000003 PHARM REV CODE 250: Performed by: INTERNAL MEDICINE

## 2023-08-16 PROCEDURE — 84439 ASSAY OF FREE THYROXINE: CPT | Performed by: INTERNAL MEDICINE

## 2023-08-16 PROCEDURE — 85379 FIBRIN DEGRADATION QUANT: CPT | Performed by: PHYSICIAN ASSISTANT

## 2023-08-16 PROCEDURE — 94640 AIRWAY INHALATION TREATMENT: CPT

## 2023-08-16 PROCEDURE — 63600175 PHARM REV CODE 636 W HCPCS: Performed by: PHYSICIAN ASSISTANT

## 2023-08-16 PROCEDURE — 96374 THER/PROPH/DIAG INJ IV PUSH: CPT

## 2023-08-16 PROCEDURE — 80053 COMPREHEN METABOLIC PANEL: CPT | Performed by: PHYSICIAN ASSISTANT

## 2023-08-16 PROCEDURE — G0378 HOSPITAL OBSERVATION PER HR: HCPCS

## 2023-08-16 PROCEDURE — 25000003 PHARM REV CODE 250

## 2023-08-16 PROCEDURE — 83605 ASSAY OF LACTIC ACID: CPT | Performed by: PHYSICIAN ASSISTANT

## 2023-08-16 PROCEDURE — 63600175 PHARM REV CODE 636 W HCPCS: Performed by: INTERNAL MEDICINE

## 2023-08-16 PROCEDURE — 87040 BLOOD CULTURE FOR BACTERIA: CPT | Performed by: PHYSICIAN ASSISTANT

## 2023-08-16 PROCEDURE — 84443 ASSAY THYROID STIM HORMONE: CPT | Performed by: INTERNAL MEDICINE

## 2023-08-16 PROCEDURE — 93005 ELECTROCARDIOGRAM TRACING: CPT

## 2023-08-16 PROCEDURE — 0240U COVID/FLU A&B PCR: CPT | Performed by: PHYSICIAN ASSISTANT

## 2023-08-16 PROCEDURE — 94640 AIRWAY INHALATION TREATMENT: CPT | Mod: XB

## 2023-08-16 PROCEDURE — 84484 ASSAY OF TROPONIN QUANT: CPT | Performed by: PHYSICIAN ASSISTANT

## 2023-08-16 PROCEDURE — 25000003 PHARM REV CODE 250: Performed by: STUDENT IN AN ORGANIZED HEALTH CARE EDUCATION/TRAINING PROGRAM

## 2023-08-16 PROCEDURE — 85025 COMPLETE CBC W/AUTO DIFF WBC: CPT | Performed by: PHYSICIAN ASSISTANT

## 2023-08-16 PROCEDURE — 83880 ASSAY OF NATRIURETIC PEPTIDE: CPT | Performed by: PHYSICIAN ASSISTANT

## 2023-08-16 PROCEDURE — 96372 THER/PROPH/DIAG INJ SC/IM: CPT | Performed by: INTERNAL MEDICINE

## 2023-08-16 PROCEDURE — 63600175 PHARM REV CODE 636 W HCPCS

## 2023-08-16 PROCEDURE — 99285 EMERGENCY DEPT VISIT HI MDM: CPT | Mod: 25

## 2023-08-16 PROCEDURE — 21400001 HC TELEMETRY ROOM

## 2023-08-16 PROCEDURE — 96372 THER/PROPH/DIAG INJ SC/IM: CPT | Mod: 59 | Performed by: PHYSICIAN ASSISTANT

## 2023-08-16 RX ORDER — ENOXAPARIN SODIUM 100 MG/ML
40 INJECTION SUBCUTANEOUS EVERY 24 HOURS
Status: DISCONTINUED | OUTPATIENT
Start: 2023-08-16 | End: 2023-08-19 | Stop reason: HOSPADM

## 2023-08-16 RX ORDER — ACETAMINOPHEN 325 MG/1
650 TABLET ORAL ONCE AS NEEDED
Status: DISCONTINUED | OUTPATIENT
Start: 2023-08-16 | End: 2023-08-19 | Stop reason: HOSPADM

## 2023-08-16 RX ORDER — SODIUM CHLORIDE FOR INHALATION 3 %
4 VIAL, NEBULIZER (ML) INHALATION EVERY 6 HOURS PRN
Status: DISCONTINUED | OUTPATIENT
Start: 2023-08-16 | End: 2023-08-19 | Stop reason: HOSPADM

## 2023-08-16 RX ORDER — SODIUM CHLORIDE 0.9 % (FLUSH) 0.9 %
10 SYRINGE (ML) INJECTION
Status: DISCONTINUED | OUTPATIENT
Start: 2023-08-16 | End: 2023-08-19 | Stop reason: HOSPADM

## 2023-08-16 RX ORDER — IPRATROPIUM BROMIDE AND ALBUTEROL SULFATE 2.5; .5 MG/3ML; MG/3ML
3 SOLUTION RESPIRATORY (INHALATION)
Status: DISCONTINUED | OUTPATIENT
Start: 2023-08-16 | End: 2023-08-19 | Stop reason: HOSPADM

## 2023-08-16 RX ORDER — PROPRANOLOL HYDROCHLORIDE 20 MG/1
60 TABLET ORAL DAILY
Status: DISCONTINUED | OUTPATIENT
Start: 2023-08-16 | End: 2023-08-18

## 2023-08-16 RX ORDER — TALC
6 POWDER (GRAM) TOPICAL NIGHTLY PRN
Status: DISCONTINUED | OUTPATIENT
Start: 2023-08-16 | End: 2023-08-19 | Stop reason: HOSPADM

## 2023-08-16 RX ORDER — BUDESONIDE 0.5 MG/2ML
0.5 INHALANT ORAL EVERY 12 HOURS
Status: DISCONTINUED | OUTPATIENT
Start: 2023-08-16 | End: 2023-08-19 | Stop reason: HOSPADM

## 2023-08-16 RX ORDER — KETOROLAC TROMETHAMINE 30 MG/ML
INJECTION, SOLUTION INTRAMUSCULAR; INTRAVENOUS
Status: DISPENSED
Start: 2023-08-16 | End: 2023-08-17

## 2023-08-16 RX ORDER — IPRATROPIUM BROMIDE AND ALBUTEROL SULFATE 2.5; .5 MG/3ML; MG/3ML
9 SOLUTION RESPIRATORY (INHALATION)
Status: COMPLETED | OUTPATIENT
Start: 2023-08-16 | End: 2023-08-16

## 2023-08-16 RX ORDER — PREDNISONE 50 MG/1
50 TABLET ORAL DAILY
Status: DISCONTINUED | OUTPATIENT
Start: 2023-08-16 | End: 2023-08-19 | Stop reason: HOSPADM

## 2023-08-16 RX ORDER — MONTELUKAST SODIUM 5 MG/1
10 TABLET, CHEWABLE ORAL DAILY
Status: DISCONTINUED | OUTPATIENT
Start: 2023-08-16 | End: 2023-08-19 | Stop reason: HOSPADM

## 2023-08-16 RX ORDER — DEXAMETHASONE SODIUM PHOSPHATE 4 MG/ML
8 INJECTION, SOLUTION INTRA-ARTICULAR; INTRALESIONAL; INTRAMUSCULAR; INTRAVENOUS; SOFT TISSUE
Status: COMPLETED | OUTPATIENT
Start: 2023-08-16 | End: 2023-08-16

## 2023-08-16 RX ORDER — SODIUM CHLORIDE 9 MG/ML
INJECTION, SOLUTION INTRAVENOUS
Status: DISCONTINUED | OUTPATIENT
Start: 2023-08-16 | End: 2023-08-19 | Stop reason: HOSPADM

## 2023-08-16 RX ORDER — ACETAMINOPHEN 325 MG/1
TABLET ORAL
Status: DISCONTINUED
Start: 2023-08-16 | End: 2023-08-17 | Stop reason: WASHOUT

## 2023-08-16 RX ORDER — FLUTICASONE PROPIONATE 50 MCG
1 SPRAY, SUSPENSION (ML) NASAL DAILY PRN
Status: DISCONTINUED | OUTPATIENT
Start: 2023-08-16 | End: 2023-08-19 | Stop reason: HOSPADM

## 2023-08-16 RX ORDER — MORPHINE SULFATE 2 MG/ML
2 INJECTION, SOLUTION INTRAMUSCULAR; INTRAVENOUS
Status: COMPLETED | OUTPATIENT
Start: 2023-08-16 | End: 2023-08-16

## 2023-08-16 RX ORDER — METHIMAZOLE 5 MG/1
10 TABLET ORAL 2 TIMES DAILY
Status: DISCONTINUED | OUTPATIENT
Start: 2023-08-16 | End: 2023-08-17

## 2023-08-16 RX ORDER — PANTOPRAZOLE SODIUM 40 MG/1
40 TABLET, DELAYED RELEASE ORAL DAILY
Status: DISCONTINUED | OUTPATIENT
Start: 2023-08-16 | End: 2023-08-19 | Stop reason: HOSPADM

## 2023-08-16 RX ORDER — KETOROLAC TROMETHAMINE 30 MG/ML
15 INJECTION, SOLUTION INTRAMUSCULAR; INTRAVENOUS ONCE AS NEEDED
Status: DISCONTINUED | OUTPATIENT
Start: 2023-08-17 | End: 2023-08-18

## 2023-08-16 RX ORDER — METHIMAZOLE 10 MG/1
10 TABLET ORAL 2 TIMES DAILY
Status: DISCONTINUED | OUTPATIENT
Start: 2023-08-16 | End: 2023-08-16

## 2023-08-16 RX ADMIN — IPRATROPIUM BROMIDE AND ALBUTEROL SULFATE 3 ML: .5; 3 SOLUTION RESPIRATORY (INHALATION) at 03:08

## 2023-08-16 RX ADMIN — METHIMAZOLE 10 MG: 5 TABLET ORAL at 11:08

## 2023-08-16 RX ADMIN — AZITHROMYCIN MONOHYDRATE 500 MG: 500 INJECTION, POWDER, LYOPHILIZED, FOR SOLUTION INTRAVENOUS at 02:08

## 2023-08-16 RX ADMIN — MONTELUKAST SODIUM 10 MG: 5 TABLET, CHEWABLE ORAL at 02:08

## 2023-08-16 RX ADMIN — Medication 6 MG: at 09:08

## 2023-08-16 RX ADMIN — MEROPENEM 2 G: 1 INJECTION, POWDER, FOR SOLUTION INTRAVENOUS at 10:08

## 2023-08-16 RX ADMIN — MORPHINE SULFATE 2 MG: 2 INJECTION, SOLUTION INTRAMUSCULAR; INTRAVENOUS at 09:08

## 2023-08-16 RX ADMIN — SODIUM CHLORIDE: 9 INJECTION, SOLUTION INTRAVENOUS at 10:08

## 2023-08-16 RX ADMIN — PROPRANOLOL HYDROCHLORIDE 60 MG: 20 TABLET ORAL at 02:08

## 2023-08-16 RX ADMIN — PANTOPRAZOLE SODIUM 40 MG: 40 TABLET, DELAYED RELEASE ORAL at 02:08

## 2023-08-16 RX ADMIN — IPRATROPIUM BROMIDE AND ALBUTEROL SULFATE 3 ML: .5; 3 SOLUTION RESPIRATORY (INHALATION) at 07:08

## 2023-08-16 RX ADMIN — IOPAMIDOL 100 ML: 755 INJECTION, SOLUTION INTRAVENOUS at 10:08

## 2023-08-16 RX ADMIN — PREDNISONE 50 MG: 50 TABLET ORAL at 02:08

## 2023-08-16 RX ADMIN — ENOXAPARIN SODIUM 40 MG: 40 INJECTION SUBCUTANEOUS at 05:08

## 2023-08-16 RX ADMIN — IPRATROPIUM BROMIDE AND ALBUTEROL SULFATE 9 ML: .5; 3 SOLUTION RESPIRATORY (INHALATION) at 07:08

## 2023-08-16 RX ADMIN — DEXAMETHASONE SODIUM PHOSPHATE 8 MG: 4 INJECTION, SOLUTION INTRA-ARTICULAR; INTRALESIONAL; INTRAMUSCULAR; INTRAVENOUS; SOFT TISSUE at 07:08

## 2023-08-16 RX ADMIN — MEROPENEM 1 G: 1 INJECTION, POWDER, FOR SOLUTION INTRAVENOUS at 03:08

## 2023-08-16 RX ADMIN — BUDESONIDE INHALATION 0.5 MG: 0.5 SUSPENSION RESPIRATORY (INHALATION) at 07:08

## 2023-08-16 NOTE — ED PROVIDER NOTES
"Encounter Date: 8/16/2023       History     Chief Complaint   Patient presents with    Shortness of Breath     States last 1 month worsening last week bilateral eye drainage, nasal drainage and SOB.       Niya Moeller is a 53 y.o. female with a history of asthma, COPD, GERD, diverticulosis, and tobacco use who presents to the ED with multiple complaints. She reports waking up yesterday with rhinorrhea, cough, and shortness of breath. Shortness of breath worsened today and she is coughing up "yellow mucus" which prompted her ED visit. She also reports severe abdominal pain 2/2 her diverticulitis that has been persistent since her last ED visit in July. She reports compliance with antibiotics, states she cant even walk straight due to the pain. She has also had bilateral eye swelling and drainage for 3 months. Has gone to an eye clinic and they told her nothing was wrong. She denies fevers, chills, chest pain, nausea/vomiting, sick contacts.    The history is provided by the patient.     Review of patient's allergies indicates:   Allergen Reactions    Zosyn [piperacillin-tazobactam] Swelling    Decadron-la Itching    Latex      Past Medical History:   Diagnosis Date    Asthma     COPD (chronic obstructive pulmonary disease)     Diverticulosis     GERD (gastroesophageal reflux disease)      No past surgical history on file.  History reviewed. No pertinent family history.  Social History     Tobacco Use    Smoking status: Every Day     Current packs/day: 0.50     Average packs/day: 0.5 packs/day for 40.6 years (20.3 ttl pk-yrs)     Types: Cigarettes     Start date: 1983    Smokeless tobacco: Current    Tobacco comments:     Ambulatory referral to Smoking Cessation clinic following hospital discharge.    Substance Use Topics    Alcohol use: Not Currently    Drug use: Not Currently     Review of Systems   Constitutional:  Negative for fever.   HENT:  Positive for postnasal drip and rhinorrhea. Negative for sinus pain " and sore throat.    Respiratory:  Positive for cough and shortness of breath.    Cardiovascular:  Negative for chest pain.   Gastrointestinal:  Positive for abdominal pain. Negative for diarrhea, nausea and vomiting.   Genitourinary:  Negative for dysuria and frequency.   Musculoskeletal:  Negative for arthralgias and back pain.   Skin:  Negative for rash and wound.   Neurological:  Negative for dizziness and weakness.   Hematological:  Does not bruise/bleed easily.       Physical Exam     Initial Vitals [08/16/23 0632]   BP Pulse Resp Temp SpO2   (!) 141/71 (!) 112 (!) 26 98.1 °F (36.7 °C) (!) 94 %      MAP       --         Physical Exam    Nursing note and vitals reviewed.  Constitutional: She appears well-developed and well-nourished. No distress.   HENT:   Head: Normocephalic and atraumatic.   Mouth/Throat: No oropharyngeal exudate.   Eyes: EOM are normal. Right eye exhibits chemosis. Right eye exhibits no discharge. Left eye exhibits chemosis. Left eye exhibits no discharge. Right conjunctiva is not injected. Right conjunctiva has no hemorrhage. Left conjunctiva is not injected. Left conjunctiva has no hemorrhage. No scleral icterus.   Neck: Neck supple.   Normal range of motion.  Cardiovascular:  Normal rate and regular rhythm.           No murmur heard.  Pulmonary/Chest: Tachypnea noted. No respiratory distress. She has wheezes (diffuse inspiratory and expiratory wheezes).   Patient speaking in full sentences without difficulty. Placed on O2 for comfort   Abdominal: Abdomen is soft. Bowel sounds are normal. She exhibits no distension. There is no abdominal tenderness.   Musculoskeletal:         General: No tenderness. Normal range of motion.      Cervical back: Normal range of motion and neck supple.     Neurological: She is alert and oriented to person, place, and time. No cranial nerve deficit.   Skin: Skin is warm and dry. Capillary refill takes less than 2 seconds. No erythema.   Psychiatric: She has a  normal mood and affect. Her behavior is normal. Judgment and thought content normal.         ED Course   Procedures  Labs Reviewed   COMPREHENSIVE METABOLIC PANEL - Abnormal; Notable for the following components:       Result Value    Blood Urea Nitrogen 7.8 (*)     Creatinine 1.03 (*)     All other components within normal limits   CBC WITH DIFFERENTIAL - Abnormal; Notable for the following components:    RBC 4.09 (*)     .7 (*)     MCH 32.5 (*)     MCHC 32.3 (*)     MPV 11.2 (*)     All other components within normal limits   D DIMER, QUANTITATIVE - Abnormal; Notable for the following components:    D-Dimer 0.96 (*)     All other components within normal limits   TROPONIN I - Normal   B-TYPE NATRIURETIC PEPTIDE - Normal   COVID/FLU A&B PCR - Normal    Narrative:     The Xpert Xpress SARS-CoV-2/FLU/RSV plus is a rapid, multiplexed real-time PCR test intended for the simultaneous qualitative detection and differentiation of SARS-CoV-2, Influenza A, Influenza B, and respiratory syncytial virus (RSV) viral RNA in either nasopharyngeal swab or nasal swab specimens.         LACTIC ACID, PLASMA - Normal   BLOOD CULTURE OLG   BLOOD CULTURE [facility]   CBC W/ AUTO DIFFERENTIAL    Narrative:     The following orders were created for panel order CBC auto differential.  Procedure                               Abnormality         Status                     ---------                               -----------         ------                     CBC with Differential[[id number]]        Abnormal            Final result                 Please view results for these tests on the individual orders.   EXTRA TUBES    Narrative:     The following orders were created for panel order EXTRA TUBES.  Procedure                               Abnormality         Status                     ---------                               -----------         ------                     Gold Top Hold[[id number]]                                    In process                  Pink Top Hold[[id number]]                                    In process                   Please view results for these tests on the individual orders.   GOLD TOP HOLD   PINK TOP HOLD        ECG Results              EKG 12-lead (Final result)  Result time 08/16/23 08:29:43      Final result by Interface, Lab In [facility] (08/16/23 08:29:43)                   Narrative:    Test Reason : R06.02,    Vent. Rate : 064 BPM     Atrial Rate : 064 BPM     P-R Int : 148 ms          QRS Dur : 062 ms      QT Int : 394 ms       P-R-T Axes : 079 080 065 degrees     QTc Int : 406 ms    Normal sinus rhythm  Septal infarct (cited on or before 14-JUL-2023)  Abnormal ECG  When compared with ECG of 14-JUL-2023 22:38,  No significant change was found  Confirmed by Juan Serna MD ([id number]) on 8/16/2023 8:29:30 AM    Referred By: [email or web address]   SELF           Confirmed By:Juan Serna MD                                  Imaging Results              CT Abdomen Pelvis With Contrast (Final result)  Result time 08/16/23 11:38:21      Final result by Genesis Iraheta MD (08/16/23 11:38:21)                   Impression:      Acute on chronic diverticular disease with persistent inflammatory changes at the sigmoid colon with improved though small residual mural abscess tracking laterally toward the peritoneal lining.  Overall inflammatory fat stranding is improved compared to the previous exam.      Electronically signed by: Genesis Iraheta  Date:    08/16/2023  Time:    11:38               Narrative:    EXAMINATION:  CT ABDOMEN PELVIS WITH CONTRAST    CLINICAL HISTORY:  LLQ abdominal pain;    TECHNIQUE:  Helically acquired images with axial, sagittal and coronal reformations were obtained from the lung bases to the pubic symphysis after the IV administration of contrast.    Automated tube current modulation, weight-based exposure dosing, and/or iterative reconstruction technique utilized to reach lowest reasonably achievable  exposure rate.    DLP: 1558 mGy*cm    COMPARISON:  CT abdomen pelvis 07/22/2023    FINDINGS:  LUNG BASES: Left basilar atelectasis.    LIVER: Normal attenuation. No appreciable focal hepatic lesion.    BILIARY: No calcified gallstones.    PANCREAS: No inflammatory change.    SPLEEN: Normal in size    ADRENALS: No mass.    KIDNEYS/URETERS: The kidneys enhance symmetrically.  No hydronephrosis.    GI TRACT/MESENTERY:  No evidence of bowel obstruction.  Diffuse diverticulosis.  There is wall thickening and edema at the distal descending/proximal sigmoid colon.  Trace residual encapsulated collection at the previously seen mural abscess estimated to measure 2.5 x 0.9 cm on today's exam compared to 3.4 x 1.5 cm previously.  Collection tracks laterally to abut the peritoneum in the left lower quadrant..    PERITONEUM: Trace free intraperitoneal fluid.No free air.    LYMPH NODES: No enlarged lymph nodes by size criteria.    VASCULATURE: No significant atherosclerosis or aneurysm.    BLADDER: Normal appearance given degree of distention.    REPRODUCTIVE ORGANS: Intramural fibroid at the anterior lower uterine segment.    SOFT TISSUES: There are small fat containing inguinal hernias.    BONES: Degenerative change at the lumbosacral junction.                                       CTA Chest Non-Coronary (PE Studies) (Final result)  Result time 08/16/23 11:46:04      Final result by John Bowens MD (08/16/23 11:46:04)                   Impression:      1. No pulmonary embolism identified.  2. Endobronchial material bilaterally, greater on the left, with mild left lung base atelectasis and scattered small areas of ground-glass in the medial right lung.  Suspect this endobronchial material relates to infection or inflammation.      Electronically signed by: John Bowens  Date:    08/16/2023  Time:    11:46               Narrative:    EXAMINATION:  CTA CHEST NON CORONARY (PE STUDIES)    CLINICAL HISTORY:  Pulmonary embolism  (PE) suspected, positive D-dimer;    TECHNIQUE:  CTA imaging of the chest after IV contrast. Axial, coronal and sagittal reconstructions, including MIP images, are reviewed. Dose length product 1558 mGycm. Automatic exposure control, adjustment of mA/kV or iterative reconstruction technique used to limit radiation dose.    COMPARISON:  CT 04/11/2023    FINDINGS:  Diagnostic quality: Adequate    Pulmonary embolism: None identified.    Lung parenchyma: Areas of endobronchial debris and bronchial wall thickening in the perihilar region on the left.  Mild left lung base atelectasis.  Smaller volume endobronchial material in the right lower lobe with few small areas of mild ground-glass medially in the right lung.    Pleural effusion: None.    Mediastinum/bruce: Normal heart size and thoracic aortic caliber.  No pathologically enlarged lymph node.    Chest wall/axilla: No significant findings.    Upper abdomen: Better assessed on concurrent abdominal CT.    Bones: No acute osseous process.                                       X-Ray Chest AP Portable (Final result)  Result time 08/16/23 10:14:47      Final result by John Bowens MD (08/16/23 10:14:47)                   Impression:      No acute pulmonary process identified.      Electronically signed by: John Bowens  Date:    08/16/2023  Time:    10:14               Narrative:    EXAMINATION:  XR CHEST AP PORTABLE    CLINICAL HISTORY:  Shortness of breath    TECHNIQUE:  Frontal view(s) of the chest.    COMPARISON:  Radiography 05/20/2023    FINDINGS:  Normal cardiac silhouette.  The lungs are well-inflated.  No consolidation identified.  No significant pleural effusion or discernible pneumothorax.                                       Medications   fluticasone propionate 50 mcg/actuation nasal spray 50 mcg (has no administration in time range)   methIMAzole tablet 10 mg (has no administration in time range)   montelukast chewable tablet 10 mg (has no administration  in time range)   propranoloL tablet 60 mg (has no administration in time range)   sodium chloride 0.9% flush 10 mL (has no administration in time range)   melatonin tablet 6 mg (has no administration in time range)   enoxaparin injection 40 mg (has no administration in time range)   piperacillin-tazobactam (ZOSYN) 4.5 g in dextrose 5 % in water (D5W) 100 mL IVPB (MB+) (has no administration in time range)   azithromycin (ZITHROMAX) 500 mg in dextrose 5 % (D5W) 250 mL IVPB (Vial-Mate) (has no administration in time range)   albuterol-ipratropium 2.5 mg-0.5 mg/3 mL nebulizer solution 3 mL (has no administration in time range)   budesonide nebulizer solution 0.5 mg (has no administration in time range)   sodium chloride 3% nebulizer solution 4 mL (has no administration in time range)   predniSONE tablet 50 mg (has no administration in time range)   albuterol-ipratropium 2.5 mg-0.5 mg/3 mL nebulizer solution 9 mL (9 mLs Nebulization Given 8/16/23 0756)   dexAMETHasone injection 8 mg (8 mg Intramuscular Given 8/16/23 0752)   morphine injection 2 mg (2 mg Intravenous Given 8/16/23 0919)   iopamidoL (ISOVUE-370) injection 100 mL (100 mLs Intravenous Given 8/16/23 1021)     Medical Decision Making:   Initial Assessment:   Tachypneic, diffuse inspiratory and expiratory wheezing. Speaking in full sentences without difficulty.   Differential Diagnosis:   Pneumonia, covid, asthma exacerbation, PE  Clinical Tests:   Lab Tests: Ordered and Reviewed  The following lab test(s) were unremarkable: CBC and CMP  Radiological Study: Ordered and Reviewed  ED Management:  CBC without leukocytosis. Pt hypoxic on arrival with O2 91% on room air as well as tachypneic. Diffuse wheezing on exam. Given 3 duonebs and dexamethasone with improvement, however continues to desat when off supplemental O2. CTA without evidence of PE, bilateral endobronchial material. Patient also reports persistent LLQ abdominal pain consistent with previous  diverticulitis flares, CT abdomen/pelvis without abscess. Medicine consulted for admission for further management.              ED Course as of 08/16/23 1336   Wed Aug 16, 2023   0818 WBC: 7.59 [KD]   0818 BNP: 51.9 [KD]   0818 Lactate, Isaac: 1.4 [KD]   0852 D-Dimer(!): 0.96 [KD]   0902 CTA chest ordered to rule out PE. CT abdomen/pelvis ordered for further eval of LLQ abdominal pain. [KD]   1048 X-Ray Chest AP Portable  No acute pulmonary process identified. [KD]   1207 CTA Chest Non-Coronary (PE Studies)   Endobronchial material bilaterally, greater on the left, with mild left lung base atelectasis and scattered small areas of ground-glass in the medial right lung.  Suspect this endobronchial material relates to infection or inflammation. [KD]   1207 CT Abdomen Pelvis With Contrast  Acute on chronic diverticular disease with persistent inflammatory changes at the sigmoid colon with improved though small residual mural abscess tracking laterally toward the peritoneal lining.  Overall inflammatory fat stranding is improved compared to the previous exam. [KD]   1232 Attempted to take patient off supplemental O2. Maintained O2 sats at 94% at rest, however continues to desat when talking. Medicine consulted for acute hypoxemic respiratory failure [KD]      ED Course User Index  [KD] Arely Oscar PA-C                 Clinical Impression:   Final diagnoses:  [R06.02] Shortness of breath  [J96.01] Acute hypoxemic respiratory failure (Primary)  [J45.901] Exacerbation of asthma, unspecified asthma severity, unspecified whether persistent  [R10.32] LLQ abdominal pain        ED Disposition Condition    Observation                 Arely Oscar PA-C  08/16/23 1337

## 2023-08-16 NOTE — CONSULTS
"[facility]/General Surgery  CONSULT NOTE  Chief Complaint:   LLQ pain, URI    History of Present Illness:  Niya Moeller is a 53 y.o. female with PMHx of hyperthyroidism, COPD, asthma, GERD, and diverticulosis here for upper respiratory issues and LLQ abdominal pain. General Surgery consulted for diverticular abscess on CT obtained in ED. Patient has had ongoing issues with diverticulitis with most recent episode beginning 1 month ago. She has been to the ED twice, on 7/14 and 7/22 and was placed on oral antibiotics both times. However, she states that she has not been consistently taking these antibiotic courses, calling them "as needed" medicines. Previous to this flare, she was evaluated in [place] where she had a drain placed in the abscess in February of this year with improvement of her symptoms. She is having regular bowel movements without blood or melena. No pain with bowel movements or urination. No sputtering urine. Has been tolerating a regular diet without nausea and vomiting, having just ate a whole meal  just prior to our evaluation. Occasionally has issues with constipation which she takes stool softeners for. Last colonoscopy was reportedly 10 years ago and has not been able to reschedule due to recurrence of these abscesses. Of note, patient has had upper respiratory symptoms for past few days with congestion and productive cough with bilateral eye swelling. Otherwise, patient denies any fevers, chills, chest pain, back pain.     Review of Systems:  Negative other than stated in HPI on 10 point review of systems.     Allergies:  Review of patient's allergies indicates:   Allergen Reactions    Zosyn [piperacillin-tazobactam] Swelling    Decadron-la Itching    Latex      Home Medications:  No current facility-administered medications on file prior to encounter.     Current Outpatient Medications on File Prior to Encounter   Medication Sig    albuterol-ipratropium (DUO-NEB) 2.5 mg-0.5 mg/3 mL " nebulizer solution Take 3 mLs by nebulization every 4 (four) hours as needed for Wheezing. Rescue (Patient not taking: Reported on 5/2/2023)    amoxicillin-clavulanate 875-125mg (AUGMENTIN) 875-125 mg per tablet Take 1 tablet by mouth 2 (two) times daily.    budesonide-formoterol 160-4.5 mcg (SYMBICORT) 160-4.5 mcg/actuation HFAA Inhale 2 puffs into the lungs every 12 (twelve) hours. Controller    fluticasone propionate (FLONASE) 50 mcg/actuation nasal spray 1 spray (50 mcg total) by Each Nostril route daily as needed for Allergies or Rhinitis.    methIMAzole (TAPAZOLE) 10 MG Tab Take 1 tablet (10 mg total) by mouth 2 (two) times daily.    montelukast (SINGULAIR) 10 mg tablet Take 10 mg by mouth once daily.    omeprazole (PRILOSEC) 40 MG capsule Take 1 capsule (40 mg total) by mouth once daily.    propranoloL (INDERAL LA) 60 MG 24 hr capsule Take 1 capsule (60 mg total) by mouth once daily.    white petrolatum-mineral oiL (ARTIFICIAL TEARS, VINCENZO/MIN,) 83-15 % Oint Place into both eyes every evening.       Past Medical History:  Past Medical History:   Diagnosis Date    Asthma     COPD (chronic obstructive pulmonary disease)     Diverticulosis     GERD (gastroesophageal reflux disease)    Hyperthyroidism     Past Surgical History:  No past surgical history on file.    Family History:   History reviewed. No pertinent family history.    Social History:   Social History     Tobacco Use    Smoking status: Every Day     Current packs/day: 0.50     Average packs/day: 0.5 packs/day for 40.6 years (20.3 ttl pk-yrs)     Types: Cigarettes     Start date: 1983    Smokeless tobacco: Current    Tobacco comments:     Ambulatory referral to Smoking Cessation clinic following hospital discharge.    Substance Use Topics    Alcohol use: Not Currently    Drug use: Not Currently        Vital Signs:  Temp: 98.1 °F (36.7 °C) (08/16/23 0632)  Pulse: 88 (08/16/23 1446)  Resp: 18 (08/16/23 0919)  BP: (!) 144/88 (08/16/23 1446)  SpO2: 96 %  (08/16/23 0756)    Physical Exam:  General: NAD  HEENT: normocephalic, dry membranes moist, no scleral icterus  Neck: supple, symmetrical, no JVD  Lungs:  mild increased work of breathing on 2L NC; wheezing  clear to auscultation bilaterally and normal respiratory effort  Cardiovascular: RRR  Extremities: moving all extremities, distal pulses palpable and symmetric  Abdomen: lower abdominal pain worse in LLQ, no surgical scars   Skin: turgor normal. No rashes or lesions  Neurologic: No focal numbness or weakness  Psych/Behavioral:  A&Ox3, appropriate affect.    Laboratory:  WBC/Hgb/Hct/Plts:  7.59/13.3/41.2/182 (08/16 0710)  Na/K/Cl/CO2:  143/3.5/105/29 (08/16 0710).  BUN/Cr/glu/ALT/AST/amyl/lip:  7.8/1.03/--/9/13/--/-- (08/16 0710)   Lactate 1.4    Diagnostic Results:  CT A/P:   Acute on chronic diverticular disease with persistent inflammatory changes at the sigmoid colon with improved though small residual mural abscess tracking laterally toward the peritoneal lining.  Overall inflammatory fat stranding is improved compared to the previous exam.     ASSESSMENT:     Niya Moeller is a 53 y.o. female here with sigmoid diverticular mural abscess. Agree, this appears improved compared to previous scans. Patient is tender without signs of peritonitis. Initial tachycardia resolved. Now on supplemental O2.     PLAN:     - no urgent/emergent surgical intervention at this time  - agree with admission for IV antibiotics, recommend cefepime /lagyl given allergy to preferred antibiotic   - will follow for repeat abdominal exams  - discussed at length with patient that if she were to need surgery during this admission, would likely require ostomy due to active inflammation. She adamantly declined any type of ostomy  - eventually would like colonoscopy 6 weeks after resolution of this flare  - we will also plan to see her in clinic after colonoscopy to plan for resection   - will continue to follow, please notify us of any  acute changes in exam or concerns     Fabien Arreola MD  [facility] General Surgery PGY-1  2:54 PM

## 2023-08-16 NOTE — H&P
[facility] - Emergency Dept  H&P Note    Patient Name: Niya Moeller  MRN: [id number]  Admission Date: 8/16/2023  Hospital Length of Stay: 0 days  Code Status: Prior  Attending Provider: No att. providers found  Primary Care Provider: Oswaldo Riggs NP     Subjective:     HPI:   This is a 53-year-old  female with past medical history of asthma, recurrent diverticulitis, GERD, and hyperthyroidism.  She presents to the emergency department today with a myriad of complaints.  Her biggest complaint today is left lower quadrant abdominal pain.  States that she is had this problem for approximately 3 more weeks.  Twice last month she was seen in emergency room on the 14th and again on the 22nd with similar complaints.  The visit on the 14th she was sent home with moxifloxacin and on the 22nd she was sent home with Augmentin.  Since then pain has not resolved and she continues fairly consistent and persistent abdominal pain that is worsened with eating and not relieved with much else.  She does have bowel movements but these have to be forced with stool softeners.  Otherwise when having bowel movements she denies seeing blood or melena.  Other complaints include shortness of breath and eye swelling both of which started approximately 2-3 days ago.  Although per chart review it seems that she has had this bilateral eyelid swelling for several weeks now.  Nonetheless shortness of breath is now acute.  It is associated with rhinorrhea and sinus congestion.  States that she is normal able to walk from her bed to her bathroom but since onset of of symptoms she has been barely able to do so. She also endorses cough productive of thick yellow sputum. She tells me that she is had asthma since she was a child she is on Symbicort and does have albuterol.  States normally that she uses albuterol 3 times a week and does not have any nighttime awakenings.  However since onset of symptoms she is been using her  albuterol inhaler 5 times daily and has some nighttime awakenings.    On arrival she was tachycardic with a heart rate of 112, tachypneic with respiratory rate 26, and hypertensive blood pressure 141/71 satting 94% on 4 L nasal cannula.  On evaluation vitals improved no longer tachycardic or tachypneic.  No white count.  CMP relatively unremarkable aside from creatinine that is slightly elevated from baseline.    Hospital Course/Significant events:      24 Hour Interval History:      Past Medical History:   Diagnosis Date    Asthma     COPD (chronic obstructive pulmonary disease)     Diverticulosis     GERD (gastroesophageal reflux disease)        No past surgical history on file.    Social History     Socioeconomic History    Marital status: Single   Tobacco Use    Smoking status: Every Day     Current packs/day: 0.50     Average packs/day: 0.5 packs/day for 40.6 years (20.3 ttl pk-yrs)     Types: Cigarettes     Start date: 1983    Smokeless tobacco: Current    Tobacco comments:     Ambulatory referral to Smoking Cessation clinic following hospital discharge.    Substance and Sexual Activity    Alcohol use: Not Currently    Drug use: Not Currently    Sexual activity: Not Currently     Social Determinants of Health     Financial Resource Strain: Low Risk  (5/1/2023)    Overall Financial Resource Strain (CARDIA)     Difficulty of Paying Living Expenses: Not very hard   Food Insecurity: No Food Insecurity (5/1/2023)    Hunger Vital Sign     Worried About Running Out of Food in the Last Year: Never true     Ran Out of Food in the Last Year: Never true   Transportation Needs: No Transportation Needs (5/1/2023)    PRAPARE - Transportation     Lack of Transportation (Medical): No     Lack of Transportation (Non-Medical): No   Physical Activity: Inactive (5/1/2023)    Exercise Vital Sign     Days of Exercise per Week: 0 days     Minutes of Exercise per Session: 0 min   Stress: No Stress Concern Present (5/1/2023)     [facility] of Occupational Health - Occupational Stress Questionnaire     Feeling of Stress : Not at all   Social Connections: Socially Isolated (5/1/2023)    Social Connection and Isolation Panel [NHANES]     Frequency of Communication with Friends and Family: More than three times a week     Frequency of Social Gatherings with Friends and Family: Twice a week     Attends Shinto Services: Never     Active Member of Clubs or Organizations: No     Attends Club or Organization Meetings: Never     Marital Status: Never    Housing Stability: Low Risk  (5/1/2023)    Housing Stability Vital Sign     Unable to Pay for Housing in the Last Year: No     Number of Places Lived in the Last Year: 2     Unstable Housing in the Last Year: No           Current Outpatient Medications   Medication Instructions    albuterol-ipratropium (DUO-NEB) 2.5 mg-0.5 mg/3 mL nebulizer solution 3 mLs, Nebulization, Every 4 hours PRN, Rescue    amoxicillin-clavulanate 875-125mg (AUGMENTIN) 875-125 mg per tablet 1 tablet, Oral, 2 times daily    budesonide-formoterol 160-4.5 mcg (SYMBICORT) 160-4.5 mcg/actuation HFAA 2 puffs, Inhalation, Every 12 hours, Controller    fluticasone propionate (FLONASE) 50 mcg, Each Nostril, Daily PRN    methIMAzole (TAPAZOLE) 10 mg, Oral, 2 times daily    montelukast (SINGULAIR) 10 mg, Oral, Daily    omeprazole (PRILOSEC) 40 mg, Oral, Daily    propranoloL (INDERAL LA) 60 mg, Oral, Daily    white petrolatum-mineral oiL (ARTIFICIAL TEARS, VINCENZO/MIN,) 83-15 % Oint Both Eyes, Nightly       Current Inpatient Medications      Current Intravenous Infusions        Review of Systems   Constitutional:  Negative for chills and fever.   HENT:  Positive for congestion.    Respiratory:  Positive for cough and shortness of breath.    Cardiovascular:  Negative for chest pain and leg swelling.   Gastrointestinal:  Positive for abdominal pain and constipation. Negative for blood in stool, diarrhea, melena, nausea and  vomiting.   Genitourinary:  Negative for dysuria and urgency.   Neurological:  Negative for dizziness.          Objective:     No intake or output data in the 24 hours ending 08/16/23 1307      Vital Signs (Most Recent):  Temp: 98.1 °F (36.7 °C) (08/16/23 0632)  Pulse: 72 (08/16/23 0756)  Resp: 18 (08/16/23 0919)  BP: (!) 141/71 (08/16/23 0632)  SpO2: 96 % (08/16/23 0756)  Body mass index is 29.29 kg/m².  Weight: 65.8 kg (145 lb) Vital Signs (24h Range):  Temp:  [98.1 °F (36.7 °C)] 98.1 °F (36.7 °C)  Pulse:  [] 72  Resp:  [18-26] 18  SpO2:  [91 %-96 %] 96 %  BP: (141)/(71) 141/71     Physical Exam  Vitals and nursing note reviewed.   Constitutional:       General: She is not in acute distress.     Appearance: Normal appearance. She is normal weight. She is not ill-appearing or toxic-appearing.   HENT:      Head: Normocephalic and atraumatic.   Eyes:      Extraocular Movements: Extraocular movements intact.      Conjunctiva/sclera: Conjunctivae normal.      Pupils: Pupils are equal, round, and reactive to light.      Comments: Bilateral eye lid swelling   Cardiovascular:      Rate and Rhythm: Normal rate and regular rhythm.      Pulses: Normal pulses.      Heart sounds: Normal heart sounds. No murmur heard.  Pulmonary:      Effort: Pulmonary effort is normal.      Breath sounds: No stridor. Wheezing present. No rhonchi.   Abdominal:      General: Bowel sounds are normal. There is no distension.      Palpations: Abdomen is soft. There is no mass.      Tenderness: There is abdominal tenderness (LLQ).   Musculoskeletal:         General: No swelling.      Right lower leg: No edema.      Left lower leg: No edema.   Skin:     General: Skin is warm and dry.   Neurological:      General: No focal deficit present.      Mental Status: She is alert and oriented to person, place, and time.           Lines/Drains/Airways       Drain  Duration                  Closed/Suction Drain 02/16/23 1310 Left;Anterior LLQ Bulb 10 Fr.  "180 days              Peripheral Intravenous Line  Duration                  Peripheral IV - Single Lumen 08/16/23 0919 20 G Anterior;Distal;Right Upper Arm <1 day                    Significant Labs:    Lab Results   Component Value Date    WBC 7.59 08/16/2023    HGB 13.3 08/16/2023    HCT 41.2 08/16/2023    .7 (H) 08/16/2023     08/16/2023           BMP  Lab Results   Component Value Date     08/16/2023    K 3.5 08/16/2023    CHLORIDE 105 08/16/2023    CO2 29 08/16/2023    BUN 7.8 (L) 08/16/2023    CREATININE 1.03 (H) 08/16/2023    CALCIUM 8.9 08/16/2023    AGAP 7.0 03/15/2023    EGFRNONAA 81 04/17/2022         ABG  No results for input(s): "PH", "PO2", "PCO2", "HCO3", "POCBASEDEF" in the last 168 hours.    Mechanical Ventilation Support:         Significant Imaging:  I have reviewed the pertinent imaging within the past 24 hours.        Assessment/Plan:     Recurrent diverticulitis  -she has had several bouts of acute on chronic diverticulitis with several ER visits this past month.  Seems like she had an abscess back in February 16th of this year which was drained and showed ESBL treated with meropenem for 7 days.  She presents again with diverticular pain and CT abdomen pelvis done in the emergency department shows a trace residual encapsulated collection at the previously seen mural abscess estimated to measure 2.5 x 0.9 cm with a collection tracking laterally to but at the peritoneum in the left lower quadrant.  Apparently this has decreased in the interval but looking at previous CTs there is no mention of size  -will put on merrem as she is allergic to zosyn and has issues with flagyl. Moreso, her cultures from the abscess drained previously was positive for ESBL only sensitive to a carbapenem  -Stated to me during interview, eating made pain worse but asking for diet. Full liquid diet  -Consult surgery and infectious disease    Mild-Moderate persistent Asthma  Acute Hypoxic respiratory " Failure  CAP vs Bronchitis  -CT of chest does not show a PE but does show right upper anterior lung consolidation and left lower lobe consolidation vs. Atelectasis  -Start Steroids, Duo neb,  Budesonide, and azithromycin  -Get resp culture and PCR  -Cont Montelukast    Allergic conjunctivitis  -most of the things above such as montelukast and steroids (prednisone) should help with this   -nothing new reported being used by patient. No pets. Lives alone    Hyperthryoidism  -Last TSH 0.11. Recheck  -Continue Methimazole and Propanolol    DVT Prophylaxis:lovenox  GI Prophylaxis:protonix     Deonte Diaz DO  Internal Medicine  [facility] - Emergency Dept

## 2023-08-17 PROBLEM — D53.9 MACROCYTIC ANEMIA: Status: ACTIVE | Noted: 2023-08-17

## 2023-08-17 LAB
ALBUMIN SERPL-MCNC: 3.5 G/DL (ref 3.5–5)
ALBUMIN/GLOB SERPL: 1 RATIO (ref 1.1–2)
ALP SERPL-CCNC: 63 UNIT/L (ref 40–150)
ALT SERPL-CCNC: 8 UNIT/L (ref 0–55)
AST SERPL-CCNC: 13 UNIT/L (ref 5–34)
BASOPHILS # BLD AUTO: 0.01 X10(3)/MCL
BASOPHILS NFR BLD AUTO: 0.1 %
BILIRUB SERPL-MCNC: 0.4 MG/DL
BUN SERPL-MCNC: 8.9 MG/DL (ref 9.8–20.1)
CALCIUM SERPL-MCNC: 9.2 MG/DL (ref 8.4–10.2)
CHLORIDE SERPL-SCNC: 104 MMOL/L (ref 98–107)
CO2 SERPL-SCNC: 27 MMOL/L (ref 22–29)
CREAT SERPL-MCNC: 0.9 MG/DL (ref 0.55–1.02)
EOSINOPHIL # BLD AUTO: 0 X10(3)/MCL (ref 0–0.9)
EOSINOPHIL NFR BLD AUTO: 0 %
ERYTHROCYTE [DISTWIDTH] IN BLOOD BY AUTOMATED COUNT: 14.2 % (ref 11.5–17)
FOLATE SERPL-MCNC: 5.2 NG/ML (ref 7–31.4)
GFR SERPLBLD CREATININE-BSD FMLA CKD-EPI: >60 MLS/MIN/1.73/M2
GLOBULIN SER-MCNC: 3.5 GM/DL (ref 2.4–3.5)
GLUCOSE SERPL-MCNC: 128 MG/DL (ref 74–100)
HCT VFR BLD AUTO: 40.8 % (ref 37–47)
HGB BLD-MCNC: 13.4 G/DL (ref 12–16)
IMM GRANULOCYTES # BLD AUTO: 0.11 X10(3)/MCL (ref 0–0.04)
IMM GRANULOCYTES NFR BLD AUTO: 0.8 %
LYMPHOCYTES # BLD AUTO: 2.47 X10(3)/MCL (ref 0.6–4.6)
LYMPHOCYTES NFR BLD AUTO: 18.5 %
MAGNESIUM SERPL-MCNC: 2.3 MG/DL (ref 1.6–2.6)
MCH RBC QN AUTO: 33.1 PG (ref 27–31)
MCHC RBC AUTO-ENTMCNC: 32.8 G/DL (ref 33–36)
MCV RBC AUTO: 100.7 FL (ref 80–94)
MONOCYTES # BLD AUTO: 0.74 X10(3)/MCL (ref 0.1–1.3)
MONOCYTES NFR BLD AUTO: 5.5 %
NEUTROPHILS # BLD AUTO: 10.02 X10(3)/MCL (ref 2.1–9.2)
NEUTROPHILS NFR BLD AUTO: 75.1 %
NRBC BLD AUTO-RTO: 0 %
PHOSPHATE SERPL-MCNC: 2.6 MG/DL (ref 2.3–4.7)
PLATELET # BLD AUTO: 206 X10(3)/MCL (ref 130–400)
PMV BLD AUTO: 11.7 FL (ref 7.4–10.4)
POTASSIUM SERPL-SCNC: 3.9 MMOL/L (ref 3.5–5.1)
PROT SERPL-MCNC: 7 GM/DL (ref 6.4–8.3)
RBC # BLD AUTO: 4.05 X10(6)/MCL (ref 4.2–5.4)
SODIUM SERPL-SCNC: 141 MMOL/L (ref 136–145)
VIT B12 SERPL-MCNC: 426 PG/ML (ref 213–816)
WBC # SPEC AUTO: 13.35 X10(3)/MCL (ref 4.5–11.5)

## 2023-08-17 PROCEDURE — 82746 ASSAY OF FOLIC ACID SERUM: CPT | Performed by: INTERNAL MEDICINE

## 2023-08-17 PROCEDURE — 85025 COMPLETE CBC W/AUTO DIFF WBC: CPT | Performed by: INTERNAL MEDICINE

## 2023-08-17 PROCEDURE — 84100 ASSAY OF PHOSPHORUS: CPT | Performed by: INTERNAL MEDICINE

## 2023-08-17 PROCEDURE — 25000003 PHARM REV CODE 250: Performed by: STUDENT IN AN ORGANIZED HEALTH CARE EDUCATION/TRAINING PROGRAM

## 2023-08-17 PROCEDURE — 80053 COMPREHEN METABOLIC PANEL: CPT | Performed by: INTERNAL MEDICINE

## 2023-08-17 PROCEDURE — 25000003 PHARM REV CODE 250

## 2023-08-17 PROCEDURE — 25000242 PHARM REV CODE 250 ALT 637 W/ HCPCS: Performed by: INTERNAL MEDICINE

## 2023-08-17 PROCEDURE — 25000003 PHARM REV CODE 250: Performed by: INTERNAL MEDICINE

## 2023-08-17 PROCEDURE — 82607 VITAMIN B-12: CPT | Performed by: INTERNAL MEDICINE

## 2023-08-17 PROCEDURE — 94640 AIRWAY INHALATION TREATMENT: CPT | Mod: XB

## 2023-08-17 PROCEDURE — 21400001 HC TELEMETRY ROOM

## 2023-08-17 PROCEDURE — 83735 ASSAY OF MAGNESIUM: CPT | Performed by: INTERNAL MEDICINE

## 2023-08-17 PROCEDURE — 63600175 PHARM REV CODE 636 W HCPCS: Performed by: INTERNAL MEDICINE

## 2023-08-17 PROCEDURE — G0378 HOSPITAL OBSERVATION PER HR: HCPCS

## 2023-08-17 PROCEDURE — 63600175 PHARM REV CODE 636 W HCPCS

## 2023-08-17 PROCEDURE — 94761 N-INVAS EAR/PLS OXIMETRY MLT: CPT

## 2023-08-17 PROCEDURE — 87070 CULTURE OTHR SPECIMN AEROBIC: CPT | Performed by: INTERNAL MEDICINE

## 2023-08-17 RX ORDER — METHIMAZOLE 5 MG/1
5 TABLET ORAL 2 TIMES DAILY
Status: DISCONTINUED | OUTPATIENT
Start: 2023-08-17 | End: 2023-08-17

## 2023-08-17 RX ORDER — LANOLIN ALCOHOL/MO/W.PET/CERES
1000 CREAM (GRAM) TOPICAL DAILY
Status: DISCONTINUED | OUTPATIENT
Start: 2023-08-17 | End: 2023-08-19 | Stop reason: HOSPADM

## 2023-08-17 RX ORDER — ONDANSETRON 4 MG/1
4 TABLET, ORALLY DISINTEGRATING ORAL EVERY 8 HOURS PRN
Status: DISCONTINUED | OUTPATIENT
Start: 2023-08-17 | End: 2023-08-19 | Stop reason: HOSPADM

## 2023-08-17 RX ORDER — FOLIC ACID 5 MG/ML
1 INJECTION, SOLUTION INTRAMUSCULAR; INTRAVENOUS; SUBCUTANEOUS DAILY
Status: DISCONTINUED | OUTPATIENT
Start: 2023-08-17 | End: 2023-08-17

## 2023-08-17 RX ADMIN — MEROPENEM 2 G: 1 INJECTION, POWDER, FOR SOLUTION INTRAVENOUS at 09:08

## 2023-08-17 RX ADMIN — METHIMAZOLE 5 MG: 10 TABLET ORAL at 09:08

## 2023-08-17 RX ADMIN — IPRATROPIUM BROMIDE AND ALBUTEROL SULFATE 3 ML: .5; 3 SOLUTION RESPIRATORY (INHALATION) at 06:08

## 2023-08-17 RX ADMIN — IPRATROPIUM BROMIDE AND ALBUTEROL SULFATE 3 ML: .5; 3 SOLUTION RESPIRATORY (INHALATION) at 01:08

## 2023-08-17 RX ADMIN — PREDNISONE 50 MG: 50 TABLET ORAL at 09:08

## 2023-08-17 RX ADMIN — FOLIC ACID 1 MG: 5 INJECTION, SOLUTION INTRAMUSCULAR; INTRAVENOUS; SUBCUTANEOUS at 03:08

## 2023-08-17 RX ADMIN — PANTOPRAZOLE SODIUM 40 MG: 40 TABLET, DELAYED RELEASE ORAL at 09:08

## 2023-08-17 RX ADMIN — MEROPENEM 2 G: 1 INJECTION, POWDER, FOR SOLUTION INTRAVENOUS at 02:08

## 2023-08-17 RX ADMIN — CYANOCOBALAMIN TAB 1000 MCG 1000 MCG: 1000 TAB at 09:08

## 2023-08-17 RX ADMIN — AZITHROMYCIN MONOHYDRATE 500 MG: 500 INJECTION, POWDER, LYOPHILIZED, FOR SOLUTION INTRAVENOUS at 01:08

## 2023-08-17 RX ADMIN — BUDESONIDE INHALATION 0.5 MG: 0.5 SUSPENSION RESPIRATORY (INHALATION) at 06:08

## 2023-08-17 RX ADMIN — MONTELUKAST SODIUM 10 MG: 5 TABLET, CHEWABLE ORAL at 09:08

## 2023-08-17 RX ADMIN — MEROPENEM 2 G: 1 INJECTION, POWDER, FOR SOLUTION INTRAVENOUS at 06:08

## 2023-08-17 RX ADMIN — ONDANSETRON 4 MG: 4 TABLET, ORALLY DISINTEGRATING ORAL at 02:08

## 2023-08-17 RX ADMIN — PROPRANOLOL HYDROCHLORIDE 60 MG: 20 TABLET ORAL at 09:08

## 2023-08-17 NOTE — MEDICAL/APP STUDENT
"Acute Care Surgery   Progress Note  Admit Date: 8/16/2023  HD#0  POD#* No surgery found *    Subjective:   Brief Summary: Niya Moeller is a 53 y.o female with PMH of COPD, asthma, GERD, and diverticulosis with sigmoid mural abscesses here for chronic upper respiratory issues and LLQ abdominal pain with an acute exacerbation of both issues on 08.16.2023.     Interval history:  Patient endorses improvements in sleep, SOB, and eye swelling. Abdominal pain also improved, but still rated as a 7-9/10. Denies Bowel movements since beginning of hospitalization. Does report sweating, transient chest pain, and headache last night when asked specifically. Tolerating feeds without issue. Patient requests medications for pain management and voiced frustrations with hospital staff when only offered tylenol. She uses a "black dog" analogy to express her frustration, stating how a mute animal would receive better treatment than her since staff would be more willing to perform exhaustive medical tests on the creature. Denies pain when using rest room.     Home Meds:  Current Outpatient Medications   Medication Instructions    albuterol-ipratropium (DUO-NEB) 2.5 mg-0.5 mg/3 mL nebulizer solution 3 mLs, Nebulization, Every 4 hours PRN, Rescue    amoxicillin-clavulanate 875-125mg (AUGMENTIN) 875-125 mg per tablet 1 tablet, Oral, 2 times daily    budesonide-formoterol 160-4.5 mcg (SYMBICORT) 160-4.5 mcg/actuation HFAA 2 puffs, Inhalation, Every 12 hours, Controller    fluticasone propionate (FLONASE) 50 mcg, Each Nostril, Daily PRN    methIMAzole (TAPAZOLE) 10 mg, Oral, 2 times daily    montelukast (SINGULAIR) 10 mg, Oral, Daily    omeprazole (PRILOSEC) 40 mg, Oral, Daily    propranoloL (INDERAL LA) 60 mg, Oral, Daily    white petrolatum-mineral oiL (ARTIFICIAL TEARS, VINCENZO/MIN,) 83-15 % Oint Both Eyes, Nightly      Scheduled Meds:   albuterol-ipratropium  3 mL Nebulization Q6H WAKE    azithromycin  500 mg Intravenous Q24H    " "budesonide  0.5 mg Nebulization Q12H    enoxparin  40 mg Subcutaneous Daily    meropenem (MERREM) IVPB  2 g Intravenous Q8H    methIMAzole  10 mg Oral BID    montelukast  10 mg Oral Daily    pantoprazole  40 mg Oral Daily    predniSONE  50 mg Oral Daily    propranoloL  60 mg Oral Daily     Continuous Infusions:  PRN Meds:sodium chloride 0.9%, acetaminophen, fluticasone propionate, ketorolac, melatonin, sodium chloride 0.9%, sodium chloride 3%     Objective:     VITAL SIGNS: 24 HR MIN & MAX LAST   Temp  Min: 97.6 °F (36.4 °C)  Max: 98.1 °F (36.7 °C)  97.8 °F (36.6 °C)   BP  Min: 113/62  Max: 147/73  (!) 147/73    Pulse  Min: 47  Max: 112  (!) 57    Resp  Min: 18  Max: 26  20    SpO2  Min: 91 %  Max: 98 %  96 %      HT: 4' 11" (149.9 cm)  WT: 73.4 kg (161 lb 13.1 oz)  BMI: 32.7     Intake/output:  Intake/Output - Last 3 Shifts         08/15 0700  08/16 0659 08/16 0700  08/17 0659    P.O.  118    IV Piggyback  252.1    Total Intake(mL/kg)  370.1 (5)    Net  +370.1                  Intake/Output Summary (Last 24 hours) at 8/17/2023 0609  Last data filed at 8/17/2023 0028  Gross per 24 hour   Intake 370.06 ml   Output --   Net 370.06 ml         Lines/drains/airway:       Peripheral IV - Single Lumen 08/16/23 0919 20 G Anterior;Distal;Right Upper Arm (Active)   Site Assessment Clean;Dry;Intact 08/17/23 0400   Line Status Flushed;Infusing 08/17/23 0000   Dressing Status Clean;Dry;Intact 08/17/23 0000   Dressing Intervention Integrity maintained 08/17/23 0000   Number of days: 0            Closed/Suction Drain 02/16/23 1310 Left;Anterior LLQ Bulb 10 Fr. (Active)   Number of days: 181       Physical examination:  Gen: NAD,answering questions appropriately  HEENT:MMM  Resp: Wheezing on speech  Abd: Tenderness to palpation and firmness noted in LLQ. Otherwise S/NT/ND  Ext: moving all extremities spontaneously and purposefully  Neuro: CN II-XII grossly intact  Skin/wounds: No noted abnormalities.     Labs:  WBC/Hgb/Hct/Plts:  " 13.35/13.4/40.8/206 (08/17 0326)  Na/K/Cl/CO2:  141/3.9/104/27 (08/17 0326)  BUN/Cr/glu/ALT/AST/amyl/lip:  8.9/0.90/--/8/13/--/-- (08/17 0326)     Imaging:  CT Abdomen (08.16.2023):    Impression:   Acute on chronic diverticular disease with persistent inflammatory changes at the sigmoid colon with improved though small residual mural abscess tracking laterally toward the peritoneal lining.  Overall inflammatory fat stranding is improved compared to the previous exam.    Assessment & Plan:   Niya Moeller is a 53 y.o. female with sigmoid diverticular mural abscess, moderately improved on visualization when compared to previous scans performed prior to this hospitalization and abdominal pain of fluctuating severity.     Plan:   - No urgent/emergent surgical intervention at this time  - Continue IV antibiotics (cefepime/flagyl recommended)  - Aggressive bowel regimen  - Monitor with serial abdominal exams  - If condition acutely deteriorates, consider ostomy due to active inflammation  - At resolution of flair will likely require follow up in clinic in 6 weeks if no other abnormalities. Will also discuss colonoscopy at this time.   - Please notify us of any acute changes     Kavon Franklin, MS3  [facility] Surgery  6:09 AM

## 2023-08-17 NOTE — CONSULTS
[facility]ocrinology Consult Note     Patient Name: Niya Moeller  MRN: [id number]  Admission Date: 8/16/2023  Current Hospital Day: 2   Code Status: Full Code  Attending Provider: Dez Jones MD    Reason for Consult:      Hypothyroidism    Subjective:     History of Present Illness:  Niya Moeller is a 53 y.o. female with PMH of asthma, recurrent diverticulitis, GERD, and hyperthyroidism.  Patient was initially admitted for Diverticulitis.  On presentation patient complained of left lower quadrant abdominal pain, this was her main complaint.  She stated been going on for approximately 3 weeks, of note patient was seen twice last month in the emergency room for similar complaints.  Patient also complains of difficulty with bowel movements despite use of stool softeners, with overall constipation.  CT abdomen pelvis with contrast noted for diverticulitis.    Per chart review it seems patient was bradycardic on arrival, other notable lab findings include a TSH of approximately 23 with a free T4 < 0.42.      Of note patient has a history of hyperthyroidism, further chart review show in November of 2022 patient's free T4 within normal limits however in January of 2023 patient had undetectable TSH with elevated free T4 as well as free T3 at which point above treatment was started, was diagnosed with hyperthyroidism however very likely this could have been thyrotoxic phase of thyroiditis and overt nature.  Since original diagnosis patient has been on 10 mg methimazole b.i.d. without further lab draws.  Was able to locate patient's ultrasound of the thyroid, impression states likely thyroiditis.  At initial presentation in January cannot rule out transient hashitoxicosis, as thyroid function was never followed after initial presentation.  May eventually need methimazole as well as propranolol discontinued, will need further follow-up in Endocrinology Clinic prior to confirmation of this. Patient states she has been  waiting to see Art Morris and has upcoming appointment August 31st.     Patient endorses recent hair loss, cold intolerance, fatigue, constipation as well as issues with concentration.    Endocrinology is being consulted for hypothyroidism.    Overnight Events:  Please see above    Review of Systems:  Pertinent positives and negatives listed in HPI. All other systems are reviewed and are negative.    Past Medical History: See above    Past Surgical History:  She  has no past surgical history on file.    Allergies:  Review of patient's allergies indicates:   Allergen Reactions    Zosyn [piperacillin-tazobactam] Swelling    Decadron-la Itching    Latex        Family History:  Patient states she has a strong family history of thyroid issues in all her sisters.    Social History:  She  reports that she has been smoking cigarettes. She started smoking about 40 years ago. She has a 20.3 pack-year smoking history. She uses smokeless tobacco. She reports that she does not currently use alcohol. She reports that she does not currently use drugs.    Home Medications:  She has a current medication list which includes the following prescription(s): albuterol-ipratropium, amoxicillin-clavulanate 875-125mg, budesonide-formoterol 160-4.5 mcg, fluticasone propionate, methimazole, montelukast, omeprazole, propranolol, and artificial tears (yamileth/min), and the following Facility-Administered Medications: sodium chloride 0.9%, acetaminophen, albuterol-ipratropium, azithromycin (ZITHROMAX) 500 mg in dextrose 5 % (D5W) 250 mL IVPB (Vial-Mate), budesonide, cyanocobalamin, enoxaparin, fluticasone propionate, ketorolac, melatonin, meropenem (MERREM) 2 g in sodium chloride 0.9% 100 mL IVPB, methimazole, montelukast, pantoprazole, prednisone, propranolol, sodium chloride 0.9%, and sodium chloride 3%.        Objective:   Vitals  Vitals  BP: 138/84  Temp: 97.9 °F (36.6 °C)  Temp Source: Oral  Pulse: (!) 51  Resp: 16  SpO2: 98 %  Height: 4'  "11" (149.9 cm)  Weight: 73.4 kg (161 lb 13.1 oz)    Physical Examination:  General: Awake, alert, & oriented to person, place & time. No acute distress  Psychiatric: Mood and affect normal  HEENT: Normocephalic, atraumatic. Face symmetric.  Cardiovascular:  Bradycardic with regular rhythm.  Normal S1 & S2 w/out murmurs, rubs or gallops  Pulmonary: Bilateral symmetric chest rise, Non-labored, diffuse wheezes are appreciated  Abdominal:  nondistended  Extremities: No clubbing, cyanosis or edema.  Skin:  Exposed skin is warm & dry.  Neuro:   Patient moves all extremities equally. Sensation intact bilateraly.  Bilateral upper and lower extremity diminished reflexes are appreciated.    Laboratory:  All pertinent labs within the past 24 hours have been reviewed.    Radiology:  I have reviewed all pertinent imaging results/findings within the past 24 hours.    Assessment & Plan:     Patient is a 53-year-old female PMH of hyperthyroidism diagnosis in January of 2023 however further testing to verify that that episode was not overt hyperthyroidism was not performed, it was noted that thyroid ultrasound that time showed likely thyroiditis.  Currently on methimazole who presented with left lower quadrant abdominal pain and constipation was noted to have diverticulitis on imaging.  Patient was noted to have elevated TSH and decreased T4 2/2 methimazole.    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      Kishan Cervantes MD  Internal Medicine - PGY-3      "

## 2023-08-17 NOTE — MEDICAL/APP STUDENT
[facility] Medicine Wards Progress Note     Resident Team: [facility] Medicine List 3  Attending Physician: Dez Jones MD  Medical Student: Tricia Birmingham      Subjective:      Brief HPI:  Niya Moeller is a 52 yo female with PMH of asthma, recurrent diverticulitis, GERD and hyperthyroidism. She presented to [facility] ED on 8/16 with a chief complaint of LLQ abdominal pain for the past three weeks. She had previously been to the ED on 7/14 for similar complaints and was prescribed Moxifloxacin, however reported that her insurance would not cover this medication. She returned to the ED on 7/22 at which time she was prescribed Augmentin. She reported no improvement in her abdominal pain after the course of Augmentin. She also reported shortness of breath, productive cough, and bilateral eye swelling and drainage for the past two days. She stated that she was diagnosed with asthma as a child and was currently using Symbicort and albuterol. She stated that she typically used her albuterol three times daily and had no nighttime awakenings, but recently had been using her albuterol up to five times daily with nighttime awakenings. She also reported chills, occasional nausea, constipation, sinus pain, and rhinorrhea. She denies fever, hemoptysis, hematochezia, and melena.    Hospital Course:  She presented to the ED with tachycardia with a HR of 112,  tachypneic with RR of 26, and was initially satting 94% on 4 L nasal cannula. Internal medicine was consulted for asthma exacerbation and LLQ abdominal pain. Her vital signs stabilized while in the ED. Her CBC showed a normal WBC and no electrolyte abnormalities. CXR showed no acute pulmonary process. D dimer was elevated at 0.96 at which time CTA was ordered. The CTA showed no pulmonary embolism but did demonstrate endobronchial material bilaterally, greater on the left, with mild left lung base atelectasis and scattered small areas of ground-glass in the medial right lung. She was initiated  "on albuterol-ipratropium, budesonide inhaler, montelukast, prednisone 50mg, and azithromycin. She also underwent CT Abdomen Pelvis which showed acute on chronic diverticular disease with persistent inflammatory changes at the sigmoid colon with improved though small residual mural abscess tracking laterally toward the peritoneal lining. Overall inflammatory fat stranding was improved compared to the previous exam.    Interval History:   NAEON. Patient declined further interview and physical exam today. Reported discontent with pain management and stated that she "did not want to speak with any doctors." Requesting a vet and compared herself to a "black labrador retriever."    Review of Systems:  ROS completed and negative except as indicated above.     Objective:     Last 24 Hour Vital Signs:  BP  Min: 113/62  Max: 147/73  Temp  Av.7 °F (36.5 °C)  Min: 97.6 °F (36.4 °C)  Max: 97.9 °F (36.6 °C)  Pulse  Av.8  Min: 46  Max: 88  Resp  Av.9  Min: 16  Max: 20  SpO2  Av.8 %  Min: 95 %  Max: 98 %  Height  Av' 11" (149.9 cm)  Min: 4' 11" (149.9 cm)  Max: 4' 11" (149.9 cm)  Weight  Av.4 kg (161 lb 13.1 oz)  Min: 73.4 kg (161 lb 13.1 oz)  Max: 73.4 kg (161 lb 13.1 oz)  I/O last 3 completed shifts:  In: 387.5 [P.O.:118; I.V.:4.3; IV Piggyback:265.2]  Out: 300 [Urine:300]    Physical Examination:   Physical Exam  Vitals and nursing note reviewed.   Constitutional:       Appearance: She is obese. She is not ill-appearing or toxic-appearing.      Comments: Physical exam limited by patient refusal.   HENT:      Head: Normocephalic and atraumatic.   Eyes:      Conjunctiva/sclera: Conjunctivae normal.      Comments: Mild bilateral eyelid swelling. No crusting or drainage present.   Pulmonary:      Effort: No accessory muscle usage or respiratory distress.   Musculoskeletal:         General: No swelling.      Right lower leg: No edema.      Left lower leg: No edema.   Neurological:      General: No focal " deficit present.      Mental Status: She is alert and oriented to person, place, and time.   Psychiatric:         Behavior: Behavior is uncooperative.        Laboratory:  Most Recent Data:  CBC:   Recent Labs   Lab 08/16/23  0710 08/17/23  0326   WBC 7.59 13.35*   HGB 13.3 13.4   HCT 41.2 40.8    206   .7* 100.7*     BMP:   Lab Results   Component Value Date     08/17/2023    K 3.9 08/17/2023     02/19/2023    CO2 27 08/17/2023    BUN 8.9 (L) 08/17/2023    CREATININE 0.90 08/17/2023    GLU 97 02/19/2023    CALCIUM 9.2 08/17/2023    MG 2.30 08/17/2023    PHOS 2.6 08/17/2023     LFTs:   Lab Results   Component Value Date    PROT 7.5 02/19/2023    ALBUMIN 3.5 08/17/2023    BILITOT 0.4 08/17/2023    AST 13 08/17/2023    ALKPHOS 63 08/17/2023    ALT 8 08/17/2023     Thyroid:   Lab Results   Component Value Date    TSH 22.988 (H) 08/16/2023    FREET4 3.09 (H) 01/22/2023     Anemia:   Lab Results   Component Value Date    NEHDLNQJ38 426 08/17/2023    FOLATE 5.2 (L) 08/17/2023     Cardiac:   Lab Results   Component Value Date    TROPONINI <0.010 08/16/2023    BNP 51.9 08/16/2023       Microbiology Data:  Blood cultures and respiratory cultures in process.    Other Results:  EKG (my interpretation): normal sinus rhythm.    Radiology:  Imaging Results              CT Abdomen Pelvis With Contrast (Final result)  Result time 08/16/23 11:38:21      Final result by Genesis Iraheta MD (08/16/23 11:38:21)                   Impression:      Acute on chronic diverticular disease with persistent inflammatory changes at the sigmoid colon with improved though small residual mural abscess tracking laterally toward the peritoneal lining.  Overall inflammatory fat stranding is improved compared to the previous exam.      Electronically signed by: Genesis Iraheta  Date:    08/16/2023  Time:    11:38               Narrative:    EXAMINATION:  CT ABDOMEN PELVIS WITH CONTRAST    CLINICAL HISTORY:  LLQ abdominal  pain;    TECHNIQUE:  Helically acquired images with axial, sagittal and coronal reformations were obtained from the lung bases to the pubic symphysis after the IV administration of contrast.    Automated tube current modulation, weight-based exposure dosing, and/or iterative reconstruction technique utilized to reach lowest reasonably achievable exposure rate.    DLP: 1558 mGy*cm    COMPARISON:  CT abdomen pelvis 07/22/2023    FINDINGS:  LUNG BASES: Left basilar atelectasis.    LIVER: Normal attenuation. No appreciable focal hepatic lesion.    BILIARY: No calcified gallstones.    PANCREAS: No inflammatory change.    SPLEEN: Normal in size    ADRENALS: No mass.    KIDNEYS/URETERS: The kidneys enhance symmetrically.  No hydronephrosis.    GI TRACT/MESENTERY:  No evidence of bowel obstruction.  Diffuse diverticulosis.  There is wall thickening and edema at the distal descending/proximal sigmoid colon.  Trace residual encapsulated collection at the previously seen mural abscess estimated to measure 2.5 x 0.9 cm on today's exam compared to 3.4 x 1.5 cm previously.  Collection tracks laterally to abut the peritoneum in the left lower quadrant..    PERITONEUM: Trace free intraperitoneal fluid.No free air.    LYMPH NODES: No enlarged lymph nodes by size criteria.    VASCULATURE: No significant atherosclerosis or aneurysm.    BLADDER: Normal appearance given degree of distention.    REPRODUCTIVE ORGANS: Intramural fibroid at the anterior lower uterine segment.    SOFT TISSUES: There are small fat containing inguinal hernias.    BONES: Degenerative change at the lumbosacral junction.                                       CTA Chest Non-Coronary (PE Studies) (Final result)  Result time 08/16/23 11:46:04      Final result by John Bowens MD (08/16/23 11:46:04)                   Impression:      1. No pulmonary embolism identified.  2. Endobronchial material bilaterally, greater on the left, with mild left lung base  atelectasis and scattered small areas of ground-glass in the medial right lung.  Suspect this endobronchial material relates to infection or inflammation.      Electronically signed by: John Bowens  Date:    08/16/2023  Time:    11:46               Narrative:    EXAMINATION:  CTA CHEST NON CORONARY (PE STUDIES)    CLINICAL HISTORY:  Pulmonary embolism (PE) suspected, positive D-dimer;    TECHNIQUE:  CTA imaging of the chest after IV contrast. Axial, coronal and sagittal reconstructions, including MIP images, are reviewed. Dose length product 1558 mGycm. Automatic exposure control, adjustment of mA/kV or iterative reconstruction technique used to limit radiation dose.    COMPARISON:  CT 04/11/2023    FINDINGS:  Diagnostic quality: Adequate    Pulmonary embolism: None identified.    Lung parenchyma: Areas of endobronchial debris and bronchial wall thickening in the perihilar region on the left.  Mild left lung base atelectasis.  Smaller volume endobronchial material in the right lower lobe with few small areas of mild ground-glass medially in the right lung.    Pleural effusion: None.    Mediastinum/bruce: Normal heart size and thoracic aortic caliber.  No pathologically enlarged lymph node.    Chest wall/axilla: No significant findings.    Upper abdomen: Better assessed on concurrent abdominal CT.    Bones: No acute osseous process.                                       X-Ray Chest AP Portable (Final result)  Result time 08/16/23 10:14:47      Final result by John Bowens MD (08/16/23 10:14:47)                   Impression:      No acute pulmonary process identified.      Electronically signed by: John Bowens  Date:    08/16/2023  Time:    10:14               Narrative:    EXAMINATION:  XR CHEST AP PORTABLE    CLINICAL HISTORY:  Shortness of breath    TECHNIQUE:  Frontal view(s) of the chest.    COMPARISON:  Radiography 05/20/2023    FINDINGS:  Normal cardiac silhouette.  The lungs are well-inflated.  No  consolidation identified.  No significant pleural effusion or discernible pneumothorax.                                      Current Medications:     Scheduled:   albuterol-ipratropium  3 mL Nebulization Q6H WAKE    azithromycin  500 mg Intravenous Q24H    budesonide  0.5 mg Nebulization Q12H    cyanocobalamin  1,000 mcg Oral Daily    enoxparin  40 mg Subcutaneous Daily    meropenem (MERREM) IVPB  2 g Intravenous Q8H    methIMAzole  5 mg Oral BID    montelukast  10 mg Oral Daily    pantoprazole  40 mg Oral Daily    predniSONE  50 mg Oral Daily    propranoloL  60 mg Oral Daily        PRN:  sodium chloride 0.9%, acetaminophen, fluticasone propionate, ketorolac, melatonin, sodium chloride 0.9%, sodium chloride 3%    Antibiotics and Day Number of Therapy:  Antibiotics (From admission, onward)      Start     Stop Route Frequency Ordered    08/16/23 2215  meropenem (MERREM) 2 g in sodium chloride 0.9% 100 mL IVPB         -- IV Every 8 hours (non-standard times) 08/16/23 1451    08/16/23 1430  azithromycin (ZITHROMAX) 500 mg in dextrose 5 % (D5W) 250 mL IVPB (Vial-Mate)         -- IV Every 24 hours (non-standard times) 08/16/23 1330            Assessment & Plan:     Recurrent Diverticulitis       - Patient has presented to ED for acute on chronic diverticulitis this past month       - Upon chart review, she had an abscess drained in 2/16/23 which showed ESBL treated with meropenem x 7 days       - 8/16 CT Abdomen Pelvis showed a trace residual encapsulated collection at the previously seen mural abscess estimated   to measure 2.5 x 0.9 cm with a collection tracking laterally to but at the peritoneum in the left lower quadrant. Reported to have decreased in the interval however upon review of previous CT there is no mention of size       - Patient allergic to Zosyn and Flagyl       - Continue meropenem       - Surgery and ID following    Mild-Moderate Persistent Asthma  Acute Hypoxic Respiratory Failure  CAP vs Bronchitis        - CTA negative for PE but did show RUL and LLL consolidation vs atelectasis       - Continue prednisone, duonebs, montelukast, budesonide, and azithromycin       - Pending respiratory culture and PCR    Allergic Conjuctivits       - Improvement in bilateral eyelid swelling and rhinorrhea today       - Continue current management    Hyperthyroidism       - 8/16 TSH 22.988 and T4 <0.42       - Will decrease methimazole to 5mg BID and continue propranolol 60mg qD       - Will consult Endocrine    Psychosis       - Could be associated with steroid or hypothyroidism       - Will continue to monitor    CODE STATUS: Full  Access: Peripheral  Antibiotics: Meorpenem and Azithromycin  Diet: Full Liquid  DVT Prophylaxis: Lovenox  GI Prophylaxis: Protonix  Fluids: N/A      Disposition: Niya Moeller is a 52 yo female with PMH of asthma, recurrent diverticulitis, GERD and hyperthyroidism who presented with acute asthma exacerbation and acute on chronic diverticulitis. CXR showed no acute pulmonary process, will continue current management. Patient continues to report LLQ abdominal pain with CT Abdomen Pelvis showing trace residual encapsulation at prior site of abscess, initiated on Meropenem due to patient's allergies and sensitivities. Surgery and ID consulted and following. Patient uncooperative during interview this morning unlike yesterday's interview. Psychosis potentially secondary to steroid use or hypothyroidism. Will decreased methimazole to 5mg daily. Will continue to monitor.      Tricia Birmingham MS4  [facility] Internal Medicine

## 2023-08-17 NOTE — PROGRESS NOTES
Acute Care Surgery   Progress Note  Admit Date: 8/16/2023  HD#0  POD#* No surgery found *    Subjective:   Interval history:  Afebrile, VSS  No longer on NC, feels breathing has improved  Continues to have 7/10 pain worse in LLQ  Tolerating FLD, no N/V and has good appetite  No BM here but is passing gas  Voiding appropriately  Getting OOB to use toilet     Home Meds:  Current Outpatient Medications   Medication Instructions    albuterol-ipratropium (DUO-NEB) 2.5 mg-0.5 mg/3 mL nebulizer solution 3 mLs, Nebulization, Every 4 hours PRN, Rescue    amoxicillin-clavulanate 875-125mg (AUGMENTIN) 875-125 mg per tablet 1 tablet, Oral, 2 times daily    budesonide-formoterol 160-4.5 mcg (SYMBICORT) 160-4.5 mcg/actuation HFAA 2 puffs, Inhalation, Every 12 hours, Controller    fluticasone propionate (FLONASE) 50 mcg, Each Nostril, Daily PRN    methIMAzole (TAPAZOLE) 10 mg, Oral, 2 times daily    montelukast (SINGULAIR) 10 mg, Oral, Daily    omeprazole (PRILOSEC) 40 mg, Oral, Daily    propranoloL (INDERAL LA) 60 mg, Oral, Daily    white petrolatum-mineral oiL (ARTIFICIAL TEARS, VINCENZO/MIN,) 83-15 % Oint Both Eyes, Nightly      Scheduled Meds:   albuterol-ipratropium  3 mL Nebulization Q6H WAKE    azithromycin  500 mg Intravenous Q24H    budesonide  0.5 mg Nebulization Q12H    cyanocobalamin  1,000 mcg Oral Daily    enoxparin  40 mg Subcutaneous Daily    folic acid (FOLVITE) IVPB  1 mg Intravenous Daily    meropenem (MERREM) IVPB  2 g Intravenous Q8H    montelukast  10 mg Oral Daily    pantoprazole  40 mg Oral Daily    predniSONE  50 mg Oral Daily    propranoloL  60 mg Oral Daily     Continuous Infusions:  PRN Meds:sodium chloride 0.9%, acetaminophen, fluticasone propionate, ketorolac, melatonin, ondansetron, sodium chloride 0.9%, sodium chloride 3%     Objective:     VITAL SIGNS: 24 HR MIN & MAX LAST   Temp  Min: 97.6 °F (36.4 °C)  Max: 97.9 °F (36.6 °C)  97.9 °F (36.6 °C)   BP  Min: 113/62  Max: 147/73  138/84    Pulse  Min: 46  " Max: 69  (!) 54    Resp  Min: 16  Max: 20  16    SpO2  Min: 95 %  Max: 98 %  96 %      HT: 4' 11" (149.9 cm)  WT: 73.4 kg (161 lb 13.1 oz)  BMI: 32.7     Intake/output:  Intake/Output - Last 3 Shifts         08/15 0700 08/16 0659 08/16 0700 08/17 0659 08/17 0700 08/18 0659    P.O.  118     I.V. (mL/kg)  4.3 (0.1)     IV Piggyback  265.2     Total Intake(mL/kg)  387.5 (5.3)     Urine (mL/kg/hr)  300 (0.2)     Total Output  300     Net  +87.5                    Intake/Output Summary (Last 24 hours) at 8/17/2023 1531  Last data filed at 8/17/2023 0622  Gross per 24 hour   Intake 387.47 ml   Output 300 ml   Net 87.47 ml         Lines/drains/airway:       Peripheral IV - Single Lumen 08/16/23 0919 20 G Anterior;Distal;Right Upper Arm (Active)   Site Assessment Clean;Dry;Intact 08/17/23 0800   Line Status Flushed;Infusing 08/17/23 0000   Dressing Status Clean;Dry;Intact 08/17/23 0800   Dressing Intervention Integrity maintained 08/17/23 0000   Number of days: 1            Closed/Suction Drain 02/16/23 1310 Left;Anterior LLQ Bulb 10 Fr. (Active)   Number of days: 182       Physical examination:  Gen: NAD, AAOx3, answering questions appropriately  HEENT:  CV: RR  Resp: inspiratory wheezing, gasping between sentences, not on NC  Abd: abdomen soft with continued LLQ tenderness, non-distended, no abdominal scars   Ext: moving all extremities spontaneously and purposefully  Neuro: CN II-XII grossly intact  Skin/wounds: warm/dry     Labs:  WBC/Hgb/Hct/Plts:  13.35/13.4/40.8/206 (08/17 0326)  Na/K/Cl/CO2:  141/3.9/104/27 (08/17 0326)  BUN/Cr/glu/ALT/AST/amyl/lip:  8.9/0.90/--/8/13/--/-- (08/17 0326)     Imaging:  CTAP:   Acute on chronic diverticular disease with persistent inflammatory changes at the sigmoid colon with improved though small residual mural abscess tracking laterally toward the peritoneal lining.  Overall inflammatory fat stranding is improved compared to the previous exam.     Assessment & Plan:   Niya" Sajan is a 53 y.o. female with mural sigmoid abscess improved in size with diverticulitis. Currently receiving IV antibiotic therapy. HDS with elevation in WBC. Also has pneumonia for which she is also being treated.     - On evaluation of patient this morning, she adamantly does not want to be treated by surgery here and states that she will refuse any surgery that could lead to a possibility of an ostomy.   - At this point, she would benefit from sigmoid resection. Even during an interval surgery after resolution of current episode, this surgery has an inherent risk of leading to ostomy. Given this and her reticence as described above, we cannot perform surgery here.   - Patient has stated that she is set up with GI in [place] and has seen a surgeon for her diverticulitis in [facility]. She stated to us that she prefer to be treated for her diverticulitis there.   - Given the above, would continue IV antibiotic therapy and ensure that she has the appropriate follow up in 6 weeks with her GI for colonoscopy and surgeon for operative evaluation as stated above.  - Surgery signing off at this time, please do not hesitate to contact us for any acute changes in exam, questions, or concerns.    Fabien Arreola MD  [facility] Surgery HO-1  3:31 PM

## 2023-08-17 NOTE — PLAN OF CARE
08/17/23 1109   Discharge Assessment   Assessment Type Discharge Planning Assessment   Confirmed/corrected address, phone number and insurance Yes   Confirmed Demographics Correct on Facesheet   Source of Information patient;health record   When was your last doctors appointment?   (Oswaldo Riggs)   Reason For Admission Shortness of breath  E05.90 Hyperthyroidism  R10.32 LLQ abdominal pain  K57.20 Diverticulitis of large intestine with abscess without bleeding  J96.01 Acute hypoxemic respiratory failure  J45.901 Exacerbation of asthma, unspecified asthma severity, unspecified whether persistent   People in Home alone   Facility Arrived From: Home   Do you expect to return to your current living situation? Yes   Do you have help at home or someone to help you manage your care at home? Yes   Who are your caregiver(s) and their phone number(s)? Dustin Viveros (Son)   [phone]   Prior to hospitilization cognitive status: Alert/Oriented   Current cognitive status: Alert/Oriented   Equipment Currently Used at Home nebulizer   Readmission within 30 days? No   Patient currently being followed by outpatient case management? No   Do you currently have service(s) that help you manage your care at home? No   Do you take prescription medications? Yes  ([facility])   Do you have prescription coverage? Yes   Coverage [employer] M/D   Do you have any problems affording any of your prescribed medications? No   Is the patient taking medications as prescribed? yes   Who is going to help you get home at discharge? M/D Transportation vs cab   How do you get to doctors appointments? car, drives self   Are you on dialysis? No   DME Needed Upon Discharge  none   Transition of Care Barriers None     Pt single; Resides alone; Son, Dustin, is emergency contact; Pt unemployed ; Fly reportedly provides financial support; CM to follow for d/c planning needs.

## 2023-08-17 NOTE — PROGRESS NOTES
[facility] - Emergency Dept  Progress Note    Patient Name: Niya Moeller  MRN: [id number]  Admission Date: 8/16/2023  Hospital Length of Stay: 0 days  Code Status: Full Code  Attending Provider: Dez Jones MD  Primary Care Provider: Oswaldo Riggs NP     Subjective:     HPI:   This is a 53-year-old  female with past medical history of asthma, recurrent diverticulitis, GERD, and hyperthyroidism.  She presents to the emergency department today with a myriad of complaints.  Her biggest complaint today is left lower quadrant abdominal pain.  States that she is had this problem for approximately 3 more weeks.  Twice last month she was seen in emergency room on the 14th and again on the 22nd with similar complaints.  The visit on the 14th she was sent home with moxifloxacin and on the 22nd she was sent home with Augmentin.  Since then pain has not resolved and she continues fairly consistent and persistent abdominal pain that is worsened with eating and not relieved with much else.  She does have bowel movements but these have to be forced with stool softeners.  Otherwise when having bowel movements she denies seeing blood or melena.  Other complaints include shortness of breath and eye swelling both of which started approximately 2-3 days ago.  Although per chart review it seems that she has had this bilateral eyelid swelling for several weeks now.  Nonetheless shortness of breath is now acute.  It is associated with rhinorrhea and sinus congestion.  States that she is normal able to walk from her bed to her bathroom but since onset of of symptoms she has been barely able to do so. She also endorses cough productive of thick yellow sputum. She tells me that she is had asthma since she was a child she is on Symbicort and does have albuterol.  States normally that she uses albuterol 3 times a week and does not have any nighttime awakenings.  However since onset of symptoms she is been using  her albuterol inhaler 5 times daily and has some nighttime awakenings.    On arrival she was tachycardic with a heart rate of 112, tachypneic with respiratory rate 26, and hypertensive blood pressure 141/71 satting 94% on 4 L nasal cannula.  On evaluation vitals improved no longer tachycardic or tachypneic.  No white count.  CMP relatively unremarkable aside from creatinine that is slightly elevated from baseline.    Hospital Course/Significant events:      24 Hour Interval History:  No acute events overnight. Complaining of pain this morning in lower abdomen. Has myriad of complaints still HA all night, shortness of breath, eye fullness. States she is not being treated like a human. Compares herself to a black lab and states she needs a vet. No very compliant this morning.    Past Medical History:   Diagnosis Date    Asthma     COPD (chronic obstructive pulmonary disease)     Diverticulosis     GERD (gastroesophageal reflux disease)        No past surgical history on file.    Social History     Socioeconomic History    Marital status: Single   Tobacco Use    Smoking status: Every Day     Current packs/day: 0.50     Average packs/day: 0.5 packs/day for 40.6 years (20.3 ttl pk-yrs)     Types: Cigarettes     Start date: 1983    Smokeless tobacco: Current    Tobacco comments:     Ambulatory referral to Smoking Cessation clinic following hospital discharge.    Substance and Sexual Activity    Alcohol use: Not Currently    Drug use: Not Currently    Sexual activity: Not Currently     Social Determinants of Health     Financial Resource Strain: Low Risk  (5/1/2023)    Overall Financial Resource Strain (CARDIA)     Difficulty of Paying Living Expenses: Not very hard   Food Insecurity: No Food Insecurity (5/1/2023)    Hunger Vital Sign     Worried About Running Out of Food in the Last Year: Never true     Ran Out of Food in the Last Year: Never true   Transportation Needs: No Transportation Needs (5/1/2023)    PRAPARE -  Transportation     Lack of Transportation (Medical): No     Lack of Transportation (Non-Medical): No   Physical Activity: Inactive (5/1/2023)    Exercise Vital Sign     Days of Exercise per Week: 0 days     Minutes of Exercise per Session: 0 min   Stress: No Stress Concern Present (5/1/2023)    [place] [employer] - Occupational Stress Questionnaire     Feeling of Stress : Not at all   Social Connections: Socially Isolated (5/1/2023)    Social Connection and Isolation Panel [NHANES]     Frequency of Communication with Friends and Family: More than three times a week     Frequency of Social Gatherings with Friends and Family: Twice a week     Attends Sabianist Services: Never     Active Member of Clubs or Organizations: No     Attends Club or Organization Meetings: Never     Marital Status: Never    Housing Stability: Low Risk  (5/1/2023)    Housing Stability Vital Sign     Unable to Pay for Housing in the Last Year: No     Number of Places Lived in the Last Year: 2     Unstable Housing in the Last Year: No           Current Outpatient Medications   Medication Instructions    albuterol-ipratropium (DUO-NEB) 2.5 mg-0.5 mg/3 mL nebulizer solution 3 mLs, Nebulization, Every 4 hours PRN, Rescue    amoxicillin-clavulanate 875-125mg (AUGMENTIN) 875-125 mg per tablet 1 tablet, Oral, 2 times daily    budesonide-formoterol 160-4.5 mcg (SYMBICORT) 160-4.5 mcg/actuation HFAA 2 puffs, Inhalation, Every 12 hours, Controller    fluticasone propionate (FLONASE) 50 mcg, Each Nostril, Daily PRN    methIMAzole (TAPAZOLE) 10 mg, Oral, 2 times daily    montelukast (SINGULAIR) 10 mg, Oral, Daily    omeprazole (PRILOSEC) 40 mg, Oral, Daily    propranoloL (INDERAL LA) 60 mg, Oral, Daily    white petrolatum-mineral oiL (ARTIFICIAL TEARS, VINCENZO/MIN,) 83-15 % Oint Both Eyes, Nightly       Current Inpatient Medications      Current Intravenous Infusions        Review of Systems   Constitutional:  Negative for chills  and fever.   HENT:  Positive for congestion.    Respiratory:  Positive for cough and shortness of breath.    Cardiovascular:  Negative for chest pain and leg swelling.   Gastrointestinal:  Positive for abdominal pain and constipation. Negative for blood in stool, diarrhea, melena, nausea and vomiting.   Genitourinary:  Negative for dysuria and urgency.   Neurological:  Positive for headaches. Negative for dizziness.          Objective:       Intake/Output Summary (Last 24 hours) at 8/17/2023 0827  Last data filed at 8/17/2023 0622  Gross per 24 hour   Intake 387.47 ml   Output 300 ml   Net 87.47 ml         Vital Signs (Most Recent):  Temp: 97.9 °F (36.6 °C) (08/17/23 0810)  Pulse: (!) 46 (08/17/23 0810)  Resp: 16 (08/17/23 0641)  BP: (!) 146/71 (08/17/23 0810)  SpO2: 95 % (08/17/23 0810)  Body mass index is 32.68 kg/m².  Weight: 73.4 kg (161 lb 13.1 oz) Vital Signs (24h Range):  Temp:  [97.6 °F (36.4 °C)-97.9 °F (36.6 °C)] 97.9 °F (36.6 °C)  Pulse:  [46-88] 46  Resp:  [16-20] 16  SpO2:  [95 %-98 %] 95 %  BP: (113-147)/(62-88) 146/71     Physical Exam  Vitals and nursing note reviewed.   Constitutional:       General: She is not in acute distress.     Appearance: Normal appearance. She is normal weight. She is not ill-appearing or toxic-appearing.   HENT:      Head: Normocephalic and atraumatic.   Eyes:      Extraocular Movements: Extraocular movements intact.      Conjunctiva/sclera: Conjunctivae normal.      Pupils: Pupils are equal, round, and reactive to light.      Comments: Bilateral eye lid swelling - improvement in interim   Cardiovascular:      Rate and Rhythm: Normal rate and regular rhythm.      Pulses: Normal pulses.      Heart sounds: Normal heart sounds. No murmur heard.  Pulmonary:      Effort: Pulmonary effort is normal.      Breath sounds: No stridor. Wheezing present. No rhonchi.   Abdominal:      General: Bowel sounds are normal. There is no distension.      Palpations: Abdomen is soft. There is no  "mass.      Tenderness: There is abdominal tenderness (LLQ).   Musculoskeletal:         General: No swelling.      Right lower leg: No edema.      Left lower leg: No edema.   Skin:     General: Skin is warm and dry.   Neurological:      General: No focal deficit present.      Mental Status: She is alert and oriented to person, place, and time.           Lines/Drains/Airways       Drain  Duration                  Closed/Suction Drain 02/16/23 1310 Left;Anterior LLQ Bulb 10 Fr. 181 days              Peripheral Intravenous Line  Duration                  Peripheral IV - Single Lumen 08/16/23 0919 20 G Anterior;Distal;Right Upper Arm <1 day                    Significant Labs:    Lab Results   Component Value Date    WBC 13.35 (H) 08/17/2023    HGB 13.4 08/17/2023    HCT 40.8 08/17/2023    .7 (H) 08/17/2023     08/17/2023           BMP  Lab Results   Component Value Date     08/17/2023    K 3.9 08/17/2023    CHLORIDE 104 08/17/2023    CO2 27 08/17/2023    BUN 8.9 (L) 08/17/2023    CREATININE 0.90 08/17/2023    CALCIUM 9.2 08/17/2023    AGAP 7.0 03/15/2023    EGFRNONAA 81 04/17/2022         ABG  No results for input(s): "PH", "PO2", "PCO2", "HCO3", "POCBASEDEF" in the last 168 hours.    Mechanical Ventilation Support:         Significant Imaging:  I have reviewed the pertinent imaging within the past 24 hours.        Assessment/Plan:     Recurrent diverticulitis  -she has had several bouts of acute on chronic diverticulitis with several ER visits this past month.  Seems like she had an abscess back in February 16th of this year which was drained and showed ESBL treated with meropenem for 7 days.  She presents again with diverticular pain and CT abdomen pelvis done in the emergency department shows a trace residual encapsulated collection at the previously seen mural abscess estimated to measure 2.5 x 0.9 cm with a collection tracking laterally to but at the peritoneum in the left lower quadrant.  " Apparently this has decreased in the interval but looking at previous CTs there is no mention of size  -On merrem. Allergic to zosyn and flagyl.   -Surgery and ID following    Mild-Moderate persistent Asthma  Acute Hypoxic respiratory Failure  CAP vs Bronchitis  -CT of chest does not show a PE but does show right upper anterior lung consolidation and left lower lobe consolidation vs. Atelectasis  -Cont Steroids, Duo neb,  Budesonide, and azithromycin  -Pending resp culture and PCR  -Cont Montelukast    Allergic conjunctivitis - improved  -most of the things above such as montelukast and steroids (prednisone) should help with this   -nothing new reported being used by patient. No pets. Lives alone    Hyperthryoidism  -Last TSH 0.11. Recheck >20. Cut back dose of Methimazole to 5 BID  -Continue Methimazole and Propanolol  -Consult Endocrine    Psychosis  -Could be related to steroid or hypothyroid related    DVT Prophylaxis:lovenox  GI Prophylaxis:protonix     Deonte Diaz,   Internal Medicine  [facility] - Emergency Dept

## 2023-08-17 NOTE — PT/OT/SLP PROGRESS
Physical Therapy    Missed Treatment Session    Patient Name:  Niya Moeller   MRN:  [id number]      Patient not seen at this time secondary to Patient unwilling to participate. Pt reports that she does not need PT services while in the hospital. Pt stated that she has no concerns regarding her strength or mobility.  PT to sign off per patient request.

## 2023-08-17 NOTE — PROGRESS NOTES
"Inpatient Nutrition Evaluation    Admit Date: 2023   Total duration of encounter: 1 day    Nutrition Recommendation/Prescription     Full liquid diet-->>ADAt to low residue  Will order boost breeze --tid; Boost Breeze (provides 250 kcal, 9 g protein per serving)   MVI/fe  Biweekly wt  Will monitor nutriiton status     Nutrition Assessment     Chart Review    Reason Seen: continuous nutrition monitoring    Malnutrition Screening Tool Results   Have you recently lost weight without trying?: No  Have you been eating poorly because of a decreased appetite?: No   MST Score: 0     Diagnosis:  Diverticulitis, asthma, acute hypoxia respiratory failure, CAP, conjuncitvitis, hyperthyroid     Relevant Medical History: hyperthyroid, COPD, GERD, asthma, diverticulosis     Nutrition-Related Medications: albuterol, zithromax, B12, merrem, pantoprazole, prednisone     Nutrition-Related Labs:  () H/H 13.4/40.8 gluc 128 Bun 8.9 Cr 0.9 K 3.9 P 2.6     Diet Order: Diet full liquid  Oral Supplement Order: none  Appetite/Oral Intake: fair/50-75% of meals  Factors Affecting Nutritional Intake: abdominal pain  Food/Orthodoxy/Cultural Preferences: none reported  Food Allergies: none reported       Wound(s):   none    Comments    () pt reported decreased appetite few days; + abdominal pain; dx diverticulitis; currently on full liquid diet--tolerating liquids; will order boost breeze oral supplement for added nutrition. Pt stated wt elevated; partly fluid; will track wts. Labs acknowledged.     Anthropometrics    Height: 4' 11" (149.9 cm) Height Method: Stated  Last Weight: 73.4 kg (161 lb 13.1 oz) (23 1725) Weight Method: Bed Scale  BMI (Calculated): 32.7  BMI Classification: obese grade I (BMI 30-34.9)     Ideal Body Weight (IBW), Female: 95 lb     % Ideal Body Weight, Female (lb): 170.34 %                    Usual Body Weight (UBW), k.3 kg  % Usual Body Weight: 119.99     Usual Weight Provided By: patient and EMR " weight history    Wt Readings from Last 5 Encounters:   08/16/23 73.4 kg (161 lb 13.1 oz)   07/22/23 65.8 kg (145 lb)   07/14/23 70.5 kg (155 lb 6.8 oz)   07/09/23 71 kg (156 lb 8.4 oz)   05/20/23 67.9 kg (149 lb 11.1 oz)     Weight Change(s) Since Admission:  Admit Weight: 65.8 kg (145 lb) (08/16/23 0632)  Pt stated #; wt elevated.     Patient Education    Not applicable.    Monitoring & Evaluation     Dietitian will monitor food and beverage intake, weight, and glucose/endocrine profile.  Nutrition Risk/Follow-Up: low (follow-up in 5-7 days)  Patients assigned 'low nutrition risk' status do not qualify for a full nutritional assessment but will be monitored and re-evaluated in a 5-7 day time period. Please consult if re-evaluation needed sooner.

## 2023-08-18 VITALS
BODY MASS INDEX: 32.62 KG/M2 | WEIGHT: 161.81 LBS | HEIGHT: 59 IN | HEART RATE: 50 BPM | OXYGEN SATURATION: 99 % | TEMPERATURE: 98 F | RESPIRATION RATE: 18 BRPM | DIASTOLIC BLOOD PRESSURE: 81 MMHG | SYSTOLIC BLOOD PRESSURE: 149 MMHG

## 2023-08-18 DIAGNOSIS — K57.92 DIVERTICULITIS: Primary | ICD-10-CM

## 2023-08-18 LAB
ALBUMIN SERPL-MCNC: 3.4 G/DL (ref 3.5–5)
ALBUMIN/GLOB SERPL: 1 RATIO (ref 1.1–2)
ALP SERPL-CCNC: 62 UNIT/L (ref 40–150)
ALT SERPL-CCNC: 9 UNIT/L (ref 0–55)
AST SERPL-CCNC: 13 UNIT/L (ref 5–34)
BASOPHILS # BLD AUTO: 0.02 X10(3)/MCL
BASOPHILS NFR BLD AUTO: 0.1 %
BILIRUB SERPL-MCNC: 0.5 MG/DL
BUN SERPL-MCNC: 10.7 MG/DL (ref 9.8–20.1)
CALCIUM SERPL-MCNC: 9.4 MG/DL (ref 8.4–10.2)
CHLORIDE SERPL-SCNC: 103 MMOL/L (ref 98–107)
CO2 SERPL-SCNC: 30 MMOL/L (ref 22–29)
CREAT SERPL-MCNC: 1.04 MG/DL (ref 0.55–1.02)
EOSINOPHIL # BLD AUTO: 0 X10(3)/MCL (ref 0–0.9)
EOSINOPHIL NFR BLD AUTO: 0 %
ERYTHROCYTE [DISTWIDTH] IN BLOOD BY AUTOMATED COUNT: 14.5 % (ref 11.5–17)
GFR SERPLBLD CREATININE-BSD FMLA CKD-EPI: >60 MLS/MIN/1.73/M2
GLOBULIN SER-MCNC: 3.4 GM/DL (ref 2.4–3.5)
GLUCOSE SERPL-MCNC: 96 MG/DL (ref 74–100)
HCT VFR BLD AUTO: 42.8 % (ref 37–47)
HGB BLD-MCNC: 13.8 G/DL (ref 12–16)
IMM GRANULOCYTES # BLD AUTO: 0.1 X10(3)/MCL (ref 0–0.04)
IMM GRANULOCYTES NFR BLD AUTO: 0.7 %
LYMPHOCYTES # BLD AUTO: 2.47 X10(3)/MCL (ref 0.6–4.6)
LYMPHOCYTES NFR BLD AUTO: 18.2 %
MAGNESIUM SERPL-MCNC: 2.4 MG/DL (ref 1.6–2.6)
MCH RBC QN AUTO: 32.5 PG (ref 27–31)
MCHC RBC AUTO-ENTMCNC: 32.2 G/DL (ref 33–36)
MCV RBC AUTO: 100.7 FL (ref 80–94)
MONOCYTES # BLD AUTO: 1.24 X10(3)/MCL (ref 0.1–1.3)
MONOCYTES NFR BLD AUTO: 9.1 %
NEUTROPHILS # BLD AUTO: 9.73 X10(3)/MCL (ref 2.1–9.2)
NEUTROPHILS NFR BLD AUTO: 71.9 %
NRBC BLD AUTO-RTO: 0 %
PHOSPHATE SERPL-MCNC: 3.2 MG/DL (ref 2.3–4.7)
PLATELET # BLD AUTO: 210 X10(3)/MCL (ref 130–400)
PMV BLD AUTO: 12 FL (ref 7.4–10.4)
POTASSIUM SERPL-SCNC: 4.2 MMOL/L (ref 3.5–5.1)
PROT SERPL-MCNC: 6.8 GM/DL (ref 6.4–8.3)
RBC # BLD AUTO: 4.25 X10(6)/MCL (ref 4.2–5.4)
SODIUM SERPL-SCNC: 143 MMOL/L (ref 136–145)
WBC # SPEC AUTO: 13.56 X10(3)/MCL (ref 4.5–11.5)

## 2023-08-18 PROCEDURE — 25000003 PHARM REV CODE 250: Performed by: INTERNAL MEDICINE

## 2023-08-18 PROCEDURE — 86376 MICROSOMAL ANTIBODY EACH: CPT | Performed by: STUDENT IN AN ORGANIZED HEALTH CARE EDUCATION/TRAINING PROGRAM

## 2023-08-18 PROCEDURE — 25000003 PHARM REV CODE 250

## 2023-08-18 PROCEDURE — 36569 INSJ PICC 5 YR+ W/O IMAGING: CPT

## 2023-08-18 PROCEDURE — 94761 N-INVAS EAR/PLS OXIMETRY MLT: CPT

## 2023-08-18 PROCEDURE — C1751 CATH, INF, PER/CENT/MIDLINE: HCPCS

## 2023-08-18 PROCEDURE — 63600175 PHARM REV CODE 636 W HCPCS

## 2023-08-18 PROCEDURE — 21400001 HC TELEMETRY ROOM

## 2023-08-18 PROCEDURE — 94640 AIRWAY INHALATION TREATMENT: CPT

## 2023-08-18 PROCEDURE — 63600175 PHARM REV CODE 636 W HCPCS: Performed by: INTERNAL MEDICINE

## 2023-08-18 PROCEDURE — 25000003 PHARM REV CODE 250: Performed by: STUDENT IN AN ORGANIZED HEALTH CARE EDUCATION/TRAINING PROGRAM

## 2023-08-18 PROCEDURE — 85025 COMPLETE CBC W/AUTO DIFF WBC: CPT | Performed by: INTERNAL MEDICINE

## 2023-08-18 PROCEDURE — 25000242 PHARM REV CODE 250 ALT 637 W/ HCPCS: Performed by: INTERNAL MEDICINE

## 2023-08-18 PROCEDURE — 80053 COMPREHEN METABOLIC PANEL: CPT | Performed by: INTERNAL MEDICINE

## 2023-08-18 PROCEDURE — 36573 INSJ PICC RS&I 5 YR+: CPT

## 2023-08-18 PROCEDURE — 83735 ASSAY OF MAGNESIUM: CPT | Performed by: INTERNAL MEDICINE

## 2023-08-18 PROCEDURE — 84445 ASSAY OF TSI GLOBULIN: CPT | Performed by: STUDENT IN AN ORGANIZED HEALTH CARE EDUCATION/TRAINING PROGRAM

## 2023-08-18 PROCEDURE — 84100 ASSAY OF PHOSPHORUS: CPT | Performed by: INTERNAL MEDICINE

## 2023-08-18 PROCEDURE — 83520 IMMUNOASSAY QUANT NOS NONAB: CPT | Performed by: STUDENT IN AN ORGANIZED HEALTH CARE EDUCATION/TRAINING PROGRAM

## 2023-08-18 RX ORDER — DOCUSATE SODIUM 100 MG/1
100 CAPSULE, LIQUID FILLED ORAL DAILY
Status: DISCONTINUED | OUTPATIENT
Start: 2023-08-18 | End: 2023-08-19 | Stop reason: HOSPADM

## 2023-08-18 RX ORDER — SODIUM CHLORIDE 0.9 % (FLUSH) 0.9 %
10 SYRINGE (ML) INJECTION
Status: DISCONTINUED | OUTPATIENT
Start: 2023-08-18 | End: 2023-08-19 | Stop reason: HOSPADM

## 2023-08-18 RX ORDER — HYDROCODONE BITARTRATE AND ACETAMINOPHEN 5; 325 MG/1; MG/1
1 TABLET ORAL EVERY 6 HOURS PRN
Status: DISCONTINUED | OUTPATIENT
Start: 2023-08-18 | End: 2023-08-19 | Stop reason: HOSPADM

## 2023-08-18 RX ORDER — SODIUM CHLORIDE 0.9 % (FLUSH) 0.9 %
10 SYRINGE (ML) INJECTION EVERY 6 HOURS
Status: DISCONTINUED | OUTPATIENT
Start: 2023-08-19 | End: 2023-08-19 | Stop reason: HOSPADM

## 2023-08-18 RX ORDER — MUPIROCIN 20 MG/G
OINTMENT TOPICAL 2 TIMES DAILY
Status: DISCONTINUED | OUTPATIENT
Start: 2023-08-18 | End: 2023-08-19 | Stop reason: HOSPADM

## 2023-08-18 RX ORDER — POLYETHYLENE GLYCOL 3350, SODIUM SULFATE, SODIUM CHLORIDE, POTASSIUM CHLORIDE, SODIUM ASCORBATE, AND ASCORBIC ACID 7.5-2.691G
KIT ORAL
Qty: 1 KIT | Refills: 0 | Status: SHIPPED | OUTPATIENT
Start: 2023-08-18 | End: 2023-08-31

## 2023-08-18 RX ADMIN — DOCUSATE SODIUM 100 MG: 100 CAPSULE, LIQUID FILLED ORAL at 11:08

## 2023-08-18 RX ADMIN — BUDESONIDE INHALATION 0.5 MG: 0.5 SUSPENSION RESPIRATORY (INHALATION) at 07:08

## 2023-08-18 RX ADMIN — PANTOPRAZOLE SODIUM 40 MG: 40 TABLET, DELAYED RELEASE ORAL at 08:08

## 2023-08-18 RX ADMIN — AZITHROMYCIN MONOHYDRATE 500 MG: 500 INJECTION, POWDER, LYOPHILIZED, FOR SOLUTION INTRAVENOUS at 03:08

## 2023-08-18 RX ADMIN — MONTELUKAST SODIUM 10 MG: 5 TABLET, CHEWABLE ORAL at 08:08

## 2023-08-18 RX ADMIN — IPRATROPIUM BROMIDE AND ALBUTEROL SULFATE 3 ML: .5; 3 SOLUTION RESPIRATORY (INHALATION) at 07:08

## 2023-08-18 RX ADMIN — FOLIC ACID 1 MG: 5 INJECTION, SOLUTION INTRAMUSCULAR; INTRAVENOUS; SUBCUTANEOUS at 08:08

## 2023-08-18 RX ADMIN — MUPIROCIN: 20 OINTMENT TOPICAL at 09:08

## 2023-08-18 RX ADMIN — MEROPENEM 2 G: 1 INJECTION, POWDER, FOR SOLUTION INTRAVENOUS at 06:08

## 2023-08-18 RX ADMIN — PREDNISONE 50 MG: 50 TABLET ORAL at 08:08

## 2023-08-18 RX ADMIN — HYDROCODONE BITARTRATE AND ACETAMINOPHEN 1 TABLET: 5; 325 TABLET ORAL at 10:08

## 2023-08-18 RX ADMIN — IPRATROPIUM BROMIDE AND ALBUTEROL SULFATE 3 ML: .5; 3 SOLUTION RESPIRATORY (INHALATION) at 01:08

## 2023-08-18 RX ADMIN — MEROPENEM 2 G: 1 INJECTION, POWDER, FOR SOLUTION INTRAVENOUS at 02:08

## 2023-08-18 RX ADMIN — ERTAPENEM 1 G: 1 INJECTION INTRAMUSCULAR; INTRAVENOUS at 08:08

## 2023-08-18 RX ADMIN — CYANOCOBALAMIN TAB 1000 MCG 1000 MCG: 1000 TAB at 08:08

## 2023-08-18 NOTE — DISCHARGE INSTRUCTIONS
Patient focused on discharge home with extended IV Antibiotic care. PICC line to be placed prior to discharge and last dose of ertapenem to be given. Patient educated on PICC line care and dosing of ATBX.

## 2023-08-18 NOTE — PROGRESS NOTES
Courtney Yao with [facility] will provide teaching this afternoon for home IV antibiotics. Unable to obtain HH services due to pt's insurance coverage. Relayed to pt's nurse, Mary Bautista, pt will need 1st dose of Ertapenem prior to discharge.

## 2023-08-18 NOTE — PROGRESS NOTES
Referral for home IV Antibiotics submitted to [facility] via [employer]. Consult for HH submitted to multiple providers via [employer], due to pt's insurance coverage through [employer].

## 2023-08-18 NOTE — CONSULTS
[facility] Inpatient Infectious Diseases Consultation    Physician requesting consultation: Dez Jones MD  Service requesting consultation: Internal Medicine  Reason for consultation:  Diverticulitis with history of ESBL E coli    Historian: Patient    Isolation Status:  Contact    HPI:     Ms. Niya Moeller, 53-year-old female, past medical history of diverticulitis, asthma, GERD, and hypothyroidism.  Patient initially presented to [facility] ED for what appeared to be allergic conjunctivitis and left lower quadrant abdominal pain.  She has had chronic left lower quadrant pain since October of last year, and has been hospitalized for diverticulitis.  In February, patient was admitted to [facility] for diverticulitis, with an abscess measuring 2.7 x 1.8 cm.  JOE drain was placed and abscess was drained, and cultures grew back ESBL E coli.  Eventually, patient had drain removed and abscess had resolved.  Since then, patient has been to the emergency department multiple times and been prescribed antibiotics for diverticulitis, receiving ciprofloxacin and metronidazole, with metronidazole causing hives/syncopal like episodes.  On last ED visit in 7/22/23, patient was discharged with Augmentin. Interval imaging at this time revealed improvement with abscess size.  Due to history of ESBL E coli, ID consulted for assistance with antibiotic management for diverticulitis complicated by abscess.    Antibiotic history:  Merrem, 8/16-present      Past Medical History/Past Surgical History/Social History     Past Medical History:   Diagnosis Date    Asthma     COPD (chronic obstructive pulmonary disease)     Diverticulosis     GERD (gastroesophageal reflux disease)        No past surgical history on file.     FAMILY HISTORY:  family history is not on file.     SOCIAL HISTORY:   Social History     Socioeconomic History    Marital status: Single   Tobacco Use    Smoking status: Every Day     Current packs/day: 0.50     Average  packs/day: 0.5 packs/day for 40.6 years (20.3 ttl pk-yrs)     Types: Cigarettes     Start date: 1983    Smokeless tobacco: Current    Tobacco comments:     Ambulatory referral to Smoking Cessation clinic following hospital discharge.    Substance and Sexual Activity    Alcohol use: Not Currently    Drug use: Not Currently    Sexual activity: Not Currently     Social Determinants of Health     Financial Resource Strain: Low Risk  (5/1/2023)    Overall Financial Resource Strain (CARDIA)     Difficulty of Paying Living Expenses: Not very hard   Food Insecurity: No Food Insecurity (5/1/2023)    Hunger Vital Sign     Worried About Running Out of Food in the Last Year: Never true     Ran Out of Food in the Last Year: Never true   Transportation Needs: No Transportation Needs (5/1/2023)    PRAPARE - Transportation     Lack of Transportation (Medical): No     Lack of Transportation (Non-Medical): No   Physical Activity: Inactive (5/1/2023)    Exercise Vital Sign     Days of Exercise per Week: 0 days     Minutes of Exercise per Session: 0 min   Stress: No Stress Concern Present (5/1/2023)    [employer] - Occupational Stress Questionnaire     Feeling of Stress : Not at all   Social Connections: Socially Isolated (5/1/2023)    Social Connection and Isolation Panel [NHANES]     Frequency of Communication with Friends and Family: More than three times a week     Frequency of Social Gatherings with Friends and Family: Twice a week     Attends Hoahaoism Services: Never     Active Member of Clubs or Organizations: No     Attends Club or Organization Meetings: Never     Marital Status: Never    Housing Stability: Low Risk  (5/1/2023)    Housing Stability Vital Sign     Unable to Pay for Housing in the Last Year: No     Number of Places Lived in the Last Year: 2     Unstable Housing in the Last Year: No        ALLERGIES:   Review of patient's allergies indicates:   Allergen Reactions    Zosyn  [piperacillin-tazobactam] Swelling    Decadron-la Itching    Latex          Review of Systems     Review of Systems   Constitutional:  Negative for chills and fever.   Respiratory:  Negative for cough and shortness of breath.    Cardiovascular:  Negative for chest pain and palpitations.   Gastrointestinal:  Positive for abdominal pain and constipation. Negative for nausea and vomiting.   Genitourinary:  Negative for dysuria.   Neurological:  Negative for headaches.         MEDICATIONS:   Scheduled Meds:   albuterol-ipratropium  3 mL Nebulization Q6H WAKE    azithromycin  500 mg Intravenous Q24H    budesonide  0.5 mg Nebulization Q12H    cyanocobalamin  1,000 mcg Oral Daily    enoxparin  40 mg Subcutaneous Daily    folic acid (FOLVITE) IVPB  1 mg Intravenous Daily    meropenem (MERREM) IVPB  2 g Intravenous Q8H    montelukast  10 mg Oral Daily    pantoprazole  40 mg Oral Daily    predniSONE  50 mg Oral Daily    propranoloL  60 mg Oral Daily     Continuous Infusions:  PRN Meds:.sodium chloride 0.9%, acetaminophen, fluticasone propionate, ketorolac, melatonin, ondansetron, sodium chloride 0.9%, sodium chloride 3%    Physical exam:     Temp:  [97.7 °F (36.5 °C)-98.3 °F (36.8 °C)] 98.3 °F (36.8 °C)  Pulse:  [46-69] 55  Resp:  [16-20] 18  SpO2:  [95 %-98 %] 96 %  BP: (116-147)/(62-84) 128/62     GENERAL: A&Ox3, NAD, sitting in bed comfortably  HEENT: NC/AT, anicteric, moist oral mucosa without lesions, exudate, or erythema  LUNGS: CTA b/l, no labored breathing  HEART: RRR; no murmur, rub, or gallop  ABDOMEN: +BS, no tympany, soft, tender to palpation in bilateral lower quadrants, no rebound, guarding, or organomegaly  EXTREMITIES: no edema, clubbing, or cyanosis   SKIN: no rashes or lesions  NEURO: speech fluent and intact, facial symmetry preserved, no tremor  PSYCH: cooperative, normal mood and affect  LINES: 1 R PIV      LABS:     I have personally reviewed patient's labs.  Pertinent results noted below.    CBC  Recent  Labs     08/16/23  0710 08/17/23  0326   WBC 7.59 13.35*   HGB 13.3 13.4   HCT 41.2 40.8    206       Differential  Recent Labs   Lab 08/17/23  0326   WBC 13.35*   HGB 13.4   HCT 40.8           Basic Metabolic Panel  Recent Labs     08/16/23  0710 08/17/23  0326    141   K 3.5 3.9   CO2 29 27   BUN 7.8* 8.9*   CREATININE 1.03* 0.90        Hepatic Panel  Lab Results   Component Value Date    ALT 8 08/17/2023    AST 13 08/17/2023    ALKPHOS 63 08/17/2023    BILITOT 0.4 08/17/2023        Urinalysis:  Results for orders placed or performed during the hospital encounter of 07/14/23   Urinalysis, Reflex to Urine Culture    Specimen: Urine   Result Value Ref Range    Color, UA Yellow Yellow, Light-Yellow, Dark Yellow, Gisella, Straw    Appearance, UA Clear Clear    Specific Gravity, UA <1.005     pH, UA 7.0 5.0 - 8.5    Protein, UA Negative Negative    Glucose, UA Negative Negative, Normal    Ketones, UA Negative Negative    Blood, UA Negative Negative    Bilirubin, UA Negative Negative    Urobilinogen, UA 0.2 0.2, 1.0, Normal    Nitrites, UA Negative Negative    Leukocyte Esterase, UA Negative Negative    WBC, UA None Seen None Seen, 0-2, 3-5, 0-5 /HPF    Bacteria, UA None Seen None Seen, Rare, Occasional /HPF    Squamous Epithelial Cells, UA None Seen /HPF    RBC, UA None Seen None Seen, 0-2, 3-5, 0-5 /HPF       ESR:  7/3/22 19    CRP:  Results for orders placed or performed during the hospital encounter of 03/08/23   C-Reactive Protein   Result Value Ref Range    C-Reactive Protein 43.80 (H) <5.00 mg/L           MICRO AND PATHOLOGY:   Colonization:  2/16/23 Aerobic culture      2/16/23 Anerobic culture: Few Bacteroides thetaiotamicron  8/16/23 Peripheral blood culture x 2: NGTD    IMAGING:     I have personally reviewed patient's imaging. Pertinent results noted below.  CT Abdomen Pelvis With Contrast   Final Result      Acute on chronic diverticular disease with persistent inflammatory changes at the  sigmoid colon with improved though small residual mural abscess tracking laterally toward the peritoneal lining.  Overall inflammatory fat stranding is improved compared to the previous exam.         Electronically signed by: Genesis Iraheta   Date:    08/16/2023   Time:    11:38      CTA Chest Non-Coronary (PE Studies)   Final Result      1. No pulmonary embolism identified.   2. Endobronchial material bilaterally, greater on the left, with mild left lung base atelectasis and scattered small areas of ground-glass in the medial right lung.  Suspect this endobronchial material relates to infection or inflammation.         Electronically signed by: John Bowens   Date:    08/16/2023   Time:    11:46      X-Ray Chest AP Portable   Final Result      No acute pulmonary process identified.         Electronically signed by: John Bowens   Date:    08/16/2023   Time:    10:14            IMPRESSION     1) Recurrent Diverticulitis with abscess, with hx of ESBL E coli  2) Mild-moderate persistent Asthma  3) CAP vs Bronchitis  4) Allergic conjunctivitis      RECOMMENDATIONS:  -patient with multiple courses of antibiotics within the past year, grew ESBL E coli in February 2023.  -was seen in [place] for diverticular abscess at this time, with JOE drainage placed and resolution of abscess which is reflected in later CT scans in March 2023.  Abscess returning on 7/22/23, measuring 1.2 x 2.9 cm.  Was discharged with Augmentin, which improved abscess size.  CT scan on 08/16 revealed abscess size now at 0.9 x 2.5 cm.  -agree with Merrem, 2 g IV Q8H.  -the abscess is improving, however, source control would be most beneficial in terms of antibiotic treatment course.  Recommend General surgery/IR consultation for possible drainage.  -Rest of management per primary team      Thank you for the consult. We will follow along with you. Please do not hesitate to call with any questions or concerns.     Attending attestation to  follow.    Oscar Dowell DO  [facility] Internal Medicine, PGY-II  Infectious Diseases

## 2023-08-18 NOTE — PROGRESS NOTES
[facility] - Emergency Dept  Progress Note    Patient Name: Niya Moeller  MRN: [id number]  Admission Date: 8/16/2023  Hospital Length of Stay: 0 days  Code Status: Full Code  Attending Provider: Dez Jones MD  Primary Care Provider: Oswaldo Riggs NP     Subjective:     HPI:   This is a 53-year-old  female with past medical history of asthma, recurrent diverticulitis, GERD, and hyperthyroidism.  She presents to the emergency department today with a myriad of complaints.  Her biggest complaint today is left lower quadrant abdominal pain.  States that she is had this problem for approximately 3 more weeks.  Twice last month she was seen in emergency room on the 14th and again on the 22nd with similar complaints.  The visit on the 14th she was sent home with moxifloxacin and on the 22nd she was sent home with Augmentin.  Since then pain has not resolved and she continues fairly consistent and persistent abdominal pain that is worsened with eating and not relieved with much else.  She does have bowel movements but these have to be forced with stool softeners.  Otherwise when having bowel movements she denies seeing blood or melena.  Other complaints include shortness of breath and eye swelling both of which started approximately 2-3 days ago.  Although per chart review it seems that she has had this bilateral eyelid swelling for several weeks now.  Nonetheless shortness of breath is now acute.  It is associated with rhinorrhea and sinus congestion.  States that she is normal able to walk from her bed to her bathroom but since onset of of symptoms she has been barely able to do so. She also endorses cough productive of thick yellow sputum. She tells me that she is had asthma since she was a child she is on Symbicort and does have albuterol.  States normally that she uses albuterol 3 times a week and does not have any nighttime awakenings.  However since onset of symptoms she is been using  her albuterol inhaler 5 times daily and has some nighttime awakenings.    On arrival she was tachycardic with a heart rate of 112, tachypneic with respiratory rate 26, and hypertensive blood pressure 141/71 satting 94% on 4 L nasal cannula.  On evaluation vitals improved no longer tachycardic or tachypneic.  No white count.  CMP relatively unremarkable aside from creatinine that is slightly elevated from baseline.    Hospital Course/Significant events:      24 Hour Interval History:  Nothing acute overnight. Remains asymptomatic bradycardia. On 2L NC. Methimazole and propanolol stopped. Still complaining of pain in LLQ albeit improved with abx apparently. Seen by multiple specialists. The biggest recommendations being surgery vs. IR drainage vs. Prolonged Abx.     Past Medical History:   Diagnosis Date    Asthma     COPD (chronic obstructive pulmonary disease)     Diverticulosis     GERD (gastroesophageal reflux disease)        No past surgical history on file.    Social History     Socioeconomic History    Marital status: Single   Tobacco Use    Smoking status: Every Day     Current packs/day: 0.50     Average packs/day: 0.5 packs/day for 40.6 years (20.3 ttl pk-yrs)     Types: Cigarettes     Start date: 1983    Smokeless tobacco: Current    Tobacco comments:     Ambulatory referral to Smoking Cessation clinic following hospital discharge.    Substance and Sexual Activity    Alcohol use: Not Currently    Drug use: Not Currently    Sexual activity: Not Currently     Social Determinants of Health     Financial Resource Strain: Low Risk  (5/1/2023)    Overall Financial Resource Strain (CARDIA)     Difficulty of Paying Living Expenses: Not very hard   Food Insecurity: No Food Insecurity (5/1/2023)    Hunger Vital Sign     Worried About Running Out of Food in the Last Year: Never true     Ran Out of Food in the Last Year: Never true   Transportation Needs: No Transportation Needs (5/1/2023)    PRAPARE - Transportation      Lack of Transportation (Medical): No     Lack of Transportation (Non-Medical): No   Physical Activity: Inactive (5/1/2023)    Exercise Vital Sign     Days of Exercise per Week: 0 days     Minutes of Exercise per Session: 0 min   Stress: No Stress Concern Present (5/1/2023)    [employer] - Occupational Stress Questionnaire     Feeling of Stress : Not at all   Social Connections: Socially Isolated (5/1/2023)    Social Connection and Isolation Panel [NHANES]     Frequency of Communication with Friends and Family: More than three times a week     Frequency of Social Gatherings with Friends and Family: Twice a week     Attends Hinduism Services: Never     Active Member of Clubs or Organizations: No     Attends Club or Organization Meetings: Never     Marital Status: Never    Housing Stability: Low Risk  (5/1/2023)    Housing Stability Vital Sign     Unable to Pay for Housing in the Last Year: No     Number of Places Lived in the Last Year: 2     Unstable Housing in the Last Year: No           Current Outpatient Medications   Medication Instructions    albuterol-ipratropium (DUO-NEB) 2.5 mg-0.5 mg/3 mL nebulizer solution 3 mLs, Nebulization, Every 4 hours PRN, Rescue    amoxicillin-clavulanate 875-125mg (AUGMENTIN) 875-125 mg per tablet 1 tablet, Oral, 2 times daily    budesonide-formoterol 160-4.5 mcg (SYMBICORT) 160-4.5 mcg/actuation HFAA 2 puffs, Inhalation, Every 12 hours, Controller    fluticasone propionate (FLONASE) 50 mcg, Each Nostril, Daily PRN    methIMAzole (TAPAZOLE) 10 mg, Oral, 2 times daily    montelukast (SINGULAIR) 10 mg, Oral, Daily    omeprazole (PRILOSEC) 40 mg, Oral, Daily    propranoloL (INDERAL LA) 60 mg, Oral, Daily    white petrolatum-mineral oiL (ARTIFICIAL TEARS, VINCENZO/MIN,) 83-15 % Oint Both Eyes, Nightly       Current Inpatient Medications      Current Intravenous Infusions        Review of Systems   Constitutional:  Negative for chills and fever.    HENT:  Positive for congestion.    Respiratory:  Positive for cough and shortness of breath.    Cardiovascular:  Negative for chest pain and leg swelling.   Gastrointestinal:  Positive for abdominal pain and constipation. Negative for blood in stool, diarrhea, melena, nausea and vomiting.   Genitourinary:  Negative for dysuria and urgency.   Neurological:  Positive for headaches. Negative for dizziness.          Objective:       Intake/Output Summary (Last 24 hours) at 8/18/2023 0635  Last data filed at 8/17/2023 1850  Gross per 24 hour   Intake 477.18 ml   Output --   Net 477.18 ml           Vital Signs (Most Recent):  Temp: 98.5 °F (36.9 °C) (08/18/23 0427)  Pulse: (!) 46 (08/18/23 0427)  Resp: 18 (08/17/23 1858)  BP: (!) 92/57 (08/18/23 0427)  SpO2: (!) 94 % (08/18/23 0427)  Body mass index is 32.68 kg/m².  Weight: 73.4 kg (161 lb 13.1 oz) Vital Signs (24h Range):  Temp:  [97.6 °F (36.4 °C)-98.5 °F (36.9 °C)] 98.5 °F (36.9 °C)  Pulse:  [45-68] 46  Resp:  [16-18] 18  SpO2:  [94 %-100 %] 94 %  BP: ()/(57-84) 92/57     Physical Exam  Vitals and nursing note reviewed.   Constitutional:       General: She is not in acute distress.     Appearance: Normal appearance. She is normal weight. She is not ill-appearing or toxic-appearing.   HENT:      Head: Normocephalic and atraumatic.   Eyes:      Extraocular Movements: Extraocular movements intact.      Conjunctiva/sclera: Conjunctivae normal.      Pupils: Pupils are equal, round, and reactive to light.   Cardiovascular:      Rate and Rhythm: Normal rate and regular rhythm.      Pulses: Normal pulses.      Heart sounds: Normal heart sounds. No murmur heard.  Pulmonary:      Effort: Pulmonary effort is normal.      Breath sounds: No stridor. Wheezing present. No rhonchi.   Abdominal:      General: Bowel sounds are normal. There is no distension.      Palpations: Abdomen is soft. There is no mass.      Tenderness: There is abdominal tenderness (LLQ).  "  Musculoskeletal:         General: No swelling.      Right lower leg: No edema.      Left lower leg: No edema.   Skin:     General: Skin is warm and dry.   Neurological:      General: No focal deficit present.      Mental Status: She is alert and oriented to person, place, and time.           Lines/Drains/Airways       Drain  Duration                  Closed/Suction Drain 02/16/23 1310 Left;Anterior LLQ Bulb 10 Fr. 182 days              Peripheral Intravenous Line  Duration                  Peripheral IV - Single Lumen 08/17/23 1130 20 G Anterior;Distal;Right Upper Arm <1 day                    Significant Labs:    Lab Results   Component Value Date    WBC 13.56 (H) 08/18/2023    HGB 13.8 08/18/2023    HCT 42.8 08/18/2023    .7 (H) 08/18/2023     08/18/2023           BMP  Lab Results   Component Value Date     08/18/2023    K 4.2 08/18/2023    CHLORIDE 103 08/18/2023    CO2 30 (H) 08/18/2023    BUN 10.7 08/18/2023    CREATININE 1.04 (H) 08/18/2023    CALCIUM 9.4 08/18/2023    AGAP 7.0 03/15/2023    EGFRNONAA 81 04/17/2022         ABG  No results for input(s): "PH", "PO2", "PCO2", "HCO3", "POCBASEDEF" in the last 168 hours.    Mechanical Ventilation Support:         Significant Imaging:  I have reviewed the pertinent imaging within the past 24 hours.        Assessment/Plan:     Recurrent diverticulitis  -she has had several bouts of acute on chronic diverticulitis with several ER visits this past month.  Seems like she had an abscess back in February 16th of this year which was drained and showed ESBL treated with meropenem for 7 days.  She presents again with diverticular pain and CT abdomen pelvis done in the emergency department shows a trace residual encapsulated collection at the previously seen mural abscess estimated to measure 2.5 x 0.9 cm with a collection tracking laterally to but at the peritoneum in the left lower quadrant.  Apparently this has decreased in the interval but looking at " previous CTs there is no mention of size  -On merrem. Allergic to zosyn and flagyl.   -Surgery and ID following  -Refusing surgery and does not to invested in prolonged Abx. Considering IR drainage. Will speak to IR about this  -Norco 5 q6h PRN    Mild-Moderate persistent Asthma  Acute Hypoxic respiratory Failure  CAP vs Bronchitis  -CT of chest does not show a PE but does show right upper anterior lung consolidation and left lower lobe consolidation vs. Atelectasis  -Cont Steroids, Duo neb,  Budesonide, and azithromycin  -Pending resp culture and PCR  -Cont Montelukast    Allergic conjunctivitis - resolved  -most of the things above such as montelukast and steroids (prednisone) should help with this   -nothing new reported being used by patient. No pets. Lives alone    Hyperthryoidism -> Hypothyroidism  -Last TSH 0.11. Recheck >20. Cut back dose of Methimazole to 5 BID  -D/c Methimazole and propanolol  -Thyroid Ab ordered and pending    Psychosis  -Could be related to steroid or hypothyroid related    Macrocytic Anemia  -Giving folate and B12    DVT Prophylaxis:lovenox  GI Prophylaxis:protonix     Deonte Diaz DO  Internal Medicine  [facility] - Emergency Dept

## 2023-08-18 NOTE — CARE UPDATE
IR will not be doing the drain placement. Recommended IV Abx.    Deonte Diaz, DO  Internal Medicine - PGY-3

## 2023-08-18 NOTE — PROGRESS NOTES
"[facility] INFECTIOUS DISEASES CONSULT PROGRESS NOTE      Reason for consultation     Antibiotic assistance with ESBL E coli      Interval history     No acute events overnight.  Abdomen continues to remain very tender, patient denies any fevers/chills overnight.  Able to tolerate p.o. intake, but not having any bowel movements.  No acute complaints otherwise    Antibiotic history:    Merrem 8/16-present    Medications     Scheduled Meds:   albuterol-ipratropium  3 mL Nebulization Q6H WAKE    azithromycin  500 mg Intravenous Q24H    budesonide  0.5 mg Nebulization Q12H    cyanocobalamin  1,000 mcg Oral Daily    docusate sodium  100 mg Oral Daily    enoxparin  40 mg Subcutaneous Daily    folic acid (FOLVITE) IVPB  1 mg Intravenous Daily    meropenem (MERREM) IVPB  2 g Intravenous Q8H    montelukast  10 mg Oral Daily    pantoprazole  40 mg Oral Daily    predniSONE  50 mg Oral Daily     Continuous Infusions:  PRN Meds:.sodium chloride 0.9%, acetaminophen, fluticasone propionate, HYDROcodone-acetaminophen, melatonin, ondansetron, sodium chloride 0.9%, sodium chloride 3%    Allergies:   Review of patient's allergies indicates:   Allergen Reactions    Zosyn [piperacillin-tazobactam] Swelling    Decadron-la Itching    Latex        Physical exam:     /65   Pulse (!) 52   Temp 98 °F (36.7 °C) (Oral)   Resp 20   Ht 4' 11" (1.499 m)   Wt 73.4 kg (161 lb 13.1 oz)   SpO2 (!) 94%   BMI 32.68 kg/m²     Body mass index is 32.68 kg/m².    GENERAL: A&Ox3, NAD, resting comfortably in bed  HEENT: NC/AT, anicteric, moist oral mucosa without lesions, exudate, or erythema  LUNGS: CTA b/l, no labored breathing  HEART: RRR; no murmur, rub, or gallop  ABDOMEN: +BS, no tympany, soft, very tender, no rebound, guarding, or organomegaly  EXTREMITIES: no edema, clubbing, or cyanosis   SKIN: no rashes or lesions  NEURO: speech fluent and intact, facial symmetry preserved, no tremor, CN II-XII grossly intact   PSYCH: cooperative, normal mood " and affect  LINES: 1 R PIV      LABS:     I have personally reviewed patient's labs.  Pertinent results noted below.    CBC  Recent Labs     08/16/23  0710 08/17/23  0326 08/18/23 0317   WBC 7.59 13.35* 13.56*   HGB 13.3 13.4 13.8   HCT 41.2 40.8 42.8    206 210         Basic Metabolic Panel  Recent Labs     08/16/23  0710 08/17/23  0326 08/18/23 0317    141 143   K 3.5 3.9 4.2   CO2 29 27 30*   BUN 7.8* 8.9* 10.7   CREATININE 1.03* 0.90 1.04*        Hepatic Panel  Recent Labs     08/16/23  0710 08/17/23 0326 08/18/23 0317   ALT 9 8 9   AST 13 13 13   ALKPHOS 62 63 62   BILITOT 0.6 0.4 0.5   ALBUMIN 3.5 3.5 3.4*             Imaging     I have personally reviewed patient's imaging. Pertinent results noted below.  CT Abdomen Pelvis With Contrast   Final Result       Acute on chronic diverticular disease with persistent inflammatory changes at the sigmoid colon with improved though small residual mural abscess tracking laterally toward the peritoneal lining.  Overall inflammatory fat stranding is improved compared to the previous exam.           Electronically signed by:          Genesis Iraheta   Date:                                                            08/16/2023   Time:                                                            11:38       CTA Chest Non-Coronary (PE Studies)   Final Result       1. No pulmonary embolism identified.   2. Endobronchial material bilaterally, greater on the left, with mild left lung base atelectasis and scattered small areas of ground-glass in the medial right lung.  Suspect this endobronchial material relates to infection or inflammation.           Electronically signed by:          John Bowens   Date:                                                            08/16/2023   Time:                                                            11:46       X-Ray Chest AP Portable   Final Result       No acute pulmonary process identified.           Electronically  signed by:          John Bowens   Date:                                                            08/16/2023   Time:                                                            10:14               Micro/Path   Colonization:  2/16/23 Aerobic culture       2/16/23 Anerobic culture: Few Bacteroides thetaiotamicron  8/16/23 Peripheral blood culture x 2: NGTD      IMPRESSION     1) Recurrent Diverticulitis with abscess, with hx of ESBL E coli  2) Mild-moderate persistent Asthma  3) CAP vs Bronchitis  4) Allergic conjunctivitis        RECOMMENDATIONS:  -patient with multiple courses of antibiotics within the past year, grew ESBL E coli in February 2023.  -was seen in [place] for diverticular abscess at this time, with JOE drainage placed and resolution of abscess which is reflected in later CT scans in March 2023.  Abscess returning on 7/22/23, measuring 1.2 x 2.9 cm.  Was discharged with Augmentin, which improved abscess size.  CT scan on 08/16 revealed abscess size now at 0.9 x 2.5 cm.  -currently on Merrem, 2 g IV Q8H.  -at this time, IR will not place drain in abscess, thus requiring medical management for the abscess.  -patient is a candidate for OPAT, recommend ertapenem 1 g Q24H, for 28 days. EOT 9/15/23.  -Will need repeat CT abdomen and pelvis with contrast in 3 weeks to evaluate for resolution of abscess, and will follow up with ID Clinic in 4 weeks.  -Rest of management per primary team     Thank you for the consult.  ID will sign off at this time.      Attending attestation to follow.     Oscar Dowell DO  [facility] Internal Medicine, PGY-II  Infectious Diseases

## 2023-08-18 NOTE — CARE UPDATE
IR will not be doing the drain placement. Recommended IV Abx.  ID Okay with OPAT.   Consult CM for HH & OPAT  PICC line team consulted    Deonte Diaz DO  Internal Medicine - PGY-3

## 2023-08-18 NOTE — MEDICAL/APP STUDENT
[facility] Medicine Wards Progress Note     Resident Team: [facility] Medicine List 3  Attending Physician: Dez Jones MD  Medical Student: Tricia Birmingham      Subjective:      Brief HPI:  Niya Moeller is a 54 yo female with PMH of asthma, recurrent diverticulitis, GERD and hyperthyroidism. She presented to [facility] ED on 8/16 with a chief complaint of LLQ abdominal pain for the past three weeks. She had previously been to the ED on 7/14 for similar complaints and was prescribed Moxifloxacin, however reported that her insurance would not cover this medication. She returned to the ED on 7/22 at which time she was prescribed Augmentin. She reported no improvement in her abdominal pain after the course of Augmentin. She also reported shortness of breath, productive cough, and bilateral eye swelling and drainage for the past two days. She stated that she was diagnosed with asthma as a child and was currently using Symbicort and albuterol. She stated that she typically used her albuterol three times daily and had no nighttime awakenings, but recently had been using her albuterol up to five times daily with nighttime awakenings. She also reported chills, occasional nausea, constipation, sinus pain, and rhinorrhea. She denies fever, hemoptysis, hematochezia, and melena.    Hospital Course:  She presented to the ED with tachycardia with a HR of 112,  tachypneic with RR of 26, and was initially satting 94% on 4 L nasal cannula. Internal medicine was consulted for asthma exacerbation and LLQ abdominal pain. Her vital signs stabilized while in the ED. Her CBC showed a normal WBC and no electrolyte abnormalities. CXR showed no acute pulmonary process. D dimer was elevated at 0.96 at which time CTA was ordered. The CTA showed no pulmonary embolism but did demonstrate endobronchial material bilaterally, greater on the left, with mild left lung base atelectasis and scattered small areas of ground-glass in the medial right lung. She was initiated  on albuterol-ipratropium, budesonide inhaler, montelukast, prednisone 50mg, and azithromycin. On , CT Abdomen Pelvis showed a trace residual encapsulated collection at the previously seen mural abscess estimated to measure 2.5 x 0.9 cm with a collection tracking laterally to but at the peritoneum in the left lower quadrant. Reported to have decreased in the interval however upon review of previous CT there was no mention of size. Upon chart review, she had an abscess drained in 23 which showed ESBL treated with meropenem x 7 days. She was initiated on Meropenem due to patient's allergies and sensitivities. Surgery and ID consulted and following. On , patient was uncooperative, stating that she did not want to speak with doctors and requesting a vet. Psychosis was thought to potentially be secondary to steroid use or hypothyroidism. Decreased methimazole to 5mg daily and consulted Endocrine.    Interval History:   NAEON. Patient reports improvement in pain this AM. Continues to state that she does not want to speak with doctors. Refused further interview and physical exam.     Review of Systems:  ROS completed and negative except as indicated above.     Objective:     Last 24 Hour Vital Signs:  BP  Min: 92/57  Max: 138/84  Temp  Av.3 °F (36.8 °C)  Min: 97.6 °F (36.4 °C)  Max: 99 °F (37.2 °C)  Pulse  Av.5  Min: 45  Max: 55  Resp  Av  Min: 16  Max: 20  SpO2  Av.9 %  Min: 93 %  Max: 100 %  I/O last 3 completed shifts:  In: 612.6 [P.O.:118; I.V.:4.3; IV Piggyback:490.3]  Out: 300 [Urine:300]    Physical Examination:   Physical Exam  Vitals and nursing note reviewed.   Constitutional:       Appearance: She is obese. She is not ill-appearing or toxic-appearing.      Comments: Patient resting in bed. Physical exam limited by patient refusal.   HENT:      Head: Normocephalic and atraumatic.      Nose: No rhinorrhea.   Eyes:      Conjunctiva/sclera: Conjunctivae normal.      Comments: Minimal  bilateral eyelid swelling. No crusting or drainage present.   Pulmonary:      Effort: No accessory muscle usage or respiratory distress.   Musculoskeletal:         General: No swelling.      Right lower leg: No edema.      Left lower leg: No edema.   Neurological:      General: No focal deficit present.      Mental Status: She is alert and oriented to person, place, and time.   Psychiatric:         Behavior: Behavior is uncooperative.      Laboratory:  Most Recent Data:  CBC:   Recent Labs   Lab 08/16/23  0710 08/17/23  0326 08/18/23  0317   WBC 7.59 13.35* 13.56*   HGB 13.3 13.4 13.8   HCT 41.2 40.8 42.8    206 210   .7* 100.7* 100.7*     BMP:   Lab Results   Component Value Date     08/18/2023    K 4.2 08/18/2023     02/19/2023    CO2 30 (H) 08/18/2023    BUN 10.7 08/18/2023    CREATININE 1.04 (H) 08/18/2023    GLU 97 02/19/2023    CALCIUM 9.4 08/18/2023    MG 2.40 08/18/2023    PHOS 3.2 08/18/2023     LFTs:   Lab Results   Component Value Date    PROT 7.5 02/19/2023    ALBUMIN 3.4 (L) 08/18/2023    BILITOT 0.5 08/18/2023    AST 13 08/18/2023    ALKPHOS 62 08/18/2023    ALT 9 08/18/2023     Thyroid:   Lab Results   Component Value Date    TSH 22.988 (H) 08/16/2023    FREET4 3.09 (H) 01/22/2023     Anemia:   Lab Results   Component Value Date    CDGLCUGS60 426 08/17/2023    FOLATE 5.2 (L) 08/17/2023     Cardiac:   Lab Results   Component Value Date    TROPONINI <0.010 08/16/2023    BNP 51.9 08/16/2023       Microbiology Data:  8/16 Both blood culture sites negative at 24 hours.  8/16 Respiratory culture showed normal respiratory magnus.    Other Results:  EKG (my interpretation): normal sinus rhythm.    Radiology:  Imaging Results              CT Abdomen Pelvis With Contrast (Final result)  Result time 08/16/23 11:38:21      Final result by Genesis Iraheta MD (08/16/23 11:38:21)                   Impression:      Acute on chronic diverticular disease with persistent inflammatory  changes at the sigmoid colon with improved though small residual mural abscess tracking laterally toward the peritoneal lining.  Overall inflammatory fat stranding is improved compared to the previous exam.      Electronically signed by: Genesis Iraheta  Date:    08/16/2023  Time:    11:38               Narrative:    EXAMINATION:  CT ABDOMEN PELVIS WITH CONTRAST    CLINICAL HISTORY:  LLQ abdominal pain;    TECHNIQUE:  Helically acquired images with axial, sagittal and coronal reformations were obtained from the lung bases to the pubic symphysis after the IV administration of contrast.    Automated tube current modulation, weight-based exposure dosing, and/or iterative reconstruction technique utilized to reach lowest reasonably achievable exposure rate.    DLP: 1558 mGy*cm    COMPARISON:  CT abdomen pelvis 07/22/2023    FINDINGS:  LUNG BASES: Left basilar atelectasis.    LIVER: Normal attenuation. No appreciable focal hepatic lesion.    BILIARY: No calcified gallstones.    PANCREAS: No inflammatory change.    SPLEEN: Normal in size    ADRENALS: No mass.    KIDNEYS/URETERS: The kidneys enhance symmetrically.  No hydronephrosis.    GI TRACT/MESENTERY:  No evidence of bowel obstruction.  Diffuse diverticulosis.  There is wall thickening and edema at the distal descending/proximal sigmoid colon.  Trace residual encapsulated collection at the previously seen mural abscess estimated to measure 2.5 x 0.9 cm on today's exam compared to 3.4 x 1.5 cm previously.  Collection tracks laterally to abut the peritoneum in the left lower quadrant..    PERITONEUM: Trace free intraperitoneal fluid.No free air.    LYMPH NODES: No enlarged lymph nodes by size criteria.    VASCULATURE: No significant atherosclerosis or aneurysm.    BLADDER: Normal appearance given degree of distention.    REPRODUCTIVE ORGANS: Intramural fibroid at the anterior lower uterine segment.    SOFT TISSUES: There are small fat containing inguinal  hernias.    BONES: Degenerative change at the lumbosacral junction.                                       CTA Chest Non-Coronary (PE Studies) (Final result)  Result time 08/16/23 11:46:04      Final result by John Bowens MD (08/16/23 11:46:04)                   Impression:      1. No pulmonary embolism identified.  2. Endobronchial material bilaterally, greater on the left, with mild left lung base atelectasis and scattered small areas of ground-glass in the medial right lung.  Suspect this endobronchial material relates to infection or inflammation.      Electronically signed by: John Bowens  Date:    08/16/2023  Time:    11:46               Narrative:    EXAMINATION:  CTA CHEST NON CORONARY (PE STUDIES)    CLINICAL HISTORY:  Pulmonary embolism (PE) suspected, positive D-dimer;    TECHNIQUE:  CTA imaging of the chest after IV contrast. Axial, coronal and sagittal reconstructions, including MIP images, are reviewed. Dose length product 1558 mGycm. Automatic exposure control, adjustment of mA/kV or iterative reconstruction technique used to limit radiation dose.    COMPARISON:  CT 04/11/2023    FINDINGS:  Diagnostic quality: Adequate    Pulmonary embolism: None identified.    Lung parenchyma: Areas of endobronchial debris and bronchial wall thickening in the perihilar region on the left.  Mild left lung base atelectasis.  Smaller volume endobronchial material in the right lower lobe with few small areas of mild ground-glass medially in the right lung.    Pleural effusion: None.    Mediastinum/bruce: Normal heart size and thoracic aortic caliber.  No pathologically enlarged lymph node.    Chest wall/axilla: No significant findings.    Upper abdomen: Better assessed on concurrent abdominal CT.    Bones: No acute osseous process.                                       X-Ray Chest AP Portable (Final result)  Result time 08/16/23 10:14:47      Final result by John Bowens MD (08/16/23 10:14:47)                    Impression:      No acute pulmonary process identified.      Electronically signed by: John Bowens  Date:    08/16/2023  Time:    10:14               Narrative:    EXAMINATION:  XR CHEST AP PORTABLE    CLINICAL HISTORY:  Shortness of breath    TECHNIQUE:  Frontal view(s) of the chest.    COMPARISON:  Radiography 05/20/2023    FINDINGS:  Normal cardiac silhouette.  The lungs are well-inflated.  No consolidation identified.  No significant pleural effusion or discernible pneumothorax.                                      Current Medications:     Scheduled:   albuterol-ipratropium  3 mL Nebulization Q6H WAKE    azithromycin  500 mg Intravenous Q24H    budesonide  0.5 mg Nebulization Q12H    cyanocobalamin  1,000 mcg Oral Daily    docusate sodium  100 mg Oral Daily    enoxparin  40 mg Subcutaneous Daily    folic acid (FOLVITE) IVPB  1 mg Intravenous Daily    meropenem (MERREM) IVPB  2 g Intravenous Q8H    montelukast  10 mg Oral Daily    pantoprazole  40 mg Oral Daily    predniSONE  50 mg Oral Daily        PRN:  sodium chloride 0.9%, acetaminophen, fluticasone propionate, HYDROcodone-acetaminophen, melatonin, ondansetron, sodium chloride 0.9%, sodium chloride 3%    Antibiotics and Day Number of Therapy:  Antibiotics (From admission, onward)      Start     Stop Route Frequency Ordered    08/16/23 2215  meropenem (MERREM) 2 g in sodium chloride 0.9% 100 mL IVPB         -- IV Every 8 hours (non-standard times) 08/16/23 1451    08/16/23 1430  azithromycin (ZITHROMAX) 500 mg in dextrose 5 % (D5W) 250 mL IVPB (Vial-Mate)         -- IV Every 24 hours (non-standard times) 08/16/23 1330            Assessment & Plan:     Recurrent Diverticulitis       - Patient has presented to ED for acute on chronic diverticulitis this past month       - Upon chart review, she had an abscess drained in 2/16/23 which showed ESBL treated with meropenem x 7 days       - 8/16 CT Abdomen Pelvis showed a trace residual encapsulated collection  at the previously seen mural abscess estimated   to measure 2.5 x 0.9 cm with a collection tracking laterally to but at the peritoneum in the left lower quadrant. Reported to have decreased in the interval however upon review of previous CT there is no mention of size       - Patient allergic to Zosyn and Flagyl       - Continue meropenem. ID following       - Patient refusing surgery at this time, surgery signed off       - Patient states interest in IV antibiotics course during rounds this AM. Will plan for PICC placement       - Recommend patient undergo colonoscopy on outpatient basis 6 weeks after antibiotic course to evaluate for IBD       - Will order fecal calprotectin       - Norco 5mg q6hr PRN    Mild-Moderate Persistent Asthma  Acute Hypoxic Respiratory Failure  CAP vs Bronchitis       - CTA negative for PE but did show RUL and LLL consolidation vs atelectasis       - Respiratory culture showed normal respiratory magnus       - Continue prednisone, duonebs, montelukast, budesonide, and azithromycin        Allergic Conjuctivits       - Minimal eyelid swelling this AM without conjunctivitis or rhinorrhea       - Continue current management    Hyperthyroidism       - 8/16 TSH 22.988 and T4 <0.42       - Discontinued propranolol and methimazole       - TPO and TSI/TrAb pending       - Will schedule follow up in Endocrine clinic in 2 weeks    Psychosis       - Could be associated with steroid or hypothyroidism       - Will continue to monitor    Macrocytic Anemia        - B12 normal at 426        - Folate decreased at 5.2        - Supplement folic acid 1mg IV daily     CODE STATUS: Full  Access: Peripheral  Antibiotics: Meropenem and Azithromycin  Diet: Full Liquid  DVT Prophylaxis: Lovenox  GI Prophylaxis: Protonix  Fluids: N/A      Disposition: Niya Moeller is a 54 yo female with PMH of asthma, recurrent diverticulitis, GERD and hyperthyroidism who presented with acute asthma exacerbation and acute on  chronic diverticulitis. CXR showed no acute pulmonary process, will continue current management. Patient initiated on Meropenem for trace residual encapsulation at prior abscess site. Patient not interested in surgery at this time but expressed interest in prolonged antibiotic course. Will plan for PICC placement. She will need a colonoscopy on outpatient basis 6 weeks after finishing the antibiotic course to evaluate for IBD. Endocrine consulted for hypothyroidism with recommendations to discontinue methimazole and propranolol. TPO, TSI, and Tab pending. Will continue to monitor.      Tricia Birmingham, MS4  [facility] Internal Medicine

## 2023-08-19 ENCOUNTER — TELEPHONE (OUTPATIENT)
Dept: ENDOCRINOLOGY | Facility: CLINIC | Age: 53
End: 2023-08-19
Payer: MEDICAID

## 2023-08-19 LAB
BACTERIA SPEC CULT: NORMAL
GRAM STN SPEC: NORMAL
GRAM STN SPEC: NORMAL
TSH RECEP AB SER-ACNC: 2.23 IU/L (ref 0–1.75)

## 2023-08-19 NOTE — TELEPHONE ENCOUNTER
Saw pt as inpt consult.  Has initial visit already booked with you at end of this month.    Shelby showed iatrogenic hypothyroidism from MMI.  Workup suggests she could have underlying Graves' d/s given positive ab.     I stopped the MMI due to uncertainty of d/s and status (this has been ongoing for yrs w/o regular f/u as best I can tell).    She was d/c off MMI w/ plan to keep f/u 8/31/23 w/ TFTs the morning off prior to being seen to ascertain status.  The goal is to follow off MMI.  If she ever trends hyperthyroid, this would be an indication of Graves and consideration given to surgery vs REDDY.  If she goes and stays euthyroid, then she could be followed over time to assess for remission.    Please let me know if you have any questions.

## 2023-08-19 NOTE — CARE UPDATE
Called by nurse regarding pt discharge. Apparently was told pt was discharged by previous shift and only required transportation, however, there was no discharge order, discharge medication reconciliation also not done. Per sign out, discharge in the am. At time of call, cab was already called and pt wheeled down and already left hospital.

## 2023-08-19 NOTE — DISCHARGE SUMMARY
[facility] Tele[facility]    Admitting Physician: Dez Jones MD  Attending Physician: Dez Jones MD  Date of Admit: 8/16/2023  Date of Discharge: 8/19/2023    Condition: Good  Outcome: Condition has improved and patient is now ready for discharge.  DISPOSITION: Home or Self Care    Discharge Diagnoses     Patient Active Problem List   Diagnosis    Obesity    Asthma exacerbation    GERD (gastroesophageal reflux disease)    Diverticulitis    Colitis    Moderate persistent asthma with (acute) exacerbation    Hyperthyroidism    Thyroiditis    Diastolic dysfunction, left ventricle    Tachycardia    Other chest pain    Hypoglycemia    Thrombocytopathia    Diverticulitis of intestine with abscess    Asthma    Acute abdominal pain    Acute diverticulitis    Allergic conjunctivitis and rhinitis    Macrocytic anemia       Principal Problem:  Diverticulitis    Consultants and Procedures     Consultants:  Consults (From admission, onward)          Status Ordering Provider     Inpatient consult to PICC team ([email or web address])  Once        Provider:  (Not yet assigned)    Acknowledged BRUCE OLIVIER     Inpatient consult to Social Work/Case Management  Once        Provider:  (Not yet assigned)    Completed CAROL FLORENCE     Inpatient consult to PICC team ([email or web address])  Once        Provider:  (Not yet assigned)    Acknowledged CAROL FLORENCE     Inpatient consult to Interventional Radiology  Once        Provider:  Juan Leavitt MD    Acknowledged CAROL FLORENCE     Inpatient consult to Endocrinology  Once        Provider:  Rell Beasley MD    Completed CAROL FLORENCE     Inpatient consult to Infectious Diseases  Once        Provider:  Justin Valdivia MD    Completed CAROL FLORENCE     Inpatient consult to General Surgery  Once        Provider:  Elmer Lee Jr., MD    Completed CAROL FLORENCE     Inpatient consult to Internal Medicine  Once        Provider:  Dez Jones MD     Acknowledged CAROL FLORENCE             Procedures:   * No surgery found *     Brief History of Present Illness      This is a 53-year-old  female with past medical history of asthma, recurrent diverticulitis, GERD, and hyperthyroidism.  She presents to the emergency department today with a myriad of complaints.  Her biggest complaint today is left lower quadrant abdominal pain.  States that she is had this problem for approximately 3 more weeks.  Twice last month she was seen in emergency room on the 14th and again on the 22nd with similar complaints.  The visit on the 14th she was sent home with moxifloxacin and on the 22nd she was sent home with Augmentin.  Since then pain has not resolved and she continues fairly consistent and persistent abdominal pain that is worsened with eating and not relieved with much else.  She does have bowel movements but these have to be forced with stool softeners.  Otherwise when having bowel movements she denies seeing blood or melena.  Other complaints include shortness of breath and eye swelling both of which started approximately 2-3 days ago.  Although per chart review it seems that she has had this bilateral eyelid swelling for several weeks now.  Nonetheless shortness of breath is now acute.  It is associated with rhinorrhea and sinus congestion.  States that she is normal able to walk from her bed to her bathroom but since onset of of symptoms she has been barely able to do so. She also endorses cough productive of thick yellow sputum. She tells me that she is had asthma since she was a child she is on Symbicort and does have albuterol.  States normally that she uses albuterol 3 times a week and does not have any nighttime awakenings.  However since onset of symptoms she is been using her albuterol inhaler 5 times daily and has some nighttime awakenings.     On arrival she was tachycardic with a heart rate of 112, tachypneic with respiratory rate 26, and  hypertensive blood pressure 141/71 satting 94% on 4 L nasal cannula.  On evaluation vitals improved no longer tachycardic or tachypneic.  No white count.  CMP relatively unremarkable aside from creatinine that is slightly elevated from baseline.    Hospital Course with Pertinent Findings     She was admitted patient was admitted to the hospital medicine service for recurrent diverticulitis.  On admission surgery was consulted for evaluation in addition to Infectious Disease as she has complicated history of diverticulitis with resistant organisms.  After evaluation by surgery patient did not want to have any surgical procedure done.  Infectious Disease saw the patient and with her complicated history agreed with meropenem inpatient.  Also recommended that she do have either surgery or IR drainage of said abscess to repeat cultures.  Again patient did not want to pursue surgery therefore IR was consulted for IR drainage placement.  However, Interventional Radiology stated that this would not be something that would need a drain and is recommended IV antibiotics.  Therefore consult was placed the afternoon for PICC line and OPAT.  It was not my plan to discharge her yesterday evening rather discharge this morning.  However it appears that she did get her PICC, get her 1st dose of ertapenem, and did get OPAT set up and was discharged without any orders placed.  She will follow-up with her PCP.  She will get set up in our infectious disease clinic.  She will have a repeat CT in 3 weeks to evaluate abscess size to determine if prolonged IV antibiotics would be needed.  Additionally behoove her to see a gastroenterologist to evaluate for IBD.  For this message was sent to our GI coordinator to see if would be possible to schedule for a colonoscopy in 10 weeks.  Additionally, was discovered that patient is not hypothyroid and is in fact likely hypothyroid.  Methimazole was discontinued in addition to propranolol.  She will  "need to follow up with endocrinology.  Thyroid antibodies obtain this visit still pending.    Discharge physical exam:  Vitals  BP: (!) 149/81  Temp: 98.1 °F (36.7 °C)  Temp Source: Oral  Pulse: (!) 50  Resp: 18  SpO2: 99 %  Height: 4' 11" (149.9 cm)  Weight: 73.4 kg (161 lb 13.1 oz)    Physical Exam  Vitals and nursing note reviewed.   Constitutional:       General: She is not in acute distress.     Appearance: Normal appearance. She is normal weight. She is not ill-appearing or toxic-appearing.   HENT:      Head: Normocephalic and atraumatic.   Eyes:      Extraocular Movements: Extraocular movements intact.      Conjunctiva/sclera: Conjunctivae normal.      Pupils: Pupils are equal, round, and reactive to light.   Cardiovascular:      Rate and Rhythm: Normal rate and regular rhythm.      Pulses: Normal pulses.      Heart sounds: Normal heart sounds. No murmur heard.  Pulmonary:      Effort: Pulmonary effort is normal.      Breath sounds: No stridor. Wheezing present. No rhonchi.   Abdominal:      General: Bowel sounds are normal. There is no distension.      Palpations: Abdomen is soft. There is no mass.      Tenderness: There is abdominal tenderness (LLQ).   Musculoskeletal:         General: No swelling.      Right lower leg: No edema.      Left lower leg: No edema.   Skin:     General: Skin is warm and dry.   Neurological:      General: No focal deficit present.      Mental Status: She is alert and oriented to person, place, and time    TIME SPENT ON DISCHARGE: 60 minutes    Discharge Medications        Medication List        START taking these medications      polyethylene glycol 100-7.5-2.691 gram solution  Commonly known as: MOVIPREP  Follow paper instructions that were mailed by our GI clinic.            CONTINUE taking these medications      ARTIFICIAL TEARS (VINCENZO/MIN) 83-15 % Oint  Generic drug: white petrolatum-mineral oiL  Place into both eyes every evening.     budesonide-formoterol 160-4.5 mcg 160-4.5 " mcg/actuation Hfaa  Commonly known as: SYMBICORT  Inhale 2 puffs into the lungs every 12 (twelve) hours. Controller     fluticasone propionate 50 mcg/actuation nasal spray  Commonly known as: FLONASE  1 spray (50 mcg total) by Each Nostril route daily as needed for Allergies or Rhinitis.     montelukast 10 mg tablet  Commonly known as: SINGULAIR     omeprazole 40 MG capsule  Commonly known as: PRILOSEC  Take 1 capsule (40 mg total) by mouth once daily.            STOP taking these medications      amoxicillin-clavulanate 875-125mg 875-125 mg per tablet  Commonly known as: AUGMENTIN     methIMAzole 10 MG Tab  Commonly known as: TAPAZOLE     propranoloL 60 MG 24 hr capsule  Commonly known as: INDERAL LA            ASK your doctor about these medications      albuterol-ipratropium 2.5 mg-0.5 mg/3 mL nebulizer solution  Commonly known as: DUO-NEB  Take 3 mLs by nebulization every 4 (four) hours as needed for Wheezing. Rescue               Where to Get Your Medications        These medications were sent to [address] [address]      Phone: [phone]   polyethylene glycol 100-7.5-2.691 gram solution         Discharge Information:     Mr. Niya Moeller is being discharged Home or Self Care.    Discharge Procedure Orders   CT Abdomen Pelvis With Contrast   Standing Status: Future Standing Exp. Date: 08/18/24     Order Specific Question Answer Comments   Is the patient allergic to iodine contrast? No    Does this patient have impaired renal function? No    May the Radiologist modify the order per protocol to meet the clinical needs of the patient? Yes    Oral/Rectal Contrast instructions: NO Oral Contrast      TSH   Standing Status: Future Standing Exp. Date: 08/31/24     T4, Free   Standing Status: Future Standing Exp. Date: 08/31/24     T3, Free   Standing Status: Future Standing Exp. Date: 08/31/24     Ambulatory  referral/consult to Infectious Disease   Standing Status: Future   Referral Priority: Routine Referral Type: Consultation   Referral Reason: Specialty Services Required   Requested Specialty: Infectious Diseases   Number of Visits Requested: 1        Follow-Up Appointments:   Follow-up Information       Oswaldo Riggs NP Follow up.    Specialty: Family Medicine  Contact information:  [address] [place] [address]  PRIMARY CARE [facility]  [place] [address]  [phone]               [facility] - Emergency Dept Follow up.    Specialty: Emergency Medicine  Contact information:  [facility] [address] [phone]             [facility] - Infectious Disease .    Specialty: Infectious Diseases  Contact information:  [facility] [address] [phone]                             Deonte Diaz DO  Internal Medicine - PGY-3

## 2023-08-19 NOTE — PROCEDURES
"Niya Moeller is a 53 y.o. female patient.    Temp: 98 °F (36.7 °C) (08/18/23 1552)  Pulse: (!) 52 (08/18/23 1552)  Resp: 20 (08/18/23 1322)  BP: 106/65 (08/18/23 1552)  SpO2: (!) 94 % (08/18/23 1552)  Weight: 73.4 kg (161 lb 13.1 oz) (08/16/23 1725)  Height: 4' 11" (149.9 cm) (08/16/23 1725)    PICC  Date/Time: 8/18/2023 7:27 PM  Performed by: Nataliia Moore RN  Consent Done: Yes  Time out: Immediately prior to procedure a time out was called to verify the correct patient, procedure, equipment, support staff and site/side marked as required  Indications: med administration and vascular access  Anesthesia: local infiltration  Local anesthetic: lidocaine 1% without epinephrine  Anesthetic Total (mL): 4  Preparation: skin prepped with ChloraPrep  Skin prep agent dried: skin prep agent completely dried prior to procedure  Sterile barriers: all five maximum sterile barriers used - cap, mask, sterile gown, sterile gloves, and large sterile sheet  Hand hygiene: hand hygiene performed prior to central venous catheter insertion  Location details: right basilic  Catheter type: double lumen  Catheter size: 5 Fr  Catheter Length: 35cm    Ultrasound guidance: yes  Vessel Caliber: medium and patent, compressibility normal  Needle advanced into vessel with real time Ultrasound guidance.  Guidewire confirmed in vessel.  Sterile sheath used.  Number of attempts: 1  Post-procedure: blood return through all ports, sterile dressing applied and chlorhexidine patch    Assessment: placement verified by x-ray  Complications: none          Nataliia Moore RN  8/18/2023    "

## 2023-08-21 ENCOUNTER — LAB REQUISITION (OUTPATIENT)
Dept: LAB | Facility: HOSPITAL | Age: 53
End: 2023-08-21
Payer: MEDICAID

## 2023-08-21 ENCOUNTER — PATIENT OUTREACH (OUTPATIENT)
Dept: ADMINISTRATIVE | Facility: CLINIC | Age: 53
End: 2023-08-21
Payer: MEDICAID

## 2023-08-21 DIAGNOSIS — A49.9 BACTERIAL INFECTION, UNSPECIFIED: ICD-10-CM

## 2023-08-21 LAB
ALBUMIN SERPL-MCNC: 3.7 G/DL (ref 3.5–5)
ALBUMIN/GLOB SERPL: 1.3 RATIO (ref 1.1–2)
ALP SERPL-CCNC: 80 UNIT/L (ref 40–150)
ALT SERPL-CCNC: 13 UNIT/L (ref 0–55)
AST SERPL-CCNC: 16 UNIT/L (ref 5–34)
BACTERIA BLD CULT: NORMAL
BACTERIA BLD CULT: NORMAL
BASOPHILS # BLD AUTO: 0.02 X10(3)/MCL
BASOPHILS NFR BLD AUTO: 0.2 %
BILIRUB SERPL-MCNC: 0.2 MG/DL
BUN SERPL-MCNC: 7.7 MG/DL (ref 9.8–20.1)
CALCIUM SERPL-MCNC: 9.6 MG/DL (ref 8.4–10.2)
CHLORIDE SERPL-SCNC: 103 MMOL/L (ref 98–107)
CO2 SERPL-SCNC: 30 MMOL/L (ref 22–29)
CREAT SERPL-MCNC: 0.9 MG/DL (ref 0.55–1.02)
CRP SERPL-MCNC: 9.2 MG/L
EOSINOPHIL # BLD AUTO: 0.21 X10(3)/MCL (ref 0–0.9)
EOSINOPHIL NFR BLD AUTO: 2.5 %
ERYTHROCYTE [DISTWIDTH] IN BLOOD BY AUTOMATED COUNT: 14.5 % (ref 11.5–17)
ERYTHROCYTE [SEDIMENTATION RATE] IN BLOOD: 32 MM/HR (ref 0–20)
GFR SERPLBLD CREATININE-BSD FMLA CKD-EPI: >60 MLS/MIN/1.73/M2
GLOBULIN SER-MCNC: 2.9 GM/DL (ref 2.4–3.5)
GLUCOSE SERPL-MCNC: 89 MG/DL (ref 74–100)
HCT VFR BLD AUTO: 41.9 % (ref 37–47)
HGB BLD-MCNC: 13.4 G/DL (ref 12–16)
IMM GRANULOCYTES # BLD AUTO: 0.04 X10(3)/MCL (ref 0–0.04)
IMM GRANULOCYTES NFR BLD AUTO: 0.5 %
LYMPHOCYTES # BLD AUTO: 3.95 X10(3)/MCL (ref 0.6–4.6)
LYMPHOCYTES NFR BLD AUTO: 46.6 %
MCH RBC QN AUTO: 33 PG (ref 27–31)
MCHC RBC AUTO-ENTMCNC: 32 G/DL (ref 33–36)
MCV RBC AUTO: 103.2 FL (ref 80–94)
MONOCYTES # BLD AUTO: 0.91 X10(3)/MCL (ref 0.1–1.3)
MONOCYTES NFR BLD AUTO: 10.7 %
NEUTROPHILS # BLD AUTO: 3.35 X10(3)/MCL (ref 2.1–9.2)
NEUTROPHILS NFR BLD AUTO: 39.5 %
NRBC BLD AUTO-RTO: 0 %
PLATELET # BLD AUTO: 214 X10(3)/MCL (ref 130–400)
PMV BLD AUTO: 12.4 FL (ref 7.4–10.4)
POTASSIUM SERPL-SCNC: 4.1 MMOL/L (ref 3.5–5.1)
PROT SERPL-MCNC: 6.6 GM/DL (ref 6.4–8.3)
RBC # BLD AUTO: 4.06 X10(6)/MCL (ref 4.2–5.4)
SODIUM SERPL-SCNC: 142 MMOL/L (ref 136–145)
THYROGLOB AB SERPL IA-ACNC: <12 IU/ML
THYROID PEROXIDASE QUANT (OLG): <4 IU/ML
WBC # SPEC AUTO: 8.48 X10(3)/MCL (ref 4.5–11.5)

## 2023-08-21 PROCEDURE — 85025 COMPLETE CBC W/AUTO DIFF WBC: CPT | Performed by: NURSE PRACTITIONER

## 2023-08-21 PROCEDURE — 80053 COMPREHEN METABOLIC PANEL: CPT | Performed by: NURSE PRACTITIONER

## 2023-08-21 PROCEDURE — 86140 C-REACTIVE PROTEIN: CPT | Performed by: NURSE PRACTITIONER

## 2023-08-21 PROCEDURE — 85652 RBC SED RATE AUTOMATED: CPT | Performed by: NURSE PRACTITIONER

## 2023-08-21 NOTE — PROGRESS NOTES
C3 nurse spoke with Niya Moeller for a TCC post hospital discharge follow up call. The patient does not have a scheduled HOSFU appointment with Oswaldo Riggs NP and the patient does not want to schedule one.  Patient stated she has an appt with a pulmonologist in [place] on Wednesday of this week and will wait until then.. C3 nurse was unable to schedule HOSFU appointment in [facility].  Patient strongly encouraged to contact PCP to report recent fall and increasing abdominal pain but patient refused.  Patient confirms she is receiving her in home IV antibiotics and is taking an OTC stool softener.  Medication unable to be reconciled as it is not on alejandra's medication list.

## 2023-08-22 ENCOUNTER — NURSE TRIAGE (OUTPATIENT)
Dept: ADMINISTRATIVE | Facility: CLINIC | Age: 53
End: 2023-08-22
Payer: MEDICAID

## 2023-08-22 NOTE — TELEPHONE ENCOUNTER
Patient is now calling with HR of 140. I have advised as per protocol. She also is c/o abd pain from diverticulitis.  Patient states she is a black woman and she just want to have a message sent to Dr. Riggs.    Reason for Disposition   Visible sweat on face or sweat dripping down face    Additional Information   Negative: Passed out (i.e., lost consciousness, collapsed and was not responding)   Negative: Shock suspected (e.g., cold/pale/clammy skin, too weak to stand, low BP, rapid pulse)   Negative: Difficult to awaken or acting confused (e.g., disoriented, slurred speech)    Protocols used: Heart Rate and Heartbeat [id number]

## 2023-08-22 NOTE — TELEPHONE ENCOUNTER
Pt with heart rate of 39-40, tired all day, lightheaded.  Care advice states to call 911.  Patient verbally understands, all questions answered, advised to call back for any worsening symptoms or further needs.     Reason for Disposition   [1] Dizziness, lightheadedness, or weakness AND [2] heart beating very slowly (e.g., < 50 / minute)    Additional Information   Negative: Passed out (i.e., lost consciousness, collapsed and was not responding)   Negative: Shock suspected (e.g., cold/pale/clammy skin, too weak to stand, low BP, rapid pulse)   Negative: Difficult to awaken or acting confused (e.g., disoriented, slurred speech)   Negative: Visible sweat on face or sweat dripping down face   Negative: Unable to walk, or can only walk with assistance (e.g., requires support)   Negative: [1] Received SHOCK from implantable cardiac defibrillator AND [2] persisting symptoms (i.e., palpitations, lightheadedness)   Negative: [1] Dizziness, lightheadedness, or weakness AND [2] heart beating very rapidly (e.g., > 140 / minute)    Protocols used: Heart Rate and Heartbeat [id number]     Dressing: bandage

## 2023-08-24 ENCOUNTER — DOCUMENTATION ONLY (OUTPATIENT)
Dept: INFECTIOUS DISEASES | Facility: HOSPITAL | Age: 53
End: 2023-08-24
Payer: MEDICAID

## 2023-08-24 ENCOUNTER — TELEPHONE (OUTPATIENT)
Dept: GASTROENTEROLOGY | Facility: CLINIC | Age: 53
End: 2023-08-24
Payer: MEDICAID

## 2023-08-24 NOTE — PROGRESS NOTES
[facility] Infectious Diseases OPAT Lab Review Note      Medication: Ertapenem 1 gram IV daily x 28 days    Infusion Company: [facility]    Start date: 8/11/23  Stop date: 9/7/23      Labs 8/21/23 reviewed: WBC 8.48. H&H and lytes stable. BUN 7.7 with creat 0.90. AST 16 / ALT 13. ESR 32. CRP 9.2.         Assessment / Plan:   Recurrent Diverticulitis with abscess  History of ESBL E coli      No leukocytosis. Inflammatory markers are elevated, but no prior values for comparison. Will continue to trend  Continue current treatment  Pt to get repeat CT abdomen / pelvis on 9/8/23 prior to ID visit so that can assess progress with treatment plan      CT abdomen / pelvis: 9/8/23  Follow up ID appointment: 9/12/23

## 2023-08-24 NOTE — TELEPHONE ENCOUNTER
"Spoke with pt. Advised of Colonoscopy date. Verbalized understanding. States, "I need you to know that I have had a bowel perforation twice while taking the stuff to clean you out. I had to be hospitalized both times. It happened with the liquid you drink and the Miralax. I can't take either of those. In [place] when you have a perforation or even an abscess they don't give you pain medicine, even if you are writhing in pain. I don't like pain medicine, but when you are in that much pain, you need something. I think it is because of my demographic. That may not be the case, but it's what I think. I need to do a longer prep just with stool softeners. If we can't do that, I need to have the colonoscopy scheduled in [place]. I know in [place] I will receive appropriate medical care. If if could see the doctor before we schedule the colonoscopy to explain, that may help." Informed pt that Dr. Lance does not have any appointments available prior to November. I explained that I will discuss this with Dr. Lance and get back with her. Verbalized understanding.  "

## 2023-08-24 NOTE — TELEPHONE ENCOUNTER
----- Message from Rosalba Sandoval MA sent at 8/22/2023 12:55 PM CDT -----  Regarding: FW: Colonoscopy scheduling  PT scheduled. Mailed instructions.  Called PT to see if instructions were received.  No answer, Left a message for call back.  ----- Message -----  From: Deonte Diaz DO  Sent: 8/22/2023  10:05 AM CDT  To: Rosalba Sandoval MA  Subject: RE: Colonoscopy scheduling                       Thank you madalyn    ----- Message -----  From: Rosalba Sandoval MA  Sent: 8/18/2023   1:50 PM CDT  To: Deonte Diaz DO  Subject: RE: Colonoscopy scheduling                       Sure, we can make it work  ----- Message -----  From: Deonte Diaz DO  Sent: 8/18/2023   1:36 PM CDT  To: Rosalba Sandoval MA  Subject: Colonoscopy scheduling                           Good afternoon,   I was wondering if this patient could be scheduled for a colonoscopy in 10 weeks. She has recurrent diverticulitis and is currently hospitalized for another bout of this. She was supposed to have a colonoscopy in the past but for personal reasons was not done.     Thank you.

## 2023-08-25 ENCOUNTER — PATIENT OUTREACH (OUTPATIENT)
Dept: EMERGENCY MEDICINE | Facility: HOSPITAL | Age: 53
End: 2023-08-25
Payer: MEDICAID

## 2023-08-25 LAB — TSI SER-ACNC: 1.4 TSI INDEX

## 2023-08-26 ENCOUNTER — HOSPITAL ENCOUNTER (EMERGENCY)
Facility: HOSPITAL | Age: 53
Discharge: HOME | End: 2023-08-27
Attending: EMERGENCY MEDICINE
Payer: MEDICAID

## 2023-08-26 DIAGNOSIS — Z78.9 PROBLEM WITH VASCULAR ACCESS: ICD-10-CM

## 2023-08-26 DIAGNOSIS — H10.9 CONJUNCTIVITIS, UNSPECIFIED CONJUNCTIVITIS TYPE, UNSPECIFIED LATERALITY: Primary | ICD-10-CM

## 2023-08-26 DIAGNOSIS — T82.898A OCCLUSION OF PERIPHERALLY INSERTED CENTRAL CATHETER (PICC) LINE, INITIAL ENCOUNTER: ICD-10-CM

## 2023-08-26 PROCEDURE — 99283 EMERGENCY DEPT VISIT LOW MDM: CPT

## 2023-08-26 NOTE — Clinical Note
"Niya Byers" Sajan was seen and treated in our emergency department on 8/26/2023.  She may return to work on 08/29/2023.       If you have any questions or concerns, please don't hesitate to call.      JAS Jensen RN    "

## 2023-08-27 VITALS
OXYGEN SATURATION: 100 % | HEART RATE: 82 BPM | HEIGHT: 59 IN | RESPIRATION RATE: 16 BRPM | BODY MASS INDEX: 31.16 KG/M2 | TEMPERATURE: 98 F | DIASTOLIC BLOOD PRESSURE: 75 MMHG | WEIGHT: 154.56 LBS | SYSTOLIC BLOOD PRESSURE: 101 MMHG

## 2023-08-27 PROCEDURE — 63600175 PHARM REV CODE 636 W HCPCS: Mod: JZ,JG | Performed by: EMERGENCY MEDICINE

## 2023-08-27 PROCEDURE — 25000003 PHARM REV CODE 250: Performed by: EMERGENCY MEDICINE

## 2023-08-27 PROCEDURE — 87040 BLOOD CULTURE FOR BACTERIA: CPT | Performed by: EMERGENCY MEDICINE

## 2023-08-27 RX ORDER — DEXAMETHASONE SODIUM PHOSPHATE 1 MG/ML
2 SOLUTION/ DROPS OPHTHALMIC
Status: DISCONTINUED | OUTPATIENT
Start: 2023-08-27 | End: 2023-08-27

## 2023-08-27 RX ORDER — TOBRAMYCIN AND DEXAMETHASONE 3; 1 MG/ML; MG/ML
2 SUSPENSION/ DROPS OPHTHALMIC
Status: DISCONTINUED | OUTPATIENT
Start: 2023-08-27 | End: 2023-08-27

## 2023-08-27 RX ORDER — PREDNISOLONE ACETATE 10 MG/ML
1 SUSPENSION/ DROPS OPHTHALMIC
Status: COMPLETED | OUTPATIENT
Start: 2023-08-27 | End: 2023-08-27

## 2023-08-27 RX ADMIN — PREDNISOLONE ACETATE 1 DROP: 10 SUSPENSION/ DROPS OPHTHALMIC at 05:08

## 2023-08-27 RX ADMIN — ALTEPLASE 2 MG: 2.2 INJECTION, POWDER, LYOPHILIZED, FOR SOLUTION INTRAVENOUS at 01:08

## 2023-08-27 NOTE — ED PROVIDER NOTES
Encounter Date: 8/26/2023       History     Chief Complaint   Patient presents with    Vascular Access Problem     Pt reports leaking PICC line, SOB and eye drainage      Requesting steroid eye drop; reporting occlusion right PICC line;      Eye Pain   This is a chronic problem. The current episode started several weeks ago. The problem occurs daily. The problem has been waxing and waning. Both eyes are affected. There was no injury mechanism. The pain is at a severity of 2/10. There is No history of trauma to the eye. There is No known exposure to pink eye. Associated symptoms include eye redness. Pertinent negatives include no numbness, no blurred vision, no decreased vision, no discharge, no double vision, no foreign body sensation, no photophobia, no nausea and no vomiting. Treatments tried: presents requesting ophthalmic steroid reporting the one prescribed in clinic had helped but cannot be refilled until tuesday, because it had to be ordered from pharmacy. Improvement on treatment: as above.     Review of patient's allergies indicates:   Allergen Reactions    Zosyn [piperacillin-tazobactam] Swelling    Decadron-la Itching    Latex      Past Medical History:   Diagnosis Date    Asthma     COPD (chronic obstructive pulmonary disease)     Diverticulosis     GERD (gastroesophageal reflux disease)      No past surgical history on file.  No family history on file.  Social History     Tobacco Use    Smoking status: Every Day     Current packs/day: 0.50     Average packs/day: 0.5 packs/day for 40.7 years (20.3 ttl pk-yrs)     Types: Cigarettes     Start date: 1983    Smokeless tobacco: Current    Tobacco comments:     Ambulatory referral to Smoking Cessation clinic following hospital discharge.    Substance Use Topics    Alcohol use: Not Currently    Drug use: Not Currently     Review of Systems   Eyes:  Positive for pain and redness. Negative for blurred vision, double vision, photophobia and discharge.    Gastrointestinal:  Negative for nausea and vomiting.   Neurological:  Negative for numbness.   All other systems reviewed and are negative.      Physical Exam     Initial Vitals [08/26/23 2356]   BP Pulse Resp Temp SpO2   112/66 85 18 97.5 °F (36.4 °C) 99 %      MAP       --         Physical Exam    Nursing note and vitals reviewed.  Constitutional: She appears well-developed and well-nourished. She is not diaphoretic. No distress.   HENT:   Head: Normocephalic and atraumatic.   Right Ear: External ear normal.   Left Ear: External ear normal.   Mild bilateral conjunctivitis ;   Eyes: EOM are normal. Pupils are equal, round, and reactive to light. Right eye exhibits no discharge. Left eye exhibits no discharge.   Neck: Neck supple. No thyromegaly present. No tracheal deviation present. No JVD present.   Normal range of motion.  Cardiovascular:  Normal rate, regular rhythm, normal heart sounds and intact distal pulses.     Exam reveals no gallop and no friction rub.       No murmur heard.  Pulmonary/Chest: Breath sounds normal. No stridor. No respiratory distress. She has no wheezes. She has no rhonchi. She has no rales.   Abdominal: Abdomen is soft. Bowel sounds are normal. She exhibits no distension. There is no abdominal tenderness. There is no rebound and no guarding.   Musculoskeletal:         General: No tenderness or edema. Normal range of motion.      Cervical back: Normal range of motion and neck supple.     Neurological: She is alert and oriented to person, place, and time. She has normal strength. No cranial nerve deficit or sensory deficit. GCS score is 15. GCS eye subscore is 4. GCS verbal subscore is 5. GCS motor subscore is 6.   Skin: Skin is warm and dry. Capillary refill takes less than 2 seconds. No rash and no abscess noted. No erythema. No pallor.   Psychiatric: She has a normal mood and affect. Her behavior is normal. Judgment and thought content normal.         ED Course   Procedures  Labs  Reviewed   BLOOD CULTURE [facility]   BLOOD CULTURE [facility]     EKG Readings: (Independently Interpreted)   NSR @ 92, non acute and non ischemic appearing ;       Imaging Results    None          Medications   prednisoLONE acetate 1 % ophthalmic suspension 1 drop (has no administration in time range)   alteplase injection 2 mg (2 mg Intra-Catheter Given 8/27/23 0130)     Medical Decision Making  Patient presents this evening endorsing that her midline catheter is clogged.  She is concerned that it may be infected.  This is a secondary complaint related to the ongoing antibiotics she is getting for diverticulitis.  Patient relatively well known to the service she would presented several times intermittently for conjunctivitis and also for diverticulitis.  She did not take the 1st 2 courses of medications that were prescribed for diverticulitis and ultimately required IV antibiotics she is here today principally because she wants an ophthalmic steroid that was prescribed by the ophthalmology clinic it had been improving her conjunctivitis, after she was given a prescription and also samples.  That the medication is not present in the pharmacy so it has been ordered and will be delivered on Tuesday.  She is requiring her next dose of antibiotics tomorrow hence also needs her vascular access functioning.    Amount and/or Complexity of Data Reviewed  External Data Reviewed: notes.  Labs: ordered. Decision-making details documented in ED Course.     Details: Blood cultures obtained from her PICC line are pending;    Risk  Prescription drug management.  Risk Details: PICC line occlusion is relieved with medications.  Patient is given an ophthalmic preparation of prednisolone.  She will refill her prescribed steroid on Tuesday as planned she will continue IV infusions for her diverticulitis tomorrow as planned.  She will follow up results of the blood cultures.  She is discharged home with anticipatory guidance, return precautions,  follow-up instructions.                               Clinical Impression:   Final diagnoses:  [T82.523K] Occlusion of peripherally inserted central catheter (PICC) line, initial encounter  [H10.9] Conjunctivitis, unspecified conjunctivitis type, unspecified laterality (Primary)  [Z78.9] Problem with vascular access        ED Disposition Condition    Discharge Stable          ED Prescriptions    None       Follow-up Information       Follow up With Specialties Details Why Contact Info    [facility] - Emergency Dept Emergency Medicine  As needed, If symptoms worsen [address] [id number] [phone]             Theodelle, Sid SAWYER MD  08/27/23 0586

## 2023-08-28 NOTE — PROGRESS NOTES
Spoke with patient following hospital discharge 23 due diverticulitis. She was discharged 23 with [facility] providing IV infusions daily for 28 days.She does report feeling better since starting the IV antibiotics. Patient  previously said she would have a Pulmonologist appointment 23 in [facility]. She now says she was not feeling good so she rescheduled the Pulmonology appointment for next month 23. She  mentioned she will also see a GI and Eye doctor all appointments in the same day and will start receiving all of her care in [facility]. She stated she does not feel she is receiving the care she needs in [place]. Appears to be negative on doctor recommendations following exams and test done. She told me she refuses to no longer see the IM staff or any of the surgery staff. Informed patient she does have a Colonoscopy scheduled at [facility] on 23. She said she doesn't know how the doctor can do a Colonoscopy on her without having seen her first and with her history of bad colon issues she would need a different prep prior to procedure. Her plan is to see the GI doctor 23 in [facility] and than get with [facility] regarding prep patient is referring too. Reminded patient she will need to complete thyroid blood work before her Endocrine Clinic appointment on 23. She said she was unaware of labs and that no one had told her this. Explained during her in patient stay Dr Beasley  the Endocrinologist was consulted 23 and ordered the labs for clinic follow up visit 23 for evaluation of thyroid levels in regards to Rx medication dosage. Advised to report to the lab and give her name/ ,no appointment is needed and her orders are in the computer dated 23. Voices understanding.

## 2023-08-29 ENCOUNTER — LAB REQUISITION (OUTPATIENT)
Dept: LAB | Facility: HOSPITAL | Age: 53
End: 2023-08-29
Payer: MEDICAID

## 2023-08-29 DIAGNOSIS — A49.9 BACTERIAL INFECTION, UNSPECIFIED: ICD-10-CM

## 2023-08-29 DIAGNOSIS — K57.01 DIVERTICULITIS OF SMALL INTESTINE WITH PERFORATION AND ABSCESS WITH BLEEDING: ICD-10-CM

## 2023-08-29 PROCEDURE — 85652 RBC SED RATE AUTOMATED: CPT | Performed by: NURSE PRACTITIONER

## 2023-08-29 PROCEDURE — 80053 COMPREHEN METABOLIC PANEL: CPT | Performed by: NURSE PRACTITIONER

## 2023-08-29 PROCEDURE — 86140 C-REACTIVE PROTEIN: CPT | Performed by: NURSE PRACTITIONER

## 2023-08-29 PROCEDURE — 85025 COMPLETE CBC W/AUTO DIFF WBC: CPT | Performed by: NURSE PRACTITIONER

## 2023-08-30 LAB
ALBUMIN SERPL-MCNC: 4 G/DL (ref 3.5–5)
ALBUMIN/GLOB SERPL: 1.3 RATIO (ref 1.1–2)
ALP SERPL-CCNC: 104 UNIT/L (ref 40–150)
ALT SERPL-CCNC: 33 UNIT/L (ref 0–55)
AST SERPL-CCNC: 30 UNIT/L (ref 5–34)
BASOPHILS # BLD AUTO: 0.04 X10(3)/MCL
BASOPHILS NFR BLD AUTO: 0.7 %
BILIRUB SERPL-MCNC: 0.4 MG/DL
BUN SERPL-MCNC: 7.4 MG/DL (ref 9.8–20.1)
CALCIUM SERPL-MCNC: 10 MG/DL (ref 8.4–10.2)
CHLORIDE SERPL-SCNC: 108 MMOL/L (ref 98–107)
CO2 SERPL-SCNC: 27 MMOL/L (ref 22–29)
CREAT SERPL-MCNC: 0.83 MG/DL (ref 0.55–1.02)
CRP SERPL-MCNC: 11.2 MG/L
EOSINOPHIL # BLD AUTO: 0.13 X10(3)/MCL (ref 0–0.9)
EOSINOPHIL NFR BLD AUTO: 2.2 %
ERYTHROCYTE [DISTWIDTH] IN BLOOD BY AUTOMATED COUNT: 13.5 % (ref 11.5–17)
ERYTHROCYTE [SEDIMENTATION RATE] IN BLOOD: 36 MM/HR (ref 0–20)
GFR SERPLBLD CREATININE-BSD FMLA CKD-EPI: >60 MLS/MIN/1.73/M2
GLOBULIN SER-MCNC: 3.1 GM/DL (ref 2.4–3.5)
GLUCOSE SERPL-MCNC: 107 MG/DL (ref 74–100)
HCT VFR BLD AUTO: 36.2 % (ref 37–47)
HGB BLD-MCNC: 11.6 G/DL (ref 12–16)
IMM GRANULOCYTES # BLD AUTO: 0.01 X10(3)/MCL (ref 0–0.04)
IMM GRANULOCYTES NFR BLD AUTO: 0.2 %
LYMPHOCYTES # BLD AUTO: 2.42 X10(3)/MCL (ref 0.6–4.6)
LYMPHOCYTES NFR BLD AUTO: 41.5 %
MCH RBC QN AUTO: 32.8 PG (ref 27–31)
MCHC RBC AUTO-ENTMCNC: 32 G/DL (ref 33–36)
MCV RBC AUTO: 102.3 FL (ref 80–94)
MONOCYTES # BLD AUTO: 0.63 X10(3)/MCL (ref 0.1–1.3)
MONOCYTES NFR BLD AUTO: 10.8 %
NEUTROPHILS # BLD AUTO: 2.6 X10(3)/MCL (ref 2.1–9.2)
NEUTROPHILS NFR BLD AUTO: 44.6 %
NRBC BLD AUTO-RTO: 0 %
PLATELET # BLD AUTO: 182 X10(3)/MCL (ref 130–400)
PMV BLD AUTO: 11.8 FL (ref 7.4–10.4)
POTASSIUM SERPL-SCNC: 4.1 MMOL/L (ref 3.5–5.1)
PROT SERPL-MCNC: 7.1 GM/DL (ref 6.4–8.3)
RBC # BLD AUTO: 3.54 X10(6)/MCL (ref 4.2–5.4)
SODIUM SERPL-SCNC: 144 MMOL/L (ref 136–145)
WBC # SPEC AUTO: 5.83 X10(3)/MCL (ref 4.5–11.5)

## 2023-08-30 NOTE — TELEPHONE ENCOUNTER
I am happy to meet with her but it may delay her procedure.   Looks like she was schedule in [facility] for a colonoscopy at some point this year, not sure why it was canceled.  She will need a bowel prep for the procedure.  I am ok with miralax prep or sutabs if that's what it takes.  A prep with stool softeners will not be sufficient.  This is standard at most places for colonoscopies, even [facility].  Ultimately up to the patient whether she does it here or there.

## 2023-08-31 ENCOUNTER — OFFICE VISIT (OUTPATIENT)
Dept: ENDOCRINOLOGY | Facility: CLINIC | Age: 53
End: 2023-08-31
Payer: MEDICAID

## 2023-08-31 VITALS
BODY MASS INDEX: 30.74 KG/M2 | DIASTOLIC BLOOD PRESSURE: 78 MMHG | HEART RATE: 86 BPM | WEIGHT: 152.5 LBS | HEIGHT: 59 IN | SYSTOLIC BLOOD PRESSURE: 115 MMHG | TEMPERATURE: 98 F

## 2023-08-31 DIAGNOSIS — E04.9 ENLARGED THYROID: ICD-10-CM

## 2023-08-31 DIAGNOSIS — E05.00 GRAVES DISEASE: Primary | ICD-10-CM

## 2023-08-31 DIAGNOSIS — E05.90 HYPERTHYROIDISM: ICD-10-CM

## 2023-08-31 PROCEDURE — 3074F PR MOST RECENT SYSTOLIC BLOOD PRESSURE < 130 MM HG: ICD-10-PCS | Mod: CPTII,,, | Performed by: NURSE PRACTITIONER

## 2023-08-31 PROCEDURE — 3078F DIAST BP <80 MM HG: CPT | Mod: CPTII,,, | Performed by: NURSE PRACTITIONER

## 2023-08-31 PROCEDURE — 1111F PR DISCHARGE MEDS RECONCILED W/ CURRENT OUTPATIENT MED LIST: ICD-10-PCS | Mod: CPTII,,, | Performed by: NURSE PRACTITIONER

## 2023-08-31 PROCEDURE — 99214 OFFICE O/P EST MOD 30 MIN: CPT | Mod: PBBFAC | Performed by: NURSE PRACTITIONER

## 2023-08-31 PROCEDURE — 99215 OFFICE O/P EST HI 40 MIN: CPT | Mod: S$PBB,,, | Performed by: NURSE PRACTITIONER

## 2023-08-31 PROCEDURE — 1111F DSCHRG MED/CURRENT MED MERGE: CPT | Mod: CPTII,,, | Performed by: NURSE PRACTITIONER

## 2023-08-31 PROCEDURE — 3008F BODY MASS INDEX DOCD: CPT | Mod: CPTII,,, | Performed by: NURSE PRACTITIONER

## 2023-08-31 PROCEDURE — 3074F SYST BP LT 130 MM HG: CPT | Mod: CPTII,,, | Performed by: NURSE PRACTITIONER

## 2023-08-31 PROCEDURE — 3078F PR MOST RECENT DIASTOLIC BLOOD PRESSURE < 80 MM HG: ICD-10-PCS | Mod: CPTII,,, | Performed by: NURSE PRACTITIONER

## 2023-08-31 PROCEDURE — 99215 PR OFFICE/OUTPT VISIT, EST, LEVL V, 40-54 MIN: ICD-10-PCS | Mod: S$PBB,,, | Performed by: NURSE PRACTITIONER

## 2023-08-31 PROCEDURE — 1160F PR REVIEW ALL MEDS BY PRESCRIBER/CLIN PHARMACIST DOCUMENTED: ICD-10-PCS | Mod: CPTII,,, | Performed by: NURSE PRACTITIONER

## 2023-08-31 PROCEDURE — 3008F PR BODY MASS INDEX (BMI) DOCUMENTED: ICD-10-PCS | Mod: CPTII,,, | Performed by: NURSE PRACTITIONER

## 2023-08-31 PROCEDURE — 1159F MED LIST DOCD IN RCRD: CPT | Mod: CPTII,,, | Performed by: NURSE PRACTITIONER

## 2023-08-31 PROCEDURE — 1160F RVW MEDS BY RX/DR IN RCRD: CPT | Mod: CPTII,,, | Performed by: NURSE PRACTITIONER

## 2023-08-31 PROCEDURE — 1159F PR MEDICATION LIST DOCUMENTED IN MEDICAL RECORD: ICD-10-PCS | Mod: CPTII,,, | Performed by: NURSE PRACTITIONER

## 2023-08-31 NOTE — PROGRESS NOTES
Subjective     Patient ID: Niya Moeller is a 53 y.o. female.    Chief Complaint: Hyperthyroidism    Endocrine clinic note 08/31/2023:  53-year-old female scheduled today as new patient referral to endocrine clinic for history of Graves disease/hyperthyroidism and enlarged thyroid.  Graves disease patient states she was diagnosed with hyperthyroidism multiple years ago when she states placed on medication and she states that time medication was stopped stating she was in remission.  Patient returned back hyperthyroid at the beginning of 2023 w TSH < 0.026, Free T4 3.89 patient was placed on methimazole 10 mg twice a day.  Remain on 10 mg twice a day 5 months and started having symptoms of being lethargic, periorbital edema and swelling to her legs and severe fatigue and weight gain. Labs repeated  TSH 22.988, free T4 <0.42 on 08/16/2023 MMI stopped at that time 2 weeks ago. Labs repeat today TSH 4.000, Free T4 0.90 today.  Patient reports a small amount of remaining periorbital edema fatigue is improving.  Instructed patient that will monitor her thyroid labs closely.  Patient states previously when she was hyperthyroid she had an enlarged thyroid, tremors, anxiety, insomnia, heat intolerance.  She states when she was on menses all times several months she started having cold intolerance, becoming more sleepy and symptoms of hypothyroidism with weight gain.  TraB 2.23, TSI 1.4 on 08/18/2023.  Patient states when she was hyperthyroid and thyroid was enlarged she can feel tightness around her neck and dysphagia she states she is mild symptoms of dysphagia now.       Review of Systems   Constitutional:  Positive for fatigue and unexpected weight change. Negative for activity change and appetite change.   HENT:  Negative for dental problem, hearing loss, tinnitus, trouble swallowing and goiter.    Eyes:  Negative for photophobia, pain and visual disturbance.   Respiratory:  Negative for cough, chest tightness and  wheezing.    Cardiovascular:  Negative for chest pain, palpitations and leg swelling.   Gastrointestinal:  Negative for abdominal pain, constipation, diarrhea, nausea and reflux.   Endocrine: Positive for cold intolerance. Negative for heat intolerance, polydipsia and polyphagia.   Genitourinary:  Negative for difficulty urinating, flank pain, hematuria, hot flashes, menstrual irregularity, menstrual problem, nocturia and urgency.   Musculoskeletal:  Negative for back pain, gait problem, joint swelling, leg pain and joint deformity.   Integumentary:  Negative for color change, pallor, rash and breast discharge.   Allergic/Immunologic: Negative for environmental allergies, food allergies and immunocompromised state.   Neurological:  Negative for tremors, seizures, headaches, coordination difficulties, memory loss and coordination difficulties.   Psychiatric/Behavioral:  Negative for agitation, behavioral problems and sleep disturbance. The patient is nervous/anxious.           Objective     Physical Exam  Constitutional:       General: She is not in acute distress.     Appearance: Normal appearance. She is not ill-appearing.   HENT:      Head: Normocephalic and atraumatic.      Right Ear: External ear normal.      Left Ear: External ear normal.      Nose: Nose normal. No congestion or rhinorrhea.      Mouth/Throat:      Mouth: Mucous membranes are moist.      Pharynx: Oropharynx is clear. No oropharyngeal exudate.   Eyes:      General:         Right eye: No discharge.         Left eye: No discharge.      Conjunctiva/sclera: Conjunctivae normal.      Pupils: Pupils are equal, round, and reactive to light.   Neck:      Thyroid: No thyroid mass, thyromegaly or thyroid tenderness.   Cardiovascular:      Rate and Rhythm: Normal rate and regular rhythm.      Pulses: Normal pulses.      Heart sounds: Normal heart sounds. No murmur heard.  Pulmonary:      Effort: Pulmonary effort is normal. No respiratory distress.       Breath sounds: Normal breath sounds.   Abdominal:      General: Abdomen is flat. Bowel sounds are normal. There is no distension.      Palpations: Abdomen is soft.      Tenderness: There is no abdominal tenderness.   Musculoskeletal:         General: No swelling or tenderness. Normal range of motion.      Cervical back: Normal range of motion and neck supple. No tenderness.      Right lower leg: No edema.      Left lower leg: No edema.   Feet:      Right foot:      Skin integrity: Skin integrity normal.      Left foot:      Skin integrity: Skin integrity normal.   Lymphadenopathy:      Cervical: No cervical adenopathy.   Skin:     General: Skin is warm and dry.      Coloration: Skin is not jaundiced or pale.   Neurological:      General: No focal deficit present.      Mental Status: She is alert and oriented to person, place, and time. Mental status is at baseline.      Coordination: Coordination normal.      Gait: Gait normal.   Psychiatric:         Mood and Affect: Mood normal.         Behavior: Behavior normal.         Thought Content: Thought content normal.            Assessment and Plan     1. Graves disease  Previous on MMI 10 mg twice a day stopped 2 weeks ago    TraB 2.23, TSI 1.4 on 08/18/2023  TSH 22.988, free T4 <0.42 on 08/16/2023 MMI stopped at that time by PCP   TSH 4.000, Free T4 0.90 today   Continue to hold MMI repeat labs in 3-4 weeks report any symptoms of returning hyperthyroidism sooner   Component Ref Range & Units 13 d ago   Thyrotropin Receptor Ab 0.00 - 1.75 IU/L 2.23 High       Component Ref Range & Units 13 d ago   Thyroid-Stimulating Immunoglob <=1.3 TSI index 1.4 High       Component Ref Range & Units 09:30 2 wk ago 1 yr ago   Thyroid Stimulating Hormone 0.350 - 4.940 uIU/mL 4.000  22.988 High   0.1176 R      Component Ref Range & Units 09:30 2 wk ago 7 mo ago 1 yr ago   Thyroxine Free 0.70 - 1.48 ng/dL 0.90  <0.42 Low   3.09 High  R  1.06 R      Component Ref Range & Units 7 mo ago    TSH 0.400 - 4.000 uIU/mL <0.026 Low       Component Ref Range & Units 7 mo ago 1 yr ago   Free T4 0.71 - 1.51 ng/dL 3.09 High   1.06 R    -     T4, Free; Future; Expected date: 09/14/2023  -     T3, Free (OLG); Future; Expected date: 09/14/2023  -     TSH; Future; Expected date: 09/14/2023    2. Hyperthyroidism  See Above   -     Ambulatory referral/consult to Endocrinology     Dispensed Days Supply Quantity Provider Pharmacy   METHIMAZOLE 10MG    TAB 07/12/2023 30 60 each Milvia Goodman, [facility] [id number] ...   METHIMAZOLE 10MG    TAB 06/14/2023 30 60 each Milvia Goodman, [facility] [address] ...   METHIMAZOLE 10MG    TAB 05/14/2023 30 60 each Milvia Goodman, [facility] [address] ...   METHIMAZOLE 10MG    TAB 04/04/2023 30 60 each Milvia Goodman, [facility] [address] ...   methIMAzole (TAPAZOLE) 10 MG Tab 02/14/2023 30 60 tablet Sol Aguirre NP [facility]     3. Enlarged thyroid  -     US Thyroid; Future; Expected date: 09/07/2023          Follow up in about 3 months (around 11/30/2023) for Hyperthyroidism, Graves.    I spent a total of 45 minutes on the day of the visit.  This includes face to face time and non-face to face time preparing to see the patient (eg, review of tests), obtaining and/or reviewing separately obtained history, documenting clinical information in the electronic or other health record, independently interpreting results and communicating results to the patient/family/caregiver, or care coordinator.

## 2023-09-01 ENCOUNTER — DOCUMENTATION ONLY (OUTPATIENT)
Dept: INFECTIOUS DISEASES | Facility: HOSPITAL | Age: 53
End: 2023-09-01
Payer: MEDICAID

## 2023-09-01 ENCOUNTER — DOCUMENTATION ONLY (OUTPATIENT)
Dept: INFECTIOUS DISEASES | Facility: CLINIC | Age: 53
End: 2023-09-01
Payer: MEDICAID

## 2023-09-01 LAB
BACTERIA BLD CULT: NORMAL
BACTERIA BLD CULT: NORMAL

## 2023-09-01 NOTE — PROGRESS NOTES
[facility] Infectious Diseases OPAT Lab Review Note      Medication: Ertapenem 1 gram IV daily x 28 days     Infusion Company: [facility]     Start date: 8/11/23  Stop date: 9/7/23      Lab Results   Component Value Date    WBC 5.83 08/29/2023    HGB 11.6 (L) 08/29/2023    HCT 36.2 (L) 08/29/2023    .3 (H) 08/29/2023     08/29/2023     CMP  Sodium   Date Value Ref Range Status   02/19/2023 144 136 - 145 mmol/L Final     Sodium Level   Date Value Ref Range Status   08/29/2023 144 136 - 145 mmol/L Final     Potassium   Date Value Ref Range Status   02/19/2023 4.3 3.5 - 5.1 mmol/L Final     Potassium Level   Date Value Ref Range Status   08/29/2023 4.1 3.5 - 5.1 mmol/L Final     Chloride   Date Value Ref Range Status   02/19/2023 108 95 - 110 mmol/L Final     CO2   Date Value Ref Range Status   02/19/2023 22 (L) 23 - 29 mmol/L Final     Carbon Dioxide   Date Value Ref Range Status   08/29/2023 27 22 - 29 mmol/L Final     Glucose   Date Value Ref Range Status   02/19/2023 97 70 - 110 mg/dL Final     BUN   Date Value Ref Range Status   02/19/2023 13 6 - 20 mg/dL Final     Blood Urea Nitrogen   Date Value Ref Range Status   08/29/2023 7.4 (L) 9.8 - 20.1 mg/dL Final     Creatinine   Date Value Ref Range Status   08/29/2023 0.83 0.55 - 1.02 mg/dL Final   02/19/2023 0.8 0.5 - 1.4 mg/dL Final     Calcium   Date Value Ref Range Status   02/19/2023 9.6 8.7 - 10.5 mg/dL Final     Calcium Level Total   Date Value Ref Range Status   08/29/2023 10.0 8.4 - 10.2 mg/dL Final     Total Protein   Date Value Ref Range Status   02/19/2023 7.5 6.0 - 8.4 g/dL Final     Albumin   Date Value Ref Range Status   02/19/2023 3.3 (L) 3.5 - 5.2 g/dL Final     Albumin Level   Date Value Ref Range Status   08/29/2023 4.0 3.5 - 5.0 g/dL Final     Total Bilirubin   Date Value Ref Range Status   02/19/2023 0.2 0.1 - 1.0 mg/dL Final     Comment:     For infants and newborns, interpretation of results should be based  on gestational age,  weight and in agreement with clinical  observations.    Premature Infant recommended reference ranges:  Up to 24 hours.............<8.0 mg/dL  Up to 48 hours............<12.0 mg/dL  3-5 days..................<15.0 mg/dL  6-29 days.................<15.0 mg/dL       Bilirubin Total   Date Value Ref Range Status   08/29/2023 0.4 <=1.5 mg/dL Final     Alkaline Phosphatase   Date Value Ref Range Status   08/29/2023 104 40 - 150 unit/L Final   02/19/2023 75 55 - 135 U/L Final     AST   Date Value Ref Range Status   02/19/2023 25 10 - 40 U/L Final     Comment:     Specimen slightly hemolyzed     Aspartate Aminotransferase   Date Value Ref Range Status   08/29/2023 30 5 - 34 unit/L Final     ALT   Date Value Ref Range Status   02/19/2023 46 (H) 10 - 44 U/L Final     Alanine Aminotransferase   Date Value Ref Range Status   08/29/2023 33 0 - 55 unit/L Final     Anion Gap   Date Value Ref Range Status   02/19/2023 14 8 - 16 mmol/L Final     eGFR   Date Value Ref Range Status   08/29/2023 >60 mls/min/1.73/m2 Final   02/19/2023 >60 >60 mL/min/1.73 m^2 Final     Lab Results   Component Value Date    CRP 11.20 (H) 08/29/2023      Lab Results   Component Value Date    SEDRATE 36 (H) 08/29/2023            Assessment / Plan:   Recurrent Diverticulitis with abscess  History of ESBL E coli        No leukocytosis. Inflammatory markers with slight continued upward trend.   Pt is due for repeat CT abdomen / pelvis on 9/8/23 (prior to ID visit). This may provide more insite.    Will continue to trend labs  Continue current treatment        CT abdomen / pelvis: 9/8/23  Follow up ID appointment: 9/12/23

## 2023-09-04 ENCOUNTER — HOSPITAL ENCOUNTER (OUTPATIENT)
Facility: HOSPITAL | Age: 53
Discharge: HOME | End: 2023-09-05
Attending: INTERNAL MEDICINE | Admitting: INTERNAL MEDICINE
Payer: MEDICAID

## 2023-09-04 DIAGNOSIS — R10.32 LLQ ABDOMINAL PAIN: ICD-10-CM

## 2023-09-04 DIAGNOSIS — R07.9 CHEST PAIN: ICD-10-CM

## 2023-09-04 DIAGNOSIS — K57.90 DIVERTICULOSIS: Primary | ICD-10-CM

## 2023-09-04 DIAGNOSIS — R10.31 RLQ ABDOMINAL PAIN: ICD-10-CM

## 2023-09-04 LAB
ALBUMIN SERPL-MCNC: 3.8 G/DL (ref 3.5–5)
ALBUMIN/GLOB SERPL: 1.1 RATIO (ref 1.1–2)
ALP SERPL-CCNC: 103 UNIT/L (ref 40–150)
ALT SERPL-CCNC: 21 UNIT/L (ref 0–55)
AST SERPL-CCNC: 21 UNIT/L (ref 5–34)
BASOPHILS # BLD AUTO: 0.02 X10(3)/MCL
BASOPHILS NFR BLD AUTO: 0.4 %
BILIRUB SERPL-MCNC: 0.4 MG/DL
BUN SERPL-MCNC: 10.6 MG/DL (ref 9.8–20.1)
CALCIUM SERPL-MCNC: 9.8 MG/DL (ref 8.4–10.2)
CHLORIDE SERPL-SCNC: 108 MMOL/L (ref 98–107)
CO2 SERPL-SCNC: 26 MMOL/L (ref 22–29)
CREAT SERPL-MCNC: 0.83 MG/DL (ref 0.55–1.02)
EOSINOPHIL # BLD AUTO: 0.16 X10(3)/MCL (ref 0–0.9)
EOSINOPHIL NFR BLD AUTO: 2.9 %
ERYTHROCYTE [DISTWIDTH] IN BLOOD BY AUTOMATED COUNT: 13.7 % (ref 11.5–17)
GFR SERPLBLD CREATININE-BSD FMLA CKD-EPI: >60 MLS/MIN/1.73/M2
GLOBULIN SER-MCNC: 3.6 GM/DL (ref 2.4–3.5)
GLUCOSE SERPL-MCNC: 80 MG/DL (ref 74–100)
HCT VFR BLD AUTO: 36.5 % (ref 37–47)
HGB BLD-MCNC: 11.8 G/DL (ref 12–16)
IMM GRANULOCYTES # BLD AUTO: 0.01 X10(3)/MCL (ref 0–0.04)
IMM GRANULOCYTES NFR BLD AUTO: 0.2 %
LACTATE SERPL-SCNC: 1.9 MMOL/L (ref 0.5–2.2)
LYMPHOCYTES # BLD AUTO: 2.63 X10(3)/MCL (ref 0.6–4.6)
LYMPHOCYTES NFR BLD AUTO: 47.9 %
MCH RBC QN AUTO: 33 PG (ref 27–31)
MCHC RBC AUTO-ENTMCNC: 32.3 G/DL (ref 33–36)
MCV RBC AUTO: 102 FL (ref 80–94)
MONOCYTES # BLD AUTO: 0.79 X10(3)/MCL (ref 0.1–1.3)
MONOCYTES NFR BLD AUTO: 14.4 %
NEUTROPHILS # BLD AUTO: 1.88 X10(3)/MCL (ref 2.1–9.2)
NEUTROPHILS NFR BLD AUTO: 34.2 %
NRBC BLD AUTO-RTO: 0 %
PLATELET # BLD AUTO: 168 X10(3)/MCL (ref 130–400)
PMV BLD AUTO: 11 FL (ref 7.4–10.4)
POTASSIUM SERPL-SCNC: 3.7 MMOL/L (ref 3.5–5.1)
PROT SERPL-MCNC: 7.4 GM/DL (ref 6.4–8.3)
RBC # BLD AUTO: 3.58 X10(6)/MCL (ref 4.2–5.4)
SODIUM SERPL-SCNC: 143 MMOL/L (ref 136–145)
WBC # SPEC AUTO: 5.49 X10(3)/MCL (ref 4.5–11.5)

## 2023-09-04 PROCEDURE — 80053 COMPREHEN METABOLIC PANEL: CPT | Performed by: PHYSICIAN ASSISTANT

## 2023-09-04 PROCEDURE — 85025 COMPLETE CBC W/AUTO DIFF WBC: CPT | Performed by: PHYSICIAN ASSISTANT

## 2023-09-04 PROCEDURE — 99285 EMERGENCY DEPT VISIT HI MDM: CPT

## 2023-09-04 PROCEDURE — 83605 ASSAY OF LACTIC ACID: CPT | Performed by: PHYSICIAN ASSISTANT

## 2023-09-05 VITALS
RESPIRATION RATE: 18 BRPM | HEIGHT: 59 IN | WEIGHT: 152.56 LBS | TEMPERATURE: 98 F | HEART RATE: 81 BPM | OXYGEN SATURATION: 96 % | BODY MASS INDEX: 30.76 KG/M2 | DIASTOLIC BLOOD PRESSURE: 76 MMHG | SYSTOLIC BLOOD PRESSURE: 113 MMHG

## 2023-09-05 PROBLEM — I63.9 CVA (CEREBRAL VASCULAR ACCIDENT): Status: ACTIVE | Noted: 2023-09-05

## 2023-09-05 LAB
APPEARANCE UR: CLEAR
BACTERIA #/AREA URNS AUTO: ABNORMAL /HPF
BILIRUB UR QL STRIP.AUTO: NEGATIVE
COLOR UR: YELLOW
GLUCOSE UR QL STRIP.AUTO: NORMAL
HYALINE CASTS #/AREA URNS LPF: ABNORMAL /LPF
KETONES UR QL STRIP.AUTO: ABNORMAL
LEUKOCYTE ESTERASE UR QL STRIP.AUTO: NEGATIVE
MUCOUS THREADS URNS QL MICRO: ABNORMAL /LPF
NITRITE UR QL STRIP.AUTO: NEGATIVE
PH UR STRIP.AUTO: 5.5 [PH]
PROT UR QL STRIP.AUTO: ABNORMAL
RBC #/AREA URNS AUTO: ABNORMAL /HPF
RBC UR QL AUTO: ABNORMAL
SP GR UR STRIP.AUTO: 1.03 (ref 1–1.03)
SQUAMOUS #/AREA URNS LPF: ABNORMAL /HPF
UROBILINOGEN UR STRIP-ACNC: ABNORMAL
WBC #/AREA URNS AUTO: ABNORMAL /HPF

## 2023-09-05 PROCEDURE — 81001 URINALYSIS AUTO W/SCOPE: CPT | Performed by: PHYSICIAN ASSISTANT

## 2023-09-05 PROCEDURE — 96374 THER/PROPH/DIAG INJ IV PUSH: CPT | Mod: 59

## 2023-09-05 PROCEDURE — G0378 HOSPITAL OBSERVATION PER HR: HCPCS

## 2023-09-05 PROCEDURE — 25500020 PHARM REV CODE 255

## 2023-09-05 PROCEDURE — 96375 TX/PRO/DX INJ NEW DRUG ADDON: CPT

## 2023-09-05 PROCEDURE — 63600175 PHARM REV CODE 636 W HCPCS: Performed by: PHYSICIAN ASSISTANT

## 2023-09-05 RX ORDER — IBUPROFEN 200 MG
24 TABLET ORAL
Status: DISCONTINUED | OUTPATIENT
Start: 2023-09-05 | End: 2023-09-05

## 2023-09-05 RX ORDER — FLUTICASONE FUROATE AND VILANTEROL 100; 25 UG/1; UG/1
1 POWDER RESPIRATORY (INHALATION) DAILY
Status: DISCONTINUED | OUTPATIENT
Start: 2023-09-05 | End: 2023-09-05

## 2023-09-05 RX ORDER — DICYCLOMINE HYDROCHLORIDE 10 MG/1
10 CAPSULE ORAL EVERY 6 HOURS PRN
Qty: 20 CAPSULE | Refills: 0 | Status: SHIPPED | OUTPATIENT
Start: 2023-09-05 | End: 2023-09-15

## 2023-09-05 RX ORDER — MONTELUKAST SODIUM 5 MG/1
10 TABLET, CHEWABLE ORAL DAILY
Status: DISCONTINUED | OUTPATIENT
Start: 2023-09-05 | End: 2023-09-05

## 2023-09-05 RX ORDER — HYDROCODONE BITARTRATE AND ACETAMINOPHEN 5; 325 MG/1; MG/1
1 TABLET ORAL EVERY 6 HOURS PRN
Qty: 12 TABLET | Refills: 0 | Status: SHIPPED | OUTPATIENT
Start: 2023-09-05 | End: 2023-09-08

## 2023-09-05 RX ORDER — IPRATROPIUM BROMIDE AND ALBUTEROL SULFATE 2.5; .5 MG/3ML; MG/3ML
3 SOLUTION RESPIRATORY (INHALATION) EVERY 4 HOURS PRN
Status: DISCONTINUED | OUTPATIENT
Start: 2023-09-05 | End: 2023-09-05

## 2023-09-05 RX ORDER — MORPHINE SULFATE 2 MG/ML
4 INJECTION, SOLUTION INTRAMUSCULAR; INTRAVENOUS
Status: COMPLETED | OUTPATIENT
Start: 2023-09-05 | End: 2023-09-05

## 2023-09-05 RX ORDER — NALOXONE HCL 0.4 MG/ML
0.02 VIAL (ML) INJECTION
Status: DISCONTINUED | OUTPATIENT
Start: 2023-09-05 | End: 2023-09-05

## 2023-09-05 RX ORDER — PROPRANOLOL HYDROCHLORIDE 60 MG/1
60 TABLET ORAL
COMMUNITY
Start: 2023-07-12 | End: 2023-11-06

## 2023-09-05 RX ORDER — ERTAPENEM 1 G/1
1 INJECTION, POWDER, LYOPHILIZED, FOR SOLUTION INTRAMUSCULAR; INTRAVENOUS DAILY
COMMUNITY
Start: 2023-08-19 | End: 2023-09-12 | Stop reason: ALTCHOICE

## 2023-09-05 RX ORDER — KETOROLAC TROMETHAMINE 30 MG/ML
15 INJECTION, SOLUTION INTRAMUSCULAR; INTRAVENOUS
Status: COMPLETED | OUTPATIENT
Start: 2023-09-05 | End: 2023-09-05

## 2023-09-05 RX ORDER — SODIUM CHLORIDE 0.9 % (FLUSH) 0.9 %
10 SYRINGE (ML) INJECTION EVERY 12 HOURS PRN
Status: DISCONTINUED | OUTPATIENT
Start: 2023-09-05 | End: 2023-09-05

## 2023-09-05 RX ORDER — ONDANSETRON 2 MG/ML
4 INJECTION INTRAMUSCULAR; INTRAVENOUS
Status: COMPLETED | OUTPATIENT
Start: 2023-09-05 | End: 2023-09-05

## 2023-09-05 RX ORDER — IBUPROFEN 200 MG
16 TABLET ORAL
Status: DISCONTINUED | OUTPATIENT
Start: 2023-09-05 | End: 2023-09-05

## 2023-09-05 RX ORDER — GLUCAGON 1 MG
1 KIT INJECTION
Status: DISCONTINUED | OUTPATIENT
Start: 2023-09-05 | End: 2023-09-05

## 2023-09-05 RX ADMIN — MORPHINE SULFATE 4 MG: 2 INJECTION, SOLUTION INTRAMUSCULAR; INTRAVENOUS at 01:09

## 2023-09-05 RX ADMIN — KETOROLAC TROMETHAMINE 15 MG: 30 INJECTION, SOLUTION INTRAMUSCULAR; INTRAVENOUS at 12:09

## 2023-09-05 RX ADMIN — IOHEXOL 100 ML: 350 INJECTION, SOLUTION INTRAVENOUS at 01:09

## 2023-09-05 RX ADMIN — ONDANSETRON 4 MG: 2 INJECTION INTRAMUSCULAR; INTRAVENOUS at 12:09

## 2023-09-05 NOTE — ED NOTES
Pt given discharge instructions. Pt instructed to follow up with pcp and return to er if any complications.

## 2023-09-05 NOTE — ED PROVIDER NOTES
Encounter Date: 9/4/2023       History     Chief Complaint   Patient presents with    Abdominal Pain     Herman lower abdominal pain and nausea. Hx of diverticulitis. On home iv antibiotics.      54 YO AAF in ER with complaints of worsening lower abdominal pain with nausea. States her pain was initially only on the left but now its also on the right. Currently has a PICC line and is on Ertapenem at home for recurrent diverticulitis and was discharged home after an inpatient stay from 8/16/23 to 8/19/23. Denies fever, chest pain, SOB, V/D, HA or dizziness. No other complaints.     The history is provided by the patient.     Review of patient's allergies indicates:   Allergen Reactions    Latex      Past Medical History:   Diagnosis Date    Asthma     Diverticulosis     GERD (gastroesophageal reflux disease)      History reviewed. No pertinent surgical history.  No family history on file.  Social History     Tobacco Use    Smoking status: Every Day     Current packs/day: 0.50     Average packs/day: 0.5 packs/day for 40.7 years (20.3 ttl pk-yrs)     Types: Cigarettes     Start date: 1983    Smokeless tobacco: Current    Tobacco comments:     Ambulatory referral to Smoking Cessation clinic following hospital discharge.    Substance Use Topics    Alcohol use: Not Currently    Drug use: Not Currently     Review of Systems   Constitutional:  Negative for chills and fever.   HENT:  Negative for congestion and sore throat.    Respiratory:  Negative for shortness of breath.    Cardiovascular:  Negative for chest pain.   Gastrointestinal:  Positive for abdominal pain and nausea. Negative for diarrhea and vomiting.   Genitourinary:  Negative for dysuria.   Musculoskeletal:  Negative for back pain.   Skin:  Negative for rash.   Neurological:  Negative for dizziness, weakness, light-headedness and headaches.   Hematological:  Does not bruise/bleed easily.   All other systems reviewed and are negative.      Physical Exam     Initial  Vitals [09/04/23 2222]   BP Pulse Resp Temp SpO2   111/67 90 18 98 °F (36.7 °C) 98 %      MAP       --         Physical Exam    Nursing note and vitals reviewed.  Constitutional: She appears well-developed and well-nourished. She is not diaphoretic. No distress.   HENT:   Head: Normocephalic and atraumatic.   Mouth/Throat: Oropharynx is clear and moist.   Eyes: Conjunctivae are normal.   Cardiovascular:  Normal rate, regular rhythm and normal heart sounds.           Pulmonary/Chest: Breath sounds normal.   Abdominal: Abdomen is soft and flat. Bowel sounds are normal. There is abdominal tenderness in the right lower quadrant and left lower quadrant.     There is no rebound and no guarding.   Musculoskeletal:         General: Normal range of motion.     Neurological: She is alert and oriented to person, place, and time. She has normal strength.   Skin: Skin is warm and dry.   Psychiatric: She has a normal mood and affect.         ED Course   Procedures  Labs Reviewed   COMPREHENSIVE METABOLIC PANEL - Abnormal; Notable for the following components:       Result Value    Chloride 108 (*)     Globulin 3.6 (*)     All other components within normal limits   URINALYSIS, REFLEX TO URINE CULTURE - Abnormal; Notable for the following components:    Protein, UA Trace (*)     Ketones, UA Trace (*)     Blood, UA Trace (*)     Urobilinogen, UA 1+ (*)     Squamous Epithelial Cells, UA Trace (*)     Mucous, UA Few (*)     Hyaline Casts, UA 0-2 (*)     All other components within normal limits   CBC WITH DIFFERENTIAL - Abnormal; Notable for the following components:    RBC 3.58 (*)     Hgb 11.8 (*)     Hct 36.5 (*)     .0 (*)     MCH 33.0 (*)     MCHC 32.3 (*)     MPV 11.0 (*)     Neut # 1.88 (*)     All other components within normal limits   LACTIC ACID, PLASMA - Normal   CBC W/ AUTO DIFFERENTIAL    Narrative:     The following orders were created for panel order CBC auto differential.  Procedure                                Abnormality         Status                     ---------                               -----------         ------                     CBC with Differential[[id number]]        Abnormal            Final result                 Please view results for these tests on the individual orders.   EXTRA TUBES    Narrative:     The following orders were created for panel order EXTRA TUBES.  Procedure                               Abnormality         Status                     ---------                               -----------         ------                     Gold Top Hold[[id number]]                                    In process                   Please view results for these tests on the individual orders.   GOLD TOP HOLD          Imaging Results              CT Abdomen Pelvis With Contrast (Preliminary result)  Result time 09/05/23 02:20:31      Preliminary result by Gregor Pritchett MD (09/05/23 02:15:45)                   Narrative:    START OF REPORT:  Pre- reader UserID:    QAScore:  Clinical Relevance: A-no change in clinical management.    Technique: CT of the abdomen and pelvis was performed with axial images as well as sagittal and coronal reconstruction images with intravenous contrast.    Comparison: None available.    Clinical History: Abdominal pain.    Dosage Information: Automated Exposure Control was utilized 286.58 mGy.cm.    Findings:  Lines and Tubes: None.  Lungs: The visualized lung bases appear unremarkable. No focal infiltrate or consolidation is seen.  Pleura: No effusions or thickening. No pneumothorax is seen.  Heart: The heart size is within normal limits.  Thorax:  Abdominal Wall: No abdominal wall pathology is seen.  Liver: The liver appears unremarkable.  Biliary System: No intrahepatic or extrahepatic biliary duct dilatation is seen.  Gallbladder: The gallbladder appears unremarkable.  Pancreas: The pancreas appears unremarkable. The pancreatic duct appears prominent.  Spleen: The spleen  appears unremarkable.  Adrenals: The adrenal glands appear unremarkable.  Kidneys: The kidneys appear unremarkable with no stones cysts masses or hydronephrosis.  Kidneys:  Esophagus: The visualized esophagus appears unremarkable.  Stomach: The stomach appears unremarkable.  Duodenum: Unremarkable appearing duodenum.  Small Bowel: The small bowel appears unremarkable.  Colon: Multiple diverticula are seen throughout the colon. No associated inflammatory stranding or pericolonic fluid is seen to suggest diverticulitis.  Appendix: The appendix appears unremarkable seen on Image 42, Series 6.  Peritoneum: No intraperitoneal free air or ascites is seen.    Pelvis:  Bladder: The bladder is nondistended but appears otherwise unremarkable.  Peritoneum:  Uterus: The uterus appears unremarkable.  Ovaries: The ovaries appear unremarkable.    Bony structures:  Dorsal Spine: There is moderate multilevel spondylosis of the visualized dorsal spine.  Bony Pelvis: The visualized bony structures of the pelvis appear unremarkable.      Impression:  1. No acute intraabdominal or pelvic solid organ or bowel pathology identified. Details and findings as discussed.                                         Medications   ketorolac injection 15 mg (15 mg Intravenous Given 9/5/23 0032)   ondansetron injection 4 mg (4 mg Intravenous Given 9/5/23 0032)   morphine injection 4 mg (4 mg Intravenous Given 9/5/23 0122)   iohexoL (OMNIPAQUE 350) 350 mg iodine/mL injection (100 mLs Intravenous Given 9/5/23 0130)     Medical Decision Making  54 YO AAF with hx of recurrent diverticulitis, PICC in place for Ertapenem X 28 days ending on 9/7/23. Follows with ID clinic at [facility], now having worsening pain with nausea    Amount and/or Complexity of Data Reviewed  External Data Reviewed: labs, radiology and notes.     Details: From inpatient stay 8/16/23-8/19/23  Labs: ordered.     Details: CBC, CMP and lactic acid unremarkable  Radiology:  ordered.    Risk  Prescription drug management.      Additional MDM:   Differential Diagnosis:   Symptom: Abdominal pain. <> The follow diagnoses were considered and will be evaluated: Appendicitis and Diverticulitis.   Other: The following diagnoses were also considered and will be evaluated: bowel abscess.            ED Course as of 09/05/23 0305   Tue Sep 05, 2023   0200 Care transitioned to Dr. Garcia at this time [TT]   0300 Feeling improved; discussed results with patient.  Stable for discharge to home.  Er precautions for any acute worsening. [MW]      ED Course User Index  [MW] Alek Garcia MD  [TT] Devendra Shaikh PA                    Clinical Impression:   Final diagnoses:  [K57.90] Diverticulosis (Primary)  [R10.32] LLQ abdominal pain  [R10.31] RLQ abdominal pain        ED Disposition Condition    Discharge Stable          ED Prescriptions       Medication Sig Dispense Start Date End Date Auth. Provider    dicyclomine (BENTYL) 10 MG capsule Take 1 capsule (10 mg total) by mouth every 6 (six) hours as needed (abdominal cramping). 20 capsule 9/5/2023 9/15/2023 Alek Garcia MD    HYDROcodone-acetaminophen (NORCO) 5-325 mg per tablet Take 1 tablet by mouth every 6 (six) hours as needed for Pain. 12 tablet 9/5/2023 9/8/2023 Alek Gacria MD          Follow-up Information       Follow up With Specialties Details Why Contact Info    Oswaldo Riggs, NP Family Medicine   [address] [place] [address]  [facility]  [place] [address]  [phone]      [facility] Emergency Medicine  As needed, If symptoms worsen [address] [facility] [address] [phone]             Alek Garcia MD  09/05/23 0305

## 2023-09-08 ENCOUNTER — DOCUMENTATION ONLY (OUTPATIENT)
Dept: INFECTIOUS DISEASES | Facility: HOSPITAL | Age: 53
End: 2023-09-08
Payer: MEDICAID

## 2023-09-08 ENCOUNTER — HOSPITAL ENCOUNTER (OUTPATIENT)
Dept: RADIOLOGY | Facility: HOSPITAL | Age: 53
Discharge: HOME | End: 2023-09-08
Payer: MEDICAID

## 2023-09-08 ENCOUNTER — HOSPITAL ENCOUNTER (OUTPATIENT)
Dept: RADIOLOGY | Facility: HOSPITAL | Age: 53
Discharge: HOME | End: 2023-09-08
Attending: NURSE PRACTITIONER
Payer: MEDICAID

## 2023-09-08 DIAGNOSIS — E05.00 GRAVES DISEASE: ICD-10-CM

## 2023-09-08 DIAGNOSIS — E05.90 HYPERTHYROIDISM: ICD-10-CM

## 2023-09-08 DIAGNOSIS — K57.92 DIVERTICULITIS: ICD-10-CM

## 2023-09-08 PROCEDURE — 76536 US EXAM OF HEAD AND NECK: CPT | Mod: TC

## 2023-09-08 PROCEDURE — 25500020 PHARM REV CODE 255

## 2023-09-08 PROCEDURE — 74177 CT ABD & PELVIS W/CONTRAST: CPT | Mod: TC

## 2023-09-08 RX ADMIN — IOHEXOL 100 ML: 350 INJECTION, SOLUTION INTRAVENOUS at 09:09

## 2023-09-08 NOTE — PROGRESS NOTES
[facility] Infectious Diseases OPAT Lab Review Note      Medication: Ertapenem 1 gram IV daily x 28 days     Infusion Company: [employer]     Start date: 8/11/23  Stop date: 9/7/23      Lab Results   Component Value Date    WBC 4.70 09/05/2023    HGB 11.3 (L) 09/05/2023    HCT 35.6 (L) 09/05/2023    .6 (H) 09/05/2023     09/05/2023     CMP  Sodium   Date Value Ref Range Status   02/19/2023 144 136 - 145 mmol/L Final     Sodium Level   Date Value Ref Range Status   09/05/2023 144 136 - 145 mmol/L Final     Potassium   Date Value Ref Range Status   02/19/2023 4.3 3.5 - 5.1 mmol/L Final     Potassium Level   Date Value Ref Range Status   09/05/2023 3.9 3.5 - 5.1 mmol/L Final     Chloride   Date Value Ref Range Status   02/19/2023 108 95 - 110 mmol/L Final     CO2   Date Value Ref Range Status   02/19/2023 22 (L) 23 - 29 mmol/L Final     Carbon Dioxide   Date Value Ref Range Status   09/05/2023 28 22 - 29 mmol/L Final     Glucose   Date Value Ref Range Status   02/19/2023 97 70 - 110 mg/dL Final     BUN   Date Value Ref Range Status   02/19/2023 13 6 - 20 mg/dL Final     Blood Urea Nitrogen   Date Value Ref Range Status   09/05/2023 9.5 (L) 9.8 - 20.1 mg/dL Final     Creatinine   Date Value Ref Range Status   09/05/2023 0.90 0.55 - 1.02 mg/dL Final   02/19/2023 0.8 0.5 - 1.4 mg/dL Final     Calcium   Date Value Ref Range Status   02/19/2023 9.6 8.7 - 10.5 mg/dL Final     Calcium Level Total   Date Value Ref Range Status   09/05/2023 9.5 8.4 - 10.2 mg/dL Final     Total Protein   Date Value Ref Range Status   02/19/2023 7.5 6.0 - 8.4 g/dL Final     Albumin   Date Value Ref Range Status   02/19/2023 3.3 (L) 3.5 - 5.2 g/dL Final     Albumin Level   Date Value Ref Range Status   09/05/2023 3.9 3.5 - 5.0 g/dL Final     Total Bilirubin   Date Value Ref Range Status   02/19/2023 0.2 0.1 - 1.0 mg/dL Final     Comment:     For infants and newborns, interpretation of results should be based  on gestational age,  weight and in agreement with clinical  observations.    Premature Infant recommended reference ranges:  Up to 24 hours.............<8.0 mg/dL  Up to 48 hours............<12.0 mg/dL  3-5 days..................<15.0 mg/dL  6-29 days.................<15.0 mg/dL       Bilirubin Total   Date Value Ref Range Status   09/05/2023 0.5 <=1.5 mg/dL Final     Alkaline Phosphatase   Date Value Ref Range Status   09/05/2023 104 40 - 150 unit/L Final   02/19/2023 75 55 - 135 U/L Final     AST   Date Value Ref Range Status   02/19/2023 25 10 - 40 U/L Final     Comment:     Specimen slightly hemolyzed     Aspartate Aminotransferase   Date Value Ref Range Status   09/05/2023 22 5 - 34 unit/L Final     ALT   Date Value Ref Range Status   02/19/2023 46 (H) 10 - 44 U/L Final     Alanine Aminotransferase   Date Value Ref Range Status   09/05/2023 23 0 - 55 unit/L Final     Anion Gap   Date Value Ref Range Status   02/19/2023 14 8 - 16 mmol/L Final     eGFR   Date Value Ref Range Status   09/05/2023 >60 mls/min/1.73/m2 Final   02/19/2023 >60 >60 mL/min/1.73 m^2 Final      Lab Results   Component Value Date    CRP 7.80 (H) 09/05/2023      Lab Results   Component Value Date    SEDRATE 25 (H) 09/05/2023           Assessment / Plan:   Recurrent Diverticulitis with abscess  History of ESBL E coli        No leukocytosis. Inflammatory markers improving  Pt is due for repeat CT abdomen / pelvis today 9/8/23 (prior to ID visit). Results pending  Continue current treatment  Have discussed with pt. Will extend treatment with labs until 9/12/23 where pt can be further evaluated in ID clinic and CT can be reviewed by Dr Valdivia  Extension discussed with Jeremiah Yao RN and will accommodate          Follow up ID appointment: 9/12/23

## 2023-09-08 NOTE — TELEPHONE ENCOUNTER
Spoke with pt. Advised of Dr. Lance's recommendations. Verbalized understanding. Pt would like to meet with Dr. Lance to discuss her history and bowel prep prior to rescheduling colonoscopy. GI clinic appt sched 3/12/24. Verbalized understanding

## 2023-09-12 ENCOUNTER — OFFICE VISIT (OUTPATIENT)
Dept: INFECTIOUS DISEASES | Facility: CLINIC | Age: 53
End: 2023-09-12
Payer: MEDICAID

## 2023-09-12 VITALS
SYSTOLIC BLOOD PRESSURE: 95 MMHG | DIASTOLIC BLOOD PRESSURE: 64 MMHG | HEART RATE: 112 BPM | TEMPERATURE: 98 F | WEIGHT: 148 LBS | HEIGHT: 59 IN | BODY MASS INDEX: 29.84 KG/M2 | RESPIRATION RATE: 16 BRPM

## 2023-09-12 DIAGNOSIS — R00.0 TACHYCARDIA: ICD-10-CM

## 2023-09-12 DIAGNOSIS — K57.92 DIVERTICULITIS: Chronic | ICD-10-CM

## 2023-09-12 DIAGNOSIS — K57.20 DIVERTICULITIS OF LARGE INTESTINE WITH ABSCESS WITHOUT BLEEDING: Primary | ICD-10-CM

## 2023-09-12 PROCEDURE — 99214 OFFICE O/P EST MOD 30 MIN: CPT | Mod: PBBFAC

## 2023-09-12 PROCEDURE — 93005 ELECTROCARDIOGRAM TRACING: CPT

## 2023-09-12 RX ORDER — ALBUTEROL SULFATE 1.25 MG/3ML
SOLUTION RESPIRATORY (INHALATION)
COMMUNITY
Start: 2023-08-04 | End: 2023-09-12

## 2023-09-12 RX ORDER — PREDNISONE 20 MG/1
TABLET ORAL
Status: ON HOLD | COMMUNITY
Start: 2023-09-11 | End: 2023-09-15 | Stop reason: HOSPADM

## 2023-09-12 RX ORDER — FLUCONAZOLE 200 MG/1
400 TABLET ORAL DAILY
Qty: 60 TABLET | Refills: 0 | Status: SHIPPED | OUTPATIENT
Start: 2023-09-12 | End: 2023-09-12

## 2023-09-12 RX ORDER — FLUCONAZOLE 200 MG/1
400 TABLET ORAL DAILY
Qty: 60 TABLET | Refills: 0 | Status: SHIPPED | OUTPATIENT
Start: 2023-09-12 | End: 2023-10-12

## 2023-09-12 RX ORDER — AMOXICILLIN AND CLAVULANATE POTASSIUM 875; 125 MG/1; MG/1
1 TABLET, FILM COATED ORAL EVERY 12 HOURS
Qty: 60 TABLET | Refills: 0 | Status: SHIPPED | OUTPATIENT
Start: 2023-09-12 | End: 2023-09-12

## 2023-09-12 RX ORDER — AMOXICILLIN AND CLAVULANATE POTASSIUM 875; 125 MG/1; MG/1
1 TABLET, FILM COATED ORAL EVERY 12 HOURS
Qty: 60 TABLET | Refills: 0 | Status: SHIPPED | OUTPATIENT
Start: 2023-09-12 | End: 2023-10-12

## 2023-09-12 NOTE — PROGRESS NOTES
[facility] Outpatient INFECTIOUS DISEASES Clinic Note    CHIEF COMPLAINT: Post hospital discharge follow up for sigmoid abscess/diverticulitis    HISTORY OF PRESENT ILLNESS:     54 y/o female with PMH diverticulitis with sigmoid abscess s/p IR drainage in 2/2023 (cx growing ESBL E coli), recurrent abscess 7/2023 which has been management with antibiotics who presents to ID clinic today for scheduled follow up visit. She is currently being treated with ertapenem x4 weeks with end date 9/15/23.    She was admitted in 3/2023 where she received merrem for 1-2 weeks. Would note that no abscess was noted from imaging during this time.     Despite IV antibiotics she continued to have abdominal pain, and represented to the ED in 7/2023 where she was found to have a 3.5cm sigmoid abscess. She was treated with Augmentin x2 weeks. Despite this therapy she continued to have worsening LLQ pain and thus represented to the ED, and was admitted. Would note that imaging showed about a 1cm reducing in size of the sigmoid abscess. When she was hospitalized at [facility] in 8/2023 she was started on ertapenem and completed 4 weeks. At week 3 into therapy she had repeat CT abd/pelivs which showed resolution of abscess but continues to show colonic inflammation.    Today she states she continues to have LLQ abdominal pain which is about the same since she was discharged. Occasionally she reports RLQ as well but this is intermittent. She denies fever, chills, N/V/D. She also endorses constipation. She is scheduled to see a GI doctor in [place] next week to discuss need for colonoscopy.    REVIEW OF SYSTEMS:  Review of Systems   Constitutional:  Negative for chills, fever, malaise/fatigue and weight loss.   HENT:  Negative for congestion and sore throat.    Eyes:  Negative for blurred vision.   Respiratory:  Negative for cough, sputum production and shortness of breath.    Cardiovascular:  Negative for chest pain, palpitations and leg swelling.    Gastrointestinal:  Negative for abdominal pain, diarrhea, nausea and vomiting.   Genitourinary:  Negative for dysuria.   Musculoskeletal:  Negative for joint pain.   Skin:  Negative for rash.   Neurological:  Negative for focal weakness, weakness and headaches.       PREVIOUS MEDICAL HISTORY:  Past Medical History:   Diagnosis Date    Asthma     Diverticulosis     GERD (gastroesophageal reflux disease)        PREVIOUS SURGICAL HISTORY:  No past surgical history on file.      ALLERGIES:  Review of patient's allergies indicates:   Allergen Reactions    Latex          MEDICATIONS:  Current Outpatient Medications on File Prior to Visit   Medication Sig Dispense Refill    albuterol-ipratropium (DUO-NEB) 2.5 mg-0.5 mg/3 mL nebulizer solution Take 3 mLs by nebulization every 4 (four) hours as needed for Wheezing. Rescue 75 mL 0    budesonide-formoterol 160-4.5 mcg (SYMBICORT) 160-4.5 mcg/actuation HFAA Inhale 2 puffs into the lungs every 12 (twelve) hours. Controller 10.2 g 0    dicyclomine (BENTYL) 10 MG capsule Take 1 capsule (10 mg total) by mouth every 6 (six) hours as needed (abdominal cramping). 20 capsule 0    ertapenem (INVANZ) 1 gram injection Inject 1 g into the vein once daily.      fluticasone propionate (FLONASE) 50 mcg/actuation nasal spray 1 spray (50 mcg total) by Each Nostril route daily as needed for Allergies or Rhinitis. 18.2 mL 0    montelukast (SINGULAIR) 10 mg tablet Take 10 mg by mouth once daily.      omeprazole (PRILOSEC) 40 MG capsule Take 1 capsule (40 mg total) by mouth once daily. 30 capsule 0    propranoloL (INDERAL) 60 MG tablet Take 60 mg by mouth.      white petrolatum-mineral oiL (ARTIFICIAL TEARS, VINCENZO/MIN,) 83-15 % Oint Place into both eyes every evening. (Patient not taking: Reported on 8/21/2023) 3.5 g 0     No current facility-administered medications on file prior to visit.          SOCIAL HISTORY:    reports that she has been smoking cigarettes. She started smoking about 40  "years ago. She has a 20.3 pack-year smoking history. She uses smokeless tobacco. She reports that she does not currently use alcohol. She reports that she does not currently use drugs.      FAMILY HISTORY:   No family history on file.    PHYSICAL EXAMINATION:  BP 95/64 (BP Location: Left arm, Patient Position: Sitting, BP Method: Medium (Automatic))   Pulse (!) 112   Temp 97.9 °F (36.6 °C) (Oral)   Resp 16   Ht 4' 11" (1.499 m)   Wt 67.1 kg (148 lb)   BMI 29.89 kg/m²  There is no height or weight on file to calculate BMI.    Gen: awake, alert, NAD  HEENT: NC/AT, EOMi, anicteric sclera, no rhinorrhea, OP clear  Neck: no cervical LAD  CV: regular rate normal rhythm no murmur  Resp: easy WOB on RA CTABL no w/r/r  Abd: +BS, soft, NTTP, no HSM  Ext: warm, no LE edema  Skin: no rash  Neuro: no focal deficits       LABORATORY DATA:  Lab Results   Component Value Date    WBC 4.70 09/05/2023    WBC 5.49 09/04/2023    WBC 5.83 08/29/2023    HGB 11.3 (L) 09/05/2023    HGB 11.8 (L) 09/04/2023    HGB 11.6 (L) 08/29/2023     09/05/2023     09/04/2023     08/29/2023    CREATININE 0.90 09/05/2023    CREATININE 0.83 09/04/2023    CREATININE 0.83 08/29/2023    ALT 23 09/05/2023    ALT 21 09/04/2023    ALT 33 08/29/2023    AST 22 09/05/2023    AST 21 09/04/2023    AST 30 08/29/2023    ALKPHOS 104 09/05/2023    ALKPHOS 103 09/04/2023    ALKPHOS 104 08/29/2023    BILITOT 0.5 09/05/2023    BILITOT 0.4 09/04/2023    BILITOT 0.4 08/29/2023    INR 1.0 04/01/2022       MICROBIOLOGY DATA:  2/16/23 Aerobic culture       2/16/23 Anerobic culture: Few Bacteroides thetaiotamicron  8/16/23 Peripheral blood culture x 2: NGTD       IMAGING DATA:  9/8/23 CT abd/pelvis  Impression:     Persistent circumferential wall thickening in the sigmoid colon with mild associated inflammatory changes.  Findings are similar since prior examinations.  Endoscopy should be considered to exclude underlying mass.     Findings consistent with " "uterine fibroid    ASSESSMENT:    52 y/o female with PMH diverticulitis with sigmoid abscess s/p IR drainage in 2/2023 (cx growing ESBL E coli), recurrent abscess 7/2023 which has been management with antibiotics who presents to ID clinic today for scheduled follow up visit. She is currently being treated with ertapenem x4 weeks with end date 9/15/23.    1) L sigmoid diverticulitis, unchanged despite 4 weeks of IV ertapenem  2) L sigmoid abscess, resolved per last CT  3) Tachycardia    Suspect there is an underlying illness causing her chronic sigmoid inflammation with ddx including IBD and cancer. If this were truly pure diverticulitis would expect some degree of improvement with carbapenems. However, this could either due to do carbapenemase producing bacteria vs untreated yeast (although neither have been recovered on previous cultures but she has been exposed to many carbapenems). Will stop IV antibiotics today given resolution of abscess and start Augmentin with addition of fluconazole.    It is very important that she undergo endoscopy testing in the near future for biopsy of sigmoid inflammation. ID would appreciate cultures from tissue to evaluate for resistant organism.      PLAN:    - Stopping ertapenem today as she has completed 4 weeks of therapy  - Starting Augmentin 875mg PO BID x30 days  - Starting Fluconazole 400mg PO q24h x30 days   - Above treatment designed to treat ongoing "diverticulitis" seen on recent imaging. Adding antifungal coverage in case this organism is playing a role in her illness  - She will need GI evaluation, planned for appointment next week. ID would appreciate colonoscopy with biopsy for sigmoid inflammation. Please obtain cultures (bacterial, anaerobic, fungal) and pathology. See above  - If no improvement on above regimen will repeat CT abd at next visit      RTC 1 month      Justin Valdivia MD  Infectious Diseases Faculty    "

## 2023-09-12 NOTE — PATIENT INSTRUCTIONS
Stopping ertapenem  Start augmentin 875mg by mouth tomorrow for 30 days  Start fluconazole 400mg PO q24h tomorrow for 30 days

## 2023-09-14 ENCOUNTER — HOSPITAL ENCOUNTER (OUTPATIENT)
Facility: HOSPITAL | Age: 53
Discharge: HOME | End: 2023-09-15
Attending: STUDENT IN AN ORGANIZED HEALTH CARE EDUCATION/TRAINING PROGRAM | Admitting: INTERNAL MEDICINE
Payer: MEDICAID

## 2023-09-14 DIAGNOSIS — I26.99 PULMONARY EMBOLISM: ICD-10-CM

## 2023-09-14 DIAGNOSIS — R06.02 SOB (SHORTNESS OF BREATH): ICD-10-CM

## 2023-09-14 DIAGNOSIS — R07.9 CHEST PAIN: ICD-10-CM

## 2023-09-14 DIAGNOSIS — I82.721 CHRONIC DEEP VEIN THROMBOSIS (DVT) OF BRACHIAL VEIN OF RIGHT UPPER EXTREMITY: ICD-10-CM

## 2023-09-14 DIAGNOSIS — I26.99 ACUTE PULMONARY EMBOLISM WITHOUT ACUTE COR PULMONALE, UNSPECIFIED PULMONARY EMBOLISM TYPE: Primary | ICD-10-CM

## 2023-09-14 DIAGNOSIS — R60.9 SWELLING: ICD-10-CM

## 2023-09-14 LAB
ALBUMIN SERPL-MCNC: 3.7 G/DL (ref 3.5–5)
ALBUMIN/GLOB SERPL: 1 RATIO (ref 1.1–2)
ALP SERPL-CCNC: 97 UNIT/L (ref 40–150)
ALT SERPL-CCNC: 12 UNIT/L (ref 0–55)
AST SERPL-CCNC: 16 UNIT/L (ref 5–34)
BASOPHILS # BLD AUTO: 0.01 X10(3)/MCL
BASOPHILS NFR BLD AUTO: 0.1 %
BILIRUB SERPL-MCNC: 0.5 MG/DL
BUN SERPL-MCNC: 14 MG/DL (ref 9.8–20.1)
CALCIUM SERPL-MCNC: 9.8 MG/DL (ref 8.4–10.2)
CHLORIDE SERPL-SCNC: 106 MMOL/L (ref 98–107)
CO2 SERPL-SCNC: 24 MMOL/L (ref 22–29)
CREAT SERPL-MCNC: 0.9 MG/DL (ref 0.55–1.02)
EOSINOPHIL # BLD AUTO: 0 X10(3)/MCL (ref 0–0.9)
EOSINOPHIL NFR BLD AUTO: 0 %
ERYTHROCYTE [DISTWIDTH] IN BLOOD BY AUTOMATED COUNT: 13.4 % (ref 11.5–17)
FLUAV AG UPPER RESP QL IA.RAPID: NOT DETECTED
FLUBV AG UPPER RESP QL IA.RAPID: NOT DETECTED
GFR SERPLBLD CREATININE-BSD FMLA CKD-EPI: >60 MLS/MIN/1.73/M2
GLOBULIN SER-MCNC: 3.6 GM/DL (ref 2.4–3.5)
GLUCOSE SERPL-MCNC: 103 MG/DL (ref 74–100)
HCT VFR BLD AUTO: 35.9 % (ref 37–47)
HGB BLD-MCNC: 11.9 G/DL (ref 12–16)
IMM GRANULOCYTES # BLD AUTO: 0.02 X10(3)/MCL (ref 0–0.04)
IMM GRANULOCYTES NFR BLD AUTO: 0.3 %
INR PPP: 1.1
LYMPHOCYTES # BLD AUTO: 1.74 X10(3)/MCL (ref 0.6–4.6)
LYMPHOCYTES NFR BLD AUTO: 24.5 %
MCH RBC QN AUTO: 33.5 PG (ref 27–31)
MCHC RBC AUTO-ENTMCNC: 33.1 G/DL (ref 33–36)
MCV RBC AUTO: 101.1 FL (ref 80–94)
MONOCYTES # BLD AUTO: 0.75 X10(3)/MCL (ref 0.1–1.3)
MONOCYTES NFR BLD AUTO: 10.6 %
NEUTROPHILS # BLD AUTO: 4.58 X10(3)/MCL (ref 2.1–9.2)
NEUTROPHILS NFR BLD AUTO: 64.5 %
NRBC BLD AUTO-RTO: 0 %
PLATELET # BLD AUTO: 210 X10(3)/MCL (ref 130–400)
PMV BLD AUTO: 10.5 FL (ref 7.4–10.4)
POTASSIUM SERPL-SCNC: 3.7 MMOL/L (ref 3.5–5.1)
PROT SERPL-MCNC: 7.3 GM/DL (ref 6.4–8.3)
PROTHROMBIN TIME: 13.5 SECONDS (ref 11.4–14)
RBC # BLD AUTO: 3.55 X10(6)/MCL (ref 4.2–5.4)
SARS-COV-2 RNA RESP QL NAA+PROBE: NOT DETECTED
SODIUM SERPL-SCNC: 139 MMOL/L (ref 136–145)
TROPONIN I SERPL-MCNC: <0.01 NG/ML (ref 0–0.04)
WBC # SPEC AUTO: 7.1 X10(3)/MCL (ref 4.5–11.5)

## 2023-09-14 PROCEDURE — 85610 PROTHROMBIN TIME: CPT | Performed by: STUDENT IN AN ORGANIZED HEALTH CARE EDUCATION/TRAINING PROGRAM

## 2023-09-14 PROCEDURE — 80053 COMPREHEN METABOLIC PANEL: CPT | Performed by: STUDENT IN AN ORGANIZED HEALTH CARE EDUCATION/TRAINING PROGRAM

## 2023-09-14 PROCEDURE — 84484 ASSAY OF TROPONIN QUANT: CPT | Performed by: STUDENT IN AN ORGANIZED HEALTH CARE EDUCATION/TRAINING PROGRAM

## 2023-09-14 PROCEDURE — 0240U COVID/FLU A&B PCR: CPT | Performed by: STUDENT IN AN ORGANIZED HEALTH CARE EDUCATION/TRAINING PROGRAM

## 2023-09-14 PROCEDURE — 99285 EMERGENCY DEPT VISIT HI MDM: CPT | Mod: 25

## 2023-09-14 PROCEDURE — 85025 COMPLETE CBC W/AUTO DIFF WBC: CPT | Performed by: STUDENT IN AN ORGANIZED HEALTH CARE EDUCATION/TRAINING PROGRAM

## 2023-09-14 PROCEDURE — 25500020 PHARM REV CODE 255: Performed by: STUDENT IN AN ORGANIZED HEALTH CARE EDUCATION/TRAINING PROGRAM

## 2023-09-14 PROCEDURE — 93005 ELECTROCARDIOGRAM TRACING: CPT

## 2023-09-14 RX ADMIN — IOHEXOL 80 ML: 350 INJECTION, SOLUTION INTRAVENOUS at 10:09

## 2023-09-15 ENCOUNTER — DOCUMENTATION ONLY (OUTPATIENT)
Dept: PHARMACY | Facility: HOSPITAL | Age: 53
End: 2023-09-15
Payer: MEDICAID

## 2023-09-15 VITALS
BODY MASS INDEX: 31.45 KG/M2 | WEIGHT: 156 LBS | TEMPERATURE: 98 F | SYSTOLIC BLOOD PRESSURE: 116 MMHG | HEIGHT: 59 IN | HEART RATE: 65 BPM | RESPIRATION RATE: 18 BRPM | OXYGEN SATURATION: 97 % | DIASTOLIC BLOOD PRESSURE: 76 MMHG

## 2023-09-15 DIAGNOSIS — K57.92 DIVERTICULITIS: ICD-10-CM

## 2023-09-15 DIAGNOSIS — Z12.11 ENCOUNTER FOR SCREENING COLONOSCOPY: Primary | ICD-10-CM

## 2023-09-15 DIAGNOSIS — Z86.010 HISTORY OF COLON POLYPS: ICD-10-CM

## 2023-09-15 PROBLEM — I26.99 ACUTE PULMONARY EMBOLISM: Status: ACTIVE | Noted: 2023-09-15

## 2023-09-15 PROBLEM — I82.890 SUPERFICIAL VEIN THROMBOSIS: Status: ACTIVE | Noted: 2023-09-15

## 2023-09-15 LAB
ALBUMIN SERPL-MCNC: 3.3 G/DL (ref 3.5–5)
ALBUMIN/GLOB SERPL: 1 RATIO (ref 1.1–2)
ALP SERPL-CCNC: 83 UNIT/L (ref 40–150)
ALT SERPL-CCNC: 9 UNIT/L (ref 0–55)
AST SERPL-CCNC: 16 UNIT/L (ref 5–34)
AV INDEX (PROSTH): 0.78
AV MEAN GRADIENT: 3 MMHG
AV PEAK GRADIENT: 4 MMHG
AV VALVE AREA BY VELOCITY RATIO: 2.5 CM²
AV VALVE AREA: 2.32 CM²
AV VELOCITY RATIO: 0.84
BASOPHILS # BLD AUTO: 0 X10(3)/MCL
BASOPHILS NFR BLD AUTO: 0 %
BILIRUB SERPL-MCNC: 0.4 MG/DL
BSA FOR ECHO PROCEDURE: 1.72 M2
BUN SERPL-MCNC: 18.1 MG/DL (ref 9.8–20.1)
CALCIUM SERPL-MCNC: 9.6 MG/DL (ref 8.4–10.2)
CHLORIDE SERPL-SCNC: 106 MMOL/L (ref 98–107)
CO2 SERPL-SCNC: 26 MMOL/L (ref 22–29)
CREAT SERPL-MCNC: 0.8 MG/DL (ref 0.55–1.02)
CV ECHO LV RWT: 0.29 CM
DOP CALC AO PEAK VEL: 1.05 M/S
DOP CALC AO VTI: 21.4 CM
DOP CALC LVOT AREA: 3 CM2
DOP CALC LVOT DIAMETER: 1.95 CM
DOP CALC LVOT PEAK VEL: 0.88 M/S
DOP CALC LVOT STROKE VOLUME: 49.55 CM3
DOP CALC MV VTI: 20.6 CM
DOP CALCLVOT PEAK VEL VTI: 16.6 CM
E WAVE DECELERATION TIME: 169.38 MSEC
E/A RATIO: 1.16
E/E' RATIO: 7.58 M/S
ECHO LV POSTERIOR WALL: 0.7 CM (ref 0.6–1.1)
EOSINOPHIL # BLD AUTO: 0.05 X10(3)/MCL (ref 0–0.9)
EOSINOPHIL NFR BLD AUTO: 0.7 %
ERYTHROCYTE [DISTWIDTH] IN BLOOD BY AUTOMATED COUNT: 13.3 % (ref 11.5–17)
FRACTIONAL SHORTENING: 32 % (ref 28–44)
GFR SERPLBLD CREATININE-BSD FMLA CKD-EPI: >60 MLS/MIN/1.73/M2
GLOBULIN SER-MCNC: 3.3 GM/DL (ref 2.4–3.5)
GLUCOSE SERPL-MCNC: 94 MG/DL (ref 74–100)
HCT VFR BLD AUTO: 32 % (ref 37–47)
HGB BLD-MCNC: 10.6 G/DL (ref 12–16)
IMM GRANULOCYTES # BLD AUTO: 0.02 X10(3)/MCL (ref 0–0.04)
IMM GRANULOCYTES NFR BLD AUTO: 0.3 %
INTERVENTRICULAR SEPTUM: 0.79 CM (ref 0.6–1.1)
LEFT ATRIUM SIZE: 3.55 CM
LEFT INTERNAL DIMENSION IN SYSTOLE: 3.27 CM (ref 2.1–4)
LEFT VENTRICLE DIASTOLIC VOLUME INDEX: 65.23 ML/M2
LEFT VENTRICLE DIASTOLIC VOLUME: 108.28 ML
LEFT VENTRICLE MASS INDEX: 70 G/M2
LEFT VENTRICLE SYSTOLIC VOLUME INDEX: 26 ML/M2
LEFT VENTRICLE SYSTOLIC VOLUME: 43.23 ML
LEFT VENTRICULAR INTERNAL DIMENSION IN DIASTOLE: 4.81 CM (ref 3.5–6)
LEFT VENTRICULAR MASS: 116.05 G
LV LATERAL E/E' RATIO: 8 M/S
LV SEPTAL E/E' RATIO: 7.2 M/S
LVOT MG: 1.34 MMHG
LVOT MV: 0.52 CM/S
LYMPHOCYTES # BLD AUTO: 3.29 X10(3)/MCL (ref 0.6–4.6)
LYMPHOCYTES NFR BLD AUTO: 42.8 %
MAGNESIUM SERPL-MCNC: 2.1 MG/DL (ref 1.6–2.6)
MCH RBC QN AUTO: 33.1 PG (ref 27–31)
MCHC RBC AUTO-ENTMCNC: 33.1 G/DL (ref 33–36)
MCV RBC AUTO: 100 FL (ref 80–94)
MONOCYTES # BLD AUTO: 1.11 X10(3)/MCL (ref 0.1–1.3)
MONOCYTES NFR BLD AUTO: 14.5 %
MV MEAN GRADIENT: 1 MMHG
MV PEAK A VEL: 0.62 M/S
MV PEAK E VEL: 0.72 M/S
MV PEAK GRADIENT: 2 MMHG
MV STENOSIS PRESSURE HALF TIME: 49.12 MS
MV VALVE AREA BY CONTINUITY EQUATION: 2.41 CM2
MV VALVE AREA P 1/2 METHOD: 4.48 CM2
NEUTROPHILS # BLD AUTO: 3.21 X10(3)/MCL (ref 2.1–9.2)
NEUTROPHILS NFR BLD AUTO: 41.7 %
NRBC BLD AUTO-RTO: 0 %
PHOSPHATE SERPL-MCNC: 3.3 MG/DL (ref 2.3–4.7)
PISA TR MAX VEL: 2.13 M/S
PLATELET # BLD AUTO: 205 X10(3)/MCL (ref 130–400)
PMV BLD AUTO: 10.7 FL (ref 7.4–10.4)
POTASSIUM SERPL-SCNC: 3.6 MMOL/L (ref 3.5–5.1)
PROT SERPL-MCNC: 6.6 GM/DL (ref 6.4–8.3)
RA PRESSURE ESTIMATED: 3 MMHG
RBC # BLD AUTO: 3.2 X10(6)/MCL (ref 4.2–5.4)
RIGHT VENTRICULAR END-DIASTOLIC DIMENSION: 2.97 CM
RV TB RVSP: 5 MMHG
SODIUM SERPL-SCNC: 141 MMOL/L (ref 136–145)
TDI LATERAL: 0.09 M/S
TDI SEPTAL: 0.1 M/S
TDI: 0.1 M/S
TR MAX PG: 18 MMHG
TV REST PULMONARY ARTERY PRESSURE: 21 MMHG
WBC # SPEC AUTO: 7.68 X10(3)/MCL (ref 4.5–11.5)
Z-SCORE OF LEFT VENTRICULAR DIMENSION IN END DIASTOLE: 0.34
Z-SCORE OF LEFT VENTRICULAR DIMENSION IN END SYSTOLE: 1

## 2023-09-15 PROCEDURE — 99900035 HC TECH TIME PER 15 MIN (STAT)

## 2023-09-15 PROCEDURE — 25000003 PHARM REV CODE 250

## 2023-09-15 PROCEDURE — 83735 ASSAY OF MAGNESIUM: CPT

## 2023-09-15 PROCEDURE — 63700000 PHARM REV CODE 250 ALT 637 W/O HCPCS

## 2023-09-15 PROCEDURE — 94760 N-INVAS EAR/PLS OXIMETRY 1: CPT

## 2023-09-15 PROCEDURE — 25000003 PHARM REV CODE 250: Performed by: STUDENT IN AN ORGANIZED HEALTH CARE EDUCATION/TRAINING PROGRAM

## 2023-09-15 PROCEDURE — G0378 HOSPITAL OBSERVATION PER HR: HCPCS

## 2023-09-15 PROCEDURE — 85025 COMPLETE CBC W/AUTO DIFF WBC: CPT

## 2023-09-15 PROCEDURE — 80053 COMPREHEN METABOLIC PANEL: CPT

## 2023-09-15 PROCEDURE — 94761 N-INVAS EAR/PLS OXIMETRY MLT: CPT

## 2023-09-15 PROCEDURE — 84100 ASSAY OF PHOSPHORUS: CPT

## 2023-09-15 PROCEDURE — 94640 AIRWAY INHALATION TREATMENT: CPT

## 2023-09-15 PROCEDURE — 25000242 PHARM REV CODE 250 ALT 637 W/ HCPCS

## 2023-09-15 RX ORDER — BUDESONIDE AND FORMOTEROL FUMARATE DIHYDRATE 160; 4.5 UG/1; UG/1
2 AEROSOL RESPIRATORY (INHALATION) EVERY 12 HOURS
Status: DISCONTINUED | OUTPATIENT
Start: 2023-09-15 | End: 2023-09-15 | Stop reason: HOSPADM

## 2023-09-15 RX ORDER — IPRATROPIUM BROMIDE AND ALBUTEROL SULFATE 2.5; .5 MG/3ML; MG/3ML
3 SOLUTION RESPIRATORY (INHALATION) EVERY 4 HOURS PRN
Status: DISCONTINUED | OUTPATIENT
Start: 2023-09-15 | End: 2023-09-15 | Stop reason: HOSPADM

## 2023-09-15 RX ORDER — ACETAMINOPHEN 325 MG/1
650 TABLET ORAL EVERY 4 HOURS PRN
Status: DISCONTINUED | OUTPATIENT
Start: 2023-09-15 | End: 2023-09-15

## 2023-09-15 RX ORDER — PANTOPRAZOLE SODIUM 40 MG/1
40 TABLET, DELAYED RELEASE ORAL DAILY
Status: DISCONTINUED | OUTPATIENT
Start: 2023-09-15 | End: 2023-09-15 | Stop reason: HOSPADM

## 2023-09-15 RX ORDER — TALC
6 POWDER (GRAM) TOPICAL NIGHTLY PRN
Status: DISCONTINUED | OUTPATIENT
Start: 2023-09-15 | End: 2023-09-15 | Stop reason: HOSPADM

## 2023-09-15 RX ORDER — GLUCAGON 1 MG
1 KIT INJECTION
Status: DISCONTINUED | OUTPATIENT
Start: 2023-09-15 | End: 2023-09-15 | Stop reason: HOSPADM

## 2023-09-15 RX ORDER — IBUPROFEN 600 MG/1
600 TABLET ORAL EVERY 6 HOURS PRN
Status: DISCONTINUED | OUTPATIENT
Start: 2023-09-15 | End: 2023-09-15 | Stop reason: HOSPADM

## 2023-09-15 RX ORDER — IPRATROPIUM BROMIDE AND ALBUTEROL SULFATE 2.5; .5 MG/3ML; MG/3ML
3 SOLUTION RESPIRATORY (INHALATION) ONCE
Status: COMPLETED | OUTPATIENT
Start: 2023-09-15 | End: 2023-09-15

## 2023-09-15 RX ORDER — SOD SULF/POT CHLORIDE/MAG SULF 1.479 G
12 TABLET ORAL DAILY
Qty: 24 TABLET | Refills: 0 | Status: ON HOLD | OUTPATIENT
Start: 2023-09-15 | End: 2024-01-25 | Stop reason: HOSPADM

## 2023-09-15 RX ORDER — SODIUM CHLORIDE 0.9 % (FLUSH) 0.9 %
10 SYRINGE (ML) INJECTION EVERY 12 HOURS PRN
Status: DISCONTINUED | OUTPATIENT
Start: 2023-09-15 | End: 2023-09-15 | Stop reason: HOSPADM

## 2023-09-15 RX ORDER — NALOXONE HCL 0.4 MG/ML
0.02 VIAL (ML) INJECTION
Status: DISCONTINUED | OUTPATIENT
Start: 2023-09-15 | End: 2023-09-15 | Stop reason: HOSPADM

## 2023-09-15 RX ORDER — ONDANSETRON 4 MG/1
4 TABLET, ORALLY DISINTEGRATING ORAL ONCE
Status: COMPLETED | OUTPATIENT
Start: 2023-09-15 | End: 2023-09-15

## 2023-09-15 RX ORDER — IBUPROFEN 200 MG
16 TABLET ORAL
Status: DISCONTINUED | OUTPATIENT
Start: 2023-09-15 | End: 2023-09-15 | Stop reason: HOSPADM

## 2023-09-15 RX ORDER — AMOXICILLIN AND CLAVULANATE POTASSIUM 875; 125 MG/1; MG/1
1 TABLET, FILM COATED ORAL EVERY 12 HOURS
Status: DISCONTINUED | OUTPATIENT
Start: 2023-09-15 | End: 2023-09-15 | Stop reason: HOSPADM

## 2023-09-15 RX ORDER — FLUCONAZOLE 100 MG/1
400 TABLET ORAL DAILY
Status: DISCONTINUED | OUTPATIENT
Start: 2023-09-15 | End: 2023-09-15 | Stop reason: HOSPADM

## 2023-09-15 RX ORDER — IBUPROFEN 200 MG
24 TABLET ORAL
Status: DISCONTINUED | OUTPATIENT
Start: 2023-09-15 | End: 2023-09-15 | Stop reason: HOSPADM

## 2023-09-15 RX ADMIN — Medication 6 MG: at 03:09

## 2023-09-15 RX ADMIN — ONDANSETRON 4 MG: 4 TABLET, ORALLY DISINTEGRATING ORAL at 01:09

## 2023-09-15 RX ADMIN — APIXABAN 10 MG: 2.5 TABLET, FILM COATED ORAL at 01:09

## 2023-09-15 RX ADMIN — APIXABAN 10 MG: 2.5 TABLET, FILM COATED ORAL at 09:09

## 2023-09-15 RX ADMIN — IBUPROFEN 600 MG: 600 TABLET, FILM COATED ORAL at 02:09

## 2023-09-15 RX ADMIN — FLUCONAZOLE 400 MG: 100 TABLET ORAL at 09:09

## 2023-09-15 RX ADMIN — IPRATROPIUM BROMIDE AND ALBUTEROL SULFATE 3 ML: .5; 3 SOLUTION RESPIRATORY (INHALATION) at 01:09

## 2023-09-15 RX ADMIN — AMOXICILLIN AND CLAVULANATE POTASSIUM 1 TABLET: 875; 125 TABLET, FILM COATED ORAL at 09:09

## 2023-09-15 NOTE — NURSING
Pt discharge home with meds to bed. Pt educated on how to take her apixaban medication. Pt agree and understands how to take medication. She has no questions or concerns.

## 2023-09-15 NOTE — H&P
[facility] Medicine Wards History & Physical Note     Resident Team: [facility] Medicine List 1  Attending Physician: Renita Brown MD    Date of Admit: 9/14/2023    Chief Complaint     Arm Swelling (RUE edema after PICC removal 2 days ago.)      Subjective:      History of Present Illness:  Niya Moeller is a 53 y.o. female who with a history of diverticulitis, asthma, and GERD who presented to [facility] ER on 9/14/2023  with a primary complaint of Right upper extremity pain that began about 2 days ago after her PICC line was removed. Patient had PICC in place for IV abx treatment of Diverticulitis and states the PICC was painful while in place and she noticed her RUE began to get hardened around the PICC line area. She endorses numbness to her right palm at times and swelling. PICC was removed 2 days ago and she was started on PO Augmentin and fluconazole. She also endorses shortness of breath and chest pain that began yesterday afternoon. Chest pain is central and does not radiate anywhere else and is constant without relief; SOB worse with exertion, constant, and felt at rest also. History of asthma that she follows with pulmonology for an uses Symbicort inhaler BID and has not needed albuterol rescue inhaler much lately.  Patient denies recent abdominal pain, constipation, diarrhea, dysuria, vomiting/nausea, cough/sputum production, recent asthma attack, and fevers/chills. She is a current smoker and denies alcohol or illicit drug use. She denies any cardiac history including no heart attacks, strokes, or DVT's and no past history of stents.       In the ER, patient was normotensive with a HR in the 90s saturating in the upper 90s on room air. EKG was NSR with heart rate in the 80s without ischemic changes. She tested negative for covid/flu and her troponin was negative. RUE U/S showed RUE superficial vein thrombosis; CTA chest showed right sided pulmonary embolism. Medicine was consulted for admission for PE/DVT UE treatment.          Past Medical History:  Past Medical History:   Diagnosis Date    Asthma     Diverticulosis     GERD (gastroesophageal reflux disease)        Past Surgical History:  No past surgical history on file.    Family History:  No family history on file.    Social History:  Social History     Tobacco Use    Smoking status: Every Day     Current packs/day: 0.50     Average packs/day: 0.5 packs/day for 40.7 years (20.4 ttl pk-yrs)     Types: Cigarettes     Start date: 1983    Smokeless tobacco: Current    Tobacco comments:     Ambulatory referral to Smoking Cessation clinic following hospital discharge.    Substance Use Topics    Alcohol use: Not Currently    Drug use: Not Currently       Allergies:  Review of patient's allergies indicates:   Allergen Reactions    Latex        Home Medications:  Prior to Admission medications    Medication Sig Start Date End Date Taking? Authorizing Provider   albuterol-ipratropium (DUO-NEB) 2.5 mg-0.5 mg/3 mL nebulizer solution Take 3 mLs by nebulization every 4 (four) hours as needed for Wheezing. Rescue 5/1/23 4/30/24  Yeyo Andrews MD   amoxicillin-clavulanate 875-125mg (AUGMENTIN) 875-125 mg per tablet Take 1 tablet by mouth every 12 (twelve) hours. 9/12/23 10/12/23  Justin Valdivia MD   budesonide-formoterol 160-4.5 mcg (SYMBICORT) 160-4.5 mcg/actuation HFAA Inhale 2 puffs into the lungs every 12 (twelve) hours. Controller 5/1/23 4/30/24  Yeyo Andrews MD   dicyclomine (BENTYL) 10 MG capsule Take 1 capsule (10 mg total) by mouth every 6 (six) hours as needed (abdominal cramping). 9/5/23 9/15/23  Alek Garcia MD   fluconazole (DIFLUCAN) 200 MG Tab Take 2 tablets (400 mg total) by mouth once daily. 9/12/23 10/12/23  Justin Valdivia MD   fluticasone propionate (FLONASE) 50 mcg/actuation nasal spray 1 spray (50 mcg total) by Each Nostril route daily as needed for Allergies or Rhinitis. 5/1/23   Yeyo Andrews MD   montelukast (SINGULAIR) 10 mg tablet Take 10  "mg by mouth once daily. 10/17/22 10/17/23  Provider, Historical   omeprazole (PRILOSEC) 40 MG capsule Take 1 capsule (40 mg total) by mouth once daily. 3/15/23 9/12/23  Anamaria Ruffin MD   predniSONE (DELTASONE) 20 MG tablet Take by mouth. 23   Provider, Historical   propranoloL (INDERAL) 60 MG tablet Take 60 mg by mouth. 23   Provider, Historical   white petrolatum-mineral oiL (ARTIFICIAL TEARS, VINCENZO/MIN,) 83-15 % Oint Place into both eyes every evening.  Patient not taking: Reported on 2023   Sid Jean MD       ROS as seen in HPI above.    Objective:   Last 24 Hour Vital Signs:  BP  Min: 103/72  Max: 126/64  Temp  Av.3 °F (36.8 °C)  Min: 97.8 °F (36.6 °C)  Max: 98.8 °F (37.1 °C)  Pulse  Av.9  Min: 69  Max: 93  Resp  Av.6  Min: 18  Max: 20  SpO2  Av.3 %  Min: 95 %  Max: 100 %  Height  Av' 11" (149.9 cm)  Min: 4' 11" (149.9 cm)  Max: 4' 11" (149.9 cm)  Weight  Av.8 kg (153 lb 14.1 oz)  Min: 68.6 kg (151 lb 3.8 oz)  Max: 71 kg (156 lb 8.4 oz)  Body mass index is 31.61 kg/m².  No intake/output data recorded.    Physical Exam  Constitutional:       General: She is not in acute distress.     Appearance: She is not ill-appearing.   Cardiovascular:      Rate and Rhythm: Normal rate and regular rhythm.      Heart sounds: No murmur heard.     Comments: 2+ radial pulses and pedal pulses palpable B/L.  Pulmonary:      Effort: Pulmonary effort is normal. No respiratory distress.      Breath sounds: Normal breath sounds. No rhonchi. Wheezes: B/L in all lung fields.     Comments: On room air.  Abdominal:      General: Abdomen is flat. There is no distension.      Palpations: Abdomen is soft.      Tenderness: There is no abdominal tenderness. There is no guarding.   Musculoskeletal:         General: Swelling (RUE above and below elbow to right wrist and hand) and tenderness (RUE tender ness above elbow medial arm.) present.      Comments: Full ROM in right " "shoulder, elbow and hand   Skin:     General: Skin is warm.      Capillary Refill: Capillary refill takes less than 2 seconds.      Findings: Bruising (right medial upper arm) present.   Neurological:      General: No focal deficit present.      Mental Status: She is alert and oriented to person, place, and time.      Motor: No weakness.   Psychiatric:      Comments: Patient anxious and tearful about diagnosis on examination.          Laboratory:  Most Recent Data:  CBC:   Lab Results   Component Value Date    WBC 7.68 09/15/2023    HGB 10.6 (L) 09/15/2023    HCT 32.0 (L) 09/15/2023     09/15/2023    .0 (H) 09/15/2023    RDW 13.3 09/15/2023     WBC Differential:   Recent Labs   Lab 09/14/23  2021 09/15/23  0434   WBC 7.10 7.68   HGB 11.9* 10.6*   HCT 35.9* 32.0*    205   .1* 100.0*     BMP:   Lab Results   Component Value Date     09/15/2023    K 3.6 09/15/2023     02/19/2023    CO2 26 09/15/2023    BUN 18.1 09/15/2023    CREATININE 0.80 09/15/2023    GLU 97 02/19/2023    CALCIUM 9.6 09/15/2023    MG 2.10 09/15/2023    PHOS 3.3 09/15/2023     LFTs:   Lab Results   Component Value Date    PROT 7.5 02/19/2023    ALBUMIN 3.3 (L) 09/15/2023    BILITOT 0.4 09/15/2023    AST 16 09/15/2023    ALKPHOS 83 09/15/2023    ALT 9 09/15/2023     Coags:   Lab Results   Component Value Date    INR 1.1 09/14/2023    PROTIME 13.5 09/14/2023     FLP:   Lab Results   Component Value Date    CHOL 147 03/31/2022    HDL 51 03/31/2022    TRIG 61 03/31/2022     DM:   Lab Results   Component Value Date    HGBA1C 5.9 (H) 11/23/2022    HGBA1C 5.3 06/28/2022    HGBA1C 5.4 03/31/2022    CREATININE 0.80 09/15/2023     Thyroid:   Lab Results   Component Value Date    TSH 4.000 08/31/2023    FREET4 3.09 (H) 01/22/2023      Anemia: No results found for: "IRON", "TIBC", "FERRITIN", "SATURATEDIRO"    Lab Results   Component Value Date    [id number] 426 08/17/2023       Lab Results   Component Value Date    FOLATE " 5.2 (L) 08/17/2023        Cardiac:   Lab Results   Component Value Date    TROPONINI <0.010 09/14/2023    BNP 51.9 08/16/2023     Urinalysis:   Lab Results   Component Value Date    COLORU Yellow 02/15/2023    PHUA 5.5 09/05/2023    SPECGRAV 1.010 02/15/2023    NITRITE Negative 02/15/2023    GLUCOSEU Negative 04/07/2022    KETONESU Negative 02/15/2023    UROBILINOGEN 1+ (A) 09/05/2023    WBCUA 0-5 09/05/2023       Trended Lab Data:  Recent Labs   Lab 09/14/23  2021 09/15/23  0434   WBC 7.10 7.68   HGB 11.9* 10.6*   HCT 35.9* 32.0*    205   .1* 100.0*   RDW 13.4 13.3    141   K 3.7 3.6   CO2 24 26   BUN 14.0 18.1   CREATININE 0.90 0.80   ALBUMIN 3.7 3.3*   BILITOT 0.5 0.4   AST 16 16   ALKPHOS 97 83   ALT 12 9       Trended Cardiac Data:  Recent Labs   Lab 09/14/23 2021   TROPONINI <0.010       Microbiology Data:  Microbiology Results (last 7 days)       ** No results found for the last 168 hours. **             Radiology:  Imaging Results              CTA Chest Non-Coronary (PE Studies) (Preliminary result)  Result time 09/14/23 23:03:54      Preliminary result by Kevin Barreto Jr., MD (09/14/23 23:03:54)                   Narrative:    START OF REPORT:  Technique: CT Scan of the chest was performed with intravenous contrast with direct axial images as well as sagittal and coronal reconstruction images pulmonary embolus protocol.    Dosage Information: Automated Exposure Control was utilized.    Comparison: Report available, but images not available.    Clinical History: Pe suspected.    Findings:  Soft Tissues: Unremarkable.  Lines and Tubes: None.  Neck: The visualized soft tissues of the neck appear unremarkable. The thyroid gland appear unremarkable.  Mediastinum: The mediastinal structures are within normal limits.  Heart: The heart size is within normal limits.  Aorta: Unremarkable appearing aorta.  Pulmonary Arteries: There are filling defects in the upper lobe branch of the right  pulmonary artery with extension into the segmental and subsegmental branches of the apical and anterior segments. This is consistent with pulmonary embolism. No right heart strain is seen.  Lungs: There is mild non specific dependent change at the lung bases. There are a few non specific ground glass opacities in the right upper lobe with no specific findings to suggest pulmonary infarct. No acute focal infiltrate or consolidation is seen.  Pleura: No effusions or pneumothorax are identified.  Bony Structures:  Spine: The visualized dorsal spine appears unremarkable.  Abdomen: The visualized upper abdominal organs appear unremarkable.    Notifications: The results were discussed with the emergency room physician Dr. Garcia prior to dictation at 2023-09-14 23:39:50 CDT.      Impression:  1. There are filling defects in the upper lobe branch of the right pulmonary artery with extension into the segmental and subsegmental branches of the apical and anterior segments. This is consistent with pulmonary embolism. No right heart strain is seen.  2. Details and other findings as discussed above.                                         X-Ray Chest PA And Lateral (Final result)  Result time 09/14/23 22:35:15      Final result by Shayne Atkins MD (09/14/23 22:35:15)                   Impression:      No acute abnormality.      Electronically signed by: Shayne Atkins MD  Date:    09/14/2023  Time:    22:35               Narrative:    EXAMINATION:  XR CHEST PA AND LATERAL    CLINICAL HISTORY:  sob;    TECHNIQUE:  PA and lateral views of the chest were performed.    COMPARISON:  08/18/2023    FINDINGS:  The lungs are clear, with normal appearance of pulmonary vasculature and no pleural effusion or pneumothorax.    The cardiac silhouette is normal in size. The hilar and mediastinal contours are unremarkable.    Bones are intact.                                    Assessment & Plan:     Pulmonary Embolism   RUE Superficial  vein thrombosis   - CTA chest showed filling defects in the upper lobe branch of the right pulmonary artery with extension into the segmental and subsegmental branches of the apical and anterior segments  - CV U/S RUE showed acute appearing occlusive thrombus within the right upper basilic vein from the proximal upper arm to the site of PICC line removal two days ago just above the mid upper arm  - Starting patient on 10 mg BID Eliquis for 7 days and then continue on 5 mg BID Eliquis for PE treatment  - Continue PO antibiotics for Diverticulitis treatment as previously prescribed  - Consider cardiology referral in AM  - Echo ordered for AM    History of Asthma  - CXR without infiltrates or pleural effusions  - Wheezes auscultated B/L   - Continue home scheduled Symbicort BID and albuterol inhaler PRN         CODE STATUS: Full code  Access: PIV  Antibiotics: PO augmentin and PO Fluconazole   Diet: Cardiac diet  DVT Prophylaxis: Eliquis  GI Prophylaxis: none  Fluids: none      Disposition: 53 year old female admitted for observation for pulmonary embolism and UE DVT. Patient likely to be discharged following day pending hospital course.          Alie Mederos DO  [facility] HO-2

## 2023-09-15 NOTE — CONSULTS
Gastroenterology/Hepatology Initial Consult Note    Patient Name: Niya Moeller  Age: 53 y.o.  : 1970  MRN: [id number]  Admission Date: 2023    Reason for Consult:      Medical Management  Chief Complaint   Patient presents with    Arm Swelling     RUE edema after PICC removal 2 days ago.         Self, Aaareferral    HPI:     Niya Moeller is a 53 y.o. female with history of colon polyps, diverticulitis with sigmoid abscess s/p IR drainage in 2023 (cx positive for ESBL E.coli), GERD and asthma who presented to [facility] ED on 23 with primary complaint of right upper extremity pain which started 2 days prior to presentation after the PICC line being used for IV antibiotic treatment for diverticulitis was removed. Following the removal of the PICC line, she was started on po Augmentin and fluconazole. Her workup revealed a superficial vein thrombosis to the RUE on US and a right sided pulmonary embolism was seen on the CTA of chest. She was admitted inpatient and GI service was consulted for sigmoid thickening and recent recurrent diverticulitis.     Patient reports multiple episodes of diverticulitis ongoing since approximately . Symptoms typically present as LLQ abdominal pain with associated nausea and constipation with no fever. She reports symptom improvement once she is treated with antibiotics. She typically has a bowel movement daily with formed stool. She denies black or bloody stools. She reports having significant GERD described as reflux symptoms occurring mostly at night which is effectively managed with omeprazole 40 mg daily.  She reports having a good appetite with no significant weight changes.    Of note, in 2023, she had a sigmoid abscess which was drained and cultured which grew ESBL. At that time she was treated with meropenem for 7 days.  In 2023, she presented to ED with c/o abdominal pain and was found to have a 3.4 x 1.5 cm sigmoid abscess. She was treated  with Augmentin x 2 weeks. Despite antibiotic therapy, she continued to have worsening LLQ pain and presented to ED on 8/16/2023 and was admitted. CT abdomen pelvis done in the ED showed a trace residual encapsulated collection at the previously seen mural abscess estimated to measure 2.5 x 0.9 cm with a collection tracking laterally to but at the peritoneum in the left lower quadrant. At that time she was started on merrem. She had a repeat CT of abdomen pelvis with contrast on 8/18/23 revealing colonic wall thickening seen involving the sigmoid colon, relatively circumferential with some associated mild inflammatory changes. No abscess or fluid collection was seen. These findings had been seen on prior examination as well with similar changes but to various degrees since April 2023. Recommendation for endoscopy to be considered to exclude an underlying mass. She was discharged home with a PICC line for  outpatient IV antibiotic therapy of ertapenem. Repeat CT abdomen pelvis with contrast completed on 9/5/23 revealed colonic diverticulosis with similar inflammatory changes along the sigmoid colon with no drainable collection evident.    Reports having a colonoscopy done in [place] 10-15 years ago.  She states that at that time 10 polyps were removed, 3 of which she reports were precancerous and 7 were benign.  She is unable to recall the hospital or physician who performed the colonoscopy.  Denies having an EGD done but reports that she was supposed to have an EGD previously due to severe GERD. Denies regular NSAID use.  She admits to smoking cigarettes 1/2 pack per day.  She denies current alcohol use but reports that she was previously drinking a half pt of cognac daily which she quit 5 years ago.  She denies illicit drug use.  Denies a family history of IBD, colon polyps or colon cancer.    Recommendation for repeat colonoscopy has been made but patient had been previously hesitant to have the colonoscopy  completed due to concerns that the bowel prep was causing bowel perforations.          Oswaldo Riggs NP    Past Medical History:   Diagnosis Date    Asthma     Diverticulosis     GERD (gastroesophageal reflux disease)         No past surgical history on file.     No family history on file.    Social History     Tobacco Use    Smoking status: Every Day     Current packs/day: 0.50     Average packs/day: 0.5 packs/day for 40.7 years (20.4 ttl pk-yrs)     Types: Cigarettes     Start date: 1983    Smokeless tobacco: Current    Tobacco comments:     Ambulatory referral to Smoking Cessation clinic following hospital discharge.    Substance Use Topics    Alcohol use: Not Currently         Review of patient's allergies indicates:   Allergen Reactions    Latex         Medications Prior to Admission   Medication Sig Dispense Refill Last Dose    albuterol-ipratropium (DUO-NEB) 2.5 mg-0.5 mg/3 mL nebulizer solution Take 3 mLs by nebulization every 4 (four) hours as needed for Wheezing. Rescue 75 mL 0     amoxicillin-clavulanate 875-125mg (AUGMENTIN) 875-125 mg per tablet Take 1 tablet by mouth every 12 (twelve) hours. 60 tablet 0     budesonide-formoterol 160-4.5 mcg (SYMBICORT) 160-4.5 mcg/actuation HFAA Inhale 2 puffs into the lungs every 12 (twelve) hours. Controller 10.2 g 0     dicyclomine (BENTYL) 10 MG capsule Take 1 capsule (10 mg total) by mouth every 6 (six) hours as needed (abdominal cramping). 20 capsule 0     fluconazole (DIFLUCAN) 200 MG Tab Take 2 tablets (400 mg total) by mouth once daily. 60 tablet 0     fluticasone propionate (FLONASE) 50 mcg/actuation nasal spray 1 spray (50 mcg total) by Each Nostril route daily as needed for Allergies or Rhinitis. 18.2 mL 0     montelukast (SINGULAIR) 10 mg tablet Take 10 mg by mouth once daily.       omeprazole (PRILOSEC) 40 MG capsule Take 1 capsule (40 mg total) by mouth once daily. 30 capsule 0     propranoloL (INDERAL) 60 MG tablet Take 60 mg by mouth.        [DISCONTINUED] predniSONE (DELTASONE) 20 MG tablet Take by mouth.       [DISCONTINUED] white petrolatum-mineral oiL (ARTIFICIAL TEARS, VINCENZO/MIN,) 83-15 % Oint Place into both eyes every evening. (Patient not taking: Reported on 8/21/2023) 3.5 g 0          INPATIENT MEDICATIONS    Scheduled Meds:   amoxicillin-clavulanate 875-125mg  1 tablet Oral Q12H    apixaban  10 mg Oral BID    [START ON 9/21/2023] apixaban  5 mg Oral BID    budesonide-formoterol 160-4.5 mcg  2 puff Inhalation Q12H    fluconazole  400 mg Oral Daily    pantoprazole  40 mg Oral Daily     Continuous Infusions:  PRN Meds:  albuterol-ipratropium, dextrose 10%, dextrose 10%, glucagon (human recombinant), glucose, glucose, ibuprofen, melatonin, naloxone, sodium chloride 0.9%          Review of Systems:       Review of Systems   Constitutional:  Negative for appetite change, chills and fever.   HENT:  Negative for trouble swallowing.    Respiratory:  Positive for wheezing. Negative for cough.    Gastrointestinal:  Positive for abdominal pain (LLQ). Negative for abdominal distention, blood in stool, change in bowel habit, constipation, diarrhea, nausea, vomiting, reflux and change in bowel habit.   Genitourinary:  Negative for dysuria.   Integumentary:  Negative for color change and pallor.   Neurological:  Negative for dizziness and weakness.          Objective:       VITAL SIGNS: 24 HR MIN & MAX LAST    Temp  Min: 97.8 °F (36.6 °C)  Max: 98.8 °F (37.1 °C)  98 °F (36.7 °C)        BP  Min: 103/72  Max: 126/64  116/76     Pulse  Min: 65  Max: 93  65     Resp  Min: 18  Max: 20  18    SpO2  Min: 95 %  Max: 100 %  97 %        Physical Exam  Vitals reviewed.   Constitutional:       General: She is not in acute distress.  HENT:      Head: Normocephalic.      Mouth/Throat:      Mouth: Mucous membranes are moist.   Eyes:      General: No scleral icterus.     Conjunctiva/sclera: Conjunctivae normal.   Cardiovascular:      Rate and Rhythm: Normal rate and regular  "rhythm.      Pulses: Normal pulses.   Pulmonary:      Effort: Pulmonary effort is normal. No respiratory distress.      Breath sounds: Wheezing (bilaterally) present.   Abdominal:      General: Bowel sounds are normal. There is no distension.      Palpations: Abdomen is soft.      Tenderness: There is abdominal tenderness (mild, LLQ).   Musculoskeletal:      Right lower leg: No edema.      Left lower leg: No edema.   Skin:     General: Skin is warm and dry.      Coloration: Skin is not jaundiced or pale.   Neurological:      General: No focal deficit present.      Mental Status: She is alert and oriented to person, place, and time.              LABS:      Recent Labs   Lab 09/14/23  2021 09/15/23  0434   WBC 7.10 7.68   HGB 11.9* 10.6*   HCT 35.9* 32.0*    205   .1* 100.0*       Recent Labs   Lab 09/14/23  2021 09/15/23  0434   HGB 11.9* 10.6*   HCT 35.9* 32.0*        Recent Labs   Lab 09/14/23  2021 09/15/23  0434    141   K 3.7 3.6   CO2 24 26   BUN 14.0 18.1   CREATININE 0.90 0.80   BILITOT 0.5 0.4   ALKPHOS 97 83   AST 16 16   ALT 12 9   LABPROT 7.3 6.6   ALBUMIN 3.7 3.3*        Recent Labs   Lab 09/14/23 2021   INR 1.1   PROTIME 13.5        No results for input(s): "IRON", "FERRITIN" in the last 168 hours.       IMAGING:   CTA Chest Non-Coronary (PE Studies)    Result Date: 9/15/2023  EXAMINATION: CTA CHEST NON CORONARY (PE STUDIES) CLINICAL HISTORY: Pulmonary embolism (PE) suspected, high prob; TECHNIQUE: Helically acquired images with axial, sagittal and coronal reformations were obtained from the thoracic inlet to the lung bases followingthe IV administration of contrast.  CTA timed for evaluation of the pulmonary arteries.  MIP images were performed. Automated tube current modulation, weight-based exposure dosing, and/or iterative reconstruction technique utilized to reach lowest reasonably achievable exposure rate. DLP: 327 mGy*cm COMPARISON: CTA chest 08/16/2023 FINDINGS: BASE OF " "NECK: No significant abnormality. AORTA: Left-sided aortic arch.  No aneurysm and no significant atherosclerosis PULMONARY VASCULATURE: There are segmental and subsegmental filling defects within the right upper lobe pulmonary artery. HEART: Normal size. No effusion. LIDIA/MEDIASTINUM: No enlarged lymph nodes by size criteria. AIRWAYS: There is mild central bronchial wall thickening. LUNGS/PLEURA: Mild pulmonary emphysema.  There are very mild, patchy ground-glass opacities in the perihilar lung zones bilaterally.  No lobar consolidation.  No pleural effusion or pneumothorax. UPPER ABDOMEN: No abnormality of the partially imaged upper abdomen. THORACIC SOFT TISSUES: Unremarkable. BONES: No acute fracture. No suspicious lytic or sclerotic lesions.     1. Right upper lobe pulmonary emboli.  No evidence of right heart strain. 2. Very mild patchy perihilar bronchial wall thickening and ground-glass opacities may be related to bronchitis and pneumonitis. The preliminary and final reports are concordant.  Per Dr. Barreto preliminary report: "Notifications: The results were discussed with the emergency room physician Dr. Garcia prior to dictation at 2023-09-14 23:39:50 CDT." Electronically signed by: Genesis Iraheta Date:    09/15/2023 Time:    08:28    CV Ultrasound doppler venous arm right    Result Date: 9/15/2023  There was acute appearing occlusive thrombus within the right upper basilic vein from the proximal upper arm to the site of PICC line removal two days ago just above the mid upper arm. There was no scanned evidence of DVT within the visualized veins of the right upper arm.     X-Ray Chest PA And Lateral    Result Date: 9/14/2023  EXAMINATION: XR CHEST PA AND LATERAL CLINICAL HISTORY: sob; TECHNIQUE: PA and lateral views of the chest were performed. COMPARISON: 08/18/2023 FINDINGS: The lungs are clear, with normal appearance of pulmonary vasculature and no pleural effusion or pneumothorax. The cardiac " silhouette is normal in size. The hilar and mediastinal contours are unremarkable. Bones are intact.     No acute abnormality. Electronically signed by: Shayne Atkins MD Date:    09/14/2023 Time:    22:35    CT Abdomen Pelvis With Contrast    Result Date: 9/8/2023  EXAMINATION: CT ABDOMEN PELVIS WITH CONTRAST CLINICAL HISTORY: Abdominal abscess/infection suspected;Follow up diverticular abscess; Diverticulitis of intestine, part unspecified, without perforation or abscess without bleeding TECHNIQUE: Low dose axial images, sagittal and coronal reformations were obtained from the lung bases to the pubic symphysis following the IV administration of contrast. Automatic exposure control (AEC) is utilized to reduce patient radiation exposure. COMPARISON: 09/05/2023 FINDINGS: The lung bases are clear. The liver appears normal.  No liver mass or lesion is seen.  Portal and hepatic veins appear normal. The gallbladder appears normal.  No obvious gallstones are seen.  No biliary dilatation is seen.  No pericholecystic fluid is seen. The pancreas appears normal.  No pancreatic mass or lesion is seen. The spleen shows no acute abnormality. The adrenal glands appear normal.  No adrenal nodule is seen. The kidneys appear normal.  No hydronephrosis is seen.  No hydroureter is seen.  No nephrolithiasis is seen.  No obvious ureteral stones are seen. Urinary bladder appears grossly unremarkable. There is a heterogeneous area seen in the lower urine segment consistent with uterine fibroids There is colonic wall thickening seen involving the sigmoid colon.  It is relatively circumferential.  There is some associated mild inflammatory changes.  No abscess or fluid collection is seen.  This was seen on the prior examination as well.  Similar changes but to various degrees have been seen on examinations dating back to April 23.  At this point endoscopy should be considered to exclude underlying mass. No free air is seen.  No free fluid is  seen.     Persistent circumferential wall thickening in the sigmoid colon with mild associated inflammatory changes.  Findings are similar since prior examinations.  Endoscopy should be considered to exclude underlying mass. Findings consistent with uterine fibroid This report was flagged in Epic as abnormal. Electronically signed by: Arely Roche Date:    09/08/2023 Time:    12:31    US Thyroid    Result Date: 9/8/2023  EXAMINATION: US THYROID CLINICAL HISTORY: , Thyrotoxicosis, unspecified without thyrotoxic crisis or storm. TECHNIQUE: Multiple transverse and sagittal grayscale sonographic images were acquired through the thyroid gland. COMPARISON: With ultrasound of an outside institution dated February 12, 2023 FINDINGS: Examination reveals the right hemithyroid to measured 4.6 x 1.9 x 1.6 cm, left zaida thyroid measures 4.4 x 1.9 x 1.9 cm isthmus measures 3.5 mm in thickness. There is some heterogenicity of the thyroid parenchyma with no discrete focal lesions identified there is also evidence of increased vascularity throughout     Heterogenicity of the thyroid parenchyma with a hypervascularity likely related to thyroiditis. No focal lesions Electronically signed by: Pillo Stein Date:    09/08/2023 Time:    10:50    CT Abdomen Pelvis With Contrast    Result Date: 9/5/2023  EXAMINATION: CT ABDOMEN PELVIS WITH CONTRAST CLINICAL HISTORY: LLQ abdominal pain;RLQ abdominal pain (Age >= 14y); TECHNIQUE: Helical acquisition through the abdomen and pelvis with IV contrast.  Three plane reconstructions were provided for review.  mGycm. Automatic exposure control, adjustment of mA/kV or iterative reconstruction technique was used to reduce radiation. COMPARISON: 16 August 2023 FINDINGS: Mild atelectasis or scarring at the lung bases. Suspect hepatic steatosis.  No significant abnormality gallbladder, spleen, pancreas or adrenals.  No hydronephrosis or suspicious renal lesion. No bowel obstruction.   There is colonic diverticulosis.  Similar inflammatory changes along the sigmoid colon with no drainable collection evident.  No free air. Urinary bladder not well distended.  No pelvic free fluid.  Abdominal aorta normal in caliber. Mild degenerative changes of the spine primarily L5-S1.     Similar appearance of sigmoid diverticulitis.  No drainable fluid collection. Electronically signed by: Glen Celestin Date:    09/05/2023 Time:    07:45    X-Ray Chest 1 View for Line/Tube Placement    Result Date: 8/18/2023  EXAMINATION XR CHEST 1 VIEW FOR LINE/TUBE PLACEMENT CLINICAL HISTORY PICC line place; TECHNIQUE A total of 1 frontal image(s) of the chest. COMPARISON 16 August 2023 FINDINGS Lines/tubes/devices: Right upper extremity PICC is now visualized, catheter tip projecting over the mid SVC region.  Multiple ECG leads overlie the chest. The cardiac silhouette and central vascular structures are unchanged.  The trachea is midline. No new or worsening consolidation is identified. There is no enlarging pleural effusion or convincing pneumothorax. Regional osseous structures and extrathoracic soft tissues are similar. IMPRESSION Interval right upper extremity PICC placement with no adverse postprocedure findings. Electronically signed by: Kishan Allen Date:    08/18/2023 Time:    19:36        Assessment / Plan:   Recurrent diverticulitis with sigmoid abscess s/p IR drainage in 2/2023, history of colonic polyps:  -Recommend outpatient colonoscopy-scheduled for 11/6/2023  -Necessity for bowel prep discussed with patient. Discussed multiple formulations for bowel prep and patient amendable to taking Sorensen-tab.     GERD:   -Will schedule for outpatient EGD.   -Continue omeprazole 40 mg daily.

## 2023-09-16 NOTE — DISCHARGE SUMMARY
[facility] Discharge Summary    Admitting Physician: Renita Brown MD  Attending Physician: No att. providers found  Date of Admit: 9/14/2023  Date of Discharge: 9/15/2023    Condition: Good  Outcome: Condition has improved and patient is now ready for discharge.  DISPOSITION: Home or Self Care        Discharge Diagnoses:     Patient Active Problem List   Diagnosis    Obesity    Asthma exacerbation    GERD (gastroesophageal reflux disease)    Diverticulitis    Colitis    Moderate persistent asthma with (acute) exacerbation    Hyperthyroidism    Thyroiditis    Diastolic dysfunction, left ventricle    Tachycardia    Other chest pain    Hypoglycemia    Thrombocytopathia    Diverticulitis of intestine with abscess    Asthma    Acute abdominal pain    Acute diverticulitis    Allergic conjunctivitis and rhinitis    Macrocytic anemia    Acute pulmonary embolism    Superficial vein thrombosis       Principal Problem:  Acute pulmonary embolism    Consultants and Procedures:     Consultants:  IP CONSULT TO GI    Procedures:   * No surgery found *      Brief Admission History:      Niya Moeller is a 53 y.o. female who with a history of diverticulitis, asthma, and GERD who presented to [facility] ER on 9/14/2023  with a primary complaint of Right upper extremity pain that began about 2 days ago after her PICC line was removed. Patient had PICC in place for IV abx treatment of Diverticulitis and states the PICC was painful while in place and she noticed her RUE began to get hardened around the PICC line area. She endorses numbness to her right palm at times and swelling. PICC was removed 2 days ago and she was started on PO Augmentin and fluconazole. She also endorses shortness of breath and chest pain that began yesterday afternoon. Chest pain is central and does not radiate anywhere else and is constant without relief; SOB worse with exertion, constant, and felt at rest also. History of asthma that  she follows with pulmonology for an uses Symbicort inhaler BID and has not needed albuterol rescue inhaler much lately.  Patient denies recent abdominal pain, constipation, diarrhea, dysuria, vomiting/nausea, cough/sputum production, recent asthma attack, and fevers/chills. She is a current smoker and denies alcohol or illicit drug use. She denies any cardiac history including no heart attacks, strokes, or DVT's and no past history of stents.     In the ER, patient was normotensive with a HR in the 90s saturating in the upper 90s on room air. EKG was NSR with heart rate in the 80s without ischemic changes. She tested negative for covid/flu and her troponin was negative. RUE U/S showed RUE superficial vein thrombosis; CTA chest showed right sided pulmonary embolism. Medicine was consulted for admission for PE/DVT UE treatment.     Hospital Course with Pertinent Findings:      Patient was admitted to internal medicine after being diagnosed with a right sided pulmonary embolism.  Right upper extremity ultrasound also showed a superficial vein thrombosis in the basilic vein.  Patient was initiated on Eliquis 10 mg b.i.d.  Patient did not become tachycardic, hypotensive, or hypoxic during her hospitalization.  Due to her recent history of recurrent diverticulitis, and her need for colonoscopy that has been delayed, gastroenterology was consulted speak with patient as she had previously been reluctant to perform any bowel prep.  They educated her on the necessity of bowel prep in order to adequately perform the procedure, which she needs especially as she has history of significant polyp removal in the past.  Patient was stable for discharge on 09/15/2023.  She will be discharged home to complete Eliquis 10 mg b.i.d. for a total of 7 days, after which point she will continue 5 mg b.i.d. Also recommended applying warm compresses to her right upper extremity for her superficial vein thrombosis.  For her diverticulitis, she  "does follow Infectious Disease, she is currently taking Augmentin as well as fluconazole which he should continue.  She is scheduled for a colonoscopy on 11/06/2023.  Recommended patient follow-up with her PCP Oswaldo Riggs NP within 1-2 weeks.  ED return precautions provided.  Stable for discharge home.    Discharge physical exam:  Vitals  BP: 116/76  Temp: 98 °F (36.7 °C)  Temp Source: Oral  Pulse: 65  Resp: 18  SpO2: 97 %  Height: 4' 11" (149.9 cm)  Weight: 70.8 kg (156 lb)    Physical Exam  Constitutional:       General: She is not in acute distress.  HENT:      Head: Normocephalic and atraumatic.      Right Ear: External ear normal.      Left Ear: External ear normal.      Nose: Nose normal.      Mouth/Throat:      Mouth: Mucous membranes are moist.   Eyes:      Extraocular Movements: Extraocular movements intact.      Pupils: Pupils are equal, round, and reactive to light.   Cardiovascular:      Rate and Rhythm: Normal rate and regular rhythm.      Heart sounds: No murmur heard.     No friction rub. No gallop.   Pulmonary:      Effort: Pulmonary effort is normal.      Breath sounds: No wheezing, rhonchi or rales.   Abdominal:      General: There is no distension.      Palpations: Abdomen is soft.      Tenderness: There is abdominal tenderness (Mild LLQ).   Musculoskeletal:         General: Swelling (RUE) present.      Cervical back: Normal range of motion.      Right lower leg: No edema.      Left lower leg: No edema.   Skin:     General: Skin is warm and dry.   Neurological:      General: No focal deficit present.      Mental Status: She is alert and oriented to person, place, and time.   Psychiatric:         Mood and Affect: Affect is tearful.           TIME SPENT ON DISCHARGE: 35 minutes    Discharge Medications:         Medication List        START taking these medications      apixaban 5 mg Tab  Commonly known as: ELIQUIS  Take 2 tablets (10 mg total) by mouth 2 (two) times daily for 6 days, THEN 1 tablet " (5 mg total) 2 (two) times daily.  Start taking on: September 15, 2023            CONTINUE taking these medications      albuterol-ipratropium 2.5 mg-0.5 mg/3 mL nebulizer solution  Commonly known as: DUO-NEB  Take 3 mLs by nebulization every 4 (four) hours as needed for Wheezing. Rescue     amoxicillin-clavulanate 875-125mg 875-125 mg per tablet  Commonly known as: AUGMENTIN  Take 1 tablet by mouth every 12 (twelve) hours.     budesonide-formoterol 160-4.5 mcg 160-4.5 mcg/actuation Hfaa  Commonly known as: SYMBICORT  Inhale 2 puffs into the lungs every 12 (twelve) hours. Controller     dicyclomine 10 MG capsule  Commonly known as: BENTYL  Take 1 capsule (10 mg total) by mouth every 6 (six) hours as needed (abdominal cramping).     fluconazole 200 MG Tab  Commonly known as: DIFLUCAN  Take 2 tablets (400 mg total) by mouth once daily.     fluticasone propionate 50 mcg/actuation nasal spray  Commonly known as: FLONASE  1 spray (50 mcg total) by Each Nostril route daily as needed for Allergies or Rhinitis.     montelukast 10 mg tablet  Commonly known as: SINGULAIR     omeprazole 40 MG capsule  Commonly known as: PRILOSEC  Take 1 capsule (40 mg total) by mouth once daily.     propranoloL 60 MG tablet  Commonly known as: INDERAL            STOP taking these medications      ARTIFICIAL TEARS (VINCENZO/MIN) 83-15 % Oint  Generic drug: white petrolatum-mineral oiL     predniSONE 20 MG tablet  Commonly known as: DELTASONE               Where to Get Your Medications        These medications were sent to [address] [address]      Phone: [phone]   apixaban 5 mg Tab         Discharge Instructions:         Niya Moeller is being discharged Home or Self Care.    No discharge procedures on file.     Follow-Up Appointments:   Follow-up Information       Oswaldo Riggs, NP Follow up in 1 week(s).    Specialty: Family  Medicine  Contact information:  [address] [facility]  [address]  [facility]  [place] [address]  [phone]                             Future Appointments   Date Time Provider Department Center   10/12/2023  9:20 AM RESIDENT, [facility] INFECTIOUS DISEASE [facility] INFDIS [place]    11/7/2023  2:15 PM Delaney Morris, NP [facility] ENDOCR [place]   3/12/2024 10:00 AM Heather Lance MD [facility] GASTRO [place]    4/29/2024  8:10 AM Corie Jimenez, ANP [facility] GYN [place]     At this time, Niya Moeller is determined to have maximized benefits of IP hospitalization. she is discharged in stable condition with OP f/u recommendations and instructions. All questions answered, and patient verbalized agreement with the POC. They were given return precautions prior to d/c including symptoms that should prompt return to ED or to call PCP. Total time spent of DC of 35 minutes.       Glen Shaw MD  [facility], PGY3

## 2023-09-17 ENCOUNTER — HOSPITAL ENCOUNTER (EMERGENCY)
Facility: HOSPITAL | Age: 53
Discharge: HOME | End: 2023-09-17
Attending: EMERGENCY MEDICINE
Payer: MEDICAID

## 2023-09-17 VITALS
OXYGEN SATURATION: 96 % | SYSTOLIC BLOOD PRESSURE: 159 MMHG | WEIGHT: 170 LBS | HEART RATE: 83 BPM | TEMPERATURE: 98 F | DIASTOLIC BLOOD PRESSURE: 71 MMHG | BODY MASS INDEX: 34.27 KG/M2 | RESPIRATION RATE: 17 BRPM | HEIGHT: 59 IN

## 2023-09-17 DIAGNOSIS — R07.9 CHEST PAIN: ICD-10-CM

## 2023-09-17 DIAGNOSIS — J18.9 PNEUMONIA OF RIGHT LUNG DUE TO INFECTIOUS ORGANISM, UNSPECIFIED PART OF LUNG: Primary | ICD-10-CM

## 2023-09-17 LAB
ALBUMIN SERPL-MCNC: 3.4 G/DL (ref 3.5–5)
ALBUMIN/GLOB SERPL: 0.9 RATIO (ref 1.1–2)
ALP SERPL-CCNC: 90 UNIT/L (ref 40–150)
ALT SERPL-CCNC: 13 UNIT/L (ref 0–55)
AST SERPL-CCNC: 18 UNIT/L (ref 5–34)
BASOPHILS # BLD AUTO: 0.01 X10(3)/MCL
BASOPHILS NFR BLD AUTO: 0.1 %
BILIRUB SERPL-MCNC: 0.6 MG/DL
BNP BLD-MCNC: 28.3 PG/ML
BUN SERPL-MCNC: 7.6 MG/DL (ref 9.8–20.1)
CALCIUM SERPL-MCNC: 9.3 MG/DL (ref 8.4–10.2)
CHLORIDE SERPL-SCNC: 106 MMOL/L (ref 98–107)
CO2 SERPL-SCNC: 28 MMOL/L (ref 22–29)
CREAT SERPL-MCNC: 0.9 MG/DL (ref 0.55–1.02)
EOSINOPHIL # BLD AUTO: 0.21 X10(3)/MCL (ref 0–0.9)
EOSINOPHIL NFR BLD AUTO: 2.4 %
ERYTHROCYTE [DISTWIDTH] IN BLOOD BY AUTOMATED COUNT: 13.6 % (ref 11.5–17)
GFR SERPLBLD CREATININE-BSD FMLA CKD-EPI: >60 MLS/MIN/1.73/M2
GLOBULIN SER-MCNC: 3.6 GM/DL (ref 2.4–3.5)
GLUCOSE SERPL-MCNC: 101 MG/DL (ref 74–100)
HCT VFR BLD AUTO: 34.3 % (ref 37–47)
HGB BLD-MCNC: 11.7 G/DL (ref 12–16)
IMM GRANULOCYTES # BLD AUTO: 0.02 X10(3)/MCL (ref 0–0.04)
IMM GRANULOCYTES NFR BLD AUTO: 0.2 %
INR PPP: 1.4
LYMPHOCYTES # BLD AUTO: 2.91 X10(3)/MCL (ref 0.6–4.6)
LYMPHOCYTES NFR BLD AUTO: 33.4 %
MCH RBC QN AUTO: 33.6 PG (ref 27–31)
MCHC RBC AUTO-ENTMCNC: 34.1 G/DL (ref 33–36)
MCV RBC AUTO: 98.6 FL (ref 80–94)
MONOCYTES # BLD AUTO: 0.99 X10(3)/MCL (ref 0.1–1.3)
MONOCYTES NFR BLD AUTO: 11.4 %
NEUTROPHILS # BLD AUTO: 4.57 X10(3)/MCL (ref 2.1–9.2)
NEUTROPHILS NFR BLD AUTO: 52.5 %
NRBC BLD AUTO-RTO: 0 %
PLATELET # BLD AUTO: 210 X10(3)/MCL (ref 130–400)
PMV BLD AUTO: 10.5 FL (ref 7.4–10.4)
POTASSIUM SERPL-SCNC: 3.6 MMOL/L (ref 3.5–5.1)
PROT SERPL-MCNC: 7 GM/DL (ref 6.4–8.3)
PROTHROMBIN TIME: 16.9 SECONDS (ref 12.5–14.5)
RBC # BLD AUTO: 3.48 X10(6)/MCL (ref 4.2–5.4)
SODIUM SERPL-SCNC: 142 MMOL/L (ref 136–145)
TROPONIN I SERPL-MCNC: <0.01 NG/ML (ref 0–0.04)
WBC # SPEC AUTO: 8.71 X10(3)/MCL (ref 4.5–11.5)

## 2023-09-17 PROCEDURE — 83880 ASSAY OF NATRIURETIC PEPTIDE: CPT | Performed by: EMERGENCY MEDICINE

## 2023-09-17 PROCEDURE — 25000242 PHARM REV CODE 250 ALT 637 W/ HCPCS: Performed by: EMERGENCY MEDICINE

## 2023-09-17 PROCEDURE — 87040 BLOOD CULTURE FOR BACTERIA: CPT | Performed by: EMERGENCY MEDICINE

## 2023-09-17 PROCEDURE — 80053 COMPREHEN METABOLIC PANEL: CPT | Performed by: EMERGENCY MEDICINE

## 2023-09-17 PROCEDURE — 85025 COMPLETE CBC W/AUTO DIFF WBC: CPT | Performed by: EMERGENCY MEDICINE

## 2023-09-17 PROCEDURE — 85610 PROTHROMBIN TIME: CPT | Performed by: EMERGENCY MEDICINE

## 2023-09-17 PROCEDURE — 84484 ASSAY OF TROPONIN QUANT: CPT | Performed by: EMERGENCY MEDICINE

## 2023-09-17 PROCEDURE — 99285 EMERGENCY DEPT VISIT HI MDM: CPT | Mod: 25

## 2023-09-17 PROCEDURE — 93005 ELECTROCARDIOGRAM TRACING: CPT

## 2023-09-17 RX ORDER — DOXYCYCLINE 100 MG/1
100 CAPSULE ORAL 2 TIMES DAILY
Qty: 20 CAPSULE | Refills: 0 | Status: SHIPPED | OUTPATIENT
Start: 2023-09-17 | End: 2023-10-12

## 2023-09-17 RX ORDER — ALBUTEROL SULFATE 90 UG/1
2 AEROSOL, METERED RESPIRATORY (INHALATION) EVERY 6 HOURS PRN
Qty: 18 G | Refills: 0 | Status: SHIPPED | OUTPATIENT
Start: 2023-09-17 | End: 2023-11-30 | Stop reason: SDUPTHER

## 2023-09-17 RX ORDER — IPRATROPIUM BROMIDE AND ALBUTEROL SULFATE 2.5; .5 MG/3ML; MG/3ML
3 SOLUTION RESPIRATORY (INHALATION)
Status: COMPLETED | OUTPATIENT
Start: 2023-09-17 | End: 2023-09-17

## 2023-09-17 RX ADMIN — IPRATROPIUM BROMIDE AND ALBUTEROL SULFATE 3 ML: 2.5; .5 SOLUTION RESPIRATORY (INHALATION) at 04:09

## 2023-09-17 NOTE — ED PROVIDER NOTES
Encounter Date: 9/17/2023    SCRIBE #1 NOTE: I, Washington Maguire, am scribing for, and in the presence of,  Jeancarlos Molina MD. I have scribed the following portions of the note - Other sections scribed: HPI,ROS,PE.       History     Chief Complaint   Patient presents with    Chest Pain     Pt having CP and SOB from this afternoon. Recently seen at [facility] and dx with blood clots in her right arm. Sent home with Eloquis on 9/15.      54 y/o female with PMHx of diverticulosis, asthma, DVT of RUE, PE, and GERD presents to ED c/o SOB onset 9/16. She c/o right arm swelling and pain, 101 fever, diaphoresis, and wheezing. Pt reports being seen at [facility] on 9/14 and diagnosed with blood clots in her right arm and PE. She reports being sent home on 9/15 with eliquis. She reports compliance with her eliquis.     The history is provided by the patient. No  was used.     Review of patient's allergies indicates:   Allergen Reactions    Latex      Past Medical History:   Diagnosis Date    Asthma     Diverticulosis     GERD (gastroesophageal reflux disease)      No past surgical history on file.  No family history on file.  Social History     Tobacco Use    Smoking status: Every Day     Current packs/day: 0.50     Average packs/day: 0.5 packs/day for 40.7 years (20.4 ttl pk-yrs)     Types: Cigarettes     Start date: 1983    Smokeless tobacco: Current    Tobacco comments:     Ambulatory referral to Smoking Cessation clinic following hospital discharge.    Substance Use Topics    Alcohol use: Not Currently    Drug use: Not Currently     Review of Systems   Constitutional:  Positive for diaphoresis and fever.   Respiratory:  Positive for shortness of breath and wheezing.    Musculoskeletal:         Right arm swelling and pain.        Physical Exam     Initial Vitals [09/17/23 0343]   BP Pulse Resp Temp SpO2   114/81 81 18 98.1 °F (36.7 °C) 96 %      MAP       --         Physical Exam    Nursing note and vitals  reviewed.  Constitutional: She appears well-developed. No distress.   Non febrile    HENT:   Head: Normocephalic and atraumatic.   Mouth/Throat: Oropharynx is clear and moist.   Eyes: Conjunctivae and EOM are normal. Pupils are equal, round, and reactive to light.   Neck: Neck supple.   Cardiovascular:  Normal rate and regular rhythm.           No murmur heard.  Pulses:       Radial pulses are 2+ on the right side.   Pulmonary/Chest: No respiratory distress. She has wheezes (moderate). She exhibits no tenderness.   Abdominal: Abdomen is soft. Bowel sounds are normal. She exhibits no distension. There is no abdominal tenderness.   Musculoskeletal:         General: Edema (right arm edema) present. Normal range of motion.      Cervical back: Neck supple.      Lumbar back: Normal. Normal range of motion.     Neurological: She is alert and oriented to person, place, and time. She has normal strength. No cranial nerve deficit or sensory deficit.   Psychiatric: She has a normal mood and affect. Judgment normal.         ED Course   Procedures  Labs Reviewed   COMPREHENSIVE METABOLIC PANEL - Abnormal; Notable for the following components:       Result Value    Glucose Level 101 (*)     Blood Urea Nitrogen 7.6 (*)     Albumin Level 3.4 (*)     Globulin 3.6 (*)     Albumin/Globulin Ratio 0.9 (*)     All other components within normal limits   PROTIME-INR - Abnormal; Notable for the following components:    PT 16.9 (*)     INR 1.4 (*)     All other components within normal limits   CBC WITH DIFFERENTIAL - Abnormal; Notable for the following components:    RBC 3.48 (*)     Hgb 11.7 (*)     Hct 34.3 (*)     MCV 98.6 (*)     MCH 33.6 (*)     MPV 10.5 (*)     All other components within normal limits   B-TYPE NATRIURETIC PEPTIDE - Normal   TROPONIN I - Normal   BLOOD CULTURE OLG   BLOOD CULTURE OLG   CBC W/ AUTO DIFFERENTIAL    Narrative:     The following orders were created for panel order CBC auto differential.  Procedure                                Abnormality         Status                     ---------                               -----------         ------                     CBC with Differential[[id number]]       Abnormal            Final result                 Please view results for these tests on the individual orders.     EKG Readings: (Independently Interpreted)   Initial Reading: No STEMI. Rhythm: Normal Sinus Rhythm. Heart Rate: 90. Ectopy: No Ectopy. Conduction: Normal. ST Segments: Normal ST Segments. T Waves: Normal. Axis: Normal.   Taken at 0341       Imaging Results              X-Ray Chest PA And Lateral (Preliminary result)  Result time 09/17/23 07:02:02      Wet Read by Jeancarlos Molina MD (09/17/23 07:02:02, [facility] - Emergency Dept, Emergency Medicine)    Rt infiltrate                                     Medications   albuterol-ipratropium 2.5 mg-0.5 mg/3 mL nebulizer solution 3 mL (3 mLs Nebulization Given 9/17/23 0420)             Scribe Attestation:   Scribe #1: I performed the above scribed service and the documentation accurately describes the services I performed. I attest to the accuracy of the note.    Attending Attestation:           Physician Attestation for Scribe:  Physician Attestation Statement for Scribe #1: I, Jeancarlos Molina MD, reviewed documentation, as scribed by Washington Maguire in my presence, and it is both accurate and complete.         Medical Decision Making  The differential diagnosis includes, but is not limited to, DVT, PE, asthma, and PNA.     Amount and/or Complexity of Data Reviewed  Labs: ordered. Decision-making details documented in ED Course.  Radiology: ordered and independent interpretation performed. Decision-making details documented in ED Course.  ECG/medicine tests: ordered and independent interpretation performed.    Risk  Prescription drug management.                           Clinical Impression:   Final diagnoses:  [R07.9] Chest pain  [J18.9]  Pneumonia of right lung due to infectious organism, unspecified part of lung (Primary)        ED Disposition Condition    Discharge Stable          ED Prescriptions       Medication Sig Dispense Start Date End Date Auth. Provider    doxycycline (VIBRAMYCIN) 100 MG Cap Take 1 capsule (100 mg total) by mouth 2 (two) times daily. for 10 days 20 capsule 9/17/2023 9/27/2023 Jeancarlos Molina MD    albuterol (PROAIR HFA) 90 mcg/actuation inhaler Inhale 2 puffs into the lungs every 6 (six) hours as needed for Wheezing. Rescue 18 g 9/17/2023 9/16/2024 Jeancarlos Molina MD          Follow-up Information       Follow up With Specialties Details Why Contact Info    Oswaldo Riggs, NP Family Medicine   [address] [place] [address] [address]  [facility]  [place] [address]  [phone]               Jeancarlos Molina MD  09/17/23 0716

## 2023-09-21 ENCOUNTER — PATIENT MESSAGE (OUTPATIENT)
Dept: ENDOCRINOLOGY | Facility: CLINIC | Age: 53
End: 2023-09-21
Payer: MEDICAID

## 2023-09-22 LAB
BACTERIA BLD CULT: NORMAL
BACTERIA BLD CULT: NORMAL

## 2023-09-28 ENCOUNTER — LAB VISIT (OUTPATIENT)
Dept: LAB | Facility: HOSPITAL | Age: 53
End: 2023-09-28
Attending: NURSE PRACTITIONER
Payer: MEDICAID

## 2023-09-28 DIAGNOSIS — E05.00 GRAVES DISEASE: ICD-10-CM

## 2023-09-28 DIAGNOSIS — E06.9 THYROIDITIS: ICD-10-CM

## 2023-09-28 LAB
T3FREE SERPL-MCNC: 3.56 PG/ML (ref 1.58–3.91)
T3FREE SERPL-MCNC: 3.69 PG/ML (ref 1.58–3.91)
T4 FREE SERPL-MCNC: 1.04 NG/DL (ref 0.7–1.48)
TSH SERPL-ACNC: 0.66 UIU/ML (ref 0.35–4.94)

## 2023-09-28 PROCEDURE — 84481 FREE ASSAY (FT-3): CPT

## 2023-09-28 PROCEDURE — 84481 FREE ASSAY (FT-3): CPT | Mod: 91

## 2023-09-28 PROCEDURE — 84439 ASSAY OF FREE THYROXINE: CPT

## 2023-09-28 PROCEDURE — 36415 COLL VENOUS BLD VENIPUNCTURE: CPT

## 2023-09-28 PROCEDURE — 84443 ASSAY THYROID STIM HORMONE: CPT

## 2023-09-28 RX ORDER — METHIMAZOLE 5 MG/1
TABLET ORAL
Qty: 15 TABLET | Refills: 2 | Status: SHIPPED | OUTPATIENT
Start: 2023-09-28 | End: 2023-11-06 | Stop reason: SDUPTHER

## 2023-09-29 NOTE — PROGRESS NOTES
Informed the patient that currently her thyroid functions are normal.  Free T3 is on the higher end of normal so to ensure that she does not go back hyperthyroid Delaney will be starting a very low dose of the methimazole. I Instruct her we will be sending methimazole 5 mg and she is to take a half a tablet a day to be on a low-dose 2.5mg. Instructed her this is a very low dose and will not cause go hyperthyroid like her previous higher doses and we will recheck her labs on her follow-up in 5 weeks.     Informed her that her old dose was 10 mg tablets so she is not to take these she is to  the new dose and take a half a tablet of the 5 mg.  Pt verbalized understanding.

## 2023-09-30 ENCOUNTER — HOSPITAL ENCOUNTER (EMERGENCY)
Facility: HOSPITAL | Age: 53
Discharge: HOME | End: 2023-10-01
Attending: STUDENT IN AN ORGANIZED HEALTH CARE EDUCATION/TRAINING PROGRAM
Payer: MEDICAID

## 2023-09-30 DIAGNOSIS — J45.901 EXACERBATION OF ASTHMA, UNSPECIFIED ASTHMA SEVERITY, UNSPECIFIED WHETHER PERSISTENT: Primary | ICD-10-CM

## 2023-09-30 LAB
ALBUMIN SERPL-MCNC: 3.4 G/DL (ref 3.5–5)
ALBUMIN/GLOB SERPL: 1 RATIO (ref 1.1–2)
ALP SERPL-CCNC: 85 UNIT/L (ref 40–150)
ALT SERPL-CCNC: 15 UNIT/L (ref 0–55)
AST SERPL-CCNC: 19 UNIT/L (ref 5–34)
BASOPHILS # BLD AUTO: 0.02 X10(3)/MCL
BASOPHILS NFR BLD AUTO: 0.4 %
BILIRUB SERPL-MCNC: 0.2 MG/DL
BNP BLD-MCNC: 15 PG/ML
BUN SERPL-MCNC: 7.1 MG/DL (ref 9.8–20.1)
CALCIUM SERPL-MCNC: 9.6 MG/DL (ref 8.4–10.2)
CHLORIDE SERPL-SCNC: 110 MMOL/L (ref 98–107)
CO2 SERPL-SCNC: 23 MMOL/L (ref 22–29)
CREAT SERPL-MCNC: 0.74 MG/DL (ref 0.55–1.02)
EOSINOPHIL # BLD AUTO: 0.23 X10(3)/MCL (ref 0–0.9)
EOSINOPHIL NFR BLD AUTO: 4.2 %
ERYTHROCYTE [DISTWIDTH] IN BLOOD BY AUTOMATED COUNT: 13.1 % (ref 11.5–17)
FLUAV AG UPPER RESP QL IA.RAPID: NOT DETECTED
FLUBV AG UPPER RESP QL IA.RAPID: NOT DETECTED
GFR SERPLBLD CREATININE-BSD FMLA CKD-EPI: >60 MLS/MIN/1.73/M2
GLOBULIN SER-MCNC: 3.5 GM/DL (ref 2.4–3.5)
GLUCOSE SERPL-MCNC: 89 MG/DL (ref 74–100)
HCT VFR BLD AUTO: 35.2 % (ref 37–47)
HGB BLD-MCNC: 11.5 G/DL (ref 12–16)
IMM GRANULOCYTES # BLD AUTO: 0.01 X10(3)/MCL (ref 0–0.04)
IMM GRANULOCYTES NFR BLD AUTO: 0.2 %
LYMPHOCYTES # BLD AUTO: 2.67 X10(3)/MCL (ref 0.6–4.6)
LYMPHOCYTES NFR BLD AUTO: 48.5 %
MCH RBC QN AUTO: 32.9 PG (ref 27–31)
MCHC RBC AUTO-ENTMCNC: 32.7 G/DL (ref 33–36)
MCV RBC AUTO: 100.6 FL (ref 80–94)
MONOCYTES # BLD AUTO: 0.67 X10(3)/MCL (ref 0.1–1.3)
MONOCYTES NFR BLD AUTO: 12.2 %
NEUTROPHILS # BLD AUTO: 1.91 X10(3)/MCL (ref 2.1–9.2)
NEUTROPHILS NFR BLD AUTO: 34.5 %
NRBC BLD AUTO-RTO: 0 %
PLATELET # BLD AUTO: 207 X10(3)/MCL (ref 130–400)
PMV BLD AUTO: 10.5 FL (ref 7.4–10.4)
POTASSIUM SERPL-SCNC: 3.9 MMOL/L (ref 3.5–5.1)
PROT SERPL-MCNC: 6.9 GM/DL (ref 6.4–8.3)
RBC # BLD AUTO: 3.5 X10(6)/MCL (ref 4.2–5.4)
SARS-COV-2 RNA RESP QL NAA+PROBE: NOT DETECTED
SODIUM SERPL-SCNC: 143 MMOL/L (ref 136–145)
TROPONIN I SERPL-MCNC: <0.01 NG/ML (ref 0–0.04)
WBC # SPEC AUTO: 5.51 X10(3)/MCL (ref 4.5–11.5)

## 2023-09-30 PROCEDURE — 84484 ASSAY OF TROPONIN QUANT: CPT | Performed by: STUDENT IN AN ORGANIZED HEALTH CARE EDUCATION/TRAINING PROGRAM

## 2023-09-30 PROCEDURE — 93005 ELECTROCARDIOGRAM TRACING: CPT

## 2023-09-30 PROCEDURE — 96372 THER/PROPH/DIAG INJ SC/IM: CPT | Performed by: STUDENT IN AN ORGANIZED HEALTH CARE EDUCATION/TRAINING PROGRAM

## 2023-09-30 PROCEDURE — 0240U COVID/FLU A&B PCR: CPT | Performed by: STUDENT IN AN ORGANIZED HEALTH CARE EDUCATION/TRAINING PROGRAM

## 2023-09-30 PROCEDURE — 80053 COMPREHEN METABOLIC PANEL: CPT | Performed by: STUDENT IN AN ORGANIZED HEALTH CARE EDUCATION/TRAINING PROGRAM

## 2023-09-30 PROCEDURE — 96375 TX/PRO/DX INJ NEW DRUG ADDON: CPT

## 2023-09-30 PROCEDURE — 83880 ASSAY OF NATRIURETIC PEPTIDE: CPT | Performed by: STUDENT IN AN ORGANIZED HEALTH CARE EDUCATION/TRAINING PROGRAM

## 2023-09-30 PROCEDURE — 99285 EMERGENCY DEPT VISIT HI MDM: CPT | Mod: 25

## 2023-09-30 PROCEDURE — 94761 N-INVAS EAR/PLS OXIMETRY MLT: CPT

## 2023-09-30 PROCEDURE — 25000242 PHARM REV CODE 250 ALT 637 W/ HCPCS: Performed by: STUDENT IN AN ORGANIZED HEALTH CARE EDUCATION/TRAINING PROGRAM

## 2023-09-30 PROCEDURE — 63600175 PHARM REV CODE 636 W HCPCS: Performed by: STUDENT IN AN ORGANIZED HEALTH CARE EDUCATION/TRAINING PROGRAM

## 2023-09-30 PROCEDURE — 94640 AIRWAY INHALATION TREATMENT: CPT

## 2023-09-30 PROCEDURE — 85025 COMPLETE CBC W/AUTO DIFF WBC: CPT | Performed by: STUDENT IN AN ORGANIZED HEALTH CARE EDUCATION/TRAINING PROGRAM

## 2023-09-30 PROCEDURE — 96365 THER/PROPH/DIAG IV INF INIT: CPT

## 2023-09-30 RX ORDER — MAGNESIUM SULFATE HEPTAHYDRATE 40 MG/ML
2 INJECTION, SOLUTION INTRAVENOUS
Status: COMPLETED | OUTPATIENT
Start: 2023-09-30 | End: 2023-09-30

## 2023-09-30 RX ORDER — DIPHENHYDRAMINE HYDROCHLORIDE 50 MG/ML
25 INJECTION INTRAMUSCULAR; INTRAVENOUS
Status: COMPLETED | OUTPATIENT
Start: 2023-09-30 | End: 2023-09-30

## 2023-09-30 RX ORDER — DEXAMETHASONE SODIUM PHOSPHATE 4 MG/ML
12 INJECTION, SOLUTION INTRA-ARTICULAR; INTRALESIONAL; INTRAMUSCULAR; INTRAVENOUS; SOFT TISSUE
Status: COMPLETED | OUTPATIENT
Start: 2023-09-30 | End: 2023-09-30

## 2023-09-30 RX ORDER — IPRATROPIUM BROMIDE AND ALBUTEROL SULFATE 2.5; .5 MG/3ML; MG/3ML
9 SOLUTION RESPIRATORY (INHALATION) ONCE
Status: DISCONTINUED | OUTPATIENT
Start: 2023-09-30 | End: 2023-09-30

## 2023-09-30 RX ORDER — IPRATROPIUM BROMIDE AND ALBUTEROL SULFATE 2.5; .5 MG/3ML; MG/3ML
9 SOLUTION RESPIRATORY (INHALATION) ONCE
Status: COMPLETED | OUTPATIENT
Start: 2023-09-30 | End: 2023-09-30

## 2023-09-30 RX ORDER — PREDNISONE 20 MG/1
40 TABLET ORAL DAILY
Qty: 10 TABLET | Refills: 0 | Status: SHIPPED | OUTPATIENT
Start: 2023-09-30 | End: 2023-10-05

## 2023-09-30 RX ORDER — TERBUTALINE SULFATE 1 MG/ML
0.25 INJECTION SUBCUTANEOUS ONCE
Status: COMPLETED | OUTPATIENT
Start: 2023-09-30 | End: 2023-09-30

## 2023-09-30 RX ADMIN — TERBUTALINE SULFATE 0.25 MG: 1 INJECTION SUBCUTANEOUS at 10:09

## 2023-09-30 RX ADMIN — IPRATROPIUM BROMIDE AND ALBUTEROL SULFATE 9 ML: 2.5; .5 SOLUTION RESPIRATORY (INHALATION) at 09:09

## 2023-09-30 RX ADMIN — DEXAMETHASONE SODIUM PHOSPHATE 12 MG: 4 INJECTION, SOLUTION INTRA-ARTICULAR; INTRALESIONAL; INTRAMUSCULAR; INTRAVENOUS; SOFT TISSUE at 10:09

## 2023-09-30 RX ADMIN — DIPHENHYDRAMINE HYDROCHLORIDE 25 MG: 50 INJECTION INTRAMUSCULAR; INTRAVENOUS at 10:09

## 2023-09-30 RX ADMIN — MAGNESIUM SULFATE HEPTAHYDRATE 2 G: 40 INJECTION, SOLUTION INTRAVENOUS at 10:09

## 2023-10-01 VITALS
SYSTOLIC BLOOD PRESSURE: 113 MMHG | HEIGHT: 59 IN | RESPIRATION RATE: 20 BRPM | DIASTOLIC BLOOD PRESSURE: 70 MMHG | TEMPERATURE: 98 F | OXYGEN SATURATION: 95 % | HEART RATE: 79 BPM | BODY MASS INDEX: 34.34 KG/M2

## 2023-10-01 NOTE — ED PROVIDER NOTES
Encounter Date: 9/30/2023       History     Chief Complaint   Patient presents with    Shortness of Breath     Pt. Reports having an asthma attack for the lat 3 days; states that treatments at home have not been helping; Was admitted recently for diverticulitis and finished antibiotics this week; Wheezing but speaking freely in triage     53-year-old female with history of asthma presents to ED for several days worsening shortness of breath.  Chart review indicates history of both asthma and COPD.  She states her lungs feels tight and wheezy consistent with her typical exacerbations.  To me she denies any chest pain or pressure only intermittent palpitations and shakiness.  No fevers or chills.  Did recently finish a course of antibiotics for an abdominal infection.  No pleuritic component, and no other complaints or concerns at this time.      Review of patient's allergies indicates:   Allergen Reactions    Latex      Past Medical History:   Diagnosis Date    Asthma     Diverticulosis     GERD (gastroesophageal reflux disease)      No past surgical history on file.  No family history on file.  Social History     Tobacco Use    Smoking status: Every Day     Current packs/day: 0.50     Average packs/day: 0.5 packs/day for 40.8 years (20.4 ttl pk-yrs)     Types: Cigarettes     Start date: 1983    Smokeless tobacco: Current    Tobacco comments:     Ambulatory referral to Smoking Cessation clinic following hospital discharge.    Substance Use Topics    Alcohol use: Not Currently    Drug use: Not Currently     Review of Systems   Constitutional:  Negative for chills, diaphoresis and fever.   HENT:  Negative for congestion, rhinorrhea, sinus pain and sore throat.    Eyes:  Negative for pain, discharge and itching.   Respiratory:  Positive for cough, shortness of breath and wheezing. Negative for chest tightness.    Cardiovascular:  Negative for chest pain and palpitations.   Gastrointestinal:  Negative for abdominal pain,  nausea and vomiting.   Genitourinary:  Negative for dysuria, flank pain and hematuria.   Musculoskeletal:  Negative for back pain and myalgias.   Skin:  Negative for color change and rash.   Neurological:  Positive for tremors. Negative for dizziness, weakness and headaches.   Psychiatric/Behavioral:  Negative for confusion. The patient is not hyperactive.        Physical Exam     Initial Vitals [09/30/23 2008]   BP Pulse Resp Temp SpO2   109/60 92 (!) 24 98.1 °F (36.7 °C) 95 %      MAP       --         Physical Exam    Vitals reviewed.  Constitutional: She appears well-developed and well-nourished. She is not diaphoretic. No distress.   Comfortably sitting up in no acute distress.  Very pleasant.  Have seen this patient previously.   HENT:   Head: Normocephalic and atraumatic.   Eyes: Conjunctivae and EOM are normal. Pupils are equal, round, and reactive to light.   Neck: Neck supple. No tracheal deviation present.   Normal range of motion.  Cardiovascular:  Normal rate, regular rhythm, normal heart sounds and intact distal pulses.           Pulmonary/Chest: No accessory muscle usage. Tachypnea noted. No respiratory distress. She has decreased breath sounds. She has wheezes. She has no rhonchi. She has no rales.   Abdominal: Abdomen is soft. There is no abdominal tenderness. There is no rebound and no guarding.   Musculoskeletal:         General: Normal range of motion.      Cervical back: Normal range of motion and neck supple.     Neurological: She is alert and oriented to person, place, and time. She has normal strength. GCS score is 15. GCS eye subscore is 4. GCS verbal subscore is 5. GCS motor subscore is 6.   Skin: Skin is warm and dry. Capillary refill takes less than 2 seconds. No rash noted.   Psychiatric: She has a normal mood and affect. Her behavior is normal. Judgment and thought content normal.         ED Course   Procedures  Labs Reviewed   COMPREHENSIVE METABOLIC PANEL - Abnormal; Notable for the  following components:       Result Value    Chloride 110 (*)     Blood Urea Nitrogen 7.1 (*)     Albumin Level 3.4 (*)     Albumin/Globulin Ratio 1.0 (*)     All other components within normal limits   CBC WITH DIFFERENTIAL - Abnormal; Notable for the following components:    RBC 3.50 (*)     Hgb 11.5 (*)     Hct 35.2 (*)     .6 (*)     MCH 32.9 (*)     MCHC 32.7 (*)     MPV 10.5 (*)     Neut # 1.91 (*)     All other components within normal limits   TROPONIN I - Normal   B-TYPE NATRIURETIC PEPTIDE - Normal   COVID/FLU A&B PCR - Normal    Narrative:     The Xpert Xpress SARS-CoV-2/FLU/RSV plus is a rapid, multiplexed real-time PCR test intended for the simultaneous qualitative detection and differentiation of SARS-CoV-2, Influenza A, Influenza B, and respiratory syncytial virus (RSV) viral RNA in either nasopharyngeal swab or nasal swab specimens.         CBC W/ AUTO DIFFERENTIAL    Narrative:     The following orders were created for panel order CBC auto differential.  Procedure                               Abnormality         Status                     ---------                               -----------         ------                     CBC with Differential[[id number]]       Abnormal            Final result                 Please view results for these tests on the individual orders.     EKG Readings: (Independently Interpreted)   Initial Reading: No STEMI. Rhythm: Sinus Tachycardia. Ectopy: No Ectopy. Conduction: Normal. Axis: Normal. Clinical Impression: Sinus Tachycardia     ECG Results              EKG 12-lead (Final result)  Result time 10/01/23 21:56:39      Final result by Interface, Lab In [facility] (10/01/23 21:56:39)                   Narrative:    Test Reason : R07.9,    Vent. Rate : 103 BPM     Atrial Rate : 103 BPM     P-R Int : 156 ms          QRS Dur : 072 ms      QT Int : 358 ms       P-R-T Axes : 067 069 053 degrees     QTc Int : 468 ms    Sinus tachycardia  Nonspecific ST  abnormality  Abnormal ECG  When compared with ECG of 17-SEP-2023 03:41,  No significant change was found  Confirmed by Law Arceo MD ([id number]) on 10/1/2023 9:56:30 PM    Referred By: ARTHUR   SELF           Confirmed By:Law Arceo MD                                  Imaging Results              X-Ray Chest AP Portable (Final result)  Result time 10/01/23 08:27:31      Final result by Juan Leavitt MD (10/01/23 08:27:31)                   Impression:      No acute cardiopulmonary process.      Electronically signed by: Juan Leavitt  Date:    10/01/2023  Time:    08:27               Narrative:    EXAMINATION:  XR CHEST AP PORTABLE    CLINICAL HISTORY:  Chest Pain;    TECHNIQUE:  Single view of the chest    COMPARISON:  09/17/2023    FINDINGS:  No focal opacification, pleural effusion, or pneumothorax.    The cardiomediastinal silhouette is within normal limits.    No acute osseous abnormality.                                       Medications   dexAMETHasone injection 12 mg (12 mg Intravenous Given 9/30/23 2210)   magnesium sulfate 2g in water 50mL IVPB (premix) (0 g Intravenous Stopped 9/30/23 2245)   terbutaline injection 0.25 mg (0.25 mg Subcutaneous Given 9/30/23 2210)   albuterol-ipratropium 2.5 mg-0.5 mg/3 mL nebulizer solution 9 mL (9 mLs Nebulization Given 9/30/23 2144)   diphenhydrAMINE injection 25 mg (25 mg Intravenous Given 9/30/23 2222)     Medical Decision Making  53-year-old female with history of asthma and COPD presents for wheezing and shortness of breath    Differential.  Asthma vs COPD exacerbation, reactive airway disease, pneumonia vs bronchitis, viral syndrome, flu COVID    Patient in no initial distress.  Mild tachypnea and tachycardia and saturations fine.  No baseline oxygen content.  Pulmonary auscultation demonstrated diffuse mild-moderate wheezing.  Physical exam otherwise benign.  Not conversationally dyspneic.  EKG sinus tach, chest x-ray clear and labs grossly unremarkable  for infectious process.  Cardiac workup negative as well.  After multiple breathing treatments felt significantly better.  Did develop a mild tremor and stated she felt anxious after medications which is to be expected given cardiac stimulant defects.  Did ultimately prescribe short course steroids and will follow up closely with PCP.  Strict return precautions provided and released.  After discharge patient did request I specifically change her diagnosis from COPD to asthma exacerbation.  Patient has a history of both.  Given that it does not acutely change the management after discharge complied. (Patrice)     Amount and/or Complexity of Data Reviewed  Labs: ordered.  Radiology: ordered.    Risk  Prescription drug management.               ED Course as of 10/04/23 1001   Sat Sep 30, 2023   2327 Upon entry patient was sleeping on her side.  Reports near-complete resolution of shortness of breath.  Declines any refills of home medications.  Feels stable for discharge. [RZ]   2327 Patient declined breathing treatment earlier.  Stated her only complaint at that time was tremors. [RZ]      ED Course User Index  [RZ] Wilver Ibrahim MD                    Clinical Impression:   Final diagnoses:  [J45.901] Exacerbation of asthma, unspecified asthma severity, unspecified whether persistent (Primary)        ED Disposition Condition    Discharge Stable          ED Prescriptions       Medication Sig Dispense Start Date End Date Auth. Provider    predniSONE (DELTASONE) 20 MG tablet Take 2 tablets (40 mg total) by mouth once daily. for 5 days 10 tablet 9/30/2023 10/5/2023 Wilver Ibrahim MD          Follow-up Information       Follow up With Specialties Details Why Contact Info    Owsaldo Riggs, NP Family Medicine Schedule an appointment as soon as possible for a visit in 3 days  [address] [place] [address]  PRIMARY CARE [facility]  [place] [id number] [phone]      [facility] Emergency Medicine  As needed,  If symptoms worsen [address] [facility] [address]  [phone]             Wilver Ibrahim MD  10/04/23 1007

## 2023-10-05 ENCOUNTER — PATIENT OUTREACH (OUTPATIENT)
Dept: EMERGENCY MEDICINE | Facility: HOSPITAL | Age: 53
End: 2023-10-05
Payer: MEDICAID

## 2023-10-06 NOTE — PROGRESS NOTES
Called and spoke with patient,says she is wondering if she still has an diverticulitis  abscess.No complaints of pain at this time. She completed her antibiotics 09/12/23 and she has a scheduled follow up in the ID Clinic next week 10/12/23. She has established care and had recent follow ups last month (SEPT) with a GI and Pulmonologist in [facility]. She continues to verbalized her unhappiness with some of the local providers. She is scheduled for a Colonoscopy 11/06/23 at [facility]. The GI doctor 09/19/23 in [facility] consulted with her and the patient told him she could not tolerate the Golytely prep. The recommended he ordered is 2 days of clear liquids along with 5 Dulcolax tabs x 2 days. She said she is not sure what time she needs to report for her GI procedure. Explained the GI Clinic will notified her  few days before with instructions with the time to arrive. She verified she has follow up visits with her out side  PCP following her ED visits to keep him posted on her current issues.

## 2023-10-12 ENCOUNTER — HOSPITAL ENCOUNTER (EMERGENCY)
Facility: HOSPITAL | Age: 53
Discharge: HOME | End: 2023-10-12
Attending: EMERGENCY MEDICINE
Payer: MEDICAID

## 2023-10-12 ENCOUNTER — OFFICE VISIT (OUTPATIENT)
Dept: INFECTIOUS DISEASES | Facility: CLINIC | Age: 53
End: 2023-10-12
Payer: MEDICAID

## 2023-10-12 VITALS
HEART RATE: 68 BPM | RESPIRATION RATE: 22 BRPM | HEIGHT: 59 IN | WEIGHT: 151.88 LBS | SYSTOLIC BLOOD PRESSURE: 128 MMHG | DIASTOLIC BLOOD PRESSURE: 81 MMHG | BODY MASS INDEX: 30.62 KG/M2 | TEMPERATURE: 98 F | OXYGEN SATURATION: 96 %

## 2023-10-12 VITALS
HEIGHT: 59 IN | RESPIRATION RATE: 16 BRPM | DIASTOLIC BLOOD PRESSURE: 66 MMHG | HEART RATE: 93 BPM | WEIGHT: 152 LBS | TEMPERATURE: 98 F | SYSTOLIC BLOOD PRESSURE: 100 MMHG | BODY MASS INDEX: 30.64 KG/M2

## 2023-10-12 DIAGNOSIS — I82.890 SUPERFICIAL VEIN THROMBOSIS: ICD-10-CM

## 2023-10-12 DIAGNOSIS — R93.5 ABNORMAL CT OF THE ABDOMEN: ICD-10-CM

## 2023-10-12 DIAGNOSIS — R19.7 DIARRHEA, UNSPECIFIED TYPE: ICD-10-CM

## 2023-10-12 DIAGNOSIS — R10.9 ACUTE ABDOMINAL PAIN: ICD-10-CM

## 2023-10-12 DIAGNOSIS — K57.20 DIVERTICULITIS OF LARGE INTESTINE WITH ABSCESS WITHOUT BLEEDING: Primary | ICD-10-CM

## 2023-10-12 DIAGNOSIS — R10.9 ABDOMINAL PAIN, UNSPECIFIED ABDOMINAL LOCATION: Primary | ICD-10-CM

## 2023-10-12 DIAGNOSIS — K52.9 COLITIS: ICD-10-CM

## 2023-10-12 LAB
ALBUMIN SERPL-MCNC: 3.8 G/DL (ref 3.5–5)
ALBUMIN/GLOB SERPL: 1.1 RATIO (ref 1.1–2)
ALP SERPL-CCNC: 93 UNIT/L (ref 40–150)
ALT SERPL-CCNC: 14 UNIT/L (ref 0–55)
APPEARANCE UR: CLEAR
AST SERPL-CCNC: 16 UNIT/L (ref 5–34)
BACTERIA #/AREA URNS AUTO: ABNORMAL /HPF
BASOPHILS # BLD AUTO: 0.01 X10(3)/MCL
BASOPHILS NFR BLD AUTO: 0.2 %
BILIRUB SERPL-MCNC: 0.3 MG/DL
BILIRUB UR QL STRIP.AUTO: NEGATIVE
BUN SERPL-MCNC: 8.7 MG/DL (ref 9.8–20.1)
CALCIUM SERPL-MCNC: 10.7 MG/DL (ref 8.4–10.2)
CHLORIDE SERPL-SCNC: 108 MMOL/L (ref 98–107)
CO2 SERPL-SCNC: 27 MMOL/L (ref 22–29)
COLOR UR AUTO: COLORLESS
CREAT SERPL-MCNC: 0.77 MG/DL (ref 0.55–1.02)
EOSINOPHIL # BLD AUTO: 0.21 X10(3)/MCL (ref 0–0.9)
EOSINOPHIL NFR BLD AUTO: 3.3 %
ERYTHROCYTE [DISTWIDTH] IN BLOOD BY AUTOMATED COUNT: 13.2 % (ref 11.5–17)
GFR SERPLBLD CREATININE-BSD FMLA CKD-EPI: >60 MLS/MIN/1.73/M2
GLOBULIN SER-MCNC: 3.6 GM/DL (ref 2.4–3.5)
GLUCOSE SERPL-MCNC: 69 MG/DL (ref 74–100)
GLUCOSE UR QL STRIP.AUTO: NORMAL
HCT VFR BLD AUTO: 41 % (ref 37–47)
HGB BLD-MCNC: 13.3 G/DL (ref 12–16)
HYALINE CASTS #/AREA URNS LPF: ABNORMAL /LPF
IMM GRANULOCYTES # BLD AUTO: 0.01 X10(3)/MCL (ref 0–0.04)
IMM GRANULOCYTES NFR BLD AUTO: 0.2 %
KETONES UR QL STRIP.AUTO: NEGATIVE
LACTATE SERPL-SCNC: 0.7 MMOL/L (ref 0.5–2.2)
LEUKOCYTE ESTERASE UR QL STRIP.AUTO: NEGATIVE
LIPASE SERPL-CCNC: 38 U/L
LYMPHOCYTES # BLD AUTO: 2.83 X10(3)/MCL (ref 0.6–4.6)
LYMPHOCYTES NFR BLD AUTO: 45 %
MCH RBC QN AUTO: 33.6 PG (ref 27–31)
MCHC RBC AUTO-ENTMCNC: 32.4 G/DL (ref 33–36)
MCV RBC AUTO: 103.5 FL (ref 80–94)
MONOCYTES # BLD AUTO: 0.73 X10(3)/MCL (ref 0.1–1.3)
MONOCYTES NFR BLD AUTO: 11.6 %
MUCOUS THREADS URNS QL MICRO: ABNORMAL /LPF
NEUTROPHILS # BLD AUTO: 2.5 X10(3)/MCL (ref 2.1–9.2)
NEUTROPHILS NFR BLD AUTO: 39.7 %
NITRITE UR QL STRIP.AUTO: NEGATIVE
NRBC BLD AUTO-RTO: 0 %
PH UR STRIP.AUTO: 5.5 [PH]
PLATELET # BLD AUTO: 237 X10(3)/MCL (ref 130–400)
PMV BLD AUTO: 11.6 FL (ref 7.4–10.4)
POTASSIUM SERPL-SCNC: 3.5 MMOL/L (ref 3.5–5.1)
PROT SERPL-MCNC: 7.4 GM/DL (ref 6.4–8.3)
PROT UR QL STRIP.AUTO: NEGATIVE
RBC # BLD AUTO: 3.96 X10(6)/MCL (ref 4.2–5.4)
RBC #/AREA URNS AUTO: ABNORMAL /HPF
RBC UR QL AUTO: NEGATIVE
SODIUM SERPL-SCNC: 142 MMOL/L (ref 136–145)
SP GR UR STRIP.AUTO: 1.01 (ref 1–1.03)
SQUAMOUS #/AREA URNS LPF: ABNORMAL /HPF
UROBILINOGEN UR STRIP-ACNC: NORMAL
WBC # SPEC AUTO: 6.29 X10(3)/MCL (ref 4.5–11.5)
WBC #/AREA URNS AUTO: ABNORMAL /HPF

## 2023-10-12 PROCEDURE — 83690 ASSAY OF LIPASE: CPT | Performed by: EMERGENCY MEDICINE

## 2023-10-12 PROCEDURE — 96374 THER/PROPH/DIAG INJ IV PUSH: CPT | Mod: 59

## 2023-10-12 PROCEDURE — 36415 COLL VENOUS BLD VENIPUNCTURE: CPT

## 2023-10-12 PROCEDURE — 99213 OFFICE O/P EST LOW 20 MIN: CPT | Mod: PBBFAC

## 2023-10-12 PROCEDURE — 99285 EMERGENCY DEPT VISIT HI MDM: CPT | Mod: 25,27

## 2023-10-12 PROCEDURE — 80053 COMPREHEN METABOLIC PANEL: CPT

## 2023-10-12 PROCEDURE — 85025 COMPLETE CBC W/AUTO DIFF WBC: CPT

## 2023-10-12 PROCEDURE — 25500020 PHARM REV CODE 255

## 2023-10-12 PROCEDURE — 83605 ASSAY OF LACTIC ACID: CPT | Performed by: EMERGENCY MEDICINE

## 2023-10-12 PROCEDURE — 63600175 PHARM REV CODE 636 W HCPCS: Performed by: EMERGENCY MEDICINE

## 2023-10-12 PROCEDURE — 81001 URINALYSIS AUTO W/SCOPE: CPT | Performed by: EMERGENCY MEDICINE

## 2023-10-12 PROCEDURE — 96361 HYDRATE IV INFUSION ADD-ON: CPT

## 2023-10-12 RX ORDER — ONDANSETRON 2 MG/ML
4 INJECTION INTRAMUSCULAR; INTRAVENOUS
Status: COMPLETED | OUTPATIENT
Start: 2023-10-12 | End: 2023-10-12

## 2023-10-12 RX ORDER — HYDROCODONE BITARTRATE AND ACETAMINOPHEN 7.5; 325 MG/1; MG/1
1 TABLET ORAL EVERY 8 HOURS PRN
Qty: 20 TABLET | Refills: 0 | Status: SHIPPED | OUTPATIENT
Start: 2023-10-12 | End: 2023-10-19

## 2023-10-12 RX ORDER — SODIUM CHLORIDE, SODIUM LACTATE, POTASSIUM CHLORIDE, CALCIUM CHLORIDE 600; 310; 30; 20 MG/100ML; MG/100ML; MG/100ML; MG/100ML
1000 INJECTION, SOLUTION INTRAVENOUS
Status: COMPLETED | OUTPATIENT
Start: 2023-10-12 | End: 2023-10-12

## 2023-10-12 RX ADMIN — ONDANSETRON 4 MG: 2 INJECTION INTRAMUSCULAR; INTRAVENOUS at 05:10

## 2023-10-12 RX ADMIN — IOHEXOL 100 ML: 350 INJECTION, SOLUTION INTRAVENOUS at 05:10

## 2023-10-12 RX ADMIN — SODIUM CHLORIDE, POTASSIUM CHLORIDE, SODIUM LACTATE AND CALCIUM CHLORIDE 1000 ML: 600; 310; 30; 20 INJECTION, SOLUTION INTRAVENOUS at 05:10

## 2023-10-12 NOTE — PROGRESS NOTES
[facility] Outpatient INFECTIOUS DISEASES Clinic Note    CHIEF COMPLAINT: Post hospital discharge follow up for sigmoid abscess/diverticulitis    HISTORY OF PRESENT ILLNESS:     54 y/o female with PMH diverticulitis with sigmoid abscess s/p IR drainage in 2/2023 (cx growing ESBL E coli), recurrent abscess 7/2023 which has been management with antibiotics who presents to ID clinic today for scheduled follow up visit. She is currently being treated with ertapenem x4 weeks with end date 9/15/23.    She was admitted in 3/2023 where she received merrem for 1-2 weeks. Would note that no abscess was noted from imaging during this time.     Despite IV antibiotics she continued to have abdominal pain, and represented to the ED in 7/2023 where she was found to have a 3.5cm sigmoid abscess. She was treated with Augmentin x2 weeks. Despite this therapy she continued to have worsening LLQ pain and thus represented to the ED, and was admitted. Would note that imaging showed about a 1cm reducing in size of the sigmoid abscess. When she was hospitalized at [facility] in 8/2023 she was started on ertapenem and completed 4 weeks. At week 3 into therapy she had repeat CT abd/pelivs which showed resolution of abscess but continues to show colonic inflammation. IV antibiotics were stopped and she was transitioned for Augmentin and fluconazole to complete a 30 day course.    Since last seen she has completed Augmentin and fluconazole. She stopped the fluconazole early due to complaints of her hands peeling off skin which has now improved since stopping this medication. She is planned to undergo colonoscopy on 11/6/23 here at [facility]. Today she states her abdominal pain has returned about 2 days ago and is mostly on the LLQ. She is also c/o diarrhea that kept her up all night; she reports having 4 watery BM yesterday and 2 today. Denies fever, chills, N/V, hematochezia.      REVIEW OF SYSTEMS:  Review of Systems   Constitutional:  Negative for chills,  fever, malaise/fatigue and weight loss.   HENT:  Negative for congestion and sore throat.    Eyes:  Negative for blurred vision.   Respiratory:  Negative for cough, sputum production and shortness of breath.    Cardiovascular:  Negative for chest pain, palpitations and leg swelling.   Gastrointestinal:  Negative for abdominal pain, diarrhea, nausea and vomiting.   Genitourinary:  Negative for dysuria.   Musculoskeletal:  Negative for joint pain.   Skin:  Negative for rash.   Neurological:  Negative for focal weakness, weakness and headaches.       PREVIOUS MEDICAL HISTORY:  Past Medical History:   Diagnosis Date    Asthma     Diverticulosis     GERD (gastroesophageal reflux disease)        PREVIOUS SURGICAL HISTORY:  History reviewed. No pertinent surgical history.      ALLERGIES:  Review of patient's allergies indicates:   Allergen Reactions    Latex          MEDICATIONS:  Current Outpatient Medications on File Prior to Visit   Medication Sig Dispense Refill    albuterol (PROAIR HFA) 90 mcg/actuation inhaler Inhale 2 puffs into the lungs every 6 (six) hours as needed for Wheezing. Rescue 18 g 0    albuterol-ipratropium (DUO-NEB) 2.5 mg-0.5 mg/3 mL nebulizer solution Take 3 mLs by nebulization every 4 (four) hours as needed for Wheezing. Rescue 75 mL 0    apixaban (ELIQUIS) 5 mg Tab Take 2 tablets (10 mg total) by mouth 2 (two) times daily for 6 days, THEN 1 tablet (5 mg total) 2 (two) times daily. 84 tablet 0    budesonide-formoterol 160-4.5 mcg (SYMBICORT) 160-4.5 mcg/actuation HFAA Inhale 2 puffs into the lungs every 12 (twelve) hours. Controller 10.2 g 0    fluticasone propionate (FLONASE) 50 mcg/actuation nasal spray 1 spray (50 mcg total) by Each Nostril route daily as needed for Allergies or Rhinitis. 18.2 mL 0    methIMAzole (TAPAZOLE) 5 MG Tab Take a half of a tablet daily to Total 2.5 mg 15 tablet 2    montelukast (SINGULAIR) 10 mg tablet Take 10 mg by mouth once daily.      amoxicillin-clavulanate  "875-125mg (AUGMENTIN) 875-125 mg per tablet Take 1 tablet by mouth every 12 (twelve) hours. (Patient not taking: Reported on 10/12/2023) 60 tablet 0    fluconazole (DIFLUCAN) 200 MG Tab Take 2 tablets (400 mg total) by mouth once daily. (Patient not taking: Reported on 10/12/2023) 60 tablet 0    omeprazole (PRILOSEC) 40 MG capsule Take 1 capsule (40 mg total) by mouth once daily. 30 capsule 0    propranoloL (INDERAL) 60 MG tablet Take 60 mg by mouth.      sod sulf-pot chloride-mag sulf (SUTAB) 1.479-0.188- 0.225 gram tablet Take 12 tablets by mouth once daily. Take according to package instructions with indicated amount of water. (Patient not taking: Reported on 10/12/2023) 24 tablet 0     No current facility-administered medications on file prior to visit.          SOCIAL HISTORY:    reports that she has been smoking cigarettes. She started smoking about 40 years ago. She has a 20.4 pack-year smoking history. She uses smokeless tobacco. She reports that she does not currently use alcohol. She reports that she does not currently use drugs.      FAMILY HISTORY:   History reviewed. No pertinent family history.    PHYSICAL EXAMINATION:  /66 (BP Location: Left arm, Patient Position: Sitting, BP Method: Medium (Automatic))   Pulse 93   Temp 98 °F (36.7 °C) (Oral)   Resp 16   Ht 4' 11" (1.499 m)   Wt 68.9 kg (152 lb)   BMI 30.70 kg/m²  Body mass index is 30.7 kg/m².    Gen: awake, alert, NAD  HEENT: NC/AT, EOMi, anicteric sclera, no rhinorrhea, OP clear  Neck: no cervical LAD  CV: regular rate normal rhythm no murmur  Resp: easy WOB on RA CTABL no w/r/r  Abd: +BS, soft. Tender to palpation of LLQ. No rebound.  Ext: warm, no LE edema  Skin: no rash  Neuro: no focal deficits       LABORATORY DATA:  Lab Results   Component Value Date    WBC 5.51 09/30/2023    WBC 8.71 09/17/2023    WBC 7.68 09/15/2023    HGB 11.5 (L) 09/30/2023    HGB 11.7 (L) 09/17/2023    HGB 10.6 (L) 09/15/2023     09/30/2023     " 09/17/2023     09/15/2023    CREATININE 0.74 09/30/2023    CREATININE 0.90 09/17/2023    CREATININE 0.80 09/15/2023    ALT 15 09/30/2023    ALT 13 09/17/2023    ALT 9 09/15/2023    AST 19 09/30/2023    AST 18 09/17/2023    AST 16 09/15/2023    ALKPHOS 85 09/30/2023    ALKPHOS 90 09/17/2023    ALKPHOS 83 09/15/2023    BILITOT 0.2 09/30/2023    BILITOT 0.6 09/17/2023    BILITOT 0.4 09/15/2023    INR 1.4 (H) 09/17/2023    INR 1.1 09/14/2023    INR 1.0 04/01/2022       MICROBIOLOGY DATA:  2/16/23 Aerobic culture       2/16/23 Anerobic culture: Few Bacteroides thetaiotamicron  8/16/23 Peripheral blood culture x 2: NGTD       IMAGING DATA:  9/8/23 CT abd/pelvis  Impression:     Persistent circumferential wall thickening in the sigmoid colon with mild associated inflammatory changes.  Findings are similar since prior examinations.  Endoscopy should be considered to exclude underlying mass.     Findings consistent with uterine fibroid    ASSESSMENT:    52 y/o female with PMH diverticulitis with sigmoid abscess s/p IR drainage in 2/2023 (cx growing ESBL E coli), recurrent abscess 7/2023 which has been management with antibiotics who presents to ID clinic today for scheduled follow up visit. She is currently being treated with ertapenem x4 weeks with end date 9/15/23.    1) L sigmoid diverticulitis, unchanged despite 4 weeks of IV ertapenem  2) L sigmoid abscess, resolved per last CT  3) Tachycardia, resolved  4) Diarrhea    Suspect there is an underlying illness causing her chronic sigmoid inflammation with ddx including IBD and cancer. If this were truly pure diverticulitis would expect some degree of improvement with carbapenems. However, this could either due to do carbapenemase producing bacteria vs untreated yeast (although neither have been recovered on previous cultures but she has been exposed to many carbapenems). Will stop IV antibiotics today given resolution of abscess and start Augmentin with addition of  fluconazole.    It is very important that she undergo endoscopy testing; this is now scheduled for 11/6/23. Explained the importance of this procedure. Biopsies should be obtained of the sigmoid area and be sent for cytology and pathology.       PLAN:    - Given LLQ and diarrhea will obtain GI PCR panel and stool culture.   - Consider repeating CT abd/pelvis w/ IV contrast if above testing is negative and pain persists. If abscess is again seen she will need to be admitted for IV antibiotics.  - ED precautions given  - She will need GI evaluation, planned for 11/6/23.  ID would appreciate colonoscopy with biopsy for sigmoid inflammation. Please obtain cultures (bacterial, anaerobic, fungal) and pathology. See above        RTC 6 weeks      Justin Valdivia MD  Infectious Diseases Faculty

## 2023-10-12 NOTE — Clinical Note
"Niya Justicee" Sajan was seen and treated in our emergency department on 10/12/2023.  She may return to work on 10/12/2023.       If you have any questions or concerns, please don't hesitate to call.      Celestine Greenfield RN    "

## 2023-10-12 NOTE — ED PROVIDER NOTES
Encounter Date: 10/12/2023       History     Chief Complaint   Patient presents with    Abdominal Pain     Left lower abdominal pain with diarrhea x 2 days; believes she has diverticulitis again. Nausea.     She has a history of apparent recurrent diverticulitis with some perforation and abscess, has not yet had any surgery, has been followed both in [place] and in [place] for this problem.  Most recent CT was a little over a month ago, recommended endoscopy to rule out underlying mass lesion.  She has colonoscopy scheduled for the 6th of next month.  Most recent antibiotics completed about 2 weeks ago.  She has required hospitalization and both oral and IV antibiotics including a previous right upper extremity PICC line that was removed after complication of DVT.  She saw Infectious Disease earlier today who recommended stool studies and performed CBC and CMP, those laboratory studies were relatively unremarkable.  She feels worse with her left greater than right lower quadrant abdominal pain, some nausea, and some diarrhea.  No overt bleeding.  Previous episodes have been associated with constipation.  No definite fever but has felt some chills and some night sweats.  No urinary complaints.  No other specific complaints.    The history is provided by the patient. No  was used.     Review of patient's allergies indicates:   Allergen Reactions    Latex      Past Medical History:   Diagnosis Date    Asthma     Diverticulosis     GERD (gastroesophageal reflux disease)      History reviewed. No pertinent surgical history.  History reviewed. No pertinent family history.  Social History     Tobacco Use    Smoking status: Every Day     Current packs/day: 0.50     Average packs/day: 0.5 packs/day for 40.8 years (20.4 ttl pk-yrs)     Types: Cigarettes     Start date: 1983    Smokeless tobacco: Never    Tobacco comments:     Ambulatory referral to Smoking Cessation clinic following \[facility] [place] [facility]\""  discharge.    Substance Use Topics    Alcohol use: Not Currently    Drug use: Not Currently     Review of Systems   Constitutional:  Positive for chills. Negative for activity change, fatigue and fever.   HENT:  Negative for congestion, ear pain, facial swelling, nosebleeds, sinus pressure and sore throat.    Eyes:  Negative for pain, discharge, redness and visual disturbance.   Respiratory:  Negative for cough, choking, chest tightness, shortness of breath and wheezing.    Cardiovascular:  Negative for chest pain, palpitations and leg swelling.   Gastrointestinal:  Positive for abdominal pain, diarrhea and nausea. Negative for abdominal distention and vomiting.   Endocrine: Negative for heat intolerance, polydipsia and polyuria.   Genitourinary:  Negative for difficulty urinating, dysuria, flank pain, hematuria and urgency.   Musculoskeletal:  Negative for back pain, gait problem, joint swelling and myalgias.   Skin:  Negative for color change and rash.   Allergic/Immunologic: Negative for environmental allergies and food allergies.   Neurological:  Negative for dizziness, weakness, numbness and headaches.   Hematological:  Negative for adenopathy. Does not bruise/bleed easily.   Psychiatric/Behavioral:  Negative for agitation and behavioral problems. The patient is not nervous/anxious.    All other systems reviewed and are negative.      Physical Exam     Initial Vitals [10/12/23 1631]   BP Pulse Resp Temp SpO2   (!) 109/56 80 16 98.3 °F (36.8 °C) 97 %      MAP       --         Physical Exam    Nursing note and vitals reviewed.  Constitutional: She appears well-developed and well-nourished. She is not diaphoretic. She appears distressed.   Mild discomfort, mildly anxious   HENT:   Head: Normocephalic and atraumatic.   Mouth/Throat: No oropharyngeal exudate.   Eyes: Conjunctivae and EOM are normal. Pupils are equal, round, and reactive to light. Right eye exhibits no discharge. Left eye exhibits no discharge. No  scleral icterus.   Neck: Neck supple. No thyromegaly present. No tracheal deviation present. No JVD present.   Normal range of motion.  Cardiovascular:  Normal rate, regular rhythm, normal heart sounds and intact distal pulses.     Exam reveals no gallop and no friction rub.       No murmur heard.  Pulmonary/Chest: Breath sounds normal. No stridor. No respiratory distress. She has no wheezes. She has no rhonchi. She has no rales. She exhibits no tenderness.   Abdominal: Abdomen is soft. Bowel sounds are normal. She exhibits no distension and no mass. There is abdominal tenderness.   Mild-to-moderate left greater than right lower quadrant abdominal tenderness to palpation, questionable rebound and guarding to both of these areas.  No distention.  Normal bowel sounds. There is no rebound and no guarding.   Musculoskeletal:         General: No tenderness or edema. Normal range of motion.      Cervical back: Normal range of motion and neck supple.     Neurological: She is alert and oriented to person, place, and time. She has normal strength.   Skin: Skin is warm and dry. No rash and no abscess noted. No erythema.   Psychiatric: She has a normal mood and affect. Her behavior is normal. Judgment and thought content normal.         ED Course   Procedures  Labs Reviewed   URINALYSIS, REFLEX TO URINE CULTURE - Abnormal; Notable for the following components:       Result Value    Color, UA Colorless (*)     Bacteria, UA Trace (*)     Squamous Epithelial Cells, UA Few (*)     Mucous, UA Trace (*)     All other components within normal limits   LACTIC ACID, PLASMA - Normal   LIPASE - Normal   EXTRA TUBES    Narrative:     The following orders were created for panel order EXTRA TUBES.  Procedure                               Abnormality         Status                     ---------                               -----------         ------                     Lavender Top Hold[[id number]]                               In process                    Please view results for these tests on the individual orders.   LAVENDER TOP HOLD         5:33 PM ID notes reviewed; care transferred to Kit BARBOSA.             Imaging Results              CT Abdomen Pelvis With Contrast (Final result)  Result time 10/12/23 19:56:14      Final result by Michael Mendoza MD (10/12/23 19:56:14)                   Impression:      1. Distal descending and sigmoid colon irregular mural thickening narrowing the lumen with about similar appearance.  Findings are concerning for underlying mass lesion.  Highly recommend follow-up colonoscopy if not already performed.    2. Details of other findings above.      Electronically signed by: Michael Mendoza  Date:    10/12/2023  Time:    19:56               Narrative:    EXAMINATION:  CT ABDOMEN PELVIS WITH CONTRAST    CLINICAL HISTORY:  LLQ abdominal pain;    TECHNIQUE:  Multidetector axial images were obtained of the abdomen and pelvis following the administration of IV contrast. Oral contrast was not administered.    Dose length product of 262 mGycm. Automated exposure control was utilized to minimize radiation dose.    COMPARISON:  September 8, 2023.    FINDINGS:  There are right middle lung lobe new mild ground-glass infiltrates on image 1 series 4.  Attention to follow-up exams.    Liver is remarkable steatosis without focal space occupying lesion..  Gallbladder wall is not thickened and there is no intra luminal calcified calculus. No biliary dilation identified. Pancreatic unremarkable attenuation without acute peripancreatic phlegmons. Main pancreatic duct is not dilated. Spleen is of normal size without focal lesion.    The adrenal glands appear within normal limits. The kidneys are unremarkable in size and contour. No solid or cystic renal lesion identified. There is no hydronephrosis. No perinephric fluid strandings or collections identified.    Stomach is decompressed and difficult to assess.  No abnormal  dilatation of loops of small bowel.  Appendix is nondilated and partially air-filled.  There is diverticulosis coli involving the distal descending and the sigmoid colon.  Distal descending and proximal sigmoid colon is remarkable for irregular mural thickening which protrudes and narrows the lumen with about similar appearance.  No significant pericolonic acute strandings.  Upstream colon is nondistended.  No free fluid.    Urinary bladder is partially decompressed.  Uterus is not enlarged. The endometrium is not delineated.  There is no pelvic free fluid.    Degenerative changes of the lumbar spine.  No acute or otherwise osseous abnormality identified.                                       Medications   iohexoL (OMNIPAQUE 350) 350 mg iodine/mL injection (has no administration in time range)   lactated ringers infusion (1,000 mLs Intravenous New Bag 10/12/23 1749)   ondansetron injection 4 mg (4 mg Intravenous Given 10/12/23 1749)   iohexoL (OMNIPAQUE 350) 350 mg iodine/mL injection (100 mLs Intravenous Given 10/12/23 1745)     Medical Decision Making  PATIENT ACCEPTED FROM DR. Santos PENDING LACTIC ACID AS WELL AS CT RESULTS    Amount and/or Complexity of Data Reviewed  Radiology: ordered.     Details: CT results concerning for a mass; no evidence of diverticulitis, abscess, or perforation; given the patient has a colonoscopy scheduled for approximately 3 weeks from today she will make sure to not miss this appointment due the importance given the new CT results    Risk  Prescription drug management.  Risk Details:   Given strict ED return precautions. I have spoken with the patient and/or caregivers. I have explained the patient's condition, diagnoses and treatment plan based on the information available to me at this time. I have answered the patient's and/or caregiver's questions and addressed any concerns. The patient and/or caregivers have as good an understanding of the patient's diagnosis, condition and  treatment plan as can be expected at this point. The vital signs have been stable. The patient's condition is stable and appropriate for discharge from the emergency department.      The patient will pursue further outpatient evaluation with the primary care physician or other designated or consulting physician as outlined in the discharge instructions. The patient and/or caregivers are agreeable to this plan of care and follow-up instructions have been explained in detail. The patient and/or caregivers have received these instructions in written format and have expressed an understanding of the discharge instructions. The patient and/or caregivers are aware that any significant change in condition or worsening of symptoms should prompt an immediate return to this or the closest emergency department or a call to 911.                               Clinical Impression:   Final diagnoses:  [R10.9] Abdominal pain, unspecified abdominal location (Primary)  [R93.5] Abnormal CT of the abdomen        ED Disposition Condition    Discharge Stable          ED Prescriptions       Medication Sig Dispense Start Date End Date Auth. Provider    HYDROcodone-acetaminophen (NORCO) 7.5-325 mg per tablet Take 1 tablet by mouth every 8 (eight) hours as needed for Pain. 20 tablet 10/12/2023 10/19/2023 Kit Meeks, PA          Follow-up Information       Follow up With Specialties Details Why Contact Info    Oswaldo Riggs, NP Family Medicine   [address]  [address]  PRIMARY CARE [facility]  [place] [id number] [phone]      discharge followup    If your symptoms become WORSE or you DO NOT IMPROVE and you are unable to reach your health care provider, you should RETURN to the emergency department    discharge info    Discussed all pertinent ED information, results, diagnosis and treatment plan; All questions and concerns were addressed at this time. Patient voices understanding of information and instructions. Patient is  comfortable with plan and discharge             Kit Meeks PA  10/12/23 2059

## 2023-10-13 ENCOUNTER — PATIENT OUTREACH (OUTPATIENT)
Dept: EMERGENCY MEDICINE | Facility: HOSPITAL | Age: 53
End: 2023-10-13
Payer: MEDICAID

## 2023-10-13 ENCOUNTER — TELEPHONE (OUTPATIENT)
Dept: INFECTIOUS DISEASES | Facility: CLINIC | Age: 53
End: 2023-10-13
Payer: MEDICAID

## 2023-10-13 RX ORDER — AMOXICILLIN AND CLAVULANATE POTASSIUM 875; 125 MG/1; MG/1
1 TABLET, FILM COATED ORAL EVERY 12 HOURS
Qty: 60 TABLET | Refills: 0 | Status: SHIPPED | OUTPATIENT
Start: 2023-10-13 | End: 2023-11-12

## 2023-10-13 NOTE — PROGRESS NOTES
Attempted to reach patient regarding her scheduled GI procedure for clarification on facility where she will have it done. No answer-left message on voicemail for a call back to verify which location she will complete the procedure. Patient has 2 scheduled dates for GI procedure at 2 different facilities. As documented and noted in the computer she is scheduled for a EGD and Colonoscopy in [facility] with Dr Puneet Millard ( [facility] in [place]). She is also scheduled 11/06/23 for a Colonoscopy [facility] with Dr Heather Lance.  Waiting for a return call back for confirmation.

## 2023-10-13 NOTE — TELEPHONE ENCOUNTER
Called patient back. On review of her most recent CT scan there is now concern for underlying malignancy which has likely been the driving force of her constant RLQ pain. Potential of this was discussed in detail with Ms. Moeller on multiple occasions. It is very important that she undergo colonoscopy which is scheduled on 11/6/23.    She states she pain is worsening despite use of PO pain meds. I doubt that there is an underlying infection however will give a trial script of augmentin to see if this helps. If there is no pain relief she has been instructed to present to the ED for likely admission. Instructed her to go to an ED with GI services as [facility] does not have GI over the weekend.    Patient is in agreement with the plan.    Justin Valdivia MD  Infectious Diseases Faculty   of Medicine

## 2023-10-13 NOTE — PROGRESS NOTES
Patient returned phone call she stated she is planning on having her Colonoscopy procedure done at [facility] 11/06/23. She was going to call [facility] and cancel the appointment with Dr Millard due to her family not being able to be with her on 10/26/23 for the Colonoscopy.

## 2023-10-13 NOTE — TELEPHONE ENCOUNTER
Patient went to [facility] ER last night for stomach pain. Had a CT done.  She is requesting antibiotics for the stomach pain, she feels she has an infection.

## 2023-10-18 NOTE — PROGRESS NOTES
Patient called back 10/13/23 informing she spoke with Dr Millard in [facility] -cancelling 10/26/23 C-scope. She stated she planned on doing the Colonoscopy at [facility] 11/6/23.

## 2023-10-18 NOTE — PROGRESS NOTES
Contacted the GI Clinic in [facility] to verified if the patient did call to cancel her 10/26/23 appointment  with Dr Millard. The GI procedure was still showing it as a scheduled procedure on 10/26/23. The [facility] confirmed the appointment was still on for that date and was not cancelled by the patient. They will reach out to confirmed with patient if she wishes to have Colonoscopy as planned in [facility].

## 2023-10-19 ENCOUNTER — HOSPITAL ENCOUNTER (EMERGENCY)
Facility: HOSPITAL | Age: 53
Discharge: HOME | End: 2023-10-20
Attending: STUDENT IN AN ORGANIZED HEALTH CARE EDUCATION/TRAINING PROGRAM
Payer: MEDICAID

## 2023-10-19 DIAGNOSIS — R06.02 SHORTNESS OF BREATH: ICD-10-CM

## 2023-10-19 DIAGNOSIS — U07.1 COVID-19: Primary | ICD-10-CM

## 2023-10-19 DIAGNOSIS — K57.92 DIVERTICULITIS: ICD-10-CM

## 2023-10-19 LAB
ALBUMIN SERPL-MCNC: 3.9 G/DL (ref 3.5–5)
ALBUMIN/GLOB SERPL: 1.1 RATIO (ref 1.1–2)
ALP SERPL-CCNC: 72 UNIT/L (ref 40–150)
ALT SERPL-CCNC: 16 UNIT/L (ref 0–55)
AST SERPL-CCNC: 24 UNIT/L (ref 5–34)
BASOPHILS # BLD AUTO: 0.03 X10(3)/MCL
BASOPHILS NFR BLD AUTO: 0.3 %
BILIRUB SERPL-MCNC: 0.4 MG/DL
BUN SERPL-MCNC: 15.5 MG/DL (ref 9.8–20.1)
CALCIUM SERPL-MCNC: 9.5 MG/DL (ref 8.4–10.2)
CHLORIDE SERPL-SCNC: 100 MMOL/L (ref 98–107)
CO2 SERPL-SCNC: 22 MMOL/L (ref 22–29)
CREAT SERPL-MCNC: 1.29 MG/DL (ref 0.55–1.02)
EOSINOPHIL # BLD AUTO: 0 X10(3)/MCL (ref 0–0.9)
EOSINOPHIL NFR BLD AUTO: 0 %
ERYTHROCYTE [DISTWIDTH] IN BLOOD BY AUTOMATED COUNT: 13.1 % (ref 11.5–17)
FLUAV AG UPPER RESP QL IA.RAPID: NOT DETECTED
FLUBV AG UPPER RESP QL IA.RAPID: NOT DETECTED
GFR SERPLBLD CREATININE-BSD FMLA CKD-EPI: 49 MLS/MIN/1.73/M2
GLOBULIN SER-MCNC: 3.6 GM/DL (ref 2.4–3.5)
GLUCOSE SERPL-MCNC: 116 MG/DL (ref 74–100)
HCT VFR BLD AUTO: 43.4 % (ref 37–47)
HGB BLD-MCNC: 14.4 G/DL (ref 12–16)
IMM GRANULOCYTES # BLD AUTO: 0.02 X10(3)/MCL (ref 0–0.04)
IMM GRANULOCYTES NFR BLD AUTO: 0.2 %
INR PPP: 1.2
LACTATE SERPL-SCNC: 1.2 MMOL/L (ref 0.5–2.2)
LIPASE SERPL-CCNC: 24 U/L
LYMPHOCYTES # BLD AUTO: 2.05 X10(3)/MCL (ref 0.6–4.6)
LYMPHOCYTES NFR BLD AUTO: 23.4 %
MAGNESIUM SERPL-MCNC: 1.8 MG/DL (ref 1.6–2.6)
MCH RBC QN AUTO: 33.4 PG (ref 27–31)
MCHC RBC AUTO-ENTMCNC: 33.2 G/DL (ref 33–36)
MCV RBC AUTO: 100.7 FL (ref 80–94)
MONOCYTES # BLD AUTO: 0.71 X10(3)/MCL (ref 0.1–1.3)
MONOCYTES NFR BLD AUTO: 8.1 %
NEUTROPHILS # BLD AUTO: 5.94 X10(3)/MCL (ref 2.1–9.2)
NEUTROPHILS NFR BLD AUTO: 68 %
NRBC BLD AUTO-RTO: 0 %
PLATELET # BLD AUTO: 207 X10(3)/MCL (ref 130–400)
PMV BLD AUTO: 10.6 FL (ref 7.4–10.4)
POTASSIUM SERPL-SCNC: 3.6 MMOL/L (ref 3.5–5.1)
PROT SERPL-MCNC: 7.5 GM/DL (ref 6.4–8.3)
PROTHROMBIN TIME: 14.6 SECONDS (ref 11.4–14)
RBC # BLD AUTO: 4.31 X10(6)/MCL (ref 4.2–5.4)
SARS-COV-2 RNA RESP QL NAA+PROBE: DETECTED
SODIUM SERPL-SCNC: 136 MMOL/L (ref 136–145)
T4 FREE SERPL-MCNC: 1 NG/DL (ref 0.7–1.48)
TROPONIN I SERPL-MCNC: 0.01 NG/ML (ref 0–0.04)
TSH SERPL-ACNC: 0.25 UIU/ML (ref 0.35–4.94)
WBC # SPEC AUTO: 8.75 X10(3)/MCL (ref 4.5–11.5)

## 2023-10-19 PROCEDURE — 83735 ASSAY OF MAGNESIUM: CPT | Performed by: STUDENT IN AN ORGANIZED HEALTH CARE EDUCATION/TRAINING PROGRAM

## 2023-10-19 PROCEDURE — 94761 N-INVAS EAR/PLS OXIMETRY MLT: CPT

## 2023-10-19 PROCEDURE — 25000003 PHARM REV CODE 250: Performed by: STUDENT IN AN ORGANIZED HEALTH CARE EDUCATION/TRAINING PROGRAM

## 2023-10-19 PROCEDURE — 80053 COMPREHEN METABOLIC PANEL: CPT | Performed by: STUDENT IN AN ORGANIZED HEALTH CARE EDUCATION/TRAINING PROGRAM

## 2023-10-19 PROCEDURE — 85610 PROTHROMBIN TIME: CPT | Performed by: STUDENT IN AN ORGANIZED HEALTH CARE EDUCATION/TRAINING PROGRAM

## 2023-10-19 PROCEDURE — 84439 ASSAY OF FREE THYROXINE: CPT | Performed by: STUDENT IN AN ORGANIZED HEALTH CARE EDUCATION/TRAINING PROGRAM

## 2023-10-19 PROCEDURE — 0240U COVID/FLU A&B PCR: CPT | Performed by: STUDENT IN AN ORGANIZED HEALTH CARE EDUCATION/TRAINING PROGRAM

## 2023-10-19 PROCEDURE — 83605 ASSAY OF LACTIC ACID: CPT | Performed by: STUDENT IN AN ORGANIZED HEALTH CARE EDUCATION/TRAINING PROGRAM

## 2023-10-19 PROCEDURE — 94640 AIRWAY INHALATION TREATMENT: CPT

## 2023-10-19 PROCEDURE — 83690 ASSAY OF LIPASE: CPT | Performed by: STUDENT IN AN ORGANIZED HEALTH CARE EDUCATION/TRAINING PROGRAM

## 2023-10-19 PROCEDURE — 87040 BLOOD CULTURE FOR BACTERIA: CPT | Performed by: STUDENT IN AN ORGANIZED HEALTH CARE EDUCATION/TRAINING PROGRAM

## 2023-10-19 PROCEDURE — 25000242 PHARM REV CODE 250 ALT 637 W/ HCPCS: Performed by: STUDENT IN AN ORGANIZED HEALTH CARE EDUCATION/TRAINING PROGRAM

## 2023-10-19 PROCEDURE — 85025 COMPLETE CBC W/AUTO DIFF WBC: CPT | Performed by: STUDENT IN AN ORGANIZED HEALTH CARE EDUCATION/TRAINING PROGRAM

## 2023-10-19 PROCEDURE — 27000221 HC OXYGEN, UP TO 24 HOURS

## 2023-10-19 PROCEDURE — 96360 HYDRATION IV INFUSION INIT: CPT | Mod: 59

## 2023-10-19 PROCEDURE — 99285 EMERGENCY DEPT VISIT HI MDM: CPT | Mod: 25

## 2023-10-19 PROCEDURE — 93005 ELECTROCARDIOGRAM TRACING: CPT

## 2023-10-19 PROCEDURE — 96361 HYDRATE IV INFUSION ADD-ON: CPT

## 2023-10-19 PROCEDURE — 84484 ASSAY OF TROPONIN QUANT: CPT | Performed by: STUDENT IN AN ORGANIZED HEALTH CARE EDUCATION/TRAINING PROGRAM

## 2023-10-19 PROCEDURE — 84443 ASSAY THYROID STIM HORMONE: CPT | Performed by: STUDENT IN AN ORGANIZED HEALTH CARE EDUCATION/TRAINING PROGRAM

## 2023-10-19 RX ORDER — IPRATROPIUM BROMIDE AND ALBUTEROL SULFATE 2.5; .5 MG/3ML; MG/3ML
3 SOLUTION RESPIRATORY (INHALATION)
Status: COMPLETED | OUTPATIENT
Start: 2023-10-19 | End: 2023-10-19

## 2023-10-19 RX ORDER — ACETAMINOPHEN 325 MG/1
650 TABLET ORAL
Status: COMPLETED | OUTPATIENT
Start: 2023-10-19 | End: 2023-10-19

## 2023-10-19 RX ADMIN — ACETAMINOPHEN 650 MG: 325 TABLET, FILM COATED ORAL at 10:10

## 2023-10-19 RX ADMIN — SODIUM CHLORIDE 1000 ML: 9 INJECTION, SOLUTION INTRAVENOUS at 10:10

## 2023-10-19 RX ADMIN — IOHEXOL 100 ML: 350 INJECTION, SOLUTION INTRAVENOUS at 11:10

## 2023-10-19 RX ADMIN — IPRATROPIUM BROMIDE AND ALBUTEROL SULFATE 3 ML: .5; 3 SOLUTION RESPIRATORY (INHALATION) at 10:10

## 2023-10-20 ENCOUNTER — PATIENT OUTREACH (OUTPATIENT)
Dept: EMERGENCY MEDICINE | Facility: HOSPITAL | Age: 53
End: 2023-10-20
Payer: MEDICAID

## 2023-10-20 VITALS
TEMPERATURE: 99 F | DIASTOLIC BLOOD PRESSURE: 77 MMHG | OXYGEN SATURATION: 98 % | HEIGHT: 59 IN | WEIGHT: 140 LBS | RESPIRATION RATE: 17 BRPM | HEART RATE: 81 BPM | BODY MASS INDEX: 28.22 KG/M2 | SYSTOLIC BLOOD PRESSURE: 108 MMHG

## 2023-10-20 PROCEDURE — 25500020 PHARM REV CODE 255: Performed by: STUDENT IN AN ORGANIZED HEALTH CARE EDUCATION/TRAINING PROGRAM

## 2023-10-20 NOTE — ED PROVIDER NOTES
Encounter Date: 10/19/2023       History     Chief Complaint   Patient presents with    Weakness    Shortness of Breath     Weakness and sob since yesterday. Also states stomach pain. Received solumedrol and zofran enroute per ems. Madeline jason     Patient presents to the emergency department complaining of chills and shortness of breath.  She states starting last night she noticed it having chills at home and today she is been short of breath coughing up yellowish sputum.  She denies any pain in her chest but it was stating she was now having some pain in the left side of her abdomen.  She was worried about a mass that was found colon, she was scheduled follow up with GI for further evaluation.  She also has a history of PE, states she was currently been compliant with her Eliquis.  She was given Solu-Medrol by EMS but declined a breathing treatment initially.    The history is provided by the patient.     Review of patient's allergies indicates:   Allergen Reactions    Latex      Past Medical History:   Diagnosis Date    Asthma     Diverticulosis     GERD (gastroesophageal reflux disease)      No past surgical history on file.  No family history on file.  Social History     Tobacco Use    Smoking status: Every Day     Current packs/day: 0.50     Average packs/day: 0.5 packs/day for 40.8 years (20.4 ttl pk-yrs)     Types: Cigarettes     Start date: 1983    Smokeless tobacco: Never    Tobacco comments:     Ambulatory referral to Smoking Cessation clinic following hospital discharge.    Substance Use Topics    Alcohol use: Not Currently    Drug use: Not Currently     Review of Systems   Constitutional:  Negative for chills and fever.   HENT:  Negative for congestion and sore throat.    Respiratory:  Positive for cough and shortness of breath.    Cardiovascular:  Negative for chest pain and palpitations.   Gastrointestinal:  Positive for abdominal pain. Negative for nausea.   Genitourinary:  Negative for dysuria and  hematuria.   Musculoskeletal:  Negative for arthralgias and myalgias.   Neurological:  Negative for dizziness and weakness.       Physical Exam     Initial Vitals [10/19/23 2056]   BP Pulse Resp Temp SpO2   102/72 101 (!) 22 (!) 100.4 °F (38 °C) 96 %      MAP       --         Physical Exam    Nursing note and vitals reviewed.  Constitutional: She appears well-developed and well-nourished.   HENT:   Head: Normocephalic and atraumatic.   Eyes: EOM are normal. Pupils are equal, round, and reactive to light.   Neck: Neck supple.   Normal range of motion.  Cardiovascular:  Regular rhythm and normal heart sounds.           Tachycardia   Pulmonary/Chest: No respiratory distress. She has wheezes.   Abdominal: Abdomen is soft. There is no abdominal tenderness.   Musculoskeletal:         General: No edema. Normal range of motion.      Cervical back: Normal range of motion and neck supple.     Neurological: She is alert and oriented to person, place, and time.   Skin: Skin is warm and dry.         ED Course   Procedures  Labs Reviewed   COMPREHENSIVE METABOLIC PANEL - Abnormal; Notable for the following components:       Result Value    Glucose Level 116 (*)     Creatinine 1.29 (*)     Globulin 3.6 (*)     All other components within normal limits   COVID/FLU A&B PCR - Abnormal; Notable for the following components:    SARS-CoV-2 PCR Detected (*)     All other components within normal limits    Narrative:     The Xpert Xpress SARS-CoV-2/FLU/RSV plus is a rapid, multiplexed real-time PCR test intended for the simultaneous qualitative detection and differentiation of SARS-CoV-2, Influenza A, Influenza B, and respiratory syncytial virus (RSV) viral RNA in either nasopharyngeal swab or nasal swab specimens.         PROTIME-INR - Abnormal; Notable for the following components:    PT 14.6 (*)     All other components within normal limits   TSH - Abnormal; Notable for the following components:    TSH 0.254 (*)     All other  components within normal limits   CBC WITH DIFFERENTIAL - Abnormal; Notable for the following components:    .7 (*)     MCH 33.4 (*)     MPV 10.6 (*)     All other components within normal limits   LACTIC ACID, PLASMA - Normal   LIPASE - Normal   MAGNESIUM - Normal   TROPONIN I - Normal   T4, FREE - Normal   BLOOD CULTURE O[facility]   BLOOD CULTURE [facility]   CBC W/ AUTO DIFFERENTIAL    Narrative:     The following orders were created for panel order CBC auto differential.  Procedure                               Abnormality         Status                     ---------                               -----------         ------                     CBC with Differential[[id number]]       Abnormal            Final result                 Please view results for these tests on the individual orders.     EKG Readings: (Independently Interpreted)   Initial Reading: No STEMI. Rhythm: Sinus Tachycardia. Heart Rate: 111. Ectopy: No Ectopy. Conduction: Normal. Axis: Normal.       Imaging Results              CTA Chest Non-Coronary (PE Studies) (Preliminary result)  Result time 10/19/23 23:39:22      Preliminary result by Kevin Barreto Jr., MD (10/19/23 23:39:22)                   Narrative:    START OF REPORT:  Technique: CT Scan of the chest was performed with intravenous contrast with direct axial images as well as sagittal and coronal reconstruction images.    Dosage Information: Automated Exposure Control was utilized.    Comparison: Comparison is with study dated.    Clinical History: Pe suspected.    Findings:  Soft Tissues: Unremarkable.  Lines and Tubes: None.  Neck: The visualized soft tissues of the neck appear unremarkable. The thyroid gland appear unremarkable.  Mediastinum: A few subcentimeter mediastinal lymph nodes are seen in the pretracheal spaces .  Heart: The heart size is within normal limits.  Aorta: Unremarkable appearing aorta.  Pulmonary Arteries: There is significant interval decrease in the pulmonary  embolus with mild residual filing defect in the apical segmental branch of the upper lobar branch of the right pulmonary artery seen centered on series 10 image 148. There is no right heart strain.  Lungs: Mild streaky linear opacity is seen predominantly in the right upper and middle lobes with mild associated bronchiectatic changes, consistent with scarring and subsegmental atelectasis. No acute focal infiltrate or consolidation is seen. No new or progressive filling defects.  Pleura: No effusions or pneumothorax are identified.  Bony Structures:  Spine: The visualized dorsal spine appears unremarkable.  Abdomen: The visualized upper abdominal organs appear unremarkable.      Impression:  1. No acute focal infiltrate or consolidation is seen. No new or progressive filling defects.  2. There is significant interval decrease in the pulmonary embolus with mild residual filing defect in the apical segmental branch of the upper lobar branch of the right pulmonary artery seen centered on series 10 image 148. There is no right heart strain.  3. Details and other findings as discussed above.                                         CT Abdomen Pelvis With Contrast (Preliminary result)  Result time 10/19/23 23:36:17      Preliminary result by Kevin Barreto Jr., MD (10/19/23 23:36:17)                   Narrative:    START OF REPORT:  Technique: CT of the abdomen and pelvis was performed with axial images as well as sagittal and coronal reconstruction images with intravenous contrast. None available. None.    Comparison: Comparison is with study dated2023-10-12 19:47:43.    Clinical History: LLQ abdominal pain.    Dosage Information: Automated Exposure Control was utilized.    Findings:  Lines and Tubes: None.  Thorax:  Lungs: Faint ground glass densities are seen in the visualized right middle lobe which may reflect an infectious process such as atypical pneumonia. Correlate with clinical and laboratory findings as  regards additional evaluation and follow-up. This finding appears mildly decreased since the previous study.  Pleura: No effusions or thickening are seen.  Heart: The heart size is within normal limits.  Abdomen:  Abdominal Wall: There is a subtle uncomplicated umbilical hernia which contains mesenteric fat.  Liver: The liver appears unremarkable.  Biliary System: No intrahepatic or extrahepatic biliary duct dilatation is seen.  Gallbladder: The gallbladder appears unremarkable.  Pancreas: The pancreas appears unremarkable.  Adrenals: The adrenal glands appear unremarkable.  Kidneys: The kidneys appear unremarkable with no stones cysts masses or hydronephrosis.  Aorta: The abdominal aorta appears unremarkable.  IVC: Unremarkable.  Bowel:  Esophagus: The visualized esophagus appears unremarkable.  Stomach: The stomach appears unremarkable.  Duodenum: Unremarkable appearing duodenum.  Small Bowel: The small bowel appears unremarkable.  Colon: Multiple diverticula are seen throughout the colon. There is mild free fluid and inflammatory stranding which appears relatively confined to the region adjacent to the sigmoid colon. These findings appear not significantly changed since the prior.  Appendix: The appendix appears unremarkable seen on Image 81, Series 2 through Image 88, Series 2.  Peritoneum: No intraperitoneal free air or ascites is seen.    Pelvis:  Bladder: The bladder appears unremarkable.  Female:  Uterus: The uterus appears unremarkable.  Ovaries: No adnexal masses are seen.    Bony structures:  Dorsal Spine: There is mild multilevel spondylosis of the visualized dorsal spine. There is moderate to severe decreased disc height at L5-S1.  Bony Pelvis: The visualized bony structures of the pelvis appear unremarkable.      Impression:  1. Multiple diverticula are seen throughout the colon. There is mild free fluid and inflammatory stranding which appears relatively confined to the region adjacent to the sigmoid  colon. These findings suggest mild diverticulitis. Correlate with clinical and laboratory findings. These findings appear not significantly changed since the prior.  2. Details and other findings as discussed above.                                         X-Ray Chest AP Portable (Preliminary result)  Result time 10/19/23 22:18:47      Wet Read by Fabien Bonilla MD (10/19/23 22:18:47, [facility], Emergency Medicine)    No acute pulmonary process                                     Medications   acetaminophen tablet 650 mg (650 mg Oral Given 10/19/23 2235)   sodium chloride 0.9% bolus 1,000 mL 1,000 mL (0 mLs Intravenous Stopped 10/20/23 0112)   albuterol-ipratropium 2.5 mg-0.5 mg/3 mL nebulizer solution 3 mL (3 mLs Nebulization Given 10/19/23 2240)   iohexoL (OMNIPAQUE 350) injection 100 mL (100 mLs Intravenous Given 10/19/23 2343)     Medical Decision Making  Vital signs stable.  She was given a DuoNeb which resolved her shortness of breath.  EKGs without concerning acute ischemic changes.  CBC is generally unremarkable, as well as CMP.  Troponin within normal limits.  CTA of the chest shows known PEs but no worsening.  CT of the abdomen pelvis shows diverticulitis.  Her COVID test is negative, likely the source of her fever and chills.  The patient states she recently started taking antibiotics for this diverticulitis, so we will not prescribe more.  She was feeling much better with stable vitals and ambulatory without assistance, appropriate for outpatient management.  She was given strict return precautions.    Amount and/or Complexity of Data Reviewed  Labs: ordered. Decision-making details documented in ED Course.  Radiology: ordered and independent interpretation performed. Decision-making details documented in ED Course.  ECG/medicine tests: ordered and independent interpretation performed. Decision-making details documented in ED Course.    Risk  OTC drugs.  Prescription drug  management.                               Clinical Impression:   Final diagnoses:  [R06.02] Shortness of breath  [U07.1] COVID-19 (Primary)  [K57.92] Diverticulitis        ED Disposition Condition    Discharge Stable          ED Prescriptions    None       Follow-up Information       Follow up With Specialties Details Why Contact Info    Oswaldo Riggs, NP Family Medicine Call  For ED follow up [address] [facility] [address]  [address]  [place] [address]  [phone]      [facility] Emergency Medicine Go to  If symptoms worsen [address] [facility] [id number] [phone]             Fabien Bonilla MD  10/20/23 0636

## 2023-10-21 ENCOUNTER — TELEPHONE (OUTPATIENT)
Dept: INTERNAL MEDICINE | Facility: CLINIC | Age: 53
End: 2023-10-21
Payer: MEDICAID

## 2023-10-21 NOTE — TELEPHONE ENCOUNTER
Please let Ms. Moeller know that her GI PCR panel was positive for norovirus which is a contagious virus often seen with children. It is also associated with outbreaks in nursing homes/group homes or on cruise ships. She likely acquired it from someone else recently.    There is no treatment for this virus aside from increasing fluid intake, so there is nothing extra she needs to do.    I also see she was diagnosed with COVID recently which may explain why she has been feeling poorly.    Let me know if she has any questions.    Justin Valdivia MD  Infectious Diseases Faculty   of Medicine

## 2023-10-22 ENCOUNTER — HOSPITAL ENCOUNTER (OUTPATIENT)
Facility: HOSPITAL | Age: 53
Discharge: LEFT BEFORE DISCHARGE | End: 2023-10-23
Attending: STUDENT IN AN ORGANIZED HEALTH CARE EDUCATION/TRAINING PROGRAM | Admitting: STUDENT IN AN ORGANIZED HEALTH CARE EDUCATION/TRAINING PROGRAM
Payer: MEDICAID

## 2023-10-22 DIAGNOSIS — T78.2XXA ANAPHYLAXIS, INITIAL ENCOUNTER: ICD-10-CM

## 2023-10-22 DIAGNOSIS — E05.90 HYPERTHYROIDISM: ICD-10-CM

## 2023-10-22 DIAGNOSIS — K57.92 DIVERTICULITIS: Primary | ICD-10-CM

## 2023-10-22 LAB
BASOPHILS # BLD AUTO: 0.01 X10(3)/MCL
BASOPHILS NFR BLD AUTO: 0.2 %
EOSINOPHIL # BLD AUTO: 0 X10(3)/MCL (ref 0–0.9)
EOSINOPHIL NFR BLD AUTO: 0 %
ERYTHROCYTE [DISTWIDTH] IN BLOOD BY AUTOMATED COUNT: 12.5 % (ref 11.5–17)
HCT VFR BLD AUTO: 39.4 % (ref 37–47)
HGB BLD-MCNC: 13.1 G/DL (ref 12–16)
IMM GRANULOCYTES # BLD AUTO: 0.01 X10(3)/MCL (ref 0–0.04)
IMM GRANULOCYTES NFR BLD AUTO: 0.2 %
LYMPHOCYTES # BLD AUTO: 2 X10(3)/MCL (ref 0.6–4.6)
LYMPHOCYTES NFR BLD AUTO: 34.4 %
MCH RBC QN AUTO: 33 PG (ref 27–31)
MCHC RBC AUTO-ENTMCNC: 33.2 G/DL (ref 33–36)
MCV RBC AUTO: 99.2 FL (ref 80–94)
MONOCYTES # BLD AUTO: 0.61 X10(3)/MCL (ref 0.1–1.3)
MONOCYTES NFR BLD AUTO: 10.5 %
NEUTROPHILS # BLD AUTO: 3.19 X10(3)/MCL (ref 2.1–9.2)
NEUTROPHILS NFR BLD AUTO: 54.7 %
NRBC BLD AUTO-RTO: 0 %
PLATELET # BLD AUTO: 174 X10(3)/MCL (ref 130–400)
PMV BLD AUTO: 10.7 FL (ref 7.4–10.4)
RBC # BLD AUTO: 3.97 X10(6)/MCL (ref 4.2–5.4)
WBC # SPEC AUTO: 5.82 X10(3)/MCL (ref 4.5–11.5)

## 2023-10-22 PROCEDURE — 25000003 PHARM REV CODE 250: Performed by: STUDENT IN AN ORGANIZED HEALTH CARE EDUCATION/TRAINING PROGRAM

## 2023-10-22 PROCEDURE — 96375 TX/PRO/DX INJ NEW DRUG ADDON: CPT

## 2023-10-22 PROCEDURE — 25500020 PHARM REV CODE 255

## 2023-10-22 PROCEDURE — 85025 COMPLETE CBC W/AUTO DIFF WBC: CPT | Performed by: STUDENT IN AN ORGANIZED HEALTH CARE EDUCATION/TRAINING PROGRAM

## 2023-10-22 PROCEDURE — 99285 EMERGENCY DEPT VISIT HI MDM: CPT | Mod: 25

## 2023-10-22 PROCEDURE — 83735 ASSAY OF MAGNESIUM: CPT | Performed by: STUDENT IN AN ORGANIZED HEALTH CARE EDUCATION/TRAINING PROGRAM

## 2023-10-22 PROCEDURE — 63600175 PHARM REV CODE 636 W HCPCS: Performed by: STUDENT IN AN ORGANIZED HEALTH CARE EDUCATION/TRAINING PROGRAM

## 2023-10-22 PROCEDURE — 96361 HYDRATE IV INFUSION ADD-ON: CPT

## 2023-10-22 PROCEDURE — 80053 COMPREHEN METABOLIC PANEL: CPT | Performed by: STUDENT IN AN ORGANIZED HEALTH CARE EDUCATION/TRAINING PROGRAM

## 2023-10-22 PROCEDURE — 83690 ASSAY OF LIPASE: CPT | Performed by: STUDENT IN AN ORGANIZED HEALTH CARE EDUCATION/TRAINING PROGRAM

## 2023-10-22 RX ORDER — MORPHINE SULFATE 2 MG/ML
4 INJECTION, SOLUTION INTRAMUSCULAR; INTRAVENOUS
Status: COMPLETED | OUTPATIENT
Start: 2023-10-22 | End: 2023-10-22

## 2023-10-22 RX ORDER — ONDANSETRON 2 MG/ML
4 INJECTION INTRAMUSCULAR; INTRAVENOUS
Status: COMPLETED | OUTPATIENT
Start: 2023-10-22 | End: 2023-10-22

## 2023-10-22 RX ADMIN — SODIUM CHLORIDE 1000 ML: 9 INJECTION, SOLUTION INTRAVENOUS at 11:10

## 2023-10-22 RX ADMIN — ONDANSETRON 4 MG: 2 INJECTION INTRAMUSCULAR; INTRAVENOUS at 11:10

## 2023-10-22 RX ADMIN — IOHEXOL 100 ML: 350 INJECTION, SOLUTION INTRAVENOUS at 11:10

## 2023-10-22 RX ADMIN — MORPHINE SULFATE 4 MG: 2 INJECTION, SOLUTION INTRAMUSCULAR; INTRAVENOUS at 11:10

## 2023-10-23 VITALS
RESPIRATION RATE: 16 BRPM | BODY MASS INDEX: 29.77 KG/M2 | TEMPERATURE: 98 F | HEART RATE: 71 BPM | DIASTOLIC BLOOD PRESSURE: 64 MMHG | OXYGEN SATURATION: 97 % | HEIGHT: 59 IN | SYSTOLIC BLOOD PRESSURE: 100 MMHG | WEIGHT: 147.69 LBS

## 2023-10-23 LAB
ALBUMIN SERPL-MCNC: 3.4 G/DL (ref 3.5–5)
ALBUMIN/GLOB SERPL: 0.9 RATIO (ref 1.1–2)
ALP SERPL-CCNC: 62 UNIT/L (ref 40–150)
ALT SERPL-CCNC: 12 UNIT/L (ref 0–55)
APPEARANCE UR: CLEAR
AST SERPL-CCNC: 17 UNIT/L (ref 5–34)
BACTERIA #/AREA URNS AUTO: ABNORMAL /HPF
BILIRUB SERPL-MCNC: 0.7 MG/DL
BILIRUB UR QL STRIP.AUTO: NEGATIVE
BUN SERPL-MCNC: 8.7 MG/DL (ref 9.8–20.1)
CALCIUM SERPL-MCNC: 9.6 MG/DL (ref 8.4–10.2)
CHLORIDE SERPL-SCNC: 104 MMOL/L (ref 98–107)
CO2 SERPL-SCNC: 23 MMOL/L (ref 22–29)
COLOR UR AUTO: ABNORMAL
CREAT SERPL-MCNC: 0.82 MG/DL (ref 0.55–1.02)
GFR SERPLBLD CREATININE-BSD FMLA CKD-EPI: >60 MLS/MIN/1.73/M2
GLOBULIN SER-MCNC: 3.9 GM/DL (ref 2.4–3.5)
GLUCOSE SERPL-MCNC: 101 MG/DL (ref 74–100)
GLUCOSE UR QL STRIP.AUTO: NORMAL
HYALINE CASTS #/AREA URNS LPF: ABNORMAL /LPF
KETONES UR QL STRIP.AUTO: ABNORMAL
LEUKOCYTE ESTERASE UR QL STRIP.AUTO: NEGATIVE
LIPASE SERPL-CCNC: 17 U/L
MAGNESIUM SERPL-MCNC: 1.9 MG/DL (ref 1.6–2.6)
MUCOUS THREADS URNS QL MICRO: ABNORMAL /LPF
NITRITE UR QL STRIP.AUTO: NEGATIVE
PH UR STRIP.AUTO: 6.5 [PH]
POTASSIUM SERPL-SCNC: 3.6 MMOL/L (ref 3.5–5.1)
PROT SERPL-MCNC: 7.3 GM/DL (ref 6.4–8.3)
PROT UR QL STRIP.AUTO: ABNORMAL
RBC #/AREA URNS AUTO: ABNORMAL /HPF
RBC UR QL AUTO: ABNORMAL
SODIUM SERPL-SCNC: 139 MMOL/L (ref 136–145)
SP GR UR STRIP.AUTO: 1.02 (ref 1–1.03)
SQUAMOUS #/AREA URNS LPF: ABNORMAL /HPF
TSH SERPL-ACNC: 0.42 UIU/ML (ref 0.35–4.94)
UROBILINOGEN UR STRIP-ACNC: ABNORMAL
WBC #/AREA URNS AUTO: ABNORMAL /HPF

## 2023-10-23 PROCEDURE — 87040 BLOOD CULTURE FOR BACTERIA: CPT | Performed by: STUDENT IN AN ORGANIZED HEALTH CARE EDUCATION/TRAINING PROGRAM

## 2023-10-23 PROCEDURE — 25000003 PHARM REV CODE 250

## 2023-10-23 PROCEDURE — 94761 N-INVAS EAR/PLS OXIMETRY MLT: CPT

## 2023-10-23 PROCEDURE — 63600175 PHARM REV CODE 636 W HCPCS

## 2023-10-23 PROCEDURE — 96375 TX/PRO/DX INJ NEW DRUG ADDON: CPT

## 2023-10-23 PROCEDURE — 96365 THER/PROPH/DIAG IV INF INIT: CPT

## 2023-10-23 PROCEDURE — G0378 HOSPITAL OBSERVATION PER HR: HCPCS

## 2023-10-23 PROCEDURE — 94640 AIRWAY INHALATION TREATMENT: CPT

## 2023-10-23 PROCEDURE — 25000242 PHARM REV CODE 250 ALT 637 W/ HCPCS: Performed by: STUDENT IN AN ORGANIZED HEALTH CARE EDUCATION/TRAINING PROGRAM

## 2023-10-23 PROCEDURE — 96361 HYDRATE IV INFUSION ADD-ON: CPT

## 2023-10-23 PROCEDURE — 84443 ASSAY THYROID STIM HORMONE: CPT

## 2023-10-23 PROCEDURE — 81001 URINALYSIS AUTO W/SCOPE: CPT | Performed by: STUDENT IN AN ORGANIZED HEALTH CARE EDUCATION/TRAINING PROGRAM

## 2023-10-23 PROCEDURE — 63600175 PHARM REV CODE 636 W HCPCS: Performed by: STUDENT IN AN ORGANIZED HEALTH CARE EDUCATION/TRAINING PROGRAM

## 2023-10-23 PROCEDURE — 25000003 PHARM REV CODE 250: Performed by: STUDENT IN AN ORGANIZED HEALTH CARE EDUCATION/TRAINING PROGRAM

## 2023-10-23 PROCEDURE — 93005 ELECTROCARDIOGRAM TRACING: CPT

## 2023-10-23 PROCEDURE — 96376 TX/PRO/DX INJ SAME DRUG ADON: CPT

## 2023-10-23 RX ORDER — TALC
6 POWDER (GRAM) TOPICAL NIGHTLY PRN
Status: DISCONTINUED | OUTPATIENT
Start: 2023-10-23 | End: 2023-10-23 | Stop reason: HOSPADM

## 2023-10-23 RX ORDER — SODIUM CHLORIDE 0.9 % (FLUSH) 0.9 %
10 SYRINGE (ML) INJECTION
Status: DISCONTINUED | OUTPATIENT
Start: 2023-10-23 | End: 2023-10-23 | Stop reason: HOSPADM

## 2023-10-23 RX ORDER — METHYLPREDNISOLONE SOD SUCC 125 MG
125 VIAL (EA) INJECTION
Status: COMPLETED | OUTPATIENT
Start: 2023-10-23 | End: 2023-10-23

## 2023-10-23 RX ORDER — PANTOPRAZOLE SODIUM 40 MG/1
40 TABLET, DELAYED RELEASE ORAL DAILY
Status: DISCONTINUED | OUTPATIENT
Start: 2023-10-23 | End: 2023-10-23 | Stop reason: HOSPADM

## 2023-10-23 RX ORDER — ONDANSETRON 2 MG/ML
4 INJECTION INTRAMUSCULAR; INTRAVENOUS EVERY 6 HOURS PRN
Status: DISCONTINUED | OUTPATIENT
Start: 2023-10-23 | End: 2023-10-23 | Stop reason: HOSPADM

## 2023-10-23 RX ORDER — ALBUTEROL SULFATE 0.83 MG/ML
SOLUTION RESPIRATORY (INHALATION)
Status: COMPLETED
Start: 2023-10-23 | End: 2023-10-23

## 2023-10-23 RX ORDER — ALBUTEROL SULFATE 0.83 MG/ML
2.5 SOLUTION RESPIRATORY (INHALATION)
Status: COMPLETED | OUTPATIENT
Start: 2023-10-23 | End: 2023-10-23

## 2023-10-23 RX ORDER — CETIRIZINE HYDROCHLORIDE 10 MG/1
10 TABLET ORAL DAILY
Status: DISCONTINUED | OUTPATIENT
Start: 2023-10-23 | End: 2023-10-23 | Stop reason: HOSPADM

## 2023-10-23 RX ORDER — HYDROCODONE BITARTRATE AND ACETAMINOPHEN 10; 325 MG/1; MG/1
1 TABLET ORAL
Status: DISCONTINUED | OUTPATIENT
Start: 2023-10-23 | End: 2023-10-23 | Stop reason: HOSPADM

## 2023-10-23 RX ORDER — EPINEPHRINE 1 MG/ML
INJECTION, SOLUTION, CONCENTRATE INTRAVENOUS
Status: COMPLETED
Start: 2023-10-23 | End: 2023-10-23

## 2023-10-23 RX ORDER — HYDROMORPHONE HYDROCHLORIDE 1 MG/ML
0.5 INJECTION, SOLUTION INTRAMUSCULAR; INTRAVENOUS; SUBCUTANEOUS EVERY 6 HOURS PRN
Status: DISCONTINUED | OUTPATIENT
Start: 2023-10-23 | End: 2023-10-23 | Stop reason: HOSPADM

## 2023-10-23 RX ORDER — HYDROCODONE BITARTRATE AND ACETAMINOPHEN 5; 325 MG/1; MG/1
1 TABLET ORAL ONCE
Status: COMPLETED | OUTPATIENT
Start: 2023-10-23 | End: 2023-10-23

## 2023-10-23 RX ORDER — HYDROMORPHONE HYDROCHLORIDE 1 MG/ML
0.5 INJECTION, SOLUTION INTRAMUSCULAR; INTRAVENOUS; SUBCUTANEOUS EVERY 6 HOURS PRN
Status: DISCONTINUED | OUTPATIENT
Start: 2023-10-23 | End: 2023-10-23

## 2023-10-23 RX ORDER — ONDANSETRON 2 MG/ML
4 INJECTION INTRAMUSCULAR; INTRAVENOUS EVERY 6 HOURS PRN
Status: DISCONTINUED | OUTPATIENT
Start: 2023-10-23 | End: 2023-10-23

## 2023-10-23 RX ORDER — EPINEPHRINE 1 MG/ML
0.3 INJECTION, SOLUTION, CONCENTRATE INTRAVENOUS
Status: COMPLETED | OUTPATIENT
Start: 2023-10-23 | End: 2023-10-23

## 2023-10-23 RX ORDER — DIPHENHYDRAMINE HYDROCHLORIDE 50 MG/ML
50 INJECTION INTRAMUSCULAR; INTRAVENOUS
Status: COMPLETED | OUTPATIENT
Start: 2023-10-23 | End: 2023-10-23

## 2023-10-23 RX ORDER — FAMOTIDINE 10 MG/ML
20 INJECTION INTRAVENOUS
Status: COMPLETED | OUTPATIENT
Start: 2023-10-23 | End: 2023-10-23

## 2023-10-23 RX ORDER — KETOROLAC TROMETHAMINE 30 MG/ML
30 INJECTION, SOLUTION INTRAMUSCULAR; INTRAVENOUS ONCE
Status: DISCONTINUED | OUTPATIENT
Start: 2023-10-23 | End: 2023-10-23 | Stop reason: HOSPADM

## 2023-10-23 RX ORDER — FAMOTIDINE 20 MG/1
20 TABLET, FILM COATED ORAL DAILY
Status: DISCONTINUED | OUTPATIENT
Start: 2023-10-23 | End: 2023-10-23 | Stop reason: HOSPADM

## 2023-10-23 RX ORDER — SODIUM CHLORIDE, SODIUM LACTATE, POTASSIUM CHLORIDE, CALCIUM CHLORIDE 600; 310; 30; 20 MG/100ML; MG/100ML; MG/100ML; MG/100ML
INJECTION, SOLUTION INTRAVENOUS CONTINUOUS
Status: DISCONTINUED | OUTPATIENT
Start: 2023-10-23 | End: 2023-10-23 | Stop reason: HOSPADM

## 2023-10-23 RX ADMIN — PANTOPRAZOLE SODIUM 40 MG: 40 TABLET, DELAYED RELEASE ORAL at 08:10

## 2023-10-23 RX ADMIN — DIPHENHYDRAMINE HYDROCHLORIDE 50 MG: 50 INJECTION INTRAMUSCULAR; INTRAVENOUS at 02:10

## 2023-10-23 RX ADMIN — METHIMAZOLE 2.5 MG: 5 TABLET ORAL at 09:10

## 2023-10-23 RX ADMIN — PIPERACILLIN AND TAZOBACTAM 4.5 G: 4; .5 INJECTION, POWDER, LYOPHILIZED, FOR SOLUTION INTRAVENOUS; PARENTERAL at 01:10

## 2023-10-23 RX ADMIN — FAMOTIDINE 20 MG: 20 TABLET, FILM COATED ORAL at 09:10

## 2023-10-23 RX ADMIN — HYDROCODONE BITARTRATE AND ACETAMINOPHEN 1 TABLET: 10; 325 TABLET ORAL at 08:10

## 2023-10-23 RX ADMIN — FAMOTIDINE 20 MG: 10 INJECTION, SOLUTION INTRAVENOUS at 02:10

## 2023-10-23 RX ADMIN — EPINEPHRINE 1 MG: 1 INJECTION, SOLUTION, CONCENTRATE INTRAVENOUS at 03:10

## 2023-10-23 RX ADMIN — ONDANSETRON 4 MG: 2 INJECTION INTRAMUSCULAR; INTRAVENOUS at 12:10

## 2023-10-23 RX ADMIN — HYDROCODONE BITARTRATE AND ACETAMINOPHEN 1 TABLET: 10; 325 TABLET ORAL at 02:10

## 2023-10-23 RX ADMIN — METHYLPREDNISOLONE SODIUM SUCCINATE 125 MG: 125 INJECTION, POWDER, FOR SOLUTION INTRAMUSCULAR; INTRAVENOUS at 02:10

## 2023-10-23 RX ADMIN — ALBUTEROL SULFATE 2.5 MG: 2.5 SOLUTION RESPIRATORY (INHALATION) at 03:10

## 2023-10-23 RX ADMIN — SODIUM CHLORIDE, POTASSIUM CHLORIDE, SODIUM LACTATE AND CALCIUM CHLORIDE: 600; 310; 30; 20 INJECTION, SOLUTION INTRAVENOUS at 03:10

## 2023-10-23 RX ADMIN — ONDANSETRON 4 MG: 2 INJECTION INTRAMUSCULAR; INTRAVENOUS at 06:10

## 2023-10-23 RX ADMIN — SODIUM CHLORIDE, POTASSIUM CHLORIDE, SODIUM LACTATE AND CALCIUM CHLORIDE: 600; 310; 30; 20 INJECTION, SOLUTION INTRAVENOUS at 01:10

## 2023-10-23 RX ADMIN — CETIRIZINE HYDROCHLORIDE 10 MG: 10 TABLET, FILM COATED ORAL at 09:10

## 2023-10-23 RX ADMIN — SODIUM CHLORIDE 1000 ML: 9 INJECTION, SOLUTION INTRAVENOUS at 01:10

## 2023-10-23 RX ADMIN — HYDROMORPHONE HYDROCHLORIDE 0.5 MG: 1 INJECTION, SOLUTION INTRAMUSCULAR; INTRAVENOUS; SUBCUTANEOUS at 04:10

## 2023-10-23 RX ADMIN — HYDROCODONE BITARTRATE AND ACETAMINOPHEN 1 TABLET: 5; 325 TABLET ORAL at 07:10

## 2023-10-23 NOTE — CONSULTS
[facility]   Inpatient Infectious Diseases Consultation    Physician requesting consultation: Justin Elizondo MD  Service requesting consultation: Internal Medicine  Reason for consultation:  Diverticulitis with history of ESBL E coli    Historian: Patient and Electronic Medical Record    Isolation Status: Airborne and Contact and Droplet     HPI:     53-year-old female with a past medical history of asthma, GERD, hyperthyroidism and read current diverticulitis presented to [facility] ED on 10/22/2023 with worsening abdominal pain in left lower quadrant that has been going on for 2 weeks associated with chills and night sweats.  Last week patient reports 3 days watery diarrhea without blood or pus.  GI panel was ordered on 10/12/2023 and patient tested positive 10/20 2023 for norovirus.  10/19/2023 patient tested positive for COVID and discharged home as her symptoms were mild and did not require oxygen. CT abdomen and pelvis performed on admission revealed changes consistent with diverticulitis in the sigmoid colon.  In the ED patient received Zosyn had anaphylactic reaction in which she became hypotensive with shortness a breath had to be administered epinephrine.  Today patient states that she is doing well and willing to under go colonoscopy.      Of note patient had and diverticular abscess drained February 2023 in [place] which tested positive for ESBL E coli sensitive to Merrem and ertapenem.  Repeat CT scan in March revealed the patient had cleared diverticular abscess.  07/22/2023 patient had another abscess on TC scan in which patient was treated with Augmentin.  08/16/2023 the abscess responded to Augmentin and patient was continued on outpatient antibiotics with ertapenem 1 g Q 24 hours in which she completed 09/25/2023.    Antibiotic history:    Zosyn x1 dose on 10/23/2023 with anaphylaxis reaction    Past Medical History/Past Surgical History/Social History     Past  Medical History:   Diagnosis Date    Asthma     Diverticulosis     GERD (gastroesophageal reflux disease)        History reviewed. No pertinent surgical history.     FAMILY HISTORY:  family history is not on file.     SOCIAL HISTORY:   Social History     Socioeconomic History    Marital status: Single   Tobacco Use    Smoking status: Every Day     Current packs/day: 0.50     Average packs/day: 0.5 packs/day for 40.8 years (20.4 ttl pk-yrs)     Types: Cigarettes     Start date: 1983    Smokeless tobacco: Never    Tobacco comments:     Ambulatory referral to Smoking Cessation clinic following hospital discharge.    Substance and Sexual Activity    Alcohol use: Not Currently    Drug use: Not Currently    Sexual activity: Not Currently     Social Determinants of Health     Financial Resource Strain: Low Risk  (5/1/2023)    Overall Financial Resource Strain (CARDIA)     Difficulty of Paying Living Expenses: Not very hard   Food Insecurity: No Food Insecurity (5/1/2023)    Hunger Vital Sign     Worried About Running Out of Food in the Last Year: Never true     Ran Out of Food in the Last Year: Never true   Transportation Needs: No Transportation Needs (5/1/2023)    PRAPARE - Transportation     Lack of Transportation (Medical): No     Lack of Transportation (Non-Medical): No   Physical Activity: Inactive (5/1/2023)    Exercise Vital Sign     Days of Exercise per Week: 0 days     Minutes of Exercise per Session: 0 min   Stress: No Stress Concern Present (5/1/2023)    [employer] - Occupational Stress Questionnaire     Feeling of Stress : Not at all   Social Connections: Socially Isolated (5/1/2023)    Social Connection and Isolation Panel [NHANES]     Frequency of Communication with Friends and Family: More than three times a week     Frequency of Social Gatherings with Friends and Family: Twice a week     Attends Jew Services: Never     Active Member of Clubs or Organizations: No      Attends Club or Organization Meetings: Never     Marital Status: Never    Housing Stability: Low Risk  (5/1/2023)    Housing Stability Vital Sign     Unable to Pay for Housing in the Last Year: No     Number of Places Lived in the Last Year: 2     Unstable Housing in the Last Year: No        ALLERGIES:   Review of patient's allergies indicates:   Allergen Reactions    Zosyn [piperacillin-tazobactam] Anaphylaxis    Latex          Review of Systems     ROS    As stated above  MEDICATIONS:   Scheduled Meds:   cetirizine  10 mg Oral Daily    famotidine  20 mg Oral Daily    HYDROcodone-acetaminophen  1 tablet Oral Q6H    ketorolac  30 mg Intravenous Once    methIMAzole  2.5 mg Oral Daily    pantoprazole  40 mg Oral Daily     Continuous Infusions:   lactated ringers 100 mL/hr at 10/23/23 1509     PRN Meds:.HYDROmorphone, melatonin, ondansetron, sodium chloride 0.9%    Physical exam:     Temp:  [98 °F (36.7 °C)-99.8 °F (37.7 °C)] 98 °F (36.7 °C)  Pulse:  [72-95] 72  Resp:  [16-28] 16  SpO2:  [92 %-100 %] 98 %  BP: ()/(50-84) 93/60     GENERAL: A&Ox3, NAD,   HEENT: atraumatic, normocephalic, anicteric, moist oral mucosa without lesions, exudate, or erythema  LUNGS: breathing unlabored, lungs CTA bilateral  HEART: RRR; no murmur, rub, or gallop  ABDOMEN: abdomen soft, nondistended, BS noted x 4 quadrants, no tympany, no rebound, guarding, or organomegaly  : no CVA tenderness  EXTREMITIES: no edema, clubbing, or cyanosis   SKIN: no rashes or lesions  NEURO: speech fluent and intact, facial symmetry preserved, no tremor  PSYCH: cooperative, normal mood and affect  LINES:  PIV      LABS:     I have personally reviewed patient's labs.  Pertinent results noted below.    CBC  Recent Labs     10/22/23  2336   WBC 5.82   HGB 13.1   HCT 39.4          Differential  Recent Labs   Lab 10/22/23  2336   WBC 5.82   HGB 13.1   HCT 39.4           Basic Metabolic Panel  Recent Labs     10/22/23  2336      K  3.6   CO2 23   BUN 8.7*   CREATININE 0.82        Hepatic Panel  Lab Results   Component Value Date    ALT 12 10/22/2023    AST 17 10/22/2023    ALKPHOS 62 10/22/2023    BILITOT 0.7 10/22/2023        Urinalysis:  Results for orders placed or performed during the hospital encounter of 10/22/23   Urinalysis, Reflex to Urine Culture    Specimen: Urine   Result Value Ref Range    Color, UA Light-Yellow Yellow, Light-Yellow, Dark Yellow, Gisella, Straw    Appearance, UA Clear Clear    Specific Gravity, UA 1.025 1.005 - 1.030    pH, UA 6.5 5.0 - 8.5    Protein, UA Trace (A) Negative    Glucose, UA Normal Negative, Normal    Ketones, UA 1+ (A) Negative    Blood, UA Trace (A) Negative    Bilirubin, UA Negative Negative    Urobilinogen, UA 1+ (A) 0.2, 1.0, Normal    Nitrites, UA Negative Negative    Leukocyte Esterase, UA Negative Negative    WBC, UA 0-5 None Seen, 0-2, 3-5, 0-5 /HPF    Bacteria, UA None Seen None Seen /HPF    Squamous Epithelial Cells, UA Trace (A) None Seen /HPF    Mucous, UA Trace (A) None Seen /LPF    Hyaline Casts, UA None Seen None Seen /lpf    RBC, UA 6-10 (A) None Seen, 0-2, 3-5, 0-5 /HPF       Estimated Creatinine Clearance: 68.7 mL/min (based on SCr of 0.82 mg/dL).     ESR:  Results for orders placed or performed in visit on 09/05/23   Sedimentation Rate   Result Value Ref Range    Sed Rate 25 (H) 0 - 20 mm/hr      CRP:  Results for orders placed or performed in visit on 09/05/23   C-Reactive Protein   Result Value Ref Range    C-Reactive Protein 7.80 (H) <5.00 mg/L           MICRO AND PATHOLOGY:   Colonization:  No results found for this or any previous visit.    Microbiology Results (last 7 days)       Procedure Component Value Units Date/Time    Blood culture #2 **CANNOT BE ORDERED STAT** [[id number]] Collected: 10/23/23 0153    Order Status: Resulted Specimen: Blood from Arm, Left Updated: 10/23/23 0157    Blood culture #1 **CANNOT BE ORDERED STAT** [[id number]] Collected: 10/23/23 0145    Order  Status: Resulted Specimen: Blood from Hand, Right Updated: 10/23/23 0157              IMAGING:     I have personally reviewed patient's imaging. Pertinent results noted below.  CT Abdomen Pelvis With Contrast   Final Result      Changes consistent with diverticulitis in the sigmoid colon.      Thickening of the wall of the antrum of the stomach cannot rule out gastritis or ulcer.         Electronically signed by: Shayne Atkins MD   Date:    10/23/2023   Time:    08:22            IMPRESSION     Diverticulitis   Concern for malignancy  COVID-19  Norovirus      RECOMMENDATIONS:    - concern for malignancy as patient's CT scan still reports diverticulitis after being treated with multiple antibiotics.    -discussed case with GI attending and does not believe that this is diverticulitis or antibiotics would be helpful.  Hold off on meropenem  - consult GI for colonoscopy as this could be malignancy as opposed to diverticulitis  -keep patient on COVID precautions for 10 days.  With end date 10/29/2023.  Monitor patient for signs and symptoms clinically    Thank you for the consult. We will follow along with you. Please do not hesitate to call with any questions or concerns.       Lester Cagle MD  Internal medicine PGY 3  [facility] Infectious Diseases

## 2023-10-23 NOTE — MEDICAL/APP STUDENT
Acute Care Surgery  Consult Note  YOB: 1970  Date: 10/23/2023 8:27 AM  Date of Admission: 10/22/2023  HD#0  POD#* No surgery found *    PRESENTING HISTORY   Chief Complaint/Reason for Admission: LLQ abdominal pain, history of diverticulitis and recently discovered mass    History of Present Illness:  Niya Moeller is a 53 y.o. female with PMHx of asthma, GERD, hyperthyroidism and recurrent diverticulitis who presented to [facility] ED on 10/22/2023 for worsening abdominal pain. She reports she was being treated with Augmentin outpatient for diverticulitis since 10/13 but has had increasing pain for the last 3 days. In the ED she was afebrile, BP 97/51, T 99.8, WBC 5.82, Covid positive. CT abdomen/pelvis showed diverticulum in the colon with inflammation of the proximal sigmoid colon consistent with diverticulitis and no definite abscess on updated radiology read. Surgery was consulted for diverticulitis/possible abscess and potential drainage.     Today, the patient reports fever, chills, night sweats, nausea, and diarrhea over the past couple of weeks. She denies melena, hematochezia, vomiting. She reports she had her first episode of diverticulitis in 2021 and then a second episode later in 2021 with perforation and abscess that was drained in [place]. She reports a second episode of diverticulitis with perforation/abscess that was drained in February 2023 at [facility] which was complicated by MDRO ESBL E.coli sensitive to meropenem and ertapenem. She was seen at [facility] and [facility] in March 2023 and [facility] in July and August 2023 with symptoms related to her recurrent diverticulitis. She reports she had a colonoscopy 10 years ago that showed 3 precancerous polyps that were removed and 7 benign polyps. She has a colonoscopy scheduled at [facility] for 11/6. She currently smokes 1/2 pack of cigarettes per day.     Review of Systems:  12 point ROS negative except as stated in HPI    HISTORY:    Allergies:  Review of patient's allergies indicates:   Allergen Reactions    Zosyn [piperacillin-tazobactam] Anaphylaxis    Latex      Medications  Current Outpatient Medications   Medication Instructions    albuterol (PROAIR HFA) 90 mcg/actuation inhaler 2 puffs, Inhalation, Every 6 hours PRN, Rescue    albuterol-ipratropium (DUO-NEB) 2.5 mg-0.5 mg/3 mL nebulizer solution 3 mLs, Nebulization, Every 4 hours PRN, Rescue    amoxicillin-clavulanate 875-125mg (AUGMENTIN) 875-125 mg per tablet 1 tablet, Oral, Every 12 hours    budesonide-formoterol 160-4.5 mcg (SYMBICORT) 160-4.5 mcg/actuation HFAA 2 puffs, Inhalation, Every 12 hours, Controller    fluticasone propionate (FLONASE) 50 mcg, Each Nostril, Daily PRN    methIMAzole (TAPAZOLE) 5 MG Tab Take a half of a tablet daily to Total 2.5 mg    omeprazole (PRILOSEC) 40 mg, Oral, Daily    propranoloL (INDERAL) 60 mg, Oral    sod sulf-pot chloride-mag sulf (SUTAB) 1.479-0.188- 0.225 gram tablet 12 tablets, Oral, Daily, Take according to package instructions with indicated amount of water.     Past Medical History:  Past Medical History:   Diagnosis Date    Asthma     Diverticulosis     GERD (gastroesophageal reflux disease)      Past Surgical History:  History reviewed. No pertinent surgical history.  Family History:  History reviewed. No pertinent family history.  Social History:  Social History     Tobacco Use   Smoking Status Every Day    Current packs/day: 0.50    Average packs/day: 0.5 packs/day for 40.8 years (20.4 ttl pk-yrs)    Types: Cigarettes    Start date: 1983   Smokeless Tobacco Never   Tobacco Comments    Ambulatory referral to Smoking Cessation clinic following hospital discharge.       Social History     Substance and Sexual Activity   Alcohol Use Not Currently        OBJECTIVE:   Vital Signs:  VITAL SIGNS: 24 HR MIN & MAX LAST   Temp  Min: 98.3 °F (36.8 °C)  Max: 99.8 °F (37.7 °C)  98.4 °F (36.9 °C)   BP  Min: 97/51  Max: 116/69  112/70    Pulse  Min:  "75  Max: 95  77    Resp  Min: 16  Max: 28  16    SpO2  Min: 92 %  Max: 100 %  95 %      HT: 4' 11" (149.9 cm)  WT: 67 kg (147 lb 11.3 oz)  BMI: 29.8     Physical Exam:  General: Well developed, well nourished, no acute distress  HEENT: Normocephalic, atraumatic, PERRL  CV: RR  Resp: NWOB  GI:  Abdomen soft, tenderness to palpation of LLQ, non-distended, no guarding, no rebound, normoactive bowel sounds, no masses  MSK: No cyanosis, peripheral edema, moving all extremities spontaneously  Skin/wounds:  No rashes, ulcers, erythema  Neuro:  CNII-XII grossly intact, alert and oriented to person, place, and time    Labs:  WBC/Hgb/Hct/Plts:  5.82/13.1/39.4/174 (10/22 2336)  BUN/Cr/glu/ALT/AST/amyl/lip:  8.7/0.82/--/12/17/--/17 (10/22 2336)  Na/K/Cl/CO2:  139/3.6/104/23 (10/22 2336)      Diagnostic Results:  CT abdomen/pelvis with contrast 10/22/2023 updated read  There are diverticulum in the colon with inflammation of the proximal sigmoid colon and I cannot rule out diverticulitis.  A definite abscess is not seen.     Impression:  Changes consistent with diverticulitis in the sigmoid colon.    ASSESSMENT & PLAN:    Niya Moeller is a 53 y.o. female with PMHx of asthma, GERD, hyperthyroidism and recurrent diverticulitis here with diverticulitis. CT abdomen with contrast 10/22 showed diverticulum in the colon with inflammation of the proximal sigmoid colon consistent with diverticulitis but no definite abscess seen on updated radiology read.     - continue non-operative management  - NPO   - Bowel rest  - IV antibiotics  - Available as needed, call with any questions  - Refer to surgery clinic outpatient following colonoscopy in 6-8 weeks for discussion for sigmoidectomy  - Rest of care per primary team    Kathi Hopkins, MS3  [facility]  8:27 AM  10/23/2023   "

## 2023-10-23 NOTE — H&P
[facility] Medicine  History & Physical      Patient Name: Niya Moeller  : 1970  MRN: [id number]  Patient Class: OP- Observation   Admission Date: 10/22/2023   Length of Stay: 0  Admitting Service: Hospital Medicine  Attending Physician: Justin Elizondo MD  PCP: Oswaldo Riggs, YOLANDA  Source of history: Patient, patient's family, and EMR.   Code status: Full Code    Chief Complaint   Abdominal Pain (LLQ abdominal pain; hx of diverticulosis and recently discovered mass.)    History of Present Illness   53 y.o. female with past medical history of asthma, recurrent diverticulitis, GERD, and hyperthyroidism. She presents to ED for worsening abdominal pain,. She was being treated for diverticulitis outpatient with Augmentin, but 3 days ago she began to have difficulty ambulating due to the pain. She states having to walk with her leg bent. Eating makes pain worse, with nausea and fatigue. She was recently diagnosed with Covid-19 on 10/19/23 and has remained stable from respiratory standpoint. Reports SOB, cough, and fevers with highest being 101 today, but is controlled with tylenol as needed. Denies melena, hematochezia, vomiting, diarrhea, or wheezing.    ED Course: Vitals on presentation: BP 97/51 T 99.8, Pulse 95, SpO2 100% on room air.  CT abd/pelvis shows thickening of the colon, more pronounced compared to previous ct on 10/19/23. Internal medicine was consulted for admission of diverticulitis with possible abscess.    ROS   Pertinent positive and negative as mentioned in HPI     Past Medical History     Past Medical History:   Diagnosis Date    Asthma     Diverticulosis     GERD (gastroesophageal reflux disease)      Past Surgical History   History reviewed. No pertinent surgical history.  Social History     Social History     Tobacco Use    Smoking status: Every Day     Current packs/day: 0.50     Average packs/day: 0.5 packs/day for 40.8 years (20.4 ttl pk-yrs)      Types: Cigarettes     Start date: 1983    Smokeless tobacco: Never    Tobacco comments:     Ambulatory referral to Smoking Cessation clinic following hospital discharge.    Substance Use Topics    Alcohol use: Not Currently      Family History   Reviewed and noncontributory    Allergies   Latex  Home Medications     Prior to Admission medications    Medication Sig Start Date End Date Taking? Authorizing Provider   albuterol (PROAIR HFA) 90 mcg/actuation inhaler Inhale 2 puffs into the lungs every 6 (six) hours as needed for Wheezing. Rescue 9/17/23 9/16/24  Jeancarlos Molina MD   albuterol-ipratropium (DUO-NEB) 2.5 mg-0.5 mg/3 mL nebulizer solution Take 3 mLs by nebulization every 4 (four) hours as needed for Wheezing. Rescue 5/1/23 4/30/24  Yeyo Andrews MD   amoxicillin-clavulanate 875-125mg (AUGMENTIN) 875-125 mg per tablet Take 1 tablet by mouth every 12 (twelve) hours. 10/13/23 11/12/23  Justin Valdivia MD   budesonide-formoterol 160-4.5 mcg (SYMBICORT) 160-4.5 mcg/actuation HFAA Inhale 2 puffs into the lungs every 12 (twelve) hours. Controller 5/1/23 4/30/24  Yeyo Andrews MD   fluticasone propionate (FLONASE) 50 mcg/actuation nasal spray 1 spray (50 mcg total) by Each Nostril route daily as needed for Allergies or Rhinitis. 5/1/23   Yeyo Andrews MD   methIMAzole (TAPAZOLE) 5 MG Tab Take a half of a tablet daily to Total 2.5 mg 9/28/23   Delaney Morris NP   omeprazole (PRILOSEC) 40 MG capsule Take 1 capsule (40 mg total) by mouth once daily. 3/15/23 9/12/23  Anamaria Ruffin MD   propranoloL (INDERAL) 60 MG tablet Take 60 mg by mouth. 7/12/23   Provider, Historical   sod sulf-pot chloride-mag sulf (SUTAB) 1.479-0.188- 0.225 gram tablet Take 12 tablets by mouth once daily. Take according to package instructions with indicated amount of water.  Patient not taking: Reported on 10/12/2023 9/15/23   Pauline Gunter, FNP      Inpatient Medications   Scheduled Meds   piperacillin-tazobactam  "(Zosyn) IV (PEDS and ADULTS) (extended infusion is not appropriate)  4.5 g Intravenous ED 1 Time    sodium chloride 0.9%  1,000 mL Intravenous ED 1 Time     Continuous Infusions    PRN Meds  melatonin, sodium chloride 0.9%    Physical Exam   Vital Signs  Temp:  [99.8 °F (37.7 °C)]   Pulse:  [86-95]   Resp:  [16-18]   BP: ()/(51-74)   SpO2:  [95 %-100 %]       Physical Exam  Constitutional:       General: She is not in acute distress.  Eyes:      Pupils: Pupils are equal, round, and reactive to light.   Cardiovascular:      Rate and Rhythm: Normal rate and regular rhythm.      Pulses: Normal pulses.      Heart sounds: Normal heart sounds.   Pulmonary:      Effort: No respiratory distress.      Breath sounds: Normal breath sounds.   Abdominal:      General: There is no distension.      Palpations: Abdomen is soft.      Tenderness: There is abdominal tenderness (left lower quadrant). There is no rebound.   Skin:     General: Skin is warm and dry.   Neurological:      General: No focal deficit present.      Mental Status: She is alert and oriented to person, place, and time.          Labs   I have reviewed the following results below:  CBC  Recent Labs     10/22/23  2336   WBC 5.82   RBC 3.97*   HGB 13.1   HCT 39.4   MCV 99.2*   MCH 33.0*   MCHC 33.2   RDW 12.5        Anemia  No results for input(s): "HAPTOGLOBIN", "FERRITIN", "IRON", "TIBC" in the last 72 hours.  Coags  No results for input(s): "INR", "APTT", "D-DIMER" in the last 72 hours.  Cardiac  No results for input(s): "BNP", "CPK", "CKMB", "TROPONINI" in the last 72 hours.  ABG/Lactate  No results for input(s): "PH", "PCO2", "PO2", "HCO3", "POCSATURATED", "BE", "LACTIC" in the last 72 hours.   Urinalysis  Recent Labs     10/23/23  0057   COLORUA Light-Yellow   APPEARANCEUA Clear   SGUA 1.025   PHUA 6.5   PROTEINUA Trace*   GLUCOSEUA Normal   KETONESUA 1+*   BLOODUA Trace*   UROBILINOGEN 1+*   NITRITESUA Negative   LEUKOCYTESUR Negative      " "BMP  Recent Labs     10/22/23  2336      K 3.6   CHLORIDE 104   CO2 23   BUN 8.7*   CREATININE 0.82   GLUCOSE 101*   CALCIUM 9.6   MG 1.90     LFTs  Recent Labs     10/22/23  2336   ALBUMIN 3.4*   GLOBULIN 3.9*   ALKPHOS 62   ALT 12   AST 17   BILITOT 0.7   LIPASE 17     Inflammatory Markers  No results for input(s): "CRP", "LDH", "ESR" in the last 72 hours.  Lipid  No results for input(s): "CHOL", "TRIG", "LDL", "VLDL", "HDL" in the last 72 hours.  Diabetes  Recent Labs     10/22/23  2336   GLUCOSE 101*     Thyroid  No results for input(s): "TSH", "FREET4" in the last 72 hours.       Microbiology Results (last 7 days)       Procedure Component Value Units Date/Time    Blood culture #1 **CANNOT BE ORDERED STAT** [[id number]]     Order Status: Sent Specimen: Blood     Blood culture #2 **CANNOT BE ORDERED STAT** [[id number]]     Order Status: Sent Specimen: Blood            Imaging     CT Abdomen Pelvis With Contrast           Assessment & Plan     Diverticulitis  Patient has had several bouts of acute on chronic diverticulitis with several ER visits this past month.  Upon chart review noted pt had an abscess back in February 16th of this year which was drained and showed ESBL treated with meropenem for 7 days.  She presents again with diverticular pain and CT abdomen pelvis done in the emergency department shows a trace residual encapsulated collection at the previously seen mural abscess estimated to measure 1.5 x 2 cm. Increased in size since previous CTs where there was no abscess mentioned on 10/19/2023  Was placed on Augmentin on 10/13/2023, but has not completed therapy  Patient recieved Zosyn IV in the ED, but had a reaction, was given steroids and epi.  Per chart review on admission on 8/2023, patient found to be allergic to zosyn and has issues with flagyl, though denied allergies to medication upon interview  NPO  Zofran Q6h for nausea, and one dose of Toradol given for pain  Received 2 L bolus of NS " in ED, will place on 100mL/hr LR  Consider consults surgery and infectious disease in the morning    COVID-19 infection  Patient reports SOB and fever at home highest of 101 today, tylenol has been successful at lowering temp  Afebrile in the ED   O2 Sat at  on room air  Continue to monitor     HX of PE  Continue home Elliquis    Asthma  Holding home PRN medications and Symbicort    GERD  Substitute home omeprazole with Protonix 40 mg    Access: PIV  Antibiotics: TBD  Diet: NPO  DVT Prophylaxis: Elliquis 5mg BID  GI prophylaxis: Protonix  Fluids: 2L bolus NS in ED, 100 mL/hr    Jovanna Bertrand DO  Family Medicine, [facility]

## 2023-10-23 NOTE — ED PROVIDER NOTES
Encounter Date: 10/22/2023       History     Chief Complaint   Patient presents with    Abdominal Pain     LLQ abdominal pain; hx of diverticulosis and recently discovered mass.     Patient presents to the emergency department due to worsening left lower quadrant pain.  Recent diagnosis of diverticulitis she was currently on Augmentin and states she is been taking it as prescribed, she also was recently diagnosed with COVID as well.  She states since going home she was had worsening pain more nausea and feeling fatigued and tired.  She states she does not want to eat much due to the pain and how she was feeling.  Subjective fevers at home.    The history is provided by the patient.     Review of patient's allergies indicates:   Allergen Reactions    Zosyn [piperacillin-tazobactam] Anaphylaxis    Latex      Past Medical History:   Diagnosis Date    Asthma     Diverticulosis     GERD (gastroesophageal reflux disease)      History reviewed. No pertinent surgical history.  History reviewed. No pertinent family history.  Social History     Tobacco Use    Smoking status: Every Day     Current packs/day: 0.50     Average packs/day: 0.5 packs/day for 40.8 years (20.4 ttl pk-yrs)     Types: Cigarettes     Start date: 1983    Smokeless tobacco: Never    Tobacco comments:     Ambulatory referral to Smoking Cessation clinic following hospital discharge.    Substance Use Topics    Alcohol use: Not Currently    Drug use: Not Currently     Review of Systems   Constitutional:  Positive for chills, fatigue and fever.   HENT:  Negative for congestion and sore throat.    Respiratory:  Negative for cough and shortness of breath.    Cardiovascular:  Negative for chest pain and palpitations.   Gastrointestinal:  Positive for abdominal pain and nausea.   Genitourinary:  Negative for dysuria and hematuria.   Musculoskeletal:  Positive for arthralgias and myalgias.   Neurological:  Positive for weakness. Negative for dizziness.        Physical Exam     Initial Vitals [10/22/23 2258]   BP Pulse Resp Temp SpO2   (!) 97/51 95 16 99.8 °F (37.7 °C) 100 %      MAP       --         Physical Exam    Nursing note and vitals reviewed.  Constitutional: She appears well-developed and well-nourished.   HENT:   Head: Normocephalic and atraumatic.   Eyes: EOM are normal. Pupils are equal, round, and reactive to light.   Neck: Neck supple.   Normal range of motion.  Cardiovascular:  Normal rate and regular rhythm.           Pulmonary/Chest: Breath sounds normal. No respiratory distress.   Abdominal: Abdomen is soft. She exhibits no distension. There is abdominal tenderness (left lower quadrant). There is no rebound.   Musculoskeletal:         General: No edema. Normal range of motion.      Cervical back: Normal range of motion and neck supple.     Neurological: She is alert and oriented to person, place, and time.   Skin: Skin is warm and dry.         ED Course   Critical Care    Date/Time: 10/23/2023 4:42 AM    Performed by: Fabien Bonilla MD  Authorized by: Justin Elizondo MD  Direct patient critical care time: 15 minutes  Additional history critical care time: 6 minutes  Ordering / reviewing critical care time: 5 minutes  Documentation critical care time: 7 minutes  Consulting other physicians critical care time: 5 minutes  Consult with family critical care time: 0 minutes  Other critical care time: 0 minutes  Total critical care time (exclusive of procedural time) : 38 minutes  Critical care time was exclusive of separately billable procedures and treating other patients and teaching time.  Critical care was necessary to treat or prevent imminent or life-threatening deterioration of the following conditions: metabolic crisis and respiratory failure.  Critical care was time spent personally by me on the following activities: blood draw for specimens, discussions with consultants, development of treatment plan with patient or surrogate,  interpretation of cardiac output measurements, evaluation of patient's response to treatment, examination of patient, obtaining history from patient or surrogate, ordering and performing treatments and interventions, ordering and review of laboratory studies, ordering and review of radiographic studies, pulse oximetry, re-evaluation of patient's condition and review of old charts.        Labs Reviewed   COMPREHENSIVE METABOLIC PANEL - Abnormal; Notable for the following components:       Result Value    Glucose Level 101 (*)     Blood Urea Nitrogen 8.7 (*)     Albumin Level 3.4 (*)     Globulin 3.9 (*)     Albumin/Globulin Ratio 0.9 (*)     All other components within normal limits   URINALYSIS, REFLEX TO URINE CULTURE - Abnormal; Notable for the following components:    Protein, UA Trace (*)     Ketones, UA 1+ (*)     Blood, UA Trace (*)     Urobilinogen, UA 1+ (*)     Squamous Epithelial Cells, UA Trace (*)     Mucous, UA Trace (*)     RBC, UA 6-10 (*)     All other components within normal limits   CBC WITH DIFFERENTIAL - Abnormal; Notable for the following components:    RBC 3.97 (*)     MCV 99.2 (*)     MCH 33.0 (*)     MPV 10.7 (*)     All other components within normal limits   LIPASE - Normal   MAGNESIUM - Normal   BLOOD CULTURE [facility]   BLOOD CULTURE [facility]   CBC W/ AUTO DIFFERENTIAL    Narrative:     The following orders were created for panel order CBC auto differential.  Procedure                               Abnormality         Status                     ---------                               -----------         ------                     CBC with Differential[[id number]]       Abnormal            Final result                 Please view results for these tests on the individual orders.   EXTRA TUBES    Narrative:     The following orders were created for panel order EXTRA TUBES.  Procedure                               Abnormality         Status                     ---------                                -----------         ------                     Light Blue Top Hold[[id number]]                             In process                 Gold Top Hold[[id number]]                                   In process                   Please view results for these tests on the individual orders.   LIGHT BLUE TOP HOLD   GOLD TOP HOLD   EXTRA TUBES    Narrative:     The following orders were created for panel order EXTRA TUBES.  Procedure                               Abnormality         Status                     ---------                               -----------         ------                     Light Blue Top Hold[[id number]]                             In process                 Red Top Hold[[id number]]                                    In process                 Light Green Top Hold[[id number]]                            In process                   Please view results for these tests on the individual orders.   LIGHT BLUE TOP HOLD   RED TOP HOLD   LIGHT GREEN TOP HOLD          Imaging Results              CT Abdomen Pelvis With Contrast (Preliminary result)  Result time 10/23/23 00:25:40      Preliminary result by Gregor Pritchett MD (10/23/23 00:25:40)                   Narrative:    START OF REPORT:  Technique: CT of the abdomen and pelvis was performed with axial images as well as sagittal and coronal reconstruction images with intravenous contrast.    Comparison: Comparison is with study dated2023-10-19 23:14:45.    Clinical History: LLQ abdominal pain, Diverticulitis, worsening pain.    Dosage Information: Automated Exposure Control was utilized 263.05 mGy.cm.    Findings:  Thorax:  Lungs: Ground-glass opacities are again seen in the right middle lobe, lingula and bilateral lower lobes, which may reflect pulmonary infiltrates. These findings are grossly unchanged since the prior examination.  Pleura: No effusions or thickening are seen.  Heart: The heart size is within normal limits.  Abdomen:  Abdominal Wall: No  abdominal wall pathology is seen.  Liver: The liver appears unremarkable.  Biliary System: No intrahepatic or extrahepatic biliary duct dilatation is seen.  Gallbladder: The gallbladder appears unremarkable.  Pancreas: The pancreas appears unremarkable.  Spleen: The spleen appears unremarkable.  Adrenals: The adrenal glands appear unremarkable.  Kidneys: The kidneys appear unremarkable with no stones cysts masses or hydronephrosis.  Aorta: The abdominal aorta appears unremarkable.  IVC: Unremarkable.  Bowel:  Esophagus: The visualized esophagus appears unremarkable.  Stomach: There is mild circumferential wall thickening of the gastric antrum (series 2; images 46-49). This is grossly unchanged since the prior examination. No significant perigastric fat stranding or enlarged lymph nodes are seen. This may reflect an inflammatory process.  Duodenum: Unremarkable appearing duodenum.  Small Bowel: The small bowel appears unremarkable.  Colon: Again noted is moderate circumferential wall thickening of the proximal sigmoid colon with pericolonic fat stranding. These findings are more pronounced on the current examination. There is an inflamed sigmoid diverticulum versus an evolving abscess in the left anterior pelvis (series 2; image 102) and measures approximately 1.5 x 2 cm (previously measured 0.8 cm), increased in size since the prior examination. This lesion is contiguous with the ventral pelvic wall. There is no free intraperitoneal air to indicate perforation.  Appendix: The appendix appears unremarkable (series 2; image 91).  Peritoneum: No intraperitoneal free air or ascites is seen.    Pelvis:  Bladder: The bladder appears unremarkable.  Female:  Uterus: There is a subserosal fibroid in the anterior myometrium which measures 1.8 x 2.1 cm (series 8; image 75).  Ovaries: No adnexal masses are seen.    Bony structures:  Dorsal Spine: There is mild spondylosis of the visualized dorsal spine.  Bony Pelvis: The  visualized bony structures of the pelvis appear unremarkable.      Impression:  1. There is mild circumferential wall thickening of the gastric antrum (series 2; images 46-49). This is grossly unchanged since the prior examination. No significant perigastric fat stranding or enlarged lymph nodes are seen. This may reflect an inflammatory process. Correlate clinically as regards further evaluation and followup.  2. Again noted is moderate circumferential wall thickening of the proximal sigmoid colon with pericolonic fat stranding. These findings are more pronounced on the current examination. There is an inflamed sigmoid diverticulum versus an evolving abscess in the left anterior pelvis (series 2; image 102) and measures approximately 1.5 x 2 cm (previously measured 0.8 cm), increased in size since the prior examination. This lesion is contiguous with the ventral pelvic wall. There is no free intraperitoneal air to indicate perforation. Correlate clinically as regards further evaluation and followup.  3. Ground-glass opacities are again seen in the right middle lobe, lingula and bilateral lower lobes, which may reflect pulmonary infiltrates. These findings are grossly unchanged since the prior examination. Correlate with clinical and laboratory findings and followup to full resolution.  4. Details and other findings as discussed above.                                         Medications   sodium chloride 0.9% flush 10 mL (has no administration in time range)   melatonin tablet 6 mg (has no administration in time range)   lactated ringers infusion ( Intravenous New Bag 10/23/23 0350)   apixaban tablet 5 mg (has no administration in time range)   methIMAzole split tablet 2.5 mg (has no administration in time range)   pantoprazole EC tablet 40 mg (has no administration in time range)   sodium chloride 0.9% bolus 1,000 mL 1,000 mL (0 mLs Intravenous Stopped 10/23/23 0124)   morphine injection 4 mg (4 mg Intravenous  Given 10/22/23 2352)   ondansetron injection 4 mg (4 mg Intravenous Given 10/22/23 2351)   iohexoL (OMNIPAQUE 350) 350 mg iodine/mL injection (100 mLs Intravenous Given 10/22/23 2345)   sodium chloride 0.9% bolus 1,000 mL 1,000 mL (0 mLs Intravenous Stopped 10/23/23 0331)   piperacillin-tazobactam (ZOSYN) 4.5 g in dextrose 5 % in water (D5W) 100 mL IVPB (MB+) (0 g Intravenous Stopped 10/23/23 0216)   methylPREDNISolone sodium succinate injection 125 mg (125 mg Intravenous Given 10/23/23 0226)   diphenhydrAMINE injection 50 mg (50 mg Intravenous Given 10/23/23 0225)   famotidine (PF) injection 20 mg (20 mg Intravenous Given 10/23/23 0236)   albuterol nebulizer solution 2.5 mg (2.5 mg Nebulization Given 10/23/23 0300)   albuterol (PROVENTIL) 2.5 mg /3 mL (0.083 %) nebulizer solution (  Return to Cabinet 10/23/23 0300)   EPINEPHrine (PF) injection 0.3 mg (1 mg Intramuscular Given 10/23/23 0301)     Medical Decision Making  Hypotension but otherwise vital signs stable.  This resolved after 2 L of fluids.  White count within normal limits, CMP was generally unremarkable and lipase was unremarkable.  CT abdomen pelvis shows worsening of her diverticulitis was possible early abscess formation but no evidence of perforation.  Blood cultures were drawn she was ordered a dose of Zosyn.  He was flagged for latex allergy therefore contacted the pharmacist, she stated she was unsure far Zosyn had latex in an and she did not know which antibiotics did have latex in it.  I discussed this with the patient was seen stated she has been on Zosyn before without reaction.  Shortly after starting this medication patient develop a diffuse erythematous rash was itchy all over.  Her vital signs remained stable she had no angioedema, no abdominal upset, and no wheezing.  She was given Solu-Medrol Benadryl and Pepcid.  On re-evaluation 10 minutes later patient stated she was starting to feel dizzy and blood pressure had decreased 80s over 60s  and she now had some mild wheezing.  This point she meets criteria for anaphylaxis, she was given 0.3 mg of IM epinephrine and an albuterol treatment was ordered.  Patient then had stabilization of her blood pressure, stated she felt much better in the rash has resolved.  Reviewing patient's allergies in the chart, it appears that her Zosyn allergy was deleted on 08/31/23.  Patient had been admitted to the medicine service prior to her anaphylactic episode.  I discussed with the patient that she was in fact allergic to Zosyn and she needs to never receive this medication again.    Amount and/or Complexity of Data Reviewed  Labs: ordered. Decision-making details documented in ED Course.  Radiology: ordered. Decision-making details documented in ED Course.    Risk  Prescription drug management.               ED Course as of 10/23/23 1157   [place] Oct 22, 2023   2304 Temp: 99.8 °F (37.7 °C) [AW]      ED Course User Index  [AW] Fabien Bonilla MD                    Clinical Impression:   Final diagnoses:  [K57.92] Diverticulitis (Primary)        ED Disposition Condition    Observation                 Fabien Bonilla MD  10/23/23 7482

## 2023-10-23 NOTE — TELEPHONE ENCOUNTER
Spoke to pt she is inpatient here at [facility]. She is room 642.

## 2023-10-23 NOTE — PROGRESS NOTES
Acute Care Surgery  Consult Note  YOB: 1970  Date: 10/23/2023 8:27 AM  Date of Admission: 10/22/2023  HD#0  POD#* No surgery found *    PRESENTING HISTORY   Chief Complaint/Reason for Admission: LLQ abdominal pain, history of diverticulitis and recently discovered mass    History of Present Illness:  Niya Moeller is a 53 y.o. female with PMHx of asthma, GERD, hyperthyroidism and recurrent diverticulitis who presented to [facility] ED on 10/22/2023 for worsening abdominal pain. She reports she was being treated with Augmentin outpatient for diverticulitis since 10/13 but has had increasing pain for the last 3 days. In the ED she was afebrile, BP 97/51, T 99.8, WBC 5.82, Covid positive. CT abdomen/pelvis showed diverticulum in the colon with inflammation of the proximal sigmoid colon consistent with diverticulitis and no definite abscess on updated radiology read. Surgery was consulted for diverticulitis/possible abscess and potential drainage.     Today, the patient reports fever, chills, night sweats, nausea, and diarrhea over the past couple of weeks. She denies melena, hematochezia, vomiting. She reports she had her first episode of diverticulitis in 2021 and then a second episode later in 2021 with perforation and abscess that was drained in [place]. She reports a second episode of diverticulitis with perforation/abscess that was drained in February 2023 at [facility] which was complicated by MDRO ESBL E.coli sensitive to meropenem and ertapenem. She was seen at [facility] in March 2023 and [facility] in July and August 2023 with symptoms related to her recurrent diverticulitis. She reports she had a colonoscopy 10 years ago that showed 3 precancerous polyps that were removed and 7 benign polyps. She currently smokes 1/2 pack of cigarettes per day.     Review of Systems:  12 point ROS negative except as stated in HPI    HISTORY:   Allergies:  Review of patient's allergies indicates:   Allergen  Reactions    Zosyn [piperacillin-tazobactam] Anaphylaxis    Latex      Medications  Current Outpatient Medications   Medication Instructions    albuterol (PROAIR HFA) 90 mcg/actuation inhaler 2 puffs, Inhalation, Every 6 hours PRN, Rescue    albuterol-ipratropium (DUO-NEB) 2.5 mg-0.5 mg/3 mL nebulizer solution 3 mLs, Nebulization, Every 4 hours PRN, Rescue    amoxicillin-clavulanate 875-125mg (AUGMENTIN) 875-125 mg per tablet 1 tablet, Oral, Every 12 hours    budesonide-formoterol 160-4.5 mcg (SYMBICORT) 160-4.5 mcg/actuation HFAA 2 puffs, Inhalation, Every 12 hours, Controller    fluticasone propionate (FLONASE) 50 mcg, Each Nostril, Daily PRN    methIMAzole (TAPAZOLE) 5 MG Tab Take a half of a tablet daily to Total 2.5 mg    omeprazole (PRILOSEC) 40 mg, Oral, Daily    propranoloL (INDERAL) 60 mg, Oral    sod sulf-pot chloride-mag sulf (SUTAB) 1.479-0.188- 0.225 gram tablet 12 tablets, Oral, Daily, Take according to package instructions with indicated amount of water.     Past Medical History:  Past Medical History:   Diagnosis Date    Asthma     Diverticulosis     GERD (gastroesophageal reflux disease)      Past Surgical History:  History reviewed. No pertinent surgical history.  Family History:  History reviewed. No pertinent family history.  Social History:  Social History     Tobacco Use   Smoking Status Every Day    Current packs/day: 0.50    Average packs/day: 0.5 packs/day for 40.8 years (20.4 ttl pk-yrs)    Types: Cigarettes    Start date: 1983   Smokeless Tobacco Never   Tobacco Comments    Ambulatory referral to Smoking Cessation clinic following hospital discharge.       Social History     Substance and Sexual Activity   Alcohol Use Not Currently        OBJECTIVE:   Vital Signs:  VITAL SIGNS: 24 HR MIN & MAX LAST   Temp  Min: 98.3 °F (36.8 °C)  Max: 99.8 °F (37.7 °C)  98.4 °F (36.9 °C)   BP  Min: 97/51  Max: 116/69  112/70    Pulse  Min: 75  Max: 95  77    Resp  Min: 16  Max: 28  16    SpO2  Min: 92 %   "Max: 100 %  95 %      HT: 4' 11" (149.9 cm)  WT: 67 kg (147 lb 11.3 oz)  BMI: 29.8     Physical Exam:  General: Well developed, well nourished, no acute distress  HEENT: Normocephalic, atraumatic, PERRL  CV: RR  Resp: NWOB  GI:  Abdomen soft, tenderness to palpation of LLQ, non-distended, no guarding, no rebound, normoactive bowel sounds, no masses  MSK: No cyanosis, peripheral edema, moving all extremities spontaneously  Skin/wounds:  No rashes, ulcers, erythema  Neuro:  CNII-XII grossly intact, alert and oriented to person, place, and time    Labs:  WBC/Hgb/Hct/Plts:  5.82/13.1/39.4/174 (10/22 2336)  BUN/Cr/glu/ALT/AST/amyl/lip:  8.7/0.82/--/12/17/--/17 (10/22 2336)  Na/K/Cl/CO2:  139/3.6/104/23 (10/22 2336)      Diagnostic Results:  CT abdomen/pelvis with contrast 10/22/2023 updated read  There are diverticulum in the colon with inflammation of the proximal sigmoid colon and I cannot rule out diverticulitis.  A definite abscess is not seen.     Impression:  Changes consistent with diverticulitis in the sigmoid colon.    ASSESSMENT & PLAN:    Niya Moeller is a 53 y.o. female with PMHx of asthma, GERD, hyperthyroidism and recurrent diverticulitis here with diverticulitis. CT abdomen with contrast 10/22 showed diverticulum in the colon with inflammation of the proximal sigmoid colon consistent with diverticulitis but no definite abscess seen on updated radiology read.     - Continue non-operative management  - NPO   - Bowel rest  - IV antibiotics with cipro/flagyl  - Available as needed, call with any questions  - Refer to surgery clinic outpatient following colonoscopy in 6-8 weeks for discussion for sigmoidectomy  - Rest of care per primary team    Kathi Hopkins, MS3  [facility]  8:27 AM  10/23/2023       RESIDENT ATTESTATION    I have seen and  evaluated the patient with the student doctor. Agree with assessment and plan with appropriate changes made.      Genesis" MD Farhat  [employer] General Surgery PGY-1  11:04 AM

## 2023-10-24 ENCOUNTER — TELEPHONE (OUTPATIENT)
Dept: GASTROENTEROLOGY | Facility: CLINIC | Age: 53
End: 2023-10-24
Payer: MEDICAID

## 2023-10-24 NOTE — NURSING
Patient is agitated and disruptive at this time. States she wants to sign herself out of this hospital. After attempting to reassure patient, spoke to Dr. Fishman regarding patients concerns. Stated she will see patient at bedside.

## 2023-10-24 NOTE — DISCHARGE SUMMARY
[facility] Medicine  Discharge Summary      Patient Name: Niya Moeller  MRN: [id number]  Admission Date: 10/22/2023  Hospital Length of Stay: 0 days  Discharge Date and Time:  10/23/23 2010  Attending Physician: Justin Elizondo MD   Discharging Provider: Caleb Jefferson MD  Discharge Provider Team: Networked reference to record PCT   Primary Care Provider: Oswaldo Riggs NP        HPI: 53 y.o. female with past medical history of asthma, recurrent diverticulitis, GERD, and hyperthyroidism. She presented to ED for worsening abdominal pain,. She was being treated for diverticulitis outpatient with Augmentin, but 3 days ago she began to have difficulty ambulating due to the pain. She stated having to walk with her leg bent. Eating makes pain worse, with nausea and fatigue. She was recently diagnosed with Covid-19 on 10/19/23 and has remained stable from respiratory standpoint.  Reported SOB, cough, and fevers with highest being 101 °, but is controlled with tylenol as needed.  Denied melena, hematochezia, vomiting, diarrhea, or wheezing.    * No surgery found *      Hospital Course: Patient packing bags upon my encounter with her.  States she is unhappy with the care she has received and is planning to leave as soon as possible.  Reported that she did not understand why her pain was not being treated with IV antibiotics, or whether or not an abscess was still present.  Declines to complete AMA paperwork.    Consults:   Consults (From admission, onward)          Status Ordering Provider     Inpatient consult to General Surgery  Once        Provider:  Elmer Lee Jr., MD    Acknowledged ALBERT ENG     Inpatient consult to Infectious Diseases  Once        Provider:  Justin Valdivia MD    Completed ALBERT ENG     Inpatient consult to Internal Medicine  Once        Provider:  Caleb Jefferson MD    Acknowledged ONDINA MARTINEZ            There are no  hospital problems to display for this patient.     Discharged Condition: against medical advice    Disposition: Home or Self Care    Follow Up:    Patient Instructions:   No discharge procedures on file.  Medications:  None    Significant Diagnostic Studies: N/A    Pending Diagnostic Studies:       Procedure Component Value Units Date/Time    5 HIAA, quantitative, Urine [facility] [[id number]]     Order Status: Sent Lab Status: No result     Specimen: Urine     EXTRA TUBES [[id number]] Collected: 10/23/23 0217    Order Status: Sent Lab Status: In process Updated: 10/23/23 0217    Specimen: Blood, Venous     Narrative:      The following orders were created for panel order EXTRA TUBES.  Procedure                               Abnormality         Status                     ---------                               -----------         ------                     Light Blue Top Hold[[id number]]                             In process                 Red Top Hold[[id number]]                                    In process                 Light Green Top Hold[[id number]]                            In process                   Please view results for these tests on the individual orders.    EXTRA TUBES [[id number]] Collected: 10/22/23 2339    Order Status: Sent Lab Status: In process Updated: 10/22/23 2339    Specimen: Blood, Venous     Narrative:      The following orders were created for panel order EXTRA TUBES.  Procedure                               Abnormality         Status                     ---------                               -----------         ------                     Light Blue Top Hold[[id number]]                             In process                 Gold Top Hold[[id number]]                                   In process                   Please view results for these tests on the individual orders.          Indwelling Lines/Drains at time of discharge:   Lines/Drains/Airways       None                   Time  spent on the discharge of patient: 20 minutes         Caleb Jefferson MD  Department of Hospital Medicine  [facility] Tele[facility]

## 2023-10-24 NOTE — CARE UPDATE
This patient is choosing to leave against medical advice. I have personally explained to patient the risks and that choosing to do so may result in permanent bodily harm or even death. Discussed at great length that without further evaluation and monitoring there may have unforeseen circumstances and/or deterioration causing permanent bodily harm or death as a result of leaving against medical advice including worsening of condition, return of abscess.     Patient still desires to leave against medical advice. Patient is alert, oriented, and shows the mental capacity to make clear decisions regarding their health care at this time.     Advise patient to return to ED at any time immediately if they change their mind at any time or if condition begins to worsen or change in anyway.    Caleb Jefferson MD, MPH  Family Medicine - [facility]

## 2023-10-24 NOTE — NURSING
Patient pulled out IV.  Dr. Jefferson at bedside to speak with patient regarding concerns. Patient uncooperative. Refused to sign AMA document. Left unit with all her belongings.

## 2023-10-24 NOTE — NURSING
Placed call to IM on call to notify patient is upset with plan of care. She wants to know why she is not on antibiotics. She is also upset that her Colonscopy was cancelled. Dr. Fishman stated she will come speak with patient at bedside.

## 2023-10-25 LAB
BACTERIA BLD CULT: NORMAL
BACTERIA BLD CULT: NORMAL

## 2023-10-28 LAB
BACTERIA BLD CULT: NORMAL
BACTERIA BLD CULT: NORMAL

## 2023-11-06 ENCOUNTER — OFFICE VISIT (OUTPATIENT)
Dept: ENDOCRINOLOGY | Facility: CLINIC | Age: 53
End: 2023-11-06
Payer: MEDICAID

## 2023-11-06 DIAGNOSIS — E05.00 GRAVES DISEASE: Primary | ICD-10-CM

## 2023-11-06 PROCEDURE — 1160F RVW MEDS BY RX/DR IN RCRD: CPT | Mod: CPTII,95,, | Performed by: NURSE PRACTITIONER

## 2023-11-06 PROCEDURE — 99214 PR OFFICE/OUTPT VISIT, EST, LEVL IV, 30-39 MIN: ICD-10-PCS | Mod: 95,,, | Performed by: NURSE PRACTITIONER

## 2023-11-06 PROCEDURE — 1160F PR REVIEW ALL MEDS BY PRESCRIBER/CLIN PHARMACIST DOCUMENTED: ICD-10-PCS | Mod: CPTII,95,, | Performed by: NURSE PRACTITIONER

## 2023-11-06 PROCEDURE — 99214 OFFICE O/P EST MOD 30 MIN: CPT | Mod: 95,,, | Performed by: NURSE PRACTITIONER

## 2023-11-06 PROCEDURE — 1159F MED LIST DOCD IN RCRD: CPT | Mod: CPTII,95,, | Performed by: NURSE PRACTITIONER

## 2023-11-06 PROCEDURE — 1159F PR MEDICATION LIST DOCUMENTED IN MEDICAL RECORD: ICD-10-PCS | Mod: CPTII,95,, | Performed by: NURSE PRACTITIONER

## 2023-11-06 RX ORDER — METHIMAZOLE 5 MG/1
TABLET ORAL
Qty: 15 TABLET | Refills: 11 | Status: SHIPPED | OUTPATIENT
Start: 2023-11-06

## 2023-11-06 RX ORDER — PREDNISONE 20 MG/1
40 TABLET ORAL
Status: ON HOLD | COMMUNITY
Start: 2023-10-16 | End: 2024-01-25

## 2023-11-06 RX ORDER — FLUTICASONE PROPIONATE AND SALMETEROL 50; 250 UG/1; UG/1
POWDER RESPIRATORY (INHALATION)
COMMUNITY
Start: 2023-10-16

## 2023-11-06 NOTE — PROGRESS NOTES
patient declined to answer the rest of the questions to the questionnaire. she stated she would rather get to her appointment and discuss things related to her appointment.

## 2023-11-06 NOTE — PROGRESS NOTES
Subjective     Patient ID: Niya Moeller is a 53 y.o. female.    Chief Complaint: No chief complaint on file.    Endocrine clinic note 08/31/2023:  53-year-old female scheduled today as new patient referral to endocrine clinic for history of Graves disease/hyperthyroidism and enlarged thyroid.  Graves disease patient states she was diagnosed with hyperthyroidism multiple years ago when she states placed on medication and she states that time medication was stopped stating she was in remission.  Patient returned back hyperthyroid at the beginning of 2023 w TSH < 0.026, Free T4 3.89 patient was placed on methimazole 10 mg twice a day.  Remain on 10 mg twice a day 5 months and started having symptoms of being lethargic, periorbital edema and swelling to her legs and severe fatigue and weight gain. Labs repeated TSH 22.988, free T4 <0.42 on 08/16/2023 MMI stopped at that time 2 weeks ago. Labs repeat today TSH 4.000, Free T4 0.90 today.  Patient reports a small amount of remaining periorbital edema fatigue is improving.  Instructed patient that will monitor her thyroid labs closely.  Patient states previously when she was hyperthyroid she had an enlarged thyroid, tremors, anxiety, insomnia, heat intolerance.  She states when she was on menses all times several months she started having cold intolerance, becoming more sleepy and symptoms of hypothyroidism with weight gain.  TraB 2.23, TSI 1.4 on 08/18/2023.  Patient states when she was hyperthyroid and thyroid was enlarged she can feel tightness around her neck and dysphagia she states she is mild symptoms of dysphagia now.     The patient location is: Home   The chief complaint leading to consultation is:  Graves disease/hyperthyroidism    Visit type: audiovisual    Face to Face time with patient: 15  30 minutes of total time spent on the encounter, which includes face to face time and non-face to face time preparing to see the patient (eg, review of tests), Obtaining  and/or reviewing separately obtained history, Documenting clinical information in the electronic or other health record, Independently interpreting results (not separately reported) and communicating results to the patient/family/caregiver, or Care coordination (not separately reported).         Each patient to whom he or she provides medical services by telemedicine is:  (1) informed of the relationship between the physician and patient and the respective role of any other health care provider with respect to management of the patient; and (2) notified that he or she may decline to receive medical services by telemedicine and may withdraw from such care at any time.    Notes:  Endocrine clinic note 11/06/2023:  53 year female scheduled today for endocrine clinic follow-up.  History of Graves disease/hyperthyroidism and enlarged thyroid.  Patient is currently on methimazole 2.5 mg recently hospitalized with diverticulitis abscess and COVID-19.  On admit patient's TSH 0.254 free T4 1.04 at that time patient had missed 1-2 doses of her medication due to hospitalization.  Repeat TSH 0.420 on 10/23/2023.  Patient denies any symptoms of hyperthyroidism.  Patient is currently at home recovering she states that she is completing her amoxicillin and has a follow-up with ID clinic on 11/30/2023.  Instructed the patient that I will repeat her labs and she can complete him on her 11/30 visit if she has any symptoms of hyperthyroidism prior to that visit she can repeat her labs early.  Patient did have a positive depression screening on the visit today patient mentioned since her medical diagnosis she does have mild depression due to her recent medical visits.  Patient did not express any suicidal or homicidal ideations.           Review of Systems   Constitutional:  Positive for fatigue. Negative for activity change and appetite change.   HENT:  Negative for dental problem, hearing loss, tinnitus, trouble swallowing and  goiter.    Eyes:  Negative for photophobia, pain and visual disturbance.   Respiratory:  Negative for cough, chest tightness and wheezing.    Cardiovascular:  Negative for chest pain, palpitations and leg swelling.   Gastrointestinal:  Negative for abdominal pain, constipation, diarrhea, nausea and reflux.   Endocrine: Negative for cold intolerance, heat intolerance, polydipsia and polyphagia.   Genitourinary:  Negative for difficulty urinating, flank pain, hematuria, hot flashes, menstrual irregularity, menstrual problem, nocturia and urgency.   Musculoskeletal:  Negative for back pain, gait problem, joint swelling, leg pain and joint deformity.   Integumentary:  Negative for color change, pallor, rash and breast discharge.   Allergic/Immunologic: Negative for environmental allergies, food allergies and immunocompromised state.   Neurological:  Negative for tremors, seizures, headaches, memory loss and coordination difficulties.   Psychiatric/Behavioral:  Negative for agitation, behavioral problems and sleep disturbance. The patient is not nervous/anxious.           Objective     Physical Exam  Constitutional:       Appearance: Normal appearance.   HENT:      Head: Normocephalic.      Nose: Nose normal.   Eyes:      Conjunctiva/sclera: Conjunctivae normal.   Pulmonary:      Effort: Pulmonary effort is normal.   Musculoskeletal:      Cervical back: Normal range of motion.   Neurological:      Mental Status: She is alert.   Psychiatric:         Mood and Affect: Mood normal.         Behavior: Behavior normal.         Thought Content: Thought content normal.         Judgment: Judgment normal.            Assessment and Plan     1. Graves disease  TSH 0.420 on 10/23/2023  TSH 0.254, Free T4 1.00 on 10/19/2023   TraB 2.23, TSI 1.4 on 08/18/2023  On MMI 2.5 mg daily   Repeat labs TSH, Free T4, Free T3 and Trab   Component Ref Range & Units 2 wk ago  (10/23/23) 2 wk ago  (10/19/23) 1 mo ago  (9/28/23) 2 mo ago  (8/31/23) 2  mo ago  (8/16/23) 9 mo ago  (1/22/23) 9 mo ago  (1/13/23)   TSH 0.350 - 4.940 uIU/mL 0.420  0.254 Low   0.660  4.000  22.988 High   <0.026 Low  R, CM  0.00 Low  R      Component Ref Range & Units 2 wk ago  (10/19/23) 1 mo ago  (9/28/23) 2 mo ago  (8/31/23) 2 mo ago  (8/16/23) 9 mo ago  (1/22/23) 1 yr ago  (3/31/22)   Thyroxine Free 0.70 - 1.48 ng/dL 1.00  1.04  0.90  <0.42 Low   3.09 High  R  1.06 R    -     T3, Free (OLG); Future; Expected date: 11/06/2023  -     T4, Free; Future; Expected date: 11/06/2023  -     TSH; Future; Expected date: 11/06/2023  -     Thyrotropin Receptor Antibody; Future; Expected date: 11/06/2023  -     methIMAzole (TAPAZOLE) 5 MG Tab; Take a half of a tablet daily to Total 2.5 mg  Dispense: 15 tablet; Refill: 11    Follow up in about 6 months (around 5/6/2024) for Hyperthyroidism, Graves.    I spent a total of 30 minutes on the day of the visit.  This includes face to face time and non-face to face time preparing to see the patient (eg, review of tests), obtaining and/or reviewing separately obtained history, documenting clinical information in the electronic or other health record, independently interpreting results and communicating results to the patient/family/caregiver, or care coordinator.

## 2023-11-30 ENCOUNTER — HOSPITAL ENCOUNTER (EMERGENCY)
Facility: HOSPITAL | Age: 53
Discharge: HOME | End: 2023-11-30
Attending: EMERGENCY MEDICINE
Payer: MEDICAID

## 2023-11-30 VITALS
SYSTOLIC BLOOD PRESSURE: 110 MMHG | WEIGHT: 130 LBS | OXYGEN SATURATION: 96 % | BODY MASS INDEX: 26.21 KG/M2 | HEART RATE: 86 BPM | DIASTOLIC BLOOD PRESSURE: 60 MMHG | RESPIRATION RATE: 18 BRPM | TEMPERATURE: 98 F | HEIGHT: 59 IN

## 2023-11-30 DIAGNOSIS — K57.92 DIVERTICULITIS: ICD-10-CM

## 2023-11-30 DIAGNOSIS — J45.901 EXACERBATION OF ASTHMA, UNSPECIFIED ASTHMA SEVERITY, UNSPECIFIED WHETHER PERSISTENT: ICD-10-CM

## 2023-11-30 DIAGNOSIS — R53.1 WEAKNESS GENERALIZED: ICD-10-CM

## 2023-11-30 DIAGNOSIS — R53.1 WEAKNESS: Primary | ICD-10-CM

## 2023-11-30 LAB
ALBUMIN SERPL-MCNC: 3.7 G/DL (ref 3.5–5)
ALBUMIN/GLOB SERPL: 1.1 RATIO (ref 1.1–2)
ALP SERPL-CCNC: 88 UNIT/L (ref 40–150)
ALT SERPL-CCNC: 18 UNIT/L (ref 0–55)
APPEARANCE UR: CLEAR
AST SERPL-CCNC: 19 UNIT/L (ref 5–34)
BACTERIA #/AREA URNS AUTO: ABNORMAL /HPF
BASOPHILS # BLD AUTO: 0.03 X10(3)/MCL
BASOPHILS NFR BLD AUTO: 0.5 %
BILIRUB SERPL-MCNC: 0.2 MG/DL
BILIRUB UR QL STRIP.AUTO: NEGATIVE
BUN SERPL-MCNC: 10.1 MG/DL (ref 9.8–20.1)
CALCIUM SERPL-MCNC: 9.6 MG/DL (ref 8.4–10.2)
CHLORIDE SERPL-SCNC: 108 MMOL/L (ref 98–107)
CO2 SERPL-SCNC: 23 MMOL/L (ref 22–29)
COLOR UR AUTO: ABNORMAL
CREAT SERPL-MCNC: 0.77 MG/DL (ref 0.55–1.02)
EOSINOPHIL # BLD AUTO: 0.4 X10(3)/MCL (ref 0–0.9)
EOSINOPHIL NFR BLD AUTO: 7.2 %
ERYTHROCYTE [DISTWIDTH] IN BLOOD BY AUTOMATED COUNT: 13.4 % (ref 11.5–17)
GFR SERPLBLD CREATININE-BSD FMLA CKD-EPI: >60 MLS/MIN/1.73/M2
GLOBULIN SER-MCNC: 3.4 GM/DL (ref 2.4–3.5)
GLUCOSE SERPL-MCNC: 103 MG/DL (ref 74–100)
GLUCOSE UR QL STRIP.AUTO: NORMAL
HCT VFR BLD AUTO: 40.2 % (ref 37–47)
HGB BLD-MCNC: 13 G/DL (ref 12–16)
IMM GRANULOCYTES # BLD AUTO: 0.01 X10(3)/MCL (ref 0–0.04)
IMM GRANULOCYTES NFR BLD AUTO: 0.2 %
KETONES UR QL STRIP.AUTO: ABNORMAL
LACTATE SERPL-SCNC: 3.3 MMOL/L (ref 0.5–2.2)
LEUKOCYTE ESTERASE UR QL STRIP.AUTO: NEGATIVE
LYMPHOCYTES # BLD AUTO: 3.08 X10(3)/MCL (ref 0.6–4.6)
LYMPHOCYTES NFR BLD AUTO: 55.3 %
MCH RBC QN AUTO: 31.4 PG (ref 27–31)
MCHC RBC AUTO-ENTMCNC: 32.3 G/DL (ref 33–36)
MCV RBC AUTO: 97.1 FL (ref 80–94)
MONOCYTES # BLD AUTO: 0.74 X10(3)/MCL (ref 0.1–1.3)
MONOCYTES NFR BLD AUTO: 13.3 %
MUCOUS THREADS URNS QL MICRO: ABNORMAL /LPF
NEUTROPHILS # BLD AUTO: 1.31 X10(3)/MCL (ref 2.1–9.2)
NEUTROPHILS NFR BLD AUTO: 23.5 %
NITRITE UR QL STRIP.AUTO: NEGATIVE
NRBC BLD AUTO-RTO: 0 %
PH UR STRIP.AUTO: 5 [PH]
PLATELET # BLD AUTO: 264 X10(3)/MCL (ref 130–400)
PMV BLD AUTO: 10.5 FL (ref 7.4–10.4)
POTASSIUM SERPL-SCNC: 4.7 MMOL/L (ref 3.5–5.1)
PROT SERPL-MCNC: 7.1 GM/DL (ref 6.4–8.3)
PROT UR QL STRIP.AUTO: NEGATIVE
RBC # BLD AUTO: 4.14 X10(6)/MCL (ref 4.2–5.4)
RBC #/AREA URNS AUTO: ABNORMAL /HPF
RBC UR QL AUTO: NEGATIVE
SODIUM SERPL-SCNC: 140 MMOL/L (ref 136–145)
SP GR UR STRIP.AUTO: 1.01 (ref 1–1.03)
SQUAMOUS #/AREA URNS LPF: ABNORMAL /HPF
TROPONIN I SERPL-MCNC: <0.01 NG/ML (ref 0–0.04)
TSH SERPL-ACNC: 0.7 UIU/ML (ref 0.35–4.94)
UROBILINOGEN UR STRIP-ACNC: NORMAL
WBC # SPEC AUTO: 5.57 X10(3)/MCL (ref 4.5–11.5)
WBC #/AREA URNS AUTO: ABNORMAL /HPF

## 2023-11-30 PROCEDURE — 81001 URINALYSIS AUTO W/SCOPE: CPT | Performed by: NURSE PRACTITIONER

## 2023-11-30 PROCEDURE — 63600175 PHARM REV CODE 636 W HCPCS: Performed by: EMERGENCY MEDICINE

## 2023-11-30 PROCEDURE — 96361 HYDRATE IV INFUSION ADD-ON: CPT

## 2023-11-30 PROCEDURE — 93010 ELECTROCARDIOGRAM REPORT: CPT | Mod: ,,, | Performed by: INTERNAL MEDICINE

## 2023-11-30 PROCEDURE — 84443 ASSAY THYROID STIM HORMONE: CPT | Performed by: NURSE PRACTITIONER

## 2023-11-30 PROCEDURE — 93010 EKG 12-LEAD: ICD-10-PCS | Mod: ,,, | Performed by: INTERNAL MEDICINE

## 2023-11-30 PROCEDURE — 93005 ELECTROCARDIOGRAM TRACING: CPT

## 2023-11-30 PROCEDURE — 83605 ASSAY OF LACTIC ACID: CPT | Performed by: EMERGENCY MEDICINE

## 2023-11-30 PROCEDURE — 96360 HYDRATION IV INFUSION INIT: CPT

## 2023-11-30 PROCEDURE — 25500020 PHARM REV CODE 255: Performed by: EMERGENCY MEDICINE

## 2023-11-30 PROCEDURE — 99285 EMERGENCY DEPT VISIT HI MDM: CPT | Mod: 25

## 2023-11-30 PROCEDURE — 80053 COMPREHEN METABOLIC PANEL: CPT | Performed by: NURSE PRACTITIONER

## 2023-11-30 PROCEDURE — 84484 ASSAY OF TROPONIN QUANT: CPT | Performed by: NURSE PRACTITIONER

## 2023-11-30 PROCEDURE — 85025 COMPLETE CBC W/AUTO DIFF WBC: CPT | Performed by: NURSE PRACTITIONER

## 2023-11-30 PROCEDURE — 25000242 PHARM REV CODE 250 ALT 637 W/ HCPCS: Performed by: EMERGENCY MEDICINE

## 2023-11-30 RX ORDER — IPRATROPIUM BROMIDE AND ALBUTEROL SULFATE 2.5; .5 MG/3ML; MG/3ML
3 SOLUTION RESPIRATORY (INHALATION) ONCE
Status: COMPLETED | OUTPATIENT
Start: 2023-11-30 | End: 2023-11-30

## 2023-11-30 RX ORDER — METHYLPREDNISOLONE 4 MG/1
TABLET ORAL
Qty: 1 EACH | Refills: 0 | Status: ON HOLD | OUTPATIENT
Start: 2023-11-30 | End: 2024-01-25 | Stop reason: HOSPADM

## 2023-11-30 RX ORDER — PREDNISONE 20 MG/1
40 TABLET ORAL
Status: COMPLETED | OUTPATIENT
Start: 2023-11-30 | End: 2023-11-30

## 2023-11-30 RX ORDER — ALBUTEROL SULFATE 90 UG/1
2 AEROSOL, METERED RESPIRATORY (INHALATION) EVERY 6 HOURS PRN
Qty: 18 G | Refills: 0 | Status: SHIPPED | OUTPATIENT
Start: 2023-11-30 | End: 2024-03-12

## 2023-11-30 RX ORDER — CIPROFLOXACIN 500 MG/1
500 TABLET ORAL 2 TIMES DAILY
Qty: 20 TABLET | Refills: 0 | Status: SHIPPED | OUTPATIENT
Start: 2023-11-30 | End: 2023-12-10

## 2023-11-30 RX ADMIN — PREDNISONE 40 MG: 20 TABLET ORAL at 11:11

## 2023-11-30 RX ADMIN — SODIUM CHLORIDE, POTASSIUM CHLORIDE, SODIUM LACTATE AND CALCIUM CHLORIDE 1000 ML: 600; 310; 30; 20 INJECTION, SOLUTION INTRAVENOUS at 08:11

## 2023-11-30 RX ADMIN — IOPAMIDOL 100 ML: 755 INJECTION, SOLUTION INTRAVENOUS at 09:11

## 2023-11-30 RX ADMIN — IPRATROPIUM BROMIDE AND ALBUTEROL SULFATE 3 ML: 2.5; .5 SOLUTION RESPIRATORY (INHALATION) at 11:11

## 2023-11-30 NOTE — FIRST PROVIDER EVALUATION
"Medical screening examination initiated.  I have conducted a focused provider triage encounter, findings are as follows:    Brief history of present illness:  Patient states weakness, dizziness, and palpitations. States that she was recently diagnosed with Diverticulitis.     Vitals:    11/30/23 1746   BP: 104/64   Pulse: 95   Resp: 20   Temp: 98.2 °F (36.8 °C)   TempSrc: Oral   SpO2: 95%   Weight: 59 kg (130 lb)   Height: 4' 11" (1.499 m)       Pertinent physical exam:  Awake, alert, ambulatory      Brief workup plan:  Labs, EKG    Preliminary workup initiated; this workup will be continued and followed by the physician or advanced practice provider that is assigned to the patient when roomed.  "

## 2023-12-01 NOTE — ED PROVIDER NOTES
Encounter Date: 11/30/2023    SCRIBE #1 NOTE: ISummer, am scribing for, and in the presence of,  Nathanael Romero III, MD. I have scribed the entire note.       History     Chief Complaint   Patient presents with    Weakness     Weakness, fatigue, dizzy, palpitations, sent from I and D office for low blood pressure and fast heart rate.      53 year old female with a hx of Graves disease, GERD, and asthma presents to the ED for generalized weakness. Pt states she has been dealing with a diverticulitis flare up for the past 2 weeks. Pt finished a course of Doxycycline yesterday. Pt states her pain is worst at the LLQ. Pt has been having episodes of diaphoresis in her sleep for the last few weeks. Pt was seen by her infectious disease doctor today and became hypotensive with a blood pressure as low as 60/40. Pt also reports palpitations and dizziness during this episode. Pt was told to come to the ED for further evaluation. Pt currently rates her abdominal pain a 4/10. Pt is a current smoker.     When asking if the pt has a specific symptom, she states yes to every single symptom.     The history is provided by the patient. No  was used.     Review of patient's allergies indicates:   Allergen Reactions    Latex Anaphylaxis and Edema    Zosyn [piperacillin-tazobactam] Anaphylaxis     Past Medical History:   Diagnosis Date    Asthma     Diverticulosis     GERD (gastroesophageal reflux disease)     Graves disease      No past surgical history on file.  Family History   Problem Relation Age of Onset    Hypothyroidism Mother      Social History     Tobacco Use    Smoking status: Every Day     Current packs/day: 0.50     Average packs/day: 0.5 packs/day for 40.9 years (20.5 ttl pk-yrs)     Types: Cigarettes     Start date: 1/1/1983    Smokeless tobacco: Never    Tobacco comments:     Ambulatory referral to Smoking Cessation clinic following hospital discharge.    Substance Use Topics    Alcohol use:  Not Currently    Drug use: Never     Review of Systems   Constitutional:  Positive for diaphoresis, fatigue and fever. Negative for unexpected weight change.   HENT:  Positive for congestion and rhinorrhea.    Eyes:  Negative for pain.   Respiratory:  Positive for cough and shortness of breath. Negative for chest tightness and wheezing.    Cardiovascular:  Positive for chest pain and palpitations.   Gastrointestinal:  Positive for abdominal pain, diarrhea, nausea and vomiting. Negative for constipation.   Genitourinary:  Negative for dysuria.   Musculoskeletal:  Positive for back pain and neck pain.   Skin:  Negative for rash.   Allergic/Immunologic: Negative for environmental allergies, food allergies and immunocompromised state.   Neurological:  Positive for dizziness. Negative for speech difficulty.   Hematological:  Does not bruise/bleed easily.   Psychiatric/Behavioral:  Negative for sleep disturbance and suicidal ideas.        Physical Exam     Initial Vitals [11/30/23 1746]   BP Pulse Resp Temp SpO2   104/64 95 20 98.2 °F (36.8 °C) 95 %      MAP       --         Physical Exam    Nursing note and vitals reviewed.  Constitutional: No distress.   HENT:   Head: Normocephalic and atraumatic.   Neck: Trachea normal.   Cardiovascular:  Regular rhythm.   Tachycardia present.         No murmur heard.  Pulmonary/Chest: Breath sounds normal. No respiratory distress.   Abdominal: Abdomen is soft. Bowel sounds are normal. She exhibits no distension. There is abdominal tenderness in the left lower quadrant.   Musculoskeletal:         General: Normal range of motion.      Lumbar back: Normal range of motion.     Neurological: She is alert and oriented to person, place, and time. She has normal strength. No cranial nerve deficit.   Skin: Skin is warm and dry. No rash noted.   Psychiatric: Judgment normal. Her mood appears anxious.         ED Course   Procedures  Labs Reviewed   COMPREHENSIVE METABOLIC PANEL - Abnormal;  Notable for the following components:       Result Value    Chloride 108 (*)     Glucose Level 103 (*)     All other components within normal limits   URINALYSIS, REFLEX TO URINE CULTURE - Abnormal; Notable for the following components:    Ketones, UA Trace (*)     Mucous, UA Trace (*)     All other components within normal limits   CBC WITH DIFFERENTIAL - Abnormal; Notable for the following components:    RBC 4.14 (*)     MCV 97.1 (*)     MCH 31.4 (*)     MCHC 32.3 (*)     MPV 10.5 (*)     Neut # 1.31 (*)     All other components within normal limits   LACTIC ACID, PLASMA - Abnormal; Notable for the following components:    Lactic Acid Level 3.3 (*)     All other components within normal limits   TROPONIN I - Normal   TSH - Normal   CBC W/ AUTO DIFFERENTIAL    Narrative:     The following orders were created for panel order CBC Auto Differential.  Procedure                               Abnormality         Status                     ---------                               -----------         ------                     CBC with Differential[[id number]]       Abnormal            Final result                 Please view results for these tests on the individual orders.     EKG Readings: (Independently Interpreted)   Initial Reading: No STEMI. Rhythm: Normal Sinus Rhythm. Heart Rate: 91. Ectopy: No Ectopy. Conduction: Normal. ST Segments: Normal ST Segments. T Waves: Normal. Axis: Normal.   Done on 11/30/23 at 1748.      ECG Results              EKG 12-lead (Final result)  Result time 12/01/23 00:27:10      Final result by Interface, Lab In [facility] (12/01/23 00:27:10)                   Narrative:    Test Reason : R53.1,    Vent. Rate : 091 BPM     Atrial Rate : 091 BPM     P-R Int : 124 ms          QRS Dur : 078 ms      QT Int : 360 ms       P-R-T Axes : 075 072 061 degrees     QTc Int : 442 ms    Normal sinus rhythm  Normal ECG  When compared with ECG of 23-OCT-2023 08:09,  No significant change was found  Confirmed  by Greg Orozco MD ([id number]) on 12/1/2023 12:27:03 AM    Referred By: CURT SCHAFER           Confirmed By:Greg Orozco MD                                  Imaging Results              CT Abdomen Pelvis With IV Contrast NO Oral Contrast (Final result)  Result time 11/30/23 21:24:53      Final result by Shayne Atkins MD (11/30/23 21:24:53)                   Impression:      Persistent or recurrent diverticulitis in the sigmoid colon common an abscess or phlegmon is not demonstrated.    Cannot rule out an underlying mass with certainty would recommend colonoscopy.      Electronically signed by: Shayne Atkins MD  Date:    11/30/2023  Time:    21:24               Narrative:    EXAMINATION:  CT ABDOMEN PELVIS WITH IV CONTRAST    CLINICAL HISTORY:  LLQ abdominal pain;    TECHNIQUE:  Low dose axial images, sagittal and coronal reformations were obtained from the lung bases to the pubic symphysis following the IV administration of 100 mL of Isovue 370 no oral contrast was given.    Automatic exposure control (AEC) was utilized for dose reduction.    Dose: 638 mGycm    COMPARISON:  10/23/2023    FINDINGS:  There are chronic changes in the right middle lobe and lingula stable from the previous exam bowel liver appears normal.  Spleen appears normal.  Pancreas appears normal.  Biliary system appears normal.  The adrenals are not enlarged.  Kidneys appear normal.  Aorta shows no evidence of an aneurysm.  Uterus appears normal.  There is stool thickening of the wall of the sigmoid colon and I cannot rule out persistent diverticulitis.  A definite abscess is not seen the appendix appears normal.                                       X-Ray Chest PA And Lateral (Final result)  Result time 11/30/23 18:07:46      Final result by Arely Roche MD (11/30/23 18:07:46)                   Impression:      No abnormality seen      Electronically signed by: Raphael Roche  Date:    11/30/2023  Time:    18:07               Narrative:     EXAMINATION:  XR CHEST PA AND LATERAL    CLINICAL HISTORY:  Weakness;    TECHNIQUE:  PA and lateral view of the chest was performed.    COMPARISON:  10/19/2023    FINDINGS:  The lungs are clear.  The heart is normal appearance.  The pulmonary vascularity is unremarkable.  Aorta appears grossly unremarkable.  No pleural effusions are seen.  Bones and joints show no acute abnormality.                                       Medications   lactated ringers bolus 1,000 mL (0 mLs Intravenous Stopped 11/30/23 2143)   iopamidoL (ISOVUE-370) injection 100 mL (100 mLs Intravenous Given 11/30/23 2120)   albuterol-ipratropium 2.5 mg-0.5 mg/3 mL nebulizer solution 3 mL (3 mLs Nebulization Given 11/30/23 2310)   predniSONE tablet 40 mg (40 mg Oral Given 11/30/23 2309)     Medical Decision Making  The differential diagnosis includes, but is not limited to, anemia, ACS, bacteremia, or diverticulitis.     CBC chemistry EKG without abnormality CT showed persistent diverticulitis during movement patient's IV got dislodged and she became very upset with the attempts to replace the IV states she did not want an IV patient states she was short of breath was going to placed patient on Cipro give her more fluids she states she did not want another IV patient states she wanted her shortness of breath addressed.  Patient given nebs and steroids attempted go reassess patient especially given her lactic acid was elevated patient remove off the neb and left with her discharge instruction      Problems Addressed:  Diverticulitis: acute illness or injury    Amount and/or Complexity of Data Reviewed  Labs: ordered.  Radiology: ordered and independent interpretation performed.  ECG/medicine tests: ordered and independent interpretation performed.    Risk  Prescription drug management.            Scribe Attestation:   Scribe #1: I performed the above scribed service and the documentation accurately describes the services I performed. I attest to the  accuracy of the note.    Attending Attestation:           Physician Attestation for Scribe:  Physician Attestation Statement for Scribe #1: I, Nathanael Romero III, MD, reviewed documentation, as scribed by Summer Ortiz in my presence, and it is both accurate and complete.                                    Clinical Impression:  Final diagnoses:  [R53.1] Weakness generalized  [R53.1] Weakness (Primary)  [K57.92] Diverticulitis  [J45.901] Exacerbation of asthma, unspecified asthma severity, unspecified whether persistent          ED Disposition Condition    Discharge Stable          ED Prescriptions       Medication Sig Dispense Start Date End Date Auth. Provider    ciprofloxacin HCl (CIPRO) 500 MG tablet Take 1 tablet (500 mg total) by mouth 2 (two) times daily. for 10 days 20 tablet 11/30/2023 12/10/2023 Nathanael Romero III, MD    albuterol (PROAIR HFA) 90 mcg/actuation inhaler Inhale 2 puffs into the lungs every 6 (six) hours as needed for Wheezing. Rescue 18 g 11/30/2023 11/29/2024 Nathanael Romero III, MD    methylPREDNISolone (MEDROL DOSEPACK) 4 mg tablet Take per package directions 1 each 11/30/2023 -- Nathanael Romero III, MD          Follow-up Information       Follow up With Specialties Details Why Contact Info    Oswaldo Riggs, NP Family Medicine In 3 days  [address] [facility]  [address]  PRIMARY CARE [facility]  [place] [address]  [phone]               Nathanael Romero III, MD  12/11/23 8882

## 2023-12-18 PROBLEM — I26.99 ACUTE PULMONARY EMBOLISM: Status: RESOLVED | Noted: 2023-09-15 | Resolved: 2023-12-18

## 2024-01-01 ENCOUNTER — HOSPITAL ENCOUNTER (EMERGENCY)
Facility: HOSPITAL | Age: 54
Discharge: HOME | End: 2024-01-01
Attending: INTERNAL MEDICINE
Payer: MEDICAID

## 2024-01-01 VITALS
DIASTOLIC BLOOD PRESSURE: 65 MMHG | WEIGHT: 158.75 LBS | TEMPERATURE: 98 F | HEIGHT: 59 IN | SYSTOLIC BLOOD PRESSURE: 105 MMHG | RESPIRATION RATE: 20 BRPM | OXYGEN SATURATION: 98 % | BODY MASS INDEX: 32 KG/M2 | HEART RATE: 72 BPM

## 2024-01-01 DIAGNOSIS — K57.92 DIVERTICULITIS: ICD-10-CM

## 2024-01-01 DIAGNOSIS — R06.02 SHORTNESS OF BREATH: ICD-10-CM

## 2024-01-01 DIAGNOSIS — J45.21 MILD INTERMITTENT ASTHMA WITH EXACERBATION: Primary | ICD-10-CM

## 2024-01-01 LAB
ALBUMIN SERPL-MCNC: 3.6 G/DL (ref 3.5–5)
ALBUMIN/GLOB SERPL: 1 RATIO (ref 1.1–2)
ALP SERPL-CCNC: 76 UNIT/L (ref 40–150)
ALT SERPL-CCNC: 12 UNIT/L (ref 0–55)
AST SERPL-CCNC: 14 UNIT/L (ref 5–34)
BASOPHILS # BLD AUTO: 0.03 X10(3)/MCL
BASOPHILS NFR BLD AUTO: 0.4 %
BILIRUB SERPL-MCNC: 0.4 MG/DL
BUN SERPL-MCNC: 10.4 MG/DL (ref 9.8–20.1)
CALCIUM SERPL-MCNC: 9.7 MG/DL (ref 8.4–10.2)
CHLORIDE SERPL-SCNC: 108 MMOL/L (ref 98–107)
CO2 SERPL-SCNC: 25 MMOL/L (ref 22–29)
CREAT SERPL-MCNC: 1.25 MG/DL (ref 0.55–1.02)
EOSINOPHIL # BLD AUTO: 0.14 X10(3)/MCL (ref 0–0.9)
EOSINOPHIL NFR BLD AUTO: 2 %
ERYTHROCYTE [DISTWIDTH] IN BLOOD BY AUTOMATED COUNT: 13.2 % (ref 11.5–17)
GFR SERPLBLD CREATININE-BSD FMLA CKD-EPI: 51 MLS/MIN/1.73/M2
GLOBULIN SER-MCNC: 3.6 GM/DL (ref 2.4–3.5)
GLUCOSE SERPL-MCNC: 80 MG/DL (ref 74–100)
HCT VFR BLD AUTO: 42 % (ref 37–47)
HGB BLD-MCNC: 13.3 G/DL (ref 12–16)
IMM GRANULOCYTES # BLD AUTO: 0.02 X10(3)/MCL (ref 0–0.04)
IMM GRANULOCYTES NFR BLD AUTO: 0.3 %
LYMPHOCYTES # BLD AUTO: 2.93 X10(3)/MCL (ref 0.6–4.6)
LYMPHOCYTES NFR BLD AUTO: 42.2 %
MCH RBC QN AUTO: 30.4 PG (ref 27–31)
MCHC RBC AUTO-ENTMCNC: 31.7 G/DL (ref 33–36)
MCV RBC AUTO: 96.1 FL (ref 80–94)
MONOCYTES # BLD AUTO: 0.6 X10(3)/MCL (ref 0.1–1.3)
MONOCYTES NFR BLD AUTO: 8.6 %
NEUTROPHILS # BLD AUTO: 3.22 X10(3)/MCL (ref 2.1–9.2)
NEUTROPHILS NFR BLD AUTO: 46.5 %
NRBC BLD AUTO-RTO: 0 %
PLATELET # BLD AUTO: 241 X10(3)/MCL (ref 130–400)
PMV BLD AUTO: 10.1 FL (ref 7.4–10.4)
POTASSIUM SERPL-SCNC: 3.7 MMOL/L (ref 3.5–5.1)
PROT SERPL-MCNC: 7.2 GM/DL (ref 6.4–8.3)
RBC # BLD AUTO: 4.37 X10(6)/MCL (ref 4.2–5.4)
SODIUM SERPL-SCNC: 144 MMOL/L (ref 136–145)
WBC # SPEC AUTO: 6.94 X10(3)/MCL (ref 4.5–11.5)

## 2024-01-01 PROCEDURE — 94640 AIRWAY INHALATION TREATMENT: CPT

## 2024-01-01 PROCEDURE — 25000242 PHARM REV CODE 250 ALT 637 W/ HCPCS: Performed by: INTERNAL MEDICINE

## 2024-01-01 PROCEDURE — 94761 N-INVAS EAR/PLS OXIMETRY MLT: CPT

## 2024-01-01 PROCEDURE — 99285 EMERGENCY DEPT VISIT HI MDM: CPT | Mod: 25

## 2024-01-01 PROCEDURE — 80053 COMPREHEN METABOLIC PANEL: CPT | Performed by: INTERNAL MEDICINE

## 2024-01-01 PROCEDURE — 25500020 PHARM REV CODE 255

## 2024-01-01 PROCEDURE — 85025 COMPLETE CBC W/AUTO DIFF WBC: CPT | Performed by: INTERNAL MEDICINE

## 2024-01-01 RX ORDER — DICLOFENAC SODIUM 50 MG/1
50 TABLET, DELAYED RELEASE ORAL 2 TIMES DAILY PRN
Qty: 20 TABLET | Refills: 0 | Status: ON HOLD | OUTPATIENT
Start: 2024-01-01 | End: 2024-02-19 | Stop reason: HOSPADM

## 2024-01-01 RX ORDER — AMOXICILLIN AND CLAVULANATE POTASSIUM 875; 125 MG/1; MG/1
1 TABLET, FILM COATED ORAL 2 TIMES DAILY
Qty: 14 TABLET | Refills: 0 | Status: ON HOLD | OUTPATIENT
Start: 2024-01-01 | End: 2024-01-25 | Stop reason: HOSPADM

## 2024-01-01 RX ORDER — IPRATROPIUM BROMIDE AND ALBUTEROL SULFATE 2.5; .5 MG/3ML; MG/3ML
3 SOLUTION RESPIRATORY (INHALATION)
Status: COMPLETED | OUTPATIENT
Start: 2024-01-01 | End: 2024-01-01

## 2024-01-01 RX ADMIN — IPRATROPIUM BROMIDE AND ALBUTEROL SULFATE 3 ML: .5; 3 SOLUTION RESPIRATORY (INHALATION) at 09:01

## 2024-01-01 RX ADMIN — IOHEXOL 100 ML: 350 INJECTION, SOLUTION INTRAVENOUS at 10:01

## 2024-01-02 NOTE — ED PROVIDER NOTES
Encounter Date: 1/1/2024       History     Chief Complaint   Patient presents with    Shortness of Breath     Pt. States increased SOB recently due to asthma. Used home DuoNeb at 1700 with little relief. Speaking freely in triage, Sat 99% RA    Abdominal Pain     Pt. Endorses increased pain r/t diverticulitis     Presents with shortness of breath and concern about her diverticulitis. States a colonoscopy was performed at [facility] few weeks ago and since then she have been with pain. GI prescribed her Flagyl and Bentyl w/o improvement.    The history is provided by the patient.     Review of patient's allergies indicates:   Allergen Reactions    Latex Anaphylaxis and Edema    Zosyn [piperacillin-tazobactam] Anaphylaxis     Past Medical History:   Diagnosis Date    Asthma     Diverticulosis     GERD (gastroesophageal reflux disease)     Graves disease      History reviewed. No pertinent surgical history.  Family History   Problem Relation Age of Onset    Hypothyroidism Mother      Social History     Tobacco Use    Smoking status: Every Day     Current packs/day: 0.50     Average packs/day: 0.5 packs/day for 41.0 years (20.5 ttl pk-yrs)     Types: Cigarettes     Start date: 1/1/1983    Smokeless tobacco: Never    Tobacco comments:     Ambulatory referral to Smoking Cessation clinic following hospital discharge.    Substance Use Topics    Alcohol use: Not Currently    Drug use: Never     Review of Systems   Respiratory:  Positive for shortness of breath and wheezing.    Gastrointestinal:  Positive for abdominal pain.       Physical Exam     Initial Vitals [01/01/24 2104]   BP Pulse Resp Temp SpO2   102/63 90 18 98.3 °F (36.8 °C) 99 %      MAP       --         Physical Exam    Nursing note and vitals reviewed.  Constitutional: She appears well-developed and well-nourished. No distress.   HENT:   Head: Normocephalic and atraumatic.   Mouth/Throat: Oropharynx is clear and moist.   Eyes: Conjunctivae are normal.  Pupils are equal, round, and reactive to light.   Neck: Neck supple.   Normal range of motion.  Cardiovascular:  Normal rate, regular rhythm, normal heart sounds and intact distal pulses.           Pulmonary/Chest: She has wheezes.   Abdominal: Abdomen is soft. Bowel sounds are normal. She exhibits no distension. There is no abdominal tenderness. There is no rebound and no guarding.   Musculoskeletal:         General: No edema. Normal range of motion.      Cervical back: Normal range of motion and neck supple.     Neurological: She is alert and oriented to person, place, and time. She has normal strength. GCS score is 15. GCS eye subscore is 4. GCS verbal subscore is 5. GCS motor subscore is 6.   Skin: Skin is warm and dry. No rash noted.   Psychiatric: Her behavior is normal.         ED Course   Procedures  Labs Reviewed   COMPREHENSIVE METABOLIC PANEL - Abnormal; Notable for the following components:       Result Value    Chloride 108 (*)     Creatinine 1.25 (*)     Globulin 3.6 (*)     Albumin/Globulin Ratio 1.0 (*)     All other components within normal limits   CBC WITH DIFFERENTIAL - Abnormal; Notable for the following components:    MCV 96.1 (*)     MCHC 31.7 (*)     All other components within normal limits   CBC W/ AUTO DIFFERENTIAL    Narrative:     The following orders were created for panel order CBC auto differential.  Procedure                               Abnormality         Status                     ---------                               -----------         ------                     CBC with Differential[[id number]]       Abnormal            Final result                 Please view results for these tests on the individual orders.   EXTRA TUBES    Narrative:     The following orders were created for panel order EXTRA TUBES.  Procedure                               Abnormality         Status                     ---------                               -----------         ------                      Gold Top Hold[[id number]]                                                                Please view results for these tests on the individual orders.   GOLD TOP HOLD          Imaging Results              CT Abdomen Pelvis With IV Contrast NO Oral Contrast (Preliminary result)  Result time 01/01/24 23:13:21      Preliminary result by Gregor Pritchett MD (01/01/24 23:13:21)                   Narrative:    START OF REPORT:  Technique: CT of the abdomen and pelvis was performed with axial images as well as sagittal and coronal reconstruction images with intravenous contrast.    Comparison: None available.    Clinical History: Abdominalabscess/infectionsuspected.    Dosage Information: Automated Exposure Control was utilized 263.26 mGy.cm.    Findings:  Lines and Tubes: None.  Thorax:  Lungs: There are groundglass opacities seen in the medial segment of the right middle lobe. This is suggestive of non-specific pneumonitis. The rest of the visualised lung fields appear unremarkable.  Pleura: No effusions or thickening. No pneumothorax is seen.  Heart: The heart size is within normal limits.  Abdomen:  Abdominal Wall: No abdominal wall pathology is seen.  Liver: Mild fatty infiltration of the liver is present. The liver otherwise appears unremarkable.  Biliary System: No intrahepatic or extrahepatic biliary duct dilatation is seen.  Gallbladder: The gallbladder is contracted, this may suggest post-prandial status.  Pancreas: The pancreas appears unremarkable.  Spleen: The spleen appears unremarkable.  Adrenals: The adrenal glands appear unremarkable.  Kidneys: The kidneys appear unremarkable with no stones cysts masses or hydronephrosis.  Bowel:  Esophagus: The visualized esophagus appears unremarkable.  Stomach: The gastric antrum appears somewhat thick walled which could reflect an element of gastritis.  Duodenum: Unremarkable appearing duodenum.  Small Bowel: The small bowel appears unremarkable.  Colon: There are  multiple diverticula seen along the descending colon and the sigmoid colon. There is associated wall thickening with inflammatory changes, fat stranding seen along the distal descending colon and the sigmoid colon (series6 image 31). The findings are consistent with acute diverticulitis. No abscess or extraluminal free air/ intraperitoneal air is identified to suggest perforation.  Appendix: The appendix appears unremarkable seen on Image 87, Series 2.  Peritoneum: No intraperitoneal free air or ascites is seen.    Pelvis:  Bladder: The bladder appears unremarkable.  Female:  Uterus: The uterus appears unremarkable.  Ovaries: The ovaries appear unremarkable.    Bony structures:  Dorsal Spine: There is mild multilevel spondylosis of the visualized dorsal spine.  Bony Pelvis: The visualized bony structures of the pelvis appear unremarkable.      Impression:  1. There are multiple diverticula seen along the descending colon and the sigmoid colon. There is associated wall thickening with inflammatory changes, fat stranding seen along the distal descending colon and the sigmoid colon (series6 image 31). The findings are consistent with acute diverticulitis. No abscess or extraluminal free air/ intraperitoneal air is identified to suggest perforation. Correlate with clinical and laboratory findings as regards additional evaluation and follow-up to full resolution as indicated.  2. The gastric antrum appears somewhat thick walled which could reflect an element of gastritis. Correlate clinically.  3. Details and other findings as discussed above.                                         X-Ray Chest PA And Lateral (In process)                      Medications   albuterol-ipratropium 2.5 mg-0.5 mg/3 mL nebulizer solution 3 mL (3 mLs Nebulization Given 1/1/24 2154)   iohexoL (OMNIPAQUE 350) 350 mg iodine/mL injection (100 mLs Intravenous Given 1/1/24 2200)     Medical Decision Making  Amount and/or Complexity of Data  Reviewed  Labs: ordered. Decision-making details documented in ED Course.  Radiology: ordered and independent interpretation performed. Decision-making details documented in ED Course.    Risk  Prescription drug management.      Additional MDM:   Differential Diagnosis:   Pneumonia, Asthma exacerbation, COPD exacerbation, Pericardial Effusion, Hear Failure, Pulmonary Emboli, Pleural effusion, malignancy, among others                                      Clinical Impression:  Final diagnoses:  [R06.02] Shortness of breath  [J45.21] Mild intermittent asthma with exacerbation (Primary)  [K57.92] Diverticulitis          ED Disposition Condition    Discharge Stable          ED Prescriptions       Medication Sig Dispense Start Date End Date Auth. Provider    amoxicillin-clavulanate 875-125mg (AUGMENTIN) 875-125 mg per tablet Take 1 tablet by mouth 2 (two) times daily. 14 tablet 1/1/2024 -- Alfonso Arreola MD    diclofenac (VOLTAREN) 50 MG EC tablet Take 1 tablet (50 mg total) by mouth 2 (two) times daily as needed (Pain). 20 tablet 1/1/2024 -- Alfonso Arreola MD          Follow-up Information       Follow up With Specialties Details Why Contact Info    [facility] Dept Emergency Medicine  If symptoms worsen [address] [facility] [address] [phone]    Oswaldo Riggs, NP Family Medicine Schedule an appointment as soon as possible for a visit in 1 week  [address] [place] [address]  PRIMARY CARE [facility]  [place] [address]  [phone]               Alfonso Arreola MD  01/01/24 0237

## 2024-01-02 NOTE — PROGRESS NOTES
Notes indicate patient established with GI in [place] and [facility] GI clinic has appointment scheduled in [place] 1/2/24

## 2024-01-20 ENCOUNTER — HOSPITAL ENCOUNTER (EMERGENCY)
Facility: HOSPITAL | Age: 54
Discharge: HOME | End: 2024-01-21
Attending: STUDENT IN AN ORGANIZED HEALTH CARE EDUCATION/TRAINING PROGRAM
Payer: MEDICAID

## 2024-01-20 DIAGNOSIS — R06.02 SHORTNESS OF BREATH: ICD-10-CM

## 2024-01-20 DIAGNOSIS — J45.901 EXACERBATION OF ASTHMA, UNSPECIFIED ASTHMA SEVERITY, UNSPECIFIED WHETHER PERSISTENT: Primary | ICD-10-CM

## 2024-01-20 LAB
ALBUMIN SERPL-MCNC: 3.6 G/DL (ref 3.5–5)
ALBUMIN/GLOB SERPL: 0.9 RATIO (ref 1.1–2)
ALP SERPL-CCNC: 88 UNIT/L (ref 40–150)
ALT SERPL-CCNC: 14 UNIT/L (ref 0–55)
AST SERPL-CCNC: 14 UNIT/L (ref 5–34)
BASOPHILS # BLD AUTO: 0.01 X10(3)/MCL
BASOPHILS NFR BLD AUTO: 0.2 %
BILIRUB SERPL-MCNC: 0.2 MG/DL
BNP BLD-MCNC: 13.4 PG/ML
BUN SERPL-MCNC: 7.2 MG/DL (ref 9.8–20.1)
CALCIUM SERPL-MCNC: 9.8 MG/DL (ref 8.4–10.2)
CHLORIDE SERPL-SCNC: 108 MMOL/L (ref 98–107)
CO2 SERPL-SCNC: 24 MMOL/L (ref 22–29)
CREAT SERPL-MCNC: 0.79 MG/DL (ref 0.55–1.02)
EOSINOPHIL # BLD AUTO: 0 X10(3)/MCL (ref 0–0.9)
EOSINOPHIL NFR BLD AUTO: 0 %
ERYTHROCYTE [DISTWIDTH] IN BLOOD BY AUTOMATED COUNT: 13.5 % (ref 11.5–17)
GFR SERPLBLD CREATININE-BSD FMLA CKD-EPI: >60 MLS/MIN/1.73/M2
GLOBULIN SER-MCNC: 3.8 GM/DL (ref 2.4–3.5)
GLUCOSE SERPL-MCNC: 109 MG/DL (ref 74–100)
HCT VFR BLD AUTO: 39.9 % (ref 37–47)
HGB BLD-MCNC: 13.2 G/DL (ref 12–16)
IMM GRANULOCYTES # BLD AUTO: 0.01 X10(3)/MCL (ref 0–0.04)
IMM GRANULOCYTES NFR BLD AUTO: 0.2 %
LYMPHOCYTES # BLD AUTO: 1.41 X10(3)/MCL (ref 0.6–4.6)
LYMPHOCYTES NFR BLD AUTO: 22 %
MAGNESIUM SERPL-MCNC: 2 MG/DL (ref 1.6–2.6)
MCH RBC QN AUTO: 31.3 PG (ref 27–31)
MCHC RBC AUTO-ENTMCNC: 33.1 G/DL (ref 33–36)
MCV RBC AUTO: 94.5 FL (ref 80–94)
MONOCYTES # BLD AUTO: 0.53 X10(3)/MCL (ref 0.1–1.3)
MONOCYTES NFR BLD AUTO: 8.3 %
NEUTROPHILS # BLD AUTO: 4.45 X10(3)/MCL (ref 2.1–9.2)
NEUTROPHILS NFR BLD AUTO: 69.3 %
NRBC BLD AUTO-RTO: 0 %
PLATELET # BLD AUTO: 243 X10(3)/MCL (ref 130–400)
PMV BLD AUTO: 10.9 FL (ref 7.4–10.4)
POTASSIUM SERPL-SCNC: 4.1 MMOL/L (ref 3.5–5.1)
PROT SERPL-MCNC: 7.4 GM/DL (ref 6.4–8.3)
RBC # BLD AUTO: 4.22 X10(6)/MCL (ref 4.2–5.4)
SODIUM SERPL-SCNC: 141 MMOL/L (ref 136–145)
TROPONIN I SERPL-MCNC: <0.01 NG/ML (ref 0–0.04)
WBC # SPEC AUTO: 6.41 X10(3)/MCL (ref 4.5–11.5)

## 2024-01-20 PROCEDURE — 84484 ASSAY OF TROPONIN QUANT: CPT | Performed by: STUDENT IN AN ORGANIZED HEALTH CARE EDUCATION/TRAINING PROGRAM

## 2024-01-20 PROCEDURE — 85025 COMPLETE CBC W/AUTO DIFF WBC: CPT | Performed by: STUDENT IN AN ORGANIZED HEALTH CARE EDUCATION/TRAINING PROGRAM

## 2024-01-20 PROCEDURE — 0241U COVID/RSV/FLU A&B PCR: CPT | Performed by: STUDENT IN AN ORGANIZED HEALTH CARE EDUCATION/TRAINING PROGRAM

## 2024-01-20 PROCEDURE — 93005 ELECTROCARDIOGRAM TRACING: CPT

## 2024-01-20 PROCEDURE — 63600175 PHARM REV CODE 636 W HCPCS: Performed by: STUDENT IN AN ORGANIZED HEALTH CARE EDUCATION/TRAINING PROGRAM

## 2024-01-20 PROCEDURE — 94640 AIRWAY INHALATION TREATMENT: CPT

## 2024-01-20 PROCEDURE — 94761 N-INVAS EAR/PLS OXIMETRY MLT: CPT

## 2024-01-20 PROCEDURE — 83735 ASSAY OF MAGNESIUM: CPT | Performed by: STUDENT IN AN ORGANIZED HEALTH CARE EDUCATION/TRAINING PROGRAM

## 2024-01-20 PROCEDURE — 25000242 PHARM REV CODE 250 ALT 637 W/ HCPCS: Performed by: STUDENT IN AN ORGANIZED HEALTH CARE EDUCATION/TRAINING PROGRAM

## 2024-01-20 PROCEDURE — 99285 EMERGENCY DEPT VISIT HI MDM: CPT | Mod: 25

## 2024-01-20 PROCEDURE — 83880 ASSAY OF NATRIURETIC PEPTIDE: CPT | Performed by: STUDENT IN AN ORGANIZED HEALTH CARE EDUCATION/TRAINING PROGRAM

## 2024-01-20 PROCEDURE — 96374 THER/PROPH/DIAG INJ IV PUSH: CPT

## 2024-01-20 PROCEDURE — 80053 COMPREHEN METABOLIC PANEL: CPT | Performed by: STUDENT IN AN ORGANIZED HEALTH CARE EDUCATION/TRAINING PROGRAM

## 2024-01-20 RX ORDER — IPRATROPIUM BROMIDE AND ALBUTEROL SULFATE 2.5; .5 MG/3ML; MG/3ML
3 SOLUTION RESPIRATORY (INHALATION)
Status: COMPLETED | OUTPATIENT
Start: 2024-01-20 | End: 2024-01-20

## 2024-01-20 RX ORDER — METHYLPREDNISOLONE SOD SUCC 125 MG
125 VIAL (EA) INJECTION
Status: COMPLETED | OUTPATIENT
Start: 2024-01-20 | End: 2024-01-20

## 2024-01-20 RX ADMIN — IPRATROPIUM BROMIDE AND ALBUTEROL SULFATE 3 ML: .5; 3 SOLUTION RESPIRATORY (INHALATION) at 11:01

## 2024-01-20 RX ADMIN — METHYLPREDNISOLONE SODIUM SUCCINATE 125 MG: 125 INJECTION, POWDER, FOR SOLUTION INTRAMUSCULAR; INTRAVENOUS at 11:01

## 2024-01-20 NOTE — Clinical Note
"Niya Justicee" Sajan was seen and treated in our emergency department on 1/20/2024.  She may return to work on 01/24/2024.       If you have any questions or concerns, please don't hesitate to call.      CORBIN Barlow RN RN    "

## 2024-01-21 VITALS
HEIGHT: 60 IN | OXYGEN SATURATION: 97 % | RESPIRATION RATE: 20 BRPM | BODY MASS INDEX: 26.5 KG/M2 | SYSTOLIC BLOOD PRESSURE: 123 MMHG | DIASTOLIC BLOOD PRESSURE: 79 MMHG | TEMPERATURE: 98 F | WEIGHT: 135 LBS | HEART RATE: 88 BPM

## 2024-01-21 LAB
FLUAV AG UPPER RESP QL IA.RAPID: NOT DETECTED
FLUBV AG UPPER RESP QL IA.RAPID: NOT DETECTED
RSV A 5' UTR RNA NPH QL NAA+PROBE: NOT DETECTED
SARS-COV-2 RNA RESP QL NAA+PROBE: NOT DETECTED

## 2024-01-21 PROCEDURE — 94640 AIRWAY INHALATION TREATMENT: CPT

## 2024-01-21 PROCEDURE — 25000242 PHARM REV CODE 250 ALT 637 W/ HCPCS: Performed by: STUDENT IN AN ORGANIZED HEALTH CARE EDUCATION/TRAINING PROGRAM

## 2024-01-21 RX ORDER — ALBUTEROL SULFATE 1.25 MG/3ML
SOLUTION RESPIRATORY (INHALATION)
Status: ON HOLD | COMMUNITY
Start: 2024-01-05 | End: 2024-02-19 | Stop reason: HOSPADM

## 2024-01-21 RX ORDER — PREDNISONE 20 MG/1
40 TABLET ORAL DAILY
Qty: 10 TABLET | Refills: 0 | Status: SHIPPED | OUTPATIENT
Start: 2024-01-21 | End: 2024-01-26

## 2024-01-21 RX ORDER — ALBUTEROL SULFATE 0.83 MG/ML
2.5 SOLUTION RESPIRATORY (INHALATION)
Status: COMPLETED | OUTPATIENT
Start: 2024-01-21 | End: 2024-01-21

## 2024-01-21 RX ORDER — OMEPRAZOLE 20 MG/1
20 CAPSULE, DELAYED RELEASE ORAL
COMMUNITY
Start: 2024-01-15 | End: 2024-03-12 | Stop reason: SDUPTHER

## 2024-01-21 RX ADMIN — ALBUTEROL SULFATE 2.5 MG: 2.5 SOLUTION RESPIRATORY (INHALATION) at 12:01

## 2024-01-21 NOTE — ED PROVIDER NOTES
Encounter Date: 1/20/2024       History     Chief Complaint   Patient presents with    Shortness of Breath     States SOB and wheezing x 2 days.  BBS with wheeze in Triage.       Patient presents to the emergency department due to shortness of breath.  She states she has been wheezing and feeling short of breath for the last 2 days, denies any pain in his chest.  She states she had a cough yesterday but none today.  She reports a week ago she went to an urgent care where she was prescribed 10 mg daily for 5 days of steroids initially she got better but now she is worse again.  No fevers.    The history is provided by the patient.     Review of patient's allergies indicates:   Allergen Reactions    Latex Anaphylaxis and Edema    Zosyn [piperacillin-tazobactam] Anaphylaxis     Past Medical History:   Diagnosis Date    Asthma     Diverticulosis     GERD (gastroesophageal reflux disease)     Graves disease      History reviewed. No pertinent surgical history.  Family History   Problem Relation Age of Onset    Hypothyroidism Mother      Social History     Tobacco Use    Smoking status: Every Day     Current packs/day: 0.50     Average packs/day: 0.5 packs/day for 41.1 years (20.5 ttl pk-yrs)     Types: Cigarettes     Start date: 1/1/1983    Smokeless tobacco: Never    Tobacco comments:     Ambulatory referral to Smoking Cessation clinic following hospital discharge.    Substance Use Topics    Alcohol use: Not Currently    Drug use: Never     Review of Systems   Constitutional:  Negative for chills and fever.   HENT:  Negative for congestion and sore throat.    Respiratory:  Positive for shortness of breath and wheezing. Negative for cough.    Cardiovascular:  Negative for chest pain and palpitations.   Gastrointestinal:  Negative for abdominal pain and nausea.   Genitourinary:  Negative for dysuria and hematuria.   Musculoskeletal:  Negative for arthralgias and myalgias.   Neurological:  Negative for dizziness and  weakness.       Physical Exam     Initial Vitals [01/20/24 2236]   BP Pulse Resp Temp SpO2   104/69 92 20 -- 97 %      MAP       --         Physical Exam    Nursing note and vitals reviewed.  Constitutional: She appears well-developed and well-nourished.   HENT:   Head: Normocephalic and atraumatic.   Eyes: EOM are normal. Pupils are equal, round, and reactive to light.   Neck: Neck supple.   Normal range of motion.  Cardiovascular:  Normal rate, regular rhythm and normal heart sounds.           Pulmonary/Chest: No respiratory distress. She has wheezes.   Abdominal: Abdomen is soft. There is no abdominal tenderness.   Musculoskeletal:         General: No edema. Normal range of motion.      Cervical back: Normal range of motion and neck supple.     Neurological: She is alert and oriented to person, place, and time.   Skin: Skin is warm and dry.         ED Course   Procedures  Labs Reviewed   COMPREHENSIVE METABOLIC PANEL - Abnormal; Notable for the following components:       Result Value    Chloride 108 (*)     Glucose Level 109 (*)     Blood Urea Nitrogen 7.2 (*)     Globulin 3.8 (*)     Albumin/Globulin Ratio 0.9 (*)     All other components within normal limits   CBC WITH DIFFERENTIAL - Abnormal; Notable for the following components:    MCV 94.5 (*)     MCH 31.3 (*)     MPV 10.9 (*)     All other components within normal limits   B-TYPE NATRIURETIC PEPTIDE - Normal   COVID/RSV/FLU A&B PCR - Normal    Narrative:     The Xpert Xpress SARS-CoV-2/FLU/RSV plus is a rapid, multiplexed real-time PCR test intended for the simultaneous qualitative detection and differentiation of SARS-CoV-2, Influenza A, Influenza B, and respiratory syncytial virus (RSV) viral RNA in either nasopharyngeal swab or nasal swab specimens.         MAGNESIUM - Normal   TROPONIN I - Normal   CBC W/ AUTO DIFFERENTIAL    Narrative:     The following orders were created for panel order CBC auto differential.  Procedure                                Abnormality         Status                     ---------                               -----------         ------                     CBC with Differential[[id number]]       Abnormal            Final result                 Please view results for these tests on the individual orders.     EKG Readings: (Independently Interpreted)   Initial Reading: No STEMI. Rhythm: Normal Sinus Rhythm. Heart Rate: 79. Ectopy: No Ectopy. Conduction: Normal. Axis: Normal.       Imaging Results              X-Ray Chest AP Portable (Preliminary result)  Result time 01/20/24 23:23:14      Wet Read by Fabien Bonilla MD (01/20/24 23:23:14, [facility], Emergency Medicine)    No acute pulmonary process                                     Medications   albuterol nebulizer solution 2.5 mg (has no administration in time range)   albuterol-ipratropium 2.5 mg-0.5 mg/3 mL nebulizer solution 3 mL (3 mLs Nebulization Given 1/20/24 5886)   methylPREDNISolone sodium succinate injection 125 mg (125 mg Intravenous Given 1/20/24 7524)     Medical Decision Making  Vital signs are stable except for some mild tachypnea.  She would wheezing throughout, she was ordered 3 rounds of DuoNebs as well as Solu-Medrol.  EKGs without concerning changes.  Chest x-ray on my review is generally clear.  CBC, CMP troponin were unremarkable.  Viral swabs were negative.  On re-evaluation, patient states she is feeling much better, but she still has some mild wheezing on my exam, she was ordered another breathing treatment.  Afterwards she states she feels good to go home, will prescribe her with a burst of prednisone for 5 days, follow up with the primary care physician, return precautions were given.    Amount and/or Complexity of Data Reviewed  Labs: ordered. Decision-making details documented in ED Course.  Radiology: ordered and independent interpretation performed. Decision-making details documented in ED Course.  ECG/medicine tests: ordered  and independent interpretation performed. Decision-making details documented in ED Course.    Risk  Prescription drug management.                                      Clinical Impression:  Final diagnoses:  [R06.02] Shortness of breath  [J45.901] Exacerbation of asthma, unspecified asthma severity, unspecified whether persistent (Primary)          ED Disposition Condition    Discharge Stable          ED Prescriptions       Medication Sig Dispense Start Date End Date Auth. Provider    predniSONE (DELTASONE) 20 MG tablet Take 2 tablets (40 mg total) by mouth once daily. for 5 days 10 tablet 1/21/2024 1/26/2024 Fabien Bonilla MD          Follow-up Information       Follow up With Specialties Details Why Contact Info    [facility] Emergency Medicine Go to  If symptoms worsen [address] [facility] [address] [phone]    Oswaldo Riggs, NP Family Medicine Call  For ED follow up [address]  [place] [address]  [phone]               Fabien Bonilla MD  01/21/24 0035

## 2024-01-24 ENCOUNTER — HOSPITAL ENCOUNTER (OUTPATIENT)
Facility: HOSPITAL | Age: 54
Discharge: HOME | End: 2024-01-25
Attending: STUDENT IN AN ORGANIZED HEALTH CARE EDUCATION/TRAINING PROGRAM | Admitting: INTERNAL MEDICINE
Payer: MEDICAID

## 2024-01-24 DIAGNOSIS — R06.02 SHORTNESS OF BREATH: ICD-10-CM

## 2024-01-24 DIAGNOSIS — J10.1 INFLUENZA A: ICD-10-CM

## 2024-01-24 DIAGNOSIS — J96.01 ACUTE HYPOXIC RESPIRATORY FAILURE: ICD-10-CM

## 2024-01-24 DIAGNOSIS — Z12.11 ENCOUNTER FOR SCREENING COLONOSCOPY: ICD-10-CM

## 2024-01-24 DIAGNOSIS — K57.92 DIVERTICULITIS: ICD-10-CM

## 2024-01-24 DIAGNOSIS — Z86.010 HISTORY OF COLON POLYPS: ICD-10-CM

## 2024-01-24 DIAGNOSIS — J45.901 EXACERBATION OF ASTHMA, UNSPECIFIED ASTHMA SEVERITY, UNSPECIFIED WHETHER PERSISTENT: Primary | ICD-10-CM

## 2024-01-24 LAB
ALBUMIN SERPL-MCNC: 3.5 G/DL (ref 3.5–5)
ALBUMIN/GLOB SERPL: 1 RATIO (ref 1.1–2)
ALLENS TEST BLOOD GAS (OHS): ABNORMAL
ALP SERPL-CCNC: 71 UNIT/L (ref 40–150)
ALT SERPL-CCNC: 12 UNIT/L (ref 0–55)
AST SERPL-CCNC: 12 UNIT/L (ref 5–34)
BASE EXCESS BLD CALC-SCNC: 1.1 MMOL/L
BASOPHILS # BLD AUTO: 0.01 X10(3)/MCL
BASOPHILS NFR BLD AUTO: 0.2 %
BILIRUB SERPL-MCNC: 0.2 MG/DL
BIPAP(E) BLOOD GAS (OHS): 5 CM H2O
BIPAP(I) BLOOD GAS (OHS): 10 CM H2O
BLOOD GAS SAMPLE TYPE (OHS): ABNORMAL
BNP BLD-MCNC: 39.4 PG/ML
BUN SERPL-MCNC: 9.8 MG/DL (ref 9.8–20.1)
CALCIUM SERPL-MCNC: 9.1 MG/DL (ref 8.4–10.2)
CHLORIDE SERPL-SCNC: 109 MMOL/L (ref 98–107)
CO2 BLDA-SCNC: 26.2 MMOL/L
CO2 SERPL-SCNC: 25 MMOL/L (ref 22–29)
COHGB MFR BLDA: 4.4 %
CREAT SERPL-MCNC: 1.03 MG/DL (ref 0.55–1.02)
D DIMER PPP IA.FEU-MCNC: 0.81 UG/ML FEU (ref 0–0.5)
DRAWN BY BLOOD GAS (OHS): ABNORMAL
EOSINOPHIL # BLD AUTO: 0 X10(3)/MCL (ref 0–0.9)
EOSINOPHIL NFR BLD AUTO: 0 %
ERYTHROCYTE [DISTWIDTH] IN BLOOD BY AUTOMATED COUNT: 13.9 % (ref 11.5–17)
FLUAV AG UPPER RESP QL IA.RAPID: DETECTED
FLUBV AG UPPER RESP QL IA.RAPID: NOT DETECTED
GFR SERPLBLD CREATININE-BSD FMLA CKD-EPI: >60 MLS/MIN/1.73/M2
GLOBULIN SER-MCNC: 3.4 GM/DL (ref 2.4–3.5)
GLUCOSE SERPL-MCNC: 85 MG/DL (ref 74–100)
HCO3 BLDA-SCNC: 25.1 MMOL/L
HCT VFR BLD AUTO: 39.3 % (ref 37–47)
HGB BLD-MCNC: 12.9 G/DL (ref 12–16)
IMM GRANULOCYTES # BLD AUTO: 0.02 X10(3)/MCL (ref 0–0.04)
IMM GRANULOCYTES NFR BLD AUTO: 0.3 %
INHALED O2 CONCENTRATION: 30 %
LYMPHOCYTES # BLD AUTO: 1.45 X10(3)/MCL (ref 0.6–4.6)
LYMPHOCYTES NFR BLD AUTO: 24.5 %
MAGNESIUM SERPL-MCNC: 2 MG/DL (ref 1.6–2.6)
MCH RBC QN AUTO: 31.4 PG (ref 27–31)
MCHC RBC AUTO-ENTMCNC: 32.8 G/DL (ref 33–36)
MCV RBC AUTO: 95.6 FL (ref 80–94)
METHGB MFR BLDA: 1 %
MONOCYTES # BLD AUTO: 0.98 X10(3)/MCL (ref 0.1–1.3)
MONOCYTES NFR BLD AUTO: 16.6 %
NEUTROPHILS # BLD AUTO: 3.46 X10(3)/MCL (ref 2.1–9.2)
NEUTROPHILS NFR BLD AUTO: 58.4 %
NRBC BLD AUTO-RTO: 0 %
O2 HB BLOOD GAS (OHS): 94.8 %
OXYHGB MFR BLDA: 13 G/DL
PCO2 BLDA: 37 MMHG (ref 40–50)
PH BLDA: 7.44 [PH] (ref 7.3–7.4)
PLATELET # BLD AUTO: 208 X10(3)/MCL (ref 130–400)
PMV BLD AUTO: 10.6 FL (ref 7.4–10.4)
PO2 BLDA: 211 MMHG (ref 30–40)
POTASSIUM SERPL-SCNC: 3.4 MMOL/L (ref 3.5–5.1)
PROT SERPL-MCNC: 6.9 GM/DL (ref 6.4–8.3)
RBC # BLD AUTO: 4.11 X10(6)/MCL (ref 4.2–5.4)
SAO2 % BLDA: 100.2 %
SARS-COV-2 RNA RESP QL NAA+PROBE: NOT DETECTED
SODIUM SERPL-SCNC: 143 MMOL/L (ref 136–145)
TROPONIN I SERPL-MCNC: <0.01 NG/ML (ref 0–0.04)
WBC # SPEC AUTO: 5.92 X10(3)/MCL (ref 4.5–11.5)

## 2024-01-24 PROCEDURE — 93005 ELECTROCARDIOGRAM TRACING: CPT

## 2024-01-24 PROCEDURE — G0378 HOSPITAL OBSERVATION PER HR: HCPCS

## 2024-01-24 PROCEDURE — 25500020 PHARM REV CODE 255: Performed by: STUDENT IN AN ORGANIZED HEALTH CARE EDUCATION/TRAINING PROGRAM

## 2024-01-24 PROCEDURE — 85379 FIBRIN DEGRADATION QUANT: CPT | Performed by: STUDENT IN AN ORGANIZED HEALTH CARE EDUCATION/TRAINING PROGRAM

## 2024-01-24 PROCEDURE — 83880 ASSAY OF NATRIURETIC PEPTIDE: CPT | Performed by: STUDENT IN AN ORGANIZED HEALTH CARE EDUCATION/TRAINING PROGRAM

## 2024-01-24 PROCEDURE — 0240U COVID/FLU A&B PCR: CPT | Performed by: STUDENT IN AN ORGANIZED HEALTH CARE EDUCATION/TRAINING PROGRAM

## 2024-01-24 PROCEDURE — 99291 CRITICAL CARE FIRST HOUR: CPT

## 2024-01-24 PROCEDURE — 63600175 PHARM REV CODE 636 W HCPCS: Performed by: STUDENT IN AN ORGANIZED HEALTH CARE EDUCATION/TRAINING PROGRAM

## 2024-01-24 PROCEDURE — 96374 THER/PROPH/DIAG INJ IV PUSH: CPT | Mod: 59

## 2024-01-24 PROCEDURE — 25000003 PHARM REV CODE 250

## 2024-01-24 PROCEDURE — 27000190 HC CPAP FULL FACE MASK W/VALVE

## 2024-01-24 PROCEDURE — 94761 N-INVAS EAR/PLS OXIMETRY MLT: CPT

## 2024-01-24 PROCEDURE — 96365 THER/PROPH/DIAG IV INF INIT: CPT | Mod: 59

## 2024-01-24 PROCEDURE — 96375 TX/PRO/DX INJ NEW DRUG ADDON: CPT

## 2024-01-24 PROCEDURE — 25000003 PHARM REV CODE 250: Performed by: STUDENT IN AN ORGANIZED HEALTH CARE EDUCATION/TRAINING PROGRAM

## 2024-01-24 PROCEDURE — 94640 AIRWAY INHALATION TREATMENT: CPT | Mod: XB

## 2024-01-24 PROCEDURE — 80053 COMPREHEN METABOLIC PANEL: CPT | Performed by: STUDENT IN AN ORGANIZED HEALTH CARE EDUCATION/TRAINING PROGRAM

## 2024-01-24 PROCEDURE — 85025 COMPLETE CBC W/AUTO DIFF WBC: CPT | Performed by: STUDENT IN AN ORGANIZED HEALTH CARE EDUCATION/TRAINING PROGRAM

## 2024-01-24 PROCEDURE — 83735 ASSAY OF MAGNESIUM: CPT | Performed by: STUDENT IN AN ORGANIZED HEALTH CARE EDUCATION/TRAINING PROGRAM

## 2024-01-24 PROCEDURE — 94660 CPAP INITIATION&MGMT: CPT

## 2024-01-24 PROCEDURE — 25000242 PHARM REV CODE 250 ALT 637 W/ HCPCS: Performed by: STUDENT IN AN ORGANIZED HEALTH CARE EDUCATION/TRAINING PROGRAM

## 2024-01-24 PROCEDURE — 84484 ASSAY OF TROPONIN QUANT: CPT | Performed by: STUDENT IN AN ORGANIZED HEALTH CARE EDUCATION/TRAINING PROGRAM

## 2024-01-24 PROCEDURE — 96376 TX/PRO/DX INJ SAME DRUG ADON: CPT

## 2024-01-24 PROCEDURE — 27000221 HC OXYGEN, UP TO 24 HOURS

## 2024-01-24 PROCEDURE — 94640 AIRWAY INHALATION TREATMENT: CPT

## 2024-01-24 PROCEDURE — 99900035 HC TECH TIME PER 15 MIN (STAT)

## 2024-01-24 PROCEDURE — 96361 HYDRATE IV INFUSION ADD-ON: CPT

## 2024-01-24 RX ORDER — OSELTAMIVIR PHOSPHATE 75 MG/1
75 CAPSULE ORAL
Status: COMPLETED | OUTPATIENT
Start: 2024-01-24 | End: 2024-01-24

## 2024-01-24 RX ORDER — SUCRALFATE 1 G/1
1 TABLET ORAL
COMMUNITY
Start: 2024-01-23 | End: 2024-02-22

## 2024-01-24 RX ORDER — SODIUM CHLORIDE 0.9 % (FLUSH) 0.9 %
10 SYRINGE (ML) INJECTION
Status: DISCONTINUED | OUTPATIENT
Start: 2024-01-24 | End: 2024-01-25 | Stop reason: HOSPADM

## 2024-01-24 RX ORDER — IPRATROPIUM BROMIDE AND ALBUTEROL SULFATE 2.5; .5 MG/3ML; MG/3ML
3 SOLUTION RESPIRATORY (INHALATION)
Status: DISCONTINUED | OUTPATIENT
Start: 2024-01-24 | End: 2024-01-24

## 2024-01-24 RX ORDER — OSELTAMIVIR PHOSPHATE 75 MG/1
75 CAPSULE ORAL 2 TIMES DAILY
Status: DISCONTINUED | OUTPATIENT
Start: 2024-01-24 | End: 2024-01-25 | Stop reason: HOSPADM

## 2024-01-24 RX ORDER — GUAIFENESIN 600 MG/1
600 TABLET, EXTENDED RELEASE ORAL 2 TIMES DAILY
Status: DISCONTINUED | OUTPATIENT
Start: 2024-01-24 | End: 2024-01-25 | Stop reason: HOSPADM

## 2024-01-24 RX ORDER — IPRATROPIUM BROMIDE AND ALBUTEROL SULFATE 2.5; .5 MG/3ML; MG/3ML
10 SOLUTION RESPIRATORY (INHALATION)
Status: COMPLETED | OUTPATIENT
Start: 2024-01-24 | End: 2024-01-24

## 2024-01-24 RX ORDER — METHYLPREDNISOLONE SOD SUCC 125 MG
125 VIAL (EA) INJECTION
Status: COMPLETED | OUTPATIENT
Start: 2024-01-24 | End: 2024-01-24

## 2024-01-24 RX ORDER — MAGNESIUM SULFATE HEPTAHYDRATE 40 MG/ML
2 INJECTION, SOLUTION INTRAVENOUS
Status: COMPLETED | OUTPATIENT
Start: 2024-01-24 | End: 2024-01-24

## 2024-01-24 RX ORDER — IPRATROPIUM BROMIDE AND ALBUTEROL SULFATE 2.5; .5 MG/3ML; MG/3ML
3 SOLUTION RESPIRATORY (INHALATION) EVERY 6 HOURS
Status: DISCONTINUED | OUTPATIENT
Start: 2024-01-24 | End: 2024-01-24

## 2024-01-24 RX ORDER — OSELTAMIVIR PHOSPHATE 75 MG/1
75 CAPSULE ORAL 2 TIMES DAILY
Status: DISCONTINUED | OUTPATIENT
Start: 2024-01-24 | End: 2024-01-24

## 2024-01-24 RX ORDER — TALC
6 POWDER (GRAM) TOPICAL NIGHTLY PRN
Status: DISCONTINUED | OUTPATIENT
Start: 2024-01-24 | End: 2024-01-25 | Stop reason: HOSPADM

## 2024-01-24 RX ORDER — PANTOPRAZOLE SODIUM 40 MG/1
40 TABLET, DELAYED RELEASE ORAL DAILY
Status: DISCONTINUED | OUTPATIENT
Start: 2024-01-24 | End: 2024-01-25 | Stop reason: HOSPADM

## 2024-01-24 RX ORDER — IPRATROPIUM BROMIDE AND ALBUTEROL SULFATE 2.5; .5 MG/3ML; MG/3ML
3 SOLUTION RESPIRATORY (INHALATION) EVERY 4 HOURS
Status: DISCONTINUED | OUTPATIENT
Start: 2024-01-24 | End: 2024-01-25 | Stop reason: HOSPADM

## 2024-01-24 RX ADMIN — OSELTAMAVIR PHOSPHATE 75 MG: 75 CAPSULE ORAL at 05:01

## 2024-01-24 RX ADMIN — IPRATROPIUM BROMIDE AND ALBUTEROL SULFATE 10 ML: .5; 3 SOLUTION RESPIRATORY (INHALATION) at 04:01

## 2024-01-24 RX ADMIN — APIXABAN 5 MG: 2.5 TABLET, FILM COATED ORAL at 11:01

## 2024-01-24 RX ADMIN — OSELTAMIVIR PHOSPHATE 75 MG: 75 CAPSULE ORAL at 06:01

## 2024-01-24 RX ADMIN — IPRATROPIUM BROMIDE AND ALBUTEROL SULFATE 3 ML: 2.5; .5 SOLUTION RESPIRATORY (INHALATION) at 07:01

## 2024-01-24 RX ADMIN — METHYLPREDNISOLONE SODIUM SUCCINATE 40 MG: 40 INJECTION, POWDER, FOR SOLUTION INTRAMUSCULAR; INTRAVENOUS at 02:01

## 2024-01-24 RX ADMIN — IOHEXOL 100 ML: 350 INJECTION, SOLUTION INTRAVENOUS at 09:01

## 2024-01-24 RX ADMIN — IPRATROPIUM BROMIDE AND ALBUTEROL SULFATE 3 ML: 2.5; .5 SOLUTION RESPIRATORY (INHALATION) at 01:01

## 2024-01-24 RX ADMIN — MAGNESIUM SULFATE HEPTAHYDRATE 2 G: 40 INJECTION, SOLUTION INTRAVENOUS at 04:01

## 2024-01-24 RX ADMIN — SODIUM CHLORIDE 1000 ML: 9 INJECTION, SOLUTION INTRAVENOUS at 04:01

## 2024-01-24 RX ADMIN — APIXABAN 5 MG: 2.5 TABLET, FILM COATED ORAL at 09:01

## 2024-01-24 RX ADMIN — PANTOPRAZOLE SODIUM 40 MG: 40 TABLET, DELAYED RELEASE ORAL at 11:01

## 2024-01-24 RX ADMIN — IPRATROPIUM BROMIDE AND ALBUTEROL SULFATE 3 ML: 2.5; .5 SOLUTION RESPIRATORY (INHALATION) at 03:01

## 2024-01-24 RX ADMIN — METHYLPREDNISOLONE SODIUM SUCCINATE 125 MG: 125 INJECTION, POWDER, FOR SOLUTION INTRAMUSCULAR; INTRAVENOUS at 04:01

## 2024-01-24 RX ADMIN — GUAIFENESIN 600 MG: 600 TABLET, EXTENDED RELEASE ORAL at 09:01

## 2024-01-24 RX ADMIN — METHIMAZOLE 2.5 MG: 5 TABLET ORAL at 11:01

## 2024-01-24 RX ADMIN — IPRATROPIUM BROMIDE AND ALBUTEROL SULFATE 3 ML: 2.5; .5 SOLUTION RESPIRATORY (INHALATION) at 11:01

## 2024-01-24 RX ADMIN — METHYLPREDNISOLONE SODIUM SUCCINATE 40 MG: 40 INJECTION, POWDER, FOR SOLUTION INTRAMUSCULAR; INTRAVENOUS at 09:01

## 2024-01-24 NOTE — H&P
[facility]  h+p  Patient Name: Niya Moeller  MRN: [id number]  Admission Date: 1/24/2024  Hospital Length of Stay: 0 days  Code Status: Full Code  Attending Provider: Anabell Larkin MD  Primary Care Provider: Oswaldo Riggs NP     Subjective:     HPI:   Patient is 53-year-old female with past medical history of unspecified asthma, recurrent diverticulitis, GERD, and hyperthyroidism who presented to [facility] Emergency Department for shortness of breath.  Patient originally presented to ED 2 days ago for shortness of breath and was diagnosed with asthma exacerbation, was discharged home with steroids.  She states she was feeling better for a short period of time but then last night began to have more breathing difficulty.  She has had more wheezing present, more sputum production, and hypoxia as well.  Patient noted to have oxygen saturation of 82% for EMS, was given DuoNeb and came to ER.    On arrival, patient placed on BiPAP secondary to respiratory distress.  She is given DuoNebs, Solu-Medrol, magnesium with some improvement in work of breathing.  CMP mostly unremarkable, troponin and EKG with no signs of ischemia.  Patient was found to be flu A positive.  Internal medicine consulted for asthma exacerbation and influenza    Past Medical History:   Diagnosis Date    Asthma     Diverticulosis     GERD (gastroesophageal reflux disease)     Graves disease      History reviewed. No pertinent surgical history.    Social History     Socioeconomic History    Marital status: Single   Tobacco Use    Smoking status: Every Day     Current packs/day: 0.50     Average packs/day: 0.5 packs/day for 41.1 years (20.5 ttl pk-yrs)     Types: Cigarettes     Start date: 1/1/1983    Smokeless tobacco: Never    Tobacco comments:     Ambulatory referral to Smoking Cessation clinic following hospital discharge.    Substance and Sexual Activity    Alcohol use: Not Currently    Drug use: Never    Sexual activity: Not Currently     Birth  control/protection: Abstinence     Comment: States is asexual     Social Determinants of Health     Financial Resource Strain: Low Risk  (2023)    Overall Financial Resource Strain (CARDIA)     Difficulty of Paying Living Expenses: Not very hard   Food Insecurity: No Food Insecurity (2023)    Hunger Vital Sign     Worried About Running Out of Food in the Last Year: Never true     Ran Out of Food in the Last Year: Never true   Transportation Needs: No Transportation Needs (2023)    PRAPARE - Transportation     Lack of Transportation (Medical): No     Lack of Transportation (Non-Medical): No   Physical Activity: Inactive (2023)    Exercise Vital Sign     Days of Exercise per Week: 0 days     Minutes of Exercise per Session: 0 min   Stress: No Stress Concern Present (2023)    [place] [place] [employer] - Occupational Stress Questionnaire     Feeling of Stress : Not at all   Social Connections: Socially Isolated (2023)    Social Connection and Isolation Panel [NHANES]     Frequency of Communication with Friends and Family: More than three times a week     Frequency of Social Gatherings with Friends and Family: Twice a week     Attends [employer] Services: Never     Active Member of Clubs or Organizations: No     Attends Club or Organization Meetings: Never     Marital Status: Never    Housing Stability: Low Risk  (2023)    Housing Stability Vital Sign     Unable to Pay for Housing in the Last Year: No     Number of Places Lived in the Last Year: 2     Unstable Housing in the Last Year: No     Current Outpatient Medications   Medication Instructions    ADVAIR DISKUS 250-50 mcg/dose diskus inhaler SMARTSI Puff(s) By Mouth    albuterol (ACCUNEB) 1.25 mg/3 mL Nebu Nebulization, Every 4-6 hours PRN    albuterol (PROAIR HFA) 90 mcg/actuation inhaler 2 puffs, Inhalation, Every 6 hours PRN, Rescue    albuterol-ipratropium (DUO-NEB) 2.5 mg-0.5 mg/3 mL nebulizer solution 3 mLs,  Nebulization, Every 4 hours PRN, Rescue    amoxicillin-clavulanate 875-125mg (AUGMENTIN) 875-125 mg per tablet 1 tablet, Oral, 2 times daily    diclofenac (VOLTAREN) 50 mg, Oral, 2 times daily PRN    doxycycline (VIBRA-TABS) 100 mg, Oral, 2 times daily    ELIQUIS 5 mg, Oral, 2 times daily    fluticasone propionate (FLONASE) 50 mcg, Each Nostril, Daily PRN    methIMAzole (TAPAZOLE) 5 MG Tab Take a half of a tablet daily to Total 2.5 mg    methylPREDNISolone (MEDROL DOSEPACK) 4 mg tablet Take per package directions    omeprazole (PRILOSEC) 20 mg, Oral    predniSONE (DELTASONE) 40 mg, Oral    predniSONE (DELTASONE) 40 mg, Oral, Daily    sod sulf-pot chloride-mag sulf (SUTAB) 1.479-0.188- 0.225 gram tablet 12 tablets, Oral, Daily, Take according to package instructions with indicated amount of water.    sucralfate (CARAFATE) 1 gram tablet 1 tablet, Oral, Before meals & nightly   Current Inpatient Medications   albuterol-ipratropium  3 mL Nebulization Q4H    apixaban  5 mg Oral BID    methIMAzole  2.5 mg Oral Daily    methylPREDNISolone sodium succinate injection  40 mg Intravenous Q8H    oseltamivir  75 mg Oral BID    pantoprazole  40 mg Oral Daily     Review of Systems   Constitutional:  Positive for malaise/fatigue. Negative for chills and fever.   Respiratory:  Positive for cough, sputum production, shortness of breath and wheezing.    Cardiovascular:  Negative for chest pain and leg swelling.   Gastrointestinal:  Negative for abdominal pain, nausea and vomiting.   Genitourinary:  Negative for dysuria.   Musculoskeletal:  Negative for myalgias.   Neurological:  Negative for weakness and headaches.        Objective:       Intake/Output Summary (Last 24 hours) at 1/24/2024 1122  Last data filed at 1/24/2024 0556  Gross per 24 hour   Intake 1046.84 ml   Output --   Net 1046.84 ml     Vital Signs (Most Recent):  Temp: 98.4 °F (36.9 °C) (01/24/24 0444)  Pulse: 87 (01/24/24 1110)  Resp: (!) 25 (01/24/24 1110)  BP: 119/72  (01/24/24 1110)  SpO2: 99 % (01/24/24 1110)  Body mass index is 28.28 kg/m².  Weight: 63.5 kg (140 lb) Vital Signs (24h Range):  Temp:  [98.4 °F (36.9 °C)] 98.4 °F (36.9 °C)  Pulse:  [] 87  Resp:  [22-34] 25  SpO2:  [92 %-99 %] 99 %  BP: ()/() 119/72     Physical Exam  Constitutional:       Appearance: Normal appearance.   HENT:      Head: Normocephalic and atraumatic.   Cardiovascular:      Rate and Rhythm: Regular rhythm. Tachycardia present.      Pulses: Normal pulses.      Heart sounds: No murmur heard.  Pulmonary:      Effort: Respiratory distress present.      Breath sounds: Wheezing present.      Comments: OxyMask in place  Abdominal:      General: There is no distension.      Palpations: Abdomen is soft.      Tenderness: There is no abdominal tenderness.   Skin:     General: Skin is warm.   Neurological:      General: No focal deficit present.      Mental Status: She is alert and oriented to person, place, and time.       Lines/Drains/Airways       Peripheral Intravenous Line  Duration                  Peripheral IV - Single Lumen 01/24/24 0730 20 G Anterior;Right Upper Arm <1 day         Peripheral IV - Single Lumen 01/24/24 20 G Posterior;Right Hand <1 day                Significant Labs:  Lab Results   Component Value Date    WBC 5.92 01/24/2024    HGB 12.9 01/24/2024    HCT 39.3 01/24/2024    MCV 95.6 (H) 01/24/2024     01/24/2024   BMP  Lab Results   Component Value Date     01/24/2024    K 3.4 (L) 01/24/2024     02/19/2023    CO2 25 01/24/2024    BUN 9.8 01/24/2024    CREATININE 1.03 (H) 01/24/2024    CALCIUM 9.1 01/24/2024    ANIONGAP 14 02/19/2023    ESTGFRAFRICA 83 (L) 10/14/2022    EGFRNONAA 81 04/17/2022   ABG  Recent Labs   Lab 01/24/24  0522   PH 7.440*   PO2 211.0*   PCO2 37.0*   HCO3 25.1   Mechanical Ventilation Support:  Oxygen Concentration (%): 35 (01/24/24 0905)  Significant Imaging:  I have reviewed the pertinent imaging within the past 24  hours.    Assessment/Plan:     Asthma exacerbation   Influenza A  -continues with significant wheezing on exam.  Will schedule DuoNebs 3 mL q.4h  -scheduling methylprednisolone 40 mg q.8h  -Tamiflu 75 mg b.i.d. x5 days  -continue oxygen supplementation to maintain saturation of greater than 92%.  -ABG shows good oxygenation, no hypercarbia  -has been on BiPAP, can downgrade to nasal cannula if patient tolerates  -can switch back to home inhaler regimen when wheezing improved and more stable    History of pulmonary embolism  -originally diagnosed in September of last year, appeared to occur after removal of PICC line  -patient has remained on apixaban 5 mg b.i.d. since, will continue while inpatient    History of diverticulitis with abscess  Concern for underlying malignancy  -was admitted to the hospital in August, required long-term IV antibiotics at that time   -has intermittently been on antibiotics for bouts of diverticulitis   -because no resolution of inflammation and colon with repeat antibiotics, concern for malignancy versus other inflammatory process.  Was scheduled for colonoscopy on 11/06/2023, will try and obtain records of what it showed  -no abdominal symptoms today, will continue to monitor    Hypokalemia   -potassium 3.4 on admit, will replete with oral potassium as needed  -repeat with CMP in morning    GERD   -continue Protonix 40 mg daily    Graves disease   -TSH and free T4 within normal limits at last check   -continue MMI 2.5 mg daily     [facility]

## 2024-01-24 NOTE — ED PROVIDER NOTES
Encounter Date: 1/24/2024       History     Chief Complaint   Patient presents with    Shortness of Breath    Respiratory Distress     States SOB with cough last few days.  Woke startled from sleep with great difficulty breathing.  Sats 86% RA.  92-93% 2l.       Patient presents to the emergency department due to shortness of breath.  She was was seen in the ER 2 days ago for an asthma exacerbation, discharge with steroids.  She states she was doing better but then started to cough up greenish sputum yesterday and then woke up out of sleep tonight severely short of breath.  She was hypoxic for EMS around 82%.  Placed on nasal cannula, given a DuoNeb and brought to the ER.  Here she is she was tachypneic and wheezing and appears uncomfortable    The history is provided by the patient. The history is limited by the condition of the patient.     Review of patient's allergies indicates:   Allergen Reactions    Latex Anaphylaxis and Edema    Zosyn [piperacillin-tazobactam] Anaphylaxis     Past Medical History:   Diagnosis Date    Asthma     Diverticulosis     GERD (gastroesophageal reflux disease)     Graves disease      History reviewed. No pertinent surgical history.  Family History   Problem Relation Age of Onset    Hypothyroidism Mother      Social History     Tobacco Use    Smoking status: Every Day     Current packs/day: 0.50     Average packs/day: 0.5 packs/day for 41.1 years (20.5 ttl pk-yrs)     Types: Cigarettes     Start date: 1/1/1983    Smokeless tobacco: Never    Tobacco comments:     Ambulatory referral to Smoking Cessation clinic following hospital discharge.    Substance Use Topics    Alcohol use: Not Currently    Drug use: Never     Review of Systems   Unable to perform ROS: Acuity of condition       Physical Exam     Initial Vitals [01/24/24 0444]   BP Pulse Resp Temp SpO2   101/82 (!) 112 (!) 26 98.4 °F (36.9 °C) (!) 92 %      MAP       --         Physical Exam    Nursing note and vitals  reviewed.  Constitutional: She appears well-developed and well-nourished.   HENT:   Head: Normocephalic and atraumatic.   Eyes: EOM are normal. Pupils are equal, round, and reactive to light.   Neck: Neck supple.   Normal range of motion.  Cardiovascular:  Regular rhythm and normal heart sounds.           Tachycardic   Pulmonary/Chest: She is in respiratory distress. She has wheezes.   Abdominal: Abdomen is soft. There is no abdominal tenderness.   Musculoskeletal:         General: No edema. Normal range of motion.      Cervical back: Normal range of motion and neck supple.     Neurological: She is alert and oriented to person, place, and time.   Skin: Skin is warm and dry.         ED Course   Critical Care    Date/Time: 1/24/2024 6:18 AM    Performed by: Fabien Bonilla MD  Authorized by: Fabien Bonilla MD  Direct patient critical care time: 14 minutes  Additional history critical care time: 5 minutes  Ordering / reviewing critical care time: 5 minutes  Documentation critical care time: 4 minutes  Consulting other physicians critical care time: 5 minutes  Consult with family critical care time: 0 minutes  Other critical care time: 0 minutes  Total critical care time (exclusive of procedural time) : 33 minutes  Critical care time was exclusive of separately billable procedures and treating other patients and teaching time.  Critical care was necessary to treat or prevent imminent or life-threatening deterioration of the following conditions: respiratory failure.  Critical care was time spent personally by me on the following activities: blood draw for specimens, development of treatment plan with patient or surrogate, discussions with consultants, interpretation of cardiac output measurements, evaluation of patient's response to treatment, obtaining history from patient or surrogate, examination of patient, ordering and performing treatments and interventions, ordering and review of laboratory studies, ordering and  review of radiographic studies, pulse oximetry, re-evaluation of patient's condition and review of old charts.        Labs Reviewed   COMPREHENSIVE METABOLIC PANEL - Abnormal; Notable for the following components:       Result Value    Potassium Level 3.4 (*)     Chloride 109 (*)     Creatinine 1.03 (*)     Albumin/Globulin Ratio 1.0 (*)     All other components within normal limits   COVID/FLU A&B PCR - Abnormal; Notable for the following components:    Influenza A PCR Detected (*)     All other components within normal limits    Narrative:     The XpLamppost Xpress SARS-CoV-2/FLU/RSV plus is a rapid, multiplexed real-time PCR test intended for the simultaneous qualitative detection and differentiation of SARS-CoV-2, Influenza A, Influenza B, and respiratory syncytial virus (RSV) viral RNA in either nasopharyngeal swab or nasal swab specimens.         CBC WITH DIFFERENTIAL - Abnormal; Notable for the following components:    RBC 4.11 (*)     MCV 95.6 (*)     MCH 31.4 (*)     MCHC 32.8 (*)     MPV 10.6 (*)     All other components within normal limits   BLOOD GAS - Abnormal; Notable for the following components:    pH, Blood gas 7.440 (*)     pCO2, Blood gas 37.0 (*)     pO2, Blood gas 211.0 (*)     All other components within normal limits   D DIMER, QUANTITATIVE - Abnormal; Notable for the following components:    D-Dimer 0.81 (*)     All other components within normal limits   B-TYPE NATRIURETIC PEPTIDE - Normal   TROPONIN I - Normal   MAGNESIUM - Normal   CBC W/ AUTO DIFFERENTIAL    Narrative:     The following orders were created for panel order CBC auto differential.  Procedure                               Abnormality         Status                     ---------                               -----------         ------                     CBC with Differential[[id number]]       Abnormal            Final result                 Please view results for these tests on the individual orders.     EKG Readings:  (Independently Interpreted)   Initial Reading: No STEMI. Rhythm: Sinus Tachycardia. Heart Rate: 119. Ectopy: No Ectopy. Conduction: Normal. Axis: Normal.       Imaging Results              X-Ray Chest AP Portable (Final result)  Result time 01/24/24 06:01:11      Final result by Juan Leavitt MD (01/24/24 06:01:11)                   Impression:      No acute cardiopulmonary process.      Electronically signed by: Juan Leavitt  Date:    01/24/2024  Time:    06:01               Narrative:    EXAMINATION:  XR CHEST AP PORTABLE    CLINICAL HISTORY:  Shortness of breath;    TECHNIQUE:  Single view of the chest    COMPARISON:  01/20/2024    FINDINGS:  No focal opacification, pleural effusion, or pneumothorax.    The cardiomediastinal silhouette is within normal limits.    No acute osseous abnormality.                        Wet Read by Fabien Bonilla MD (01/24/24 05:11:34, [facility], Emergency Medicine)    No acute pulmonary process                                     Medications   albuterol-ipratropium 2.5 mg-0.5 mg/3 mL nebulizer solution 10 mL (10 mLs Nebulization Given by Other 1/24/24 0457)   magnesium sulfate 2g in water 50mL IVPB (premix) (0 g Intravenous Stopped 1/24/24 0511)   methylPREDNISolone sodium succinate injection 125 mg (125 mg Intravenous Given 1/24/24 0454)   sodium chloride 0.9% bolus 1,000 mL 1,000 mL ( Intravenous Stopped 1/24/24 0555)   oseltamivir capsule 75 mg (75 mg Oral Given 1/24/24 0614)     Medical Decision Making  On arrival patient was in respiratory distress, placed on BiPAP with improvement.  She was given inhaled bronchodilators, Solu-Medrol, magnesium with further improvement in her work of breathing.  White count normal, CMP unremarkable.  Troponin within normal limits, EKG without acute ischemic changes.  Flu A positive, initiated on Tamiflu.  Elevated D-dimer, CT ordered pending, admitted to the medicine service    Amount and/or Complexity of Data  Reviewed  Labs: ordered. Decision-making details documented in ED Course.  Radiology: ordered and independent interpretation performed. Decision-making details documented in ED Course.  ECG/medicine tests: ordered and independent interpretation performed. Decision-making details documented in ED Course.    Risk  Prescription drug management.  Decision regarding hospitalization.                                      Clinical Impression:  Final diagnoses:  [R06.02] Shortness of breath  [J45.901] Exacerbation of asthma, unspecified asthma severity, unspecified whether persistent (Primary)  [J96.01] Acute hypoxic respiratory failure  [J10.1] Influenza A          ED Disposition Condition    Admit Fabien Calix MD  01/24/24 0668

## 2024-01-24 NOTE — ED NOTES
Attempted to start an IV higher than the hand to do CTA, jack valverde was unsuccessful.pt informed nurse she did not want to be stuck anymore she wanted to wait awhile. Dr ellis notified.

## 2024-01-24 NOTE — ED NOTES
Notified dr brown that we were unable to obtain iv access higher than the hand. Md stated he will talk to the pt

## 2024-01-24 NOTE — PLAN OF CARE
01/24/24 1420   Discharge Assessment   Assessment Type Discharge Planning Assessment   Confirmed/corrected address, phone number and insurance Yes   Confirmed Demographics Correct on Facesheet   Source of Information patient   When was your last doctors appointment?   (PCP: Raquel oLpez)   Does patient/caregiver understand observation status Yes   Communicated ROXIE with patient/caregiver Date not available/Unable to determine   Reason For Admission SOB, Influenza A, Exacerbation of asthma, Acute hypoxic respiratory failure   People in Home alone   Facility Arrived From: Home   Do you expect to return to your current living situation? Yes   Do you have help at home or someone to help you manage your care at home? No   Prior to hospitilization cognitive status: Alert/Oriented;No Deficits   Current cognitive status: Alert/Oriented;No Deficits   Walking or Climbing Stairs Difficulty no   Dressing/Bathing Difficulty no   Home Accessibility wheelchair accessible   Home Layout Able to live on 1st floor   Equipment Currently Used at Home nebulizer   Readmission within 30 days? No   Patient currently being followed by outpatient case management? No   Do you currently have service(s) that help you manage your care at home? No   Do you take prescription medications? Yes  (24 hour [facility] on [address])   Do you have prescription coverage? Yes   Coverage [employer]   Do you have any problems affording any of your prescribed medications? No   Is the patient taking medications as prescribed? yes   Who is going to help you get home at discharge? Medicaid transportation/Taxi   How do you get to doctors appointments? car, drives self   Are you on dialysis? No   Do you take coumadin? No   Discharge Plan A Home   DME Needed Upon Discharge  none   Discharge Plan discussed with: Patient   Transition of Care Barriers No family/friends to help     Patient resides alone; has one adult son, Dustin Magallon, who lives in  [place]; CM will follow for DC planning needs.

## 2024-01-25 VITALS
BODY MASS INDEX: 30.71 KG/M2 | HEART RATE: 78 BPM | WEIGHT: 152.31 LBS | TEMPERATURE: 99 F | SYSTOLIC BLOOD PRESSURE: 109 MMHG | RESPIRATION RATE: 20 BRPM | DIASTOLIC BLOOD PRESSURE: 52 MMHG | OXYGEN SATURATION: 98 % | HEIGHT: 59 IN

## 2024-01-25 LAB
ALBUMIN SERPL-MCNC: 3.2 G/DL (ref 3.5–5)
ALBUMIN/GLOB SERPL: 0.9 RATIO (ref 1.1–2)
ALP SERPL-CCNC: 74 UNIT/L (ref 40–150)
ALT SERPL-CCNC: 10 UNIT/L (ref 0–55)
AST SERPL-CCNC: 10 UNIT/L (ref 5–34)
BASOPHILS # BLD AUTO: 0.01 X10(3)/MCL
BASOPHILS NFR BLD AUTO: 0.2 %
BILIRUB SERPL-MCNC: 0.1 MG/DL
BUN SERPL-MCNC: 8.5 MG/DL (ref 9.8–20.1)
CALCIUM SERPL-MCNC: 9 MG/DL (ref 8.4–10.2)
CHLORIDE SERPL-SCNC: 108 MMOL/L (ref 98–107)
CO2 SERPL-SCNC: 27 MMOL/L (ref 22–29)
CREAT SERPL-MCNC: 0.68 MG/DL (ref 0.55–1.02)
EOSINOPHIL # BLD AUTO: 0 X10(3)/MCL (ref 0–0.9)
EOSINOPHIL NFR BLD AUTO: 0 %
ERYTHROCYTE [DISTWIDTH] IN BLOOD BY AUTOMATED COUNT: 13.7 % (ref 11.5–17)
GFR SERPLBLD CREATININE-BSD FMLA CKD-EPI: >60 MLS/MIN/1.73/M2
GLOBULIN SER-MCNC: 3.4 GM/DL (ref 2.4–3.5)
GLUCOSE SERPL-MCNC: 141 MG/DL (ref 74–100)
HCT VFR BLD AUTO: 38.8 % (ref 37–47)
HGB BLD-MCNC: 12.7 G/DL (ref 12–16)
HOLD SPECIMEN: NORMAL
HOLD SPECIMEN: NORMAL
IMM GRANULOCYTES # BLD AUTO: 0.03 X10(3)/MCL (ref 0–0.04)
IMM GRANULOCYTES NFR BLD AUTO: 0.6 %
LYMPHOCYTES # BLD AUTO: 0.55 X10(3)/MCL (ref 0.6–4.6)
LYMPHOCYTES NFR BLD AUTO: 10.7 %
MCH RBC QN AUTO: 31.3 PG (ref 27–31)
MCHC RBC AUTO-ENTMCNC: 32.7 G/DL (ref 33–36)
MCV RBC AUTO: 95.6 FL (ref 80–94)
MONOCYTES # BLD AUTO: 0.99 X10(3)/MCL (ref 0.1–1.3)
MONOCYTES NFR BLD AUTO: 19.2 %
NEUTROPHILS # BLD AUTO: 3.58 X10(3)/MCL (ref 2.1–9.2)
NEUTROPHILS NFR BLD AUTO: 69.3 %
NRBC BLD AUTO-RTO: 0 %
PLATELET # BLD AUTO: 204 X10(3)/MCL (ref 130–400)
PMV BLD AUTO: 11 FL (ref 7.4–10.4)
POTASSIUM SERPL-SCNC: 4.1 MMOL/L (ref 3.5–5.1)
PROT SERPL-MCNC: 6.6 GM/DL (ref 6.4–8.3)
RBC # BLD AUTO: 4.06 X10(6)/MCL (ref 4.2–5.4)
SODIUM SERPL-SCNC: 142 MMOL/L (ref 136–145)
WBC # SPEC AUTO: 5.16 X10(3)/MCL (ref 4.5–11.5)

## 2024-01-25 PROCEDURE — 25000003 PHARM REV CODE 250

## 2024-01-25 PROCEDURE — 63600175 PHARM REV CODE 636 W HCPCS: Performed by: STUDENT IN AN ORGANIZED HEALTH CARE EDUCATION/TRAINING PROGRAM

## 2024-01-25 PROCEDURE — 85025 COMPLETE CBC W/AUTO DIFF WBC: CPT | Performed by: STUDENT IN AN ORGANIZED HEALTH CARE EDUCATION/TRAINING PROGRAM

## 2024-01-25 PROCEDURE — 25000003 PHARM REV CODE 250: Performed by: STUDENT IN AN ORGANIZED HEALTH CARE EDUCATION/TRAINING PROGRAM

## 2024-01-25 PROCEDURE — 99900035 HC TECH TIME PER 15 MIN (STAT)

## 2024-01-25 PROCEDURE — 80053 COMPREHEN METABOLIC PANEL: CPT | Performed by: STUDENT IN AN ORGANIZED HEALTH CARE EDUCATION/TRAINING PROGRAM

## 2024-01-25 PROCEDURE — G0378 HOSPITAL OBSERVATION PER HR: HCPCS

## 2024-01-25 PROCEDURE — 94640 AIRWAY INHALATION TREATMENT: CPT

## 2024-01-25 PROCEDURE — 94761 N-INVAS EAR/PLS OXIMETRY MLT: CPT

## 2024-01-25 PROCEDURE — 27000221 HC OXYGEN, UP TO 24 HOURS

## 2024-01-25 PROCEDURE — 96376 TX/PRO/DX INJ SAME DRUG ADON: CPT

## 2024-01-25 PROCEDURE — 25000242 PHARM REV CODE 250 ALT 637 W/ HCPCS: Performed by: STUDENT IN AN ORGANIZED HEALTH CARE EDUCATION/TRAINING PROGRAM

## 2024-01-25 RX ORDER — OSELTAMIVIR PHOSPHATE 75 MG/1
75 CAPSULE ORAL 2 TIMES DAILY
Qty: 7 CAPSULE | Refills: 0 | Status: SHIPPED | OUTPATIENT
Start: 2024-01-25 | End: 2024-01-28

## 2024-01-25 RX ORDER — PREDNISONE 20 MG/1
20 TABLET ORAL 2 TIMES DAILY
Qty: 6 TABLET | Refills: 0 | Status: SHIPPED | OUTPATIENT
Start: 2024-01-25 | End: 2024-01-28

## 2024-01-25 RX ORDER — IPRATROPIUM BROMIDE AND ALBUTEROL SULFATE 2.5; .5 MG/3ML; MG/3ML
3 SOLUTION RESPIRATORY (INHALATION) EVERY 4 HOURS
Qty: 75 ML | Refills: 0 | Status: ON HOLD | OUTPATIENT
Start: 2024-01-25 | End: 2024-02-19 | Stop reason: HOSPADM

## 2024-01-25 RX ORDER — PREDNISONE 20 MG/1
20 TABLET ORAL DAILY
Qty: 3 TABLET | Refills: 0 | Status: SHIPPED | OUTPATIENT
Start: 2024-01-29 | End: 2024-02-01

## 2024-01-25 RX ORDER — PREDNISONE 20 MG/1
10 TABLET ORAL DAILY
Qty: 2 TABLET | Refills: 0 | Status: SHIPPED | OUTPATIENT
Start: 2024-02-01 | End: 2024-02-04

## 2024-01-25 RX ORDER — PREDNISONE 20 MG/1
20 TABLET ORAL DAILY
Qty: 4 TABLET | Refills: 0 | Status: SHIPPED | OUTPATIENT
Start: 2024-01-25 | End: 2024-01-25 | Stop reason: HOSPADM

## 2024-01-25 RX ADMIN — METHYLPREDNISOLONE SODIUM SUCCINATE 40 MG: 40 INJECTION, POWDER, FOR SOLUTION INTRAMUSCULAR; INTRAVENOUS at 06:01

## 2024-01-25 RX ADMIN — METHIMAZOLE 2.5 MG: 5 TABLET ORAL at 09:01

## 2024-01-25 RX ADMIN — IPRATROPIUM BROMIDE AND ALBUTEROL SULFATE 3 ML: 2.5; .5 SOLUTION RESPIRATORY (INHALATION) at 07:01

## 2024-01-25 RX ADMIN — OSELTAMAVIR PHOSPHATE 75 MG: 75 CAPSULE ORAL at 09:01

## 2024-01-25 RX ADMIN — APIXABAN 5 MG: 2.5 TABLET, FILM COATED ORAL at 09:01

## 2024-01-25 RX ADMIN — GUAIFENESIN 600 MG: 600 TABLET, EXTENDED RELEASE ORAL at 09:01

## 2024-01-25 RX ADMIN — IPRATROPIUM BROMIDE AND ALBUTEROL SULFATE 3 ML: 2.5; .5 SOLUTION RESPIRATORY (INHALATION) at 03:01

## 2024-01-25 RX ADMIN — PANTOPRAZOLE SODIUM 40 MG: 40 TABLET, DELAYED RELEASE ORAL at 09:01

## 2024-01-25 NOTE — DISCHARGE INSTRUCTIONS
Pt discharge home. Advise how to take medication, Pt understands and agrees, meds to bed completed. No issues/concerns at this time.

## 2024-01-25 NOTE — DISCHARGE SUMMARY
[facility] Internal Medicine Discharge Summary    Admitting Physician: Anabell Larkin MD  Attending Physician: Anabell Larkin MD  Date of Admit: 1/24/2024  Date of Discharge: 1/25/2024    Condition: Good  Outcome: Condition has improved and patient is now ready for discharge.  DISPOSITION: Home or Self Care    Discharge Diagnoses     Patient Active Problem List   Diagnosis    Obesity    Asthma exacerbation    GERD (gastroesophageal reflux disease)    Diverticulitis    Colitis    Moderate persistent asthma with (acute) exacerbation    Hyperthyroidism    Thyroiditis    Diastolic dysfunction, left ventricle    Tachycardia    Other chest pain    Hypoglycemia    Thrombocytopathia    Diverticulitis of intestine with abscess    Asthma    Acute abdominal pain    Acute diverticulitis    Allergic conjunctivitis and rhinitis    Macrocytic anemia    Superficial vein thrombosis       Principal Problem:  <principal problem not specified>    Consultants and Procedures     Consultants:  IP CONSULT TO INTERNAL MEDICINE    Procedures:   * No surgery found *     Brief Admission History      Patient is 53-year-old female with past medical history of unspecified asthma, recurrent diverticulitis, GERD, and hyperthyroidism who presented to [facility] Emergency Department for shortness of breath.  Patient originally presented to ED 2 days ago for shortness of breath and was diagnosed with asthma exacerbation, was discharged home with steroids.  She states she was feeling better for a short period of time but then last night began to have more breathing difficulty.  She has had more wheezing present, more sputum production, and hypoxia as well.  Patient noted to have oxygen saturation of 82% for EMS, was given DuoNeb and came to ER.     On arrival, patient placed on BiPAP secondary to respiratory distress.  She is given DuoNebs, Solu-Medrol, magnesium with some improvement in work of breathing.  CMP mostly unremarkable, troponin and EKG with no signs of  "ischemia.  Patient was found to be flu A positive.  Internal medicine consulted for asthma exacerbation and influenza    Hospital Course with Pertinent Findings     Patient admitted to the Internal Medicine team for asthma exacerbation.  Patient started on DuoNebs q.4 hours and methylprednisone 40 mg q.8 hours.  Patient also started on Tamiflu 75 mg b.i.d..  Overnight, patient displayed continuous improvement and eventually maintain acceptable oxygen saturations on room air.  This morning, wheezing greatly improved from physical exam on admission.  Patient was deemed acceptable for discharge at this time.     Discharge physical exam:  Vitals  BP: (!) 109/52  Temp: 97.5 °F (36.4 °C)  Temp Source: Oral  Pulse: 78  Resp: 20  SpO2: 98 %  Height: 4' 11" (149.9 cm)  Weight: 69.1 kg (152 lb 5.4 oz)    Physical Exam  Vitals reviewed.   Constitutional:       General: She is not in acute distress.     Appearance: She is not ill-appearing.   Cardiovascular:      Rate and Rhythm: Normal rate and regular rhythm.      Pulses: Normal pulses.      Heart sounds: No murmur heard.     No friction rub. No gallop.   Pulmonary:      Breath sounds: Wheezing present. No rhonchi or rales.      Comments: Wheezing much improved from admission  Abdominal:      General: There is no distension.      Palpations: Abdomen is soft.      Tenderness: There is no abdominal tenderness.   Musculoskeletal:         General: No swelling or tenderness.   Skin:     General: Skin is warm and dry.   Neurological:      General: No focal deficit present.      Mental Status: She is alert. Mental status is at baseline.   Psychiatric:         Mood and Affect: Mood normal.         Behavior: Behavior normal.       TIME SPENT ON DISCHARGE: 60 minutes    Discharge Medications        Medication List        START taking these medications      * albuterol-ipratropium 2.5 mg-0.5 mg/3 mL nebulizer solution  Commonly known as: DUO-NEB  Take 3 mLs by nebulization every 4 " (four) hours as needed for Wheezing. Rescue     * albuterol-ipratropium 2.5 mg-0.5 mg/3 mL nebulizer solution  Commonly known as: DUO-NEB  Take 3 mLs by nebulization every 4 (four) hours. Rescue     oseltamivir 75 MG capsule  Commonly known as: TAMIFLU  Take 1 capsule (75 mg total) by mouth 2 (two) times daily. for 3 days     * predniSONE 20 MG tablet  Commonly known as: DELTASONE  Take 2 tablets (40 mg total) by mouth once daily. for 5 days     * predniSONE 20 MG tablet  Commonly known as: DELTASONE  Take 1 tablet (20 mg total) by mouth once daily.     * predniSONE 20 MG tablet  Commonly known as: DELTASONE  Take 1 tablet (20 mg total) by mouth 2 (two) times daily. for 3 days     * predniSONE 20 MG tablet  Commonly known as: DELTASONE  Take 1 tablet (20 mg total) by mouth once daily. for 3 days  Start taking on: January 29, 2024     * predniSONE 20 MG tablet  Commonly known as: DELTASONE  Take 0.5 tablets (10 mg total) by mouth once daily. for 3 days  Start taking on: February 1, 2024           * This list has 7 medication(s) that are the same as other medications prescribed for you. Read the directions carefully, and ask your doctor or other care provider to review them with you.                CONTINUE taking these medications      ADVAIR DISKUS 250-50 mcg/dose diskus inhaler  Generic drug: fluticasone-salmeterol 250-50 mcg/dose     * albuterol 90 mcg/actuation inhaler  Commonly known as: PROAIR HFA  Inhale 2 puffs into the lungs every 6 (six) hours as needed for Wheezing. Rescue     * albuterol 1.25 mg/3 mL Nebu  Commonly known as: ACCUNEB     diclofenac 50 MG EC tablet  Commonly known as: VOLTAREN  Take 1 tablet (50 mg total) by mouth 2 (two) times daily as needed (Pain).     doxycycline 100 MG tablet  Commonly known as: VIBRA-TABS     ELIQUIS 5 mg Tab  Generic drug: apixaban     methIMAzole 5 MG Tab  Commonly known as: TAPAZOLE  Take a half of a tablet daily to Total 2.5 mg     omeprazole 20 MG  capsule  Commonly known as: PRILOSEC     sucralfate 1 gram tablet  Commonly known as: CARAFATE           * This list has 2 medication(s) that are the same as other medications prescribed for you. Read the directions carefully, and ask your doctor or other care provider to review them with you.                STOP taking these medications      amoxicillin-clavulanate 875-125mg 875-125 mg per tablet  Commonly known as: AUGMENTIN     methylPREDNISolone 4 mg tablet  Commonly known as: MEDROL DOSEPACK     SUTAB 1.479-0.188- 0.225 gram tablet  Generic drug: sod sulf-pot chloride-mag sulf            ASK your doctor about these medications      fluticasone propionate 50 mcg/actuation nasal spray  Commonly known as: FLONASE  1 spray (50 mcg total) by Each Nostril route daily as needed for Allergies or Rhinitis.               Where to Get Your Medications        These medications were sent to [address] [address]      Phone: [phone]   albuterol-ipratropium 2.5 mg-0.5 mg/3 mL nebulizer solution  oseltamivir 75 MG capsule  predniSONE 20 MG tablet  predniSONE 20 MG tablet  predniSONE 20 MG tablet  predniSONE 20 MG tablet         Discharge Information:     -continue taking/start taking medications as noted above  -patient is to complete prednisone taper with 40 mg for 3 days, 20 mg for 3 days, and 10 mg for 3 days.  Explained this to patient who voiced understanding.  -patient is to follow up with her PCP in approximately 1-2 weeks  -ED precautions given to patient and warning symptoms explained    Yeyo Pollack DO  [facility] Internal Medicine PGY-1

## 2024-01-27 ENCOUNTER — HOSPITAL ENCOUNTER (INPATIENT)
Facility: HOSPITAL | Age: 54
LOS: 3 days | Discharge: LEFT BEFORE DISCHARGE | DRG: 872 | End: 2024-01-30
Attending: INTERNAL MEDICINE | Admitting: STUDENT IN AN ORGANIZED HEALTH CARE EDUCATION/TRAINING PROGRAM
Payer: MEDICAID

## 2024-01-27 DIAGNOSIS — J45.41 MODERATE PERSISTENT ASTHMA WITH EXACERBATION: ICD-10-CM

## 2024-01-27 DIAGNOSIS — K57.20 DIVERTICULITIS OF LARGE INTESTINE WITH ABSCESS WITHOUT BLEEDING: Primary | ICD-10-CM

## 2024-01-27 LAB
ALBUMIN SERPL-MCNC: 3 G/DL (ref 3.5–5)
ALBUMIN/GLOB SERPL: 0.9 RATIO (ref 1.1–2)
ALP SERPL-CCNC: 63 UNIT/L (ref 40–150)
ALT SERPL-CCNC: 11 UNIT/L (ref 0–55)
AST SERPL-CCNC: 15 UNIT/L (ref 5–34)
BASOPHILS # BLD AUTO: 0.01 X10(3)/MCL
BASOPHILS NFR BLD AUTO: 0.1 %
BILIRUB SERPL-MCNC: 0.2 MG/DL
BUN SERPL-MCNC: 10.6 MG/DL (ref 9.8–20.1)
CALCIUM SERPL-MCNC: 8.2 MG/DL (ref 8.4–10.2)
CHLORIDE SERPL-SCNC: 107 MMOL/L (ref 98–107)
CO2 SERPL-SCNC: 24 MMOL/L (ref 22–29)
CREAT SERPL-MCNC: 0.86 MG/DL (ref 0.55–1.02)
EOSINOPHIL # BLD AUTO: 0 X10(3)/MCL (ref 0–0.9)
EOSINOPHIL NFR BLD AUTO: 0 %
ERYTHROCYTE [DISTWIDTH] IN BLOOD BY AUTOMATED COUNT: 14.3 % (ref 11.5–17)
GFR SERPLBLD CREATININE-BSD FMLA CKD-EPI: >60 MLS/MIN/1.73/M2
GLOBULIN SER-MCNC: 3.3 GM/DL (ref 2.4–3.5)
GLUCOSE SERPL-MCNC: 92 MG/DL (ref 74–100)
HCT VFR BLD AUTO: 37.5 % (ref 37–47)
HGB BLD-MCNC: 12.5 G/DL (ref 12–16)
HOLD SPECIMEN: NORMAL
IMM GRANULOCYTES # BLD AUTO: 0.02 X10(3)/MCL (ref 0–0.04)
IMM GRANULOCYTES NFR BLD AUTO: 0.3 %
LACTATE SERPL-SCNC: 0.8 MMOL/L (ref 0.5–2.2)
LYMPHOCYTES # BLD AUTO: 3.02 X10(3)/MCL (ref 0.6–4.6)
LYMPHOCYTES NFR BLD AUTO: 45.1 %
MCH RBC QN AUTO: 31.9 PG (ref 27–31)
MCHC RBC AUTO-ENTMCNC: 33.3 G/DL (ref 33–36)
MCV RBC AUTO: 95.7 FL (ref 80–94)
MONOCYTES # BLD AUTO: 0.76 X10(3)/MCL (ref 0.1–1.3)
MONOCYTES NFR BLD AUTO: 11.3 %
NEUTROPHILS # BLD AUTO: 2.89 X10(3)/MCL (ref 2.1–9.2)
NEUTROPHILS NFR BLD AUTO: 43.2 %
NRBC BLD AUTO-RTO: 0 %
PLATELET # BLD AUTO: 200 X10(3)/MCL (ref 130–400)
PMV BLD AUTO: 11.7 FL (ref 7.4–10.4)
POTASSIUM SERPL-SCNC: 3.6 MMOL/L (ref 3.5–5.1)
PROT SERPL-MCNC: 6.3 GM/DL (ref 6.4–8.3)
RBC # BLD AUTO: 3.92 X10(6)/MCL (ref 4.2–5.4)
SODIUM SERPL-SCNC: 142 MMOL/L (ref 136–145)
WBC # SPEC AUTO: 6.7 X10(3)/MCL (ref 4.5–11.5)

## 2024-01-27 PROCEDURE — 94640 AIRWAY INHALATION TREATMENT: CPT

## 2024-01-27 PROCEDURE — 25500020 PHARM REV CODE 255

## 2024-01-27 PROCEDURE — 27000207 HC ISOLATION

## 2024-01-27 PROCEDURE — 80053 COMPREHEN METABOLIC PANEL: CPT | Performed by: INTERNAL MEDICINE

## 2024-01-27 PROCEDURE — 25000242 PHARM REV CODE 250 ALT 637 W/ HCPCS: Performed by: INTERNAL MEDICINE

## 2024-01-27 PROCEDURE — 25000242 PHARM REV CODE 250 ALT 637 W/ HCPCS

## 2024-01-27 PROCEDURE — 25000003 PHARM REV CODE 250

## 2024-01-27 PROCEDURE — 83605 ASSAY OF LACTIC ACID: CPT | Performed by: INTERNAL MEDICINE

## 2024-01-27 PROCEDURE — 21400001 HC TELEMETRY ROOM

## 2024-01-27 PROCEDURE — 99285 EMERGENCY DEPT VISIT HI MDM: CPT | Mod: 25

## 2024-01-27 PROCEDURE — 27000221 HC OXYGEN, UP TO 24 HOURS

## 2024-01-27 PROCEDURE — 85025 COMPLETE CBC W/AUTO DIFF WBC: CPT | Performed by: INTERNAL MEDICINE

## 2024-01-27 PROCEDURE — 87040 BLOOD CULTURE FOR BACTERIA: CPT | Performed by: INTERNAL MEDICINE

## 2024-01-27 PROCEDURE — 63600175 PHARM REV CODE 636 W HCPCS

## 2024-01-27 PROCEDURE — 63600175 PHARM REV CODE 636 W HCPCS: Mod: JZ,JG | Performed by: INTERNAL MEDICINE

## 2024-01-27 PROCEDURE — 63600175 PHARM REV CODE 636 W HCPCS: Mod: JZ,JG

## 2024-01-27 RX ORDER — SODIUM CHLORIDE 0.9 % (FLUSH) 0.9 %
10 SYRINGE (ML) INJECTION
Status: DISCONTINUED | OUTPATIENT
Start: 2024-01-27 | End: 2024-01-30 | Stop reason: HOSPADM

## 2024-01-27 RX ORDER — ACETAMINOPHEN 325 MG/1
650 TABLET ORAL EVERY 8 HOURS PRN
Status: DISCONTINUED | OUTPATIENT
Start: 2024-01-27 | End: 2024-01-30 | Stop reason: HOSPADM

## 2024-01-27 RX ORDER — HEPARIN SODIUM 5000 [USP'U]/ML
5000 INJECTION, SOLUTION INTRAVENOUS; SUBCUTANEOUS EVERY 12 HOURS
Status: DISCONTINUED | OUTPATIENT
Start: 2024-01-28 | End: 2024-01-27

## 2024-01-27 RX ORDER — IPRATROPIUM BROMIDE AND ALBUTEROL SULFATE 2.5; .5 MG/3ML; MG/3ML
6 SOLUTION RESPIRATORY (INHALATION)
Status: COMPLETED | OUTPATIENT
Start: 2024-01-27 | End: 2024-01-27

## 2024-01-27 RX ORDER — LEVOFLOXACIN 5 MG/ML
750 INJECTION, SOLUTION INTRAVENOUS
Status: DISCONTINUED | OUTPATIENT
Start: 2024-01-27 | End: 2024-01-29

## 2024-01-27 RX ORDER — ALBUTEROL SULFATE 90 UG/1
2 AEROSOL, METERED RESPIRATORY (INHALATION) EVERY 6 HOURS PRN
Status: DISCONTINUED | OUTPATIENT
Start: 2024-01-27 | End: 2024-01-30 | Stop reason: HOSPADM

## 2024-01-27 RX ORDER — MORPHINE SULFATE 2 MG/ML
4 INJECTION, SOLUTION INTRAMUSCULAR; INTRAVENOUS
Status: DISCONTINUED | OUTPATIENT
Start: 2024-01-27 | End: 2024-01-27

## 2024-01-27 RX ORDER — OSELTAMIVIR PHOSPHATE 75 MG/1
75 CAPSULE ORAL 2 TIMES DAILY
Status: COMPLETED | OUTPATIENT
Start: 2024-01-27 | End: 2024-01-28

## 2024-01-27 RX ORDER — SODIUM CHLORIDE, SODIUM LACTATE, POTASSIUM CHLORIDE, CALCIUM CHLORIDE 600; 310; 30; 20 MG/100ML; MG/100ML; MG/100ML; MG/100ML
INJECTION, SOLUTION INTRAVENOUS CONTINUOUS
Status: DISCONTINUED | OUTPATIENT
Start: 2024-01-27 | End: 2024-01-30 | Stop reason: HOSPADM

## 2024-01-27 RX ORDER — BENZONATATE 100 MG/1
100 CAPSULE ORAL 3 TIMES DAILY PRN
Status: DISCONTINUED | OUTPATIENT
Start: 2024-01-27 | End: 2024-01-30 | Stop reason: HOSPADM

## 2024-01-27 RX ORDER — METRONIDAZOLE 500 MG/100ML
500 INJECTION, SOLUTION INTRAVENOUS
Status: DISCONTINUED | OUTPATIENT
Start: 2024-01-27 | End: 2024-01-29

## 2024-01-27 RX ORDER — MORPHINE SULFATE 2 MG/ML
2 INJECTION, SOLUTION INTRAMUSCULAR; INTRAVENOUS EVERY 6 HOURS PRN
Status: DISCONTINUED | OUTPATIENT
Start: 2024-01-27 | End: 2024-01-27

## 2024-01-27 RX ORDER — TALC
6 POWDER (GRAM) TOPICAL NIGHTLY PRN
Status: DISCONTINUED | OUTPATIENT
Start: 2024-01-27 | End: 2024-01-30 | Stop reason: HOSPADM

## 2024-01-27 RX ORDER — PANTOPRAZOLE SODIUM 40 MG/1
40 TABLET, DELAYED RELEASE ORAL DAILY
Status: DISCONTINUED | OUTPATIENT
Start: 2024-01-27 | End: 2024-01-30 | Stop reason: HOSPADM

## 2024-01-27 RX ORDER — IPRATROPIUM BROMIDE AND ALBUTEROL SULFATE 2.5; .5 MG/3ML; MG/3ML
3 SOLUTION RESPIRATORY (INHALATION) EVERY 4 HOURS PRN
Status: DISCONTINUED | OUTPATIENT
Start: 2024-01-27 | End: 2024-01-30 | Stop reason: HOSPADM

## 2024-01-27 RX ORDER — ONDANSETRON 4 MG/1
4 TABLET, ORALLY DISINTEGRATING ORAL ONCE
Status: COMPLETED | OUTPATIENT
Start: 2024-01-27 | End: 2024-01-27

## 2024-01-27 RX ORDER — MORPHINE SULFATE 2 MG/ML
2 INJECTION, SOLUTION INTRAMUSCULAR; INTRAVENOUS EVERY 4 HOURS PRN
Status: DISCONTINUED | OUTPATIENT
Start: 2024-01-27 | End: 2024-01-28

## 2024-01-27 RX ADMIN — METRONIDAZOLE 500 MG: 5 INJECTION, SOLUTION INTRAVENOUS at 07:01

## 2024-01-27 RX ADMIN — LEVOFLOXACIN 750 MG: 750 INJECTION, SOLUTION INTRAVENOUS at 10:01

## 2024-01-27 RX ADMIN — MORPHINE SULFATE 2 MG: 2 INJECTION, SOLUTION INTRAMUSCULAR; INTRAVENOUS at 01:01

## 2024-01-27 RX ADMIN — MORPHINE SULFATE 2 MG: 2 INJECTION, SOLUTION INTRAMUSCULAR; INTRAVENOUS at 11:01

## 2024-01-27 RX ADMIN — IOHEXOL 100 ML: 350 INJECTION, SOLUTION INTRAVENOUS at 09:01

## 2024-01-27 RX ADMIN — OSELTAMAVIR PHOSPHATE 75 MG: 75 CAPSULE ORAL at 08:01

## 2024-01-27 RX ADMIN — SODIUM CHLORIDE, POTASSIUM CHLORIDE, SODIUM LACTATE AND CALCIUM CHLORIDE: 600; 310; 30; 20 INJECTION, SOLUTION INTRAVENOUS at 01:01

## 2024-01-27 RX ADMIN — IPRATROPIUM BROMIDE AND ALBUTEROL SULFATE 3 ML: 2.5; .5 SOLUTION RESPIRATORY (INHALATION) at 07:01

## 2024-01-27 RX ADMIN — ONDANSETRON 4 MG: 4 TABLET, ORALLY DISINTEGRATING ORAL at 01:01

## 2024-01-27 RX ADMIN — IPRATROPIUM BROMIDE AND ALBUTEROL SULFATE 3 ML: 2.5; .5 SOLUTION RESPIRATORY (INHALATION) at 11:01

## 2024-01-27 RX ADMIN — METRONIDAZOLE 500 MG: 5 INJECTION, SOLUTION INTRAVENOUS at 10:01

## 2024-01-27 RX ADMIN — PANTOPRAZOLE SODIUM 40 MG: 40 TABLET, DELAYED RELEASE ORAL at 01:01

## 2024-01-27 RX ADMIN — IPRATROPIUM BROMIDE AND ALBUTEROL SULFATE 6 ML: .5; 3 SOLUTION RESPIRATORY (INHALATION) at 09:01

## 2024-01-27 RX ADMIN — APIXABAN 5 MG: 2.5 TABLET, FILM COATED ORAL at 08:01

## 2024-01-27 RX ADMIN — MORPHINE SULFATE 2 MG: 2 INJECTION, SOLUTION INTRAMUSCULAR; INTRAVENOUS at 06:01

## 2024-01-27 NOTE — MEDICAL/APP STUDENT
[facility]   Consult Note  General Surgery    Patient Name: Niya Moeller  YOB: 1970  Date of Admission: 1/27/2024  Date: 01/27/2024 11:57 AM  Reason for Consult: Diverticulitis with CT indicated 4cm abscess   HD#0  POD#* No surgery found *    PRESENTING HISTORY     Chief Complaint/Reason for Admission: <principal problem not specified>    History of Present Illness:  Niya Moeller is a 53 y.o. female with PMHx of diverticulitis, Graves disease, asthma, and previous PE who presented to the ED via EMS for 2 day history of abdominal pain. She reports that she had an asthma attack 2 days ago that was treated in the ED. Following the asthma attach, she then developed gradual onset pain in her LLQ that she describes a squeezing. She reports having a continuous subjective fever, and she states an at home thermometer read 100.4F last night. She has had multiple flares of diverticulitis before, with two previous abscesses requiring IR drainage at outside hospitals. She reports a recent attempted colonoscopy with poor visualization. The patient is currently taking Eliquis secondary to a previous PE. She states that she currently smokes 0.5-1 pack/day cigarettes.    Review of Systems:  12 point ROS negative except as stated in HPI    PAST HISTORY:   Past medical history:  Past Medical History:   Diagnosis Date    Asthma     Diverticulosis     GERD (gastroesophageal reflux disease)     Graves disease        Past surgical history:  No past surgical history on file.    Family history:  Family History   Problem Relation Age of Onset    Hypothyroidism Mother        Social history:  Social History     Socioeconomic History    Marital status: Single   Tobacco Use    Smoking status: Every Day     Current packs/day: 0.50     Average packs/day: 0.5 packs/day for 41.1 years (20.5 ttl pk-yrs)     Types: Cigarettes     Start date: 1/1/1983    Smokeless tobacco: Never    Tobacco comments:     Ambulatory  referral to Smoking Cessation clinic following hospital discharge.    Substance and Sexual Activity    Alcohol use: Not Currently    Drug use: Never    Sexual activity: Not Currently     Birth control/protection: Abstinence     Comment: States is asexual     Social Determinants of Health     Financial Resource Strain: Low Risk  (5/1/2023)    Overall Financial Resource Strain (CARDIA)     Difficulty of Paying Living Expenses: Not very hard   Food Insecurity: No Food Insecurity (5/1/2023)    Hunger Vital Sign     Worried About Running Out of Food in the Last Year: Never true     Ran Out of Food in the Last Year: Never true   Transportation Needs: No Transportation Needs (5/1/2023)    PRAPARE - Transportation     Lack of Transportation (Medical): No     Lack of Transportation (Non-Medical): No   Physical Activity: Inactive (5/1/2023)    Exercise Vital Sign     Days of Exercise per Week: 0 days     Minutes of Exercise per Session: 0 min   Stress: No Stress Concern Present (5/1/2023)    [employer] - Occupational Stress Questionnaire     Feeling of Stress : Not at all   Social Connections: Socially Isolated (5/1/2023)    Social Connection and Isolation Panel [NHANES]     Frequency of Communication with Friends and Family: More than three times a week     Frequency of Social Gatherings with Friends and Family: Twice a week     Attends Buddhism Services: Never     Active Member of Clubs or Organizations: No     Attends Club or Organization Meetings: Never     Marital Status: Never    Housing Stability: Low Risk  (5/1/2023)    Housing Stability Vital Sign     Unable to Pay for Housing in the Last Year: No     Number of Places Lived in the Last Year: 2     Unstable Housing in the Last Year: No     Social History     Tobacco Use   Smoking Status Every Day    Current packs/day: 0.50    Average packs/day: 0.5 packs/day for 41.1 years (20.5 ttl pk-yrs)    Types: Cigarettes    Start date:  1983   Smokeless Tobacco Never   Tobacco Comments    Ambulatory referral to Smoking Cessation clinic following hospital discharge.          MEDICATIONS & ALLERGIES:   Allergies:   Review of patient's allergies indicates:   Allergen Reactions    Latex Anaphylaxis and Edema    Zosyn [piperacillin-tazobactam] Anaphylaxis       No current facility-administered medications on file prior to encounter.     Current Outpatient Medications on File Prior to Encounter   Medication Sig    ADVAIR DISKUS 250-50 mcg/dose diskus inhaler SMARTSI Puff(s) By Mouth    albuterol (ACCUNEB) 1.25 mg/3 mL Nebu Take by nebulization every 4 to 6 hours as needed.    albuterol (PROAIR HFA) 90 mcg/actuation inhaler Inhale 2 puffs into the lungs every 6 (six) hours as needed for Wheezing. Rescue    albuterol-ipratropium (DUO-NEB) 2.5 mg-0.5 mg/3 mL nebulizer solution Take 3 mLs by nebulization every 4 (four) hours as needed for Wheezing. Rescue    albuterol-ipratropium (DUO-NEB) 2.5 mg-0.5 mg/3 mL nebulizer solution Take 3 mLs by nebulization every 4 (four) hours. Rescue    diclofenac (VOLTAREN) 50 MG EC tablet Take 1 tablet (50 mg total) by mouth 2 (two) times daily as needed (Pain).    doxycycline (VIBRA-TABS) 100 MG tablet Take 100 mg by mouth 2 (two) times daily.    ELIQUIS 5 mg Tab Take 5 mg by mouth 2 (two) times daily.    fluticasone propionate (FLONASE) 50 mcg/actuation nasal spray 1 spray (50 mcg total) by Each Nostril route daily as needed for Allergies or Rhinitis. (Patient not taking: Reported on 2023)    methIMAzole (TAPAZOLE) 5 MG Tab Take a half of a tablet daily to Total 2.5 mg    omeprazole (PRILOSEC) 20 MG capsule Take 20 mg by mouth.    oseltamivir (TAMIFLU) 75 MG capsule Take 1 capsule (75 mg total) by mouth 2 (two) times daily. for 3 days    [] predniSONE (DELTASONE) 20 MG tablet Take 2 tablets (40 mg total) by mouth once daily. for 5 days    predniSONE (DELTASONE) 20 MG tablet Take 1 tablet (20 mg total)  by mouth 2 (two) times daily. for 3 days    [START ON 1/29/2024] predniSONE (DELTASONE) 20 MG tablet Take 1 tablet (20 mg total) by mouth once daily. for 3 days    [START ON 2/1/2024] predniSONE (DELTASONE) 20 MG tablet Take 0.5 tablets (10 mg total) by mouth once daily. for 3 days    sucralfate (CARAFATE) 1 gram tablet Take 1 tablet by mouth 4 (four) times daily before meals and nightly.       Scheduled Meds:   apixaban  5 mg Oral BID    levoFLOXacin  750 mg Intravenous Q24H    [START ON 1/28/2024] methIMAzole  2.5 mg Oral Daily    metronidazole  500 mg Intravenous Q8H    oseltamivir  75 mg Oral BID    pantoprazole  40 mg Oral Daily       Continuous Infusions:    PRN Meds:acetaminophen, albuterol, albuterol-ipratropium, melatonin, morphine, sodium chloride 0.9%    OBJECTIVE:     Vital Signs:  Temp:  [99.1 °F (37.3 °C)-99.3 °F (37.4 °C)] 99.1 °F (37.3 °C)  Pulse:  [] 80  Resp:  [18-20] 18  SpO2:  [96 %-100 %] 96 %  BP: ()/(64-86) 99/64  Body mass index is 31.08 kg/m².     No intake/output data recorded.  No intake/output data recorded.    Physical Exam:  General:  Well developed, well nourished  HEENT:  Normocephalic, atraumatic, EOMI, clear sclera, ears normal, neck supple  CVS:  RR  Resp:  Normal work of breathing on RA, equal rise and fall of the chest  GI: Abdomen soft, non-distended, no masses, tenderness to palpation in LLQ and lower midline with voluntary guarding.  :  Deferred  MSK:  No muscle atrophy, cyanosis, peripheral edema, moving all extremities spontaneously  Vascular: All extremities WWP  Skin:  No rashes, ulcers, erythema  Neuro:  CNII-XII grossly intact, alert and oriented to person, place, and time    Laboratory:  Recent Labs     01/25/24  0621 01/27/24  0851   WBC 5.16 6.70   HGB 12.7 12.5   HCT 38.8 37.5    200     Recent Labs     01/25/24  0621 01/27/24  0851    142   K 4.1 3.6   CO2 27 24   BUN 8.5* 10.6   CREATININE 0.68 0.86   CALCIUM 9.0 8.2*   ALBUMIN 3.2* 3.0*  "  BILITOT 0.1 0.2   AST 10 15   ALKPHOS 74 63   ALT 10 11     Troponin:  No results for input(s): "TROPONINI" in the last 72 hours.  CBC:  Recent Labs     01/25/24  0621 01/27/24  0851   WBC 5.16 6.70   RBC 4.06* 3.92*   HGB 12.7 12.5   HCT 38.8 37.5    200   MCV 95.6* 95.7*   MCH 31.3* 31.9*   MCHC 32.7* 33.3     CMP:  Recent Labs     01/25/24  0621 01/27/24  0851   CALCIUM 9.0 8.2*   ALBUMIN 3.2* 3.0*    142   K 4.1 3.6   CO2 27 24   BUN 8.5* 10.6   CREATININE 0.68 0.86   ALKPHOS 74 63   ALT 10 11   AST 10 15   BILITOT 0.1 0.2     Lactic Acid:  No results for input(s): "LACTATE" in the last 72 hours.  Etoh:  No results for input(s): "ALCOHOLMEDIC" in the last 72 hours.  Drug Screen:  No results for input(s): "PCDSCOMETHA", "COCAINEMETAB", "OPIATESCREEN", "BARBITURATES", "AMPHETAMINES", "MARIJUANATHC", "PCDSOPHENCYN", "CREATRANDUR", "TOXINFO" in the last 72 hours.    ABG:  No results for input(s): "PH", "PCO2", "PO2", "HCO3", "BE", "POCSATURATED" in the last 72 hours.    Diagnostic Results:  CT Abdomen Pelvis With IV Contrast NO Oral Contrast    Result Date: 1/27/2024  Sigmoid diverticulitis with appearance of intramural abscess. Electronically signed by: Juan Leavitt Date:    01/27/2024 Time:    10:05    X-Ray Chest 1 View    Result Date: 1/27/2024  No acute cardiopulmonary process. Electronically signed by: Juan Leavitt Date:    01/27/2024 Time:    09:50    CT Abdomen Pelvis With IV Contrast NO Oral Contrast   Final Result      Sigmoid diverticulitis with appearance of intramural abscess.         Electronically signed by: Juan Leavitt   Date:    01/27/2024   Time:    10:05      X-Ray Chest 1 View   Final Result      No acute cardiopulmonary process.         Electronically signed by: Juan Leavitt   Date:    01/27/2024   Time:    09:50          Microbiology:  Microbiology Results (last 7 days)       Procedure Component Value Units Date/Time    Blood Culture #1 **CANNOT BE ORDERED STAT** " [[id number]] Collected: 01/27/24 1035    Order Status: Sent Specimen: Blood from Arm, Right Updated: 01/27/24 1059    Blood Culture #2 **CANNOT BE ORDERED STAT** [[id number]] Collected: 01/27/24 1036    Order Status: Sent Specimen: Blood from Arm, Left Updated: 01/27/24 1059             ASSESSMENT & PLAN:   Niya Moeller is a 53 y.o. female with PMHx of diverticulitis with abscess, Grave's disease, asthma, and previous PE who is presenting with CT indicated diverticulitis with 4cm abscess. Patient is hemodynamically stable, with no leukocytosis or recorded temperature greater than 100.4.    Plan:  -Surgical intervention not indicated at this time, patient is stable to continue on current antibiotics.   -Continue morning CBC to monitor for leukocytosis.  -Continue vital signs monitoring for fever and/of hemodynamic instability.    Roberta Reynolds, MS3  [facility]    1/27/2024  11:57 AM

## 2024-01-27 NOTE — H&P
[facility] Medicine Wards History & Physical Note     Resident Team: [facility] Medicine List 3  Attending Physician: Dez Jones MD  Resident: Jasvir Briggs  Intern: Yeyo Pollack    Date of Admit: 1/27/2024    Chief Complaint     Abdominal Pain and Shortness of Breath (Pt reports lower quadrant abdominal pain with Sob that started yesterday. )     Subjective:      History of Present Illness:  Niya Moeller is a 53 y.o. female with a past medical history of diverticulitis, asthma, pulmonary embolism, and GERD who presented to the ED via EMS on 1/27/2024  with a primary complaint of abdominal pain.  Patient stated that her abdominal pain started approximately 3 days ago and intensified last night where she rated her pain as a 10/10 on the pain scale.  Patient localizes her abdominal pain in the left lower quadrant and states that it does not radiate elsewhere.  She endorses associated fever, chills, nausea without vomiting, and constipation.  Denies bloody stool.  Patient has been hospitalized several times in the past for diverticulitis requiring IV antibiotics.  Most recently she was admitted at our facility on 10/23/2023 for diverticulitis without abscess.  Patient states that she has been followed up with Gastroenterology in [place] and is supposed to be scheduled for colonoscopy soon.  Patient has adamantly refused partial colectomy in the past for recurrent diverticulitis.    In ED, patient slightly tachycardic with a HR of 100 on arrival but vital signs otherwise unremarkable.  Wheezing was auscultated on initial examination and patient received DuoNebs x2 with noted resolution and wheezing.  CTA abdomen and pelvis displayed sigmoid diverticulitis with a 4 cm intramural abscess.  Patient with no leukocytosis on labs.  Internal medicine was consulted for further care and management moving forward.    Past Medical History:  Past Medical History:   Diagnosis Date    Asthma     Diverticulosis     GERD  (gastroesophageal reflux disease)     Graves disease        Past Surgical History:  No past surgical history on file.    Family History:  Family History   Problem Relation Age of Onset    Hypothyroidism Mother        Social History:  Social History     Tobacco Use    Smoking status: Every Day     Current packs/day: 0.50     Average packs/day: 0.5 packs/day for 41.1 years (20.5 ttl pk-yrs)     Types: Cigarettes     Start date: 1983    Smokeless tobacco: Never    Tobacco comments:     Ambulatory referral to Smoking Cessation clinic following hospital discharge.    Substance Use Topics    Alcohol use: Not Currently    Drug use: Never       Allergies:  Review of patient's allergies indicates:   Allergen Reactions    Latex Anaphylaxis and Edema    Zosyn [piperacillin-tazobactam] Anaphylaxis       Home Medications:  Prior to Admission medications    Medication Sig Start Date End Date Taking? Authorizing Provider   ADVAIR DISKUS 250-50 mcg/dose diskus inhaler SMARTSI Puff(s) By Mouth 10/16/23   Provider, Historical   albuterol (ACCUNEB) 1.25 mg/3 mL Nebu Take by nebulization every 4 to 6 hours as needed. 24   Provider, Historical   albuterol (PROAIR HFA) 90 mcg/actuation inhaler Inhale 2 puffs into the lungs every 6 (six) hours as needed for Wheezing. Rescue 23  Nathanael Romero III, MD   albuterol-ipratropium (DUO-NEB) 2.5 mg-0.5 mg/3 mL nebulizer solution Take 3 mLs by nebulization every 4 (four) hours as needed for Wheezing. Rescue 23  Yeyo Andrews MD   albuterol-ipratropium (DUO-NEB) 2.5 mg-0.5 mg/3 mL nebulizer solution Take 3 mLs by nebulization every 4 (four) hours. Rescue 24  Yeyo Pollack DO   diclofenac (VOLTAREN) 50 MG EC tablet Take 1 tablet (50 mg total) by mouth 2 (two) times daily as needed (Pain). 24   Alfonso Arreola MD   doxycycline (VIBRA-TABS) 100 MG tablet Take 100 mg by mouth 2 (two) times daily. 23   Provider, Historical    ELIQUIS 5 mg Tab Take 5 mg by mouth 2 (two) times daily. 11/13/23   Provider, Historical   fluticasone propionate (FLONASE) 50 mcg/actuation nasal spray 1 spray (50 mcg total) by Each Nostril route daily as needed for Allergies or Rhinitis.  Patient not taking: Reported on 11/30/2023 5/1/23   Yeyo Andrews MD   methIMAzole (TAPAZOLE) 5 MG Tab Take a half of a tablet daily to Total 2.5 mg 11/6/23   Delaney Morris, NP   omeprazole (PRILOSEC) 20 MG capsule Take 20 mg by mouth. 1/15/24   Provider, Historical   oseltamivir (TAMIFLU) 75 MG capsule Take 1 capsule (75 mg total) by mouth 2 (two) times daily. for 3 days 1/25/24 1/28/24  Yeyo Pollack DO   predniSONE (DELTASONE) 20 MG tablet Take 2 tablets (40 mg total) by mouth once daily. for 5 days 1/21/24 1/26/24  Fabien Bonilla MD   predniSONE (DELTASONE) 20 MG tablet Take 1 tablet (20 mg total) by mouth 2 (two) times daily. for 3 days 1/25/24 1/28/24  Yeyo Pollack DO   predniSONE (DELTASONE) 20 MG tablet Take 1 tablet (20 mg total) by mouth once daily. for 3 days 1/29/24 2/1/24  Yeyo Pollack DO   predniSONE (DELTASONE) 20 MG tablet Take 0.5 tablets (10 mg total) by mouth once daily. for 3 days 2/1/24 2/4/24  Yeyo Pollack DO   sucralfate (CARAFATE) 1 gram tablet Take 1 tablet by mouth 4 (four) times daily before meals and nightly. 1/23/24 2/22/24  Provider, Historical         Review of Systems:  Review of Systems   Constitutional:  Positive for chills and fever. Negative for malaise/fatigue.   Respiratory:  Positive for shortness of breath and wheezing. Negative for cough, hemoptysis and sputum production.    Cardiovascular:  Negative for chest pain, palpitations and leg swelling.   Gastrointestinal:  Positive for abdominal pain, constipation and nausea. Negative for diarrhea and vomiting.   Genitourinary:  Negative for dysuria, flank pain, frequency, hematuria and urgency.   Musculoskeletal:  Negative for myalgias.   Neurological:  Negative for  "dizziness, tingling, tremors and headaches.       Objective:   Last 24 Hour Vital Signs:  BP  Min: 127/86  Max: 138/76  Temp  Av.3 °F (37.4 °C)  Min: 99.3 °F (37.4 °C)  Max: 99.3 °F (37.4 °C)  Pulse  Av  Min: 78  Max: 102  Resp  Av  Min: 18  Max: 20  SpO2  Av %  Min: 97 %  Max: 100 %  Height  Av' 10.66" (149 cm)  Min: 4' 10.66" (149 cm)  Max: 4' 10.66" (149 cm)  Weight  Av kg (152 lb 1.9 oz)  Min: 69 kg (152 lb 1.9 oz)  Max: 69 kg (152 lb 1.9 oz)  Body mass index is 31.08 kg/m².  No intake/output data recorded.    Physical Examination:  Physical Exam  Vitals reviewed.   Constitutional:       General: She is in acute distress.      Appearance: Normal appearance.   Cardiovascular:      Rate and Rhythm: Normal rate and regular rhythm.      Pulses: Normal pulses.      Heart sounds: No murmur heard.     No friction rub. No gallop.   Pulmonary:      Effort: Pulmonary effort is normal.      Breath sounds: Wheezing present. No rhonchi or rales.   Abdominal:      General: There is no distension.      Palpations: Abdomen is soft.      Tenderness: There is abdominal tenderness.      Comments: LLQ tenderness on light palpation    Musculoskeletal:         General: No swelling or tenderness.   Skin:     General: Skin is warm and dry.   Neurological:      General: No focal deficit present.      Mental Status: She is alert. Mental status is at baseline.   Psychiatric:         Mood and Affect: Mood normal.         Behavior: Behavior normal.       Laboratory:  Most Recent Data:  CBC:   Lab Results   Component Value Date    WBC 6.70 2024    HGB 12.5 2024    HCT 37.5 2024     2024    MCV 95.7 (H) 2024    RDW 14.3 2024     WBC Differential:   Recent Labs   Lab 24  2255 24  0444 24  0621 24  0851   WBC 6.41 5.92 5.16 6.70   HGB 13.2 12.9 12.7 12.5   HCT 39.9 39.3 38.8 37.5    208 204 200   MCV 94.5* 95.6* 95.6* 95.7*     BMP:   Lab " Results   Component Value Date     01/27/2024    K 3.6 01/27/2024     02/19/2023    CO2 24 01/27/2024    BUN 10.6 01/27/2024    CREATININE 0.86 01/27/2024    GLU 97 02/19/2023    CALCIUM 8.2 (L) 01/27/2024    MG 2.00 01/24/2024    PHOS 3.3 09/15/2023     LFTs:   Lab Results   Component Value Date    PROT 7.5 02/19/2023    ALBUMIN 3.0 (L) 01/27/2024    BILITOT 0.2 01/27/2024    AST 15 01/27/2024    ALKPHOS 63 01/27/2024    ALT 11 01/27/2024     Coags:   Lab Results   Component Value Date    INR 1.2 10/19/2023    PROTIME 14.6 (H) 10/19/2023     FLP:   Lab Results   Component Value Date    CHOL 147 03/31/2022    HDL 51 03/31/2022    TRIG 61 03/31/2022     DM:   Lab Results   Component Value Date    HGBA1C 5.9 (H) 11/23/2022    HGBA1C 5.3 06/28/2022    HGBA1C 5.4 03/31/2022    CREATININE 0.86 01/27/2024     Thyroid:   Lab Results   Component Value Date    TSH 0.704 11/30/2023    FREET4 3.09 (H) 01/22/2023     Anemia:   Lab Results   Component Value Date    CCNTJYEO92 426 08/17/2023    FOLATE 5.2 (L) 08/17/2023     Cardiac:   Lab Results   Component Value Date    TROPONINI <0.010 01/24/2024    BNP 39.4 01/24/2024     Urinalysis:   Lab Results   Component Value Date    COLORU Yellow 02/15/2023    PHUA 5.0 11/30/2023    SPECGRAV 1.010 02/15/2023    NITRITE Negative 02/15/2023    GLUCOSEU Negative 04/07/2022    KETONESU Negative 02/15/2023    UROBILINOGEN Normal 11/30/2023    WBCUA 0-5 11/30/2023       Trended Lab Data:  Recent Labs   Lab 01/24/24  0444 01/25/24  0621 01/27/24  0851   WBC 5.92 5.16 6.70   HGB 12.9 12.7 12.5   HCT 39.3 38.8 37.5    204 200   MCV 95.6* 95.6* 95.7*   RDW 13.9 13.7 14.3    142 142   K 3.4* 4.1 3.6   CO2 25 27 24   BUN 9.8 8.5* 10.6   CREATININE 1.03* 0.68 0.86   ALBUMIN 3.5 3.2* 3.0*   BILITOT 0.2 0.1 0.2   AST 12 10 15   ALKPHOS 71 74 63   ALT 12 10 11       Trended Cardiac Data:  Recent Labs   Lab 01/20/24  2255 01/24/24  0444   TROPONINI <0.010 <0.010   BNP 13.4 39.4        Radiology:  Imaging Results              CT Abdomen Pelvis With IV Contrast NO Oral Contrast (Final result)  Result time 01/27/24 10:05:42      Final result by Juan Leavitt MD (01/27/24 10:05:42)                   Impression:      Sigmoid diverticulitis with appearance of intramural abscess.      Electronically signed by: Juan Leavitt  Date:    01/27/2024  Time:    10:05               Narrative:    EXAMINATION:  CT ABDOMEN PELVIS WITH IV CONTRAST    CLINICAL HISTORY:  Abdominal abscess/infection suspected;    TECHNIQUE:  Multidetector IV contrast enhanced axial CT images of the abdomen and pelvis were obtained with coronal and sagittal reconstructions.    Automatic exposure control was utilized to reduce the patient's radiation dose.    DLP= 259    COMPARISON:  01/01/2024    FINDINGS:  01. HEPATOBILIARY: No focal hepatic lesion is identified, The gallbladder is normal.    02. SPLEEN: Normal    03. PANCREAS: No focal masses or ductal dilatation.    04. ADRENALS: No adrenal nodules.    05. KIDNEYS: The right kidney demonstrates no stone, hydronephrosis, or hydroureter. No focal mass identified. The left kidney demonstrates no stone, hydronephrosis, or hydroureter. No focal mass identified.    06. LYMPHADENOPATHY/RETROPERITONEUM: There is no retroperitoneal lymphadenopathy. The abdominal aorta is normal in course and caliber.    07. BOWEL: Inflammatory change of the sigmoid colon with appearance of intramural abscess measuring approximately 4 cm.  There is minimal extension along the anterior abdominal wall musculature (series 2, image 102).    08. PELVIC VISCERA: Enhancing uterine mass is noted most likely represents a fibroid.    09. PELVIC LYMPH NODES: No lymphadenopathy.    10. PERITONEUM/ABDOMINAL WALL: No ascites or implant.    11. SKELETAL: No aggressive appearing lytic/blastic lesion. No acute fractures, subluxations or dislocations.    12. LUNG BASES: The visualized lungs are unremarkable.                                        X-Ray Chest 1 View (Final result)  Result time 01/27/24 09:50:09      Final result by Juan Leavitt MD (01/27/24 09:50:09)                   Impression:      No acute cardiopulmonary process.      Electronically signed by: Juan Leavitt  Date:    01/27/2024  Time:    09:50               Narrative:    EXAMINATION:  XR CHEST 1 VIEW    CLINICAL HISTORY:  shortness of breath;    TECHNIQUE:  Single view of the chest    COMPARISON:  01/24/2024    FINDINGS:  No focal opacification, pleural effusion, or pneumothorax.    The cardiomediastinal silhouette is within normal limits.    No acute osseous abnormality.                                      Assessment & Plan:     1) Diverticulitis with 4cm sigmoid abscess      Abdominal pain  - patient reports a 3 day history of left lower quadrant abdominal pain that worsened to 10/10 on the pain scale last night  - patient was previously admitted in August and required long-term IV antibiotics at that time due to diverticulitis with abscess.  At that time, there was concern for malignancy versus other inflammatory process due to no resolution of inflammation and need for repeat antibiotics.  Was scheduled for colonoscopy on 11/06/2023.  Patient states that she follows up with GI in [place].  - patient with history and currently adamantly refusing partial colectomy as indicated for repeat diverticulitis  - case management consult placed for records from [place]  - patient started on levofloxacin and Flagyl (D1), patient with noted Zosyn allergy  - surgery consulted for further recommendations being that abscesses 4 cm and we do not have IR services until Tuesday for percutaneous drainage  - repeat abdominal x-ray tomorrow morning  - serial abdominal exams with nursing ordered  - clear liquid diet for now as bowel rest is recommended in the setting of diverticulitis  - morphine 2 mg ordered p.r.n. for pain  - LR at 100 mL/hr due to oral  intake restriction for bowel rest     Hx of Asthma   Influenza A  - received DuoNebs x1 in ED  - patient with mild wheezing on examination  -Tamiflu 75 mg b.i.d. x5 days, complete course from previous admission  - DuoNebs q.4 hours p.r.n.  - avoid steroids in setting of diverticulitis with abscess being treated with antibiotics     History of pulmonary embolism  -originally diagnosed in September of last year, appeared to occur after removal of PICC line  -patient has remained on apixaban 5 mg b.i.d. since, will continue while inpatient     GERD   -continue Protonix 40 mg daily     Graves disease   -TSH and free T4 within normal limits at last check   -continue MMI 2.5 mg daily    CODE STATUS: Full  Access: PIV  Antibiotics: Flagyl and Cipro (D1)  Diet: Clear liquid   DVT Prophylaxis: Eliquis (home)  GI Prophylaxis: Protonx (home)  Fluids: LR @100 mL/hr    Disposition:  Patient admitted for diverticulitis with sigmoid abscess.  Surgery consulted and awaiting further recommendations.  Patient currently on IV antibiotics.    Yeyo Pollack DO  Internal Medicine [id number]

## 2024-01-27 NOTE — CONSULTS
[facility]   Consult Note  General Surgery     Patient Name: Niya Moeller  YOB: 1970  Date of Admission: 1/27/2024  Date: 01/27/2024 11:57 AM  Reason for Consult: Diverticulitis with CT indicated 4cm abscess   HD#0  POD#* No surgery found *     PRESENTING HISTORY      Chief Complaint/Reason for Admission: <principal problem not specified>     History of Present Illness:  Niya Moeller is a 53 y.o. female with PMHx of diverticulitis, Graves disease, asthma, and previous PE who presented to the ED via EMS for 2 day history of abdominal pain. She reports that she had an asthma attack 2 days ago that was treated in the ED. Following the asthma attach, she then developed gradual onset pain in her LLQ that she describes a squeezing. She reports having a continuous subjective fever, and she states an at home thermometer read 100.4F last night. She has had multiple flares of diverticulitis before, with two previous abscesses requiring IR drainage at outside hospitals. She reports a recent attempted colonoscopy with poor visualization but denies any significant findings. Per chart review patient has refused surgical intervention for diverticulosis in the past. The patient is currently taking Eliquis secondary to a previous PE. She states that she currently smokes 0.5-1 pack/day cigarettes.      Review of Systems:  12 point ROS negative except as stated in HPI     PAST HISTORY:   Past medical history:       Past Medical History:   Diagnosis Date    Asthma      Diverticulosis      GERD (gastroesophageal reflux disease)      Graves disease           Past surgical history:  No past surgical history on file.     Family history:        Family History   Problem Relation Age of Onset    Hypothyroidism Mother           Social history:  Social History            Socioeconomic History    Marital status: Single   Tobacco Use    Smoking status: Every Day       Current packs/day: 0.50       Average  packs/day: 0.5 packs/day for 41.1 years (20.5 ttl pk-yrs)       Types: Cigarettes       Start date: 1/1/1983    Smokeless tobacco: Never    Tobacco comments:       Ambulatory referral to Smoking Cessation clinic following hospital discharge.    Substance and Sexual Activity    Alcohol use: Not Currently    Drug use: Never    Sexual activity: Not Currently       Birth control/protection: Abstinence       Comment: States is asexual      Social Determinants of Health           Financial Resource Strain: Low Risk  (5/1/2023)     Overall Financial Resource Strain (CARDIA)      Difficulty of Paying Living Expenses: Not very hard   Food Insecurity: No Food Insecurity (5/1/2023)     Hunger Vital Sign      Worried About Running Out of Food in the Last Year: Never true      Ran Out of Food in the Last Year: Never true   Transportation Needs: No Transportation Needs (5/1/2023)     PRAPARE - Transportation      Lack of Transportation (Medical): No      Lack of Transportation (Non-Medical): No   Physical Activity: Inactive (5/1/2023)     Exercise Vital Sign      Days of Exercise per Week: 0 days      Minutes of Exercise per Session: 0 min   Stress: No Stress Concern Present (5/1/2023)     [employer] [place] [employer] - Occupational Stress Questionnaire      Feeling of Stress : Not at all   Social Connections: Socially Isolated (5/1/2023)     Social Connection and Isolation Panel [NHANES]      Frequency of Communication with Friends and Family: More than three times a week      Frequency of Social Gatherings with Friends and Family: Twice a week      Attends Congregation Services: Never      Active Member of Clubs or Organizations: No      Attends Club or Organization Meetings: Never      Marital Status: Never    Housing Stability: Low Risk  (5/1/2023)     Housing Stability Vital Sign      Unable to Pay for Housing in the Last Year: No      Number of Places Lived in the Last Year: 2      Unstable Housing in the  Last Year: No      Social History           Tobacco Use   Smoking Status Every Day    Current packs/day: 0.50    Average packs/day: 0.5 packs/day for 41.1 years (20.5 ttl pk-yrs)    Types: Cigarettes    Start date: 1983   Smokeless Tobacco Never   Tobacco Comments     Ambulatory referral to Smoking Cessation clinic following hospital discharge.          MEDICATIONS & ALLERGIES:   Allergies:        Review of patient's allergies indicates:   Allergen Reactions    Latex Anaphylaxis and Edema    Zosyn [piperacillin-tazobactam] Anaphylaxis         No current facility-administered medications on file prior to encounter.           Current Outpatient Medications on File Prior to Encounter   Medication Sig    ADVAIR DISKUS 250-50 mcg/dose diskus inhaler SMARTSI Puff(s) By Mouth    albuterol (ACCUNEB) 1.25 mg/3 mL Nebu Take by nebulization every 4 to 6 hours as needed.    albuterol (PROAIR HFA) 90 mcg/actuation inhaler Inhale 2 puffs into the lungs every 6 (six) hours as needed for Wheezing. Rescue    albuterol-ipratropium (DUO-NEB) 2.5 mg-0.5 mg/3 mL nebulizer solution Take 3 mLs by nebulization every 4 (four) hours as needed for Wheezing. Rescue    albuterol-ipratropium (DUO-NEB) 2.5 mg-0.5 mg/3 mL nebulizer solution Take 3 mLs by nebulization every 4 (four) hours. Rescue    diclofenac (VOLTAREN) 50 MG EC tablet Take 1 tablet (50 mg total) by mouth 2 (two) times daily as needed (Pain).    doxycycline (VIBRA-TABS) 100 MG tablet Take 100 mg by mouth 2 (two) times daily.    ELIQUIS 5 mg Tab Take 5 mg by mouth 2 (two) times daily.    fluticasone propionate (FLONASE) 50 mcg/actuation nasal spray 1 spray (50 mcg total) by Each Nostril route daily as needed for Allergies or Rhinitis. (Patient not taking: Reported on 2023)    methIMAzole (TAPAZOLE) 5 MG Tab Take a half of a tablet daily to Total 2.5 mg    omeprazole (PRILOSEC) 20 MG capsule Take 20 mg by mouth.    oseltamivir (TAMIFLU) 75 MG capsule Take 1 capsule (75  mg total) by mouth 2 (two) times daily. for 3 days    [] predniSONE (DELTASONE) 20 MG tablet Take 2 tablets (40 mg total) by mouth once daily. for 5 days    predniSONE (DELTASONE) 20 MG tablet Take 1 tablet (20 mg total) by mouth 2 (two) times daily. for 3 days    [START ON 2024] predniSONE (DELTASONE) 20 MG tablet Take 1 tablet (20 mg total) by mouth once daily. for 3 days    [START ON 2024] predniSONE (DELTASONE) 20 MG tablet Take 0.5 tablets (10 mg total) by mouth once daily. for 3 days    sucralfate (CARAFATE) 1 gram tablet Take 1 tablet by mouth 4 (four) times daily before meals and nightly.         Scheduled Meds:   apixaban  5 mg Oral BID    levoFLOXacin  750 mg Intravenous Q24H    [START ON 2024] methIMAzole  2.5 mg Oral Daily    metronidazole  500 mg Intravenous Q8H    oseltamivir  75 mg Oral BID    pantoprazole  40 mg Oral Daily         Continuous Infusions:     PRN Meds:acetaminophen, albuterol, albuterol-ipratropium, melatonin, morphine, sodium chloride 0.9%     OBJECTIVE:      Vital Signs:  Temp:  [99.1 °F (37.3 °C)-99.3 °F (37.4 °C)] 99.1 °F (37.3 °C)  Pulse:  [] 80  Resp:  [18-20] 18  SpO2:  [96 %-100 %] 96 %  BP: ()/(64-86) 99/64  Body mass index is 31.08 kg/m².      No intake/output data recorded.  No intake/output data recorded.     Physical Exam:  General:  Well developed, well nourished  HEENT:  Normocephalic, atraumatic, EOMI, clear sclera, ears normal, neck supple  CVS:  RR  Resp:  Normal work of breathing on RA, equal rise and fall of the chest  GI: Abdomen soft, non-distended, no masses, tenderness to palpation in LLQ and lower midline with voluntary guarding. No rebound or signs of peritonitis  :  Deferred  MSK:  No muscle atrophy, cyanosis, peripheral edema, moving all extremities spontaneously  Vascular: All extremities WWP  Skin:  No rashes, ulcers, erythema  Neuro:  CNII-XII grossly intact, alert and oriented to person, place, and time     Laboratory:    "    Recent Labs     01/25/24  0621 01/27/24  0851   WBC 5.16 6.70   HGB 12.7 12.5   HCT 38.8 37.5    200           Recent Labs     01/25/24  0621 01/27/24  0851    142   K 4.1 3.6   CO2 27 24   BUN 8.5* 10.6   CREATININE 0.68 0.86   CALCIUM 9.0 8.2*   ALBUMIN 3.2* 3.0*   BILITOT 0.1 0.2   AST 10 15   ALKPHOS 74 63   ALT 10 11      Troponin:  No results for input(s): "TROPONINI" in the last 72 hours.  CBC:       Recent Labs     01/25/24  0621 01/27/24  0851   WBC 5.16 6.70   RBC 4.06* 3.92*   HGB 12.7 12.5   HCT 38.8 37.5    200   MCV 95.6* 95.7*   MCH 31.3* 31.9*   MCHC 32.7* 33.3      CMP:       Recent Labs     01/25/24  0621 01/27/24  0851   CALCIUM 9.0 8.2*   ALBUMIN 3.2* 3.0*    142   K 4.1 3.6   CO2 27 24   BUN 8.5* 10.6   CREATININE 0.68 0.86   ALKPHOS 74 63   ALT 10 11   AST 10 15   BILITOT 0.1 0.2      Lactic Acid:  0.8     Diagnostic Results:  CT Abdomen Pelvis With IV Contrast NO Oral Contrast     Result Date: 1/27/2024  Sigmoid diverticulitis with appearance of intramural abscess. Electronically signed by:      Juan Leavitt Date:                                       01/27/2024 Time:                                            10:05     X-Ray Chest 1 View     Result Date: 1/27/2024  No acute cardiopulmonary process. Electronically signed by:          Juan Leavitt Date:                                       01/27/2024 Time:                                            09:50       Microbiology:  Microbiology Results (last 7 days)         Procedure Component Value Units Date/Time     Blood Culture #1 **CANNOT BE ORDERED STAT** [[id number]] Collected: 01/27/24 1035     Order Status: Sent Specimen: Blood from Arm, Right Updated: 01/27/24 1059     Blood Culture #2 **CANNOT BE ORDERED STAT** [[id number]] Collected: 01/27/24 1036     Order Status: Sent Specimen: Blood from Arm, Left Updated: 01/27/24 1059                ASSESSMENT & PLAN:   Niya Moeller is a 53 y.o. female with PMHx " of diverticulitis with abscess, Grave's disease, asthma, and previous PE who is presenting with CT indicated diverticulitis with 4cm abscess. Patient is hemodynamically stable, with no leukocytosis or recorded temperature greater than 100.4.     Plan:  -Surgical intervention not indicated at this time, patient is stable to continue on current antibiotics.   -Continue morning CBC to monitor for leukocytosis.  -Continue vital signs monitoring for fever and/of hemodynamic instability.     Robertacourtney Reynolds, MS3  [facility]  1/27/2024  11:57 AM     PGY-3 ADDENDUM  I have seen and examined the patient with the student. Above note has been reviewed and edited.     Patient with history of multiple episodes of diverticulitis requiring hospitalizations, and patient states she has had previous drainage procedures for abscesses. Per chart review, patient has refused surgical intervention for diverticulosis in the past. Currently patient is afebrile, hemodynamically stable, without leukocytosis. She is exhibiting some tenderness to the LLQ but no evidence of peritonitis. IR is not currently available for percutaneous drainage of her abscess. She is currently stable and can continue IV antibiotics at this time and monitor her progression. We discussed possible need/benefit from surgical intervention should her condition worsen or alternatively in the elective setting if she were to improve in order to prevent recurrent episodes, but patient seemed to be disinterested in surgery.    Of note, patient with history RUE basilic vein occlusive thrombus following PICC line with subsequent RLL PE which has shown resolution on latest CTA on 01/24/2024. Patient is still on Eliquis.    We will continue to monitor her progress on IV antibiotics at this time.    Magda Carter MD PGY-3  [facility] General Surgery  1/27/2024 1:11 PM

## 2024-01-28 LAB
ABS NEUT CALC (OHS): 3.26 X10(3)/MCL (ref 2.1–9.2)
ABS NEUT CALC (OHS): 3.36 X10(3)/MCL (ref 2.1–9.2)
ALBUMIN SERPL-MCNC: 2.9 G/DL (ref 3.5–5)
ALBUMIN/GLOB SERPL: 0.9 RATIO (ref 1.1–2)
ALP SERPL-CCNC: 65 UNIT/L (ref 40–150)
ALT SERPL-CCNC: 11 UNIT/L (ref 0–55)
APTT PPP: 44.7 SECONDS (ref 23.2–33.7)
APTT PPP: 62.4 SECONDS (ref 23.2–33.7)
AST SERPL-CCNC: 14 UNIT/L (ref 5–34)
BILIRUB SERPL-MCNC: 0.3 MG/DL
BUN SERPL-MCNC: 6.4 MG/DL (ref 9.8–20.1)
CALCIUM SERPL-MCNC: 8.5 MG/DL (ref 8.4–10.2)
CHLORIDE SERPL-SCNC: 103 MMOL/L (ref 98–107)
CO2 SERPL-SCNC: 25 MMOL/L (ref 22–29)
CREAT SERPL-MCNC: 0.8 MG/DL (ref 0.55–1.02)
EOSINOPHIL NFR BLD MANUAL: 0.07 X10(3)/MCL (ref 0–0.9)
EOSINOPHIL NFR BLD MANUAL: 1 % (ref 0–8)
ERYTHROCYTE [DISTWIDTH] IN BLOOD BY AUTOMATED COUNT: 13.9 % (ref 11.5–17)
ERYTHROCYTE [DISTWIDTH] IN BLOOD BY AUTOMATED COUNT: 14 % (ref 11.5–17)
GFR SERPLBLD CREATININE-BSD FMLA CKD-EPI: >60 MLS/MIN/1.73/M2
GLOBULIN SER-MCNC: 3.1 GM/DL (ref 2.4–3.5)
GLUCOSE SERPL-MCNC: 75 MG/DL (ref 74–100)
HCT VFR BLD AUTO: 36 % (ref 37–47)
HCT VFR BLD AUTO: 38.1 % (ref 37–47)
HGB BLD-MCNC: 11.9 G/DL (ref 12–16)
HGB BLD-MCNC: 12.3 G/DL (ref 12–16)
INR PPP: 1.4
LYMPH ABN # BLD MANUAL: 5 %
LYMPHOCYTES NFR BLD MANUAL: 3.05 X10(3)/MCL
LYMPHOCYTES NFR BLD MANUAL: 3.4 X10(3)/MCL
LYMPHOCYTES NFR BLD MANUAL: 40 % (ref 13–40)
LYMPHOCYTES NFR BLD MANUAL: 46 % (ref 13–40)
MCH RBC QN AUTO: 30.4 PG (ref 27–31)
MCH RBC QN AUTO: 31.3 PG (ref 27–31)
MCHC RBC AUTO-ENTMCNC: 32.3 G/DL (ref 33–36)
MCHC RBC AUTO-ENTMCNC: 33.1 G/DL (ref 33–36)
MCV RBC AUTO: 94.3 FL (ref 80–94)
MCV RBC AUTO: 94.7 FL (ref 80–94)
MONOCYTES NFR BLD MANUAL: 0.67 X10(3)/MCL (ref 0.1–1.3)
MONOCYTES NFR BLD MANUAL: 0.84 X10(3)/MCL (ref 0.1–1.3)
MONOCYTES NFR BLD MANUAL: 11 % (ref 2–11)
MONOCYTES NFR BLD MANUAL: 9 % (ref 2–11)
NEUTROPHILS NFR BLD MANUAL: 44 % (ref 47–80)
NEUTROPHILS NFR BLD MANUAL: 44 % (ref 47–80)
NRBC BLD AUTO-RTO: 0 %
NRBC BLD AUTO-RTO: 0 %
PLATELET # BLD AUTO: 157 X10(3)/MCL (ref 130–400)
PLATELET # BLD AUTO: 163 X10(3)/MCL (ref 130–400)
PLATELET # BLD EST: ADEQUATE 10*3/UL
PLATELET # BLD EST: ADEQUATE 10*3/UL
PMV BLD AUTO: 11 FL (ref 7.4–10.4)
PMV BLD AUTO: 11.5 FL (ref 7.4–10.4)
POTASSIUM SERPL-SCNC: 3.9 MMOL/L (ref 3.5–5.1)
PROT SERPL-MCNC: 6 GM/DL (ref 6.4–8.3)
PROTHROMBIN TIME: 16.9 SECONDS (ref 11.4–14)
RBC # BLD AUTO: 3.8 X10(6)/MCL (ref 4.2–5.4)
RBC # BLD AUTO: 4.04 X10(6)/MCL (ref 4.2–5.4)
RBC MORPH BLD: NORMAL
RBC MORPH BLD: NORMAL
SODIUM SERPL-SCNC: 137 MMOL/L (ref 136–145)
WBC # SPEC AUTO: 7.4 X10(3)/MCL (ref 4.5–11.5)
WBC # SPEC AUTO: 7.63 X10(3)/MCL (ref 4.5–11.5)

## 2024-01-28 PROCEDURE — 63600175 PHARM REV CODE 636 W HCPCS

## 2024-01-28 PROCEDURE — 25500020 PHARM REV CODE 255

## 2024-01-28 PROCEDURE — 25000003 PHARM REV CODE 250

## 2024-01-28 PROCEDURE — 85027 COMPLETE CBC AUTOMATED: CPT

## 2024-01-28 PROCEDURE — 85027 COMPLETE CBC AUTOMATED: CPT | Performed by: STUDENT IN AN ORGANIZED HEALTH CARE EDUCATION/TRAINING PROGRAM

## 2024-01-28 PROCEDURE — 94640 AIRWAY INHALATION TREATMENT: CPT

## 2024-01-28 PROCEDURE — 99900035 HC TECH TIME PER 15 MIN (STAT)

## 2024-01-28 PROCEDURE — 85007 BL SMEAR W/DIFF WBC COUNT: CPT | Performed by: STUDENT IN AN ORGANIZED HEALTH CARE EDUCATION/TRAINING PROGRAM

## 2024-01-28 PROCEDURE — 63600175 PHARM REV CODE 636 W HCPCS: Performed by: STUDENT IN AN ORGANIZED HEALTH CARE EDUCATION/TRAINING PROGRAM

## 2024-01-28 PROCEDURE — 25000242 PHARM REV CODE 250 ALT 637 W/ HCPCS

## 2024-01-28 PROCEDURE — 27000221 HC OXYGEN, UP TO 24 HOURS

## 2024-01-28 PROCEDURE — 85730 THROMBOPLASTIN TIME PARTIAL: CPT | Performed by: STUDENT IN AN ORGANIZED HEALTH CARE EDUCATION/TRAINING PROGRAM

## 2024-01-28 PROCEDURE — 27000207 HC ISOLATION

## 2024-01-28 PROCEDURE — 94761 N-INVAS EAR/PLS OXIMETRY MLT: CPT

## 2024-01-28 PROCEDURE — 21400001 HC TELEMETRY ROOM

## 2024-01-28 PROCEDURE — 85610 PROTHROMBIN TIME: CPT | Performed by: STUDENT IN AN ORGANIZED HEALTH CARE EDUCATION/TRAINING PROGRAM

## 2024-01-28 PROCEDURE — 63600175 PHARM REV CODE 636 W HCPCS: Mod: JZ,JG

## 2024-01-28 PROCEDURE — 80053 COMPREHEN METABOLIC PANEL: CPT

## 2024-01-28 RX ORDER — MORPHINE SULFATE 2 MG/ML
1 INJECTION, SOLUTION INTRAMUSCULAR; INTRAVENOUS ONCE
Status: COMPLETED | OUTPATIENT
Start: 2024-01-28 | End: 2024-01-28

## 2024-01-28 RX ORDER — AMOXICILLIN 250 MG
1 CAPSULE ORAL DAILY PRN
Status: DISCONTINUED | OUTPATIENT
Start: 2024-01-28 | End: 2024-01-30 | Stop reason: HOSPADM

## 2024-01-28 RX ORDER — HEPARIN SODIUM,PORCINE/D5W 25000/250
0-40 INTRAVENOUS SOLUTION INTRAVENOUS CONTINUOUS
Status: DISCONTINUED | OUTPATIENT
Start: 2024-01-28 | End: 2024-01-29

## 2024-01-28 RX ORDER — HEPARIN SODIUM,PORCINE/D5W 25000/250
0-40 INTRAVENOUS SOLUTION INTRAVENOUS CONTINUOUS
Status: DISCONTINUED | OUTPATIENT
Start: 2024-01-28 | End: 2024-01-28

## 2024-01-28 RX ORDER — ONDANSETRON 4 MG/1
8 TABLET, ORALLY DISINTEGRATING ORAL ONCE
Status: COMPLETED | OUTPATIENT
Start: 2024-01-29 | End: 2024-01-28

## 2024-01-28 RX ADMIN — SODIUM CHLORIDE, POTASSIUM CHLORIDE, SODIUM LACTATE AND CALCIUM CHLORIDE: 600; 310; 30; 20 INJECTION, SOLUTION INTRAVENOUS at 03:01

## 2024-01-28 RX ADMIN — APIXABAN 5 MG: 2.5 TABLET, FILM COATED ORAL at 08:01

## 2024-01-28 RX ADMIN — MORPHINE SULFATE 1 MG: 2 INJECTION, SOLUTION INTRAMUSCULAR; INTRAVENOUS at 12:01

## 2024-01-28 RX ADMIN — DOCUSATE SODIUM AND SENNOSIDES 1 TABLET: 8.6; 5 TABLET ORAL at 12:01

## 2024-01-28 RX ADMIN — IOHEXOL 100 ML: 350 INJECTION, SOLUTION INTRAVENOUS at 09:01

## 2024-01-28 RX ADMIN — METRONIDAZOLE 500 MG: 5 INJECTION, SOLUTION INTRAVENOUS at 06:01

## 2024-01-28 RX ADMIN — OSELTAMAVIR PHOSPHATE 75 MG: 75 CAPSULE ORAL at 08:01

## 2024-01-28 RX ADMIN — MORPHINE SULFATE 2 MG: 2 INJECTION, SOLUTION INTRAMUSCULAR; INTRAVENOUS at 03:01

## 2024-01-28 RX ADMIN — METRONIDAZOLE 500 MG: 5 INJECTION, SOLUTION INTRAVENOUS at 10:01

## 2024-01-28 RX ADMIN — MORPHINE SULFATE 1 MG: 2 INJECTION, SOLUTION INTRAMUSCULAR; INTRAVENOUS at 08:01

## 2024-01-28 RX ADMIN — ONDANSETRON 8 MG: 4 TABLET, ORALLY DISINTEGRATING ORAL at 10:01

## 2024-01-28 RX ADMIN — IPRATROPIUM BROMIDE AND ALBUTEROL SULFATE 3 ML: 2.5; .5 SOLUTION RESPIRATORY (INHALATION) at 03:01

## 2024-01-28 RX ADMIN — PANTOPRAZOLE SODIUM 40 MG: 40 TABLET, DELAYED RELEASE ORAL at 08:01

## 2024-01-28 RX ADMIN — METRONIDAZOLE 500 MG: 5 INJECTION, SOLUTION INTRAVENOUS at 03:01

## 2024-01-28 RX ADMIN — MORPHINE SULFATE 2 MG: 2 INJECTION, SOLUTION INTRAMUSCULAR; INTRAVENOUS at 07:01

## 2024-01-28 RX ADMIN — IPRATROPIUM BROMIDE AND ALBUTEROL SULFATE 3 ML: 2.5; .5 SOLUTION RESPIRATORY (INHALATION) at 07:01

## 2024-01-28 RX ADMIN — HEPARIN SODIUM 12 UNITS/KG/HR: 10000 INJECTION, SOLUTION INTRAVENOUS at 11:01

## 2024-01-28 RX ADMIN — LEVOFLOXACIN 750 MG: 750 INJECTION, SOLUTION INTRAVENOUS at 12:01

## 2024-01-28 RX ADMIN — METHIMAZOLE 2.5 MG: 5 TABLET ORAL at 08:01

## 2024-01-28 NOTE — PROGRESS NOTES
[facility] General Surgery   Progress Note  Admit Date: 1/27/2024  HD#1  POD#* No surgery found *    Subjective:   Interval history:  NAEMADINA RUSHINGHDS  Reports persistent LLQ abdominal pain  Tolerating CLD  Passing gas  Denies vomiting. Reports nausea from Flagyl     Objective:     VITAL SIGNS: 24 HR MIN & MAX LAST   Temp  Min: 98.4 °F (36.9 °C)  Max: 99.9 °F (37.7 °C)  98.7 °F (37.1 °C)   BP  Min: 85/45  Max: 117/57  (!) 85/45    Pulse  Min: 74  Max: 89  76    Resp  Min: 16  Max: 20  18    SpO2  Min: 92 %  Max: 100 %  99 %      Intake/output:  None recorded     Lines/drains/airway:  PIV    Physical examination:  Gen: NAD, AAOx3, answering questions appropriately  CV: RR  Resp: NWOB  Abd: soft, nondistended,  TTP to RLQ with voluntary guarding, no rebound or signs of peritonitis  Ext: moving all extremities spontaneously and purposefully  Neuro: CN II-XII grossly intact     Labs:  WBC 7.6 (6.7)  Cr 0.8    Imaging:  XR abdomen without significant interval change    Assessment & Plan:   Niya Moeller is a 53 y.o. female with history of Grave's disease, asthma, and PE (2/2 RUE DVT from PICC in 09/2023) on Eliquis and recurrent diverticulitis who presents with recurrent diverticulitis with abscess.    - Discussed need for surgical intervention vs IR drainage if patient's symptoms do not improve or her condition worsens. Patient reports she has plans to meet with a surgeon in [place] for elective colectomy vs ?stent but adamantly refuses any surgery that would involve possible ostomy, including any surgical intervention this hospital admission.  - Patient continues to be stable without leukocytosis and afebrile on IV antibiotics. Patient continues to not have urgent indication for surgery at this time and is refusing any surgical options at this time.  - Please call surgery with any further questions or concerns as there are no treatments we can offer at this time. Patient has plans to see a surgeon in [place] but she  is welcome to follow-up in our clinic should she change her mind.    Magda Carter MD PGY-3  [employer] General Surgery  1/28/2024 1:16 PM   1:10 PM

## 2024-01-28 NOTE — PROGRESS NOTES
[facility] Progress Note    Patient Name: Niya Moeller  MRN: [id number]  Admission Date: 1/27/2024  Hospital Length of Stay: 1 days  Code Status: Full Code  Attending Provider: Sonia Olivarez*  Primary Care Provider: Oswaldo Riggs NP     Subjective:      Brief HPI:  Niya Moeller is a 53 y.o. female with a past medical history of diverticulitis, asthma, pulmonary embolism, and GERD who presented to the ED via EMS on 1/27/2024  with a primary complaint of abdominal pain.  Patient stated that her abdominal pain started approximately 3 days ago and intensified last night where she rated her pain as a 10/10 on the pain scale.  Patient localizes her abdominal pain in the left lower quadrant and states that it does not radiate elsewhere.  She endorses associated fever, chills, nausea without vomiting, and constipation.  Denies bloody stool.  Patient has been hospitalized several times in the past for diverticulitis requiring IV antibiotics.  Most recently she was admitted at our facility on 10/23/2023 for diverticulitis without abscess.  Patient states that she has been followed up with Gastroenterology in [place] and is supposed to be scheduled for colonoscopy soon.  Patient has adamantly refused partial colectomy in the past for recurrent diverticulitis.     In ED, patient slightly tachycardic with a HR of 100 on arrival but vital signs otherwise unremarkable.  Wheezing was auscultated on initial examination and patient received DuoNebs x2 with noted resolution and wheezing.  CTA abdomen and pelvis displayed sigmoid diverticulitis with a 4 cm intramural abscess.  Patient with no leukocytosis on labs.  Internal medicine was consulted for further care and management moving forward.         Interval History:  Patient continues to have squeezing abdominal pain however she says that it was diffuse however now it is more localized in the left lower quadrant. Patient is afebrile.       Review of  Systems:  The remainder of the 14 point ROS is noncontributory or negative unless mentioned/reviewed above.     Objective:     Vital Signs:  Vital Signs (Most Recent):  Temp: 99.8 °F (37.7 °C) (01/28/24 0756)  Pulse: 84 (01/28/24 0756)  Resp: 17 (01/28/24 0756)  BP: (!) 117/57 (01/28/24 0756)  SpO2: 96 % (01/28/24 0756)  Body mass index is 31.08 kg/m².  Weight: 69 kg (152 lb 1.9 oz) Vital Signs (24h Range):  Temp:  [98.4 °F (36.9 °C)-99.9 °F (37.7 °C)] 99.8 °F (37.7 °C)  Pulse:  [74-89] 84  Resp:  [16-20] 17  SpO2:  [92 %-100 %] 96 %  BP: ()/(57-86) 117/57       Input/output:   No intake or output data in the 24 hours ending 01/28/24 0941    Physical Examination:  General:  Well developed, well nourished, no acute respiratory distress  Head: Normocephalic, atraumatic  Eyes: PERRL, anicteric sclera  Throat: No posterior pharyngeal erythema or exudate, no tonsillar exudate  Neck: supple, normal ROM, no JVD  CVS:  RRR, S1 and S2 normal, no murmurs, no added heart sounds, rubs, gallops, regular peripheral pulses, and no peripheral edema  Resp:  Lungs clear to auscultation bilaterally, no wheezes, rales, or rhonci  GI:  Abdomen soft, non-tender, non-distended, normoactive bowel sounds  MSK:  Full range of motion, no obvious deformities   Skin:  No rashes, ulcers, erythema  Neuro:  Alert and oriented x3, No focal neuro deficits, CNII-XII grossly intact  Psych:  Appropriate mood and affect       Lines/Drains/Airways       None                    Laboratory:    Recent Labs   Lab 01/25/24  0621 01/27/24  0851 01/28/24  0409   WBC 5.16 6.70 7.63   RBC 4.06* 3.92* 4.04*   HGB 12.7 12.5 12.3   HCT 38.8 37.5 38.1   MCV 95.6* 95.7* 94.3*   MCH 31.3* 31.9* 30.4   MCHC 32.7* 33.3 32.3*   RDW 13.7 14.3 14.0    200 163   MPV 11.0* 11.7* 11.5*      Recent Labs   Lab 01/24/24  0444 01/24/24  0522 01/25/24  0621 01/27/24  0851 01/28/24  0409     --  142 142 137   K 3.4*  --  4.1 3.6 3.9   CHLORIDE 109*  --  108* 107  103   CO2 25  --  27 24 25   BUN 9.8  --  8.5* 10.6 6.4*   CREATININE 1.03*  --  0.68 0.86 0.80   CALCIUM 9.1  --  9.0 8.2* 8.5   PH  --  7.440*  --   --   --    MG 2.00  --   --   --   --    ALBUMIN 3.5  --  3.2* 3.0* 2.9*   ALKPHOS 71  --  74 63 65   ALT 12  --  10 11 11   AST 12  --  10 15 14   BILITOT 0.2  --  0.1 0.2 0.3        Other Results:  Estimated Creatinine Clearance: 70.9 mL/min (based on SCr of 0.8 mg/dL).    Current Medications:     Infusions:   heparin (porcine) in D5W      lactated ringers 100 mL/hr at 01/27/24 1325         Scheduled:   heparin (PORCINE)  60 Units/kg (Adjusted) Intravenous Once    levoFLOXacin  750 mg Intravenous Q24H    methIMAzole  2.5 mg Oral Daily    metronidazole  500 mg Intravenous Q8H    oseltamivir  75 mg Oral BID    pantoprazole  40 mg Oral Daily         PRN:   acetaminophen    albuterol    albuterol-ipratropium    benzonatate    heparin (PORCINE)    heparin (PORCINE)    melatonin    morphine    sodium chloride 0.9%        Microbiology Data:  Microbiology Results (last 7 days)       Procedure Component Value Units Date/Time    Blood Culture #1 **CANNOT BE ORDERED STAT** [[id number]] Collected: 01/27/24 1035    Order Status: Resulted Specimen: Blood from Arm, Right Updated: 01/27/24 1059    Blood Culture #2 **CANNOT BE ORDERED STAT** [[id number]] Collected: 01/27/24 1036    Order Status: Resulted Specimen: Blood from Arm, Left Updated: 01/27/24 1059             Antibiotics and Day Number of Therapy:  Antibiotics (From admission, onward)      Start     Stop Route Frequency Ordered    01/27/24 1115  levoFLOXacin 750 mg/150 mL IVPB 750 mg         -- IV Every 24 hours (non-standard times) 01/27/24 1013    01/27/24 1115  metronidazole IVPB 500 mg         -- IV Every 8 hours (non-standard times) 01/27/24 1013             Imaging:  X-Ray Abdomen AP 1 View  EXAMINATION  XR ABDOMEN AP 1 VIEW    CLINICAL HISTORY  Abscess;    TECHNIQUE  A total of 1 AP image(s) of the  abdomen.    COMPARISON  14 February 2023    FINDINGS  Lines/tubes/devices: none present    A non-obstructed bowel gas pattern is present. No intra-abdominal mass effect is appreciated. Detection of air-fluid levels and low-volume pneumoperitoneum is limited due to supine technique.    Included lower thoracic cavity and osseous structures are without acute abnormality.    IMPRESSION  No acute intra-abdominal abnormality.    Electronically signed by: Kishan Allen  Date:    01/28/2024  Time:    08:23        Assessment & Plan:   Diverticulitis with intramural abscess 4 cm in the sigmoid colon   -patient has had several instances of diverticulitis and abscess.  Per chart review patient has had abscess since October 2022 and had a drain placed in 2023.  Patient has required weeks of Zosyn in the past.  -I explained to patient that a surgical resection could benefit her because she has had recurrent diverticulitis with abscess formation however patient is adamant that she does not want a colostomy bag and that she will not entertain the idea at all   -patient was on Zosyn in the past for 6 weeks in [place], however she has an allergy noted on our EMR.  Therefore will continue Levaquin and Flagyl at this time   -consult IR  -patient has pain and is wanting some pain medication, will hold opiate medications and NSAIDs.  Of note patient had an asthma exacerbation was given prednisone in the past couple weeks will be cognizant for perforation.    Hx of Asthma   Influenza A  - received DuoNebs x1 in ED  - patient with mild wheezing on examination  -Tamiflu 75 mg b.i.d. x5 days, complete course from previous admission  - DuoNebs q.4 hours p.r.n.  - avoid steroids in setting of diverticulitis with abscess being treated with antibiotics     History of pulmonary embolism  -originally diagnosed in September of last year, appeared to occur after removal of PICC line  -transition to heparin GTT    GERD   -continue Protonix 40 mg  daily     Graves disease   -TSH and free T4 within normal limits at last check   -continue MMI 2.5 mg daily     CODE STATUS: Full Code   Access: PIV  Antibiotics: levaquin and flagyl  Diet: Diet clear liquid  DVT Prophylaxis: Heparin GTT  GI Prophylaxis: protonix      Disposition:  Patient admitted for diverticulitis with abscess formation this is a recurrent issue for her as she had IR drainage completed early in 2023.  Patient has also had received prolonged courses of IV Zosyn and other antibiotics in the past.  Patient could benefit from surgical intervention however patient is adamant that she does not want it intervention at this time.  Will therefore consult IR.  Transitioned to heparin GTT in anticipation for procedure.      Kelly Miranda MD  Internal Medicine Resident PGY-III  [facility] Med Surg Telemetry  01/28/2024

## 2024-01-28 NOTE — PLAN OF CARE
Problem: Adult Inpatient Plan of Care  Goal: Plan of Care Review  Outcome: Ongoing, Progressing  Flowsheets (Taken 1/28/2024 1651)  Plan of Care Reviewed With: patient  Goal: Absence of Hospital-Acquired Illness or Injury  Outcome: Ongoing, Progressing  Intervention: Identify and Manage Fall Risk  Flowsheets (Taken 1/28/2024 1651)  Safety Promotion/Fall Prevention:   assistive device/personal item within reach   nonskid shoes/socks when out of bed   medications reviewed   high risk medications identified   lighting adjusted   side rails raised x 2   instructed to call staff for mobility  Intervention: Prevent Skin Injury  Flowsheets (Taken 1/28/2024 1651)  Body Position: position changed independently  Skin Protection: adhesive use limited  Intervention: Prevent and Manage VTE (Venous Thromboembolism) Risk  Flowsheets (Taken 1/28/2024 1651)  Activity Management: Ambulated to bathroom - L4  VTE Prevention/Management:   remove, assess skin, and reapply sequential compression device   ambulation promoted   bleeding risk assessed  Intervention: Prevent Infection  Flowsheets (Taken 1/28/2024 1651)  Infection Prevention: hand hygiene promoted

## 2024-01-29 LAB
ALBUMIN SERPL-MCNC: 2.9 G/DL (ref 3.5–5)
ALBUMIN/GLOB SERPL: 0.9 RATIO (ref 1.1–2)
ALP SERPL-CCNC: 59 UNIT/L (ref 40–150)
ALT SERPL-CCNC: 10 UNIT/L (ref 0–55)
APTT PPP: 57.7 SECONDS (ref 23.2–33.7)
APTT PPP: 87.9 SECONDS (ref 23.2–33.7)
AST SERPL-CCNC: 14 UNIT/L (ref 5–34)
BASOPHILS # BLD AUTO: 0.02 X10(3)/MCL
BASOPHILS NFR BLD AUTO: 0.2 %
BILIRUB SERPL-MCNC: 0.3 MG/DL
BUN SERPL-MCNC: 5.5 MG/DL (ref 9.8–20.1)
CALCIUM SERPL-MCNC: 8.8 MG/DL (ref 8.4–10.2)
CHLORIDE SERPL-SCNC: 103 MMOL/L (ref 98–107)
CO2 SERPL-SCNC: 27 MMOL/L (ref 22–29)
CREAT SERPL-MCNC: 0.76 MG/DL (ref 0.55–1.02)
EOSINOPHIL # BLD AUTO: 0.02 X10(3)/MCL (ref 0–0.9)
EOSINOPHIL NFR BLD AUTO: 0.2 %
ERYTHROCYTE [DISTWIDTH] IN BLOOD BY AUTOMATED COUNT: 13.5 % (ref 11.5–17)
GFR SERPLBLD CREATININE-BSD FMLA CKD-EPI: >60 MLS/MIN/1.73/M2
GLOBULIN SER-MCNC: 3.3 GM/DL (ref 2.4–3.5)
GLUCOSE SERPL-MCNC: 82 MG/DL (ref 74–100)
HCT VFR BLD AUTO: 38 % (ref 37–47)
HGB BLD-MCNC: 12.6 G/DL (ref 12–16)
HOLD SPECIMEN: NORMAL
HOLD SPECIMEN: NORMAL
IMM GRANULOCYTES # BLD AUTO: 0.02 X10(3)/MCL (ref 0–0.04)
IMM GRANULOCYTES NFR BLD AUTO: 0.2 %
LYMPHOCYTES # BLD AUTO: 3.17 X10(3)/MCL (ref 0.6–4.6)
LYMPHOCYTES NFR BLD AUTO: 37.4 %
MCH RBC QN AUTO: 31.1 PG (ref 27–31)
MCHC RBC AUTO-ENTMCNC: 33.2 G/DL (ref 33–36)
MCV RBC AUTO: 93.8 FL (ref 80–94)
MONOCYTES # BLD AUTO: 0.81 X10(3)/MCL (ref 0.1–1.3)
MONOCYTES NFR BLD AUTO: 9.6 %
NEUTROPHILS # BLD AUTO: 4.44 X10(3)/MCL (ref 2.1–9.2)
NEUTROPHILS NFR BLD AUTO: 52.4 %
NRBC BLD AUTO-RTO: 0 %
PLATELET # BLD AUTO: 155 X10(3)/MCL (ref 130–400)
PMV BLD AUTO: 11.2 FL (ref 7.4–10.4)
POTASSIUM SERPL-SCNC: 3.9 MMOL/L (ref 3.5–5.1)
PROT SERPL-MCNC: 6.2 GM/DL (ref 6.4–8.3)
RBC # BLD AUTO: 4.05 X10(6)/MCL (ref 4.2–5.4)
SODIUM SERPL-SCNC: 139 MMOL/L (ref 136–145)
WBC # SPEC AUTO: 8.48 X10(3)/MCL (ref 4.5–11.5)

## 2024-01-29 PROCEDURE — 85730 THROMBOPLASTIN TIME PARTIAL: CPT | Performed by: STUDENT IN AN ORGANIZED HEALTH CARE EDUCATION/TRAINING PROGRAM

## 2024-01-29 PROCEDURE — 25000003 PHARM REV CODE 250

## 2024-01-29 PROCEDURE — 27000221 HC OXYGEN, UP TO 24 HOURS

## 2024-01-29 PROCEDURE — 25000003 PHARM REV CODE 250: Performed by: STUDENT IN AN ORGANIZED HEALTH CARE EDUCATION/TRAINING PROGRAM

## 2024-01-29 PROCEDURE — 21400001 HC TELEMETRY ROOM

## 2024-01-29 PROCEDURE — 80053 COMPREHEN METABOLIC PANEL: CPT

## 2024-01-29 PROCEDURE — 27000207 HC ISOLATION

## 2024-01-29 PROCEDURE — 25000242 PHARM REV CODE 250 ALT 637 W/ HCPCS

## 2024-01-29 PROCEDURE — 99900035 HC TECH TIME PER 15 MIN (STAT)

## 2024-01-29 PROCEDURE — 63600175 PHARM REV CODE 636 W HCPCS

## 2024-01-29 PROCEDURE — 97165 OT EVAL LOW COMPLEX 30 MIN: CPT

## 2024-01-29 PROCEDURE — 63600175 PHARM REV CODE 636 W HCPCS: Mod: JZ,JG

## 2024-01-29 PROCEDURE — 85025 COMPLETE CBC W/AUTO DIFF WBC: CPT

## 2024-01-29 PROCEDURE — 94761 N-INVAS EAR/PLS OXIMETRY MLT: CPT

## 2024-01-29 PROCEDURE — 94640 AIRWAY INHALATION TREATMENT: CPT

## 2024-01-29 PROCEDURE — 97161 PT EVAL LOW COMPLEX 20 MIN: CPT

## 2024-01-29 RX ORDER — TRAMADOL HYDROCHLORIDE 50 MG/1
50 TABLET ORAL ONCE
Status: COMPLETED | OUTPATIENT
Start: 2024-01-29 | End: 2024-01-29

## 2024-01-29 RX ORDER — MORPHINE SULFATE 2 MG/ML
2 INJECTION, SOLUTION INTRAMUSCULAR; INTRAVENOUS ONCE
Status: COMPLETED | OUTPATIENT
Start: 2024-01-29 | End: 2024-01-29

## 2024-01-29 RX ORDER — KETOROLAC TROMETHAMINE 30 MG/ML
15 INJECTION, SOLUTION INTRAMUSCULAR; INTRAVENOUS EVERY 6 HOURS PRN
Status: DISCONTINUED | OUTPATIENT
Start: 2024-01-29 | End: 2024-01-29

## 2024-01-29 RX ADMIN — METHIMAZOLE 2.5 MG: 5 TABLET ORAL at 08:01

## 2024-01-29 RX ADMIN — TRAMADOL HYDROCHLORIDE 50 MG: 50 TABLET, COATED ORAL at 04:01

## 2024-01-29 RX ADMIN — MORPHINE SULFATE 2 MG: 2 INJECTION, SOLUTION INTRAMUSCULAR; INTRAVENOUS at 06:01

## 2024-01-29 RX ADMIN — IPRATROPIUM BROMIDE AND ALBUTEROL SULFATE 3 ML: 2.5; .5 SOLUTION RESPIRATORY (INHALATION) at 09:01

## 2024-01-29 RX ADMIN — KETOROLAC TROMETHAMINE 15 MG: 30 INJECTION, SOLUTION INTRAMUSCULAR; INTRAVENOUS at 09:01

## 2024-01-29 RX ADMIN — PANTOPRAZOLE SODIUM 40 MG: 40 TABLET, DELAYED RELEASE ORAL at 08:01

## 2024-01-29 RX ADMIN — IPRATROPIUM BROMIDE AND ALBUTEROL SULFATE 3 ML: 2.5; .5 SOLUTION RESPIRATORY (INHALATION) at 07:01

## 2024-01-29 RX ADMIN — ERTAPENEM 1 G: 1 INJECTION INTRAMUSCULAR; INTRAVENOUS at 11:01

## 2024-01-29 RX ADMIN — SODIUM CHLORIDE, POTASSIUM CHLORIDE, SODIUM LACTATE AND CALCIUM CHLORIDE 1000 ML: 600; 310; 30; 20 INJECTION, SOLUTION INTRAVENOUS at 04:01

## 2024-01-29 RX ADMIN — METRONIDAZOLE 500 MG: 5 INJECTION, SOLUTION INTRAVENOUS at 02:01

## 2024-01-29 NOTE — PT/OT/SLP EVAL
Occupational Therapy   Evaluation and Discharge Note    Name: Niya Moeller  MRN: [id number]  Admitting Diagnosis: Final diagnoses:  [K57.20] Diverticulitis of large intestine with abscess without bleeding (Primary)  [J45.41] Moderate persistent asthma with exacerbation   Patient Active Problem List   Diagnosis    Obesity    Asthma exacerbation    GERD (gastroesophageal reflux disease)    Diverticulitis    Colitis    Moderate persistent asthma with (acute) exacerbation    Hyperthyroidism    Thyroiditis    Diastolic dysfunction, left ventricle    Tachycardia    Other chest pain    Hypoglycemia    Thrombocytopathia    Diverticulitis of intestine with abscess    Asthma    Acute abdominal pain    Acute diverticulitis    Allergic conjunctivitis and rhinitis    Macrocytic anemia    Superficial vein thrombosis      Recent Surgery: * No surgery found *      Recommendations:     Discharge Recommendations: No Therapy Indicated  Discharge Equipment Recommendations:  none  Barriers to discharge:  None    Assessment:     Niya Moeller is a 53 y.o. female with a medical diagnosis of diverticulitis of large intestine with abscess without bleeding . At this time, patient is functioning at their prior level of function and does not require further acute OT services.     Plan:     During this hospitalization, patient does not require further acute OT services.  Please re-consult if situation changes.    Plan of Care Reviewed with: patient    Subjective     Chief Complaint: Pt wants to eat real food - I'm hungry !  Patient/Family Comments/goals: return home     Occupational Profile:  Living Environment: Pt lives alone in single story home with no steps and walk in shower   Previous level of function: Pt was independent basic and I ADL's and ambulated without AD   Roles and Routines: Pt drives and works full time and cooks and performs chores  Equipment Used at home: none  Assistance upon Discharge: none    Pain/Comfort:  Pain Rating  1: 7/10  Location - Side 1: Bilateral  Location - Orientation 1: lower  Pain Addressed 1: Nurse notified  Pain Rating Post-Intervention 1: 7/10    Patients cultural, spiritual, Sabianism conflicts given the current situation: no    Objective:     Communicated with: Nurse Noguera prior to session.  Patient found HOB elevated with peripheral IV, oxygen upon OT entry to room.    General Precautions: Standard, droplet  Orthopedic Precautions: N/A  Braces: N/A  Respiratory Status: Room air     VITALS  Preactivity  BP 95/65  HR 67  O2 100%    Occupational Performance:    Bed Mobility:    Patient completed Supine to Sit with independence  Patient completed Sit to Supine with independence    Functional Mobility/Transfers:  Patient completed Sit <> Stand Transfer with independence  with  no assistive device   Patient completed Toilet Transfer Step Transfer technique with independence with  no AD  Functional Mobility: Pt ambulated in room independently in room for basic self care tasks ~ 30 feet ; no LOB and steady gait    Activities of Daily Living:  Feeding:  independence    Grooming: independence    Upper Body Dressing: independence    Lower Body Dressing: independence    Toileting: independence      Cognitive/Visual Perceptual:  Cognitive/Psychosocial Skills:     -       Oriented to: Person, Place, Time, and Situation   -       Follows Commands/attention:Follows multistep  commands  -       Safety awareness/insight to disability: intact     Physical Exam:  Pt is right hand dominant    B UE AROM, strength and coordination WFL's .        Treatment & Education:  Pt. educated on OT  POC and use of call bell for assist as needed     Patient left HOB elevated with all lines intact, call button in reach, and nurse  notified    GOALS:   Multidisciplinary Problems       Occupational Therapy Goals       Not on file                    History:     Past Medical History:   Diagnosis Date    Asthma     Diverticulosis     GERD  (gastroesophageal reflux disease)     Graves disease        No past surgical history on file.    Time Tracking:     OT Date of Treatment: 01/29/24  OT Start Time: 1052  OT Stop Time: 1102  OT Total Time (min): 10 min    Billable Minutes:Evaluation 10 min     1/29/2024

## 2024-01-29 NOTE — CONSULTS
[facility]   Inpatient Infectious Diseases Consultation    Physician requesting consultation: Sonia Olivarez*  Service requesting consultation: Internal Medicine  Reason for consultation: Antibiotic management    Historian: Patient and Electronic Medical Record    Isolation Status: Droplet     HPI:     Niya Moeller is a 53 y.o. female with a PM Hsignificant for recurrent diverticulitis with previous abscesses, asthma, PE, GERD, and recent influenza A infection who presented to the ED via EMS on 1/27/2024 with a primary complaint of severe left sided abdominal pain associated with fever, chills, n/v, and constipation.  Patient stated that her abdominal pain started on 1/25/24 and intensified 1/27/24 to a 10/10 with fever 101.1 prompting presentation to the ER. She denies melena or hematochezia.  Patient has been hospitalized several times in the past for diverticulitis requiring IV antibiotics, notably in Feb 2023 with an abscess that was drained and found to be ESBL E. coli requiring Ertapenem.  Most recently she was admitted at our facility on 10/23/2023 for diverticulitis without abscess.  At this time, patient is currently refusing any surgical intervention that would result in any form of colostomy (including temporary). CT scan showed significant sigmoid mural abscess about 4 x 5.4 cm in size, so she was  started on Levaquin and Flagyl on 1/27 which was switched to Ertapenem on 1/29.          Antibiotic history:  Levaquin: 1/27-1/29  Flagyl: 1/27-1/29  Ertapenem: 1/29-p      Past Medical History/Past Surgical History/Social History     Past Medical History:   Diagnosis Date    Asthma     Diverticulosis     GERD (gastroesophageal reflux disease)     Graves disease        No past surgical history on file.     FAMILY HISTORY:  family history includes Hypothyroidism in her mother.     SOCIAL HISTORY:   Social History     Socioeconomic History    Marital status: Single    Tobacco Use    Smoking status: Every Day     Current packs/day: 0.50     Average packs/day: 0.5 packs/day for 41.1 years (20.5 ttl pk-yrs)     Types: Cigarettes     Start date: 1/1/1983    Smokeless tobacco: Never    Tobacco comments:     Ambulatory referral to Smoking Cessation clinic following hospital discharge.    Substance and Sexual Activity    Alcohol use: Not Currently    Drug use: Never    Sexual activity: Not Currently     Birth control/protection: Abstinence     Comment: States is asexual     Social Determinants of Health     Financial Resource Strain: Low Risk  (5/1/2023)    Overall Financial Resource Strain (CARDIA)     Difficulty of Paying Living Expenses: Not very hard   Food Insecurity: No Food Insecurity (5/1/2023)    Hunger Vital Sign     Worried About Running Out of Food in the Last Year: Never true     Ran Out of Food in the Last Year: Never true   Transportation Needs: No Transportation Needs (5/1/2023)    PRAPARE - Transportation     Lack of Transportation (Medical): No     Lack of Transportation (Non-Medical): No   Physical Activity: Inactive (5/1/2023)    Exercise Vital Sign     Days of Exercise per Week: 0 days     Minutes of Exercise per Session: 0 min   Stress: No Stress Concern Present (5/1/2023)    Citizen of [place] of Occupational Health - Occupational Stress Questionnaire     Feeling of Stress : Not at all   Social Connections: Socially Isolated (5/1/2023)    Social Connection and Isolation Panel [NHANES]     Frequency of Communication with Friends and Family: More than three times a week     Frequency of Social Gatherings with Friends and Family: Twice a week     Attends Anabaptist Services: Never     Active Member of Clubs or Organizations: No     Attends Club or Organization Meetings: Never     Marital Status: Never    Housing Stability: Low Risk  (5/1/2023)    Housing Stability Vital Sign     Unable to Pay for Housing in the Last Year: No     Number of Places Lived in the  Last Year: 2     Unstable Housing in the Last Year: No        ALLERGIES:   Review of patient's allergies indicates:   Allergen Reactions    Latex Anaphylaxis and Edema    Zosyn [piperacillin-tazobactam] Anaphylaxis         Review of Systems     Review of Systems   Constitutional:  Positive for chills, fever and malaise/fatigue.   Respiratory:  Positive for shortness of breath and wheezing. Negative for cough, hemoptysis and sputum production.    Cardiovascular:  Negative for chest pain and palpitations.   Gastrointestinal:  Positive for abdominal pain, constipation, nausea and vomiting. Negative for blood in stool, diarrhea, heartburn and melena.   Genitourinary:  Negative for dysuria, flank pain, frequency, hematuria and urgency.         MEDICATIONS:   Scheduled Meds:   ertapenem (INVANZ) IVPB  1 g Intravenous Q24H    methIMAzole  2.5 mg Oral Daily    pantoprazole  40 mg Oral Daily     Continuous Infusions:   lactated ringers 100 mL/hr at 01/28/24 1847     PRN Meds:.acetaminophen, albuterol, albuterol-ipratropium, benzonatate, melatonin, senna-docusate 8.6-50 mg, sodium chloride 0.9%    Physical exam:     Temp:  [97.8 °F (36.6 °C)-98.9 °F (37.2 °C)] 98.2 °F (36.8 °C)  Pulse:  [61-98] 66  Resp:  [14-20] 14  SpO2:  [92 %-100 %] 100 %  BP: ()/(61-71) 93/63     GENERAL: A&Ox3, NAD, non-toxic  HEENT: atraumatic, normocephalic, anicteric, moist oral mucosa without lesions, exudate, or erythema  LUNGS: breathing unlabored, mild end-expiratory wheezing appreciated bilaterally  HEART: RRR; no murmur, rub, or gallop  ABDOMEN: abdomen soft, non-distended, +guarding noted, BS noted x 4 quadrants  : deferred  EXTREMITIES: no edema, clubbing, or cyanosis   SKIN: no rashes or lesions  NEURO: speech fluent and intact, facial symmetry preserved, no tremor  PSYCH: cooperative, normal mood and affect  LINES: PIV x2 LUE      LABS:     I have personally reviewed patient's labs.  Pertinent results noted below.    CBC  Recent  Labs     01/27/24  0851 01/28/24  0409 01/28/24  0955 01/29/24  0118   WBC 6.70 7.63 7.40 8.48   HGB 12.5 12.3 11.9* 12.6   HCT 37.5 38.1 36.0* 38.0    163 157 155       Differential  Recent Labs   Lab 01/29/24  0118   WBC 8.48   HGB 12.6   HCT 38.0           Basic Metabolic Panel  Recent Labs     01/27/24  0851 01/28/24  0409 01/29/24  0118    137 139   K 3.6 3.9 3.9   CO2 24 25 27   BUN 10.6 6.4* 5.5*   CREATININE 0.86 0.80 0.76        Hepatic Panel  Lab Results   Component Value Date    ALT 10 01/29/2024    AST 14 01/29/2024    ALKPHOS 59 01/29/2024    BILITOT 0.3 01/29/2024        Urinalysis:  Results for orders placed or performed during the hospital encounter of 11/30/23   Urinalysis, Reflex to Urine Culture    Specimen: Urine   Result Value Ref Range    Color, UA Light-Yellow Yellow, Light-Yellow, Colorless, Straw, Dark-Yellow    Appearance, UA Clear Clear    Specific Gravity, UA 1.014 1.005 - 1.030    pH, UA 5.0 5.0 - 8.5    Protein, UA Negative Negative    Glucose, UA Normal Negative, Normal    Ketones, UA Trace (A) Negative    Blood, UA Negative Negative    Bilirubin, UA Negative Negative    Urobilinogen, UA Normal 0.2, 1.0, Normal    Nitrites, UA Negative Negative    Leukocyte Esterase, UA Negative Negative    WBC, UA 0-5 None Seen, 0-2, 3-5, 0-5 /HPF    Bacteria, UA None Seen None Seen, Trace /HPF    Squamous Epithelial Cells, UA Trace None Seen /HPF    Mucous, UA Trace (A) None Seen /LPF    RBC, UA 0-5 None Seen, 0-2, 3-5, 0-5 /HPF       Estimated Creatinine Clearance: 74.6 mL/min (based on SCr of 0.76 mg/dL).     ESR:  Results for orders placed or performed in visit on 09/05/23   Sedimentation Rate   Result Value Ref Range    Sed Rate 25 (H) 0 - 20 mm/hr      CRP:  Results for orders placed or performed in visit on 09/05/23   C-Reactive Protein   Result Value Ref Range    C-Reactive Protein 7.80 (H) <5.00 mg/L           MICRO AND PATHOLOGY:   New:  Blood cultures x2 on 1/27/24:  negative x24h     Previous:  Influenza A + on 1/24/24; s/p 5 days of Tamiflu    **2/16/23 Aerobic culture from abscess drainage: +ESBL Escheria coli sensitive only to carbapenems  **2/16/23 anaerobic culture from abscess drainage: +Bacteroides thetaiotaomicron     IMAGING:     I have personally reviewed patient's imaging. Pertinent results noted below.  CT Abdomen Pelvis W Wo Contrast         X-Ray Abdomen AP 1 View   Final Result      X-Ray Abdomen AP 1 View   Final Result      CT Abdomen Pelvis With IV Contrast NO Oral Contrast   Final Result      Sigmoid diverticulitis with appearance of intramural abscess.         Electronically signed by: Juan Leavitt   Date:    01/27/2024   Time:    10:05      X-Ray Chest 1 View   Final Result      No acute cardiopulmonary process.         Electronically signed by: Juan Leavitt   Date:    01/27/2024   Time:    09:50            IMPRESSION     Ms. Moeller is a 53  y.o. female with recurrent, chronic diverticulitis presenting with recurrence of colonic abscess (previously ESBL 2/2023) now with new intramural 4 cm in the sigmoid colon. Also found to be influenza A+ on 1/24/24 now s/p 5 days of Tamiflu.      RECOMMENDATIONS:     - Pt continues to adamantly refuse any surgical resection of diseased colon at this time that would result in colostomy. Discussed options again with pt to stress benefit of colectomy as this is preferred for better disease control given multiple recurrences; worry for eventual abx resistance.  - 4 x 5.4 cm intramural abscess seen on CT 1/27/24, agree with consult to IR for percutaneous drainage- primary team spoke with IR who deferred drainage at this time, recommended re-imaging on 1/31/24 and if still > 4 cm to drain at that time. If able to drain, could reduce IV abx duration as well as obtain culture and sensitivity for tailored abx approach.  - Pt with hx of ESBL abscess drained on 2/16/23 sensitive only to carbapenems; recommend continuing  ertapenem 1 g q24h likely for 14 days if drained, or 4 weeks if not. Currently day 1.  - Recommend PICC line placement for long term IV abx once blood cultures negative x72h.  - Pt completed 5 days of Tamiflu for influenza A; okay to stop droplet isolation after 7 days on 1/31/24.     Thank you for the consult. We will follow along with you. Please do not hesitate to call with any questions or concerns.      Noel Spence MD  [employer] IM PGY III

## 2024-01-29 NOTE — PROGRESS NOTES
Inpatient Nutrition Evaluation    Admit Date: 1/27/2024   Total duration of encounter: 2 days   Patient Age: 53 y.o.    Nutrition Recommendation/Prescription     Continue to advance diet as tolerated -- goal diet Low Residue/GI Soft  Will send Boost Breeze TID (provides 250 kcal, 9 g protein per serving)  Monitor po intake, wt, labs    Nutrition Assessment     Chart Review    Reason Seen: continuous nutrition monitoring    Malnutrition Screening Tool Results   Have you recently lost weight without trying?: No  Have you been eating poorly because of a decreased appetite?: No   MST Score: 0     Diagnosis: Diverticulitis with intramural abscess 4 cm in the sigmoid colon   Hx of Asthma   Influenza A  History of pulmonary embolism   GERD  Graves disease    Relevant Medical History: diverticulitis, asthma, pulmonary embolism, and GERD    Scheduled Medications:  ertapenem (INVANZ) IVPB, 1 g, Q24H  methIMAzole, 2.5 mg, Daily  pantoprazole, 40 mg, Daily    Continuous Infusions:  lactated ringers, Last Rate: 100 mL/hr at 01/28/24 1847    PRN Medications: acetaminophen, albuterol, albuterol-ipratropium, benzonatate, melatonin, senna-docusate 8.6-50 mg, sodium chloride 0.9%    Recent Labs   Lab 01/24/24  0444 01/25/24  0621 01/27/24  0851 01/28/24  0409 01/28/24  0955 01/29/24  0118    142 142 137  --  139   K 3.4* 4.1 3.6 3.9  --  3.9   CALCIUM 9.1 9.0 8.2* 8.5  --  8.8   MG 2.00  --   --   --   --   --    CHLORIDE 109* 108* 107 103  --  103   CO2 25 27 24 25 --  27   BUN 9.8 8.5* 10.6 6.4*  --  5.5*   CREATININE 1.03* 0.68 0.86 0.80  --  0.76   EGFRNORACEVR >60 >60 >60 >60  --  >60   GLUCOSE 85 141* 92 75  --  82   BILITOT 0.2 0.1 0.2 0.3  --  0.3   ALKPHOS 71 74 63 65  --  59   ALT 12 10 11 11  --  10   AST 12 10 15 14  --  14   ALBUMIN 3.5 3.2* 3.0* 2.9*  --  2.9*   WBC 5.92 5.16 6.70 7.63 7.40 8.48   HGB 12.9 12.7 12.5 12.3 11.9* 12.6   HCT 39.3 38.8 37.5 38.1 36.0* 38.0     Nutrition Orders:  Diet clear  "liquid  Dietary nutrition supplements Boost Breeze; TID    Appetite/Oral Intake: good/0-25% of meals  Factors Affecting Nutritional Intake: abdominal pain and clear liquid diet  Food/Pentecostal/Cultural Preferences: none reported  Food Allergies: no known food allergies  Last Bowel Movement: 24  Wound(s):  None noted    Comments  : Pt reports feeling hungry -- ready for diet advancement. Pt reports sweet (jello, popsicles) clear liquids upsets stomach; reports abd pain improving. Pt ok to add Boost Breeze TID. Pt denies any N/V/D/C at this time. Pt reports good appetite and no recent wt loss pta. Pt reports #; current wt elevated.      Anthropometrics    Height: 4' 10.66" (149 cm), Height Method: Stated  Last Weight: 69 kg (152 lb 1.9 oz) (24), Weight Method: Standard Scale  BMI (Calculated): 31.1  BMI Classification: obese grade I (BMI 30-34.9)     Ideal Body Weight (IBW), Female: 93.3 lb     % Ideal Body Weight, Female (lb): 163.04 %                    Usual Body Weight (UBW), k.36 kg (#)  % Usual Body Weight: 112.69     Usual Weight Provided By: patient    Wt Readings from Last 5 Encounters:   24 69 kg (152 lb 1.9 oz)   24 69.1 kg (152 lb 5.4 oz)   24 61.2 kg (135 lb)   24 72 kg (158 lb 11.7 oz)   23 59 kg (130 lb)     Weight Change(s) Since Admission:   Wt Readings from Last 1 Encounters:   24 69 kg (152 lb 1.9 oz)   Admit Weight: 69 kg (152 lb 1.9 oz) (24), Weight Method: Standard Scale    Patient Education     Not applicable.    Nutrition Goals & Monitoring     Dietitian will monitor: food and beverage intake, weight, and gastrointestinal profile    Nutrition Risk/Follow-Up: low (follow-up in 5-7 days)  Patients assigned 'low nutrition risk' status do not qualify for a full nutritional assessment but will be monitored and re-evaluated in a 5-7 day time period. Please consult if re-evaluation needed sooner.    "

## 2024-01-29 NOTE — PROGRESS NOTES
Faculty addendum:     I have reviewed and concur with the resident's history, physical, assessment, and plan.  I have discussed with him all issues related to the diagnosis, workup and treatment plan.Care provided as reasonable and necessary.     Patient seen and examined this morning.    Previous cultures taken from CT-guided drainage of diverticular abscess in February of 2023 had grown ESBL E coli, sensitive only to meropenem and ertapenem.  Unfortunately, multiple hospitalizations in the last year, for flares of this diverticulitis, and has left against medical advice on multiple admissions as well, has been seen by infectious disease on multiple occasions, question whether or not has completed the full course of ertapenem on the previous admissions as well.    Presents over the weekend, left lower quadrant abdominal pain, now CT evidence showing evidence of recurrent abscess now 4 x 5 cm, much larger than last year's abscess which was only 2.4 x 1.8 cm.    She is adamantly refusing surgical resection of her sigmoid colon, we have discussed with her at length she is at high-risk of diverticular perforation, fistula formation, and she is still adamantly refusing resection on this admission.    Discontinue Flagyl and Levaquin, start ertapenem, discussed with Dr. Valdivia this morning, he is familiar with this patient.    Please avoid NSAIDs with this patient as well as these are known culprit of diverticular perforation as well.        Fully dictated resident progress note forthcoming.

## 2024-01-29 NOTE — PT/OT/SLP EVAL
"Physical Therapy Evaluation & Discharge Note    Patient Name:  Niya Moeller   MRN:  [id number]    Recommendations     Therapy Intensity Recommendations at Discharge: No Therapy Indicated  Discharge Equipment Recommendations: none   Equipment to be obtained for discharge: none.  Barriers to discharge: none    Assessment     Niya Moeller is a 53 y.o. female admitted with a medical diagnosis of <principal problem not specified>.    She presents with the following impairments/functional limitations:   .    Patient was seen by therapy for evaluation only.    Recent Surgery: * No surgery found *      Plan     Patient discharged from acute PT Services on 01/29/24.    Based on current functional levels observed, patient does not require further acute PT services.    Re-consult PT Services if patient's status changes and therapy services warranted.    Subjective     Communicated with patient's nurse Poornima prior to session.    Patient agreeable to participate in evaluation.     Chief Complaint: States she is hungry and wants "real food."  Patient/Family Comments/goals: To go home.  Pain/Comfort:  Pain Rating 1: 7/10  Location - Side 1: Bilateral  Location - Orientation 1: lower  Location 1: abdomen  Pain Addressed 1: Nurse notified  Pain Rating Post-Intervention 1: 7/10    Patients cultural, spiritual, Sikh conflicts given the current situation: no    Social History  Living Environment: Patient lives alone in a single level home, with no steps, with walk-in shower.  Functional Level: Prior to admission patient was employed, was driving, ambulated without assistive device, and was independent in ADL's.  Equipment Used at Home: nebulizer  Equipment owned (not currently used): none.  Assistance Upon Discharge: none needed.    Objective     Patient found supine in bed with peripheral IV, oxygen  upon PT entry to room.    General Precautions: Standard,     Orthopedic Precautions:N/A   Braces: N/A  Respiratory Status: room " air    Vitals   At Rest (pre-session)  BP  95/65   HR  67   O2 Sat %  100      With Activity (post-session)  BP  98/65   HR  70   O2 Sat %  100     Exams:  Orientation: Patient is oriented to person, place, time, situation  Commands: Patient follows commands consistently  BILAT UE ROM/strength - defer to OT - see OT note for details  RLE ROM: WFL  RLE Strength: WFL  LLE ROM: WFL  LLE Strength: WFL    Functional Mobility:    Bed Mobility:  Supine to Sit: independence  Sit to Supine: independence    Transfers:  Sit to Stand: independence with no assistive device    Gait:  Patient ambulated 30ft with no assistive device and independence.  Patient demonstrates :       steady gait.    Other Mobility:  not assessed    Balance:  Sit  Static: NORMAL: No deviations seen in posture held statically  Dynamic: NORMAL: No deviations seen in posture held dynamically  Stand  Static: NORMAL: No deviations seen in posture held statically  Dynamic: NORMAL: No deviations seen in posture held dynamically    Additional Treatment Session  n/a    Patient left supine in bed with all lines intact, call button in reach, and patient' nurse notified.    Education     White board updated regarding patient's safest level of mobility with staff assistance.    Goals - No STG's or LTG's established secondary to patient was seen for evaluation and discharge     Multidisciplinary Problems       Physical Therapy Goals       Not on file                    History     Past Medical History:   Diagnosis Date    Asthma     Diverticulosis     GERD (gastroesophageal reflux disease)     Graves disease      No past surgical history on file.  Time Tracking     PT Received On: 01/29/24  PT Start Time: 1052     PT Stop Time: 1103  PT Total Time (min): 11 min     Billable Minutes: Evaluation 11    01/29/2024

## 2024-01-29 NOTE — PROGRESS NOTES
[facility] Progress Note    Patient Name: Niya Moeller  MRN: [id number]  Admission Date: 1/27/2024  Hospital Length of Stay: 2 days  Code Status: Full Code  Attending Provider: Sonia Olivarez*  Primary Care Provider: Oswaldo Riggs NP     Subjective:      Brief HPI:  Niya Moeller is a 53 y.o. female with a past medical history of diverticulitis, asthma, pulmonary embolism, and GERD who presented to the ED via EMS on 1/27/2024  with a primary complaint of abdominal pain.  Patient stated that her abdominal pain started approximately 3 days ago and intensified last night where she rated her pain as a 10/10 on the pain scale.  Patient localizes her abdominal pain in the left lower quadrant and states that it does not radiate elsewhere.  She endorses associated fever, chills, nausea without vomiting, and constipation.  Denies bloody stool.  Patient has been hospitalized several times in the past for diverticulitis requiring IV antibiotics.  Most recently she was admitted at our facility on 10/23/2023 for diverticulitis without abscess.  Patient states that she has been followed up with Gastroenterology in [place] and is supposed to be scheduled for colonoscopy soon.  Patient has adamantly refused partial colectomy in the past for recurrent diverticulitis.     In ED, patient slightly tachycardic with a HR of 100 on arrival but vital signs otherwise unremarkable.  Wheezing was auscultated on initial examination and patient received DuoNebs x2 with noted resolution and wheezing.  CTA abdomen and pelvis displayed sigmoid diverticulitis with a 4 cm intramural abscess.  Patient with no leukocytosis on labs.  Internal medicine was consulted for further care and management moving forward.     Interval History:  No acute medical events overnight.  Patient reports that her abdominal pain is improved since 1st presentation.  Patient's antibiotics switched to ertapenem due to prior ESBL E coli growth from prior  Established Patient - Audio Only Telehealth Visit     The patient location is: HOME  The chief complaint leading to consultation is: insomnia  Visit type: Virtual visit with audio only (telephone)  Total time spent with patient: 5 mins       The reason for the audio only service rather than synchronous audio and video virtual visit was related to technical difficulties or patient preference/necessity.     Each patient to whom I provide medical services by telemedicine is:  (1) informed of the relationship between the physician and patient and the respective role of any other health care provider with respect to management of the patient; and (2) notified that they may decline to receive medical services by telemedicine and may withdraw from such care at any time. Patient verbally consented to receive this service via voice-only telephone call.       HPI: pt calling since he has been having insomnia since he has been at home. States that he was given a sleeping med when he was in the hospital as well as SNF. Wants to know if he can have that same med now. Pt does snore, obese and has never seen/or had a sleep study in the past.     Assessment and plan:  Explained to pt that I do not have access to the day to day meds that were given to him in the hospital. I also explained to pt that he is being supervised when taking meds with staff all around him as well as doctors. At home, he does not have that access. And, with his h/o COPD, I dont think that such a med is advisable unless he is seen with the sleep doctors.  At this time pt will wait to make appt, but wants referral in system and will touch back when he feels ready to do so    Insomnia due to medical condition  -     Ambulatory referral/consult to Sleep Disorders; Future; Expected date: 09/09/2020    did provide OTC options such as tylenol PM and melatonin. Pt declines this at this time                          This service was not originating from a related E/M  diverticular abscess.  Repeat CT conducted yesterday displayed interval increase in abscess size to 4cm x 5 cm.  Had a lengthy conversation with the patient about high risk of diverticular perforation, fistula formation as she is adamantly refusing resection.  Infectious Disease consulted for further recommendations.    Review of Systems:  The remainder of the 14 point ROS is noncontributory or negative unless mentioned/reviewed above.     Objective:     Vital Signs:  Vital Signs (Most Recent):  Temp: 98.2 °F (36.8 °C) (01/29/24 1209)  Pulse: 66 (01/29/24 1209)  Resp: 14 (01/29/24 1209)  BP: 93/63 (01/29/24 1209)  SpO2: 100 % (01/29/24 1209)  Body mass index is 31.08 kg/m².  Weight: 69 kg (152 lb 1.9 oz) Vital Signs (24h Range):  Temp:  [97.8 °F (36.6 °C)-98.9 °F (37.2 °C)] 98.2 °F (36.8 °C)  Pulse:  [61-98] 66  Resp:  [14-20] 14  SpO2:  [92 %-100 %] 100 %  BP: ()/(61-71) 93/63       Input/output:     Intake/Output Summary (Last 24 hours) at 1/29/2024 1335  Last data filed at 1/28/2024 1847  Gross per 24 hour   Intake 2414.38 ml   Output --   Net 2414.38 ml       Physical Examination:  General:  Well developed, well nourished, no acute respiratory distress  Head: Normocephalic, atraumatic  Eyes: PERRL, anicteric sclera  Throat: No posterior pharyngeal erythema or exudate, no tonsillar exudate  Neck: supple, normal ROM, no JVD  CVS:  RRR, S1 and S2 normal, no murmurs, no added heart sounds, rubs, gallops, regular peripheral pulses, and no peripheral edema  Resp:  Lungs clear to auscultation bilaterally, no wheezes, rales, or rhonci  GI:  Abdomen soft, non-distended, normoactive bowel sounds, tender to palpation  MSK:  Full range of motion, no obvious deformities   Skin:  No rashes, ulcers, erythema  Neuro:  Alert and oriented x3, No focal neuro deficits, CNII-XII grossly intact  Psych:  Appropriate mood and affect       Lines/Drains/Airways       None                    Laboratory:    Recent Labs   Lab  service provided within the previous 7 days nor will  to an E/M service or procedure within the next 24 hours or my soonest available appointment.  Prevailing standard of care was able to be met in this audio-only visit.         01/28/24  0409 01/28/24  0955 01/29/24  0118   WBC 7.63 7.40 8.48   RBC 4.04* 3.80* 4.05*   HGB 12.3 11.9* 12.6   HCT 38.1 36.0* 38.0   MCV 94.3* 94.7* 93.8   MCH 30.4 31.3* 31.1*   MCHC 32.3* 33.1 33.2   RDW 14.0 13.9 13.5    157 155   MPV 11.5* 11.0* 11.2*      Recent Labs   Lab 01/24/24  0444 01/24/24  0522 01/25/24  0621 01/27/24  0851 01/28/24  0409 01/29/24 0118     --    < > 142 137 139   K 3.4*  --    < > 3.6 3.9 3.9   CHLORIDE 109*  --    < > 107 103 103   CO2 25  --    < > 24 25 27   BUN 9.8  --    < > 10.6 6.4* 5.5*   CREATININE 1.03*  --    < > 0.86 0.80 0.76   CALCIUM 9.1  --    < > 8.2* 8.5 8.8   PH  --  7.440*  --   --   --   --    MG 2.00  --   --   --   --   --    ALBUMIN 3.5  --    < > 3.0* 2.9* 2.9*   ALKPHOS 71  --    < > 63 65 59   ALT 12  --    < > 11 11 10   AST 12  --    < > 15 14 14   BILITOT 0.2  --    < > 0.2 0.3 0.3    < > = values in this interval not displayed.        Other Results:  Estimated Creatinine Clearance: 74.6 mL/min (based on SCr of 0.76 mg/dL).    Current Medications:     Infusions:   lactated ringers 100 mL/hr at 01/28/24 1847         Scheduled:   ertapenem (INVANZ) IVPB  1 g Intravenous Q24H    methIMAzole  2.5 mg Oral Daily    pantoprazole  40 mg Oral Daily         PRN:   acetaminophen    albuterol    albuterol-ipratropium    benzonatate    melatonin    senna-docusate 8.6-50 mg    sodium chloride 0.9%        Microbiology Data:  Microbiology Results (last 7 days)       Procedure Component Value Units Date/Time    Blood Culture #1 **CANNOT BE ORDERED STAT** [[id number]]  (Normal) Collected: 01/27/24 1035    Order Status: Completed Specimen: Blood from Arm, Right Updated: 01/28/24 1500     CULTURE, BLOOD ([facility]) No Growth At 24 Hours    Blood Culture #2 **CANNOT BE ORDERED STAT** [[id number]]  (Normal) Collected: 01/27/24 1036    Order Status: Completed Specimen: Blood from Arm, Left Updated: 01/28/24 1500     CULTURE, BLOOD ([facility]) No Growth At 24 Hours              Antibiotics and Day Number of Therapy:  Antibiotics (From admission, onward)      Start     Stop Route Frequency Ordered    01/29/24 1200  ertapenem (INVANZ) 1 g in sodium chloride 0.9 % 100 mL IVPB (MB+)         -- IV Every 24 hours (non-standard times) 01/29/24 0928             Imaging:  CT Abdomen Pelvis W Wo Contrast  START OF REPORT:  Technique: CT of the abdomen and pelvis was performed with axial images as well as sagittal and coronal reconstruction images with and without intravenous contrast.    Comparison: Comparison is with study dated 2024-01-27 09:51:22.    Clinical History: Abdominal abscess/infection suspected.    Dosage Information: Automated Exposure Control was utilized 595.90 mGy.cm.    Findings:  Lines and Tubes: None.  Thorax:  Lungs: There is no significant change in the streaky densities with ground glass opacities in the visualized lung bases. This represent subsegmental atelectasis and/or fibrosis with the possibility of mild concurrent infectious process not excluded. Correlate with clinical and laboratory findings and follow-up as indicated.  Pleura: No effusions or thickening.  Heart: The heart size is within normal limits.  Abdomen:  Abdominal Wall: There is mild involvement of the left inferior abdominal wall by an abscess as detailed below.  Liver: Mild fatty infiltration of the liver is present. The liver otherwise appears unremarkable.  Biliary System: No intrahepatic or extrahepatic biliary duct dilatation is seen.  Gallbladder: There is now a large amount of layering hyperdensity within the gallbladder. This represents vicarious excretion of contrast material. The gallbladder otherwise appears unremarkable.  Pancreas: The pancreas appears unremarkable.  Spleen: The spleen appears unremarkable. A probable small splenuleis seen anterior to the spleen.  Adrenals: The adrenal glands appear unremarkable.  Kidneys: The kidneys appear unremarkable with no stones cysts masses or  hydronephrosis.  Aorta: The abdominal aorta appears unremarkable.  IVC: Unremarkable.  Bowel:  Esophagus: The visualized esophagus appears unremarkable.  Stomach: The stomach appears unremarkable.  Duodenum: Unremarkable appearing duodenum.  Small Bowel: The small bowel appears unremarkable.  Colon: Numerous diverticula are again seen predominantly in the descending and sigmoid colon. There is a slight interval increase in the segmental wall thickening of the sigmoid colon and associated pericolic fat stranding as well as in the size of the associated rim enhancing loculated fluid collection (approximately measuring 4.0 x 5.4 cm, APxT) which extends anteriorly to partially involve the left lower abdominal wall (centered on series 10 image 96).  Appendix: The appendix appears unremarkable and is seen on series 6 images 37-45.  Peritoneum: No intraperitoneal free air or ascites is seen.    Pelvis:  Bladder: The bladder appears unremarkable.  Female:  Uterus: There is stable appearance of the enhancing soft tissue lesion in the uterus measuring 2.5 cm (series 10 image 109). This likely represents a myoma.  Ovaries: No adnexal masses are seen.  Inguinal Findings:  Inguinal Hernia: Incidental note is made of small uncomplicated mesenteric fat containing bilateral inguinal hernias.    Bony structures:  Dorsal Spine: There is moderate spondylosis of the visualized dorsal spine.  Bony Pelvis: There is mild degenerative change of the bilateral hipnhipsn.    Notifications: The results were discussed prior to dictation with the patient's nurse (Hawa) at 2024-01-28 22:39:02 CST.    Impression:  1. Numerous diverticula are again seen predominantly in the descending and sigmoid colon. There is a slight interval increase in the segmental wall thickening of the sigmoid colon and associated pericolic fat stranding as well as in the size of the associated rim enhancing loculated fluid collection (approximately measuring 4.0 x 5.4  cm, APxT) which extends anteriorly to partially involve the left lower abdominal wall (centered on series 10 image 96). This is consistent with diverticulitis complicated by abscess formation. Correlate with clinical and laboratory findings regards additional evaluation and follow-up until full resolution.  2. Details and other findings as discussed above.  X-Ray Abdomen AP 1 View  EXAMINATION  XR ABDOMEN AP 1 VIEW    CLINICAL HISTORY  constipation, abscess;    TECHNIQUE  A total of 3 AP image(s) of the abdomen.    COMPARISON  28 January 2024    FINDINGS  Lines/tubes/devices: ECG leads overlie the imaged region.    There are no interval changes to suggest new or worsening high-grade mechanical bowel obstruction.  No intra-abdominal mass effect is developed.  Detection of air-fluid levels and low-volume pneumoperitoneum is limited due to supine technique.    Included lower thoracic cavity and osseous structures are similar in comparison.    IMPRESSION  No significant interval change.    Electronically signed by: Kishan Allen  Date:    01/28/2024  Time:    12:44  X-Ray Abdomen AP 1 View  EXAMINATION  XR ABDOMEN AP 1 VIEW    CLINICAL HISTORY  Abscess;    TECHNIQUE  A total of 1 AP image(s) of the abdomen.    COMPARISON  14 February 2023    FINDINGS  Lines/tubes/devices: none present    A non-obstructed bowel gas pattern is present. No intra-abdominal mass effect is appreciated. Detection of air-fluid levels and low-volume pneumoperitoneum is limited due to supine technique.    Included lower thoracic cavity and osseous structures are without acute abnormality.    IMPRESSION  No acute intra-abdominal abnormality.    Electronically signed by: Kishan Allen  Date:    01/28/2024  Time:    08:23        Assessment & Plan:   Diverticulitis with intramural abscess 4 cm in the sigmoid colon   -patient has had several instances of diverticulitis and abscess.  Per chart review patient has had abscess since October 2022 and had a  drain placed in 2023.  Patient has required weeks of Zosyn in the past.  -patient currently adamantly refusing colectomy as indicated for repeat diverticulitis and in this case abscess   -patient was on Zosyn in the past for 6 weeks in [place], however she has an allergy noted on our EMR.  Start ertapenem due to patient's history of ESBL E coli infection from previous diverticular abscess (D1)  -consulted IR today who stated that patient is not candidate for percutaneous drainage at this time due to the fact that abscesses intramural.  Recommended that we repeat abdominal imaging on Wednesday and reconsult.  -be cautious of giving any further opiates or NSAIDs for pain control  -we will have further discussion with the patient today in regards to wishes if she were to perforate    Hx of Asthma   Influenza A  - received DuoNebs x1 in ED  - patient with mild wheezing on examination  -Tamiflu 75 mg b.i.d. x5 days, completed course from previous admission  - DuoNebs q.4 hours p.r.n.  - avoid steroids in setting of diverticulitis with abscess being treated with antibiotics     History of pulmonary embolism  -originally diagnosed in September of last year, appeared to occur after removal of PICC line  -since previous DVT/PE was provoked, can discontinue heparin at this time as patient has completed 3 months of therapy    GERD   -continue Protonix 40 mg daily     Graves disease   -TSH and free T4 within normal limits at last check   -continue MMI 2.5 mg daily     CODE STATUS: Full Code   Access: PIV  Antibiotics: levaquin and flagyl  Diet: Diet clear liquid  DVT Prophylaxis: Heparin GTT  GI Prophylaxis: protonix    Disposition:  Patient admitted for diverticulitis with abscess formation this is a recurrent issue for her as she had IR drainage completed early in 2023.  Patient has also had received prolonged courses of IV Zosyn and other antibiotics in the past.  Patient could benefit from surgical intervention however  patient is adamant that she does not want it intervention at this time.  IR stated that patient is not a candidate for percutaneous drainage at this time due to the fact that abscesses intramural.    Yeyo Pollack DO  Internal Medicine Resident PGY-I  [facility] Surg Telemetry  01/29/2024

## 2024-01-29 NOTE — NURSING
"Approx. 10 minutes ago, pt called requesting pain medication stronger than tylenol. RN paged MD MARIA DE JESUS stated orders would be put in. Approx. 5 mins ago, pt called requesting ice water. RN brought ice water to pt and gave update that phone call was made and MD was putting in orders for pain medication. 1 min later pt begins yelling out and screaming "I condemn everyone to hell." Staff attempted to redirect pt and pt became verbally aggressive. Security was called and MD was made aware of pt's current behavior. House supervisor notified.   "

## 2024-01-29 NOTE — CONSULTS
[facility]   Inpatient Infectious Diseases Consultation    Physician requesting consultation: Sonia Olivarez*  Service requesting consultation: Internal Medicine  Reason for consultation: Antibiotic management    Historian: Patient and Electronic Medical Record    Isolation Status: Droplet     HPI:     Niya Moeller is a 53 y.o. female with a PM Hsignificant for recurrent diverticulitis with previous abscesses, asthma, PE, GERD, and recent influenza A infection who presented to the ED via EMS on 1/27/2024 with a primary complaint of severe left sided abdominal pain associated with fever, chills, n/v, and constipation.  Patient stated that her abdominal pain started on 1/25/24 and intensified 1/27/24 to a 10/10 with fever 101.1 prompting presentation to the ER. She denies melena or hematochezia.  Patient has been hospitalized several times in the past for diverticulitis requiring IV antibiotics, notably in Feb 2023 with an abscess that was drained and found to be ESBL E. coli requiring Ertapenem.  Most recently she was admitted at our facility on 10/23/2023 for diverticulitis without abscess.  At this time, patient is currently refusing any surgical intervention that would result in any form of colostomy (including temporary). CT scan showed significant sigmoid mural abscess about 4 x 5.4 cm in size, so she was  started on Levaquin and Flagyl on 1/27 which was switched to Ertapenem on 1/29.       Antibiotic history:  Levaquin: 1/27-1/29  Flagyl: 1/27-1/29  Ertapenem: 1/29-p    Past Medical History/Past Surgical History/Social History     Past Medical History:   Diagnosis Date    Asthma     Diverticulosis     GERD (gastroesophageal reflux disease)     Graves disease        No past surgical history on file.     FAMILY HISTORY:  family history includes Hypothyroidism in her mother.     SOCIAL HISTORY:   Social History     Socioeconomic History    Marital status: Single   Tobacco  Use    Smoking status: Every Day     Current packs/day: 0.50     Average packs/day: 0.5 packs/day for 41.1 years (20.5 ttl pk-yrs)     Types: Cigarettes     Start date: 1/1/1983    Smokeless tobacco: Never    Tobacco comments:     Ambulatory referral to Smoking Cessation clinic following hospital discharge.    Substance and Sexual Activity    Alcohol use: Not Currently    Drug use: Never    Sexual activity: Not Currently     Birth control/protection: Abstinence     Comment: States is asexual     Social Determinants of Health     Financial Resource Strain: Low Risk  (5/1/2023)    Overall Financial Resource Strain (CARDIA)     Difficulty of Paying Living Expenses: Not very hard   Food Insecurity: No Food Insecurity (5/1/2023)    Hunger Vital Sign     Worried About Running Out of Food in the Last Year: Never true     Ran Out of Food in the Last Year: Never true   Transportation Needs: No Transportation Needs (5/1/2023)    PRAPARE - Transportation     Lack of Transportation (Medical): No     Lack of Transportation (Non-Medical): No   Physical Activity: Inactive (5/1/2023)    Exercise Vital Sign     Days of Exercise per Week: 0 days     Minutes of Exercise per Session: 0 min   Stress: No Stress Concern Present (5/1/2023)    [employer] - Occupational Stress Questionnaire     Feeling of Stress : Not at all   Social Connections: Socially Isolated (5/1/2023)    Social Connection and Isolation Panel [NHANES]     Frequency of Communication with Friends and Family: More than three times a week     Frequency of Social Gatherings with Friends and Family: Twice a week     Attends Mandaen Services: Never     Active Member of Clubs or Organizations: No     Attends Club or Organization Meetings: Never     Marital Status: Never    Housing Stability: Low Risk  (5/1/2023)    Housing Stability Vital Sign     Unable to Pay for Housing in the Last Year: No     Number of Places Lived in the Last Year:  2     Unstable Housing in the Last Year: No        ALLERGIES:   Review of patient's allergies indicates:   Allergen Reactions    Latex Anaphylaxis and Edema    Zosyn [piperacillin-tazobactam] Anaphylaxis         Review of Systems     Review of Systems   Constitutional:  Positive for chills, fever and malaise/fatigue. Negative for weight loss.   Respiratory:  Positive for shortness of breath and wheezing. Negative for cough, hemoptysis and sputum production.    Cardiovascular:  Negative for chest pain and palpitations.   Gastrointestinal:  Positive for abdominal pain, constipation and nausea. Negative for blood in stool, diarrhea, melena and vomiting.        Poor PO intake since 1/26   Genitourinary:  Negative for dysuria, flank pain, frequency, hematuria and urgency.         MEDICATIONS:   Scheduled Meds:   ertapenem (INVANZ) IVPB  1 g Intravenous Q24H    methIMAzole  2.5 mg Oral Daily    pantoprazole  40 mg Oral Daily     Continuous Infusions:   lactated ringers 100 mL/hr at 01/28/24 1847     PRN Meds:.acetaminophen, albuterol, albuterol-ipratropium, benzonatate, melatonin, senna-docusate 8.6-50 mg, sodium chloride 0.9%    Physical exam:     Temp:  [97.8 °F (36.6 °C)-98.9 °F (37.2 °C)] 98.2 °F (36.8 °C)  Pulse:  [61-98] 66  Resp:  [14-20] 14  SpO2:  [92 %-100 %] 100 %  BP: ()/(61-71) 93/63     GENERAL: A&Ox3, NAD, non-toxic  HEENT: atraumatic, normocephalic, anicteric, moist oral mucosa without lesions, exudate, or erythema  LUNGS: breathing unlabored, mild end-expiratory wheezing appreciated bilaterally  HEART: RRR; no murmur, rub, or gallop  ABDOMEN: abdomen soft, non-distended, +guarding noted, BS noted x 4 quadrants  : deferred  EXTREMITIES: no edema, clubbing, or cyanosis   SKIN: no rashes or lesions  NEURO: speech fluent and intact, facial symmetry preserved, no tremor  PSYCH: cooperative, normal mood and affect  LINES: PIV x2 LUE      LABS:     I have personally reviewed patient's labs.  Pertinent  results noted below.    CBC  Recent Labs     01/27/24  0851 01/28/24  0409 01/28/24  0955 01/29/24  0118   WBC 6.70 7.63 7.40 8.48   HGB 12.5 12.3 11.9* 12.6   HCT 37.5 38.1 36.0* 38.0    163 157 155       Differential  Recent Labs   Lab 01/29/24  0118   WBC 8.48   HGB 12.6   HCT 38.0           Basic Metabolic Panel  Recent Labs     01/27/24  0851 01/28/24  0409 01/29/24  0118    137 139   K 3.6 3.9 3.9   CO2 24 25 27   BUN 10.6 6.4* 5.5*   CREATININE 0.86 0.80 0.76        Hepatic Panel  Lab Results   Component Value Date    ALT 10 01/29/2024    AST 14 01/29/2024    ALKPHOS 59 01/29/2024    BILITOT 0.3 01/29/2024        Urinalysis:  Results for orders placed or performed during the hospital encounter of 11/30/23   Urinalysis, Reflex to Urine Culture    Specimen: Urine   Result Value Ref Range    Color, UA Light-Yellow Yellow, Light-Yellow, Colorless, Straw, Dark-Yellow    Appearance, UA Clear Clear    Specific Gravity, UA 1.014 1.005 - 1.030    pH, UA 5.0 5.0 - 8.5    Protein, UA Negative Negative    Glucose, UA Normal Negative, Normal    Ketones, UA Trace (A) Negative    Blood, UA Negative Negative    Bilirubin, UA Negative Negative    Urobilinogen, UA Normal 0.2, 1.0, Normal    Nitrites, UA Negative Negative    Leukocyte Esterase, UA Negative Negative    WBC, UA 0-5 None Seen, 0-2, 3-5, 0-5 /HPF    Bacteria, UA None Seen None Seen, Trace /HPF    Squamous Epithelial Cells, UA Trace None Seen /HPF    Mucous, UA Trace (A) None Seen /LPF    RBC, UA 0-5 None Seen, 0-2, 3-5, 0-5 /HPF       Estimated Creatinine Clearance: 74.6 mL/min (based on SCr of 0.76 mg/dL).     ESR:  Results for orders placed or performed in visit on 09/05/23   Sedimentation Rate   Result Value Ref Range    Sed Rate 25 (H) 0 - 20 mm/hr      CRP:  Results for orders placed or performed in visit on 09/05/23   C-Reactive Protein   Result Value Ref Range    C-Reactive Protein 7.80 (H) <5.00 mg/L           MICRO AND PATHOLOGY:    New:  Blood cultures x2 on 1/27/24: negative x24h    Previous:  Influenza A + on 1/24/24; s/p 5 days of Tamiflu    **2/16/23 Aerobic culture from abscess drainage: +ESBL Escheria coli sensitive only to carbapenems  **2/16/23 anaerobic culture from abscess drainage: +Bacteroides thetaiotaomicron     IMAGING:     I have personally reviewed patient's imaging. Pertinent results noted below.  CT Abdomen Pelvis W Wo Contrast         X-Ray Abdomen AP 1 View   Final Result      X-Ray Abdomen AP 1 View   Final Result      CT Abdomen Pelvis With IV Contrast NO Oral Contrast   Final Result      Sigmoid diverticulitis with appearance of intramural abscess.         Electronically signed by: Juan Leavitt   Date:    01/27/2024   Time:    10:05      X-Ray Chest 1 View   Final Result      No acute cardiopulmonary process.         Electronically signed by: Juan Leavitt   Date:    01/27/2024   Time:    09:50            IMPRESSION     Ms. Moeller is a 53  y.o. female with recurrent, chronic diverticulitis presenting with recurrence of colonic abscess (previously ESBL 2/2023) now with new intramural 4 cm in the sigmoid colon. Also found to be influenza A+ on 1/24/24 now s/p 5 days of Tamiflu.     RECOMMENDATIONS:    - Pt continues to adamantly refuse any surgical resection of diseased colon at this time that would result in colostomy. Discussed options again with pt to stress benefit of colectomy as this is preferred for better disease control given multiple recurrences; worry for eventual abx resistance.  - 4 x 5.4 cm intramural abscess seen on CT 1/27/24, agree with consult to IR for percutaneous drainage- primary team spoke with IR who deferred drainage at this time, recommended re-imaging on 1/31/24 and if still > 4 cm to drain at that time. If able to drain, could reduce IV abx duration as well as obtain culture and sensitivity for tailored abx approach.  - Pt with hx of ESBL abscess drained on 2/16/23 sensitive only to  carbapenems; recommend continuing ertapenem 1 g q24h likely for 14 days if drained, or 4 weeks if not. Currently day 1.  - Recommend PICC line placement for long term IV abx once blood cultures negative x72h.  - Pt completed 5 days of Tamiflu for influenza A; okay to stop droplet isolation after 7 days on 1/31/24.    Thank you for the consult. We will follow along with you. Please do not hesitate to call with any questions or concerns.     Noel Spence MD  [employer] IM PGY III

## 2024-01-30 VITALS
HEART RATE: 62 BPM | WEIGHT: 152.13 LBS | OXYGEN SATURATION: 100 % | BODY MASS INDEX: 30.67 KG/M2 | HEIGHT: 59 IN | DIASTOLIC BLOOD PRESSURE: 55 MMHG | TEMPERATURE: 98 F | RESPIRATION RATE: 18 BRPM | SYSTOLIC BLOOD PRESSURE: 99 MMHG

## 2024-01-30 LAB
ABS NEUT CALC (OHS): 2.5 X10(3)/MCL (ref 2.1–9.2)
ALBUMIN SERPL-MCNC: 2.7 G/DL (ref 3.5–5)
ALBUMIN/GLOB SERPL: 0.8 RATIO (ref 1.1–2)
ALP SERPL-CCNC: 51 UNIT/L (ref 40–150)
ALT SERPL-CCNC: 10 UNIT/L (ref 0–55)
AST SERPL-CCNC: 13 UNIT/L (ref 5–34)
BILIRUB SERPL-MCNC: 0.3 MG/DL
BUN SERPL-MCNC: 3.3 MG/DL (ref 9.8–20.1)
CALCIUM SERPL-MCNC: 8.7 MG/DL (ref 8.4–10.2)
CHLORIDE SERPL-SCNC: 106 MMOL/L (ref 98–107)
CO2 SERPL-SCNC: 29 MMOL/L (ref 22–29)
CREAT SERPL-MCNC: 0.7 MG/DL (ref 0.55–1.02)
EOSINOPHIL NFR BLD MANUAL: 0.05 X10(3)/MCL (ref 0–0.9)
EOSINOPHIL NFR BLD MANUAL: 1 % (ref 0–8)
ERYTHROCYTE [DISTWIDTH] IN BLOOD BY AUTOMATED COUNT: 13.2 % (ref 11.5–17)
GFR SERPLBLD CREATININE-BSD FMLA CKD-EPI: >60 MLS/MIN/1.73/M2
GLOBULIN SER-MCNC: 3.2 GM/DL (ref 2.4–3.5)
GLUCOSE SERPL-MCNC: 79 MG/DL (ref 74–100)
HCT VFR BLD AUTO: 37.2 % (ref 37–47)
HGB BLD-MCNC: 12.1 G/DL (ref 12–16)
HOLD SPECIMEN: NORMAL
LYMPHOCYTES NFR BLD MANUAL: 2.25 X10(3)/MCL
LYMPHOCYTES NFR BLD MANUAL: 45 % (ref 13–40)
MCH RBC QN AUTO: 30.6 PG (ref 27–31)
MCHC RBC AUTO-ENTMCNC: 32.5 G/DL (ref 33–36)
MCV RBC AUTO: 94.2 FL (ref 80–94)
MONOCYTES NFR BLD MANUAL: 0.2 X10(3)/MCL (ref 0.1–1.3)
MONOCYTES NFR BLD MANUAL: 4 % (ref 2–11)
NEUTROPHILS NFR BLD MANUAL: 50 % (ref 47–80)
NRBC BLD AUTO-RTO: 0 %
PLATELET # BLD AUTO: 164 X10(3)/MCL (ref 130–400)
PLATELET # BLD EST: ADEQUATE 10*3/UL
PMV BLD AUTO: 11 FL (ref 7.4–10.4)
POTASSIUM SERPL-SCNC: 3.8 MMOL/L (ref 3.5–5.1)
PROT SERPL-MCNC: 5.9 GM/DL (ref 6.4–8.3)
RBC # BLD AUTO: 3.95 X10(6)/MCL (ref 4.2–5.4)
SODIUM SERPL-SCNC: 143 MMOL/L (ref 136–145)
WBC # SPEC AUTO: 4.99 X10(3)/MCL (ref 4.5–11.5)

## 2024-01-30 PROCEDURE — 85027 COMPLETE CBC AUTOMATED: CPT

## 2024-01-30 PROCEDURE — 94761 N-INVAS EAR/PLS OXIMETRY MLT: CPT

## 2024-01-30 PROCEDURE — 63600175 PHARM REV CODE 636 W HCPCS

## 2024-01-30 PROCEDURE — 27000221 HC OXYGEN, UP TO 24 HOURS

## 2024-01-30 PROCEDURE — 80053 COMPREHEN METABOLIC PANEL: CPT

## 2024-01-30 PROCEDURE — 25000003 PHARM REV CODE 250

## 2024-01-30 RX ORDER — MORPHINE SULFATE 2 MG/ML
1 INJECTION, SOLUTION INTRAMUSCULAR; INTRAVENOUS ONCE
Status: COMPLETED | OUTPATIENT
Start: 2024-01-30 | End: 2024-01-30

## 2024-01-30 RX ADMIN — MORPHINE SULFATE 1 MG: 2 INJECTION, SOLUTION INTRAMUSCULAR; INTRAVENOUS at 03:01

## 2024-01-30 RX ADMIN — PANTOPRAZOLE SODIUM 40 MG: 40 TABLET, DELAYED RELEASE ORAL at 05:01

## 2024-01-30 NOTE — DISCHARGE SUMMARY
"AMA Note     Date of AMA: 01/30/2024    Despite our efforts, Ms. Niya Moeller has decided to leave AGAINST MEDICAL ADVICE. She has normal mental status and full decisional capacity. The patient understands her medical comorbidities including her pending further evaluation as this has been explained to her multiple times during this hospitalization.  I attempted to explain to the patient the risks associated with leaving AMA and once again explain her current management.  However, patient stated that "she did not want to hear anything that I have to say and proceeded to walk out of the room. She ultimately refused to sign AMA form.  Patient was, however, informed to return for care if at any time they experience worsening symptoms    Yeyo Pollack, DO  [facility] Internal Medicine PGY-1  01/30/2024    "

## 2024-01-30 NOTE — NURSING
Pt called and asked to speak with nurse, nurse went to room. Pt stated that she wishes to eat food from vending machine because she's hungry and cannot go on just clear liquids anymore. RN educated on risks of doing so. Pt verbalized understanding but still wishes to eat food. RN paged MD on call and notified of such.

## 2024-01-30 NOTE — NURSING
Dr Pollack notified that pt is requesting to leave AMA to go to [facility]. He stated he'll be up to talk to the pt

## 2024-01-30 NOTE — MEDICAL/APP STUDENT
[facility] Medicine  Progress Note      Patient Name: Niya Moeller  : 1970  MRN: [id number]  Patient Class: IP- Inpatient   Admission Date: 2024   Length of Stay: 3  Admitting Service: Hospital Medicine  Attending Physician: Sonia Olivarez*  PCP: Oswaldo Riggs NP    CHIEF COMPLAINT   Hospital follow up    HOSPITAL COURSE     Brief HPI:  Niya Moeller is a 53 y.o. female with a past medical history of diverticulitis, asthma, pulmonary embolism, and GERD who presented to the ED via EMS on 2024  with a primary complaint of abdominal pain.  Patient stated that her abdominal pain started approximately 3 days ago and intensified last night where she rated her pain as a 10/10 on the pain scale.  Patient localizes her abdominal pain in the left lower quadrant and states that it does not radiate elsewhere.  She endorses associated fever, chills, nausea without vomiting, and constipation.  Denies bloody stool.  Patient has been hospitalized several times in the past for diverticulitis requiring IV antibiotics.  Most recently she was admitted at our facility on 10/23/2023 for diverticulitis without abscess.  Patient states that she has been followed up with Gastroenterology in [place] and is supposed to be scheduled for colonoscopy soon.  Patient has adamantly refused partial colectomy in the past for recurrent diverticulitis.     In ED, patient slightly tachycardic with a HR of 100 on arrival but vital signs otherwise unremarkable.  Wheezing was auscultated on initial examination and patient received DuoNebs x2 with noted resolution and wheezing.  CTA abdomen and pelvis displayed sigmoid diverticulitis with a 4 cm intramural abscess.  Patient with no leukocytosis on labs.  Internal medicine was consulted for further care and management moving forward.      Interval History: No acute events overnight. Vital signs stable. Patient is on day 2 of ertapenem.  "      OBJECTIVE/PHYSICAL EXAM     VITAL SIGNS (MOST RECENT):  Temp: 97.8 °F (36.6 °C) (01/30/24 0357)  Pulse: 62 (01/30/24 0357)  Resp: 18 (01/30/24 0334)  BP: (!) 99/55 (01/30/24 0357)  SpO2: 100 % (01/30/24 0357) VITAL SIGNS (24 HOUR RANGE):  Temp:  [97.8 °F (36.6 °C)-98.5 °F (36.9 °C)]   Pulse:  [62-68]   Resp:  [16-18]   BP: ()/(55-74)   SpO2:  [99 %-100 %]      Physical Exam    LABS/MICRO/MEDS/DIAGNOSTICS     LABS  CBC  Recent Labs     01/29/24 0118 01/30/24 0333   WBC 8.48 4.99   RBC 4.05* 3.95*   HGB 12.6 12.1   HCT 38.0 37.2   MCV 93.8 94.2*   MCH 31.1* 30.6   MCHC 33.2 32.5*   RDW 13.5 13.2    164     Anemia  No results for input(s): "HAPTOGLOBIN", "FERRITIN", "IRON", "TIBC" in the last 72 hours.  Coags  Recent Labs     01/28/24  0955   INR 1.4*     Cardiac  No results for input(s): "BNP", "CPK", "TROPONINI" in the last 72 hours.  ABG/Lactate  Recent Labs     01/27/24  0851   LACTIC 0.8       BMP  Recent Labs     01/29/24 0118 01/30/24 0333    143   K 3.9 3.8   CHLORIDE 103 106   CO2 27 29   BUN 5.5* 3.3*   CREATININE 0.76 0.70   GLUCOSE 82 79   CALCIUM 8.8 8.7     LFTs  Recent Labs     01/29/24 0118 01/30/24 0333   ALBUMIN 2.9* 2.7*   GLOBULIN 3.3 3.2   ALKPHOS 59 51   ALT 10 10   AST 14 13   BILITOT 0.3 0.3     Inflammatory Markers  No results for input(s): "CRP", "LDH", "ESR" in the last 72 hours.  Lipid  No results for input(s): "CHOL", "TRIG", "LDL", "VLDL", "HDL" in the last 72 hours.  Diabetes  Recent Labs     01/28/24  0409 01/29/24  0118 01/30/24  0333   GLUCOSE 75 82 79     Thyroid  No results for input(s): "TSH", "FREET4" in the last 72 hours.       MICROBIOLOGY  Microbiology Results (last 7 days)       Procedure Component Value Units Date/Time    Blood Culture #1 **CANNOT BE ORDERED STAT** [[id number]]  (Normal) Collected: 01/27/24 1035    Order Status: Completed Specimen: Blood from Arm, Right Updated: 01/29/24 1501     CULTURE, BLOOD ([facility]) No Growth At 48 Hours    " Blood Culture #2 **CANNOT BE ORDERED STAT** [[id number]]  (Normal) Collected: 01/27/24 1036    Order Status: Completed Specimen: Blood from Arm, Left Updated: 01/29/24 1501     CULTURE, BLOOD ([facility]) No Growth At 48 Hours               MEDICATIONS   ertapenem (INVANZ) IVPB  1 g Intravenous Q24H    methIMAzole  2.5 mg Oral Daily    pantoprazole  40 mg Oral Daily         INFUSIONS   lactated ringers 125 mL/hr at 01/30/24 0613          DIAGNOSTIC TESTS  CT Abdomen Pelvis W Wo Contrast   Final Result      Little change in the intramural/pericolonic abscess limits 01/27/2024.      No major discrepancy with the preliminary report.         Electronically signed by: John Bowens   Date:    01/29/2024   Time:    15:43      X-Ray Abdomen AP 1 View   Final Result      X-Ray Abdomen AP 1 View   Final Result      CT Abdomen Pelvis With IV Contrast NO Oral Contrast   Final Result      Sigmoid diverticulitis with appearance of intramural abscess.         Electronically signed by: Juan Leavitt   Date:    01/27/2024   Time:    10:05      X-Ray Chest 1 View   Final Result      No acute cardiopulmonary process.         Electronically signed by: Juan Leavitt   Date:    01/27/2024   Time:    09:50           EF   Date Value Ref Range Status   02/13/2023 75 % Final          ASSESSMENT/PLAN     Diverticulitis with intramural abscess 4 cm in the sigmoid colon   -patient has had several instances of diverticulitis and abscess.  Per chart review patient has had abscess since October 2022 and had a drain placed in 2023.  Patient has required weeks of Zosyn in the past.  -patient currently adamantly refusing colectomy as indicated for repeat diverticulitis and in this case abscess   -patient was on Zosyn in the past for 6 weeks in [place], however she has an allergy noted on our EMR.  Continue ertapenem due to patient's history of ESBL E coli infection from previous diverticular abscess (D2)  - Infectious disease recommended 14 days of  ertapenem if the abscess is drained and 4 weeks if drainage is not preformed.    -Consulted IR on 1/29 who stated that patient is not candidate for percutaneous drainage at this time due to the fact that abscesses intramural.  Recommended that we repeat abdominal imaging on Wednesday and reconsult.  -be cautious of giving any further opiates or NSAIDs for pain control  - Repeat abdominal imaging tomorrow and reconsult IR   - If no growth on blood cultures at 72 hours, place PICC line     Hx of Asthma   Influenza A  - received DuoNebs x1 in ED  - patient with mild wheezing on examination  -Tamiflu 75 mg b.i.d. x5 days, completed course from previous admission  - DuoNebs q.4 hours p.r.n.  - avoid steroids in setting of diverticulitis with abscess being treated with antibiotics     History of pulmonary embolism  -originally diagnosed in September of last year, appeared to occur after removal of PICC line  -since previous DVT/PE was provoked, can discontinue heparin at this time as patient has completed 3 months of therapy     GERD   -continue Protonix 40 mg daily     Graves disease   -TSH and free T4 within normal limits at last check   -continue MMI 2.5 mg daily       Access: PIV  Antibiotics: Ertapenem   Diet: Clear liquid diet   DVT Prophylaxis: ***  GI Prophylaxis: Protonix   Fluids:  ml/hr    Anticipated discharge and disposition:   __________________________________________________________________________      Case related differential diagnoses have been reviewed; assessment and plan has been documented. I have personally reviewed the labs and test results that are currently available; I have reviewed the patients medication list. I have reviewed the consulting providers recommendations. I have reviewed or attempted to review medical records based upon their availability.  All of the patient's and/or family's questions have been addressed and answered to the best of my ability.  I will continue to monitor  closely and make adjustments to medical management as needed.  Please contact me if any questions may rise regarding documentation to clarify transcription.

## 2024-02-01 LAB
BACTERIA BLD CULT: NORMAL
BACTERIA BLD CULT: NORMAL

## 2024-02-09 NOTE — PHYSICIAN QUERY
PT Name: Niya Moeller  MR #: [id number]     DOCUMENTATION CLARIFICATION     CDS/: Chadd Corrales               Contact information: [email or web address]  This form is a permanent document in the medical record.     Query Date: February 9, 2024    By submitting this query, we are merely seeking further clarification of documentation.  Please utilize your independent clinical judgment when addressing the question(s) below.  The Medical Record contains the following:  Indicators Supporting Clinical Findings Location in Medical Record   X HR         RR          BP        Temp 102       18    138/76     ED provider note 01/27/2024   X Lactic Acid          Procalcitonin Lactic acid 0.8  Consult note 01/27/2024   X WBC           Bands          CRP  WBC 6.70  H&P note 01/27/2024    Culture(s)      AMS, Confusion, LOC, etc.      Organ Dysfunction/Failure     X Bacteremia or Sepsis / Septic Sepsis 2/2 colonic abscess  Consult note 01/29/2024    Known or Suspected Source of Infection documented      (Failed) Outpatient Treatment     X Medication Patient currently on antibiotics  H&P note 01/27/2024    Treatment      Other          Provider, please specify diagnosis or diagnoses associated with above clinical findings.  [  x ] Sepsis due to unknown organism   [   ] Sepsis due to (suspected) organism (please specify): __________   [   ] Severe Sepsis with Acute Organ Dysfunction/Failure (please specify organ dysfunction/failure): _______   [   ] Sepsis with Septic Shock   [   ] Bacteremia without Sepsis   [   ] Bacteremia with Sepsis   [   ] SIRS without infectious etiology   [   ] SIRS with infection but without Sepsis   [   ] Other Infectious Disease (please specify): __________   [   ] Sepsis Ruled Out   [  ] Clinically Undetermined       Present on admission (POA) status:   [   ] Yes (Y)            [  ] Clinically Undetermined (W)  [   ] No (N)              [   ] Documentation insufficient to determine if condition is  POA (U)     Please document in your progress notes daily for the duration of treatment until resolved and include in your discharge summary.

## 2024-02-12 ENCOUNTER — HOSPITAL ENCOUNTER (INPATIENT)
Facility: HOSPITAL | Age: 54
LOS: 9 days | Discharge: LEFT BEFORE DISCHARGE | DRG: 392 | End: 2024-02-21
Attending: EMERGENCY MEDICINE | Admitting: INTERNAL MEDICINE
Payer: MEDICAID

## 2024-02-12 DIAGNOSIS — R10.9 ACUTE ABDOMINAL PAIN IN LEFT FLANK: Primary | ICD-10-CM

## 2024-02-12 DIAGNOSIS — Z78.9 FAILURE OF OUTPATIENT TREATMENT: ICD-10-CM

## 2024-02-12 DIAGNOSIS — K57.92 DIVERTICULITIS: ICD-10-CM

## 2024-02-12 DIAGNOSIS — Z86.19 HISTORY OF INFECTION DUE TO EXTENDED SPECTRUM BETA LACTAMASE (ESBL) PRODUCING BACTERIA: ICD-10-CM

## 2024-02-12 LAB
ALBUMIN SERPL-MCNC: 4 G/DL (ref 3.5–5)
ALBUMIN/GLOB SERPL: 1.1 RATIO (ref 1.1–2)
ALP SERPL-CCNC: 74 UNIT/L (ref 40–150)
ALT SERPL-CCNC: 16 UNIT/L (ref 0–55)
APPEARANCE UR: CLEAR
AST SERPL-CCNC: 18 UNIT/L (ref 5–34)
BACTERIA #/AREA URNS AUTO: ABNORMAL /HPF
BASOPHILS # BLD AUTO: 0.01 X10(3)/MCL
BASOPHILS NFR BLD AUTO: 0.2 %
BILIRUB SERPL-MCNC: 0.4 MG/DL
BILIRUB UR QL STRIP.AUTO: NEGATIVE
BUN SERPL-MCNC: 8.4 MG/DL (ref 9.8–20.1)
CALCIUM SERPL-MCNC: 10.1 MG/DL (ref 8.4–10.2)
CHLORIDE SERPL-SCNC: 104 MMOL/L (ref 98–107)
CO2 SERPL-SCNC: 26 MMOL/L (ref 22–29)
COLOR UR AUTO: COLORLESS
CREAT SERPL-MCNC: 0.85 MG/DL (ref 0.55–1.02)
EOSINOPHIL # BLD AUTO: 0.11 X10(3)/MCL (ref 0–0.9)
EOSINOPHIL NFR BLD AUTO: 1.7 %
ERYTHROCYTE [DISTWIDTH] IN BLOOD BY AUTOMATED COUNT: 14.2 % (ref 11.5–17)
GFR SERPLBLD CREATININE-BSD FMLA CKD-EPI: >60 MLS/MIN/1.73/M2
GLOBULIN SER-MCNC: 3.5 GM/DL (ref 2.4–3.5)
GLUCOSE SERPL-MCNC: 102 MG/DL (ref 74–100)
GLUCOSE UR QL STRIP.AUTO: NORMAL
HCT VFR BLD AUTO: 42.4 % (ref 37–47)
HGB BLD-MCNC: 13.8 G/DL (ref 12–16)
IMM GRANULOCYTES # BLD AUTO: 0.02 X10(3)/MCL (ref 0–0.04)
IMM GRANULOCYTES NFR BLD AUTO: 0.3 %
KETONES UR QL STRIP.AUTO: NEGATIVE
LEUKOCYTE ESTERASE UR QL STRIP.AUTO: NEGATIVE
LYMPHOCYTES # BLD AUTO: 2.01 X10(3)/MCL (ref 0.6–4.6)
LYMPHOCYTES NFR BLD AUTO: 31.5 %
MCH RBC QN AUTO: 30.8 PG (ref 27–31)
MCHC RBC AUTO-ENTMCNC: 32.5 G/DL (ref 33–36)
MCV RBC AUTO: 94.6 FL (ref 80–94)
MONOCYTES # BLD AUTO: 0.65 X10(3)/MCL (ref 0.1–1.3)
MONOCYTES NFR BLD AUTO: 10.2 %
NEUTROPHILS # BLD AUTO: 3.58 X10(3)/MCL (ref 2.1–9.2)
NEUTROPHILS NFR BLD AUTO: 56.1 %
NITRITE UR QL STRIP.AUTO: NEGATIVE
NRBC BLD AUTO-RTO: 0 %
PH UR STRIP.AUTO: 5.5 [PH]
PLATELET # BLD AUTO: 325 X10(3)/MCL (ref 130–400)
PMV BLD AUTO: 10.8 FL (ref 7.4–10.4)
POTASSIUM SERPL-SCNC: 3.8 MMOL/L (ref 3.5–5.1)
PROT SERPL-MCNC: 7.5 GM/DL (ref 6.4–8.3)
PROT UR QL STRIP.AUTO: NEGATIVE
RBC # BLD AUTO: 4.48 X10(6)/MCL (ref 4.2–5.4)
RBC #/AREA URNS AUTO: ABNORMAL /HPF
RBC UR QL AUTO: NEGATIVE
SODIUM SERPL-SCNC: 140 MMOL/L (ref 136–145)
SP GR UR STRIP.AUTO: >1.05 (ref 1–1.03)
SQUAMOUS #/AREA URNS LPF: ABNORMAL /HPF
UROBILINOGEN UR STRIP-ACNC: NORMAL
WBC # SPEC AUTO: 6.38 X10(3)/MCL (ref 4.5–11.5)
WBC #/AREA URNS AUTO: ABNORMAL /HPF

## 2024-02-12 PROCEDURE — 87040 BLOOD CULTURE FOR BACTERIA: CPT | Performed by: EMERGENCY MEDICINE

## 2024-02-12 PROCEDURE — 99900031 HC PATIENT EDUCATION (STAT)

## 2024-02-12 PROCEDURE — 25000242 PHARM REV CODE 250 ALT 637 W/ HCPCS: Performed by: INTERNAL MEDICINE

## 2024-02-12 PROCEDURE — 96365 THER/PROPH/DIAG IV INF INIT: CPT

## 2024-02-12 PROCEDURE — 85025 COMPLETE CBC W/AUTO DIFF WBC: CPT | Performed by: EMERGENCY MEDICINE

## 2024-02-12 PROCEDURE — 94640 AIRWAY INHALATION TREATMENT: CPT

## 2024-02-12 PROCEDURE — 25000003 PHARM REV CODE 250: Performed by: EMERGENCY MEDICINE

## 2024-02-12 PROCEDURE — 25000003 PHARM REV CODE 250: Performed by: INTERNAL MEDICINE

## 2024-02-12 PROCEDURE — 80053 COMPREHEN METABOLIC PANEL: CPT | Performed by: EMERGENCY MEDICINE

## 2024-02-12 PROCEDURE — 21400001 HC TELEMETRY ROOM

## 2024-02-12 PROCEDURE — 99285 EMERGENCY DEPT VISIT HI MDM: CPT | Mod: 25

## 2024-02-12 PROCEDURE — 96361 HYDRATE IV INFUSION ADD-ON: CPT

## 2024-02-12 PROCEDURE — 94761 N-INVAS EAR/PLS OXIMETRY MLT: CPT

## 2024-02-12 PROCEDURE — 25500020 PHARM REV CODE 255: Performed by: EMERGENCY MEDICINE

## 2024-02-12 PROCEDURE — 63600175 PHARM REV CODE 636 W HCPCS: Performed by: EMERGENCY MEDICINE

## 2024-02-12 PROCEDURE — 81001 URINALYSIS AUTO W/SCOPE: CPT | Performed by: EMERGENCY MEDICINE

## 2024-02-12 PROCEDURE — 99900035 HC TECH TIME PER 15 MIN (STAT)

## 2024-02-12 PROCEDURE — 96375 TX/PRO/DX INJ NEW DRUG ADDON: CPT

## 2024-02-12 PROCEDURE — 11000001 HC ACUTE MED/SURG PRIVATE ROOM

## 2024-02-12 PROCEDURE — 63600175 PHARM REV CODE 636 W HCPCS: Performed by: INTERNAL MEDICINE

## 2024-02-12 RX ORDER — MORPHINE SULFATE 4 MG/ML
2 INJECTION, SOLUTION INTRAMUSCULAR; INTRAVENOUS EVERY 4 HOURS PRN
Status: DISCONTINUED | OUTPATIENT
Start: 2024-02-12 | End: 2024-02-21 | Stop reason: HOSPADM

## 2024-02-12 RX ORDER — ACETAMINOPHEN 325 MG/1
650 TABLET ORAL EVERY 8 HOURS PRN
Status: DISCONTINUED | OUTPATIENT
Start: 2024-02-12 | End: 2024-02-21 | Stop reason: HOSPADM

## 2024-02-12 RX ORDER — ALBUTEROL SULFATE 90 UG/1
2 AEROSOL, METERED RESPIRATORY (INHALATION) EVERY 6 HOURS PRN
Status: DISCONTINUED | OUTPATIENT
Start: 2024-02-12 | End: 2024-02-21 | Stop reason: HOSPADM

## 2024-02-12 RX ORDER — ONDANSETRON HYDROCHLORIDE 2 MG/ML
4 INJECTION, SOLUTION INTRAVENOUS
Status: COMPLETED | OUTPATIENT
Start: 2024-02-12 | End: 2024-02-12

## 2024-02-12 RX ORDER — MORPHINE SULFATE 4 MG/ML
4 INJECTION, SOLUTION INTRAMUSCULAR; INTRAVENOUS EVERY 4 HOURS PRN
Status: DISCONTINUED | OUTPATIENT
Start: 2024-02-12 | End: 2024-02-21 | Stop reason: HOSPADM

## 2024-02-12 RX ORDER — PANTOPRAZOLE SODIUM 40 MG/1
40 TABLET, DELAYED RELEASE ORAL DAILY
Status: DISCONTINUED | OUTPATIENT
Start: 2024-02-13 | End: 2024-02-21 | Stop reason: HOSPADM

## 2024-02-12 RX ORDER — LEVOFLOXACIN 5 MG/ML
750 INJECTION, SOLUTION INTRAVENOUS
Status: DISCONTINUED | OUTPATIENT
Start: 2024-02-12 | End: 2024-02-12

## 2024-02-12 RX ORDER — POLYETHYLENE GLYCOL 3350 17 G/17G
17 POWDER, FOR SOLUTION ORAL DAILY
Status: DISCONTINUED | OUTPATIENT
Start: 2024-02-12 | End: 2024-02-13

## 2024-02-12 RX ORDER — METHIMAZOLE 10 MG/1
10 TABLET ORAL DAILY
Status: DISCONTINUED | OUTPATIENT
Start: 2024-02-13 | End: 2024-02-20

## 2024-02-12 RX ORDER — METRONIDAZOLE 500 MG/100ML
500 INJECTION, SOLUTION INTRAVENOUS
Status: DISCONTINUED | OUTPATIENT
Start: 2024-02-12 | End: 2024-02-14

## 2024-02-12 RX ORDER — MORPHINE SULFATE 4 MG/ML
4 INJECTION, SOLUTION INTRAMUSCULAR; INTRAVENOUS
Status: COMPLETED | OUTPATIENT
Start: 2024-02-12 | End: 2024-02-12

## 2024-02-12 RX ORDER — HYDROCODONE BITARTRATE AND ACETAMINOPHEN 5; 325 MG/1; MG/1
1 TABLET ORAL EVERY 6 HOURS PRN
Status: DISCONTINUED | OUTPATIENT
Start: 2024-02-12 | End: 2024-02-21 | Stop reason: HOSPADM

## 2024-02-12 RX ORDER — ONDANSETRON HYDROCHLORIDE 2 MG/ML
4 INJECTION, SOLUTION INTRAVENOUS EVERY 8 HOURS PRN
Status: DISCONTINUED | OUTPATIENT
Start: 2024-02-12 | End: 2024-02-21 | Stop reason: HOSPADM

## 2024-02-12 RX ORDER — SUCRALFATE 1 G/1
1 TABLET ORAL
Status: DISCONTINUED | OUTPATIENT
Start: 2024-02-12 | End: 2024-02-21 | Stop reason: HOSPADM

## 2024-02-12 RX ORDER — IPRATROPIUM BROMIDE AND ALBUTEROL SULFATE 2.5; .5 MG/3ML; MG/3ML
3 SOLUTION RESPIRATORY (INHALATION) EVERY 4 HOURS
Status: DISCONTINUED | OUTPATIENT
Start: 2024-02-12 | End: 2024-02-21 | Stop reason: HOSPADM

## 2024-02-12 RX ORDER — IPRATROPIUM BROMIDE AND ALBUTEROL SULFATE 2.5; .5 MG/3ML; MG/3ML
3 SOLUTION RESPIRATORY (INHALATION) EVERY 4 HOURS
Status: DISCONTINUED | OUTPATIENT
Start: 2024-02-12 | End: 2024-02-12

## 2024-02-12 RX ORDER — METRONIDAZOLE 500 MG/100ML
500 INJECTION, SOLUTION INTRAVENOUS
Status: DISCONTINUED | OUTPATIENT
Start: 2024-02-12 | End: 2024-02-12

## 2024-02-12 RX ORDER — TALC
6 POWDER (GRAM) TOPICAL NIGHTLY PRN
Status: DISCONTINUED | OUTPATIENT
Start: 2024-02-12 | End: 2024-02-21 | Stop reason: HOSPADM

## 2024-02-12 RX ORDER — SODIUM CHLORIDE, SODIUM LACTATE, POTASSIUM CHLORIDE, CALCIUM CHLORIDE 600; 310; 30; 20 MG/100ML; MG/100ML; MG/100ML; MG/100ML
INJECTION, SOLUTION INTRAVENOUS CONTINUOUS
Status: DISCONTINUED | OUTPATIENT
Start: 2024-02-12 | End: 2024-02-21 | Stop reason: HOSPADM

## 2024-02-12 RX ORDER — FLUTICASONE FUROATE AND VILANTEROL 100; 25 UG/1; UG/1
1 POWDER RESPIRATORY (INHALATION) DAILY
Status: DISCONTINUED | OUTPATIENT
Start: 2024-02-13 | End: 2024-02-21 | Stop reason: HOSPADM

## 2024-02-12 RX ORDER — PROCHLORPERAZINE EDISYLATE 5 MG/ML
5 INJECTION INTRAMUSCULAR; INTRAVENOUS EVERY 6 HOURS PRN
Status: DISCONTINUED | OUTPATIENT
Start: 2024-02-12 | End: 2024-02-21 | Stop reason: HOSPADM

## 2024-02-12 RX ORDER — CIPROFLOXACIN 2 MG/ML
400 INJECTION, SOLUTION INTRAVENOUS
Status: DISCONTINUED | OUTPATIENT
Start: 2024-02-12 | End: 2024-02-14

## 2024-02-12 RX ADMIN — ONDANSETRON 4 MG: 2 INJECTION INTRAMUSCULAR; INTRAVENOUS at 09:02

## 2024-02-12 RX ADMIN — HYDROCODONE BITARTRATE AND ACETAMINOPHEN 1 TABLET: 5; 325 TABLET ORAL at 05:02

## 2024-02-12 RX ADMIN — MEROPENEM 1 G: 1 INJECTION, POWDER, FOR SOLUTION INTRAVENOUS at 10:02

## 2024-02-12 RX ADMIN — CIPROFLOXACIN 400 MG: 2 INJECTION, SOLUTION INTRAVENOUS at 05:02

## 2024-02-12 RX ADMIN — METRONIDAZOLE 500 MG: 5 INJECTION, SOLUTION INTRAVENOUS at 08:02

## 2024-02-12 RX ADMIN — SODIUM CHLORIDE 1000 ML: 9 INJECTION, SOLUTION INTRAVENOUS at 10:02

## 2024-02-12 RX ADMIN — SUCRALFATE 1 G: 1 TABLET ORAL at 08:02

## 2024-02-12 RX ADMIN — IOPAMIDOL 100 ML: 755 INJECTION, SOLUTION INTRAVENOUS at 09:02

## 2024-02-12 RX ADMIN — MORPHINE SULFATE 4 MG: 4 INJECTION, SOLUTION INTRAMUSCULAR; INTRAVENOUS at 09:02

## 2024-02-12 RX ADMIN — SODIUM CHLORIDE, POTASSIUM CHLORIDE, SODIUM LACTATE AND CALCIUM CHLORIDE: 600; 310; 30; 20 INJECTION, SOLUTION INTRAVENOUS at 12:02

## 2024-02-12 RX ADMIN — SUCRALFATE 1 G: 1 TABLET ORAL at 05:02

## 2024-02-12 RX ADMIN — IPRATROPIUM BROMIDE AND ALBUTEROL SULFATE 3 ML: 2.5; .5 SOLUTION RESPIRATORY (INHALATION) at 04:02

## 2024-02-12 RX ADMIN — IPRATROPIUM BROMIDE AND ALBUTEROL SULFATE 3 ML: 2.5; .5 SOLUTION RESPIRATORY (INHALATION) at 08:02

## 2024-02-12 NOTE — NURSING
Nurses Note -- 4 Eyes      2/12/2024   4:07 PM      Skin assessed during: Admit      [x] No Altered Skin Integrity Present    []Prevention Measures Documented      [] Yes- Altered Skin Integrity Present or Discovered   [] LDA Added if Not in Epic (Describe Wound)   [] New Altered Skin Integrity was Present on Admit and Documented in LDA   [] Wound Image Taken    Wound Care Consulted? No    Attending Nurse:  Amy Keith RN/Staff Member:  Kvng Batres

## 2024-02-12 NOTE — PLAN OF CARE
Pt is , 1 child no living will or POA. Per request, referral sent to [facility].    02/12/24 1543   Discharge Assessment   Assessment Type Discharge Planning Assessment   Confirmed/corrected address, phone number and insurance Yes   Confirmed Demographics Correct on Facesheet   Source of Information patient   When was your last doctors appointment?   (PCP Dr. Lopez)   Communicated ROXIE with patient/caregiver Date not available/Unable to determine   People in Home alone   Do you expect to return to your current living situation? Yes   Do you have help at home or someone to help you manage your care at home? Yes   Who are your caregiver(s) and their phone number(s)? son Dustin Magallon Identified as support [phone]   Prior to hospitilization cognitive status: Unable to Assess   Current cognitive status: Alert/Oriented   Walking or Climbing Stairs Difficulty no   Dressing/Bathing Difficulty no   Home Accessibility wheelchair accessible   Home Layout Able to live on 1st floor   Equipment Currently Used at Home nebulizer   Readmission within 30 days? Yes   Patient currently being followed by outpatient case management? No   Do you currently have service(s) that help you manage your care at home? No   Do you take [facility]? [facility]  [facility])   Do you have prescription coverage? Yes   Coverage medicaid-aetna   Do you have any problems affording any of your prescribed medications? No   Is the patient taking medications as prescribed? yes   Who is going to help you get home at discharge? brother   How do you get to doctors appointments? family or friend will provide;car, drives self   Are you on dialysis? No   Do you take coumadin? No   Discharge Plan A Other  (TBD)   DME Needed Upon Discharge  other (see comments)  (TBD)   Discharge Plan discussed with: Patient   Transition of Care Barriers None   Physical Activity   On average, how many days per week do you engage in  moderate to strenuous exercise (like a brisk walk)? 4 days   On average, how many minutes do you engage in exercise at this level? 50 min   Financial Resource Strain   How hard is it for you to pay for the very basics like food, housing, medical care, and heating? Hard   Housing Stability   In the last 12 months, was there a time when you were not able to pay the mortgage or rent on time? Y   In the last 12 months, how many places have you lived? 1   In the last 12 months, was there a time when you did not have a steady place to sleep or slept in a shelter (including now)? N   Transportation Needs   In the past 12 months, has lack of transportation kept you from medical appointments or from getting medications? no   In the past 12 months, has lack of transportation kept you from meetings, work, or from getting things needed for daily living? No   Food Insecurity   Within the past 12 months, you worried that your food would run out before you got the money to buy more. Sometimes   Within the past 12 months, the food you bought just didn't last and you didn't have money to get more. Sometimes   Stress   Do you feel stress - tense, restless, nervous, or anxious, or unable to sleep at night because your mind is troubled all the time - these days? Rather much  (referred to [facility] for mental health resources per request)   Social Connections   In a typical week, how many times do you talk on the phone with family, friends, or neighbors? More than 3   How often do you get together with friends or relatives? More than 3   How often do you attend Quaker or [employer]ian services? Never   How often do you attend meetings of the clubs or organizations you belong to? Never   Are you , , , , never , or living with a partner?    Alcohol Use   Q1: How often do you have a drink containing alcohol? Never   Q2: How many drinks containing alcohol do you have on a typical  day when you are drinking? None   Q3: How often do you have six or more drinks on one occasion? Never

## 2024-02-12 NOTE — H&P
[facility] - Emergency Dept    History & Physical      Patient Name: Niya Moeller  MRN: [id number]  Admission Date: 2024  Attending Physician: Bin Reyes MD   Primary Care Provider: Oswaldo Riggs NP         Patient information was obtained from ER records.     Subjective:     Principal Problem:<principal problem not specified>    Chief Complaint:   Chief Complaint   Patient presents with    Abdominal Pain     C/o LLQ pain & nausea that started yesterday. States was seen in ED yesterday & d/c home. Hx diverticulitis.         HPI: 52 y/o female with history of asthma graves diverticulitis gerd presented to the ed with complaints of abdominal pain recently finished iv abx for diverticulitis further work up and imaging suggestive of sigmoid diverticulitis with small abscess and subsequently admitted on ivf and iv abx along with gi consult    Past Medical History:   Diagnosis Date    Asthma     Diverticulosis     GERD (gastroesophageal reflux disease)     Graves disease        No past surgical history on file.    Review of patient's allergies indicates:   Allergen Reactions    Latex Anaphylaxis and Edema    Zosyn [piperacillin-tazobactam] Anaphylaxis       Current Facility-Administered Medications on File Prior to Encounter   Medication    [COMPLETED] iopamidoL (ISOVUE-370) injection 100 mL    [COMPLETED] morphine injection 4 mg     Current Outpatient Medications on File Prior to Encounter   Medication Sig    ADVAIR DISKUS 250-50 mcg/dose diskus inhaler SMARTSI Puff(s) By Mouth    albuterol (ACCUNEB) 1.25 mg/3 mL Nebu Take by nebulization every 4 to 6 hours as needed.    albuterol (PROAIR HFA) 90 mcg/actuation inhaler Inhale 2 puffs into the lungs every 6 (six) hours as needed for Wheezing. Rescue    albuterol-ipratropium (DUO-NEB) 2.5 mg-0.5 mg/3 mL nebulizer solution Take 3 mLs by nebulization every 4 (four) hours as needed for Wheezing. Rescue    albuterol-ipratropium (DUO-NEB) 2.5  mg-0.5 mg/3 mL nebulizer solution Take 3 mLs by nebulization every 4 (four) hours. Rescue    ciprofloxacin HCl (CIPRO) 500 MG tablet Take 1 tablet (500 mg total) by mouth 2 (two) times daily. for 10 days    diclofenac (VOLTAREN) 50 MG EC tablet Take 1 tablet (50 mg total) by mouth 2 (two) times daily as needed (Pain).    doxycycline (VIBRA-TABS) 100 MG tablet Take 100 mg by mouth 2 (two) times daily.    ELIQUIS 5 mg Tab Take 5 mg by mouth 2 (two) times daily.    fluticasone propionate (FLONASE) 50 mcg/actuation nasal spray 1 spray (50 mcg total) by Each Nostril route daily as needed for Allergies or Rhinitis.    HYDROcodone-acetaminophen (NORCO) 5-325 mg per tablet Take 1 tablet by mouth every 6 (six) hours as needed for Pain.    methIMAzole (TAPAZOLE) 5 MG Tab Take a half of a tablet daily to Total 2.5 mg    omeprazole (PRILOSEC) 20 MG capsule Take 20 mg by mouth.    sucralfate (CARAFATE) 1 gram tablet Take 1 tablet by mouth 4 (four) times daily before meals and nightly.     Family History       Problem Relation (Age of Onset)    Hypothyroidism Mother          Tobacco Use    Smoking status: Every Day     Current packs/day: 0.50     Average packs/day: 0.5 packs/day for 41.1 years (20.6 ttl pk-yrs)     Types: Cigarettes     Start date: 1/1/1983    Smokeless tobacco: Never    Tobacco comments:     Ambulatory referral to Smoking Cessation clinic following hospital discharge.    Substance and Sexual Activity    Alcohol use: Not Currently    Drug use: Never    Sexual activity: Not Currently     Birth control/protection: Abstinence     Comment: States is asexual     Review of Systems   Constitutional: Negative.    HENT: Negative.     Eyes: Negative.    Respiratory: Negative.     Cardiovascular: Negative.    Gastrointestinal:  Positive for abdominal pain and nausea.   Endocrine: Negative.    Genitourinary: Negative.    Musculoskeletal: Negative.    Skin: Negative.    Allergic/Immunologic: Negative.  Food allergies:  edward.   Neurological: Negative.    Hematological: Negative.    Psychiatric/Behavioral: Negative.       Objective:     Vital Signs (Most Recent):  Temp: 98.2 °F (36.8 °C) (02/12/24 0838)  Pulse: 89 (02/12/24 0838)  Resp: 18 (02/12/24 0923)  BP: 104/76 (02/12/24 0838)  SpO2: 97 % (02/12/24 0838) Vital Signs (24h Range):  Temp:  [98.1 °F (36.7 °C)-98.2 °F (36.8 °C)] 98.2 °F (36.8 °C)  Pulse:  [74-93] 89  Resp:  [16-20] 18  SpO2:  [96 %-98 %] 97 %  BP: (101-140)/() 104/76     Weight: 65.8 kg (145 lb)  Body mass index is 29.29 kg/m².    Physical Exam  Constitutional:       Appearance: Normal appearance. She is obese.   HENT:      Head: Normocephalic and atraumatic.      Right Ear: Tympanic membrane normal.      Left Ear: Tympanic membrane normal.      Nose: Nose normal.      Mouth/Throat:      Mouth: Mucous membranes are dry.   Eyes:      Pupils: Pupils are equal, round, and reactive to light.   Cardiovascular:      Rate and Rhythm: Regular rhythm. Tachycardia present.      Pulses: Normal pulses.      Heart sounds: Normal heart sounds.   Pulmonary:      Breath sounds: Normal breath sounds.   Abdominal:      General: Abdomen is flat. Bowel sounds are normal. There is distension.      Palpations: Abdomen is soft.      Tenderness: There is abdominal tenderness.   Musculoskeletal:         General: Normal range of motion.      Cervical back: Normal range of motion and neck supple.   Skin:     General: Skin is warm and dry.      Capillary Refill: Capillary refill takes less than 2 seconds.   Neurological:      General: No focal deficit present.      Mental Status: She is alert.   Psychiatric:         Mood and Affect: Mood normal.         Behavior: Behavior normal.           CRANIAL NERVES     CN III, IV, VI   Pupils are equal, round, and reactive to light.      Significant Labs: All pertinent labs within the past 24 hours have been reviewed.  Lab Results   Component Value Date    WBC 6.38 02/12/2024    HGB 13.8  02/12/2024    HCT 42.4 02/12/2024    MCV 94.6 (H) 02/12/2024     02/12/2024         Recent Labs   Lab 02/12/24  0848      K 3.8   CO2 26   BUN 8.4*   CREATININE 0.85   GLUCOSE 102*   CALCIUM 10.1       Significant Imaging: I have reviewed all pertinent imaging results/findings within the past 24 hours.    Assessment/Plan:     There are no hospital problems to display for this patient.    VTE Risk Mitigation (From admission, onward)           Ordered     IP VTE LOW RISK PATIENT  Once         02/12/24 1058     Place sequential compression device  Until discontinued         02/12/24 1058                    Acute sigmoid diverticulitis  Intestinal abscess  Gerd  Htn  Asthma  Graves    Plan :  Ivf  Iv abx  Follow cx  Gi reccs  Adv dieta s alton  Resume home mesds  Labs in am  Karie nd dvt ppx  Full code    Bin Reyes MD  Department of Hospital Medicine   [facility] - Emergency Dept

## 2024-02-12 NOTE — CONSULTS
Gastroenterology Consult    Reason for Consult:     Recurrent diverticulitis    HPI:  Niya Moeller is a 53 y.o. female known to Dr. Perry Jones from inpatient care only, w/ pmhx of recurrent diverticulitis, asthma, Grave's disease.. According to records the pt had multiple cases of diverticulitis while living in [place] around the year 2020.      Admitted April 2022 for LLQ pain, acute diverticulitis with CT findings of innumerable diverticula spanning the entire colon with evidence of acute uncomplicated proximal sigmoid diverticulitis. The patient reported at that time that she had never had a colonoscopy though she was prepped at least twice in an attempt to have colonoscopies out of state.     The pt had an EGD and colonoscopy by Dr. Millard 12/21/23. We do not have access to the full report of this procedure, but there are biopsy results available, and listed subsequent telephone encounters with Dr. Millard. EGD pathology revealing of chronic inactive gastritis, negative for HP. Colonic mucosa with mucosal prolapse polyp, neg for dysplasia.    _____________________________________________________________    Pt presents to the ED yesterday 2/11/24 with a chief complaint of LLQ abd pain. She was just reportedly admitted to [facility] about 2 weeks ago, transferred to [place] for the same problem, was discharged 10 days ago, finished her course of abx and 2 days later had a recurrence of pain.    Pt states the colonoscopy was unable to be completed by Dr. Millard due to the risks of perforation, there was inflammation at the time.    She is established with a surgeon Dr. Sol Hernandez in [facility], who suggested temporary colostomy, but the pt absolutely refuses a colostomy despite all of the benefits, even If the colostomy were to be temporary. She apparently is requesting referral to a surgeon out of state who may be able to perform surgery with other options.    CT abd/pelv w/ IV contrast 2/12/24 revealing  ""similar appearance of sigmoid diverticulitis with possible small abscess." No free air, no bowel obstruction. No measure of the abscess. Started on merrem in the ED. ID also consulted. VSS.       PCP:  Oswaldo Riggs NP    Review of patient's allergies indicates:   Allergen Reactions    Latex Anaphylaxis and Edema    Zosyn [piperacillin-tazobactam] Anaphylaxis       Current Facility-Administered Medications   Medication Dose Route Frequency Provider Last Rate Last Admin    acetaminophen tablet 650 mg  650 mg Oral Q8H PRN Malachi Jimenez MD        lactated ringers infusion   Intravenous Continuous Malachi Jimenez  mL/hr at 24 1237 New Bag at 24 1237    melatonin tablet 6 mg  6 mg Oral Nightly PRN Malachi Jimenez MD        morphine injection 2 mg  2 mg Intravenous Q4H PRN Malachi Jimenez MD        morphine injection 4 mg  4 mg Intravenous Q4H PRN Malachi Jimenez MD        ondansetron injection 4 mg  4 mg Intravenous Q8H PRN Malachi Jimenez MD        polyethylene glycol packet 17 g  17 g Oral Daily Malachi Jimenez MD        prochlorperazine injection Soln 5 mg  5 mg Intravenous Q6H PRN Malachi Jimenez MD         Current Outpatient Medications   Medication Sig Dispense Refill    ADVAIR DISKUS 250-50 mcg/dose diskus inhaler SMARTSI Puff(s) By Mouth      albuterol (ACCUNEB) 1.25 mg/3 mL Nebu Take by nebulization every 4 to 6 hours as needed.      albuterol (PROAIR HFA) 90 mcg/actuation inhaler Inhale 2 puffs into the lungs every 6 (six) hours as needed for Wheezing. Rescue 18 g 0    albuterol-ipratropium (DUO-NEB) 2.5 mg-0.5 mg/3 mL nebulizer solution Take 3 mLs by nebulization every 4 (four) hours as needed for Wheezing. Rescue 75 mL 0    albuterol-ipratropium (DUO-NEB) 2.5 mg-0.5 mg/3 mL nebulizer solution Take 3 mLs by nebulization every 4 (four) hours. Rescue 75 mL 0    ciprofloxacin HCl (CIPRO) 500 MG tablet Take 1 tablet (500 mg total) by mouth 2 (two) times daily. " for 10 days 20 tablet 0    diclofenac (VOLTAREN) 50 MG EC tablet Take 1 tablet (50 mg total) by mouth 2 (two) times daily as needed (Pain). 20 tablet 0    doxycycline (VIBRA-TABS) 100 MG tablet Take 100 mg by mouth 2 (two) times daily.      ELIQUIS 5 mg Tab Take 5 mg by mouth 2 (two) times daily.      fluticasone propionate (FLONASE) 50 mcg/actuation nasal spray 1 spray (50 mcg total) by Each Nostril route daily as needed for Allergies or Rhinitis. 18.2 mL 0    HYDROcodone-acetaminophen (NORCO) 5-325 mg per tablet Take 1 tablet by mouth every 6 (six) hours as needed for Pain. 12 tablet 0    methIMAzole (TAPAZOLE) 5 MG Tab Take a half of a tablet daily to Total 2.5 mg 15 tablet 11    omeprazole (PRILOSEC) 20 MG capsule Take 20 mg by mouth.      sucralfate (CARAFATE) 1 gram tablet Take 1 tablet by mouth 4 (four) times daily before meals and nightly.       (Not in a hospital admission)      Past Medical History:  Past Medical History:   Diagnosis Date    Asthma     Diverticulosis     GERD (gastroesophageal reflux disease)     Graves disease        Past Surgical History:  No past surgical history on file.    Family History:  Family History   Problem Relation Age of Onset    Hypothyroidism Mother        Social History:  Social History     Tobacco Use    Smoking status: Every Day     Current packs/day: 0.50     Average packs/day: 0.5 packs/day for 41.1 years (20.6 ttl pk-yrs)     Types: Cigarettes     Start date: 1/1/1983    Smokeless tobacco: Never    Tobacco comments:     Ambulatory referral to Smoking Cessation clinic following hospital discharge.    Substance Use Topics    Alcohol use: Not Currently           Review of Systems:    Review of Systems   Constitutional:  Negative for fever and unexpected weight change.   Respiratory:  Negative for cough and shortness of breath.    Cardiovascular:  Negative for chest pain and leg swelling.   Gastrointestinal:  Positive for abdominal pain. Negative for blood in stool,  diarrhea, nausea and vomiting.   Musculoskeletal:  Negative for back pain and myalgias.   Skin:  Negative for color change and pallor.   Neurological:  Negative for speech difficulty and weakness.   All other systems reviewed and are negative.      Objective:      VITAL SIGNS: 24 HR MIN & MAX LAST  Temp  Min: 98.1 °F (36.7 °C)  Max: 98.2 °F (36.8 °C) 98.2 °F (36.8 °C)  BP  Min: 101/45  Max: 140/114 104/76  Pulse  Min: 74  Max: 93 89  Resp  Min: 16  Max: 20 18  SpO2  Min: 96 %  Max: 98 % 97 %    No intake or output data in the 24 hours ending 02/12/24 1415    Physical Exam  Vitals and nursing note reviewed.   Constitutional:       Appearance: Normal appearance.   HENT:      Head: Normocephalic and atraumatic.   Eyes:      General: No scleral icterus.     Extraocular Movements: Extraocular movements intact.   Cardiovascular:      Rate and Rhythm: Normal rate and regular rhythm.      Heart sounds: Normal heart sounds.   Pulmonary:      Effort: Pulmonary effort is normal. No respiratory distress.      Breath sounds: Normal breath sounds.   Abdominal:      General: Abdomen is flat. Bowel sounds are normal. There is no distension.      Palpations: Abdomen is soft.      Tenderness: There is abdominal tenderness.   Musculoskeletal:         General: No swelling or deformity. Normal range of motion.      Right lower leg: No edema.      Left lower leg: No edema.   Skin:     General: Skin is warm and dry.   Neurological:      General: No focal deficit present.      Mental Status: She is alert and oriented to person, place, and time.   Psychiatric:         Mood and Affect: Mood normal.         Behavior: Behavior normal.         Recent Results (from the past 48 hour(s))   Comprehensive metabolic panel    Collection Time: 02/11/24  4:24 PM   Result Value Ref Range    Sodium Level 139 136 - 145 mmol/L    Potassium Level 4.4 3.5 - 5.1 mmol/L    Chloride 108 (H) 98 - 107 mmol/L    Carbon Dioxide 23 22 - 29 mmol/L    Glucose Level 106  (H) 74 - 100 mg/dL    Blood Urea Nitrogen 8.7 (L) 9.8 - 20.1 mg/dL    Creatinine 0.73 0.55 - 1.02 mg/dL    Calcium Level Total 9.8 8.4 - 10.2 mg/dL    Protein Total 7.0 6.4 - 8.3 gm/dL    Albumin Level 3.7 3.5 - 5.0 g/dL    Globulin 3.3 2.4 - 3.5 gm/dL    Albumin/Globulin Ratio 1.1 1.1 - 2.0 ratio    Bilirubin Total 0.2 <=1.5 mg/dL    Alkaline Phosphatase 74 40 - 150 unit/L    Alanine Aminotransferase 14 0 - 55 unit/L    Aspartate Aminotransferase 18 5 - 34 unit/L    eGFR >60 mls/min/1.73/m2   Lactic acid, plasma    Collection Time: 02/11/24  4:24 PM   Result Value Ref Range    Lactic Acid Level 1.2 0.5 - 2.2 mmol/L   CBC with Differential    Collection Time: 02/11/24  4:24 PM   Result Value Ref Range    WBC 6.00 4.50 - 11.50 x10(3)/mcL    RBC 4.28 4.20 - 5.40 x10(6)/mcL    Hgb 13.2 12.0 - 16.0 g/dL    Hct 40.2 37.0 - 47.0 %    MCV 93.9 80.0 - 94.0 fL    MCH 30.8 27.0 - 31.0 pg    MCHC 32.8 (L) 33.0 - 36.0 g/dL    RDW 14.2 11.5 - 17.0 %    Platelet 313 130 - 400 x10(3)/mcL    MPV 10.6 (H) 7.4 - 10.4 fL    Neut % 66.1 %    Lymph % 24.0 %    Mono % 9.0 %    Eos % 0.5 %    Basophil % 0.2 %    Lymph # 1.44 0.6 - 4.6 x10(3)/mcL    Neut # 3.97 2.1 - 9.2 x10(3)/mcL    Mono # 0.54 0.1 - 1.3 x10(3)/mcL    Eos # 0.03 0 - 0.9 x10(3)/mcL    Baso # 0.01 <=0.2 x10(3)/mcL    IG# 0.01 0 - 0.04 x10(3)/mcL    IG% 0.2 %    NRBC% 0.0 %   Urinalysis, Reflex to Urine Culture    Collection Time: 02/11/24  6:08 PM    Specimen: Urine   Result Value Ref Range    Color, UA Light-Yellow Yellow, Light-Yellow, Colorless, Straw, Dark-Yellow    Appearance, UA Clear Clear    Specific Gravity, UA 1.021 1.005 - 1.030    pH, UA 5.0 5.0 - 8.5    Protein, UA Negative Negative    Glucose, UA Normal Negative, Normal    Ketones, UA Negative Negative    Blood, UA Negative Negative    Bilirubin, UA Negative Negative    Urobilinogen, UA Normal 0.2, 1.0, Normal    Nitrites, UA Negative Negative    Leukocyte Esterase, UA Negative Negative    WBC, UA 0-5 None  Seen, 0-2, 3-5, 0-5 /HPF    Bacteria, UA None Seen None Seen, Trace /HPF    Squamous Epithelial Cells, UA Trace None Seen /HPF    Mucous, UA Trace (A) None Seen /LPF    RBC, UA 0-5 None Seen, 0-2, 3-5, 0-5 /HPF   Comprehensive metabolic panel    Collection Time: 02/12/24  8:48 AM   Result Value Ref Range    Sodium Level 140 136 - 145 mmol/L    Potassium Level 3.8 3.5 - 5.1 mmol/L    Chloride 104 98 - 107 mmol/L    Carbon Dioxide 26 22 - 29 mmol/L    Glucose Level 102 (H) 74 - 100 mg/dL    Blood Urea Nitrogen 8.4 (L) 9.8 - 20.1 mg/dL    Creatinine 0.85 0.55 - 1.02 mg/dL    Calcium Level Total 10.1 8.4 - 10.2 mg/dL    Protein Total 7.5 6.4 - 8.3 gm/dL    Albumin Level 4.0 3.5 - 5.0 g/dL    Globulin 3.5 2.4 - 3.5 gm/dL    Albumin/Globulin Ratio 1.1 1.1 - 2.0 ratio    Bilirubin Total 0.4 <=1.5 mg/dL    Alkaline Phosphatase 74 40 - 150 unit/L    Alanine Aminotransferase 16 0 - 55 unit/L    Aspartate Aminotransferase 18 5 - 34 unit/L    eGFR >60 mls/min/1.73/m2   CBC with Differential    Collection Time: 02/12/24  8:48 AM   Result Value Ref Range    WBC 6.38 4.50 - 11.50 x10(3)/mcL    RBC 4.48 4.20 - 5.40 x10(6)/mcL    Hgb 13.8 12.0 - 16.0 g/dL    Hct 42.4 37.0 - 47.0 %    MCV 94.6 (H) 80.0 - 94.0 fL    MCH 30.8 27.0 - 31.0 pg    MCHC 32.5 (L) 33.0 - 36.0 g/dL    RDW 14.2 11.5 - 17.0 %    Platelet 325 130 - 400 x10(3)/mcL    MPV 10.8 (H) 7.4 - 10.4 fL    Neut % 56.1 %    Lymph % 31.5 %    Mono % 10.2 %    Eos % 1.7 %    Basophil % 0.2 %    Lymph # 2.01 0.6 - 4.6 x10(3)/mcL    Neut # 3.58 2.1 - 9.2 x10(3)/mcL    Mono # 0.65 0.1 - 1.3 x10(3)/mcL    Eos # 0.11 0 - 0.9 x10(3)/mcL    Baso # 0.01 <=0.2 x10(3)/mcL    IG# 0.02 0 - 0.04 x10(3)/mcL    IG% 0.3 %    NRBC% 0.0 %   Urinalysis, Reflex to Urine Culture    Collection Time: 02/12/24 11:20 AM    Specimen: Urine   Result Value Ref Range    Color, UA Colorless Yellow, Light-Yellow, Colorless, Straw, Dark-Yellow    Appearance, UA Clear Clear    Specific Gravity, UA >1.050 (H)  1.005 - 1.030    pH, UA 5.5 5.0 - 8.5    Protein, UA Negative Negative    Glucose, UA Normal Negative, Normal    Ketones, UA Negative Negative    Blood, UA Negative Negative    Bilirubin, UA Negative Negative    Urobilinogen, UA Normal 0.2, 1.0, Normal    Nitrites, UA Negative Negative    Leukocyte Esterase, UA Negative Negative    WBC, UA 0-5 None Seen, 0-2, 3-5, 0-5 /HPF    Bacteria, UA None Seen None Seen, Trace /HPF    Squamous Epithelial Cells, UA Trace None Seen /HPF    RBC, UA 0-5 None Seen, 0-2, 3-5, 0-5 /HPF       CT Abdomen Pelvis With IV Contrast NO Oral Contrast    Result Date: 2/12/2024  EXAMINATION: CT ABDOMEN PELVIS WITH IV CONTRAST CLINICAL HISTORY: LLQ abdominal pain; TECHNIQUE: Helical acquisition through the abdomen and pelvis with IV contrast.  Three plane reconstructions were provided for review. DLP  mGycm. Automatic exposure control, adjustment of mA/kV or iterative reconstruction technique was used to reduce radiation. COMPARISON: Yesterday FINDINGS: Mild atelectasis or scarring lung bases. The liver, spleen, gallbladder, pancreas, adrenals and kidneys are within normal limits. No bowel obstruction.  Colonic diverticulosis with continued inflammatory changes along the sigmoid.  Again question 1 cm abscess on image 102 series 2.  No free air. Urinary bladder is unremarkable. No free fluid. Aorta normal in caliber. No acute osseous findings.     Similar appearance of sigmoid diverticulitis with possible small abscess. Electronically signed by: Glen Celestin Date:    02/12/2024 Time:    10:02    CT Abdomen Pelvis With IV Contrast NO Oral Contrast    Result Date: 2/12/2024  Technique:CT of the abdomen and pelvis was performed with axial images as well as sagittal and coronal reconstruction images with intravenous contrast. Comparison:Comparison is with study dated ?2024-01-28 21:20:44?. Clinical History:Abdominal abscess/infection suspected, Recent diverticular abscess, increased pain. Dosage  Information:Automated Exposure Control was utilized.   Findings: Lines and Tubes:None. Thorax: Lungs:There is no significant change in the streaky densities and ground glass opacities in the visualized lung bases. This may represet subsegmental atelectasis and/or fibrosis with the possibility of mild concurrent infectious process not exlcluded. Correlate with clinical and laboratory findings regards additional evaluation and follow-up. Pleura:No effusions or thickening. Heart:The heart size is within normal limits. Abdomen: Abdominal Wall:There is interval decrease in the involvement of the of the left inferior abdominal wall by an abscess as detailed below. Liver:Mild fatty infiltration of the liver is present. The liver otherwise appears unremarkable. Biliary System:No intrahepatic or extrahepatic biliary duct dilatation is seen. Gallbladder:The gallbladder appears unremarkable. Pancreas:The pancreas appears unremarkable. Spleen:The spleen appears unremarkable. Adrenals:The adrenal glands appear unremarkable. Kidneys:The kidneys appear unremarkable with no stones cysts masses or hydronephrosis. Aorta:The abdominal aorta appears unremarkable. Bowel: Esophagus:The visualized esophagus appears unremarkable. Stomach:The stomach appears unremarkable. Duodenum:Unremarkable appearing duodenum. Small Bowel:The small bowel appears unremarkable. Colon:There is moderate stool in the cecum and ascending colon which could reflect an element of constipation. Numerous diverticula are seen predominantly in the descending and sigmoid colon. There is wall thickening of the distal descending colon through to the mid sigmoid colon. There are areas of rim enhancement in the walls of the sigmoid colon for instance on image 99 series 2. These findings are consistent with diverticulitis with the possibility of small mural abscess is not entirely excluded. Appendix:The appendix appears unremarkable and is seen on series 6 image  35-41. Peritoneum:No intraperitoneal free air or ascites is seen. Pelvis: Bladder:The bladder appears unremarkable. Female: Uterus:There is stable appearance of the enhancing lesion in the uterine corpus measuring 3.3 cm (centered on series 2 image 112). This likely represents a myoma. Ovaries:No adnexal masses are seen. Inguinal Findings: Inguinal Hernia:Incidental note is made of small uncomplicated mesenteric fat containing bilateral inguinal hernias. Bony structures: Dorsal Spine:There is moderate spondylosis of the visualized dorsal spine. Bony Pelvis:There is mild degenerative change of the bilateral hips.     Impression: 1. Numerous diverticula are seen predominantly in the descending and sigmoid colon. There is wall thickening of the distal descending colon through to the mid sigmoid colon. There are areas of rim enhancement in the walls of the sigmoid colon for instance on image 99 series 2. These findings are consistent with diverticulitis with the possibility of small mural abscess is not entirely excluded. No perforation or definitive extra colonic abscess is identified. Recommend continued interval follow-up to full resolution as indicated.  Please also correlate patient is up-to-date on colonoscopy. 2. Details and other findings as discussed above. No significant discrepancy with overnight report Electronically signed by: Michael Mendoza Date:    02/12/2024 Time:    07:10    CT Abdomen Pelvis W Wo Contrast    Result Date: 1/29/2024  EXAMINATION: CT ABDOMEN PELVIS W WO CONTRAST CLINICAL HISTORY: Abdominal abscess/infection suspected; TECHNIQUE: CT imaging of the abdomen and pelvis before and after IV contrast.  Axial, coronal and sagittal images reviewed. Dose length product 596 mGycm. Automatic exposure control, adjustment of mA/kV or iterative reconstruction technique used to limit radiation dose. COMPARISON: CT 01/27/2024 FINDINGS: Intramural/pericolonic abscess adjacent to the sigmoid colon measures up  to 6 cm in transverse diameter, not significantly changed.  No new fluid collection appreciated. Stable appearance of the liver, spleen, pancreas and adrenal glands.  No hydronephrosis.  No bowel obstruction.  Normal bladder.     Little change in the intramural/pericolonic abscess limits 01/27/2024. No major discrepancy with the preliminary report. Electronically signed by: John Bowens Date:    01/29/2024 Time:    15:43    X-Ray Abdomen AP 1 View    Result Date: 1/28/2024  EXAMINATION XR ABDOMEN AP 1 VIEW CLINICAL HISTORY constipation, abscess; TECHNIQUE A total of 3 AP image(s) of the abdomen. COMPARISON 28 January 2024 FINDINGS Lines/tubes/devices: ECG leads overlie the imaged region. There are no interval changes to suggest new or worsening high-grade mechanical bowel obstruction.  No intra-abdominal mass effect is developed.  Detection of air-fluid levels and low-volume pneumoperitoneum is limited due to supine technique. Included lower thoracic cavity and osseous structures are similar in comparison. IMPRESSION No significant interval change. Electronically signed by: Kishan Allen Date:    01/28/2024 Time:    12:44    X-Ray Abdomen AP 1 View    Result Date: 1/28/2024  EXAMINATION XR ABDOMEN AP 1 VIEW CLINICAL HISTORY Abscess; TECHNIQUE A total of 1 AP image(s) of the abdomen. COMPARISON 14 February 2023 FINDINGS Lines/tubes/devices: none present A non-obstructed bowel gas pattern is present. No intra-abdominal mass effect is appreciated. Detection of air-fluid levels and low-volume pneumoperitoneum is limited due to supine technique. Included lower thoracic cavity and osseous structures are without acute abnormality. IMPRESSION No acute intra-abdominal abnormality. Electronically signed by: Kishan Allen Date:    01/28/2024 Time:    08:23    CT Abdomen Pelvis With IV Contrast NO Oral Contrast    Result Date: 1/27/2024  EXAMINATION: CT ABDOMEN PELVIS WITH IV CONTRAST CLINICAL HISTORY: Abdominal  abscess/infection suspected; TECHNIQUE: Multidetector IV contrast enhanced axial CT images of the abdomen and pelvis were obtained with coronal and sagittal reconstructions. Automatic exposure control was utilized to reduce the patient's radiation dose. DLP= [id number] COMPARISON: 01/01/2024 FINDINGS: 01. HEPATOBILIARY: No focal hepatic lesion is identified, The gallbladder is normal. 02. SPLEEN: Normal 03. PANCREAS: No focal masses or ductal dilatation. 04. ADRENALS: No adrenal nodules. 05. KIDNEYS: The right kidney demonstrates no stone, hydronephrosis, or hydroureter. No focal mass identified. The left kidney demonstrates no stone, hydronephrosis, or hydroureter. No focal mass identified. 06. LYMPHADENOPATHY/RETROPERITONEUM: There is no retroperitoneal lymphadenopathy. The abdominal aorta is normal in course and caliber. 07. BOWEL: Inflammatory change of the sigmoid colon with appearance of intramural abscess measuring approximately 4 cm.  There is minimal extension along the anterior abdominal wall musculature (series 2, image 102). 08. PELVIC VISCERA: Enhancing uterine mass is noted most likely represents a fibroid. 09. PELVIC LYMPH NODES: No lymphadenopathy. 10. PERITONEUM/ABDOMINAL WALL: No ascites or implant. 11. SKELETAL: No aggressive appearing lytic/blastic lesion. No acute fractures, subluxations or dislocations. 12. LUNG BASES: The visualized lungs are unremarkable.     Sigmoid diverticulitis with appearance of intramural abscess. Electronically signed by: Juan Leavitt Date:    01/27/2024 Time:    10:05    X-Ray Chest 1 View    Result Date: 1/27/2024  EXAMINATION: XR CHEST 1 VIEW CLINICAL HISTORY: shortness of breath; TECHNIQUE: Single view of the chest COMPARISON: 01/24/2024 FINDINGS: No focal opacification, pleural effusion, or pneumothorax. The cardiomediastinal silhouette is within normal limits. No acute osseous abnormality.     No acute cardiopulmonary process. Electronically signed by: Juan Leavitt  Date:    01/27/2024 Time:    09:50    CTA Chest Non-Coronary (PE Studies)    Result Date: 1/24/2024  EXAMINATION: CTA CHEST NON CORONARY (PE STUDIES) CLINICAL HISTORY: Pulmonary embolism (PE) suspected, positive D-dimer; TECHNIQUE: Helical acquisition through the chest with IV contrast targeting the pulmonary arteries. Multiplanar and 3D MIP reconstructed images were provided for review.  mGycm. Automatic exposure control, adjustment of mA/kV or iterative reconstruction technique was used to reduce radiation. COMPARISON: 19 October 2023. FINDINGS: There is good opacification of the pulmonary arterial tree. No acute pulmonary embolus seen today.  Some small pulmonary arteries are obscured by motion.  There is no aortic dissection. There is no mediastinal, hilar or axillary lymphadenopathy by size criteria. Heart is normal in size. No pericardial effusion. No pleural effusion.  Mild chronic changes of the lungs including mild emphysema.  No opacities suspicious for pneumonia. No acute findings in the imaged upper abdomen.  No acute osseous findings.     Negative for acute pulmonary thromboembolic disease.  No acute findings in the chest. Electronically signed by: Glen Celestin Date:    01/24/2024 Time:    09:58    X-Ray Chest AP Portable    Result Date: 1/24/2024  EXAMINATION: XR CHEST AP PORTABLE CLINICAL HISTORY: Shortness of breath; TECHNIQUE: Single view of the chest COMPARISON: 01/20/2024 FINDINGS: No focal opacification, pleural effusion, or pneumothorax. The cardiomediastinal silhouette is within normal limits. No acute osseous abnormality.     No acute cardiopulmonary process. Electronically signed by: Juan Leavitt Date:    01/24/2024 Time:    06:01    X-Ray Chest AP Portable    Result Date: 1/21/2024  EXAMINATION: Single view chest radiograph. CLINICAL HISTORY: Shortness of breath; TECHNIQUE: Single view of the chest. COMPARISON: Chest radiograph 01/01/2024. FINDINGS: The lungs are clear without  consolidation or effusion.  There is no pneumothorax.  The cardiac silhouette is normal in size.  There is no acute osseous abnormality.     No acute cardiopulmonary abnormality. Electronically signed by: Claudio Rdz MD Date: 01/21/2024 Time: 08:13        Assessment & Plan:    53 y.o. female known to Dr. Perry Jones from inpatient care only, w/ pmhx of recurrent diverticulitis, asthma, Grave's disease, s/p multiple cases of recurrent diverticulitis while living in [place] around the year 2020, and after 2022 has had a few hospitalizations for the same problem. Reportedly established with Dr. Millard.     Recurrent sigmoid diverticulitis - complicated w/ possible abscess   - most recent EGD/colonoscopy by Dr. Millard December 2023. Unable to complete colonoscopy due to inflammation  - failed recent abx treatment. ID consulted  - recommend surgical consult  - continue supportive care. No immediate plans for endoscopy.  - diet as tolerated.  - abx per ID.     - pt will need colonoscopy after 6-8 weeks.     Thank you for allowing us to participate in this patient's care.    Kaitlin Higgins PA-C  [facility], [facility]

## 2024-02-12 NOTE — ED PROVIDER NOTES
Encounter Date: 2/12/2024       History     Chief Complaint   Patient presents with    Abdominal Pain     C/o LLQ pain & nausea that started yesterday. States was seen in ED yesterday & d/c home. Hx diverticulitis.      53 F history of diverticulitis Graves disease asthma discharged from an inpatient stay in [place] 10 days ago after IV treatment for diverticulitis.  She has been on antibiotics orally since that time, Cipro and Flagyl.  Was seen in the emergency department yesterday due to worsening pain.  Her antibiotic course was lengthened and she was prescribed Norco for pain.  She reports pain has worsened from yesterday and she was still running fevers at night it was 100.5 last night.  99 this morning.  Pain not controlled with the Norco and antibiotics.  She has nausea with no vomiting no diarrhea.  States she does have blood in her stool but no mucus.        Review of patient's allergies indicates:   Allergen Reactions    Latex Anaphylaxis and Edema    Zosyn [piperacillin-tazobactam] Anaphylaxis     Past Medical History:   Diagnosis Date    Asthma     Diverticulosis     GERD (gastroesophageal reflux disease)     Graves disease      No past surgical history on file.  Family History   Problem Relation Age of Onset    Hypothyroidism Mother      Social History     Tobacco Use    Smoking status: Every Day     Current packs/day: 0.50     Average packs/day: 0.5 packs/day for 41.1 years (20.6 ttl pk-yrs)     Types: Cigarettes     Start date: 1/1/1983    Smokeless tobacco: Never    Tobacco comments:     Ambulatory referral to Smoking Cessation clinic following hospital discharge.    Substance Use Topics    Alcohol use: Not Currently    Drug use: Never     Review of Systems   Constitutional:  Negative for chills and fever.   Respiratory:  Negative for cough, chest tightness and shortness of breath.    Cardiovascular:  Negative for chest pain.   Gastrointestinal:  Positive for abdominal pain, blood in stool and  nausea. Negative for diarrhea and vomiting.   Musculoskeletal:  Negative for myalgias and neck pain.   Neurological:  Negative for syncope.   All other systems reviewed and are negative.      Physical Exam     Initial Vitals [02/12/24 0838]   BP Pulse Resp Temp SpO2   104/76 89 18 98.2 °F (36.8 °C) 97 %      MAP       --         Physical Exam    Nursing note and vitals reviewed.  Constitutional: She appears well-developed and well-nourished. No distress.   HENT:   Head: Normocephalic and atraumatic.   Eyes: Conjunctivae are normal.   Cardiovascular:  Normal rate and intact distal pulses.           Pulmonary/Chest: No respiratory distress. She has no rhonchi.   Abdominal: Abdomen is soft. There is abdominal tenderness. There is guarding.   Musculoskeletal:         General: No edema.     Neurological: She is alert. She has normal strength.   Skin: Skin is warm and dry.   Psychiatric: She has a normal mood and affect.         ED Course   Procedures  Labs Reviewed   COMPREHENSIVE METABOLIC PANEL - Abnormal; Notable for the following components:       Result Value    Glucose Level 102 (*)     Blood Urea Nitrogen 8.4 (*)     All other components within normal limits   CBC WITH DIFFERENTIAL - Abnormal; Notable for the following components:    MCV 94.6 (*)     MCHC 32.5 (*)     MPV 10.8 (*)     All other components within normal limits   BLOOD CULTURE [facility]   BLOOD CULTURE [facility]   CBC W/ AUTO DIFFERENTIAL    Narrative:     The following orders were created for panel order CBC auto differential.  Procedure                               Abnormality         Status                     ---------                               -----------         ------                     CBC with Differential[[id number]]       Abnormal            Final result                 Please view results for these tests on the individual orders.   URINALYSIS, REFLEX TO URINE CULTURE          Imaging Results              CT Abdomen Pelvis With IV Contrast NO  Oral Contrast (Final result)  Result time 02/12/24 10:02:24      Final result by Glen Celestin MD (02/12/24 10:02:24)                   Impression:      Similar appearance of sigmoid diverticulitis with possible small abscess.      Electronically signed by: Glen Celestin  Date:    02/12/2024  Time:    10:02               Narrative:    EXAMINATION:  CT ABDOMEN PELVIS WITH IV CONTRAST    CLINICAL HISTORY:  LLQ abdominal pain;    TECHNIQUE:  Helical acquisition through the abdomen and pelvis with IV contrast.  Three plane reconstructions were provided for review. DLP  mGycm. Automatic exposure control, adjustment of mA/kV or iterative reconstruction technique was used to reduce radiation.    COMPARISON:  Yesterday    FINDINGS:  Mild atelectasis or scarring lung bases.    The liver, spleen, gallbladder, pancreas, adrenals and kidneys are within normal limits.    No bowel obstruction.  Colonic diverticulosis with continued inflammatory changes along the sigmoid.  Again question 1 cm abscess on image 102 series 2.  No free air.    Urinary bladder is unremarkable. No free fluid. Aorta normal in caliber.    No acute osseous findings.                                       Medications   sodium chloride 0.9% bolus 1,000 mL 1,000 mL (1,000 mLs Intravenous New Bag 2/12/24 1001)   meropenem (MERREM) 1 g in sodium chloride 0.9 % 100 mL IVPB (MB+) (1 g Intravenous New Bag 2/12/24 1000)   ondansetron injection 4 mg (4 mg Intravenous Given 2/12/24 0922)   morphine injection 4 mg (4 mg Intravenous Given 2/12/24 0923)   iopamidoL (ISOVUE-370) injection 100 mL (100 mLs Intravenous Given 2/12/24 0950)     Medical Decision Making  Known to have diverticulitis worsening symptoms further worsening today prompting re-evaluation in the emergency department.  At this point she will require admission with IV antibiotics.  Do the degree of tenderness unfortunately I feel it is necessary to repeat her CT today no she just had 1 yesterday.  I  "did review the scan from yesterday that showed mural wall thickening and persistent diverticulitis    Problems Addressed:  Diverticulitis: acute illness or injury that poses a threat to life or bodily functions  Failure of outpatient treatment: acute illness or injury    Amount and/or Complexity of Data Reviewed  Labs: ordered.  Radiology: ordered.    Risk  Prescription drug management.  Parenteral controlled substances.  Decision regarding hospitalization.               ED Course as of 02/12/24 1018   Mon Feb 12, 2024   0923 Review of records patient apparently left against medical advice from her previous admission.  At that time she was seen by infectious disease and found to be resistant to fluoroquinolones they recommended 1 g of ertapenem daily for 14 days [LF]   0928 ID note from 1/29/24 states "cultures in 2/2023 ESBL organism was identified resistant to FQ antibiotics. She was treated with Ertapenem on 2 separate occasions." [LF]   1017 No evidence of perforation on CT.  Discussed with the pharmacist.  Will start meropenem for now in place ID consult expecting she will require ertapenem at home.  Discussed with Dr. Dye who will admit [LF]      ED Course User Index  [LF] Malachi Jimenez MD                           Clinical Impression:  Final diagnoses:  [R10.9] Acute abdominal pain in left flank (Primary)  [K57.92] Diverticulitis  [Z78.9] Failure of outpatient treatment  [Z86.19] History of infection due to extended spectrum beta lactamase (ESBL) producing bacteria          ED Disposition Condition    Admit Stable                Malachi Jimenez MD  02/12/24 1017       Malachi Jimenez MD  02/12/24 1018    "

## 2024-02-13 LAB
ALBUMIN SERPL-MCNC: 2.9 G/DL (ref 3.5–5)
ALBUMIN/GLOB SERPL: 1.1 RATIO (ref 1.1–2)
ALP SERPL-CCNC: 54 UNIT/L (ref 40–150)
ALT SERPL-CCNC: 12 UNIT/L (ref 0–55)
AST SERPL-CCNC: 17 UNIT/L (ref 5–34)
BASOPHILS # BLD AUTO: 0.02 X10(3)/MCL
BASOPHILS NFR BLD AUTO: 0.5 %
BILIRUB SERPL-MCNC: 0.3 MG/DL
BUN SERPL-MCNC: 3.5 MG/DL (ref 9.8–20.1)
CALCIUM SERPL-MCNC: 8.9 MG/DL (ref 8.4–10.2)
CHLORIDE SERPL-SCNC: 110 MMOL/L (ref 98–107)
CO2 SERPL-SCNC: 26 MMOL/L (ref 22–29)
CREAT SERPL-MCNC: 0.75 MG/DL (ref 0.55–1.02)
EOSINOPHIL # BLD AUTO: 0.16 X10(3)/MCL (ref 0–0.9)
EOSINOPHIL NFR BLD AUTO: 3.6 %
ERYTHROCYTE [DISTWIDTH] IN BLOOD BY AUTOMATED COUNT: 14.1 % (ref 11.5–17)
GFR SERPLBLD CREATININE-BSD FMLA CKD-EPI: >60 MLS/MIN/1.73/M2
GLOBULIN SER-MCNC: 2.7 GM/DL (ref 2.4–3.5)
GLUCOSE SERPL-MCNC: 104 MG/DL (ref 74–100)
HCT VFR BLD AUTO: 37.3 % (ref 37–47)
HGB BLD-MCNC: 11.6 G/DL (ref 12–16)
IMM GRANULOCYTES # BLD AUTO: 0 X10(3)/MCL (ref 0–0.04)
IMM GRANULOCYTES NFR BLD AUTO: 0 %
LYMPHOCYTES # BLD AUTO: 2.25 X10(3)/MCL (ref 0.6–4.6)
LYMPHOCYTES NFR BLD AUTO: 51.3 %
MCH RBC QN AUTO: 30.9 PG (ref 27–31)
MCHC RBC AUTO-ENTMCNC: 31.1 G/DL (ref 33–36)
MCV RBC AUTO: 99.5 FL (ref 80–94)
MONOCYTES # BLD AUTO: 0.64 X10(3)/MCL (ref 0.1–1.3)
MONOCYTES NFR BLD AUTO: 14.6 %
NEUTROPHILS # BLD AUTO: 1.32 X10(3)/MCL (ref 2.1–9.2)
NEUTROPHILS NFR BLD AUTO: 30 %
NRBC BLD AUTO-RTO: 0 %
PLATELET # BLD AUTO: 246 X10(3)/MCL (ref 130–400)
PMV BLD AUTO: 10.6 FL (ref 7.4–10.4)
POTASSIUM SERPL-SCNC: 3.6 MMOL/L (ref 3.5–5.1)
PROT SERPL-MCNC: 5.6 GM/DL (ref 6.4–8.3)
RBC # BLD AUTO: 3.75 X10(6)/MCL (ref 4.2–5.4)
SODIUM SERPL-SCNC: 142 MMOL/L (ref 136–145)
WBC # SPEC AUTO: 4.39 X10(3)/MCL (ref 4.5–11.5)

## 2024-02-13 PROCEDURE — 63600175 PHARM REV CODE 636 W HCPCS: Mod: JZ,JG | Performed by: INTERNAL MEDICINE

## 2024-02-13 PROCEDURE — 21400001 HC TELEMETRY ROOM

## 2024-02-13 PROCEDURE — 94761 N-INVAS EAR/PLS OXIMETRY MLT: CPT

## 2024-02-13 PROCEDURE — C1751 CATH, INF, PER/CENT/MIDLINE: HCPCS

## 2024-02-13 PROCEDURE — 80053 COMPREHEN METABOLIC PANEL: CPT | Performed by: INTERNAL MEDICINE

## 2024-02-13 PROCEDURE — 25000242 PHARM REV CODE 250 ALT 637 W/ HCPCS: Performed by: INTERNAL MEDICINE

## 2024-02-13 PROCEDURE — 99900035 HC TECH TIME PER 15 MIN (STAT)

## 2024-02-13 PROCEDURE — 36410 VNPNXR 3YR/> PHY/QHP DX/THER: CPT

## 2024-02-13 PROCEDURE — 11000001 HC ACUTE MED/SURG PRIVATE ROOM

## 2024-02-13 PROCEDURE — 85025 COMPLETE CBC W/AUTO DIFF WBC: CPT | Performed by: INTERNAL MEDICINE

## 2024-02-13 PROCEDURE — 99900031 HC PATIENT EDUCATION (STAT)

## 2024-02-13 PROCEDURE — 25000003 PHARM REV CODE 250: Performed by: INTERNAL MEDICINE

## 2024-02-13 PROCEDURE — 94640 AIRWAY INHALATION TREATMENT: CPT

## 2024-02-13 RX ADMIN — METRONIDAZOLE 500 MG: 5 INJECTION, SOLUTION INTRAVENOUS at 03:02

## 2024-02-13 RX ADMIN — METHIMAZOLE 10 MG: 10 TABLET ORAL at 08:02

## 2024-02-13 RX ADMIN — SUCRALFATE 1 G: 1 TABLET ORAL at 04:02

## 2024-02-13 RX ADMIN — IPRATROPIUM BROMIDE AND ALBUTEROL SULFATE 3 ML: 2.5; .5 SOLUTION RESPIRATORY (INHALATION) at 08:02

## 2024-02-13 RX ADMIN — DOCUSATE SODIUM 50 MG: 50 CAPSULE, LIQUID FILLED ORAL at 08:02

## 2024-02-13 RX ADMIN — IPRATROPIUM BROMIDE AND ALBUTEROL SULFATE 3 ML: 2.5; .5 SOLUTION RESPIRATORY (INHALATION) at 11:02

## 2024-02-13 RX ADMIN — SUCRALFATE 1 G: 1 TABLET ORAL at 05:02

## 2024-02-13 RX ADMIN — CIPROFLOXACIN 400 MG: 2 INJECTION, SOLUTION INTRAVENOUS at 06:02

## 2024-02-13 RX ADMIN — SUCRALFATE 1 G: 1 TABLET ORAL at 11:02

## 2024-02-13 RX ADMIN — ACETAMINOPHEN 650 MG: 325 TABLET, FILM COATED ORAL at 11:02

## 2024-02-13 RX ADMIN — ONDANSETRON 4 MG: 2 INJECTION INTRAMUSCULAR; INTRAVENOUS at 02:02

## 2024-02-13 RX ADMIN — CIPROFLOXACIN 400 MG: 2 INJECTION, SOLUTION INTRAVENOUS at 04:02

## 2024-02-13 RX ADMIN — IPRATROPIUM BROMIDE AND ALBUTEROL SULFATE 3 ML: 2.5; .5 SOLUTION RESPIRATORY (INHALATION) at 12:02

## 2024-02-13 RX ADMIN — METRONIDAZOLE 500 MG: 5 INJECTION, SOLUTION INTRAVENOUS at 04:02

## 2024-02-13 RX ADMIN — PANTOPRAZOLE SODIUM 40 MG: 40 TABLET, DELAYED RELEASE ORAL at 08:02

## 2024-02-13 RX ADMIN — SUCRALFATE 1 G: 1 TABLET ORAL at 08:02

## 2024-02-13 RX ADMIN — IPRATROPIUM BROMIDE AND ALBUTEROL SULFATE 3 ML: 2.5; .5 SOLUTION RESPIRATORY (INHALATION) at 04:02

## 2024-02-13 RX ADMIN — METRONIDAZOLE 500 MG: 5 INJECTION, SOLUTION INTRAVENOUS at 08:02

## 2024-02-13 NOTE — CONSULTS
Infectious Diseases Consultation       Inpatient consult to Infectious Diseases  Consult performed by: Nicole Ospina MD  Consult ordered by: Bin Reyes MD      HPI:   53-year-old female with past medical history of, Graves disease, GERD, asthma, diverticulosis/diverticulitis, is admitted to [facility] today 02/12/2024, presenting through the ED with complaints of abdominal pain, apparently completed course of IV antibiotics for diverticulitis recently.  She had presented at the time to [facility] about 2 weeks ago, and transferred to [place], discharged about 10 days ago.  On presentation this time, she was noted to have no fevers and no leukocytosis.  Blood cultures have been negative.  CT scan of the abdomen and pelvis showed similar appearance of sigmoid diverticulitis with possible associated small abscesses.  He was seen by the GI team with inputs noted. Per patient there was an attempt on colonoscopy by Dr. Millard in December 2023 which was not completed due to the risk of perforation having significant inflammation at the time.  She follows with the surgeon in [place]-suggested temporal colostomy which she declined.  She is allergic to Zosyn and currently on antibiotic coverage with Cipro and Flagyl.    Past Medical and Surgical History  Allergies :   Latex and Zosyn [piperacillin-tazobactam]    Medical :   She has a past medical history of Asthma, Diverticulosis, GERD (gastroesophageal reflux disease), and Graves disease.    Surgical :   She has no past surgical history on file.     Family History  Her family history includes Hypothyroidism in her mother.    Social History  She reports that she has been smoking cigarettes. She started smoking about 41 years ago. She has a 20.6 pack-year smoking history. She has never used smokeless tobacco. She reports that she does not currently use alcohol. She reports that she does not use drugs.     Review of Systems  "  Constitutional:  Positive for malaise/fatigue.   HENT: Negative.     Respiratory: Negative.     Gastrointestinal:  Positive for abdominal pain.   Genitourinary: Negative.    Musculoskeletal: Negative.    Neurological:  Positive for weakness.   Endo/Heme/Allergies: Negative.    Psychiatric/Behavioral: Negative.     All other Systems review done and negative.    Objective   Physical Exam  Vitals reviewed.   Constitutional:       General: She is not in acute distress.  HENT:      Head: Normocephalic and atraumatic.   Eyes:      Pupils: Pupils are equal, round, and reactive to light.   Cardiovascular:      Rate and Rhythm: Normal rate and regular rhythm.      Heart sounds: Normal heart sounds.   Pulmonary:      Effort: Pulmonary effort is normal.      Breath sounds: Normal breath sounds.   Abdominal:      General: Bowel sounds are normal. There is no distension.      Palpations: Abdomen is soft.      Tenderness: There is abdominal tenderness.   Genitourinary:     Comments: No CVA tenderness  Musculoskeletal:      Cervical back: Neck supple.      Right lower leg: No edema.      Left lower leg: No edema.   Skin:     Findings: No erythema or rash.   Neurological:      Mental Status: She is alert and oriented to person, place, and time.   Psychiatric:      Comments: Calm and cooperative       VITAL SIGNS: 24 HR MIN & MAX LAST    Temp  Min: 97.3 °F (36.3 °C)  Max: 98.2 °F (36.8 °C)  97.3 °F (36.3 °C)        BP  Min: 75/36  Max: 104/76  (!) 75/36     Pulse  Min: 64  Max: 89  65     Resp  Min: 18  Max: 18  18    SpO2  Min: 96 %  Max: 97 %  97 %      HT: 4' 11" (149.9 cm)  WT: 65.8 kg (145 lb)  BMI: 29.3     Recent Results (from the past 24 hour(s))   Comprehensive metabolic panel    Collection Time: 02/12/24  8:48 AM   Result Value Ref Range    Sodium Level 140 136 - 145 mmol/L    Potassium Level 3.8 3.5 - 5.1 mmol/L    Chloride 104 98 - 107 mmol/L    Carbon Dioxide 26 22 - 29 mmol/L    Glucose Level 102 (H) 74 - 100 mg/dL "    Blood Urea Nitrogen 8.4 (L) 9.8 - 20.1 mg/dL    Creatinine 0.85 0.55 - 1.02 mg/dL    Calcium Level Total 10.1 8.4 - 10.2 mg/dL    Protein Total 7.5 6.4 - 8.3 gm/dL    Albumin Level 4.0 3.5 - 5.0 g/dL    Globulin 3.5 2.4 - 3.5 gm/dL    Albumin/Globulin Ratio 1.1 1.1 - 2.0 ratio    Bilirubin Total 0.4 <=1.5 mg/dL    Alkaline Phosphatase 74 40 - 150 unit/L    Alanine Aminotransferase 16 0 - 55 unit/L    Aspartate Aminotransferase 18 5 - 34 unit/L    eGFR >60 mls/min/1.73/m2   CBC with Differential    Collection Time: 02/12/24  8:48 AM   Result Value Ref Range    WBC 6.38 4.50 - 11.50 x10(3)/mcL    RBC 4.48 4.20 - 5.40 x10(6)/mcL    Hgb 13.8 12.0 - 16.0 g/dL    Hct 42.4 37.0 - 47.0 %    MCV 94.6 (H) 80.0 - 94.0 fL    MCH 30.8 27.0 - 31.0 pg    MCHC 32.5 (L) 33.0 - 36.0 g/dL    RDW 14.2 11.5 - 17.0 %    Platelet 325 130 - 400 x10(3)/mcL    MPV 10.8 (H) 7.4 - 10.4 fL    Neut % 56.1 %    Lymph % 31.5 %    Mono % 10.2 %    Eos % 1.7 %    Basophil % 0.2 %    Lymph # 2.01 0.6 - 4.6 x10(3)/mcL    Neut # 3.58 2.1 - 9.2 x10(3)/mcL    Mono # 0.65 0.1 - 1.3 x10(3)/mcL    Eos # 0.11 0 - 0.9 x10(3)/mcL    Baso # 0.01 <=0.2 x10(3)/mcL    IG# 0.02 0 - 0.04 x10(3)/mcL    IG% 0.3 %    NRBC% 0.0 %   Urinalysis, Reflex to Urine Culture    Collection Time: 02/12/24 11:20 AM    Specimen: Urine   Result Value Ref Range    Color, UA Colorless Yellow, Light-Yellow, Colorless, Straw, Dark-Yellow    Appearance, UA Clear Clear    Specific Gravity, UA >1.050 (H) 1.005 - 1.030    pH, UA 5.5 5.0 - 8.5    Protein, UA Negative Negative    Glucose, UA Normal Negative, Normal    Ketones, UA Negative Negative    Blood, UA Negative Negative    Bilirubin, UA Negative Negative    Urobilinogen, UA Normal 0.2, 1.0, Normal    Nitrites, UA Negative Negative    Leukocyte Esterase, UA Negative Negative    WBC, UA 0-5 None Seen, 0-2, 3-5, 0-5 /HPF    Bacteria, UA None Seen None Seen, Trace /HPF    Squamous Epithelial Cells, UA Trace None Seen /HPF    RBC, UA  0-5 None Seen, 0-2, 3-5, 0-5 /HPF       Imaging  Imaging Results              CT Abdomen Pelvis With IV Contrast NO Oral Contrast (Final result)  Result time 02/12/24 10:02:24      Final result by Glen Celestin MD (02/12/24 10:02:24)                   Impression:      Similar appearance of sigmoid diverticulitis with possible small abscess.      Electronically signed by: Glen Celestin  Date:    02/12/2024  Time:    10:02               Narrative:    EXAMINATION:  CT ABDOMEN PELVIS WITH IV CONTRAST    CLINICAL HISTORY:  LLQ abdominal pain;    TECHNIQUE:  Helical acquisition through the abdomen and pelvis with IV contrast.  Three plane reconstructions were provided for review. DLP  mGycm. Automatic exposure control, adjustment of mA/kV or iterative reconstruction technique was used to reduce radiation.    COMPARISON:  Yesterday    FINDINGS:  Mild atelectasis or scarring lung bases.    The liver, spleen, gallbladder, pancreas, adrenals and kidneys are within normal limits.    No bowel obstruction.  Colonic diverticulosis with continued inflammatory changes along the sigmoid.  Again question 1 cm abscess on image 102 series 2.  No free air.    Urinary bladder is unremarkable. No free fluid. Aorta normal in caliber.    No acute osseous findings.                                       Impression  1. Acute sigmoid diverticulitis with diverticular abscess   2.  History of Graves disease  3.  Morbid obesity    Recommendations  I agree with the management this patient.  History of recurrent diverticulitis, seen at various facilities with prior recommendations for surgical intervention with a colostomy which she declined, apparently seeking other surgical options without having to go through having a colostomy, just failed a recent course of IV antibiotics.  She was without any much of systemic signs of infection, no fevers and no leukocytosis.  Severe allergy to Zosyn of anaphylaxis type limits the choice of antibiotics.  We  will continue current antibiotic coverage with Cipro and Flagyl.  Plan for consultation to surgery noted and we will follow the inputs.  Case is discussed at length with patient, questions solicited and answered.  I have also discussed the case with nursing staff.  I would like to thank the team very much for the opportunity to assist in the care of this patient.

## 2024-02-13 NOTE — PROGRESS NOTES
[facility] - 5th Floor [facility] Medicine  Progress Note    Patient Name: Niya Moeller  MRN: [id number]  Patient Class: IP- Inpatient   Admission Date: 2/12/2024  Length of Stay: 1 days  Attending Physician: Bin Reyes MD  Primary Care Provider: Oswaldo Riggs NP        Subjective:     Principal Problem:Colitis    Interval History:   Today's info : seen and examined, no acute events overnight. Continues to improve   Afebrile  Remains on iv abx  Pain improving    Review of Systems   Constitutional:  Positive for fatigue.   HENT: Negative.     Eyes: Negative.    Respiratory:  Positive for shortness of breath.    Cardiovascular:  Positive for palpitations.   Gastrointestinal:  Positive for abdominal pain.   Endocrine: Negative.    Genitourinary: Negative.    Musculoskeletal: Negative.    Skin: Negative.    Allergic/Immunologic: Negative.    Neurological:  Positive for weakness.   Hematological: Negative.    Psychiatric/Behavioral:  The patient is nervous/anxious.      Objective:     Vital Signs (Most Recent):  Temp: 97.9 °F (36.6 °C) (02/13/24 1052)  Pulse: 77 (02/13/24 1209)  Resp: 20 (02/13/24 1209)  BP: 109/69 (02/13/24 1052)  SpO2: 96 % (02/13/24 1209) Vital Signs (24h Range):  Temp:  [97.3 °F (36.3 °C)-98.4 °F (36.9 °C)] 97.9 °F (36.6 °C)  Pulse:  [64-82] 77  Resp:  [18-20] 20  SpO2:  [93 %-97 %] 96 %  BP: ()/(36-76) 109/69     Weight: 65.8 kg (145 lb)  Body mass index is 29.29 kg/m².  No intake or output data in the 24 hours ending 02/13/24 1315   Physical Exam  Vitals reviewed.   Constitutional:       Appearance: Normal appearance.   HENT:      Head: Normocephalic and atraumatic.      Right Ear: Tympanic membrane and external ear normal.      Nose: Nose normal.      Mouth/Throat:      Mouth: Mucous membranes are moist.   Eyes:      Extraocular Movements: Extraocular movements intact.      Pupils: Pupils are equal, round, and reactive to light.   Cardiovascular:       Rate and Rhythm: Normal rate and regular rhythm.      Pulses: Normal pulses.      Heart sounds: Normal heart sounds.   Pulmonary:      Effort: Pulmonary effort is normal.      Breath sounds: Normal breath sounds.   Abdominal:      General: Abdomen is flat. Bowel sounds are normal.      Palpations: Abdomen is soft.      Tenderness: There is abdominal tenderness.   Musculoskeletal:         General: Normal range of motion.      Cervical back: Normal range of motion and neck supple.   Skin:     General: Skin is warm and dry.   Neurological:      General: No focal deficit present.      Mental Status: She is alert and oriented to person, place, and time.   Psychiatric:         Mood and Affect: Mood normal.         Behavior: Behavior normal.           Overview/Hospital Course: stable    Significant Labs: All pertinent labs within the past 24 hours have been reviewed.  Lab Results   Component Value Date    WBC 4.39 (L) 02/13/2024    HGB 11.6 (L) 02/13/2024    HCT 37.3 02/13/2024    MCV 99.5 (H) 02/13/2024     02/13/2024         Recent Labs   Lab 02/13/24  0419      K 3.6   CO2 26   BUN 3.5*   CREATININE 0.75   GLUCOSE 104*   CALCIUM 8.9       Significant Imaging: I have reviewed all pertinent imaging results/findings within the past 24 hours.    Assessment/Plan:      Active Diagnoses:    Diagnosis Date Noted POA    PRINCIPAL PROBLEM:  Colitis [K52.9]  Yes      Problems Resolved During this Admission:     VTE Risk Mitigation (From admission, onward)           Ordered     apixaban tablet 5 mg  2 times daily         02/12/24 1520     IP VTE LOW RISK PATIENT  Once         02/12/24 1058     Place sequential compression device  Until discontinued         02/12/24 1058                      Acute sigmoid diverticulitis  Intestinal abscess  Gerd  Htn  Asthma  Graves     Plan :  Ivf  Iv abx  Follow cx  Gi reccs  Adv dieta s alton  Labs in am  Karie nd dvt ppx  Bin Reyes MD  Department of Hospital Medicine    [facility] - 5th Floor Med Surg

## 2024-02-13 NOTE — PLAN OF CARE
Problem: Infection  Goal: Absence of Infection Signs and Symptoms  Outcome: Ongoing, Progressing     Problem: Adult Inpatient Plan of Care  Goal: Plan of Care Review  Outcome: Ongoing, Progressing  Goal: Patient-Specific Goal (Individualized)  Outcome: Ongoing, Progressing  Goal: Absence of Hospital-Acquired Illness or Injury  Outcome: Ongoing, Progressing  Goal: Optimal Comfort and Wellbeing  Outcome: Ongoing, Progressing  Goal: Readiness for Transition of Care  Outcome: Ongoing, Progressing     Problem: Nausea and Vomiting  Goal: Fluid and Electrolyte Balance  Outcome: Ongoing, Progressing

## 2024-02-13 NOTE — PROCEDURES
"Niya Moeller is a 53 y.o. female patient.    Temp: 97.9 °F (36.6 °C) (02/13/24 1052)  Pulse: 77 (02/13/24 1209)  Resp: 20 (02/13/24 1209)  BP: 109/69 (02/13/24 1052)  SpO2: 96 % (02/13/24 1209)  Weight: 65.8 kg (145 lb) (02/12/24 0838)  Height: 4' 11" (149.9 cm) (02/12/24 0838)    PICC  Date/Time: 2/13/2024 3:05 PM  Performed by: Fabien Montelongo, RN  Consent Done: Yes  Time out: Immediately prior to procedure a time out was called to verify the correct patient, procedure, equipment, support staff and site/side marked as required  Indications: med administration  Anesthesia: local infiltration  Local anesthetic: lidocaine 1% without epinephrine  Anesthetic Total (mL): 5  Skin prep agent dried: skin prep agent completely dried prior to procedure  Sterile barriers: all five maximum sterile barriers used - cap, mask, sterile gown, sterile gloves, and large sterile sheet  Hand hygiene: hand hygiene performed prior to central venous catheter insertion  Location details: left basilic  Catheter type: single lumen  Catheter size: 4 Fr  Catheter Length: 15cm    Number of attempts: 1  Post-procedure: blood return through all ports and sterile dressing applied            Fabien Montelongo RN  2/13/2024    "

## 2024-02-14 PROCEDURE — 25000003 PHARM REV CODE 250: Performed by: INTERNAL MEDICINE

## 2024-02-14 PROCEDURE — 99900035 HC TECH TIME PER 15 MIN (STAT)

## 2024-02-14 PROCEDURE — 25000242 PHARM REV CODE 250 ALT 637 W/ HCPCS: Performed by: INTERNAL MEDICINE

## 2024-02-14 PROCEDURE — 94640 AIRWAY INHALATION TREATMENT: CPT

## 2024-02-14 PROCEDURE — 63600175 PHARM REV CODE 636 W HCPCS: Mod: JZ,JG | Performed by: INTERNAL MEDICINE

## 2024-02-14 PROCEDURE — 27000221 HC OXYGEN, UP TO 24 HOURS

## 2024-02-14 PROCEDURE — 99900031 HC PATIENT EDUCATION (STAT)

## 2024-02-14 PROCEDURE — 94761 N-INVAS EAR/PLS OXIMETRY MLT: CPT

## 2024-02-14 PROCEDURE — 21400001 HC TELEMETRY ROOM

## 2024-02-14 RX ORDER — METRONIDAZOLE 500 MG/100ML
500 INJECTION, SOLUTION INTRAVENOUS
Status: DISCONTINUED | OUTPATIENT
Start: 2024-02-14 | End: 2024-02-21 | Stop reason: HOSPADM

## 2024-02-14 RX ORDER — CIPROFLOXACIN 2 MG/ML
400 INJECTION, SOLUTION INTRAVENOUS
Status: DISCONTINUED | OUTPATIENT
Start: 2024-02-14 | End: 2024-02-21 | Stop reason: HOSPADM

## 2024-02-14 RX ADMIN — CIPROFLOXACIN 400 MG: 2 INJECTION, SOLUTION INTRAVENOUS at 04:02

## 2024-02-14 RX ADMIN — DOCUSATE SODIUM 50 MG: 50 CAPSULE, LIQUID FILLED ORAL at 09:02

## 2024-02-14 RX ADMIN — SUCRALFATE 1 G: 1 TABLET ORAL at 04:02

## 2024-02-14 RX ADMIN — PANTOPRAZOLE SODIUM 40 MG: 40 TABLET, DELAYED RELEASE ORAL at 09:02

## 2024-02-14 RX ADMIN — CIPROFLOXACIN 400 MG: 2 INJECTION, SOLUTION INTRAVENOUS at 05:02

## 2024-02-14 RX ADMIN — IPRATROPIUM BROMIDE AND ALBUTEROL SULFATE 3 ML: 2.5; .5 SOLUTION RESPIRATORY (INHALATION) at 11:02

## 2024-02-14 RX ADMIN — IPRATROPIUM BROMIDE AND ALBUTEROL SULFATE 3 ML: 2.5; .5 SOLUTION RESPIRATORY (INHALATION) at 08:02

## 2024-02-14 RX ADMIN — SUCRALFATE 1 G: 1 TABLET ORAL at 11:02

## 2024-02-14 RX ADMIN — METRONIDAZOLE 500 MG: 5 INJECTION, SOLUTION INTRAVENOUS at 06:02

## 2024-02-14 RX ADMIN — ONDANSETRON 4 MG: 2 INJECTION INTRAMUSCULAR; INTRAVENOUS at 11:02

## 2024-02-14 RX ADMIN — METHIMAZOLE 10 MG: 10 TABLET ORAL at 09:02

## 2024-02-14 RX ADMIN — METRONIDAZOLE 500 MG: 5 INJECTION, SOLUTION INTRAVENOUS at 08:02

## 2024-02-14 RX ADMIN — ACETAMINOPHEN 650 MG: 325 TABLET, FILM COATED ORAL at 06:02

## 2024-02-14 RX ADMIN — SUCRALFATE 1 G: 1 TABLET ORAL at 10:02

## 2024-02-14 RX ADMIN — IPRATROPIUM BROMIDE AND ALBUTEROL SULFATE 3 ML: 2.5; .5 SOLUTION RESPIRATORY (INHALATION) at 04:02

## 2024-02-14 RX ADMIN — FLUTICASONE FUROATE AND VILANTEROL TRIFENATATE 1 PUFF: 100; 25 POWDER RESPIRATORY (INHALATION) at 11:02

## 2024-02-14 RX ADMIN — METRONIDAZOLE 500 MG: 5 INJECTION, SOLUTION INTRAVENOUS at 12:02

## 2024-02-14 NOTE — PROGRESS NOTES
[facility] - 5th Floor [facility] Medicine  Progress Note    Patient Name: Niya Moeller  MRN: [id number]  Patient Class: IP- Inpatient   Admission Date: 2/12/2024  Length of Stay: 2 days  Attending Physician: Bin Reyes MD  Primary Care Provider: Oswaldo Riggs NP        Subjective:     Principal Problem:Colitis    Interval History:   Today's info : seen and examined, no acute events overnight. Continues to improve   Afebrile  Remains on iv abx  Pain improving  Po intakee improving    Review of Systems   Constitutional:  Positive for fatigue.   HENT: Negative.     Eyes: Negative.    Respiratory:  Positive for shortness of breath.    Cardiovascular:  Positive for palpitations.   Gastrointestinal:  Positive for abdominal pain.   Endocrine: Negative.    Genitourinary: Negative.    Musculoskeletal: Negative.    Skin: Negative.    Allergic/Immunologic: Negative.    Neurological:  Positive for weakness.   Hematological: Negative.    Psychiatric/Behavioral:  The patient is nervous/anxious.      Objective:     Vital Signs (Most Recent):  Temp: 98.7 °F (37.1 °C) (02/14/24 1121)  Pulse: 75 (02/14/24 1150)  Resp: 16 (02/14/24 1150)  BP: 124/61 (02/14/24 1121)  SpO2: 99 % (02/14/24 1150) Vital Signs (24h Range):  Temp:  [98.1 °F (36.7 °C)-98.7 °F (37.1 °C)] 98.7 °F (37.1 °C)  Pulse:  [75-95] 75  Resp:  [16-22] 16  SpO2:  [93 %-99 %] 99 %  BP: (102-124)/(59-73) 124/61     Weight: 65.8 kg (145 lb)  Body mass index is 29.29 kg/m².    Intake/Output Summary (Last 24 hours) at 2/14/2024 1517  Last data filed at 2/14/2024 1300  Gross per 24 hour   Intake 360 ml   Output --   Net 360 ml      Physical Exam  Vitals reviewed.   Constitutional:       Appearance: Normal appearance.   HENT:      Head: Normocephalic and atraumatic.      Right Ear: Tympanic membrane and external ear normal.      Nose: Nose normal.      Mouth/Throat:      Mouth: Mucous membranes are moist.   Eyes:      Extraocular  Movements: Extraocular movements intact.      Pupils: Pupils are equal, round, and reactive to light.   Cardiovascular:      Rate and Rhythm: Normal rate and regular rhythm.      Pulses: Normal pulses.      Heart sounds: Normal heart sounds.   Pulmonary:      Effort: Pulmonary effort is normal.      Breath sounds: Normal breath sounds.   Abdominal:      General: Abdomen is flat. Bowel sounds are normal.      Palpations: Abdomen is soft.      Tenderness: There is abdominal tenderness.   Musculoskeletal:         General: Normal range of motion.      Cervical back: Normal range of motion and neck supple.   Skin:     General: Skin is warm and dry.   Neurological:      General: No focal deficit present.      Mental Status: She is alert and oriented to person, place, and time.   Psychiatric:         Mood and Affect: Mood normal.         Behavior: Behavior normal.           Overview/Hospital Course: stable    Significant Labs: All pertinent labs within the past 24 hours have been reviewed.  Lab Results   Component Value Date    WBC 4.39 (L) 02/13/2024    HGB 11.6 (L) 02/13/2024    HCT 37.3 02/13/2024    MCV 99.5 (H) 02/13/2024     02/13/2024         Recent Labs   Lab 02/13/24  0419      K 3.6   CO2 26   BUN 3.5*   CREATININE 0.75   GLUCOSE 104*   CALCIUM 8.9         Significant Imaging: I have reviewed all pertinent imaging results/findings within the past 24 hours.    Assessment/Plan:      Active Diagnoses:    Diagnosis Date Noted POA    PRINCIPAL PROBLEM:  Colitis [K52.9]  Yes      Problems Resolved During this Admission:     VTE Risk Mitigation (From admission, onward)           Ordered     IP VTE LOW RISK PATIENT  Once         02/12/24 1058     Place sequential compression device  Until discontinued         02/12/24 1058                      Acute sigmoid diverticulitis  Intestinal abscess  Gerd  Htn  Asthma  Graves     Plan :  Ivf  Iv abx  Follow cx  Gi reccs  Adv dieta s alton  Karie nd dvt ppx  Home soon on  oral abx        Bin Reyes MD  Department of Hospital Medicine   [facility] - 5th Floor Med Surg

## 2024-02-14 NOTE — PROGRESS NOTES
"Gastroenterology Progress Note      HPI:    VSS. Her pain has improved a lot since admission. Pt states she has unwanted side effects from flagyl including jittery feeling, "feeling bad." She is tolerating PO.     ROS:    Review of Systems   Constitutional:  Negative for fever, malaise/fatigue and weight loss.   Respiratory:  Negative for cough and shortness of breath.    Cardiovascular:  Negative for chest pain, palpitations and leg swelling.   Gastrointestinal:  Positive for abdominal pain. Negative for blood in stool, constipation, diarrhea, heartburn, melena, nausea and vomiting.   Musculoskeletal:  Negative for back pain and myalgias.   Skin:  Negative for rash.   Neurological:  Negative for speech change and focal weakness.   All other systems reviewed and are negative.        Vital Signs:  /61 (BP Location: Right arm, Patient Position: Sitting)   Pulse 75   Temp 98.7 °F (37.1 °C) (Oral)   Resp 16   Ht 4' 11" (1.499 m)   Wt 65.8 kg (145 lb)   SpO2 99%   BMI 29.29 kg/m²   Body mass index is 29.29 kg/m².    Physical Exam:    Physical Exam  Vitals and nursing note reviewed.   Constitutional:       Appearance: Normal appearance.   HENT:      Head: Normocephalic and atraumatic.   Eyes:      General: No scleral icterus.     Extraocular Movements: Extraocular movements intact.   Cardiovascular:      Rate and Rhythm: Normal rate and regular rhythm.      Heart sounds: Normal heart sounds.   Pulmonary:      Effort: Pulmonary effort is normal. No respiratory distress.      Breath sounds: Normal breath sounds.   Abdominal:      General: Abdomen is flat. Bowel sounds are normal. There is no distension.      Palpations: Abdomen is soft.      Tenderness: There is no abdominal tenderness.   Musculoskeletal:         General: No swelling or deformity. Normal range of motion.      Right lower leg: No edema.      Left lower leg: No edema.   Skin:     General: Skin is warm and dry.   Neurological:      General: No " focal deficit present.      Mental Status: She is alert and oriented to person, place, and time.   Psychiatric:         Mood and Affect: Mood normal.         Behavior: Behavior normal.         Labs:  Recent Results (from the past 48 hour(s))   Comprehensive Metabolic Panel    Collection Time: 02/13/24  4:19 AM   Result Value Ref Range    Sodium Level 142 136 - 145 mmol/L    Potassium Level 3.6 3.5 - 5.1 mmol/L    Chloride 110 (H) 98 - 107 mmol/L    Carbon Dioxide 26 22 - 29 mmol/L    Glucose Level 104 (H) 74 - 100 mg/dL    Blood Urea Nitrogen 3.5 (L) 9.8 - 20.1 mg/dL    Creatinine 0.75 0.55 - 1.02 mg/dL    Calcium Level Total 8.9 8.4 - 10.2 mg/dL    Protein Total 5.6 (L) 6.4 - 8.3 gm/dL    Albumin Level 2.9 (L) 3.5 - 5.0 g/dL    Globulin 2.7 2.4 - 3.5 gm/dL    Albumin/Globulin Ratio 1.1 1.1 - 2.0 ratio    Bilirubin Total 0.3 <=1.5 mg/dL    Alkaline Phosphatase 54 40 - 150 unit/L    Alanine Aminotransferase 12 0 - 55 unit/L    Aspartate Aminotransferase 17 5 - 34 unit/L    eGFR >60 mls/min/1.73/m2   CBC with Differential    Collection Time: 02/13/24  4:19 AM   Result Value Ref Range    WBC 4.39 (L) 4.50 - 11.50 x10(3)/mcL    RBC 3.75 (L) 4.20 - 5.40 x10(6)/mcL    Hgb 11.6 (L) 12.0 - 16.0 g/dL    Hct 37.3 37.0 - 47.0 %    MCV 99.5 (H) 80.0 - 94.0 fL    MCH 30.9 27.0 - 31.0 pg    MCHC 31.1 (L) 33.0 - 36.0 g/dL    RDW 14.1 11.5 - 17.0 %    Platelet 246 130 - 400 x10(3)/mcL    MPV 10.6 (H) 7.4 - 10.4 fL    Neut % 30.0 %    Lymph % 51.3 %    Mono % 14.6 %    Eos % 3.6 %    Basophil % 0.5 %    Lymph # 2.25 0.6 - 4.6 x10(3)/mcL    Neut # 1.32 (L) 2.1 - 9.2 x10(3)/mcL    Mono # 0.64 0.1 - 1.3 x10(3)/mcL    Eos # 0.16 0 - 0.9 x10(3)/mcL    Baso # 0.02 <=0.2 x10(3)/mcL    IG# 0.00 0 - 0.04 x10(3)/mcL    IG% 0.0 %    NRBC% 0.0 %         Assessment/Plan:    53 y.o. female [employer]  and retired , known to Dr. Perry Jones from inpatient care only (primary GI Dr. Millard), w/ pmhx of recurrent diverticulitis,  asthma, Grave's disease, s/p multiple cases of recurrent diverticulitis while living in [place] around the year 2020, and after 2022 has had a few hospitalizations for the same problem. Reportedly established with Dr. Millard.      Recurrent sigmoid diverticulitis - complicated w/ possible abscess   - most recent EGD/colonoscopy by Dr. Millard December 2023. Unable to complete colonoscopy due to inflammation  - continue supportive care. No immediate plans for endoscopy.  - diet as tolerated.     - pt will need colonoscopy after 6-8 weeks.   - pt is established with Dr. Millard and plans to go back, and make an oupt appt asap. Agree with this plan  - recommend d/c flagyl given unwanted side effects. Consider augmentin PO on discharge.   - no further inpatient GI recs. GI will sign off.       Kaitlin Higgins PA-C  [facility], [facility]

## 2024-02-15 PROCEDURE — 63600175 PHARM REV CODE 636 W HCPCS: Performed by: INTERNAL MEDICINE

## 2024-02-15 PROCEDURE — 25000242 PHARM REV CODE 250 ALT 637 W/ HCPCS: Performed by: INTERNAL MEDICINE

## 2024-02-15 PROCEDURE — 27000221 HC OXYGEN, UP TO 24 HOURS

## 2024-02-15 PROCEDURE — 99900031 HC PATIENT EDUCATION (STAT)

## 2024-02-15 PROCEDURE — 21400001 HC TELEMETRY ROOM

## 2024-02-15 PROCEDURE — 94761 N-INVAS EAR/PLS OXIMETRY MLT: CPT

## 2024-02-15 PROCEDURE — 94640 AIRWAY INHALATION TREATMENT: CPT

## 2024-02-15 PROCEDURE — 99900035 HC TECH TIME PER 15 MIN (STAT)

## 2024-02-15 PROCEDURE — 25000003 PHARM REV CODE 250: Performed by: INTERNAL MEDICINE

## 2024-02-15 RX ADMIN — METRONIDAZOLE 500 MG: 5 INJECTION, SOLUTION INTRAVENOUS at 01:02

## 2024-02-15 RX ADMIN — IPRATROPIUM BROMIDE AND ALBUTEROL SULFATE 3 ML: 2.5; .5 SOLUTION RESPIRATORY (INHALATION) at 03:02

## 2024-02-15 RX ADMIN — ONDANSETRON 4 MG: 2 INJECTION INTRAMUSCULAR; INTRAVENOUS at 06:02

## 2024-02-15 RX ADMIN — SUCRALFATE 1 G: 1 TABLET ORAL at 04:02

## 2024-02-15 RX ADMIN — SODIUM CHLORIDE, POTASSIUM CHLORIDE, SODIUM LACTATE AND CALCIUM CHLORIDE: 600; 310; 30; 20 INJECTION, SOLUTION INTRAVENOUS at 07:02

## 2024-02-15 RX ADMIN — SUCRALFATE 1 G: 1 TABLET ORAL at 06:02

## 2024-02-15 RX ADMIN — DOCUSATE SODIUM 50 MG: 50 CAPSULE, LIQUID FILLED ORAL at 08:02

## 2024-02-15 RX ADMIN — FLUTICASONE FUROATE AND VILANTEROL TRIFENATATE 1 PUFF: 100; 25 POWDER RESPIRATORY (INHALATION) at 08:02

## 2024-02-15 RX ADMIN — CIPROFLOXACIN 400 MG: 2 INJECTION, SOLUTION INTRAVENOUS at 05:02

## 2024-02-15 RX ADMIN — IPRATROPIUM BROMIDE AND ALBUTEROL SULFATE 3 ML: 2.5; .5 SOLUTION RESPIRATORY (INHALATION) at 11:02

## 2024-02-15 RX ADMIN — IPRATROPIUM BROMIDE AND ALBUTEROL SULFATE 3 ML: 2.5; .5 SOLUTION RESPIRATORY (INHALATION) at 04:02

## 2024-02-15 RX ADMIN — METHIMAZOLE 10 MG: 10 TABLET ORAL at 08:02

## 2024-02-15 RX ADMIN — METRONIDAZOLE 500 MG: 5 INJECTION, SOLUTION INTRAVENOUS at 07:02

## 2024-02-15 RX ADMIN — HYDROCODONE BITARTRATE AND ACETAMINOPHEN 1 TABLET: 5; 325 TABLET ORAL at 04:02

## 2024-02-15 RX ADMIN — METRONIDAZOLE 500 MG: 5 INJECTION, SOLUTION INTRAVENOUS at 04:02

## 2024-02-15 RX ADMIN — IPRATROPIUM BROMIDE AND ALBUTEROL SULFATE 3 ML: 2.5; .5 SOLUTION RESPIRATORY (INHALATION) at 08:02

## 2024-02-15 RX ADMIN — SUCRALFATE 1 G: 1 TABLET ORAL at 08:02

## 2024-02-15 RX ADMIN — SODIUM CHLORIDE, POTASSIUM CHLORIDE, SODIUM LACTATE AND CALCIUM CHLORIDE: 600; 310; 30; 20 INJECTION, SOLUTION INTRAVENOUS at 04:02

## 2024-02-15 RX ADMIN — SUCRALFATE 1 G: 1 TABLET ORAL at 01:02

## 2024-02-15 RX ADMIN — PANTOPRAZOLE SODIUM 40 MG: 40 TABLET, DELAYED RELEASE ORAL at 08:02

## 2024-02-15 RX ADMIN — IPRATROPIUM BROMIDE AND ALBUTEROL SULFATE 3 ML: 2.5; .5 SOLUTION RESPIRATORY (INHALATION) at 07:02

## 2024-02-15 NOTE — PROGRESS NOTES
[facility] - 5th Floor [facility] Medicine  Progress Note    Patient Name: Niya Moeller  MRN: [id number]  Patient Class: IP- Inpatient   Admission Date: 2/12/2024  Length of Stay: 3 days  Attending Physician: Bin Reyes MD  Primary Care Provider: Oswaldo Riggs NP        Subjective:     Principal Problem:Colitis    Interval History:   Today's info : seen and examined, no acute events overnight. Continues to improve   Afebrile  Remains on iv abx  Pain improving  Po intakee improving    Review of Systems   Constitutional:  Positive for fatigue.   HENT: Negative.     Eyes: Negative.    Respiratory:  Positive for shortness of breath.    Cardiovascular:  Positive for palpitations.   Gastrointestinal:  Positive for abdominal pain.   Endocrine: Negative.    Genitourinary: Negative.    Musculoskeletal: Negative.    Skin: Negative.    Allergic/Immunologic: Negative.    Neurological:  Positive for weakness.   Hematological: Negative.    Psychiatric/Behavioral:  The patient is nervous/anxious.      Objective:     Vital Signs (Most Recent):  Temp: 98 °F (36.7 °C) (02/15/24 1128)  Pulse: 84 (02/15/24 1153)  Resp: 18 (02/15/24 1153)  BP: 94/63 (02/15/24 1128)  SpO2: (!) 94 % (02/15/24 1153) Vital Signs (24h Range):  Temp:  [98 °F (36.7 °C)-98.7 °F (37.1 °C)] 98 °F (36.7 °C)  Pulse:  [62-90] 84  Resp:  [14-19] 18  SpO2:  [94 %-99 %] 94 %  BP: ()/(58-78) 94/63     Weight: 65.8 kg (145 lb)  Body mass index is 29.29 kg/m².    Intake/Output Summary (Last 24 hours) at 2/15/2024 1449  Last data filed at 2/15/2024 1300  Gross per 24 hour   Intake 240 ml   Output --   Net 240 ml        Physical Exam  Vitals reviewed.   Constitutional:       Appearance: Normal appearance.   HENT:      Head: Normocephalic and atraumatic.      Right Ear: Tympanic membrane and external ear normal.      Nose: Nose normal.      Mouth/Throat:      Mouth: Mucous membranes are moist.   Eyes:      Extraocular  Movements: Extraocular movements intact.      Pupils: Pupils are equal, round, and reactive to light.   Cardiovascular:      Rate and Rhythm: Normal rate and regular rhythm.      Pulses: Normal pulses.      Heart sounds: Normal heart sounds.   Pulmonary:      Effort: Pulmonary effort is normal.      Breath sounds: Normal breath sounds.   Abdominal:      General: Abdomen is flat. Bowel sounds are normal.      Palpations: Abdomen is soft.      Tenderness: There is abdominal tenderness.   Musculoskeletal:         General: Normal range of motion.      Cervical back: Normal range of motion and neck supple.   Skin:     General: Skin is warm and dry.   Neurological:      General: No focal deficit present.      Mental Status: She is alert and oriented to person, place, and time.   Psychiatric:         Mood and Affect: Mood normal.         Behavior: Behavior normal.           Overview/Hospital Course: stable    Significant Labs: All pertinent labs within the past 24 hours have been reviewed.  Lab Results   Component Value Date    WBC 4.39 (L) 02/13/2024    HGB 11.6 (L) 02/13/2024    HCT 37.3 02/13/2024    MCV 99.5 (H) 02/13/2024     02/13/2024         Recent Labs   Lab 02/13/24  0419      K 3.6   CO2 26   BUN 3.5*   CREATININE 0.75   GLUCOSE 104*   CALCIUM 8.9         Significant Imaging: I have reviewed all pertinent imaging results/findings within the past 24 hours.    Assessment/Plan:      Active Diagnoses:    Diagnosis Date Noted POA    PRINCIPAL PROBLEM:  Colitis [K52.9]  Yes      Problems Resolved During this Admission:     VTE Risk Mitigation (From admission, onward)           Ordered     IP VTE LOW RISK PATIENT  Once         02/12/24 1058     Place sequential compression device  Until discontinued         02/12/24 1058                      Acute sigmoid diverticulitis  Intestinal abscess  Gerd  Htn  Asthma  Graves     Plan :  Ivf  Iv abx  Follow cx  Gi reccs  Adv dieta s alton  Karie nd dvt ppx  Home soon on  oral abx        Bin Reyes MD  Department of Hospital Medicine   [facility] - 5th Floor Med Surg

## 2024-02-15 NOTE — PROGRESS NOTES
02/15/24 1118   Discharge Assessment   Assessment Type Discharge Planning Reassessment   Confirmed/corrected address, phone number and insurance Yes   Confirmed Demographics Correct on Facesheet   Source of Information patient   Communicated ROXIE with patient/caregiver Date not available/Unable to determine   Reason For Admission Colitis   People in Home alone   Do you expect to return to your current living situation? Yes   Do you have help at home or someone to help you manage your care at home? No   Prior to hospitilization cognitive status: Alert/Oriented   Current cognitive status: Alert/Oriented   Walking or Climbing Stairs Difficulty no   Dressing/Bathing Difficulty no   Equipment Currently Used at Home none   Readmission within 30 days? Yes   Patient currently being followed by outpatient case management? No   Do you currently have service(s) that help you manage your care at home? No   Do you take prescription medications? Yes   Do you have prescription coverage? Yes   Coverage [employer]   Do you have any problems affording any of your prescribed medications? No   Is the patient taking medications as prescribed? yes   Who is going to help you get home at discharge? Brother Adithya Gardner   How do you get to doctors appointments? family or friend will provide   Are you on dialysis? No   Do you take coumadin? No   Discharge Plan A Home   Discharge Plan B Home   DME Needed Upon Discharge  none   Discharge Plan discussed with: Patient   Transition of Care Barriers Transportation   Social Connections   Are you , , , , never , or living with a partner?

## 2024-02-16 PROCEDURE — 63600175 PHARM REV CODE 636 W HCPCS: Performed by: INTERNAL MEDICINE

## 2024-02-16 PROCEDURE — 21400001 HC TELEMETRY ROOM

## 2024-02-16 PROCEDURE — 25000242 PHARM REV CODE 250 ALT 637 W/ HCPCS: Performed by: INTERNAL MEDICINE

## 2024-02-16 PROCEDURE — 25000003 PHARM REV CODE 250: Performed by: INTERNAL MEDICINE

## 2024-02-16 PROCEDURE — 94761 N-INVAS EAR/PLS OXIMETRY MLT: CPT

## 2024-02-16 PROCEDURE — 94640 AIRWAY INHALATION TREATMENT: CPT

## 2024-02-16 RX ORDER — DICYCLOMINE HYDROCHLORIDE 10 MG/1
20 CAPSULE ORAL 3 TIMES DAILY PRN
Status: DISCONTINUED | OUTPATIENT
Start: 2024-02-16 | End: 2024-02-21 | Stop reason: HOSPADM

## 2024-02-16 RX ADMIN — SODIUM CHLORIDE, POTASSIUM CHLORIDE, SODIUM LACTATE AND CALCIUM CHLORIDE: 600; 310; 30; 20 INJECTION, SOLUTION INTRAVENOUS at 10:02

## 2024-02-16 RX ADMIN — IPRATROPIUM BROMIDE AND ALBUTEROL SULFATE 3 ML: 2.5; .5 SOLUTION RESPIRATORY (INHALATION) at 12:02

## 2024-02-16 RX ADMIN — METRONIDAZOLE 500 MG: 5 INJECTION, SOLUTION INTRAVENOUS at 11:02

## 2024-02-16 RX ADMIN — SUCRALFATE 1 G: 1 TABLET ORAL at 04:02

## 2024-02-16 RX ADMIN — IPRATROPIUM BROMIDE AND ALBUTEROL SULFATE 3 ML: 2.5; .5 SOLUTION RESPIRATORY (INHALATION) at 02:02

## 2024-02-16 RX ADMIN — SODIUM CHLORIDE, POTASSIUM CHLORIDE, SODIUM LACTATE AND CALCIUM CHLORIDE: 600; 310; 30; 20 INJECTION, SOLUTION INTRAVENOUS at 08:02

## 2024-02-16 RX ADMIN — IPRATROPIUM BROMIDE AND ALBUTEROL SULFATE 3 ML: 2.5; .5 SOLUTION RESPIRATORY (INHALATION) at 07:02

## 2024-02-16 RX ADMIN — IPRATROPIUM BROMIDE AND ALBUTEROL SULFATE 3 ML: 2.5; .5 SOLUTION RESPIRATORY (INHALATION) at 05:02

## 2024-02-16 RX ADMIN — CIPROFLOXACIN 400 MG: 2 INJECTION, SOLUTION INTRAVENOUS at 05:02

## 2024-02-16 RX ADMIN — HYDROCODONE BITARTRATE AND ACETAMINOPHEN 1 TABLET: 5; 325 TABLET ORAL at 05:02

## 2024-02-16 RX ADMIN — IOPAMIDOL 100 ML: 755 INJECTION, SOLUTION INTRAVENOUS at 11:02

## 2024-02-16 RX ADMIN — METHIMAZOLE 10 MG: 10 TABLET ORAL at 08:02

## 2024-02-16 RX ADMIN — METRONIDAZOLE 500 MG: 5 INJECTION, SOLUTION INTRAVENOUS at 07:02

## 2024-02-16 RX ADMIN — DICYCLOMINE HYDROCHLORIDE 20 MG: 10 CAPSULE ORAL at 07:02

## 2024-02-16 RX ADMIN — SUCRALFATE 1 G: 1 TABLET ORAL at 05:02

## 2024-02-16 RX ADMIN — METRONIDAZOLE 500 MG: 5 INJECTION, SOLUTION INTRAVENOUS at 04:02

## 2024-02-16 RX ADMIN — ONDANSETRON 4 MG: 2 INJECTION INTRAMUSCULAR; INTRAVENOUS at 05:02

## 2024-02-16 RX ADMIN — IPRATROPIUM BROMIDE AND ALBUTEROL SULFATE 3 ML: 2.5; .5 SOLUTION RESPIRATORY (INHALATION) at 04:02

## 2024-02-16 RX ADMIN — IPRATROPIUM BROMIDE AND ALBUTEROL SULFATE 3 ML: 2.5; .5 SOLUTION RESPIRATORY (INHALATION) at 11:02

## 2024-02-16 RX ADMIN — IPRATROPIUM BROMIDE AND ALBUTEROL SULFATE 3 ML: 2.5; .5 SOLUTION RESPIRATORY (INHALATION) at 08:02

## 2024-02-16 RX ADMIN — DOCUSATE SODIUM 50 MG: 50 CAPSULE, LIQUID FILLED ORAL at 08:02

## 2024-02-16 RX ADMIN — SUCRALFATE 1 G: 1 TABLET ORAL at 11:02

## 2024-02-16 RX ADMIN — PANTOPRAZOLE SODIUM 40 MG: 40 TABLET, DELAYED RELEASE ORAL at 08:02

## 2024-02-16 RX ADMIN — FLUTICASONE FUROATE AND VILANTEROL TRIFENATATE 1 PUFF: 100; 25 POWDER RESPIRATORY (INHALATION) at 08:02

## 2024-02-16 NOTE — PROGRESS NOTES
[facility] - 5th Floor [facility] Medicine  Progress Note    Patient Name: Niya Moeller  MRN: [id number]  Patient Class: IP- Inpatient   Admission Date: 2/12/2024  Length of Stay: 4 days  Attending Physician: Bin Reyes MD  Primary Care Provider: Oswaldo Riggs NP        Subjective:     Principal Problem:Colitis    Interval History:   Today's info : seen and examined, no acute events overnight. Continues to improve   Afebrile  Remains on iv abx  Pain worse again - will get ct to any change in the abscess  Po intakee improving    Review of Systems   Constitutional:  Positive for fatigue.   HENT: Negative.     Eyes: Negative.    Respiratory:  Positive for shortness of breath.    Cardiovascular:  Positive for palpitations.   Gastrointestinal:  Positive for abdominal pain.   Endocrine: Negative.    Genitourinary: Negative.    Musculoskeletal: Negative.    Skin: Negative.    Allergic/Immunologic: Negative.    Neurological:  Positive for weakness.   Hematological: Negative.    Psychiatric/Behavioral:  The patient is nervous/anxious.      Objective:     Vital Signs (Most Recent):  Temp: 96.8 °F (36 °C) (02/16/24 1130)  Pulse: 75 (02/16/24 1405)  Resp: 18 (02/16/24 1405)  BP: (!) 112/51 (02/16/24 1130)  SpO2: 95 % (02/16/24 1405) Vital Signs (24h Range):  Temp:  [96.8 °F (36 °C)-98.6 °F (37 °C)] 96.8 °F (36 °C)  Pulse:  [72-85] 75  Resp:  [14-19] 18  SpO2:  [94 %-99 %] 95 %  BP: ()/(46-77) 112/51     Weight: 65.8 kg (145 lb)  Body mass index is 29.29 kg/m².    Intake/Output Summary (Last 24 hours) at 2/16/2024 1431  Last data filed at 2/16/2024 1300  Gross per 24 hour   Intake 360 ml   Output --   Net 360 ml        Physical Exam  Vitals reviewed.   Constitutional:       Appearance: Normal appearance.   HENT:      Head: Normocephalic and atraumatic.      Right Ear: Tympanic membrane and external ear normal.      Nose: Nose normal.      Mouth/Throat:      Mouth: Mucous membranes  are moist.   Eyes:      Extraocular Movements: Extraocular movements intact.      Pupils: Pupils are equal, round, and reactive to light.   Cardiovascular:      Rate and Rhythm: Normal rate and regular rhythm.      Pulses: Normal pulses.      Heart sounds: Normal heart sounds.   Pulmonary:      Effort: Pulmonary effort is normal.      Breath sounds: Normal breath sounds.   Abdominal:      General: Abdomen is flat. Bowel sounds are normal.      Palpations: Abdomen is soft.      Tenderness: There is abdominal tenderness.   Musculoskeletal:         General: Normal range of motion.      Cervical back: Normal range of motion and neck supple.   Skin:     General: Skin is warm and dry.   Neurological:      General: No focal deficit present.      Mental Status: She is alert and oriented to person, place, and time.   Psychiatric:         Mood and Affect: Mood normal.         Behavior: Behavior normal.           Overview/Hospital Course: stable    Significant Labs: All pertinent labs within the past 24 hours have been reviewed.  Lab Results   Component Value Date    WBC 4.39 (L) 02/13/2024    HGB 11.6 (L) 02/13/2024    HCT 37.3 02/13/2024    MCV 99.5 (H) 02/13/2024     02/13/2024         Recent Labs   Lab 02/13/24  0419      K 3.6   CO2 26   BUN 3.5*   CREATININE 0.75   GLUCOSE 104*   CALCIUM 8.9         Significant Imaging: I have reviewed all pertinent imaging results/findings within the past 24 hours.    Assessment/Plan:      Active Diagnoses:    Diagnosis Date Noted POA    PRINCIPAL PROBLEM:  Colitis [K52.9]  Yes      Problems Resolved During this Admission:     VTE Risk Mitigation (From admission, onward)           Ordered     IP VTE LOW RISK PATIENT  Once         02/12/24 1058     Place sequential compression device  Until discontinued         02/12/24 1058                      Acute sigmoid diverticulitis  Intestinal abscess  Gerd  Htn  Asthma  Graves     Plan :  Ivf  Iv abx  Follow cx  Gi reccs  Adv dieta  albert Tiwari nd dvt ppx  Home soon on oral abx        Bin Reyes MD  Department of Hospital Medicine   [facility] - 5th Floor Med Surg

## 2024-02-17 LAB
ALBUMIN SERPL-MCNC: 3.1 G/DL (ref 3.5–5)
ALBUMIN/GLOB SERPL: 1.1 RATIO (ref 1.1–2)
ALP SERPL-CCNC: 62 UNIT/L (ref 40–150)
ALT SERPL-CCNC: 10 UNIT/L (ref 0–55)
AST SERPL-CCNC: 16 UNIT/L (ref 5–34)
BACTERIA BLD CULT: NORMAL
BACTERIA BLD CULT: NORMAL
BASOPHILS # BLD AUTO: 0.01 X10(3)/MCL
BASOPHILS NFR BLD AUTO: 0.2 %
BILIRUB SERPL-MCNC: 0.3 MG/DL
BUN SERPL-MCNC: 4.8 MG/DL (ref 9.8–20.1)
CALCIUM SERPL-MCNC: 9.3 MG/DL (ref 8.4–10.2)
CHLORIDE SERPL-SCNC: 107 MMOL/L (ref 98–107)
CO2 SERPL-SCNC: 26 MMOL/L (ref 22–29)
CREAT SERPL-MCNC: 0.76 MG/DL (ref 0.55–1.02)
EOSINOPHIL # BLD AUTO: 0.26 X10(3)/MCL (ref 0–0.9)
EOSINOPHIL NFR BLD AUTO: 6 %
ERYTHROCYTE [DISTWIDTH] IN BLOOD BY AUTOMATED COUNT: 14.3 % (ref 11.5–17)
GFR SERPLBLD CREATININE-BSD FMLA CKD-EPI: >60 MLS/MIN/1.73/M2
GLOBULIN SER-MCNC: 2.9 GM/DL (ref 2.4–3.5)
GLUCOSE SERPL-MCNC: 88 MG/DL (ref 74–100)
HCT VFR BLD AUTO: 36 % (ref 37–47)
HGB BLD-MCNC: 12.1 G/DL (ref 12–16)
IMM GRANULOCYTES # BLD AUTO: 0 X10(3)/MCL (ref 0–0.04)
IMM GRANULOCYTES NFR BLD AUTO: 0 %
LYMPHOCYTES # BLD AUTO: 2.25 X10(3)/MCL (ref 0.6–4.6)
LYMPHOCYTES NFR BLD AUTO: 52.1 %
MCH RBC QN AUTO: 31.1 PG (ref 27–31)
MCHC RBC AUTO-ENTMCNC: 33.6 G/DL (ref 33–36)
MCV RBC AUTO: 92.5 FL (ref 80–94)
MONOCYTES # BLD AUTO: 0.67 X10(3)/MCL (ref 0.1–1.3)
MONOCYTES NFR BLD AUTO: 15.5 %
NEUTROPHILS # BLD AUTO: 1.13 X10(3)/MCL (ref 2.1–9.2)
NEUTROPHILS NFR BLD AUTO: 26.2 %
NRBC BLD AUTO-RTO: 0 %
PLATELET # BLD AUTO: 208 X10(3)/MCL (ref 130–400)
PMV BLD AUTO: 10.7 FL (ref 7.4–10.4)
POTASSIUM SERPL-SCNC: 4 MMOL/L (ref 3.5–5.1)
PROT SERPL-MCNC: 6 GM/DL (ref 6.4–8.3)
RBC # BLD AUTO: 3.89 X10(6)/MCL (ref 4.2–5.4)
SODIUM SERPL-SCNC: 143 MMOL/L (ref 136–145)
WBC # SPEC AUTO: 4.32 X10(3)/MCL (ref 4.5–11.5)

## 2024-02-17 PROCEDURE — 21400001 HC TELEMETRY ROOM

## 2024-02-17 PROCEDURE — 25000003 PHARM REV CODE 250: Performed by: INTERNAL MEDICINE

## 2024-02-17 PROCEDURE — 94640 AIRWAY INHALATION TREATMENT: CPT

## 2024-02-17 PROCEDURE — 80053 COMPREHEN METABOLIC PANEL: CPT | Performed by: INTERNAL MEDICINE

## 2024-02-17 PROCEDURE — 94761 N-INVAS EAR/PLS OXIMETRY MLT: CPT

## 2024-02-17 PROCEDURE — 25000242 PHARM REV CODE 250 ALT 637 W/ HCPCS: Performed by: INTERNAL MEDICINE

## 2024-02-17 PROCEDURE — 63600175 PHARM REV CODE 636 W HCPCS: Mod: JZ,JG | Performed by: INTERNAL MEDICINE

## 2024-02-17 PROCEDURE — 25500020 PHARM REV CODE 255: Performed by: INTERNAL MEDICINE

## 2024-02-17 PROCEDURE — 85025 COMPLETE CBC W/AUTO DIFF WBC: CPT | Performed by: INTERNAL MEDICINE

## 2024-02-17 RX ADMIN — HYDROCODONE BITARTRATE AND ACETAMINOPHEN 1 TABLET: 5; 325 TABLET ORAL at 04:02

## 2024-02-17 RX ADMIN — METHIMAZOLE 10 MG: 10 TABLET ORAL at 08:02

## 2024-02-17 RX ADMIN — DICYCLOMINE HYDROCHLORIDE 20 MG: 10 CAPSULE ORAL at 08:02

## 2024-02-17 RX ADMIN — IPRATROPIUM BROMIDE AND ALBUTEROL SULFATE 3 ML: 2.5; .5 SOLUTION RESPIRATORY (INHALATION) at 01:02

## 2024-02-17 RX ADMIN — IPRATROPIUM BROMIDE AND ALBUTEROL SULFATE 3 ML: 2.5; .5 SOLUTION RESPIRATORY (INHALATION) at 08:02

## 2024-02-17 RX ADMIN — CIPROFLOXACIN 400 MG: 2 INJECTION, SOLUTION INTRAVENOUS at 05:02

## 2024-02-17 RX ADMIN — METRONIDAZOLE 500 MG: 5 INJECTION, SOLUTION INTRAVENOUS at 08:02

## 2024-02-17 RX ADMIN — SUCRALFATE 1 G: 1 TABLET ORAL at 08:02

## 2024-02-17 RX ADMIN — SUCRALFATE 1 G: 1 TABLET ORAL at 03:02

## 2024-02-17 RX ADMIN — IPRATROPIUM BROMIDE AND ALBUTEROL SULFATE 3 ML: 2.5; .5 SOLUTION RESPIRATORY (INHALATION) at 04:02

## 2024-02-17 RX ADMIN — SUCRALFATE 1 G: 1 TABLET ORAL at 10:02

## 2024-02-17 RX ADMIN — ONDANSETRON 4 MG: 2 INJECTION INTRAMUSCULAR; INTRAVENOUS at 03:02

## 2024-02-17 RX ADMIN — PANTOPRAZOLE SODIUM 40 MG: 40 TABLET, DELAYED RELEASE ORAL at 08:02

## 2024-02-17 RX ADMIN — METRONIDAZOLE 500 MG: 5 INJECTION, SOLUTION INTRAVENOUS at 04:02

## 2024-02-17 RX ADMIN — FLUTICASONE FUROATE AND VILANTEROL TRIFENATATE 1 PUFF: 100; 25 POWDER RESPIRATORY (INHALATION) at 08:02

## 2024-02-17 RX ADMIN — HYDROCODONE BITARTRATE AND ACETAMINOPHEN 1 TABLET: 5; 325 TABLET ORAL at 08:02

## 2024-02-17 RX ADMIN — METRONIDAZOLE 500 MG: 5 INJECTION, SOLUTION INTRAVENOUS at 11:02

## 2024-02-17 RX ADMIN — DOCUSATE SODIUM 50 MG: 50 CAPSULE, LIQUID FILLED ORAL at 08:02

## 2024-02-17 RX ADMIN — SUCRALFATE 1 G: 1 TABLET ORAL at 05:02

## 2024-02-17 NOTE — PLAN OF CARE
Problem: Infection  Goal: Absence of Infection Signs and Symptoms  2/17/2024 0940 by Carson Odom RN  Outcome: Ongoing, Progressing  2/17/2024 0929 by Carson Odom RN  Outcome: Ongoing, Progressing     Problem: Adult Inpatient Plan of Care  Goal: Plan of Care Review  2/17/2024 0940 by Carson Odom RN  Outcome: Ongoing, Progressing  2/17/2024 0929 by Carson Odom RN  Outcome: Ongoing, Progressing  Goal: Patient-Specific Goal (Individualized)  2/17/2024 0940 by Carson Odom RN  Outcome: Ongoing, Progressing  2/17/2024 0929 by Casron Odom RN  Outcome: Ongoing, Progressing  Goal: Absence of Hospital-Acquired Illness or Injury  2/17/2024 0940 by Carson Odom RN  Outcome: Ongoing, Progressing  2/17/2024 0929 by Carson Odom RN  Outcome: Ongoing, Progressing  Goal: Optimal Comfort and Wellbeing  2/17/2024 0940 by Carson Odom RN  Outcome: Ongoing, Progressing  2/17/2024 0929 by Carson Odom RN  Outcome: Ongoing, Progressing  Goal: Readiness for Transition of Care  2/17/2024 0940 by Carson Odom RN  Outcome: Ongoing, Progressing  2/17/2024 0929 by Carson Odom RN  Outcome: Ongoing, Progressing     Problem: Nausea and Vomiting  Goal: Fluid and Electrolyte Balance  2/17/2024 0940 by Carson Odom RN  Outcome: Ongoing, Progressing  2/17/2024 0929 by Carson Odom RN  Outcome: Ongoing, Progressing

## 2024-02-17 NOTE — PROGRESS NOTES
Inpatient Nutrition Evaluation    Admit Date: 2/12/2024   Total duration of encounter: 5 days   Patient Age: 53 y.o.    Nutrition Recommendation/Prescription     -Continue bland low fiber/residue diet as ordered.     Nutrition Assessment     Chart Review    Reason Seen: length of stay    Malnutrition Screening Tool Results   Have you recently lost weight without trying?: No  Have you been eating poorly because of a decreased appetite?: No   MST Score: 0   Diagnosis:  Acute sigmoid diverticulitis  Intestinal abscess  Gerd  Htn  Asthma  Graves    Relevant Medical History:   Asthma, diverticulosis, GERD, Graves disease    Scheduled Medications:  albuterol-ipratropium, 3 mL, Q4H  ciprofloxacin, 400 mg, Q12H  docusate sodium, 50 mg, Daily  fluticasone furoate-vilanteroL, 1 puff, Daily  methIMAzole, 10 mg, Daily  metronidazole, 500 mg, Q8H  pantoprazole, 40 mg, Daily  sucralfate, 1 g, QID (AC & HS)    Continuous Infusions:  lactated ringers, Last Rate: 75 mL/hr at 02/16/24 2236    PRN Medications: acetaminophen, albuterol, dicyclomine, HYDROcodone-acetaminophen, melatonin, morphine, morphine, ondansetron, prochlorperazine    Recent Labs   Lab 02/11/24  1624 02/12/24  0848 02/13/24  0419 02/17/24  0626    140 142 143   K 4.4 3.8 3.6 4.0   CALCIUM 9.8 10.1 8.9 9.3   CHLORIDE 108* 104 110* 107   CO2 23 26 26 26   BUN 8.7* 8.4* 3.5* 4.8*   CREATININE 0.73 0.85 0.75 0.76   EGFRNORACEVR >60 >60 >60 >60   GLUCOSE 106* 102* 104* 88   BILITOT 0.2 0.4 0.3 0.3   ALKPHOS 74 74 54 62   ALT 14 16 12 10   AST 18 18 17 16   ALBUMIN 3.7 4.0 2.9* 3.1*   WBC 6.00 6.38 4.39* 4.32*   HGB 13.2 13.8 11.6* 12.1   HCT 40.2 42.4 37.3 36.0*     Nutrition Orders:  Diet low fiber/residue Lucas      Appetite/Oral Intake: fair/50-75% of meals  Factors Affecting Nutritional Intake: abdominal pain and decreased appetite  Food/Orthodoxy/Cultural Preferences: none reported  Food Allergies:  Latex  Last Bowel Movement: 02/14/24  Wound(s):  No partial  "or full thickness pressure injuries     Comments    2/17/24: Pt reports poor appetite since admit. Pt complains of ongoing abdominal pain. She denies any unintended wt loss. Pt not willing to try ONS at this time.     Anthropometrics    Height: 4' 11" (149.9 cm), Height Method: Stated  Last Weight: 65.8 kg (145 lb) (02/13/24 2100), Weight Method: Standard Scale  BMI (Calculated): 29.3  BMI Classification: overweight (BMI 25-29.9)     Ideal Body Weight (IBW), Female: 95 lb     % Ideal Body Weight, Female (lb): 152.63 %                             Usual Weight Provided By: patient denies unintentional weight loss    Wt Readings from Last 5 Encounters:   02/13/24 65.8 kg (145 lb)   02/11/24 59 kg (130 lb)   01/27/24 69 kg (152 lb 1.9 oz)   01/24/24 69.1 kg (152 lb 5.4 oz)   01/20/24 61.2 kg (135 lb)     Weight Change(s) Since Admission:   Wt Readings from Last 1 Encounters:   02/13/24 2100 65.8 kg (145 lb)   02/12/24 0838 65.8 kg (145 lb)   Admit Weight: 65.8 kg (145 lb) (02/12/24 0838), Weight Method: Stated    Patient Education     Not applicable.    Nutrition Goals & Monitoring     Dietitian will monitor: food and beverage intake and weight    Nutrition Risk/Follow-Up: low (follow-up in 5-7 days)  Patients assigned 'low nutrition risk' status do not qualify for a full nutritional assessment but will be monitored and re-evaluated in a 5-7 day time period. Please consult if re-evaluation needed sooner.    "

## 2024-02-17 NOTE — PROGRESS NOTES
[facility]                       [address] [address]    PATIENT NAME:       AMBERLY WASHINGTON   YOB: 1970  CSN:                [id number]   MRN:                [id number]  ADMIT DATE:         02/12/2024 08:39:00  PHYSICIAN:          Karl Villarreal MD                            PROGRESS NOTE    DATE:      HISTORY:  A 53-year-old  female.  She is still with some   abdominal pain mostly in the left lower quadrant and below the umbilicus.  She   has been afebrile.  She has been tolerating her diet.  Her blood pressure has   been in the low normal range.  She denies any chest pain, palpitations, or other   problems.    REVIEW OF SYSTEMS:  Times 12 as above.    PHYSICAL EXAMINATION:  VITAL SIGNS:  Blood pressure 102/71, pulse 90, and temperature 98.7.  GENERAL APPEARANCE:  She is with abdominal pain, afebrile.  HEART:  Regular rhythm and rate.  LUNGS:  Clear.  ABDOMEN:  There is tenderness mostly in the left lower quadrant is soft.  No   guarding.    EXTREMITIES:  No clubbing, cyanosis, or edema.  NEUROLOGIC:  Nonfocal.    LABORATORY DATA:  Remarkable for a white cell count of 4.32, H and H 12.1 and   36, platelet count is 208.  Chemistry shows a BUN of 4.8, creatinine 0.76, and   albumin 3.1.  CT of the abdomen showed no significant changes from the previous   CT scan that showed continued inflammatory changes around the sigmoid with a   possible 1 cm abscess.    IMPRESSION:  Acute diverticulitis with an abscess in the sigmoid.  She has a   history of hypertension, anemia, gastroesophageal reflux disease, history of   asthma, vitamin D deficiency, and graves disease/hyperthyroidism.    PLAN:  We will continue with Cipro and metronidazole.  We will continue with   pantoprazole.  We will continue with dvt proph.  Continue with p.r.n. opiate   analgesics, antibiotics will be followed as per ID's  orders.  We will continue   with supportive care.  We will consult Colorectal Surgery as per the patient's   wishes.        ______________________________  MD JAIRON Hernandez/PHIL  DD:  02/17/2024  Time:  04:15PM  DT:  02/17/2024  Time:  05:19PM  Job #:  [id number]      PROGRESS NOTE

## 2024-02-17 NOTE — PROGRESS NOTES
Infectious Diseases Progress Note  53-year-old female with past medical history of, Graves disease, GERD, asthma, diverticulosis/diverticulitis, is admitted to [facility] today 02/12/2024, presenting through the ED with complaints of abdominal pain, apparently completed course of IV antibiotics for diverticulitis recently.  She had presented at the time to [facility] about 2 weeks ago, and transferred to [place], discharged about 10 days ago.  On presentation this time, she was noted to have no fevers and no leukocytosis.  Blood cultures have been negative.  CT scan of the abdomen and pelvis showed similar appearance of sigmoid diverticulitis with possible associated small abscesses.  He was seen by the GI team with inputs noted. Per patient there was an attempt on colonoscopy by Dr. Millard in December 2023 which was not completed due to the risk of perforation having significant inflammation at the time.  She follows with the surgeon in [place]-suggested temporal colostomy which she declined.  She is allergic to Zosyn.  She is on Cipro and Flagyl.     Subjective:  No new complaints, no fevers, doing about the same.  Lying in bed in no acute distress      Past Medical History:   Diagnosis Date    Asthma     Diverticulosis     GERD (gastroesophageal reflux disease)     Graves disease      No past surgical history on file.  Social History     Socioeconomic History    Marital status:    Tobacco Use    Smoking status: Every Day     Current packs/day: 0.50     Average packs/day: 0.5 packs/day for 41.1 years (20.6 ttl pk-yrs)     Types: Cigarettes     Start date: 1/1/1983    Smokeless tobacco: Never    Tobacco comments:     Ambulatory referral to Smoking Cessation clinic following hospital discharge.    Substance and Sexual Activity    Alcohol use: Not Currently    Drug use: Never    Sexual activity: Not Currently     Birth control/protection: Abstinence     Comment: States is asexual      Social Determinants of Health     Financial Resource Strain: High Risk (2/12/2024)    Overall Financial Resource Strain (CARDIA)     Difficulty of Paying Living Expenses: Hard   Food Insecurity: Food Insecurity Present (2/12/2024)    Hunger Vital Sign     Worried About Running Out of Food in the Last Year: Sometimes true     Ran Out of Food in the Last Year: Sometimes true   Transportation Needs: No Transportation Needs (2/12/2024)    PRAPARE - Transportation     Lack of Transportation (Medical): No     Lack of Transportation (Non-Medical): No   Physical Activity: Sufficiently Active (2/12/2024)    Exercise Vital Sign     Days of Exercise per Week: 4 days     Minutes of Exercise per Session: 50 min   Stress: Stress Concern Present (2/12/2024)    [employer] [place] [employer] - Occupational Stress Questionnaire     Feeling of Stress : Rather much   Social Connections: Socially Isolated (2/15/2024)    Social Connection and Isolation Panel [NHANES]     Frequency of Communication with Friends and Family: More than three times a week     Frequency of Social Gatherings with Friends and Family: More than three times a week     Attends Hinduism Services: Never     Active Member of Clubs or Organizations: No     Attends Club or Organization Meetings: Never     Marital Status:    Housing Stability: High Risk (2/12/2024)    Housing Stability Vital Sign     Unable to Pay for Housing in the Last Year: Yes     Number of Places Lived in the Last Year: 1     Unstable Housing in the Last Year: No       ROS  Constitutional:  Positive for malaise/fatigue.   HENT: Negative.     Respiratory: Negative.     Gastrointestinal:  Positive for abdominal pain.   Genitourinary: Negative.    Musculoskeletal: Negative.    Neurological:  Positive for weakness.   Endo/Heme/Allergies: Negative.    Psychiatric/Behavioral: Negative.     All other Systems review done and negative.    Review of patient's allergies indicates:  "  Allergen Reactions    Latex Anaphylaxis and Edema    Zosyn [piperacillin-tazobactam] Anaphylaxis         Scheduled Meds:   albuterol-ipratropium  3 mL Nebulization Q4H    ciprofloxacin  400 mg Intravenous Q12H    docusate sodium  50 mg Oral Daily    fluticasone furoate-vilanteroL  1 puff Inhalation Daily    methIMAzole  10 mg Oral Daily    metronidazole  500 mg Intravenous Q8H    pantoprazole  40 mg Oral Daily    sucralfate  1 g Oral QID (AC & HS)     Continuous Infusions:   lactated ringers 75 mL/hr at 02/16/24 0846     PRN Meds:acetaminophen, albuterol, dicyclomine, HYDROcodone-acetaminophen, melatonin, morphine, morphine, ondansetron, prochlorperazine    Objective:  /71   Pulse 76   Temp 98.8 °F (37.1 °C) (Oral)   Resp 18   Ht 4' 11" (1.499 m)   Wt 65.8 kg (145 lb)   SpO2 95%   BMI 29.29 kg/m²     Physical Exam:   Physical Exam  Vitals reviewed.   Constitutional:       General: She is not in acute distress.  HENT:      Head: Normocephalic and atraumatic.   Cardiovascular:      Rate and Rhythm: Normal rate and regular rhythm.      Heart sounds: Normal heart sounds.   Pulmonary:      Effort: Pulmonary effort is normal.      Breath sounds: Normal breath sounds.   Abdominal:      General: Bowel sounds are normal. There is no distension.      Palpations: Abdomen is soft.      Tenderness: There is abdominal tenderness.   Musculoskeletal:      Cervical back: Neck supple.      Right lower leg: No edema.      Left lower leg: No edema.   Skin:     Findings: No erythema or rash.   Neurological:      Mental Status: She is alert and oriented to person, place, and time.   Psychiatric:      Comments: Calm and cooperative        Imaging  Imaging Results              CT Abdomen Pelvis With IV Contrast NO Oral Contrast (Final result)  Result time 02/12/24 10:02:24      Final result by Glen Celestin MD (02/12/24 10:02:24)                   Impression:      Similar appearance of sigmoid diverticulitis with possible " small abscess.      Electronically signed by: Glen Celestin  Date:    02/12/2024  Time:    10:02               Narrative:    EXAMINATION:  CT ABDOMEN PELVIS WITH IV CONTRAST    CLINICAL HISTORY:  LLQ abdominal pain;    TECHNIQUE:  Helical acquisition through the abdomen and pelvis with IV contrast.  Three plane reconstructions were provided for review. DLP  mGycm. Automatic exposure control, adjustment of mA/kV or iterative reconstruction technique was used to reduce radiation.    COMPARISON:  Yesterday    FINDINGS:  Mild atelectasis or scarring lung bases.    The liver, spleen, gallbladder, pancreas, adrenals and kidneys are within normal limits.    No bowel obstruction.  Colonic diverticulosis with continued inflammatory changes along the sigmoid.  Again question 1 cm abscess on image 102 series 2.  No free air.    Urinary bladder is unremarkable. No free fluid. Aorta normal in caliber.    No acute osseous findings.                                       Lab Review   No results found for this or any previous visit (from the past 24 hour(s)).          Assessment/Plan:  1. Acute sigmoid diverticulitis with diverticular abscess   2.  History of Graves disease  3.  Morbid obesity     -Continue Cipro and Flagyl   -Severe allergy to Zosyn limits choice of antibiotics  -No fevers and leukopenia noted, follow  -2/12 CT abdomen and pelvis with similar appearance of sigmoid diverticulitis with possible small abscess.  Plan to repeat CT abdomen and pelvis today noted, follow  -History of prior recommendations for surgical intervention with a colostomy but she declined, apparently seeking other surgical options without having to go through having a colostomy  -Plan for consultation to surgery noted and we will follow the inputs  -Discussed with patient and nursing staff.

## 2024-02-18 PROCEDURE — 63600175 PHARM REV CODE 636 W HCPCS: Performed by: INTERNAL MEDICINE

## 2024-02-18 PROCEDURE — 25000242 PHARM REV CODE 250 ALT 637 W/ HCPCS: Performed by: INTERNAL MEDICINE

## 2024-02-18 PROCEDURE — 21400001 HC TELEMETRY ROOM

## 2024-02-18 PROCEDURE — 25000003 PHARM REV CODE 250: Performed by: INTERNAL MEDICINE

## 2024-02-18 PROCEDURE — 94761 N-INVAS EAR/PLS OXIMETRY MLT: CPT

## 2024-02-18 PROCEDURE — 27000221 HC OXYGEN, UP TO 24 HOURS

## 2024-02-18 PROCEDURE — 0241U COVID/RSV/FLU A&B PCR: CPT | Performed by: INTERNAL MEDICINE

## 2024-02-18 PROCEDURE — 94640 AIRWAY INHALATION TREATMENT: CPT

## 2024-02-18 RX ORDER — ENOXAPARIN SODIUM 100 MG/ML
40 INJECTION SUBCUTANEOUS EVERY 24 HOURS
Status: DISCONTINUED | OUTPATIENT
Start: 2024-02-18 | End: 2024-02-21 | Stop reason: HOSPADM

## 2024-02-18 RX ORDER — BENZONATATE 100 MG/1
100 CAPSULE ORAL 3 TIMES DAILY
Status: DISCONTINUED | OUTPATIENT
Start: 2024-02-18 | End: 2024-02-21 | Stop reason: HOSPADM

## 2024-02-18 RX ADMIN — METHIMAZOLE 10 MG: 10 TABLET ORAL at 09:02

## 2024-02-18 RX ADMIN — SUCRALFATE 1 G: 1 TABLET ORAL at 05:02

## 2024-02-18 RX ADMIN — ENOXAPARIN SODIUM 40 MG: 40 INJECTION SUBCUTANEOUS at 04:02

## 2024-02-18 RX ADMIN — IPRATROPIUM BROMIDE AND ALBUTEROL SULFATE 3 ML: 2.5; .5 SOLUTION RESPIRATORY (INHALATION) at 04:02

## 2024-02-18 RX ADMIN — BENZONATATE 100 MG: 100 CAPSULE ORAL at 02:02

## 2024-02-18 RX ADMIN — SUCRALFATE 1 G: 1 TABLET ORAL at 10:02

## 2024-02-18 RX ADMIN — HYDROCODONE BITARTRATE AND ACETAMINOPHEN 1 TABLET: 5; 325 TABLET ORAL at 10:02

## 2024-02-18 RX ADMIN — IPRATROPIUM BROMIDE AND ALBUTEROL SULFATE 3 ML: 2.5; .5 SOLUTION RESPIRATORY (INHALATION) at 08:02

## 2024-02-18 RX ADMIN — CIPROFLOXACIN 400 MG: 2 INJECTION, SOLUTION INTRAVENOUS at 05:02

## 2024-02-18 RX ADMIN — IPRATROPIUM BROMIDE AND ALBUTEROL SULFATE 3 ML: 2.5; .5 SOLUTION RESPIRATORY (INHALATION) at 12:02

## 2024-02-18 RX ADMIN — DICYCLOMINE HYDROCHLORIDE 20 MG: 10 CAPSULE ORAL at 08:02

## 2024-02-18 RX ADMIN — HYDROCODONE BITARTRATE AND ACETAMINOPHEN 1 TABLET: 5; 325 TABLET ORAL at 12:02

## 2024-02-18 RX ADMIN — SUCRALFATE 1 G: 1 TABLET ORAL at 04:02

## 2024-02-18 RX ADMIN — FLUTICASONE FUROATE AND VILANTEROL TRIFENATATE 1 PUFF: 100; 25 POWDER RESPIRATORY (INHALATION) at 09:02

## 2024-02-18 RX ADMIN — SUCRALFATE 1 G: 1 TABLET ORAL at 08:02

## 2024-02-18 RX ADMIN — METRONIDAZOLE 500 MG: 5 INJECTION, SOLUTION INTRAVENOUS at 04:02

## 2024-02-18 RX ADMIN — PANTOPRAZOLE SODIUM 40 MG: 40 TABLET, DELAYED RELEASE ORAL at 09:02

## 2024-02-18 RX ADMIN — METRONIDAZOLE 500 MG: 5 INJECTION, SOLUTION INTRAVENOUS at 08:02

## 2024-02-18 RX ADMIN — METRONIDAZOLE 500 MG: 5 INJECTION, SOLUTION INTRAVENOUS at 11:02

## 2024-02-18 RX ADMIN — BENZONATATE 100 MG: 100 CAPSULE ORAL at 08:02

## 2024-02-18 NOTE — PROGRESS NOTES
[facility]                       [address] [address]    PATIENT NAME:       AMBERLY WASHINGTON   YOB: 1970  CSN:                [id number]   MRN:                [id number]  ADMIT DATE:         02/12/2024 08:39:00  PHYSICIAN:          Karl Villarreal MD                            PROGRESS NOTE    DATE:      SUBJECTIVE:  A 53-year-old  female.  She was admitted secondary   to recurrent diverticulitis.  She has refused seeing the Surgery in the past due   to the fact that she did not want to have a colostomy.  I did talk to her at   length and I told her that there is colorectal surgeon who can see her and explain   having a resection of the colon.  She has not had any fever or chills.  The pain   is getting better at the present time.  She denies any nausea, vomiting, or   other new problems.    REVIEW OF SYSTEMS:  Times 12 as above.    OBJECTIVE:  VITAL SIGNS:  Her latest vitals, blood pressure 103/65, pulse 86,   temp 98.9.  GENERAL APPEARANCE:  She is alert, in no acute distress.  She is with some pain   in the lower abdomen.  She is tolerating her diet.  HEART:  She has regular rhythm and rate.  LUNGS:  Clear.  ABDOMEN:  Tender mostly in the inferior aspect inferior to the umbilicus in the   left lower quadrant.  Soft.  No guarding.  The bowel sounds are decreased.  EXTREMITIES:  No clubbing, cyanosis, or edema.  NEUROLOGIC:  Nonfocal.    LABORATORY DATA:  There are no labs today.    IMPRESSION:  Acute diverticulitis.  She does have history of recurrent   diverticulitis and a small abscess of about 1 cm in the sigmoid, hypertension,   gastroesophageal reflux disease, asthma, vitamin D deficiency, and Graves   disease.    PLAN:  We will continue at the present time with Cipro and metronidazole as per   Infectious Disease.  The patient also on p.r.n. nebulizations.  She will   continue  using her Advair, methimazole for hyperthyroidism, and pantoprazole and   sucralfate as well as IV fluids and IV analgesics and antiemetics.  We will   continue with DVT prophylaxis.        ______________________________  MD JAIRON Hernandez/PHIL  DD:  02/18/2024  Time:  12:38PM  DT:  02/18/2024  Time:  01:15PM  Job #:  [id number]      PROGRESS NOTE

## 2024-02-19 PROBLEM — R10.9 ACUTE ABDOMINAL PAIN IN LEFT FLANK: Status: RESOLVED | Noted: 2023-02-19 | Resolved: 2024-02-19

## 2024-02-19 PROCEDURE — 99223 1ST HOSP IP/OBS HIGH 75: CPT | Mod: ,,, | Performed by: COLON & RECTAL SURGERY

## 2024-02-19 PROCEDURE — 94640 AIRWAY INHALATION TREATMENT: CPT

## 2024-02-19 PROCEDURE — 94761 N-INVAS EAR/PLS OXIMETRY MLT: CPT

## 2024-02-19 PROCEDURE — 25000242 PHARM REV CODE 250 ALT 637 W/ HCPCS: Performed by: INTERNAL MEDICINE

## 2024-02-19 PROCEDURE — 21400001 HC TELEMETRY ROOM

## 2024-02-19 PROCEDURE — 27000221 HC OXYGEN, UP TO 24 HOURS

## 2024-02-19 PROCEDURE — 63600175 PHARM REV CODE 636 W HCPCS: Performed by: INTERNAL MEDICINE

## 2024-02-19 PROCEDURE — 25000003 PHARM REV CODE 250: Performed by: INTERNAL MEDICINE

## 2024-02-19 RX ADMIN — HYDROCODONE BITARTRATE AND ACETAMINOPHEN 1 TABLET: 5; 325 TABLET ORAL at 08:02

## 2024-02-19 RX ADMIN — IPRATROPIUM BROMIDE AND ALBUTEROL SULFATE 3 ML: 2.5; .5 SOLUTION RESPIRATORY (INHALATION) at 07:02

## 2024-02-19 RX ADMIN — CIPROFLOXACIN 400 MG: 2 INJECTION, SOLUTION INTRAVENOUS at 05:02

## 2024-02-19 RX ADMIN — ONDANSETRON 4 MG: 2 INJECTION INTRAMUSCULAR; INTRAVENOUS at 10:02

## 2024-02-19 RX ADMIN — METHIMAZOLE 10 MG: 10 TABLET ORAL at 08:02

## 2024-02-19 RX ADMIN — SUCRALFATE 1 G: 1 TABLET ORAL at 08:02

## 2024-02-19 RX ADMIN — SUCRALFATE 1 G: 1 TABLET ORAL at 10:02

## 2024-02-19 RX ADMIN — CIPROFLOXACIN 400 MG: 2 INJECTION, SOLUTION INTRAVENOUS at 06:02

## 2024-02-19 RX ADMIN — METRONIDAZOLE 500 MG: 5 INJECTION, SOLUTION INTRAVENOUS at 08:02

## 2024-02-19 RX ADMIN — IPRATROPIUM BROMIDE AND ALBUTEROL SULFATE 3 ML: 2.5; .5 SOLUTION RESPIRATORY (INHALATION) at 08:02

## 2024-02-19 RX ADMIN — IPRATROPIUM BROMIDE AND ALBUTEROL SULFATE 3 ML: 2.5; .5 SOLUTION RESPIRATORY (INHALATION) at 12:02

## 2024-02-19 RX ADMIN — IPRATROPIUM BROMIDE AND ALBUTEROL SULFATE 3 ML: 2.5; .5 SOLUTION RESPIRATORY (INHALATION) at 04:02

## 2024-02-19 RX ADMIN — DICYCLOMINE HYDROCHLORIDE 20 MG: 10 CAPSULE ORAL at 06:02

## 2024-02-19 RX ADMIN — BENZONATATE 100 MG: 100 CAPSULE ORAL at 08:02

## 2024-02-19 RX ADMIN — FLUTICASONE FUROATE AND VILANTEROL TRIFENATATE 1 PUFF: 100; 25 POWDER RESPIRATORY (INHALATION) at 08:02

## 2024-02-19 RX ADMIN — SUCRALFATE 1 G: 1 TABLET ORAL at 04:02

## 2024-02-19 RX ADMIN — METRONIDAZOLE 500 MG: 5 INJECTION, SOLUTION INTRAVENOUS at 04:02

## 2024-02-19 RX ADMIN — BENZONATATE 100 MG: 100 CAPSULE ORAL at 02:02

## 2024-02-19 RX ADMIN — DOCUSATE SODIUM 50 MG: 50 CAPSULE, LIQUID FILLED ORAL at 08:02

## 2024-02-19 RX ADMIN — METRONIDAZOLE 500 MG: 5 INJECTION, SOLUTION INTRAVENOUS at 11:02

## 2024-02-19 RX ADMIN — PANTOPRAZOLE SODIUM 40 MG: 40 TABLET, DELAYED RELEASE ORAL at 08:02

## 2024-02-19 NOTE — CONSULTS
[facility] - 5th Floor Med Surg  Colon & Rectal Surgery  Consult Note    Inpatient consult to Colorectal Surgery  Consult performed by: Serge Stewart MD  Consult ordered by: Bin Reyes MD        Subjective:     Chief Complaint/Reason for Admission:  Recurrent diverticulitis    History of Present Illness:  53-year-old black female with history of asthma and Graves disease presented to the ER 2024 complaining of left lower quadrant abdominal pain.  She was recently admitted to the hospital and discharged home on antibiotics 5 days prior to presentation.  She has had multiple ER encounters and admissions for diverticulitis over the past 4-6 months.  She even reports having 2 episodes of diverticulitis with pericolonic abscess, 1 of which required percutaneous drainage[place]  She has been resistant to surgery because she refuses to have a colostomy, even if it is temporary.  Her last attempted colonoscopy was in [place] on 2023 but aborted due to risk of perforation as there was inflammation present.  Today she is feeling much better and inquires about when she will be discharged.  She claims to have a colonoscopy scheduled in [place] on 2024.  She denies nausea, vomiting, constipation.    No current facility-administered medications on file prior to encounter.     Current Outpatient Medications on File Prior to Encounter   Medication Sig    ADVAIR DISKUS 250-50 mcg/dose diskus inhaler SMARTSI Puff(s) By Mouth    albuterol (ACCUNEB) 1.25 mg/3 mL Nebu Take by nebulization every 4 to 6 hours as needed.    albuterol (PROAIR HFA) 90 mcg/actuation inhaler Inhale 2 puffs into the lungs every 6 (six) hours as needed for Wheezing. Rescue    albuterol-ipratropium (DUO-NEB) 2.5 mg-0.5 mg/3 mL nebulizer solution Take 3 mLs by nebulization every 4 (four) hours as needed for Wheezing. Rescue    albuterol-ipratropium (DUO-NEB) 2.5 mg-0.5 mg/3 mL nebulizer  solution Take 3 mLs by nebulization every 4 (four) hours. Rescue    ciprofloxacin HCl (CIPRO) 500 MG tablet Take 1 tablet (500 mg total) by mouth 2 (two) times daily. for 10 days    diclofenac (VOLTAREN) 50 MG EC tablet Take 1 tablet (50 mg total) by mouth 2 (two) times daily as needed (Pain).    doxycycline (VIBRA-TABS) 100 MG tablet Take 100 mg by mouth 2 (two) times daily.    ELIQUIS 5 mg Tab Take 5 mg by mouth 2 (two) times daily.    fluticasone propionate (FLONASE) 50 mcg/actuation nasal spray 1 spray (50 mcg total) by Each Nostril route daily as needed for Allergies or Rhinitis.    HYDROcodone-acetaminophen (NORCO) 5-325 mg per tablet Take 1 tablet by mouth every 6 (six) hours as needed for Pain.    methIMAzole (TAPAZOLE) 5 MG Tab Take a half of a tablet daily to Total 2.5 mg    omeprazole (PRILOSEC) 20 MG capsule Take 20 mg by mouth.    sucralfate (CARAFATE) 1 gram tablet Take 1 tablet by mouth 4 (four) times daily before meals and nightly.       Review of patient's allergies indicates:   Allergen Reactions    Latex Anaphylaxis and Edema    Zosyn [piperacillin-tazobactam] Anaphylaxis       Past Medical History:   Diagnosis Date    Asthma     Diverticulosis     GERD (gastroesophageal reflux disease)     Graves disease      No past surgical history on file.  Family History       Problem Relation (Age of Onset)    Hypothyroidism Mother          Tobacco Use    Smoking status: Every Day     Current packs/day: 0.50     Average packs/day: 0.5 packs/day for 41.1 years (20.6 ttl pk-yrs)     Types: Cigarettes     Start date: 1/1/1983    Smokeless tobacco: Never    Tobacco comments:     Ambulatory referral to Smoking Cessation clinic following hospital discharge.    Substance and Sexual Activity    Alcohol use: Not Currently    Drug use: Never    Sexual activity: Not Currently     Birth control/protection: Abstinence     Comment: States is asexual     Review of Systems   Constitutional:  Negative for chills,  diaphoresis, fatigue and fever.   HENT:  Negative for facial swelling, rhinorrhea and sore throat.    Eyes:  Negative for redness.   Cardiovascular:  Negative for chest pain.   Gastrointestinal:  Negative for abdominal distention, abdominal pain, blood in stool, constipation, nausea and vomiting.   Genitourinary:  Negative for dysuria and hematuria.   Musculoskeletal:  Negative for back pain.   Skin:  Negative for rash.   Neurological:  Negative for dizziness and syncope.   Psychiatric/Behavioral:  Negative for confusion.      Objective:     Vital Signs (Most Recent):  Temp: 98.5 °F (36.9 °C) (02/19/24 1553)  Pulse: 90 (02/19/24 1617)  Resp: 18 (02/19/24 1617)  BP: (!) 109/55 (02/19/24 1553)  SpO2: 98 % (02/19/24 1617) Vital Signs (24h Range):  Temp:  [97.7 °F (36.5 °C)-99 °F (37.2 °C)] 98.5 °F (36.9 °C)  Pulse:  [] 90  Resp:  [18] 18  SpO2:  [93 %-100 %] 98 %  BP: (101-120)/(51-87) 109/55     Weight: 65.8 kg (145 lb)  Body mass index is 29.29 kg/m².      Intake/Output Summary (Last 24 hours) at 2/19/2024 1713  Last data filed at 2/19/2024 1300  Gross per 24 hour   Intake 480 ml   Output --   Net 480 ml       Physical Exam  Vitals reviewed.   Constitutional:       General: She is not in acute distress.  HENT:      Head: Normocephalic and atraumatic.      Nose: Nose normal.   Eyes:      General: No scleral icterus.  Cardiovascular:      Rate and Rhythm: Normal rate and regular rhythm.   Pulmonary:      Effort: Pulmonary effort is normal. No respiratory distress.   Abdominal:      General: There is no distension.      Palpations: Abdomen is soft.      Tenderness: There is no abdominal tenderness. There is no guarding or rebound.   Musculoskeletal:         General: Normal range of motion.   Neurological:      Mental Status: She is alert and oriented to person, place, and time.         Significant Labs:  None    Significant Diagnostics:  CT abdomen/pelvis reviewed    Assessment/Plan:     Active Diagnoses:     Diagnosis Date Noted POA    PRINCIPAL PROBLEM:  Colitis [K52.9]  Yes      Problems Resolved During this Admission:     I discussed the pathophysiology of the disease process and need for surgery.  I also explained the possibility of a temporary colostomy.  I would recommend interval colectomy in 6 weeks at [facility] after preop colonoscopy.    Thank you for your consult. I will sign off. Please contact us if you have any additional questions.    Serge Stewart MD  Colon & Rectal Surgery  [facility] - 5th Floor Med Surg

## 2024-02-19 NOTE — PROGRESS NOTES
Infectious Diseases Progress Note  53-year-old female with past medical history of, Graves disease, GERD, asthma, diverticulosis/diverticulitis, is admitted to [facility] today 02/12/2024, presenting through the ED with complaints of abdominal pain, apparently completed course of IV antibiotics for diverticulitis recently.  She had presented at the time to [facility] about 2 weeks ago, and transferred to [place], discharged about 10 days ago.  On presentation this time, she was noted to have no fevers and no leukocytosis.  Blood cultures have been negative.  CT scan of the abdomen and pelvis showed similar appearance of sigmoid diverticulitis with possible associated small abscesses.  He was seen by the GI team with inputs noted. Per patient there was an attempt on colonoscopy by Dr. Millard in December 2023 which was not completed due to the risk of perforation having significant inflammation at the time.  She follows with the surgeon in [place]-suggested temporal colostomy which she declined.  She is allergic to Zosyn.  She is on Cipro and Flagyl.     Subjective:  No new complaints, no fevers, doing about the same.  Lying in bed in no acute distress      Past Medical History:   Diagnosis Date    Asthma     Diverticulosis     GERD (gastroesophageal reflux disease)     Graves disease      No past surgical history on file.  Social History     Socioeconomic History    Marital status:    Tobacco Use    Smoking status: Every Day     Current packs/day: 0.50     Average packs/day: 0.5 packs/day for 41.1 years (20.6 ttl pk-yrs)     Types: Cigarettes     Start date: 1/1/1983    Smokeless tobacco: Never    Tobacco comments:     Ambulatory referral to Smoking Cessation clinic following hospital discharge.    Substance and Sexual Activity    Alcohol use: Not Currently    Drug use: Never    Sexual activity: Not Currently     Birth control/protection: Abstinence     Comment: States is asexual      Social Determinants of Health     Financial Resource Strain: High Risk (2/12/2024)    Overall Financial Resource Strain (CARDIA)     Difficulty of Paying Living Expenses: Hard   Food Insecurity: Food Insecurity Present (2/12/2024)    Hunger Vital Sign     Worried About Running Out of Food in the Last Year: Sometimes true     Ran Out of Food in the Last Year: Sometimes true   Transportation Needs: No Transportation Needs (2/12/2024)    PRAPARE - Transportation     Lack of Transportation (Medical): No     Lack of Transportation (Non-Medical): No   Physical Activity: Sufficiently Active (2/12/2024)    Exercise Vital Sign     Days of Exercise per Week: 4 days     Minutes of Exercise per Session: 50 min   Stress: Stress Concern Present (2/12/2024)    [employer] [place] [employer] - Occupational Stress Questionnaire     Feeling of Stress : Rather much   Social Connections: Socially Isolated (2/15/2024)    Social Connection and Isolation Panel [NHANES]     Frequency of Communication with Friends and Family: More than three times a week     Frequency of Social Gatherings with Friends and Family: More than three times a week     Attends Mosque Services: Never     Active Member of Clubs or Organizations: No     Attends Club or Organization Meetings: Never     Marital Status:    Housing Stability: High Risk (2/12/2024)    Housing Stability Vital Sign     Unable to Pay for Housing in the Last Year: Yes     Number of Places Lived in the Last Year: 1     Unstable Housing in the Last Year: No       ROS  Constitutional:  Positive for malaise/fatigue.   HENT: Negative.     Respiratory: Negative.     Gastrointestinal:  Positive for abdominal pain.   Genitourinary: Negative.    Musculoskeletal: Negative.    Neurological:  Positive for weakness.   Endo/Heme/Allergies: Negative.    Psychiatric/Behavioral: Negative.     All other Systems review done and negative.    Review of patient's allergies indicates:  "  Allergen Reactions    Latex Anaphylaxis and Edema    Zosyn [piperacillin-tazobactam] Anaphylaxis         Scheduled Meds:   albuterol-ipratropium  3 mL Nebulization Q4H    benzonatate  100 mg Oral TID    ciprofloxacin  400 mg Intravenous Q12H    docusate sodium  50 mg Oral Daily    enoxparin  40 mg Subcutaneous Q24H (prophylaxis, 1700)    fluticasone furoate-vilanteroL  1 puff Inhalation Daily    methIMAzole  10 mg Oral Daily    metronidazole  500 mg Intravenous Q8H    pantoprazole  40 mg Oral Daily    sucralfate  1 g Oral QID (AC & HS)     Continuous Infusions:   lactated ringers 75 mL/hr at 02/16/24 2236     PRN Meds:acetaminophen, albuterol, dicyclomine, HYDROcodone-acetaminophen, melatonin, morphine, morphine, ondansetron, prochlorperazine    Objective:  BP (!) 113/56 (BP Location: Right arm, Patient Position: Lying)   Pulse 88   Temp 98.4 °F (36.9 °C) (Oral)   Resp 18   Ht 4' 11" (1.499 m)   Wt 65.8 kg (145 lb)   SpO2 98%   BMI 29.29 kg/m²     Physical Exam:   Physical Exam  Vitals reviewed.   Constitutional:       General: She is not in acute distress.  HENT:      Head: Normocephalic and atraumatic.   Cardiovascular:      Rate and Rhythm: Normal rate and regular rhythm.      Heart sounds: Normal heart sounds.   Pulmonary:      Effort: Pulmonary effort is normal.      Breath sounds: Normal breath sounds.   Abdominal:      General: Bowel sounds are normal. There is no distension.      Palpations: Abdomen is soft.      Tenderness: There is abdominal tenderness.   Musculoskeletal:      Cervical back: Neck supple.      Right lower leg: No edema.      Left lower leg: No edema.   Skin:     Findings: No erythema or rash.   Neurological:      Mental Status: She is alert and oriented to person, place, and time.   Psychiatric:      Comments: Calm and cooperative        Imaging  Imaging Results              CT Abdomen Pelvis With IV Contrast NO Oral Contrast (Final result)  Result time 02/12/24 10:02:24      Final " result by Glen Celestin MD (02/12/24 10:02:24)                   Impression:      Similar appearance of sigmoid diverticulitis with possible small abscess.      Electronically signed by: Glen Celestin  Date:    02/12/2024  Time:    10:02               Narrative:    EXAMINATION:  CT ABDOMEN PELVIS WITH IV CONTRAST    CLINICAL HISTORY:  LLQ abdominal pain;    TECHNIQUE:  Helical acquisition through the abdomen and pelvis with IV contrast.  Three plane reconstructions were provided for review. DLP  mGycm. Automatic exposure control, adjustment of mA/kV or iterative reconstruction technique was used to reduce radiation.    COMPARISON:  Yesterday    FINDINGS:  Mild atelectasis or scarring lung bases.    The liver, spleen, gallbladder, pancreas, adrenals and kidneys are within normal limits.    No bowel obstruction.  Colonic diverticulosis with continued inflammatory changes along the sigmoid.  Again question 1 cm abscess on image 102 series 2.  No free air.    Urinary bladder is unremarkable. No free fluid. Aorta normal in caliber.    No acute osseous findings.                                       Lab Review   Recent Results (from the past 24 hour(s))   COVID/RSV/FLU A&B PCR    Collection Time: 02/18/24  9:02 PM   Result Value Ref Range    Influenza A PCR Not Detected Not Detected    Influenza B PCR Not Detected Not Detected    Respiratory Syncytial Virus PCR Not Detected Not Detected    SARS-CoV-2 PCR Not Detected Not Detected, Negative             Assessment/Plan:  1. Acute sigmoid diverticulitis with diverticular abscess   2.  History of Graves disease  3.  Morbid obesity     -Continue Cipro and Flagyl   -Severe allergy to Zosyn limits choice of antibiotics  -No fevers and leukopenia noted, follow  -2/17 CT abdomen and pelvis results noted including with no significant change in the inflammatory changes of diverticulitis in the sigmoid colon  -2/12 CT abdomen and pelvis with similar appearance of sigmoid  diverticulitis with possible small abscess.  Plan to repeat CT abdomen and pelvis today noted, follow  -History of prior recommendations for surgical intervention with a colostomy but she declined, apparently seeking other surgical options without having to go through having a colostomy  -Plan for consultation to surgery noted and we will follow the inputs  -Discussed with patient and nursing staff.

## 2024-02-20 PROCEDURE — 27000221 HC OXYGEN, UP TO 24 HOURS

## 2024-02-20 PROCEDURE — 25000242 PHARM REV CODE 250 ALT 637 W/ HCPCS: Performed by: INTERNAL MEDICINE

## 2024-02-20 PROCEDURE — 63600175 PHARM REV CODE 636 W HCPCS: Performed by: INTERNAL MEDICINE

## 2024-02-20 PROCEDURE — 25000003 PHARM REV CODE 250: Performed by: INTERNAL MEDICINE

## 2024-02-20 PROCEDURE — 63600175 PHARM REV CODE 636 W HCPCS: Mod: JZ,JG | Performed by: INTERNAL MEDICINE

## 2024-02-20 PROCEDURE — 94761 N-INVAS EAR/PLS OXIMETRY MLT: CPT

## 2024-02-20 PROCEDURE — 21400001 HC TELEMETRY ROOM

## 2024-02-20 PROCEDURE — 94640 AIRWAY INHALATION TREATMENT: CPT

## 2024-02-20 PROCEDURE — 99900035 HC TECH TIME PER 15 MIN (STAT)

## 2024-02-20 RX ORDER — METHIMAZOLE 5 MG/1
5 TABLET ORAL DAILY
Status: DISCONTINUED | OUTPATIENT
Start: 2024-02-21 | End: 2024-02-21 | Stop reason: HOSPADM

## 2024-02-20 RX ORDER — FLUTICASONE PROPIONATE 50 MCG
2 SPRAY, SUSPENSION (ML) NASAL 2 TIMES DAILY
Status: DISCONTINUED | OUTPATIENT
Start: 2024-02-20 | End: 2024-02-21 | Stop reason: HOSPADM

## 2024-02-20 RX ADMIN — METHIMAZOLE 10 MG: 10 TABLET ORAL at 09:02

## 2024-02-20 RX ADMIN — IPRATROPIUM BROMIDE AND ALBUTEROL SULFATE 3 ML: 2.5; .5 SOLUTION RESPIRATORY (INHALATION) at 07:02

## 2024-02-20 RX ADMIN — SODIUM CHLORIDE, POTASSIUM CHLORIDE, SODIUM LACTATE AND CALCIUM CHLORIDE: 600; 310; 30; 20 INJECTION, SOLUTION INTRAVENOUS at 03:02

## 2024-02-20 RX ADMIN — METHYLPREDNISOLONE SODIUM SUCCINATE 60 MG: 40 INJECTION, POWDER, FOR SOLUTION INTRAMUSCULAR; INTRAVENOUS at 11:02

## 2024-02-20 RX ADMIN — METRONIDAZOLE 500 MG: 5 INJECTION, SOLUTION INTRAVENOUS at 11:02

## 2024-02-20 RX ADMIN — IPRATROPIUM BROMIDE AND ALBUTEROL SULFATE 3 ML: 2.5; .5 SOLUTION RESPIRATORY (INHALATION) at 04:02

## 2024-02-20 RX ADMIN — BENZONATATE 100 MG: 100 CAPSULE ORAL at 08:02

## 2024-02-20 RX ADMIN — FLUTICASONE PROPIONATE 100 MCG: 50 SPRAY, METERED NASAL at 09:02

## 2024-02-20 RX ADMIN — IPRATROPIUM BROMIDE AND ALBUTEROL SULFATE 3 ML: 2.5; .5 SOLUTION RESPIRATORY (INHALATION) at 12:02

## 2024-02-20 RX ADMIN — FLUTICASONE PROPIONATE 100 MCG: 50 SPRAY, METERED NASAL at 10:02

## 2024-02-20 RX ADMIN — SUCRALFATE 1 G: 1 TABLET ORAL at 10:02

## 2024-02-20 RX ADMIN — BENZONATATE 100 MG: 100 CAPSULE ORAL at 09:02

## 2024-02-20 RX ADMIN — SUCRALFATE 1 G: 1 TABLET ORAL at 06:02

## 2024-02-20 RX ADMIN — ACETAMINOPHEN 650 MG: 325 TABLET, FILM COATED ORAL at 01:02

## 2024-02-20 RX ADMIN — BENZONATATE 100 MG: 100 CAPSULE ORAL at 03:02

## 2024-02-20 RX ADMIN — IPRATROPIUM BROMIDE AND ALBUTEROL SULFATE 3 ML: 2.5; .5 SOLUTION RESPIRATORY (INHALATION) at 09:02

## 2024-02-20 RX ADMIN — Medication 6 MG: at 11:02

## 2024-02-20 RX ADMIN — METRONIDAZOLE 500 MG: 5 INJECTION, SOLUTION INTRAVENOUS at 03:02

## 2024-02-20 RX ADMIN — FLUTICASONE FUROATE AND VILANTEROL TRIFENATATE 1 PUFF: 100; 25 POWDER RESPIRATORY (INHALATION) at 09:02

## 2024-02-20 RX ADMIN — PANTOPRAZOLE SODIUM 40 MG: 40 TABLET, DELAYED RELEASE ORAL at 09:02

## 2024-02-20 RX ADMIN — METHYLPREDNISOLONE SODIUM SUCCINATE 60 MG: 40 INJECTION, POWDER, FOR SOLUTION INTRAMUSCULAR; INTRAVENOUS at 06:02

## 2024-02-20 RX ADMIN — IPRATROPIUM BROMIDE AND ALBUTEROL SULFATE 3 ML: 2.5; .5 SOLUTION RESPIRATORY (INHALATION) at 11:02

## 2024-02-20 RX ADMIN — METRONIDAZOLE 500 MG: 5 INJECTION, SOLUTION INTRAVENOUS at 10:02

## 2024-02-20 RX ADMIN — CIPROFLOXACIN 400 MG: 2 INJECTION, SOLUTION INTRAVENOUS at 06:02

## 2024-02-20 RX ADMIN — CIPROFLOXACIN 400 MG: 2 INJECTION, SOLUTION INTRAVENOUS at 08:02

## 2024-02-20 RX ADMIN — METHYLPREDNISOLONE SODIUM SUCCINATE 60 MG: 40 INJECTION, POWDER, FOR SOLUTION INTRAMUSCULAR; INTRAVENOUS at 02:02

## 2024-02-20 RX ADMIN — SUCRALFATE 1 G: 1 TABLET ORAL at 08:02

## 2024-02-20 NOTE — NURSING
Spoken to Dr. Villarreal and discussed about his discharge order, make aware that patient  is complaining clogged and itchy nose as well as complaints of having asthma attack, Dr. Villarreal stated will see patient tonight. Patient made aware. Patient still complaining of feeling nauseous overnight and anti-emetic given.

## 2024-02-21 ENCOUNTER — TELEPHONE (OUTPATIENT)
Dept: ENDOCRINOLOGY | Facility: CLINIC | Age: 54
End: 2024-02-21
Payer: MEDICAID

## 2024-02-21 VITALS
HEIGHT: 59 IN | TEMPERATURE: 98 F | OXYGEN SATURATION: 96 % | BODY MASS INDEX: 29.23 KG/M2 | HEART RATE: 96 BPM | DIASTOLIC BLOOD PRESSURE: 59 MMHG | SYSTOLIC BLOOD PRESSURE: 113 MMHG | WEIGHT: 145 LBS | RESPIRATION RATE: 20 BRPM

## 2024-02-21 DIAGNOSIS — E05.90 HYPERTHYROIDISM: Primary | ICD-10-CM

## 2024-02-21 PROCEDURE — 94640 AIRWAY INHALATION TREATMENT: CPT

## 2024-02-21 PROCEDURE — 63600175 PHARM REV CODE 636 W HCPCS: Mod: JZ,JG | Performed by: INTERNAL MEDICINE

## 2024-02-21 PROCEDURE — 25000003 PHARM REV CODE 250: Performed by: INTERNAL MEDICINE

## 2024-02-21 PROCEDURE — 25000242 PHARM REV CODE 250 ALT 637 W/ HCPCS: Performed by: INTERNAL MEDICINE

## 2024-02-21 PROCEDURE — 63600175 PHARM REV CODE 636 W HCPCS: Performed by: INTERNAL MEDICINE

## 2024-02-21 PROCEDURE — 99900031 HC PATIENT EDUCATION (STAT)

## 2024-02-21 PROCEDURE — 27000221 HC OXYGEN, UP TO 24 HOURS

## 2024-02-21 PROCEDURE — 94761 N-INVAS EAR/PLS OXIMETRY MLT: CPT

## 2024-02-21 RX ADMIN — BENZONATATE 100 MG: 100 CAPSULE ORAL at 09:02

## 2024-02-21 RX ADMIN — FLUTICASONE FUROATE AND VILANTEROL TRIFENATATE 1 PUFF: 100; 25 POWDER RESPIRATORY (INHALATION) at 09:02

## 2024-02-21 RX ADMIN — IPRATROPIUM BROMIDE AND ALBUTEROL SULFATE 3 ML: 2.5; .5 SOLUTION RESPIRATORY (INHALATION) at 12:02

## 2024-02-21 RX ADMIN — SUCRALFATE 1 G: 1 TABLET ORAL at 06:02

## 2024-02-21 RX ADMIN — METRONIDAZOLE 500 MG: 5 INJECTION, SOLUTION INTRAVENOUS at 12:02

## 2024-02-21 RX ADMIN — METHYLPREDNISOLONE SODIUM SUCCINATE 60 MG: 40 INJECTION, POWDER, FOR SOLUTION INTRAMUSCULAR; INTRAVENOUS at 06:02

## 2024-02-21 RX ADMIN — DICYCLOMINE HYDROCHLORIDE 20 MG: 10 CAPSULE ORAL at 01:02

## 2024-02-21 RX ADMIN — HYDROCODONE BITARTRATE AND ACETAMINOPHEN 1 TABLET: 5; 325 TABLET ORAL at 06:02

## 2024-02-21 RX ADMIN — PANTOPRAZOLE SODIUM 40 MG: 40 TABLET, DELAYED RELEASE ORAL at 09:02

## 2024-02-21 RX ADMIN — SUCRALFATE 1 G: 1 TABLET ORAL at 11:02

## 2024-02-21 RX ADMIN — IPRATROPIUM BROMIDE AND ALBUTEROL SULFATE 3 ML: 2.5; .5 SOLUTION RESPIRATORY (INHALATION) at 07:02

## 2024-02-21 RX ADMIN — FLUTICASONE PROPIONATE 100 MCG: 50 SPRAY, METERED NASAL at 09:02

## 2024-02-21 RX ADMIN — METHIMAZOLE 5 MG: 10 TABLET ORAL at 09:02

## 2024-02-21 RX ADMIN — IPRATROPIUM BROMIDE AND ALBUTEROL SULFATE 3 ML: 2.5; .5 SOLUTION RESPIRATORY (INHALATION) at 04:02

## 2024-02-21 RX ADMIN — CIPROFLOXACIN 400 MG: 2 INJECTION, SOLUTION INTRAVENOUS at 06:02

## 2024-02-21 RX ADMIN — DOCUSATE SODIUM 50 MG: 50 CAPSULE, LIQUID FILLED ORAL at 09:02

## 2024-02-21 RX ADMIN — METHYLPREDNISOLONE SODIUM SUCCINATE 60 MG: 40 INJECTION, POWDER, FOR SOLUTION INTRAMUSCULAR; INTRAVENOUS at 11:02

## 2024-02-21 RX ADMIN — METRONIDAZOLE 500 MG: 5 INJECTION, SOLUTION INTRAVENOUS at 04:02

## 2024-02-21 NOTE — PROGRESS NOTES
Infectious Diseases Progress Note  53-year-old female with past medical history of, Graves disease, GERD, asthma, diverticulosis/diverticulitis, is admitted to [facility] today 02/12/2024, presenting through the ED with complaints of abdominal pain, apparently completed course of IV antibiotics for diverticulitis recently.  She had presented at the time to [facility] about 2 weeks ago, and transferred to [place], discharged about 10 days ago.  On presentation this time, she was noted to have no fevers and no leukocytosis.  Blood cultures have been negative.  CT scan of the abdomen and pelvis showed similar appearance of sigmoid diverticulitis with possible associated small abscesses.  He was seen by the GI team with inputs noted. Per patient there was an attempt on colonoscopy by Dr. Millard in December 2023 which was not completed due to the risk of perforation having significant inflammation at the time.  She follows with the surgeon in [place]-suggested temporal colostomy which she declined.  She is allergic to Zosyn.  She is on Cipro and Flagyl.     Subjective:  No new complaints, no fevers, doing about the same.  Lying in bed in no acute distress      Past Medical History:   Diagnosis Date    Asthma     Diverticulosis     GERD (gastroesophageal reflux disease)     Graves disease      No past surgical history on file.  Social History     Socioeconomic History    Marital status:    Tobacco Use    Smoking status: Every Day     Current packs/day: 0.50     Average packs/day: 0.5 packs/day for 41.1 years (20.6 ttl pk-yrs)     Types: Cigarettes     Start date: 1/1/1983    Smokeless tobacco: Never    Tobacco comments:     Ambulatory referral to Smoking Cessation clinic following hospital discharge.    Substance and Sexual Activity    Alcohol use: Not Currently    Drug use: Never    Sexual activity: Not Currently     Birth control/protection: Abstinence     Comment: States is asexual      Social Determinants of Health     Financial Resource Strain: High Risk (2/12/2024)    Overall Financial Resource Strain (CARDIA)     Difficulty of Paying Living Expenses: Hard   Food Insecurity: Food Insecurity Present (2/12/2024)    Hunger Vital Sign     Worried About Running Out of Food in the Last Year: Sometimes true     Ran Out of Food in the Last Year: Sometimes true   Transportation Needs: No Transportation Needs (2/12/2024)    PRAPARE - Transportation     Lack of Transportation (Medical): No     Lack of Transportation (Non-Medical): No   Physical Activity: Sufficiently Active (2/12/2024)    Exercise Vital Sign     Days of Exercise per Week: 4 days     Minutes of Exercise per Session: 50 min   Stress: Stress Concern Present (2/12/2024)    [employer] [employer] - Occupational Stress Questionnaire     Feeling of Stress : Rather much   Social Connections: Socially Isolated (2/15/2024)    Social Connection and Isolation Panel [NHANES]     Frequency of Communication with Friends and Family: More than three times a week     Frequency of Social Gatherings with Friends and Family: More than three times a week     Attends Christian Services: Never     Active Member of Clubs or Organizations: No     Attends Club or Organization Meetings: Never     Marital Status:    Housing Stability: High Risk (2/12/2024)    Housing Stability Vital Sign     Unable to Pay for Housing in the Last Year: Yes     Number of Places Lived in the Last Year: 1     Unstable Housing in the Last Year: No       ROS  Constitutional:  Positive for malaise/fatigue.   HENT: Negative.     Respiratory: Negative.     Gastrointestinal:  Positive for abdominal pain.   Genitourinary: Negative.    Musculoskeletal: Negative.    Neurological:  Positive for weakness.   Endo/Heme/Allergies: Negative.    Psychiatric/Behavioral: Negative.     All other Systems review done and negative.    Review of patient's allergies indicates:  "  Allergen Reactions    Latex Anaphylaxis and Edema    Zosyn [piperacillin-tazobactam] Anaphylaxis         Scheduled Meds:   albuterol-ipratropium  3 mL Nebulization Q4H    benzonatate  100 mg Oral TID    ciprofloxacin  400 mg Intravenous Q12H    docusate sodium  50 mg Oral Daily    enoxparin  40 mg Subcutaneous Q24H (prophylaxis, 1700)    fluticasone furoate-vilanteroL  1 puff Inhalation Daily    fluticasone propionate  2 spray Each Nostril BID    [START ON 2/21/2024] methIMAzole  5 mg Oral Daily    methylPREDNISolone sodium succinate injection  60 mg Intravenous Q6H    metronidazole  500 mg Intravenous Q8H    pantoprazole  40 mg Oral Daily    sucralfate  1 g Oral QID (AC & HS)     Continuous Infusions:   lactated ringers 75 mL/hr at 02/20/24 0329     PRN Meds:acetaminophen, albuterol, dicyclomine, HYDROcodone-acetaminophen, melatonin, morphine, morphine, ondansetron, prochlorperazine    Objective:  /79   Pulse 100   Temp 98.6 °F (37 °C) (Oral)   Resp (!) 23   Ht 4' 11" (1.499 m)   Wt 65.8 kg (145 lb)   SpO2 (!) 94%   BMI 29.29 kg/m²     Physical Exam:   Physical Exam  Vitals reviewed.   Constitutional:       General: She is not in acute distress.  HENT:      Head: Normocephalic and atraumatic.   Cardiovascular:      Rate and Rhythm: Normal rate and regular rhythm.      Heart sounds: Normal heart sounds.   Pulmonary:      Effort: Pulmonary effort is normal.      Breath sounds: Normal breath sounds.   Abdominal:      General: Bowel sounds are normal. There is no distension.      Palpations: Abdomen is soft.      Tenderness: There is abdominal tenderness.   Musculoskeletal:      Cervical back: Neck supple.      Right lower leg: No edema.      Left lower leg: No edema.   Skin:     Findings: No erythema or rash.   Neurological:      Mental Status: She is alert and oriented to person, place, and time.   Psychiatric:      Comments: Calm and cooperative        Imaging  Imaging Results              CT Abdomen " Pelvis With IV Contrast NO Oral Contrast (Final result)  Result time 02/12/24 10:02:24      Final result by Glen Celestin MD (02/12/24 10:02:24)                   Impression:      Similar appearance of sigmoid diverticulitis with possible small abscess.      Electronically signed by: Glen Celestin  Date:    02/12/2024  Time:    10:02               Narrative:    EXAMINATION:  CT ABDOMEN PELVIS WITH IV CONTRAST    CLINICAL HISTORY:  LLQ abdominal pain;    TECHNIQUE:  Helical acquisition through the abdomen and pelvis with IV contrast.  Three plane reconstructions were provided for review. DLP  mGycm. Automatic exposure control, adjustment of mA/kV or iterative reconstruction technique was used to reduce radiation.    COMPARISON:  Yesterday    FINDINGS:  Mild atelectasis or scarring lung bases.    The liver, spleen, gallbladder, pancreas, adrenals and kidneys are within normal limits.    No bowel obstruction.  Colonic diverticulosis with continued inflammatory changes along the sigmoid.  Again question 1 cm abscess on image 102 series 2.  No free air.    Urinary bladder is unremarkable. No free fluid. Aorta normal in caliber.    No acute osseous findings.                                       Lab Review   No results found for this or any previous visit (from the past 24 hour(s)).            Assessment/Plan:  1. Acute sigmoid diverticulitis with diverticular abscess   2.  History of Graves disease  3.  Morbid obesity     -Continue Cipro and Flagyl   -Severe allergy to Zosyn limits choice of antibiotics  -No fevers and leukopenia noted, follow  -2/17 CT abdomen and pelvis results noted including with no significant change in the inflammatory changes of diverticulitis in the sigmoid colon  -2/12 CT abdomen and pelvis with similar appearance of sigmoid diverticulitis with possible small abscess.  Plan to repeat CT abdomen and pelvis today noted, follow  -History of prior recommendations for surgical intervention with a  colostomy but she declined, apparently seeking other surgical options without having to go through having a colostomy  -Seen by colorectal surgery with inputs noted and and have signed off  -Discussed with patient and nursing staff.

## 2024-02-21 NOTE — TELEPHONE ENCOUNTER
----- Message from Melissa Miranda sent at 2/20/2024 12:08 PM CST -----  Regarding: Seamus saucedo  KANDICE PT       Pt is admitted in the hospital and is requesting a call back regarding her medication Methimazole.   She stated that the hospital is giving her 10 mg a day but Ms. Baltazar has her on 2.5 mg.   She also stated that her heart rate is very high and she is having trouble sleeping.   Please call back @ [phone]

## 2024-02-21 NOTE — TELEPHONE ENCOUNTER
No labs on thyroid was completed in the hospital please clarify if she started the 10mg MMI and how long ago.     I have labs in the system to test her thyroid levels

## 2024-02-21 NOTE — PLAN OF CARE
Problem: Infection  Goal: Absence of Infection Signs and Symptoms  2/20/2024 2247 by Mamie Sagastume LPN  Outcome: Ongoing, Progressing  2/20/2024 2246 by Mamie Sagastume LPN  Outcome: Ongoing, Progressing     Problem: Adult Inpatient Plan of Care  Goal: Plan of Care Review  2/20/2024 2247 by Mamie Sagastume LPN  Outcome: Ongoing, Progressing  2/20/2024 2246 by Mamie Sagastume LPN  Outcome: Ongoing, Progressing  Goal: Patient-Specific Goal (Individualized)  2/20/2024 2247 by Mamie Sagastume LPN  Outcome: Ongoing, Progressing  2/20/2024 2246 by Mamie Sagastume LPN  Outcome: Ongoing, Progressing  Goal: Absence of Hospital-Acquired Illness or Injury  2/20/2024 2247 by Mamie Sagastume LPN  Outcome: Ongoing, Progressing  2/20/2024 2246 by Mamie Sagastume LPN  Outcome: Ongoing, Progressing  Goal: Optimal Comfort and Wellbeing  2/20/2024 2247 by Mamie Sagastume LPN  Outcome: Ongoing, Progressing  2/20/2024 2246 by Mamie Sagastume LPN  Outcome: Ongoing, Progressing  Goal: Readiness for Transition of Care  2/20/2024 2247 by Mamie Sagastume LPN  Outcome: Ongoing, Progressing  2/20/2024 2246 by Mamie Sagastume LPN  Outcome: Ongoing, Progressing     Problem: Nausea and Vomiting  Goal: Fluid and Electrolyte Balance  2/20/2024 2247 by Mamie Sagastume LPN  Outcome: Ongoing, Progressing  2/20/2024 2246 by Mamie Sagastume LPN  Outcome: Ongoing, Progressing

## 2024-02-22 NOTE — TELEPHONE ENCOUNTER
Delaney I called pt who stated she was taking MMI 2.5mg daily since she was told by you to change. When she went into hospital she was being given MMI 10mg for 7 days and began having palpitations and insomnia. She has been discharged and started back on MMI 2.5mg daily and will come in tomorrow for labs

## 2024-03-12 ENCOUNTER — OFFICE VISIT (OUTPATIENT)
Dept: GASTROENTEROLOGY | Facility: CLINIC | Age: 54
End: 2024-03-12
Payer: MEDICAID

## 2024-03-12 VITALS
HEART RATE: 96 BPM | TEMPERATURE: 98 F | BODY MASS INDEX: 29.61 KG/M2 | SYSTOLIC BLOOD PRESSURE: 111 MMHG | WEIGHT: 150.81 LBS | DIASTOLIC BLOOD PRESSURE: 77 MMHG | RESPIRATION RATE: 12 BRPM | HEIGHT: 60 IN

## 2024-03-12 DIAGNOSIS — F17.200 TOBACCO DEPENDENCE: ICD-10-CM

## 2024-03-12 DIAGNOSIS — K21.9 GASTROESOPHAGEAL REFLUX DISEASE WITHOUT ESOPHAGITIS: Chronic | ICD-10-CM

## 2024-03-12 DIAGNOSIS — K57.20 DIVERTICULITIS OF LARGE INTESTINE WITH ABSCESS WITHOUT BLEEDING: Primary | ICD-10-CM

## 2024-03-12 PROCEDURE — 99214 OFFICE O/P EST MOD 30 MIN: CPT | Mod: PBBFAC | Performed by: INTERNAL MEDICINE

## 2024-03-12 RX ORDER — ALBUTEROL SULFATE 90 UG/1
2 AEROSOL, METERED RESPIRATORY (INHALATION) EVERY 6 HOURS PRN
Qty: 18 G | Refills: 1 | Status: SHIPPED | OUTPATIENT
Start: 2024-03-12 | End: 2025-03-12

## 2024-03-12 RX ORDER — OMEPRAZOLE 20 MG/1
20 CAPSULE, DELAYED RELEASE ORAL
Qty: 60 CAPSULE | Refills: 11 | Status: SHIPPED | OUTPATIENT
Start: 2024-03-12 | End: 2025-03-12

## 2024-03-12 RX ORDER — DICYCLOMINE HYDROCHLORIDE 10 MG/1
20 CAPSULE ORAL
COMMUNITY
Start: 2024-03-08 | End: 2024-05-07

## 2024-03-12 RX ORDER — CIPROFLOXACIN 500 MG/1
1 TABLET ORAL 2 TIMES DAILY
COMMUNITY
Start: 2024-03-04

## 2024-03-12 RX ORDER — METRONIDAZOLE 500 MG/1
1 TABLET ORAL 3 TIMES DAILY
COMMUNITY
Start: 2024-03-05

## 2024-03-12 NOTE — PROGRESS NOTES
Gastroenterology and Hepatology        Patient Note         TODAY'S VISIT DATE:  3/12/2024  The patient's last visit with me was on Visit date not found.     PCP: Oswaldo Riggs.      Referring MD:   No ref. provider found    History of Present Illness:  Ms. Moeller is a 53 year old AAF with recurrent diverticulitis complicated by abscess s/p IR drainage 2/2023 (cx growing ESBL E coli), recurrent abscess 7/2023 and 01/2024, asthma here for follow-up.    She is known to me from previous hospitalizations for the above.  She has required IV antibiotics for treatment.  It has been recommended several times that she undergo resection but she has refused.     She reports constant LLQ pain which waxes and wanes.  She has bowel movements every other day which vary from loose to hard pebble stools.  She denies taking any medications for bowel movements.  She has had bright red blood on the tissue and during last admission had some in the stool.  Her appetite is good and weight is stable.     She has been diagnosed with GERD in the past.   She has heartburn and regurgitation.  She gets relief with both symptoms with omeprazole 20 mg BID but has been out of the medications.  She has diagnosis of asthma and has been told that reflux contributes to underlying lung problems.  She does have worsening symptoms with trigger foods.  She continues to smoke    For the past few days she has been having worsening wheezing.  She is out of her albuterol and advair.  Has an appointment with PCP next week.  Not establish currently with pulmonary (failed PFTs per patient in the past).  Was seeing pulmonary in [place] prior to relocating.         Review of Systems   Constitutional:  Negative for appetite change and unexpected weight change.   HENT:  Negative for trouble swallowing.    Respiratory:  Positive for shortness of breath and wheezing. Negative for chest tightness.    Cardiovascular: Negative.     Gastrointestinal:  Positive for abdominal pain, blood in stool, change in bowel habit, constipation, diarrhea and reflux.   Musculoskeletal: Negative.    Neurological: Negative.    Psychiatric/Behavioral: Negative.     All other systems reviewed and are negative.         Medical/Surgical History:   Past Medical History:   Diagnosis Date    Asthma     Diverticulitis     Diverticulosis     GERD (gastroesophageal reflux disease)     Graves disease      History reviewed. No pertinent surgical history.      Family History:   Family History   Problem Relation Age of Onset    Hypothyroidism Mother         Social History:   Social History     Socioeconomic History    Marital status:    Tobacco Use    Smoking status: Every Day     Current packs/day: 0.50     Average packs/day: 0.5 packs/day for 41.2 years (20.6 ttl pk-yrs)     Types: Cigarettes     Start date: 1/1/1983    Smokeless tobacco: Never    Tobacco comments:     Ambulatory referral to Smoking Cessation clinic following hospital discharge.    Substance and Sexual Activity    Alcohol use: Not Currently    Drug use: Never    Sexual activity: Not Currently     Birth control/protection: Abstinence     Comment: States is asexual     Social Determinants of Health     Financial Resource Strain: High Risk (2/12/2024)    Overall Financial Resource Strain (CARDIA)     Difficulty of Paying Living Expenses: Hard   Food Insecurity: Food Insecurity Present (2/12/2024)    Hunger Vital Sign     Worried About Running Out of Food in the Last Year: Sometimes true     Ran Out of Food in the Last Year: Sometimes true   Transportation Needs: No Transportation Needs (2/12/2024)    PRAPARE - Transportation     Lack of Transportation (Medical): No     Lack of Transportation (Non-Medical): No   Physical Activity: Sufficiently Active (2/12/2024)    Exercise Vital Sign     Days of Exercise per Week: 4 days     Minutes of Exercise per Session: 50 min   Stress: Stress Concern Present  (2024)    [employer] - Occupational Stress Questionnaire     Feeling of Stress : Rather much   Social Connections: Socially Isolated (2/15/2024)    Social Connection and Isolation Panel [NHANES]     Frequency of Communication with Friends and Family: More than three times a week     Frequency of Social Gatherings with Friends and Family: More than three times a week     Attends Christianity Services: Never     Active Member of Clubs or Organizations: No     Attends Club or Organization Meetings: Never     Marital Status:    Housing Stability: High Risk (2024)    Housing Stability Vital Sign     Unable to Pay for Housing in the Last Year: Yes     Number of Places Lived in the Last Year: 1     Unstable Housing in the Last Year: No        Review of patient's allergies indicates:   Allergen Reactions    Latex Anaphylaxis and Edema    Zosyn [piperacillin-tazobactam] Anaphylaxis       Current Medications:   Outpatient Medications Marked as Taking for the 3/12/24 encounter (Office Visit) with Heather Lance MD   Medication Sig Dispense Refill    ADVAIR DISKUS 250-50 mcg/dose diskus inhaler SMARTSI Puff(s) By Mouth      ciprofloxacin HCl (CIPRO) 500 MG tablet Take 1 tablet by mouth 2 (two) times daily.      dicyclomine (BENTYL) 10 MG capsule Take 20 mg by mouth 4 (four) times daily before meals and nightly.      fluticasone propionate (FLONASE) 50 mcg/actuation nasal spray 1 spray (50 mcg total) by Each Nostril route daily as needed for Allergies or Rhinitis. 18.2 mL 0    methIMAzole (TAPAZOLE) 5 MG Tab Take a half of a tablet daily to Total 2.5 mg 15 tablet 11    metroNIDAZOLE (FLAGYL) 500 MG tablet Take 1 tablet by mouth 3 (three) times daily.      [DISCONTINUED] albuterol (PROAIR HFA) 90 mcg/actuation inhaler Inhale 2 puffs into the lungs every 6 (six) hours as needed for Wheezing. Rescue 18 g 0    [DISCONTINUED] omeprazole (PRILOSEC) 20 MG capsule Take 20 mg by mouth  "2 (two) times daily before meals.          Vital Signs:  /77 (BP Location: Left arm, Patient Position: Sitting, BP Method: Medium (Automatic))   Pulse 96   Temp 98.3 °F (36.8 °C) (Oral)   Resp 12   Ht 4' 11.5" (1.511 m)   Wt 68.4 kg (150 lb 12.8 oz)   BMI 29.95 kg/m²      Physical Exam  Constitutional:       Appearance: Normal appearance.   HENT:      Head: Normocephalic and atraumatic.      Mouth/Throat:      Mouth: Mucous membranes are moist.   Eyes:      Conjunctiva/sclera: Conjunctivae normal.   Cardiovascular:      Rate and Rhythm: Normal rate and regular rhythm.   Pulmonary:      Effort: Pulmonary effort is normal.      Breath sounds: Normal breath sounds.   Abdominal:      General: Bowel sounds are normal.      Palpations: Abdomen is soft.   Musculoskeletal:         General: Normal range of motion.      Cervical back: Normal range of motion.   Skin:     General: Skin is warm.   Neurological:      General: No focal deficit present.      Mental Status: She is alert and oriented to person, place, and time. Mental status is at baseline.   Psychiatric:         Mood and Affect: Mood normal.         Behavior: Behavior normal.         Labs: Reviewed  WBC   Date Value Ref Range Status   02/17/2024 4.32 (L) 4.50 - 11.50 x10(3)/mcL Final   03/15/2023 10.4 x10(3)/mcL Final   02/19/2023 11.15 3.90 - 12.70 K/uL Final     Hgb   Date Value Ref Range Status   02/17/2024 12.1 12.0 - 16.0 g/dL Final     Hemoglobin   Date Value Ref Range Status   02/19/2023 13.5 12.0 - 16.0 g/dL Final     Hematocrit   Date Value Ref Range Status   02/19/2023 41.7 37.0 - 48.5 % Final     Hct   Date Value Ref Range Status   02/17/2024 36.0 (L) 37.0 - 47.0 % Final     Platelets   Date Value Ref Range Status   02/19/2023 241 150 - 450 K/uL Final     Platelet   Date Value Ref Range Status   02/17/2024 208 130 - 400 x10(3)/mcL Final     MCV   Date Value Ref Range Status   02/17/2024 92.5 80.0 - 94.0 fL Final   02/19/2023 93 82 - 98 fL " Final       Sodium   Date Value Ref Range Status   02/19/2023 144 136 - 145 mmol/L Final     Sodium Level   Date Value Ref Range Status   02/17/2024 143 136 - 145 mmol/L Final     Potassium   Date Value Ref Range Status   02/19/2023 4.3 3.5 - 5.1 mmol/L Final     Potassium Level   Date Value Ref Range Status   02/17/2024 4.0 3.5 - 5.1 mmol/L Final     CO2   Date Value Ref Range Status   02/19/2023 22 (L) 23 - 29 mmol/L Final     Carbon Dioxide   Date Value Ref Range Status   02/17/2024 26 22 - 29 mmol/L Final     BUN   Date Value Ref Range Status   02/19/2023 13 6 - 20 mg/dL Final     Blood Urea Nitrogen   Date Value Ref Range Status   02/17/2024 4.8 (L) 9.8 - 20.1 mg/dL Final     Creatinine   Date Value Ref Range Status   02/17/2024 0.76 0.55 - 1.02 mg/dL Final   02/19/2023 0.8 0.5 - 1.4 mg/dL Final     Total Bilirubin   Date Value Ref Range Status   02/19/2023 0.2 0.1 - 1.0 mg/dL Final     Comment:     For infants and newborns, interpretation of results should be based  on gestational age, weight and in agreement with clinical  observations.    Premature Infant recommended reference ranges:  Up to 24 hours.............<8.0 mg/dL  Up to 48 hours............<12.0 mg/dL  3-5 days..................<15.0 mg/dL  6-29 days.................<15.0 mg/dL       Bilirubin Total   Date Value Ref Range Status   02/17/2024 0.3 <=1.5 mg/dL Final     Bilirubin Direct   Date Value Ref Range Status   05/05/2022 0.2 0.0 - 0.5 mg/dL Final     Bilirubin Indirect   Date Value Ref Range Status   05/05/2022 0.30 0.00 - 0.80 mg/dL Final     Alkaline Phosphatase   Date Value Ref Range Status   02/17/2024 62 40 - 150 unit/L Final   02/19/2023 75 55 - 135 U/L Final     AST   Date Value Ref Range Status   02/19/2023 25 10 - 40 U/L Final     Comment:     Specimen slightly hemolyzed     Aspartate Aminotransferase   Date Value Ref Range Status   02/17/2024 16 5 - 34 unit/L Final     ALT   Date Value Ref Range Status   02/19/2023 46 (H) 10 - 44 U/L  Final     Alanine Aminotransferase   Date Value Ref Range Status   02/17/2024 10 0 - 55 unit/L Final     Protein Total   Date Value Ref Range Status   02/17/2024 6.0 (L) 6.4 - 8.3 gm/dL Final     PT   Date Value Ref Range Status   04/01/2022 13.1 11.5 - 15.3 SECONDS Final     Albumin   Date Value Ref Range Status   02/19/2023 3.3 (L) 3.5 - 5.2 g/dL Final     Albumin Level   Date Value Ref Range Status   02/17/2024 3.1 (L) 3.5 - 5.0 g/dL Final       INR   Date Value Ref Range Status   01/28/2024 1.4 (H) <=1.3 Final   04/01/2022 1.0  Final       Assessment/Plan:    Problem List Items Addressed This Visit          GI    GERD (gastroesophageal reflux disease) (Chronic)     Controlled on omeprazole 20 mg BID         Diverticulitis - Primary (Chronic)     Recurrent diverticulitis complicated by abscess  Recommended multiple times that she undergo resection  Has been hesitant to pursue colonoscopy but amendable at this time  More willing to proceed with surgical resection     Schedule colonoscopy  Refer to CRS            Other    Tobacco dependence

## 2024-03-13 PROBLEM — F17.200 TOBACCO DEPENDENCE: Status: ACTIVE | Noted: 2024-03-13

## 2024-03-13 RX ORDER — SOD SULF/POT CHLORIDE/MAG SULF 1.479 G
12 TABLET ORAL DAILY
Qty: 24 TABLET | Refills: 0 | Status: ON HOLD | OUTPATIENT
Start: 2024-03-13 | End: 2024-04-08 | Stop reason: HOSPADM

## 2024-03-13 RX ORDER — BUPROPION HYDROCHLORIDE 150 MG/1
150 TABLET, EXTENDED RELEASE ORAL 2 TIMES DAILY
Qty: 60 TABLET | Refills: 1 | Status: SHIPPED | OUTPATIENT
Start: 2024-03-13 | End: 2024-05-12

## 2024-03-13 RX ORDER — ONDANSETRON 4 MG/1
TABLET, ORALLY DISINTEGRATING ORAL
Qty: 4 TABLET | Refills: 0 | Status: ON HOLD | OUTPATIENT
Start: 2024-03-13 | End: 2024-04-06 | Stop reason: HOSPADM

## 2024-03-18 ENCOUNTER — PATIENT MESSAGE (OUTPATIENT)
Dept: GASTROENTEROLOGY | Facility: CLINIC | Age: 54
End: 2024-03-18
Payer: MEDICAID

## 2024-03-19 ENCOUNTER — TELEPHONE (OUTPATIENT)
Dept: GASTROENTEROLOGY | Facility: CLINIC | Age: 54
End: 2024-03-19
Payer: MEDICAID

## 2024-03-20 ENCOUNTER — PATIENT MESSAGE (OUTPATIENT)
Dept: GASTROENTEROLOGY | Facility: CLINIC | Age: 54
End: 2024-03-20
Payer: MEDICAID

## 2024-03-20 NOTE — DISCHARGE SUMMARY
[facility]                       [address] [place] [id number]    PATIENT NAME:       AMBERLY WASHINGTON   YOB: 1970  CSN:                [id number]   MRN:                [id number]  ADMIT DATE:         02/12/2024 08:39:00  PHYSICIAN:          Karl Villarreal MD                          DISCHARGE SUMMARY    DATE OF DISCHARGE:  02/21/2024 16:30:00    HOSPITAL COURSE:  A 53-year-old female.  She was admitted by Dr. Reyes on   02/21/2024.  She had abdominal pain and left lower quadrant and below the   umbilicus.  She had acute diverticulitis with an abscess in the sigmoid.  She   was placed on Cipro and metronidazole as well as pantoprazole.  She was placed   on DVT prophylaxis.  She was advanced on her diet as tolerated.  She was placed   on opioid analgesics for pain.  She was followed by Infectious Disease.  She had   an evaluation by Dr. Stewart, colorectal surgeon.  Her labs were remarkable for   an increased white cell and CT of the abdomen showed inflammatory changes   around the sigmoid with a possible 1 cm abscess.  She had a colonoscopy   scheduled for March 7 in [place] and after that she wanted to follow up with   Dr. Stewart for possible removal of the damaged area from the diverticulitis in   the sigmoid with a possible interval colectomy in 6 weeks at [facility].  She was   discharged home in good and stable condition on 21st of February.    FINAL DIAGNOSES:  Remarkable for:  1. Acute diverticulitis with abscess.  2. History of Graves disease.  3. Morbid obesity.  4. She has history of recurrent diverticulitis.  5. She has history of gastroesophageal reflux disease.  6. Asthma.  7. She has mild anemia.  8. History of vitamin D deficiency.  9. Hyperthyroidism.    PLAN:  As above, the patient will continue with antibiotics to complete 7 days.    We will continue with her current home medications.   Please refer to discharge   paper for complete list of the medications, diet, and medical recommendations as   well as followup.  She will follow up with Dr. Stewart and her primary care as   outpatient.        ______________________________  MD JAIRON Hernandez/PHIL  DD:  03/19/2024  Time:  06:29PM  DT:  03/19/2024  Time:  09:55PM  Job #:  [id number]      DISCHARGE SUMMARY

## 2024-04-04 ENCOUNTER — ANESTHESIA EVENT (OUTPATIENT)
Dept: ENDOSCOPY | Facility: HOSPITAL | Age: 54
End: 2024-04-04
Payer: MEDICAID

## 2024-04-04 NOTE — ANESTHESIA PREPROCEDURE EVALUATION
04/04/2024  Niya Moeller is a 54 y.o., female with PMHx of asthma (exacerbation with admission 4/5/24), smoking, GERD, hyperthyroidism presents  for CLN    NO BETA BLOCKER USE    Active Ambulatory Problems     Diagnosis Date Noted    Obesity 06/29/2022    Asthma exacerbation 06/29/2022    GERD (gastroesophageal reflux disease) 06/29/2022    Diverticulitis 07/06/2022    Colitis     Moderate persistent asthma with (acute) exacerbation 02/11/2023    Hyperthyroidism 02/12/2023    Thyroiditis 02/12/2023    Diastolic dysfunction, left ventricle 02/13/2023    Tachycardia 02/14/2023    Other chest pain 02/14/2023    Hypoglycemia 02/16/2023    Thrombocytopathia 02/18/2023    Diverticulitis of intestine with abscess 02/19/2023    Asthma 02/19/2023    Acute diverticulitis 05/01/2023    Allergic conjunctivitis and rhinitis 08/16/2023    Macrocytic anemia 08/17/2023    Superficial vein thrombosis 09/15/2023    Tobacco dependence 03/13/2024     Resolved Ambulatory Problems     Diagnosis Date Noted    Acute abdominal pain in left flank 02/19/2023    Acute pulmonary embolism 09/15/2023     Past Medical History:   Diagnosis Date    Diverticulosis     Graves disease      Pre-op Assessment    I have reviewed the NPO Status.      Review of Systems  Anesthesia Hx:  No problems with previous Anesthesia                Social:  Smoker       Cardiovascular:  Cardiovascular Normal                                            Pulmonary:    Asthma severe                   Renal/:  Renal/ Normal                 Hepatic/GI:     GERD, well controlled             Neurological:  Neurology Normal                                      Endocrine:    Hyperthyroidism           Vitals:    03/25/24 1139 04/08/24 0817   BP:  91/68   BP Location:  Left arm   Patient Position:  Lying   Pulse:  89   Resp:  18   Temp:  37.1 °C (98.8 °F)   TempSrc:   "Oral   SpO2:  95%   Weight: 68 kg (150 lb) 68.7 kg (151 lb 6.4 oz)   Height: 4' 11" (1.499 m) 4' 11" (1.499 m)         Physical Exam  General: Alert, Cooperative and Well nourished    Airway:  Mallampati: II   Mouth Opening: Normal  TM Distance: Normal  Tongue: Normal  Neck ROM: Normal ROM    Dental:  Intact    Chest/Lungs:  expiratory wheezes    Heart:  Rate: Normal  Rhythm: Regular Rhythm  Sounds: Normal      Lab Results   Component Value Date    WBC 3.61 (L) 04/06/2024    HGB 12.7 04/06/2024    HCT 38.1 04/06/2024    MCV 95.3 (H) 04/06/2024     04/06/2024       CMP  Sodium   Date Value Ref Range Status   02/19/2023 144 136 - 145 mmol/L Final     Sodium Level   Date Value Ref Range Status   04/06/2024 140 136 - 145 mmol/L Final     Potassium   Date Value Ref Range Status   02/19/2023 4.3 3.5 - 5.1 mmol/L Final     Potassium Level   Date Value Ref Range Status   04/06/2024 3.9 3.5 - 5.1 mmol/L Final     Chloride   Date Value Ref Range Status   02/19/2023 108 95 - 110 mmol/L Final     CO2   Date Value Ref Range Status   02/19/2023 22 (L) 23 - 29 mmol/L Final     Carbon Dioxide   Date Value Ref Range Status   04/06/2024 23 22 - 29 mmol/L Final     Glucose   Date Value Ref Range Status   02/19/2023 97 70 - 110 mg/dL Final     BUN   Date Value Ref Range Status   02/19/2023 13 6 - 20 mg/dL Final     Blood Urea Nitrogen   Date Value Ref Range Status   04/06/2024 8.7 (L) 9.8 - 20.1 mg/dL Final     Creatinine   Date Value Ref Range Status   04/06/2024 0.72 0.55 - 1.02 mg/dL Final   02/19/2023 0.8 0.5 - 1.4 mg/dL Final     Calcium   Date Value Ref Range Status   02/19/2023 9.6 8.7 - 10.5 mg/dL Final     Calcium Level Total   Date Value Ref Range Status   04/06/2024 9.7 8.4 - 10.2 mg/dL Final     Total Protein   Date Value Ref Range Status   02/19/2023 7.5 6.0 - 8.4 g/dL Final     Albumin   Date Value Ref Range Status   02/19/2023 3.3 (L) 3.5 - 5.2 g/dL Final     Albumin Level   Date Value Ref Range Status "   04/06/2024 3.3 (L) 3.5 - 5.0 g/dL Final     Total Bilirubin   Date Value Ref Range Status   02/19/2023 0.2 0.1 - 1.0 mg/dL Final     Comment:     For infants and newborns, interpretation of results should be based  on gestational age, weight and in agreement with clinical  observations.    Premature Infant recommended reference ranges:  Up to 24 hours.............<8.0 mg/dL  Up to 48 hours............<12.0 mg/dL  3-5 days..................<15.0 mg/dL  6-29 days.................<15.0 mg/dL       Bilirubin Total   Date Value Ref Range Status   04/06/2024 0.2 <=1.5 mg/dL Final     Alkaline Phosphatase   Date Value Ref Range Status   04/06/2024 63 40 - 150 unit/L Final   02/19/2023 75 55 - 135 U/L Final     AST   Date Value Ref Range Status   02/19/2023 25 10 - 40 U/L Final     Comment:     Specimen slightly hemolyzed     Aspartate Aminotransferase   Date Value Ref Range Status   04/06/2024 13 5 - 34 unit/L Final     ALT   Date Value Ref Range Status   02/19/2023 46 (H) 10 - 44 U/L Final     Alanine Aminotransferase   Date Value Ref Range Status   04/06/2024 17 0 - 55 unit/L Final     Anion Gap   Date Value Ref Range Status   02/19/2023 14 8 - 16 mmol/L Final     eGFR   Date Value Ref Range Status   04/06/2024 >60 mls/min/1.73/m2 Final   02/19/2023 >60 >60 mL/min/1.73 m^2 Final             Anesthesia Plan  Type of Anesthesia, risks & benefits discussed:    Anesthesia Type: Gen Natural Airway  Intra-op Monitoring Plan: Standard ASA Monitors  Post Op Pain Control Plan: IV/PO Opioids PRN  (medical reason for not using multimodal pain management)  Induction:  IV  Informed Consent: Informed consent signed with the Patient and all parties understand the risks and agree with anesthesia plan.  All questions answered. Patient consented to blood products? No  ASA Score: 4  Day of Surgery Review of History & Physical: H&P Update referred to the surgeon/provider.  Anesthesia Plan Notes: Pt with asthma exacerbation requiring  hospital admission over the weekend.  Currently on steroids. Made aware of increased risk of proceeding with recent asthma exacerbation including decreased oxygen saturation, bronchospasm, intubation, and inability to ventilate. Pt would like to proceed since she has already done a colon prep.    Ready For Surgery From Anesthesia Perspective.     .

## 2024-04-05 ENCOUNTER — HOSPITAL ENCOUNTER (OUTPATIENT)
Facility: HOSPITAL | Age: 54
Discharge: HOME | End: 2024-04-06
Attending: STUDENT IN AN ORGANIZED HEALTH CARE EDUCATION/TRAINING PROGRAM | Admitting: STUDENT IN AN ORGANIZED HEALTH CARE EDUCATION/TRAINING PROGRAM
Payer: MEDICAID

## 2024-04-05 DIAGNOSIS — R06.02 SOB (SHORTNESS OF BREATH): ICD-10-CM

## 2024-04-05 DIAGNOSIS — J45.41 MODERATE PERSISTENT ASTHMA WITH ACUTE EXACERBATION: Primary | ICD-10-CM

## 2024-04-05 LAB
ALBUMIN SERPL-MCNC: 3.6 G/DL (ref 3.5–5)
ALBUMIN/GLOB SERPL: 0.9 RATIO (ref 1.1–2)
ALP SERPL-CCNC: 68 UNIT/L (ref 40–150)
ALT SERPL-CCNC: 19 UNIT/L (ref 0–55)
AST SERPL-CCNC: 18 UNIT/L (ref 5–34)
BASOPHILS # BLD AUTO: 0.01 X10(3)/MCL
BASOPHILS NFR BLD AUTO: 0.2 %
BILIRUB SERPL-MCNC: 0.3 MG/DL
BNP BLD-MCNC: <10 PG/ML
BUN SERPL-MCNC: 8.1 MG/DL (ref 9.8–20.1)
CALCIUM SERPL-MCNC: 10.1 MG/DL (ref 8.4–10.2)
CHLORIDE SERPL-SCNC: 109 MMOL/L (ref 98–107)
CK MB SERPL-MCNC: 1 NG/ML
CK SERPL-CCNC: 63 U/L (ref 29–168)
CO2 SERPL-SCNC: 22 MMOL/L (ref 22–29)
CREAT SERPL-MCNC: 0.86 MG/DL (ref 0.55–1.02)
D DIMER PPP IA.FEU-MCNC: 0.81 UG/ML FEU (ref 0–0.5)
EOSINOPHIL # BLD AUTO: 0.1 X10(3)/MCL (ref 0–0.9)
EOSINOPHIL NFR BLD AUTO: 2.1 %
ERYTHROCYTE [DISTWIDTH] IN BLOOD BY AUTOMATED COUNT: 14 % (ref 11.5–17)
FLUAV AG UPPER RESP QL IA.RAPID: NOT DETECTED
FLUBV AG UPPER RESP QL IA.RAPID: NOT DETECTED
GFR SERPLBLD CREATININE-BSD FMLA CKD-EPI: >60 MLS/MIN/1.73/M2
GLOBULIN SER-MCNC: 3.8 GM/DL (ref 2.4–3.5)
GLUCOSE SERPL-MCNC: 115 MG/DL (ref 74–100)
HCT VFR BLD AUTO: 43.3 % (ref 37–47)
HGB BLD-MCNC: 14.2 G/DL (ref 12–16)
HOLD SPECIMEN: NORMAL
IMM GRANULOCYTES # BLD AUTO: 0.01 X10(3)/MCL (ref 0–0.04)
IMM GRANULOCYTES NFR BLD AUTO: 0.2 %
LYMPHOCYTES # BLD AUTO: 1.39 X10(3)/MCL (ref 0.6–4.6)
LYMPHOCYTES NFR BLD AUTO: 29.7 %
MAGNESIUM SERPL-MCNC: 1.9 MG/DL (ref 1.6–2.6)
MCH RBC QN AUTO: 32 PG (ref 27–31)
MCHC RBC AUTO-ENTMCNC: 32.8 G/DL (ref 33–36)
MCV RBC AUTO: 97.5 FL (ref 80–94)
MONOCYTES # BLD AUTO: 0.29 X10(3)/MCL (ref 0.1–1.3)
MONOCYTES NFR BLD AUTO: 6.2 %
NEUTROPHILS # BLD AUTO: 2.88 X10(3)/MCL (ref 2.1–9.2)
NEUTROPHILS NFR BLD AUTO: 61.6 %
NRBC BLD AUTO-RTO: 0 %
PLATELET # BLD AUTO: 201 X10(3)/MCL (ref 130–400)
PMV BLD AUTO: 11.1 FL (ref 7.4–10.4)
POTASSIUM SERPL-SCNC: 4.2 MMOL/L (ref 3.5–5.1)
PROT SERPL-MCNC: 7.4 GM/DL (ref 6.4–8.3)
RBC # BLD AUTO: 4.44 X10(6)/MCL (ref 4.2–5.4)
SARS-COV-2 RNA RESP QL NAA+PROBE: NOT DETECTED
SODIUM SERPL-SCNC: 139 MMOL/L (ref 136–145)
TROPONIN I SERPL-MCNC: <0.01 NG/ML (ref 0–0.04)
WBC # SPEC AUTO: 4.68 X10(3)/MCL (ref 4.5–11.5)

## 2024-04-05 PROCEDURE — 84484 ASSAY OF TROPONIN QUANT: CPT | Performed by: NURSE PRACTITIONER

## 2024-04-05 PROCEDURE — 25000003 PHARM REV CODE 250

## 2024-04-05 PROCEDURE — 63600175 PHARM REV CODE 636 W HCPCS: Performed by: NURSE PRACTITIONER

## 2024-04-05 PROCEDURE — 82550 ASSAY OF CK (CPK): CPT | Performed by: NURSE PRACTITIONER

## 2024-04-05 PROCEDURE — 82553 CREATINE MB FRACTION: CPT | Performed by: NURSE PRACTITIONER

## 2024-04-05 PROCEDURE — 96372 THER/PROPH/DIAG INJ SC/IM: CPT

## 2024-04-05 PROCEDURE — 85379 FIBRIN DEGRADATION QUANT: CPT | Performed by: NURSE PRACTITIONER

## 2024-04-05 PROCEDURE — 94640 AIRWAY INHALATION TREATMENT: CPT

## 2024-04-05 PROCEDURE — 85025 COMPLETE CBC W/AUTO DIFF WBC: CPT | Performed by: NURSE PRACTITIONER

## 2024-04-05 PROCEDURE — G0378 HOSPITAL OBSERVATION PER HR: HCPCS

## 2024-04-05 PROCEDURE — 25000242 PHARM REV CODE 250 ALT 637 W/ HCPCS: Performed by: STUDENT IN AN ORGANIZED HEALTH CARE EDUCATION/TRAINING PROGRAM

## 2024-04-05 PROCEDURE — 80053 COMPREHEN METABOLIC PANEL: CPT | Performed by: NURSE PRACTITIONER

## 2024-04-05 PROCEDURE — 99285 EMERGENCY DEPT VISIT HI MDM: CPT | Mod: 25

## 2024-04-05 PROCEDURE — 96372 THER/PROPH/DIAG INJ SC/IM: CPT | Performed by: NURSE PRACTITIONER

## 2024-04-05 PROCEDURE — 25000242 PHARM REV CODE 250 ALT 637 W/ HCPCS: Performed by: NURSE PRACTITIONER

## 2024-04-05 PROCEDURE — 25000242 PHARM REV CODE 250 ALT 637 W/ HCPCS

## 2024-04-05 PROCEDURE — 0240U COVID/FLU A&B PCR: CPT | Performed by: NURSE PRACTITIONER

## 2024-04-05 PROCEDURE — 83735 ASSAY OF MAGNESIUM: CPT | Performed by: NURSE PRACTITIONER

## 2024-04-05 PROCEDURE — 83880 ASSAY OF NATRIURETIC PEPTIDE: CPT | Performed by: NURSE PRACTITIONER

## 2024-04-05 PROCEDURE — 94640 AIRWAY INHALATION TREATMENT: CPT | Mod: XB

## 2024-04-05 PROCEDURE — 63600175 PHARM REV CODE 636 W HCPCS

## 2024-04-05 PROCEDURE — 93005 ELECTROCARDIOGRAM TRACING: CPT

## 2024-04-05 RX ORDER — IPRATROPIUM BROMIDE AND ALBUTEROL SULFATE 2.5; .5 MG/3ML; MG/3ML
3 SOLUTION RESPIRATORY (INHALATION)
Status: DISCONTINUED | OUTPATIENT
Start: 2024-04-05 | End: 2024-04-06 | Stop reason: HOSPADM

## 2024-04-05 RX ORDER — BUPROPION HYDROCHLORIDE 150 MG/1
150 TABLET, EXTENDED RELEASE ORAL 2 TIMES DAILY
Status: DISCONTINUED | OUTPATIENT
Start: 2024-04-05 | End: 2024-04-06 | Stop reason: HOSPADM

## 2024-04-05 RX ORDER — IPRATROPIUM BROMIDE AND ALBUTEROL SULFATE 2.5; .5 MG/3ML; MG/3ML
9 SOLUTION RESPIRATORY (INHALATION)
Status: COMPLETED | OUTPATIENT
Start: 2024-04-05 | End: 2024-04-05

## 2024-04-05 RX ORDER — ENOXAPARIN SODIUM 100 MG/ML
40 INJECTION SUBCUTANEOUS EVERY 24 HOURS
Status: DISCONTINUED | OUTPATIENT
Start: 2024-04-05 | End: 2024-04-06 | Stop reason: HOSPADM

## 2024-04-05 RX ORDER — TERBUTALINE SULFATE 1 MG/ML
0.25 INJECTION SUBCUTANEOUS
Status: COMPLETED | OUTPATIENT
Start: 2024-04-05 | End: 2024-04-05

## 2024-04-05 RX ORDER — TALC
6 POWDER (GRAM) TOPICAL NIGHTLY PRN
Status: DISCONTINUED | OUTPATIENT
Start: 2024-04-05 | End: 2024-04-06 | Stop reason: HOSPADM

## 2024-04-05 RX ORDER — PREDNISONE 20 MG/1
40 TABLET ORAL DAILY
Status: DISCONTINUED | OUTPATIENT
Start: 2024-04-06 | End: 2024-04-06 | Stop reason: HOSPADM

## 2024-04-05 RX ORDER — FAMOTIDINE 20 MG/1
20 TABLET, FILM COATED ORAL 2 TIMES DAILY
Status: DISCONTINUED | OUTPATIENT
Start: 2024-04-05 | End: 2024-04-06 | Stop reason: HOSPADM

## 2024-04-05 RX ORDER — MONTELUKAST SODIUM 5 MG/1
5 TABLET, CHEWABLE ORAL DAILY
Status: DISCONTINUED | OUTPATIENT
Start: 2024-04-06 | End: 2024-04-06 | Stop reason: HOSPADM

## 2024-04-05 RX ORDER — ALBUTEROL SULFATE 90 UG/1
2 AEROSOL, METERED RESPIRATORY (INHALATION) EVERY 6 HOURS PRN
Status: DISCONTINUED | OUTPATIENT
Start: 2024-04-05 | End: 2024-04-05

## 2024-04-05 RX ORDER — BUDESONIDE 0.5 MG/2ML
0.25 INHALANT ORAL DAILY
Status: DISCONTINUED | OUTPATIENT
Start: 2024-04-05 | End: 2024-04-06 | Stop reason: HOSPADM

## 2024-04-05 RX ORDER — FLUTICASONE FUROATE AND VILANTEROL 100; 25 UG/1; UG/1
1 POWDER RESPIRATORY (INHALATION) DAILY
Status: DISCONTINUED | OUTPATIENT
Start: 2024-04-06 | End: 2024-04-06 | Stop reason: HOSPADM

## 2024-04-05 RX ORDER — SODIUM CHLORIDE 0.9 % (FLUSH) 0.9 %
10 SYRINGE (ML) INJECTION
Status: DISCONTINUED | OUTPATIENT
Start: 2024-04-05 | End: 2024-04-06 | Stop reason: HOSPADM

## 2024-04-05 RX ORDER — CIPROFLOXACIN 500 MG/1
500 TABLET ORAL 2 TIMES DAILY
Status: DISCONTINUED | OUTPATIENT
Start: 2024-04-05 | End: 2024-04-05

## 2024-04-05 RX ORDER — IPRATROPIUM BROMIDE AND ALBUTEROL SULFATE 2.5; .5 MG/3ML; MG/3ML
3 SOLUTION RESPIRATORY (INHALATION) EVERY 4 HOURS PRN
Status: DISCONTINUED | OUTPATIENT
Start: 2024-04-05 | End: 2024-04-06 | Stop reason: HOSPADM

## 2024-04-05 RX ADMIN — IPRATROPIUM BROMIDE AND ALBUTEROL SULFATE 9 ML: .5; 3 SOLUTION RESPIRATORY (INHALATION) at 03:04

## 2024-04-05 RX ADMIN — Medication 6 MG: at 09:04

## 2024-04-05 RX ADMIN — FAMOTIDINE 20 MG: 20 TABLET, FILM COATED ORAL at 10:04

## 2024-04-05 RX ADMIN — TERBUTALINE SULFATE 0.25 MG: 1 INJECTION SUBCUTANEOUS at 04:04

## 2024-04-05 RX ADMIN — BUPROPION HYDROCHLORIDE 150 MG: 150 TABLET, EXTENDED RELEASE ORAL at 10:04

## 2024-04-05 RX ADMIN — BUDESONIDE INHALATION 0.25 MG: 0.5 SUSPENSION RESPIRATORY (INHALATION) at 06:04

## 2024-04-05 RX ADMIN — IPRATROPIUM BROMIDE AND ALBUTEROL SULFATE 3 ML: 2.5; .5 SOLUTION RESPIRATORY (INHALATION) at 11:04

## 2024-04-05 RX ADMIN — ENOXAPARIN SODIUM 40 MG: 40 INJECTION SUBCUTANEOUS at 06:04

## 2024-04-05 NOTE — ED PROVIDER NOTES
Encounter Date: 4/5/2024       History     Chief Complaint   Patient presents with    Shortness of Breath    Wheezing     C/o sob, wheezing . Ems gave 125 solumedrol and duoneb tx upon arrival.      Pt is a 54 y.o. female who presents to the [facility] ED complaining of SOB and wheezing. Hx of asthma. Reports symptoms have been present x 2 days, worsening this AM. Pt given a nebulizer treatment and a steroid injection in route to the ED per EMS. Pt admits her symptoms have slightly improved but is still actively wheezing. Denies chest pain, nausea, vomiting, abdominal pain, or loss of bowel or bladder control.      Review of patient's allergies indicates:   Allergen Reactions    Latex Anaphylaxis and Edema    Zosyn [piperacillin-tazobactam] Anaphylaxis     Past Medical History:   Diagnosis Date    Asthma     Diverticulitis     Diverticulosis     GERD (gastroesophageal reflux disease)     Graves disease      History reviewed. No pertinent surgical history.  Family History   Problem Relation Age of Onset    Hypothyroidism Mother      Social History     Tobacco Use    Smoking status: Every Day     Current packs/day: 0.50     Average packs/day: 0.5 packs/day for 41.3 years (20.6 ttl pk-yrs)     Types: Cigarettes     Start date: 1/1/1983    Smokeless tobacco: Never    Tobacco comments:     Ambulatory referral to Smoking Cessation clinic following hospital discharge.    Substance Use Topics    Alcohol use: Not Currently    Drug use: Never     Review of Systems   Constitutional:  Negative for chills, diaphoresis, fatigue and fever.   HENT:  Negative for facial swelling, rhinorrhea, sinus pressure, sinus pain, sore throat and trouble swallowing.    Respiratory:  Positive for shortness of breath and wheezing. Negative for cough and chest tightness.    Cardiovascular:  Negative for chest pain, palpitations and leg swelling.   Gastrointestinal:  Negative for abdominal pain, diarrhea, nausea and vomiting.   Genitourinary:  Negative  for dysuria, flank pain, frequency, hematuria and urgency.   Musculoskeletal:  Negative for arthralgias, back pain, joint swelling and myalgias.   Skin:  Negative for color change and rash.   Neurological:  Negative for dizziness, syncope, weakness and light-headedness.   Hematological:  Does not bruise/bleed easily.   All other systems reviewed and are negative.      Physical Exam     Initial Vitals [04/05/24 1432]   BP Pulse Resp Temp SpO2   123/73 104 (!) 24 97.7 °F (36.5 °C) 95 %      MAP       --         Physical Exam    Nursing note and vitals reviewed.  Constitutional: She appears well-developed and well-nourished.   HENT:   Head: Normocephalic and atraumatic.   Nose: Nose normal.   Mouth/Throat: Oropharynx is clear and moist.   Eyes: Conjunctivae and EOM are normal. Pupils are equal, round, and reactive to light.   Neck: Neck supple.   Normal range of motion.  Cardiovascular:  Normal rate, regular rhythm, normal heart sounds and intact distal pulses.           Pulmonary/Chest: Effort normal. Tachypnea noted. No respiratory distress. She has wheezes in the right middle field and the left middle field. She has no rhonchi. She has no rales. She exhibits no tenderness.   Dry cough present.     Abdominal: Abdomen is soft and flat. Bowel sounds are normal. She exhibits no distension. There is no abdominal tenderness. There is no rebound, no guarding, no tenderness at McBurney's point and negative Parnell's sign. negative psoas sign  Musculoskeletal:         General: Normal range of motion.      Cervical back: Normal range of motion and neck supple.     Neurological: She is alert and oriented to person, place, and time. She has normal strength and normal reflexes.   Skin: Skin is warm and dry. Capillary refill takes less than 2 seconds.   Psychiatric: She has a normal mood and affect. Her speech is normal and behavior is normal. Judgment and thought content normal.         ED Course   Procedures  Labs Reviewed    COMPREHENSIVE METABOLIC PANEL - Abnormal; Notable for the following components:       Result Value    Chloride 109 (*)     Glucose Level 115 (*)     Blood Urea Nitrogen 8.1 (*)     Globulin 3.8 (*)     Albumin/Globulin Ratio 0.9 (*)     All other components within normal limits   CBC WITH DIFFERENTIAL - Abnormal; Notable for the following components:    MCV 97.5 (*)     MCH 32.0 (*)     MCHC 32.8 (*)     MPV 11.1 (*)     All other components within normal limits   CK-MB - Normal   CK - Normal   B-TYPE NATRIURETIC PEPTIDE - Normal   TROPONIN I - Normal   COVID/FLU A&B PCR - Normal    Narrative:     The Xpert Xpress SARS-CoV-2/FLU/RSV plus is a rapid, multiplexed real-time PCR test intended for the simultaneous qualitative detection and differentiation of SARS-CoV-2, Influenza A, Influenza B, and respiratory syncytial virus (RSV) viral RNA in either nasopharyngeal swab or nasal swab specimens.         CBC W/ AUTO DIFFERENTIAL    Narrative:     The following orders were created for panel order CBC Auto Differential.  Procedure                               Abnormality         Status                     ---------                               -----------         ------                     CBC with Differential[[id number]]       Abnormal            Final result                 Please view results for these tests on the individual orders.   EXTRA TUBES    Narrative:     The following orders were created for panel order EXTRA TUBES.  Procedure                               Abnormality         Status                     ---------                               -----------         ------                     Lavender Top Hold[[id number]]                               In process                   Please view results for these tests on the individual orders.   LAVENDER TOP HOLD   D DIMER, QUANTITATIVE   MAGNESIUM   EXTRA TUBES    Narrative:     The following orders were created for panel order EXTRA TUBES.  Procedure                                Abnormality         Status                     ---------                               -----------         ------                     Light Blue Top Hold[[id number]]                                                        Light Green Top Hold[[id number]]                                                         Please view results for these tests on the individual orders.   LIGHT BLUE TOP HOLD   LIGHT GREEN TOP HOLD          Imaging Results              X-Ray Chest PA And Lateral (Final result)  Result time 04/05/24 16:12:41      Final result by Esequiel Jimenez MD (04/05/24 16:12:41)                   Impression:      No radiographic evidence of an acute cardiopulmonary process.      Electronically signed by: Esequiel Jimenez  Date:    04/05/2024  Time:    16:12               Narrative:    EXAMINATION:  XR CHEST PA AND LATERAL    CLINICAL HISTORY:  Shortness of breath    COMPARISON:  01/27/2024    FINDINGS:  PA and lateral chest radiographs are provided for evaluation.    Cardiac silhouette is normal in size.    There is no focal consolidation, pneumothorax or pleural effusion.    No acute osseous findings.                                       Medications   terbutaline injection 0.25 mg (has no administration in time range)   albuterol-ipratropium 2.5 mg-0.5 mg/3 mL nebulizer solution 9 mL (9 mLs Nebulization Given 4/5/24 1688)     Medical Decision Making  Amount and/or Complexity of Data Reviewed  Labs: ordered.  Radiology: ordered.    Risk  Prescription drug management.               ED Course as of 04/05/24 1632   Fri Apr 05, 2024   1605 Well' s Criteria for PE    Clinical signs of DVT                                                                       No - 0  PE is #1 diagnosis or equally likely                                                  No - 0  Heart rate > 100                                                                              Yes - 1.5  Previously objectively diagnosed PE or  "DVT                                   Yes - 1.5  Hemoptysis                                                                                      No - 0  Malignancy w/ treatment within 6 months or palliative                     No - 0   Immobilization at least 3 days OR surgery in the previous 4 weeks  No - 0    3.0 points  Moderate risk group: 16.2% chance of PE in an ED population.    Another study assigned scores = 4 as "PE Unlikely" and had a 3% incidence of PE.   [JA]   1629 I have discussed pt status with Dr. Bonilla, ED attending, physician recommends evaluation for pt admission due to persistent wheezing and SOB post neb treatments and steroid administration. IM On Call notified and will evaluate pt at bedside. Discussed plan with pt. Understanding and agreement voiced. [JA]      ED Course User Index  [JA] Glen Alvarado Jr., JUVENAL                           Clinical Impression:  Final diagnoses:  [R06.02] SOB (shortness of breath)  [J45.41] Moderate persistent asthma with acute exacerbation (Primary)          ED Disposition Condition    Observation Stable                Glen Alvarado Jr., JUVENAL  04/05/24 1633    "

## 2024-04-05 NOTE — H&P
[facility] Wards History & Physical Note     Resident Team: [facility] Medicine List 1  Attending Physician: Sonia Olivarez*  Resident: DO Alek  Intern: MD Sindi     Date of Admit: 4/5/2024    Chief Complaint     Shortness of Breath and Wheezing (C/o sob, wheezing . Ems gave 125 solumedrol and duoneb tx upon arrival. )      Subjective:      History of Present Illness:  Niya Moeller is a 54 y.o. female with a history of GERD, Graves, diverticulosis, asthma who presented to ED on 4/5/2024  with a primary complaint of shortness of breath and wheezing.  Patient arrived via EMS, was given Solu-Medrol 125 and DuoNeb in route.  Patient states that starting Wednesday she noticed worsening shortness of breath in wheezing that is worse with ambulation.  States that this morning it was significantly worse so she called EMS.  She is prescribed Advair and rescue inhaler PRN.  States that she ran out of her Advair on Tuesday, has been using her rescue inhaler with no improvement.  Endorses a dry cough and chest tightness secondary to dyspnea.  Denies any recent sick contacts.  Denies headaches, dizziness, abdominal pain, nausea, vomiting, diarrhea.    Of note, she was discharged on 02/21/2024 where she was admitted for acute diverticulitis with abscess and sigmoid colon.  At that time she was discharged with ciprofloxacin and metronidazole.  Patient states that she did not take the antibiotics because she did not like the way they made her feel.  Very adamant on not taking Flagyl or ciprofloxacin.  Currently no GI symptoms present.  She has an appointment scheduled with Dr. Chawla on 04/08/2024 for colonoscopy.    In the ED, /73, pulse 104, respirations 24, temperature 97.7° F, oxygen saturation 95% on room air.  Chest x-ray unremarkable.  CBC, CMP unremarkable, troponin negative.  Patient given terbutaline and DuoNebs with persistent wheezing.  Internal medicine consulted for asthma exacerbation.      Past  Medical History:  Past Medical History:   Diagnosis Date    Asthma     Diverticulitis     Diverticulosis     GERD (gastroesophageal reflux disease)     Graves disease        Past Surgical History:  History reviewed. No pertinent surgical history.    Family History:  Family History   Problem Relation Age of Onset    Hypothyroidism Mother        Social History:  Social History     Tobacco Use    Smoking status: Every Day     Current packs/day: 0.50     Average packs/day: 0.5 packs/day for 41.3 years (20.6 ttl pk-yrs)     Types: Cigarettes     Start date: 1983    Smokeless tobacco: Never    Tobacco comments:     Ambulatory referral to Smoking Cessation clinic following hospital discharge.    Substance Use Topics    Alcohol use: Not Currently    Drug use: Never       Allergies:  Review of patient's allergies indicates:   Allergen Reactions    Latex Anaphylaxis and Edema    Zosyn [piperacillin-tazobactam] Anaphylaxis       Home Medications:  Prior to Admission medications    Medication Sig Start Date End Date Taking? Authorizing Provider   ADVAIR DISKUS 250-50 mcg/dose diskus inhaler SMARTSI Puff(s) By Mouth 10/16/23   Provider, Historical   albuterol (PROAIR HFA) 90 mcg/actuation inhaler Inhale 2 puffs into the lungs every 6 (six) hours as needed for Wheezing. Rescue 3/12/24 3/12/25  Heather Lance MD   buPROPion (WELLBUTRIN SR) 150 MG TBSR 12 hr tablet Take 1 tablet (150 mg total) by mouth 2 (two) times daily. For smoking cessation.  Discontinue if still smoking. 3/13/24 5/12/24  Heather Lance MD   ciprofloxacin HCl (CIPRO) 500 MG tablet Take 1 tablet by mouth 2 (two) times daily. 3/4/24   Provider, Historical   dicyclomine (BENTYL) 10 MG capsule Take 20 mg by mouth 4 (four) times daily before meals and nightly. 3/8/24 5/7/24  Provider, Historical   fluticasone propionate (FLONASE) 50 mcg/actuation nasal spray 1 spray (50 mcg total) by Each Nostril route daily as needed for Allergies or Rhinitis.  "23   Yeyo Andrews MD   methIMAzole (TAPAZOLE) 5 MG Tab Take a half of a tablet daily to Total 2.5 mg 23   Delaney Morris NP   metroNIDAZOLE (FLAGYL) 500 MG tablet Take 1 tablet by mouth 3 (three) times daily. 3/5/24   Provider, Historical   omeprazole (PRILOSEC) 20 MG capsule Take 1 capsule (20 mg total) by mouth 2 (two) times daily before meals. 3/12/24 3/12/25  Heather Lance MD   ondansetron (ZOFRAN-ODT) 4 MG TbDL Take 1 30 mins prior to each dose of colon prep (Sutab)  Patient not taking: Reported on 3/25/2024 3/13/24   Heather Lance MD   sod sulf-pot chloride-mag sulf (SUTAB) 1.479-0.188- 0.225 gram tablet Take 12 tablets by mouth once daily. Take according to package instructions with indicated amount of water. 3/13/24   Heather Lance MD         Review of Systems:  Constitutional: no fever/chills  EENT: no sore throat, ear pain, sinus pain/congestion, nasal congestion/drainage  Respiratory: + dry cough, + wheezing, + shortness of breath  Cardiovascular: + chest tightness, no palpitations, no edema  Gastrointestinal: no nausea, vomiting, or diarrhea. No abdominal pain  Genitourinary: no dysuria, no urinary frequency or urgency, no hematuria  Integumentary: no skin rash or abnormal lesion  Neurologic: no headache, no dizziness, no weakness or numbness           Objective:   Last 24 Hour Vital Signs:  BP  Min: 123/73  Max: 123/73  Temp  Av.7 °F (36.5 °C)  Min: 97.7 °F (36.5 °C)  Max: 97.7 °F (36.5 °C)  Pulse  Av.5  Min: 103  Max: 104  Resp  Av  Min: 22  Max: 24  SpO2  Av %  Min: 95 %  Max: 95 %  Height  Av' 11" (149.9 cm)  Min: 4' 11" (149.9 cm)  Max: 4' 11" (149.9 cm)  Weight  Av kg (149 lb 14.6 oz)  Min: 68 kg (149 lb 14.6 oz)  Max: 68 kg (149 lb 14.6 oz)  Body mass index is 30.28 kg/m².  No intake/output data recorded.    Physical Examination:  General: well-developed, well-nourished, no acute distress  Eye: PERRLA, EOMI, clear conjunctiva, " eyelids normal  Head: normocephalic and atraumatic  Neck: full range of motion, supple  Respiratory:  Tachypneic.  Diffuse wheezing bilaterally, no rales noted.   Cardiovascular:  Tachycardic, regular rhythm without murmurs. No JVD.   Gastrointestinal: soft, non-tender, non-distended with normal bowel sounds in all four quadrants. No masses palpated  Musculoskeletal: full range of motion of all extremities without limitation or discomfort  Integumentary: no rashes or skin lesions present, no erythema  Neurologic: AAO x 3,  motor/sensory function intact, no dysarthria.  Psychiatric: cooperative with exam, good eye contact.      Laboratory:  Most Recent Data:  CBC:   Lab Results   Component Value Date    WBC 4.68 04/05/2024    HGB 14.2 04/05/2024    HCT 43.3 04/05/2024     04/05/2024    MCV 97.5 (H) 04/05/2024    RDW 14.0 04/05/2024     WBC Differential:   Recent Labs   Lab 04/05/24  1511   WBC 4.68   HGB 14.2   HCT 43.3      MCV 97.5*     BMP:   Lab Results   Component Value Date     04/05/2024    K 4.2 04/05/2024     02/19/2023    CO2 22 04/05/2024    BUN 8.1 (L) 04/05/2024    CREATININE 0.86 04/05/2024    GLU 97 02/19/2023    CALCIUM 10.1 04/05/2024    MG 1.90 04/05/2024    PHOS 3.3 09/15/2023     LFTs:   Lab Results   Component Value Date    PROT 7.5 02/19/2023    ALBUMIN 3.6 04/05/2024    BILITOT 0.3 04/05/2024    AST 18 04/05/2024    ALKPHOS 68 04/05/2024    ALT 19 04/05/2024     Coags:   Lab Results   Component Value Date    INR 1.4 (H) 01/28/2024    PROTIME 16.9 (H) 01/28/2024    PTT 57.7 (H) 01/29/2024     FLP:   Lab Results   Component Value Date    CHOL 147 03/31/2022    HDL 51 03/31/2022    TRIG 61 03/31/2022     DM:   Lab Results   Component Value Date    HGBA1C 5.9 (H) 11/23/2022    HGBA1C 5.3 06/28/2022    HGBA1C 5.4 03/31/2022    CREATININE 0.86 04/05/2024     Thyroid:   Lab Results   Component Value Date    TSH 0.704 11/30/2023    FREET4 3.09 (H) 01/22/2023      Anemia: No  "results found for: "IRON", "TIBC", "FERRITIN", "SATURATEDIRO"    Lab Results   Component Value Date    [id number] 426 08/17/2023       Lab Results   Component Value Date    FOLATE 5.2 (L) 08/17/2023        Cardiac:   Lab Results   Component Value Date    TROPONINI <0.010 04/05/2024    BNP <10.0 04/05/2024     Urinalysis:   Lab Results   Component Value Date    COLORU Yellow 02/15/2023    PHUA 5.5 02/12/2024    SPECGRAV 1.010 02/15/2023    NITRITE Negative 02/15/2023    GLUCOSEU Negative 04/07/2022    KETONESU Negative 02/15/2023    UROBILINOGEN Normal 02/12/2024    WBCUA 0-5 02/12/2024       Trended Lab Data:  Recent Labs   Lab 04/05/24  1511   WBC 4.68   HGB 14.2   HCT 43.3      MCV 97.5*   RDW 14.0      K 4.2   CO2 22   BUN 8.1*   CREATININE 0.86   ALBUMIN 3.6   BILITOT 0.3   AST 18   ALKPHOS 68   ALT 19       Trended Cardiac Data:  Recent Labs   Lab 04/05/24  1511   TROPONINI <0.010   BNP <10.0       Microbiology Data:  Microbiology Results (last 7 days)       ** No results found for the last 168 hours. **             Other Results:    Radiology:  Imaging Results              X-Ray Chest PA And Lateral (Final result)  Result time 04/05/24 16:12:41      Final result by Esequiel Jimenez MD (04/05/24 16:12:41)                   Impression:      No radiographic evidence of an acute cardiopulmonary process.      Electronically signed by: Esequiel Jimenez  Date:    04/05/2024  Time:    16:12               Narrative:    EXAMINATION:  XR CHEST PA AND LATERAL    CLINICAL HISTORY:  Shortness of breath    COMPARISON:  01/27/2024    FINDINGS:  PA and lateral chest radiographs are provided for evaluation.    Cardiac silhouette is normal in size.    There is no focal consolidation, pneumothorax or pleural effusion.    No acute osseous findings.                                           Assessment & Plan:     Asthma exacerbation  Tachypnea  -patient has been out of home Advair for the past 4 days.    -received " Solu-Medrol 125 mg and DuoNeb with EMS.  -in ED, received terbutaline and DuoNeb with persistent wheezing.  -no leukocytosis noted, chest x-ray unremarkable.  Low suspicion for infection.  -continue with montelukast 5 mg daily, fluticasone-vilanterol daily, budesonide daily, prednisone 40 mg daily.  -DuoNeb q.4 H awake.  DuoNeb q.4h PRN at night.  -patient will need refill on Advair upon discharge.    History of diverticulitis with abscess  -recently discharged on 02/21/2024 with Flagyl and ciprofloxacin.  Patient states she has not been taking antibiotics.  -patient very adamant on not taking Flagyl or ciprofloxacin.  -currently no GI symptoms.  -appointment scheduled on 04/08/2024 with Dr. Chawla for colonoscopy.    Graves  -last TSH 0.704, T4 1  -Repeat ordered.  -continue home methimazole.    GERD  -patient on omeprazole at home.    -continue with Pepcid.      CODE STATUS:  Full  Access:  PIV  Antibiotics:  None  Diet:  Adult regular  DVT Prophylaxis:  Lovenox  GI Prophylaxis:  Pepcid  Fluids:  None      Disposition:  Patient admitted for acute asthma exacerbation.  Patient started on steroids, inhalers, and DuoNebs.  Admit for observation.  Possible discharge tomorrow.          Adithya Hines MD  [facility] PGY-1

## 2024-04-06 VITALS
RESPIRATION RATE: 18 BRPM | OXYGEN SATURATION: 98 % | HEIGHT: 59 IN | WEIGHT: 154.31 LBS | HEART RATE: 90 BPM | SYSTOLIC BLOOD PRESSURE: 122 MMHG | BODY MASS INDEX: 31.11 KG/M2 | DIASTOLIC BLOOD PRESSURE: 82 MMHG | TEMPERATURE: 98 F

## 2024-04-06 LAB
ALBUMIN SERPL-MCNC: 3.3 G/DL (ref 3.5–5)
ALBUMIN/GLOB SERPL: 0.9 RATIO (ref 1.1–2)
ALP SERPL-CCNC: 63 UNIT/L (ref 40–150)
ALT SERPL-CCNC: 17 UNIT/L (ref 0–55)
AST SERPL-CCNC: 13 UNIT/L (ref 5–34)
BASOPHILS # BLD AUTO: 0 X10(3)/MCL
BASOPHILS NFR BLD AUTO: 0 %
BILIRUB SERPL-MCNC: 0.2 MG/DL
BUN SERPL-MCNC: 8.7 MG/DL (ref 9.8–20.1)
CALCIUM SERPL-MCNC: 9.7 MG/DL (ref 8.4–10.2)
CHLORIDE SERPL-SCNC: 109 MMOL/L (ref 98–107)
CO2 SERPL-SCNC: 23 MMOL/L (ref 22–29)
CREAT SERPL-MCNC: 0.72 MG/DL (ref 0.55–1.02)
EOSINOPHIL # BLD AUTO: 0 X10(3)/MCL (ref 0–0.9)
EOSINOPHIL NFR BLD AUTO: 0 %
ERYTHROCYTE [DISTWIDTH] IN BLOOD BY AUTOMATED COUNT: 13.9 % (ref 11.5–17)
GFR SERPLBLD CREATININE-BSD FMLA CKD-EPI: >60 MLS/MIN/1.73/M2
GLOBULIN SER-MCNC: 3.6 GM/DL (ref 2.4–3.5)
GLUCOSE SERPL-MCNC: 167 MG/DL (ref 74–100)
HCT VFR BLD AUTO: 38.1 % (ref 37–47)
HGB BLD-MCNC: 12.7 G/DL (ref 12–16)
HOLD SPECIMEN: NORMAL
IMM GRANULOCYTES # BLD AUTO: 0.02 X10(3)/MCL (ref 0–0.04)
IMM GRANULOCYTES NFR BLD AUTO: 0.6 %
LYMPHOCYTES # BLD AUTO: 0.94 X10(3)/MCL (ref 0.6–4.6)
LYMPHOCYTES NFR BLD AUTO: 26 %
MCH RBC QN AUTO: 31.8 PG (ref 27–31)
MCHC RBC AUTO-ENTMCNC: 33.3 G/DL (ref 33–36)
MCV RBC AUTO: 95.3 FL (ref 80–94)
MONOCYTES # BLD AUTO: 0.27 X10(3)/MCL (ref 0.1–1.3)
MONOCYTES NFR BLD AUTO: 7.5 %
NEUTROPHILS # BLD AUTO: 2.38 X10(3)/MCL (ref 2.1–9.2)
NEUTROPHILS NFR BLD AUTO: 65.9 %
NRBC BLD AUTO-RTO: 0 %
PLATELET # BLD AUTO: 212 X10(3)/MCL (ref 130–400)
PMV BLD AUTO: 10.9 FL (ref 7.4–10.4)
POTASSIUM SERPL-SCNC: 3.9 MMOL/L (ref 3.5–5.1)
PROT SERPL-MCNC: 6.9 GM/DL (ref 6.4–8.3)
RBC # BLD AUTO: 4 X10(6)/MCL (ref 4.2–5.4)
SODIUM SERPL-SCNC: 140 MMOL/L (ref 136–145)
T4 FREE SERPL-MCNC: 0.84 NG/DL (ref 0.7–1.48)
TSH SERPL-ACNC: 0.19 UIU/ML (ref 0.35–4.94)
WBC # SPEC AUTO: 3.61 X10(3)/MCL (ref 4.5–11.5)

## 2024-04-06 PROCEDURE — 84443 ASSAY THYROID STIM HORMONE: CPT

## 2024-04-06 PROCEDURE — 63600175 PHARM REV CODE 636 W HCPCS

## 2024-04-06 PROCEDURE — 25000242 PHARM REV CODE 250 ALT 637 W/ HCPCS

## 2024-04-06 PROCEDURE — 94640 AIRWAY INHALATION TREATMENT: CPT | Mod: XB

## 2024-04-06 PROCEDURE — 25000003 PHARM REV CODE 250

## 2024-04-06 PROCEDURE — 94761 N-INVAS EAR/PLS OXIMETRY MLT: CPT

## 2024-04-06 PROCEDURE — 25000242 PHARM REV CODE 250 ALT 637 W/ HCPCS: Performed by: STUDENT IN AN ORGANIZED HEALTH CARE EDUCATION/TRAINING PROGRAM

## 2024-04-06 PROCEDURE — G0378 HOSPITAL OBSERVATION PER HR: HCPCS

## 2024-04-06 PROCEDURE — 80053 COMPREHEN METABOLIC PANEL: CPT

## 2024-04-06 PROCEDURE — 85025 COMPLETE CBC W/AUTO DIFF WBC: CPT

## 2024-04-06 PROCEDURE — 84439 ASSAY OF FREE THYROXINE: CPT

## 2024-04-06 RX ORDER — FLUTICASONE PROPIONATE AND SALMETEROL 50; 250 UG/1; UG/1
POWDER RESPIRATORY (INHALATION)
Qty: 60 EACH | Refills: 2 | Status: SHIPPED | OUTPATIENT
Start: 2024-04-06

## 2024-04-06 RX ORDER — PREDNISONE 20 MG/1
TABLET ORAL
Qty: 18 TABLET | Refills: 0 | Status: SHIPPED | OUTPATIENT
Start: 2024-04-07 | End: 2024-04-22

## 2024-04-06 RX ORDER — IPRATROPIUM BROMIDE AND ALBUTEROL SULFATE 2.5; .5 MG/3ML; MG/3ML
3 SOLUTION RESPIRATORY (INHALATION) EVERY 6 HOURS PRN
Qty: 75 ML | Refills: 0 | Status: SHIPPED | OUTPATIENT
Start: 2024-04-06 | End: 2025-04-06

## 2024-04-06 RX ADMIN — IPRATROPIUM BROMIDE AND ALBUTEROL SULFATE 3 ML: .5; 3 SOLUTION RESPIRATORY (INHALATION) at 07:04

## 2024-04-06 RX ADMIN — FLUTICASONE FUROATE AND VILANTEROL TRIFENATATE 1 PUFF: 100; 25 POWDER RESPIRATORY (INHALATION) at 07:04

## 2024-04-06 RX ADMIN — FAMOTIDINE 20 MG: 20 TABLET, FILM COATED ORAL at 09:04

## 2024-04-06 RX ADMIN — MONTELUKAST SODIUM 5 MG: 5 TABLET, CHEWABLE ORAL at 09:04

## 2024-04-06 RX ADMIN — BUPROPION HYDROCHLORIDE 150 MG: 150 TABLET, EXTENDED RELEASE ORAL at 09:04

## 2024-04-06 RX ADMIN — BUDESONIDE INHALATION 0.25 MG: 0.5 SUSPENSION RESPIRATORY (INHALATION) at 07:04

## 2024-04-06 RX ADMIN — IPRATROPIUM BROMIDE AND ALBUTEROL SULFATE 3 ML: 2.5; .5 SOLUTION RESPIRATORY (INHALATION) at 03:04

## 2024-04-06 RX ADMIN — PREDNISONE 40 MG: 20 TABLET ORAL at 09:04

## 2024-04-06 RX ADMIN — METHIMAZOLE 2.5 MG: 5 TABLET ORAL at 12:04

## 2024-04-06 RX ADMIN — IPRATROPIUM BROMIDE AND ALBUTEROL SULFATE 3 ML: .5; 3 SOLUTION RESPIRATORY (INHALATION) at 11:04

## 2024-04-06 NOTE — DISCHARGE SUMMARY
[facility] Medicine Discharge Summary    Admitting Physician: Yanci Olivarez MD  Attending Physician: Sonia Olivarez*  Date of Admit: 4/5/2024  Date of Discharge: 4/6/2024    Discharge to: Home or Self Care   Condition: Stable    Discharge Diagnoses     Patient Active Problem List   Diagnosis    Obesity    Asthma exacerbation    GERD (gastroesophageal reflux disease)    Diverticulitis    Colitis    Moderate persistent asthma with (acute) exacerbation    Hyperthyroidism    Thyroiditis    Diastolic dysfunction, left ventricle    Tachycardia    Other chest pain    Hypoglycemia    Thrombocytopathia    Diverticulitis of intestine with abscess    Asthma    Acute diverticulitis    Allergic conjunctivitis and rhinitis    Macrocytic anemia    Superficial vein thrombosis    Tobacco dependence       Consultants and Procedures     Consultants:  Consults (From admission, onward)          Status Ordering Provider     Inpatient consult to Internal Medicine  Once        Provider:  Loren Byrnes FNP Acknowledged ANDRIES JR, JAMES C               Brief History of Present Illness      Niya Moeller is a 54 y.o. female with a history of GERD, Graves, diverticulosis, asthma who presented to ED on 4/5/2024  with a primary complaint of shortness of breath and wheezing.  Patient arrived via EMS, was given Solu-Medrol 125 and DuoNeb in route.  Patient states that starting Wednesday she noticed worsening shortness of breath in wheezing that is worse with ambulation.  States that this morning it was significantly worse so she called EMS.  She is prescribed Advair and rescue inhaler PRN.  States that she ran out of her Advair on Tuesday, has been using her rescue inhaler with no improvement.  Endorses a dry cough and chest tightness secondary to dyspnea.  Denies any recent sick contacts.  Denies headaches, dizziness, abdominal pain, nausea, vomiting, diarrhea.     Of note, she was discharged on 02/21/2024 where  she was admitted for acute diverticulitis with abscess and sigmoid colon.  At that time she was discharged with ciprofloxacin and metronidazole.  Patient states that she did not take the antibiotics because she did not like the way they made her feel.  Very adamant on not taking Flagyl or ciprofloxacin.  Currently no GI symptoms present.  She has an appointment scheduled with Dr. Chawla on 04/08/2024 for colonoscopy.         Hospital Course with Pertinent Findings       Patient admitted for asthma exacerbation.Symptoms improved with duonebs and steroids.  Patient feels much better at the time of discharge.  Refilling her Advair, albuterol.  We will give her DuoNebs p.r.n. for wheezing on discharge as well.  We will give patient prednisone taper at discharge with prednisone 40 mg to take for 5 days, prednisone 20 mg to take for another 5 days after, and prednisone 10 mg take for 5 days after to complete the course of treatment.  Patient stated that she was not taking her antibiotics that she was discharged with.  Currently not having any GI symptoms.  Instructed patient to be compliant on her antibiotics.  Patient is to follow up with PCP within 1-2 weeks after discharge.  Stable at discharge.  Strict ED precautions given at the time of discharge.      Discharge physical exam:  Vitals:    04/06/24 1154   BP: 122/82   Pulse: 90   Resp: 18   Temp: 98.2 °F (36.8 °C)     Physical Examination:  General: Nontoxic-appearing  man lying in bed in no acute distress  Eye: PERRL, EOMI  HENT: Normocephalic, atraumatic, moist mucous membranes  Neck: Supple, nontender, no JVD  Respiratory: Clear to auscultation bilaterally,  wheezing improves this morning, rales, or rhonchi. Normal work of breathing  Cardiovascular: Regular rate and rhythm, no murmur present, rubs, or gallops. No peripheral edema. 2+ radial pulses  Gastrointestinal: Soft, nontender, nondistended abdomen  Musculoskeletal: Moves all extremities  purposefully. No gross deformities. No calf tenderness  Integumentary: Warm, dry, intact. No rashes  Neurologic: Alert and oriented x3. Normal speech. No gross deficits  Psychiatric: Appropriate mood and affect           TIME SPENT ON DISCHARGE: 60 minutes    Discharge Medications        Medication List        START taking these medications      albuterol-ipratropium 2.5 mg-0.5 mg/3 mL nebulizer solution  Commonly known as: DUO-NEB  Take 3 mLs by nebulization every 6 (six) hours as needed for Wheezing. Rescue     predniSONE 20 MG tablet  Commonly known as: DELTASONE  Take 2 tablets (40 mg total) by mouth once daily for 5 days, THEN 1 tablet (20 mg total) once daily for 5 days, THEN 0.5 tablets (10 mg total) once daily for 5 days.  Start taking on: 2024            CONTINUE taking these medications      ADVAIR DISKUS 250-50 mcg/dose diskus inhaler  Generic drug: fluticasone-salmeterol 250-50 mcg/dose  SMARTSI Puff(s) By Mouth     albuterol 90 mcg/actuation inhaler  Commonly known as: PROAIR HFA  Inhale 2 puffs into the lungs every 6 (six) hours as needed for Wheezing. Rescue     buPROPion 150 MG TBSR 12 hr tablet  Commonly known as: WELLBUTRIN SR  Take 1 tablet (150 mg total) by mouth 2 (two) times daily. For smoking cessation.  Discontinue if still smoking.     ciprofloxacin HCl 500 MG tablet  Commonly known as: CIPRO     dicyclomine 10 MG capsule  Commonly known as: BENTYL     fluticasone propionate 50 mcg/actuation nasal spray  Commonly known as: FLONASE  1 spray (50 mcg total) by Each Nostril route daily as needed for Allergies or Rhinitis.     methIMAzole 5 MG Tab  Commonly known as: TAPAZOLE  Take a half of a tablet daily to Total 2.5 mg     metroNIDAZOLE 500 MG tablet  Commonly known as: FLAGYL     omeprazole 20 MG capsule  Commonly known as: PRILOSEC  Take 1 capsule (20 mg total) by mouth 2 (two) times daily before meals.     SUTAB 1.479-0.188- 0.225 gram tablet  Generic drug: sod sulf-pot  chloride-mag sulf  Take 12 tablets by mouth once daily. Take according to package instructions with indicated amount of water.            STOP taking these medications      ondansetron 4 MG Tbdl  Commonly known as: ZOFRAN-ODT               Where to Get Your Medications        These medications were sent to [id number] - [place], [address] AT [facility] & [address] [address]      Hours: 24-hours Phone: [phone]   ADVAIR DISKUS 250-50 mcg/dose diskus inhaler  albuterol-ipratropium 2.5 mg-0.5 mg/3 mL nebulizer solution  predniSONE 20 MG tablet         Discharge Information:     Stable at discharge   Strict ED precautions given at the time of discharge   Refilled her advair and albuterol at discharge   Started on DuoNebs p.r.n.  Patient was instructed to take prednisone taper with prednisone 40 for 5 days, 20 mg x 5 days and 10 mg x5 days  Instructed patient to be compliant on on her antibiotics that she was discharged with on last admission; patient verbalized understanding      Alvaro Gaston, DO  Internal Medicine - PGY-2

## 2024-04-08 ENCOUNTER — HOSPITAL ENCOUNTER (OUTPATIENT)
Facility: HOSPITAL | Age: 54
Discharge: HOME | End: 2024-04-08
Attending: INTERNAL MEDICINE | Admitting: INTERNAL MEDICINE
Payer: MEDICAID

## 2024-04-08 ENCOUNTER — ANESTHESIA (OUTPATIENT)
Dept: ENDOSCOPY | Facility: HOSPITAL | Age: 54
End: 2024-04-08
Payer: MEDICAID

## 2024-04-08 DIAGNOSIS — K57.92 DIVERTICULITIS: Primary | Chronic | ICD-10-CM

## 2024-04-08 LAB
OHS QRS DURATION: 72 MS
OHS QTC CALCULATION: 457 MS

## 2024-04-08 PROCEDURE — 25000242 PHARM REV CODE 250 ALT 637 W/ HCPCS: Performed by: ANESTHESIOLOGY

## 2024-04-08 PROCEDURE — 37000008 HC ANESTHESIA 1ST 15 MINUTES: Performed by: INTERNAL MEDICINE

## 2024-04-08 PROCEDURE — 94640 AIRWAY INHALATION TREATMENT: CPT

## 2024-04-08 PROCEDURE — 27201423 OPTIME MED/SURG SUP & DEVICES STERILE SUPPLY: Performed by: INTERNAL MEDICINE

## 2024-04-08 PROCEDURE — 63600175 PHARM REV CODE 636 W HCPCS: Performed by: NURSE ANESTHETIST, CERTIFIED REGISTERED

## 2024-04-08 PROCEDURE — 45381 COLONOSCOPY SUBMUCOUS NJX: CPT | Performed by: INTERNAL MEDICINE

## 2024-04-08 PROCEDURE — 25000003 PHARM REV CODE 250: Performed by: NURSE ANESTHETIST, CERTIFIED REGISTERED

## 2024-04-08 PROCEDURE — 37000009 HC ANESTHESIA EA ADD 15 MINS: Performed by: INTERNAL MEDICINE

## 2024-04-08 PROCEDURE — 63600175 PHARM REV CODE 636 W HCPCS: Performed by: INTERNAL MEDICINE

## 2024-04-08 PROCEDURE — D9220A PRA ANESTHESIA: Mod: ,,, | Performed by: NURSE ANESTHETIST, CERTIFIED REGISTERED

## 2024-04-08 RX ORDER — SODIUM CHLORIDE, SODIUM LACTATE, POTASSIUM CHLORIDE, CALCIUM CHLORIDE 600; 310; 30; 20 MG/100ML; MG/100ML; MG/100ML; MG/100ML
INJECTION, SOLUTION INTRAVENOUS CONTINUOUS
Status: DISCONTINUED | OUTPATIENT
Start: 2024-04-08 | End: 2024-04-08 | Stop reason: HOSPADM

## 2024-04-08 RX ORDER — LIDOCAINE HYDROCHLORIDE 20 MG/ML
INJECTION INTRAVENOUS
Status: DISCONTINUED | OUTPATIENT
Start: 2024-04-08 | End: 2024-04-08

## 2024-04-08 RX ORDER — IPRATROPIUM BROMIDE AND ALBUTEROL SULFATE 2.5; .5 MG/3ML; MG/3ML
3 SOLUTION RESPIRATORY (INHALATION) ONCE
Status: COMPLETED | OUTPATIENT
Start: 2024-04-08 | End: 2024-04-08

## 2024-04-08 RX ORDER — PROPOFOL 10 MG/ML
INJECTION, EMULSION INTRAVENOUS
Status: DISCONTINUED | OUTPATIENT
Start: 2024-04-08 | End: 2024-04-08

## 2024-04-08 RX ADMIN — PROPOFOL 50 MG: 10 INJECTION, EMULSION INTRAVENOUS at 10:04

## 2024-04-08 RX ADMIN — PROPOFOL 25 MG: 10 INJECTION, EMULSION INTRAVENOUS at 10:04

## 2024-04-08 RX ADMIN — IPRATROPIUM BROMIDE AND ALBUTEROL SULFATE 3 ML: 2.5; .5 SOLUTION RESPIRATORY (INHALATION) at 09:04

## 2024-04-08 RX ADMIN — LIDOCAINE HYDROCHLORIDE 50 MG: 20 INJECTION INTRAVENOUS at 10:04

## 2024-04-08 RX ADMIN — SODIUM CHLORIDE, POTASSIUM CHLORIDE, SODIUM LACTATE AND CALCIUM CHLORIDE: 600; 310; 30; 20 INJECTION, SOLUTION INTRAVENOUS at 08:04

## 2024-04-08 RX ADMIN — PROPOFOL 75 MG: 10 INJECTION, EMULSION INTRAVENOUS at 10:04

## 2024-04-08 NOTE — TRANSFER OF CARE
Anesthesia Transfer of Care Note    Patient: Niya Moeller    Procedure(s) Performed: Procedure(s) (LRB):  COLONOSCOPY, WITH DIRECTED SUBMUCOSAL INJECTION (N/A)    Patient location: GI    Anesthesia Type: general    Post pain: adequate analgesia    Post assessment: no apparent anesthetic complications    Post vital signs: stable    Level of consciousness: sedated and responds to stimulation    Nausea/Vomiting: no nausea/vomiting    Complications: none    Transfer of care protocol was followedComments: Report to Kavon CASTILLO      Last vitals: 110/76 p 86 r 21 t 36 sat 99 fm

## 2024-04-08 NOTE — PROVATION PATIENT INSTRUCTIONS
Discharge Summary/Instructions after an Endoscopic Procedure  Patient Name: Niya Moeller  Patient MRN: [id number]  Patient YOB: 1970  Monday, April 8, 2024  Heather Lance MD  Dear patient,  As a result of recent federal legislation (The Federal Cures Act), you may   receive lab or pathology results from your procedure in your [facility]   account before your physician is able to contact you. Your physician or   their representative will relay the results to you with their   recommendations at their soonest availability.  Thank you,  RESTRICTIONS:  During your procedure today, you received medications for sedation.  These   medications may affect your judgment, balance and coordination.  Therefore,   for 24 hours, you have the following restrictions:   - DO NOT drive a car, operate machinery, make legal/financial decisions,   sign important papers or drink alcohol.    ACTIVITY:  Today: no heavy lifting, straining or running due to procedural   sedation/anesthesia.  The following day: return to full activity including work.  DIET:  Eat and drink normally unless instructed otherwise.     TREATMENT FOR COMMON SIDE EFFECTS:  - Mild abdominal pain, nausea, belching, bloating or excessive gas:  rest,   eat lightly and use a heating pad.  - Sore Throat: treat with throat lozenges and/or gargle with warm salt   water.  - Because air was used during the procedure, expelling large amounts of air   from your rectum or belching is normal.  - If a bowel prep was taken, you may not have a bowel movement for 1-3 days.    This is normal.  SYMPTOMS TO WATCH FOR AND REPORT TO YOUR PHYSICIAN:  1. Abdominal pain or bloating, other than gas cramps.  2. Chest pain.  3. Back pain.  4. Signs of infection such as: chills or fever occurring within 24 hours   after the procedure.  5. Rectal bleeding, which would show as bright red, maroon, or black stools.   (A tablespoon of blood from the rectum is not serious, especially  if   hemorrhoids are present.)  6. Vomiting.  7. Weakness or dizziness.  GO DIRECTLY TO THE NEAREST EMERGENCY ROOM IF YOU HAVE ANY OF THE FOLLOWING:      Difficulty breathing              Chills and/or fever over 101 F   Persistent vomiting and/or vomiting blood   Severe abdominal pain   Severe chest pain   Black, tarry stools   Bleeding- more than one tablespoon   Any other symptom or condition that you feel may need urgent attention  Your doctor recommends these additional instructions:  If any biopsies were taken, your doctors clinic will contact you in 1 to 2   weeks with any results.  - Patient has a contact number available for emergencies.  The signs and   symptoms of potential delayed complications were discussed with the   patient.  Return to normal activities tomorrow.  Written discharge   instructions were provided to the patient.   - Discharge patient to home.   - Resume previous diet.   - Continue present medications.   - Repeat colonoscopy in 5-7 years for screening purposes.   - Refer to a colo-rectal surgeon at appointment to be scheduled.  For questions, problems or results please call your physician - Heather Lance MD at Work:  [phone], Work:  [phone].  [facility] , EMERGENCY ROOM PHONE NUMBER: [phone]  IF A COMPLICATION OR EMERGENCY SITUATION ARISES AND YOU ARE UNABLE TO REACH   YOUR PHYSICIAN - GO DIRECTLY TO THE EMERGENCY ROOM.  Heather Lance MD  4/8/2024 11:12:06 AM  This report has been verified and signed electronically.  Dear patient,  As a result of recent federal legislation (The Federal Cures Act), you may   receive lab or pathology results from your procedure in your [facility]   account before your physician is able to contact you. Your physician or   their representative will relay the results to you with their   recommendations at their soonest availability.  Thank you,  PROVATION

## 2024-04-08 NOTE — ANESTHESIA POSTPROCEDURE EVALUATION
Anesthesia Post Evaluation    Patient: Niya Moeller    Procedure(s) Performed: Procedure(s) (LRB):  COLONOSCOPY, WITH DIRECTED SUBMUCOSAL INJECTION (N/A)    Final Anesthesia Type: general      Patient location during evaluation: GI PACU  Patient participation: Yes- Able to Participate  Level of consciousness: awake and lethargic  Post-procedure vital signs: reviewed and stable  Pain management: adequate  Airway patency: patent    PONV status at discharge: No PONV  Anesthetic complications: no      Cardiovascular status: hemodynamically stable  Respiratory status: unassisted, spontaneous ventilation and face mask  Follow-up not needed.              Vitals Value Taken Time   /76 04/08/24 1055   Temp 37.1 °C (98.8 °F) 04/08/24 0817   Pulse 86 04/08/24 1055   Resp 20 04/08/24 0911   SpO2 100 % 04/08/24 1055         No case tracking events are documented in the log.      Pain/Héctor Score: No data recorded

## 2024-04-08 NOTE — PLAN OF CARE
Back from procedure. Awake and alert ao4. No acute distress. Drinking fluids now. Dc instructions given and reviewed

## 2024-04-09 VITALS
DIASTOLIC BLOOD PRESSURE: 67 MMHG | SYSTOLIC BLOOD PRESSURE: 118 MMHG | WEIGHT: 151.38 LBS | BODY MASS INDEX: 30.52 KG/M2 | HEART RATE: 75 BPM | HEIGHT: 59 IN | RESPIRATION RATE: 20 BRPM | TEMPERATURE: 99 F | OXYGEN SATURATION: 98 %

## 2024-04-09 DIAGNOSIS — K57.92 DIVERTICULITIS: Primary | ICD-10-CM

## 2024-04-23 ENCOUNTER — OFFICE VISIT (OUTPATIENT)
Dept: SURGERY | Facility: CLINIC | Age: 54
End: 2024-04-23
Payer: MEDICAID

## 2024-04-23 VITALS
DIASTOLIC BLOOD PRESSURE: 58 MMHG | TEMPERATURE: 98 F | HEART RATE: 92 BPM | HEIGHT: 59 IN | BODY MASS INDEX: 30.57 KG/M2 | SYSTOLIC BLOOD PRESSURE: 109 MMHG | WEIGHT: 151.63 LBS | OXYGEN SATURATION: 97 %

## 2024-04-23 DIAGNOSIS — K57.92 DIVERTICULITIS: ICD-10-CM

## 2024-04-23 PROCEDURE — 99214 OFFICE O/P EST MOD 30 MIN: CPT | Mod: PBBFAC

## 2024-04-23 NOTE — PROGRESS NOTES
[facility] General Surgery Clinic Note    HPI:   Niya Moeller is a 53 y.o. female with history of Grave's disease, asthma, and PE (2/2 RUE DVT from PICC in 2023) on Eliquis and recurrent diverticulitis who presents for f/u regarding management of diverticulitis. Patient was admitted 2024 for diverticulitis with diverticular abscess that was managed conservatively and without IR drain placement. Patient has since been admitted for similar complaints. . Patient has also seen infectious diseases who prescribed her a 6 week course of cipro/flagyl; she has almost finished her antibiotic course. Patient recently underwent colonoscopy with Dr Lance on  following admission which showed many diverticula in the sigmoid and narrowing of the colon; diverticula were also noted in the descending and ascending colon. Since inpatient admission, patient reports persistent daily lower abdominal pain. She denies n/v and reports subjective fevers and chills. She reports some bright red blood in her bowel movements, but attributes this to her known internal hemorrhoids. She denies melena, CP, SOB, dizziness.    ROS: Negative, except as per HPI    PMH:   As per HPI    PSH:   None    SocHx:  Smokes 0.5 ppd  Denies EtOH or illicit drug use    Allergies:   Zosyn      Medications:  Current Outpatient Medications on File Prior to Visit   Medication Sig Dispense Refill    ADVAIR DISKUS 250-50 mcg/dose diskus inhaler SMARTSI Puff(s) By Mouth 60 each 2    albuterol (PROAIR HFA) 90 mcg/actuation inhaler Inhale 2 puffs into the lungs every 6 (six) hours as needed for Wheezing. Rescue 18 g 1    albuterol-ipratropium (DUO-NEB) 2.5 mg-0.5 mg/3 mL nebulizer solution Take 3 mLs by nebulization every 6 (six) hours as needed for Wheezing. Rescue 75 mL 0    buPROPion (WELLBUTRIN SR) 150 MG TBSR 12 hr tablet Take 1 tablet (150 mg total) by mouth 2 (two) times daily. For smoking cessation.  Discontinue if still smoking. 60 tablet 1     ciprofloxacin HCl (CIPRO) 500 MG tablet Take 1 tablet by mouth 2 (two) times daily.      dicyclomine (BENTYL) 10 MG capsule Take 20 mg by mouth 4 (four) times daily before meals and nightly.      fluticasone propionate (FLONASE) 50 mcg/actuation nasal spray 1 spray (50 mcg total) by Each Nostril route daily as needed for Allergies or Rhinitis. 18.2 mL 0    methIMAzole (TAPAZOLE) 5 MG Tab Take a half of a tablet daily to Total 2.5 mg 15 tablet 11    metroNIDAZOLE (FLAGYL) 500 MG tablet Take 1 tablet by mouth 3 (three) times daily.      omeprazole (PRILOSEC) 20 MG capsule Take 1 capsule (20 mg total) by mouth 2 (two) times daily before meals. 60 capsule 11    [] predniSONE (DELTASONE) 20 MG tablet Take 2 tablets (40 mg total) by mouth once daily for 5 days, THEN 1 tablet (20 mg total) once daily for 5 days, THEN 0.5 tablets (10 mg total) once daily for 5 days. 18 tablet 0     No current facility-administered medications on file prior to visit.     Objective:  Physical Exam:  NAD  RR  Unlabored breathing on RA    Results  None new    Imaging:  None new    A/P:   Niya Moeller is a 53 y.o. female with history of Grave's disease, asthma, and PE (2/2 RUE DVT from PICC in 2023) on Eliquis and recurrent diverticulitis who presents for f/u regarding management of diverticulitis.    - Discussed treatment options with patient including conservative management and surgical options. Discussed colonoscopy findings and presence of many diverticula in sigmoid colon. Started discussion about surgical options including laparoscopic vs open sigmoidectomy with primary anastomosis. Upon mentioning of rare chance that a colostomy may need to be created while explaining risks/benefits of surgery, patient became extremely defensive and was adamant that she did not want a colostomy. Multiple attempts made to explain to patient that ostomy creation an inherent risk of any bowel resection and despite efforts to explain that it is  extremely rare and not something that would be anticipated in her case, the patient became irate and stormed out of the clinic room.  - Patient can RTC CAROLINE Harris MD  [employer] General Surgery, PGY-3

## 2024-04-26 ENCOUNTER — PATIENT MESSAGE (OUTPATIENT)
Dept: GASTROENTEROLOGY | Facility: CLINIC | Age: 54
End: 2024-04-26
Payer: MEDICAID

## 2024-06-01 ENCOUNTER — HOSPITAL ENCOUNTER (EMERGENCY)
Facility: HOSPITAL | Age: 54
Discharge: HOME | End: 2024-06-02
Attending: EMERGENCY MEDICINE
Payer: MEDICAID

## 2024-06-01 DIAGNOSIS — J98.01 BRONCHOSPASM: Primary | ICD-10-CM

## 2024-06-01 PROCEDURE — 63600175 PHARM REV CODE 636 W HCPCS: Performed by: EMERGENCY MEDICINE

## 2024-06-01 PROCEDURE — 99285 EMERGENCY DEPT VISIT HI MDM: CPT | Mod: 25

## 2024-06-01 PROCEDURE — 94640 AIRWAY INHALATION TREATMENT: CPT

## 2024-06-01 PROCEDURE — 96372 THER/PROPH/DIAG INJ SC/IM: CPT | Performed by: EMERGENCY MEDICINE

## 2024-06-01 RX ORDER — IPRATROPIUM BROMIDE AND ALBUTEROL SULFATE 2.5; .5 MG/3ML; MG/3ML
3 SOLUTION RESPIRATORY (INHALATION)
Status: COMPLETED | OUTPATIENT
Start: 2024-06-02 | End: 2024-06-01

## 2024-06-01 RX ORDER — DIPHENHYDRAMINE HYDROCHLORIDE 50 MG/ML
25 INJECTION INTRAMUSCULAR; INTRAVENOUS
Status: COMPLETED | OUTPATIENT
Start: 2024-06-01 | End: 2024-06-01

## 2024-06-01 RX ORDER — DEXAMETHASONE SODIUM PHOSPHATE 4 MG/ML
8 INJECTION, SOLUTION INTRA-ARTICULAR; INTRALESIONAL; INTRAMUSCULAR; INTRAVENOUS; SOFT TISSUE
Status: COMPLETED | OUTPATIENT
Start: 2024-06-01 | End: 2024-06-01

## 2024-06-01 RX ADMIN — IPRATROPIUM BROMIDE AND ALBUTEROL SULFATE 3 ML: .5; 3 SOLUTION RESPIRATORY (INHALATION) at 11:06

## 2024-06-01 RX ADMIN — DEXAMETHASONE SODIUM PHOSPHATE 8 MG: 4 INJECTION, SOLUTION INTRA-ARTICULAR; INTRALESIONAL; INTRAMUSCULAR; INTRAVENOUS; SOFT TISSUE at 11:06

## 2024-06-01 RX ADMIN — DIPHENHYDRAMINE HYDROCHLORIDE 25 MG: 50 INJECTION INTRAMUSCULAR; INTRAVENOUS at 11:06

## 2024-06-02 VITALS
DIASTOLIC BLOOD PRESSURE: 81 MMHG | TEMPERATURE: 99 F | WEIGHT: 154.31 LBS | BODY MASS INDEX: 31.11 KG/M2 | SYSTOLIC BLOOD PRESSURE: 127 MMHG | HEIGHT: 59 IN | RESPIRATION RATE: 18 BRPM | OXYGEN SATURATION: 100 % | HEART RATE: 94 BPM

## 2024-06-02 PROCEDURE — 25000242 PHARM REV CODE 250 ALT 637 W/ HCPCS: Performed by: EMERGENCY MEDICINE

## 2024-06-02 RX ORDER — PREDNISONE 50 MG/1
50 TABLET ORAL DAILY
Qty: 5 TABLET | Refills: 0 | Status: SHIPPED | OUTPATIENT
Start: 2024-06-02 | End: 2024-06-07

## 2024-06-02 NOTE — ED PROVIDER NOTES
Encounter Date: 6/1/2024       History     Chief Complaint   Patient presents with    Shortness of Breath     Pt. C/o increased SOB that she attributes to allergies and sinus issues. 93% on RA, speaking freely in triage. Hx asthma     54-year-old female with known reactive airway syndrome presents today with symptoms worsening, in the setting of recent weather changes.  Patient is obviously bronchospastic on arrival.  She is denying fever, chills, chest pain.  Patient is endorsing mild cough, dry.        Review of patient's allergies indicates:   Allergen Reactions    Latex Anaphylaxis and Edema    Zosyn [piperacillin-tazobactam] Anaphylaxis     Past Medical History:   Diagnosis Date    Asthma     Diverticulitis     Diverticulosis     GERD (gastroesophageal reflux disease)     Graves disease      Past Surgical History:   Procedure Laterality Date    COLONOSCOPY, WITH DIRECTED SUBMUCOSAL INJECTION N/A 4/8/2024    Procedure: COLONOSCOPY, WITH DIRECTED SUBMUCOSAL INJECTION;  Surgeon: Heather Lance MD;  Location: [facility] ENDOSCOPY;  Service: Gastroenterology;  Laterality: N/A;     Family History   Problem Relation Name Age of Onset    Hypothyroidism Mother       Social History     Tobacco Use    Smoking status: Every Day     Current packs/day: 0.50     Average packs/day: 0.5 packs/day for 41.4 years (20.7 ttl pk-yrs)     Types: Cigarettes     Start date: 1/1/1983    Smokeless tobacco: Never    Tobacco comments:     Ambulatory referral to Smoking Cessation clinic following hospital discharge.    Substance Use Topics    Alcohol use: Not Currently    Drug use: Never     Review of Systems    Physical Exam     Initial Vitals [06/01/24 2250]   BP Pulse Resp Temp SpO2   135/89 84 18 98.7 °F (37.1 °C) 98 %      MAP       --         Physical Exam    Nursing note and vitals reviewed.  Constitutional: She appears well-developed and well-nourished. She is not diaphoretic. No distress.   HENT:   Head: Normocephalic and  atraumatic.   Right Ear: External ear normal.   Left Ear: External ear normal.   Eyes: EOM are normal. Pupils are equal, round, and reactive to light. Right eye exhibits no discharge. Left eye exhibits no discharge.   Neck: Neck supple. No thyromegaly present. No tracheal deviation present. No JVD present.   Normal range of motion.  Cardiovascular:  Normal rate, regular rhythm, normal heart sounds and intact distal pulses.     Exam reveals no gallop and no friction rub.       No murmur heard.  Pulmonary/Chest: No stridor. No respiratory distress. She has wheezes. She has no rhonchi. She has no rales.   Abdominal: Abdomen is soft. Bowel sounds are normal. She exhibits no distension. There is no abdominal tenderness. There is no rebound and no guarding.   Musculoskeletal:         General: No tenderness or edema. Normal range of motion.      Cervical back: Normal range of motion and neck supple.     Neurological: She is alert and oriented to person, place, and time. She has normal strength. No cranial nerve deficit or sensory deficit. GCS score is 15. GCS eye subscore is 4. GCS verbal subscore is 5. GCS motor subscore is 6.   Skin: Skin is warm and dry. Capillary refill takes less than 2 seconds. No rash and no abscess noted. No erythema. No pallor.   Psychiatric: She has a normal mood and affect. Her behavior is normal. Judgment and thought content normal.         ED Course   Procedures  Labs Reviewed - No data to display       Imaging Results    None          Medications   dexAMETHasone injection 8 mg (8 mg Intramuscular Given 6/1/24 2324)   albuterol-ipratropium 2.5 mg-0.5 mg/3 mL nebulizer solution 3 mL (3 mLs Nebulization Given 6/1/24 2340)   diphenhydrAMINE injection 25 mg (25 mg Intramuscular Given 6/1/24 2323)     Medical Decision Making  Patient with known history of reactive airway syndrome presents today with worsening wheeze in the setting of weather change.    Amount and/or Complexity of Data  Reviewed  External Data Reviewed: notes.    Risk  Prescription drug management.  Risk Details: Features compatible with exacerbation of reactive airway syndrome.  Patient is improved with conservative interventions.  Plan home with prescriptions, anticipatory guidance, return precautions, follow-up instructions.  Home in stable condition without event.                                      Clinical Impression:  Final diagnoses:  [J98.01] Bronchospasm (Primary)          ED Disposition Condition    Discharge Stable          ED Prescriptions       Medication Sig Dispense Start Date End Date Auth. Provider    predniSONE (DELTASONE) 50 MG Tab Take 1 tablet (50 mg total) by mouth once daily. for 5 days 5 tablet 6/2/2024 6/7/2024 Sid Jean MD          Follow-up Information       Follow up With Specialties Details Why Contact Info    [facility] Emergency Medicine  As needed, If symptoms worsen [address] [facility] [address] [phone]    Oswaldo Riggs, NP Family Medicine Call   [address]  [address]  PRIMARY CARE [facility]  [place] [address]  [phone]               Sid Jean MD  06/02/24 0129

## 2024-06-04 ENCOUNTER — OFFICE VISIT (OUTPATIENT)
Dept: SURGERY | Facility: CLINIC | Age: 54
End: 2024-06-04
Payer: MEDICAID

## 2024-06-04 VITALS
RESPIRATION RATE: 20 BRPM | WEIGHT: 146 LBS | SYSTOLIC BLOOD PRESSURE: 124 MMHG | BODY MASS INDEX: 29.43 KG/M2 | HEART RATE: 85 BPM | DIASTOLIC BLOOD PRESSURE: 84 MMHG | TEMPERATURE: 98 F | HEIGHT: 59 IN | OXYGEN SATURATION: 97 %

## 2024-06-04 DIAGNOSIS — K57.92 DIVERTICULITIS: Chronic | ICD-10-CM

## 2024-06-04 DIAGNOSIS — K57.20 DIVERTICULITIS OF LARGE INTESTINE WITH ABSCESS WITHOUT BLEEDING: Primary | ICD-10-CM

## 2024-06-04 PROCEDURE — 99215 OFFICE O/P EST HI 40 MIN: CPT | Mod: PBBFAC

## 2024-06-04 RX ORDER — ASPIRIN 325 MG
50000 TABLET, DELAYED RELEASE (ENTERIC COATED) ORAL
COMMUNITY
Start: 2024-05-28

## 2024-06-04 NOTE — PROGRESS NOTES
[facility] Follow-Up Note    HPI:  54 female with history of Grave's disease, asthma, and PE (2/2 RUE DVT from PICC in 09/2023) on Eliquis and recurrent diverticulitis who presents for f/u regarding management of diverticulitis. Patient was admitted 2/2024 for diverticulitis with diverticular abscess that was managed conservatively and without IR drain placement. Patient has since been admitted for similar complaints. . Patient has also seen infectious diseases who prescribed her a 6 week course of cipro/flagyl; she has almost finished her antibiotic course. Patient recently underwent colonoscopy with Dr Lance on 4/8 following admission which showed many diverticula in the sigmoid and narrowing of the colon; diverticula were also noted in the descending and ascending colon. Segment most affected was tattooed. Patient was last seen in our clinic 4/23/2024 for scheduling of lap sigmoidectomy when she became irate and left clinic upon mentioning of colostomy creation. She was then seen in Dr. Stewart's private clinic and referred back to our clinic due to her insurance. She states that she has had no further episodes since her 6 weeks cipro/flagyl treatment. She was in her usual state of health until an asthma exacerbation 3 days ago. She desires elective laparoscopic sigmoidectomy with Dr. Stewart. Ok with risk of colostomy if with him.     Objective  Gen: NAD, AAOx3  CV: extremities wwp, regular rate, palpable radials  Pulm: nonlabored, no increased wob, equal chest rise b/l   Abd: s/nt/nd   Wounds:none    A/P:    54 y.o. female with recurrent diverticulitis.     - after discussing all r/b/a including medical therapy, wishes to proceed with elective laparoscopic sigmoidectomy  - unfortunately, due to recent asthma exacerbation, unable to schedule today. Will have patient RTC in 6 weeks for re-revaluation. Return precautions given.     Yovani Castillo MD  [facility] General Surgery, PGY-3

## 2024-07-16 ENCOUNTER — OFFICE VISIT (OUTPATIENT)
Dept: SURGERY | Facility: CLINIC | Age: 54
End: 2024-07-16
Payer: MEDICAID

## 2024-07-16 VITALS
WEIGHT: 151 LBS | DIASTOLIC BLOOD PRESSURE: 66 MMHG | TEMPERATURE: 98 F | BODY MASS INDEX: 30.44 KG/M2 | HEART RATE: 94 BPM | HEIGHT: 59 IN | RESPIRATION RATE: 20 BRPM | SYSTOLIC BLOOD PRESSURE: 92 MMHG | OXYGEN SATURATION: 98 %

## 2024-07-16 DIAGNOSIS — K57.20 DIVERTICULITIS OF LARGE INTESTINE WITH ABSCESS WITHOUT BLEEDING: Primary | ICD-10-CM

## 2024-07-16 PROCEDURE — 99215 OFFICE O/P EST HI 40 MIN: CPT | Mod: PBBFAC

## 2024-07-16 RX ORDER — AMOXICILLIN 500 MG/1
500 CAPSULE ORAL 4 TIMES DAILY
COMMUNITY
Start: 2024-03-15

## 2024-07-16 NOTE — PROGRESS NOTES
[facility]   Clinic Note       CC: evaluation for elective laparoscopic sigmoidectomy       HPI: Niya Moeller is a 54 y.o. female with PMHx of Grave's disease, asthma, and PE (2/2 RUE DVT from PICC in 09/2023) on Eliquis and recurrent diverticulitis who presents for f/u regarding management of diverticulitis. Patient was admitted 2/2024 for diverticulitis with diverticular abscess that was managed conservatively and without IR drain placement. Patient has since been admitted for similar complaints. Patient has also seen infectious diseases who prescribed her a 6 week course of cipro/flagyl. Patient underwent colonoscopy with Dr Lance on 4/8 following admission which showed many diverticula in the sigmoid and narrowing of the colon; diverticula were also noted in the descending and ascending colon. Segment most affected was tattooed. Patient was last seen in our clinic 06/04/2024 for scheduling of lap sigmoidectomy but were unable to because of a asthma exacerbation at that time.    Patient comes back today for scheduling surgery.  Patient has overall been doing well but has had some intermittent left lower quadrant pain which she has self prescribed herself amoxicillin for.  I explained the risk of taking antibiotics when there was no clinical indication she verbalized admit this time.  I further recommended that she stopped taking the antibiotics.  Otherwise patient has been stable.  I started to have the conversation regarding consent process for lap sigmoidectomy and patient again adamantly refused having even the chance of a colostomy done.  Patient explained that unfortunately there is always a small risk for colostomy formation if the bowel does not look healthy and suitable for anastomosis.  I again referenced patient's conversation with Dr. Stewart who per pt said there is a proximally 1-3% chance for colostomy formation at the time of lap sigmoidectomy.  Patient adamantly refused even  thinking about having a colostomy as this would to debilitating for her.  I informed the patient that she can call us back if and when she was ready to have her operation given that there is always going to be a small risk for a colostomy formation.  After further discussion, pt states she was ok with the risk of the colostomy formation. I further explained to her that I do not want to perform surgery on her if she was not okay with the risks of surgery and she said she accepted the risk of the colostomy formation.  I further emphasized with her that this is not an intention but there is always a risk.  I ended the conversation stating that if patient ever had any doubt or does not want to proceed with surgery, we will happily cancel it just please let us know.  At this time patient would like surgery.  Patient verbalized understanding regarding our conversation and is amenable to proceed.       PMH:  Past Medical History:   Diagnosis Date    Asthma     Diverticulitis     Diverticulosis     GERD (gastroesophageal reflux disease)     Graves disease          PSH:  Past Surgical History:   Procedure Laterality Date    COLONOSCOPY, WITH DIRECTED SUBMUCOSAL INJECTION N/A 2024    Procedure: COLONOSCOPY, WITH DIRECTED SUBMUCOSAL INJECTION;  Surgeon: Heather Lance MD;  Location: [facility] ENDOSCOPY;  Service: Gastroenterology;  Laterality: N/A;         Medications:  Current Outpatient Medications on File Prior to Visit   Medication Sig Dispense Refill    ADVAIR DISKUS 250-50 mcg/dose diskus inhaler SMARTSI Puff(s) By Mouth 60 each 2    albuterol (PROAIR HFA) 90 mcg/actuation inhaler Inhale 2 puffs into the lungs every 6 (six) hours as needed for Wheezing. Rescue 18 g 1    albuterol-ipratropium (DUO-NEB) 2.5 mg-0.5 mg/3 mL nebulizer solution Take 3 mLs by nebulization every 6 (six) hours as needed for Wheezing. Rescue 75 mL 0    amoxicillin (AMOXIL) 500 MG capsule Take 500 mg by mouth 4 (four) times daily.       cholecalciferol, vitamin D3, 1,250 mcg (50,000 unit) capsule Take 50,000 Units by mouth every 7 days.      fluticasone propionate (FLONASE) 50 mcg/actuation nasal spray 1 spray (50 mcg total) by Each Nostril route daily as needed for Allergies or Rhinitis. 18.2 mL 0    methIMAzole (TAPAZOLE) 5 MG Tab Take a half of a tablet daily to Total 2.5 mg 15 tablet 11    omeprazole (PRILOSEC) 20 MG capsule Take 1 capsule (20 mg total) by mouth 2 (two) times daily before meals. 60 capsule 11    buPROPion (WELLBUTRIN SR) 150 MG TBSR 12 hr tablet Take 1 tablet (150 mg total) by mouth 2 (two) times daily. For smoking cessation.  Discontinue if still smoking. 60 tablet 1    ciprofloxacin HCl (CIPRO) 500 MG tablet Take 1 tablet by mouth 2 (two) times daily. (Patient not taking: Reported on 7/16/2024)      metroNIDAZOLE (FLAGYL) 500 MG tablet Take 1 tablet by mouth 3 (three) times daily. (Patient not taking: Reported on 7/16/2024)       No current facility-administered medications on file prior to visit.        Allergies:  Review of patient's allergies indicates:   Allergen Reactions    Latex Anaphylaxis and Edema    Zosyn [piperacillin-tazobactam] Anaphylaxis         Social Hx:  Social History     Tobacco Use   Smoking Status Every Day    Current packs/day: 0.50    Average packs/day: 0.5 packs/day for 41.5 years (20.8 ttl pk-yrs)    Types: Cigarettes    Start date: 1/1/1983   Smokeless Tobacco Never   Tobacco Comments    Ambulatory referral to Smoking Cessation clinic following hospital discharge.       Social History     Substance and Sexual Activity   Alcohol Use Not Currently         Relevant Family Hx:  Family History   Problem Relation Name Age of Onset    Hypothyroidism Mother            Physical Exam:   Vitals:    07/16/24 1016   BP: 92/66   Pulse:    Resp:    Temp:       Gen: NAD, AAOx3   Eye: EOMI   CV: RR  Pulm: NWOB on RA  Abd: soft, non-tender, non-distended  Ext:  Move all 4 extremities.       Interval Labs:    None       Interval Imaging:    COLONOSCOPY 04/08/2024  Impression:            -   - Diverticulosis in the sigmoid colon and from 15 to 25 cm proximal to the anus. There was narrowing of the colon with significant edema in association  with the diverticular opening. Erythema was seen in association with the diverticular opening. Tattooed.   - Diverticulosis in the descending colon and in the ascending colon.     CT Abdomen Pelvis with IV Contrast 02/11/2024  Impression:     Numerous diverticula are seen predominantly in the descending and sigmoid colon. There is wall thickening of the distal descending colon through to the mid sigmoid colon. There are areas of rim enhancement in the walls of the sigmoid colon for instance on image 99 series 2. These findings are consistent with diverticulitis with the possibility of small mural abscess is not entirely excluded. No perforation or definitive extra colonic abscess is identified. Recommend continued interval follow-up to full resolution as indicated.  Please also correlate patient is up-to-date on colonoscopy.      Interval Pathology:    None      A/P: Niya Moeller is a 54 y.o. female with PMHx of Grave's disease, asthma, and PE (2/2 RUE DVT from PICC in 09/2023) on Eliquis and recurrent diverticulitis who presents for f/u regarding management of diverticulitis. She presents today for evaluation for an elective laparoscopic sigmoidectomy.       - extensive conversation regarding the risk of colostomy and the risks of surgery with a laparoscopic sigmoidectomy.  After extensive talk, patient consented to the surgery but would like to done next month sometime as she needs to give time to her family to prepare to help her postoperatively. (See HPI for further discussion).   - we will talk with Dr. Stewart regarding next month's availability for a laparoscopic sigmoidectomy.  At that time we will call patient and scheduled for surgery.  If patient requires to be seen back in the  clinic, we will organize that at that time. If patient ever does not want to proceed with surgery due to any of its risks, pt will ntoify us. Pt verbalized understanding of this and is amenable to this plan.    Lester Wyatt, MS3  [facility]  07/16/2024     Agree with documentation above.  Andrei Davis MD  [facility]

## 2024-07-16 NOTE — PROGRESS NOTES
Pt seen by Dr. Davis; Consented for lap sigmoidectomy; Surgery date not chosen due to needing to schedule w/Dr. Stewart; Pre op & post op dates can be scheduled after surgery date is chosen; Discharge paperwork given w/pt verbalizing understanding

## 2024-07-18 ENCOUNTER — HOSPITAL ENCOUNTER (EMERGENCY)
Facility: HOSPITAL | Age: 54
Discharge: HOME | End: 2024-07-18
Attending: INTERNAL MEDICINE
Payer: MEDICAID

## 2024-07-18 VITALS
BODY MASS INDEX: 29.44 KG/M2 | OXYGEN SATURATION: 97 % | HEIGHT: 60 IN | RESPIRATION RATE: 20 BRPM | HEART RATE: 77 BPM | WEIGHT: 149.94 LBS | TEMPERATURE: 98 F | DIASTOLIC BLOOD PRESSURE: 72 MMHG | SYSTOLIC BLOOD PRESSURE: 100 MMHG

## 2024-07-18 DIAGNOSIS — K59.00 CONSTIPATION, UNSPECIFIED CONSTIPATION TYPE: Primary | ICD-10-CM

## 2024-07-18 LAB
ALBUMIN SERPL-MCNC: 3.7 G/DL (ref 3.5–5)
ALBUMIN/GLOB SERPL: 1 RATIO (ref 1.1–2)
ALP SERPL-CCNC: 72 UNIT/L (ref 40–150)
ALT SERPL-CCNC: 14 UNIT/L (ref 0–55)
ANION GAP SERPL CALC-SCNC: 10 MEQ/L
AST SERPL-CCNC: 17 UNIT/L (ref 5–34)
BACTERIA #/AREA URNS AUTO: ABNORMAL /HPF
BASOPHILS # BLD AUTO: 0.02 X10(3)/MCL
BASOPHILS NFR BLD AUTO: 0.2 %
BILIRUB SERPL-MCNC: 0.4 MG/DL
BILIRUB UR QL STRIP.AUTO: NEGATIVE
BUN SERPL-MCNC: 5.9 MG/DL (ref 9.8–20.1)
CALCIUM SERPL-MCNC: 9.9 MG/DL (ref 8.4–10.2)
CHLORIDE SERPL-SCNC: 106 MMOL/L (ref 98–107)
CLARITY UR: CLEAR
CO2 SERPL-SCNC: 27 MMOL/L (ref 22–29)
COLOR UR AUTO: ABNORMAL
CREAT SERPL-MCNC: 1.04 MG/DL (ref 0.55–1.02)
CREAT/UREA NIT SERPL: 6
EOSINOPHIL # BLD AUTO: 0.16 X10(3)/MCL (ref 0–0.9)
EOSINOPHIL NFR BLD AUTO: 1.7 %
ERYTHROCYTE [DISTWIDTH] IN BLOOD BY AUTOMATED COUNT: 13.4 % (ref 11.5–17)
GFR SERPLBLD CREATININE-BSD FMLA CKD-EPI: >60 ML/MIN/1.73/M2
GLOBULIN SER-MCNC: 3.7 GM/DL (ref 2.4–3.5)
GLUCOSE SERPL-MCNC: 78 MG/DL (ref 74–100)
GLUCOSE UR QL STRIP: NORMAL
HCT VFR BLD AUTO: 41.8 % (ref 37–47)
HGB BLD-MCNC: 13.7 G/DL (ref 12–16)
HGB UR QL STRIP: NEGATIVE
HOLD SPECIMEN: NORMAL
HYALINE CASTS #/AREA URNS LPF: ABNORMAL /LPF
IMM GRANULOCYTES # BLD AUTO: 0.02 X10(3)/MCL (ref 0–0.04)
IMM GRANULOCYTES NFR BLD AUTO: 0.2 %
KETONES UR QL STRIP: NEGATIVE
LEUKOCYTE ESTERASE UR QL STRIP: NEGATIVE
LIPASE SERPL-CCNC: 44 U/L
LYMPHOCYTES # BLD AUTO: 3.52 X10(3)/MCL (ref 0.6–4.6)
LYMPHOCYTES NFR BLD AUTO: 38.2 %
MCH RBC QN AUTO: 32.5 PG (ref 27–31)
MCHC RBC AUTO-ENTMCNC: 32.8 G/DL (ref 33–36)
MCV RBC AUTO: 99.1 FL (ref 80–94)
MONOCYTES # BLD AUTO: 1.15 X10(3)/MCL (ref 0.1–1.3)
MONOCYTES NFR BLD AUTO: 12.5 %
MUCOUS THREADS URNS QL MICRO: ABNORMAL /LPF
NEUTROPHILS # BLD AUTO: 4.34 X10(3)/MCL (ref 2.1–9.2)
NEUTROPHILS NFR BLD AUTO: 47.2 %
NITRITE UR QL STRIP: NEGATIVE
NRBC BLD AUTO-RTO: 0 %
PH UR STRIP: 5.5 [PH]
PLATELET # BLD AUTO: 211 X10(3)/MCL (ref 130–400)
PMV BLD AUTO: 10.8 FL (ref 7.4–10.4)
POTASSIUM SERPL-SCNC: 3.7 MMOL/L (ref 3.5–5.1)
PROT SERPL-MCNC: 7.4 GM/DL (ref 6.4–8.3)
PROT UR QL STRIP: NEGATIVE
RBC # BLD AUTO: 4.22 X10(6)/MCL (ref 4.2–5.4)
RBC #/AREA URNS AUTO: ABNORMAL /HPF
SODIUM SERPL-SCNC: 143 MMOL/L (ref 136–145)
SP GR UR STRIP.AUTO: 1.01 (ref 1–1.03)
SQUAMOUS #/AREA URNS LPF: ABNORMAL /HPF
UROBILINOGEN UR STRIP-ACNC: NORMAL
WBC # BLD AUTO: 9.21 X10(3)/MCL (ref 4.5–11.5)
WBC #/AREA URNS AUTO: ABNORMAL /HPF

## 2024-07-18 PROCEDURE — 99285 EMERGENCY DEPT VISIT HI MDM: CPT | Mod: 25

## 2024-07-18 PROCEDURE — 96374 THER/PROPH/DIAG INJ IV PUSH: CPT

## 2024-07-18 PROCEDURE — 80053 COMPREHEN METABOLIC PANEL: CPT | Performed by: PHYSICIAN ASSISTANT

## 2024-07-18 PROCEDURE — 25500020 PHARM REV CODE 255: Performed by: PHYSICIAN ASSISTANT

## 2024-07-18 PROCEDURE — 85025 COMPLETE CBC W/AUTO DIFF WBC: CPT | Performed by: PHYSICIAN ASSISTANT

## 2024-07-18 PROCEDURE — 63600175 PHARM REV CODE 636 W HCPCS: Performed by: PHYSICIAN ASSISTANT

## 2024-07-18 PROCEDURE — 83690 ASSAY OF LIPASE: CPT | Performed by: PHYSICIAN ASSISTANT

## 2024-07-18 PROCEDURE — 81001 URINALYSIS AUTO W/SCOPE: CPT | Performed by: PHYSICIAN ASSISTANT

## 2024-07-18 PROCEDURE — 96375 TX/PRO/DX INJ NEW DRUG ADDON: CPT

## 2024-07-18 RX ORDER — KETOROLAC TROMETHAMINE 30 MG/ML
15 INJECTION, SOLUTION INTRAMUSCULAR; INTRAVENOUS
Status: COMPLETED | OUTPATIENT
Start: 2024-07-18 | End: 2024-07-18

## 2024-07-18 RX ORDER — DOCUSATE SODIUM 100 MG/1
100 CAPSULE, LIQUID FILLED ORAL 2 TIMES DAILY PRN
Qty: 10 CAPSULE | Refills: 0 | Status: SHIPPED | OUTPATIENT
Start: 2024-07-18

## 2024-07-18 RX ORDER — ONDANSETRON HYDROCHLORIDE 2 MG/ML
4 INJECTION, SOLUTION INTRAVENOUS
Status: COMPLETED | OUTPATIENT
Start: 2024-07-18 | End: 2024-07-18

## 2024-07-18 RX ADMIN — KETOROLAC TROMETHAMINE 15 MG: 30 INJECTION, SOLUTION INTRAMUSCULAR; INTRAVENOUS at 08:07

## 2024-07-18 RX ADMIN — ONDANSETRON 4 MG: 2 INJECTION INTRAMUSCULAR; INTRAVENOUS at 08:07

## 2024-07-18 RX ADMIN — IOHEXOL 100 ML: 350 INJECTION, SOLUTION INTRAVENOUS at 09:07

## 2024-07-19 RX ORDER — AMOXICILLIN AND CLAVULANATE POTASSIUM 875; 125 MG/1; MG/1
1 TABLET, FILM COATED ORAL 2 TIMES DAILY
Qty: 14 TABLET | Refills: 0 | Status: SHIPPED | OUTPATIENT
Start: 2024-07-19

## 2024-07-19 NOTE — ED PROVIDER NOTES
Encounter Date: 7/18/2024       History     Chief Complaint   Patient presents with    Abdominal Pain     Chronic recurrent abd pain. Reports recent fevers.     Niya Moeller is a 54 y.o. female with a history asthma, diverticulosis, diverticulitis, and graves disease who presents to the ED complaining of LLQ abdominal pain x 1 week. Reports feels similar to when she has had diverticulitis in the past. She had some left over amoxicillin at home from a previous prescription and has been taking it with no improvement. Also reports fever and nausea. Has not taken any fever reducing meds today.     The history is provided by the patient.     Review of patient's allergies indicates:   Allergen Reactions    Latex Anaphylaxis and Edema    Zosyn [piperacillin-tazobactam] Anaphylaxis     Past Medical History:   Diagnosis Date    Asthma     Diverticulitis     Diverticulosis     GERD (gastroesophageal reflux disease)     Graves disease      Past Surgical History:   Procedure Laterality Date    COLONOSCOPY, WITH DIRECTED SUBMUCOSAL INJECTION N/A 4/8/2024    Procedure: COLONOSCOPY, WITH DIRECTED SUBMUCOSAL INJECTION;  Surgeon: Heather Lance MD;  Location: [facility] ENDOSCOPY;  Service: Gastroenterology;  Laterality: N/A;     Family History   Problem Relation Name Age of Onset    Hypothyroidism Mother       Social History     Tobacco Use    Smoking status: Every Day     Current packs/day: 0.50     Average packs/day: 0.5 packs/day for 41.5 years (20.8 ttl pk-yrs)     Types: Cigarettes     Start date: 1/1/1983    Smokeless tobacco: Never    Tobacco comments:     Ambulatory referral to Smoking Cessation clinic following hospital discharge.    Substance Use Topics    Alcohol use: Not Currently    Drug use: Never     Review of Systems   Constitutional:  Negative for fever.   HENT:  Negative for sore throat.    Respiratory:  Negative for shortness of breath.    Cardiovascular:  Negative for chest pain.   Gastrointestinal:   Positive for abdominal pain and nausea.   Genitourinary:  Negative for dysuria.   Musculoskeletal:  Negative for back pain.   Skin:  Negative for rash.   Neurological:  Negative for weakness.   Hematological:  Does not bruise/bleed easily.       Physical Exam     Initial Vitals [07/18/24 1926]   BP Pulse Resp Temp SpO2   107/62 78 20 97.6 °F (36.4 °C) --      MAP       --         Physical Exam    Nursing note and vitals reviewed.  Constitutional: She appears well-developed and well-nourished. No distress.   HENT:   Head: Normocephalic and atraumatic.   Mouth/Throat: No oropharyngeal exudate.   Eyes: EOM are normal. No scleral icterus.   Neck: Neck supple.   Normal range of motion.  Cardiovascular:  Normal rate and regular rhythm.           No murmur heard.  Pulmonary/Chest: Breath sounds normal. No respiratory distress. She has no wheezes.   Abdominal: Abdomen is soft. Bowel sounds are normal. She exhibits no distension. There is abdominal tenderness in the left lower quadrant. There is no rebound and no guarding.   Musculoskeletal:         General: No tenderness. Normal range of motion.      Cervical back: Normal range of motion and neck supple.     Neurological: She is alert and oriented to person, place, and time. No cranial nerve deficit.   Skin: Skin is warm and dry. Capillary refill takes less than 2 seconds. No erythema.   Psychiatric: She has a normal mood and affect. Her behavior is normal. Judgment and thought content normal.         ED Course   Procedures  Labs Reviewed   COMPREHENSIVE METABOLIC PANEL - Abnormal; Notable for the following components:       Result Value    Blood Urea Nitrogen 5.9 (*)     Creatinine 1.04 (*)     Globulin 3.7 (*)     Albumin/Globulin Ratio 1.0 (*)     All other components within normal limits   URINALYSIS, REFLEX TO URINE CULTURE - Abnormal; Notable for the following components:    Mucous, UA Trace (*)     All other components within normal limits   CBC WITH DIFFERENTIAL -  Abnormal; Notable for the following components:    MCV 99.1 (*)     MCH 32.5 (*)     MCHC 32.8 (*)     MPV 10.8 (*)     All other components within normal limits   LIPASE - Normal   CBC W/ AUTO DIFFERENTIAL    Narrative:     The following orders were created for panel order CBC auto differential.  Procedure                               Abnormality         Status                     ---------                               -----------         ------                     CBC with Differential[[id number]]       Abnormal            Final result                 Please view results for these tests on the individual orders.   EXTRA TUBES    Narrative:     The following orders were created for panel order EXTRA TUBES.  Procedure                               Abnormality         Status                     ---------                               -----------         ------                     Light Blue Top Hold[[id number]]                             Final result               Light Green Top Hold[[id number]]                            Final result               Gold Top Hold[[id number]]                                   Final result               Pink Top Hold[[id number]]                                   Final result                 Please view results for these tests on the individual orders.   LIGHT BLUE TOP HOLD   LIGHT GREEN TOP HOLD   GOLD TOP HOLD   PINK TOP HOLD          Imaging Results              CT Abdomen Pelvis With IV Contrast NO Oral Contrast (Preliminary result)  Result time 07/18/24 22:17:49      Preliminary result by Kevin Barreto Jr., MD (07/18/24 22:17:49)                   Narrative:    START OF REPORT:  Technique: CT of the abdomen and pelvis was performed with axial images as well as sagittal and coronal reconstruction images with intravenous contrast.    Comparison: Comparison is with study dated 2024-02-11 18:56:12.    Clinical History: Flank Pain (Left flank pain and nausea PTA.    Dosage  Information: Automated Exposure Control was utilized.    Findings:  Lines and Tubes: None.  Thorax:  Lungs: Mild opacity is present at the visualized middle lobe and inferior lingula, consistent with nonspecific dependent changes and scarring and atelectasis. Patchy areas of ground glass opacities are seen in the middle lobe, visualized inferior lingula and both lower lobes. Multifocal pneumonia may be considered.  Pleura: No effusions or thickening are seen.  Heart: The heart size is within normal limits.  Abdomen:  Abdominal Wall: No abdominal wall pathology is seen.  Liver: The liver appears unremarkable.  Biliary System: No intrahepatic or extrahepatic biliary duct dilatation is seen.  Gallbladder: The gallbladder is non-distended and appears otherwise unremarkable.  Pancreas: The pancreas appears unremarkable.  Spleen: The spleen appears unremarkable.  Adrenals: The adrenal glands appear unremarkable.  Kidneys: The kidneys appear unremarkable with no stones cysts masses or hydronephrosis with IV contrast decreasing sensitivity and specificity for stones.  Aorta: The abdominal aorta appears unremarkable.  IVC: Unremarkable.  Bowel:  Esophagus: The visualized esophagus appears unremarkable.  Stomach: The stomach appears unremarkable.  Duodenum: Unremarkable appearing duodenum.  Small Bowel: The small bowel appears unremarkable.  Colon: Nondistended. There is moderate stool in the colon which could reflect an element of constipation. Multiple scattered diverticula are seen predominantly in the transverse, descending and sigmoid colon. No associated inflammatory stranding or pericolonic fluid is seen to suggest diverticulitis.  Appendix: The appendix appears unremarkable and is seen on Image 92, Series 2 through Image 95, Series 2.  Peritoneum: No intraperitoneal free air or ascites is seen.    Pelvis:  Bladder: The bladder is nondistended but appears otherwise unremarkable.  Female:  Uterus: A nodular enhancing  focus is seen at the anterior aspect of the uterine body, likely a uterine fibroid.  Ovaries: The ovaries are not identified. No adnexal masses are seen.    Bony structures:  Dorsal Spine: There is mild multilevel spondylosis of the visualized dorsal spine. Severe loss of intervertebral disc height with vacuum disc phenomenon is again noted in L5-S1.  Bony Pelvis: The visualized bony structures of the pelvis appear unremarkable.      Impression:  1. There is moderate stool in the colon which could reflect an element of constipation. Multiple scattered diverticula are seen predominantly in the transverse, descending and sigmoid colon. No associated inflammatory stranding or pericolonic fluid is seen to suggest diverticulitis.  2. Patchy areas of ground glass opacities are seen in the middle lobe, visualized inferior lingula and both lower lobes. Multifocal pneumonia may be considered. Correlate with clinical and laboratory findings as regards additional evaluation and follow-up.  3. The kidneys appear unremarkable with no stones cysts masses or hydronephrosis with IV contrast decreasing sensitivity and specificity for stones.  4. Details and other findings as discussed above.                                         Medications   ketorolac injection 15 mg (15 mg Intravenous Given 7/18/24 2046)   ondansetron injection 4 mg (4 mg Intravenous Given 7/18/24 2046)   iohexoL (OMNIPAQUE 350) injection 100 mL (100 mLs Intravenous Given 7/18/24 2155)     Medical Decision Making  Differential: diverticulitis, chronic abdominal pain, among others    ED management: HDS and afebrile. Resting comfortably in NAD. + LLQ abdominal tenderness without rebound or guarding. Labs unremarkable. UA without infection. CT abdomen/pelvis without diverticulitis, + constipation. Discussed findings with patient. Will prescribe colace to take BID prn. Instructed to follow up with PCP in 3 days. ED return precautions given. She verbalized  understanding. All questions answered.     Amount and/or Complexity of Data Reviewed  Labs: ordered.  Radiology: ordered. Decision-making details documented in ED Course.    Risk  OTC drugs.  Prescription drug management.               ED Course as of 07/18/24 2240   Thu Jul 18, 2024 2236 CT Abdomen Pelvis With IV Contrast NO Oral Contrast  There is moderate stool in the colon which could reflect an element of constipation. Multiple scattered diverticula are seen predominantly in the transverse, descending and sigmoid colon. No associated inflammatory stranding or pericolonic fluid is seen to suggest diverticulitis. [KD]      ED Course User Index  [KD] Arely Oscar PA-C                           Clinical Impression:  Final diagnoses:  [K59.00] Constipation, unspecified constipation type (Primary)          ED Disposition Condition    Discharge Stable          ED Prescriptions       Medication Sig Dispense Start Date End Date Auth. Provider    docusate sodium (COLACE) 100 MG capsule Take 1 capsule (100 mg total) by mouth 2 (two) times daily as needed for Constipation. 10 capsule 7/18/2024 -- Arely Oscar PA-C          Follow-up Information       Follow up With Specialties Details Why Contact Info    [facility] Emergency Medicine  If symptoms worsen [address] [facility] [id number] [phone]    Oswaldo Riggs, NP Family Medicine In 3 days Hospital follow up [address] [place] [address] [address]  PRIMARY CARE [facility]  [place] [address]  [phone]               Arely Oscar PA-C  07/18/24 2241

## 2024-07-22 NOTE — ASSESSMENT & PLAN NOTE
Recurrent diverticulitis complicated by abscess  Recommended multiple times that she undergo resection  Has been hesitant to pursue colonoscopy but amendable at this time  More willing to proceed with surgical resection     Schedule colonoscopy  Refer to [facility]

## 2024-07-29 NOTE — PLAN OF CARE
Problem: Infection  Goal: Absence of Infection Signs and Symptoms  Outcome: Ongoing, Progressing     Problem: Adult Inpatient Plan of Care  Goal: Plan of Care Review  Outcome: Ongoing, Progressing  Goal: Patient-Specific Goal (Individualized)  Outcome: Ongoing, Progressing  Goal: Absence of Hospital-Acquired Illness or Injury  Outcome: Ongoing, Progressing  Goal: Optimal Comfort and Wellbeing  Outcome: Ongoing, Progressing  Goal: Readiness for Transition of Care  Outcome: Ongoing, Progressing     Problem: Nausea and Vomiting  Goal: Fluid and Electrolyte Balance  Outcome: Ongoing, Progressing      no

## 2024-08-20 ENCOUNTER — HOSPITAL ENCOUNTER (EMERGENCY)
Facility: HOSPITAL | Age: 54
Discharge: HOME | End: 2024-08-20
Attending: EMERGENCY MEDICINE
Payer: MEDICAID

## 2024-08-20 VITALS
RESPIRATION RATE: 18 BRPM | HEIGHT: 60 IN | TEMPERATURE: 98 F | SYSTOLIC BLOOD PRESSURE: 100 MMHG | WEIGHT: 135 LBS | HEART RATE: 85 BPM | DIASTOLIC BLOOD PRESSURE: 56 MMHG | OXYGEN SATURATION: 95 % | BODY MASS INDEX: 26.5 KG/M2

## 2024-08-20 DIAGNOSIS — J45.901 EXACERBATION OF ASTHMA, UNSPECIFIED ASTHMA SEVERITY, UNSPECIFIED WHETHER PERSISTENT: ICD-10-CM

## 2024-08-20 DIAGNOSIS — R06.02 SOB (SHORTNESS OF BREATH): ICD-10-CM

## 2024-08-20 DIAGNOSIS — K57.92 DIVERTICULITIS: Primary | ICD-10-CM

## 2024-08-20 LAB
ALBUMIN SERPL-MCNC: 3.4 G/DL (ref 3.5–5)
ALBUMIN/GLOB SERPL: 1 RATIO (ref 1.1–2)
ALP SERPL-CCNC: 60 UNIT/L (ref 40–150)
ALT SERPL-CCNC: 16 UNIT/L (ref 0–55)
ANION GAP SERPL CALC-SCNC: 8 MEQ/L
AST SERPL-CCNC: 17 UNIT/L (ref 5–34)
BASOPHILS # BLD AUTO: 0.02 X10(3)/MCL
BASOPHILS NFR BLD AUTO: 0.3 %
BILIRUB SERPL-MCNC: 0.6 MG/DL
BUN SERPL-MCNC: 5.2 MG/DL (ref 9.8–20.1)
CALCIUM SERPL-MCNC: 10.2 MG/DL (ref 8.4–10.2)
CHLORIDE SERPL-SCNC: 109 MMOL/L (ref 98–107)
CO2 SERPL-SCNC: 26 MMOL/L (ref 22–29)
CREAT SERPL-MCNC: 0.86 MG/DL (ref 0.55–1.02)
CREAT/UREA NIT SERPL: 6
EOSINOPHIL # BLD AUTO: 0.19 X10(3)/MCL (ref 0–0.9)
EOSINOPHIL NFR BLD AUTO: 2.8 %
ERYTHROCYTE [DISTWIDTH] IN BLOOD BY AUTOMATED COUNT: 13.6 % (ref 11.5–17)
GFR SERPLBLD CREATININE-BSD FMLA CKD-EPI: >60 ML/MIN/1.73/M2
GLOBULIN SER-MCNC: 3.4 GM/DL (ref 2.4–3.5)
GLUCOSE SERPL-MCNC: 75 MG/DL (ref 74–100)
HCT VFR BLD AUTO: 40.6 % (ref 37–47)
HGB BLD-MCNC: 13.4 G/DL (ref 12–16)
IMM GRANULOCYTES # BLD AUTO: 0.01 X10(3)/MCL (ref 0–0.04)
IMM GRANULOCYTES NFR BLD AUTO: 0.1 %
LIPASE SERPL-CCNC: 12 U/L
LYMPHOCYTES # BLD AUTO: 2.48 X10(3)/MCL (ref 0.6–4.6)
LYMPHOCYTES NFR BLD AUTO: 36 %
MCH RBC QN AUTO: 32.4 PG (ref 27–31)
MCHC RBC AUTO-ENTMCNC: 33 G/DL (ref 33–36)
MCV RBC AUTO: 98.3 FL (ref 80–94)
MONOCYTES # BLD AUTO: 0.74 X10(3)/MCL (ref 0.1–1.3)
MONOCYTES NFR BLD AUTO: 10.7 %
NEUTROPHILS # BLD AUTO: 3.45 X10(3)/MCL (ref 2.1–9.2)
NEUTROPHILS NFR BLD AUTO: 50.1 %
NRBC BLD AUTO-RTO: 0 %
OHS QRS DURATION: 74 MS
OHS QTC CALCULATION: 429 MS
PLATELET # BLD AUTO: 201 X10(3)/MCL (ref 130–400)
PMV BLD AUTO: 10.5 FL (ref 7.4–10.4)
POTASSIUM SERPL-SCNC: 4.1 MMOL/L (ref 3.5–5.1)
PROT SERPL-MCNC: 6.8 GM/DL (ref 6.4–8.3)
RBC # BLD AUTO: 4.13 X10(6)/MCL (ref 4.2–5.4)
SODIUM SERPL-SCNC: 143 MMOL/L (ref 136–145)
TROPONIN I SERPL-MCNC: <0.01 NG/ML (ref 0–0.04)
WBC # BLD AUTO: 6.89 X10(3)/MCL (ref 4.5–11.5)

## 2024-08-20 PROCEDURE — 94640 AIRWAY INHALATION TREATMENT: CPT

## 2024-08-20 PROCEDURE — 80053 COMPREHEN METABOLIC PANEL: CPT | Performed by: PHYSICIAN ASSISTANT

## 2024-08-20 PROCEDURE — 25000242 PHARM REV CODE 250 ALT 637 W/ HCPCS: Performed by: PHYSICIAN ASSISTANT

## 2024-08-20 PROCEDURE — 83690 ASSAY OF LIPASE: CPT | Performed by: PHYSICIAN ASSISTANT

## 2024-08-20 PROCEDURE — 84484 ASSAY OF TROPONIN QUANT: CPT | Performed by: PHYSICIAN ASSISTANT

## 2024-08-20 PROCEDURE — 85025 COMPLETE CBC W/AUTO DIFF WBC: CPT | Performed by: PHYSICIAN ASSISTANT

## 2024-08-20 PROCEDURE — 93005 ELECTROCARDIOGRAM TRACING: CPT

## 2024-08-20 PROCEDURE — 63600175 PHARM REV CODE 636 W HCPCS: Performed by: PHYSICIAN ASSISTANT

## 2024-08-20 PROCEDURE — 99285 EMERGENCY DEPT VISIT HI MDM: CPT | Mod: 25

## 2024-08-20 PROCEDURE — 96372 THER/PROPH/DIAG INJ SC/IM: CPT | Performed by: PHYSICIAN ASSISTANT

## 2024-08-20 RX ORDER — METHYLPREDNISOLONE 4 MG/1
TABLET ORAL
Qty: 21 TABLET | Refills: 0 | Status: SHIPPED | OUTPATIENT
Start: 2024-08-20 | End: 2024-09-10

## 2024-08-20 RX ORDER — CIPROFLOXACIN 500 MG/1
500 TABLET ORAL 2 TIMES DAILY
Qty: 14 TABLET | Refills: 0 | Status: SHIPPED | OUTPATIENT
Start: 2024-08-20 | End: 2024-08-27

## 2024-08-20 RX ORDER — METRONIDAZOLE 500 MG/1
500 TABLET ORAL 3 TIMES DAILY
Qty: 21 TABLET | Refills: 0 | Status: SHIPPED | OUTPATIENT
Start: 2024-08-20 | End: 2024-08-27

## 2024-08-20 RX ORDER — TIZANIDINE 2 MG/1
2 TABLET ORAL EVERY 8 HOURS
Qty: 15 TABLET | Refills: 0 | Status: SHIPPED | OUTPATIENT
Start: 2024-08-20 | End: 2024-08-25

## 2024-08-20 RX ORDER — IPRATROPIUM BROMIDE AND ALBUTEROL SULFATE 2.5; .5 MG/3ML; MG/3ML
9 SOLUTION RESPIRATORY (INHALATION)
Status: COMPLETED | OUTPATIENT
Start: 2024-08-20 | End: 2024-08-20

## 2024-08-20 RX ORDER — KETOROLAC TROMETHAMINE 30 MG/ML
30 INJECTION, SOLUTION INTRAMUSCULAR; INTRAVENOUS
Status: COMPLETED | OUTPATIENT
Start: 2024-08-20 | End: 2024-08-20

## 2024-08-20 RX ORDER — KETOROLAC TROMETHAMINE 30 MG/ML
15 INJECTION, SOLUTION INTRAMUSCULAR; INTRAVENOUS
Status: DISCONTINUED | OUTPATIENT
Start: 2024-08-20 | End: 2024-08-20

## 2024-08-20 RX ORDER — DICYCLOMINE HYDROCHLORIDE 20 MG/1
20 TABLET ORAL 2 TIMES DAILY
Qty: 20 TABLET | Refills: 0 | Status: SHIPPED | OUTPATIENT
Start: 2024-08-20 | End: 2024-09-19

## 2024-08-20 RX ORDER — METHYLPREDNISOLONE SOD SUCC 125 MG
125 VIAL (EA) INJECTION
Status: COMPLETED | OUTPATIENT
Start: 2024-08-20 | End: 2024-08-20

## 2024-08-20 RX ADMIN — IPRATROPIUM BROMIDE AND ALBUTEROL SULFATE 9 ML: .5; 3 SOLUTION RESPIRATORY (INHALATION) at 12:08

## 2024-08-20 RX ADMIN — METHYLPREDNISOLONE SODIUM SUCCINATE 125 MG: 125 INJECTION, POWDER, FOR SOLUTION INTRAMUSCULAR; INTRAVENOUS at 03:08

## 2024-08-20 RX ADMIN — KETOROLAC TROMETHAMINE 30 MG: 30 INJECTION, SOLUTION INTRAMUSCULAR at 02:08

## 2024-08-20 NOTE — ED NOTES
Pt refused to except discharge instructions without a different medication for pain. Pt did not want bentyl. Pt states it makes me constipated. Demanded provider prescribe something else.

## 2024-08-20 NOTE — ED PROVIDER NOTES
"Encounter Date: 8/20/2024       History     Chief Complaint   Patient presents with    Abdominal Pain    Eye Problem    Shortness of Breath     C/o abdominal pain for 2 days. States hx of diverticulitis. Also c/o increased sob. Hx of asthma.  has eye problem  x 1 year with redness and itching of eyes . States has been to eye dr with eye drops but has not helped.      Niya Moeller is a 54 y.o. female with a PMHx of asthma, diverticulitis, GERD, and Graves disease presents to the ED with complaints of left lower quadrant abdominal pain that started 2 day(s) ago. She denies vomiting and diarrhea but reports nausea. She states she recently moved out of an apartment that had black mold and it "has my asthma flared up." She reports using her inhalers more than normal for the past week without improvement of symptoms. She denies chest pain. Denies dizziness and syncope. Reports occasional cough but states she is wheezing.       The history is provided by the patient. No  was used.     Review of patient's allergies indicates:   Allergen Reactions    Latex Anaphylaxis and Edema    Zosyn [piperacillin-tazobactam] Anaphylaxis     Past Medical History:   Diagnosis Date    Asthma     Diverticulitis     Diverticulosis     GERD (gastroesophageal reflux disease)     Graves disease      Past Surgical History:   Procedure Laterality Date    COLONOSCOPY, WITH DIRECTED SUBMUCOSAL INJECTION N/A 4/8/2024    Procedure: COLONOSCOPY, WITH DIRECTED SUBMUCOSAL INJECTION;  Surgeon: Heather Lance MD;  Location: [facility] ENDOSCOPY;  Service: Gastroenterology;  Laterality: N/A;     Family History   Problem Relation Name Age of Onset    Hypothyroidism Mother       Social History     Tobacco Use    Smoking status: Every Day     Current packs/day: 0.50     Average packs/day: 0.5 packs/day for 41.6 years (20.8 ttl pk-yrs)     Types: Cigarettes     Start date: 1/1/1983    Smokeless tobacco: Never    Tobacco comments: "     Ambulatory referral to Smoking Cessation clinic following hospital discharge.    Substance Use Topics    Alcohol use: Not Currently    Drug use: Never     Review of Systems   Constitutional:  Negative for chills, fatigue and fever.   HENT:  Negative for congestion, ear pain, sinus pain and sore throat.    Eyes:  Negative for pain.   Respiratory:  Positive for shortness of breath and wheezing. Negative for cough and chest tightness.    Cardiovascular:  Negative for chest pain.   Gastrointestinal:  Positive for abdominal pain and nausea. Negative for constipation, diarrhea and vomiting.   Genitourinary:  Negative for dysuria, flank pain, frequency and hematuria.   Musculoskeletal:  Negative for back pain and joint swelling.   Skin:  Negative for color change and rash.   Neurological:  Negative for dizziness, syncope, weakness, light-headedness and headaches.   Psychiatric/Behavioral:  Negative for behavioral problems and confusion.        Physical Exam     Initial Vitals [08/20/24 1103]   BP Pulse Resp Temp SpO2   127/72 82 20 97.9 °F (36.6 °C) 99 %      MAP       --         Physical Exam    Nursing note and vitals reviewed.  Constitutional: She appears well-developed and well-nourished.   HENT:   Head: Normocephalic and atraumatic.   Nose: Nose normal.   Eyes: EOM are normal. Pupils are equal, round, and reactive to light.   Neck: Neck supple. No thyromegaly present. No JVD present.   Normal range of motion.  Cardiovascular:  Normal rate, regular rhythm, normal heart sounds and intact distal pulses.           No murmur heard.  Pulmonary/Chest: No respiratory distress. She has wheezes. She has no rhonchi. She has no rales. She exhibits no tenderness.   Abdominal: Abdomen is soft. Bowel sounds are normal. She exhibits no distension. There is abdominal tenderness in the left lower quadrant. There is no rebound and no guarding.   Musculoskeletal:         General: No tenderness or edema. Normal range of motion.       Cervical back: Normal range of motion and neck supple.     Lymphadenopathy:     She has no cervical adenopathy.   Neurological: She is alert and oriented to person, place, and time.   Skin: Skin is warm and dry. Capillary refill takes less than 2 seconds.   Psychiatric: She has a normal mood and affect. Thought content normal.         ED Course   Procedures  Labs Reviewed   COMPREHENSIVE METABOLIC PANEL - Abnormal       Result Value    Sodium 143      Potassium 4.1      Chloride 109 (*)     CO2 26      Glucose 75      Blood Urea Nitrogen 5.2 (*)     Creatinine 0.86      Calcium 10.2      Protein Total 6.8      Albumin 3.4 (*)     Globulin 3.4      Albumin/Globulin Ratio 1.0 (*)     Bilirubin Total 0.6      ALP 60      ALT 16      AST 17      eGFR >60      Anion Gap 8.0      BUN/Creatinine Ratio 6     CBC WITH DIFFERENTIAL - Abnormal    WBC 6.89      RBC 4.13 (*)     Hgb 13.4      Hct 40.6      MCV 98.3 (*)     MCH 32.4 (*)     MCHC 33.0      RDW 13.6      Platelet 201      MPV 10.5 (*)     Neut % 50.1      Lymph % 36.0      Mono % 10.7      Eos % 2.8      Basophil % 0.3      Lymph # 2.48      Neut # 3.45      Mono # 0.74      Eos # 0.19      Baso # 0.02      IG# 0.01      IG% 0.1      NRBC% 0.0     LIPASE - Normal    Lipase Level 12     TROPONIN I - Normal    Troponin-I <0.010     CBC W/ AUTO DIFFERENTIAL    Narrative:     The following orders were created for panel order CBC Auto Differential.  Procedure                               Abnormality         Status                     ---------                               -----------         ------                     CBC with Differential[[id number]]       Abnormal            Final result                 Please view results for these tests on the individual orders.   URINALYSIS        ECG Results              EKG 12-lead (Shortness of Breath) Age > 50 (In process)        Collection Time Result Time QRS Duration [facility] QTC Calculation    08/20/24 11:29:19 08/20/24  11:35:21 74 429                     In process by Interface, Lab In [facility] (08/20/24 11:35:31)                   Narrative:    Test Reason : R06.02,    Vent. Rate : 081 BPM     Atrial Rate : 081 BPM     P-R Int : 136 ms          QRS Dur : 074 ms      QT Int : 370 ms       P-R-T Axes : 077 082 074 degrees     QTc Int : 429 ms    Normal sinus rhythm  Septal infarct ,age undetermined  Abnormal ECG  When compared with ECG of 05-APR-2024 14:49,  No significant change was found    Referred By: [email or web address]   SELF           Confirmed By:                                   Imaging Results              CT Abdomen Pelvis  Without Contrast (Final result)  Result time 08/20/24 15:25:25      Final result by Glen Celestin MD (08/20/24 15:25:25)                   Impression:      Sigmoid diverticulitis without gross perforation or drainable abscess.      Electronically signed by: Glen Celestin  Date:    08/20/2024  Time:    15:25               Narrative:    EXAMINATION:  CT ABDOMEN PELVIS WITHOUT CONTRAST    CLINICAL HISTORY:  LLQ abdominal pain;    TECHNIQUE:  Helical acquisition through the abdomen and pelvis without IV contrast.  Lack of contrast limits evaluation of solid organs and vascular structures .  Three plane reconstructions were provided for review.  mGycm. Automatic exposure control, adjustment of mA/kV or iterative reconstruction technique was used to reduce radiation.    COMPARISON:  18 July 2024    FINDINGS:  Similar appearance of chronic changes at the lung bases.    There is no significant abnormality of the noncontrast liver, gallbladder, spleen, pancreas or adrenals.  No hydronephrosis.  There is a small left renal calcification.    There is no bowel obstruction.  There is colonic diverticulosis with inflammatory changes along the proximal sigmoid colon.  No gross perforation or drainable abscess.    Urinary bladder is unremarkable. No free fluid. Aorta normal in caliber.    There are no acute osseous  "findings.                                       X-Ray Chest 1 View (Final result)  Result time 08/20/24 12:28:39      Final result by Pillo Stein MD (08/20/24 12:28:39)                   Impression:      No acute chest disease is identified.      Electronically signed by: Pillo Stein  Date:    08/20/2024  Time:    12:28               Narrative:    EXAMINATION:  XR CHEST 1 VIEW    CLINICAL HISTORY:  wheezing;, .    FINDINGS:  No alveolar consolidation, effusion, or pneumothorax is seen.   The thoracic aorta is normal  cardiac silhouette, central pulmonary vessels and mediastinum are normal in size and are grossly unremarkable.   visualized osseous structures are grossly unremarkable.                                       Medications   albuterol-ipratropium 2.5 mg-0.5 mg/3 mL nebulizer solution 9 mL (9 mLs Nebulization Given 8/20/24 1210)   ketorolac injection 30 mg (30 mg Intramuscular Given 8/20/24 1431)   methylPREDNISolone sodium succinate injection 125 mg (125 mg Intramuscular Given 8/20/24 1520)     Medical Decision Making  DDX: diverticulitis, pyelonephritis, gastroenteritis,among others  Asthma exacerbation, pneumonia, URI, among others    Niya Moeller is a 54 y.o. female with a PMHx of asthma, diverticulitis, GERD, and Graves disease presents to the ED with complaints of left lower quadrant abdominal pain that started 2 day(s) ago. She denies vomiting and diarrhea but reports nausea. She states she recently moved out of an apartment that had black mold and it "has my asthma flared up." She reports using her inhalers more than normal for the past week without improvement of symptoms. She denies chest pain. Denies dizziness and syncope. Reports occasional cough but states she is wheezing.     Hospital Course: Labs and imaging ordered. DuoNeb x 3 ordered for diffuse wheezing. Solumedrol 125 mg IM given as well. CBC, CMP, Lipase, and Troponin wnl. CT abdomen/pelvis reveals diverticulitis without " abscess or perforation. Will DC Home with Medrol Dose Pack and Cipro/Flagyl, as she had an anaphylactic reaction to Zosyn in the past. Will also DC home with bentyl. She states she has inhalers and medication for her nebulizer at home. Strict return precautions given. Stable for discharge.     Amount and/or Complexity of Data Reviewed  Labs: ordered.  Radiology: ordered.    Risk  Prescription drug management.                                      Clinical Impression:  Final diagnoses:  [R06.02] SOB (shortness of breath)  [K57.92] Diverticulitis (Primary)  [J45.901] Exacerbation of asthma, unspecified asthma severity, unspecified whether persistent          ED Disposition Condition    Discharge Stable          ED Prescriptions       Medication Sig Dispense Start Date End Date Auth. Provider    ciprofloxacin HCl (CIPRO) 500 MG tablet Take 1 tablet (500 mg total) by mouth 2 (two) times daily. for 7 days 14 tablet 8/20/2024 8/27/2024 Maylin Wei PA-C    metroNIDAZOLE (FLAGYL) 500 MG tablet Take 1 tablet (500 mg total) by mouth 3 (three) times daily. for 7 days 21 tablet 8/20/2024 8/27/2024 Maylin Wei PA-C    dicyclomine (BENTYL) 20 mg tablet Take 1 tablet (20 mg total) by mouth 2 (two) times daily. 20 tablet 8/20/2024 9/19/2024 Maylin Wei PA-C    methylPREDNISolone (MEDROL DOSEPACK) 4 mg tablet Please take as directed on packing. 21 tablet 8/20/2024 9/10/2024 Maylin Wei PA-C          Follow-up Information       Follow up With Specialties Details Why Contact Info    Oswaldo Riggs, NP Family Medicine   [address] [place] [address] [address]  [facility]  [place] [address] [phone]      [facility] Emergency Medicine In 3 days As needed, If symptoms worsen [address] [facility] [address] [phone]             Maylin Wei PA-C  08/20/24 9943

## 2024-09-09 ENCOUNTER — HOSPITAL ENCOUNTER (EMERGENCY)
Facility: HOSPITAL | Age: 54
Discharge: HOME | End: 2024-09-09
Attending: STUDENT IN AN ORGANIZED HEALTH CARE EDUCATION/TRAINING PROGRAM
Payer: MEDICAID

## 2024-09-09 VITALS
HEART RATE: 90 BPM | DIASTOLIC BLOOD PRESSURE: 77 MMHG | SYSTOLIC BLOOD PRESSURE: 114 MMHG | RESPIRATION RATE: 18 BRPM | OXYGEN SATURATION: 98 % | HEIGHT: 59 IN | BODY MASS INDEX: 29.23 KG/M2 | TEMPERATURE: 98 F | WEIGHT: 145 LBS

## 2024-09-09 DIAGNOSIS — J45.909 ASTHMA, UNSPECIFIED ASTHMA SEVERITY, UNSPECIFIED WHETHER COMPLICATED, UNSPECIFIED WHETHER PERSISTENT: Primary | ICD-10-CM

## 2024-09-09 DIAGNOSIS — R06.2 WHEEZING: ICD-10-CM

## 2024-09-09 PROCEDURE — 63600175 PHARM REV CODE 636 W HCPCS: Performed by: NURSE PRACTITIONER

## 2024-09-09 PROCEDURE — 99284 EMERGENCY DEPT VISIT MOD MDM: CPT | Mod: 25

## 2024-09-09 PROCEDURE — 96372 THER/PROPH/DIAG INJ SC/IM: CPT | Performed by: NURSE PRACTITIONER

## 2024-09-09 PROCEDURE — 94640 AIRWAY INHALATION TREATMENT: CPT

## 2024-09-09 PROCEDURE — 25000242 PHARM REV CODE 250 ALT 637 W/ HCPCS: Performed by: NURSE PRACTITIONER

## 2024-09-09 RX ORDER — DIPHENHYDRAMINE HYDROCHLORIDE 50 MG/ML
25 INJECTION INTRAMUSCULAR; INTRAVENOUS
Status: COMPLETED | OUTPATIENT
Start: 2024-09-09 | End: 2024-09-09

## 2024-09-09 RX ORDER — IPRATROPIUM BROMIDE AND ALBUTEROL SULFATE 2.5; .5 MG/3ML; MG/3ML
9 SOLUTION RESPIRATORY (INHALATION)
Status: COMPLETED | OUTPATIENT
Start: 2024-09-09 | End: 2024-09-09

## 2024-09-09 RX ORDER — PREDNISONE 20 MG/1
40 TABLET ORAL DAILY
Qty: 10 TABLET | Refills: 0 | Status: SHIPPED | OUTPATIENT
Start: 2024-09-09 | End: 2024-09-14

## 2024-09-09 RX ORDER — DEXAMETHASONE SODIUM PHOSPHATE 4 MG/ML
8 INJECTION, SOLUTION INTRA-ARTICULAR; INTRALESIONAL; INTRAMUSCULAR; INTRAVENOUS; SOFT TISSUE
Status: COMPLETED | OUTPATIENT
Start: 2024-09-09 | End: 2024-09-09

## 2024-09-09 RX ORDER — AZITHROMYCIN 250 MG/1
TABLET, FILM COATED ORAL
Qty: 6 TABLET | Refills: 0 | Status: SHIPPED | OUTPATIENT
Start: 2024-09-09 | End: 2024-09-14

## 2024-09-09 RX ADMIN — DIPHENHYDRAMINE HYDROCHLORIDE 25 MG: 50 INJECTION INTRAMUSCULAR; INTRAVENOUS at 01:09

## 2024-09-09 RX ADMIN — DEXAMETHASONE SODIUM PHOSPHATE 8 MG: 4 INJECTION, SOLUTION INTRA-ARTICULAR; INTRALESIONAL; INTRAMUSCULAR; INTRAVENOUS; SOFT TISSUE at 01:09

## 2024-09-09 RX ADMIN — IPRATROPIUM BROMIDE AND ALBUTEROL SULFATE 9 ML: .5; 3 SOLUTION RESPIRATORY (INHALATION) at 01:09

## 2024-09-09 NOTE — ED PROVIDER NOTES
Encounter Date: 9/9/2024       History     Chief Complaint   Patient presents with    Shortness of Breath     Asthma flare up     Pt is a 54 y.o. female who presents to the [facility] ED for evaluation for wheezing and shortness of breath. Hx of asthma. Denies relief with home nebulizer medication. Denies chest pain, fever, abdominal pain, nausea, or vomiting. Hx of asthma, diverticulitis, GERD, and thyroid disease.        Review of patient's allergies indicates:   Allergen Reactions    Latex Anaphylaxis and Edema    Zosyn [piperacillin-tazobactam] Anaphylaxis     Past Medical History:   Diagnosis Date    Asthma     Diverticulitis     Diverticulosis     GERD (gastroesophageal reflux disease)     Graves disease      Past Surgical History:   Procedure Laterality Date    COLONOSCOPY, WITH DIRECTED SUBMUCOSAL INJECTION N/A 4/8/2024    Procedure: COLONOSCOPY, WITH DIRECTED SUBMUCOSAL INJECTION;  Surgeon: Heather Lance MD;  Location: [facility] ENDOSCOPY;  Service: Gastroenterology;  Laterality: N/A;     Family History   Problem Relation Name Age of Onset    Hypothyroidism Mother       Social History     Tobacco Use    Smoking status: Every Day     Current packs/day: 0.50     Average packs/day: 0.5 packs/day for 41.8 years (20.9 ttl pk-yrs)     Types: Cigarettes     Start date: 1/1/1983    Smokeless tobacco: Never    Tobacco comments:     Ambulatory referral to Smoking Cessation clinic following hospital discharge.    Substance Use Topics    Alcohol use: Not Currently    Drug use: Never     Review of Systems   Constitutional:  Negative for chills, diaphoresis, fatigue and fever.   HENT:  Negative for facial swelling, rhinorrhea, sinus pressure, sinus pain, sore throat and trouble swallowing.    Respiratory:  Positive for shortness of breath and wheezing. Negative for cough and chest tightness.    Cardiovascular:  Negative for chest pain, palpitations and leg swelling.   Gastrointestinal:  Negative for abdominal pain,  diarrhea, nausea and vomiting.   Genitourinary:  Negative for dysuria, flank pain, frequency, hematuria and urgency.   Musculoskeletal:  Negative for arthralgias, back pain, joint swelling and myalgias.   Skin:  Negative for color change and rash.   Neurological:  Negative for dizziness, syncope, weakness and light-headedness.   Hematological:  Does not bruise/bleed easily.   All other systems reviewed and are negative.      Physical Exam     Initial Vitals [09/09/24 0110]   BP Pulse Resp Temp SpO2   114/77 87 17 97.7 °F (36.5 °C) 96 %      MAP       --         Physical Exam    Nursing note and vitals reviewed.  Constitutional: She appears well-developed and well-nourished.   HENT:   Head: Normocephalic and atraumatic.   Nose: Nose normal. Mouth/Throat: Oropharynx is clear and moist.   Eyes: Conjunctivae and EOM are normal. Pupils are equal, round, and reactive to light.   Neck: Neck supple.   Normal range of motion.  Cardiovascular:  Normal rate, regular rhythm, normal heart sounds and intact distal pulses.           Pulmonary/Chest: Effort normal. No respiratory distress. She has wheezes in the right middle field and the left middle field. She has no rhonchi. She has no rales. She exhibits no tenderness.   Abdominal: Abdomen is soft and flat. Bowel sounds are normal. She exhibits no distension. There is no abdominal tenderness. There is no rebound, no guarding, no tenderness at McBurney's point and negative Parnell's sign. negative psoas sign  Musculoskeletal:         General: Normal range of motion.      Cervical back: Normal range of motion and neck supple.     Neurological: She is alert and oriented to person, place, and time. She has normal strength and normal reflexes.   Skin: Skin is warm and dry. Capillary refill takes less than 2 seconds.   Psychiatric: She has a normal mood and affect. Her speech is normal and behavior is normal. Judgment and thought content normal.         ED Course   Procedures  Labs  Reviewed   SARS-COV-2 RNA AMPLIFICATION, QUAL          Imaging Results              X-Ray Chest 1 View (Final result)  Result time 09/09/24 03:29:39      Final result by Pillo Stein MD (09/09/24 03:29:39)                   Impression:      No acute chest disease is identified.      Electronically signed by: Pillo Stein  Date:    09/09/2024  Time:    03:29               Narrative:    EXAMINATION:  XR CHEST 1 VIEW    CLINICAL HISTORY:  , Wheezing.    COMPARISON:  August 20, 2024    FINDINGS:  No alveolar consolidation, effusion, or pneumothorax is seen.   The thoracic aorta is normal  cardiac silhouette, central pulmonary vessels and mediastinum are normal in size and are grossly unremarkable.   visualized osseous structures are grossly unremarkable.                        Wet Read by Fabien Bonilla MD (09/09/24 01:49:15, [facility], Emergency Medicine)    No acute pulmonary process                                     Medications   albuterol-ipratropium 2.5 mg-0.5 mg/3 mL nebulizer solution 9 mL (9 mLs Nebulization Given 9/9/24 0143)   dexAMETHasone injection 8 mg (8 mg Intramuscular Given 9/9/24 0132)   diphenhydrAMINE injection 25 mg (25 mg Intramuscular Given 9/9/24 0144)     Medical Decision Making  Differential:  Asthma  Pneumonia  COVID      Amount and/or Complexity of Data Reviewed  Radiology: ordered.    Risk  Prescription drug management.              Attending Attestation:             Attending ED Notes:   I, Dr. Fabien Bonilla; reviewed the documentation of Eugene Alvarado NP and agree with the assessment and plan of care, I approved management plan and take responsibility for the patient management. I had a face to face time with the patient.    HPI:  Patient presents to emergency department due to shortness of breath and chest tightness.    Physical Exam:  Tachypnea, wheezing on exam    Radiology:  Chest x-ray clear without acute abnormalities    Impression:  Asthma  "exacerbation    Plan:  Symptoms significantly improved after treatments, patient requested discharge.        ED Course as of 10/07/24 1424   Mon Sep 09, 2024   013 Pt reports feeling "itchy" from decadron and is requesting medication for issue. New order provided. [JA]   0145 Pt transitioned to Dr. EDGAR Bonilla, ED attending, at this time.  [JA]      ED Course User Index  [JA] Glen Alvarado Jr., FNP                           Clinical Impression:  Final diagnoses:  [R06.2] Wheezing  [J45.909] Asthma, unspecified asthma severity, unspecified whether complicated, unspecified whether persistent (Primary)          ED Disposition Condition    Discharge Stable          ED Prescriptions       Medication Sig Dispense Start Date End Date Auth. Provider    predniSONE (DELTASONE) 20 MG tablet () Take 2 tablets (40 mg total) by mouth once daily. for 5 days 10 tablet 2024 Fabien Bonilla MD    azithromycin (Z-FRANCISCO JAVIER) 250 MG tablet () Take 2 tablets by mouth on day 1; Take 1 tablet by mouth on days 2-5 6 tablet 2024 Fabien Bonilla MD          Follow-up Information       Follow up With Specialties Details Why Contact Info    [facility] Emergency Medicine Go to  If symptoms worsen [address] [facility] [address] [phone]             Fabien Bonilla MD  10/07/24 1425    "

## 2024-09-27 ENCOUNTER — HOSPITAL ENCOUNTER (EMERGENCY)
Facility: HOSPITAL | Age: 54
Discharge: HOME | End: 2024-09-27
Attending: EMERGENCY MEDICINE
Payer: MEDICAID

## 2024-09-27 VITALS
TEMPERATURE: 98 F | SYSTOLIC BLOOD PRESSURE: 110 MMHG | BODY MASS INDEX: 29.89 KG/M2 | HEART RATE: 81 BPM | RESPIRATION RATE: 19 BRPM | DIASTOLIC BLOOD PRESSURE: 53 MMHG | WEIGHT: 148 LBS | OXYGEN SATURATION: 96 %

## 2024-09-27 DIAGNOSIS — J45.901 EXACERBATION OF ASTHMA, UNSPECIFIED ASTHMA SEVERITY, UNSPECIFIED WHETHER PERSISTENT: Primary | ICD-10-CM

## 2024-09-27 DIAGNOSIS — R06.02 SOB (SHORTNESS OF BREATH): ICD-10-CM

## 2024-09-27 LAB
ALBUMIN SERPL-MCNC: 3.6 G/DL (ref 3.5–5)
ALBUMIN/GLOB SERPL: 1.1 RATIO (ref 1.1–2)
ALP SERPL-CCNC: 73 UNIT/L (ref 40–150)
ALT SERPL-CCNC: 13 UNIT/L (ref 0–55)
ANION GAP SERPL CALC-SCNC: 8 MEQ/L
AST SERPL-CCNC: 19 UNIT/L (ref 5–34)
BASOPHILS # BLD AUTO: 0.02 X10(3)/MCL
BASOPHILS NFR BLD AUTO: 0.3 %
BILIRUB SERPL-MCNC: 0.4 MG/DL
BUN SERPL-MCNC: 11 MG/DL (ref 9.8–20.1)
CALCIUM SERPL-MCNC: 9.7 MG/DL (ref 8.4–10.2)
CHLORIDE SERPL-SCNC: 110 MMOL/L (ref 98–107)
CO2 SERPL-SCNC: 22 MMOL/L (ref 22–29)
CREAT SERPL-MCNC: 0.85 MG/DL (ref 0.55–1.02)
CREAT/UREA NIT SERPL: 13
EOSINOPHIL # BLD AUTO: 0.11 X10(3)/MCL (ref 0–0.9)
EOSINOPHIL NFR BLD AUTO: 1.5 %
ERYTHROCYTE [DISTWIDTH] IN BLOOD BY AUTOMATED COUNT: 13.1 % (ref 11.5–17)
FLUAV AG UPPER RESP QL IA.RAPID: NOT DETECTED
FLUBV AG UPPER RESP QL IA.RAPID: NOT DETECTED
GFR SERPLBLD CREATININE-BSD FMLA CKD-EPI: >60 ML/MIN/1.73/M2
GLOBULIN SER-MCNC: 3.4 GM/DL (ref 2.4–3.5)
GLUCOSE SERPL-MCNC: 111 MG/DL (ref 74–100)
HCT VFR BLD AUTO: 44.4 % (ref 37–47)
HGB BLD-MCNC: 14.6 G/DL (ref 12–16)
HOLD SPECIMEN: NORMAL
IMM GRANULOCYTES # BLD AUTO: 0.02 X10(3)/MCL (ref 0–0.04)
IMM GRANULOCYTES NFR BLD AUTO: 0.3 %
LYMPHOCYTES # BLD AUTO: 1.3 X10(3)/MCL (ref 0.6–4.6)
LYMPHOCYTES NFR BLD AUTO: 17.9 %
MCH RBC QN AUTO: 33.3 PG (ref 27–31)
MCHC RBC AUTO-ENTMCNC: 32.9 G/DL (ref 33–36)
MCV RBC AUTO: 101.4 FL (ref 80–94)
MONOCYTES # BLD AUTO: 0.24 X10(3)/MCL (ref 0.1–1.3)
MONOCYTES NFR BLD AUTO: 3.3 %
NEUTROPHILS # BLD AUTO: 5.59 X10(3)/MCL (ref 2.1–9.2)
NEUTROPHILS NFR BLD AUTO: 76.7 %
NRBC BLD AUTO-RTO: 0 %
OHS QRS DURATION: 74 MS
OHS QTC CALCULATION: 437 MS
PLATELET # BLD AUTO: 202 X10(3)/MCL (ref 130–400)
PMV BLD AUTO: 10.2 FL (ref 7.4–10.4)
POTASSIUM SERPL-SCNC: 4.8 MMOL/L (ref 3.5–5.1)
PROT SERPL-MCNC: 7 GM/DL (ref 6.4–8.3)
RBC # BLD AUTO: 4.38 X10(6)/MCL (ref 4.2–5.4)
RSV A 5' UTR RNA NPH QL NAA+PROBE: NOT DETECTED
SARS-COV-2 RNA RESP QL NAA+PROBE: NOT DETECTED
SODIUM SERPL-SCNC: 140 MMOL/L (ref 136–145)
WBC # BLD AUTO: 7.28 X10(3)/MCL (ref 4.5–11.5)

## 2024-09-27 PROCEDURE — 93005 ELECTROCARDIOGRAM TRACING: CPT

## 2024-09-27 PROCEDURE — 25000242 PHARM REV CODE 250 ALT 637 W/ HCPCS: Performed by: EMERGENCY MEDICINE

## 2024-09-27 PROCEDURE — 99291 CRITICAL CARE FIRST HOUR: CPT | Mod: 25

## 2024-09-27 PROCEDURE — 94640 AIRWAY INHALATION TREATMENT: CPT | Mod: XB

## 2024-09-27 PROCEDURE — 80053 COMPREHEN METABOLIC PANEL: CPT | Performed by: EMERGENCY MEDICINE

## 2024-09-27 PROCEDURE — 0241U COVID/RSV/FLU A&B PCR: CPT | Performed by: EMERGENCY MEDICINE

## 2024-09-27 PROCEDURE — 85025 COMPLETE CBC W/AUTO DIFF WBC: CPT | Performed by: EMERGENCY MEDICINE

## 2024-09-27 PROCEDURE — 96365 THER/PROPH/DIAG IV INF INIT: CPT

## 2024-09-27 PROCEDURE — 27000221 HC OXYGEN, UP TO 24 HOURS

## 2024-09-27 PROCEDURE — 63600175 PHARM REV CODE 636 W HCPCS: Performed by: EMERGENCY MEDICINE

## 2024-09-27 RX ORDER — IPRATROPIUM BROMIDE AND ALBUTEROL SULFATE 2.5; .5 MG/3ML; MG/3ML
9 SOLUTION RESPIRATORY (INHALATION)
Status: COMPLETED | OUTPATIENT
Start: 2024-09-27 | End: 2024-09-27

## 2024-09-27 RX ORDER — ROSUVASTATIN CALCIUM 5 MG/1
5 TABLET, COATED ORAL NIGHTLY
COMMUNITY
Start: 2024-05-29

## 2024-09-27 RX ORDER — ALBUTEROL SULFATE 90 UG/1
2 INHALANT RESPIRATORY (INHALATION) EVERY 6 HOURS PRN
Qty: 18 G | Refills: 1 | Status: SHIPPED | OUTPATIENT
Start: 2024-09-27 | End: 2025-09-27

## 2024-09-27 RX ORDER — PREDNISONE 20 MG/1
40 TABLET ORAL DAILY
Qty: 10 TABLET | Refills: 0 | Status: SHIPPED | OUTPATIENT
Start: 2024-09-27 | End: 2024-10-02

## 2024-09-27 RX ORDER — IPRATROPIUM BROMIDE AND ALBUTEROL SULFATE 2.5; .5 MG/3ML; MG/3ML
3 SOLUTION RESPIRATORY (INHALATION) EVERY 6 HOURS PRN
Qty: 75 ML | Refills: 0 | Status: SHIPPED | OUTPATIENT
Start: 2024-09-27 | End: 2025-09-27

## 2024-09-27 RX ORDER — MONTELUKAST SODIUM 10 MG/1
10 TABLET ORAL DAILY
COMMUNITY
Start: 2024-07-15

## 2024-09-27 RX ORDER — MAGNESIUM SULFATE HEPTAHYDRATE 40 MG/ML
2 INJECTION, SOLUTION INTRAVENOUS
Status: COMPLETED | OUTPATIENT
Start: 2024-09-27 | End: 2024-09-27

## 2024-09-27 RX ORDER — ALBUTEROL SULFATE 0.83 MG/ML
2.5 SOLUTION RESPIRATORY (INHALATION)
COMMUNITY
Start: 2024-07-03

## 2024-09-27 RX ORDER — IPRATROPIUM BROMIDE AND ALBUTEROL SULFATE 2.5; .5 MG/3ML; MG/3ML
3 SOLUTION RESPIRATORY (INHALATION)
Status: COMPLETED | OUTPATIENT
Start: 2024-09-27 | End: 2024-09-27

## 2024-09-27 RX ORDER — DICYCLOMINE HYDROCHLORIDE 10 MG/1
10 CAPSULE ORAL 4 TIMES DAILY
COMMUNITY
Start: 2024-06-07

## 2024-09-27 RX ADMIN — IPRATROPIUM BROMIDE AND ALBUTEROL SULFATE 3 ML: .5; 3 SOLUTION RESPIRATORY (INHALATION) at 03:09

## 2024-09-27 RX ADMIN — IPRATROPIUM BROMIDE AND ALBUTEROL SULFATE 9 ML: .5; 3 SOLUTION RESPIRATORY (INHALATION) at 12:09

## 2024-09-27 RX ADMIN — MAGNESIUM SULFATE HEPTAHYDRATE 2 G: 40 INJECTION, SOLUTION INTRAVENOUS at 12:09

## 2024-09-27 NOTE — ED PROVIDER NOTES
ED PROVIDER NOTE  9/27/2024    CHIEF COMPLAINT:   Chief Complaint   Patient presents with    Shortness of Breath    Wheezing     PT IN /AASI W CO SOB/WHEEZING X 2 DAYS.  HX OF ASTHMA. RECEIVED ALBUTEROL TX, 125 SOLUMEDROL IVP AND 4 MG ZOFRAN IVP TO IV 20 LH/EMS.  EKG OBTAINED.        HISTORY OF PRESENT ILLNESS:   Niya Moeller is a 54 y.o. female who presents with chief complaint Shortness of breath. Onset was about a week ago when she began having shortness of breath associated with cough, wheezing and chest tightness. States that she had covid test 2 days ago that was negative.  She reports having similar symptoms earlier this month that resolved after she was put on steroids.  She reports that she has been using her albuterol inhaler every 4 hours over the past 2 days without any significant improvement.  Given IV Solu-Medrol and DuoNeb by EMS prior to ED arrival.  Denies chest pain, fever, hemoptysis.    The history is provided by the patient.         REVIEW OF SYSTEMS: as noted in the HPI.  NURSING NOTES REVIEWED      PAST MEDICAL/SURGICAL HISTORY:   Past Medical History:   Diagnosis Date    Asthma     Diverticulitis     Diverticulosis     GERD (gastroesophageal reflux disease)     Graves disease       Past Surgical History:   Procedure Laterality Date    COLONOSCOPY, WITH DIRECTED SUBMUCOSAL INJECTION N/A 4/8/2024    Procedure: COLONOSCOPY, WITH DIRECTED SUBMUCOSAL INJECTION;  Surgeon: Heather Lance MD;  Location: [facility] ENDOSCOPY;  Service: Gastroenterology;  Laterality: N/A;       FAMILY HISTORY:   Family History   Problem Relation Name Age of Onset    Hypothyroidism Mother         SOCIAL HISTORY:   Social History     Tobacco Use    Smoking status: Every Day     Current packs/day: 0.50     Average packs/day: 0.5 packs/day for 41.7 years (20.9 ttl pk-yrs)     Types: Cigarettes     Start date: 1/1/1983    Smokeless tobacco: Never    Tobacco comments:     Ambulatory referral to Smoking Cessation clinic  following hospital discharge.    Substance Use Topics    Alcohol use: Not Currently    Drug use: Never       ALLERGIES:   Review of patient's allergies indicates:   Allergen Reactions    Latex Anaphylaxis and Edema    Zosyn [piperacillin-tazobactam] Anaphylaxis       PHYSICAL EXAM:  Initial Vitals [09/27/24 1230]   BP Pulse Resp Temp SpO2   (!) 100/44 88 (!) 22 97.3 °F (36.3 °C) 98 %      MAP       --         Physical Exam    Nursing note and vitals reviewed.  Constitutional: She appears well-developed and well-nourished. She appears distressed.   HENT:   Head: Normocephalic and atraumatic.   Mouth/Throat: Uvula is midline and mucous membranes are normal.   Eyes: EOM are normal. Pupils are equal, round, and reactive to light.   Neck: Trachea normal. Neck supple.   Cardiovascular:  Normal rate, regular rhythm and normal pulses.           Pulmonary/Chest: Tachypnea noted. She is in respiratory distress. She has decreased breath sounds. She has wheezes.   Abdominal: Abdomen is soft. Bowel sounds are normal. There is no rebound and no guarding.   Musculoskeletal:         General: Normal range of motion.      Cervical back: Neck supple.     Neurological: She is alert and oriented to person, place, and time. GCS eye subscore is 4. GCS verbal subscore is 5. GCS motor subscore is 6.   Skin: Skin is warm and dry.   Psychiatric: She has a normal mood and affect. Her speech is normal. Thought content normal.         RESULTS:  Labs Reviewed   COMPREHENSIVE METABOLIC PANEL - Abnormal       Result Value    Sodium 140      Potassium 4.8      Chloride 110 (*)     CO2 22      Glucose 111 (*)     Blood Urea Nitrogen 11.0      Creatinine 0.85      Calcium 9.7      Protein Total 7.0      Albumin 3.6      Globulin 3.4      Albumin/Globulin Ratio 1.1      Bilirubin Total 0.4      ALP 73      ALT 13      AST 19      eGFR >60      Anion Gap 8.0      BUN/Creatinine Ratio 13     CBC WITH DIFFERENTIAL - Abnormal    WBC 7.28      RBC 4.38       Hgb 14.6      Hct 44.4      .4 (*)     MCH 33.3 (*)     MCHC 32.9 (*)     RDW 13.1      Platelet 202      MPV 10.2      Neut % 76.7      Lymph % 17.9      Mono % 3.3      Eos % 1.5      Basophil % 0.3      Lymph # 1.30      Neut # 5.59      Mono # 0.24      Eos # 0.11      Baso # 0.02      IG# 0.02      IG% 0.3      NRBC% 0.0     COVID/RSV/FLU A&B PCR - Normal    Influenza A PCR Not Detected      Influenza B PCR Not Detected      Respiratory Syncytial Virus PCR Not Detected      SARS-CoV-2 PCR Not Detected      Narrative:     The Xpert Xpress SARS-CoV-2/FLU/RSV plus is a rapid, multiplexed real-time PCR test intended for the simultaneous qualitative detection and differentiation of SARS-CoV-2, Influenza A, Influenza B, and respiratory syncytial virus (RSV) viral RNA in either nasopharyngeal swab or nasal swab specimens.         CBC W/ AUTO DIFFERENTIAL    Narrative:     The following orders were created for panel order CBC auto differential.  Procedure                               Abnormality         Status                     ---------                               -----------         ------                     CBC with Differential[[id number]]       Abnormal            Final result                 Please view results for these tests on the individual orders.   EXTRA TUBES    Narrative:     The following orders were created for panel order EXTRA TUBES.  Procedure                               Abnormality         Status                     ---------                               -----------         ------                     Light Green Top Hold[[id number]]                            Final result                 Please view results for these tests on the individual orders.   LIGHT GREEN TOP HOLD    Extra Tube Hold for add-ons.       Imaging Results              X-Ray Chest AP Portable (Final result)  Result time 09/27/24 14:43:08      Final result by Glen Celestin MD (09/27/24 14:43:08)                    Impression:      No acute findings.      Electronically signed by: Glen Celestin  Date:    09/27/2024  Time:    14:43               Narrative:    EXAMINATION:  XR CHEST AP PORTABLE    CLINICAL HISTORY:  cough;    COMPARISON:  9 September 2024    FINDINGS:  Frontal view of the chest was obtained. The heart is not enlarged.  Lungs are clear.  No pneumothorax.                                      PROCEDURES:  Critical Care    Date/Time: 9/27/2024 4:24 PM    Performed by: Pdero Sagastume DO  Authorized by: Pedro Sagastume DO  Direct patient critical care time: 25 minutes  Ordering / reviewing critical care time: 10 minutes  Documentation critical care time: 5 minutes  Total critical care time (exclusive of procedural time) : 40 minutes  Critical care time was exclusive of separately billable procedures and treating other patients.  Critical care was necessary to treat or prevent imminent or life-threatening deterioration of the following conditions: respiratory failure.  Critical care was time spent personally by me on the following activities: development of treatment plan with patient or surrogate, interpretation of cardiac output measurements, evaluation of patient's response to treatment, examination of patient, obtaining history from patient or surrogate, ordering and performing treatments and interventions, ordering and review of laboratory studies, ordering and review of radiographic studies, pulse oximetry and re-evaluation of patient's condition.          ECG:  EKG Readings: (Independently Interpreted)   Initial Reading: No STEMI. Rhythm: Normal Sinus Rhythm. Heart Rate: 92. Ectopy: No Ectopy. Conduction: Normal. Axis: Normal.       ED COURSE AND MEDICAL DECISION MAKING:  Medications   albuterol-ipratropium 2.5 mg-0.5 mg/3 mL nebulizer solution 9 mL (9 mLs Nebulization Given 9/27/24 1250)   magnesium sulfate 2g in water 50mL IVPB (premix) (0 g Intravenous Stopped 9/27/24 1320)   albuterol-ipratropium 2.5 mg-0.5  mg/3 mL nebulizer solution 3 mL (3 mLs Nebulization Given 9/27/24 1546)     ED Course as of 09/27/24 1626   Fri Sep 27, 2024   1322 Still with diffuse wheezing after continuous duoneb and IV magnesium sulfate. Will expand workup to include labs and imaging in anticipation of admission. [IB]   1422 Creatinine: 0.85 [IB]   1431 WBC: 7.28 [IB]   1431 Hemoglobin: 14.6 [IB]   1431 Platelet Count: 202 [IB]   1507 Influenza A, Molecular: Not Detected [IB]   1507 Influenza B, Molecular: Not Detected [IB]   1507 RSV Ag by Molecular Method: Not Detected [IB]   1507 SARS-CoV2 (COVID-19) Qualitative PCR: Not Detected [IB]   1510 Wheezing has significantly improved. States she is feeling much better. Will give repeat duoneb and if wheezing resolves will discharge but if it persists then will admit. [IB]      ED Course User Index  [IB] Pedro Sagastume, DO        Medical Decision Making  54-year-old female who presents with progressively worsening shortness of breath over the past week associated with cough, wheezing, and chest tightness.  Differential diagnosis includes asthma exacerbation, bronchitis, pneumonia.  Given IV Solu-Medrol and DuoNeb by EMS prior to ED arrival.  Still has diffuse wheezing upon arrival so she was given continuous DuoNeb along with IV magnesium sulfate, this significantly reduced her wheezing but it did not completely resolve so she was given an additional DuoNeb treatment.  She reports feeling much better.  Admission was offered but she refused stating she would prefer to go home and we will continue her breathing treatments at home.  She was maintaining normal oxygen saturation on room air.  We will discharge with prednisone and refill her albuterol and DuoNeb and instructed her to follow up with her PCP.  Given strict ED return precautions. I have spoken with the patient and/or caregivers. I have explained the patient's condition, diagnoses and treatment plan based on the information available to me  at this time. I have answered the patient's and/or caregiver's questions and addressed any concerns. The patient and/or caregivers have as good an understanding of the patient's diagnosis, condition and treatment plan as can be expected at this point. The vital signs have been stable. The patient's condition is stable and appropriate for discharge from the emergency department.     The patient will pursue further outpatient evaluation with the primary care physician or other designated or consulting physician as outlined in the discharge instructions. The patient and/or caregivers are agreeable to this plan of care and follow-up instructions have been explained in detail. The patient and/or caregivers have received these instructions in written format and have expressed an understanding of the discharge instructions. The patient and/or caregivers are aware that any significant change in condition or worsening of symptoms should prompt an immediate return to this or the closest emergency department or a call to 911.    Amount and/or Complexity of Data Reviewed  Labs: ordered. Decision-making details documented in ED Course.  Radiology: ordered.  ECG/medicine tests: ordered and independent interpretation performed.    Risk  Prescription drug management.  Drug therapy requiring intensive monitoring for toxicity.  Decision regarding hospitalization.        CLINICAL IMPRESSION:  1. Exacerbation of asthma, unspecified asthma severity, unspecified whether persistent    2. SOB (shortness of breath)        DISPOSITION:   ED Disposition Condition    Discharge Stable            ED Prescriptions       Medication Sig Dispense Start Date End Date Auth. Provider    predniSONE (DELTASONE) 20 MG tablet Take 2 tablets (40 mg total) by mouth once daily. for 5 days 10 tablet 9/27/2024 10/2/2024 Pedro Sagastume,     albuterol (PROAIR HFA) 90 mcg/actuation inhaler Inhale 2 puffs into the lungs every 6 (six) hours as needed for Wheezing.  Rescue 18 g 9/27/2024 9/27/2025 Pedro Sagastume,     albuterol-ipratropium (DUO-NEB) 2.5 mg-0.5 mg/3 mL nebulizer solution Take 3 mLs by nebulization every 6 (six) hours as needed for Wheezing. Rescue 75 mL 9/27/2024 9/27/2025 Pedro Sagastume,           Follow-up Information       Follow up With Specialties Details Why Contact Info    Oswaldo Riggs, NP Family Medicine Schedule an appointment as soon as possible for a visit   [address] [place] [id number]  [phone]      [facility] Emergency Medicine  If symptoms worsen [address] [facility] [place] [address] [phone]               Pedro Sagastume DO  09/27/24 4551

## 2024-11-01 ENCOUNTER — TELEPHONE (OUTPATIENT)
Dept: ENDOCRINOLOGY | Facility: CLINIC | Age: 54
End: 2024-11-01
Payer: MEDICAID

## 2024-11-01 DIAGNOSIS — E05.90 HYPERTHYROIDISM: Primary | ICD-10-CM

## 2024-11-12 ENCOUNTER — OFFICE VISIT (OUTPATIENT)
Dept: SURGERY | Facility: CLINIC | Age: 54
End: 2024-11-12
Payer: MEDICAID

## 2024-11-12 VITALS
TEMPERATURE: 98 F | HEIGHT: 59 IN | OXYGEN SATURATION: 97 % | SYSTOLIC BLOOD PRESSURE: 101 MMHG | HEART RATE: 80 BPM | DIASTOLIC BLOOD PRESSURE: 70 MMHG | RESPIRATION RATE: 20 BRPM | WEIGHT: 154 LBS | BODY MASS INDEX: 31.04 KG/M2

## 2024-11-12 DIAGNOSIS — K57.92 DIVERTICULITIS: Primary | ICD-10-CM

## 2024-11-12 PROCEDURE — 99215 OFFICE O/P EST HI 40 MIN: CPT | Mod: PBBFAC

## 2024-11-12 RX ORDER — CIPROFLOXACIN 500 MG/1
500 TABLET ORAL EVERY 12 HOURS
Qty: 14 TABLET | Refills: 0 | Status: SHIPPED | OUTPATIENT
Start: 2024-11-12 | End: 2024-11-16 | Stop reason: ALTCHOICE

## 2024-11-12 RX ORDER — METRONIDAZOLE 500 MG/1
500 TABLET ORAL EVERY 8 HOURS
Qty: 21 TABLET | Refills: 0 | Status: SHIPPED | OUTPATIENT
Start: 2024-11-12 | End: 2024-11-19

## 2024-11-12 NOTE — PROGRESS NOTES
[facility] Surgery Clinic Note    HPI: Patient is a 53 yo woman with PMHx of hyperthyroidism, asthma, and sigmoid diverticulitis who presents to the surgery clinic for evaluation for sigmoidectomy. For the past few days, she has had severe LLQ pain associated with constipation, thin stools, night sweats, and chills. The pain is severe, sharp, and is exacerbated by movement and touch. She states this episode feels different from her previous episodes, as it radiates to her pelvis and causes her to limp. Her most recent flare of diverticulitis was in August, and was treated in the hospital with metronidazole and ciprofloxacin. She received a colonoscopy recently which had no polyps but did show diverticula across her entire colon, though the inflammation seems to be limited to the sigmoid colon. She does not drink alcohol, but smoked 0.5-1 packs of cigarettes per day.    PMH:   Past Medical History:   Diagnosis Date    Asthma     Diverticulitis     Diverticulosis     GERD (gastroesophageal reflux disease)     Graves disease       Meds:   Current Outpatient Medications:     ADVAIR DISKUS 250-50 mcg/dose diskus inhaler, SMARTSI Puff(s) By Mouth, Disp: 60 each, Rfl: 2    albuterol (PROAIR HFA) 90 mcg/actuation inhaler, Inhale 2 puffs into the lungs every 6 (six) hours as needed for Wheezing. Rescue, Disp: 18 g, Rfl: 1    albuterol (PROVENTIL) 2.5 mg /3 mL (0.083 %) nebulizer solution, Take 2.5 mg by nebulization every 4 to 6 hours as needed for Wheezing or Shortness of Breath., Disp: , Rfl:     albuterol-ipratropium (DUO-NEB) 2.5 mg-0.5 mg/3 mL nebulizer solution, Take 3 mLs by nebulization every 6 (six) hours as needed for Wheezing. Rescue, Disp: 75 mL, Rfl: 0    cholecalciferol, vitamin D3, 1,250 mcg (50,000 unit) capsule, Take 50,000 Units by mouth every 7 days., Disp: , Rfl:     dicyclomine (BENTYL) 10 MG capsule, Take 10 mg by mouth 4 (four) times daily., Disp: , Rfl:     docusate sodium (COLACE) 100 MG capsule,  Take 1 capsule (100 mg total) by mouth 2 (two) times daily as needed for Constipation., Disp: 10 capsule, Rfl: 0    fluticasone propionate (FLONASE) 50 mcg/actuation nasal spray, 1 spray (50 mcg total) by Each Nostril route daily as needed for Allergies or Rhinitis., Disp: 18.2 mL, Rfl: 0    methIMAzole (TAPAZOLE) 5 MG Tab, Take a half of a tablet daily to Total 2.5 mg, Disp: 15 tablet, Rfl: 11    montelukast (SINGULAIR) 10 mg tablet, Take 10 mg by mouth once daily., Disp: , Rfl:     omeprazole (PRILOSEC) 20 MG capsule, Take 1 capsule (20 mg total) by mouth 2 (two) times daily before meals., Disp: 60 capsule, Rfl: 11    rosuvastatin (CRESTOR) 5 MG tablet, Take 5 mg by mouth every evening., Disp: , Rfl:     amoxicillin (AMOXIL) 500 MG capsule, Take 500 mg by mouth 4 (four) times daily. (Patient not taking: Reported on 11/12/2024), Disp: , Rfl:     amoxicillin-clavulanate 875-125mg (AUGMENTIN) 875-125 mg per tablet, Take 1 tablet by mouth 2 (two) times daily. (Patient not taking: Reported on 11/12/2024), Disp: 14 tablet, Rfl: 0    buPROPion (WELLBUTRIN SR) 150 MG TBSR 12 hr tablet, Take 1 tablet (150 mg total) by mouth 2 (two) times daily. For smoking cessation.  Discontinue if still smoking., Disp: 60 tablet, Rfl: 1    ciprofloxacin HCl (CIPRO) 500 MG tablet, Take 1 tablet (500 mg total) by mouth every 12 (twelve) hours. for 7 days, Disp: 14 tablet, Rfl: 0    metroNIDAZOLE (FLAGYL) 500 MG tablet, Take 1 tablet (500 mg total) by mouth every 8 (eight) hours. for 7 days, Disp: 21 tablet, Rfl: 0  Allergies:   Review of patient's allergies indicates:   Allergen Reactions    Latex Anaphylaxis and Edema    Zosyn [piperacillin-tazobactam] Anaphylaxis     Social History:   Social History     Tobacco Use    Smoking status: Every Day     Current packs/day: 0.50     Average packs/day: 0.5 packs/day for 41.9 years (20.9 ttl pk-yrs)     Types: Cigarettes     Start date: 1/1/1983    Smokeless tobacco: Never    Tobacco comments:      Ambulatory referral to Smoking Cessation clinic following hospital discharge.    Substance Use Topics    Alcohol use: Not Currently    Drug use: Never     Family History:   Family History   Problem Relation Name Age of Onset    Hypothyroidism Mother       Surgical History:   Past Surgical History:   Procedure Laterality Date    COLONOSCOPY, WITH DIRECTED SUBMUCOSAL INJECTION N/A 4/8/2024    Procedure: COLONOSCOPY, WITH DIRECTED SUBMUCOSAL INJECTION;  Surgeon: Heather Lance MD;  Location: [facility] ENDOSCOPY;  Service: Gastroenterology;  Laterality: N/A;     Review of Systems:  ROS negative unless specified above    Objective:    Vitals:  Vitals:    11/12/24 1342   BP: 101/70   Pulse:    Resp:    Temp:          Physical Exam:  Gen: NAD  Neuro: awake, alert, answering questions appropriately  CV: RRR  Resp: non-labored breathing, JAZMIN  Abd: abdomen is soft, LLQ is focally tender to palpation and warm to touch  : not performed  Ext: moves all 4 spontaneously and purposefully  Skin: warm, well perfused      Imaging:  Colonoscopy 4/2024: Diverticulosis in the sigmoid colon and from 15                          to 25 cm proximal to the anus. There was narrowing                          of the colon with significant edema in association                          with the diverticular opening. Erythema was seen                          in association with the diverticular opening.                          Tattooed.                          - Diverticulosis in the descending colon and in                          the ascending colon.       Assessment/Plan:  Patient is a 55 yo woman with a PMHx of Graves disease, asthma, and diverticulitis who presents for evaluation for surgery for her diverticulitis. She is currently undergoing a flare of her diverticulitis. As of now, an operation during her flare up would cause more risk for colon injury or need for ostomy placement. Patient was deemed stable enough to go home on  metronidazole and ciprofloxacin because her tenderness is localized to the LLQ, and she is able to tolerate a clear liquid diet. She was given return precautions and told to follow up in 2 weeks. Once her acute flare has resolved, we plan to schedule her for a sigmoidectomy.  She is amenable to receiving a sigmoidectomy, but insists that she does not want to need a colostomy. In addition, she was counseled on smoking cessation, and she was informed that her current smoking status puts her at risk for perioperative complications and poor wound healing post surgery. Because of her asthma, we will touch base with Sol Gutierrez, who is her NP at [facility] for pulmonology. In addition, we plan to schedule her for preoperative labs including CMP, CBC, and EKG.    - send home on one week of ciprofloxacin and metronidazole   - patient was given strict return precautions for diverticulitis  - follow up in clinic in two weeks  - obtain preoperative labs (CBC, CMP, EKG)  - assess pulmonary status and perioperative respiratory needs with Sol Gutierrez at [facility] Pulmon[facility]    Serge Mares, MS3  [facility]      Patient currently with diverticulitis episode, with left lower quadrant tenderness, but otherwise no complaints.   - provided 1 week abx  - return to clinic in 3 weeks to re-evaluate symptoms. If improving, will plan for surgery (lap vs open sigmoidectomy) with Dr. Lee soon after clinic visit.   - Pt is high risk of recurrent diverticulitis if her sigmoidectomy is delayed  - pre-op labs ordered to be done prior to next visit  - Will attempt to reach pt's pulmonologist for pre op clearance/risk optimization in meantime    Cristal Wesley MD

## 2024-11-12 NOTE — PROGRESS NOTES
[facility] Clinic Note    HPI: Patient is a 55 yo woman with PMHx of hyperthyroidism, asthma, and sigmoid diverticulitis who presents to the surgery clinic for evaluation for sigmoidectomy. For the past few days, she has had severe LLQ pain associated with constipation, thin stools, night sweats, and chills. The pain is severe, sharp, and is exacerbated by movement and touch. She states this episode feels different from her previous episodes, as it radiates to her pelvis and causes her to limp. Her most recent flare of diverticulitis was in August, and was treated in the hospital with metronidazole and ciprofloxacin. She received a colonoscopy recently which had no polyps but did show diverticula across her entire colon, though the inflammation seems to be limited to the sigmoid colon. She does not drink alcohol, but smoked 0.5-1 packs of cigarettes per day.    PMH:   Past Medical History:   Diagnosis Date    Asthma     Diverticulitis     Diverticulosis     GERD (gastroesophageal reflux disease)     Graves disease       Meds:   Current Outpatient Medications:     ADVAIR DISKUS 250-50 mcg/dose diskus inhaler, SMARTSI Puff(s) By Mouth, Disp: 60 each, Rfl: 2    albuterol (PROAIR HFA) 90 mcg/actuation inhaler, Inhale 2 puffs into the lungs every 6 (six) hours as needed for Wheezing. Rescue, Disp: 18 g, Rfl: 1    albuterol (PROVENTIL) 2.5 mg /3 mL (0.083 %) nebulizer solution, Take 2.5 mg by nebulization every 4 to 6 hours as needed for Wheezing or Shortness of Breath., Disp: , Rfl:     albuterol-ipratropium (DUO-NEB) 2.5 mg-0.5 mg/3 mL nebulizer solution, Take 3 mLs by nebulization every 6 (six) hours as needed for Wheezing. Rescue, Disp: 75 mL, Rfl: 0    cholecalciferol, vitamin D3, 1,250 mcg (50,000 unit) capsule, Take 50,000 Units by mouth every 7 days., Disp: , Rfl:     dicyclomine (BENTYL) 10 MG capsule, Take 10 mg by mouth 4 (four) times daily., Disp: , Rfl:     docusate sodium (COLACE) 100 MG capsule,  Take 1 capsule (100 mg total) by mouth 2 (two) times daily as needed for Constipation., Disp: 10 capsule, Rfl: 0    fluticasone propionate (FLONASE) 50 mcg/actuation nasal spray, 1 spray (50 mcg total) by Each Nostril route daily as needed for Allergies or Rhinitis., Disp: 18.2 mL, Rfl: 0    methIMAzole (TAPAZOLE) 5 MG Tab, Take a half of a tablet daily to Total 2.5 mg, Disp: 15 tablet, Rfl: 11    montelukast (SINGULAIR) 10 mg tablet, Take 10 mg by mouth once daily., Disp: , Rfl:     omeprazole (PRILOSEC) 20 MG capsule, Take 1 capsule (20 mg total) by mouth 2 (two) times daily before meals., Disp: 60 capsule, Rfl: 11    rosuvastatin (CRESTOR) 5 MG tablet, Take 5 mg by mouth every evening., Disp: , Rfl:     amoxicillin (AMOXIL) 500 MG capsule, Take 500 mg by mouth 4 (four) times daily. (Patient not taking: Reported on 11/12/2024), Disp: , Rfl:     amoxicillin-clavulanate 875-125mg (AUGMENTIN) 875-125 mg per tablet, Take 1 tablet by mouth 2 (two) times daily. (Patient not taking: Reported on 11/12/2024), Disp: 14 tablet, Rfl: 0    buPROPion (WELLBUTRIN SR) 150 MG TBSR 12 hr tablet, Take 1 tablet (150 mg total) by mouth 2 (two) times daily. For smoking cessation.  Discontinue if still smoking., Disp: 60 tablet, Rfl: 1    ciprofloxacin HCl (CIPRO) 500 MG tablet, Take 1 tablet by mouth 2 (two) times daily. (Patient not taking: Reported on 11/12/2024), Disp: , Rfl:     metroNIDAZOLE (FLAGYL) 500 MG tablet, Take 1 tablet by mouth 3 (three) times daily. (Patient not taking: Reported on 11/12/2024), Disp: , Rfl:   Allergies:   Review of patient's allergies indicates:   Allergen Reactions    Latex Anaphylaxis and Edema    Zosyn [piperacillin-tazobactam] Anaphylaxis     Social History:   Social History     Tobacco Use    Smoking status: Every Day     Current packs/day: 0.50     Average packs/day: 0.5 packs/day for 41.9 years (20.9 ttl pk-yrs)     Types: Cigarettes     Start date: 1/1/1983    Smokeless tobacco: Never    Tobacco  comments:     Ambulatory referral to Smoking Cessation clinic following hospital discharge.    Substance Use Topics    Alcohol use: Not Currently    Drug use: Never     Family History:   Family History   Problem Relation Name Age of Onset    Hypothyroidism Mother       Surgical History:   Past Surgical History:   Procedure Laterality Date    COLONOSCOPY, WITH DIRECTED SUBMUCOSAL INJECTION N/A 4/8/2024    Procedure: COLONOSCOPY, WITH DIRECTED SUBMUCOSAL INJECTION;  Surgeon: Heather Lance MD;  Location: [facility] ENDOSCOPY;  Service: Gastroenterology;  Laterality: N/A;     Review of Systems:  ROS negative unless specified above    Objective:    Vitals:  Vitals:    11/12/24 1342   BP: 101/70   Pulse:    Resp:    Temp:          Physical Exam:  Gen: NAD  Neuro: awake, alert, answering questions appropriately  CV: RRR  Resp: non-labored breathing, JAZMIN  Abd: abdomen is soft, LLQ is focally tender to palpation and warm to touch  : not performed  Ext: moves all 4 spontaneously and purposefully  Skin: warm, well perfused      Imaging:  Colonoscopy 4/2024: Diverticulosis in the sigmoid colon and from 15                          to 25 cm proximal to the anus. There was narrowing                          of the colon with significant edema in association                          with the diverticular opening. Erythema was seen                          in association with the diverticular opening.                          Tattooed.                          - Diverticulosis in the descending colon and in                          the ascending colon.       Assessment/Plan:  Patient is a 53 yo woman with a PMHx of Graves disease, asthma, and diverticulitis who presents for evaluation for surgery for her diverticulitis. She is currently undergoing a flare of her diverticulitis. As of now, an operation during her flare up would cause more risk for colon injury or need for ostomy placement. Patient was deemed stable enough to go  home on metronidazole and ciprofloxacin because her tenderness is localized to the LLQ, and she is able to tolerate a clear liquid diet. She was given return precautions and told to follow up in 2 weeks. Once her acute flare has resolved, we plan to schedule her for a sigmoidectomy.  She is amenable to receiving a sigmoidectomy, but insists that she does not want to need a colostomy. In addition, she was counseled on smoking cessation, and she was informed that her current smoking status puts her at risk for perioperative complications and poor wound healing post surgery. Because of her asthma, we will touch base with Sol Gutierrez, who is her NP at [facility] for pulmonology. In addition, we plan to schedule her for preoperative labs including CMP, CBC, and EKG.    - send home on one week of ciprofloxacin and metronidazole   - patient was given strict return precautions for diverticulitis  - follow up in clinic in two weeks  - obtain preoperative labs (CBC, CMP, EKG)  - assess pulmonary status and perioperative respiratory needs with Sol Gutierrez at [facility] Pulmonology    Serge Mares, MS3  [facility]      Patient currently with diverticulitis episode, with left lower quadrant tenderness, but otherwise no complaints.   - provided 1 week abx  - return to clinic in 3 weeks to re-evaluate symptoms. If improving, will plan for surgery (lap vs open sigmoidectomy) with Dr. Lee soon after clinic visit.   - Pt is high risk of recurrent diverticulitis if her sigmoidectomy is delayed  - pre-op labs ordered to be done prior to next visit  - Will attempt to reach pt's pulmonologist for pre op clearance/risk optimization in meantime    Cristal Wesley MD

## 2024-11-13 ENCOUNTER — HOSPITAL ENCOUNTER (EMERGENCY)
Facility: HOSPITAL | Age: 54
Discharge: HOME | End: 2024-11-13
Attending: INTERNAL MEDICINE
Payer: MEDICAID

## 2024-11-13 VITALS
HEART RATE: 72 BPM | RESPIRATION RATE: 18 BRPM | HEIGHT: 59 IN | SYSTOLIC BLOOD PRESSURE: 119 MMHG | DIASTOLIC BLOOD PRESSURE: 49 MMHG | TEMPERATURE: 99 F | OXYGEN SATURATION: 100 % | WEIGHT: 140 LBS | BODY MASS INDEX: 28.22 KG/M2

## 2024-11-13 DIAGNOSIS — K57.92 DIVERTICULITIS: Primary | ICD-10-CM

## 2024-11-13 LAB
ALBUMIN SERPL-MCNC: 3.5 G/DL (ref 3.5–5)
ALBUMIN/GLOB SERPL: 1.1 RATIO (ref 1.1–2)
ALP SERPL-CCNC: 64 UNIT/L (ref 40–150)
ALT SERPL-CCNC: 20 UNIT/L (ref 0–55)
ANION GAP SERPL CALC-SCNC: 7 MEQ/L
AST SERPL-CCNC: 24 UNIT/L (ref 5–34)
BACTERIA #/AREA URNS AUTO: ABNORMAL /HPF
BASOPHILS # BLD AUTO: 0.02 X10(3)/MCL
BASOPHILS NFR BLD AUTO: 0.3 %
BILIRUB SERPL-MCNC: 0.3 MG/DL
BILIRUB UR QL STRIP.AUTO: NEGATIVE
BUN SERPL-MCNC: 7.8 MG/DL (ref 9.8–20.1)
CALCIUM SERPL-MCNC: 10.1 MG/DL (ref 8.4–10.2)
CHLORIDE SERPL-SCNC: 111 MMOL/L (ref 98–107)
CLARITY UR: CLEAR
CO2 SERPL-SCNC: 23 MMOL/L (ref 22–29)
COLOR UR AUTO: ABNORMAL
CREAT SERPL-MCNC: 0.89 MG/DL (ref 0.55–1.02)
CREAT/UREA NIT SERPL: 9
CRP SERPL-MCNC: 4.6 MG/L
EOSINOPHIL # BLD AUTO: 0.13 X10(3)/MCL (ref 0–0.9)
EOSINOPHIL NFR BLD AUTO: 1.7 %
ERYTHROCYTE [DISTWIDTH] IN BLOOD BY AUTOMATED COUNT: 13 % (ref 11.5–17)
GFR SERPLBLD CREATININE-BSD FMLA CKD-EPI: >60 ML/MIN/1.73/M2
GLOBULIN SER-MCNC: 3.3 GM/DL (ref 2.4–3.5)
GLUCOSE SERPL-MCNC: 66 MG/DL (ref 74–100)
GLUCOSE UR QL STRIP: NORMAL
HCT VFR BLD AUTO: 41.6 % (ref 37–47)
HGB BLD-MCNC: 14 G/DL (ref 12–16)
HGB UR QL STRIP: NEGATIVE
HOLD SPECIMEN: NORMAL
HYALINE CASTS #/AREA URNS LPF: ABNORMAL /LPF
IMM GRANULOCYTES # BLD AUTO: 0.01 X10(3)/MCL (ref 0–0.04)
IMM GRANULOCYTES NFR BLD AUTO: 0.1 %
KETONES UR QL STRIP: NEGATIVE
LEUKOCYTE ESTERASE UR QL STRIP: NEGATIVE
LYMPHOCYTES # BLD AUTO: 2.43 X10(3)/MCL (ref 0.6–4.6)
LYMPHOCYTES NFR BLD AUTO: 31.8 %
MCH RBC QN AUTO: 33.6 PG (ref 27–31)
MCHC RBC AUTO-ENTMCNC: 33.7 G/DL (ref 33–36)
MCV RBC AUTO: 99.8 FL (ref 80–94)
MONOCYTES # BLD AUTO: 0.75 X10(3)/MCL (ref 0.1–1.3)
MONOCYTES NFR BLD AUTO: 9.8 %
MUCOUS THREADS URNS QL MICRO: ABNORMAL /LPF
NEUTROPHILS # BLD AUTO: 4.3 X10(3)/MCL (ref 2.1–9.2)
NEUTROPHILS NFR BLD AUTO: 56.3 %
NITRITE UR QL STRIP: NEGATIVE
NRBC BLD AUTO-RTO: 0 %
PH UR STRIP: 5 [PH]
PLATELET # BLD AUTO: 202 X10(3)/MCL (ref 130–400)
PMV BLD AUTO: 11.5 FL (ref 7.4–10.4)
POTASSIUM SERPL-SCNC: 3.9 MMOL/L (ref 3.5–5.1)
PROT SERPL-MCNC: 6.8 GM/DL (ref 6.4–8.3)
PROT UR QL STRIP: NEGATIVE
RBC # BLD AUTO: 4.17 X10(6)/MCL (ref 4.2–5.4)
RBC #/AREA URNS AUTO: ABNORMAL /HPF
SODIUM SERPL-SCNC: 141 MMOL/L (ref 136–145)
SP GR UR STRIP.AUTO: 1.01 (ref 1–1.03)
SQUAMOUS #/AREA URNS LPF: ABNORMAL /HPF
UROBILINOGEN UR STRIP-ACNC: NORMAL
WBC # BLD AUTO: 7.64 X10(3)/MCL (ref 4.5–11.5)
WBC #/AREA URNS AUTO: ABNORMAL /HPF

## 2024-11-13 PROCEDURE — 96376 TX/PRO/DX INJ SAME DRUG ADON: CPT

## 2024-11-13 PROCEDURE — 96375 TX/PRO/DX INJ NEW DRUG ADDON: CPT

## 2024-11-13 PROCEDURE — 63600175 PHARM REV CODE 636 W HCPCS: Performed by: PHYSICIAN ASSISTANT

## 2024-11-13 PROCEDURE — 25500020 PHARM REV CODE 255

## 2024-11-13 PROCEDURE — 85025 COMPLETE CBC W/AUTO DIFF WBC: CPT | Performed by: PHYSICIAN ASSISTANT

## 2024-11-13 PROCEDURE — 99285 EMERGENCY DEPT VISIT HI MDM: CPT | Mod: 25

## 2024-11-13 PROCEDURE — 96374 THER/PROPH/DIAG INJ IV PUSH: CPT

## 2024-11-13 PROCEDURE — 81001 URINALYSIS AUTO W/SCOPE: CPT | Performed by: PHYSICIAN ASSISTANT

## 2024-11-13 PROCEDURE — 86140 C-REACTIVE PROTEIN: CPT | Performed by: PHYSICIAN ASSISTANT

## 2024-11-13 PROCEDURE — 80053 COMPREHEN METABOLIC PANEL: CPT | Performed by: PHYSICIAN ASSISTANT

## 2024-11-13 RX ORDER — HYDROCODONE BITARTRATE AND ACETAMINOPHEN 5; 325 MG/1; MG/1
1 TABLET ORAL EVERY 6 HOURS PRN
Qty: 12 TABLET | Refills: 0 | Status: SHIPPED | OUTPATIENT
Start: 2024-11-13 | End: 2024-11-16

## 2024-11-13 RX ORDER — KETOROLAC TROMETHAMINE 30 MG/ML
15 INJECTION, SOLUTION INTRAMUSCULAR; INTRAVENOUS
Status: COMPLETED | OUTPATIENT
Start: 2024-11-13 | End: 2024-11-13

## 2024-11-13 RX ORDER — MORPHINE SULFATE 2 MG/ML
2 INJECTION, SOLUTION INTRAMUSCULAR; INTRAVENOUS ONCE
Status: COMPLETED | OUTPATIENT
Start: 2024-11-13 | End: 2024-11-13

## 2024-11-13 RX ORDER — MORPHINE SULFATE 2 MG/ML
4 INJECTION, SOLUTION INTRAMUSCULAR; INTRAVENOUS ONCE
Status: COMPLETED | OUTPATIENT
Start: 2024-11-13 | End: 2024-11-13

## 2024-11-13 RX ADMIN — IOHEXOL 100 ML: 350 INJECTION, SOLUTION INTRAVENOUS at 05:11

## 2024-11-13 RX ADMIN — MORPHINE SULFATE 2 MG: 2 INJECTION, SOLUTION INTRAMUSCULAR; INTRAVENOUS at 06:11

## 2024-11-13 RX ADMIN — KETOROLAC TROMETHAMINE 15 MG: 30 INJECTION, SOLUTION INTRAMUSCULAR; INTRAVENOUS at 06:11

## 2024-11-13 RX ADMIN — MORPHINE SULFATE 4 MG: 2 INJECTION, SOLUTION INTRAMUSCULAR; INTRAVENOUS at 04:11

## 2024-11-13 NOTE — ED PROVIDER NOTES
Encounter Date: 11/13/2024       History     Chief Complaint   Patient presents with    Abdominal Pain      Left, lower quad pain (x)3 days. Also states history of diverticulitis, nausea, and increased pain with movement. Rates pain 9/10 at this time. Madeline jason     54 year old female with a hx of diverticulitis, Graves disease, GERD and asthma, presents to the emergency department complaints of left lower quadrant pain & nausea x 3 days.  She rates her pain 9/10.  Patient states she is concerned there is an abscess.  She states she went to surgery yesterday and was prescribed Cipro and Flagyl.  She has an upcoming  sigmoidectomy planned.  She denies fever.      The history is provided by the patient. No  was used.     Review of patient's allergies indicates:   Allergen Reactions    Latex Anaphylaxis and Edema    Zosyn [piperacillin-tazobactam] Anaphylaxis     Past Medical History:   Diagnosis Date    Asthma     Diverticulitis     Diverticulosis     GERD (gastroesophageal reflux disease)     Graves disease      Past Surgical History:   Procedure Laterality Date    COLONOSCOPY, WITH DIRECTED SUBMUCOSAL INJECTION N/A 4/8/2024    Procedure: COLONOSCOPY, WITH DIRECTED SUBMUCOSAL INJECTION;  Surgeon: Heather Lance MD;  Location: [facility] ENDOSCOPY;  Service: Gastroenterology;  Laterality: N/A;     Family History   Problem Relation Name Age of Onset    Hypothyroidism Mother       Social History     Tobacco Use    Smoking status: Every Day     Current packs/day: 0.50     Average packs/day: 0.5 packs/day for 41.9 years (20.9 ttl pk-yrs)     Types: Cigarettes     Start date: 1/1/1983    Smokeless tobacco: Never    Tobacco comments:     Ambulatory referral to Smoking Cessation clinic following hospital discharge.    Substance Use Topics    Alcohol use: Not Currently    Drug use: Never     Review of Systems   Constitutional:  Negative for chills and fever.   Cardiovascular:  Negative for chest pain and  palpitations.   Gastrointestinal:  Positive for abdominal pain (left lower). Negative for diarrhea, nausea and vomiting.   Genitourinary:  Negative for decreased urine volume, dysuria, vaginal bleeding and vaginal discharge.       Physical Exam     Initial Vitals [11/13/24 1506]   BP Pulse Resp Temp SpO2   106/61 66 18 98.8 °F (37.1 °C) 98 %      MAP       --         Physical Exam    Nursing note and vitals reviewed.  Constitutional: She appears well-developed and well-nourished.   Cardiovascular:  Normal rate, regular rhythm, normal heart sounds and intact distal pulses.           Pulmonary/Chest: Breath sounds normal.   Abdominal: Abdomen is soft. Bowel sounds are normal. There is abdominal tenderness (LLQ). There is no rebound and no guarding.   Musculoskeletal:         General: Normal range of motion.     Neurological: She is alert.   Skin: Skin is warm. Capillary refill takes less than 2 seconds.         ED Course   Procedures  Labs Reviewed   COMPREHENSIVE METABOLIC PANEL - Abnormal       Result Value    Sodium 141      Potassium 3.9      Chloride 111 (*)     CO2 23      Glucose 66 (*)     Blood Urea Nitrogen 7.8 (*)     Creatinine 0.89      Calcium 10.1      Protein Total 6.8      Albumin 3.5      Globulin 3.3      Albumin/Globulin Ratio 1.1      Bilirubin Total 0.3      ALP 64      ALT 20      AST 24      eGFR >60      Anion Gap 7.0      BUN/Creatinine Ratio 9     URINALYSIS, REFLEX TO URINE CULTURE - Abnormal    Color, UA Light-Yellow      Appearance, UA Clear      Specific Gravity, UA 1.007      pH, UA 5.0      Protein, UA Negative      Glucose, UA Normal      Ketones, UA Negative      Blood, UA Negative      Bilirubin, UA Negative      Urobilinogen, UA Normal      Nitrites, UA Negative      Leukocyte Esterase, UA Negative      RBC, UA 0-5      WBC, UA 0-5      Bacteria, UA Trace (*)     Squamous Epithelial Cells, UA None Seen      Mucous, UA Trace (*)     Hyaline Casts, UA None Seen     CBC WITH  DIFFERENTIAL - Abnormal    WBC 7.64      RBC 4.17 (*)     Hgb 14.0      Hct 41.6      MCV 99.8 (*)     MCH 33.6 (*)     MCHC 33.7      RDW 13.0      Platelet 202      MPV 11.5 (*)     Neut % 56.3      Lymph % 31.8      Mono % 9.8      Eos % 1.7      Basophil % 0.3      Lymph # 2.43      Neut # 4.30      Mono # 0.75      Eos # 0.13      Baso # 0.02      IG# 0.01      IG% 0.1      NRBC% 0.0     C-REACTIVE PROTEIN - Normal    CRP 4.60     CBC W/ AUTO DIFFERENTIAL    Narrative:     The following orders were created for panel order CBC Auto Differential.  Procedure                               Abnormality         Status                     ---------                               -----------         ------                     CBC with Differential[[id number]]       Abnormal            Final result                 Please view results for these tests on the individual orders.   EXTRA TUBES    Narrative:     The following orders were created for panel order EXTRA TUBES.  Procedure                               Abnormality         Status                     ---------                               -----------         ------                     Light Blue Top Hold[[id number]]                             Final result               Red Top Hold[[id number]]                                    Final result               Light Green Top Hold[[id number]]                            Final result                 Please view results for these tests on the individual orders.   LIGHT BLUE TOP HOLD    Extra Tube Hold for add-ons.     RED TOP HOLD    Extra Tube Hold for add-ons.     LIGHT GREEN TOP HOLD    Extra Tube Hold for add-ons.            Imaging Results              CT Abdomen Pelvis With IV Contrast NO Oral Contrast (Final result)  Result time 11/13/24 18:11:09      Final result by Arely Roche MD (11/13/24 18:11:09)                   Impression:      Recurrent/residual inflammatory change in the sigmoid colon with some  associated wall thickening.  Findings could represent focal diverticulitis or colitis but underlying mass should be excluded given the recurrent nature of this process.  Similar changes were seen on the prior examination.      Electronically signed by: Raphael Roche  Date:    11/13/2024  Time:    18:11               Narrative:    EXAMINATION:  CT ABDOMEN PELVIS WITH IV CONTRAST    CLINICAL HISTORY:  LLQ abdominal pain;    TECHNIQUE:  Low dose axial images, sagittal and coronal reformations were obtained from the lung bases to the pubic symphysis following the IV administration of contrast. Automatic exposure control (AEC) is utilized to reduce patient radiation exposure.    COMPARISON:  08/20/2024    FINDINGS:  The lung bases are clear.    The liver appears normal.  No liver mass or lesion is seen.  Portal and hepatic veins appear normal.    The gallbladder appears normal.  No obvious gallstones are seen.  No biliary dilatation is seen.  No pericholecystic fluid is seen.    The pancreas appears normal.  No pancreatic mass or lesion is seen.    The spleen shows no acute abnormality.    The adrenal glands appear normal.  No adrenal nodule is seen.    The kidneys appear normal.  No hydronephrosis is seen.  No hydroureter is seen.  No nephrolithiasis is seen.  No obvious ureteral stones are seen.    Urinary bladder appears grossly unremarkable.    There are inflammatory changes and wall thickening seen in the sigmoid colon consistent with focal colitis/diverticulitis.  Underlying mass should be excluded given the recurrent nature of this area of inflammation.  Similar changes were seen on the prior examination.  No perforation is seen.  No abscess is seen.  There is some reactive fluid seen adjacent to the sigmoid colon..    No free air is seen.                                       Medications   iohexoL (OMNIPAQUE 350) 350 mg iodine/mL injection (has no administration in time range)   morphine injection 4 mg (4 mg  Intravenous Given 11/13/24 1607)   iohexoL (OMNIPAQUE 350) 350 mg iodine/mL injection (100 mLs  Given 11/13/24 1745)   ketorolac injection 15 mg (15 mg Intravenous Given 11/13/24 1828)   morphine injection 2 mg (2 mg Intravenous Given 11/13/24 1828)     Medical Decision Making  54 year old female with a hx of diverticulitis, Graves disease, GERD and asthma, presents to the emergency department complaints of left lower quadrant pain & nausea x 3 days.  She rates her pain 9/10.  Patient states she is concerned there is an abscess.  She states she went to surgery yesterday and was prescribed Cipro and Flagyl.  She has an upcoming  sigmoidectomy planned.  She denies fever.     DDx: diverticulitis, constipation, UTI    Ct abdomen and pelvis: Recurrent/residual inflammatory change in the sigmoid colon with some associated wall thickening.  Findings could represent focal diverticulitis or colitis but underlying mass should be excluded given the recurrent nature of this process.  Similar changes were seen on the prior examination.     She is under close care with GI and Surgery clinic and will follow up with them.   She   We will continue taking Cipro and Flagyl.  Norco prescribed.      Amount and/or Complexity of Data Reviewed  Labs: ordered. Decision-making details documented in ED Course.  Radiology: ordered. Decision-making details documented in ED Course.    Risk  Prescription drug management.               ED Course as of 11/13/24 1916 Wed Nov 13, 2024 1810 WBC: 7.64 [ER]   1816 CT Abdomen Pelvis With IV Contrast NO Oral Contrast  Recurrent/residual inflammatory change in the sigmoid colon with some associated wall thickening.  Findings could represent focal diverticulitis or colitis but underlying mass should be excluded given the recurrent nature of this process.  Similar changes were seen on the prior examination.      [ER]   1837 The patient is resting comfortably and in no acute distress.  She states that her  symptoms have improved after treatment in Emergency Department. I personally discussed her test results and treatment plan.  Gave strict ED precautions, discussed specific conditions for return to the emergency department and importance of follow up with her primary care provider.  Patient voices understanding and agrees to the plan discussed. All patients' questions have been answered at this time.   She has remained hemodynamically stable throughout entire stay in ED and is stable for discharge home.  [ER]      ED Course User Index  [ER] Pauline Branham PA                             Clinical Impression:  Final diagnoses:  [K57.92] Diverticulitis (Primary)          ED Disposition Condition    Discharge Stable          ED Prescriptions       Medication Sig Dispense Start Date End Date Auth. Provider    HYDROcodone-acetaminophen (NORCO) 5-325 mg per tablet Take 1 tablet by mouth every 6 (six) hours as needed for Pain. 12 tablet 11/13/2024 11/16/2024 Pauline Branham PA          Follow-up Information       Follow up With Specialties Details Why Contact Info Additional Information    [facility] - Emergency Dept Emergency Medicine  As needed, If symptoms worsen [address] [place] [address] [phone]     Oswaldo Riggs, NP Family Medicine Schedule an appointment as soon as possible for a visit in 2 days  [address] [place] [id number]  [phone]       [facility] Gastroenterology In 1 week they will call to schedule [address] [place] [address] [phone] Entrance 1 for clinic visits If your appointment is a fibroscan, please present to GI Lab on 5th Floor.              Pauline Branham PA  11/13/24 1916

## 2024-11-14 NOTE — PROGRESS NOTES
I have reviewed the notes, assessments, and/or procedures performed by Dr Wesley, I concur with her/his documentation of Niya Moeller.  Date of Service: 11/12/2024    [facility]

## 2024-11-14 NOTE — DISCHARGE INSTRUCTIONS
Continue taking Cipro and Flagyl recently prescribed.  Follow up with GI within 1 week.  Follow up with your doctor within 2-3 days and return if symptoms worsen.

## 2024-11-15 ENCOUNTER — HOSPITAL ENCOUNTER (EMERGENCY)
Facility: HOSPITAL | Age: 54
Discharge: HOME | End: 2024-11-15
Attending: EMERGENCY MEDICINE
Payer: MEDICAID

## 2024-11-15 VITALS
WEIGHT: 154 LBS | SYSTOLIC BLOOD PRESSURE: 107 MMHG | OXYGEN SATURATION: 98 % | RESPIRATION RATE: 19 BRPM | BODY MASS INDEX: 31.04 KG/M2 | TEMPERATURE: 98 F | HEART RATE: 73 BPM | HEIGHT: 59 IN | DIASTOLIC BLOOD PRESSURE: 77 MMHG

## 2024-11-15 DIAGNOSIS — K52.9 COLITIS: Primary | ICD-10-CM

## 2024-11-15 DIAGNOSIS — R06.02 SOB (SHORTNESS OF BREATH): ICD-10-CM

## 2024-11-15 LAB
ALBUMIN SERPL-MCNC: 3.5 G/DL (ref 3.5–5)
ALBUMIN/GLOB SERPL: 1 RATIO (ref 1.1–2)
ALP SERPL-CCNC: 65 UNIT/L (ref 40–150)
ALT SERPL-CCNC: 27 UNIT/L (ref 0–55)
ANION GAP SERPL CALC-SCNC: 10 MEQ/L
AST SERPL-CCNC: 31 UNIT/L (ref 5–34)
BACTERIA #/AREA URNS AUTO: ABNORMAL /HPF
BASOPHILS # BLD AUTO: 0.02 X10(3)/MCL
BASOPHILS NFR BLD AUTO: 0.3 %
BILIRUB SERPL-MCNC: 0.3 MG/DL
BILIRUB UR QL STRIP.AUTO: NEGATIVE
BUN SERPL-MCNC: 9.6 MG/DL (ref 9.8–20.1)
CALCIUM SERPL-MCNC: 9.7 MG/DL (ref 8.4–10.2)
CHLORIDE SERPL-SCNC: 108 MMOL/L (ref 98–107)
CLARITY UR: CLEAR
CO2 SERPL-SCNC: 22 MMOL/L (ref 22–29)
COLOR UR AUTO: ABNORMAL
CREAT SERPL-MCNC: 0.85 MG/DL (ref 0.55–1.02)
CREAT/UREA NIT SERPL: 11
EOSINOPHIL # BLD AUTO: 0.11 X10(3)/MCL (ref 0–0.9)
EOSINOPHIL NFR BLD AUTO: 1.6 %
ERYTHROCYTE [DISTWIDTH] IN BLOOD BY AUTOMATED COUNT: 13 % (ref 11.5–17)
GFR SERPLBLD CREATININE-BSD FMLA CKD-EPI: >60 ML/MIN/1.73/M2
GLOBULIN SER-MCNC: 3.5 GM/DL (ref 2.4–3.5)
GLUCOSE SERPL-MCNC: 96 MG/DL (ref 74–100)
GLUCOSE UR QL STRIP: NORMAL
HCT VFR BLD AUTO: 43 % (ref 37–47)
HGB BLD-MCNC: 14.4 G/DL (ref 12–16)
HGB UR QL STRIP: NEGATIVE
HOLD SPECIMEN: NORMAL
HOLD SPECIMEN: NORMAL
HYALINE CASTS #/AREA URNS LPF: ABNORMAL /LPF
IMM GRANULOCYTES # BLD AUTO: 0.02 X10(3)/MCL (ref 0–0.04)
IMM GRANULOCYTES NFR BLD AUTO: 0.3 %
KETONES UR QL STRIP: ABNORMAL
LACTATE SERPL-SCNC: 0.9 MMOL/L (ref 0.5–2.2)
LEUKOCYTE ESTERASE UR QL STRIP: NEGATIVE
LYMPHOCYTES # BLD AUTO: 2.39 X10(3)/MCL (ref 0.6–4.6)
LYMPHOCYTES NFR BLD AUTO: 34.6 %
MAGNESIUM SERPL-MCNC: 1.8 MG/DL (ref 1.6–2.6)
MCH RBC QN AUTO: 32.3 PG (ref 27–31)
MCHC RBC AUTO-ENTMCNC: 33.5 G/DL (ref 33–36)
MCV RBC AUTO: 96.4 FL (ref 80–94)
MONOCYTES # BLD AUTO: 0.86 X10(3)/MCL (ref 0.1–1.3)
MONOCYTES NFR BLD AUTO: 12.4 %
MUCOUS THREADS URNS QL MICRO: ABNORMAL /LPF
NEUTROPHILS # BLD AUTO: 3.51 X10(3)/MCL (ref 2.1–9.2)
NEUTROPHILS NFR BLD AUTO: 50.8 %
NITRITE UR QL STRIP: NEGATIVE
NRBC BLD AUTO-RTO: 0 %
PH UR STRIP: 6.5 [PH]
PLATELET # BLD AUTO: 230 X10(3)/MCL (ref 130–400)
PLATELETS.RETICULATED NFR BLD AUTO: 4.2 % (ref 0.9–11.2)
PMV BLD AUTO: 10.8 FL (ref 7.4–10.4)
POTASSIUM SERPL-SCNC: 4 MMOL/L (ref 3.5–5.1)
PROT SERPL-MCNC: 7 GM/DL (ref 6.4–8.3)
PROT UR QL STRIP: NEGATIVE
RBC # BLD AUTO: 4.46 X10(6)/MCL (ref 4.2–5.4)
RBC #/AREA URNS AUTO: ABNORMAL /HPF
SODIUM SERPL-SCNC: 140 MMOL/L (ref 136–145)
SP GR UR STRIP.AUTO: 1.01 (ref 1–1.03)
SQUAMOUS #/AREA URNS LPF: ABNORMAL /HPF
UROBILINOGEN UR STRIP-ACNC: NORMAL
WBC # BLD AUTO: 6.91 X10(3)/MCL (ref 4.5–11.5)
WBC #/AREA URNS AUTO: ABNORMAL /HPF

## 2024-11-15 PROCEDURE — 99285 EMERGENCY DEPT VISIT HI MDM: CPT | Mod: 25

## 2024-11-15 PROCEDURE — 81001 URINALYSIS AUTO W/SCOPE: CPT | Performed by: EMERGENCY MEDICINE

## 2024-11-15 PROCEDURE — 83735 ASSAY OF MAGNESIUM: CPT | Performed by: EMERGENCY MEDICINE

## 2024-11-15 PROCEDURE — 80053 COMPREHEN METABOLIC PANEL: CPT | Performed by: EMERGENCY MEDICINE

## 2024-11-15 PROCEDURE — 96374 THER/PROPH/DIAG INJ IV PUSH: CPT

## 2024-11-15 PROCEDURE — 25000242 PHARM REV CODE 250 ALT 637 W/ HCPCS: Performed by: EMERGENCY MEDICINE

## 2024-11-15 PROCEDURE — 93005 ELECTROCARDIOGRAM TRACING: CPT

## 2024-11-15 PROCEDURE — 85025 COMPLETE CBC W/AUTO DIFF WBC: CPT | Performed by: EMERGENCY MEDICINE

## 2024-11-15 PROCEDURE — 63600175 PHARM REV CODE 636 W HCPCS: Performed by: EMERGENCY MEDICINE

## 2024-11-15 PROCEDURE — 25500020 PHARM REV CODE 255

## 2024-11-15 PROCEDURE — 83605 ASSAY OF LACTIC ACID: CPT | Performed by: EMERGENCY MEDICINE

## 2024-11-15 PROCEDURE — 94640 AIRWAY INHALATION TREATMENT: CPT

## 2024-11-15 PROCEDURE — 96375 TX/PRO/DX INJ NEW DRUG ADDON: CPT

## 2024-11-15 RX ORDER — IPRATROPIUM BROMIDE AND ALBUTEROL SULFATE 2.5; .5 MG/3ML; MG/3ML
3 SOLUTION RESPIRATORY (INHALATION)
Status: COMPLETED | OUTPATIENT
Start: 2024-11-15 | End: 2024-11-15

## 2024-11-15 RX ORDER — MORPHINE SULFATE 2 MG/ML
4 INJECTION, SOLUTION INTRAMUSCULAR; INTRAVENOUS
Status: COMPLETED | OUTPATIENT
Start: 2024-11-15 | End: 2024-11-15

## 2024-11-15 RX ORDER — ONDANSETRON HYDROCHLORIDE 2 MG/ML
4 INJECTION, SOLUTION INTRAVENOUS
Status: COMPLETED | OUTPATIENT
Start: 2024-11-15 | End: 2024-11-15

## 2024-11-15 RX ADMIN — ONDANSETRON 4 MG: 2 INJECTION INTRAMUSCULAR; INTRAVENOUS at 03:11

## 2024-11-15 RX ADMIN — IOHEXOL 95 ML: 350 INJECTION, SOLUTION INTRAVENOUS at 04:11

## 2024-11-15 RX ADMIN — MORPHINE SULFATE 4 MG: 2 INJECTION, SOLUTION INTRAMUSCULAR; INTRAVENOUS at 03:11

## 2024-11-15 RX ADMIN — IPRATROPIUM BROMIDE AND ALBUTEROL SULFATE 3 ML: .5; 3 SOLUTION RESPIRATORY (INHALATION) at 02:11

## 2024-11-15 NOTE — ED PROVIDER NOTES
ED PROVIDER NOTE  11/15/2024    CHIEF COMPLAINT:   Chief Complaint   Patient presents with    Abdominal Pain     PT W CO LT SIDED ABD PAIN WORSE SINCE VISIT 2 DAYS AGO W DX DIVERTICULITIS.  PT FEELS SHE HAS PERFORATED DUE TO INCREASE IN PAIN W NAUSEA, NO VOMITING OR FEVER REPORTED.  ALSO REPORTS ASTHMA FLARE W WHEEZING AND SOB.  EKG OBTAINED.      Shortness of Breath       HISTORY OF PRESENT ILLNESS:   Niya Moeller is a 54 y.o. female who presents with chief complaint Abdominal pain.  Patient reports since last night having worsening left lower quadrant abdominal pain now moving up to the left upper quadrant.  States she feels like she perforated her diverticulitis.  Associated symptoms of nausea and states the pain is flaring up her asthma.    The history is provided by the patient.         REVIEW OF SYSTEMS: as noted in the HPI.  NURSING NOTES REVIEWED      PAST MEDICAL/SURGICAL HISTORY:   Past Medical History:   Diagnosis Date    Asthma     Diverticulitis     Diverticulosis     GERD (gastroesophageal reflux disease)     Graves disease       Past Surgical History:   Procedure Laterality Date    COLONOSCOPY, WITH DIRECTED SUBMUCOSAL INJECTION N/A 4/8/2024    Procedure: COLONOSCOPY, WITH DIRECTED SUBMUCOSAL INJECTION;  Surgeon: Heather Lance MD;  Location: [facility] ENDOSCOPY;  Service: Gastroenterology;  Laterality: N/A;       FAMILY HISTORY:   Family History   Problem Relation Name Age of Onset    Hypothyroidism Mother         SOCIAL HISTORY:   Social History     Tobacco Use    Smoking status: Every Day     Current packs/day: 0.50     Average packs/day: 0.5 packs/day for 41.9 years (20.9 ttl pk-yrs)     Types: Cigarettes     Start date: 1/1/1983    Smokeless tobacco: Never    Tobacco comments:     Ambulatory referral to Smoking Cessation clinic following hospital discharge.    Substance Use Topics    Alcohol use: Not Currently    Drug use: Never       ALLERGIES:   Review of patient's allergies indicates:    Allergen Reactions    Latex Anaphylaxis and Edema    Zosyn [piperacillin-tazobactam] Anaphylaxis       PHYSICAL EXAM:  Initial Vitals [11/15/24 1415]   BP Pulse Resp Temp SpO2   92/66 83 16 97.5 °F (36.4 °C) (!) 93 %      MAP       --         Physical Exam    Nursing note and vitals reviewed.  Constitutional: She appears well-developed and well-nourished.   HENT:   Head: Normocephalic and atraumatic. Mouth/Throat: Uvula is midline and mucous membranes are normal.   Eyes: EOM are normal. Pupils are equal, round, and reactive to light.   Neck: Trachea normal. Neck supple.   Cardiovascular:  Normal rate, regular rhythm and normal pulses.           Pulmonary/Chest: Effort normal. She has wheezes (faint).   Abdominal: Abdomen is soft. Bowel sounds are normal. There is abdominal tenderness in the left upper quadrant and left lower quadrant. There is no rebound and no guarding.   Musculoskeletal:         General: Normal range of motion.      Cervical back: Neck supple.     Neurological: She is alert and oriented to person, place, and time. GCS eye subscore is 4. GCS verbal subscore is 5. GCS motor subscore is 6.   Skin: Skin is warm and dry.   Psychiatric: She has a normal mood and affect. Her speech is normal. Thought content normal.         RESULTS:  Labs Reviewed   COMPREHENSIVE METABOLIC PANEL - Abnormal       Result Value    Sodium 140      Potassium 4.0      Chloride 108 (*)     CO2 22      Glucose 96      Blood Urea Nitrogen 9.6 (*)     Creatinine 0.85      Calcium 9.7      Protein Total 7.0      Albumin 3.5      Globulin 3.5      Albumin/Globulin Ratio 1.0 (*)     Bilirubin Total 0.3      ALP 65      ALT 27      AST 31      eGFR >60      Anion Gap 10.0      BUN/Creatinine Ratio 11     URINALYSIS, REFLEX TO URINE CULTURE - Abnormal    Color, UA Light-Yellow      Appearance, UA Clear      Specific Gravity, UA 1.011      pH, UA 6.5      Protein, UA Negative      Glucose, UA Normal      Ketones, UA Trace (*)      Blood, UA Negative      Bilirubin, UA Negative      Urobilinogen, UA Normal      Nitrites, UA Negative      Leukocyte Esterase, UA Negative      RBC, UA 0-5      WBC, UA 0-5      Bacteria, UA None Seen      Squamous Epithelial Cells, UA Trace (*)     Mucous, UA Trace (*)     Hyaline Casts, UA None Seen     CBC WITH DIFFERENTIAL - Abnormal    WBC 6.91      RBC 4.46      Hgb 14.4      Hct 43.0      MCV 96.4 (*)     MCH 32.3 (*)     MCHC 33.5      RDW 13.0      Platelet 230      MPV 10.8 (*)     IPF 4.2      Neut % 50.8      Lymph % 34.6      Mono % 12.4      Eos % 1.6      Basophil % 0.3      Lymph # 2.39      Neut # 3.51      Mono # 0.86      Eos # 0.11      Baso # 0.02      IG# 0.02      IG% 0.3      NRBC% 0.0     LACTIC ACID, PLASMA - Normal    Lactic Acid Level 0.9     MAGNESIUM - Normal    Magnesium Level 1.80     CBC W/ AUTO DIFFERENTIAL    Narrative:     The following orders were created for panel order CBC auto differential.  Procedure                               Abnormality         Status                     ---------                               -----------         ------                     CBC with Differential[[id number]]       Abnormal            Final result                 Please view results for these tests on the individual orders.   EXTRA TUBES    Narrative:     The following orders were created for panel order EXTRA TUBES.  Procedure                               Abnormality         Status                     ---------                               -----------         ------                     Light Blue Top Hold[[id number]]                             Final result               Gold Top Hold[[id number]]                                   Final result                 Please view results for these tests on the individual orders.   LIGHT BLUE TOP HOLD    Extra Tube Hold for add-ons.     GOLD TOP HOLD    Extra Tube Hold for add-ons.       Imaging Results              CT Abdomen Pelvis With IV  Contrast NO Oral Contrast (Final result)  Result time 11/15/24 16:52:46      Final result by Arely Roche MD (11/15/24 16:52:46)                   Impression:      Persistent inflammatory changes in the distal descending and proximal sigmoid colon.  Findings could represent a focal recurrent colitis versus underlying mass.      Electronically signed by: Raphael Roche  Date:    11/15/2024  Time:    16:52               Narrative:    EXAMINATION:  CT ABDOMEN PELVIS WITH IV CONTRAST    CLINICAL HISTORY:  LLQ abdominal pain;    TECHNIQUE:  Low dose axial images, sagittal and coronal reformations were obtained from the lung bases to the pubic symphysis following the IV administration of contrast. Automatic exposure control (AEC) is utilized to reduce patient radiation exposure.    COMPARISON:  11/13/2024    FINDINGS:  The lung bases are clear.    The liver appears normal.  No liver mass or lesion is seen.  Portal and hepatic veins appear normal.    The gallbladder appears normal.  No obvious gallstones are seen.  No biliary dilatation is seen.  No pericholecystic fluid is seen.    The pancreas appears normal.  No pancreatic mass or lesion is seen.    The spleen shows no acute abnormality.    The adrenal glands appear normal.  No adrenal nodule is seen.    The kidneys appear normal.  No hydronephrosis is seen.  No hydroureter is seen.  No nephrolithiasis is seen.  No obvious ureteral stones are seen.    Urinary bladder appears grossly unremarkable.    There is colonic wall thickening seen in the distal descending and sigmoid colon.  It appears slightly worse than the prior examination.  Findings could represent a focal colitis but underlying mass should be excluded.  The findings are slightly more prominent than the prior examination..  None no perforation or abscess is seen.    No free air is seen.  No free fluid is seen.                                      PROCEDURES:  Procedures    ECG:  EKG Readings:  (Independently Interpreted)   Initial Reading: No STEMI. Rhythm: Normal Sinus Rhythm. Heart Rate: 69. Ectopy: No Ectopy. Conduction: Normal. Axis: Normal.       ED COURSE AND MEDICAL DECISION MAKING:  Medications   morphine injection 4 mg (4 mg Intravenous Given 11/15/24 1520)   ondansetron injection 4 mg (4 mg Intravenous Given 11/15/24 1520)   albuterol-ipratropium 2.5 mg-0.5 mg/3 mL nebulizer solution 3 mL (3 mLs Nebulization Given 11/15/24 1445)   iohexoL (OMNIPAQUE 350) 350 mg iodine/mL injection (95 mLs Intravenous Given 11/15/24 1630)     ED Course as of 11/15/24 1834   Fri Nov 15, 2024   1459 WBC: 6.91 [IB]   1459 Hemoglobin: 14.4 [IB]   1459 Platelet Count: 230 [IB]   1515 Lactic Acid Level: 0.9 [IB]   1523 Creatinine: 0.85 [IB]   1523 Magnesium : 1.80 [IB]      ED Course User Index  [IB] Pedro Sagastume, DO        Medical Decision Making  54-year-old female who presents with left-sided abdominal pain states she was concerned that she may have perforated as she was recently diagnosed with diverticulitis.  Labs unremarkable.  CT abdomen and pelvis shows no evidence of perforation.  Given strict ED return precautions. I have spoken with the patient and/or caregivers. I have explained the patient's condition, diagnoses and treatment plan based on the information available to me at this time. I have answered the patient's and/or caregiver's questions and addressed any concerns. The patient and/or caregivers have as good an understanding of the patient's diagnosis, condition and treatment plan as can be expected at this point. The vital signs have been stable. The patient's condition is stable and appropriate for discharge from the emergency department.     The patient will pursue further outpatient evaluation with the primary care physician or other designated or consulting physician as outlined in the discharge instructions. The patient and/or caregivers are agreeable to this plan of care and follow-up  instructions have been explained in detail. The patient and/or caregivers have received these instructions in written format and have expressed an understanding of the discharge instructions. The patient and/or caregivers are aware that any significant change in condition or worsening of symptoms should prompt an immediate return to this or the closest emergency department or a call to 911.    Amount and/or Complexity of Data Reviewed  Labs: ordered. Decision-making details documented in ED Course.  Radiology: ordered.  ECG/medicine tests: ordered and independent interpretation performed.    Risk  Prescription drug management.  Decision regarding hospitalization.        CLINICAL IMPRESSION:  1. Colitis    2. SOB (shortness of breath)        DISPOSITION:   ED Disposition Condition    Discharge Stable            ED Prescriptions    None       Follow-up Information       Follow up With Specialties Details Why Contact Info    Oswaldo Riggs, NP Family Medicine Schedule an appointment as soon as possible for a visit   [address] [place] [id number]  [phone]      [facility] Emergency Medicine  If symptoms worsen [address] [facility] [id number] [phone]               Pedro Sagastume,   11/15/24 1836

## 2024-11-16 ENCOUNTER — HOSPITAL ENCOUNTER (EMERGENCY)
Facility: HOSPITAL | Age: 54
Discharge: HOME | End: 2024-11-16
Attending: STUDENT IN AN ORGANIZED HEALTH CARE EDUCATION/TRAINING PROGRAM
Payer: MEDICAID

## 2024-11-16 ENCOUNTER — NURSE TRIAGE (OUTPATIENT)
Dept: ADMINISTRATIVE | Facility: CLINIC | Age: 54
End: 2024-11-16
Payer: MEDICAID

## 2024-11-16 VITALS
HEART RATE: 69 BPM | WEIGHT: 140 LBS | DIASTOLIC BLOOD PRESSURE: 68 MMHG | TEMPERATURE: 99 F | BODY MASS INDEX: 28.28 KG/M2 | SYSTOLIC BLOOD PRESSURE: 128 MMHG | OXYGEN SATURATION: 91 % | RESPIRATION RATE: 20 BRPM

## 2024-11-16 DIAGNOSIS — R11.2 NAUSEA AND VOMITING, UNSPECIFIED VOMITING TYPE: Primary | ICD-10-CM

## 2024-11-16 DIAGNOSIS — K57.92 DIVERTICULITIS: ICD-10-CM

## 2024-11-16 DIAGNOSIS — R10.9 ABDOMINAL PAIN: ICD-10-CM

## 2024-11-16 LAB
BACTERIA #/AREA URNS AUTO: ABNORMAL /HPF
BASOPHILS # BLD AUTO: 0.03 X10(3)/MCL
BASOPHILS NFR BLD AUTO: 0.5 %
BILIRUB UR QL STRIP.AUTO: NEGATIVE
CLARITY UR: CLEAR
COLOR UR AUTO: ABNORMAL
EOSINOPHIL # BLD AUTO: 0.11 X10(3)/MCL (ref 0–0.9)
EOSINOPHIL NFR BLD AUTO: 1.8 %
ERYTHROCYTE [DISTWIDTH] IN BLOOD BY AUTOMATED COUNT: 13.2 % (ref 11.5–17)
ETHANOL SERPL-MCNC: <10 MG/DL
GLUCOSE UR QL STRIP: NORMAL
HCT VFR BLD AUTO: 43.7 % (ref 37–47)
HGB BLD-MCNC: 14.6 G/DL (ref 12–16)
HGB UR QL STRIP: ABNORMAL
IMM GRANULOCYTES # BLD AUTO: 0.01 X10(3)/MCL (ref 0–0.04)
IMM GRANULOCYTES NFR BLD AUTO: 0.2 %
KETONES UR QL STRIP: ABNORMAL
LEUKOCYTE ESTERASE UR QL STRIP: NEGATIVE
LIPASE SERPL-CCNC: 18 U/L
LYMPHOCYTES # BLD AUTO: 2.51 X10(3)/MCL (ref 0.6–4.6)
LYMPHOCYTES NFR BLD AUTO: 40.1 %
MCH RBC QN AUTO: 32.6 PG (ref 27–31)
MCHC RBC AUTO-ENTMCNC: 33.4 G/DL (ref 33–36)
MCV RBC AUTO: 97.5 FL (ref 80–94)
MONOCYTES # BLD AUTO: 0.74 X10(3)/MCL (ref 0.1–1.3)
MONOCYTES NFR BLD AUTO: 11.8 %
NEUTROPHILS # BLD AUTO: 2.86 X10(3)/MCL (ref 2.1–9.2)
NEUTROPHILS NFR BLD AUTO: 45.6 %
NITRITE UR QL STRIP: NEGATIVE
NRBC BLD AUTO-RTO: 0 %
PH UR STRIP: 5.5 [PH]
PLATELET # BLD AUTO: 203 X10(3)/MCL (ref 130–400)
PMV BLD AUTO: 11.6 FL (ref 7.4–10.4)
PROT UR QL STRIP: NEGATIVE
RBC # BLD AUTO: 4.48 X10(6)/MCL (ref 4.2–5.4)
RBC #/AREA URNS AUTO: ABNORMAL /HPF
SP GR UR STRIP.AUTO: >1.05 (ref 1–1.03)
SQUAMOUS #/AREA URNS LPF: ABNORMAL /HPF
UROBILINOGEN UR STRIP-ACNC: NORMAL
WBC # BLD AUTO: 6.26 X10(3)/MCL (ref 4.5–11.5)
WBC #/AREA URNS AUTO: ABNORMAL /HPF

## 2024-11-16 PROCEDURE — 83690 ASSAY OF LIPASE: CPT | Performed by: PHYSICIAN ASSISTANT

## 2024-11-16 PROCEDURE — 96374 THER/PROPH/DIAG INJ IV PUSH: CPT

## 2024-11-16 PROCEDURE — 81001 URINALYSIS AUTO W/SCOPE: CPT | Performed by: PHYSICIAN ASSISTANT

## 2024-11-16 PROCEDURE — 93005 ELECTROCARDIOGRAM TRACING: CPT

## 2024-11-16 PROCEDURE — 85025 COMPLETE CBC W/AUTO DIFF WBC: CPT | Performed by: PHYSICIAN ASSISTANT

## 2024-11-16 PROCEDURE — 63600175 PHARM REV CODE 636 W HCPCS: Performed by: STUDENT IN AN ORGANIZED HEALTH CARE EDUCATION/TRAINING PROGRAM

## 2024-11-16 PROCEDURE — 99285 EMERGENCY DEPT VISIT HI MDM: CPT | Mod: 25

## 2024-11-16 PROCEDURE — 93010 ELECTROCARDIOGRAM REPORT: CPT | Mod: ,,, | Performed by: INTERNAL MEDICINE

## 2024-11-16 PROCEDURE — 82077 ASSAY SPEC XCP UR&BREATH IA: CPT | Performed by: STUDENT IN AN ORGANIZED HEALTH CARE EDUCATION/TRAINING PROGRAM

## 2024-11-16 PROCEDURE — 25500020 PHARM REV CODE 255: Performed by: STUDENT IN AN ORGANIZED HEALTH CARE EDUCATION/TRAINING PROGRAM

## 2024-11-16 RX ORDER — ONDANSETRON 4 MG/1
4 TABLET, ORALLY DISINTEGRATING ORAL EVERY 8 HOURS PRN
Qty: 15 TABLET | Refills: 0 | Status: SHIPPED | OUTPATIENT
Start: 2024-11-16 | End: 2024-11-21

## 2024-11-16 RX ORDER — MOXIFLOXACIN HYDROCHLORIDE 400 MG/1
400 TABLET ORAL DAILY
Qty: 7 TABLET | Refills: 0 | Status: SHIPPED | OUTPATIENT
Start: 2024-11-16 | End: 2024-11-23

## 2024-11-16 RX ORDER — ONDANSETRON HYDROCHLORIDE 2 MG/ML
4 INJECTION, SOLUTION INTRAVENOUS
Status: COMPLETED | OUTPATIENT
Start: 2024-11-16 | End: 2024-11-16

## 2024-11-16 RX ORDER — HYDROCODONE BITARTRATE AND ACETAMINOPHEN 5; 325 MG/1; MG/1
1 TABLET ORAL EVERY 8 HOURS PRN
Qty: 9 TABLET | Refills: 0 | Status: SHIPPED | OUTPATIENT
Start: 2024-11-16 | End: 2024-11-19

## 2024-11-16 RX ADMIN — ONDANSETRON 4 MG: 2 INJECTION INTRAMUSCULAR; INTRAVENOUS at 08:11

## 2024-11-16 RX ADMIN — SODIUM CHLORIDE, POTASSIUM CHLORIDE, SODIUM LACTATE AND CALCIUM CHLORIDE 1000 ML: 600; 310; 30; 20 INJECTION, SOLUTION INTRAVENOUS at 09:11

## 2024-11-16 RX ADMIN — IOHEXOL 100 ML: 350 INJECTION, SOLUTION INTRAVENOUS at 08:11

## 2024-11-16 NOTE — TELEPHONE ENCOUNTER
Patient reports severe weakness that she believes may be due to antibiotics. Explains she is going to call EMS as she would like to be evaluated and admitted to the hospital for care. I have triaged her, per protocol and she verbalizes understanding.     Reason for Disposition   [1] SEVERE weakness (i.e., unable to walk or barely able to walk, requires support) AND [2] new-onset or getting worse    Protocols used: Weakness (Generalized) and Fatigue-A-AH

## 2024-11-17 LAB
OHS QRS DURATION: 68 MS
OHS QTC CALCULATION: 404 MS

## 2024-11-17 NOTE — FIRST PROVIDER EVALUATION
Medical screening examination initiated.  I have conducted a focused provider triage encounter, findings are as follows:    Brief history of present illness:  54yoAAF presents to ER with abdominal pain LLQ for 1 week. States has diverticulitis and needs to be admitted because the flagyl makes her dizzy and weak. And says she needs pain control. Has been seen at [facility] x 3 this week. No fever.     Vitals:    11/16/24 1812   BP: (!) 153/95   Pulse: 76   Resp: 20   Temp: 98.7 °F (37.1 °C)   TempSrc: Oral   SpO2: 97%   Weight: 63.5 kg (140 lb)       Pertinent physical exam:  NAD    Brief workup plan:  labs and imaging if appropriate    Preliminary workup initiated; this workup will be continued and followed by the physician or advanced practice provider that is assigned to the patient when roomed.

## 2024-11-17 NOTE — DISCHARGE INSTRUCTIONS
Follow-up with the primary care physician.      Follow-up with a gastroenterologist.      You may start taking moxifloxacin as prescribed.      Return to the emergency department if any new or worsening symptoms.    You may take Zofran as needed for nausea and vomiting.

## 2024-11-17 NOTE — ED PROVIDER NOTES
Encounter Date: 11/16/2024    SCRIBE #1 NOTE: I, Washington Maguire, am scribing for, and in the presence of,  Bran Wilson MD. I have scribed the following portions of the note - Other sections scribed: HPI,ROS,PE.       History     Chief Complaint   Patient presents with    Weakness    Dizziness    Nausea    Abdominal Pain     C/o generalized weakness, dizziness, nausea, generalized abd pain x a few days. States was seen at [facility] last night for same and discharged home with flagyl. States hx diverticulitis. GCS 15      Patient is a 53 y/o female with PMHx of asthma, diverticulosis, GERD, and Graves disease presents to ED c/o LLQ abdominal pain onset ~5x days. Pt reports associated symptoms of generalized weakness, nausea, and dizziness. Pt reports she went to [facility] twice this week for the same complaint, had CT scans done, states they showed worsening diverticulitis, was discharged, and prescribed flagyl and cipro.     The history is provided by the patient. No  was used.     Review of patient's allergies indicates:   Allergen Reactions    Latex Anaphylaxis and Edema    Zosyn [piperacillin-tazobactam] Anaphylaxis     Past Medical History:   Diagnosis Date    Asthma     Diverticulitis     Diverticulosis     GERD (gastroesophageal reflux disease)     Graves disease      Past Surgical History:   Procedure Laterality Date    COLONOSCOPY, WITH DIRECTED SUBMUCOSAL INJECTION N/A 4/8/2024    Procedure: COLONOSCOPY, WITH DIRECTED SUBMUCOSAL INJECTION;  Surgeon: Heather Lance MD;  Location: [facility] ENDOSCOPY;  Service: Gastroenterology;  Laterality: N/A;     Family History   Problem Relation Name Age of Onset    Hypothyroidism Mother       Social History     Tobacco Use    Smoking status: Every Day     Current packs/day: 0.50     Average packs/day: 0.5 packs/day for 41.9 years (21.0 ttl pk-yrs)     Types: Cigarettes     Start date: 1/1/1983    Smokeless tobacco: Current    Tobacco comments:     Ambulatory  referral to Smoking Cessation clinic following hospital discharge.    Substance Use Topics    Alcohol use: Not Currently    Drug use: Never     Review of Systems   Constitutional:  Negative for fever.        Generalized weakness.    HENT:  Negative for sore throat.    Eyes:  Negative for visual disturbance.   Respiratory:  Negative for shortness of breath.    Cardiovascular:  Negative for chest pain.   Gastrointestinal:  Positive for abdominal pain (LLQ) and nausea. Negative for vomiting.   Genitourinary:  Negative for dysuria.   Musculoskeletal:  Negative for joint swelling.   Skin:  Negative for rash.   Neurological:  Positive for dizziness. Negative for weakness.   Psychiatric/Behavioral:  Negative for confusion.        Physical Exam     Initial Vitals [11/16/24 1812]   BP Pulse Resp Temp SpO2   (!) 153/95 76 20 98.7 °F (37.1 °C) 97 %      MAP       --         Physical Exam    Nursing note and vitals reviewed.  Constitutional: She appears well-developed and well-nourished.   HENT:   Head: Normocephalic and atraumatic.   Eyes: EOM are normal. Pupils are equal, round, and reactive to light.   Neck:   Normal range of motion.  Cardiovascular:  Normal rate, regular rhythm, normal heart sounds and intact distal pulses.           No murmur heard.  Pulmonary/Chest: Breath sounds normal. No respiratory distress. She has no wheezes. She has no rales.   Abdominal: Abdomen is soft. She exhibits no distension. There is abdominal tenderness in the left lower quadrant. There is no rebound.   Musculoskeletal:         General: No tenderness or edema. Normal range of motion.      Cervical back: Normal range of motion.     Neurological: She is alert. She has normal strength. No cranial nerve deficit. GCS score is 15.   Skin: Skin is warm and dry. Capillary refill takes less than 2 seconds. No rash noted. No erythema.   Psychiatric: She has a normal mood and affect.         ED Course   Procedures  Labs Reviewed   CBC WITH  DIFFERENTIAL - Abnormal       Result Value    WBC 6.26      RBC 4.48      Hgb 14.6      Hct 43.7      MCV 97.5 (*)     MCH 32.6 (*)     MCHC 33.4      RDW 13.2      Platelet 203      MPV 11.6 (*)     Neut % 45.6      Lymph % 40.1      Mono % 11.8      Eos % 1.8      Basophil % 0.5      Lymph # 2.51      Neut # 2.86      Mono # 0.74      Eos # 0.11      Baso # 0.03      IG# 0.01      IG% 0.2      NRBC% 0.0     URINALYSIS, REFLEX TO URINE CULTURE - Abnormal    Color, UA Light-Yellow      Appearance, UA Clear      Specific Gravity, UA >1.050 (*)     pH, UA 5.5      Protein, UA Negative      Glucose, UA Normal      Ketones, UA Trace (*)     Blood, UA Trace (*)     Bilirubin, UA Negative      Urobilinogen, UA Normal      Nitrites, UA Negative      Leukocyte Esterase, UA Negative      RBC, UA 0-5      WBC, UA None Seen      Bacteria, UA None Seen      Squamous Epithelial Cells, UA Trace     LIPASE - Normal    Lipase Level 18     ALCOHOL,MEDICAL (ETHANOL) - Normal    Ethanol Level <10.0     CBC W/ AUTO DIFFERENTIAL    Narrative:     The following orders were created for panel order CBC auto differential.  Procedure                               Abnormality         Status                     ---------                               -----------         ------                     CBC with Differential[[id number]]       Abnormal            Final result                 Please view results for these tests on the individual orders.     EKG Readings: (Independently Interpreted)   Initial Reading: No STEMI. Rhythm: Normal Sinus Rhythm. Heart Rate: 66. Ectopy: No Ectopy. Conduction: Normal. ST Segments: Normal ST Segments. T Waves: Normal. Axis: Normal.   Taken at 18:19     ECG Results              EKG 12-lead (Final result)        Collection Time Result Time QRS Duration OHS QTC Calculation    11/16/24 18:19:09 11/17/24 17:59:21 68 404                     Final result by Interface, Lab In [facility] (11/17/24 17:59:27)                    Narrative:    Test Reason : R10.9,    Vent. Rate :  66 BPM     Atrial Rate :  66 BPM     P-R Int : 136 ms          QRS Dur :  68 ms      QT Int : 386 ms       P-R-T Axes :  79  84  74 degrees    QTcB Int : 404 ms    Normal sinus rhythm  Normal ECG  Confirmed by Greg Orozco ([id number]) on 11/17/2024 5:59:17 PM    Referred By:            Confirmed By: Greg Orozco                                     EKG 12-lead (Final result)  Result time 11/22/24 15:23:03      Final result by Unknown User (11/22/24 15:23:03)                                      Imaging Results              CT Abdomen Pelvis With IV Contrast NO Oral Contrast (Final result)  Result time 11/16/24 21:16:37      Final result by Kishan Allen MD (11/16/24 21:16:37)                   Narrative:    EXAMINATION  CT ABDOMEN PELVIS WITH IV CONTRAST    CLINICAL HISTORY  LLQ abdominal pain;    TECHNIQUE  Post-contrast helical-acquisition CT images were obtained and multiplanar reformats accomplished by a CT technologist at a separate workstation, pushed to PACS for physician review.    CONTRAST  *IV: Omnipaque 350, 100 mL  *Enteric: none    COMPARISON  15 November 2024    FINDINGS  Images were reviewed in soft tissue, lung, and bone windows.    Exam quality: adequate for evaluation    Lines/tubes: none visualized    No short interval change of previously described descending/sigmoid colon diverticulosis and associated moderate length segment irregular mural thickening and pericolonic inflammatory changes.  No interval development of pneumoperitoneum to suggest gross full-thickness mural perforation and there is no visualized drainable pericolonic abscess.    Remainder of the GI tract is similar in comparison.  The visualized thoracic cavity, upper abdominal solid organs, and genitourinary components are also unchanged.  No acute vascular findings.  Musculoskeletal structures are similar.    IMPRESSION  1. No short interval changes of descending/sigmoid colon  findings.  As before, changes could represent acute diverticulitis with possible atypical presentation of infiltrative neoplasm again not excluded.  2. Remaining abdominopelvic structures are similar to the study obtained yesterday    RADIATION DOSE  Automated tube current modulation, weight-based exposure dosing, and/or iterative reconstruction technique utilized to reach lowest reasonably achievable exposure rate.    DLP: 572 mGy*cm      Electronically signed by: Kishan Allen  Date:    11/16/2024  Time:    21:16                                     Medications   ondansetron injection 4 mg (4 mg Intravenous Given 11/16/24 2017)   iohexoL (OMNIPAQUE 350) injection 100 mL (100 mLs Intravenous Given 11/16/24 2035)   lactated ringers bolus 1,000 mL (1,000 mLs Intravenous New Bag 11/16/24 2154)     Medical Decision Making  Judging by the patient's chief complaint and pertinent history, the patient has the following possible differential diagnoses, including but not limited to the following.  Some of these are deemed to be lower likelihood and some more likely based on my physical exam and history combined with possible lab work and/or imaging studies.   Please see the pertinent studies, and refer to the HPI.  Some of these diagnoses will take further evaluation to fully rule out, perhaps as an outpatient and the patient was encouraged to follow up when discharged for more comprehensive evaluation.    appendicitis, biliary disease, diverticulitis, ACS, mesenteric ischemia, intraabdominal abscess, retroperitoneal abscess, gastritis, gastroenteritis, hepatitis, hernia, pancreatitis, inflammatory bowel disease, PUD, gastroparesis, nephrolithiasis, constipation, GERD, IBS      Patient is a 54-year-old female presents to emergency department for left lower quadrant abdominal pain.  Recent diagnosis of diverticulitis.  Labs and imaging obtained.  No evidence of diverticular abscess.  May still have some component of  diverticulitis.  Will place patient on moxifloxacin.  Discussed need for follow-up with primary care provider.  Discussed need for follow-up with Gastroenterology.  Patient's pain controlled.  Will also write for Norco and Zofran.  Discussed all results with patient and family including incidental findings.  Discussed need for follow up with primary care provider.  Discussed return precautions.  Vital signs stable.  Hemodynamically stable for continued outpatient management with strict return precautions. Patient and family verbalized understanding and agreed to plan.       Problems Addressed:  Abdominal pain: acute illness or injury that poses a threat to life or bodily functions  Diverticulitis: acute illness or injury that poses a threat to life or bodily functions  Nausea and vomiting, unspecified vomiting type: acute illness or injury that poses a threat to life or bodily functions    Amount and/or Complexity of Data Reviewed  Labs: ordered. Decision-making details documented in ED Course.  Radiology: ordered and independent interpretation performed. Decision-making details documented in ED Course.  ECG/medicine tests: ordered and independent interpretation performed.     Details: Initial Reading: No STEMI. Rhythm: Normal Sinus Rhythm. Heart Rate: 66. Ectopy: No Ectopy. Conduction: Normal. ST Segments: Normal ST Segments. T Waves: Normal. Axis: Normal.   Taken at 18:19     Risk  Prescription drug management.  Parenteral controlled substances.  Decision regarding hospitalization.  Diagnosis or treatment significantly limited by social determinants of health.            Scribe Attestation:   Scribe #1: I performed the above scribed service and the documentation accurately describes the services I performed. I attest to the accuracy of the note.    Attending Attestation:           Physician Attestation for Scribe:  Physician Attestation Statement for Scribe #1: I, Bran Wilson MD, reviewed documentation, as  scribed by Washington Maguire in my presence, and it is both accurate and complete.                                    Clinical Impression:  Final diagnoses:  [R10.9] Abdominal pain  [R11.2] Nausea and vomiting, unspecified vomiting type (Primary)  [K57.92] Diverticulitis          ED Disposition Condition    Discharge Stable          ED Prescriptions       Medication Sig Dispense Start Date End Date Auth. Provider    ondansetron (ZOFRAN-ODT) 4 MG TbDL () Take 1 tablet (4 mg total) by mouth every 8 (eight) hours as needed. 15 tablet 2024 Bran Wilson MD    moxifloxacin (AVELOX) 400 mg tablet () Take 1 tablet (400 mg total) by mouth once daily. for 7 days 7 tablet 2024 Bran Wilson MD    HYDROcodone-acetaminophen (NORCO) 5-325 mg per tablet () Take 1 tablet by mouth every 8 (eight) hours as needed for Pain. 9 tablet 2024 Bran Wilson MD          Follow-up Information       Follow up With Specialties Details Why Contact Info    Oswaldo Riggs, NP Family Medicine   [address] [id number] [phone]      Primary Care  Call in 1 day  Please call [phone] for a primary care provider.             Bran Wilson MD  12/10/24 0815

## 2024-11-18 LAB
OHS QRS DURATION: 62 MS
OHS QTC CALCULATION: 400 MS

## 2024-11-19 ENCOUNTER — PATIENT MESSAGE (OUTPATIENT)
Dept: RESEARCH | Facility: HOSPITAL | Age: 54
End: 2024-11-19
Payer: MEDICAID

## 2024-11-26 ENCOUNTER — OFFICE VISIT (OUTPATIENT)
Dept: ENDOCRINOLOGY | Facility: CLINIC | Age: 54
End: 2024-11-26
Payer: MEDICAID

## 2024-11-26 ENCOUNTER — HOSPITAL ENCOUNTER (EMERGENCY)
Facility: HOSPITAL | Age: 54
Discharge: HOME | End: 2024-11-26
Attending: STUDENT IN AN ORGANIZED HEALTH CARE EDUCATION/TRAINING PROGRAM
Payer: MEDICAID

## 2024-11-26 VITALS
OXYGEN SATURATION: 96 % | HEIGHT: 59 IN | RESPIRATION RATE: 20 BRPM | DIASTOLIC BLOOD PRESSURE: 62 MMHG | TEMPERATURE: 99 F | WEIGHT: 150.38 LBS | SYSTOLIC BLOOD PRESSURE: 99 MMHG | HEART RATE: 88 BPM | BODY MASS INDEX: 30.32 KG/M2

## 2024-11-26 VITALS
HEIGHT: 59 IN | DIASTOLIC BLOOD PRESSURE: 69 MMHG | WEIGHT: 152.69 LBS | RESPIRATION RATE: 18 BRPM | SYSTOLIC BLOOD PRESSURE: 90 MMHG | TEMPERATURE: 99 F | BODY MASS INDEX: 30.78 KG/M2 | HEART RATE: 91 BPM

## 2024-11-26 DIAGNOSIS — R06.02 SOB (SHORTNESS OF BREATH): ICD-10-CM

## 2024-11-26 DIAGNOSIS — E04.9 ENLARGED THYROID: ICD-10-CM

## 2024-11-26 DIAGNOSIS — J45.909 ASTHMA, UNSPECIFIED ASTHMA SEVERITY, UNSPECIFIED WHETHER COMPLICATED, UNSPECIFIED WHETHER PERSISTENT: Primary | ICD-10-CM

## 2024-11-26 DIAGNOSIS — E05.00 GRAVES' DISEASE WITH EXOPHTHALMOS: ICD-10-CM

## 2024-11-26 DIAGNOSIS — E05.00 GRAVES DISEASE: Primary | ICD-10-CM

## 2024-11-26 LAB
ALBUMIN SERPL-MCNC: 3.9 G/DL (ref 3.5–5)
ALBUMIN/GLOB SERPL: 1 RATIO (ref 1.1–2)
ALP SERPL-CCNC: 77 UNIT/L (ref 40–150)
ALT SERPL-CCNC: 16 UNIT/L (ref 0–55)
ANION GAP SERPL CALC-SCNC: 9 MEQ/L
AST SERPL-CCNC: 20 UNIT/L (ref 5–34)
BASOPHILS # BLD AUTO: 0.02 X10(3)/MCL
BASOPHILS NFR BLD AUTO: 0.3 %
BILIRUB SERPL-MCNC: 0.5 MG/DL
BNP BLD-MCNC: 15 PG/ML
BUN SERPL-MCNC: 6.8 MG/DL (ref 9.8–20.1)
CALCIUM SERPL-MCNC: 10.1 MG/DL (ref 8.4–10.2)
CHLORIDE SERPL-SCNC: 106 MMOL/L (ref 98–107)
CO2 SERPL-SCNC: 26 MMOL/L (ref 22–29)
CREAT SERPL-MCNC: 0.93 MG/DL (ref 0.55–1.02)
CREAT/UREA NIT SERPL: 7
EOSINOPHIL # BLD AUTO: 0.22 X10(3)/MCL (ref 0–0.9)
EOSINOPHIL NFR BLD AUTO: 3.6 %
ERYTHROCYTE [DISTWIDTH] IN BLOOD BY AUTOMATED COUNT: 13.2 % (ref 11.5–17)
FLUAV AG UPPER RESP QL IA.RAPID: NOT DETECTED
FLUBV AG UPPER RESP QL IA.RAPID: NOT DETECTED
GFR SERPLBLD CREATININE-BSD FMLA CKD-EPI: >60 ML/MIN/1.73/M2
GLOBULIN SER-MCNC: 4 GM/DL (ref 2.4–3.5)
GLUCOSE SERPL-MCNC: 79 MG/DL (ref 74–100)
HCT VFR BLD AUTO: 43.8 % (ref 37–47)
HGB BLD-MCNC: 15 G/DL (ref 12–16)
HOLD SPECIMEN: NORMAL
HOLD SPECIMEN: NORMAL
IMM GRANULOCYTES # BLD AUTO: 0.01 X10(3)/MCL (ref 0–0.04)
IMM GRANULOCYTES NFR BLD AUTO: 0.2 %
LYMPHOCYTES # BLD AUTO: 2.44 X10(3)/MCL (ref 0.6–4.6)
LYMPHOCYTES NFR BLD AUTO: 39.7 %
MAGNESIUM SERPL-MCNC: 1.9 MG/DL (ref 1.6–2.6)
MCH RBC QN AUTO: 33.7 PG (ref 27–31)
MCHC RBC AUTO-ENTMCNC: 34.2 G/DL (ref 33–36)
MCV RBC AUTO: 98.4 FL (ref 80–94)
MONOCYTES # BLD AUTO: 0.76 X10(3)/MCL (ref 0.1–1.3)
MONOCYTES NFR BLD AUTO: 12.4 %
NEUTROPHILS # BLD AUTO: 2.69 X10(3)/MCL (ref 2.1–9.2)
NEUTROPHILS NFR BLD AUTO: 43.8 %
NRBC BLD AUTO-RTO: 0 %
OHS QRS DURATION: 76 MS
OHS QTC CALCULATION: 421 MS
PLATELET # BLD AUTO: 250 X10(3)/MCL (ref 130–400)
PMV BLD AUTO: 10.8 FL (ref 7.4–10.4)
POTASSIUM SERPL-SCNC: 3.6 MMOL/L (ref 3.5–5.1)
PROT SERPL-MCNC: 7.9 GM/DL (ref 6.4–8.3)
RBC # BLD AUTO: 4.45 X10(6)/MCL (ref 4.2–5.4)
RSV A 5' UTR RNA NPH QL NAA+PROBE: NOT DETECTED
SARS-COV-2 RNA RESP QL NAA+PROBE: NOT DETECTED
SODIUM SERPL-SCNC: 141 MMOL/L (ref 136–145)
TROPONIN I SERPL-MCNC: <0.01 NG/ML (ref 0–0.04)
WBC # BLD AUTO: 6.14 X10(3)/MCL (ref 4.5–11.5)

## 2024-11-26 PROCEDURE — 99215 OFFICE O/P EST HI 40 MIN: CPT | Mod: PBBFAC,25 | Performed by: NURSE PRACTITIONER

## 2024-11-26 PROCEDURE — 94640 AIRWAY INHALATION TREATMENT: CPT

## 2024-11-26 PROCEDURE — 3074F SYST BP LT 130 MM HG: CPT | Mod: CPTII,,, | Performed by: NURSE PRACTITIONER

## 2024-11-26 PROCEDURE — 3008F BODY MASS INDEX DOCD: CPT | Mod: CPTII,,, | Performed by: NURSE PRACTITIONER

## 2024-11-26 PROCEDURE — 1159F MED LIST DOCD IN RCRD: CPT | Mod: CPTII,,, | Performed by: NURSE PRACTITIONER

## 2024-11-26 PROCEDURE — 63600175 PHARM REV CODE 636 W HCPCS: Performed by: STUDENT IN AN ORGANIZED HEALTH CARE EDUCATION/TRAINING PROGRAM

## 2024-11-26 PROCEDURE — 93005 ELECTROCARDIOGRAM TRACING: CPT

## 2024-11-26 PROCEDURE — 25500020 PHARM REV CODE 255

## 2024-11-26 PROCEDURE — 0241U COVID/RSV/FLU A&B PCR: CPT | Performed by: STUDENT IN AN ORGANIZED HEALTH CARE EDUCATION/TRAINING PROGRAM

## 2024-11-26 PROCEDURE — 99285 EMERGENCY DEPT VISIT HI MDM: CPT | Mod: 25,27

## 2024-11-26 PROCEDURE — 83880 ASSAY OF NATRIURETIC PEPTIDE: CPT | Performed by: STUDENT IN AN ORGANIZED HEALTH CARE EDUCATION/TRAINING PROGRAM

## 2024-11-26 PROCEDURE — 96374 THER/PROPH/DIAG INJ IV PUSH: CPT

## 2024-11-26 PROCEDURE — 99214 OFFICE O/P EST MOD 30 MIN: CPT | Mod: S$PBB,,, | Performed by: NURSE PRACTITIONER

## 2024-11-26 PROCEDURE — 85025 COMPLETE CBC W/AUTO DIFF WBC: CPT | Performed by: STUDENT IN AN ORGANIZED HEALTH CARE EDUCATION/TRAINING PROGRAM

## 2024-11-26 PROCEDURE — 83735 ASSAY OF MAGNESIUM: CPT | Performed by: STUDENT IN AN ORGANIZED HEALTH CARE EDUCATION/TRAINING PROGRAM

## 2024-11-26 PROCEDURE — 3078F DIAST BP <80 MM HG: CPT | Mod: CPTII,,, | Performed by: NURSE PRACTITIONER

## 2024-11-26 PROCEDURE — 80053 COMPREHEN METABOLIC PANEL: CPT | Performed by: STUDENT IN AN ORGANIZED HEALTH CARE EDUCATION/TRAINING PROGRAM

## 2024-11-26 PROCEDURE — 84484 ASSAY OF TROPONIN QUANT: CPT | Performed by: STUDENT IN AN ORGANIZED HEALTH CARE EDUCATION/TRAINING PROGRAM

## 2024-11-26 PROCEDURE — 25000242 PHARM REV CODE 250 ALT 637 W/ HCPCS: Performed by: STUDENT IN AN ORGANIZED HEALTH CARE EDUCATION/TRAINING PROGRAM

## 2024-11-26 RX ORDER — ROCURONIUM BROMIDE 10 MG/ML
INJECTION, SOLUTION INTRAVENOUS
Status: DISCONTINUED
Start: 2024-11-26 | End: 2024-11-26 | Stop reason: WASHOUT

## 2024-11-26 RX ORDER — METHYLPREDNISOLONE SOD SUCC 125 MG
125 VIAL (EA) INJECTION
Status: COMPLETED | OUTPATIENT
Start: 2024-11-26 | End: 2024-11-26

## 2024-11-26 RX ORDER — SUCCINYLCHOLINE CHLORIDE 20 MG/ML
INJECTION INTRAMUSCULAR; INTRAVENOUS
Status: DISCONTINUED
Start: 2024-11-26 | End: 2024-11-26 | Stop reason: WASHOUT

## 2024-11-26 RX ORDER — ETOMIDATE 2 MG/ML
INJECTION INTRAVENOUS
Status: DISCONTINUED
Start: 2024-11-26 | End: 2024-11-26 | Stop reason: WASHOUT

## 2024-11-26 RX ORDER — IPRATROPIUM BROMIDE AND ALBUTEROL SULFATE 2.5; .5 MG/3ML; MG/3ML
3 SOLUTION RESPIRATORY (INHALATION)
Status: COMPLETED | OUTPATIENT
Start: 2024-11-26 | End: 2024-11-26

## 2024-11-26 RX ORDER — PROPOFOL 10 MG/ML
VIAL (ML) INTRAVENOUS
Status: DISCONTINUED
Start: 2024-11-26 | End: 2024-11-26 | Stop reason: WASHOUT

## 2024-11-26 RX ADMIN — IOHEXOL 100 ML: 350 INJECTION, SOLUTION INTRAVENOUS at 10:11

## 2024-11-26 RX ADMIN — METHYLPREDNISOLONE SODIUM SUCCINATE 125 MG: 125 INJECTION, POWDER, FOR SOLUTION INTRAMUSCULAR; INTRAVENOUS at 10:11

## 2024-11-26 RX ADMIN — IPRATROPIUM BROMIDE AND ALBUTEROL SULFATE 3 ML: .5; 3 SOLUTION RESPIRATORY (INHALATION) at 10:11

## 2024-11-26 NOTE — PROGRESS NOTES
Subjective     Patient ID: Niya Moeller is a 54 y.o. female.    Chief Complaint: Hyperthyroidism    Endocrine clinic note 08/31/2023:  53-year-old female scheduled today as new patient referral to endocrine clinic for history of Graves disease/hyperthyroidism and enlarged thyroid.  Graves disease patient states she was diagnosed with hyperthyroidism multiple years ago when she states placed on medication and she states that time medication was stopped stating she was in remission.  Patient returned back hyperthyroid at the beginning of 2023 w TSH < 0.026, Free T4 3.89 patient was placed on methimazole 10 mg twice a day.  Remain on 10 mg twice a day 5 months and started having symptoms of being lethargic, periorbital edema and swelling to her legs and severe fatigue and weight gain. Labs repeated TSH 22.988, free T4 <0.42 on 08/16/2023 MMI stopped at that time 2 weeks ago. Labs repeat today TSH 4.000, Free T4 0.90 today.  Patient reports a small amount of remaining periorbital edema fatigue is improving.  Instructed patient that will monitor her thyroid labs closely.  Patient states previously when she was hyperthyroid she had an enlarged thyroid, tremors, anxiety, insomnia, heat intolerance.  She states when she was on menses all times several months she started having cold intolerance, becoming more sleepy and symptoms of hypothyroidism with weight gain.  TraB 2.23, TSI 1.4 on 08/18/2023.  Patient states when she was hyperthyroid and thyroid was enlarged she can feel tightness around her neck and dysphagia she states she is mild symptoms of dysphagia now.      Telemedicine Notes:  Endocrine clinic note 11/06/2023:  53 year female scheduled today for endocrine clinic follow-up.  History of Graves disease/hyperthyroidism and enlarged thyroid.  Patient is currently on methimazole 2.5 mg recently hospitalized with diverticulitis abscess and COVID-19.  On admit patient's TSH 0.254 free T4 1.04 at that time patient  had missed 1-2 doses of her medication due to hospitalization.  Repeat TSH 0.420 on 10/23/2023.  Patient denies any symptoms of hyperthyroidism.  Patient is currently at home recovering she states that she is completing her amoxicillin and has a follow-up with ID clinic on 11/30/2023.  Instructed the patient that I will repeat her labs and she can complete him on her 11/30 visit if she has any symptoms of hyperthyroidism prior to that visit she can repeat her labs early.  Patient did have a positive depression screening on the visit today patient mentioned since her medical diagnosis she does have mild depression due to her recent medical visits.  Patient did not express any suicidal or homicidal ideations.     Endocrine clinic note 11/26/2024:  54 year female scheduled today for endocrine clinic follow-up.  History of Graves disease/hyperthyroidism and enlarged thyroid.  Patient is currently on methimazole 2.5 mg  previous TSH 0.187, Free T4 0.84 on 04/06/2024.  Patient has not had any recent thyroid labs.  Patient states   Enlarged thyroid patient had ultrasound 09/08/2023 hypervascular no nodules are noted.  Patient reports she stopped her medication multiple weeks back due to her eyes bulging and having itching and she states when she stopped the medication that her eyes with bulging as much.  She states she restarted the medication she started having eye symptoms again.  Patient's CP checked her labs TSH 1.540, free T4 1.21, free T3 3.0 on 10/28/2024.  Patient states at this time she stopped her MMI and has been off for multiple weeks.  I discussed with the patient will repeat labs today and also repeat her Graves markers.  Patient was states she saw her ophthalmologist who did not know anything about Graves eye disease instruct her I will refer her to [facility].   US Thyroid  Order: [id number]  Status: Final result       Visible to patient: Yes (seen)       Next appt: 12/03/2024 at 12:30 PM in General  Surgery (SURGERY CLINIC, [facility] GENERAL SURGERY)       Dx: Graves disease; Hyperthyroidism    1 Result Note       1 Patient Communication  Details      Reading Physician Reading Date Result Priority  Pillo Stein MD  [phone] 9/8/2023 Routine    Narrative & Impression  EXAMINATION:  US THYROID     CLINICAL HISTORY:  , Thyrotoxicosis, unspecified without thyrotoxic crisis or storm.     TECHNIQUE:  Multiple transverse and sagittal grayscale sonographic images were acquired through the thyroid gland.     COMPARISON:  With ultrasound of an outside institution dated February 12, 2023     FINDINGS:  Examination reveals the right hemithyroid to measured 4.6 x 1.9 x 1.6 cm, left zaida thyroid measures 4.4 x 1.9 x 1.9 cm isthmus measures 3.5 mm in thickness.     There is some heterogenicity of the thyroid parenchyma with no discrete focal lesions identified there is also evidence of increased vascularity throughout     Impression:     Heterogenicity of the thyroid parenchyma with a hypervascularity likely related to thyroiditis.     No focal lesions        Electronically signed by:Pillo Stein  Date:                                            09/08/2023  Time:                                           10:50      Exam Ended: 09/08/23 10:18 CDT Last Resulted: 09/08/23 10:50 CDT        How latency  Review of Systems   Constitutional:  Negative for activity change, appetite change and fatigue.   HENT:  Negative for dental problem, hearing loss, tinnitus, trouble swallowing and goiter.    Eyes:  Positive for pain and itching. Negative for photophobia and visual disturbance.   Respiratory:  Negative for cough, chest tightness and wheezing.    Cardiovascular:  Negative for chest pain, palpitations and leg swelling.   Gastrointestinal:  Negative for abdominal pain, constipation, diarrhea, nausea and reflux.   Endocrine: Negative for cold intolerance, heat intolerance, polydipsia and polyphagia.   Genitourinary:   Negative for difficulty urinating, flank pain, hematuria, hot flashes, menstrual irregularity, menstrual problem, nocturia and urgency.   Musculoskeletal:  Negative for back pain, gait problem, joint swelling, leg pain and joint deformity.   Integumentary:  Negative for color change, pallor, rash and breast discharge.   Allergic/Immunologic: Negative for environmental allergies, food allergies and immunocompromised state.   Neurological:  Negative for tremors, seizures, headaches, memory loss and coordination difficulties.   Psychiatric/Behavioral:  Negative for agitation, behavioral problems and sleep disturbance. The patient is not nervous/anxious.           Objective     Physical Exam  Constitutional:       General: She is not in acute distress.     Appearance: Normal appearance. She is not ill-appearing.   HENT:      Head: Normocephalic and atraumatic.      Right Ear: External ear normal.      Left Ear: External ear normal.      Nose: Nose normal. No congestion or rhinorrhea.      Mouth/Throat:      Mouth: Mucous membranes are moist.      Pharynx: Oropharynx is clear. No oropharyngeal exudate.   Eyes:      General:         Right eye: No discharge.         Left eye: No discharge.      Conjunctiva/sclera: Conjunctivae normal.      Pupils: Pupils are equal, round, and reactive to light.      Comments: Exophthalmos moderate   Neck:      Thyroid: No thyroid mass, thyromegaly or thyroid tenderness.   Cardiovascular:      Rate and Rhythm: Normal rate and regular rhythm.      Pulses: Normal pulses.      Heart sounds: Normal heart sounds. No murmur heard.  Pulmonary:      Effort: Pulmonary effort is normal. No respiratory distress.      Breath sounds: Normal breath sounds.   Abdominal:      General: Abdomen is flat. Bowel sounds are normal. There is no distension.      Palpations: Abdomen is soft.      Tenderness: There is no abdominal tenderness.   Musculoskeletal:         General: No swelling or tenderness. Normal  range of motion.      Cervical back: Normal range of motion and neck supple. No tenderness.      Right lower leg: No edema.      Left lower leg: No edema.   Feet:      Right foot:      Skin integrity: Skin integrity normal.      Left foot:      Skin integrity: Skin integrity normal.   Lymphadenopathy:      Cervical: No cervical adenopathy.   Skin:     General: Skin is warm and dry.      Coloration: Skin is not jaundiced or pale.   Neurological:      General: No focal deficit present.      Mental Status: She is alert and oriented to person, place, and time. Mental status is at baseline.      Coordination: Coordination normal.      Gait: Gait normal.   Psychiatric:         Mood and Affect: Mood normal.         Behavior: Behavior normal.         Thought Content: Thought content normal.            Assessment and Plan     1. Graves disease  TSH 0.187, Free T4 0.84 on 04/06/2024   TSH 1.540, free T4 1.21, free T3 3.0 on 10/28/2024  TraB 2.23, TSI 1.4 on 08/18/2023  On MMI 2.5 mg daily   Repeat labs TSH, Free T4, Free T3              Component Ref Range & Units 7 mo ago  (4/6/24) 12 mo ago  (11/30/23) 1 yr ago  (10/23/23) 1 yr ago  (10/19/23) 1 yr ago  (9/28/23) 1 yr ago  (8/31/23) 1 yr ago  (8/16/23)   TSH 0.350 - 4.940 uIU/mL 0.187 Low  0.704 0.420 0.254 Low  0.660 4.000 22.988 High                    Component Ref Range & Units 7 mo ago  (4/6/24) 1 yr ago  (10/19/23) 1 yr ago  (9/28/23) 1 yr ago  (8/31/23) 1 yr ago  (8/16/23) 1 yr ago  (1/22/23) 2 yr ago  (3/31/22)   Thyroxine Free 0.70 - 1.48 ng/dL 0.84 1.00 1.04 0.90 <0.42 Low  3.09 High  R 1.06 R      -     Thyroid Stimulating Immunoglobulin; Future; Expected date: 11/26/2024  -     Thyrotropin Receptor Antibody; Future; Expected date: 11/26/2024  -     Ambulatory referral/consult to Ophthalmology; Future; Expected date: 12/03/2024  -     T4, Free; Future; Expected date: 11/26/2024  -     T3, Free (OLG); Future; Expected date: 11/26/2024  -     TSH; Future; Expected  date: 11/26/2024    2. Enlarged thyroid  US 09/08/2023 hypervascular no nodules       3. Graves' disease with exophthalmos  Tears eyedrops as needed  Eyes do not completely close at night  -     Ambulatory referral/consult to Ophthalmology; Future; Expected date: 12/03/2024            Follow up in about 8 weeks (around 1/21/2025) for Graves, Hyperthyroidism, virtual visit.

## 2024-11-26 NOTE — ED PROVIDER NOTES
Encounter Date: 11/26/2024       History     Chief Complaint   Patient presents with    Shortness of Breath     C/o dizziness, Chills, SOB x 1 week with nausea. Reports fevers at home, temp in triage 98.5 oral. Also c/o abd pain x 2 weeks. Hx of graves. EKG obtained in triage.      It was to emergency department complaining of shortness of breath.  Has a history of asthma, Graves disease and recurrent diverticulitis.  She states for the last 2 days she has been coughing and having tightness in her chest.  Denies any fevers.  Also continuing to have some left lower quadrant pain although she was states this is improving.  Trying home breathing treatment without relief.    The history is provided by the patient.     Review of patient's allergies indicates:   Allergen Reactions    Latex Anaphylaxis and Edema    Zosyn [piperacillin-tazobactam] Anaphylaxis     Past Medical History:   Diagnosis Date    Asthma     Diverticulitis     Diverticulosis     GERD (gastroesophageal reflux disease)     Graves disease      Past Surgical History:   Procedure Laterality Date    COLONOSCOPY, WITH DIRECTED SUBMUCOSAL INJECTION N/A 4/8/2024    Procedure: COLONOSCOPY, WITH DIRECTED SUBMUCOSAL INJECTION;  Surgeon: Heather Lance MD;  Location: [facility] ENDOSCOPY;  Service: Gastroenterology;  Laterality: N/A;     Family History   Problem Relation Name Age of Onset    Hypothyroidism Mother       Social History     Tobacco Use    Smoking status: Every Day     Current packs/day: 0.50     Average packs/day: 0.5 packs/day for 41.9 years (21.0 ttl pk-yrs)     Types: Cigarettes     Start date: 1/1/1983    Smokeless tobacco: Current    Tobacco comments:     Ambulatory referral to Smoking Cessation clinic following hospital discharge.    Substance Use Topics    Alcohol use: Not Currently    Drug use: Never     Review of Systems   Constitutional:  Negative for chills and fever.   HENT:  Negative for congestion and sore throat.    Respiratory:   Positive for cough and shortness of breath.    Cardiovascular:  Negative for chest pain and palpitations.   Gastrointestinal:  Positive for abdominal pain. Negative for nausea and vomiting.   Genitourinary:  Negative for dysuria and hematuria.   Musculoskeletal:  Negative for arthralgias and myalgias.   Neurological:  Negative for dizziness and weakness.       Physical Exam     Initial Vitals [11/26/24 0859]   BP Pulse Resp Temp SpO2   99/62 85 16 98.5 °F (36.9 °C) 96 %      MAP       --         Physical Exam    Nursing note and vitals reviewed.  Constitutional: She appears well-developed and well-nourished.   HENT:   Head: Normocephalic and atraumatic.   Eyes: EOM are normal. Pupils are equal, round, and reactive to light.   Neck: Neck supple.   Normal range of motion.  Cardiovascular:  Normal rate, regular rhythm and normal heart sounds.           Pulmonary/Chest: No respiratory distress. She has wheezes.   Abdominal: Abdomen is soft. She exhibits no distension. There is abdominal tenderness. There is no rebound and no guarding.   Musculoskeletal:         General: No edema. Normal range of motion.      Cervical back: Normal range of motion and neck supple.     Neurological: She is alert and oriented to person, place, and time.   Skin: Skin is warm and dry.         ED Course   Procedures  Labs Reviewed   COMPREHENSIVE METABOLIC PANEL - Abnormal       Result Value    Sodium 141      Potassium 3.6      Chloride 106      CO2 26      Glucose 79      Blood Urea Nitrogen 6.8 (*)     Creatinine 0.93      Calcium 10.1      Protein Total 7.9      Albumin 3.9      Globulin 4.0 (*)     Albumin/Globulin Ratio 1.0 (*)     Bilirubin Total 0.5      ALP 77      ALT 16      AST 20      eGFR >60      Anion Gap 9.0      BUN/Creatinine Ratio 7     CBC WITH DIFFERENTIAL - Abnormal    WBC 6.14      RBC 4.45      Hgb 15.0      Hct 43.8      MCV 98.4 (*)     MCH 33.7 (*)     MCHC 34.2      RDW 13.2      Platelet 250      MPV 10.8 (*)      Neut % 43.8      Lymph % 39.7      Mono % 12.4      Eos % 3.6      Basophil % 0.3      Lymph # 2.44      Neut # 2.69      Mono # 0.76      Eos # 0.22      Baso # 0.02      IG# 0.01      IG% 0.2      NRBC% 0.0     B-TYPE NATRIURETIC PEPTIDE - Normal    Natriuretic Peptide 15.0     COVID/RSV/FLU A&B PCR - Normal    Influenza A PCR Not Detected      Influenza B PCR Not Detected      Respiratory Syncytial Virus PCR Not Detected      SARS-CoV-2 PCR Not Detected      Narrative:     The [employer] Xpress SARS-CoV-2/FLU/RSV plus is a rapid, multiplexed real-time PCR test intended for the simultaneous qualitative detection and differentiation of SARS-CoV-2, Influenza A, Influenza B, and respiratory syncytial virus (RSV) viral RNA in either nasopharyngeal swab or nasal swab specimens.         MAGNESIUM - Normal    Magnesium Level 1.90     TROPONIN I - Normal    Troponin-I <0.010     CBC W/ AUTO DIFFERENTIAL    Narrative:     The following orders were created for panel order CBC auto differential.  Procedure                               Abnormality         Status                     ---------                               -----------         ------                     CBC with Differential[[id number]]       Abnormal            Final result                 Please view results for these tests on the individual orders.   EXTRA TUBES    Narrative:     The following orders were created for panel order EXTRA TUBES.  Procedure                               Abnormality         Status                     ---------                               -----------         ------                     Light Blue Top Hold[[id number]]                             In process                 Red Top Hold[[id number]]                                    In process                   Please view results for these tests on the individual orders.   LIGHT BLUE TOP HOLD   RED TOP HOLD     EKG Readings: (Independently Interpreted)   Initial Reading: No STEMI.  Rhythm: Normal Sinus Rhythm. Heart Rate: 85. Ectopy: No Ectopy. Conduction: Normal. Axis: Normal.     ECG Results              EKG 12-lead (Shortness of Breath) Age > 50 (In process)        Collection Time Result Time QRS Duration OHS QTC Calculation    11/26/24 08:57:41 11/26/24 09:16:07 76 421                     In process by Interface, Lab In [facility] (11/26/24 09:16:11)                   Narrative:    Test Reason : R06.02,    Vent. Rate :  85 BPM     Atrial Rate :  85 BPM     P-R Int : 128 ms          QRS Dur :  76 ms      QT Int : 354 ms       P-R-T Axes :  81  79  63 degrees    QTcB Int : 421 ms    Normal sinus rhythm  Nonspecific ST abnormality  Abnormal ECG  When compared with ECG of 16-Nov-2024 18:19,  No significant change was found    Referred By:            Confirmed By:                                   Imaging Results              CT Abdomen Pelvis With IV Contrast NO Oral Contrast (Final result)  Result time 11/26/24 11:15:02      Final result by John Bowens MD (11/26/24 11:15:02)                   Impression:      Wall thickening and inflammation of the proximal sigmoid colon slightly improved since 11/16/2024.  No significant new findings identified.      Electronically signed by: John Bowens  Date:    11/26/2024  Time:    11:15               Narrative:    EXAMINATION:  CT ABDOMEN PELVIS WITH IV CONTRAST    CLINICAL HISTORY:  LLQ abdominal pain;    TECHNIQUE:  CT imaging of the abdomen and pelvis after intravenous contrast. Dose length product 355 mGycm. Automatic exposure control, adjustment of mA/kV or iterative reconstruction technique used to limit radiation dose.    COMPARISON:  CT 11/16/2024    FINDINGS:  Liver/biliary: Normal liver.  No defined radiodense gallstones. No intra or extrahepatic biliary ductal dilation.    Pancreas: Normal.    Spleen: Normal.    Adrenals: Normal.    Genitourinary: Symmetric renal enhancement. No hydronephrosis. Bladder underdistended with no definitive  abnormality. No pelvic mass.    Stomach/bowel: Short segment wall thickening and inflammatory changes of the proximal sigmoid colon slightly improved since prior CT.  No significant upstream bowel dilatation.  Normal appendix.    Lymph nodes/peritoneum: No pathologically enlarged lymph node identified. No ascites or free air. No drainable fluid collection.    Vasculature: Normal abdominal aortic caliber.    Abdominal wall: Small bilateral fat containing groin hernias.    Lung bases: Mild respiratory motion.  No consolidation or significant pleural fluid.    Musculoskeletal: No acute osseous findings.                                       X-Ray Chest AP Portable (Final result)  Result time 11/26/24 10:58:17      Final result by John Bowens MD (11/26/24 10:58:17)                   Impression:      No acute pulmonary process identified.      Electronically signed by: John Bowens  Date:    11/26/2024  Time:    10:58               Narrative:    EXAMINATION:  XR CHEST AP PORTABLE    CLINICAL HISTORY:  Shortness of breath;    TECHNIQUE:  Frontal view(s) of the chest.    COMPARISON:  Radiography 09/27/2024    FINDINGS:  Normal cardiac silhouette.  The lungs are well-inflated.  No consolidation identified.  No significant pleural effusion or discernible pneumothorax.                                       Medications   methylPREDNISolone sodium succinate injection 125 mg (125 mg Intravenous Given 11/26/24 1003)   albuterol-ipratropium 2.5 mg-0.5 mg/3 mL nebulizer solution 3 mL (3 mLs Nebulization Given 11/26/24 1016)   iohexoL (OMNIPAQUE 350) 350 mg iodine/mL injection (100 mLs Intravenous Given 11/26/24 1050)     Medical Decision Making  Vital signs are stable.  Started on stacked inhaled bronchodilators.  EKGs without concerning acute ischemic changes.  CBC, CMP reassuring.  Chest x-ray is clear.  COVID, flu negative.  CT of the abdomen pelvis shows improving diverticulitis.  On re-evaluation, patient states she was  breathing back to her baseline.  Shared decision-making with the patient, she declined steroids because she states she was recently on them and she was worried about her diverticulitis flaring back up.  Will discharge with strict return precautions.    Amount and/or Complexity of Data Reviewed  Labs: ordered. Decision-making details documented in ED Course.  Radiology: ordered. Decision-making details documented in ED Course.  ECG/medicine tests: ordered and independent interpretation performed. Decision-making details documented in ED Course.    Risk  Prescription drug management.                                      Clinical Impression:  Final diagnoses:  [R06.02] SOB (shortness of breath)  [J45.909] Asthma, unspecified asthma severity, unspecified whether complicated, unspecified whether persistent (Primary)          ED Disposition Condition    Discharge Stable          ED Prescriptions    None       Follow-up Information       Follow up With Specialties Details Why Contact Info    [facility] - Emergency Dept Emergency Medicine Go to  If symptoms worsen [address] [id number] [phone]             Fabien Bonilla MD  11/26/24 3575

## 2024-11-30 ENCOUNTER — HOSPITAL ENCOUNTER (INPATIENT)
Facility: HOSPITAL | Age: 54
LOS: 5 days | Discharge: HOME | DRG: 392 | End: 2024-12-05
Attending: STUDENT IN AN ORGANIZED HEALTH CARE EDUCATION/TRAINING PROGRAM | Admitting: SURGERY
Payer: MEDICAID

## 2024-11-30 DIAGNOSIS — K57.20 DIVERTICULITIS OF LARGE INTESTINE WITH PERFORATION, UNSPECIFIED BLEEDING STATUS: Primary | ICD-10-CM

## 2024-11-30 DIAGNOSIS — Z86.19 HISTORY OF ESBL E. COLI INFECTION: ICD-10-CM

## 2024-11-30 DIAGNOSIS — K57.92 ACUTE DIVERTICULITIS: ICD-10-CM

## 2024-11-30 DIAGNOSIS — J45.901 EXACERBATION OF ASTHMA, UNSPECIFIED ASTHMA SEVERITY, UNSPECIFIED WHETHER PERSISTENT: ICD-10-CM

## 2024-11-30 LAB
ALBUMIN SERPL-MCNC: 3.6 G/DL (ref 3.5–5)
ALBUMIN/GLOB SERPL: 0.9 RATIO (ref 1.1–2)
ALP SERPL-CCNC: 75 UNIT/L (ref 40–150)
ALT SERPL-CCNC: 12 UNIT/L (ref 0–55)
ANION GAP SERPL CALC-SCNC: 7 MEQ/L
AST SERPL-CCNC: 18 UNIT/L (ref 5–34)
BACTERIA #/AREA URNS AUTO: ABNORMAL /HPF
BASOPHILS # BLD AUTO: 0.02 X10(3)/MCL
BASOPHILS NFR BLD AUTO: 0.3 %
BILIRUB SERPL-MCNC: 0.5 MG/DL
BILIRUB UR QL STRIP.AUTO: NEGATIVE
BUN SERPL-MCNC: 8.9 MG/DL (ref 9.8–20.1)
CALCIUM SERPL-MCNC: 10.1 MG/DL (ref 8.4–10.2)
CHLORIDE SERPL-SCNC: 107 MMOL/L (ref 98–107)
CLARITY UR: CLEAR
CO2 SERPL-SCNC: 27 MMOL/L (ref 22–29)
COLOR UR AUTO: COLORLESS
CREAT SERPL-MCNC: 0.87 MG/DL (ref 0.55–1.02)
CREAT/UREA NIT SERPL: 10
EOSINOPHIL # BLD AUTO: 0.14 X10(3)/MCL (ref 0–0.9)
EOSINOPHIL NFR BLD AUTO: 2.2 %
ERYTHROCYTE [DISTWIDTH] IN BLOOD BY AUTOMATED COUNT: 12.5 % (ref 11.5–17)
GFR SERPLBLD CREATININE-BSD FMLA CKD-EPI: >60 ML/MIN/1.73/M2
GLOBULIN SER-MCNC: 3.8 GM/DL (ref 2.4–3.5)
GLUCOSE SERPL-MCNC: 81 MG/DL (ref 74–100)
GLUCOSE UR QL STRIP: NORMAL
HCT VFR BLD AUTO: 42.1 % (ref 37–47)
HGB BLD-MCNC: 14.4 G/DL (ref 12–16)
HGB UR QL STRIP: NEGATIVE
HOLD SPECIMEN: NORMAL
HYALINE CASTS #/AREA URNS LPF: ABNORMAL /LPF
IMM GRANULOCYTES # BLD AUTO: 0.01 X10(3)/MCL (ref 0–0.04)
IMM GRANULOCYTES NFR BLD AUTO: 0.2 %
KETONES UR QL STRIP: NEGATIVE
LACTATE SERPL-SCNC: 1 MMOL/L (ref 0.5–2.2)
LEUKOCYTE ESTERASE UR QL STRIP: NEGATIVE
LIPASE SERPL-CCNC: 17 U/L
LYMPHOCYTES # BLD AUTO: 2.92 X10(3)/MCL (ref 0.6–4.6)
LYMPHOCYTES NFR BLD AUTO: 45.6 %
MAGNESIUM SERPL-MCNC: 1.9 MG/DL (ref 1.6–2.6)
MCH RBC QN AUTO: 33.1 PG (ref 27–31)
MCHC RBC AUTO-ENTMCNC: 34.2 G/DL (ref 33–36)
MCV RBC AUTO: 96.8 FL (ref 80–94)
MONOCYTES # BLD AUTO: 0.56 X10(3)/MCL (ref 0.1–1.3)
MONOCYTES NFR BLD AUTO: 8.8 %
NEUTROPHILS # BLD AUTO: 2.75 X10(3)/MCL (ref 2.1–9.2)
NEUTROPHILS NFR BLD AUTO: 42.9 %
NITRITE UR QL STRIP: NEGATIVE
NRBC BLD AUTO-RTO: 0 %
PH UR STRIP: 5.5 [PH]
PLATELET # BLD AUTO: 250 X10(3)/MCL (ref 130–400)
PMV BLD AUTO: 10.5 FL (ref 7.4–10.4)
POTASSIUM SERPL-SCNC: 4.1 MMOL/L (ref 3.5–5.1)
PROT SERPL-MCNC: 7.4 GM/DL (ref 6.4–8.3)
PROT UR QL STRIP: NEGATIVE
RBC # BLD AUTO: 4.35 X10(6)/MCL (ref 4.2–5.4)
RBC #/AREA URNS AUTO: ABNORMAL /HPF
SODIUM SERPL-SCNC: 141 MMOL/L (ref 136–145)
SP GR UR STRIP.AUTO: 1.01 (ref 1–1.03)
SQUAMOUS #/AREA URNS LPF: ABNORMAL /HPF
UROBILINOGEN UR STRIP-ACNC: NORMAL
WBC # BLD AUTO: 6.4 X10(3)/MCL (ref 4.5–11.5)
WBC #/AREA URNS AUTO: ABNORMAL /HPF

## 2024-11-30 PROCEDURE — 80053 COMPREHEN METABOLIC PANEL: CPT | Performed by: STUDENT IN AN ORGANIZED HEALTH CARE EDUCATION/TRAINING PROGRAM

## 2024-11-30 PROCEDURE — 25000242 PHARM REV CODE 250 ALT 637 W/ HCPCS

## 2024-11-30 PROCEDURE — 85025 COMPLETE CBC W/AUTO DIFF WBC: CPT | Performed by: STUDENT IN AN ORGANIZED HEALTH CARE EDUCATION/TRAINING PROGRAM

## 2024-11-30 PROCEDURE — G0378 HOSPITAL OBSERVATION PER HR: HCPCS

## 2024-11-30 PROCEDURE — 25000003 PHARM REV CODE 250

## 2024-11-30 PROCEDURE — 25000003 PHARM REV CODE 250: Performed by: STUDENT IN AN ORGANIZED HEALTH CARE EDUCATION/TRAINING PROGRAM

## 2024-11-30 PROCEDURE — 94640 AIRWAY INHALATION TREATMENT: CPT

## 2024-11-30 PROCEDURE — 99285 EMERGENCY DEPT VISIT HI MDM: CPT | Mod: 25

## 2024-11-30 PROCEDURE — 87040 BLOOD CULTURE FOR BACTERIA: CPT | Performed by: STUDENT IN AN ORGANIZED HEALTH CARE EDUCATION/TRAINING PROGRAM

## 2024-11-30 PROCEDURE — 63600175 PHARM REV CODE 636 W HCPCS

## 2024-11-30 PROCEDURE — 96375 TX/PRO/DX INJ NEW DRUG ADDON: CPT

## 2024-11-30 PROCEDURE — 83605 ASSAY OF LACTIC ACID: CPT | Performed by: STUDENT IN AN ORGANIZED HEALTH CARE EDUCATION/TRAINING PROGRAM

## 2024-11-30 PROCEDURE — 63600175 PHARM REV CODE 636 W HCPCS: Mod: JZ,JG

## 2024-11-30 PROCEDURE — 83690 ASSAY OF LIPASE: CPT | Performed by: STUDENT IN AN ORGANIZED HEALTH CARE EDUCATION/TRAINING PROGRAM

## 2024-11-30 PROCEDURE — 63600175 PHARM REV CODE 636 W HCPCS: Performed by: STUDENT IN AN ORGANIZED HEALTH CARE EDUCATION/TRAINING PROGRAM

## 2024-11-30 PROCEDURE — 83735 ASSAY OF MAGNESIUM: CPT | Performed by: STUDENT IN AN ORGANIZED HEALTH CARE EDUCATION/TRAINING PROGRAM

## 2024-11-30 PROCEDURE — 21400001 HC TELEMETRY ROOM

## 2024-11-30 PROCEDURE — 25500020 PHARM REV CODE 255

## 2024-11-30 PROCEDURE — 96374 THER/PROPH/DIAG INJ IV PUSH: CPT

## 2024-11-30 PROCEDURE — 81001 URINALYSIS AUTO W/SCOPE: CPT | Performed by: STUDENT IN AN ORGANIZED HEALTH CARE EDUCATION/TRAINING PROGRAM

## 2024-11-30 PROCEDURE — 96361 HYDRATE IV INFUSION ADD-ON: CPT

## 2024-11-30 RX ORDER — IPRATROPIUM BROMIDE AND ALBUTEROL SULFATE 2.5; .5 MG/3ML; MG/3ML
3 SOLUTION RESPIRATORY (INHALATION) EVERY 6 HOURS
Status: DISCONTINUED | OUTPATIENT
Start: 2024-11-30 | End: 2024-12-01

## 2024-11-30 RX ORDER — ENOXAPARIN SODIUM 100 MG/ML
40 INJECTION SUBCUTANEOUS EVERY 24 HOURS
Status: DISCONTINUED | OUTPATIENT
Start: 2024-11-30 | End: 2024-12-05 | Stop reason: HOSPADM

## 2024-11-30 RX ORDER — BUDESONIDE, GLYCOPYRROLATE, AND FORMOTEROL FUMARATE 160; 9; 4.8 UG/1; UG/1; UG/1
2 AEROSOL, METERED RESPIRATORY (INHALATION) 2 TIMES DAILY
COMMUNITY

## 2024-11-30 RX ORDER — METRONIDAZOLE 500 MG/100ML
500 INJECTION, SOLUTION INTRAVENOUS
Status: DISCONTINUED | OUTPATIENT
Start: 2024-11-30 | End: 2024-11-30

## 2024-11-30 RX ORDER — OXYCODONE AND ACETAMINOPHEN 5; 325 MG/1; MG/1
1 TABLET ORAL EVERY 4 HOURS PRN
Status: DISCONTINUED | OUTPATIENT
Start: 2024-11-30 | End: 2024-12-05 | Stop reason: HOSPADM

## 2024-11-30 RX ORDER — ONDANSETRON HYDROCHLORIDE 2 MG/ML
4 INJECTION, SOLUTION INTRAVENOUS EVERY 6 HOURS PRN
Status: DISCONTINUED | OUTPATIENT
Start: 2024-11-30 | End: 2024-12-05 | Stop reason: HOSPADM

## 2024-11-30 RX ORDER — SODIUM CHLORIDE 0.9 % (FLUSH) 0.9 %
10 SYRINGE (ML) INJECTION
Status: DISCONTINUED | OUTPATIENT
Start: 2024-11-30 | End: 2024-12-05 | Stop reason: HOSPADM

## 2024-11-30 RX ORDER — HYDROMORPHONE HYDROCHLORIDE 1 MG/ML
0.5 INJECTION, SOLUTION INTRAMUSCULAR; INTRAVENOUS; SUBCUTANEOUS EVERY 6 HOURS PRN
Status: DISCONTINUED | OUTPATIENT
Start: 2024-11-30 | End: 2024-12-05 | Stop reason: HOSPADM

## 2024-11-30 RX ORDER — CIPROFLOXACIN 2 MG/ML
400 INJECTION, SOLUTION INTRAVENOUS
Status: DISCONTINUED | OUTPATIENT
Start: 2024-11-30 | End: 2024-11-30

## 2024-11-30 RX ORDER — OXYCODONE AND ACETAMINOPHEN 10; 325 MG/1; MG/1
1 TABLET ORAL EVERY 4 HOURS PRN
Status: DISCONTINUED | OUTPATIENT
Start: 2024-11-30 | End: 2024-12-05 | Stop reason: HOSPADM

## 2024-11-30 RX ORDER — MEROPENEM 1 G/1
1 INJECTION, POWDER, FOR SOLUTION INTRAVENOUS
Status: COMPLETED | OUTPATIENT
Start: 2024-11-30 | End: 2024-11-30

## 2024-11-30 RX ORDER — METRONIDAZOLE 500 MG/100ML
500 INJECTION, SOLUTION INTRAVENOUS
Status: DISCONTINUED | OUTPATIENT
Start: 2024-11-30 | End: 2024-12-02 | Stop reason: ALTCHOICE

## 2024-11-30 RX ORDER — SODIUM CHLORIDE, SODIUM LACTATE, POTASSIUM CHLORIDE, CALCIUM CHLORIDE 600; 310; 30; 20 MG/100ML; MG/100ML; MG/100ML; MG/100ML
INJECTION, SOLUTION INTRAVENOUS CONTINUOUS
Status: DISCONTINUED | OUTPATIENT
Start: 2024-11-30 | End: 2024-12-03

## 2024-11-30 RX ORDER — DIPHENHYDRAMINE HYDROCHLORIDE 50 MG/ML
25 INJECTION INTRAMUSCULAR; INTRAVENOUS
Status: COMPLETED | OUTPATIENT
Start: 2024-11-30 | End: 2024-11-30

## 2024-11-30 RX ADMIN — SODIUM CHLORIDE, POTASSIUM CHLORIDE, SODIUM LACTATE AND CALCIUM CHLORIDE: 600; 310; 30; 20 INJECTION, SOLUTION INTRAVENOUS at 06:11

## 2024-11-30 RX ADMIN — DIPHENHYDRAMINE HYDROCHLORIDE 25 MG: 50 INJECTION INTRAMUSCULAR; INTRAVENOUS at 03:11

## 2024-11-30 RX ADMIN — METRONIDAZOLE 500 MG: 5 INJECTION, SOLUTION INTRAVENOUS at 06:11

## 2024-11-30 RX ADMIN — CEFEPIME 2 G: 2 INJECTION, POWDER, FOR SOLUTION INTRAVENOUS at 08:11

## 2024-11-30 RX ADMIN — CIPROFLOXACIN 400 MG: 2 INJECTION, SOLUTION INTRAVENOUS at 02:11

## 2024-11-30 RX ADMIN — IPRATROPIUM BROMIDE AND ALBUTEROL SULFATE 3 ML: .5; 3 SOLUTION RESPIRATORY (INHALATION) at 08:11

## 2024-11-30 RX ADMIN — IOHEXOL 100 ML: 350 INJECTION, SOLUTION INTRAVENOUS at 03:11

## 2024-11-30 RX ADMIN — OXYCODONE HYDROCHLORIDE AND ACETAMINOPHEN 1 TABLET: 5; 325 TABLET ORAL at 06:11

## 2024-11-30 RX ADMIN — SODIUM CHLORIDE 1000 ML: 9 INJECTION, SOLUTION INTRAVENOUS at 02:11

## 2024-11-30 RX ADMIN — MEROPENEM 1 G: 1 INJECTION, POWDER, FOR SOLUTION INTRAVENOUS at 04:11

## 2024-11-30 RX ADMIN — OXYCODONE AND ACETAMINOPHEN 1 TABLET: 10; 325 TABLET ORAL at 11:11

## 2024-11-30 RX ADMIN — ONDANSETRON 4 MG: 2 INJECTION INTRAMUSCULAR; INTRAVENOUS at 07:11

## 2024-11-30 NOTE — H&P
[facility]  General Surgery - [facility] Team  H&P Note  Admit Date: 11/30/2024  HD#0    Patient Name: Niya Moeller  YOB: 1970  Date: 11/30/2024 5:20 PM  Date of Admission: 11/30/2024    PRESENTING HISTORY   Reason for Consult: diverticulitis flare up, pain    History of Present Illness:  54 F well known to our service with hx of chronic sigmoid diverticulitis, hyperthyroidism, asthma, who presents to ED with one day hx of acutely worsening LLQ pain associated with 2 days of constipation, nausea, fever/chills. Pt was recently seen at [facility] for another diverticulitis flare up and discharged home on one week course of PO abx. She has finished her PO course, but has had a flare up despite. Her last BM was two days ago and she has been hesitant to eat PO due to pain. She has noted a temperature of 100.4 that resolved with advil and chills overnight. Denies chest pain/SOB/vomiting, but does endorse nausea.    In the ED, patient with unremarkable labs, afebrile, VSS. CT showing possible contained microperforation. Patient was seen in general surgery clinic on 11/12 with plans for elective sigmoidectomy pending pulmonology clearance given hx of asthma.    Review of Systems:  12 point ROS negative except as stated in HPI    PAST HISTORY:   Past medical history:  Past Medical History:   Diagnosis Date    Asthma     Diverticulitis     Diverticulosis     GERD (gastroesophageal reflux disease)     Graves disease        Past surgical history:  Past Surgical History:   Procedure Laterality Date    COLONOSCOPY, WITH DIRECTED SUBMUCOSAL INJECTION N/A 4/8/2024    Procedure: COLONOSCOPY, WITH DIRECTED SUBMUCOSAL INJECTION;  Surgeon: Heather Lance MD;  Location: [facility] ENDOSCOPY;  Service: Gastroenterology;  Laterality: N/A;       Social history:  Tobacco: 0.5 PPD  Alcohol: denies  Drug use: denies    MEDICATIONS & ALLERGIES:   Home meds:  Current Outpatient Medications   Medication Sig Note     ADVAIR DISKUS 250-50 mcg/dose diskus inhaler SMARTSI Puff(s) By Mouth     albuterol (PROAIR HFA) 90 mcg/actuation inhaler Inhale 2 puffs into the lungs every 6 (six) hours as needed for Wheezing. Rescue     albuterol (PROVENTIL) 2.5 mg /3 mL (0.083 %) nebulizer solution Take 2.5 mg by nebulization every 4 to 6 hours as needed for Wheezing or Shortness of Breath.     albuterol-ipratropium (DUO-NEB) 2.5 mg-0.5 mg/3 mL nebulizer solution Take 3 mLs by nebulization every 6 (six) hours as needed for Wheezing. Rescue     amoxicillin (AMOXIL) 500 MG capsule Take 500 mg by mouth 4 (four) times daily. (Patient not taking: Reported on 2024) 2024: No longer taking    amoxicillin-clavulanate 875-125mg (AUGMENTIN) 875-125 mg per tablet Take 1 tablet by mouth 2 (two) times daily. (Patient not taking: Reported on 2024) 2024: No longer taking    buPROPion (WELLBUTRIN SR) 150 MG TBSR 12 hr tablet Take 1 tablet (150 mg total) by mouth 2 (two) times daily. For smoking cessation.  Discontinue if still smoking. (Patient not taking: Reported on 2024)     cholecalciferol, vitamin D3, 1,250 mcg (50,000 unit) capsule Take 50,000 Units by mouth every 7 days. 2024: Pt requests refill    dicyclomine (BENTYL) 10 MG capsule Take 10 mg by mouth 4 (four) times daily.     docusate sodium (COLACE) 100 MG capsule Take 1 capsule (100 mg total) by mouth 2 (two) times daily as needed for Constipation. 2024: No longer taking    fluticasone propionate (FLONASE) 50 mcg/actuation nasal spray 1 spray (50 mcg total) by Each Nostril route daily as needed for Allergies or Rhinitis.     methIMAzole (TAPAZOLE) 5 MG Tab Take a half of a tablet daily to Total 2.5 mg (Patient not taking: Reported on 2024)     montelukast (SINGULAIR) 10 mg tablet Take 10 mg by mouth once daily.     omeprazole (PRILOSEC) 20 MG capsule Take 1 capsule (20 mg total) by mouth 2 (two) times daily before meals.    Last reviewed on 2024   "3:43 PM by Naman Sherman RN     Allergies: latex, zosyn    OBJECTIVE:   Vital Signs:  VITAL SIGNS: 24 HR MIN & MAX LAST   Temp  Min: 97.8 °F (36.6 °C)  Max: 97.8 °F (36.6 °C)  97.8 °F (36.6 °C)   BP  Min: 101/42  Max: 101/42  (!) 101/42    Pulse  Min: 88  Max: 88  88    Resp  Min: 18  Max: 18  18    SpO2  Min: 97 %  Max: 97 %  97 %      HT: 4' 11" (149.9 cm)  WT: 68.5 kg (150 lb 14.5 oz)  BMI: 30.5       Physical Exam:  General: Well developed, well nourished, in some visible distress in ED bed  HEENT: NCAT, PERRL  CV: RRR on peripheral exam  Resp: NWOB on RA  GI:  soft, nondistended, tender to palpation in LUQ and LLQ  :  Deferred  MSK: Moves all extremities spontaneously and purposefully  Skin/wounds:  No rashes, ulcers, erythema  Neuro:  CNII-XII grossly intact, A&Ox3    Labs:  Recent Labs     11/30/24  1424   WBC 6.40   HGB 14.4   HCT 42.1         K 4.1      CO2 27   BUN 8.9*   CREATININE 0.87   BILITOT 0.5   AST 18   ALT 12   ALKPHOS 75   CALCIUM 10.1   ALBUMIN 3.6   MG 1.90       Imaging:  CT Abdomen Pelvis With IV Contrast NO Oral Contrast   Final Result      Similar wall thickening of the sigmoid colon with surrounding inflammatory changes.  Suspected contained perforation with small adjacent collection of fluid and air, slightly increased from the prior exam.  No free intraperitoneal air or drainable fluid collection.         Electronically signed by: Mary Centeno   Date:    11/30/2024   Time:    16:08         I have reviewed all pertinent imaging results/findings within the past 24 hours.    Micro/Path:  Blood cx pending    ASSESSMENT & PLAN:    54 F well known to our service hx of chronic sigmoid diverticulitis, asthma presenting with acute diverticulitis flare up and CT contrast concerning for possible contained microperforation.    - admit to surgery service for observation  - serial abdominal exams  - NPO except for sips with meds  - pain control as ordered  - IV cefipime + " zosyn  - maintenance LR at 125/hr    Van Flood MD  [facility] General Surgery

## 2024-11-30 NOTE — ED PROVIDER NOTES
Encounter Date: 11/30/2024       History     Chief Complaint   Patient presents with    Abdominal Pain     Reports abdominal pain, body aches, night sweats, and fevers at home. Reports Hx of Diverticulitis and requesting IV antibiotics.      HPI  Review of patient's allergies indicates:   Allergen Reactions    Latex Anaphylaxis and Edema    Zosyn [piperacillin-tazobactam] Anaphylaxis     Past Medical History:   Diagnosis Date    Asthma     Diverticulitis     Diverticulosis     GERD (gastroesophageal reflux disease)     Graves disease      Past Surgical History:   Procedure Laterality Date    COLONOSCOPY, WITH DIRECTED SUBMUCOSAL INJECTION N/A 4/8/2024    Procedure: COLONOSCOPY, WITH DIRECTED SUBMUCOSAL INJECTION;  Surgeon: Heather Lance MD;  Location: [facility] ENDOSCOPY;  Service: Gastroenterology;  Laterality: N/A;     Family History   Problem Relation Name Age of Onset    Hypothyroidism Mother       Social History     Tobacco Use    Smoking status: Every Day     Current packs/day: 0.50     Average packs/day: 0.5 packs/day for 41.9 years (21.0 ttl pk-yrs)     Types: Cigarettes     Start date: 1/1/1983    Smokeless tobacco: Current    Tobacco comments:     Ambulatory referral to Smoking Cessation clinic following hospital discharge.    Substance Use Topics    Alcohol use: Not Currently    Drug use: Never     Review of Systems    Physical Exam     Initial Vitals [11/30/24 1315]   BP Pulse Resp Temp SpO2   (!) 101/42 88 18 97.8 °F (36.6 °C) 97 %      MAP       --         Physical Exam    ED Course   Procedures  Labs Reviewed   BLOOD CULTURE OLG   BLOOD CULTURE OLG   COMPREHENSIVE METABOLIC PANEL   CBC W/ AUTO DIFFERENTIAL    Narrative:     The following orders were created for panel order CBC auto differential.  Procedure                               Abnormality         Status                     ---------                               -----------         ------                     CBC with  "Differential[[id number]]                                                        Please view results for these tests on the individual orders.   LIPASE   MAGNESIUM   URINALYSIS, REFLEX TO URINE CULTURE   CBC WITH DIFFERENTIAL   LACTIC ACID, PLASMA          Imaging Results    None          Medications   sodium chloride 0.9% bolus 1,000 mL 1,000 mL (has no administration in time range)     Medical Decision Making                                    Clinical Impression:   ***Please document a Clinical Impression and click the "Refresh" button to refresh your note and automatically pull in before signing.***           "

## 2024-12-01 LAB
ALBUMIN SERPL-MCNC: 2.9 G/DL (ref 3.5–5)
ALBUMIN/GLOB SERPL: 0.9 RATIO (ref 1.1–2)
ALP SERPL-CCNC: 57 UNIT/L (ref 40–150)
ALT SERPL-CCNC: 9 UNIT/L (ref 0–55)
ANION GAP SERPL CALC-SCNC: 6 MEQ/L
AST SERPL-CCNC: 15 UNIT/L (ref 5–34)
BASOPHILS # BLD AUTO: 0 X10(3)/MCL
BASOPHILS NFR BLD AUTO: 0 %
BILIRUB SERPL-MCNC: 0.5 MG/DL
BUN SERPL-MCNC: 7.9 MG/DL (ref 9.8–20.1)
CALCIUM SERPL-MCNC: 8.9 MG/DL (ref 8.4–10.2)
CHLORIDE SERPL-SCNC: 110 MMOL/L (ref 98–107)
CO2 SERPL-SCNC: 23 MMOL/L (ref 22–29)
CREAT SERPL-MCNC: 0.79 MG/DL (ref 0.55–1.02)
CREAT/UREA NIT SERPL: 10
EOSINOPHIL # BLD AUTO: 0.11 X10(3)/MCL (ref 0–0.9)
EOSINOPHIL NFR BLD AUTO: 2.1 %
ERYTHROCYTE [DISTWIDTH] IN BLOOD BY AUTOMATED COUNT: 12.4 % (ref 11.5–17)
GFR SERPLBLD CREATININE-BSD FMLA CKD-EPI: >60 ML/MIN/1.73/M2
GLOBULIN SER-MCNC: 3.1 GM/DL (ref 2.4–3.5)
GLUCOSE SERPL-MCNC: 66 MG/DL (ref 74–100)
HCT VFR BLD AUTO: 36.9 % (ref 37–47)
HGB BLD-MCNC: 12.7 G/DL (ref 12–16)
IMM GRANULOCYTES # BLD AUTO: 0.02 X10(3)/MCL (ref 0–0.04)
IMM GRANULOCYTES NFR BLD AUTO: 0.4 %
LYMPHOCYTES # BLD AUTO: 2.13 X10(3)/MCL (ref 0.6–4.6)
LYMPHOCYTES NFR BLD AUTO: 41.3 %
MAGNESIUM SERPL-MCNC: 1.9 MG/DL (ref 1.6–2.6)
MCH RBC QN AUTO: 33.4 PG (ref 27–31)
MCHC RBC AUTO-ENTMCNC: 34.4 G/DL (ref 33–36)
MCV RBC AUTO: 97.1 FL (ref 80–94)
MONOCYTES # BLD AUTO: 0.42 X10(3)/MCL (ref 0.1–1.3)
MONOCYTES NFR BLD AUTO: 8.1 %
NEUTROPHILS # BLD AUTO: 2.48 X10(3)/MCL (ref 2.1–9.2)
NEUTROPHILS NFR BLD AUTO: 48.1 %
NRBC BLD AUTO-RTO: 0 %
PHOSPHATE SERPL-MCNC: 3.4 MG/DL (ref 2.3–4.7)
PLATELET # BLD AUTO: 210 X10(3)/MCL (ref 130–400)
PMV BLD AUTO: 10.7 FL (ref 7.4–10.4)
POTASSIUM SERPL-SCNC: 3.8 MMOL/L (ref 3.5–5.1)
PROT SERPL-MCNC: 6 GM/DL (ref 6.4–8.3)
RBC # BLD AUTO: 3.8 X10(6)/MCL (ref 4.2–5.4)
SODIUM SERPL-SCNC: 139 MMOL/L (ref 136–145)
WBC # BLD AUTO: 5.16 X10(3)/MCL (ref 4.5–11.5)

## 2024-12-01 PROCEDURE — 94640 AIRWAY INHALATION TREATMENT: CPT | Mod: XB

## 2024-12-01 PROCEDURE — 85025 COMPLETE CBC W/AUTO DIFF WBC: CPT

## 2024-12-01 PROCEDURE — 63600175 PHARM REV CODE 636 W HCPCS: Mod: JZ,JG

## 2024-12-01 PROCEDURE — 83735 ASSAY OF MAGNESIUM: CPT

## 2024-12-01 PROCEDURE — 80053 COMPREHEN METABOLIC PANEL: CPT

## 2024-12-01 PROCEDURE — 21400001 HC TELEMETRY ROOM

## 2024-12-01 PROCEDURE — 36415 COLL VENOUS BLD VENIPUNCTURE: CPT

## 2024-12-01 PROCEDURE — 25000242 PHARM REV CODE 250 ALT 637 W/ HCPCS

## 2024-12-01 PROCEDURE — 94761 N-INVAS EAR/PLS OXIMETRY MLT: CPT

## 2024-12-01 PROCEDURE — 99900035 HC TECH TIME PER 15 MIN (STAT)

## 2024-12-01 PROCEDURE — 25000003 PHARM REV CODE 250

## 2024-12-01 PROCEDURE — 84100 ASSAY OF PHOSPHORUS: CPT

## 2024-12-01 RX ORDER — METHOCARBAMOL 500 MG/1
500 TABLET, FILM COATED ORAL 4 TIMES DAILY
Status: DISCONTINUED | OUTPATIENT
Start: 2024-12-01 | End: 2024-12-05 | Stop reason: HOSPADM

## 2024-12-01 RX ORDER — ACETAMINOPHEN 325 MG/1
650 TABLET ORAL EVERY 6 HOURS
Status: DISCONTINUED | OUTPATIENT
Start: 2024-12-01 | End: 2024-12-05 | Stop reason: HOSPADM

## 2024-12-01 RX ORDER — BUDESONIDE 0.5 MG/2ML
0.5 INHALANT ORAL 3 TIMES DAILY
Status: DISCONTINUED | OUTPATIENT
Start: 2024-12-01 | End: 2024-12-01

## 2024-12-01 RX ORDER — FLUTICASONE FUROATE AND VILANTEROL 100; 25 UG/1; UG/1
1 POWDER RESPIRATORY (INHALATION) DAILY
Status: DISCONTINUED | OUTPATIENT
Start: 2024-12-01 | End: 2024-12-05 | Stop reason: HOSPADM

## 2024-12-01 RX ORDER — IPRATROPIUM BROMIDE AND ALBUTEROL SULFATE 2.5; .5 MG/3ML; MG/3ML
3 SOLUTION RESPIRATORY (INHALATION) EVERY 4 HOURS
Status: DISCONTINUED | OUTPATIENT
Start: 2024-12-01 | End: 2024-12-05 | Stop reason: HOSPADM

## 2024-12-01 RX ADMIN — ONDANSETRON 4 MG: 2 INJECTION INTRAMUSCULAR; INTRAVENOUS at 03:12

## 2024-12-01 RX ADMIN — SODIUM CHLORIDE, POTASSIUM CHLORIDE, SODIUM LACTATE AND CALCIUM CHLORIDE: 600; 310; 30; 20 INJECTION, SOLUTION INTRAVENOUS at 09:12

## 2024-12-01 RX ADMIN — METHOCARBAMOL TABLETS 500 MG: 500 TABLET, COATED ORAL at 09:12

## 2024-12-01 RX ADMIN — IPRATROPIUM BROMIDE AND ALBUTEROL SULFATE 3 ML: .5; 3 SOLUTION RESPIRATORY (INHALATION) at 07:12

## 2024-12-01 RX ADMIN — METHOCARBAMOL TABLETS 500 MG: 500 TABLET, COATED ORAL at 05:12

## 2024-12-01 RX ADMIN — CEFEPIME 2 G: 2 INJECTION, POWDER, FOR SOLUTION INTRAVENOUS at 06:12

## 2024-12-01 RX ADMIN — FLUTICASONE FUROATE AND VILANTEROL TRIFENATATE 1 PUFF: 100; 25 POWDER RESPIRATORY (INHALATION) at 12:12

## 2024-12-01 RX ADMIN — METHOCARBAMOL TABLETS 500 MG: 500 TABLET, COATED ORAL at 01:12

## 2024-12-01 RX ADMIN — CEFEPIME 2 G: 2 INJECTION, POWDER, FOR SOLUTION INTRAVENOUS at 11:12

## 2024-12-01 RX ADMIN — IPRATROPIUM BROMIDE AND ALBUTEROL SULFATE 3 ML: .5; 3 SOLUTION RESPIRATORY (INHALATION) at 11:12

## 2024-12-01 RX ADMIN — ONDANSETRON 4 MG: 2 INJECTION INTRAMUSCULAR; INTRAVENOUS at 04:12

## 2024-12-01 RX ADMIN — ACETAMINOPHEN 650 MG: 325 TABLET, FILM COATED ORAL at 05:12

## 2024-12-01 RX ADMIN — CEFEPIME 2 G: 2 INJECTION, POWDER, FOR SOLUTION INTRAVENOUS at 04:12

## 2024-12-01 RX ADMIN — OXYCODONE AND ACETAMINOPHEN 1 TABLET: 10; 325 TABLET ORAL at 04:12

## 2024-12-01 RX ADMIN — METRONIDAZOLE 500 MG: 5 INJECTION, SOLUTION INTRAVENOUS at 05:12

## 2024-12-01 RX ADMIN — METRONIDAZOLE 500 MG: 5 INJECTION, SOLUTION INTRAVENOUS at 10:12

## 2024-12-01 RX ADMIN — METHOCARBAMOL TABLETS 500 MG: 500 TABLET, COATED ORAL at 10:12

## 2024-12-01 RX ADMIN — METRONIDAZOLE 500 MG: 5 INJECTION, SOLUTION INTRAVENOUS at 01:12

## 2024-12-01 RX ADMIN — IPRATROPIUM BROMIDE AND ALBUTEROL SULFATE 3 ML: .5; 3 SOLUTION RESPIRATORY (INHALATION) at 03:12

## 2024-12-01 RX ADMIN — ACETAMINOPHEN 650 MG: 325 TABLET, FILM COATED ORAL at 01:12

## 2024-12-01 NOTE — PROGRESS NOTES
"  [facility]  General Surgery - Gold Team  Progress Note  Admit Date: 11/30/2024  HD#0  POD#* No surgery found *    Subjective:   Interval history:  Emotionally labile, yelling at nurses  Pt requested oxygen overnight despite satting well on RA  Requesting more frequent breathing treatments  Pain stable, denies nausea/vomiting/diarrhea  Denies chest pain  Objective:     VITAL SIGNS: 24 HR MIN & MAX LAST   Temp  Min: 97.8 °F (36.6 °C)  Max: 98.6 °F (37 °C)  98.6 °F (37 °C)   BP  Min: 101/64  Max: 135/77  101/64    Pulse  Min: 63  Max: 88  84    Resp  Min: 12  Max: 24  (!) 24    SpO2  Min: 93 %  Max: 100 %  97 %      HT: 4' 11" (149.9 cm)  WT: 69 kg (152 lb 1.9 oz)  BMI: 30.7     Intake/output:  No intake or output data in the 24 hours ending 12/01/24 0831      Physical examination:  General: Well developed, well nourished, resting in bed  HEENT: NCAT, PERRL  CV: RRR on peripheral exam  Resp: NWOB on 3L HFNC  GI:  soft, nondistended, tender to palpation in LUQ and LLQ  :  Deferred  MSK: Moves all extremities spontaneously and purposefully  Skin/wounds:  No rashes, ulcers, erythema  Neuro:  CNII-XII grossly intact, A&Ox3    Labs:  Recent Labs     11/30/24  1424 12/01/24  0400   WBC 6.40 5.16   HGB 14.4 12.7   HCT 42.1 36.9*    210    139   K 4.1 3.8    110*   CO2 27 23   BUN 8.9* 7.9*   CREATININE 0.87 0.79   BILITOT 0.5 0.5   AST 18 15   ALT 12 9   ALKPHOS 75 57   CALCIUM 10.1 8.9   ALBUMIN 3.6 2.9*   MG 1.90 1.90   PHOS  --  3.4       Imaging:  CT Abdomen Pelvis With IV Contrast NO Oral Contrast   Final Result      Similar wall thickening of the sigmoid colon with surrounding inflammatory changes.  Suspected contained perforation with small adjacent collection of fluid and air, slightly increased from the prior exam.  No free intraperitoneal air or drainable fluid collection.         Electronically signed by: Mary Centeno   Date:    11/30/2024   Time:    16:08         I " have reviewed all pertinent imaging results/findings within the past 24 hours.    Micro/Path:  Blood cx in process    Assessment & Plan:   54 F well known to our service hx of chronic sigmoid diverticulitis, asthma presenting with acute diverticulitis flare up and CT contrast concerning for possible contained microperforation.    Pain stable, labs unremarkable, patient requesting more frequent breathing treatments despite appearing clinically stable.    - Q4h breathing treatments, duo nebs, wean O2 as tolerated  - continue IV cefipime + zosyn  - maintenance LR at 125/hr   - MMPC as ordered  - Diet: NPO except for sips with meds  - Anti-emesis: PO zofran prn  - Bowel reg: NPO  - PT/OT: ambulate as tolerated    DVT ppx: lovenox 5000    Dispo: pending clinical improvement of diverticulitis, possible operative intervention    Van Flood MD  [facility] General Surgery     Otc Regimen: Cold Cerave cream moisturizer Detail Level: Zone

## 2024-12-02 DIAGNOSIS — E05.00 GRAVES DISEASE: Primary | ICD-10-CM

## 2024-12-02 PROCEDURE — 25000242 PHARM REV CODE 250 ALT 637 W/ HCPCS

## 2024-12-02 PROCEDURE — 25000003 PHARM REV CODE 250

## 2024-12-02 PROCEDURE — 63600175 PHARM REV CODE 636 W HCPCS

## 2024-12-02 PROCEDURE — 94640 AIRWAY INHALATION TREATMENT: CPT

## 2024-12-02 PROCEDURE — 94761 N-INVAS EAR/PLS OXIMETRY MLT: CPT

## 2024-12-02 PROCEDURE — 27000221 HC OXYGEN, UP TO 24 HOURS

## 2024-12-02 PROCEDURE — 21400001 HC TELEMETRY ROOM

## 2024-12-02 RX ORDER — ERGOCALCIFEROL 1.25 MG/1
50000 CAPSULE ORAL
Status: DISCONTINUED | OUTPATIENT
Start: 2024-12-02 | End: 2024-12-05 | Stop reason: HOSPADM

## 2024-12-02 RX ORDER — BUPROPION HYDROCHLORIDE 150 MG/1
150 TABLET, EXTENDED RELEASE ORAL 2 TIMES DAILY
Status: DISCONTINUED | OUTPATIENT
Start: 2024-12-02 | End: 2024-12-05 | Stop reason: HOSPADM

## 2024-12-02 RX ORDER — MONTELUKAST SODIUM 5 MG/1
10 TABLET, CHEWABLE ORAL DAILY
Status: DISCONTINUED | OUTPATIENT
Start: 2024-12-02 | End: 2024-12-05 | Stop reason: HOSPADM

## 2024-12-02 RX ORDER — FLUTICASONE PROPIONATE 50 MCG
2 SPRAY, SUSPENSION (ML) NASAL DAILY
Status: DISCONTINUED | OUTPATIENT
Start: 2024-12-02 | End: 2024-12-05 | Stop reason: HOSPADM

## 2024-12-02 RX ORDER — BUDESONIDE 0.5 MG/2ML
0.5 INHALANT ORAL EVERY 12 HOURS
Status: DISCONTINUED | OUTPATIENT
Start: 2024-12-02 | End: 2024-12-05 | Stop reason: HOSPADM

## 2024-12-02 RX ORDER — METHIMAZOLE 5 MG/1
5 TABLET ORAL 3 TIMES DAILY
Status: DISCONTINUED | OUTPATIENT
Start: 2024-12-02 | End: 2024-12-02

## 2024-12-02 RX ORDER — PANTOPRAZOLE SODIUM 40 MG/1
40 TABLET, DELAYED RELEASE ORAL
Status: DISCONTINUED | OUTPATIENT
Start: 2024-12-02 | End: 2024-12-05 | Stop reason: HOSPADM

## 2024-12-02 RX ORDER — DIPHENHYDRAMINE HCL 25 MG
25 CAPSULE ORAL EVERY 6 HOURS PRN
Status: DISCONTINUED | OUTPATIENT
Start: 2024-12-02 | End: 2024-12-05 | Stop reason: HOSPADM

## 2024-12-02 RX ORDER — DICYCLOMINE HYDROCHLORIDE 10 MG/1
10 CAPSULE ORAL 4 TIMES DAILY
Status: DISCONTINUED | OUTPATIENT
Start: 2024-12-02 | End: 2024-12-03

## 2024-12-02 RX ADMIN — METHOCARBAMOL TABLETS 500 MG: 500 TABLET, COATED ORAL at 08:12

## 2024-12-02 RX ADMIN — IPRATROPIUM BROMIDE AND ALBUTEROL SULFATE 3 ML: .5; 3 SOLUTION RESPIRATORY (INHALATION) at 03:12

## 2024-12-02 RX ADMIN — MONTELUKAST SODIUM 10 MG: 5 TABLET, CHEWABLE ORAL at 08:12

## 2024-12-02 RX ADMIN — ONDANSETRON 4 MG: 2 INJECTION INTRAMUSCULAR; INTRAVENOUS at 05:12

## 2024-12-02 RX ADMIN — ACETAMINOPHEN 650 MG: 325 TABLET, FILM COATED ORAL at 05:12

## 2024-12-02 RX ADMIN — DIPHENHYDRAMINE HYDROCHLORIDE 25 MG: 25 CAPSULE ORAL at 06:12

## 2024-12-02 RX ADMIN — CEFEPIME 2 G: 2 INJECTION, POWDER, FOR SOLUTION INTRAVENOUS at 11:12

## 2024-12-02 RX ADMIN — ACETAMINOPHEN 650 MG: 325 TABLET, FILM COATED ORAL at 11:12

## 2024-12-02 RX ADMIN — IPRATROPIUM BROMIDE AND ALBUTEROL SULFATE 3 ML: .5; 3 SOLUTION RESPIRATORY (INHALATION) at 07:12

## 2024-12-02 RX ADMIN — SODIUM CHLORIDE, POTASSIUM CHLORIDE, SODIUM LACTATE AND CALCIUM CHLORIDE: 600; 310; 30; 20 INJECTION, SOLUTION INTRAVENOUS at 07:12

## 2024-12-02 RX ADMIN — ONDANSETRON 4 MG: 2 INJECTION INTRAMUSCULAR; INTRAVENOUS at 11:12

## 2024-12-02 RX ADMIN — MEROPENEM 2 G: 2 INJECTION, POWDER, FOR SOLUTION INTRAVENOUS at 09:12

## 2024-12-02 RX ADMIN — ERGOCALCIFEROL 50000 UNITS: 1.25 CAPSULE ORAL at 01:12

## 2024-12-02 RX ADMIN — METHOCARBAMOL TABLETS 500 MG: 500 TABLET, COATED ORAL at 01:12

## 2024-12-02 RX ADMIN — OXYCODONE AND ACETAMINOPHEN 1 TABLET: 10; 325 TABLET ORAL at 12:12

## 2024-12-02 RX ADMIN — FLUTICASONE FUROATE AND VILANTEROL TRIFENATATE 1 PUFF: 100; 25 POWDER RESPIRATORY (INHALATION) at 07:12

## 2024-12-02 RX ADMIN — ONDANSETRON 4 MG: 2 INJECTION INTRAMUSCULAR; INTRAVENOUS at 01:12

## 2024-12-02 RX ADMIN — IPRATROPIUM BROMIDE AND ALBUTEROL SULFATE 3 ML: .5; 3 SOLUTION RESPIRATORY (INHALATION) at 11:12

## 2024-12-02 RX ADMIN — PANTOPRAZOLE SODIUM 40 MG: 40 TABLET, DELAYED RELEASE ORAL at 04:12

## 2024-12-02 RX ADMIN — BUDESONIDE 0.5 MG: 0.5 INHALANT RESPIRATORY (INHALATION) at 07:12

## 2024-12-02 RX ADMIN — ACETAMINOPHEN 650 MG: 325 TABLET, FILM COATED ORAL at 06:12

## 2024-12-02 RX ADMIN — IPRATROPIUM BROMIDE AND ALBUTEROL SULFATE 3 ML: .5; 3 SOLUTION RESPIRATORY (INHALATION) at 12:12

## 2024-12-02 RX ADMIN — HYDROMORPHONE HYDROCHLORIDE 0.5 MG: 1 INJECTION, SOLUTION INTRAMUSCULAR; INTRAVENOUS; SUBCUTANEOUS at 07:12

## 2024-12-02 RX ADMIN — IPRATROPIUM BROMIDE AND ALBUTEROL SULFATE 3 ML: .5; 3 SOLUTION RESPIRATORY (INHALATION) at 02:12

## 2024-12-02 RX ADMIN — PANTOPRAZOLE SODIUM 40 MG: 40 TABLET, DELAYED RELEASE ORAL at 08:12

## 2024-12-02 RX ADMIN — METHOCARBAMOL TABLETS 500 MG: 500 TABLET, COATED ORAL at 04:12

## 2024-12-02 RX ADMIN — MEROPENEM 2 G: 2 INJECTION, POWDER, FOR SOLUTION INTRAVENOUS at 02:12

## 2024-12-02 NOTE — PROGRESS NOTES
Inpatient Nutrition Assessment    Admit Date: 11/30/2024   Total duration of encounter: 2 days   Patient Age: 54 y.o.    Nutrition Recommendation/Prescription     Clear liquid diet--> ADAT to low residue  Will order boost breeze tid; Boost Breeze (provides 250 kcal, 9 g protein per serving)   If unable to advance diet/or po intake remains < 50% meals; consider supplemental PPN: Clinimix 4.25/5 @ 90 ml/hr with lipids 20% 250 ml daily to provide 1234 calories, 92 gm protein   MVI/fe  Biweekly wt  Will monitor nutrition status     Communication of Recommendations: reviewed with nurse and reviewed with patient    Nutrition Assessment     Malnutrition Assessment/Nutrition-Focused Physical Exam       Malnutrition Level: other (see comments) (Does not meet criteria) (12/02/24 1341)  Energy Intake (Malnutrition): less than or equal to 50% for greater than or equal to 5 days (12/02/24 1341)  Weight Loss (Malnutrition): other (see comments) (Does not meet criteria) (12/02/24 1341)     Orbital Region (Subcutaneous Fat Loss): well nourished              Clavicle Bone Region (Muscle Loss): well nourished                    Fluid Accumulation (Malnutrition): other (see comments) (Not present) (12/02/24 1341)     Hand  Strength, Right (Malnutrition): Unable to assess (12/02/24 1341)  A minimum of two characteristics is recommended for diagnosis of either severe or non-severe malnutrition.    Chart Review    Reason Seen: continuous nutrition monitoring    Malnutrition Screening Tool Results   Have you recently lost weight without trying?: No  Have you been eating poorly because of a decreased appetite?: No   MST Score: 0   Diagnosis:  Acute diverticulitis/ possible contained microperforation; asthma, tobacco dependence, hyperthyroid/graves     Relevant Medical History: diverticulitis, asthma     Scheduled Medications:  acetaminophen, 650 mg, Q6H  albuterol-ipratropium, 3 mL, Q4H  budesonide, 0.5 mg, Q12H  buPROPion, 150 mg,  BID  dicyclomine, 10 mg, QID  enoxparin, 40 mg, Daily  ergocalciferol, 50,000 Units, Q7 Days  fluticasone furoate-vilanteroL, 1 puff, Daily  fluticasone propionate, 2 spray, Daily  meropenem IV (PEDS and ADULTS), 2 g, Q8H  methIMAzole, 2.5 mg, Daily  methocarbamoL, 500 mg, QID  montelukast, 10 mg, Daily  pantoprazole, 40 mg, BID AC    Continuous Infusions:  lactated ringers, Last Rate: 125 mL/hr at 12/02/24 0709    PRN Medications:  diphenhydrAMINE, 25 mg, Q6H PRN  HYDROmorphone, 0.5 mg, Q6H PRN  ondansetron, 4 mg, Q6H PRN  oxyCODONE-acetaminophen, 1 tablet, Q4H PRN  oxyCODONE-acetaminophen, 1 tablet, Q4H PRN  sodium chloride 0.9%, 10 mL, PRN    Calorie Containing IV Medications: no significant kcals from medications at this time    Recent Labs   Lab 11/26/24  0848 11/26/24  0922 11/30/24  1424 12/01/24  0400    141 141 139   K 3.8 3.6 4.1 3.8   CALCIUM 10.2 10.1 10.1 8.9   PHOS  --   --   --  3.4   MG  --  1.90 1.90 1.90    106 107 110*   CO2 27 26 27 23   BUN 6.7* 6.8* 8.9* 7.9*   CREATININE 0.94 0.93 0.87 0.79   EGFRNORACEVR >60 >60 >60 >60   GLUCOSE 88 79 81 66*   BILITOT 0.5 0.5 0.5 0.5   ALKPHOS 75 77 75 57   ALT 15 16 12 9   AST 19 20 18 15   ALBUMIN 3.7 3.9 3.6 2.9*   LIPASE  --   --  17  --    WBC 6.14 6.14 6.40 5.16   HGB 14.2 15.0 14.4 12.7   HCT 43.9 43.8 42.1 36.9*     Nutrition Orders:  Diet Clear Liquid      Appetite/Oral Intake: poor/25-50% of meals  Factors Affecting Nutritional Intake: abdominal pain, clear liquid diet, and decreased appetite  Social Needs Impacting Access to Food: none identified  Food/Yazdanism/Cultural Preferences: none reported  Food Allergies: none reported  Last Bowel Movement: 11/27/24  Wound(s):  none    Comments    (12/2) Pt reported 1 week hx decreased po intake; + abdominal pain; hx diverticulitis; tries to limit red meat at home; diet progressed to clear liquids; will order ONS for added nutrition. Also provided PPN recs in event pt unable to tolerate  "liquids/po intake remains poor. No wt loss. Labs acknowledged.     Anthropometrics    Height: 4' 11" (149.9 cm), Height Method: Measured  Last Weight: 69 kg (152 lb 1.9 oz) (24), Weight Method: Standard Scale  BMI (Calculated): 30.7  BMI Classification: obese grade I (BMI 30-34.9)     Ideal Body Weight (IBW), Female: 95 lb     % Ideal Body Weight, Female (lb): 160.13 %         Usual Body Weight (UBW), k.1 kg  % Usual Body Weight: 103.05     Usual Weight Provided By: patient and EMR weight history    Wt Readings from Last 5 Encounters:   24 69 kg (152 lb 1.9 oz)   24 68.2 kg (150 lb 5.7 oz)   24 69.3 kg (152 lb 10.7 oz)   24 63.5 kg (140 lb)   11/15/24 69.9 kg (154 lb)     Weight Change(s) Since Admission: no reported wt loss   Wt Readings from Last 1 Encounters:   24 69 kg (152 lb 1.9 oz)   24 1315 68.5 kg (150 lb 14.5 oz)   Admit Weight: 68.5 kg (150 lb 14.5 oz) (24 1315), Weight Method: Standard Scale    Estimated Needs    Weight Used For Calorie Calculations: 69 kg (152 lb 1.9 oz)  Energy Calorie Requirements (kcal): 1725 kcal/d; 25 cata/kg  Energy Need Method: Kcal/kg  Weight Used For Protein Calculations: 69 kg (152 lb 1.9 oz)  Protein Requirements: 90 gm protein/d; 1.3 gm/kg  Fluid Requirements (mL): 1725 ml/d; 1ml/cata        Enteral Nutrition     Patient not receiving enteral nutrition at this time.    Parenteral Nutrition     Patient not receiving parenteral nutrition support at this time.    Evaluation of Received Nutrient Intake    Calories: not meeting estimated needs  Protein: not meeting estimated needs    Patient Education     Not applicable.    Nutrition Diagnosis     PES: Inadequate oral intake related to acute illness as evidenced by eating 50% or less meals x week; clear liquid diet . (new)     PES:            Nutrition Interventions     Intervention(s): modified composition of meals/snacks, commercial beverage, multivitamin/mineral " supplement therapy, and collaboration with other providers  Intervention(s):      Goal: Meet greater than 80% of nutritional needs by follow-up. (new)  Goal: Maintain weight throughout hospitalization. (new)    Nutrition Goals & Monitoring     Dietitian will monitor: food and beverage intake, weight, and glucose/endocrine profile  Discharge planning: continue low residue diet with boost breeze  oral supplements  Nutrition Risk/Follow-Up: moderate (follow-up in 3-5 days)   Please consult if re-assessment needed sooner.

## 2024-12-02 NOTE — NURSING
"Patent requested a new nurse stated, "You are Incompetent". Marilia Kim RN assumed care 0045. Dr. Flood notified.   "

## 2024-12-02 NOTE — H&P
[facility]   History & Physical Note     Resident Team: [facility] 3  Attending Physician: Elmer Lee Jr.,*  Resident:  Greg Riley MD     Date of Admit: 11/30/2024    Chief Complaint:     Abdominal Pain (Reports abdominal pain, body aches, night sweats, and fevers at home. Reports Hx of Diverticulitis and requesting IV antibiotics. )       Subjective:      History of Present Illness:  Niya Moeller is a 54 y.o. female with PMH of asthma, Graves disease/hyperthyroidism, chronic sigmoid diverticulitis with history of diverticular abscess, GERD presented to [facility] ED on 11/30/2024  with complaint of abdominal pain and subjective fever and worsening of her diverticulitis.  Patient known to Gen surgery with tentative plans for sigmoidectomy in the future after clearance from Pulmonology for asthma. Patient admitted with Gen Surgery as primary.    On admission 11/30/2024 in the ED, patient with unremarkable labs, afebrile and VSS. CT abdomen pelvis with IV contrast showed wall thickening of sigmoid colon and suspected contained perforation with small adjacent collection of fluid and air 1 x 1.8 cm in size. Per chart review, patient emotionally labile on admit and requested oxygen 2/2 worsening SOB. Internal medicine consulted for management of asthma.     Patient recalls that for past 2 days she has not used her maintenance inhaler 2/2 abdominal pain and her SOB and wheezing got worse as her home maintenance inhalers were not initiated on admit either. However, upon my encounter today, patient states her SOB is improved since her breathing treatment and home meds. Currently only on 1.5 L supplemental oxygen via NC. Patient with increased dyspnea when NC removed and ambulates to the bathroom. Still endorses wheezing, non productive cough, and mild headache. Not home oxygen dependent. Persistent abdominal pain present 6-7/10 in the LLQ. States last bowel movement about 3-4 days ago. Today  remains afebrile, VSS, CBC and CMP unremarkable. CXR with no acute process.       Past Medical History:   has a past medical history of Asthma, Diverticulitis, Diverticulosis, GERD (gastroesophageal reflux disease), and Graves disease.     Past Surgical History:   has a past surgical history that includes colonoscopy, with directed submucosal injection (N/A, 4/8/2024).     Family History:  family history includes Hypothyroidism in her mother.     Social History:   reports that she has been smoking cigarettes. She started smoking about 41 years ago. She has a 21 pack-year smoking history. She uses smokeless tobacco. She reports that she does not currently use alcohol. She reports that she does not use drugs.     Allergies:  is allergic to latex and zosyn [piperacillin-tazobactam].     Home Medications:  Prior to Admission medications    Medication Sig Start Date End Date Taking? Authorizing Provider   albuterol (PROAIR HFA) 90 mcg/actuation inhaler Inhale 2 puffs into the lungs every 6 (six) hours as needed for Wheezing. Rescue 9/27/24 9/27/25 Yes Pedro Sagastume, DO   albuterol (PROVENTIL) 2.5 mg /3 mL (0.083 %) nebulizer solution Take 2.5 mg by nebulization every 4 to 6 hours as needed for Wheezing or Shortness of Breath. 7/3/24  Yes Provider, Historical   albuterol-ipratropium (DUO-NEB) 2.5 mg-0.5 mg/3 mL nebulizer solution Take 3 mLs by nebulization every 6 (six) hours as needed for Wheezing. Rescue 9/27/24 9/27/25 Yes Pedro Sagastume, DO   budesonide-glycopyr-formoterol (BREZTRI AEROSPHERE) 160-9-4.8 mcg/actuation HFAA Inhale 2 puffs into the lungs 2 (two) times daily.   Yes Provider, Historical   cholecalciferol, vitamin D3, 1,250 mcg (50,000 unit) capsule Take 50,000 Units by mouth every 7 days. 5/28/24  Yes Provider, Historical   fluticasone propionate (FLONASE) 50 mcg/actuation nasal spray 1 spray (50 mcg total) by Each Nostril route daily as needed for Allergies or Rhinitis. 5/1/23  Yes Yeyo Andrews MD    methIMAzole (TAPAZOLE) 5 MG Tab Take a half of a tablet daily to Total 2.5 mg 11/6/23  Yes Delaney Morris NP   montelukast (SINGULAIR) 10 mg tablet Take 10 mg by mouth once daily. 7/15/24  Yes Provider, Historical   omeprazole (PRILOSEC) 20 MG capsule Take 1 capsule (20 mg total) by mouth 2 (two) times daily before meals. 3/12/24 3/12/25 Yes Heather Lance MD   amoxicillin (AMOXIL) 500 MG capsule Take 500 mg by mouth 4 (four) times daily.  Patient not taking: Reported on 11/12/2024 3/15/24   Provider, Historical   amoxicillin-clavulanate 875-125mg (AUGMENTIN) 875-125 mg per tablet Take 1 tablet by mouth 2 (two) times daily.  Patient not taking: Reported on 11/12/2024 7/19/24   Pedro Sagastume,    dicyclomine (BENTYL) 10 MG capsule Take 10 mg by mouth 4 (four) times daily. 6/7/24   Provider, Historical   docusate sodium (COLACE) 100 MG capsule Take 1 capsule (100 mg total) by mouth 2 (two) times daily as needed for Constipation. 7/18/24   Arely Oscar, PA-C         Review of Systems:  Review of Systems   Constitutional:  Negative for chills and fever.   Respiratory:  Positive for cough, shortness of breath and wheezing. Negative for sputum production.    Cardiovascular:  Negative for chest pain, palpitations and leg swelling.   Gastrointestinal:  Positive for abdominal pain, constipation and nausea. Negative for blood in stool, diarrhea and vomiting.   Genitourinary:  Negative for dysuria, frequency and urgency.   Musculoskeletal:  Negative for myalgias.   Neurological:  Positive for headaches. Negative for dizziness and focal weakness.            Objective:       Vital Signs (Most Recent):  Temp: 98.2 °F (36.8 °C) (12/02/24 0725)  Pulse: 81 (12/02/24 0851)  Resp: 20 (12/02/24 0717)  BP: (!) 115/50 (12/02/24 0851)  SpO2: 98 % (12/02/24 0725) Vital Signs (24h Range):  Temp:  [98 °F (36.7 °C)-98.8 °F (37.1 °C)] 98.2 °F (36.8 °C)  Pulse:  [] 81  Resp:  [16-32] 20  SpO2:  [83 %-100 %] 98 %  BP:  ()/(46-71) 115/50       Physical Examination:  Physical Exam  Vitals reviewed.   Constitutional:       Appearance: Normal appearance.   HENT:      Head: Normocephalic and atraumatic.      Mouth/Throat:      Mouth: Mucous membranes are moist.   Eyes:      Pupils: Pupils are equal, round, and reactive to light.   Cardiovascular:      Rate and Rhythm: Normal rate and regular rhythm.      Pulses: Normal pulses.      Heart sounds: No murmur heard.  Pulmonary:      Breath sounds: Wheezing (inspiratory on all lung fields with diminished breath sounds) present.      Comments: On 1.5 L supplemental O2  No accessory muscle use  Abdominal:      General: Bowel sounds are normal. There is no distension.      Palpations: Abdomen is soft.      Tenderness: There is abdominal tenderness (LLQ).   Musculoskeletal:      Right lower leg: No edema.      Left lower leg: No edema.   Skin:     General: Skin is warm.   Neurological:      General: No focal deficit present.      Mental Status: She is alert and oriented to person, place, and time.   Psychiatric:         Mood and Affect: Mood normal.         Behavior: Behavior normal.           Laboratory:  Lab Results   Component Value Date     12/01/2024    K 3.8 12/01/2024     (H) 12/01/2024    CO2 23 12/01/2024    GLU 97 02/19/2023    BUN 7.9 (L) 12/01/2024    CREATININE 0.79 12/01/2024    CALCIUM 8.9 12/01/2024    PROT 7.5 02/19/2023    BILIDIR 0.2 05/05/2022    IBILI 0.30 05/05/2022    ALKPHOS 57 12/01/2024    AST 15 12/01/2024    ALT 9 12/01/2024    MG 1.90 12/01/2024    PHOS 3.4 12/01/2024        Lab Results   Component Value Date    WBC 5.16 12/01/2024    RBC 3.80 (L) 12/01/2024    HGB 12.7 12/01/2024    HCT 36.9 (L) 12/01/2024    MCV 97.1 (H) 12/01/2024    MCH 33.4 (H) 12/01/2024    MCHC 34.4 12/01/2024    RDW 12.4 12/01/2024     12/01/2024    MPV 10.7 (H) 12/01/2024    GRAN 8.1 (H) 02/19/2023    GRAN 72.4 02/19/2023    LYMPH 2.2 02/19/2023    LYMPH 19.8  02/19/2023    MONO 0.7 02/19/2023    MONO 6.4 02/19/2023    EOS 0.0 02/19/2023    BASO 0.02 02/19/2023    EOSINOPHIL 0.0 02/19/2023    BASOPHIL 0.2 02/19/2023       Microbiology Data:  Microbiology Results (last 7 days)       Procedure Component Value Units Date/Time    Blood culture #1 **CANNOT BE ORDERED STAT** [[id number]]  (Normal) Collected: 11/30/24 1424    Order Status: Completed Specimen: Blood from Antecubital, Right Updated: 12/01/24 1901     Blood Culture No Growth At 24 Hours    Blood culture #2 **CANNOT BE ORDERED STAT** [[id number]]  (Normal) Collected: 11/30/24 1424    Order Status: Completed Specimen: Blood from Hand, Left Updated: 12/01/24 1901     Blood Culture No Growth At 24 Hours            Other Results:    Radiology:  Imaging Results              CT Abdomen Pelvis With IV Contrast NO Oral Contrast (Final result)  Result time 11/30/24 16:08:18      Final result by Mary Centeno MD (11/30/24 16:08:18)                   Impression:      Similar wall thickening of the sigmoid colon with surrounding inflammatory changes.  Suspected contained perforation with small adjacent collection of fluid and air, slightly increased from the prior exam.  No free intraperitoneal air or drainable fluid collection.      Electronically signed by: Mary Centeno  Date:    11/30/2024  Time:    16:08               Narrative:    EXAMINATION:  CT ABDOMEN PELVIS WITH IV CONTRAST    CLINICAL HISTORY:  LLQ abdominal pain;    TECHNIQUE:  CT imaging was performed of the abdomen and pelvis after the administration of intravenous contrast. Dose length product is 275 mGycm. Automatic exposure control, adjustment of mA/kV or iterative reconstruction technique was used to limit radiation dose.    COMPARISON:  CT abdomen pelvis dated 11/26/2024    FINDINGS:  Liver: Normal.    Gallbladder and biliary tree: No calcified gallstones. No intra or extrahepatic biliary ductal dilation.    Pancreas: Normal.    Spleen:  Normal.    Adrenals: Normal.    Kidneys and ureters: Normal.    Bladder: Normal.    Reproductive organs: No pelvic masses.    Stomach/bowel: There is colonic diverticulosis.  There is persistent wall thickening of the sigmoid colon with similar surrounding inflammatory changes.  There is a small intramural collection of fluid and air measuring 1 x 1.8 cm in size, increased from the prior exam, with slight extramural extension (series 2, image 92).    Lymph nodes: No pathologically enlarged lymph node identified.    Peritoneum: No ascites or free air. No fluid collection.    Vessels: No abdominal aortic aneurysm.    Abdominal wall: Normal.    Lung bases: No consolidation or pleural effusion.    Bones: No acute osseous findings.                                         Lines/Drains/Airways       Peripheral Intravenous Line  Duration                  Peripheral IV - Single Lumen 12/01/24 2330 20 G Left;Posterior Wrist <1 day                     Assessment & Plan:     Asthma  Tobacco dependence  - patient presented with worsening of SOB and wheezing but off maintenance inhalers for 2-3 days  - improved dyspnea since starting home inhaler (equivalent)  - sees Pulmonology in [facility], no PFTs in file (failed before in 2023), appointment in January  - would recommend continued with Duoneb every 4 hours and Budesonide nebs every 12 hours  - Continue with fluticasone-vilanterol 1 puff daily  - Continue with Flonase and singulair  - Would hold off on systemic steroids given diverticular disease with suspected microperforation and history of diverticular abscess  - wean off Oxygen for SpO2 goal of >= 92%  - if symptoms worsen then consider ABG and risk vs benefit for initiation of systemic steroids.   - will obtain resp panel today    Hyperthyroidism/ Graves  - follows Endo. Last appt 11/26/2024  - TSH, free T3 and T4 wnl on 11/26/24  - continue home MMI 2.5 mg once daily    Chronic sigmoid diverticulitis  - on IV Abx  - management  as per surgery (primary)  - ID also consulted    Thank you for your consult.     Greg Riley MD  Internal Medicine - PGY-2        This note was generated via Dictaphone and may contain some voice recognition errors.    Complicated case.  Has left the hospital AMA in past several times.  H/o ESBL E. Coli on c/s aspirate of inta-abdominal abscess in past. Has h/o lifelong asthma, copd, vocal cord dysfuction disorder, cigarette smoker, elevated eosinophil count sporadically. For now would place on Trelegy as controller medication.  Will workup sporadic eosinophilia a well as discuss with and refer to pulmonary. Id on board. She is saying she will refuse surgery here for now.

## 2024-12-02 NOTE — PLAN OF CARE
12/02/24 1145   Discharge Assessment   Assessment Type Discharge Planning Assessment   Confirmed/corrected address, phone number and insurance Yes   Confirmed Demographics Correct on Facesheet   Source of Information patient   When was your last doctors appointment?   (Raquel John)   Reason For Admission Diverticulitis with perf of large colon   People in Home alone   Facility Arrived From: home   Do you expect to return to your current living situation? Yes   Do you have help at home or someone to help you manage your care at home? Yes   Who are your caregiver(s) and their phone number(s)? Dustin Viveros (Son)  [phone]   Prior to hospitilization cognitive status: Alert/Oriented   Current cognitive status: Alert/Oriented   Walking or Climbing Stairs Difficulty no   Dressing/Bathing Difficulty no   Home Accessibility wheelchair accessible   Equipment Currently Used at Home none   Readmission within 30 days? No   Patient currently being followed by outpatient case management? No   Do you currently have service(s) that help you manage your care at home? No   Do you take prescription medications? Yes   Do you have prescription coverage? Yes   Coverage [employer]   Do you have any problems affording any of your prescribed medications? No   Is the patient taking medications as prescribed? yes   Who is going to help you get home at discharge? self   How do you get to doctors appointments? car, drives self   Are you on dialysis? No   Discharge Plan A Home   DME Needed Upon Discharge  none   Discharge Plan discussed with: Patient   Transition of Care Barriers None   Physical Activity   On average, how many days per week do you engage in moderate to strenuous exercise (like a brisk walk)? 5 days   On average, how many minutes do you engage in exercise at this level? 30 min   Financial Resource Strain   How hard is it for you to pay for the very basics like food, housing, medical care, and heating? Not very    Housing Stability   In the last 12 months, was there a time when you were not able to pay the mortgage or rent on time? N   At any time in the past 12 months, were you homeless or living in a shelter (including now)? N   Transportation Needs   Has the lack of transportation kept you from medical appointments, meetings, work or from getting things needed for daily living? No   Food Insecurity   Within the past 12 months, you worried that your food would run out before you got the money to buy more. Never true   Within the past 12 months, the food you bought just didn't last and you didn't have money to get more. Never true   Stress   Do you feel stress - tense, restless, nervous, or anxious, or unable to sleep at night because your mind is troubled all the time - these days? Not at all   Social Isolation   How often do you feel lonely or isolated from those around you?  Never   Alcohol Use   Q1: How often do you have a drink containing alcohol? Never   Q2: How many drinks containing alcohol do you have on a typical day when you are drinking? None   Q3: How often do you have six or more drinks on one occasion? Never   Utilities   In the past 12 months has the electric, gas, oil, or water company threatened to shut off services in your home? No   Health Literacy   How often do you need to have someone help you when you read instructions, pamphlets, or other written material from your doctor or pharmacy? Never

## 2024-12-02 NOTE — PROGRESS NOTES
"  [facility]  General Surgery - Gold Team  Progress Note  Admit Date: 11/30/2024  HD#1  POD#* No surgery found *    Subjective:   Interval history:  Afebrile, VSS, satting well on 3L HFNC  Endorsing generalized malaise, asthma symptoms, improving abdominal pain.  Reports SOB with getting up to go to bathroom, says breathing treatments are not helping her asthma  Does not want to be on metronidazole, says it is causing her severe nausea  Denies chest pain, fevers/chills     Objective:     VITAL SIGNS: 24 HR MIN & MAX LAST   Temp  Min: 98 °F (36.7 °C)  Max: 98.8 °F (37.1 °C)  98.2 °F (36.8 °C)   BP  Min: 92/46  Max: 121/71  (!) 115/50    Pulse  Min: 68  Max: 110  81    Resp  Min: 16  Max: 32  20    SpO2  Min: 83 %  Max: 100 %  98 %      HT: 4' 11" (149.9 cm)  WT: 69 kg (152 lb 1.9 oz)  BMI: 30.7       Physical examination:  Gen: NAD, AAOx3, answering questions appropriately  Head: Normocephalic, atraumatic  CV: RR  Resp: NWOB on 3L HFNC  Abd: soft, nondistended, tender to palpation in LUQ and LLQ  Ext: moving all extremities spontaneously and purposefully    Labs:  Recent Labs     11/30/24  1424 12/01/24  0400   WBC 6.40 5.16   HGB 14.4 12.7   HCT 42.1 36.9*    210    139   K 4.1 3.8    110*   CO2 27 23   BUN 8.9* 7.9*   CREATININE 0.87 0.79   BILITOT 0.5 0.5   AST 18 15   ALT 12 9   ALKPHOS 75 57   CALCIUM 10.1 8.9   ALBUMIN 3.6 2.9*   MG 1.90 1.90   PHOS  --  3.4       Imaging:  X-Ray Chest 1 View   Final Result      No acute pulmonary process identified.         Electronically signed by: John Bowens   Date:    12/01/2024   Time:    11:51      CT Abdomen Pelvis With IV Contrast NO Oral Contrast   Final Result      Similar wall thickening of the sigmoid colon with surrounding inflammatory changes.  Suspected contained perforation with small adjacent collection of fluid and air, slightly increased from the prior exam.  No free intraperitoneal air or drainable fluid collection. "         Electronically signed by: Mary Centeno   Date:    11/30/2024   Time:    16:08         I have reviewed all pertinent imaging results/findings within the past 24 hours.    Micro/Path:  Blood cultures NGTD    Assessment & Plan:   54 F well known to our service hx of chronic sigmoid diverticulitis, asthma presenting with acute diverticulitis flare up and CT contrast concerning for possible contained microperforation.     - Q4h breathing treatments, duo nebs, maintenance inhaler, budesonide, montelukast        - medicine consulted for assistance with asthma management  - continue IV cefipime + metronidazole        - ID consulted for alternatives to metronidazole given patient's reported reaction of severe nausea/malaise/itching  - MMPC as ordered  - NPO except for sips with meds  - PO zofran prn  - Ambulate as tolerated     DVT ppx: lovenox     Dispo: pending clinical improvement of diverticulitis, possible operative intervention     Magdiel Tijerina, MS3  [employer] [facility] [employer]    Van Flood MD  [facility]

## 2024-12-02 NOTE — CONSULTS
[facility]   Inpatient Infectious Diseases Consultation    Physician requesting consultation: Elmer Lee Jr.,*  Service requesting consultation: General Surgery  Reason for consultation: Antibiotic management    Historian: Patient and Electronic Medical Record    Isolation Status: No active isolations     HPI:     Patient was a 54-year-old female with a past medical history of chronic sigmoid diverticulitis with multiple episodes of diverticular abscess in the past, hyperthyroidism, and asthma who presented to the ED on 11/30  with the primary complaint of left lower quadrant pain, constipation, nausea, no fever/chills.   Patient reports that approximately 2 weeks ago she visited [facility] ED for similar complaints.  She reports that at this time she was prescribed moxifloxacin for a 10 day total course.  She reports that she started taking antibiotics but experience gradual worsening of her symptoms.  She finished her antibiotic course approximally 4 days prior to this emergency room presentation.   Patient with chronic sigmoid diverticulitis multiple diverticular abscesses in the past requiring percutaneous drainage.   Patient with cultures from prior diverticular abscess on 02/2023 with growth of ESBL E coli.   CT  abdomen/pelvis obtained on admission, displayed small intramural collection of fluid/air measuring 1 x 1.8 cm.  Patient was given ciprofloxacin and Merrem for 1 dose each in the emergency department.  Surgery was consulted for admission.  Patient has been on cefepime and Flagyl since.  Blood cultures are negative at 24 hours.  Infectious Disease was consulted for further recommendations.    Antibiotic / Antiviral / Antifungal history:  Ciprofloxacin 11/30 (one dose in the ED)  Merrem 11/30 (one dose in the ED)  Flagyl 11/30-present  Cefipime 11/30-present     Past Medical History/Past Surgical History/Social History     Past Medical History:   Diagnosis Date    Asthma      Diverticulitis     Diverticulosis     GERD (gastroesophageal reflux disease)     Graves disease        Past Surgical History:   Procedure Laterality Date    COLONOSCOPY, WITH DIRECTED SUBMUCOSAL INJECTION N/A 4/8/2024    Procedure: COLONOSCOPY, WITH DIRECTED SUBMUCOSAL INJECTION;  Surgeon: Heather Lance MD;  Location: [facility] ENDOSCOPY;  Service: Gastroenterology;  Laterality: N/A;        FAMILY HISTORY:  family history includes Hypothyroidism in her mother.     SOCIAL HISTORY:   Social History     Socioeconomic History    Marital status:    Tobacco Use    Smoking status: Every Day     Current packs/day: 0.50     Average packs/day: 0.5 packs/day for 41.9 years (21.0 ttl pk-yrs)     Types: Cigarettes     Start date: 1/1/1983    Smokeless tobacco: Current    Tobacco comments:     Ambulatory referral to Smoking Cessation clinic following hospital discharge.    Substance and Sexual Activity    Alcohol use: Not Currently    Drug use: Never    Sexual activity: Not Currently     Birth control/protection: Abstinence     Comment: States is asexual     Social Drivers of Health     Financial Resource Strain: Low Risk  (11/30/2024)    Overall Financial Resource Strain (CARDIA)     Difficulty of Paying Living Expenses: Not very hard   Food Insecurity: No Food Insecurity (11/30/2024)    Hunger Vital Sign     Worried About Running Out of Food in the Last Year: Never true     Ran Out of Food in the Last Year: Never true   Transportation Needs: No Transportation Needs (11/30/2024)    TRANSPORTATION NEEDS     Transportation : No   Physical Activity: Sufficiently Active (2/12/2024)    Exercise Vital Sign     Days of Exercise per Week: 4 days     Minutes of Exercise per Session: 50 min   Stress: No Stress Concern Present (11/30/2024)    [employer] - Occupational Stress Questionnaire     Feeling of Stress : Not at all   Housing Stability: Low Risk  (11/30/2024)    Housing Stability Vital Sign      Unable to Pay for Housing in the Last Year: No     Homeless in the Last Year: No        ALLERGIES:   Review of patient's allergies indicates:   Allergen Reactions    Latex Anaphylaxis and Edema    Zosyn [piperacillin-tazobactam] Anaphylaxis       Review of Systems     Review of Systems   Constitutional:  Positive for chills and fever. Negative for malaise/fatigue.   Respiratory:  Positive for shortness of breath. Negative for cough, hemoptysis and sputum production.    Cardiovascular:  Negative for chest pain, palpitations and leg swelling.   Gastrointestinal:  Positive for abdominal pain, constipation and nausea. Negative for diarrhea and vomiting.   Genitourinary: Negative.    Neurological: Negative.    Psychiatric/Behavioral:  Negative for substance abuse.      MEDICATIONS:   Scheduled Meds:   acetaminophen  650 mg Oral Q6H    albuterol-ipratropium  3 mL Nebulization Q4H    budesonide  0.5 mg Nebulization Q12H    ceFEPime (MAXIPIME) IVPB EXTENDED INFUSION  2 g Intravenous Q8H    enoxparin  40 mg Subcutaneous Daily    fluticasone furoate-vilanteroL  1 puff Inhalation Daily    fluticasone propionate  2 spray Each Nostril Daily    methIMAzole  2.5 mg Oral Daily    methocarbamoL  500 mg Oral QID    metroNIDAZOLE IV (PEDS and ADULTS)  500 mg Intravenous Q8H    montelukast  10 mg Oral Daily    pantoprazole  40 mg Oral BID AC     Continuous Infusions:   lactated ringers   Intravenous Continuous 125 mL/hr at 12/02/24 0709 New Bag at 12/02/24 0709     PRN Meds:.  Current Facility-Administered Medications:     diphenhydrAMINE, 25 mg, Oral, Q6H PRN    HYDROmorphone, 0.5 mg, Intravenous, Q6H PRN    ondansetron, 4 mg, Intravenous, Q6H PRN    oxyCODONE-acetaminophen, 1 tablet, Oral, Q4H PRN    oxyCODONE-acetaminophen, 1 tablet, Oral, Q4H PRN    sodium chloride 0.9%, 10 mL, Intravenous, PRN    Physical exam:     Temp:  [98 °F (36.7 °C)-98.8 °F (37.1 °C)] 98.2 °F (36.8 °C)  Pulse:  [] 79  Resp:  [16-32] 20  SpO2:  [83  %-100 %] 98 %  BP: ()/(46-71) 92/46     GENERAL: A&Ox3, NAD   HEENT: atraumatic, normocephalic  LUNGS: breathing unlabored, lungs CTA bilateral, mild wheezing BL  HEART: RRR; no murmur, rub, or gallop  ABDOMEN: abdomen soft, nondistended, mild tenderness to LLQ on palpation   EXTREMITIES: no edema, clubbing, or cyanosis   SKIN: no rashes or lesions  NEURO: speech fluent and intact, facial symmetry preserved, no tremor  PSYCH: cooperative, normal mood and affect    LABS:     I have personally reviewed patient's labs.  Pertinent results noted below.    CBC  Recent Labs     11/30/24  1424 12/01/24  0400   WBC 6.40 5.16   HGB 14.4 12.7   HCT 42.1 36.9*    210       Differential  Recent Labs   Lab 12/01/24  0400   WBC 5.16   HGB 12.7   HCT 36.9*           Basic Metabolic Panel  Recent Labs     11/30/24  1424 12/01/24  0400    139   K 4.1 3.8    110*   CO2 27 23   BUN 8.9* 7.9*   CREATININE 0.87 0.79        Hepatic Panel  Lab Results   Component Value Date    ALT 9 12/01/2024    AST 15 12/01/2024    ALKPHOS 57 12/01/2024    BILITOT 0.5 12/01/2024        Urinalysis:  Results for orders placed or performed during the hospital encounter of 11/30/24   Urinalysis, Reflex to Urine Culture    Specimen: Urine   Result Value Ref Range    Color, UA Colorless (A) Yellow, Light-Yellow, Dark Yellow, Gisella, Straw    Appearance, UA Clear Clear    Specific Gravity, UA 1.006 1.005 - 1.030    pH, UA 5.5 5.0 - 8.5    Protein, UA Negative Negative    Glucose, UA Normal Negative, Normal    Ketones, UA Negative Negative    Blood, UA Negative Negative    Bilirubin, UA Negative Negative    Urobilinogen, UA Normal 0.2, 1.0, Normal    Nitrites, UA Negative Negative    Leukocyte Esterase, UA Negative Negative    RBC, UA 0-5 None Seen, 0-2, 3-5, 0-5 /HPF    WBC, UA 0-5 None Seen, 0-2, 3-5, 0-5 /HPF    Bacteria, UA Trace (A) None Seen /HPF    Squamous Epithelial Cells, UA Trace (A) None Seen /HPF    Hyaline Casts, UA  None Seen None Seen /lpf       Estimated Creatinine Clearance: 71.4 mL/min (based on SCr of 0.79 mg/dL).     ESR:  Results for orders placed or performed in visit on 09/05/23   Sedimentation Rate   Result Value Ref Range    Sed Rate 25 (H) 0 - 20 mm/hr      CRP:  Results for orders placed or performed during the hospital encounter of 11/13/24   C-Reactive Protein   Result Value Ref Range    CRP 4.60 <5.00 mg/L       IMAGING:     I have personally reviewed patient's imaging. Pertinent results noted below.  X-Ray Chest 1 View   Final Result      No acute pulmonary process identified.         Electronically signed by: John Bowens   Date:    12/01/2024   Time:    11:51      CT Abdomen Pelvis With IV Contrast NO Oral Contrast   Final Result      Similar wall thickening of the sigmoid colon with surrounding inflammatory changes.  Suspected contained perforation with small adjacent collection of fluid and air, slightly increased from the prior exam.  No free intraperitoneal air or drainable fluid collection.         Electronically signed by: Mary Centeno   Date:    11/30/2024   Time:    16:08          IMPRESSION     Chronic sigmoid diverticulitis with current diverticular abscess measuring 1 x 1.8 cm  History of ESBL E. coli   History of asthma  History of hyperthyroidism     RECOMMENDATIONS:    On review of patient's file, patient with previous history of ESBL E coli from culture obtained from diverticular abscess in 2023.  Obviously, no culture information obtained from this hospitalization.  However, suspect that this abscess is caused by ESBL E. coli.  Recommended to patient OPAT with at least 2 weeks of Ertapenem.  However, patient adamantly refused PICC placement due to previous complication.  She requested oral antibiotic therapy.  Explained to patient that oral therapy is likely to fail and could lead to worsening of her current condition and/or further complications.  She voiced understanding and still  wishes to proceed with oral antibiotic therapy at this time.    With this being said, recommend Moxifloxacin 400 mg oral for four weeks upon discharge.  Please order repeat CT A/P to be completed in 2 weeks  Initiate Merrem 2 g Q8hr while inpatient  Surgical intervention per surgery team. Patient previously refused surgical intervention.    Thank you for the consult. We will follow along with you. Please do not hesitate to call with any questions or concerns.     Yeyo Pollack, DO  [facility] PGY-2

## 2024-12-03 ENCOUNTER — TELEPHONE (OUTPATIENT)
Dept: INFECTIOUS DISEASES | Facility: HOSPITAL | Age: 54
End: 2024-12-03
Payer: MEDICAID

## 2024-12-03 PROBLEM — K57.20 DIVERTICULITIS OF LARGE INTESTINE WITH PERFORATION: Status: ACTIVE | Noted: 2023-05-01

## 2024-12-03 PROBLEM — Z86.19 HISTORY OF ESBL E. COLI INFECTION: Status: ACTIVE | Noted: 2024-12-03

## 2024-12-03 LAB
ANION GAP SERPL CALC-SCNC: 9 MEQ/L
B PERT.PT PRMT NPH QL NAA+NON-PROBE: NOT DETECTED
BASOPHILS # BLD AUTO: 0.01 X10(3)/MCL
BASOPHILS NFR BLD AUTO: 0.2 %
BUN SERPL-MCNC: 3.1 MG/DL (ref 9.8–20.1)
C PNEUM DNA NPH QL NAA+NON-PROBE: NOT DETECTED
CALCIUM SERPL-MCNC: 9.2 MG/DL (ref 8.4–10.2)
CHLORIDE SERPL-SCNC: 105 MMOL/L (ref 98–107)
CO2 SERPL-SCNC: 27 MMOL/L (ref 22–29)
CREAT SERPL-MCNC: 0.77 MG/DL (ref 0.55–1.02)
CREAT/UREA NIT SERPL: 4
CRP SERPL-MCNC: 43.8 MG/L
EOSINOPHIL # BLD AUTO: 0.46 X10(3)/MCL (ref 0–0.9)
EOSINOPHIL NFR BLD AUTO: 8.3 %
ERYTHROCYTE [DISTWIDTH] IN BLOOD BY AUTOMATED COUNT: 12.2 % (ref 11.5–17)
ERYTHROCYTE [SEDIMENTATION RATE] IN BLOOD: 33 MM/HR (ref 0–20)
GFR SERPLBLD CREATININE-BSD FMLA CKD-EPI: >60 ML/MIN/1.73/M2
GLUCOSE SERPL-MCNC: 107 MG/DL (ref 74–100)
HADV DNA NPH QL NAA+NON-PROBE: NOT DETECTED
HCOV 229E RNA NPH QL NAA+NON-PROBE: NOT DETECTED
HCOV HKU1 RNA NPH QL NAA+NON-PROBE: NOT DETECTED
HCOV NL63 RNA NPH QL NAA+NON-PROBE: NOT DETECTED
HCOV OC43 RNA NPH QL NAA+NON-PROBE: NOT DETECTED
HCT VFR BLD AUTO: 35.1 % (ref 37–47)
HGB BLD-MCNC: 11.8 G/DL (ref 12–16)
HMPV RNA NPH QL NAA+NON-PROBE: NOT DETECTED
HOLD SPECIMEN: NORMAL
HPIV1 RNA NPH QL NAA+NON-PROBE: NOT DETECTED
HPIV2 RNA NPH QL NAA+NON-PROBE: NOT DETECTED
HPIV3 RNA NPH QL NAA+NON-PROBE: NOT DETECTED
HPIV4 RNA NPH QL NAA+NON-PROBE: NOT DETECTED
IMM GRANULOCYTES # BLD AUTO: 0.01 X10(3)/MCL (ref 0–0.04)
IMM GRANULOCYTES NFR BLD AUTO: 0.2 %
LYMPHOCYTES # BLD AUTO: 2.32 X10(3)/MCL (ref 0.6–4.6)
LYMPHOCYTES NFR BLD AUTO: 41.7 %
M PNEUMO DNA NPH QL NAA+NON-PROBE: NOT DETECTED
MAGNESIUM SERPL-MCNC: 1.8 MG/DL (ref 1.6–2.6)
MCH RBC QN AUTO: 32 PG (ref 27–31)
MCHC RBC AUTO-ENTMCNC: 33.6 G/DL (ref 33–36)
MCV RBC AUTO: 95.1 FL (ref 80–94)
MONOCYTES # BLD AUTO: 0.95 X10(3)/MCL (ref 0.1–1.3)
MONOCYTES NFR BLD AUTO: 17.1 %
NEUTROPHILS # BLD AUTO: 1.81 X10(3)/MCL (ref 2.1–9.2)
NEUTROPHILS NFR BLD AUTO: 32.5 %
NRBC BLD AUTO-RTO: 0 %
PHOSPHATE SERPL-MCNC: 3.3 MG/DL (ref 2.3–4.7)
PLATELET # BLD AUTO: 220 X10(3)/MCL (ref 130–400)
PMV BLD AUTO: 10.9 FL (ref 7.4–10.4)
POTASSIUM SERPL-SCNC: 3.1 MMOL/L (ref 3.5–5.1)
RBC # BLD AUTO: 3.69 X10(6)/MCL (ref 4.2–5.4)
RSV RNA NPH QL NAA+NON-PROBE: NOT DETECTED
RV+EV RNA NPH QL NAA+NON-PROBE: NOT DETECTED
SODIUM SERPL-SCNC: 141 MMOL/L (ref 136–145)
WBC # BLD AUTO: 5.56 X10(3)/MCL (ref 4.5–11.5)

## 2024-12-03 PROCEDURE — 02HV33Z INSERTION OF INFUSION DEVICE INTO SUPERIOR VENA CAVA, PERCUTANEOUS APPROACH: ICD-10-PCS | Performed by: INTERNAL MEDICINE

## 2024-12-03 PROCEDURE — 21400001 HC TELEMETRY ROOM

## 2024-12-03 PROCEDURE — 63600175 PHARM REV CODE 636 W HCPCS

## 2024-12-03 PROCEDURE — 25000003 PHARM REV CODE 250

## 2024-12-03 PROCEDURE — 36415 COLL VENOUS BLD VENIPUNCTURE: CPT

## 2024-12-03 PROCEDURE — 82785 ASSAY OF IGE: CPT

## 2024-12-03 PROCEDURE — 94640 AIRWAY INHALATION TREATMENT: CPT

## 2024-12-03 PROCEDURE — 85025 COMPLETE CBC W/AUTO DIFF WBC: CPT | Performed by: STUDENT IN AN ORGANIZED HEALTH CARE EDUCATION/TRAINING PROGRAM

## 2024-12-03 PROCEDURE — 36415 COLL VENOUS BLD VENIPUNCTURE: CPT | Performed by: INTERNAL MEDICINE

## 2024-12-03 PROCEDURE — 94761 N-INVAS EAR/PLS OXIMETRY MLT: CPT

## 2024-12-03 PROCEDURE — 25000242 PHARM REV CODE 250 ALT 637 W/ HCPCS

## 2024-12-03 PROCEDURE — 76937 US GUIDE VASCULAR ACCESS: CPT

## 2024-12-03 PROCEDURE — 36410 VNPNXR 3YR/> PHY/QHP DX/THER: CPT

## 2024-12-03 PROCEDURE — C1751 CATH, INF, PER/CENT/MIDLINE: HCPCS

## 2024-12-03 PROCEDURE — 80048 BASIC METABOLIC PNL TOTAL CA: CPT | Performed by: STUDENT IN AN ORGANIZED HEALTH CARE EDUCATION/TRAINING PROGRAM

## 2024-12-03 PROCEDURE — 84100 ASSAY OF PHOSPHORUS: CPT | Performed by: STUDENT IN AN ORGANIZED HEALTH CARE EDUCATION/TRAINING PROGRAM

## 2024-12-03 PROCEDURE — 86140 C-REACTIVE PROTEIN: CPT | Performed by: NURSE PRACTITIONER

## 2024-12-03 PROCEDURE — 85652 RBC SED RATE AUTOMATED: CPT | Performed by: NURSE PRACTITIONER

## 2024-12-03 PROCEDURE — 86036 ANCA SCREEN EACH ANTIBODY: CPT

## 2024-12-03 PROCEDURE — 87798 DETECT AGENT NOS DNA AMP: CPT

## 2024-12-03 PROCEDURE — 36415 COLL VENOUS BLD VENIPUNCTURE: CPT | Performed by: NURSE PRACTITIONER

## 2024-12-03 PROCEDURE — 86682 HELMINTH ANTIBODY: CPT

## 2024-12-03 PROCEDURE — 83735 ASSAY OF MAGNESIUM: CPT | Performed by: STUDENT IN AN ORGANIZED HEALTH CARE EDUCATION/TRAINING PROGRAM

## 2024-12-03 RX ORDER — POTASSIUM CHLORIDE 20 MEQ/1
40 TABLET, EXTENDED RELEASE ORAL ONCE
Status: COMPLETED | OUTPATIENT
Start: 2024-12-03 | End: 2024-12-03

## 2024-12-03 RX ORDER — AMOXICILLIN 250 MG
1 CAPSULE ORAL DAILY
Status: DISCONTINUED | OUTPATIENT
Start: 2024-12-03 | End: 2024-12-05 | Stop reason: HOSPADM

## 2024-12-03 RX ORDER — AMOXICILLIN 250 MG
1 CAPSULE ORAL DAILY PRN
Status: DISCONTINUED | OUTPATIENT
Start: 2024-12-03 | End: 2024-12-03

## 2024-12-03 RX ORDER — TALC
6 POWDER (GRAM) TOPICAL NIGHTLY PRN
Status: DISCONTINUED | OUTPATIENT
Start: 2024-12-03 | End: 2024-12-05 | Stop reason: HOSPADM

## 2024-12-03 RX ORDER — SODIUM CHLORIDE 0.9 % (FLUSH) 0.9 %
10 SYRINGE (ML) INJECTION EVERY 12 HOURS PRN
Status: DISCONTINUED | OUTPATIENT
Start: 2024-12-03 | End: 2024-12-05 | Stop reason: HOSPADM

## 2024-12-03 RX ADMIN — PANTOPRAZOLE SODIUM 40 MG: 40 TABLET, DELAYED RELEASE ORAL at 05:12

## 2024-12-03 RX ADMIN — ONDANSETRON 4 MG: 2 INJECTION INTRAMUSCULAR; INTRAVENOUS at 03:12

## 2024-12-03 RX ADMIN — POTASSIUM CHLORIDE 40 MEQ: 1500 TABLET, EXTENDED RELEASE ORAL at 08:12

## 2024-12-03 RX ADMIN — HYDROMORPHONE HYDROCHLORIDE 0.5 MG: 1 INJECTION, SOLUTION INTRAMUSCULAR; INTRAVENOUS; SUBCUTANEOUS at 08:12

## 2024-12-03 RX ADMIN — OXYCODONE AND ACETAMINOPHEN 1 TABLET: 10; 325 TABLET ORAL at 12:12

## 2024-12-03 RX ADMIN — ACETAMINOPHEN 650 MG: 325 TABLET, FILM COATED ORAL at 12:12

## 2024-12-03 RX ADMIN — ENOXAPARIN SODIUM 40 MG: 40 INJECTION SUBCUTANEOUS at 05:12

## 2024-12-03 RX ADMIN — MEROPENEM 2 G: 2 INJECTION, POWDER, FOR SOLUTION INTRAVENOUS at 12:12

## 2024-12-03 RX ADMIN — METHOCARBAMOL TABLETS 500 MG: 500 TABLET, COATED ORAL at 08:12

## 2024-12-03 RX ADMIN — METHOCARBAMOL TABLETS 500 MG: 500 TABLET, COATED ORAL at 05:12

## 2024-12-03 RX ADMIN — IPRATROPIUM BROMIDE AND ALBUTEROL SULFATE 3 ML: .5; 3 SOLUTION RESPIRATORY (INHALATION) at 02:12

## 2024-12-03 RX ADMIN — BUDESONIDE 0.5 MG: 0.5 INHALANT RESPIRATORY (INHALATION) at 07:12

## 2024-12-03 RX ADMIN — MEROPENEM 2 G: 2 INJECTION, POWDER, FOR SOLUTION INTRAVENOUS at 09:12

## 2024-12-03 RX ADMIN — IPRATROPIUM BROMIDE AND ALBUTEROL SULFATE 3 ML: .5; 3 SOLUTION RESPIRATORY (INHALATION) at 03:12

## 2024-12-03 RX ADMIN — IPRATROPIUM BROMIDE AND ALBUTEROL SULFATE 3 ML: .5; 3 SOLUTION RESPIRATORY (INHALATION) at 11:12

## 2024-12-03 RX ADMIN — FLUTICASONE FUROATE AND VILANTEROL TRIFENATATE 1 PUFF: 100; 25 POWDER RESPIRATORY (INHALATION) at 07:12

## 2024-12-03 RX ADMIN — IPRATROPIUM BROMIDE AND ALBUTEROL SULFATE 3 ML: .5; 3 SOLUTION RESPIRATORY (INHALATION) at 07:12

## 2024-12-03 RX ADMIN — IPRATROPIUM BROMIDE AND ALBUTEROL SULFATE 3 ML: .5; 3 SOLUTION RESPIRATORY (INHALATION) at 10:12

## 2024-12-03 RX ADMIN — HYDROMORPHONE HYDROCHLORIDE 0.5 MG: 1 INJECTION, SOLUTION INTRAMUSCULAR; INTRAVENOUS; SUBCUTANEOUS at 06:12

## 2024-12-03 RX ADMIN — MEROPENEM 2 G: 2 INJECTION, POWDER, FOR SOLUTION INTRAVENOUS at 05:12

## 2024-12-03 RX ADMIN — MONTELUKAST SODIUM 10 MG: 5 TABLET, CHEWABLE ORAL at 08:12

## 2024-12-03 RX ADMIN — METHOCARBAMOL TABLETS 500 MG: 500 TABLET, COATED ORAL at 09:12

## 2024-12-03 RX ADMIN — ONDANSETRON 4 MG: 2 INJECTION INTRAMUSCULAR; INTRAVENOUS at 06:12

## 2024-12-03 RX ADMIN — MELATONIN TAB 3 MG 6 MG: 3 TAB at 09:12

## 2024-12-03 RX ADMIN — ACETAMINOPHEN 650 MG: 325 TABLET, FILM COATED ORAL at 05:12

## 2024-12-03 RX ADMIN — METHOCARBAMOL TABLETS 500 MG: 500 TABLET, COATED ORAL at 12:12

## 2024-12-03 NOTE — CARE UPDATE
[facility]  Infectious Diseases OPAT Orders  12/3/2024     PATIENT: Niya Moeller  :          1970   MRN:         [id number]     DIAGNOSIS: Chronic sigmoid diverticulitis with diverticular abscess, history of ESBL E coli    Review of patient's allergies indicates:   Allergen Reactions    Latex Anaphylaxis and Edema    Zosyn [piperacillin-tazobactam] Anaphylaxis        WEIGHT: 69.0 kg  CURRENT: Estimated Creatinine Clearance: 73.3 mL/min (based on SCr of 0.77 mg/dL).     MEDICATIONS:   1) Meropenem 2 g IV q 24h  - Duration: 14 days  - End date: 2024         **PLEASE FAX LAB RESULTS TO [phone]**  LABS:     Please check the following labs weekly x 2 weeks: CBC, CMP, CRP, and ESR    ** LABS are NOT to be drawn from Midline site**      NURSING / OTHER:     [] Please place PICC line  [x] Routine Midline line care with weekly Midline line dressing changes  [x] Patient to receive first dose of IV antimicrobials in a supervised setting  [x] Please provide home IV antimicrobial teaching  [] OK to pull PICC line after completion of IV antimicrobial therapy    ADDITIONAL NOTES:      Patient needs repeat CT abdomen / pelvis with contrast in 2 weeks to assess if IV antibiotics needs to be extended       Jade BARBOSAP / Dr Justin Valdivia  [facility] Infectious Diseases

## 2024-12-03 NOTE — CONSULTS
[facility]   Inpatient Infectious Diseases Consultation    Physician requesting consultation: Elmer Lee Jr.,*  Service requesting consultation: General Surgery  Reason for consultation: Antibiotic management    Historian: Patient and Electronic Medical Record    Isolation Status: No active isolations     HPI:     Patient was a 54-year-old female with a past medical history of chronic sigmoid diverticulitis with multiple episodes of diverticular abscess in the past, hyperthyroidism, and asthma who presented to the ED on 11/30  with the primary complaint of left lower quadrant pain, constipation, nausea, no fever/chills.   Patient reports that approximately 2 weeks ago she visited [facility] ED for similar complaints.  She reports that at this time she was prescribed moxifloxacin for a 10 day total course.  She reports that she started taking antibiotics but experience gradual worsening of her symptoms.  She finished her antibiotic course approximally 4 days prior to this emergency room presentation.   Patient with chronic sigmoid diverticulitis multiple diverticular abscesses in the past requiring percutaneous drainage.   Patient with cultures from prior diverticular abscess on 02/2023 with growth of ESBL E coli.   CT  abdomen/pelvis obtained on admission, displayed small intramural collection of fluid/air measuring 1 x 1.8 cm.  Patient was given ciprofloxacin and Merrem for 1 dose each in the emergency department.  Surgery was consulted for admission.  Patient has been on cefepime and Flagyl since.  Blood cultures are negative at 24 hours.  Infectious Disease was consulted for further recommendations.    Antibiotic / Antiviral / Antifungal history:  Ciprofloxacin 11/30 (one dose in the ED)  Merrem 11/30 (one dose in the ED)  Flagyl 11/30-12/2  Cefipime 11/30-12/2  Merrem 12/2-present     Interval history:  No acute events overnight.  Patient reports continued abdominal pain that is  rated as a 6/10 on the pain scale this morning.  She does report that this is improved in interval.  Patient has asked surgery not to be involved in her care anymore.  Spoke to her once again today about PICC line placement and need for long-term antibiotics given abscess present on CT scan.  She is wiling to obtain a midline and undergo OPAT.     Past Medical History/Past Surgical History/Social History     Past Medical History:   Diagnosis Date    Asthma     Diverticulitis     Diverticulosis     GERD (gastroesophageal reflux disease)     Graves disease        Past Surgical History:   Procedure Laterality Date    COLONOSCOPY, WITH DIRECTED SUBMUCOSAL INJECTION N/A 4/8/2024    Procedure: COLONOSCOPY, WITH DIRECTED SUBMUCOSAL INJECTION;  Surgeon: Heather Lance MD;  Location: [facility] ENDOSCOPY;  Service: Gastroenterology;  Laterality: N/A;        FAMILY HISTORY:  family history includes Hypothyroidism in her mother.     SOCIAL HISTORY:   Social History     Socioeconomic History    Marital status:    Tobacco Use    Smoking status: Every Day     Current packs/day: 0.50     Average packs/day: 0.5 packs/day for 41.9 years (21.0 ttl pk-yrs)     Types: Cigarettes     Start date: 1/1/1983    Smokeless tobacco: Current    Tobacco comments:     Ambulatory referral to Smoking Cessation clinic following hospital discharge.    Substance and Sexual Activity    Alcohol use: Not Currently    Drug use: Never    Sexual activity: Not Currently     Birth control/protection: Abstinence     Comment: States is asexual     Social Drivers of Health     Financial Resource Strain: Low Risk  (12/2/2024)    Overall Financial Resource Strain (CARDIA)     Difficulty of Paying Living Expenses: Not very hard   Food Insecurity: No Food Insecurity (12/2/2024)    Hunger Vital Sign     Worried About Running Out of Food in the Last Year: Never true     Ran Out of Food in the Last Year: Never true   Transportation Needs: No Transportation  Needs (12/2/2024)    TRANSPORTATION NEEDS     Transportation : No   Physical Activity: Sufficiently Active (12/2/2024)    Exercise Vital Sign     Days of Exercise per Week: 5 days     Minutes of Exercise per Session: 30 min   Stress: No Stress Concern Present (12/2/2024)    [employer] - Occupational Stress Questionnaire     Feeling of Stress : Not at all   Housing Stability: Low Risk  (12/2/2024)    Housing Stability Vital Sign     Unable to Pay for Housing in the Last Year: No     Homeless in the Last Year: No        ALLERGIES:   Review of patient's allergies indicates:   Allergen Reactions    Latex Anaphylaxis and Edema    Zosyn [piperacillin-tazobactam] Anaphylaxis       Review of Systems     Review of Systems   Constitutional:  Negative for chills, fever and malaise/fatigue.   Respiratory:  Negative for cough, hemoptysis, sputum production and shortness of breath.    Cardiovascular:  Negative for chest pain, palpitations and leg swelling.   Gastrointestinal:  Positive for abdominal pain, constipation and nausea. Negative for diarrhea and vomiting.   Genitourinary: Negative.    Neurological: Negative.    Psychiatric/Behavioral:  Negative for substance abuse.      MEDICATIONS:   Scheduled Meds:   acetaminophen  650 mg Oral Q6H    albuterol-ipratropium  3 mL Nebulization Q4H    budesonide  0.5 mg Nebulization Q12H    buPROPion  150 mg Oral BID    dicyclomine  10 mg Oral QID    enoxparin  40 mg Subcutaneous Daily    ergocalciferol  50,000 Units Oral Q7 Days    fluticasone furoate-vilanteroL  1 puff Inhalation Daily    fluticasone propionate  2 spray Each Nostril Daily    meropenem IV (PEDS and ADULTS)  2 g Intravenous Q8H    methIMAzole  2.5 mg Oral Daily    methocarbamoL  500 mg Oral QID    montelukast  10 mg Oral Daily    pantoprazole  40 mg Oral BID AC     Continuous Infusions:   lactated ringers   Intravenous Continuous 50 mL/hr at 12/03/24 0528 Rate Verify at 12/03/24 0528     PRN  Meds:.  Current Facility-Administered Medications:     diphenhydrAMINE, 25 mg, Oral, Q6H PRN    HYDROmorphone, 0.5 mg, Intravenous, Q6H PRN    ondansetron, 4 mg, Intravenous, Q6H PRN    oxyCODONE-acetaminophen, 1 tablet, Oral, Q4H PRN    oxyCODONE-acetaminophen, 1 tablet, Oral, Q4H PRN    sodium chloride 0.9%, 10 mL, Intravenous, PRN    Physical exam:     Temp:  [97.8 °F (36.6 °C)-98.9 °F (37.2 °C)] 98.2 °F (36.8 °C)  Pulse:  [51-88] 72  Resp:  [16-20] 18  SpO2:  [88 %-100 %] 100 %  BP: ()/(46-69) 123/60     GENERAL: A&Ox3, NAD   HEENT: atraumatic, normocephalic  LUNGS: breathing unlabored, lungs CTA bilateral  HEART: RRR; no murmur, rub, or gallop  ABDOMEN: abdomen soft, nondistended, mild tenderness to LLQ on palpation   EXTREMITIES: no edema, clubbing, or cyanosis   SKIN: no rashes or lesions  NEURO: speech fluent and intact, facial symmetry preserved, no tremor  PSYCH: cooperative, normal mood and affect    LABS:     I have personally reviewed patient's labs.  Pertinent results noted below.    CBC  Recent Labs     11/30/24  1424 12/01/24  0400 12/03/24  0323   WBC 6.40 5.16 5.56   HGB 14.4 12.7 11.8*   HCT 42.1 36.9* 35.1*    210 220       Differential  Recent Labs   Lab 12/03/24  0323   WBC 5.56   HGB 11.8*   HCT 35.1*           Basic Metabolic Panel  Recent Labs     11/30/24  1424 12/01/24  0400 12/03/24  0323    139 141   K 4.1 3.8 3.1*    110* 105   CO2 27 23 27   BUN 8.9* 7.9* 3.1*   CREATININE 0.87 0.79 0.77        Hepatic Panel  Lab Results   Component Value Date    ALT 9 12/01/2024    AST 15 12/01/2024    ALKPHOS 57 12/01/2024    BILITOT 0.5 12/01/2024        Urinalysis:  Results for orders placed or performed during the hospital encounter of 11/30/24   Urinalysis, Reflex to Urine Culture    Specimen: Urine   Result Value Ref Range    Color, UA Colorless (A) Yellow, Light-Yellow, Dark Yellow, Gisella, Straw    Appearance, UA Clear Clear    Specific Gravity, UA 1.006 1.005 -  1.030    pH, UA 5.5 5.0 - 8.5    Protein, UA Negative Negative    Glucose, UA Normal Negative, Normal    Ketones, UA Negative Negative    Blood, UA Negative Negative    Bilirubin, UA Negative Negative    Urobilinogen, UA Normal 0.2, 1.0, Normal    Nitrites, UA Negative Negative    Leukocyte Esterase, UA Negative Negative    RBC, UA 0-5 None Seen, 0-2, 3-5, 0-5 /HPF    WBC, UA 0-5 None Seen, 0-2, 3-5, 0-5 /HPF    Bacteria, UA Trace (A) None Seen /HPF    Squamous Epithelial Cells, UA Trace (A) None Seen /HPF    Hyaline Casts, UA None Seen None Seen /lpf       Estimated Creatinine Clearance: 73.3 mL/min (based on SCr of 0.77 mg/dL).     ESR:  Results for orders placed or performed in visit on 09/05/23   Sedimentation Rate   Result Value Ref Range    Sed Rate 25 (H) 0 - 20 mm/hr      CRP:  Results for orders placed or performed during the hospital encounter of 11/13/24   C-Reactive Protein   Result Value Ref Range    CRP 4.60 <5.00 mg/L       IMAGING:     I have personally reviewed patient's imaging. Pertinent results noted below.  X-Ray Chest 1 View   Final Result      No acute pulmonary process identified.         Electronically signed by: John Bowens   Date:    12/01/2024   Time:    11:51      CT Abdomen Pelvis With IV Contrast NO Oral Contrast   Final Result      Similar wall thickening of the sigmoid colon with surrounding inflammatory changes.  Suspected contained perforation with small adjacent collection of fluid and air, slightly increased from the prior exam.  No free intraperitoneal air or drainable fluid collection.         Electronically signed by: Mary Centeno   Date:    11/30/2024   Time:    16:08          IMPRESSION     Chronic sigmoid diverticulitis with current diverticular abscess measuring 1 x 1.8 cm  History of ESBL E. coli   History of asthma  History of hyperthyroidism     RECOMMENDATIONS:    Again discussed need for IV antibiotic therapy today with patient. She is willing to have a midline  placed and be setup for OPAT.     Proceed with Ertapenem 1 g Q24 hours for AT MINIUM two weeks. Our team will facilitate OPAT.   Please order for Midline placement.   Case management consulted for OPAT assistance   Repeat CT A/P to be completed in 2 weeks. Extend antibiotic course if abscess is worse or unchanged.  Continue Merrem 2 g Q8hr while she remains inpatient  Patient needs colectomy outpatient. Patient is refusing to have surgery completed in [place].    Yeyo Pollack, DO  [facility] Internal Medicine PGY-2

## 2024-12-03 NOTE — PLAN OF CARE
Inpatient consult to Infectious Diseases  Consult performed by: Justin Valdivia MD  Consult ordered by: Van Flood MD  Reason for consult: Diverticulitis

## 2024-12-03 NOTE — MEDICAL/APP STUDENT
"  [facility]  General Surgery - Gold Team  Progress Note  Admit Date: 11/30/2024  HD#2  POD#* No surgery found *    Subjective:   Interval history:  Afebrile, VSS  Reports asthma symptoms better controlled  Improving abdominal pain, now 6 out of 10  Denies CP, fevers/chills     Objective:     VITAL SIGNS: 24 HR MIN & MAX LAST   Temp  Min: 97.8 °F (36.6 °C)  Max: 98.9 °F (37.2 °C)  98.2 °F (36.8 °C)   BP  Min: 92/46  Max: 123/60  123/60    Pulse  Min: 51  Max: 88  72    Resp  Min: 16  Max: 20  18    SpO2  Min: 88 %  Max: 100 %  100 %      HT: 4' 11" (149.9 cm)  WT: 69 kg (152 lb 1.9 oz)  BMI: 30.7       Physical examination:  Gen: NAD, AAOx3, answering questions appropriately  Head: Normocephalic, atraumatic  CV: RR  Resp: NWOB on 1.5L NC  Abd: soft, nondistended, not tender  Ext: moving all extremities spontaneously and purposefully    Labs:  Recent Labs     11/30/24  1424 12/01/24  0400 12/03/24  0323   WBC 6.40 5.16 5.56   HGB 14.4 12.7 11.8*   HCT 42.1 36.9* 35.1*    210 220    139 141   K 4.1 3.8 3.1*    110* 105   CO2 27 23 27   BUN 8.9* 7.9* 3.1*   CREATININE 0.87 0.79 0.77   BILITOT 0.5 0.5  --    AST 18 15  --    ALT 12 9  --    ALKPHOS 75 57  --    CALCIUM 10.1 8.9 9.2   ALBUMIN 3.6 2.9*  --    MG 1.90 1.90 1.80   PHOS  --  3.4 3.3       Imaging:  X-Ray Chest 1 View   Final Result      No acute pulmonary process identified.         Electronically signed by: John Bowens   Date:    12/01/2024   Time:    11:51      CT Abdomen Pelvis With IV Contrast NO Oral Contrast   Final Result      Similar wall thickening of the sigmoid colon with surrounding inflammatory changes.  Suspected contained perforation with small adjacent collection of fluid and air, slightly increased from the prior exam.  No free intraperitoneal air or drainable fluid collection.         Electronically signed by: Mary Centeno   Date:    11/30/2024   Time:    16:08         I have reviewed all " pertinent imaging results/findings within the past 24 hours.    Micro/Path:  Blood cultures NGTD      Assessment & Plan:   54 F well known to our service hx of chronic sigmoid diverticulitis, asthma presenting with acute diverticulitis flare up and CT contrast concerning for possible contained microperforation.     - Q4h breathing treatments, duo nebs, maintenance inhaler, budesonide, montelukast        - medicine on board for asthma management  - continue IV meropenem        - ID on board  - MMPC as ordered  - NPO except for sips with meds  - PO zofran prn  - Ambulate as tolerated     DVT ppx: Lovenox    Dispo: pending clinical improvement of diverticulitis      Magdiel Tijerina, MS3  [employer]

## 2024-12-03 NOTE — TELEPHONE ENCOUNTER
Please schedule pt with Dr Valdivia on 12/17/2024 for [place] appointment for diverticular abscess / OPAT.    Please make sure repeat CT is scheduled in 2 weeks.  Pt will need be OPAT, so will need a blue chart.   Thank you

## 2024-12-03 NOTE — CARE UPDATE
[facility]  Infectious Diseases OPAT Orders  12/3/2024     PATIENT: Niya Moeller  :          1970   MRN:         [id number]     DIAGNOSIS: Chronic sigmoid diverticulitis with diverticular abscess, history of ESBL E coli     Review of patient's allergies indicates:   Allergen Reactions    Latex Anaphylaxis and Edema    Zosyn [piperacillin-tazobactam] Anaphylaxis        WEIGHT: 69.0 kg  CURRENT: Estimated Creatinine Clearance: 73.3 mL/min (based on SCr of 0.77 mg/dL).     MEDICATIONS:   1) Ertapenem 1g IV q24h  - Duration: 14 days  - End date: 2024          **PLEASE FAX LAB RESULTS TO [phone]**  LABS:     Please check the following labs weekly x 2 weeks: CBC, CMP, CRP, and ESR    ** LABS are NOT to be drawn from PICC site**      NURSING / OTHER:     [] Please place PICC line  [x] Routine Midline care with weekly Midline dressing changes  [x] Patient to receive first dose of IV antimicrobials in a supervised setting  [x] Please provide home IV antimicrobial teaching  [] OK to pull PICC line after completion of IV antimicrobial therapy    ADDITIONAL NOTES:       Patient needs repeat CT abdomen / pelvis with contrast in 2 weeks to assess if IV antibiotics needs to be extended        Jade Flower FNP / Dr Justin Valdivia  [facility] Infectious Diseases

## 2024-12-03 NOTE — PROGRESS NOTES
Order for OPAT submitted to [facility] [employer] via [employer] fax. Due to pt's insurance, referral for HH services submitted to multiple providers via Epic fax.   Instructions: This plan will send the code FBSE to the PM system.  DO NOT or CHANGE the price. Detail Level: Simple Price (Do Not Change): 0.00

## 2024-12-03 NOTE — PROGRESS NOTES
[facility] Medicine Wards   Progress Note     Resident Team: [facility] Medicine List 3  Attending Physician: Darren Bennett MD  Resident: Greg Riley MD   Date of Admit: 11/30/2024    Subjective:      Brief HPI:  Niya Moeller is a 54 y.o. female with PMH of asthma, Graves disease/hyperthyroidism, chronic sigmoid diverticulitis with history of diverticular abscess, GERD presented to [facility] ED on 11/30/2024  with complaint of abdominal pain and subjective fever and worsening of her diverticulitis.  Patient known to Gen surgery with tentative plans for sigmoidectomy in the future after clearance from Pulmonology for asthma. Patient admitted with Gen Surgery as primary.     On admission 11/30/2024 in the ED, patient with unremarkable labs, afebrile and VSS. CT abdomen pelvis with IV contrast showed wall thickening of sigmoid colon and suspected contained perforation with small adjacent collection of fluid and air 1 x 1.8 cm in size. Per chart review, patient emotionally labile on admit and requested oxygen 2/2 worsening SOB. Internal medicine consulted for management of asthma.      Patient recalls that for past 2 days she has not used her maintenance inhaler 2/2 abdominal pain and her SOB and wheezing got worse as her home maintenance inhalers were not initiated on admit either. However, upon my encounter today, patient states her SOB is improved since her breathing treatment and home meds. Currently only on 1.5 L supplemental oxygen via NC. Patient with increased dyspnea when NC removed and ambulates to the bathroom. Still endorses wheezing, non productive cough, and mild headache. Not home oxygen dependent. Persistent abdominal pain present 6-7/10 in the LLQ. States last bowel movement about 3-4 days ago. Today remains afebrile, VSS, CBC and CMP unremarkable. CXR with no acute process.     Interval History:   [place]ER[place], VSS.  Patient states her dyspnea has resolved and feels much better today.  She has persistent pain in  the LLQ. CBC with macrocytic anemia. CMP with hypokalemia of 3.1.  Elevated CRP of 43.8.  Infectious disease following, recommendation for IV Merrem while inpatient and OPAT with the ertapenem upon discharge with repeat CT abdomen and pelvis with IV contrast in 2 weeks.  Patient now amenable to this plan.      Review of Systems:  Review of Systems   Constitutional:  Negative for chills and fever.   Respiratory:  Negative for sputum production, shortness of breath and wheezing.    Cardiovascular:  Negative for chest pain, palpitations and leg swelling.   Gastrointestinal:  Positive for abdominal pain and constipation. Negative for blood in stool, diarrhea, nausea and vomiting.   Genitourinary:  Negative for dysuria, frequency and urgency.   Musculoskeletal:  Negative for myalgias.   Neurological:  Negative for dizziness, focal weakness and headaches.          Objective:     Vital Signs (Most Recent):  Temp: 98.8 °F (37.1 °C) (12/03/24 1130)  Pulse: 76 (12/03/24 1130)  Resp: 18 (12/03/24 1130)  BP: 118/67 (12/03/24 1130)  SpO2: 100 % (12/03/24 1130) Vital Signs (24h Range):  Temp:  [98.2 °F (36.8 °C)-98.9 °F (37.2 °C)] 98.8 °F (37.1 °C)  Pulse:  [51-88] 76  Resp:  [16-20] 18  SpO2:  [93 %-100 %] 100 %  BP: ()/(49-67) 118/67       Physical Examination:  Physical Exam  Vitals reviewed.   Constitutional:       Appearance: Normal appearance.   HENT:      Head: Normocephalic and atraumatic.      Mouth/Throat:      Mouth: Mucous membranes are moist.   Eyes:      Pupils: Pupils are equal, round, and reactive to light.   Cardiovascular:      Rate and Rhythm: Normal rate and regular rhythm.      Pulses: Normal pulses.      Heart sounds: No murmur heard.  Pulmonary:      Effort: No respiratory distress.      Breath sounds: No wheezing.   Abdominal:      General: Bowel sounds are normal. There is no distension.      Palpations: Abdomen is soft.      Tenderness: There is abdominal tenderness (LLQ).   Musculoskeletal:       Right lower leg: No edema.      Left lower leg: No edema.   Skin:     General: Skin is warm.   Neurological:      General: No focal deficit present.      Mental Status: She is alert and oriented to person, place, and time.   Psychiatric:         Mood and Affect: Mood normal.         Behavior: Behavior normal.         Laboratory:  Lab Results   Component Value Date     12/03/2024    K 3.1 (L) 12/03/2024     12/03/2024    CO2 27 12/03/2024    GLU 97 02/19/2023    BUN 3.1 (L) 12/03/2024    CREATININE 0.77 12/03/2024    CALCIUM 9.2 12/03/2024    PROT 7.5 02/19/2023    BILIDIR 0.2 05/05/2022    IBILI 0.30 05/05/2022    ALKPHOS 57 12/01/2024    AST 15 12/01/2024    ALT 9 12/01/2024    MG 1.80 12/03/2024    PHOS 3.3 12/03/2024        Lab Results   Component Value Date    WBC 5.56 12/03/2024    RBC 3.69 (L) 12/03/2024    HGB 11.8 (L) 12/03/2024    HCT 35.1 (L) 12/03/2024    MCV 95.1 (H) 12/03/2024    MCH 32.0 (H) 12/03/2024    MCHC 33.6 12/03/2024    RDW 12.2 12/03/2024     12/03/2024    MPV 10.9 (H) 12/03/2024    GRAN 8.1 (H) 02/19/2023    GRAN 72.4 02/19/2023    LYMPH 2.2 02/19/2023    LYMPH 19.8 02/19/2023    MONO 0.7 02/19/2023    MONO 6.4 02/19/2023    EOS 0.0 02/19/2023    BASO 0.02 02/19/2023    EOSINOPHIL 0.0 02/19/2023    BASOPHIL 0.2 02/19/2023        Microbiology:   Microbiology Results (last 7 days)       Procedure Component Value Units Date/Time    Blood culture #1 **CANNOT BE ORDERED STAT** [[id number]]  (Normal) Collected: 11/30/24 1424    Order Status: Completed Specimen: Blood from Antecubital, Right Updated: 12/02/24 1900     Blood Culture No Growth At 48 Hours    Blood culture #2 **CANNOT BE ORDERED STAT** [[id number]]  (Normal) Collected: 11/30/24 1424    Order Status: Completed Specimen: Blood from Hand, Left Updated: 12/02/24 1900     Blood Culture No Growth At 48 Hours            Other Results:      Radiology:  Imaging Results              CT Abdomen Pelvis With IV Contrast NO Oral  Contrast (Final result)  Result time 11/30/24 16:08:18      Final result by Mary Centeno MD (11/30/24 16:08:18)                   Impression:      Similar wall thickening of the sigmoid colon with surrounding inflammatory changes.  Suspected contained perforation with small adjacent collection of fluid and air, slightly increased from the prior exam.  No free intraperitoneal air or drainable fluid collection.      Electronically signed by: Mary Centeno  Date:    11/30/2024  Time:    16:08               Narrative:    EXAMINATION:  CT ABDOMEN PELVIS WITH IV CONTRAST    CLINICAL HISTORY:  LLQ abdominal pain;    TECHNIQUE:  CT imaging was performed of the abdomen and pelvis after the administration of intravenous contrast. Dose length product is 275 mGycm. Automatic exposure control, adjustment of mA/kV or iterative reconstruction technique was used to limit radiation dose.    COMPARISON:  CT abdomen pelvis dated 11/26/2024    FINDINGS:  Liver: Normal.    Gallbladder and biliary tree: No calcified gallstones. No intra or extrahepatic biliary ductal dilation.    Pancreas: Normal.    Spleen: Normal.    Adrenals: Normal.    Kidneys and ureters: Normal.    Bladder: Normal.    Reproductive organs: No pelvic masses.    Stomach/bowel: There is colonic diverticulosis.  There is persistent wall thickening of the sigmoid colon with similar surrounding inflammatory changes.  There is a small intramural collection of fluid and air measuring 1 x 1.8 cm in size, increased from the prior exam, with slight extramural extension (series 2, image 92).    Lymph nodes: No pathologically enlarged lymph node identified.    Peritoneum: No ascites or free air. No fluid collection.    Vessels: No abdominal aortic aneurysm.    Abdominal wall: Normal.    Lung bases: No consolidation or pleural effusion.    Bones: No acute osseous findings.                                      Current Medications:     Infusions:   lactated ringers    Intravenous Continuous 50 mL/hr at 12/03/24 0528 Rate Verify at 12/03/24 0528        Scheduled:   acetaminophen  650 mg Oral Q6H    albuterol-ipratropium  3 mL Nebulization Q4H    budesonide  0.5 mg Nebulization Q12H    buPROPion  150 mg Oral BID    dicyclomine  10 mg Oral QID    enoxparin  40 mg Subcutaneous Daily    ergocalciferol  50,000 Units Oral Q7 Days    fluticasone furoate-vilanteroL  1 puff Inhalation Daily    fluticasone propionate  2 spray Each Nostril Daily    meropenem IV (PEDS and ADULTS)  2 g Intravenous Q8H    methIMAzole  2.5 mg Oral Daily    methocarbamoL  500 mg Oral QID    montelukast  10 mg Oral Daily    pantoprazole  40 mg Oral BID AC    senna-docusate 8.6-50 mg  1 tablet Oral Daily        PRN:    Current Facility-Administered Medications:     diphenhydrAMINE, 25 mg, Oral, Q6H PRN    HYDROmorphone, 0.5 mg, Intravenous, Q6H PRN    ondansetron, 4 mg, Intravenous, Q6H PRN    oxyCODONE-acetaminophen, 1 tablet, Oral, Q4H PRN    oxyCODONE-acetaminophen, 1 tablet, Oral, Q4H PRN    sodium chloride 0.9%, 10 mL, Intravenous, PRN    Antibiotics:  Meropenem 2 g q8h      Intake/Output Summary (Last 24 hours) at 12/3/2024 1305  Last data filed at 12/3/2024 0528  Gross per 24 hour   Intake 3496.32 ml   Output --   Net 3496.32 ml       Lines/Drains/Airways       Peripheral Intravenous Line  Duration                  Peripheral IV - Single Lumen 22 G Anterior;Left Forearm -- days                      Assessment & Plan:     Asthma - improved  Tobacco dependence  - patient presented with worsening of SOB and wheezing but off maintenance inhalers for 2-3 days  - improved dyspnea since starting home inhaler (equivalent)  - sees Pulmonology in [facility], no PFTs in file (failed before in 2023), appointment in January  - would recommend continued with Duoneb every 4 hours and Budesonide nebs every 12 hours  - Continue with fluticasone-vilanterol 1 puff daily  - Continue with Flonase and singulair  - Would hold off on  systemic steroids given diverticular disease with suspected microperforation and history of diverticular abscess  - if symptoms worsen then consider ABG and risk vs benefit for initiation of systemic steroids.   - elevated eosinophils noted, today more than 150  - we will order Strongyloides IgG, Anca, IgE level; patient will eventually need CT chest and sinus repeated to evaluated for other pulmonary etiologies     Hyperthyroidism/ Graves  - follows Endo. Last appt 11/26/2024  - TSH, free T3 and T4 wnl on 11/26/24  - continue home MMI 2.5 mg once daily     Chronic sigmoid diverticulitis with current diverticular abscess measuring 1 x 1.8 cm  History of ESBL E. coli   - Midline ordered  - Appreciate ID recommendations  - Continue IV Merrem while inpatient, will proceed with Ertapenem 1 g q24 h for at least 2 weeks  - Repeat CT abd/pelvis with IV contrast in 2 weeks to assess for abscess resolution  - discussed in detail with the patient about importance of getting sigmoidectomy, patient looking into options outside of this facility and aware of risks of prolonging surgery.  - needs to continue to follow up with gen surgery outpatient    Hypokalemia  - level at 3.1 today  - replete as necessary    CODE STATUS: Full  Access: PIV  Antibiotics: Merrem  Diet: CLD  DVT Prophylaxis: Lovenox  GI Prophylaxis: PPI  Fluids: IV LR at 50 ml/hr      Disposition: 54 year old intially admitted under Gen surgery for chronic sigmoid diverticulitis with a history of rectal abscess, ESBL E coli.  Patient hesitant to proceed with surgical option at the moment, transitioned care to Internal medicine. ID following with recs for OPAT. Midline ordered place, likely discharge tomorrow.    Greg Riley MD  Internal Medicine - PGY-2        This note was generated via Dictaphone and may contain some voice recognition errors.

## 2024-12-03 NOTE — PROGRESS NOTES
"  [facility]  General Surgery - Gold Team  Progress Note  Admit Date: 11/30/2024  HD#2  POD#* No surgery found *    Subjective:   Interval history:  Afebrile, VSS  Reports asthma symptoms better controlled  Improving abdominal pain, now 6 out of 10  Denies CP, fevers/chills  Ambulating     Objective:     VITAL SIGNS: 24 HR MIN & MAX LAST   Temp  Min: 97.8 °F (36.6 °C)  Max: 98.9 °F (37.2 °C)  98.2 °F (36.8 °C)   BP  Min: 99/49  Max: 123/60  103/63    Pulse  Min: 51  Max: 88  70 (Simultaneous filing. User may not have seen previous data.)    Resp  Min: 16  Max: 20  16 (Simultaneous filing. User may not have seen previous data.)    SpO2  Min: 88 %  Max: 100 %  100 % (Simultaneous filing. User may not have seen previous data.)      HT: 4' 11" (149.9 cm)  WT: 69 kg (152 lb 1.9 oz)  BMI: 30.7       Physical examination:  Gen: NAD, AAOx3, answering questions appropriately  Head: Normocephalic, atraumatic  CV: RRR on peipheral exam  Resp: NWOB on 1.5L NC  Abd: soft, nondistended, not tender  Ext: moving all extremities spontaneously and purposefully    Labs:  Recent Labs     11/30/24  1424 12/01/24  0400 12/03/24  0323   WBC 6.40 5.16 5.56   HGB 14.4 12.7 11.8*   HCT 42.1 36.9* 35.1*    210 220    139 141   K 4.1 3.8 3.1*    110* 105   CO2 27 23 27   BUN 8.9* 7.9* 3.1*   CREATININE 0.87 0.79 0.77   BILITOT 0.5 0.5  --    AST 18 15  --    ALT 12 9  --    ALKPHOS 75 57  --    CALCIUM 10.1 8.9 9.2   ALBUMIN 3.6 2.9*  --    MG 1.90 1.90 1.80   PHOS  --  3.4 3.3       Imaging:  X-Ray Chest 1 View   Final Result      No acute pulmonary process identified.         Electronically signed by: John Bowens   Date:    12/01/2024   Time:    11:51      CT Abdomen Pelvis With IV Contrast NO Oral Contrast   Final Result      Similar wall thickening of the sigmoid colon with surrounding inflammatory changes.  Suspected contained perforation with small adjacent collection of fluid and air, slightly " increased from the prior exam.  No free intraperitoneal air or drainable fluid collection.         Electronically signed by: Mary Centeno   Date:    11/30/2024   Time:    16:08         I have reviewed all pertinent imaging results/findings within the past 24 hours.    Micro/Path:  Blood cultures NGTD      Assessment & Plan:   54 F well known to our service hx of chronic sigmoid diverticulitis, asthma presenting with acute diverticulitis flare up and CT contrast concerning for possible contained microperforation.     - Q4h breathing treatments, duo nebs, maintenance inhaler, budesonide, montelukast        - medicine on board for asthma management, appreciate recs: ABG if pt worsens, continue home regimen, hold off on solumedrol given acute diverticulitis flare up  - continue IV meropenem        - ID on board, appreciate recs: repeat CT A/P with IV contrast in two weeks, transition to PO Moxifloxacin 400 x 30 days  - MMPC as ordered  - CLD, advance as tolerated  - PO zofran prn  - Ambulate as tolerated     DVT ppx: Lovenox    Dispo: Patient requesting transfer of clinical care to medicine service. Please call surgery with any changes in patient's abdominal exam or clinical status.    Magdiel Tijerina, MS3  [facility] [employer]    Van Flood MD  [facility] General Surgery

## 2024-12-03 NOTE — PROCEDURES
"Niya Moeller is a 54 y.o. female patient.    Temp: 98.8 °F (37.1 °C) (12/03/24 1130)  Pulse: 76 (12/03/24 1130)  Resp: 18 (12/03/24 1130)  BP: 118/67 (12/03/24 1130)  SpO2: 100 % (12/03/24 1130)  Weight: 69 kg (152 lb 1.9 oz) (11/30/24 1947)  Height: 4' 11" (149.9 cm) (11/30/24 1947)    PICC  Date/Time: 12/3/2024 2:04 PM  Performed by: Jarett Aguilar RN  Consent Done: Yes  Time out: Immediately prior to procedure a time out was called to verify the correct patient, procedure, equipment, support staff and site/side marked as required  Indications: med administration and vascular access  Anesthesia: local infiltration  Local anesthetic: lidocaine 1% without epinephrine    Preparation: skin prepped with ChloraPrep  Skin prep agent dried: skin prep agent completely dried prior to procedure  Sterile barriers: all five maximum sterile barriers used - cap, mask, sterile gown, sterile gloves, and large sterile sheet  Hand hygiene: hand hygiene performed prior to central venous catheter insertion  Location details: left brachial  Catheter type: single lumen  Catheter size: 4 Fr  Catheter Length: 17cm    Ultrasound guidance: yes  Vessel Caliber: patent, compressibility normal  Needle advanced into vessel with real time Ultrasound guidance.  Guidewire confirmed in vessel.  Sterile sheath used.  Number of attempts: 1  Post-procedure: blood return through all ports, chlorhexidine patch and sterile dressing applied            Name SOFIE Aguilar RN   12/3/2024    "

## 2024-12-04 LAB
ANION GAP SERPL CALC-SCNC: 4 MEQ/L
BASOPHILS # BLD AUTO: 0.01 X10(3)/MCL
BASOPHILS NFR BLD AUTO: 0.2 %
BUN SERPL-MCNC: 3.9 MG/DL (ref 9.8–20.1)
CALCIUM SERPL-MCNC: 9.6 MG/DL (ref 8.4–10.2)
CHLORIDE SERPL-SCNC: 108 MMOL/L (ref 98–107)
CO2 SERPL-SCNC: 30 MMOL/L (ref 22–29)
CREAT SERPL-MCNC: 0.77 MG/DL (ref 0.55–1.02)
CREAT/UREA NIT SERPL: 5
EOSINOPHIL # BLD AUTO: 0.49 X10(3)/MCL (ref 0–0.9)
EOSINOPHIL NFR BLD AUTO: 10.3 %
ERYTHROCYTE [DISTWIDTH] IN BLOOD BY AUTOMATED COUNT: 12.5 % (ref 11.5–17)
GFR SERPLBLD CREATININE-BSD FMLA CKD-EPI: >60 ML/MIN/1.73/M2
GLUCOSE SERPL-MCNC: 110 MG/DL (ref 74–100)
HCT VFR BLD AUTO: 34.8 % (ref 37–47)
HGB BLD-MCNC: 11.9 G/DL (ref 12–16)
IMM GRANULOCYTES # BLD AUTO: 0.01 X10(3)/MCL (ref 0–0.04)
IMM GRANULOCYTES NFR BLD AUTO: 0.2 %
LYMPHOCYTES # BLD AUTO: 2.69 X10(3)/MCL (ref 0.6–4.6)
LYMPHOCYTES NFR BLD AUTO: 56.8 %
MAGNESIUM SERPL-MCNC: 1.8 MG/DL (ref 1.6–2.6)
MCH RBC QN AUTO: 33 PG (ref 27–31)
MCHC RBC AUTO-ENTMCNC: 34.2 G/DL (ref 33–36)
MCV RBC AUTO: 96.4 FL (ref 80–94)
MONOCYTES # BLD AUTO: 0.68 X10(3)/MCL (ref 0.1–1.3)
MONOCYTES NFR BLD AUTO: 14.3 %
NEUTROPHILS # BLD AUTO: 0.86 X10(3)/MCL (ref 2.1–9.2)
NEUTROPHILS NFR BLD AUTO: 18.2 %
NRBC BLD AUTO-RTO: 0 %
PHOSPHATE SERPL-MCNC: 3.4 MG/DL (ref 2.3–4.7)
PLATELET # BLD AUTO: 220 X10(3)/MCL (ref 130–400)
PMV BLD AUTO: 10.7 FL (ref 7.4–10.4)
POTASSIUM SERPL-SCNC: 3.5 MMOL/L (ref 3.5–5.1)
RBC # BLD AUTO: 3.61 X10(6)/MCL (ref 4.2–5.4)
SODIUM SERPL-SCNC: 142 MMOL/L (ref 136–145)
STRONGYLOIDES IGG SER QL IA: NEGATIVE
WBC # BLD AUTO: 4.74 X10(3)/MCL (ref 4.5–11.5)

## 2024-12-04 PROCEDURE — 11000001 HC ACUTE MED/SURG PRIVATE ROOM

## 2024-12-04 PROCEDURE — 63600175 PHARM REV CODE 636 W HCPCS

## 2024-12-04 PROCEDURE — 25000242 PHARM REV CODE 250 ALT 637 W/ HCPCS

## 2024-12-04 PROCEDURE — 85025 COMPLETE CBC W/AUTO DIFF WBC: CPT | Performed by: STUDENT IN AN ORGANIZED HEALTH CARE EDUCATION/TRAINING PROGRAM

## 2024-12-04 PROCEDURE — 84100 ASSAY OF PHOSPHORUS: CPT | Performed by: STUDENT IN AN ORGANIZED HEALTH CARE EDUCATION/TRAINING PROGRAM

## 2024-12-04 PROCEDURE — 25000003 PHARM REV CODE 250

## 2024-12-04 PROCEDURE — 36415 COLL VENOUS BLD VENIPUNCTURE: CPT | Performed by: STUDENT IN AN ORGANIZED HEALTH CARE EDUCATION/TRAINING PROGRAM

## 2024-12-04 PROCEDURE — 94640 AIRWAY INHALATION TREATMENT: CPT

## 2024-12-04 PROCEDURE — 94761 N-INVAS EAR/PLS OXIMETRY MLT: CPT

## 2024-12-04 PROCEDURE — 83735 ASSAY OF MAGNESIUM: CPT | Performed by: STUDENT IN AN ORGANIZED HEALTH CARE EDUCATION/TRAINING PROGRAM

## 2024-12-04 PROCEDURE — 80048 BASIC METABOLIC PNL TOTAL CA: CPT | Performed by: STUDENT IN AN ORGANIZED HEALTH CARE EDUCATION/TRAINING PROGRAM

## 2024-12-04 RX ORDER — GUAIFENESIN 600 MG/1
600 TABLET, EXTENDED RELEASE ORAL 2 TIMES DAILY
Status: DISCONTINUED | OUTPATIENT
Start: 2024-12-04 | End: 2024-12-04

## 2024-12-04 RX ORDER — ONDANSETRON HYDROCHLORIDE 2 MG/ML
4 INJECTION, SOLUTION INTRAVENOUS ONCE
Status: COMPLETED | OUTPATIENT
Start: 2024-12-04 | End: 2024-12-04

## 2024-12-04 RX ORDER — GUAIFENESIN 600 MG/1
600 TABLET, EXTENDED RELEASE ORAL 2 TIMES DAILY PRN
Status: DISCONTINUED | OUTPATIENT
Start: 2024-12-04 | End: 2024-12-05 | Stop reason: HOSPADM

## 2024-12-04 RX ORDER — SODIUM CHLORIDE, SODIUM LACTATE, POTASSIUM CHLORIDE, CALCIUM CHLORIDE 600; 310; 30; 20 MG/100ML; MG/100ML; MG/100ML; MG/100ML
INJECTION, SOLUTION INTRAVENOUS CONTINUOUS
Status: DISCONTINUED | OUTPATIENT
Start: 2024-12-04 | End: 2024-12-05 | Stop reason: HOSPADM

## 2024-12-04 RX ORDER — ACETAMINOPHEN 325 MG/1
650 TABLET ORAL EVERY 6 HOURS PRN
Status: DISCONTINUED | OUTPATIENT
Start: 2024-12-04 | End: 2024-12-05 | Stop reason: HOSPADM

## 2024-12-04 RX ADMIN — IPRATROPIUM BROMIDE AND ALBUTEROL SULFATE 3 ML: .5; 3 SOLUTION RESPIRATORY (INHALATION) at 07:12

## 2024-12-04 RX ADMIN — PANTOPRAZOLE SODIUM 40 MG: 40 TABLET, DELAYED RELEASE ORAL at 05:12

## 2024-12-04 RX ADMIN — PANTOPRAZOLE SODIUM 40 MG: 40 TABLET, DELAYED RELEASE ORAL at 04:12

## 2024-12-04 RX ADMIN — METHOCARBAMOL TABLETS 500 MG: 500 TABLET, COATED ORAL at 09:12

## 2024-12-04 RX ADMIN — METHOCARBAMOL TABLETS 500 MG: 500 TABLET, COATED ORAL at 01:12

## 2024-12-04 RX ADMIN — IPRATROPIUM BROMIDE AND ALBUTEROL SULFATE 3 ML: .5; 3 SOLUTION RESPIRATORY (INHALATION) at 11:12

## 2024-12-04 RX ADMIN — BUDESONIDE 0.5 MG: 0.5 INHALANT RESPIRATORY (INHALATION) at 07:12

## 2024-12-04 RX ADMIN — ACETAMINOPHEN 650 MG: 325 TABLET, FILM COATED ORAL at 06:12

## 2024-12-04 RX ADMIN — ONDANSETRON 4 MG: 2 INJECTION INTRAMUSCULAR; INTRAVENOUS at 11:12

## 2024-12-04 RX ADMIN — MEROPENEM 2 G: 2 INJECTION, POWDER, FOR SOLUTION INTRAVENOUS at 04:12

## 2024-12-04 RX ADMIN — ACETAMINOPHEN 650 MG: 325 TABLET, FILM COATED ORAL at 12:12

## 2024-12-04 RX ADMIN — FLUTICASONE FUROATE AND VILANTEROL TRIFENATATE 1 PUFF: 100; 25 POWDER RESPIRATORY (INHALATION) at 07:12

## 2024-12-04 RX ADMIN — OXYCODONE AND ACETAMINOPHEN 1 TABLET: 10; 325 TABLET ORAL at 03:12

## 2024-12-04 RX ADMIN — METHOCARBAMOL TABLETS 500 MG: 500 TABLET, COATED ORAL at 04:12

## 2024-12-04 RX ADMIN — METHOCARBAMOL TABLETS 500 MG: 500 TABLET, COATED ORAL at 08:12

## 2024-12-04 RX ADMIN — MONTELUKAST SODIUM 10 MG: 5 TABLET, CHEWABLE ORAL at 09:12

## 2024-12-04 RX ADMIN — ACETAMINOPHEN 650 MG: 325 TABLET, FILM COATED ORAL at 05:12

## 2024-12-04 RX ADMIN — IPRATROPIUM BROMIDE AND ALBUTEROL SULFATE 3 ML: .5; 3 SOLUTION RESPIRATORY (INHALATION) at 03:12

## 2024-12-04 RX ADMIN — SODIUM CHLORIDE, POTASSIUM CHLORIDE, SODIUM LACTATE AND CALCIUM CHLORIDE: 600; 310; 30; 20 INJECTION, SOLUTION INTRAVENOUS at 07:12

## 2024-12-04 RX ADMIN — MEROPENEM 2 G: 2 INJECTION, POWDER, FOR SOLUTION INTRAVENOUS at 08:12

## 2024-12-04 RX ADMIN — HYDROMORPHONE HYDROCHLORIDE 0.5 MG: 1 INJECTION, SOLUTION INTRAMUSCULAR; INTRAVENOUS; SUBCUTANEOUS at 10:12

## 2024-12-04 RX ADMIN — ONDANSETRON 4 MG: 2 INJECTION INTRAMUSCULAR; INTRAVENOUS at 09:12

## 2024-12-04 RX ADMIN — SODIUM CHLORIDE, POTASSIUM CHLORIDE, SODIUM LACTATE AND CALCIUM CHLORIDE 250 ML: 600; 310; 30; 20 INJECTION, SOLUTION INTRAVENOUS at 11:12

## 2024-12-04 RX ADMIN — ERTAPENEM 1 G: 1 INJECTION INTRAMUSCULAR; INTRAVENOUS at 12:12

## 2024-12-04 RX ADMIN — GUAIFENESIN 600 MG: 600 TABLET, EXTENDED RELEASE ORAL at 04:12

## 2024-12-04 RX ADMIN — SODIUM CHLORIDE, POTASSIUM CHLORIDE, SODIUM LACTATE AND CALCIUM CHLORIDE: 600; 310; 30; 20 INJECTION, SOLUTION INTRAVENOUS at 12:12

## 2024-12-04 RX ADMIN — HYDROMORPHONE HYDROCHLORIDE 0.5 MG: 1 INJECTION, SOLUTION INTRAMUSCULAR; INTRAVENOUS; SUBCUTANEOUS at 09:12

## 2024-12-04 RX ADMIN — ENOXAPARIN SODIUM 40 MG: 40 INJECTION SUBCUTANEOUS at 04:12

## 2024-12-04 RX ADMIN — MEROPENEM 2 G: 2 INJECTION, POWDER, FOR SOLUTION INTRAVENOUS at 01:12

## 2024-12-04 NOTE — PROGRESS NOTES
Pt approved for OPAT through [employer] covered at 100%. Unable to obtain HH services due to pt's insurance coverage. Pt has previous experience with OPAT and is confident in her ability to manage. Pt reported daily in home caregivers [facility].

## 2024-12-04 NOTE — PROGRESS NOTES
[facility]   Progress Note     Resident Team: [facility] List 3  Attending Physician: Darren Bennett MD  Resident: Greg Riley MD   Date of Admit: 11/30/2024    Subjective:      Brief HPI:  Niya Moeller is a 54 y.o. female with PMH of asthma, Graves disease/hyperthyroidism, chronic sigmoid diverticulitis with history of diverticular abscess, GERD presented to [facility] ED on 11/30/2024  with complaint of abdominal pain and subjective fever and worsening of her diverticulitis.  Patient known to Gen surgery with tentative plans for sigmoidectomy in the future after clearance from Pulmonology for asthma. Patient admitted with Gen Surgery as primary.     On admission 11/30/2024 in the ED, patient with unremarkable labs, afebrile and VSS. CT abdomen pelvis with IV contrast showed wall thickening of sigmoid colon and suspected contained perforation with small adjacent collection of fluid and air 1 x 1.8 cm in size. Per chart review, patient emotionally labile on admit and requested oxygen 2/2 worsening SOB. Internal medicine consulted for management of asthma.      Patient recalls that for past 2 days she has not used her maintenance inhaler 2/2 abdominal pain and her SOB and wheezing got worse as her home maintenance inhalers were not initiated on admit either. However, upon my encounter today, patient states her SOB is improved since her breathing treatment and home meds. Currently only on 1.5 L supplemental oxygen via NC. Patient with increased dyspnea when NC removed and ambulates to the bathroom. Still endorses wheezing, non productive cough, and mild headache. Not home oxygen dependent. Persistent abdominal pain present 6-7/10 in the LLQ. States last bowel movement about 3-4 days ago. Today remains afebrile, VSS, CBC and CMP unremarkable. CXR with no acute process.     Interval History:   RAE DRISCOLLS.  Doing well today. Only reports mild pain on LLQ, and tolerating PO intake without issues.  Tolerating medications well. Midline placed, will receive 2 weeks of ertapenem. BP slightly low today, will give IVF. No other complaints other wise.       Review of Systems:  Review of Systems   Constitutional:  Negative for chills and fever.   Respiratory:  Negative for sputum production, shortness of breath and wheezing.    Cardiovascular:  Negative for chest pain, palpitations and leg swelling.   Gastrointestinal:  Positive for abdominal pain. Negative for blood in stool, constipation, diarrhea, nausea and vomiting.   Genitourinary:  Negative for dysuria, frequency and urgency.   Musculoskeletal:  Negative for myalgias.   Neurological:  Negative for dizziness, focal weakness and headaches.          Objective:     Vital Signs (Most Recent):  Temp: 98.1 °F (36.7 °C) (12/04/24 1115)  Pulse: 73 (12/04/24 1115)  Resp: 16 (12/04/24 1115)  BP: (!) 105/55 (12/04/24 1115)  SpO2: 98 % (12/04/24 1115) Vital Signs (24h Range):  Temp:  [97.6 °F (36.4 °C)-98.4 °F (36.9 °C)] 98.1 °F (36.7 °C)  Pulse:  [59-77] 73  Resp:  [16-20] 16  SpO2:  [96 %-100 %] 98 %  BP: ()/(45-64) 105/55       Physical Examination:  Physical Exam  Vitals reviewed.   Constitutional:       Appearance: Normal appearance.   HENT:      Head: Normocephalic and atraumatic.      Mouth/Throat:      Mouth: Mucous membranes are moist.   Eyes:      Pupils: Pupils are equal, round, and reactive to light.   Cardiovascular:      Rate and Rhythm: Normal rate and regular rhythm.      Pulses: Normal pulses.      Heart sounds: No murmur heard.  Pulmonary:      Effort: No respiratory distress.      Breath sounds: No wheezing.   Abdominal:      General: Bowel sounds are normal. There is no distension.      Palpations: Abdomen is soft.      Tenderness: There is abdominal tenderness (LLQ).   Musculoskeletal:      Right lower leg: No edema.      Left lower leg: No edema.   Skin:     General: Skin is warm.   Neurological:      General: No focal deficit present.       Mental Status: She is alert and oriented to person, place, and time.   Psychiatric:         Mood and Affect: Mood normal.         Behavior: Behavior normal.         Laboratory:  Lab Results   Component Value Date     12/04/2024    K 3.5 12/04/2024     (H) 12/04/2024    CO2 30 (H) 12/04/2024    GLU 97 02/19/2023    BUN 3.9 (L) 12/04/2024    CREATININE 0.77 12/04/2024    CALCIUM 9.6 12/04/2024    PROT 7.5 02/19/2023    BILIDIR 0.2 05/05/2022    IBILI 0.30 05/05/2022    ALKPHOS 57 12/01/2024    AST 15 12/01/2024    ALT 9 12/01/2024    MG 1.80 12/04/2024    PHOS 3.4 12/04/2024        Lab Results   Component Value Date    WBC 4.74 12/04/2024    RBC 3.61 (L) 12/04/2024    HGB 11.9 (L) 12/04/2024    HCT 34.8 (L) 12/04/2024    MCV 96.4 (H) 12/04/2024    MCH 33.0 (H) 12/04/2024    MCHC 34.2 12/04/2024    RDW 12.5 12/04/2024     12/04/2024    MPV 10.7 (H) 12/04/2024    GRAN 8.1 (H) 02/19/2023    GRAN 72.4 02/19/2023    LYMPH 2.2 02/19/2023    LYMPH 19.8 02/19/2023    MONO 0.7 02/19/2023    MONO 6.4 02/19/2023    EOS 0.0 02/19/2023    BASO 0.02 02/19/2023    EOSINOPHIL 0.0 02/19/2023    BASOPHIL 0.2 02/19/2023        Microbiology:   Microbiology Results (last 7 days)       Procedure Component Value Units Date/Time    Blood culture #1 **CANNOT BE ORDERED STAT** [[id number]]  (Normal) Collected: 11/30/24 1424    Order Status: Completed Specimen: Blood from Antecubital, Right Updated: 12/03/24 1900     Blood Culture No Growth At 72 Hours    Blood culture #2 **CANNOT BE ORDERED STAT** [[id number]]  (Normal) Collected: 11/30/24 1424    Order Status: Completed Specimen: Blood from Hand, Left Updated: 12/03/24 1900     Blood Culture No Growth At 72 Hours            Other Results:      Radiology:  Imaging Results              CT Abdomen Pelvis With IV Contrast NO Oral Contrast (Final result)  Result time 11/30/24 16:08:18      Final result by Mary Centeno MD (11/30/24 16:08:18)                   Impression:       Similar wall thickening of the sigmoid colon with surrounding inflammatory changes.  Suspected contained perforation with small adjacent collection of fluid and air, slightly increased from the prior exam.  No free intraperitoneal air or drainable fluid collection.      Electronically signed by: Mary Centeno  Date:    11/30/2024  Time:    16:08               Narrative:    EXAMINATION:  CT ABDOMEN PELVIS WITH IV CONTRAST    CLINICAL HISTORY:  LLQ abdominal pain;    TECHNIQUE:  CT imaging was performed of the abdomen and pelvis after the administration of intravenous contrast. Dose length product is 275 mGycm. Automatic exposure control, adjustment of mA/kV or iterative reconstruction technique was used to limit radiation dose.    COMPARISON:  CT abdomen pelvis dated 11/26/2024    FINDINGS:  Liver: Normal.    Gallbladder and biliary tree: No calcified gallstones. No intra or extrahepatic biliary ductal dilation.    Pancreas: Normal.    Spleen: Normal.    Adrenals: Normal.    Kidneys and ureters: Normal.    Bladder: Normal.    Reproductive organs: No pelvic masses.    Stomach/bowel: There is colonic diverticulosis.  There is persistent wall thickening of the sigmoid colon with similar surrounding inflammatory changes.  There is a small intramural collection of fluid and air measuring 1 x 1.8 cm in size, increased from the prior exam, with slight extramural extension (series 2, image 92).    Lymph nodes: No pathologically enlarged lymph node identified.    Peritoneum: No ascites or free air. No fluid collection.    Vessels: No abdominal aortic aneurysm.    Abdominal wall: Normal.    Lung bases: No consolidation or pleural effusion.    Bones: No acute osseous findings.                                      Current Medications:     Infusions:   lactated ringers   Intravenous Continuous 125 mL/hr at 12/04/24 1256 New Bag at 12/04/24 1256        Scheduled:   acetaminophen  650 mg Oral Q6H    albuterol-ipratropium  3  mL Nebulization Q4H    budesonide  0.5 mg Nebulization Q12H    buPROPion  150 mg Oral BID    enoxparin  40 mg Subcutaneous Daily    ergocalciferol  50,000 Units Oral Q7 Days    fluticasone furoate-vilanteroL  1 puff Inhalation Daily    fluticasone propionate  2 spray Each Nostril Daily    meropenem IV (PEDS and ADULTS)  2 g Intravenous Q8H    methIMAzole  2.5 mg Oral Daily    methocarbamoL  500 mg Oral QID    montelukast  10 mg Oral Daily    pantoprazole  40 mg Oral BID AC    senna-docusate 8.6-50 mg  1 tablet Oral Daily        PRN:    Current Facility-Administered Medications:     diphenhydrAMINE, 25 mg, Oral, Q6H PRN    HYDROmorphone, 0.5 mg, Intravenous, Q6H PRN    melatonin, 6 mg, Oral, Nightly PRN    ondansetron, 4 mg, Intravenous, Q6H PRN    oxyCODONE-acetaminophen, 1 tablet, Oral, Q4H PRN    oxyCODONE-acetaminophen, 1 tablet, Oral, Q4H PRN    sodium chloride 0.9%, 10 mL, Intravenous, PRN    Flushing PICC/Midline Protocol, , , Until Discontinued **AND** sodium chloride 0.9%, 10 mL, Intravenous, Q12H PRN    Antibiotics:  Meropenem 2 g q8h      Intake/Output Summary (Last 24 hours) at 12/4/2024 1400  Last data filed at 12/4/2024 0413  Gross per 24 hour   Intake 706.69 ml   Output --   Net 706.69 ml       Lines/Drains/Airways       Peripheral Intravenous Line  Duration                  Midline Catheter - Single Lumen 12/03/24 1403 Left brachial vein <1 day                      Assessment & Plan:     Asthma - improved  Tobacco dependence  - patient presented with worsening of SOB and wheezing but off maintenance inhalers for 2-3 days  - improved dyspnea since starting home inhaler (equivalent)  - sees Pulmonology in [facility], no PFTs in file (failed before in 2023), appointment in January  - would recommend continued with Duoneb every 4 hours and Budesonide nebs every 12 hours  - Continue with fluticasone-vilanterol 1 puff daily  - Continue with Flonase and singulair  - Would hold off on systemic steroids given  diverticular disease with suspected microperforation and history of diverticular abscess  - if symptoms worsen then consider ABG and risk vs benefit for initiation of systemic steroids.   - elevated eosinophils noted, today more than 150  - Pending Strongyloides IgG, Anca, IgE level; patient will eventually need CT chest and sinus repeated to evaluated for other pulmonary etiologies     Hyperthyroidism/ Graves  - follows Endo. Last appt 11/26/2024  - TSH, free T3 and T4 wnl on 11/26/24  - continue home MMI 2.5 mg once daily     Chronic sigmoid diverticulitis with current diverticular abscess measuring 1 x 1.8 cm  History of ESBL E. coli   - Midline ordered  - Appreciate ID recommendations  - Continue IV Merrem while inpatient, will proceed with Ertapenem 1 g q24 h for at least 2 weeks  - Repeat CT abd/pelvis with IV contrast in 2 weeks to assess for abscess resolution  - discussed in detail with the patient about importance of getting sigmoidectomy, patient looking into options outside of this facility and aware of risks of prolonging surgery.  - needs to continue to follow up with gen surgery outpatient    Hypokalemia  - level at 3.5 today  - replete as necessary    CODE STATUS: Full  Access: PIV  Antibiotics: Merrem  Diet: CLD  DVT Prophylaxis: Lovenox  GI Prophylaxis: PPI  Fluids: IVF      Disposition: 54 year old intially admitted under Gen surgery for chronic sigmoid diverticulitis with a history of rectal abscess, ESBL E coli.  Patient hesitant to proceed with surgical option at the moment, transitioned care to Internal medicine. ID following with recs for OPAT. Midline ordered place, likely discharge tomorrow.    Oscar Dowell,   [employer] Internal Medicine, PGY-III

## 2024-12-04 NOTE — CONSULTS
[facility]   Inpatient Infectious Diseases Consultation    Physician requesting consultation: Darren Bennett MD  Service requesting consultation: General Surgery  Reason for consultation: Antibiotic management    Historian: Patient and Electronic Medical Record    Isolation Status: No active isolations     HPI:     Patient was a 54-year-old female with a past medical history of chronic sigmoid diverticulitis with multiple episodes of diverticular abscess in the past, hyperthyroidism, and asthma who presented to the ED on 11/30  with the primary complaint of left lower quadrant pain, constipation, nausea, no fever/chills.   Patient reports that approximately 2 weeks ago she visited [facility] ED for similar complaints.  She reports that at this time she was prescribed moxifloxacin for a 10 day total course.  She reports that she started taking antibiotics but experience gradual worsening of her symptoms.  She finished her antibiotic course approximally 4 days prior to this emergency room presentation.   Patient with chronic sigmoid diverticulitis multiple diverticular abscesses in the past requiring percutaneous drainage.   Patient with cultures from prior diverticular abscess on 02/2023 with growth of ESBL E coli.   CT  abdomen/pelvis obtained on admission, displayed small intramural collection of fluid/air measuring 1 x 1.8 cm.  Patient was given ciprofloxacin and Merrem for 1 dose each in the emergency department.  Surgery was consulted for admission.  Patient has been on cefepime and Flagyl since.  Blood cultures are negative at 24 hours.  Infectious Disease was consulted for further recommendations.    Antibiotic / Antiviral / Antifungal history:  Ciprofloxacin 11/30 (one dose in the ED)  Merrem 11/30 (one dose in the ED)  Flagyl 11/30-12/2  Cefipime 11/30-12/2  Merrem 12/2-present     Interval history:  No acute events overnight.  Patient reports that abdominal pain is much improved  today.  Midline placed yesterday. Will administer first dose of ertapenem before discharge today.     Past Medical History/Past Surgical History/Social History     Past Medical History:   Diagnosis Date    Asthma     Diverticulitis     Diverticulosis     GERD (gastroesophageal reflux disease)     Graves disease        Past Surgical History:   Procedure Laterality Date    COLONOSCOPY, WITH DIRECTED SUBMUCOSAL INJECTION N/A 4/8/2024    Procedure: COLONOSCOPY, WITH DIRECTED SUBMUCOSAL INJECTION;  Surgeon: Heather Lance MD;  Location: [facility] ENDOSCOPY;  Service: Gastroenterology;  Laterality: N/A;        FAMILY HISTORY:  family history includes Hypothyroidism in her mother.     SOCIAL HISTORY:   Social History     Socioeconomic History    Marital status:    Tobacco Use    Smoking status: Every Day     Current packs/day: 0.50     Average packs/day: 0.5 packs/day for 41.9 years (21.0 ttl pk-yrs)     Types: Cigarettes     Start date: 1/1/1983    Smokeless tobacco: Current    Tobacco comments:     Ambulatory referral to Smoking Cessation clinic following hospital discharge.    Substance and Sexual Activity    Alcohol use: Not Currently    Drug use: Never    Sexual activity: Not Currently     Birth control/protection: Abstinence     Comment: States is asexual     Social Drivers of Health     Financial Resource Strain: Low Risk  (12/2/2024)    Overall Financial Resource Strain (CARDIA)     Difficulty of Paying Living Expenses: Not very hard   Food Insecurity: No Food Insecurity (12/2/2024)    Hunger Vital Sign     Worried About Running Out of Food in the Last Year: Never true     Ran Out of Food in the Last Year: Never true   Transportation Needs: No Transportation Needs (12/2/2024)    TRANSPORTATION NEEDS     Transportation : No   Physical Activity: Sufficiently Active (12/2/2024)    Exercise Vital Sign     Days of Exercise per Week: 5 days     Minutes of Exercise per Session: 30 min   Stress: No Stress Concern  Present (12/2/2024)    [employer] - Occupational Stress Questionnaire     Feeling of Stress : Not at all   Housing Stability: Low Risk  (12/2/2024)    Housing Stability Vital Sign     Unable to Pay for Housing in the Last Year: No     Homeless in the Last Year: No        ALLERGIES:   Review of patient's allergies indicates:   Allergen Reactions    Latex Anaphylaxis and Edema    Zosyn [piperacillin-tazobactam] Anaphylaxis       Review of Systems     Review of Systems   Constitutional:  Negative for chills, fever and malaise/fatigue.   Respiratory:  Negative for cough, hemoptysis, sputum production and shortness of breath.    Cardiovascular:  Negative for chest pain, palpitations and leg swelling.   Gastrointestinal:  Positive for abdominal pain and constipation. Negative for diarrhea, nausea and vomiting.   Genitourinary: Negative.    Neurological: Negative.    Psychiatric/Behavioral:  Negative for substance abuse.      MEDICATIONS:   Scheduled Meds:   acetaminophen  650 mg Oral Q6H    albuterol-ipratropium  3 mL Nebulization Q4H    budesonide  0.5 mg Nebulization Q12H    buPROPion  150 mg Oral BID    enoxparin  40 mg Subcutaneous Daily    ergocalciferol  50,000 Units Oral Q7 Days    fluticasone furoate-vilanteroL  1 puff Inhalation Daily    fluticasone propionate  2 spray Each Nostril Daily    meropenem IV (PEDS and ADULTS)  2 g Intravenous Q8H    methIMAzole  2.5 mg Oral Daily    methocarbamoL  500 mg Oral QID    montelukast  10 mg Oral Daily    pantoprazole  40 mg Oral BID AC    senna-docusate 8.6-50 mg  1 tablet Oral Daily     Continuous Infusions:      PRN Meds:.  Current Facility-Administered Medications:     diphenhydrAMINE, 25 mg, Oral, Q6H PRN    HYDROmorphone, 0.5 mg, Intravenous, Q6H PRN    melatonin, 6 mg, Oral, Nightly PRN    ondansetron, 4 mg, Intravenous, Q6H PRN    oxyCODONE-acetaminophen, 1 tablet, Oral, Q4H PRN    oxyCODONE-acetaminophen, 1 tablet, Oral, Q4H PRN     sodium chloride 0.9%, 10 mL, Intravenous, PRN    Flushing PICC/Midline Protocol, , , Until Discontinued **AND** sodium chloride 0.9%, 10 mL, Intravenous, Q12H PRN    Physical exam:     Temp:  [97.6 °F (36.4 °C)-98.8 °F (37.1 °C)] 97.6 °F (36.4 °C)  Pulse:  [59-80] 69  Resp:  [16-20] 18  SpO2:  [95 %-100 %] 97 %  BP: ()/(45-67) 91/51     GENERAL: A&Ox3, NAD   HEENT: atraumatic, normocephalic  LUNGS: breathing unlabored, lungs CTA bilateral  HEART: RRR; no murmur, rub, or gallop  ABDOMEN: abdomen soft, nondistended, mild tenderness to LLQ on palpation (improved)   EXTREMITIES: no edema, clubbing, or cyanosis   SKIN: no rashes or lesions  NEURO: speech fluent and intact, facial symmetry preserved, no tremor  PSYCH: cooperative, normal mood and affect    LABS:     I have personally reviewed patient's labs.  Pertinent results noted below.    CBC  Recent Labs     12/03/24  0323 12/04/24  0343   WBC 5.56 4.74   HGB 11.8* 11.9*   HCT 35.1* 34.8*    220       Differential  Recent Labs   Lab 12/04/24  0343   WBC 4.74   HGB 11.9*   HCT 34.8*           Basic Metabolic Panel  Recent Labs     12/03/24  0323 12/04/24  0343    142   K 3.1* 3.5    108*   CO2 27 30*   BUN 3.1* 3.9*   CREATININE 0.77 0.77        Hepatic Panel  Lab Results   Component Value Date    ALT 9 12/01/2024    AST 15 12/01/2024    ALKPHOS 57 12/01/2024    BILITOT 0.5 12/01/2024        Urinalysis:  Results for orders placed or performed during the hospital encounter of 11/30/24   Urinalysis, Reflex to Urine Culture    Specimen: Urine   Result Value Ref Range    Color, UA Colorless (A) Yellow, Light-Yellow, Dark Yellow, Gisella, Straw    Appearance, UA Clear Clear    Specific Gravity, UA 1.006 1.005 - 1.030    pH, UA 5.5 5.0 - 8.5    Protein, UA Negative Negative    Glucose, UA Normal Negative, Normal    Ketones, UA Negative Negative    Blood, UA Negative Negative    Bilirubin, UA Negative Negative    Urobilinogen, UA Normal 0.2,  1.0, Normal    Nitrites, UA Negative Negative    Leukocyte Esterase, UA Negative Negative    RBC, UA 0-5 None Seen, 0-2, 3-5, 0-5 /HPF    WBC, UA 0-5 None Seen, 0-2, 3-5, 0-5 /HPF    Bacteria, UA Trace (A) None Seen /HPF    Squamous Epithelial Cells, UA Trace (A) None Seen /HPF    Hyaline Casts, UA None Seen None Seen /lpf       Estimated Creatinine Clearance: 73.3 mL/min (based on SCr of 0.77 mg/dL).     ESR:  Results for orders placed or performed in visit on 09/05/23   Sedimentation Rate   Result Value Ref Range    Sed Rate 25 (H) 0 - 20 mm/hr      CRP:  Results for orders placed or performed during the hospital encounter of 11/13/24   C-Reactive Protein   Result Value Ref Range    CRP 4.60 <5.00 mg/L       IMAGING:     I have personally reviewed patient's imaging. Pertinent results noted below.  X-Ray Chest 1 View   Final Result      No acute pulmonary process identified.         Electronically signed by: John Bowens   Date:    12/01/2024   Time:    11:51      CT Abdomen Pelvis With IV Contrast NO Oral Contrast   Final Result      Similar wall thickening of the sigmoid colon with surrounding inflammatory changes.  Suspected contained perforation with small adjacent collection of fluid and air, slightly increased from the prior exam.  No free intraperitoneal air or drainable fluid collection.         Electronically signed by: Mary Centeno   Date:    11/30/2024   Time:    16:08          IMPRESSION     Chronic sigmoid diverticulitis with current diverticular abscess measuring 1 x 1.8 cm  History of ESBL E. coli   History of asthma  History of hyperthyroidism     RECOMMENDATIONS:    Proceed with Ertapenem 1 g Q24 hours for AT MINIUM two weeks. OPAT has been established. Patient will follow-up in ID clinic. Will administer first dose of Ertapenem today prior to discharge.   Repeat CT A/P to be completed in 2 weeks. Extend antibiotic course if abscess is worse or unchanged. Please order prior to discharge.    Patient needs colectomy outpatient. Patient is refusing to have surgery completed in [place]. She is making arrangements to be completed elsewhere.    Yeyo Pollack, DO  [facility] Internal Medicine PGY-2

## 2024-12-05 VITALS
OXYGEN SATURATION: 98 % | BODY MASS INDEX: 30.67 KG/M2 | HEART RATE: 66 BPM | TEMPERATURE: 97 F | DIASTOLIC BLOOD PRESSURE: 74 MMHG | RESPIRATION RATE: 16 BRPM | WEIGHT: 152.13 LBS | HEIGHT: 59 IN | SYSTOLIC BLOOD PRESSURE: 111 MMHG

## 2024-12-05 LAB
ANION GAP SERPL CALC-SCNC: 6 MEQ/L
BACTERIA BLD CULT: NORMAL
BACTERIA BLD CULT: NORMAL
BASOPHILS # BLD AUTO: 0.01 X10(3)/MCL
BASOPHILS NFR BLD AUTO: 0.2 %
BUN SERPL-MCNC: 3.6 MG/DL (ref 9.8–20.1)
C-ANCA TITR SER IF: NEGATIVE {TITER}
CALCIUM SERPL-MCNC: 9.7 MG/DL (ref 8.4–10.2)
CHLORIDE SERPL-SCNC: 107 MMOL/L (ref 98–107)
CO2 SERPL-SCNC: 30 MMOL/L (ref 22–29)
CORTIS SERPL-SCNC: 3.9 UG/DL
CREAT SERPL-MCNC: 0.74 MG/DL (ref 0.55–1.02)
CREAT/UREA NIT SERPL: 5
CRP SERPL-MCNC: 15.5 MG/L
EOSINOPHIL # BLD AUTO: 0.42 X10(3)/MCL (ref 0–0.9)
EOSINOPHIL NFR BLD AUTO: 8.5 %
ERYTHROCYTE [DISTWIDTH] IN BLOOD BY AUTOMATED COUNT: 12.5 % (ref 11.5–17)
ERYTHROCYTE [SEDIMENTATION RATE] IN BLOOD: 25 MM/HR (ref 0–20)
FOLATE SERPL-MCNC: 9.6 NG/ML (ref 7–31.4)
GFR SERPLBLD CREATININE-BSD FMLA CKD-EPI: >60 ML/MIN/1.73/M2
GLUCOSE SERPL-MCNC: 110 MG/DL (ref 74–100)
HCT VFR BLD AUTO: 34.9 % (ref 37–47)
HGB BLD-MCNC: 11.6 G/DL (ref 12–16)
IMM GRANULOCYTES # BLD AUTO: 0.01 X10(3)/MCL (ref 0–0.04)
IMM GRANULOCYTES NFR BLD AUTO: 0.2 %
LYMPHOCYTES # BLD AUTO: 2.55 X10(3)/MCL (ref 0.6–4.6)
LYMPHOCYTES NFR BLD AUTO: 51.3 %
MAGNESIUM SERPL-MCNC: 1.7 MG/DL (ref 1.6–2.6)
MCH RBC QN AUTO: 32.2 PG (ref 27–31)
MCHC RBC AUTO-ENTMCNC: 33.2 G/DL (ref 33–36)
MCV RBC AUTO: 96.9 FL (ref 80–94)
MONOCYTES # BLD AUTO: 0.67 X10(3)/MCL (ref 0.1–1.3)
MONOCYTES NFR BLD AUTO: 13.5 %
NEUTROPHILS # BLD AUTO: 1.31 X10(3)/MCL (ref 2.1–9.2)
NEUTROPHILS NFR BLD AUTO: 26.3 %
NRBC BLD AUTO-RTO: 0 %
P-ANCA SER QL IF: NEGATIVE
PHADIOTOP IGE QN: 125 KU/L
PHOSPHATE SERPL-MCNC: 2.4 MG/DL (ref 2.3–4.7)
PLATELET # BLD AUTO: 205 X10(3)/MCL (ref 130–400)
PMV BLD AUTO: 10.6 FL (ref 7.4–10.4)
POTASSIUM SERPL-SCNC: 3.6 MMOL/L (ref 3.5–5.1)
RBC # BLD AUTO: 3.6 X10(6)/MCL (ref 4.2–5.4)
SODIUM SERPL-SCNC: 143 MMOL/L (ref 136–145)
WBC # BLD AUTO: 4.97 X10(3)/MCL (ref 4.5–11.5)

## 2024-12-05 PROCEDURE — 25000003 PHARM REV CODE 250

## 2024-12-05 PROCEDURE — 63600175 PHARM REV CODE 636 W HCPCS

## 2024-12-05 PROCEDURE — 84100 ASSAY OF PHOSPHORUS: CPT | Performed by: STUDENT IN AN ORGANIZED HEALTH CARE EDUCATION/TRAINING PROGRAM

## 2024-12-05 PROCEDURE — 83735 ASSAY OF MAGNESIUM: CPT | Performed by: STUDENT IN AN ORGANIZED HEALTH CARE EDUCATION/TRAINING PROGRAM

## 2024-12-05 PROCEDURE — 94640 AIRWAY INHALATION TREATMENT: CPT

## 2024-12-05 PROCEDURE — 82746 ASSAY OF FOLIC ACID SERUM: CPT

## 2024-12-05 PROCEDURE — 86140 C-REACTIVE PROTEIN: CPT | Performed by: NURSE PRACTITIONER

## 2024-12-05 PROCEDURE — 94761 N-INVAS EAR/PLS OXIMETRY MLT: CPT

## 2024-12-05 PROCEDURE — 85652 RBC SED RATE AUTOMATED: CPT | Performed by: NURSE PRACTITIONER

## 2024-12-05 PROCEDURE — 80048 BASIC METABOLIC PNL TOTAL CA: CPT | Performed by: STUDENT IN AN ORGANIZED HEALTH CARE EDUCATION/TRAINING PROGRAM

## 2024-12-05 PROCEDURE — 85025 COMPLETE CBC W/AUTO DIFF WBC: CPT | Performed by: STUDENT IN AN ORGANIZED HEALTH CARE EDUCATION/TRAINING PROGRAM

## 2024-12-05 PROCEDURE — 82533 TOTAL CORTISOL: CPT | Performed by: INTERNAL MEDICINE

## 2024-12-05 PROCEDURE — 36415 COLL VENOUS BLD VENIPUNCTURE: CPT

## 2024-12-05 PROCEDURE — 25000242 PHARM REV CODE 250 ALT 637 W/ HCPCS

## 2024-12-05 PROCEDURE — 36415 COLL VENOUS BLD VENIPUNCTURE: CPT | Performed by: NURSE PRACTITIONER

## 2024-12-05 RX ORDER — ERTAPENEM 1 G/1
1 INJECTION, POWDER, LYOPHILIZED, FOR SOLUTION INTRAMUSCULAR; INTRAVENOUS DAILY
Start: 2024-12-05 | End: 2024-12-19

## 2024-12-05 RX ORDER — ERTAPENEM 1 G/1
1 INJECTION, POWDER, LYOPHILIZED, FOR SOLUTION INTRAMUSCULAR; INTRAVENOUS DAILY
Start: 2024-12-05 | End: 2024-12-05

## 2024-12-05 RX ADMIN — IPRATROPIUM BROMIDE AND ALBUTEROL SULFATE 3 ML: .5; 3 SOLUTION RESPIRATORY (INHALATION) at 03:12

## 2024-12-05 RX ADMIN — MEROPENEM 2 G: 2 INJECTION, POWDER, FOR SOLUTION INTRAVENOUS at 05:12

## 2024-12-05 RX ADMIN — METHOCARBAMOL TABLETS 500 MG: 500 TABLET, COATED ORAL at 09:12

## 2024-12-05 RX ADMIN — IPRATROPIUM BROMIDE AND ALBUTEROL SULFATE 3 ML: .5; 3 SOLUTION RESPIRATORY (INHALATION) at 07:12

## 2024-12-05 RX ADMIN — FLUTICASONE FUROATE AND VILANTEROL TRIFENATATE 1 PUFF: 100; 25 POWDER RESPIRATORY (INHALATION) at 07:12

## 2024-12-05 RX ADMIN — ERTAPENEM 1 G: 1 INJECTION INTRAMUSCULAR; INTRAVENOUS at 12:12

## 2024-12-05 RX ADMIN — MONTELUKAST SODIUM 10 MG: 5 TABLET, CHEWABLE ORAL at 09:12

## 2024-12-05 RX ADMIN — SENNOSIDES AND DOCUSATE SODIUM 1 TABLET: 50; 8.6 TABLET ORAL at 09:12

## 2024-12-05 RX ADMIN — SODIUM CHLORIDE, POTASSIUM CHLORIDE, SODIUM LACTATE AND CALCIUM CHLORIDE: 600; 310; 30; 20 INJECTION, SOLUTION INTRAVENOUS at 03:12

## 2024-12-05 RX ADMIN — IPRATROPIUM BROMIDE AND ALBUTEROL SULFATE 3 ML: .5; 3 SOLUTION RESPIRATORY (INHALATION) at 11:12

## 2024-12-05 RX ADMIN — FLUTICASONE PROPIONATE 100 MCG: 50 SPRAY, METERED NASAL at 09:12

## 2024-12-05 RX ADMIN — ACETAMINOPHEN 650 MG: 325 TABLET, FILM COATED ORAL at 12:12

## 2024-12-05 RX ADMIN — BUDESONIDE 0.5 MG: 0.5 INHALANT RESPIRATORY (INHALATION) at 07:12

## 2024-12-05 NOTE — SUBJECTIVE & OBJECTIVE
Condition:: growth Past Medical History:   Diagnosis Date    Asthma     Diverticulosis     GERD (gastroesophageal reflux disease)        No past surgical history on file.    Review of patient's allergies indicates:   Allergen Reactions    Levofloxacin Hives and Itching    Metronidazole Hives    Latex        No current facility-administered medications on file prior to encounter.     Current Outpatient Medications on File Prior to Encounter   Medication Sig    albuterol-ipratropium (DUO-NEB) 2.5 mg-0.5 mg/3 mL nebulizer solution Inhale 3 mLs into the lungs every 4 (four) hours as needed.    amoxicillin-clavulanate 875-125mg (AUGMENTIN) 875-125 mg per tablet Take 1 tablet by mouth 2 (two) times daily.    budesonide-formoterol 160-4.5 mcg (SYMBICORT) 160-4.5 mcg/actuation HFAA Inhale 2 puffs into the lungs every 12 (twelve) hours. Controller    cetirizine (ZYRTEC) 10 MG tablet Take 10 mg by mouth once daily.    ciprofloxacin HCl (CIPRO) 500 MG tablet Take 500 mg by mouth 2 (two) times daily.    fluticasone propionate (FLONASE) 50 mcg/actuation nasal spray 1 spray by Nasal route daily as needed.    methIMAzole (TAPAZOLE) 10 MG Tab Take 1 tablet (10 mg total) by mouth 2 (two) times daily.    montelukast (SINGULAIR) 10 mg tablet Take 10 mg by mouth once daily.    omeprazole (PRILOSEC) 40 MG capsule Take 40 mg by mouth once daily.    predniSONE (DELTASONE) 20 MG tablet Take 2 tablets (40 mg total) by mouth once daily. for 6 days    propranoloL (INDERAL LA) 60 MG 24 hr capsule Take 1 capsule (60 mg total) by mouth once daily.    [DISCONTINUED] albuterol (PROVENTIL) 2.5 mg /3 mL (0.083 %) nebulizer solution Inhale 2.5 mg into the lungs.    [DISCONTINUED] albuterol (PROVENTIL/VENTOLIN HFA) 90 mcg/actuation inhaler Inhale 2 puffs into the lungs every 6 (six) hours as needed.    [DISCONTINUED] cetirizine (ZYRTEC) 10 MG tablet Take 10 mg by mouth.    [DISCONTINUED] dicyclomine (BENTYL) 10 MG capsule Take 10 mg by mouth 3 (three) times daily as  Please Describe Your Condition:: Patient has a complaint of a less than 2 week asymptomatic discoloration on the right arm near rough spot, and an irritated rough spot on the left cheek. needed.    [DISCONTINUED] senna-docusate 8.6-50 mg (PERICOLACE) 8.6-50 mg per tablet Take 1 tablet by mouth.    albuterol (PROVENTIL/VENTOLIN HFA) 90 mcg/actuation inhaler Inhale 1-2 puffs into the lungs every 6 (six) hours as needed for Wheezing. Rescue    ondansetron (ZOFRAN-ODT) 4 MG TbDL Take 2 tablets (8 mg total) by mouth 2 (two) times daily.    [DISCONTINUED] albuterol (PROVENTIL) 2.5 mg /3 mL (0.083 %) nebulizer solution Take by nebulization.    [DISCONTINUED] albuterol sulfate 2.5 mg/0.5 mL Nebu Take 2.5 mg by nebulization every 4 (four) hours as needed (sob). Rescue    [DISCONTINUED] albuterol-ipratropium (DUO-NEB) 2.5 mg-0.5 mg/3 mL nebulizer solution Take 3 mLs by nebulization every 4 (four) hours as needed for Wheezing or Shortness of Breath. Rescue    [DISCONTINUED] azithromycin (Z-FRANCISCO JAVIER) 250 MG tablet Take by mouth.    [DISCONTINUED] dicyclomine (BENTYL) 10 MG capsule Take 10 mg by mouth 3 (three) times daily.    [DISCONTINUED] dicyclomine (BENTYL) 20 mg tablet Take 20 mg by mouth 2 (two) times daily as needed.    [DISCONTINUED] fluconazole (DIFLUCAN) 150 MG Tab Take 150 mg by mouth once.    [DISCONTINUED] fluticasone propionate (FLONASE) 50 mcg/actuation nasal spray 1 spray by Each Nostril route.    [DISCONTINUED] HYDROcodone-acetaminophen (NORCO) 5-325 mg per tablet Take 1 tablet by mouth every 6 (six) hours as needed.    [DISCONTINUED] ipratropium (ATROVENT) 0.02 % nebulizer solution USE 1 VIAL VIA NEBULIZER FOUR TIMES DAILY AS NEEDED FOR WHEEZING    [DISCONTINUED] ketorolac (TORADOL) 10 mg tablet TAKE 1 TABLET BY MOUTH EVERY 6 HOURS AS NEEDED FOR PAIN SCALE 1-3 FOR UP TO 5 DAYS    [DISCONTINUED] levoFLOXacin (LEVAQUIN) 750 MG tablet Take 750 mg by mouth.    [DISCONTINUED] loratadine (CLARITIN) 10 mg tablet TAKE 1 TABLET BY MOUTH DAILY AS NEEDED FOR CONGESTION    [DISCONTINUED] montelukast (SINGULAIR) 10 mg tablet Take 1 tablet (10 mg total) by mouth once daily at 6am.    [DISCONTINUED] nicotine  (NICODERM CQ) 14 mg/24 hr 1 patch.    [DISCONTINUED] nicotine (NICODERM CQ) 21 mg/24 hr     [DISCONTINUED] nystatin (MYCOSTATIN) 100,000 unit/mL suspension SMARTSI-6 Milliliter(s) By Mouth 4 Times Daily    [DISCONTINUED] omeprazole (PRILOSEC) 20 MG capsule Take 20 mg by mouth 2 (two) times daily.    [DISCONTINUED] omeprazole (PRILOSEC) 40 MG capsule Take 1 capsule (40 mg total) by mouth once daily.    [DISCONTINUED] oxyCODONE-acetaminophen (PERCOCET)  mg per tablet Take 1 tablet by mouth every 8 (eight) hours as needed.    [DISCONTINUED] piperacillin-tazobactam (ZOSYN) 40.5 gram injection Inject into the vein.    [DISCONTINUED] predniSONE (DELTASONE) 50 MG Tab     [DISCONTINUED] traMADoL (ULTRAM) 50 mg tablet TAKE 1 TABLET BY MOUTH EVERY 6 HOURS AS NEEDED FOR PAIN FOR UP TO 2 DAYS.     Family History    None       Tobacco Use    Smoking status: Every Day     Packs/day: 0.50     Years: 20.00     Pack years: 10.00     Types: Cigarettes    Smokeless tobacco: Never   Substance and Sexual Activity    Alcohol use: Not Currently    Drug use: Not Currently    Sexual activity: Not Currently     Review of Systems   Constitutional:  Negative for chills and diaphoresis.   HENT:  Negative for ear discharge and ear pain.    Eyes:  Negative for pain and redness.   Respiratory:  Positive for shortness of breath and wheezing.    Cardiovascular:  Negative for palpitations and leg swelling.   Gastrointestinal:  Negative for blood in stool and constipation.   Endocrine: Negative for polydipsia and polyphagia.   Genitourinary:  Negative for dysuria and enuresis.   Musculoskeletal:  Negative for back pain and gait problem.   Skin:  Negative for pallor and rash.   Neurological:  Negative for light-headedness and headaches.   Hematological:  Negative for adenopathy. Does not bruise/bleed easily.   Psychiatric/Behavioral:  Negative for hallucinations. The patient is not hyperactive.    Objective:     Vital Signs (Most  Recent):  Temp: 97.4 °F (36.3 °C) (02/15/23 2005)  Pulse: 73 (02/15/23 2005)  Resp: 18 (02/15/23 2005)  BP: 139/87 (02/15/23 2005)  SpO2: 98 % (02/15/23 2005) Vital Signs (24h Range):  Temp:  [97.4 °F (36.3 °C)-98.4 °F (36.9 °C)] 97.4 °F (36.3 °C)  Pulse:  [72-86] 73  Resp:  [16-18] 18  SpO2:  [95 %-100 %] 98 %  BP: ()/(49-87) 139/87     Weight: 61.2 kg (135 lb)  Body mass index is 27.27 kg/m².    Physical Exam  Constitutional:       Appearance: Normal appearance. She is not ill-appearing or diaphoretic.   HENT:      Head: Normocephalic and atraumatic.      Right Ear: There is no impacted cerumen.      Left Ear: There is no impacted cerumen.      Nose: No congestion or rhinorrhea.      Mouth/Throat:      Pharynx: No oropharyngeal exudate or posterior oropharyngeal erythema.   Eyes:      General:         Right eye: No discharge.         Left eye: No discharge.   Cardiovascular:      Rate and Rhythm: Tachycardia present.      Heart sounds: No murmur heard.    No friction rub.   Pulmonary:      Breath sounds: No rhonchi.   Abdominal:      Tenderness: There is no guarding or rebound.   Musculoskeletal:         General: No swelling or deformity.   Skin:     Coloration: Skin is not jaundiced.      Findings: No bruising.   Neurological:      Mental Status: She is alert.      Cranial Nerves: No cranial nerve deficit.      Motor: No weakness.   Psychiatric:         Mood and Affect: Mood normal.           Significant Labs: All pertinent labs within the past 24 hours have been reviewed.  A1C:   Recent Labs   Lab 11/23/22  0914   HGBA1C 5.9*     ABGs: No results for input(s): PH, PCO2, HCO3, POCSATURATED, BE, TOTALHB, COHB, METHB, O2HB, POCFIO2, PO2 in the last 48 hours.  Bilirubin:   Recent Labs   Lab 01/22/23  0047 01/31/23  0513 02/11/23  1716 02/15/23  1237   BILITOT 0.3 0.5 0.5 0.7     Blood Culture: No results for input(s): LABBLOO in the last 48 hours.  BMP:   Recent Labs   Lab 02/14/23  0428 02/15/23  1237   GLU  115* 77    141   K 4.4 3.7   * 107   CO2 22* 24   BUN 13 15   CREATININE 0.7 0.8   CALCIUM 9.2 9.3   MG 2.3  --      CBC:   Recent Labs   Lab 02/14/23  0428 02/15/23  1237   WBC 12.84* 11.59   HGB 11.6* 12.9   HCT 36.1* 39.1    194     CMP:   Recent Labs   Lab 02/14/23  0428 02/15/23  1237    141   K 4.4 3.7   * 107   CO2 22* 24   * 77   BUN 13 15   CREATININE 0.7 0.8   CALCIUM 9.2 9.3   PROT  --  6.1   ALBUMIN  --  2.9*   BILITOT  --  0.7   ALKPHOS  --  59   AST  --  20   ALT  --  32   ANIONGAP 8 10     Coagulation: No results for input(s): PT, INR, APTT in the last 48 hours.  Lipid Panel: No results for input(s): CHOL, HDL, LDLCALC, TRIG, CHOLHDL in the last 48 hours.  Prealbumin: No results for input(s): PREALBUMIN in the last 48 hours.  Respiratory Culture: No results for input(s): GSRESP, RESPIRATORYC in the last 48 hours.  Troponin: No results for input(s): TROPONINI, TROPONINIHS in the last 48 hours.    Significant Imaging: I have reviewed all pertinent imaging results/findings within the past 24 hours.  I have reviewed and interpreted all pertinent imaging results/findings within the past 24 hours.

## 2024-12-05 NOTE — PROGRESS NOTES
Inpatient Nutrition Assessment    Admit Date: 11/30/2024   Total duration of encounter: 5 days   Patient Age: 54 y.o.    Nutrition Recommendation/Prescription     Continue low residue diet; encouraged oral intake/snacks available on unit.  Pt reported does not like  boost breeze ; will discontinue oral supplement as requested; does not want regular boost either at this time.   MVI/fe  Biweekly wt  Will monitor nutrition status     Communication of Recommendations: reviewed with nurse and reviewed with patient    Nutrition Assessment     Malnutrition Assessment/Nutrition-Focused Physical Exam       Malnutrition Level: other (see comments) (Does not meet criteria) (12/02/24 1341)  Energy Intake (Malnutrition): less than or equal to 50% for greater than or equal to 5 days (12/02/24 1341)  Weight Loss (Malnutrition): other (see comments) (Does not meet criteria) (12/02/24 1341)     Orbital Region (Subcutaneous Fat Loss): well nourished              Clavicle Bone Region (Muscle Loss): well nourished                    Fluid Accumulation (Malnutrition): other (see comments) (Not present) (12/02/24 1341)     Hand  Strength, Right (Malnutrition): Unable to assess (12/02/24 1341)  A minimum of two characteristics is recommended for diagnosis of either severe or non-severe malnutrition.    Chart Review    Reason Seen: continuous nutrition monitoring and follow-up    Malnutrition Screening Tool Results   Have you recently lost weight without trying?: No  Have you been eating poorly because of a decreased appetite?: No   MST Score: 0   Diagnosis:  Acute diverticulitis/ diverticular abscess; asthma, tobacco dependence, hyperthyroid/graves     Relevant Medical History: diverticulitis, asthma     Scheduled Medications:  acetaminophen, 650 mg, Q6H  albuterol-ipratropium, 3 mL, Q4H  budesonide, 0.5 mg, Q12H  buPROPion, 150 mg, BID  enoxparin, 40 mg, Daily  ergocalciferol, 50,000 Units, Q7 Days  ertapenem (INVanz) IV (PEDS and  ADULTS), 1 g, Once  fluticasone furoate-vilanteroL, 1 puff, Daily  fluticasone propionate, 2 spray, Daily  methIMAzole, 2.5 mg, Daily  methocarbamoL, 500 mg, QID  montelukast, 10 mg, Daily  pantoprazole, 40 mg, BID AC  senna-docusate 8.6-50 mg, 1 tablet, Daily    Continuous Infusions:  lactated ringers, Last Rate: 45 mL/hr at 12/05/24 0637    PRN Medications:  acetaminophen, 650 mg, Q6H PRN  diphenhydrAMINE, 25 mg, Q6H PRN  guaiFENesin, 600 mg, BID PRN  HYDROmorphone, 0.5 mg, Q6H PRN  melatonin, 6 mg, Nightly PRN  ondansetron, 4 mg, Q6H PRN  oxyCODONE-acetaminophen, 1 tablet, Q4H PRN  oxyCODONE-acetaminophen, 1 tablet, Q4H PRN  sodium chloride 0.9%, 10 mL, PRN  sodium chloride 0.9%, 10 mL, Q12H PRN    Calorie Containing IV Medications: no significant kcals from medications at this time    Recent Labs   Lab 11/30/24  1424 12/01/24  0400 12/03/24  0323 12/04/24  0343 12/05/24  0317    139 141 142 143   K 4.1 3.8 3.1* 3.5 3.6   CALCIUM 10.1 8.9 9.2 9.6 9.7   PHOS  --  3.4 3.3 3.4 2.4   MG 1.90 1.90 1.80 1.80 1.70    110* 105 108* 107   CO2 27 23 27 30* 30*   BUN 8.9* 7.9* 3.1* 3.9* 3.6*   CREATININE 0.87 0.79 0.77 0.77 0.74   EGFRNORACEVR >60 >60 >60 >60 >60   GLUCOSE 81 66* 107* 110* 110*   BILITOT 0.5 0.5  --   --   --    ALKPHOS 75 57  --   --   --    ALT 12 9  --   --   --    AST 18 15  --   --   --    ALBUMIN 3.6 2.9*  --   --   --    CRP  --   --  43.80*  --  15.50*   LIPASE 17  --   --   --   --    WBC 6.40 5.16 5.56 4.74 4.97   HGB 14.4 12.7 11.8* 11.9* 11.6*   HCT 42.1 36.9* 35.1* 34.8* 34.9*     Nutrition Orders:  Diet Low Fiber/Residue  Dietary nutrition supplements TID; Boost Breeze - Any flavor    Appetite/Oral Intake: fair/50-75% of meals  Factors Affecting Nutritional Intake: abdominal pain, constipation, and decreased appetite  Social Needs Impacting Access to Food: none identified  Food/Mormonism/Cultural Preferences: none reported  Food Allergies: none reported  Last Bowel Movement:  "24  Wound(s):  none    Comments  (() Pt reported she is eating better; approx 50% most meals; still having abdominal pain with eating ; no N/V; does not want boost breeze supplement. No new wt. Pt also mentioned no BM > week; on senna docusate. Low Residue diet appropriate.     () Pt reported 1 week hx decreased po intake; + abdominal pain; hx diverticulitis; tries to limit red meat at home; diet progressed to clear liquids; will order ONS for added nutrition. Also provided PPN recs in event pt unable to tolerate liquids/po intake remains poor. No wt loss. Labs acknowledged.     Anthropometrics    Height: 4' 11" (149.9 cm), Height Method: Measured  Last Weight: 69 kg (152 lb 1.9 oz) (24), Weight Method: Standard Scale  BMI (Calculated): 30.7  BMI Classification: obese grade I (BMI 30-34.9)     Ideal Body Weight (IBW), Female: 95 lb     % Ideal Body Weight, Female (lb): 160.13 %         Usual Body Weight (UBW), k.1 kg  % Usual Body Weight: 103.05     Usual Weight Provided By: patient and EMR weight history    Wt Readings from Last 5 Encounters:   24 69 kg (152 lb 1.9 oz)   24 68.2 kg (150 lb 5.7 oz)   24 69.3 kg (152 lb 10.7 oz)   24 63.5 kg (140 lb)   11/15/24 69.9 kg (154 lb)     Weight Change(s) Since Admission: no reported wt loss   Wt Readings from Last 1 Encounters:   24 69 kg (152 lb 1.9 oz)   24 1315 68.5 kg (150 lb 14.5 oz)   Admit Weight: 68.5 kg (150 lb 14.5 oz) (24 1315), Weight Method: Standard Scale    Estimated Needs    Weight Used For Calorie Calculations: 69 kg (152 lb 1.9 oz)  Energy Calorie Requirements (kcal): 1725 kcal/d; 25 cata/kg  Energy Need Method: Kcal/kg  Weight Used For Protein Calculations: 69 kg (152 lb 1.9 oz)  Protein Requirements: 90 gm protein/d; 1.3 gm/kg  Fluid Requirements (mL): 1725 ml/d; 1ml/cata        Enteral Nutrition     Patient not receiving enteral nutrition at this time.    Parenteral Nutrition "     Patient not receiving parenteral nutrition support at this time.    Evaluation of Received Nutrient Intake    Calories: not meeting estimated needs; (12/5) po intake increasing   Protein: not meeting estimated needs    Patient Education     Not applicable.    Nutrition Diagnosis     PES: Inadequate oral intake related to acute illness as evidenced by eating 50% or less meals x week;  . (active)     PES:            Nutrition Interventions     Intervention(s): modified composition of meals/snacks, commercial beverage, multivitamin/mineral supplement therapy, and collaboration with other providers  Intervention(s):      Goal: Meet greater than 80% of nutritional needs by follow-up. (goal progressing)  Goal: Maintain weight throughout hospitalization. (goal progressing)    Nutrition Goals & Monitoring     Dietitian will monitor: food and beverage intake, weight, and glucose/endocrine profile  Discharge planning: continue low residue diet with no oral supplements  Nutrition Risk/Follow-Up: moderate (follow-up in 3-5 days)   Please consult if re-assessment needed sooner.

## 2024-12-06 LAB
OHS QRS DURATION: 76 MS
OHS QTC CALCULATION: 421 MS

## 2024-12-06 NOTE — DISCHARGE SUMMARY
[facility] Discharge Summary    Admitting Physician: Elmer Lee Jr., MD  Attending Physician: No att. providers found  Date of Admit: 11/30/2024  Date of Discharge: 12/5/2024    Condition: Stable  Outcome: Condition has improved and patient is now ready for discharge.  DISPOSITION: Home or Self Care        Discharge Diagnoses:     Patient Active Problem List   Diagnosis    Obesity    Asthma exacerbation    GERD (gastroesophageal reflux disease)    Diverticulitis    Moderate persistent asthma with (acute) exacerbation    Hyperthyroidism    Thyroiditis    Diastolic dysfunction, left ventricle    Tachycardia    Other chest pain    Hypoglycemia    Thrombocytopathia    Diverticulitis of intestine with abscess    Asthma    Diverticulitis of large intestine with perforation    Allergic conjunctivitis and rhinitis    Macrocytic anemia    Superficial vein thrombosis    Tobacco dependence    History of ESBL E. coli infection       Principal Problem:  Diverticulitis of large intestine with perforation    Consultants and Procedures:     Consultants:  IP CONSULT TO GENERAL SURGERY  IP CONSULT TO HOSPITAL MEDICINE  IP CONSULT TO INFECTIOUS DISEASES  IP CONSULT TO MIDLINE TEAM  IP CONSULT TO SOCIAL WORK/CASE MANAGEMENT  IP CONSULT TO SOCIAL WORK/CASE MANAGEMENT    Procedures:   * No surgery found *      Brief Admission History:      Niya Moeller is a 54 y.o. female with PMH of asthma, Graves disease/hyperthyroidism, chronic sigmoid diverticulitis with history of diverticular abscess, GERD presented to [facility] ED on 11/30/2024  with complaint of abdominal pain and subjective fever and worsening of her diverticulitis.  Patient known to Gen surgery with tentative plans for sigmoidectomy in the future after clearance from Pulmonology for asthma. Patient admitted with Gen Surgery as primary.     On admission 11/30/2024 in the ED, patient with unremarkable labs, afebrile and VSS. CT abdomen pelvis with IV contrast  showed wall thickening of sigmoid colon and suspected contained perforation with small adjacent collection of fluid and air 1 x 1.8 cm in size. Per chart review, patient emotionally labile on admit and requested oxygen 2/2 worsening SOB. Internal medicine consulted for management of asthma.      Patient recalls that for past 2 days she has not used her maintenance inhaler 2/2 abdominal pain and her SOB and wheezing got worse as her home maintenance inhalers were not initiated on admit either. However, upon my encounter today, patient states her SOB is improved since her breathing treatment and home meds. Currently only on 1.5 L supplemental oxygen via NC. Patient with increased dyspnea when NC removed and ambulates to the bathroom. Still endorses wheezing, non productive cough, and mild headache. Not home oxygen dependent. Persistent abdominal pain present 6-7/10 in the LLQ. States last bowel movement about 3-4 days ago. Today remains afebrile, VSS, CBC and CMP unremarkable. CXR with no acute process.     Hospital Course with Pertinent Findings:      Patient originally admitted to general surgery for management of acute sigmoid diverticulitis with possible contained microperforation. She was treated with cefepime and refused flagyl, due to GI symptoms. Surgery recommended elective sigmoid resection, but she deferred surgical intervention at this time. ID consulted for antibiotic recommendations, and initially placed patient on Merrem while inpatient due to history of ESBL organism. Patient elected to undergo ertapenem via OPAT for 2 weeks and stated she would find a general surgeon outside of [facility] to perform her surgery. Patient is aware of risks of deferring surgery and could potentially decompensate should bowel perforation occur. ID set up OPAT therapy for 2 weeks of ertapenem, and recommending a repeat CT Abd Pelv with IV contrast to evaluate for abscess. She was made aware of plans to follow up with scan, and  "she verbalized understanding. Vitals stable upon discharge. Patient to follow up with ID and her PCP following discharge.    Discharge physical exam:  Vitals  BP: 111/74  Temp: 97.4 °F (36.3 °C)  Temp Source: Oral  Pulse: 66  Resp: 16  SpO2: 98 %  Height: 4' 11" (149.9 cm)  Weight: 69 kg (152 lb 1.9 oz)    Physical Exam  Constitutional:       General: She is not in acute distress.     Appearance: Normal appearance. She is normal weight. She is not ill-appearing or toxic-appearing.   HENT:      Mouth/Throat:      Mouth: Mucous membranes are moist.      Pharynx: Oropharynx is clear. No oropharyngeal exudate or posterior oropharyngeal erythema.   Eyes:      Extraocular Movements: Extraocular movements intact.   Cardiovascular:      Rate and Rhythm: Normal rate and regular rhythm.      Pulses: Normal pulses.      Heart sounds: Normal heart sounds. No murmur heard.     No friction rub. No gallop.   Pulmonary:      Effort: Pulmonary effort is normal. No respiratory distress.      Breath sounds: Normal breath sounds. No stridor. No wheezing.   Abdominal:      General: Abdomen is flat. Bowel sounds are normal.      Palpations: Abdomen is soft.      Tenderness: There is abdominal tenderness (minimal tenderness LLQ).   Musculoskeletal:         General: Normal range of motion.      Right lower leg: No edema.      Left lower leg: No edema.   Skin:     General: Skin is warm and dry.      Capillary Refill: Capillary refill takes less than 2 seconds.      Coloration: Skin is not jaundiced or pale.      Findings: No bruising.   Neurological:      General: No focal deficit present.      Mental Status: She is alert and oriented to person, place, and time.           TIME SPENT ON DISCHARGE: 35 minutes    Discharge Medications:         Medication List        START taking these medications      ertapenem 1 gram injection  Commonly known as: INVANZ  Inject 1 g into the vein once daily. for 14 days            CONTINUE taking these " medications      * albuterol 2.5 mg /3 mL (0.083 %) nebulizer solution  Commonly known as: PROVENTIL     * albuterol 90 mcg/actuation inhaler  Commonly known as: PROAIR HFA  Inhale 2 puffs into the lungs every 6 (six) hours as needed for Wheezing. Rescue     albuterol-ipratropium 2.5 mg-0.5 mg/3 mL nebulizer solution  Commonly known as: DUO-NEB  Take 3 mLs by nebulization every 6 (six) hours as needed for Wheezing. Rescue     BREZTRI AEROSPHERE 160-9-4.8 mcg/actuation Hfaa  Generic drug: budesonide-glycopyr-formoterol     cholecalciferol (vitamin D3) 1,250 mcg (50,000 unit) capsule     dicyclomine 10 MG capsule  Commonly known as: BENTYL     docusate sodium 100 MG capsule  Commonly known as: COLACE  Take 1 capsule (100 mg total) by mouth 2 (two) times daily as needed for Constipation.     fluticasone propionate 50 mcg/actuation nasal spray  Commonly known as: FLONASE  1 spray (50 mcg total) by Each Nostril route daily as needed for Allergies or Rhinitis.     methIMAzole 5 MG Tab  Commonly known as: TAPAZOLE  Take a half of a tablet daily to Total 2.5 mg     montelukast 10 mg tablet  Commonly known as: SINGULAIR     omeprazole 20 MG capsule  Commonly known as: PRILOSEC  Take 1 capsule (20 mg total) by mouth 2 (two) times daily before meals.           * This list has 2 medication(s) that are the same as other medications prescribed for you. Read the directions carefully, and ask your doctor or other care provider to review them with you.                STOP taking these medications      amoxicillin 500 MG capsule  Commonly known as: AMOXIL     amoxicillin-clavulanate 875-125mg 875-125 mg per tablet  Commonly known as: AUGMENTIN               Where to Get Your Medications        Information about where to get these medications is not yet available    Ask your nurse or doctor about these medications  ertapenem 1 gram injection         Discharge Instructions:         Niya Moeller is being discharged Home or Self  Care.    Discharge Procedure Orders   CT Abdomen Pelvis With IV Contrast NO Oral Contrast   Standing Status: Future Standing Exp. Date: 12/05/25   Scheduling Instructions: Needs to be obtained near the end of 2 weeks.     Order Specific Question Answer Comments   Is the patient allergic to iodine contrast? No    Does this patient have impaired renal function? No    May the Radiologist modify the order per protocol to meet the clinical needs of the patient? Yes    Oral/Rectal Contrast instructions: NO Oral Contrast      Ambulatory referral/consult to Infectious Disease   Standing Status: Future   Referral Priority: Routine Referral Type: Consultation   Referral Reason: Specialty Services Required   Requested Specialty: Infectious Diseases   Number of Visits Requested: 1        Follow-Up Appointments:   Follow-up Information       Oswaldo Riggs, NP Follow up.    Specialty: Family Medicine  Contact information:  [address] [place] [id number]  [phone]               [facility] Follow up.    Specialty: Emergency Medicine  Contact information:  [facility] [address] [phone]             [facility] Infectious [facility]. Schedule an appointment as soon as possible for a visit.    Specialty: Infectious Diseases  Contact information:  [address] [place] [id number] [phone]                             To address at follow-up:      At this time, Niya Moeller is determined to have maximized benefits of IP hospitalization. she is discharged in stable condition with OP f/u recommendations and instructions. All questions answered, and patient verbalized agreement with the POC. They were given return precautions prior to d/c including symptoms that should prompt return to ED or to call PCP. Total time spent of DC of 35 minutes.       Oscar Dowell DO  [facility] Internal Medicine,  PGY-III

## 2024-12-09 ENCOUNTER — LAB REQUISITION (OUTPATIENT)
Dept: LAB | Facility: HOSPITAL | Age: 54
End: 2024-12-09
Payer: MEDICAID

## 2024-12-09 DIAGNOSIS — K57.33 DIVERTICULITIS OF LARGE INTESTINE WITHOUT PERFORATION OR ABSCESS WITH BLEEDING: ICD-10-CM

## 2024-12-09 LAB
ALBUMIN SERPL-MCNC: 3.6 G/DL (ref 3.5–5)
ALBUMIN/GLOB SERPL: 1.1 RATIO (ref 1.1–2)
ALP SERPL-CCNC: 87 UNIT/L (ref 40–150)
ALT SERPL-CCNC: 28 UNIT/L (ref 0–55)
ANION GAP SERPL CALC-SCNC: 8 MEQ/L
AST SERPL-CCNC: 23 UNIT/L (ref 5–34)
BASOPHILS # BLD AUTO: 0.02 X10(3)/MCL
BASOPHILS NFR BLD AUTO: 0.3 %
BILIRUB SERPL-MCNC: 0.3 MG/DL
BUN SERPL-MCNC: 5.7 MG/DL (ref 9.8–20.1)
CALCIUM SERPL-MCNC: 9.7 MG/DL (ref 8.4–10.2)
CHLORIDE SERPL-SCNC: 109 MMOL/L (ref 98–107)
CO2 SERPL-SCNC: 24 MMOL/L (ref 22–29)
CREAT SERPL-MCNC: 0.84 MG/DL (ref 0.55–1.02)
CREAT/UREA NIT SERPL: 7
CRP SERPL-MCNC: 13.2 MG/L
EOSINOPHIL # BLD AUTO: 0.53 X10(3)/MCL (ref 0–0.9)
EOSINOPHIL NFR BLD AUTO: 8.3 %
ERYTHROCYTE [DISTWIDTH] IN BLOOD BY AUTOMATED COUNT: 12.7 % (ref 11.5–17)
ERYTHROCYTE [SEDIMENTATION RATE] IN BLOOD: 56 MM/HR (ref 0–20)
GFR SERPLBLD CREATININE-BSD FMLA CKD-EPI: >60 ML/MIN/1.73/M2
GLOBULIN SER-MCNC: 3.2 GM/DL (ref 2.4–3.5)
GLUCOSE SERPL-MCNC: 98 MG/DL (ref 74–100)
HCT VFR BLD AUTO: 39.7 % (ref 37–47)
HGB BLD-MCNC: 13.6 G/DL (ref 12–16)
IMM GRANULOCYTES # BLD AUTO: 0.01 X10(3)/MCL (ref 0–0.04)
IMM GRANULOCYTES NFR BLD AUTO: 0.2 %
LYMPHOCYTES # BLD AUTO: 2.39 X10(3)/MCL (ref 0.6–4.6)
LYMPHOCYTES NFR BLD AUTO: 37.5 %
MCH RBC QN AUTO: 33.3 PG (ref 27–31)
MCHC RBC AUTO-ENTMCNC: 34.3 G/DL (ref 33–36)
MCV RBC AUTO: 97.1 FL (ref 80–94)
MONOCYTES # BLD AUTO: 0.65 X10(3)/MCL (ref 0.1–1.3)
MONOCYTES NFR BLD AUTO: 10.2 %
NEUTROPHILS # BLD AUTO: 2.78 X10(3)/MCL (ref 2.1–9.2)
NEUTROPHILS NFR BLD AUTO: 43.5 %
NRBC BLD AUTO-RTO: 0 %
PLATELET # BLD AUTO: 298 X10(3)/MCL (ref 130–400)
PMV BLD AUTO: 11.1 FL (ref 7.4–10.4)
POTASSIUM SERPL-SCNC: 4.1 MMOL/L (ref 3.5–5.1)
PROT SERPL-MCNC: 6.8 GM/DL (ref 6.4–8.3)
RBC # BLD AUTO: 4.09 X10(6)/MCL (ref 4.2–5.4)
SODIUM SERPL-SCNC: 141 MMOL/L (ref 136–145)
WBC # BLD AUTO: 6.38 X10(3)/MCL (ref 4.5–11.5)

## 2024-12-09 PROCEDURE — 85025 COMPLETE CBC W/AUTO DIFF WBC: CPT | Performed by: STUDENT IN AN ORGANIZED HEALTH CARE EDUCATION/TRAINING PROGRAM

## 2024-12-09 PROCEDURE — 85652 RBC SED RATE AUTOMATED: CPT | Performed by: STUDENT IN AN ORGANIZED HEALTH CARE EDUCATION/TRAINING PROGRAM

## 2024-12-09 PROCEDURE — 86140 C-REACTIVE PROTEIN: CPT | Performed by: STUDENT IN AN ORGANIZED HEALTH CARE EDUCATION/TRAINING PROGRAM

## 2024-12-09 PROCEDURE — 80053 COMPREHEN METABOLIC PANEL: CPT | Performed by: STUDENT IN AN ORGANIZED HEALTH CARE EDUCATION/TRAINING PROGRAM

## 2024-12-10 ENCOUNTER — HOSPITAL ENCOUNTER (EMERGENCY)
Facility: HOSPITAL | Age: 54
Discharge: HOME | End: 2024-12-10
Attending: STUDENT IN AN ORGANIZED HEALTH CARE EDUCATION/TRAINING PROGRAM
Payer: MEDICAID

## 2024-12-10 ENCOUNTER — TELEPHONE (OUTPATIENT)
Dept: INFECTIOUS DISEASES | Facility: HOSPITAL | Age: 54
End: 2024-12-10
Payer: MEDICAID

## 2024-12-10 ENCOUNTER — DOCUMENTATION ONLY (OUTPATIENT)
Dept: INFECTIOUS DISEASES | Facility: HOSPITAL | Age: 54
End: 2024-12-10
Payer: MEDICAID

## 2024-12-10 VITALS
BODY MASS INDEX: 30.24 KG/M2 | TEMPERATURE: 98 F | WEIGHT: 150 LBS | SYSTOLIC BLOOD PRESSURE: 116 MMHG | DIASTOLIC BLOOD PRESSURE: 57 MMHG | HEIGHT: 59 IN | OXYGEN SATURATION: 95 % | RESPIRATION RATE: 17 BRPM | HEART RATE: 78 BPM

## 2024-12-10 DIAGNOSIS — R06.02 SHORTNESS OF BREATH: ICD-10-CM

## 2024-12-10 DIAGNOSIS — K57.32 DIVERTICULITIS OF LARGE INTESTINE WITHOUT PERFORATION OR ABSCESS WITHOUT BLEEDING: Primary | ICD-10-CM

## 2024-12-10 DIAGNOSIS — J45.909 ASTHMA, UNSPECIFIED ASTHMA SEVERITY, UNSPECIFIED WHETHER COMPLICATED, UNSPECIFIED WHETHER PERSISTENT: ICD-10-CM

## 2024-12-10 LAB
ALBUMIN SERPL-MCNC: 3.3 G/DL (ref 3.5–5)
ALBUMIN/GLOB SERPL: 1 RATIO (ref 1.1–2)
ALP SERPL-CCNC: 72 UNIT/L (ref 40–150)
ALT SERPL-CCNC: 22 UNIT/L (ref 0–55)
ANION GAP SERPL CALC-SCNC: 9 MEQ/L
APTT PPP: 37.1 SECONDS (ref 23.2–33.7)
AST SERPL-CCNC: 19 UNIT/L (ref 5–34)
BASOPHILS # BLD AUTO: 0.02 X10(3)/MCL
BASOPHILS NFR BLD AUTO: 0.3 %
BILIRUB SERPL-MCNC: 0.2 MG/DL
BNP BLD-MCNC: 28.3 PG/ML
BUN SERPL-MCNC: 7.3 MG/DL (ref 9.8–20.1)
CALCIUM SERPL-MCNC: 9.3 MG/DL (ref 8.4–10.2)
CHLORIDE SERPL-SCNC: 111 MMOL/L (ref 98–107)
CO2 SERPL-SCNC: 22 MMOL/L (ref 22–29)
CREAT SERPL-MCNC: 0.74 MG/DL (ref 0.55–1.02)
CREAT/UREA NIT SERPL: 10
EOSINOPHIL # BLD AUTO: 0.46 X10(3)/MCL (ref 0–0.9)
EOSINOPHIL NFR BLD AUTO: 6.3 %
ERYTHROCYTE [DISTWIDTH] IN BLOOD BY AUTOMATED COUNT: 12.4 % (ref 11.5–17)
GFR SERPLBLD CREATININE-BSD FMLA CKD-EPI: >60 ML/MIN/1.73/M2
GLOBULIN SER-MCNC: 3.3 GM/DL (ref 2.4–3.5)
GLUCOSE SERPL-MCNC: 89 MG/DL (ref 74–100)
HCT VFR BLD AUTO: 38 % (ref 37–47)
HGB BLD-MCNC: 12.5 G/DL (ref 12–16)
IMM GRANULOCYTES # BLD AUTO: 0.02 X10(3)/MCL (ref 0–0.04)
IMM GRANULOCYTES NFR BLD AUTO: 0.3 %
INR PPP: 1
LYMPHOCYTES # BLD AUTO: 2.71 X10(3)/MCL (ref 0.6–4.6)
LYMPHOCYTES NFR BLD AUTO: 37 %
MAGNESIUM SERPL-MCNC: 1.7 MG/DL (ref 1.6–2.6)
MCH RBC QN AUTO: 31.6 PG (ref 27–31)
MCHC RBC AUTO-ENTMCNC: 32.9 G/DL (ref 33–36)
MCV RBC AUTO: 96.2 FL (ref 80–94)
MONOCYTES # BLD AUTO: 0.72 X10(3)/MCL (ref 0.1–1.3)
MONOCYTES NFR BLD AUTO: 9.8 %
NEUTROPHILS # BLD AUTO: 3.39 X10(3)/MCL (ref 2.1–9.2)
NEUTROPHILS NFR BLD AUTO: 46.3 %
NRBC BLD AUTO-RTO: 0 %
PLATELET # BLD AUTO: 263 X10(3)/MCL (ref 130–400)
PMV BLD AUTO: 10.4 FL (ref 7.4–10.4)
POTASSIUM SERPL-SCNC: 3.8 MMOL/L (ref 3.5–5.1)
PROT SERPL-MCNC: 6.6 GM/DL (ref 6.4–8.3)
PROTHROMBIN TIME: 13.6 SECONDS (ref 11.4–14)
RBC # BLD AUTO: 3.95 X10(6)/MCL (ref 4.2–5.4)
SODIUM SERPL-SCNC: 142 MMOL/L (ref 136–145)
TROPONIN I SERPL-MCNC: <0.01 NG/ML (ref 0–0.04)
WBC # BLD AUTO: 7.32 X10(3)/MCL (ref 4.5–11.5)

## 2024-12-10 PROCEDURE — 94761 N-INVAS EAR/PLS OXIMETRY MLT: CPT

## 2024-12-10 PROCEDURE — 63600175 PHARM REV CODE 636 W HCPCS: Performed by: STUDENT IN AN ORGANIZED HEALTH CARE EDUCATION/TRAINING PROGRAM

## 2024-12-10 PROCEDURE — 85610 PROTHROMBIN TIME: CPT | Performed by: STUDENT IN AN ORGANIZED HEALTH CARE EDUCATION/TRAINING PROGRAM

## 2024-12-10 PROCEDURE — 93005 ELECTROCARDIOGRAM TRACING: CPT

## 2024-12-10 PROCEDURE — 85025 COMPLETE CBC W/AUTO DIFF WBC: CPT | Performed by: STUDENT IN AN ORGANIZED HEALTH CARE EDUCATION/TRAINING PROGRAM

## 2024-12-10 PROCEDURE — 96374 THER/PROPH/DIAG INJ IV PUSH: CPT

## 2024-12-10 PROCEDURE — 25500020 PHARM REV CODE 255

## 2024-12-10 PROCEDURE — 94640 AIRWAY INHALATION TREATMENT: CPT

## 2024-12-10 PROCEDURE — 80053 COMPREHEN METABOLIC PANEL: CPT | Performed by: STUDENT IN AN ORGANIZED HEALTH CARE EDUCATION/TRAINING PROGRAM

## 2024-12-10 PROCEDURE — 85730 THROMBOPLASTIN TIME PARTIAL: CPT | Performed by: STUDENT IN AN ORGANIZED HEALTH CARE EDUCATION/TRAINING PROGRAM

## 2024-12-10 PROCEDURE — 83735 ASSAY OF MAGNESIUM: CPT | Performed by: STUDENT IN AN ORGANIZED HEALTH CARE EDUCATION/TRAINING PROGRAM

## 2024-12-10 PROCEDURE — 25000242 PHARM REV CODE 250 ALT 637 W/ HCPCS: Performed by: STUDENT IN AN ORGANIZED HEALTH CARE EDUCATION/TRAINING PROGRAM

## 2024-12-10 PROCEDURE — 96375 TX/PRO/DX INJ NEW DRUG ADDON: CPT

## 2024-12-10 PROCEDURE — 99285 EMERGENCY DEPT VISIT HI MDM: CPT | Mod: 25

## 2024-12-10 PROCEDURE — 84484 ASSAY OF TROPONIN QUANT: CPT | Performed by: STUDENT IN AN ORGANIZED HEALTH CARE EDUCATION/TRAINING PROGRAM

## 2024-12-10 PROCEDURE — 83880 ASSAY OF NATRIURETIC PEPTIDE: CPT | Performed by: STUDENT IN AN ORGANIZED HEALTH CARE EDUCATION/TRAINING PROGRAM

## 2024-12-10 RX ORDER — MORPHINE SULFATE 2 MG/ML
2 INJECTION, SOLUTION INTRAMUSCULAR; INTRAVENOUS
Status: COMPLETED | OUTPATIENT
Start: 2024-12-10 | End: 2024-12-10

## 2024-12-10 RX ORDER — HYDROCODONE BITARTRATE AND ACETAMINOPHEN 5; 325 MG/1; MG/1
1 TABLET ORAL EVERY 6 HOURS PRN
Qty: 12 TABLET | Refills: 0 | Status: SHIPPED | OUTPATIENT
Start: 2024-12-10

## 2024-12-10 RX ORDER — IPRATROPIUM BROMIDE AND ALBUTEROL SULFATE 2.5; .5 MG/3ML; MG/3ML
3 SOLUTION RESPIRATORY (INHALATION)
Status: COMPLETED | OUTPATIENT
Start: 2024-12-10 | End: 2024-12-10

## 2024-12-10 RX ORDER — ONDANSETRON HYDROCHLORIDE 2 MG/ML
4 INJECTION, SOLUTION INTRAVENOUS
Status: COMPLETED | OUTPATIENT
Start: 2024-12-10 | End: 2024-12-10

## 2024-12-10 RX ADMIN — ONDANSETRON 4 MG: 2 INJECTION INTRAMUSCULAR; INTRAVENOUS at 09:12

## 2024-12-10 RX ADMIN — MORPHINE SULFATE 2 MG: 2 INJECTION, SOLUTION INTRAMUSCULAR; INTRAVENOUS at 09:12

## 2024-12-10 RX ADMIN — IOHEXOL 95 ML: 350 INJECTION, SOLUTION INTRAVENOUS at 10:12

## 2024-12-10 RX ADMIN — IPRATROPIUM BROMIDE AND ALBUTEROL SULFATE 3 ML: .5; 3 SOLUTION RESPIRATORY (INHALATION) at 09:12

## 2024-12-10 NOTE — PROGRESS NOTES
[facility] [place]  Infectious Diseases OPAT Lab Review Note      Medication: Ertapenem 1 g IV daily to complete a 14 day course    Infusion Company: [facility]    Outpatient start date:  12/03/2024  Outpatient stop date:  12/16/2024; treatment extended to 12/24/2024      Labs reviewed:     Lab Results   Component Value Date    WBC 6.38 12/09/2024    HGB 13.6 12/09/2024    HCT 39.7 12/09/2024    MCV 97.1 (H) 12/09/2024     12/09/2024      CMP  Sodium   Date Value Ref Range Status   12/09/2024 141 136 - 145 mmol/L Final   02/19/2023 144 136 - 145 mmol/L Final     Potassium   Date Value Ref Range Status   12/09/2024 4.1 3.5 - 5.1 mmol/L Final   02/19/2023 4.3 3.5 - 5.1 mmol/L Final     Chloride   Date Value Ref Range Status   12/09/2024 109 (H) 98 - 107 mmol/L Final   02/19/2023 108 95 - 110 mmol/L Final     CO2   Date Value Ref Range Status   12/09/2024 24 22 - 29 mmol/L Final   02/19/2023 22 (L) 23 - 29 mmol/L Final     Glucose   Date Value Ref Range Status   02/19/2023 97 70 - 110 mg/dL Final     BUN   Date Value Ref Range Status   02/19/2023 13 6 - 20 mg/dL Final     Blood Urea Nitrogen   Date Value Ref Range Status   12/09/2024 5.7 (L) 9.8 - 20.1 mg/dL Final     Creatinine   Date Value Ref Range Status   12/09/2024 0.84 0.55 - 1.02 mg/dL Final   02/19/2023 0.8 0.5 - 1.4 mg/dL Final     Calcium   Date Value Ref Range Status   12/09/2024 9.7 8.4 - 10.2 mg/dL Final   02/19/2023 9.6 8.7 - 10.5 mg/dL Final     Total Protein   Date Value Ref Range Status   02/19/2023 7.5 6.0 - 8.4 g/dL Final     Albumin   Date Value Ref Range Status   12/09/2024 3.6 3.5 - 5.0 g/dL Final   02/19/2023 3.3 (L) 3.5 - 5.2 g/dL Final     Total Bilirubin   Date Value Ref Range Status   02/19/2023 0.2 0.1 - 1.0 mg/dL Final     Comment:     For infants and newborns, interpretation of results should be based  on gestational age, weight and in agreement with clinical  observations.    Premature Infant recommended  reference ranges:  Up to 24 hours.............<8.0 mg/dL  Up to 48 hours............<12.0 mg/dL  3-5 days..................<15.0 mg/dL  6-29 days.................<15.0 mg/dL       Bilirubin Total   Date Value Ref Range Status   12/09/2024 0.3 <=1.5 mg/dL Final     Alkaline Phosphatase   Date Value Ref Range Status   02/19/2023 75 55 - 135 U/L Final     ALP   Date Value Ref Range Status   12/09/2024 87 40 - 150 unit/L Final     AST   Date Value Ref Range Status   12/09/2024 23 5 - 34 unit/L Final   02/19/2023 25 10 - 40 U/L Final     Comment:     Specimen slightly hemolyzed     ALT   Date Value Ref Range Status   12/09/2024 28 0 - 55 unit/L Final   02/19/2023 46 (H) 10 - 44 U/L Final     Anion Gap   Date Value Ref Range Status   02/19/2023 14 8 - 16 mmol/L Final     eGFR   Date Value Ref Range Status   12/09/2024 >60 mL/min/1.73/m2 Final   02/19/2023 >60 >60 mL/min/1.73 m^2 Final      Lab Results   Component Value Date    SEDRATE 56 (H) 12/09/2024      Lab Results   Component Value Date    CRP 13.20 (H) 12/09/2024          Assessment / Plan:   Diverticular abscess      No leukocytosis is noted.  Slight increases noted in ESR while CRP is downtrending  Continue current therapy  Repeat CT abdomen and pelvis scheduled for 12/20/2024  Will extend IV therapy with Ertapenem to 12/24/2024, along with moving ID appointment to that date. That way patient can follow-up with ID and results can be reviewed with her to determine if treatment needs to be extended any further  Infusion company notified of extension       Follow up ID appointment:  12/17/2024        YEHUDA Bragg  [facility] Infectious Diseases    *Portions of this note dictated using EMR integrated voice recognition software, and may be subject to voice recognition errors not corrected at proofreading. Please contact writer for clarification if needed. *

## 2024-12-10 NOTE — TELEPHONE ENCOUNTER
Repeat CT abdomen and pelvis scheduled for 12/20/2024.  ID needs to see results of CT to determine if therapy will further need to be extended or not d/t abscess resolution   ID appointment currently scheduled for 12/17/2024  Will extend IV therapy with Ertapenem to 12/24/2024.  Please move current ID appointment from 12/17/2024 to 12/24/2024  That way patient can follow-up with ID and results can be reviewed with her to determine if treatment needs to be extended any further  Infusion company notified of extension   Attempted to call patient; no answer.  Voicemail left.  Please continue to attempt to call her and explained situation  Thank you

## 2024-12-11 LAB
OHS QRS DURATION: 74 MS
OHS QTC CALCULATION: 445 MS

## 2024-12-11 NOTE — ED PROVIDER NOTES
"Encounter Date: 12/10/2024       History     Chief Complaint   Patient presents with    Abdominal Pain    Shortness of Breath     States was IP "for at least a week" and "I think they sent me home to soon".  Presents today with continued abdominal pain and "now I'm short of breath and wheezing".       HPI    54-year-old female with a past medical history of Graves disease which is well-controlled as well as asthma presents emergency department for worsening abdominal pain.  Patient states she was discharged from the hospital 5 days ago after having diverticulitis with a microperforation which was contained.  She was sent home on ertapenem daily through a PICC line.  She states that she has been having worsening abdominal pain and concerned that she has a worsening abscess formation.  She also states that she has been having some wheezing but denies any active shortness a breath.  States that she thinks she just needs a DuoNeb from here.  Denies any chest pain.  She states the antibiotics has been giving her some diarrhea.  Denies any BRBPR.    Review of patient's allergies indicates:   Allergen Reactions    Latex Anaphylaxis and Edema    Zosyn [piperacillin-tazobactam] Anaphylaxis     Past Medical History:   Diagnosis Date    Asthma     Diverticulitis     Diverticulosis     GERD (gastroesophageal reflux disease)     Graves disease      Past Surgical History:   Procedure Laterality Date    COLONOSCOPY, WITH DIRECTED SUBMUCOSAL INJECTION N/A 4/8/2024    Procedure: COLONOSCOPY, WITH DIRECTED SUBMUCOSAL INJECTION;  Surgeon: Heather Lance MD;  Location: [facility] ENDOSCOPY;  Service: Gastroenterology;  Laterality: N/A;     Family History   Problem Relation Name Age of Onset    Hypothyroidism Mother       Social History     Tobacco Use    Smoking status: Every Day     Current packs/day: 0.50     Average packs/day: 0.5 packs/day for 41.9 years (21.0 ttl pk-yrs)     Types: Cigarettes     Start date: 1/1/1983    Smokeless " tobacco: Current    Tobacco comments:     Ambulatory referral to Smoking Cessation clinic following hospital discharge.    Substance Use Topics    Alcohol use: Not Currently    Drug use: Never     Review of Systems   Constitutional:  Negative for fever.   Respiratory:  Positive for wheezing. Negative for cough and shortness of breath.    Cardiovascular:  Negative for chest pain.   Gastrointestinal:  Positive for abdominal pain and diarrhea. Negative for constipation, nausea and vomiting.   Neurological:  Negative for headaches.   All other systems reviewed and are negative.      Physical Exam     Initial Vitals [12/10/24 2059]   BP Pulse Resp Temp SpO2   126/82 81 18 97.9 °F (36.6 °C) 98 %      MAP       --         Physical Exam    Nursing note and vitals reviewed.  Constitutional: She appears well-developed and well-nourished. No distress.   Cardiovascular:  Normal rate and regular rhythm.           Pulmonary/Chest: No respiratory distress. She has wheezes. She has no rhonchi. She has no rales.   Abdominal: Abdomen is soft. There is abdominal tenderness (Tenderness to the left lower quadrant without rebound). There is no rebound and no guarding.   Musculoskeletal:         General: No tenderness. Normal range of motion.     Neurological: She is alert and oriented to person, place, and time. She has normal strength.   Skin: Skin is warm. Capillary refill takes less than 2 seconds.   Psychiatric:   Anxious         ED Course   Procedures  Labs Reviewed   APTT - Abnormal       Result Value    PTT 37.1 (*)    COMPREHENSIVE METABOLIC PANEL - Abnormal    Sodium 142      Potassium 3.8      Chloride 111 (*)     CO2 22      Glucose 89      Blood Urea Nitrogen 7.3 (*)     Creatinine 0.74      Calcium 9.3      Protein Total 6.6      Albumin 3.3 (*)     Globulin 3.3      Albumin/Globulin Ratio 1.0 (*)     Bilirubin Total 0.2      ALP 72      ALT 22      AST 19      eGFR >60      Anion Gap 9.0      BUN/Creatinine Ratio 10     CBC  WITH DIFFERENTIAL - Abnormal    WBC 7.32      RBC 3.95 (*)     Hgb 12.5      Hct 38.0      MCV 96.2 (*)     MCH 31.6 (*)     MCHC 32.9 (*)     RDW 12.4      Platelet 263      MPV 10.4      Neut % 46.3      Lymph % 37.0      Mono % 9.8      Eos % 6.3      Basophil % 0.3      Lymph # 2.71      Neut # 3.39      Mono # 0.72      Eos # 0.46      Baso # 0.02      IG# 0.02      IG% 0.3      NRBC% 0.0     B-TYPE NATRIURETIC PEPTIDE - Normal    Natriuretic Peptide 28.3     MAGNESIUM - Normal    Magnesium Level 1.70     PROTIME-INR - Normal    PT 13.6      INR 1.0     TROPONIN I - Normal    Troponin-I <0.010     CBC W/ AUTO DIFFERENTIAL    Narrative:     The following orders were created for panel order CBC auto differential.  Procedure                               Abnormality         Status                     ---------                               -----------         ------                     CBC with Differential[[id number]]       Abnormal            Final result                 Please view results for these tests on the individual orders.   URINALYSIS, REFLEX TO URINE CULTURE          Imaging Results              CT Abdomen Pelvis With IV Contrast NO Oral Contrast (Preliminary result)  Result time 12/10/24 23:05:40      Preliminary result by Kevin Barreto Jr., MD (12/10/24 23:05:40)                   Narrative:    START OF REPORT:  Technique: CT of the abdomen and pelvis was performed with axial images as well as sagittal and coronal reconstruction images with intravenous contrast.    Comparison: Comparison is with study dated 2024-11-30 15:42:27.    Clinical History: Patient comes in with continued abdominal pain and now has shortness of breath.    Dosage Information: Automated Exposure Control was utilized 257.59 mGy.cm.    Findings:  Lines and Tubes: None.  Thorax:  Lungs: No focal infiltrate or consolidation is seen.  Pleura: No effusions or thickening.  Heart: The heart size is within normal  limits.  Abdomen:  Abdominal Wall: A stable small subcutaneous ovoid fat attenuating focus is seen in the left lateral abdominal wall consistent with lipoma (image 73 series 2).  Liver: The liver appears unremarkable.  Biliary System: No intrahepatic or extrahepatic biliary duct dilatation is seen.  Gallbladder: The gallbladder appears unremarkable.  Pancreas: The pancreas appears unremarkable.  Spleen: The spleen appears unremarkable.  Adrenals: The adrenal glands appear unremarkable.  Kidneys: The kidneys appear unremarkable with no stones cysts masses or hydronephrosis.  Aorta: The abdominal aorta appears unremarkable.  IVC: Unremarkable.  Bowel:  Esophagus: The visualized esophagus appears unremarkable.  Stomach: The stomach appears unremarkable.  Duodenum: Unremarkable appearing duodenum.  Small Bowel: The small bowel appears unremarkable.  Colon: Numerous diverticula are seen in the hepatic flexure through to the sigmoid colon. There is stable focal wall thickening in the distal sigmoid colon with associated mild fat stranding centered on image 36 series 6 consistent with diverticulitis. An underlying mass is not entirely excluded. Follow up advised to resolution.  Appendix: The appendix appears unremarkable seen on Image 93, Series 2 through Image 88, Series 2.  Peritoneum: No intraperitoneal free air or ascites is seen.    Pelvis:  Bladder: The bladder appears unremarkable.  Female:  Uterus: The uterus appears unremarkable.  Ovaries: The ovaries appear unremarkable.    Bony structures:  Dorsal Spine: There is mild spondylosis of the visualized dorsal spine.  Bony Pelvis: There is mild degenerative change of the bilateral hips.      Impression:  1. Numerous diverticula are seen in the hepatic flexure through to the sigmoid colon. There is stable focal wall thickening in the distal sigmoid colon with associated mild fat stranding centered on image 36 series 6 consistent with diverticulitis. An underlying mass  is not entirely excluded. Follow up advised to resolution.  2. Details and other findings as discussed above.                                         X-Ray Chest 1 View (Preliminary result)  Result time 12/10/24 21:30:09      Wet Read by Denny Urias MD (12/10/24 21:30:09, [facility], Emergency Medicine)    Lungs clear no consolidations                                     Medications   albuterol-ipratropium 2.5 mg-0.5 mg/3 mL nebulizer solution 3 mL (3 mLs Nebulization Given 12/10/24 2139)   ondansetron injection 4 mg (4 mg Intravenous Given 12/10/24 2133)   morphine injection 2 mg (2 mg Intravenous Given 12/10/24 2154)   iohexoL (OMNIPAQUE 350) 350 mg iodine/mL injection (95 mLs Intravenous Given 12/10/24 2230)     Medical Decision Making  Initial Assessment:       Abdominal pain/wheezing      Differential Diagnosis:   Judging by the patient's chief complaint and pertinent history, the patient has the following possible differential diagnoses, including but not limited to the following.  Some of these are deemed to be lower likelihood and some more likely based on my physical exam and history combined with possible lab work and/or imaging studies.   Please see the pertinent studies, and refer to the HPI.  Some of these diagnoses will take further evaluation to fully rule out, perhaps as an outpatient and the patient was encouraged to follow up when discharged for more comprehensive evaluation.      Diverticulitis, abscess, intra-abdominal infection, peritonitis, asthma, CHF,  as well as multiple other possible etiologies      Problems Addressed:  Asthma, unspecified asthma severity, unspecified whether complicated, unspecified whether persistent: chronic illness or injury with exacerbation, progression, or side effects of treatment  Diverticulitis of large intestine without perforation or abscess without bleeding: acute illness or injury    Amount and/or Complexity of Data  Reviewed  Labs: ordered. Decision-making details documented in ED Course.  Radiology: ordered and independent interpretation performed. Decision-making details documented in ED Course.  ECG/medicine tests: ordered and independent interpretation performed.    Risk  Prescription drug management.  Decision regarding hospitalization.               ED Course as of 12/10/24 2317   Tue Dec 10, 2024   2146 Wheezing improved.  Will give some morphine for abdominal pain [BS]   2232 WBC: 7.32 [BS]   2232 Hematocrit: 38.0 [BS]   2232 Platelet Count: 263 [BS]   2232 Sodium: 142 [BS]   2232 Potassium: 3.8 [BS]   2232 Chloride(!): 111 [BS]   2232 CO2: 22 [BS]   2232 Glucose: 89 [BS]   2232 BUN(!): 7.3 [BS]   2232 Creatinine: 0.74 [BS]   2232 Magnesium : 1.70 [BS]   2232 BNP: 28.3 [BS]   2232 BILIRUBIN TOTAL: 0.2 [BS]   2232 ALP: 72 [BS]   2232 ALT: 22 [BS]   2232 AST: 19 [BS]   2314 CT Abdomen Pelvis With IV Contrast NO Oral Contrast  Persistent diverticulitis while though the micro perforation/abscess has resolved. [BS]   2314 Will discharge.  Informed patient to continue antibiotics at home.  Follow up with PCP.  Will send her with some Norco at home. [BS]   2316 Patient agrees with continue home antibiotics.  Will follow up with PCP [BS]      ED Course User Index  [BS] Denny Urias MD                           Clinical Impression:  Final diagnoses:  [R06.02] Shortness of breath  [K57.32] Diverticulitis of large intestine without perforation or abscess without bleeding (Primary)  [J45.909] Asthma, unspecified asthma severity, unspecified whether complicated, unspecified whether persistent          ED Disposition Condition    Discharge Stable          ED Prescriptions       Medication Sig Dispense Start Date End Date Auth. Provider    HYDROcodone-acetaminophen (NORCO) 5-325 mg per tablet Take 1 tablet by mouth every 6 (six) hours as needed for Pain. 12 tablet 12/10/2024 -- Denny Urias MD          Follow-up  Information       Follow up With Specialties Details Why Contact Info    Oswaldo Riggs, NP Family Medicine Schedule an appointment as soon as possible for a visit in 1 day  [address] [address]  [phone]      [facility] Emergency Medicine Go to  If symptoms worsen [address] [facility] [place] [address] [phone]             Denny Urias MD  12/10/24 5232

## 2024-12-18 ENCOUNTER — DOCUMENTATION ONLY (OUTPATIENT)
Dept: INFECTIOUS DISEASES | Facility: CLINIC | Age: 54
End: 2024-12-18
Payer: MEDICAID

## 2024-12-18 NOTE — PROGRESS NOTES
[facility]  Infectious Diseases OPAT Lab Review Note      Medication: Ertapenem 1 g IV daily to complete a 14 day course    Infusion Company: [email or web address]    Outpatient start date:  12/03/2024  Outpatient stop date:  12/16/2024; treatment extended to 12/24/2024      Labs reviewed:     Lab Results   Component Value Date    WBC 7.32 12/10/2024    HGB 12.5 12/10/2024    HCT 38.0 12/10/2024    MCV 96.2 (H) 12/10/2024     12/10/2024      CMP  Sodium   Date Value Ref Range Status   12/10/2024 142 136 - 145 mmol/L Final   02/19/2023 144 136 - 145 mmol/L Final     Potassium   Date Value Ref Range Status   12/10/2024 3.8 3.5 - 5.1 mmol/L Final   02/19/2023 4.3 3.5 - 5.1 mmol/L Final     Chloride   Date Value Ref Range Status   12/10/2024 111 (H) 98 - 107 mmol/L Final   02/19/2023 108 95 - 110 mmol/L Final     CO2   Date Value Ref Range Status   12/10/2024 22 22 - 29 mmol/L Final   02/19/2023 22 (L) 23 - 29 mmol/L Final     Glucose   Date Value Ref Range Status   02/19/2023 97 70 - 110 mg/dL Final     BUN   Date Value Ref Range Status   02/19/2023 13 6 - 20 mg/dL Final     Blood Urea Nitrogen   Date Value Ref Range Status   12/10/2024 7.3 (L) 9.8 - 20.1 mg/dL Final     Creatinine   Date Value Ref Range Status   12/10/2024 0.74 0.55 - 1.02 mg/dL Final   02/19/2023 0.8 0.5 - 1.4 mg/dL Final     Calcium   Date Value Ref Range Status   12/10/2024 9.3 8.4 - 10.2 mg/dL Final   02/19/2023 9.6 8.7 - 10.5 mg/dL Final     Total Protein   Date Value Ref Range Status   02/19/2023 7.5 6.0 - 8.4 g/dL Final     Albumin   Date Value Ref Range Status   12/10/2024 3.3 (L) 3.5 - 5.0 g/dL Final   02/19/2023 3.3 (L) 3.5 - 5.2 g/dL Final     Total Bilirubin   Date Value Ref Range Status   02/19/2023 0.2 0.1 - 1.0 mg/dL Final     Comment:     For infants and newborns, interpretation of results should be based  on gestational age, weight and in agreement with clinical  observations.    Premature Infant  recommended reference ranges:  Up to 24 hours.............<8.0 mg/dL  Up to 48 hours............<12.0 mg/dL  3-5 days..................<15.0 mg/dL  6-29 days.................<15.0 mg/dL       Bilirubin Total   Date Value Ref Range Status   12/10/2024 0.2 <=1.5 mg/dL Final     Alkaline Phosphatase   Date Value Ref Range Status   02/19/2023 75 55 - 135 U/L Final     ALP   Date Value Ref Range Status   12/10/2024 72 40 - 150 unit/L Final     AST   Date Value Ref Range Status   12/10/2024 19 5 - 34 unit/L Final   02/19/2023 25 10 - 40 U/L Final     Comment:     Specimen slightly hemolyzed     ALT   Date Value Ref Range Status   12/10/2024 22 0 - 55 unit/L Final   02/19/2023 46 (H) 10 - 44 U/L Final     Anion Gap   Date Value Ref Range Status   02/19/2023 14 8 - 16 mmol/L Final     eGFR   Date Value Ref Range Status   12/10/2024 >60 mL/min/1.73/m2 Final   02/19/2023 >60 >60 mL/min/1.73 m^2 Final      Lab Results   Component Value Date    SEDRATE 56 (H) 12/09/2024      Lab Results   Component Value Date    CRP 13.20 (H) 12/09/2024 12/16/2024 labs:  WBC 4.7; hemoglobin 14.2; hematocrit 42.6; platelets 282; ESR 41; sodium 143; potassium 4.3; chloride 108; CO2 22; BUN 7; creatinine 0.79; glucose 78; calcium 9.8; alk-phos 78; albumin 4.0; total bilirubin 0.3; AST 21; ALT 17; CRP 4.9      Assessment / Plan:   Diverticular abscess      No leukocytosis is noted.  Inflammatory markers are improving or have normalized  Continue current therapy  Repeat CT abdomen and pelvis scheduled for 12/20/2024  IV therapy with Ertapenem has been extended to 12/24/2024, along with moving ID appointment to that date. That way patient can follow-up with ID and results can be reviewed with her to determine if treatment needs to be extended any further  Infusion company notified of extension       Follow up ID appointment:  12/24/2024        YEHUDA Bragg  [facility] Infectious Diseases    *Portions of this note dictated using EMR  integrated voice recognition software, and may be subject to voice recognition errors not corrected at proofreading. Please contact writer for clarification if needed. *

## 2024-12-20 ENCOUNTER — TELEPHONE (OUTPATIENT)
Dept: GASTROENTEROLOGY | Facility: CLINIC | Age: 54
End: 2024-12-20
Payer: MEDICAID

## 2024-12-20 NOTE — TELEPHONE ENCOUNTER
----- Message from [employer] sent at 12/18/2024  4:56 PM CST -----  Type : Patient Call      Who Called :  Patient      Does the patient know what this is regarding?: Patient is requesting a callback; pt is wanting to know if she could receive a referral to Dr. Sudarshan Rdz; pt is requesting someone reach out on her mychart if no one is able to reach her on phone; please advise        Would the patient rather a call back or a response via [facility]? Call        Best Call Back Number: [phone]        Additional Information:

## 2024-12-21 ENCOUNTER — HOSPITAL ENCOUNTER (EMERGENCY)
Facility: HOSPITAL | Age: 54
Discharge: HOME | End: 2024-12-21
Attending: EMERGENCY MEDICINE
Payer: MEDICAID

## 2024-12-21 VITALS
HEART RATE: 78 BPM | RESPIRATION RATE: 18 BRPM | SYSTOLIC BLOOD PRESSURE: 112 MMHG | HEIGHT: 59 IN | OXYGEN SATURATION: 96 % | WEIGHT: 140 LBS | BODY MASS INDEX: 28.22 KG/M2 | DIASTOLIC BLOOD PRESSURE: 50 MMHG | TEMPERATURE: 97 F

## 2024-12-21 DIAGNOSIS — K57.90 DIVERTICULOSIS: Primary | ICD-10-CM

## 2024-12-21 LAB
ALBUMIN SERPL-MCNC: 3.5 G/DL (ref 3.5–5)
ALBUMIN/GLOB SERPL: 0.9 RATIO (ref 1.1–2)
ALP SERPL-CCNC: 79 UNIT/L (ref 40–150)
ALT SERPL-CCNC: 18 UNIT/L (ref 0–55)
ANION GAP SERPL CALC-SCNC: 8 MEQ/L
AST SERPL-CCNC: 19 UNIT/L (ref 5–34)
B-HCG UR QL: NEGATIVE
BACTERIA #/AREA URNS AUTO: ABNORMAL /HPF
BASOPHILS # BLD AUTO: 0.02 X10(3)/MCL
BASOPHILS NFR BLD AUTO: 0.3 %
BILIRUB SERPL-MCNC: 0.6 MG/DL
BILIRUB UR QL STRIP.AUTO: NEGATIVE
BUN SERPL-MCNC: 5.5 MG/DL (ref 9.8–20.1)
CALCIUM SERPL-MCNC: 10 MG/DL (ref 8.4–10.2)
CHLORIDE SERPL-SCNC: 109 MMOL/L (ref 98–107)
CLARITY UR: CLEAR
CO2 SERPL-SCNC: 22 MMOL/L (ref 22–29)
COLOR UR AUTO: ABNORMAL
CREAT SERPL-MCNC: 0.81 MG/DL (ref 0.55–1.02)
CREAT/UREA NIT SERPL: 7
CTP QC/QA: YES
EOSINOPHIL # BLD AUTO: 0.28 X10(3)/MCL (ref 0–0.9)
EOSINOPHIL NFR BLD AUTO: 3.7 %
ERYTHROCYTE [DISTWIDTH] IN BLOOD BY AUTOMATED COUNT: 12.5 % (ref 11.5–17)
GFR SERPLBLD CREATININE-BSD FMLA CKD-EPI: >60 ML/MIN/1.73/M2
GLOBULIN SER-MCNC: 3.9 GM/DL (ref 2.4–3.5)
GLUCOSE SERPL-MCNC: 75 MG/DL (ref 74–100)
GLUCOSE UR QL STRIP: NORMAL
HCT VFR BLD AUTO: 41.7 % (ref 37–47)
HGB BLD-MCNC: 13.7 G/DL (ref 12–16)
HGB UR QL STRIP: ABNORMAL
HYALINE CASTS #/AREA URNS LPF: ABNORMAL /LPF
IMM GRANULOCYTES # BLD AUTO: 0.02 X10(3)/MCL (ref 0–0.04)
IMM GRANULOCYTES NFR BLD AUTO: 0.3 %
KETONES UR QL STRIP: ABNORMAL
LEUKOCYTE ESTERASE UR QL STRIP: NEGATIVE
LYMPHOCYTES # BLD AUTO: 2.49 X10(3)/MCL (ref 0.6–4.6)
LYMPHOCYTES NFR BLD AUTO: 32.7 %
MCH RBC QN AUTO: 32.1 PG (ref 27–31)
MCHC RBC AUTO-ENTMCNC: 32.9 G/DL (ref 33–36)
MCV RBC AUTO: 97.7 FL (ref 80–94)
MONOCYTES # BLD AUTO: 0.84 X10(3)/MCL (ref 0.1–1.3)
MONOCYTES NFR BLD AUTO: 11 %
MUCOUS THREADS URNS QL MICRO: ABNORMAL /LPF
NEUTROPHILS # BLD AUTO: 3.96 X10(3)/MCL (ref 2.1–9.2)
NEUTROPHILS NFR BLD AUTO: 52 %
NITRITE UR QL STRIP: NEGATIVE
NRBC BLD AUTO-RTO: 0 %
PH UR STRIP: 5.5 [PH]
PLATELET # BLD AUTO: 261 X10(3)/MCL (ref 130–400)
PMV BLD AUTO: 10.6 FL (ref 7.4–10.4)
POTASSIUM SERPL-SCNC: 3.8 MMOL/L (ref 3.5–5.1)
PROT SERPL-MCNC: 7.4 GM/DL (ref 6.4–8.3)
PROT UR QL STRIP: NEGATIVE
RBC # BLD AUTO: 4.27 X10(6)/MCL (ref 4.2–5.4)
RBC #/AREA URNS AUTO: ABNORMAL /HPF
SODIUM SERPL-SCNC: 139 MMOL/L (ref 136–145)
SP GR UR STRIP.AUTO: 1.01 (ref 1–1.03)
SQUAMOUS #/AREA URNS LPF: ABNORMAL /HPF
UROBILINOGEN UR STRIP-ACNC: NORMAL
WBC # BLD AUTO: 7.61 X10(3)/MCL (ref 4.5–11.5)
WBC #/AREA URNS AUTO: ABNORMAL /HPF

## 2024-12-21 PROCEDURE — 85025 COMPLETE CBC W/AUTO DIFF WBC: CPT | Performed by: EMERGENCY MEDICINE

## 2024-12-21 PROCEDURE — 96374 THER/PROPH/DIAG INJ IV PUSH: CPT

## 2024-12-21 PROCEDURE — 80053 COMPREHEN METABOLIC PANEL: CPT | Performed by: EMERGENCY MEDICINE

## 2024-12-21 PROCEDURE — 25500020 PHARM REV CODE 255

## 2024-12-21 PROCEDURE — 99285 EMERGENCY DEPT VISIT HI MDM: CPT | Mod: 25

## 2024-12-21 PROCEDURE — 63600175 PHARM REV CODE 636 W HCPCS: Performed by: EMERGENCY MEDICINE

## 2024-12-21 PROCEDURE — 81025 URINE PREGNANCY TEST: CPT | Performed by: EMERGENCY MEDICINE

## 2024-12-21 PROCEDURE — 81001 URINALYSIS AUTO W/SCOPE: CPT | Performed by: EMERGENCY MEDICINE

## 2024-12-21 RX ORDER — HYDROCODONE BITARTRATE AND ACETAMINOPHEN 5; 325 MG/1; MG/1
1 TABLET ORAL EVERY 6 HOURS PRN
Qty: 12 TABLET | Refills: 0 | Status: ON HOLD | OUTPATIENT
Start: 2024-12-21 | End: 2024-12-26

## 2024-12-21 RX ORDER — MORPHINE SULFATE 2 MG/ML
6 INJECTION, SOLUTION INTRAMUSCULAR; INTRAVENOUS
Status: COMPLETED | OUTPATIENT
Start: 2024-12-21 | End: 2024-12-21

## 2024-12-21 RX ADMIN — MORPHINE SULFATE 6 MG: 2 INJECTION, SOLUTION INTRAMUSCULAR; INTRAVENOUS at 02:12

## 2024-12-21 RX ADMIN — IOHEXOL 95 ML: 350 INJECTION, SOLUTION INTRAVENOUS at 03:12

## 2024-12-21 NOTE — ED PROVIDER NOTES
Encounter Date: 12/21/2024       History     Chief Complaint   Patient presents with    Abdominal Pain     States re occurring abdominal pain.  Dx with diverticulitis and was IP.  Sent home with PICC and IV antibiotics.  Was seen in ER since discharge for same.  States is no better.  Also, complains of asthma.       Patient with history diverticulitis, some question about possible localized peritonitis, previously admitted to hospital, discharged with outpatient IV antibiotics, returns today endorsing continued abdominal pain.  She is alert, hemodynamically stable in the ED. reporting pain does not seem to be improving.        Review of patient's allergies indicates:   Allergen Reactions    Latex Anaphylaxis and Edema    Zosyn [piperacillin-tazobactam] Anaphylaxis     Past Medical History:   Diagnosis Date    Asthma     Diverticulitis     Diverticulosis     GERD (gastroesophageal reflux disease)     Graves disease      Past Surgical History:   Procedure Laterality Date    COLONOSCOPY, WITH DIRECTED SUBMUCOSAL INJECTION N/A 4/8/2024    Procedure: COLONOSCOPY, WITH DIRECTED SUBMUCOSAL INJECTION;  Surgeon: Heather Lance MD;  Location: [facility] ENDOSCOPY;  Service: Gastroenterology;  Laterality: N/A;     Family History   Problem Relation Name Age of Onset    Hypothyroidism Mother       Social History     Tobacco Use    Smoking status: Every Day     Current packs/day: 0.50     Average packs/day: 0.5 packs/day for 42.0 years (21.0 ttl pk-yrs)     Types: Cigarettes     Start date: 1/1/1983    Smokeless tobacco: Current    Tobacco comments:     Ambulatory referral to Smoking Cessation clinic following hospital discharge.    Substance Use Topics    Alcohol use: Not Currently    Drug use: Never     Review of Systems    Physical Exam     Initial Vitals [12/21/24 0152]   BP Pulse Resp Temp SpO2   128/67 94 17 98.2 °F (36.8 °C) 99 %      MAP       --         Physical Exam    Constitutional: She appears well-developed and  well-nourished.   HENT:   Head: Normocephalic and atraumatic.   Eyes: Conjunctivae and EOM are normal. Pupils are equal, round, and reactive to light.   Neck: No tracheal deviation present.   Normal range of motion.  Cardiovascular:  Normal rate, regular rhythm and normal heart sounds.           Pulmonary/Chest: Breath sounds normal. No respiratory distress. She exhibits no tenderness.   Abdominal: Abdomen is soft. She exhibits no distension. There is no abdominal tenderness. There is no rebound.   Musculoskeletal:         General: No tenderness. Normal range of motion.      Cervical back: Normal range of motion.     Neurological: She is alert and oriented to person, place, and time. GCS score is 15. GCS eye subscore is 4. GCS verbal subscore is 5. GCS motor subscore is 6.   Skin: No rash and no abscess noted.   Psychiatric: She has a normal mood and affect. Her behavior is normal. Judgment and thought content normal.         ED Course   Procedures  Labs Reviewed   COMPREHENSIVE METABOLIC PANEL - Abnormal       Result Value    Sodium 139      Potassium 3.8      Chloride 109 (*)     CO2 22      Glucose 75      Blood Urea Nitrogen 5.5 (*)     Creatinine 0.81      Calcium 10.0      Protein Total 7.4      Albumin 3.5      Globulin 3.9 (*)     Albumin/Globulin Ratio 0.9 (*)     Bilirubin Total 0.6      ALP 79      ALT 18      AST 19      eGFR >60      Anion Gap 8.0      BUN/Creatinine Ratio 7     URINALYSIS, REFLEX TO URINE CULTURE - Abnormal    Color, UA Light-Yellow      Appearance, UA Clear      Specific Gravity, UA 1.013      pH, UA 5.5      Protein, UA Negative      Glucose, UA Normal      Ketones, UA 1+ (*)     Blood, UA 1+ (*)     Bilirubin, UA Negative      Urobilinogen, UA Normal      Nitrites, UA Negative      Leukocyte Esterase, UA Negative      RBC, UA 0-5      WBC, UA 0-5      Bacteria, UA None Seen      Squamous Epithelial Cells, UA Trace (*)     Mucous, UA Trace (*)     Hyaline Casts, UA None Seen     CBC  WITH DIFFERENTIAL - Abnormal    WBC 7.61      RBC 4.27      Hgb 13.7      Hct 41.7      MCV 97.7 (*)     MCH 32.1 (*)     MCHC 32.9 (*)     RDW 12.5      Platelet 261      MPV 10.6 (*)     Neut % 52.0      Lymph % 32.7      Mono % 11.0      Eos % 3.7      Basophil % 0.3      Lymph # 2.49      Neut # 3.96      Mono # 0.84      Eos # 0.28      Baso # 0.02      IG# 0.02      IG% 0.3      NRBC% 0.0     CBC W/ AUTO DIFFERENTIAL    Narrative:     The following orders were created for panel order CBC auto differential.  Procedure                               Abnormality         Status                     ---------                               -----------         ------                     CBC with Differential[[id number]]       Abnormal            Final result                 Please view results for these tests on the individual orders.   POCT URINE PREGNANCY    POC Preg Test, Ur Negative       Acceptable Yes            Imaging Results              CT Abdomen Pelvis With IV Contrast NO Oral Contrast (Preliminary result)  Result time 12/21/24 04:31:05      Preliminary result by Gregor Pritchett MD (12/21/24 04:31:05)                   Narrative:    START OF REPORT:  Technique: CT of the abdomen and pelvis was performed with axial images as well as sagittal and coronal reconstruction images with intravenous contrast.    Comparison: Comparison is with study dated 2024-12-10 22:17:23.    Clinical History: States re occurring abdominal pain. Dx with diverticulitis and was IP. Sent home with PICC and IV antibiotics. Was seen in ER since discharge for same. States is no better. Also, complains of asthma.    Dosage Information: Automated Exposure Control was utilized 259.74 mGy.cm.    Findings:  Lines and Tubes: None.  Thorax:  Lungs: Minimal streaky opacity is present at the visualized lung bases, consistent with nonspecific dependent changes scarring and atelectasis. No focal infiltrate or consolidation is  seen.  Pleura: No effusions or thickening are seen.  Heart: The heart size is within normal limits.  Abdomen:  Abdominal Wall: No abdominal wall pathology is seen.  Liver: The liver appears unremarkable.  Biliary System: No intrahepatic or extrahepatic biliary duct dilatation is seen.  Gallbladder: The gallbladder appears unremarkable with no stones wall thickening or pericholecystic inflammatory changes or fluid.  Pancreas: The pancreas appears unremarkable.  Spleen: The spleen appears unremarkable.  Adrenals: The adrenal glands appear unremarkable.  Kidneys: The kidneys appear unremarkable with no stones cysts masses or hydronephrosis with IV contrast decreasing sensitivity and specificity for stones.  Aorta: The visualized abdominal aorta appears unremarkable.  IVC: Unremarkable.  Bowel:  Esophagus: The visualized distal esophagus appears unremarkable.  Stomach: The stomach appears unremarkable.  Duodenum: Unremarkable appearing duodenum.  Small Bowel: The small bowel appears unremarkable.  Colon: Numerous diverticula are seen in the hepatic flexure through to the descending. There is stable focal wall thickening in the distal sigmoid colon along with unchanged mild pericolic fat stranding(Series 2, Image 103). This could indicate stable diverticulitis, though an underlying mass lesion is not excluded. No perforation or abscess is identified.  Appendix: The appendix appears unremarkable and is seen on Image 80, Series 2 through Image 88, Series 2.  Peritoneum: No intraperitoneal free air or ascites is seen.    Pelvis:  Bladder: The bladder is nondistended and cannot be definitively evaluated.  Female:  Uterus: The uterus appears unremarkable.  Ovaries: No adnexal masses are seen.    Bony structures:  Dorsal Spine: There is mild multilevel spondylosis of the visualized dorsal spine.      Impression:  1. Numerous diverticula are seen in the hepatic flexure through to the descending. There is stable focal wall  thickening in the distal sigmoid colon along with unchanged mild pericolic fat stranding(Series 2, Image 103). This could indicate stable diverticulitis, though an underlying mass lesion is not excluded. No perforation or abscess is identified. Correlate with clinical and laboratory findings as regards additional evaluation and recommend additional follow-up as indicated.  2. Details and other findings as discussed above.                                      X-Rays:   Independently Interpreted Readings:   Other Readings:  Today's CT continues to demonstrate diverticular disease, there are no focal collections nor abscesses at present.    Medications   morphine injection 6 mg (6 mg Intravenous Given 12/21/24 0228)   iohexoL (OMNIPAQUE 350) 350 mg iodine/mL injection (95 mLs Intravenous Given 12/21/24 0330)     Medical Decision Making  Amount and/or Complexity of Data Reviewed  External Data Reviewed: notes.  Labs: ordered. Decision-making details documented in ED Course.     Details: As above;  Radiology: ordered and independent interpretation performed. Decision-making details documented in ED Course.     Details: Today's CT continues to demonstrate diverticular disease, there are no focal collections nor abscesses at present.    Risk  Prescription drug management.  Risk Details: Patient will continue getting her antibiotics as directed by the ID team she will continue to follow up with outpatient care teams.  We do plan to prescribe a few doses of Norco given she reports this had historically improved her symptoms.  She is discharged in stable condition with anticipatory guidance, return precautions, follow-up instructions.               ED Course as of 12/21/24 0516   Sat Dec 21, 2024   0336 Reassuring chemistries; [CT]   0336 Reassuring hemogram; [CT]   0414 Reassuring urinalysis; [CT]   0415 Reassuring chemistries; [CT]      ED Course User Index  [CT] Sid Jean MD                           Clinical  Impression:  Final diagnoses:  [K57.90] Diverticulosis (Primary)          ED Disposition Condition    Discharge Stable          ED Prescriptions       Medication Sig Dispense Start Date End Date Auth. Provider    HYDROcodone-acetaminophen (NORCO) 5-325 mg per tablet Take 1 tablet by mouth every 6 (six) hours as needed. 12 tablet 12/21/2024 -- Sid Jean MD          Follow-up Information       Follow up With Specialties Details Why Contact Info    [facility] Emergency Medicine  As needed, If symptoms worsen [address] [facility] [address] [phone]    Oswaldo Riggs, NP Family Medicine Call   [address] [id number] [phone]               Sid Jean MD  12/21/24 0576

## 2024-12-23 ENCOUNTER — LAB REQUISITION (OUTPATIENT)
Dept: LAB | Facility: HOSPITAL | Age: 54
End: 2024-12-23
Payer: MEDICAID

## 2024-12-23 DIAGNOSIS — K57.33 DIVERTICULITIS OF LARGE INTESTINE WITHOUT PERFORATION OR ABSCESS WITH BLEEDING: ICD-10-CM

## 2024-12-23 LAB
ALBUMIN SERPL-MCNC: 3.6 G/DL (ref 3.5–5)
ALBUMIN/GLOB SERPL: 1 RATIO (ref 1.1–2)
ALP SERPL-CCNC: 75 UNIT/L (ref 40–150)
ALT SERPL-CCNC: 19 UNIT/L (ref 0–55)
ANION GAP SERPL CALC-SCNC: 4 MEQ/L
AST SERPL-CCNC: 23 UNIT/L (ref 5–34)
BASOPHILS # BLD AUTO: 0.02 X10(3)/MCL
BASOPHILS NFR BLD AUTO: 0.3 %
BILIRUB SERPL-MCNC: 0.3 MG/DL
BUN SERPL-MCNC: 5.2 MG/DL (ref 9.8–20.1)
CALCIUM SERPL-MCNC: 9.9 MG/DL (ref 8.4–10.2)
CHLORIDE SERPL-SCNC: 112 MMOL/L (ref 98–107)
CO2 SERPL-SCNC: 23 MMOL/L (ref 22–29)
CREAT SERPL-MCNC: 0.82 MG/DL (ref 0.55–1.02)
CREAT/UREA NIT SERPL: 6
CRP SERPL-MCNC: 32.4 MG/L
EOSINOPHIL # BLD AUTO: 0.5 X10(3)/MCL (ref 0–0.9)
EOSINOPHIL NFR BLD AUTO: 7.5 %
ERYTHROCYTE [DISTWIDTH] IN BLOOD BY AUTOMATED COUNT: 12.5 % (ref 11.5–17)
ERYTHROCYTE [SEDIMENTATION RATE] IN BLOOD: 59 MM/HR (ref 0–20)
GFR SERPLBLD CREATININE-BSD FMLA CKD-EPI: >60 ML/MIN/1.73/M2
GLOBULIN SER-MCNC: 3.5 GM/DL (ref 2.4–3.5)
GLUCOSE SERPL-MCNC: 100 MG/DL (ref 74–100)
HCT VFR BLD AUTO: 42.1 % (ref 37–47)
HGB BLD-MCNC: 14 G/DL (ref 12–16)
IMM GRANULOCYTES # BLD AUTO: 0.01 X10(3)/MCL (ref 0–0.04)
IMM GRANULOCYTES NFR BLD AUTO: 0.1 %
LYMPHOCYTES # BLD AUTO: 3.38 X10(3)/MCL (ref 0.6–4.6)
LYMPHOCYTES NFR BLD AUTO: 50.6 %
MCH RBC QN AUTO: 32.7 PG (ref 27–31)
MCHC RBC AUTO-ENTMCNC: 33.3 G/DL (ref 33–36)
MCV RBC AUTO: 98.4 FL (ref 80–94)
MONOCYTES # BLD AUTO: 0.64 X10(3)/MCL (ref 0.1–1.3)
MONOCYTES NFR BLD AUTO: 9.6 %
NEUTROPHILS # BLD AUTO: 2.13 X10(3)/MCL (ref 2.1–9.2)
NEUTROPHILS NFR BLD AUTO: 31.9 %
NRBC BLD AUTO-RTO: 0 %
PLATELET # BLD AUTO: 275 X10(3)/MCL (ref 130–400)
PMV BLD AUTO: 11.6 FL (ref 7.4–10.4)
POTASSIUM SERPL-SCNC: 3.9 MMOL/L (ref 3.5–5.1)
PROT SERPL-MCNC: 7.1 GM/DL (ref 6.4–8.3)
RBC # BLD AUTO: 4.28 X10(6)/MCL (ref 4.2–5.4)
SODIUM SERPL-SCNC: 139 MMOL/L (ref 136–145)
WBC # BLD AUTO: 6.68 X10(3)/MCL (ref 4.5–11.5)

## 2024-12-23 PROCEDURE — 80053 COMPREHEN METABOLIC PANEL: CPT | Performed by: STUDENT IN AN ORGANIZED HEALTH CARE EDUCATION/TRAINING PROGRAM

## 2024-12-23 PROCEDURE — 85025 COMPLETE CBC W/AUTO DIFF WBC: CPT | Performed by: STUDENT IN AN ORGANIZED HEALTH CARE EDUCATION/TRAINING PROGRAM

## 2024-12-23 PROCEDURE — 86140 C-REACTIVE PROTEIN: CPT | Performed by: STUDENT IN AN ORGANIZED HEALTH CARE EDUCATION/TRAINING PROGRAM

## 2024-12-23 PROCEDURE — 85652 RBC SED RATE AUTOMATED: CPT | Performed by: STUDENT IN AN ORGANIZED HEALTH CARE EDUCATION/TRAINING PROGRAM

## 2024-12-24 ENCOUNTER — HOSPITAL ENCOUNTER (INPATIENT)
Facility: HOSPITAL | Age: 54
LOS: 2 days | Discharge: HOME | DRG: 392 | End: 2024-12-26
Attending: STUDENT IN AN ORGANIZED HEALTH CARE EDUCATION/TRAINING PROGRAM | Admitting: STUDENT IN AN ORGANIZED HEALTH CARE EDUCATION/TRAINING PROGRAM
Payer: MEDICAID

## 2024-12-24 ENCOUNTER — OFFICE VISIT (OUTPATIENT)
Dept: INFECTIOUS DISEASES | Facility: CLINIC | Age: 54
End: 2024-12-24
Payer: MEDICAID

## 2024-12-24 VITALS
TEMPERATURE: 98 F | SYSTOLIC BLOOD PRESSURE: 101 MMHG | WEIGHT: 143.06 LBS | DIASTOLIC BLOOD PRESSURE: 66 MMHG | RESPIRATION RATE: 15 BRPM | HEIGHT: 59 IN | BODY MASS INDEX: 28.84 KG/M2 | HEART RATE: 93 BPM

## 2024-12-24 DIAGNOSIS — J45.909 ASTHMA, UNSPECIFIED ASTHMA SEVERITY, UNSPECIFIED WHETHER COMPLICATED, UNSPECIFIED WHETHER PERSISTENT: ICD-10-CM

## 2024-12-24 DIAGNOSIS — R07.9 CHEST PAIN: ICD-10-CM

## 2024-12-24 DIAGNOSIS — K57.92 DIVERTICULITIS: ICD-10-CM

## 2024-12-24 DIAGNOSIS — K57.20 DIVERTICULITIS OF LARGE INTESTINE WITH PERFORATION, UNSPECIFIED BLEEDING STATUS: Primary | ICD-10-CM

## 2024-12-24 DIAGNOSIS — Z86.19 HISTORY OF ESBL E. COLI INFECTION: ICD-10-CM

## 2024-12-24 DIAGNOSIS — K57.92 DIVERTICULITIS: Primary | ICD-10-CM

## 2024-12-24 DIAGNOSIS — R10.9 ACUTE ABDOMINAL PAIN: ICD-10-CM

## 2024-12-24 DIAGNOSIS — K57.90 DIVERTICULOSIS: ICD-10-CM

## 2024-12-24 LAB
ALBUMIN SERPL-MCNC: 3.6 G/DL (ref 3.5–5)
ALBUMIN/GLOB SERPL: 0.9 RATIO (ref 1.1–2)
ALP SERPL-CCNC: 81 UNIT/L (ref 40–150)
ALT SERPL-CCNC: 20 UNIT/L (ref 0–55)
ANION GAP SERPL CALC-SCNC: 10 MEQ/L
AST SERPL-CCNC: 21 UNIT/L (ref 5–34)
BASOPHILS # BLD AUTO: 0.01 X10(3)/MCL
BASOPHILS NFR BLD AUTO: 0.2 %
BILIRUB SERPL-MCNC: 0.4 MG/DL
BUN SERPL-MCNC: 7 MG/DL (ref 9.8–20.1)
CALCIUM SERPL-MCNC: 10.3 MG/DL (ref 8.4–10.2)
CHLORIDE SERPL-SCNC: 110 MMOL/L (ref 98–107)
CO2 SERPL-SCNC: 23 MMOL/L (ref 22–29)
CREAT SERPL-MCNC: 0.79 MG/DL (ref 0.55–1.02)
CREAT/UREA NIT SERPL: 9
EOSINOPHIL # BLD AUTO: 0.27 X10(3)/MCL (ref 0–0.9)
EOSINOPHIL NFR BLD AUTO: 4.5 %
ERYTHROCYTE [DISTWIDTH] IN BLOOD BY AUTOMATED COUNT: 12.6 % (ref 11.5–17)
GFR SERPLBLD CREATININE-BSD FMLA CKD-EPI: >60 ML/MIN/1.73/M2
GLOBULIN SER-MCNC: 4.2 GM/DL (ref 2.4–3.5)
GLUCOSE SERPL-MCNC: 79 MG/DL (ref 74–100)
HCT VFR BLD AUTO: 41.7 % (ref 37–47)
HGB BLD-MCNC: 13.9 G/DL (ref 12–16)
IMM GRANULOCYTES # BLD AUTO: 0.01 X10(3)/MCL (ref 0–0.04)
IMM GRANULOCYTES NFR BLD AUTO: 0.2 %
LACTATE SERPL-SCNC: 1.7 MMOL/L (ref 0.5–2.2)
LYMPHOCYTES # BLD AUTO: 1.56 X10(3)/MCL (ref 0.6–4.6)
LYMPHOCYTES NFR BLD AUTO: 25.7 %
MCH RBC QN AUTO: 32.5 PG (ref 27–31)
MCHC RBC AUTO-ENTMCNC: 33.3 G/DL (ref 33–36)
MCV RBC AUTO: 97.4 FL (ref 80–94)
MONOCYTES # BLD AUTO: 0.42 X10(3)/MCL (ref 0.1–1.3)
MONOCYTES NFR BLD AUTO: 6.9 %
NEUTROPHILS # BLD AUTO: 3.79 X10(3)/MCL (ref 2.1–9.2)
NEUTROPHILS NFR BLD AUTO: 62.5 %
NRBC BLD AUTO-RTO: 0 %
PLATELET # BLD AUTO: 236 X10(3)/MCL (ref 130–400)
PMV BLD AUTO: 11.1 FL (ref 7.4–10.4)
POTASSIUM SERPL-SCNC: 3.7 MMOL/L (ref 3.5–5.1)
PROT SERPL-MCNC: 7.8 GM/DL (ref 6.4–8.3)
RBC # BLD AUTO: 4.28 X10(6)/MCL (ref 4.2–5.4)
SODIUM SERPL-SCNC: 143 MMOL/L (ref 136–145)
T4 FREE SERPL-MCNC: 1.08 NG/DL (ref 0.7–1.48)
TSH SERPL-ACNC: 0.56 UIU/ML (ref 0.35–4.94)
WBC # BLD AUTO: 6.06 X10(3)/MCL (ref 4.5–11.5)

## 2024-12-24 PROCEDURE — 63600175 PHARM REV CODE 636 W HCPCS

## 2024-12-24 PROCEDURE — 36415 COLL VENOUS BLD VENIPUNCTURE: CPT

## 2024-12-24 PROCEDURE — 87040 BLOOD CULTURE FOR BACTERIA: CPT

## 2024-12-24 PROCEDURE — 25500020 PHARM REV CODE 255

## 2024-12-24 PROCEDURE — 94760 N-INVAS EAR/PLS OXIMETRY 1: CPT

## 2024-12-24 PROCEDURE — 99900035 HC TECH TIME PER 15 MIN (STAT)

## 2024-12-24 PROCEDURE — 25000242 PHARM REV CODE 250 ALT 637 W/ HCPCS

## 2024-12-24 PROCEDURE — 84439 ASSAY OF FREE THYROXINE: CPT

## 2024-12-24 PROCEDURE — 25000003 PHARM REV CODE 250

## 2024-12-24 PROCEDURE — 21400001 HC TELEMETRY ROOM

## 2024-12-24 PROCEDURE — 80053 COMPREHEN METABOLIC PANEL: CPT

## 2024-12-24 PROCEDURE — 85025 COMPLETE CBC W/AUTO DIFF WBC: CPT

## 2024-12-24 PROCEDURE — 84443 ASSAY THYROID STIM HORMONE: CPT

## 2024-12-24 PROCEDURE — 99214 OFFICE O/P EST MOD 30 MIN: CPT | Mod: PBBFAC

## 2024-12-24 PROCEDURE — 94640 AIRWAY INHALATION TREATMENT: CPT

## 2024-12-24 PROCEDURE — 83605 ASSAY OF LACTIC ACID: CPT

## 2024-12-24 RX ORDER — MONTELUKAST SODIUM 5 MG/1
10 TABLET, CHEWABLE ORAL DAILY
Status: DISCONTINUED | OUTPATIENT
Start: 2024-12-24 | End: 2024-12-26 | Stop reason: HOSPADM

## 2024-12-24 RX ORDER — FLUTICASONE PROPIONATE 50 MCG
1 SPRAY, SUSPENSION (ML) NASAL DAILY PRN
Status: DISCONTINUED | OUTPATIENT
Start: 2024-12-24 | End: 2024-12-26 | Stop reason: HOSPADM

## 2024-12-24 RX ORDER — HYDROCODONE BITARTRATE AND ACETAMINOPHEN 5; 325 MG/1; MG/1
1 TABLET ORAL EVERY 6 HOURS PRN
Status: DISCONTINUED | OUTPATIENT
Start: 2024-12-24 | End: 2024-12-25

## 2024-12-24 RX ORDER — AMOXICILLIN 250 MG
1 CAPSULE ORAL 2 TIMES DAILY PRN
Status: DISCONTINUED | OUTPATIENT
Start: 2024-12-24 | End: 2024-12-26 | Stop reason: HOSPADM

## 2024-12-24 RX ORDER — ENOXAPARIN SODIUM 100 MG/ML
40 INJECTION SUBCUTANEOUS EVERY 24 HOURS
Status: DISCONTINUED | OUTPATIENT
Start: 2024-12-24 | End: 2024-12-26 | Stop reason: HOSPADM

## 2024-12-24 RX ORDER — SODIUM CHLORIDE 0.9 % (FLUSH) 0.9 %
10 SYRINGE (ML) INJECTION EVERY 12 HOURS PRN
Status: DISCONTINUED | OUTPATIENT
Start: 2024-12-24 | End: 2024-12-26 | Stop reason: HOSPADM

## 2024-12-24 RX ORDER — ACETAMINOPHEN 325 MG/1
650 TABLET ORAL EVERY 8 HOURS PRN
Status: DISCONTINUED | OUTPATIENT
Start: 2024-12-24 | End: 2024-12-26 | Stop reason: HOSPADM

## 2024-12-24 RX ORDER — ONDANSETRON HYDROCHLORIDE 2 MG/ML
8 INJECTION, SOLUTION INTRAVENOUS EVERY 8 HOURS PRN
Status: DISCONTINUED | OUTPATIENT
Start: 2024-12-24 | End: 2024-12-26 | Stop reason: HOSPADM

## 2024-12-24 RX ORDER — TALC
9 POWDER (GRAM) TOPICAL NIGHTLY PRN
Status: DISCONTINUED | OUTPATIENT
Start: 2024-12-24 | End: 2024-12-26 | Stop reason: HOSPADM

## 2024-12-24 RX ORDER — FLUTICASONE FUROATE AND VILANTEROL 200; 25 UG/1; UG/1
1 POWDER RESPIRATORY (INHALATION) DAILY
Status: DISCONTINUED | OUTPATIENT
Start: 2024-12-25 | End: 2024-12-26 | Stop reason: HOSPADM

## 2024-12-24 RX ORDER — ACETAMINOPHEN 325 MG/1
650 TABLET ORAL EVERY 4 HOURS PRN
Status: DISCONTINUED | OUTPATIENT
Start: 2024-12-24 | End: 2024-12-26 | Stop reason: HOSPADM

## 2024-12-24 RX ORDER — ONDANSETRON HYDROCHLORIDE 2 MG/ML
4 INJECTION, SOLUTION INTRAVENOUS EVERY 8 HOURS PRN
Status: DISCONTINUED | OUTPATIENT
Start: 2024-12-24 | End: 2024-12-26 | Stop reason: HOSPADM

## 2024-12-24 RX ORDER — IPRATROPIUM BROMIDE AND ALBUTEROL SULFATE 2.5; .5 MG/3ML; MG/3ML
3 SOLUTION RESPIRATORY (INHALATION) EVERY 4 HOURS PRN
Status: DISCONTINUED | OUTPATIENT
Start: 2024-12-24 | End: 2024-12-26 | Stop reason: HOSPADM

## 2024-12-24 RX ORDER — ALUMINUM HYDROXIDE, MAGNESIUM HYDROXIDE, AND SIMETHICONE 1200; 120; 1200 MG/30ML; MG/30ML; MG/30ML
30 SUSPENSION ORAL 4 TIMES DAILY PRN
Status: DISCONTINUED | OUTPATIENT
Start: 2024-12-24 | End: 2024-12-26 | Stop reason: HOSPADM

## 2024-12-24 RX ORDER — PANTOPRAZOLE SODIUM 40 MG/1
40 TABLET, DELAYED RELEASE ORAL DAILY
Status: DISCONTINUED | OUTPATIENT
Start: 2024-12-24 | End: 2024-12-26 | Stop reason: HOSPADM

## 2024-12-24 RX ADMIN — IPRATROPIUM BROMIDE AND ALBUTEROL SULFATE 3 ML: .5; 3 SOLUTION RESPIRATORY (INHALATION) at 02:12

## 2024-12-24 RX ADMIN — MEROPENEM 2 G: 2 INJECTION, POWDER, FOR SOLUTION INTRAVENOUS at 08:12

## 2024-12-24 RX ADMIN — IPRATROPIUM BROMIDE AND ALBUTEROL SULFATE 3 ML: .5; 3 SOLUTION RESPIRATORY (INHALATION) at 08:12

## 2024-12-24 RX ADMIN — MEROPENEM 2 G: 2 INJECTION, POWDER, FOR SOLUTION INTRAVENOUS at 01:12

## 2024-12-24 RX ADMIN — SODIUM CHLORIDE, SODIUM LACTATE, POTASSIUM CHLORIDE, CALCIUM CHLORIDE 1000 ML: 20; 30; 600; 310 INJECTION, SOLUTION INTRAVENOUS at 09:12

## 2024-12-24 RX ADMIN — ENOXAPARIN SODIUM 40 MG: 40 INJECTION SUBCUTANEOUS at 04:12

## 2024-12-24 RX ADMIN — IPRATROPIUM BROMIDE AND ALBUTEROL SULFATE 3 ML: .5; 3 SOLUTION RESPIRATORY (INHALATION) at 12:12

## 2024-12-24 RX ADMIN — PANTOPRAZOLE SODIUM 40 MG: 40 TABLET, DELAYED RELEASE ORAL at 04:12

## 2024-12-24 RX ADMIN — MONTELUKAST SODIUM 10 MG: 5 TABLET, CHEWABLE ORAL at 12:12

## 2024-12-24 RX ADMIN — HYDROCODONE BITARTRATE AND ACETAMINOPHEN 1 TABLET: 5; 325 TABLET ORAL at 08:12

## 2024-12-24 RX ADMIN — HYDROCODONE BITARTRATE AND ACETAMINOPHEN 1 TABLET: 5; 325 TABLET ORAL at 12:12

## 2024-12-24 RX ADMIN — IOHEXOL 100 ML: 350 INJECTION, SOLUTION INTRAVENOUS at 01:12

## 2024-12-24 NOTE — PLAN OF CARE
ID plan of care    Please see ID clinic note dated from today as she was admitted from clinic due to worsening LLQ pain and failure of ertapenem treatment for diverticulitis.    Patient to [facility] from surgical intervention for source control. It seems she is now agreebale for transfer for this purpose. Please transfer to center for CRS services.    If she cannot be transferred, and her pain improved on meropenem, can discharge on this regimen. OPAT note below.    [facility]  Infectious Diseases OPAT Orders  2024     PATIENT: Niya Moeller  :          1970   MRN:         [id number]     DIAGNOSIS: Sigmoid diverticulitis    Review of patient's allergies indicates:   Allergen Reactions    Latex Anaphylaxis and Edema    Zosyn [piperacillin-tazobactam] Anaphylaxis        WEIGHT:   CURRENT: Estimated Creatinine Clearance: 69.3 mL/min (based on SCr of 0.79 mg/dL).     MEDICATIONS:   1) Meropenem 2g IV q8h  - Duration: 21 days  - End date: 1/15/25    **PLEASE FAX LAB RESULTS TO [phone]**  LABS:     Please check the following labs weekly x 3 weeks: CBC, CMP, CRP, and ESR    ** LABS are NOT to be drawn from PICC site**      Justin Valdivia MD  Infectious Diseases Faculty   of Medicine

## 2024-12-24 NOTE — H&P
[facility] Wards History & Physical Note     Resident Team: [facility] Medicine List 1  Attending Physician: Justin Valdivia, *  Resident: Angel Sanches      Date of Admit: 12/24/2024    Chief Complaint   Abdominal pain secondary to diverticulitis    Subjective:      History of Present Illness:  Niya Moeller is a 54 y.o.  female who has a history of chronic sigmoid diverticulitis with a history of perforation and abscess, hyperthyroid, asthma who presented to [facility] on 12/24/2024  with a primary complaint of abdominal pain.  Patient has a history of chronic sigmoid diverticulitis.  Has had several admissions in the past.  Currently she was receiving IV antibiotics at home with OPAT.  Patient was on ertapenem once daily.  Previously seen by General surgery.  Patient not amicable to surgery at [facility].  She will like to be transferred to [facility] in order to be evaluated by colorectal surgeon that can possibly complete a J-pouch or partial colectomy with anastomosis that would allow her to defecate without a colostomy bag.  I spoke to patient about this and she is agreeable to undergo surgery given several admissions to the hospital as well as worsening of her pain.  Patient presented to the ED earlier in the week where CT abdomen at that time showed worsening inflammatory changes in her sigmoid colon.  She was discharged at that time and returns for similar complaints. Currently she is endorsing subjective fevers, shaking, chills, severe abdominal pain.  She is denying diarrhea, constipation, nausea, vomiting, headache.        Past Medical History:  Past Medical History:   Diagnosis Date    Asthma     Diverticulitis     Diverticulosis     GERD (gastroesophageal reflux disease)     Graves disease        Past Surgical History:  Past Surgical History:   Procedure Laterality Date    COLONOSCOPY, WITH DIRECTED SUBMUCOSAL INJECTION N/A 4/8/2024    Procedure: COLONOSCOPY, WITH DIRECTED SUBMUCOSAL INJECTION;  Surgeon:  Heather Lance MD;  Location: [facility] ENDOSCOPY;  Service: Gastroenterology;  Laterality: N/A;       Family History:  Family History   Problem Relation Name Age of Onset    Hypothyroidism Mother         Social History:  Social History     Tobacco Use    Smoking status: Every Day     Current packs/day: 0.50     Average packs/day: 0.5 packs/day for 42.0 years (21.0 ttl pk-yrs)     Types: Cigarettes     Start date: 1/1/1983    Smokeless tobacco: Current    Tobacco comments:     Ambulatory referral to Smoking Cessation clinic following hospital discharge.    Substance Use Topics    Alcohol use: Not Currently    Drug use: Never       Allergies:  Review of patient's allergies indicates:   Allergen Reactions    Latex Anaphylaxis and Edema    Zosyn [piperacillin-tazobactam] Anaphylaxis       Home Medications:  Prior to Admission medications    Medication Sig Start Date End Date Taking? Authorizing Provider   albuterol (PROAIR HFA) 90 mcg/actuation inhaler Inhale 2 puffs into the lungs every 6 (six) hours as needed for Wheezing. Rescue 9/27/24 9/27/25  Pedro Sagastume, DO   albuterol (PROVENTIL) 2.5 mg /3 mL (0.083 %) nebulizer solution Take 2.5 mg by nebulization every 4 to 6 hours as needed for Wheezing or Shortness of Breath. 7/3/24   Provider, Historical   albuterol-ipratropium (DUO-NEB) 2.5 mg-0.5 mg/3 mL nebulizer solution Take 3 mLs by nebulization every 6 (six) hours as needed for Wheezing. Rescue 9/27/24 9/27/25  Pedro Sagastume,    budesonide-glycopyr-formoterol (BREZTRI AEROSPHERE) 160-9-4.8 mcg/actuation HFAA Inhale 2 puffs into the lungs 2 (two) times daily.  Patient not taking: Reported on 12/24/2024    Provider, Historical   cholecalciferol, vitamin D3, 1,250 mcg (50,000 unit) capsule Take 50,000 Units by mouth every 7 days. 5/28/24   Provider, Historical   dicyclomine (BENTYL) 10 MG capsule Take 10 mg by mouth 4 (four) times daily. 6/7/24   Provider, Historical   docusate sodium (COLACE) 100 MG capsule  "Take 1 capsule (100 mg total) by mouth 2 (two) times daily as needed for Constipation.  Patient not taking: Reported on 2024   Arely Oscar PA-C   fluticasone propionate (FLONASE) 50 mcg/actuation nasal spray 1 spray (50 mcg total) by Each Nostril route daily as needed for Allergies or Rhinitis. 23   Yeyo Andrews MD   HYDROcodone-acetaminophen (NORCO) 5-325 mg per tablet Take 1 tablet by mouth every 6 (six) hours as needed for Pain.  Patient not taking: Reported on 2024 12/10/24   Denny Urias MD   HYDROcodone-acetaminophen (NORCO) 5-325 mg per tablet Take 1 tablet by mouth every 6 (six) hours as needed. 24   Sid Jean MD   methIMAzole (TAPAZOLE) 5 MG Tab Take a half of a tablet daily to Total 2.5 mg  Patient not taking: Reported on 2024   Delaney Morris NP   montelukast (SINGULAIR) 10 mg tablet Take 10 mg by mouth once daily. 7/15/24   Provider, Historical   omeprazole (PRILOSEC) 20 MG capsule Take 1 capsule (20 mg total) by mouth 2 (two) times daily before meals. 3/12/24 3/12/25  Heather Lance MD         Review of Systems:  Negative for all symptoms other than those listed in HPI           Objective:   Last 24 Hour Vital Signs:  BP  Min: 97/53  Max: 101/66  Temp  Av °F (36.7 °C)  Min: 97.9 °F (36.6 °C)  Max: 98.1 °F (36.7 °C)  Pulse  Av  Min: 87  Max: 93  Resp  Av.5  Min: 15  Max: 18  SpO2  Av %  Min: 96 %  Max: 96 %  Height  Av' 11" (149.9 cm)  Min: 4' 11" (149.9 cm)  Max: 4' 11" (149.9 cm)  Weight  Av.9 kg (143 lb 1.3 oz)  Min: 64.9 kg (143 lb 1.3 oz)  Max: 64.9 kg (143 lb 1.3 oz)  There is no height or weight on file to calculate BMI.  No intake/output data recorded.    Physical Examination:  Physical Exam  Constitutional:       General: She is in acute distress.      Appearance: She is not ill-appearing or toxic-appearing.   Eyes:      Pupils: Pupils are equal, round, and reactive to light. "   Cardiovascular:      Rate and Rhythm: Normal rate and regular rhythm.      Heart sounds: No murmur heard.     Friction rub present. No gallop.   Pulmonary:      Effort: No respiratory distress.      Breath sounds: Wheezing (Bilateral expiratory) present.   Abdominal:      General: Abdomen is flat.      Palpations: Abdomen is soft.      Tenderness: There is abdominal tenderness (To superficial and deep palpation left lower quadrant).   Musculoskeletal:         General: No swelling, tenderness, deformity or signs of injury.   Skin:     General: Skin is warm and dry.      Coloration: Skin is not jaundiced or pale.      Findings: No erythema.   Neurological:      General: No focal deficit present.      Mental Status: She is oriented to person, place, and time.      Cranial Nerves: No cranial nerve deficit.      Sensory: Sensory deficit present.           Laboratory:  Most Recent Data:  CBC:   Lab Results   Component Value Date    WBC 6.68 12/23/2024    HGB 14.0 12/23/2024    HCT 42.1 12/23/2024     12/23/2024    MCV 98.4 (H) 12/23/2024    RDW 12.5 12/23/2024     WBC Differential:   Recent Labs   Lab 12/21/24  0220 12/23/24  1415   WBC 7.61 6.68   HGB 13.7 14.0   HCT 41.7 42.1    275   MCV 97.7* 98.4*     BMP:   Lab Results   Component Value Date     12/23/2024    K 3.9 12/23/2024     (H) 12/23/2024    CO2 23 12/23/2024    BUN 5.2 (L) 12/23/2024    CREATININE 0.82 12/23/2024    GLU 97 02/19/2023    CALCIUM 9.9 12/23/2024    MG 1.70 12/10/2024    PHOS 2.4 12/05/2024     LFTs:   Lab Results   Component Value Date    PROT 7.5 02/19/2023    ALBUMIN 3.6 12/23/2024    BILITOT 0.3 12/23/2024    AST 23 12/23/2024    ALKPHOS 75 12/23/2024    ALT 19 12/23/2024     Coags:   Lab Results   Component Value Date    INR 1.0 12/10/2024     FLP:   Lab Results   Component Value Date    CHOL 147 03/31/2022    HDL 51 03/31/2022    TRIG 61 03/31/2022     DM:   Lab Results   Component Value Date    HGBA1C 5.9 (H)  "11/23/2022    HGBA1C 5.3 06/28/2022    HGBA1C 5.4 03/31/2022    CREATININE 0.82 12/23/2024     Thyroid:   Lab Results   Component Value Date    TSH 1.251 11/26/2024    FREET4 3.09 (H) 01/22/2023      Anemia: No results found for: "IRON", "TIBC", "FERRITIN", "SATURATEDIRO"    Lab Results   Component Value Date    VCOACKDR70 426 08/17/2023       Lab Results   Component Value Date    FOLATE 9.6 12/05/2024        Cardiac:   Lab Results   Component Value Date    TROPONINI <0.010 12/10/2024    BNP 28.3 12/10/2024     Urinalysis:   Lab Results   Component Value Date    COLORU Light-Yellow 12/21/2024    SPECGRAV 1.010 02/15/2023    NITRITE Negative 12/21/2024    GLUCOSEU Negative 04/07/2022    KETONESU Negative 02/15/2023    UROBILINOGEN Normal 12/21/2024    WBCUA 0-5 12/21/2024       Trended Lab Data:  Recent Labs   Lab 12/21/24  0220 12/23/24  1415   WBC 7.61 6.68   HGB 13.7 14.0   HCT 41.7 42.1    275   MCV 97.7* 98.4*   RDW 12.5 12.5    139   K 3.8 3.9   * 112*   CO2 22 23   BUN 5.5* 5.2*   CREATININE 0.81 0.82   ALBUMIN 3.5 3.6   BILITOT 0.6 0.3   AST 19 23   ALKPHOS 79 75   ALT 18 19       Trended Cardiac Data:  No results for input(s): "TROPONINI", "CKTOTAL", "CKMB", "BNP" in the last 168 hours.          Radiology:      Assessment & Plan:       Chronic sigmoid diverticulitis  -was on ertapenem once daily with OPAT.  Failed therapy as worsening of diverticulitis in pain continues  -previously not amicable to surgery but now has changed her mind.  We will attempt transfer to [facility] for higher level care with colorectal surgery  -infectious disease consulted  -we will start Merrem 2 g q.8  -awaiting blood cultures  -CT abdomen repeat pending  -multimodal pain control in place  -OPAT referral for Merrem in place  -no suspicion of sepsis at this time.  We will continue to monitor vitals.  Though BP soft patient reports this is her baseline.  Maintain maps greater than 65.       Asthma  -currently " wheezing  -we will initiate DuoNebs as needed  -patient to bring breztri from home  -continue montelukast  -continue Flonase    Hyperthyroid  -pending TSH and T4   -continue methimazole 2.5 q.d. we will adjust after labs      CODE STATUS:  Full code  Access:  PIV  Antibiotics:  May have  Diet:  Adult regular  DVT Prophylaxis:  Lovenox  GI Prophylaxis:  none  Fluids:  P.o. intake      Disposition:  We will await decrease in abdominal pain, OPAT set up for [facility], and possible transfer for Colorectal surgery to [facility]          Angel Sanches MD  [employer] Internal Medicine PGY-2  12/24/2024

## 2024-12-24 NOTE — PROGRESS NOTES
[facility] Outpatient INFECTIOUS DISEASES Clinic Note    CHIEF COMPLAINT: Sigmoid diverticulitis with perforated    HISTORY OF PRESENT ILLNESS:     54-year-old female with a past medical history of chronic sigmoid diverticulitis with multiple episodes of diverticular abscess in the past, hyperthyroidism, and asthma who presents to ID clinic today for her follow up visit. She was recently Hospitalized in early 12/2024 for management of sigmoid diverticulitis and microperforation with contained abscess.  General surgery was consulted who recommended sigmoidectomy however patient refused as she wishes to undergo surgery with a colorectal surgeon.  Id was consulted who recommended transitioned to ertapenem to complete a 4 week course via OPAT with planned end date of 12/16/2024.  Despite antibiotics patient continued to endorse worsening left lower quadrant pain which prompted her to visit the ED on 2 separate occasions, last visit was on 12/21.  CT abdomen was performed at that time which showed continued inflammatory changes the sigmoid colon without perforation or abscess.  Patient was discharged home with [place] however she reports this is not controlled her pain.    Today she reports she is in significant pain which is debilitating her at home and she feels like she is miserable.  She also endorses chills, fever, nausea without vomiting, shortness of breath, and severe left lower quadrant pain.  She denies diarrhea, chest pain, dysuria, headache.    Overall she is tolerating ertapenem although she does state it makes her feel dizzy after she takes her dose in the morning.    Patient requests admission to adjust antibiotic regimen and obtain better pain control.  Discussed need for surgical intervention in clinic however she continues to decline our surgical team from performing intervention on her she wishes to see a colorectal surgeon in [place].  I also discussed the possibility of transferring to [facility]  for surgical consult intervention however she also declined this as well.    REVIEW OF SYSTEMS:  Review of Systems   Constitutional:  Positive for chills, fever and malaise/fatigue. Negative for weight loss.   HENT:  Negative for congestion and sore throat.    Eyes:  Negative for blurred vision.   Respiratory:  Positive for shortness of breath. Negative for cough and sputum production.    Cardiovascular:  Negative for chest pain, palpitations and leg swelling.   Gastrointestinal:  Positive for abdominal pain and nausea. Negative for diarrhea and vomiting.   Genitourinary:  Negative for dysuria.   Musculoskeletal:  Negative for joint pain.   Skin:  Negative for rash.   Neurological:  Negative for focal weakness, weakness and headaches.       PREVIOUS MEDICAL HISTORY:  Past Medical History:   Diagnosis Date    Asthma     Diverticulitis     Diverticulosis     GERD (gastroesophageal reflux disease)     Graves disease        PREVIOUS SURGICAL HISTORY:  Past Surgical History:   Procedure Laterality Date    COLONOSCOPY, WITH DIRECTED SUBMUCOSAL INJECTION N/A 4/8/2024    Procedure: COLONOSCOPY, WITH DIRECTED SUBMUCOSAL INJECTION;  Surgeon: Heather Lance MD;  Location: [facility] ENDOSCOPY;  Service: Gastroenterology;  Laterality: N/A;         ALLERGIES:  Review of patient's allergies indicates:   Allergen Reactions    Latex Anaphylaxis and Edema    Zosyn [piperacillin-tazobactam] Anaphylaxis         MEDICATIONS:  Current Outpatient Medications on File Prior to Visit   Medication Sig Dispense Refill    albuterol (PROAIR HFA) 90 mcg/actuation inhaler Inhale 2 puffs into the lungs every 6 (six) hours as needed for Wheezing. Rescue 18 g 1    albuterol (PROVENTIL) 2.5 mg /3 mL (0.083 %) nebulizer solution Take 2.5 mg by nebulization every 4 to 6 hours as needed for Wheezing or Shortness of Breath.      albuterol-ipratropium (DUO-NEB) 2.5 mg-0.5 mg/3 mL nebulizer solution Take 3 mLs by nebulization every 6 (six) hours as needed  for Wheezing. Rescue 75 mL 0    dicyclomine (BENTYL) 10 MG capsule Take 10 mg by mouth 4 (four) times daily.      fluticasone propionate (FLONASE) 50 mcg/actuation nasal spray 1 spray (50 mcg total) by Each Nostril route daily as needed for Allergies or Rhinitis. 18.2 mL 0    HYDROcodone-acetaminophen (NORCO) 5-325 mg per tablet Take 1 tablet by mouth every 6 (six) hours as needed. 12 tablet 0    montelukast (SINGULAIR) 10 mg tablet Take 10 mg by mouth once daily.      omeprazole (PRILOSEC) 20 MG capsule Take 1 capsule (20 mg total) by mouth 2 (two) times daily before meals. 60 capsule 11    budesonide-glycopyr-formoterol (BREZTRI AEROSPHERE) 160-9-4.8 mcg/actuation HFAA Inhale 2 puffs into the lungs 2 (two) times daily. (Patient not taking: Reported on 12/24/2024)      cholecalciferol, vitamin D3, 1,250 mcg (50,000 unit) capsule Take 50,000 Units by mouth every 7 days.      docusate sodium (COLACE) 100 MG capsule Take 1 capsule (100 mg total) by mouth 2 (two) times daily as needed for Constipation. (Patient not taking: Reported on 12/24/2024) 10 capsule 0    HYDROcodone-acetaminophen (NORCO) 5-325 mg per tablet Take 1 tablet by mouth every 6 (six) hours as needed for Pain. (Patient not taking: Reported on 12/24/2024) 12 tablet 0    methIMAzole (TAPAZOLE) 5 MG Tab Take a half of a tablet daily to Total 2.5 mg (Patient not taking: Reported on 12/24/2024) 15 tablet 11     No current facility-administered medications on file prior to visit.          SOCIAL HISTORY:    reports that she has been smoking cigarettes. She started smoking about 42 years ago. She has a 21 pack-year smoking history. She uses smokeless tobacco. She reports that she does not currently use alcohol. She reports that she does not use drugs.      FAMILY HISTORY:   Family History   Problem Relation Name Age of Onset    Hypothyroidism Mother         PHYSICAL EXAMINATION:  /66 (BP Location: Right arm, Patient Position: Sitting)   Pulse 93    "Temp 98.1 °F (36.7 °C)   Resp 15   Ht 4' 11" (1.499 m)   Wt 64.9 kg (143 lb 1.3 oz)   BMI 28.90 kg/m²  Body mass index is 28.9 kg/m².    Gen: awake, alert, NAD. Noted to be tremulous on exam due to pain.  HEENT: NC/AT, EOMi, anicteric sclera, no rhinorrhea, OP clear  Neck: no cervical LAD  CV: regular rate normal rhythm no murmur  Resp: easy WOB on RA CTABL no w/r/r  Abd: +BS, soft. Tenderness to palpation in LLQ and RLQ  Ext: warm, no LE edema  Skin: no rash  Neuro: no focal deficits   Lines: PICC      LABORATORY DATA:  Lab Results   Component Value Date    WBC 6.68 12/23/2024    WBC 7.61 12/21/2024    WBC 7.32 12/10/2024    HGB 14.0 12/23/2024    HGB 13.7 12/21/2024    HGB 12.5 12/10/2024     12/23/2024     12/21/2024     12/10/2024    CREATININE 0.82 12/23/2024    CREATININE 0.81 12/21/2024    CREATININE 0.74 12/10/2024    ALT 19 12/23/2024    ALT 18 12/21/2024    ALT 22 12/10/2024    AST 23 12/23/2024    AST 19 12/21/2024    AST 19 12/10/2024    ALKPHOS 75 12/23/2024    ALKPHOS 79 12/21/2024    ALKPHOS 72 12/10/2024    BILITOT 0.3 12/23/2024    BILITOT 0.6 12/21/2024    BILITOT 0.2 12/10/2024    INR 1.0 12/10/2024    INR 1.4 (H) 01/28/2024    INR 1.2 10/19/2023       MICROBIOLOGY DATA:    2/16/23 IR sigmoid colon abscess drainage cx  - Aerobic: E coli    - Anaerobic: Bacteroides thetaiotaomicron    IMAGING DATA:    11/30/24 CT abd/pelvis w/ IV   Impression:     Similar wall thickening of the sigmoid colon with surrounding inflammatory changes.  Suspected contained perforation with small adjacent collection of fluid and air, slightly increased from the prior exam.  No free intraperitoneal air or drainable fluid collection.    12/21/24 CT abd/pelvis with IV    Impression:  Impression:     1. Numerous diverticula are seen in the hepatic flexure through to the descending. There is stable focal wall thickening in the distal sigmoid colon along with unchanged mild pericolic fat " stranding(Series 2, Image 103). This could indicate stable diverticulitis, though an underlying mass lesion is not excluded. No perforation or abscess is identified. Correlate with clinical and laboratory findings as regards additional evaluation and recommend additional follow-up as indicated.  Recommend gastroenterology consultation.     2. Details and other findings as discussed above.    ASSESSMENT:    54-year-old female with a past medical history of chronic sigmoid diverticulitis with multiple episodes of diverticular abscess in the past, hyperthyroidism, and asthma who presents to ID clinic today for her follow up visit for management of diverticulitis with and sigmoid colon perforation with small abscess. Recent CT scan showed resolution of abscess however she still has persistent sigmoid diverticulitis and pain despite 3 weeks of ertapenem. She has a known history of multiple abscesses which were previously drained; per last cultures in 2/2023 ESBL organism was identified resistant to FQ antibiotics. She was treated with Ertapenem on now 3 separate occasions. Additionally she has been on multiple courses of PO antibiotics which sometimes result in pain control and clinical cure of diverticulitis.     She last underwent colonoscopy in 4/2024 and was found to have diverticulosis with narrowing of the colon and significant edema/erythema to the diverticular opening. Areas were tattooed for resection however the patient has not undergone surgery. Per discussion she does not wish to have surgery here or at [facility] and continues to state she wants to have this done elsewhere.     1) Sigmoid diverticulitis   - s/p 3 weeks of ertapenem without relief of symptoms or resolution on CT imaging  2) Diverticular abscess measuring 1x 1.8cm, resolved  3) h/o ESBL E coli diverticular abscess  4) h/o asthma  5) h/o Hyperthyroidism    Again had a long discussion with the patient today regarding need for surgical intervention as  "repeated courses of antibiotics will not result in overall cure of her pain/diverticulitis. However, since she did "fail" 3 weeks of ertapenem, will elect to treat with meropenem instead. Offered this to the patient but she requests admission for pain control.     PLAN:    - Admit to medicine team. Patient has left clinic to gather some items and will return for admission at that time. She is stable for discharge from clinic  - When she returns, will notify admission team  - When admitted, please do the following:   - Obtain blood cx x2   - Repeat CT abd/pelvis with IV and PO contrast   - Stop Ertapenem   - Start Meropenem 2g IV q8h  - Will defer pain management to primary team  - Again strongly recommend surgical intervention, but she refuses this to be done at this facility of at [facility]   - if she changes her mind, consider transfer to colo-rectal surgeon for sigmoidectomy  - Recommend continuing treatment with carbapenem based off last culture results which showed EBSL pathogen. Selecting meropenem due to failure of ertapenem.   - It is possible that she has now developed CRE organism due to frequent usage of carbapenems for treatment. If this is the case, patient needs surgical intervention for source control  - Will plan to treat with meropenem 2g IV q8h via OPAT. Please consult CM to assist setting this up with her infusion company   - Extending treatment by 3 weeks  - Can consult inpatient ID service after admission, but this will not resume until 12/30/24    RTC 4 weeks    Justin Valdivia MD  Infectious Diseases Faculty    "

## 2024-12-24 NOTE — PLAN OF CARE
12/24/24 1317   Discharge Assessment   Assessment Type Discharge Planning Assessment   Confirmed/corrected address, phone number and insurance Yes   Confirmed Demographics Correct on Facesheet   Source of Information patient;health record   When was your last doctors appointment?   (PCP: Raquel Lopez)   Does patient/caregiver understand observation status   (Inpatient)   Communicated ROXIE with patient/caregiver Date not available/Unable to determine   Reason For Admission K57.92 Diverticulitis   People in Home alone   Facility Arrived From: Home   Do you expect to return to your current living situation? Yes   Do you have help at home or someone to help you manage your care at home? Yes   Who are your caregiver(s) and their phone number(s)? Dustin Viveros, son, P: [phone]   Prior to hospitilization cognitive status: Alert/Oriented;No Deficits   Current cognitive status: Alert/Oriented;No Deficits   Walking or Climbing Stairs Difficulty no   Dressing/Bathing Difficulty no   Home Accessibility wheelchair accessible   Home Layout Able to live on 1st floor   Equipment Currently Used at Home none   Readmission within 30 days? No   Patient currently being followed by outpatient case management? No   Do you currently have service(s) that help you manage your care at home? No   Do you take prescription medications? Yes   Do you have prescription coverage? Yes   Coverage [employer] [facility] Medicaid   Do you have any problems affording any of your prescribed medications? No   Is the patient taking medications as prescribed? yes   Who is going to help you get home at discharge? Family/self   How do you get to doctors appointments? car, drives self;family or friend will provide   Are you on dialysis? No   Do you take coumadin? No   Discharge Plan A Home   DME Needed Upon Discharge  none   Discharge Plan discussed with: Patient   Transition of Care Barriers None     Patient has been receiving home infusions through  [facility]; goes to [facility] for PICC line dressing changed; unable to get HH services due to insurance issues.

## 2024-12-25 ENCOUNTER — E-CONSULT (OUTPATIENT)
Dept: SURGERY | Facility: CLINIC | Age: 54
End: 2024-12-25
Payer: MEDICAID

## 2024-12-25 DIAGNOSIS — K57.92 DIVERTICULITIS: Primary | ICD-10-CM

## 2024-12-25 LAB
ALBUMIN SERPL-MCNC: 2.8 G/DL (ref 3.5–5)
ALBUMIN/GLOB SERPL: 0.8 RATIO (ref 1.1–2)
ALP SERPL-CCNC: 72 UNIT/L (ref 40–150)
ALT SERPL-CCNC: 15 UNIT/L (ref 0–55)
ANION GAP SERPL CALC-SCNC: -2 MEQ/L
AST SERPL-CCNC: 15 UNIT/L (ref 5–34)
BASOPHILS # BLD AUTO: 0.01 X10(3)/MCL
BASOPHILS NFR BLD AUTO: 0.2 %
BILIRUB SERPL-MCNC: 0.3 MG/DL
BUN SERPL-MCNC: 6.5 MG/DL (ref 9.8–20.1)
CALCIUM SERPL-MCNC: 8.9 MG/DL (ref 8.4–10.2)
CHLORIDE SERPL-SCNC: 115 MMOL/L (ref 98–107)
CO2 SERPL-SCNC: 24 MMOL/L (ref 22–29)
CREAT SERPL-MCNC: 0.81 MG/DL (ref 0.55–1.02)
CREAT/UREA NIT SERPL: 8
EOSINOPHIL # BLD AUTO: 0.48 X10(3)/MCL (ref 0–0.9)
EOSINOPHIL NFR BLD AUTO: 9.5 %
ERYTHROCYTE [DISTWIDTH] IN BLOOD BY AUTOMATED COUNT: 12.3 % (ref 11.5–17)
GFR SERPLBLD CREATININE-BSD FMLA CKD-EPI: >60 ML/MIN/1.73/M2
GLOBULIN SER-MCNC: 3.4 GM/DL (ref 2.4–3.5)
GLUCOSE SERPL-MCNC: 87 MG/DL (ref 74–100)
HCT VFR BLD AUTO: 34.4 % (ref 37–47)
HGB BLD-MCNC: 11.4 G/DL (ref 12–16)
HOLD SPECIMEN: NORMAL
IMM GRANULOCYTES # BLD AUTO: 0.01 X10(3)/MCL (ref 0–0.04)
IMM GRANULOCYTES NFR BLD AUTO: 0.2 %
LYMPHOCYTES # BLD AUTO: 2.47 X10(3)/MCL (ref 0.6–4.6)
LYMPHOCYTES NFR BLD AUTO: 49 %
MAGNESIUM SERPL-MCNC: 1.8 MG/DL (ref 1.6–2.6)
MCH RBC QN AUTO: 32.2 PG (ref 27–31)
MCHC RBC AUTO-ENTMCNC: 33.1 G/DL (ref 33–36)
MCV RBC AUTO: 97.2 FL (ref 80–94)
MONOCYTES # BLD AUTO: 0.59 X10(3)/MCL (ref 0.1–1.3)
MONOCYTES NFR BLD AUTO: 11.7 %
NEUTROPHILS # BLD AUTO: 1.48 X10(3)/MCL (ref 2.1–9.2)
NEUTROPHILS NFR BLD AUTO: 29.4 %
NRBC BLD AUTO-RTO: 0 %
PHOSPHATE SERPL-MCNC: 3.5 MG/DL (ref 2.3–4.7)
PLATELET # BLD AUTO: 208 X10(3)/MCL (ref 130–400)
PMV BLD AUTO: 10.6 FL (ref 7.4–10.4)
POTASSIUM SERPL-SCNC: 3.3 MMOL/L (ref 3.5–5.1)
PROT SERPL-MCNC: 6.2 GM/DL (ref 6.4–8.3)
RBC # BLD AUTO: 3.54 X10(6)/MCL (ref 4.2–5.4)
SODIUM SERPL-SCNC: 137 MMOL/L (ref 136–145)
WBC # BLD AUTO: 5.04 X10(3)/MCL (ref 4.5–11.5)

## 2024-12-25 PROCEDURE — 94761 N-INVAS EAR/PLS OXIMETRY MLT: CPT

## 2024-12-25 PROCEDURE — 94640 AIRWAY INHALATION TREATMENT: CPT

## 2024-12-25 PROCEDURE — 83735 ASSAY OF MAGNESIUM: CPT

## 2024-12-25 PROCEDURE — 99451 NTRPROF PH1/NTRNET/EHR 5/>: CPT | Mod: S$PBB,,, | Performed by: COLON & RECTAL SURGERY

## 2024-12-25 PROCEDURE — 25000003 PHARM REV CODE 250

## 2024-12-25 PROCEDURE — 63600175 PHARM REV CODE 636 W HCPCS

## 2024-12-25 PROCEDURE — 84100 ASSAY OF PHOSPHORUS: CPT

## 2024-12-25 PROCEDURE — 21400001 HC TELEMETRY ROOM

## 2024-12-25 PROCEDURE — 25000242 PHARM REV CODE 250 ALT 637 W/ HCPCS

## 2024-12-25 PROCEDURE — 85025 COMPLETE CBC W/AUTO DIFF WBC: CPT

## 2024-12-25 PROCEDURE — 36415 COLL VENOUS BLD VENIPUNCTURE: CPT | Performed by: STUDENT IN AN ORGANIZED HEALTH CARE EDUCATION/TRAINING PROGRAM

## 2024-12-25 PROCEDURE — 36415 COLL VENOUS BLD VENIPUNCTURE: CPT

## 2024-12-25 PROCEDURE — 99900035 HC TECH TIME PER 15 MIN (STAT)

## 2024-12-25 PROCEDURE — 80053 COMPREHEN METABOLIC PANEL: CPT

## 2024-12-25 RX ORDER — POTASSIUM CHLORIDE 20 MEQ/1
40 TABLET, EXTENDED RELEASE ORAL ONCE
Status: COMPLETED | OUTPATIENT
Start: 2024-12-25 | End: 2024-12-25

## 2024-12-25 RX ORDER — HYDROCODONE BITARTRATE AND ACETAMINOPHEN 5; 325 MG/1; MG/1
1 TABLET ORAL EVERY 4 HOURS PRN
Status: DISCONTINUED | OUTPATIENT
Start: 2024-12-25 | End: 2024-12-26 | Stop reason: HOSPADM

## 2024-12-25 RX ADMIN — IPRATROPIUM BROMIDE AND ALBUTEROL SULFATE 3 ML: .5; 3 SOLUTION RESPIRATORY (INHALATION) at 11:12

## 2024-12-25 RX ADMIN — METHIMAZOLE 2.5 MG: 5 TABLET ORAL at 09:12

## 2024-12-25 RX ADMIN — PANTOPRAZOLE SODIUM 40 MG: 40 TABLET, DELAYED RELEASE ORAL at 09:12

## 2024-12-25 RX ADMIN — ENOXAPARIN SODIUM 40 MG: 40 INJECTION SUBCUTANEOUS at 05:12

## 2024-12-25 RX ADMIN — HYDROCODONE BITARTRATE AND ACETAMINOPHEN 1 TABLET: 5; 325 TABLET ORAL at 03:12

## 2024-12-25 RX ADMIN — HYDROCODONE BITARTRATE AND ACETAMINOPHEN 1 TABLET: 5; 325 TABLET ORAL at 09:12

## 2024-12-25 RX ADMIN — HYDROCODONE BITARTRATE AND ACETAMINOPHEN 1 TABLET: 5; 325 TABLET ORAL at 11:12

## 2024-12-25 RX ADMIN — FLUTICASONE PROPIONATE 50 MCG: 50 SPRAY, METERED NASAL at 09:12

## 2024-12-25 RX ADMIN — POTASSIUM CHLORIDE 40 MEQ: 1500 TABLET, EXTENDED RELEASE ORAL at 07:12

## 2024-12-25 RX ADMIN — HYDROCODONE BITARTRATE AND ACETAMINOPHEN 1 TABLET: 5; 325 TABLET ORAL at 07:12

## 2024-12-25 RX ADMIN — MEROPENEM 2 G: 2 INJECTION, POWDER, FOR SOLUTION INTRAVENOUS at 03:12

## 2024-12-25 RX ADMIN — FLUTICASONE FUROATE AND VILANTEROL TRIFENATATE 1 PUFF: 200; 25 POWDER RESPIRATORY (INHALATION) at 06:12

## 2024-12-25 RX ADMIN — MEROPENEM 2 G: 2 INJECTION, POWDER, FOR SOLUTION INTRAVENOUS at 12:12

## 2024-12-25 RX ADMIN — IPRATROPIUM BROMIDE AND ALBUTEROL SULFATE 3 ML: .5; 3 SOLUTION RESPIRATORY (INHALATION) at 06:12

## 2024-12-25 RX ADMIN — IPRATROPIUM BROMIDE AND ALBUTEROL SULFATE 3 ML: .5; 3 SOLUTION RESPIRATORY (INHALATION) at 03:12

## 2024-12-25 RX ADMIN — MONTELUKAST SODIUM 10 MG: 5 TABLET, CHEWABLE ORAL at 09:12

## 2024-12-25 RX ADMIN — MEROPENEM 2 G: 2 INJECTION, POWDER, FOR SOLUTION INTRAVENOUS at 07:12

## 2024-12-25 NOTE — PROGRESS NOTES
[facility] Internal Medicine  PROGRESS NOTE    Patient's Name: Niya Moeller  : 1970  MRN: [id number]    Admission Date: 2024  Length of Stay: 1  Attending Physician: Noel Spence MD  Resident: MD Myron  Intern: MD Kwame Woods MD    SUBJECTIVE   No chief complaint on file.    History of Present Illness:  Niya Moeller is a 54 y.o.  female who has a history of chronic sigmoid diverticulitis with a history of perforation and abscess, hyperthyroid, asthma who presented to [facility] on 2024  with a primary complaint of abdominal pain.  Patient has a history of chronic sigmoid diverticulitis.  Has had several admissions in the past.  Currently she was receiving IV antibiotics at home with OPAT.  Patient was on ertapenem once daily.  Previously seen by General surgery.  Patient not amicable to surgery at [facility].  She will like to be transferred to [facility] in order to be evaluated by colorectal surgeon that can possibly complete a J-pouch or partial colectomy with anastomosis that would allow her to defecate without a colostomy bag.  I spoke to patient about this and she is agreeable to undergo surgery given several admissions to the hospital as well as worsening of her pain.  Patient presented to the ED earlier in the week where CT abdomen at that time showed worsening inflammatory changes in her sigmoid colon.  She was discharged at that time and returns for similar complaints. Currently she is endorsing subjective fevers, shaking, chills, severe abdominal pain.  She is denying diarrhea, constipation, nausea, vomiting, headache.     Interval History:  NAEON. VSS. H, H 11.4, 34.4. K 3.3, repleted. No acute complaints overnight. IM staff spoke with patient regarding the option of transfer to [place] for an evaluation by surgery team there regarding J-pouch/partial colectomy with anastomosis. Patient is amenable to transfer as long as she can be  transported back to [place]. Per latest update from transfer center: Agree that patient needs surgery, they will make her a tele appointment. Surgery cannot be done at this time, they will wait for her flare-up to subside. Transfer center to call patient on Thursday to set up appointments.     Review of Systems:  A 12-pt ROS was conducted and was negative unless stated above.    OBJECTIVE     VITAL SIGNS: 24 HR MIN & MAX Most Recent Vitals   Temp  Min: 97.9 °F (36.6 °C)  Max: 98.5 °F (36.9 °C)  98 °F (36.7 °C)   BP  Min: 86/45  Max: 104/55  (!) 101/55    Pulse  Min: 70  Max: 98  80   Resp  Min: 15  Max: 20  16    SpO2  Min: 93 %  Max: 98 %  95 %    Body mass index is 29.07 kg/m².    Intake/Output:  No intake/output data recorded.    Physical Examination:  General: Nontoxic-appearing, well nourished, in no acute distress  Eye: PERRL, EOMI  HENT: Normocephalic, atraumatic, moist mucous membranes  Neck: Supple, nontender, no JVD  Respiratory: BL expiratory wheezes present, no rales, or rhonchi. Normal work of breathing.  Cardiovascular: Friction rub present. Regular rate and rhythm, no murmur, or gallops. No peripheral edema. 2+ radial pulses  Gastrointestinal: Soft, non-distended. Positive TTP upon deep palpation in LLQ  Musculoskeletal: Moves all extremities purposefully. No gross deformities. No calf tenderness  Integumentary: Warm, dry, intact. No rashes  Neurologic: Alert and oriented x3. Normal speech. Sensory deficit present.  Psychiatric: Appropriate mood and affect           Current Medications:  Scheduled:   enoxparin  40 mg Subcutaneous Daily    fluticasone furoate-vilanteroL  1 puff Inhalation Daily    meropenem IV (PEDS and ADULTS)  2 g Intravenous Q8H    methIMAzole  2.5 mg Oral Daily    montelukast  10 mg Oral Daily    pantoprazole  40 mg Oral Daily      Infusions:    PRNs:    Current Facility-Administered Medications:     acetaminophen, 650 mg, Oral, Q8H PRN    acetaminophen, 650 mg, Oral, Q4H PRN     "albuterol-ipratropium, 3 mL, Nebulization, Q4H PRN    aluminum-magnesium hydroxide-simethicone, 30 mL, Oral, QID PRN    fluticasone propionate, 1 spray, Each Nostril, Daily PRN    HYDROcodone-acetaminophen, 1 tablet, Oral, Q4H PRN    melatonin, 9 mg, Oral, Nightly PRN    ondansetron, 4 mg, Intravenous, Q8H PRN    ondansetron, 8 mg, Intravenous, Q8H PRN    senna-docusate 8.6-50 mg, 1 tablet, Oral, BID PRN    sodium chloride 0.9%, 10 mL, Intravenous, Q12H PRN  Labs:  CBC:  Recent Labs   Lab 12/23/24  1415 12/24/24  1234 12/25/24  0445   WBC 6.68 6.06 5.04   HGB 14.0 13.9 11.4*   HCT 42.1 41.7 34.4*    236 208   MCV 98.4* 97.4* 97.2*   RDW 12.5 12.6 12.3     BMP/CMP:  Recent Labs   Lab 12/23/24  1415 12/24/24  1234 12/25/24  0445    143 137   K 3.9 3.7 3.3*   * 110* 115*   CO2 23 23 24   BUN 5.2* 7.0* 6.5*   CREATININE 0.82 0.79 0.81   GLUCOSE 100 79 87   EGFRNORACEVR >60 >60 >60     RFTs/LFTs:  Recent Labs   Lab 12/23/24  1415 12/24/24  1234 12/25/24  0445   CALCIUM 9.9 10.3* 8.9   LABPROT 7.1 7.8 6.2*   ALBUMIN 3.6 3.6 2.8*   AST 23 21 15   ALT 19 20 15   ALKPHOS 75 81 72   BILITOT 0.3 0.4 0.3     Recent Labs   Lab 12/25/24  0445   MG 1.80   PHOS 3.5     Cardiac Panel:  No results for input(s): "TROPONINI", "CKTOTAL", "CKMB", "BNP" in the last 168 hours.  Interval Imaging:  CTA Abdomen and Pelvis  Narrative: EXAMINATION:  CTA ABDOMEN AND PELVIS    CLINICAL HISTORY:  diverticulitis;    TECHNIQUE:  Helically acquired images were obtained from the lung bases to the pubic symphysis prior to and after IV administration of contrast. Axial, sagittal, coronal and MIP reformations were interpreted.    Automated tube current modulation, weight-based exposure dosing, and/or iterative reconstruction technique utilized to reach lowest reasonably achievable exposure rate.    DLP: 395 mGy*cm    COMPARISON:  CT abdomen pelvis 12/21/2024 CT abdomen pelvis 11/30/2024    FINDINGS:  VASCULATURE:    Aorta: No " significant atherosclerosis or aneurysm.    Mesenteric arteries: No significant stenosis.    Renal arteries:No significant stenosis.    Iliac arteries: No significant stenosis.  Patent runoff to the groin.    HEART: Normal in size. No pericardial effusion.    LUNG BASES: Bibasilar bronchial wall thickening, endobronchial opacities and tree-in-bud nodularity.    LIVER: No arterially enhancing mass.    BILIARY: No calcified gallstones.    PANCREAS: No inflammatory change.    SPLEEN: Normal in size    ADRENALS: No mass.    KIDNEYS/URETERS: No hydronephrosis.    GI TRACT/MESENTERY: Persistent mild wall thickening and inflammatory change at the sigmoid colon.  Pericolonic density extending towards the anterior abdominal wall in the left lower quadrant again seen (12, 111).  No internal foci of gas in this region on today's exam.  There was gas in this region on November exam.     No active arterial extravasation seen.    PERITONEUM AND RETROPERITONEUM: No abscess.No free air.    LYMPH NODES: No enlarged lymph nodes by size criteria.    BLADDER: Normal appearance given degree of distention.    REPRODUCTIVE ORGANS: Normal as visualized.    SOFT TISSUES: Unremarkable.    BONES: No acute osseous abnormality.  Impression: 1. Chronic diverticular disease with mild inflammatory change at the sigmoid colon similar to previous.  2. No active arterial extravasation seen    Electronically signed by: Genesis Iraheta  Date:    12/24/2024  Time:    14:04     Microbiology:  Microbiology Results (last 7 days)       Procedure Component Value Units Date/Time    Blood Culture (site 1) [[id number]] Collected: 12/24/24 1200    Order Status: Resulted Specimen: Blood from Hand, Left Updated: 12/24/24 1301    Blood Culture (site 2) [[id number]] Collected: 12/24/24 1231    Order Status: Resulted Specimen: Blood from Hand, Right Updated: 12/24/24 1300          Antibiotics:  Antibiotics (From admission, onward)      Start     Stop Route Frequency  Ordered    12/24/24 1245  meropenem 2 g in 0.9% NaCl 100 mL IVPB (MB+)         -- IV Every 8 hours (non-standard times) 12/24/24 1142             ASSESSMENT AND PLAN   Chronic sigmoid diverticulitis  -Was on ertapenem once daily with OPAT.  Failed therapy as worsening of diverticulitis in pain continues  -Previously not amicable to surgery but now has changed her mind.  We will attempt transfer to [place] for higher level care with colorectal surgery  - Appreciate ID recommendations:   Blood Cx pending   Continue meropenem 2g IV Q8h based off last culture results which showed EBSL pathogen  It is possible that patient has now developed CRE organism due to frequent usage of carbapenems for treatment. If true, this further necessitates the need for surgical intervention for source control   If, for any reason, transfer for surgical intervention does not go through or is postponed - OPAT meropenem for 21 days - end date 01/15/25. OPAT referral for Merem in place.  -CT abdomen repeat 12/24: Chronic diverticular disease with mild inflammatory change at the sigmoid colon similar to previous. No active arterial extravasation seen.  -Multimodal pain control in place  -No suspicion of sepsis at this time.  We will continue to monitor vitals.  Though BP soft patient reports this is her baseline.  Maintain maps greater than 65.         Asthma  -Wheezing on admission  -We will initiate DuoNebs as needed  -Patient to bring breztri from home  -Continue montelukast  -Continue Flonase     Hyperthyroid  -TSH and T4 wnl  -Continue methimazole 2.5 q.d. we will adjust after labs        CODE STATUS:  Full code  Access:  PIV  Antibiotics:  Meropenem  Diet:  Adult regular  DVT Prophylaxis:  Lovenox  GI Prophylaxis:  none  Fluids:  P.o. intake      Disposition:  We will await decrease in abdominal pain, OPAT set up for Merrem, and possible transfer for Colorectal surgery to [place].      Case was discussed with Dr. Spence.  Please appreciate attending's attestation to follow.    Jorden Woods MD  Internal Medicine - PGY-1

## 2024-12-25 NOTE — CONSULTS
Evin Bautista [facility]- [facility] 4th Fl  Response for E-Consult     Patient Name: Niya Moeller  MRN: [id number]  Primary Care Provider: Oswaldo Riggs NP   Requesting Provider: Angel Sanches MD  Consults    After evaluation of your eConsult clinical questions, I believe the patient should be scheduled for an office visit in our specialty due to diverticulitis.    Will plan for in-person evaluation to discuss elective resection secondary to chronic smoldering ongoing diverticular disease refractory to medical therapy    Total time of Consultation: 10 minute    *This eConsult is based on the clinical data available to me and is furnished without benefit of a physician examination.  The eConsult will need to be interpreted in light of any clinical issues of changes in patient status not available to me at the time rime of filing this eConsults.  Significant changes in patient condition of level of acuity should result in a referral for in person consultation and reevaluation.  Please alert me if you have any furth questions.     Thank you for this eConsult referral.     MD Evin Mark nestor [facility]- [address]

## 2024-12-26 ENCOUNTER — TELEPHONE (OUTPATIENT)
Dept: SURGERY | Facility: CLINIC | Age: 54
End: 2024-12-26
Payer: MEDICAID

## 2024-12-26 VITALS
HEIGHT: 59 IN | TEMPERATURE: 98 F | OXYGEN SATURATION: 100 % | BODY MASS INDEX: 29.03 KG/M2 | WEIGHT: 144 LBS | SYSTOLIC BLOOD PRESSURE: 105 MMHG | HEART RATE: 73 BPM | DIASTOLIC BLOOD PRESSURE: 59 MMHG | RESPIRATION RATE: 18 BRPM

## 2024-12-26 LAB
ALBUMIN SERPL-MCNC: 3 G/DL (ref 3.5–5)
ALBUMIN/GLOB SERPL: 0.8 RATIO (ref 1.1–2)
ALP SERPL-CCNC: 76 UNIT/L (ref 40–150)
ALT SERPL-CCNC: 15 UNIT/L (ref 0–55)
ANION GAP SERPL CALC-SCNC: 7 MEQ/L
AST SERPL-CCNC: 17 UNIT/L (ref 5–34)
BASOPHILS # BLD AUTO: 0.02 X10(3)/MCL
BASOPHILS NFR BLD AUTO: 0.4 %
BILIRUB SERPL-MCNC: 0.3 MG/DL
BUN SERPL-MCNC: 5.5 MG/DL (ref 9.8–20.1)
CALCIUM SERPL-MCNC: 9.5 MG/DL (ref 8.4–10.2)
CHLORIDE SERPL-SCNC: 109 MMOL/L (ref 98–107)
CO2 SERPL-SCNC: 26 MMOL/L (ref 22–29)
CREAT SERPL-MCNC: 0.92 MG/DL (ref 0.55–1.02)
CREAT/UREA NIT SERPL: 6
EOSINOPHIL # BLD AUTO: 0.56 X10(3)/MCL (ref 0–0.9)
EOSINOPHIL NFR BLD AUTO: 10.2 %
ERYTHROCYTE [DISTWIDTH] IN BLOOD BY AUTOMATED COUNT: 12.1 % (ref 11.5–17)
GFR SERPLBLD CREATININE-BSD FMLA CKD-EPI: >60 ML/MIN/1.73/M2
GLOBULIN SER-MCNC: 3.7 GM/DL (ref 2.4–3.5)
GLUCOSE SERPL-MCNC: 84 MG/DL (ref 74–100)
HCT VFR BLD AUTO: 37.5 % (ref 37–47)
HGB BLD-MCNC: 12.6 G/DL (ref 12–16)
HOLD SPECIMEN: NORMAL
IMM GRANULOCYTES # BLD AUTO: 0.01 X10(3)/MCL (ref 0–0.04)
IMM GRANULOCYTES NFR BLD AUTO: 0.2 %
LYMPHOCYTES # BLD AUTO: 2.98 X10(3)/MCL (ref 0.6–4.6)
LYMPHOCYTES NFR BLD AUTO: 54.2 %
MAGNESIUM SERPL-MCNC: 1.9 MG/DL (ref 1.6–2.6)
MCH RBC QN AUTO: 32 PG (ref 27–31)
MCHC RBC AUTO-ENTMCNC: 33.6 G/DL (ref 33–36)
MCV RBC AUTO: 95.2 FL (ref 80–94)
MONOCYTES # BLD AUTO: 0.59 X10(3)/MCL (ref 0.1–1.3)
MONOCYTES NFR BLD AUTO: 10.7 %
NEUTROPHILS # BLD AUTO: 1.34 X10(3)/MCL (ref 2.1–9.2)
NEUTROPHILS NFR BLD AUTO: 24.3 %
NRBC BLD AUTO-RTO: 0 %
PHOSPHATE SERPL-MCNC: 2.6 MG/DL (ref 2.3–4.7)
PLATELET # BLD AUTO: 223 X10(3)/MCL (ref 130–400)
PMV BLD AUTO: 10.4 FL (ref 7.4–10.4)
POTASSIUM SERPL-SCNC: 3.5 MMOL/L (ref 3.5–5.1)
PROT SERPL-MCNC: 6.7 GM/DL (ref 6.4–8.3)
RBC # BLD AUTO: 3.94 X10(6)/MCL (ref 4.2–5.4)
SODIUM SERPL-SCNC: 142 MMOL/L (ref 136–145)
WBC # BLD AUTO: 5.5 X10(3)/MCL (ref 4.5–11.5)

## 2024-12-26 PROCEDURE — 94640 AIRWAY INHALATION TREATMENT: CPT

## 2024-12-26 PROCEDURE — 25000003 PHARM REV CODE 250

## 2024-12-26 PROCEDURE — 36415 COLL VENOUS BLD VENIPUNCTURE: CPT

## 2024-12-26 PROCEDURE — C1751 CATH, INF, PER/CENT/MIDLINE: HCPCS

## 2024-12-26 PROCEDURE — 36415 COLL VENOUS BLD VENIPUNCTURE: CPT | Performed by: STUDENT IN AN ORGANIZED HEALTH CARE EDUCATION/TRAINING PROGRAM

## 2024-12-26 PROCEDURE — 84100 ASSAY OF PHOSPHORUS: CPT

## 2024-12-26 PROCEDURE — 80053 COMPREHEN METABOLIC PANEL: CPT

## 2024-12-26 PROCEDURE — 25000242 PHARM REV CODE 250 ALT 637 W/ HCPCS

## 2024-12-26 PROCEDURE — 83735 ASSAY OF MAGNESIUM: CPT

## 2024-12-26 PROCEDURE — 63600175 PHARM REV CODE 636 W HCPCS

## 2024-12-26 PROCEDURE — 02HV33Z INSERTION OF INFUSION DEVICE INTO SUPERIOR VENA CAVA, PERCUTANEOUS APPROACH: ICD-10-PCS | Performed by: STUDENT IN AN ORGANIZED HEALTH CARE EDUCATION/TRAINING PROGRAM

## 2024-12-26 PROCEDURE — 85025 COMPLETE CBC W/AUTO DIFF WBC: CPT

## 2024-12-26 PROCEDURE — 99900035 HC TECH TIME PER 15 MIN (STAT)

## 2024-12-26 PROCEDURE — 36569 INSJ PICC 5 YR+ W/O IMAGING: CPT

## 2024-12-26 RX ORDER — HYDROCODONE BITARTRATE AND ACETAMINOPHEN 5; 325 MG/1; MG/1
1 TABLET ORAL EVERY 6 HOURS PRN
Qty: 28 TABLET | Refills: 0 | Status: SHIPPED | OUTPATIENT
Start: 2024-12-26 | End: 2025-01-02

## 2024-12-26 RX ORDER — SODIUM CHLORIDE 0.9 % (FLUSH) 0.9 %
10 SYRINGE (ML) INJECTION EVERY 12 HOURS PRN
Status: DISCONTINUED | OUTPATIENT
Start: 2024-12-26 | End: 2024-12-26 | Stop reason: HOSPADM

## 2024-12-26 RX ADMIN — FLUTICASONE FUROATE AND VILANTEROL TRIFENATATE 1 PUFF: 200; 25 POWDER RESPIRATORY (INHALATION) at 07:12

## 2024-12-26 RX ADMIN — DIPHENHYDRAMINE HYDROCHLORIDE, ZINC ACETATE: 2; .1 CREAM TOPICAL at 01:12

## 2024-12-26 RX ADMIN — PANTOPRAZOLE SODIUM 40 MG: 40 TABLET, DELAYED RELEASE ORAL at 08:12

## 2024-12-26 RX ADMIN — ONDANSETRON 4 MG: 2 INJECTION INTRAMUSCULAR; INTRAVENOUS at 05:12

## 2024-12-26 RX ADMIN — IPRATROPIUM BROMIDE AND ALBUTEROL SULFATE 3 ML: .5; 3 SOLUTION RESPIRATORY (INHALATION) at 03:12

## 2024-12-26 RX ADMIN — MEROPENEM 2 G: 2 INJECTION, POWDER, FOR SOLUTION INTRAVENOUS at 04:12

## 2024-12-26 RX ADMIN — MONTELUKAST SODIUM 10 MG: 5 TABLET, CHEWABLE ORAL at 08:12

## 2024-12-26 RX ADMIN — HYDROCODONE BITARTRATE AND ACETAMINOPHEN 1 TABLET: 5; 325 TABLET ORAL at 04:12

## 2024-12-26 RX ADMIN — IPRATROPIUM BROMIDE AND ALBUTEROL SULFATE 3 ML: .5; 3 SOLUTION RESPIRATORY (INHALATION) at 07:12

## 2024-12-26 RX ADMIN — MEROPENEM 2 G: 2 INJECTION, POWDER, FOR SOLUTION INTRAVENOUS at 12:12

## 2024-12-26 NOTE — PLAN OF CARE
Spoke to Jaren with [facility], P: [phone], to notify of patient being discharged today.

## 2024-12-26 NOTE — DISCHARGE SUMMARY
[facility]   DISCHARGE SUMMARY    Patient's Name: Niya Moeller  : 1970  MRN: [id number]  Code Status: Full Code    Admitting Physician: Noel Spence MD  Attending Physician: Noel Spence MD  Primary Care Provider: Oswaldo Riggs NP  Date of Admission: 2024  Date of Discharge: 2024    Condition: Good  Outcome: Condition has improved and patient is now ready for discharge.  Disposition: Home or Self Care    Discharge Diagnoses     Patient Active Problem List   Diagnosis    Obesity    Asthma exacerbation    GERD (gastroesophageal reflux disease)    Diverticulitis    Moderate persistent asthma with (acute) exacerbation    Hyperthyroidism    Thyroiditis    Diastolic dysfunction, left ventricle    Tachycardia    Other chest pain    Hypoglycemia    Thrombocytopathia    Diverticulitis of intestine with abscess    Asthma    Diverticulitis of large intestine with perforation    Allergic conjunctivitis and rhinitis    Macrocytic anemia    Superficial vein thrombosis    Tobacco dependence    History of ESBL E. coli infection       Principal Problem:  Diverticulitis      Consultants and Procedures   Consultants:  IP CONSULT TO INFECTIOUS DISEASES    Procedures:   * No surgery found *       Brief Admission History   Niya Moeller is a 54 y.o. female who has a history of chronic sigmoid diverticulitis with a history of perforation and abscess, hyperthyroid, asthma who presented to [facility] on 2024 with a primary complaint of abdominal pain. Patient has a history of chronic sigmoid diverticulitis. Has had several admissions in the past. Currently she was receiving IV antibiotics at home with OPAT. Patient was on ertapenem once daily. Previously seen by General surgery. Patient not amicable to surgery at [facility]. She will like to be transferred to [facility] in order to be evaluated by colorectal surgeon that can possibly complete a  J-pouch or partial colectomy with anastomosis that would allow her to defecate without a colostomy bag. I spoke to patient about this and she is agreeable to undergo surgery given several admissions to the hospital as well as worsening of her pain. Patient presented to the ED earlier in the week where CT abdomen at that time showed worsening inflammatory changes in her sigmoid colon. She was discharged at that time and returns for similar complaints. Currently she is endorsing subjective fevers, shaking, chills, severe abdominal pain. She is denying diarrhea, constipation, nausea, vomiting, headache.       Hospital Course with Pertinent Findings   Patient had some wheezing, so DuoNebs were started. CT abdomen/pelvis with IV contrast showed chronic diverticular disease with mild inflammatory change at the sigmoid colon similar to previous CT scan and without any active arterial extravasation. ID was consulted and recommended stopping ertapenem and starting on meropenem 2 g IV q8h. Hospitalist team had an extensive discussion with patient regarding definitive control of diverticulitis with surgery since patient had failed antibiotic therapy. Originally, she did not want to pursue surgery, but she endorsed that she would be amenable to surgery if a specialized colorectal surgeon performs her case. A e-consult to Colorectal Surgery at [facility] was placed and they recommended for an in-person vs teleconference evaluation for possible surgical resection once diverticular flare improves, which the patient is amenable on attending. Since patient improved with meropenem during admission, ID submitted OPAT orders for meropenem 2g q8h for a total of 21 days. Case Management was consulted for assistance with obtaining a OPAT services. A consult to the PICC team was also placed for midline placement for OPAT therapy. Since patient improved clinically, patient was deemed stable for discharge. Patient was  "counseled on ED return precautions and medication adherence, which she reported understanding. Patient was discharged in improved condition with OPAT orders for meropenem 2g q8h with [facility] infusion services. She will need the evening dose of meropenem after discharge and OPAT will continue until 1/15/2025. She was also discharged with Norco 5-325 mg q6h for 7 days ( was reviewed) for severe pain.     Discharge Physical Exam:  Vitals  BP: 111/61  Temp: 98.6 °F (37 °C)  Temp Source: Oral  Pulse: 76  Resp: 18  SpO2: 96 %  Height: 4' 11.02" (149.9 cm)  Weight: 65.3 kg (144 lb)  General: Nontoxic-appearing, well-nourished, in no acute distress  Eye: PERRL, EOMI  HENT: Normocephalic, atraumatic, moist mucous membranes  Neck: Supple, nontender,  Respiratory: Mild expiratory wheezing, improved from yesterday; no rales, or rhonchi. Normal work of breathing.  Cardiovascular: Regular rate and rhythm, no murmur, or gallops. No peripheral edema. 2+ radial pulses  Gastrointestinal: Soft, non-distended, no tenderness to palpation, BS ausculated  Musculoskeletal: Moves all extremities purposefully. No gross deformities. No calf tenderness  Integumentary: Warm, dry, intact. No rashes  Neurologic: Alert and oriented x3. Normal speech. Sensory deficit present.  Psychiatric: Appropriate mood and affect       Discharge Medications        Medication List        ASK your doctor about these medications      * albuterol 2.5 mg /3 mL (0.083 %) nebulizer solution  Commonly known as: PROVENTIL     * albuterol 90 mcg/actuation inhaler  Commonly known as: PROAIR HFA  Inhale 2 puffs into the lungs every 6 (six) hours as needed for Wheezing. Rescue     albuterol-ipratropium 2.5 mg-0.5 mg/3 mL nebulizer solution  Commonly known as: DUO-NEB  Take 3 mLs by nebulization every 6 (six) hours as needed for Wheezing. Rescue     BREZTRI AEROSPHERE 160-9-4.8 mcg/actuation Hfaa  Generic drug: budesonide-glycopyr-formoterol   "   cholecalciferol (vitamin D3) 1,250 mcg (50,000 unit) capsule     dicyclomine 10 MG capsule  Commonly known as: BENTYL     docusate sodium 100 MG capsule  Commonly known as: COLACE  Take 1 capsule (100 mg total) by mouth 2 (two) times daily as needed for Constipation.     fluticasone propionate 50 mcg/actuation nasal spray  Commonly known as: FLONASE  1 spray (50 mcg total) by Each Nostril route daily as needed for Allergies or Rhinitis.     * HYDROcodone-acetaminophen 5-325 mg per tablet  Commonly known as: NORCO  Take 1 tablet by mouth every 6 (six) hours as needed for Pain.     * HYDROcodone-acetaminophen 5-325 mg per tablet  Commonly known as: NORCO  Take 1 tablet by mouth every 6 (six) hours as needed.     methIMAzole 5 MG Tab  Commonly known as: TAPAZOLE  Take a half of a tablet daily to Total 2.5 mg     montelukast 10 mg tablet  Commonly known as: SINGULAIR     omeprazole 20 MG capsule  Commonly known as: PRILOSEC  Take 1 capsule (20 mg total) by mouth 2 (two) times daily before meals.           * This list has 4 medication(s) that are the same as other medications prescribed for you. Read the directions carefully, and ask your doctor or other care provider to review them with you.                    Discharge Information   Diet:  As tolerated    Physical Activity:  As tolerated    Counseling Provided to Patient and Family:  Yes; ED return precautions and medication adherence    Instructions:  1. Take all medications as prescribed.  2. Keep all follow-up appointments.  3. Return to the hospital or call your primary care physician if any worsening symptoms occur, such as fever, chills, nausea, vomiting, abdominal pain, blood in stool, and changes in bowel movements.  4. The above information was discussed with the patient in clear terms. she was able to repeat the instructions to me in her own words. All questions answered.  Patient verbalized understanding. ED precautions provided.    Follow-Up  Appointments:  - Counseled patient to follow-up with PCP in 1 week or sooner as needed for post-hospital discharge follow-up.    Future Appointments:  Future Appointments   Date Time Provider Department Center   1/13/2025  7:30 AM Corie Jimenez, ANP [facility] GYN [facility]   1/24/2025  1:15 PM Delaney Morris, NP [facility] ENDOCR [facility]   1/30/2025  9:40 AM RESIDENT, [facility] INFECTIOUS DISEASE [facility] INFDIS [place]    3/11/2025 12:30 PM PROVIDERS, [facility] [place] [facility]       Follow up items to address with PCP and pending results at time of discharge:  - CRC evaluation status and improvement in diverculitis    TIME SPENT ON DISCHARGE: 60 minutes      Case discussed with Dr. Spence. Please appreciate attending's attestation to follow.    Robert Puente MD  PGY-1 Resident  U Internal Medicine Team 1  12/26/2024

## 2024-12-26 NOTE — PLAN OF CARE
Problem: Adult Inpatient Plan of Care  Goal: Plan of Care Review  Outcome: Adequate for Care Transition  Goal: Patient-Specific Goal (Individualized)  Outcome: Adequate for Care Transition  Goal: Absence of Hospital-Acquired Illness or Injury  Outcome: Adequate for Care Transition  Goal: Optimal Comfort and Wellbeing  Outcome: Adequate for Care Transition  Goal: Readiness for Transition of Care  Outcome: Adequate for Care Transition     Problem: Infection  Goal: Absence of Infection Signs and Symptoms  Outcome: Adequate for Care Transition     Problem: Pain Acute  Goal: Optimal Pain Control and Function  Outcome: Adequate for Care Transition

## 2024-12-26 NOTE — PROCEDURES
"Niya Moeller is a 54 y.o. female patient.    Temp: 98.4 °F (36.9 °C) (12/26/24 1200)  Pulse: 73 (12/26/24 1200)  Resp: 18 (12/26/24 1200)  BP: (!) 105/59 (12/26/24 1200)  SpO2: 100 % (12/26/24 1200)  Weight: 65.3 kg (144 lb) (12/25/24 0525)  Height: 4' 11.02" (149.9 cm) (12/24/24 1158)    PICC  Date/Time: 12/26/2024 2:23 PM  Performed by: Nataliia Moore, ANNA  Consent Done: Yes  Time out: Immediately prior to procedure a time out was called to verify the correct patient, procedure, equipment, support staff and site/side marked as required  Indications: med administration and vascular access  Anesthesia: local infiltration  Local anesthetic: lidocaine 1% without epinephrine  Anesthetic Total (mL): 4  Preparation: skin prepped with ChloraPrep  Skin prep agent dried: skin prep agent completely dried prior to procedure  Sterile barriers: all five maximum sterile barriers used - cap, mask, sterile gown, sterile gloves, and large sterile sheet  Hand hygiene: hand hygiene performed prior to central venous catheter insertion  Location details: right basilic  Catheter type: double lumen  Catheter size: 5 Fr  Catheter Length: 5cm    Ultrasound guidance: yes  Vessel Caliber: medium and patent, compressibility normal  Needle advanced into vessel with real time Ultrasound guidance.  Guidewire confirmed in vessel.  Sterile sheath used.  Number of attempts: 1  Post-procedure: blood return through all ports, sterile dressing applied and chlorhexidine patch    Assessment: placement verified by x-ray  Complications: none          Nataliia Moore RN  12/26/2024    "

## 2024-12-26 NOTE — TELEPHONE ENCOUNTER
----- Message from ARA Clay MD sent at 12/26/2024  9:22 AM CST -----  If we can get her in sooner, that would be ideal.  Thank you!  ----- Message -----  From: Shira Schultz RN  Sent: 12/26/2024   8:13 AM CST  To: ARA Clay MD    Scheduled virtual for 1/28. Do you need to see/speak with her sooner?  ----- Message -----  From: ARA Clay MD  Sent: 12/25/2024  11:47 AM CST  To: Danna Inman Staff    Can she please get an appt either virtual or in person for diverticulitis?  She is currently hospitalized in [place] but wants an appt to be on the schedule before she is discharged home.  She has all the imaging that she needs.      Thank you!      Storm

## 2024-12-26 NOTE — CONSULTS
Consult for OPAT noted. Patient is in agreement and prefers [facility]. Referral sent via Epic fax.

## 2024-12-28 LAB
BACTERIA BLD CULT: NORMAL
BACTERIA BLD CULT: NORMAL

## 2024-12-30 ENCOUNTER — LAB REQUISITION (OUTPATIENT)
Dept: LAB | Facility: HOSPITAL | Age: 54
End: 2024-12-30
Payer: OTHER GOVERNMENT

## 2024-12-30 DIAGNOSIS — K57.33 DIVERTICULITIS OF LARGE INTESTINE WITHOUT PERFORATION OR ABSCESS WITH BLEEDING: ICD-10-CM

## 2024-12-30 LAB
ALBUMIN SERPL-MCNC: 3.5 G/DL (ref 3.5–5)
ALBUMIN/GLOB SERPL: 0.9 RATIO (ref 1.1–2)
ALP SERPL-CCNC: 86 UNIT/L (ref 40–150)
ALT SERPL-CCNC: 24 UNIT/L (ref 0–55)
ANION GAP SERPL CALC-SCNC: 21 MEQ/L
AST SERPL-CCNC: 23 UNIT/L (ref 5–34)
BASOPHILS # BLD AUTO: 0.02 X10(3)/MCL
BASOPHILS NFR BLD AUTO: 0.3 %
BILIRUB SERPL-MCNC: 0.2 MG/DL
BUN SERPL-MCNC: 8.1 MG/DL (ref 9.8–20.1)
CALCIUM SERPL-MCNC: 10.1 MG/DL (ref 8.4–10.2)
CHLORIDE SERPL-SCNC: 103 MMOL/L (ref 98–107)
CO2 SERPL-SCNC: 24 MMOL/L (ref 22–29)
CREAT SERPL-MCNC: 0.74 MG/DL (ref 0.55–1.02)
CREAT/UREA NIT SERPL: 11
CRP SERPL-MCNC: 25.2 MG/L
EOSINOPHIL # BLD AUTO: 0.41 X10(3)/MCL (ref 0–0.9)
EOSINOPHIL NFR BLD AUTO: 6.4 %
ERYTHROCYTE [DISTWIDTH] IN BLOOD BY AUTOMATED COUNT: 12.2 % (ref 11.5–17)
ERYTHROCYTE [SEDIMENTATION RATE] IN BLOOD: 63 MM/HR (ref 0–20)
GFR SERPLBLD CREATININE-BSD FMLA CKD-EPI: >60 ML/MIN/1.73/M2
GLOBULIN SER-MCNC: 3.8 GM/DL (ref 2.4–3.5)
GLUCOSE SERPL-MCNC: 118 MG/DL (ref 74–100)
HCT VFR BLD AUTO: 39.9 % (ref 37–47)
HGB BLD-MCNC: 13.3 G/DL (ref 12–16)
IMM GRANULOCYTES # BLD AUTO: 0.01 X10(3)/MCL (ref 0–0.04)
IMM GRANULOCYTES NFR BLD AUTO: 0.2 %
LYMPHOCYTES # BLD AUTO: 1.49 X10(3)/MCL (ref 0.6–4.6)
LYMPHOCYTES NFR BLD AUTO: 23.4 %
MCH RBC QN AUTO: 32.7 PG (ref 27–31)
MCHC RBC AUTO-ENTMCNC: 33.3 G/DL (ref 33–36)
MCV RBC AUTO: 98 FL (ref 80–94)
MONOCYTES # BLD AUTO: 0.4 X10(3)/MCL (ref 0.1–1.3)
MONOCYTES NFR BLD AUTO: 6.3 %
NEUTROPHILS # BLD AUTO: 4.05 X10(3)/MCL (ref 2.1–9.2)
NEUTROPHILS NFR BLD AUTO: 63.4 %
NRBC BLD AUTO-RTO: 0 %
PLATELET # BLD AUTO: 236 X10(3)/MCL (ref 130–400)
PMV BLD AUTO: 11.2 FL (ref 7.4–10.4)
POTASSIUM SERPL-SCNC: 4.2 MMOL/L (ref 3.5–5.1)
PROT SERPL-MCNC: 7.3 GM/DL (ref 6.4–8.3)
RBC # BLD AUTO: 4.07 X10(6)/MCL (ref 4.2–5.4)
SODIUM SERPL-SCNC: 148 MMOL/L (ref 136–145)
WBC # BLD AUTO: 6.38 X10(3)/MCL (ref 4.5–11.5)

## 2024-12-30 PROCEDURE — 86140 C-REACTIVE PROTEIN: CPT | Performed by: STUDENT IN AN ORGANIZED HEALTH CARE EDUCATION/TRAINING PROGRAM

## 2024-12-30 PROCEDURE — 85652 RBC SED RATE AUTOMATED: CPT | Performed by: STUDENT IN AN ORGANIZED HEALTH CARE EDUCATION/TRAINING PROGRAM

## 2024-12-30 PROCEDURE — 80053 COMPREHEN METABOLIC PANEL: CPT | Performed by: STUDENT IN AN ORGANIZED HEALTH CARE EDUCATION/TRAINING PROGRAM

## 2024-12-30 PROCEDURE — 85025 COMPLETE CBC W/AUTO DIFF WBC: CPT | Performed by: STUDENT IN AN ORGANIZED HEALTH CARE EDUCATION/TRAINING PROGRAM

## 2025-01-02 ENCOUNTER — DOCUMENTATION ONLY (OUTPATIENT)
Dept: INFECTIOUS DISEASES | Facility: HOSPITAL | Age: 55
End: 2025-01-02
Payer: OTHER GOVERNMENT

## 2025-01-02 ENCOUNTER — HOSPITAL ENCOUNTER (INPATIENT)
Facility: HOSPITAL | Age: 55
LOS: 11 days | Discharge: NURSING, REHAB OR LONG-TERM CARE | DRG: 392 | End: 2025-01-13
Attending: EMERGENCY MEDICINE
Payer: OTHER GOVERNMENT

## 2025-01-02 DIAGNOSIS — F41.9 ANXIETY: ICD-10-CM

## 2025-01-02 DIAGNOSIS — R07.9 CHEST PAIN: ICD-10-CM

## 2025-01-02 DIAGNOSIS — R06.02 SOB (SHORTNESS OF BREATH): ICD-10-CM

## 2025-01-02 DIAGNOSIS — K57.92 DIVERTICULITIS: Primary | ICD-10-CM

## 2025-01-02 DIAGNOSIS — J45.901 EXACERBATION OF ASTHMA, UNSPECIFIED ASTHMA SEVERITY, UNSPECIFIED WHETHER PERSISTENT: ICD-10-CM

## 2025-01-02 DIAGNOSIS — K57.20 DIVERTICULITIS OF LARGE INTESTINE WITH PERFORATION, UNSPECIFIED BLEEDING STATUS: ICD-10-CM

## 2025-01-02 LAB
ALBUMIN SERPL-MCNC: 3.3 G/DL (ref 3.5–5)
ALBUMIN/GLOB SERPL: 0.8 RATIO (ref 1.1–2)
ALP SERPL-CCNC: 91 UNIT/L (ref 40–150)
ALT SERPL-CCNC: 19 UNIT/L (ref 0–55)
ANION GAP SERPL CALC-SCNC: 7 MEQ/L
AST SERPL-CCNC: 19 UNIT/L (ref 5–34)
BACTERIA #/AREA URNS AUTO: ABNORMAL /HPF
BASOPHILS # BLD AUTO: 0.03 X10(3)/MCL
BASOPHILS NFR BLD AUTO: 0.4 %
BILIRUB SERPL-MCNC: 0.5 MG/DL
BILIRUB UR QL STRIP.AUTO: NEGATIVE
BUN SERPL-MCNC: 7.5 MG/DL (ref 9.8–20.1)
CALCIUM SERPL-MCNC: 9.9 MG/DL (ref 8.4–10.2)
CHLORIDE SERPL-SCNC: 108 MMOL/L (ref 98–107)
CLARITY UR: CLEAR
CO2 SERPL-SCNC: 26 MMOL/L (ref 22–29)
COLOR UR AUTO: YELLOW
CREAT SERPL-MCNC: 0.75 MG/DL (ref 0.55–1.02)
CREAT/UREA NIT SERPL: 10
CRP SERPL-MCNC: 33.2 MG/L
EOSINOPHIL # BLD AUTO: 0.93 X10(3)/MCL (ref 0–0.9)
EOSINOPHIL NFR BLD AUTO: 12.3 %
ERYTHROCYTE [DISTWIDTH] IN BLOOD BY AUTOMATED COUNT: 12 % (ref 11.5–17)
ERYTHROCYTE [SEDIMENTATION RATE] IN BLOOD: 38 MM/HR (ref 0–20)
GFR SERPLBLD CREATININE-BSD FMLA CKD-EPI: >60 ML/MIN/1.73/M2
GLOBULIN SER-MCNC: 4.2 GM/DL (ref 2.4–3.5)
GLUCOSE SERPL-MCNC: 81 MG/DL (ref 74–100)
GLUCOSE UR QL STRIP: NEGATIVE
HCT VFR BLD AUTO: 40.2 % (ref 37–47)
HGB BLD-MCNC: 13.3 G/DL (ref 12–16)
HGB UR QL STRIP: ABNORMAL
HOLD SPECIMEN: NORMAL
IMM GRANULOCYTES # BLD AUTO: 0.01 X10(3)/MCL (ref 0–0.04)
IMM GRANULOCYTES NFR BLD AUTO: 0.1 %
KETONES UR QL STRIP: ABNORMAL
LACTATE SERPL-SCNC: 1.4 MMOL/L (ref 0.5–2.2)
LEUKOCYTE ESTERASE UR QL STRIP: 25
LYMPHOCYTES # BLD AUTO: 2.7 X10(3)/MCL (ref 0.6–4.6)
LYMPHOCYTES NFR BLD AUTO: 35.6 %
MCH RBC QN AUTO: 32.3 PG (ref 27–31)
MCHC RBC AUTO-ENTMCNC: 33.1 G/DL (ref 33–36)
MCV RBC AUTO: 97.6 FL (ref 80–94)
MONOCYTES # BLD AUTO: 0.65 X10(3)/MCL (ref 0.1–1.3)
MONOCYTES NFR BLD AUTO: 8.6 %
NEUTROPHILS # BLD AUTO: 3.26 X10(3)/MCL (ref 2.1–9.2)
NEUTROPHILS NFR BLD AUTO: 43 %
NITRITE UR QL STRIP: NEGATIVE
NRBC BLD AUTO-RTO: 0 %
OHS QRS DURATION: 66 MS
OHS QTC CALCULATION: 440 MS
PH UR STRIP: 6 [PH]
PLATELET # BLD AUTO: 238 X10(3)/MCL (ref 130–400)
PMV BLD AUTO: 10.5 FL (ref 7.4–10.4)
POTASSIUM SERPL-SCNC: 4.2 MMOL/L (ref 3.5–5.1)
PROT SERPL-MCNC: 7.5 GM/DL (ref 6.4–8.3)
PROT UR QL STRIP: ABNORMAL
RBC # BLD AUTO: 4.12 X10(6)/MCL (ref 4.2–5.4)
RBC #/AREA URNS AUTO: ABNORMAL /HPF
SODIUM SERPL-SCNC: 141 MMOL/L (ref 136–145)
SP GR UR STRIP.AUTO: 1.02 (ref 1–1.03)
SQUAMOUS #/AREA URNS LPF: ABNORMAL /HPF
T4 FREE SERPL-MCNC: 1.14 NG/DL (ref 0.7–1.48)
TSH SERPL-ACNC: 0.59 UIU/ML (ref 0.35–4.94)
UROBILINOGEN UR STRIP-ACNC: NORMAL
WBC # BLD AUTO: 7.58 X10(3)/MCL (ref 4.5–11.5)
WBC #/AREA URNS AUTO: ABNORMAL /HPF

## 2025-01-02 PROCEDURE — 85025 COMPLETE CBC W/AUTO DIFF WBC: CPT | Performed by: EMERGENCY MEDICINE

## 2025-01-02 PROCEDURE — 85652 RBC SED RATE AUTOMATED: CPT | Performed by: EMERGENCY MEDICINE

## 2025-01-02 PROCEDURE — 63600175 PHARM REV CODE 636 W HCPCS: Mod: JZ

## 2025-01-02 PROCEDURE — 25000003 PHARM REV CODE 250: Performed by: EMERGENCY MEDICINE

## 2025-01-02 PROCEDURE — 93005 ELECTROCARDIOGRAM TRACING: CPT

## 2025-01-02 PROCEDURE — 96365 THER/PROPH/DIAG IV INF INIT: CPT

## 2025-01-02 PROCEDURE — 83605 ASSAY OF LACTIC ACID: CPT | Performed by: EMERGENCY MEDICINE

## 2025-01-02 PROCEDURE — 80053 COMPREHEN METABOLIC PANEL: CPT | Performed by: EMERGENCY MEDICINE

## 2025-01-02 PROCEDURE — 63600175 PHARM REV CODE 636 W HCPCS

## 2025-01-02 PROCEDURE — 27000221 HC OXYGEN, UP TO 24 HOURS

## 2025-01-02 PROCEDURE — 96375 TX/PRO/DX INJ NEW DRUG ADDON: CPT

## 2025-01-02 PROCEDURE — 86140 C-REACTIVE PROTEIN: CPT | Performed by: EMERGENCY MEDICINE

## 2025-01-02 PROCEDURE — 25000242 PHARM REV CODE 250 ALT 637 W/ HCPCS

## 2025-01-02 PROCEDURE — 25000003 PHARM REV CODE 250

## 2025-01-02 PROCEDURE — 63600175 PHARM REV CODE 636 W HCPCS: Performed by: EMERGENCY MEDICINE

## 2025-01-02 PROCEDURE — 94640 AIRWAY INHALATION TREATMENT: CPT

## 2025-01-02 PROCEDURE — 63600175 PHARM REV CODE 636 W HCPCS: Mod: TB | Performed by: EMERGENCY MEDICINE

## 2025-01-02 PROCEDURE — 84100 ASSAY OF PHOSPHORUS: CPT

## 2025-01-02 PROCEDURE — 11000001 HC ACUTE MED/SURG PRIVATE ROOM

## 2025-01-02 PROCEDURE — 25000242 PHARM REV CODE 250 ALT 637 W/ HCPCS: Performed by: EMERGENCY MEDICINE

## 2025-01-02 PROCEDURE — 99285 EMERGENCY DEPT VISIT HI MDM: CPT | Mod: 25

## 2025-01-02 PROCEDURE — 87040 BLOOD CULTURE FOR BACTERIA: CPT

## 2025-01-02 PROCEDURE — 83735 ASSAY OF MAGNESIUM: CPT

## 2025-01-02 PROCEDURE — 94640 AIRWAY INHALATION TREATMENT: CPT | Mod: XB

## 2025-01-02 PROCEDURE — 81001 URINALYSIS AUTO W/SCOPE: CPT | Performed by: EMERGENCY MEDICINE

## 2025-01-02 PROCEDURE — 84439 ASSAY OF FREE THYROXINE: CPT | Performed by: INTERNAL MEDICINE

## 2025-01-02 PROCEDURE — 84443 ASSAY THYROID STIM HORMONE: CPT

## 2025-01-02 RX ORDER — SODIUM CHLORIDE 0.9 % (FLUSH) 0.9 %
10 SYRINGE (ML) INJECTION EVERY 12 HOURS PRN
Status: DISCONTINUED | OUTPATIENT
Start: 2025-01-02 | End: 2025-01-02

## 2025-01-02 RX ORDER — TALC
9 POWDER (GRAM) TOPICAL NIGHTLY PRN
Status: DISCONTINUED | OUTPATIENT
Start: 2025-01-02 | End: 2025-01-13 | Stop reason: HOSPADM

## 2025-01-02 RX ORDER — FLUTICASONE FUROATE AND VILANTEROL 200; 25 UG/1; UG/1
1 POWDER RESPIRATORY (INHALATION) DAILY
Status: DISCONTINUED | OUTPATIENT
Start: 2025-01-02 | End: 2025-01-13 | Stop reason: HOSPADM

## 2025-01-02 RX ORDER — METHYLPREDNISOLONE SOD SUCC 125 MG
125 VIAL (EA) INJECTION ONCE
Status: COMPLETED | OUTPATIENT
Start: 2025-01-02 | End: 2025-01-02

## 2025-01-02 RX ORDER — NALOXONE HCL 0.4 MG/ML
0.02 VIAL (ML) INJECTION
Status: DISCONTINUED | OUTPATIENT
Start: 2025-01-02 | End: 2025-01-13 | Stop reason: HOSPADM

## 2025-01-02 RX ORDER — MONTELUKAST SODIUM 5 MG/1
10 TABLET, CHEWABLE ORAL DAILY
Status: DISCONTINUED | OUTPATIENT
Start: 2025-01-02 | End: 2025-01-13 | Stop reason: HOSPADM

## 2025-01-02 RX ORDER — HYDROMORPHONE HYDROCHLORIDE 1 MG/ML
1 INJECTION, SOLUTION INTRAMUSCULAR; INTRAVENOUS; SUBCUTANEOUS
Status: COMPLETED | OUTPATIENT
Start: 2025-01-02 | End: 2025-01-02

## 2025-01-02 RX ORDER — HYDROCODONE BITARTRATE AND ACETAMINOPHEN 7.5; 325 MG/1; MG/1
1 TABLET ORAL EVERY 6 HOURS PRN
Status: DISCONTINUED | OUTPATIENT
Start: 2025-01-02 | End: 2025-01-05

## 2025-01-02 RX ORDER — ONDANSETRON HYDROCHLORIDE 2 MG/ML
4 INJECTION, SOLUTION INTRAVENOUS EVERY 8 HOURS PRN
Status: DISCONTINUED | OUTPATIENT
Start: 2025-01-02 | End: 2025-01-13 | Stop reason: HOSPADM

## 2025-01-02 RX ORDER — BENZONATATE 100 MG/1
100 CAPSULE ORAL 3 TIMES DAILY PRN
Status: DISCONTINUED | OUTPATIENT
Start: 2025-01-02 | End: 2025-01-13 | Stop reason: HOSPADM

## 2025-01-02 RX ORDER — PANTOPRAZOLE SODIUM 40 MG/1
40 TABLET, DELAYED RELEASE ORAL DAILY
Status: DISCONTINUED | OUTPATIENT
Start: 2025-01-02 | End: 2025-01-13 | Stop reason: HOSPADM

## 2025-01-02 RX ORDER — HYDROCODONE BITARTRATE AND ACETAMINOPHEN 5; 325 MG/1; MG/1
1 TABLET ORAL EVERY 4 HOURS PRN
Status: DISCONTINUED | OUTPATIENT
Start: 2025-01-02 | End: 2025-01-02

## 2025-01-02 RX ORDER — MUPIROCIN 20 MG/G
OINTMENT TOPICAL 2 TIMES DAILY
Status: DISPENSED | OUTPATIENT
Start: 2025-01-02 | End: 2025-01-07

## 2025-01-02 RX ORDER — GLUCAGON 1 MG
1 KIT INJECTION
Status: DISCONTINUED | OUTPATIENT
Start: 2025-01-02 | End: 2025-01-13 | Stop reason: HOSPADM

## 2025-01-02 RX ORDER — ALUMINUM HYDROXIDE, MAGNESIUM HYDROXIDE, AND SIMETHICONE 1200; 120; 1200 MG/30ML; MG/30ML; MG/30ML
30 SUSPENSION ORAL 4 TIMES DAILY PRN
Status: DISCONTINUED | OUTPATIENT
Start: 2025-01-02 | End: 2025-01-13 | Stop reason: HOSPADM

## 2025-01-02 RX ORDER — ENOXAPARIN SODIUM 100 MG/ML
40 INJECTION SUBCUTANEOUS EVERY 24 HOURS
Status: DISCONTINUED | OUTPATIENT
Start: 2025-01-02 | End: 2025-01-13 | Stop reason: HOSPADM

## 2025-01-02 RX ORDER — ACETAMINOPHEN 325 MG/1
650 TABLET ORAL EVERY 4 HOURS PRN
Status: DISCONTINUED | OUTPATIENT
Start: 2025-01-02 | End: 2025-01-13 | Stop reason: HOSPADM

## 2025-01-02 RX ORDER — ONDANSETRON HYDROCHLORIDE 2 MG/ML
8 INJECTION, SOLUTION INTRAVENOUS EVERY 8 HOURS PRN
Status: DISCONTINUED | OUTPATIENT
Start: 2025-01-02 | End: 2025-01-13 | Stop reason: HOSPADM

## 2025-01-02 RX ORDER — IBUPROFEN 200 MG
24 TABLET ORAL
Status: DISCONTINUED | OUTPATIENT
Start: 2025-01-02 | End: 2025-01-13 | Stop reason: HOSPADM

## 2025-01-02 RX ORDER — IPRATROPIUM BROMIDE AND ALBUTEROL SULFATE 2.5; .5 MG/3ML; MG/3ML
3 SOLUTION RESPIRATORY (INHALATION)
Status: COMPLETED | OUTPATIENT
Start: 2025-01-02 | End: 2025-01-02

## 2025-01-02 RX ORDER — AMOXICILLIN 250 MG
1 CAPSULE ORAL 2 TIMES DAILY PRN
Status: DISCONTINUED | OUTPATIENT
Start: 2025-01-02 | End: 2025-01-09

## 2025-01-02 RX ORDER — IPRATROPIUM BROMIDE AND ALBUTEROL SULFATE 2.5; .5 MG/3ML; MG/3ML
3 SOLUTION RESPIRATORY (INHALATION) EVERY 4 HOURS PRN
Status: DISCONTINUED | OUTPATIENT
Start: 2025-01-02 | End: 2025-01-13 | Stop reason: HOSPADM

## 2025-01-02 RX ORDER — FLUTICASONE PROPIONATE 50 MCG
1 SPRAY, SUSPENSION (ML) NASAL DAILY PRN
Status: DISCONTINUED | OUTPATIENT
Start: 2025-01-02 | End: 2025-01-13 | Stop reason: HOSPADM

## 2025-01-02 RX ORDER — ACETAMINOPHEN 325 MG/1
650 TABLET ORAL EVERY 8 HOURS PRN
Status: DISCONTINUED | OUTPATIENT
Start: 2025-01-02 | End: 2025-01-13 | Stop reason: HOSPADM

## 2025-01-02 RX ORDER — MORPHINE SULFATE 2 MG/ML
2 INJECTION, SOLUTION INTRAMUSCULAR; INTRAVENOUS ONCE
Status: COMPLETED | OUTPATIENT
Start: 2025-01-02 | End: 2025-01-02

## 2025-01-02 RX ORDER — IBUPROFEN 200 MG
16 TABLET ORAL
Status: DISCONTINUED | OUTPATIENT
Start: 2025-01-02 | End: 2025-01-13 | Stop reason: HOSPADM

## 2025-01-02 RX ORDER — IPRATROPIUM BROMIDE AND ALBUTEROL SULFATE 2.5; .5 MG/3ML; MG/3ML
3 SOLUTION RESPIRATORY (INHALATION) EVERY 20 MIN
Status: DISCONTINUED | OUTPATIENT
Start: 2025-01-02 | End: 2025-01-02

## 2025-01-02 RX ADMIN — FLUTICASONE PROPIONATE 50 MCG: 50 SPRAY, METERED NASAL at 02:01

## 2025-01-02 RX ADMIN — IPRATROPIUM BROMIDE AND ALBUTEROL SULFATE 3 ML: .5; 3 SOLUTION RESPIRATORY (INHALATION) at 11:01

## 2025-01-02 RX ADMIN — IPRATROPIUM BROMIDE AND ALBUTEROL SULFATE 3 ML: .5; 3 SOLUTION RESPIRATORY (INHALATION) at 02:01

## 2025-01-02 RX ADMIN — FLUTICASONE FUROATE AND VILANTEROL TRIFENATATE 1 PUFF: 200; 25 POWDER RESPIRATORY (INHALATION) at 02:01

## 2025-01-02 RX ADMIN — HYDROMORPHONE HYDROCHLORIDE 1 MG: 1 INJECTION, SOLUTION INTRAMUSCULAR; INTRAVENOUS; SUBCUTANEOUS at 12:01

## 2025-01-02 RX ADMIN — MUPIROCIN: 20 OINTMENT TOPICAL at 09:01

## 2025-01-02 RX ADMIN — HYDROCODONE BITARTRATE AND ACETAMINOPHEN 1 TABLET: 5; 325 TABLET ORAL at 06:01

## 2025-01-02 RX ADMIN — PANTOPRAZOLE SODIUM 40 MG: 40 TABLET, DELAYED RELEASE ORAL at 02:01

## 2025-01-02 RX ADMIN — BENZONATATE 100 MG: 100 CAPSULE ORAL at 06:01

## 2025-01-02 RX ADMIN — METHYLPREDNISOLONE SODIUM SUCCINATE 125 MG: 125 INJECTION, POWDER, FOR SOLUTION INTRAMUSCULAR; INTRAVENOUS at 10:01

## 2025-01-02 RX ADMIN — MONTELUKAST SODIUM 10 MG: 5 TABLET, CHEWABLE ORAL at 02:01

## 2025-01-02 RX ADMIN — MORPHINE SULFATE 2 MG: 2 INJECTION, SOLUTION INTRAMUSCULAR; INTRAVENOUS at 10:01

## 2025-01-02 RX ADMIN — SODIUM CHLORIDE, SODIUM LACTATE, POTASSIUM CHLORIDE, CALCIUM CHLORIDE 1000 ML: 20; 30; 600; 310 INJECTION, SOLUTION INTRAVENOUS at 05:01

## 2025-01-02 RX ADMIN — PROMETHAZINE HYDROCHLORIDE 12.5 MG: 25 INJECTION INTRAMUSCULAR; INTRAVENOUS at 12:01

## 2025-01-02 RX ADMIN — MEROPENEM 2 G: 2 INJECTION, POWDER, FOR SOLUTION INTRAVENOUS at 09:01

## 2025-01-02 RX ADMIN — IPRATROPIUM BROMIDE AND ALBUTEROL SULFATE 3 ML: .5; 3 SOLUTION RESPIRATORY (INHALATION) at 08:01

## 2025-01-02 RX ADMIN — MEROPENEM 2 G: 2 INJECTION, POWDER, FOR SOLUTION INTRAVENOUS at 02:01

## 2025-01-02 NOTE — ED PROVIDER NOTES
"Encounter Date: 1/2/2025       History     Chief Complaint   Patient presents with    Abdominal Pain     C/o "severe" abdominal pain and SOB x 1 month, hx of asthma. States that she was sent home with PICC line on Dec. 25th to do antibiotic infusions at home. States that she is unable to administer and is missing doses of the antibiotics. Wants to talk with doctor about being put in short term care facility.     She is here with family, lives alone, currently undergoing outpatient IV antibiotic treatment for persistence left lower quadrant diverticulitis, long history of same, indwelling PICC line in the right upper arm on outpatient self administer IV antibiotics.  She is having difficulty as the antibiotic infusion takes 2 hours and is to be given every 8 hours, she does not have any home health nurses coming to her house.  This is proving on sustainable for her and she has missed approximately 4 doses since discharge from this facility on December 26.  She did take this morning's dose having completed it at about 9:30.  She would prefer to be put in a short term care facility to facilitate antibiotic administration.  She is having persistent and possibly worsened left lower quadrant pain, no definite fever but some reported chills and sweats, stool seemed to be small in caliber and latisha-colored, she is having nausea and dysuria but no vomiting or hematuria.  The anxiety and stress related to this appears to be contributing to an asthma flare, she is wheezing unresponsive to her home DuoNeb nebulizer.  No other complaints.  No problems with the PICC line site.  She is going to an outpatient facility for dressing at the site.    The history is provided by the patient and a relative. No  was used.     Review of patient's allergies indicates:   Allergen Reactions    Latex Anaphylaxis and Edema    Zosyn [piperacillin-tazobactam] Anaphylaxis     Past Medical History:   Diagnosis Date    Asthma     " Diverticulitis     Diverticulosis     GERD (gastroesophageal reflux disease)     Graves disease      Past Surgical History:   Procedure Laterality Date    COLONOSCOPY, WITH DIRECTED SUBMUCOSAL INJECTION N/A 4/8/2024    Procedure: COLONOSCOPY, WITH DIRECTED SUBMUCOSAL INJECTION;  Surgeon: Heather Lance MD;  Location: [facility] ENDOSCOPY;  Service: Gastroenterology;  Laterality: N/A;     Family History   Problem Relation Name Age of Onset    Hypothyroidism Mother       Social History     Tobacco Use    Smoking status: Every Day     Current packs/day: 0.50     Average packs/day: 0.5 packs/day for 42.0 years (21.0 ttl pk-yrs)     Types: Cigarettes     Start date: 1/1/1983    Smokeless tobacco: Current    Tobacco comments:     Ambulatory referral to Smoking Cessation clinic following hospital discharge.    Substance Use Topics    Alcohol use: Not Currently    Drug use: Never     Review of Systems   Constitutional:  Positive for chills and diaphoresis.   Respiratory:  Positive for shortness of breath and wheezing.    Gastrointestinal:  Positive for abdominal pain and nausea.   Genitourinary:  Positive for dysuria.   Psychiatric/Behavioral:  The patient is nervous/anxious.        Physical Exam     Initial Vitals [01/02/25 1026]   BP Pulse Resp Temp SpO2   96/62 91 16 98.4 °F (36.9 °C) 96 %      MAP       --         Physical Exam    Nursing note and vitals reviewed.  Constitutional: She appears well-developed and well-nourished. She is not diaphoretic. She appears distressed.   Anxious, audible wheezing, pressured but no great physical distress   HENT:   Head: Normocephalic and atraumatic. Mouth/Throat: No oropharyngeal exudate.   Eyes: Conjunctivae and EOM are normal. Pupils are equal, round, and reactive to light. Right eye exhibits no discharge. Left eye exhibits no discharge. No scleral icterus.   Neck: Neck supple. No thyromegaly present. No tracheal deviation present. No JVD present.   Normal range of  motion.  Cardiovascular:  Normal rate, regular rhythm, normal heart sounds and intact distal pulses.     Exam reveals no gallop and no friction rub.       No murmur heard.  Pulmonary/Chest: No stridor. No respiratory distress. She has wheezes. She has no rhonchi. She has no rales. She exhibits no tenderness.   Moderate diffuse expiratory wheezing with prolonged expiratory phase throughout and slightly decreased air entry   Abdominal: Abdomen is soft. Bowel sounds are normal. She exhibits no distension and no mass. There is abdominal tenderness.   Mild left low midline and left low lateral abdominal tenderness to palpation without rebound or guarding.  No distention. There is no rebound and no guarding.   Musculoskeletal:         General: No tenderness or edema. Normal range of motion.      Cervical back: Normal range of motion and neck supple.     Neurological: She is alert and oriented to person, place, and time. She has normal strength.   Skin: Skin is warm and dry. No rash and no abscess noted. No erythema.   Right upper arm PICC line site is clean and well dressed.  No signs of infection or complication.   Psychiatric: Her behavior is normal. Judgment and thought content normal.   Anxious, mildly pressured.         ED Course   Procedures  Labs Reviewed   COMPREHENSIVE METABOLIC PANEL - Abnormal       Result Value    Sodium 141      Potassium 4.2      Chloride 108 (*)     CO2 26      Glucose 81      Blood Urea Nitrogen 7.5 (*)     Creatinine 0.75      Calcium 9.9      Protein Total 7.5      Albumin 3.3 (*)     Globulin 4.2 (*)     Albumin/Globulin Ratio 0.8 (*)     Bilirubin Total 0.5      ALP 91      ALT 19      AST 19      eGFR >60      Anion Gap 7.0      BUN/Creatinine Ratio 10     C-REACTIVE PROTEIN - Abnormal    CRP 33.20 (*)    CBC WITH DIFFERENTIAL - Abnormal    WBC 7.58      RBC 4.12 (*)     Hgb 13.3      Hct 40.2      MCV 97.6 (*)     MCH 32.3 (*)     MCHC 33.1      RDW 12.0      Platelet 238      MPV  10.5 (*)     Neut % 43.0      Lymph % 35.6      Mono % 8.6      Eos % 12.3      Basophil % 0.4      Lymph # 2.70      Neut # 3.26      Mono # 0.65      Eos # 0.93 (*)     Baso # 0.03      IG# 0.01      IG% 0.1      NRBC% 0.0     LACTIC ACID, PLASMA - Normal    Lactic Acid Level 1.4     CBC W/ AUTO DIFFERENTIAL    Narrative:     The following orders were created for panel order CBC auto differential.  Procedure                               Abnormality         Status                     ---------                               -----------         ------                     CBC with Differential[[id number]]       Abnormal            Final result                 Please view results for these tests on the individual orders.   URINALYSIS, REFLEX TO URINE CULTURE   SEDIMENTATION RATE, AUTOMATED   EXTRA TUBES    Narrative:     The following orders were created for panel order EXTRA TUBES.  Procedure                               Abnormality         Status                     ---------                               -----------         ------                     Light Blue Top Hold[[id number]]                             In process                 Gold Top Hold[[id number]]                                   In process                 Pink Top Hold[[id number]]                                   In process                   Please view results for these tests on the individual orders.   LIGHT BLUE TOP HOLD   GOLD TOP HOLD   PINK TOP HOLD     EKG Readings: (Independently Interpreted)   Initial Reading: No STEMI. Rhythm: Normal Sinus Rhythm. Ectopy: No Ectopy.   Normal sinus rhythm at 94 beats per minute, rightward axis, nonspecific ST abnormality, no significant change from previous.     ECG Results              EKG 12-lead (Shortness of Breath) Age > 50 (In process)        Collection Time Result Time QRS Duration [facility] QTC Calculation    01/02/25 10:43:02 01/02/25 10:46:47 66 440                     In process by Interface, Lab In  [facility] (01/02/25 10:46:51)                   Narrative:    Test Reason : R06.02,    Vent. Rate :  94 BPM     Atrial Rate :  94 BPM     P-R Int : 134 ms          QRS Dur :  66 ms      QT Int : 352 ms       P-R-T Axes :  86  90  80 degrees    QTcB Int : 440 ms    Normal sinus rhythm  Rightward axis  Nonspecific ST abnormality  Abnormal ECG  When compared with ECG of 10-Dec-2024 21:13,  T wave amplitude has decreased in Lateral leads    Referred By: AAAREFERRAL SELF           Confirmed By:                                   Imaging Results    None          Medications   albuterol-ipratropium 2.5 mg-0.5 mg/3 mL nebulizer solution 3 mL (3 mLs Nebulization Given 1/2/25 1143)   HYDROmorphone injection 1 mg (1 mg Intravenous Given 1/2/25 1230)   promethazine (PHENERGAN) 12.5 mg in 0.9% NaCl 50 mL IVPB (0 mg Intravenous Stopped 1/2/25 1302)       1:08 PM Consulting Int Med.          Medical Decision Making  Patient with chronic recurrent diverticulitis on home IV antibiotics, failing current IV regimen as she is not able to keep up with to our infusions every 8 hours, also anxiety and asthma flare with persistent left lower quadrant abdominal pain.  Consult Internal Medicine, consider inpatient versus long-term facility placement.    Problems Addressed:  Diverticulitis: chronic illness or injury with exacerbation, progression, or side effects of treatment  Exacerbation of asthma, unspecified asthma severity, unspecified whether persistent: chronic illness or injury with exacerbation, progression, or side effects of treatment    Amount and/or Complexity of Data Reviewed  Labs: ordered. Decision-making details documented in ED Course.  ECG/medicine tests: ordered and independent interpretation performed. Decision-making details documented in ED Course.    Risk  Prescription drug management.  Parenteral controlled substances.  Decision regarding hospitalization.      Additional MDM:   Differential Diagnosis:   Persistent  diverticulitis, treatment failure, asthma exacerbation among many others                                    Clinical Impression:  Final diagnoses:  [R06.02] SOB (shortness of breath)  [K57.92] Diverticulitis (Primary)  [J45.901] Exacerbation of asthma, unspecified asthma severity, unspecified whether persistent  [F41.9] Anxiety          ED Disposition Condition    Observation Stable                Glen Santos MD  01/02/25 5165

## 2025-01-02 NOTE — PROGRESS NOTES
[facility]  Infectious Diseases OPAT Lab Review Note      Medication:  Meropenem 2 g IV q.8 hours to complete 21 days    Infusion Company: [facility]    Outpatient start date:   Outpatient stop date:  01/15/2025      Labs reviewed:     Lab Results   Component Value Date    WBC 7.58 01/02/2025    HGB 13.3 01/02/2025    HCT 40.2 01/02/2025    MCV 97.6 (H) 01/02/2025     01/02/2025      CMP  Sodium   Date Value Ref Range Status   01/02/2025 141 136 - 145 mmol/L Final   02/19/2023 144 136 - 145 mmol/L Final     Potassium   Date Value Ref Range Status   01/02/2025 4.2 3.5 - 5.1 mmol/L Final   02/19/2023 4.3 3.5 - 5.1 mmol/L Final     Chloride   Date Value Ref Range Status   01/02/2025 108 (H) 98 - 107 mmol/L Final   02/19/2023 108 95 - 110 mmol/L Final     CO2   Date Value Ref Range Status   01/02/2025 26 22 - 29 mmol/L Final   02/19/2023 22 (L) 23 - 29 mmol/L Final     Glucose   Date Value Ref Range Status   02/19/2023 97 70 - 110 mg/dL Final     BUN   Date Value Ref Range Status   02/19/2023 13 6 - 20 mg/dL Final     Blood Urea Nitrogen   Date Value Ref Range Status   01/02/2025 7.5 (L) 9.8 - 20.1 mg/dL Final     Creatinine   Date Value Ref Range Status   01/02/2025 0.75 0.55 - 1.02 mg/dL Final   02/19/2023 0.8 0.5 - 1.4 mg/dL Final     Calcium   Date Value Ref Range Status   01/02/2025 9.9 8.4 - 10.2 mg/dL Final   02/19/2023 9.6 8.7 - 10.5 mg/dL Final     Total Protein   Date Value Ref Range Status   02/19/2023 7.5 6.0 - 8.4 g/dL Final     Albumin   Date Value Ref Range Status   01/02/2025 3.3 (L) 3.5 - 5.0 g/dL Final   02/19/2023 3.3 (L) 3.5 - 5.2 g/dL Final     Total Bilirubin   Date Value Ref Range Status   02/19/2023 0.2 0.1 - 1.0 mg/dL Final     Comment:     For infants and newborns, interpretation of results should be based  on gestational age, weight and in agreement with clinical  observations.    Premature Infant recommended reference ranges:  Up to 24  hours.............<8.0 mg/dL  Up to 48 hours............<12.0 mg/dL  3-5 days..................<15.0 mg/dL  6-29 days.................<15.0 mg/dL       Bilirubin Total   Date Value Ref Range Status   01/02/2025 0.5 <=1.5 mg/dL Final     Alkaline Phosphatase   Date Value Ref Range Status   02/19/2023 75 55 - 135 U/L Final     ALP   Date Value Ref Range Status   01/02/2025 91 40 - 150 unit/L Final     AST   Date Value Ref Range Status   01/02/2025 19 5 - 34 unit/L Final   02/19/2023 25 10 - 40 U/L Final     Comment:     Specimen slightly hemolyzed     ALT   Date Value Ref Range Status   01/02/2025 19 0 - 55 unit/L Final   02/19/2023 46 (H) 10 - 44 U/L Final     Anion Gap   Date Value Ref Range Status   02/19/2023 14 8 - 16 mmol/L Final     eGFR   Date Value Ref Range Status   01/02/2025 >60 mL/min/1.73/m2 Final   02/19/2023 >60 >60 mL/min/1.73 m^2 Final       Lab Results   Component Value Date    SEDRATE 63 (H) 12/30/2024      Lab Results   Component Value Date    CRP 33.20 (H) 01/02/2025          Assessment / Plan:   Sigmoid diverticulitis  S/P 3 weeks of Ertapenem without relief of symptoms or resolution on CT imaging   Diverticular abscess measuring 1x 1.8cm - resolved  History of ESBL E coli diverticular abscess      No leukocytosis.  ESR with slight increase, while CRP is downtrending  Continue Meropenem as ordered   Weekly labs to monitor        Follow up ID appointment:  01/30/2025        YEHUDA Bragg  [facility] Infectious Diseases    *Portions of this note dictated using EMR integrated voice recognition software, and may be subject to voice recognition errors not corrected at proofreading. Please contact writer for clarification if needed. *

## 2025-01-03 ENCOUNTER — TELEPHONE (OUTPATIENT)
Dept: INFECTIOUS DISEASES | Facility: CLINIC | Age: 55
End: 2025-01-03
Payer: OTHER GOVERNMENT

## 2025-01-03 LAB
ALBUMIN SERPL-MCNC: 3.2 G/DL (ref 3.5–5)
ALBUMIN/GLOB SERPL: 0.7 RATIO (ref 1.1–2)
ALP SERPL-CCNC: 96 UNIT/L (ref 40–150)
ALT SERPL-CCNC: 15 UNIT/L (ref 0–55)
ANION GAP SERPL CALC-SCNC: 8 MEQ/L
AST SERPL-CCNC: 18 UNIT/L (ref 5–34)
BASOPHILS # BLD AUTO: 0.01 X10(3)/MCL
BASOPHILS NFR BLD AUTO: 0.2 %
BILIRUB SERPL-MCNC: 0.2 MG/DL
BUN SERPL-MCNC: 7.5 MG/DL (ref 9.8–20.1)
CALCIUM SERPL-MCNC: 9.7 MG/DL (ref 8.4–10.2)
CHLORIDE SERPL-SCNC: 107 MMOL/L (ref 98–107)
CO2 SERPL-SCNC: 27 MMOL/L (ref 22–29)
CREAT SERPL-MCNC: 0.82 MG/DL (ref 0.55–1.02)
CREAT/UREA NIT SERPL: 9
EOSINOPHIL # BLD AUTO: 0.02 X10(3)/MCL (ref 0–0.9)
EOSINOPHIL NFR BLD AUTO: 0.3 %
ERYTHROCYTE [DISTWIDTH] IN BLOOD BY AUTOMATED COUNT: 12.1 % (ref 11.5–17)
GFR SERPLBLD CREATININE-BSD FMLA CKD-EPI: >60 ML/MIN/1.73/M2
GLOBULIN SER-MCNC: 4.3 GM/DL (ref 2.4–3.5)
GLUCOSE SERPL-MCNC: 172 MG/DL (ref 74–100)
HCT VFR BLD AUTO: 37.4 % (ref 37–47)
HGB BLD-MCNC: 12.4 G/DL (ref 12–16)
HOLD SPECIMEN: NORMAL
IMM GRANULOCYTES # BLD AUTO: 0.03 X10(3)/MCL (ref 0–0.04)
IMM GRANULOCYTES NFR BLD AUTO: 0.5 %
LYMPHOCYTES # BLD AUTO: 0.67 X10(3)/MCL (ref 0.6–4.6)
LYMPHOCYTES NFR BLD AUTO: 11.6 %
MAGNESIUM SERPL-MCNC: 2.1 MG/DL (ref 1.6–2.6)
MCH RBC QN AUTO: 32 PG (ref 27–31)
MCHC RBC AUTO-ENTMCNC: 33.2 G/DL (ref 33–36)
MCV RBC AUTO: 96.6 FL (ref 80–94)
MONOCYTES # BLD AUTO: 0.07 X10(3)/MCL (ref 0.1–1.3)
MONOCYTES NFR BLD AUTO: 1.2 %
NEUTROPHILS # BLD AUTO: 4.99 X10(3)/MCL (ref 2.1–9.2)
NEUTROPHILS NFR BLD AUTO: 86.2 %
NRBC BLD AUTO-RTO: 0 %
PHOSPHATE SERPL-MCNC: 2.6 MG/DL (ref 2.3–4.7)
PLATELET # BLD AUTO: 215 X10(3)/MCL (ref 130–400)
PMV BLD AUTO: 10.8 FL (ref 7.4–10.4)
POTASSIUM SERPL-SCNC: 3.9 MMOL/L (ref 3.5–5.1)
PROT SERPL-MCNC: 7.5 GM/DL (ref 6.4–8.3)
RBC # BLD AUTO: 3.87 X10(6)/MCL (ref 4.2–5.4)
SODIUM SERPL-SCNC: 142 MMOL/L (ref 136–145)
WBC # BLD AUTO: 5.79 X10(3)/MCL (ref 4.5–11.5)

## 2025-01-03 PROCEDURE — 36415 COLL VENOUS BLD VENIPUNCTURE: CPT

## 2025-01-03 PROCEDURE — 63600175 PHARM REV CODE 636 W HCPCS: Performed by: INTERNAL MEDICINE

## 2025-01-03 PROCEDURE — 11000001 HC ACUTE MED/SURG PRIVATE ROOM

## 2025-01-03 PROCEDURE — 25000003 PHARM REV CODE 250

## 2025-01-03 PROCEDURE — 25000242 PHARM REV CODE 250 ALT 637 W/ HCPCS

## 2025-01-03 PROCEDURE — 97165 OT EVAL LOW COMPLEX 30 MIN: CPT

## 2025-01-03 PROCEDURE — 80053 COMPREHEN METABOLIC PANEL: CPT

## 2025-01-03 PROCEDURE — 85025 COMPLETE CBC W/AUTO DIFF WBC: CPT

## 2025-01-03 PROCEDURE — 63600175 PHARM REV CODE 636 W HCPCS

## 2025-01-03 PROCEDURE — 25000003 PHARM REV CODE 250: Performed by: EMERGENCY MEDICINE

## 2025-01-03 PROCEDURE — 94640 AIRWAY INHALATION TREATMENT: CPT

## 2025-01-03 RX ORDER — PREDNISONE 20 MG/1
40 TABLET ORAL DAILY
Status: COMPLETED | OUTPATIENT
Start: 2025-01-03 | End: 2025-01-07

## 2025-01-03 RX ADMIN — PANTOPRAZOLE SODIUM 40 MG: 40 TABLET, DELAYED RELEASE ORAL at 08:01

## 2025-01-03 RX ADMIN — HYDROCODONE BITARTRATE AND ACETAMINOPHEN 1 TABLET: 7.5; 325 TABLET ORAL at 07:01

## 2025-01-03 RX ADMIN — IPRATROPIUM BROMIDE AND ALBUTEROL SULFATE 3 ML: .5; 3 SOLUTION RESPIRATORY (INHALATION) at 07:01

## 2025-01-03 RX ADMIN — MUPIROCIN: 20 OINTMENT TOPICAL at 08:01

## 2025-01-03 RX ADMIN — PREDNISONE 40 MG: 20 TABLET ORAL at 08:01

## 2025-01-03 RX ADMIN — MEROPENEM 2 G: 2 INJECTION, POWDER, FOR SOLUTION INTRAVENOUS at 01:01

## 2025-01-03 RX ADMIN — IPRATROPIUM BROMIDE AND ALBUTEROL SULFATE 3 ML: .5; 3 SOLUTION RESPIRATORY (INHALATION) at 11:01

## 2025-01-03 RX ADMIN — FLUTICASONE FUROATE AND VILANTEROL TRIFENATATE 1 PUFF: 200; 25 POWDER RESPIRATORY (INHALATION) at 06:01

## 2025-01-03 RX ADMIN — ENOXAPARIN SODIUM 40 MG: 40 INJECTION SUBCUTANEOUS at 05:01

## 2025-01-03 RX ADMIN — IPRATROPIUM BROMIDE AND ALBUTEROL SULFATE 3 ML: .5; 3 SOLUTION RESPIRATORY (INHALATION) at 03:01

## 2025-01-03 RX ADMIN — MUPIROCIN: 20 OINTMENT TOPICAL at 09:01

## 2025-01-03 RX ADMIN — MONTELUKAST SODIUM 10 MG: 5 TABLET, CHEWABLE ORAL at 08:01

## 2025-01-03 RX ADMIN — MEROPENEM 2 G: 2 INJECTION, POWDER, FOR SOLUTION INTRAVENOUS at 09:01

## 2025-01-03 RX ADMIN — MEROPENEM 2 G: 2 INJECTION, POWDER, FOR SOLUTION INTRAVENOUS at 06:01

## 2025-01-03 RX ADMIN — HYDROCODONE BITARTRATE AND ACETAMINOPHEN 1 TABLET: 7.5; 325 TABLET ORAL at 09:01

## 2025-01-03 RX ADMIN — HYDROCODONE BITARTRATE AND ACETAMINOPHEN 1 TABLET: 7.5; 325 TABLET ORAL at 01:01

## 2025-01-03 RX ADMIN — IPRATROPIUM BROMIDE AND ALBUTEROL SULFATE 3 ML: .5; 3 SOLUTION RESPIRATORY (INHALATION) at 06:01

## 2025-01-03 NOTE — PT/OT/SLP PROGRESS
Physical Therapy    Missed Treatment Session - patient unwilling to participate note - 1342 - 01/03/2025    Patient Name:  Niya Moeller   MRN:  [id number]      -patient not seen at this time secondary to patient unwilling to participate  -per patient-nothing personnel but don't need therapy  -patient stated she is independent AND saw OT Rachel earlier  -per OT Note-patient independent in transfers and in/out of room ambulation w/o device  -will discontinue PT Orders per patient request  -re-consult PT SERVICES if patients status changes and PT SERVICES warranted  -patients nurse Casillas notified

## 2025-01-03 NOTE — PROGRESS NOTES
Inpatient Nutrition Evaluation    Admit Date: 1/2/2025   Total duration of encounter: 1 day   Patient Age: 54 y.o.    Nutrition Recommendation/Prescription     GI Soft diet  Monitor Weight Weekly   Monitor glucose for need to add carb consistent diet for management of hyperglycemia    Nutrition Assessment     Chart Review    Reason Seen: continuous nutrition monitoring    Malnutrition Screening Tool Results   Have you recently lost weight without trying?: No  Have you been eating poorly because of a decreased appetite?: No   MST Score: 0   Diagnosis:  Chronic sigmoid diverticulitis, Asthma, Hyperthyroid    Relevant Medical History: Chronic sigmoid diverticulitis with perforation & abscess, Hyperthyroid, Asthma, Graves disease    Scheduled Medications:  enoxparin, 40 mg, Daily  fluticasone furoate-vilanteroL, 1 puff, Daily  meropenem IV (PEDS and ADULTS), 2 g, Q8H  methIMAzole, 2.5 mg, Daily  montelukast, 10 mg, Daily  mupirocin, , BID  pantoprazole, 40 mg, Daily  predniSONE, 40 mg, Daily    Continuous Infusions:   PRN Medications:   Current Facility-Administered Medications:     acetaminophen, 650 mg, Oral, Q8H PRN    acetaminophen, 650 mg, Oral, Q4H PRN    albuterol-ipratropium, 3 mL, Nebulization, Q4H PRN    aluminum-magnesium hydroxide-simethicone, 30 mL, Oral, QID PRN    benzonatate, 100 mg, Oral, TID PRN    dextrose 50%, 12.5 g, Intravenous, PRN    dextrose 50%, 25 g, Intravenous, PRN    diphenhydrAMINE-zinc acetate 2-0.1%, , Topical (Top), TID PRN    fluticasone propionate, 1 spray, Each Nostril, Daily PRN    glucagon (human recombinant), 1 mg, Intramuscular, PRN    glucose, 16 g, Oral, PRN    glucose, 24 g, Oral, PRN    HYDROcodone-acetaminophen, 1 tablet, Oral, Q6H PRN    melatonin, 9 mg, Oral, Nightly PRN    naloxone, 0.02 mg, Intravenous, PRN    ondansetron, 4 mg, Intravenous, Q8H PRN    ondansetron, 8 mg, Intravenous, Q8H PRN    senna-docusate 8.6-50 mg, 1 tablet, Oral, BID PRN    Recent Labs   Lab  "24  1400 25  1132 25  0354   * 141 142   K 4.2 4.2 3.9   CALCIUM 10.1 9.9 9.7   PHOS  --  2.6  --    MG  --  2.10  --    CO2 24 26 27   BUN 8.1* 7.5* 7.5*   CREATININE 0.74 0.75 0.82   EGFRNORACEVR >60 >60 >60   GLUCOSE 118* 81 172*   BILITOT 0.2 0.5 0.2   ALKPHOS 86 91 96   ALT 24 19 15   AST 23 19 18   ALBUMIN 3.5 3.3* 3.2*   CRP 25.20* 33.20*  --    WBC 6.38 7.58 5.79   HGB 13.3 13.3 12.4   HCT 39.9 40.2 37.4     Nutrition Orders:  Diet GI Soft      Appetite/Oral Intake: good/50-75% of meals  Factors Affecting Nutritional Intake: abdominal pain  Social Needs Impacting Access to Food: none identified  Food/Cheondoism/Cultural Preferences: none reported  Food Allergies: no known food allergies  Last Bowel Movement: 25  Wound(s):  skin intact    Comments    1/3/24 -- Pt reports good appetite & eats well when abdominal pain is controlled; decreased po intake with intermittent abdominal pain over the last month; no nausea this am, reports managed with meds; pt declines ONS d/t dislike; Continue diet as ordered; weight stable    Anthropometrics    Height: 4' 11" (149.9 cm), Height Method: Stated  Last Weight: 64.2 kg (141 lb 8 oz) (25 0507), Weight Method: Standard Scale  BMI (Calculated): 28.6  BMI Classification: overweight (BMI 25-29.9)     Ideal Body Weight (IBW), Female: 95 lb     % Ideal Body Weight, Female (lb): 148.53 %                    Usual Body Weight (UBW), k.6 kg  % Usual Body Weight: 101.13     Usual Weight Provided By: patient and EMR weight history    Wt Readings from Last 5 Encounters:   25 64.2 kg (141 lb 8 oz)   24 65.3 kg (144 lb)   24 64.9 kg (143 lb 1.3 oz)   24 63.5 kg (140 lb)   12/10/24 68 kg (150 lb)     Weight Change(s) Since Admission: stable  Wt Readings from Last 1 Encounters:   25 0507 64.2 kg (141 lb 8 oz)   25 1026 64 kg (141 lb 1.5 oz)   Admit Weight: 64 kg (141 lb 1.5 oz) (25 1026), Weight Method: " Standard Scale    Patient Education     Not applicable.    Nutrition Goals & Monitoring     Dietitian will monitor: food and beverage intake, weight, glucose/endocrine profile, and gastrointestinal profile  Discharge planning: continue GI Soft diet  Nutrition Risk/Follow-Up: low (follow-up in 5-7 days)  Patients assigned 'low nutrition risk' status do not qualify for a full nutritional assessment but will be monitored and re-evaluated in a 5-7 day time period. Please consult if re-evaluation needed sooner.

## 2025-01-03 NOTE — TELEPHONE ENCOUNTER
Returned a call to Ashish with [facility],she was asking if okay to discharge pt from their services due to pt now admitted at [facility] and going to LTAC. She will seen orders for Dr Valdivia to sign.

## 2025-01-03 NOTE — CARE UPDATE
Spoke with pt and her sister Patricia [phone] about SNF consult, would like [facility] or [facility]. Referrals submitted via Epic. Acceptance pending. Will follow.

## 2025-01-03 NOTE — H&P
"[facility]  HISTORY & PHYSICAL NOTE    Patient's Name: Niya Moeller  : 1970  MRN: [id number]    Admission Date: 2025  Attending Physician: carmela harris  Resident: Etienne  Intern: Christina  Primary Care Provider: Oswaldo Riggs NP    SUBJECTIVE     Chief Complaint   Patient presents with    Abdominal Pain     C/o "severe" abdominal pain and SOB x 1 month, hx of asthma. States that she was sent home with PICC line on Dec. 25th to do antibiotic infusions at home. States that she is unable to administer and is missing doses of the antibiotics. Wants to talk with doctor about being put in short term care facility.     History of Present Illness:  Niya Moeller is a 54 y.o. female with a PMHx of  chronic sigmoid diverticulitis with a history of perforation and abscess, hyperthyroid, asthma  who presented to [facility] on 2025 for left lower quadrant pain.  Patient lives alone, currently undergoing outpatient IV antibiotic treatment for persistent left lower quadrant diverticulitis, long history of same, indwelling PICC line in the right upper arm on outpatient self administered IV antibiotics.  Patient is having difficulty as antibiotic infusion takes 2 hours and is to be given every 8 hours, she does not have any home health nurses coming to her house.  She had missed approximately 4 doses since discharge from the facility on .  Patient did take this morning dose having completed it at about 930 she would prefer to be put in a short-term care facility (LTAC/nursing home) to facilitate antibiotic administration.  Patient reports left lower quadrant pain since 5 days and some reported chills and sweats, stool seemed to be small in caliber and latisha-colored.  Patient also reports having nausea and dysuria but no vomiting or hematuria.  Patient also reports worsening of her SOB, tried DuoNebs, breztri and rescue inhalers with minimal improvement.  " Likely due to stress and anxiety.  Denies diarrhea, melena, hematochezia, headache, chest pain, vomiting.  Previously seen by General surgery at [facility]. Patient not amicable to surgery at [facility]. Patient have an tele appointment with colorectal surgery on 1/7/25.  Patient reports smoking 6 cigarettes/day.  Denies alcohol use, IV drug abuse.    Upon ED presentation, vitals notable for BP 96/62 HR 91 RR 16 afebrile SpO2 96% at room air. Labs significant for chloride 108, CRP 33 lactate 1.4 EKG NSR. ED interventions included DuoNebs, hydromorphone, Phenergan. Patient was admitted to [facility] for further management of diverticulitis and placement for IV antibiotics.    Review of Systems:  A 12-pt ROS was done and was negative unless stated in the above HPI.    Past Medical History:   Diagnosis Date    Asthma     Diverticulitis     Diverticulosis     GERD (gastroesophageal reflux disease)     Graves disease      Past Surgical History:   Procedure Laterality Date    COLONOSCOPY, WITH DIRECTED SUBMUCOSAL INJECTION N/A 4/8/2024    Procedure: COLONOSCOPY, WITH DIRECTED SUBMUCOSAL INJECTION;  Surgeon: Heather Lance MD;  Location: [facility] ENDOSCOPY;  Service: Gastroenterology;  Laterality: N/A;     Social History     Tobacco Use    Smoking status: Every Day     Current packs/day: 0.50     Average packs/day: 0.5 packs/day for 42.0 years (21.0 ttl pk-yrs)     Types: Cigarettes     Start date: 1/1/1983    Smokeless tobacco: Current    Tobacco comments:     Ambulatory referral to Smoking Cessation clinic following hospital discharge.    Substance Use Topics    Alcohol use: Not Currently    Drug use: Never     Family History   Problem Relation Name Age of Onset    Hypothyroidism Mother       Home Medications:  No current outpatient medications   Allergies:  Review of patient's allergies indicates:   Allergen Reactions    Latex Anaphylaxis and Edema    Zosyn [piperacillin-tazobactam] Anaphylaxis       OBJECTIVE   Vital  "Signs:  Initial Vitals in ED Most Recent Vitals   Temp: 98.4 °F (36.9 °C)  98 °F (36.7 °C)   BP: 96/62  (!) 88/49    Pulse: 91  100   Resp: 16  20    SpO2: 96 %  96 %    Body mass index is 28.5 kg/m².    Physical Exam  Vital signs and nursing notes reviewed.  Constitutional: NAD. Awake and alert.   Head: Atraumatic. Normocephalic.  Eyes: Conjunctivae nl. No scleral icterus.  ENT: Mucous membranes are moist. Oropharynx is clear.  Neck: Supple. Full ROM. No lymphadenopathy.  Cardiovascular: Regular rate and rhythm. No murmurs, rubs, or gallops. Distal pulses are 2+ and symmetric.  Pulmonary/Chest: No respiratory distress. Clear to auscultation bilaterally.  Bilateral faint wheezing heard.  Abdominal: Soft. Non-distended.  Left lower quadrant tenderness. No rebound, guarding, or rigidity.   Musculoskeletal: Moves all extremities. No edema.    Skin: Warm and dry.  Neurological: Awake and alert. No acute focal neurological deficits are appreciated.    Labs:  CBC:  Recent Labs   Lab 12/30/24  1400 01/02/25  1132   WBC 6.38 7.58   HGB 13.3 13.3   HCT 39.9 40.2    238   MCV 98.0* 97.6*   RDW 12.2 12.0     BMP/CMP:  Recent Labs   Lab 12/30/24  1400 01/02/25  1132   * 141   K 4.2 4.2    108*   CO2 24 26   BUN 8.1* 7.5*   CREATININE 0.74 0.75   GLUCOSE 118* 81   EGFRNORACEVR >60 >60     RFTs/LFTs:  Recent Labs   Lab 12/30/24  1400 01/02/25  1132   CALCIUM 10.1 9.9   LABPROT 7.3 7.5   ALBUMIN 3.5 3.3*   AST 23 19   ALT 24 19   ALKPHOS 86 91   BILITOT 0.2 0.5     No results for input(s): "MG", "PHOS" in the last 168 hours.  Cardiac Panel:  No results for input(s): "TROPONINI", "CKTOTAL", "CKMB", "BNP" in the last 168 hours.  Lipid Panel:  Lab Results   Component Value Date    CHOL 147 03/31/2022    HDL 51 03/31/2022    LDL 84.00 03/31/2022    TRIG 61 03/31/2022     Diabetes Panel:  Lab Results   Component Value Date    HGBA1C 5.9 (H) 11/23/2022    HGBA1C 5.3 06/28/2022    HGBA1C 5.4 03/31/2022    PEDRO " "60.7 01/22/2023     Thyroid Panel:  Lab Results   Component Value Date    TSH 0.589 01/02/2025    DOJNFJ6OWVJ 1.08 12/24/2024     Anemia Panel:  Lab Results   Component Value Date    XUIOGCYW13 426 08/17/2023    FOLATE 9.6 12/05/2024     Coagulation Panel:  No results for input(s): "PT", "INR", "PTT" in the last 168 hours.  No results for input(s): "DDIMER", "FIBRINOGEN", "ANTIXA" in the last 168 hours.    Invalid input(s): "HEPXA"  Urinalysis:  Lab Results   Component Value Date    APPEARANCEUA Clear 01/02/2025    SGUA 1.020 01/02/2025    PROTEINUA 1+ (A) 01/02/2025    KETONESUA 1+ (A) 01/02/2025    NITRITESUA Negative 01/13/2023    LEUKOCYTESUR 25 (A) 01/02/2025    RBCUA 0-5 01/02/2025    WBCUA 6-10 (A) 01/02/2025    BACTERIA None Seen 01/02/2025    SQEPUA Moderate 01/02/2025    HYALINECASTS None Seen 12/21/2024    CREATRANDUR 60.7 01/22/2023      Urine Drug Screening:  Lab Results   Component Value Date    AMPHETAMINES Negative 01/22/2023    BARBITURATES Negative 01/22/2023    LABBENZ Negative 03/30/2022    CANNABUR Negative 03/30/2022    COCAINEMETAB Negative 01/22/2023    FENTANYL Negative 03/30/2022    MDMA Negative 03/30/2022    OPIATESCREEN Negative 01/22/2023    PCDSOPHENCYN Negative 01/22/2023     Imaging  X-Ray Chest 1 View for Line/Tube Placement  Narrative: EXAMINATION:  XR CHEST 1 VIEW FOR LINE/TUBE PLACEMENT    CLINICAL HISTORY:  PICC;    TECHNIQUE:  Single frontal view of the chest was performed.    COMPARISON:  None    FINDINGS:  LINES AND TUBES: Right upper extremity PICC tip projects over the SVC.  EKG/telemetry leads overlie the chest.    MEDIASTINUM AND LIDIA: The cardiac silhouette is normal.    LUNGS: No lobar consolidation. No edema.    PLEURA:No pleural effusion. No pneumothorax.    OTHER: No acute osseous abnormality.  Impression: Right upper extremity PICC tip projects over the SVC.    Electronically signed by: Genesis Iraheta  Date:    12/26/2024  Time:    14:35    EKG  ECG Results        "       EKG 12-lead (Shortness of Breath) Age > 50 (Final result)        Collection Time Result Time QRS Duration OHS QTC Calculation    01/02/25 10:43:02 01/02/25 16:32:10 66 440                     Final result by Interface, Lab In [facility] (01/02/25 16:32:19)                   Narrative:    Test Reason : R06.02,    Vent. Rate :  94 BPM     Atrial Rate :  94 BPM     P-R Int : 134 ms          QRS Dur :  66 ms      QT Int : 352 ms       P-R-T Axes :  86  90  80 degrees    QTcB Int : 440 ms    Normal sinus rhythm  Rightward axis  Nonspecific ST abnormality  Abnormal ECG    Confirmed by Sujit Mackay ([id number]) on 1/2/2025 4:32:08 PM    Referred By: AAAREFERRAL SELF           Confirmed By: Sujit Mackay                                   Microbiology  Microbiology Results (last 7 days)       Procedure Component Value Units Date/Time    Blood Culture (site 1) [[id number]] Collected: 01/02/25 1415    Order Status: Resulted Specimen: Blood from Arm, Left Updated: 01/02/25 1423    Blood Culture (site 2) [[id number]] Collected: 01/02/25 1416    Order Status: Resulted Specimen: Blood from Arm, Left Updated: 01/02/25 1422           Antibiotics  Antibiotics (From admission, onward)      Start     Stop Route Frequency Ordered    01/02/25 2100  mupirocin 2 % ointment         01/07/25 2059 Nasl 2 times daily 01/02/25 1359    01/02/25 1400  meropenem 2 g in 0.9% NaCl 100 mL IVPB (MB+)         -- IV Every 8 hours (non-standard times) 01/02/25 1357             ASSESSMENT AND PLAN   Niya Moeller is a 54 y.o. female who is admitted for:    Chronic sigmoid diverticulitis  -was on meropenem 2g every 8 hours with OPAT.  Failed therapy due to inconvenience and worsening of pain  - considering surgery if aminicable. having an appointment with Colorectal surgery on Jan 7 2025  - no leukocytosis. ESR slightly elevated CRP is downtrending.  Lactate normal.  -consult ID tomorrow  -we will start Merrem 2 g q.8  -awaiting blood cultures  -multimodal pain  control in place  - consulted case management placement long-term antibiotic therapy he  -no suspicion of sepsis at this time.  We will continue to monitor vitals.  Though BP soft patient reports this is her baseline.  Maintain maps greater than 65.         Asthma  -currently wheezing, likely stress a component to flare up.  O2 sat 97% at 2 L of oxygen  -we will initiate DuoNebs as needed  -ordered fluticasone furoate 8-vilanterol inhaler and MDI daily  -continue montelukast     Hyperthyroid  -TSH - 0.589, free T 4 pending  -continue at home methimazole 2.5 q.d.will adjust after results if needed      DVT Prophylaxis:  Enoxaparin  GI Prophylaxis:  Pantoprazole  Abx:  Meropenem  Access:  PICC see above we will see the  Fluids:  LR bolus 1000 mL  Diet: Diet GI Soft    Code Status: Full Code    Disposition: 54 y.o. female admitted for acute on chronic sigmoid diverticulitis. Discharge pending further workup and clinical improvement.       Case to be discussed with Dr. casey. Please appreciate attending's attestation to follow.    Radha Galicia MD  PGY-1 Resident  [facility] Team 3  01/02/2025

## 2025-01-03 NOTE — PT/OT/SLP EVAL
Occupational Therapy   Evaluation and Discharge Note    Name: Niya Moeller  MRN: [id number]  Admitting Diagnosis: anxiety, SOB, diverticulitis  Recent Surgery: * No surgery found *      Recommendations:     Discharge Recommendations: No Therapy Indicated  Discharge Equipment Recommendations: none  Barriers to discharge:  None    Assessment:     Niya Moeller is a 54 y.o. female with a medical diagnosis as noted above. At this time, patient is functioning at their prior level of function and does not require further acute OT services.     Plan:     During this hospitalization, patient does not require further acute OT services.  Please re-consult if situation changes.    Plan of Care Reviewed with: patient    Subjective     Chief Complaint: none at this time; pt requesting pain meds as scheduled from nurse  Patient/Family Comments/goals: pain management, placement in SNF for course of IV abx    Occupational Profile:  Living Environment: H alone  Previous level of function: I with ADLs and mobility  Roles and Routines: pt drives, cooks, cleans, and shops  Equipment Used at home: none  Assistance upon Discharge: pt has family support nearby    Pain/Comfort:  Pain Rating 1: 0/10    Patients cultural, spiritual, Sabianism conflicts given the current situation: no    Objective:     Communicated with: nurse Casillas prior to session.  Patient found ambulatory in room/goldman with peripheral IV upon OT entry to room.    General Precautions: Standard,    Orthopedic Precautions: N/A  Braces: N/A  Respiratory Status: Room air     Occupational Performance:    Functional Mobility/Transfers:  Patient completed Sit <> Stand Transfer with independence  with  no assistive device   Functional Mobility: independent to ambulate 130 ft around nurses' station without assistive device    Activities of Daily Living:  Feeding:  independence .  Grooming: independence .  Upper Body Dressing: independence .  Lower Body Dressing: independence  .  Toileting: independence .    Cognitive/Visual Perceptual:  Cognitive/Psychosocial Skills:     -       Oriented to: Person, Place, Time, and Situation   -       Follows Commands/attention:Follows multistep  commands  -       Safety awareness/insight to disability: intact   -       Mood/Affect/Coping skills/emotional control: Cooperative, Anxious, and Pleasant    Physical Exam:  BUE AROM WNL, strength 5/5, FM coordination intact, denies any sensory deficits    Treatment & Education:  Pt. educated on POC, orientation to environment, use of call bell for assist as needed.  Pt verbalized understanding.    Patient left ambulatory in room/goldman with all lines intact and nurse notified    GOALS:   Multidisciplinary Problems       Occupational Therapy Goals       Not on file                    History:     Past Medical History:   Diagnosis Date    Asthma     Diverticulitis     Diverticulosis     GERD (gastroesophageal reflux disease)     Graves disease          Past Surgical History:   Procedure Laterality Date    COLONOSCOPY, WITH DIRECTED SUBMUCOSAL INJECTION N/A 4/8/2024    Procedure: COLONOSCOPY, WITH DIRECTED SUBMUCOSAL INJECTION;  Surgeon: Heather Lance MD;  Location: [facility] ENDOSCOPY;  Service: Gastroenterology;  Laterality: N/A;       Time Tracking:     OT Date of Treatment: 01/03/25  OT Start Time: 1320  OT Stop Time: 1335  OT Total Time (min): 15 min    Billable Minutes:Evaluation 15, low    1/3/2025

## 2025-01-03 NOTE — PLAN OF CARE
01/03/25 1050   Discharge Assessment   Assessment Type Discharge Planning Assessment   Confirmed/corrected address, phone number and insurance Yes   Confirmed Demographics Correct on Facesheet   Source of Information patient;family   When was your last doctors appointment?   (Oswaldo Riggs)   Reason For Admission Anxiety, SOB, CP, diverticulitis   People in Home alone   Facility Arrived From: home   Do you expect to return to your current living situation? Yes   Do you have help at home or someone to help you manage your care at home? Yes   Who are your caregiver(s) and their phone number(s)? Dustin Viveros (Son)  [phone]   Prior to hospitilization cognitive status: Alert/Oriented   Current cognitive status: Alert/Oriented   Walking or Climbing Stairs Difficulty no   Dressing/Bathing Difficulty no   Home Accessibility wheelchair accessible   Home Layout Able to live on 1st floor   Equipment Currently Used at Home none   Readmission within 30 days? Yes   Patient currently being followed by outpatient case management? No   Do you currently have service(s) that help you manage your care at home? No   Do you take prescription medications? Yes   Do you have prescription coverage? Yes   Coverage [employer]   Do you have any problems affording any of your prescribed medications? No   Is the patient taking medications as prescribed? yes   Who is going to help you get home at discharge? family/self   How do you get to doctors appointments? car, drives self;family or friend will provide   Are you on dialysis? No   Discharge Plan A Skilled Nursing Facility   DME Needed Upon Discharge  none   Discharge Plan discussed with: Sibling;Patient   Name(s) and Number(s) Patricia [phone]   Transition of Care Barriers None   Health Literacy   How often do you need to have someone help you when you read instructions, pamphlets, or other written material from your doctor or pharmacy? Never     Pt was receiving  home infusions through [facility], goes to [facility] for PICC line dressing changes, unable to get HH due to insurance. Pt states she can not handle the frequency of the infusions & would like to get placed to complete her infusions.

## 2025-01-04 LAB
ALBUMIN SERPL-MCNC: 3.1 G/DL (ref 3.5–5)
ALBUMIN/GLOB SERPL: 0.8 RATIO (ref 1.1–2)
ALP SERPL-CCNC: 90 UNIT/L (ref 40–150)
ALT SERPL-CCNC: 14 UNIT/L (ref 0–55)
ANION GAP SERPL CALC-SCNC: 7 MEQ/L
AST SERPL-CCNC: 16 UNIT/L (ref 5–34)
BASOPHILS # BLD AUTO: 0.01 X10(3)/MCL
BASOPHILS NFR BLD AUTO: 0.1 %
BILIRUB SERPL-MCNC: 0.2 MG/DL
BUN SERPL-MCNC: 12.9 MG/DL (ref 9.8–20.1)
CALCIUM SERPL-MCNC: 9.5 MG/DL (ref 8.4–10.2)
CHLORIDE SERPL-SCNC: 109 MMOL/L (ref 98–107)
CO2 SERPL-SCNC: 26 MMOL/L (ref 22–29)
CREAT SERPL-MCNC: 0.77 MG/DL (ref 0.55–1.02)
CREAT/UREA NIT SERPL: 17
EOSINOPHIL # BLD AUTO: 0.01 X10(3)/MCL (ref 0–0.9)
EOSINOPHIL NFR BLD AUTO: 0.1 %
ERYTHROCYTE [DISTWIDTH] IN BLOOD BY AUTOMATED COUNT: 12 % (ref 11.5–17)
GFR SERPLBLD CREATININE-BSD FMLA CKD-EPI: >60 ML/MIN/1.73/M2
GLOBULIN SER-MCNC: 4 GM/DL (ref 2.4–3.5)
GLUCOSE SERPL-MCNC: 113 MG/DL (ref 74–100)
HCT VFR BLD AUTO: 34.3 % (ref 37–47)
HGB BLD-MCNC: 11.4 G/DL (ref 12–16)
IMM GRANULOCYTES # BLD AUTO: 0.01 X10(3)/MCL (ref 0–0.04)
IMM GRANULOCYTES NFR BLD AUTO: 0.1 %
LYMPHOCYTES # BLD AUTO: 2.27 X10(3)/MCL (ref 0.6–4.6)
LYMPHOCYTES NFR BLD AUTO: 31.5 %
MAGNESIUM SERPL-MCNC: 2.2 MG/DL (ref 1.6–2.6)
MCH RBC QN AUTO: 32.4 PG (ref 27–31)
MCHC RBC AUTO-ENTMCNC: 33.2 G/DL (ref 33–36)
MCV RBC AUTO: 97.4 FL (ref 80–94)
MONOCYTES # BLD AUTO: 1.11 X10(3)/MCL (ref 0.1–1.3)
MONOCYTES NFR BLD AUTO: 15.4 %
NEUTROPHILS # BLD AUTO: 3.79 X10(3)/MCL (ref 2.1–9.2)
NEUTROPHILS NFR BLD AUTO: 52.8 %
NRBC BLD AUTO-RTO: 0 %
PHOSPHATE SERPL-MCNC: 1.9 MG/DL (ref 2.3–4.7)
PLATELET # BLD AUTO: 202 X10(3)/MCL (ref 130–400)
PMV BLD AUTO: 11.7 FL (ref 7.4–10.4)
POTASSIUM SERPL-SCNC: 4 MMOL/L (ref 3.5–5.1)
PROT SERPL-MCNC: 7.1 GM/DL (ref 6.4–8.3)
RBC # BLD AUTO: 3.52 X10(6)/MCL (ref 4.2–5.4)
SODIUM SERPL-SCNC: 142 MMOL/L (ref 136–145)
WBC # BLD AUTO: 7.2 X10(3)/MCL (ref 4.5–11.5)

## 2025-01-04 PROCEDURE — 84100 ASSAY OF PHOSPHORUS: CPT

## 2025-01-04 PROCEDURE — 80053 COMPREHEN METABOLIC PANEL: CPT

## 2025-01-04 PROCEDURE — 11000001 HC ACUTE MED/SURG PRIVATE ROOM

## 2025-01-04 PROCEDURE — 94761 N-INVAS EAR/PLS OXIMETRY MLT: CPT

## 2025-01-04 PROCEDURE — 63600175 PHARM REV CODE 636 W HCPCS: Performed by: INTERNAL MEDICINE

## 2025-01-04 PROCEDURE — 25000003 PHARM REV CODE 250

## 2025-01-04 PROCEDURE — 27000221 HC OXYGEN, UP TO 24 HOURS

## 2025-01-04 PROCEDURE — S4991 NICOTINE PATCH NONLEGEND: HCPCS

## 2025-01-04 PROCEDURE — 36415 COLL VENOUS BLD VENIPUNCTURE: CPT

## 2025-01-04 PROCEDURE — 25000242 PHARM REV CODE 250 ALT 637 W/ HCPCS

## 2025-01-04 PROCEDURE — 85025 COMPLETE CBC W/AUTO DIFF WBC: CPT

## 2025-01-04 PROCEDURE — 83735 ASSAY OF MAGNESIUM: CPT

## 2025-01-04 PROCEDURE — 63600175 PHARM REV CODE 636 W HCPCS

## 2025-01-04 PROCEDURE — 94640 AIRWAY INHALATION TREATMENT: CPT

## 2025-01-04 PROCEDURE — 94760 N-INVAS EAR/PLS OXIMETRY 1: CPT

## 2025-01-04 RX ORDER — SODIUM,POTASSIUM PHOSPHATES 280-250MG
2 POWDER IN PACKET (EA) ORAL ONCE
Status: COMPLETED | OUTPATIENT
Start: 2025-01-04 | End: 2025-01-04

## 2025-01-04 RX ORDER — IBUPROFEN 200 MG
1 TABLET ORAL DAILY
Status: DISCONTINUED | OUTPATIENT
Start: 2025-01-05 | End: 2025-01-04

## 2025-01-04 RX ORDER — GUAIFENESIN 100 MG/5ML
200 SOLUTION ORAL EVERY 4 HOURS PRN
Status: DISCONTINUED | OUTPATIENT
Start: 2025-01-04 | End: 2025-01-13 | Stop reason: HOSPADM

## 2025-01-04 RX ORDER — IBUPROFEN 200 MG
1 TABLET ORAL DAILY
Status: DISCONTINUED | OUTPATIENT
Start: 2025-01-04 | End: 2025-01-13 | Stop reason: HOSPADM

## 2025-01-04 RX ADMIN — MEROPENEM 2 G: 2 INJECTION, POWDER, FOR SOLUTION INTRAVENOUS at 06:01

## 2025-01-04 RX ADMIN — MEROPENEM 2 G: 2 INJECTION, POWDER, FOR SOLUTION INTRAVENOUS at 03:01

## 2025-01-04 RX ADMIN — MEROPENEM 2 G: 2 INJECTION, POWDER, FOR SOLUTION INTRAVENOUS at 09:01

## 2025-01-04 RX ADMIN — IPRATROPIUM BROMIDE AND ALBUTEROL SULFATE 3 ML: .5; 3 SOLUTION RESPIRATORY (INHALATION) at 08:01

## 2025-01-04 RX ADMIN — GUAIFENESIN 200 MG: 100 LIQUID ORAL at 09:01

## 2025-01-04 RX ADMIN — GUAIFENESIN 200 MG: 100 LIQUID ORAL at 07:01

## 2025-01-04 RX ADMIN — MUPIROCIN: 20 OINTMENT TOPICAL at 09:01

## 2025-01-04 RX ADMIN — IPRATROPIUM BROMIDE AND ALBUTEROL SULFATE 3 ML: .5; 3 SOLUTION RESPIRATORY (INHALATION) at 11:01

## 2025-01-04 RX ADMIN — ENOXAPARIN SODIUM 40 MG: 40 INJECTION SUBCUTANEOUS at 05:01

## 2025-01-04 RX ADMIN — HYDROCODONE BITARTRATE AND ACETAMINOPHEN 1 TABLET: 7.5; 325 TABLET ORAL at 06:01

## 2025-01-04 RX ADMIN — NICOTINE 1 PATCH: 21 PATCH, EXTENDED RELEASE TRANSDERMAL at 03:01

## 2025-01-04 RX ADMIN — HYDROCODONE BITARTRATE AND ACETAMINOPHEN 1 TABLET: 7.5; 325 TABLET ORAL at 03:01

## 2025-01-04 RX ADMIN — IPRATROPIUM BROMIDE AND ALBUTEROL SULFATE 3 ML: .5; 3 SOLUTION RESPIRATORY (INHALATION) at 03:01

## 2025-01-04 RX ADMIN — IPRATROPIUM BROMIDE AND ALBUTEROL SULFATE 3 ML: .5; 3 SOLUTION RESPIRATORY (INHALATION) at 07:01

## 2025-01-04 RX ADMIN — HYDROCODONE BITARTRATE AND ACETAMINOPHEN 1 TABLET: 7.5; 325 TABLET ORAL at 09:01

## 2025-01-04 RX ADMIN — MONTELUKAST SODIUM 10 MG: 5 TABLET, CHEWABLE ORAL at 07:01

## 2025-01-04 RX ADMIN — PREDNISONE 40 MG: 20 TABLET ORAL at 07:01

## 2025-01-04 RX ADMIN — FLUTICASONE FUROATE AND VILANTEROL TRIFENATATE 1 PUFF: 200; 25 POWDER RESPIRATORY (INHALATION) at 07:01

## 2025-01-04 RX ADMIN — PANTOPRAZOLE SODIUM 40 MG: 40 TABLET, DELAYED RELEASE ORAL at 07:01

## 2025-01-04 RX ADMIN — POTASSIUM & SODIUM PHOSPHATES POWDER PACK 280-160-250 MG 2 PACKET: 280-160-250 PACK at 07:01

## 2025-01-04 NOTE — PROGRESS NOTES
"[facility]  PROGRESS NOTE    Patient's Name: Niya Moeller  : 1970  MRN: [id number]    Admission Date: 2025  Length of Stay: 1  Attending Physician: No att. providers found  Resident: Etienne  Intern: Christina    SUBJECTIVE     Chief Complaint   Patient presents with    Abdominal Pain     C/o "severe" abdominal pain and SOB x 1 month, hx of asthma. States that she was sent home with PICC line on Dec. 25th to do antibiotic infusions at home. States that she is unable to administer and is missing doses of the antibiotics. Wants to talk with doctor about being put in short term care facility.     History of Present Illness:  Niya Moeller is a 54 y.o. female with a PMHx of  chronic sigmoid diverticulitis with a history of perforation and abscess, hyperthyroid, asthma  who presented to [facility] on 2025 for left lower quadrant pain.  Patient lives alone, currently undergoing outpatient IV antibiotic treatment for persistent left lower quadrant diverticulitis, long history of same, indwelling PICC line in the right upper arm on outpatient self administered IV antibiotics.  Patient is having difficulty as antibiotic infusion takes 2 hours and is to be given every 8 hours, she does not have any home health nurses coming to her house.  She had missed approximately 4 doses since discharge from the facility on .  Patient did take this morning dose having completed it at about 930 she would prefer to be put in a short-term care facility (LTAC/nursing home) to facilitate antibiotic administration.  Patient reports left lower quadrant pain since 5 days and some reported chills and sweats, stool seemed to be small in caliber and latisha-colored.  Patient also reports having nausea and dysuria but no vomiting or hematuria.  Patient also reports worsening of her SOB, tried DuoNebs, breztri and rescue inhalers with minimal improvement.  Likely due to stress and " anxiety.  Denies diarrhea, melena, hematochezia, headache, chest pain, vomiting.  Previously seen by General surgery at [facility]. Patient not amicable to surgery at [facility]. Patient have an tele appointment with colorectal surgery on 1/7/25.  Patient reports smoking 6 cigarettes/day.  Denies alcohol use, IV drug abuse.     Upon ED presentation, vitals notable for BP 96/62 HR 91 RR 16 afebrile SpO2 96% at room air. Labs significant for chloride 108, CRP 33 lactate 1.4 EKG NSR. ED interventions included DuoNebs, hydromorphone, Phenergan. Patient was admitted to [facility] for further management of diverticulitis and placement for IV antibiotics    Hospital Course/Significant Events:  1/2/2025: Admitted to [facility].    Interval History:  Patient reported increase in pain and worsening of SOB last night, received morphine 2 and IV Solu-Medrol and received 1000 mL LR bolus yesterday evening.  Today morning, patient reports improved left lower quadrant pain (7/10).  Satting at 95% at room air. Started oral prednisone 40 daily.  Consulted case management for SNF placement.    Review of Systems:  A 12-pt ROS was conducted and was negative unless stated above.    OBJECTIVE     VITAL SIGNS: 24 HR MIN & MAX Most Recent Vitals   Temp  Min: 97.8 °F (36.6 °C)  Max: 98.2 °F (36.8 °C)  98.2 °F (36.8 °C)   BP  Min: 91/54  Max: 118/60  118/60    Pulse  Min: 63  Max: 111  82   Resp  Min: 18  Max: 24  (!) 22    SpO2  Min: 92 %  Max: 99 %  95 %    Body mass index is 28.58 kg/m².    Intake/Output:  I/O last 3 completed shifts:  In: 912 [IV Piggyback:912]  Out: 2000 [Urine:2000]    Physical Exam  Vital signs and nursing notes reviewed.  Constitutional: NAD. Awake and alert.   Head: Atraumatic. Normocephalic.  Eyes: Conjunctivae nl. No scleral icterus.  ENT: Mucous membranes are moist. Oropharynx is clear.  Neck: Supple. Full ROM. No lymphadenopathy.  Cardiovascular: Regular rate and rhythm. No murmurs, rubs, or gallops. Distal  pulses are 2+ and symmetric.  Pulmonary/Chest: No respiratory distress. Clear to auscultation bilaterally.  Bilateral faint wheezing heard.  Abdominal: Soft. Non-distended.  Left lower quadrant tenderness. No rebound, guarding, or rigidity.   Musculoskeletal: Moves all extremities. No edema.    Skin: Warm and dry.  Neurological: Awake and alert. No acute focal neurological deficits are appreciate  Current Medications:  Scheduled:   enoxparin  40 mg Subcutaneous Daily    fluticasone furoate-vilanteroL  1 puff Inhalation Daily    meropenem IV (PEDS and ADULTS)  2 g Intravenous Q8H    methIMAzole  2.5 mg Oral Daily    montelukast  10 mg Oral Daily    mupirocin   Nasal BID    pantoprazole  40 mg Oral Daily    predniSONE  40 mg Oral Daily      Infusions:    PRNs:    Current Facility-Administered Medications:     acetaminophen, 650 mg, Oral, Q8H PRN    acetaminophen, 650 mg, Oral, Q4H PRN    albuterol-ipratropium, 3 mL, Nebulization, Q4H PRN    aluminum-magnesium hydroxide-simethicone, 30 mL, Oral, QID PRN    benzonatate, 100 mg, Oral, TID PRN    dextrose 50%, 12.5 g, Intravenous, PRN    dextrose 50%, 25 g, Intravenous, PRN    diphenhydrAMINE-zinc acetate 2-0.1%, , Topical (Top), TID PRN    fluticasone propionate, 1 spray, Each Nostril, Daily PRN    glucagon (human recombinant), 1 mg, Intramuscular, PRN    glucose, 16 g, Oral, PRN    glucose, 24 g, Oral, PRN    HYDROcodone-acetaminophen, 1 tablet, Oral, Q6H PRN    melatonin, 9 mg, Oral, Nightly PRN    naloxone, 0.02 mg, Intravenous, PRN    ondansetron, 4 mg, Intravenous, Q8H PRN    ondansetron, 8 mg, Intravenous, Q8H PRN    senna-docusate 8.6-50 mg, 1 tablet, Oral, BID PRN  Labs:  CBC:  Recent Labs   Lab 12/30/24  1400 01/02/25  1132 01/03/25  0354   WBC 6.38 7.58 5.79   HGB 13.3 13.3 12.4   HCT 39.9 40.2 37.4    238 215   MCV 98.0* 97.6* 96.6*   RDW 12.2 12.0 12.1     BMP/CMP:  Recent Labs   Lab 12/30/24  1400 01/02/25  1132 01/03/25  0354   * 141 142   K  "4.2 4.2 3.9    108* 107   CO2 24 26 27   BUN 8.1* 7.5* 7.5*   CREATININE 0.74 0.75 0.82   GLUCOSE 118* 81 172*   EGFRNORACEVR >60 >60 >60     RFTs/LFTs:  Recent Labs   Lab 12/30/24  1400 01/02/25  1132 01/03/25  0354   CALCIUM 10.1 9.9 9.7   LABPROT 7.3 7.5 7.5   ALBUMIN 3.5 3.3* 3.2*   AST 23 19 18   ALT 24 19 15   ALKPHOS 86 91 96   BILITOT 0.2 0.5 0.2     Recent Labs   Lab 01/02/25  1132   MG 2.10   PHOS 2.6     Cardiac Panel:  No results for input(s): "TROPONINI", "CKTOTAL", "CKMB", "BNP" in the last 168 hours.  Interval Imaging:  X-Ray Chest 1 View for Line/Tube Placement  Narrative: EXAMINATION:  XR CHEST 1 VIEW FOR LINE/TUBE PLACEMENT    CLINICAL HISTORY:  PICC;    TECHNIQUE:  Single frontal view of the chest was performed.    COMPARISON:  None    FINDINGS:  LINES AND TUBES: Right upper extremity PICC tip projects over the SVC.  EKG/telemetry leads overlie the chest.    MEDIASTINUM AND LIDIA: The cardiac silhouette is normal.    LUNGS: No lobar consolidation. No edema.    PLEURA:No pleural effusion. No pneumothorax.    OTHER: No acute osseous abnormality.  Impression: Right upper extremity PICC tip projects over the SVC.    Electronically signed by: Genesis Iraheta  Date:    12/26/2024  Time:    14:35     Microbiology:  Microbiology Results (last 7 days)       Procedure Component Value Units Date/Time    Blood Culture (site 1) [[id number]] Collected: 01/02/25 1415    Order Status: Resulted Specimen: Blood from Arm, Left Updated: 01/02/25 1423    Blood Culture (site 2) [[id number]] Collected: 01/02/25 1416    Order Status: Resulted Specimen: Blood from Arm, Left Updated: 01/02/25 1422          Antibiotics:  Antibiotics (From admission, onward)      Start     Stop Route Frequency Ordered    01/02/25 2100  mupirocin 2 % ointment         01/07/25 2059 Nasl 2 times daily 01/02/25 1359    01/02/25 1400  meropenem 2 g in 0.9% NaCl 100 mL IVPB (MB+)         -- IV Every 8 hours (non-standard times) 01/02/25 1357 "             ASSESSMENT AND PLAN     Chronic sigmoid diverticulitis  -was on meropenem 2g every 8 hours with OPAT.  Failed therapy due to inconvenience and worsening of pain  - considering surgery if aminicable. having an appointment with Colorectal surgery on Jan 7 2025  - no leukocytosis. ESR slightly elevated CRP is downtrending.  Lactate normal.  -continue Merrem 2 g q.8  -BC pending.  -on Tylenol 650 p.r.n. q.4 for mild pain and Norco 7.5 q.6 p.r.n. for moderate to severe pain.  - consulted case management placement long-term antibiotic therapy.  - consulted PT/OT.  Consulted case management for SNF.  -no suspicion of sepsis at this time.  We will continue to monitor vitals.  Though BP soft patient reports this is her baseline.  Maintain maps greater than 65.      Asthma  -O2 sat 99% at room air.  -continue DuoNebs as needed, Breo and MDI daily  -started prednisone 40 daily  -continue montelukast     Hyperthyroid  -TSH - 0.589, free T 4 1.14  -continue at home methimazole 2.5 q.d.    DVT Prophylaxis:  Enoxaparin  GI Prophylaxis:  Pantoprazole  Abx:  Meropenem  Access:  PICC  Fluids:  None  Diet: Diet GI Soft    Code Status: Full Code    Disposition: 54 y.o. female admitted for placement for IV antibiotics for acute on chronic sigmoid diverticulitis. Disposition pending placement.       Case was discussed and seen with Dr. casey. Please appreciate attending's attestation to follow.    Radha Galicia MD  PGY-1 Resident  [facility] Team 3  01/03/2025

## 2025-01-04 NOTE — PROGRESS NOTES
[facility]  [facility]ESS [facility]    Patient's Name: Niya Moeller  : 1970  MRN: [id number]    Admission Date: 2025  Length of Stay: 2  Attending Physician: No att. providers found  Resident: Etienne  Intern: Christina    SUBJECTIVE     Chief Complaint   Patient presents with    Abdominal Pain     C/o "severe" abdominal pain and SOB x 1 month, hx of asthma. States that she was sent home with PICC line on Dec. 25th to do antibiotic infusions at home. States that she is unable to administer and is missing doses of the antibiotics. Wants to talk with doctor about being put in short term care facility.     History of Present Illness:  Niya Moeller is a 54 y.o. female with a PMHx of  chronic sigmoid diverticulitis with a history of perforation and abscess, hyperthyroid, asthma  who presented to [facility] on 2025 for left lower quadrant pain.  Patient lives alone, currently undergoing outpatient IV antibiotic treatment for persistent left lower quadrant diverticulitis, long history of same, indwelling PICC line in the right upper arm on outpatient self administered IV antibiotics.  Patient is having difficulty as antibiotic infusion takes 2 hours and is to be given every 8 hours, she does not have any home health nurses coming to her house.  She had missed approximately 4 doses since discharge from the facility on .  Patient did take this morning dose having completed it at about 930 she would prefer to be put in a short-term care facility (LTAC/nursing home) to facilitate antibiotic administration.  Patient reports left lower quadrant pain since 5 days and some reported chills and sweats, stool seemed to be small in caliber and latisha-colored.  Patient also reports having nausea and dysuria but no vomiting or hematuria.  Patient also reports worsening of her SOB, tried DuoNebs, breztri and rescue inhalers with minimal improvement.  Likely due to stress and " anxiety.  Denies diarrhea, melena, hematochezia, headache, chest pain, vomiting.  Previously seen by General surgery at [facility]. Patient not amicable to surgery at [facility]. Patient have an tele appointment with colorectal surgery on 1/7/25.  Patient reports smoking 6 cigarettes/day.  Denies alcohol use, IV drug abuse.     Upon ED presentation, vitals notable for BP 96/62 HR 91 RR 16 afebrile SpO2 96% at room air. Labs significant for chloride 108, CRP 33 lactate 1.4 EKG NSR. ED interventions included DuoNebs, hydromorphone, Phenergan. Patient was admitted to [facility] for further management of diverticulitis and placement for IV antibiotics    Hospital Course/Significant Events:  1/2/2025: Admitted to [facility].    Interval History:  No acute events overnight.  The patient requesting Robitussin over Tessalon Perles, cough did improve.  Continuing current management with meropenem Q 8 for diverticulitis with perforation and abscess.  CBC within normal limits.  CMP also within normal limits.  Patient feels well this morning, does state that family members are in discussion with nursing homes for possible placement for continuation of antibiotic therapy.  Blood cultures show no growth to date.  We will continue to monitor.  Patient reports that breathing is much better and pain is well-controlled.    Review of Systems:  A 12-pt ROS was conducted and was negative unless stated above.    OBJECTIVE     VITAL SIGNS: 24 HR MIN & MAX Most Recent Vitals   Temp  Min: 98 °F (36.7 °C)  Max: 98.5 °F (36.9 °C)  98.1 °F (36.7 °C)   BP  Min: 98/57  Max: 118/60  (!) 98/57    Pulse  Min: 63  Max: 94  88   Resp  Min: 18  Max: 22  18    SpO2  Min: 95 %  Max: 99 %  99 %    Body mass index is 29.29 kg/m².    Intake/Output:  I/O last 3 completed shifts:  In: 620.7 [P.O.:238; IV Piggyback:382.7]  Out: 2601 [Urine:2600; Stool:1]    Physical Exam  Vital signs and nursing notes reviewed.  Constitutional: NAD. Awake and alert.   Head:  Atraumatic. Normocephalic.  Eyes: Conjunctivae nl. No scleral icterus.  ENT: Mucous membranes are moist. Oropharynx is clear.  Neck: Supple. Full ROM. No lymphadenopathy.  Cardiovascular: Regular rate and rhythm. No murmurs, rubs, or gallops. Distal pulses are 2+ and symmetric.  Pulmonary/Chest: No respiratory distress. Clear to auscultation bilaterally.  Bilateral faint wheezing heard.  Abdominal: Soft. Non-distended.  No tenderness.  No rebound, guarding, or rigidity.   Musculoskeletal: Moves all extremities. No edema.    Skin: Warm and dry.  Neurological: Awake and alert. No acute focal neurological deficits are appreciate  Current Medications:  Scheduled:   enoxparin  40 mg Subcutaneous Daily    fluticasone furoate-vilanteroL  1 puff Inhalation Daily    meropenem IV (PEDS and ADULTS)  2 g Intravenous Q8H    methIMAzole  2.5 mg Oral Daily    montelukast  10 mg Oral Daily    mupirocin   Nasal BID    pantoprazole  40 mg Oral Daily    predniSONE  40 mg Oral Daily      Infusions:    PRNs:    Current Facility-Administered Medications:     acetaminophen, 650 mg, Oral, Q8H PRN    acetaminophen, 650 mg, Oral, Q4H PRN    albuterol-ipratropium, 3 mL, Nebulization, Q4H PRN    aluminum-magnesium hydroxide-simethicone, 30 mL, Oral, QID PRN    benzonatate, 100 mg, Oral, TID PRN    dextrose 50%, 12.5 g, Intravenous, PRN    dextrose 50%, 25 g, Intravenous, PRN    diphenhydrAMINE-zinc acetate 2-0.1%, , Topical (Top), TID PRN    fluticasone propionate, 1 spray, Each Nostril, Daily PRN    glucagon (human recombinant), 1 mg, Intramuscular, PRN    glucose, 16 g, Oral, PRN    glucose, 24 g, Oral, PRN    guaiFENesin 100 mg/5 ml, 200 mg, Oral, Q4H PRN    HYDROcodone-acetaminophen, 1 tablet, Oral, Q6H PRN    melatonin, 9 mg, Oral, Nightly PRN    naloxone, 0.02 mg, Intravenous, PRN    ondansetron, 4 mg, Intravenous, Q8H PRN    ondansetron, 8 mg, Intravenous, Q8H PRN    senna-docusate 8.6-50 mg, 1 tablet, Oral, BID PRN  Labs:  CBC:  Recent  "Labs   Lab 01/02/25  1132 01/03/25  0354 01/04/25  0337   WBC 7.58 5.79 7.20   HGB 13.3 12.4 11.4*   HCT 40.2 37.4 34.3*    215 202   MCV 97.6* 96.6* 97.4*   RDW 12.0 12.1 12.0     BMP/CMP:  Recent Labs   Lab 01/02/25 1132 01/03/25  0354 01/04/25  0337    142 142   K 4.2 3.9 4.0   * 107 109*   CO2 26 27 26   BUN 7.5* 7.5* 12.9   CREATININE 0.75 0.82 0.77   GLUCOSE 81 172* 113*   EGFRNORACEVR >60 >60 >60     RFTs/LFTs:  Recent Labs   Lab 01/02/25 1132 01/03/25  0354 01/04/25  0337   CALCIUM 9.9 9.7 9.5   LABPROT 7.5 7.5 7.1   ALBUMIN 3.3* 3.2* 3.1*   AST 19 18 16   ALT 19 15 14   ALKPHOS 91 96 90   BILITOT 0.5 0.2 0.2     Recent Labs   Lab 01/02/25 1132 01/04/25  0337   MG 2.10 2.20   PHOS 2.6 1.9*     Cardiac Panel:  No results for input(s): "TROPONINI", "CKTOTAL", "CKMB", "BNP" in the last 168 hours.  Interval Imaging:  X-Ray Chest 1 View for Line/Tube Placement  Narrative: EXAMINATION:  XR CHEST 1 VIEW FOR LINE/TUBE PLACEMENT    CLINICAL HISTORY:  PICC;    TECHNIQUE:  Single frontal view of the chest was performed.    COMPARISON:  None    FINDINGS:  LINES AND TUBES: Right upper extremity PICC tip projects over the SVC.  EKG/telemetry leads overlie the chest.    MEDIASTINUM AND LIDIA: The cardiac silhouette is normal.    LUNGS: No lobar consolidation. No edema.    PLEURA:No pleural effusion. No pneumothorax.    OTHER: No acute osseous abnormality.  Impression: Right upper extremity PICC tip projects over the SVC.    Electronically signed by: Genesis Iraheta  Date:    12/26/2024  Time:    14:35     Microbiology:  Microbiology Results (last 7 days)       Procedure Component Value Units Date/Time    Blood Culture (site 1) [[id number]]  (Normal) Collected: 01/02/25 1415    Order Status: Completed Specimen: Blood from Arm, Left Updated: 01/03/25 1901     Blood Culture No Growth At 24 Hours    Blood Culture (site 2) [[id number]]  (Normal) Collected: 01/02/25 1416    Order Status: Completed Specimen: " Blood from Arm, Left Updated: 01/03/25 1901     Blood Culture No Growth At 24 Hours          Antibiotics:  Antibiotics (From admission, onward)      Start     Stop Route Frequency Ordered    01/02/25 2100  mupirocin 2 % ointment         01/07/25 2059 Nasl 2 times daily 01/02/25 1359    01/02/25 1400  meropenem 2 g in 0.9% NaCl 100 mL IVPB (MB+)         -- IV Every 8 hours (non-standard times) 01/02/25 1357             ASSESSMENT AND PLAN     Chronic sigmoid diverticulitis  -was on meropenem 2g every 8 hours with OPAT.  Failed therapy due to inconvenience and worsening of pain  - considering surgery if aminicable. having an appointment with Colorectal surgery on Jan 7 2025  - no leukocytosis. ESR slightly elevated CRP is downtrending.  Lactate normal.  -continue Merrem 2 g q.8  -BC showing no growth to date at 24.  -on Tylenol 650 p.r.n. q.4 for mild pain and Norco 7.5 q.6 p.r.n. for moderate to severe pain.  - consulted case management placement long-term antibiotic therapy.  - consulted PT/OT.  Consulted case management for SNF.  -no suspicion of sepsis at this time.  We will continue to monitor vitals.  Though BP soft patient reports this is her baseline.  Maintain maps greater than 65.      Asthma  -O2 sat 99% at room air.  -continue DuoNebs as needed, Breo and MDI daily  -started prednisone 40 daily  -continue montelukast     Hyperthyroid  -TSH - 0.589, free T 4 1.14  -continue at home methimazole 2.5 q.d.    DVT Prophylaxis:  Enoxaparin  GI Prophylaxis:  Pantoprazole  Abx:  Meropenem  Access:  PICC  Fluids:  None  Diet: Diet GI Soft    Code Status: Full Code    Disposition: 54 y.o. female admitted for placement for IV antibiotics for acute on chronic sigmoid diverticulitis. Disposition pending placement.       Lg Clifton MD  PGY-3 Resident  [facility] Team 3  01/04/2025

## 2025-01-05 LAB
ALBUMIN SERPL-MCNC: 3.4 G/DL (ref 3.5–5)
ALBUMIN/GLOB SERPL: 0.8 RATIO (ref 1.1–2)
ALP SERPL-CCNC: 97 UNIT/L (ref 40–150)
ALT SERPL-CCNC: 14 UNIT/L (ref 0–55)
ANION GAP SERPL CALC-SCNC: 9 MEQ/L
AST SERPL-CCNC: 13 UNIT/L (ref 5–34)
BASOPHILS # BLD AUTO: 0.01 X10(3)/MCL
BASOPHILS NFR BLD AUTO: 0.1 %
BILIRUB SERPL-MCNC: 0.2 MG/DL
BUN SERPL-MCNC: 15.6 MG/DL (ref 9.8–20.1)
CALCIUM SERPL-MCNC: 9.9 MG/DL (ref 8.4–10.2)
CHLORIDE SERPL-SCNC: 105 MMOL/L (ref 98–107)
CO2 SERPL-SCNC: 30 MMOL/L (ref 22–29)
CREAT SERPL-MCNC: 0.76 MG/DL (ref 0.55–1.02)
CREAT/UREA NIT SERPL: 21
EOSINOPHIL # BLD AUTO: 0.03 X10(3)/MCL (ref 0–0.9)
EOSINOPHIL NFR BLD AUTO: 0.4 %
ERYTHROCYTE [DISTWIDTH] IN BLOOD BY AUTOMATED COUNT: 12.1 % (ref 11.5–17)
GFR SERPLBLD CREATININE-BSD FMLA CKD-EPI: >60 ML/MIN/1.73/M2
GLOBULIN SER-MCNC: 4.2 GM/DL (ref 2.4–3.5)
GLUCOSE SERPL-MCNC: 69 MG/DL (ref 74–100)
HCT VFR BLD AUTO: 39.7 % (ref 37–47)
HGB BLD-MCNC: 12.7 G/DL (ref 12–16)
IMM GRANULOCYTES # BLD AUTO: 0.01 X10(3)/MCL (ref 0–0.04)
IMM GRANULOCYTES NFR BLD AUTO: 0.1 %
LYMPHOCYTES # BLD AUTO: 3.2 X10(3)/MCL (ref 0.6–4.6)
LYMPHOCYTES NFR BLD AUTO: 45 %
MAGNESIUM SERPL-MCNC: 2.3 MG/DL (ref 1.6–2.6)
MCH RBC QN AUTO: 31.4 PG (ref 27–31)
MCHC RBC AUTO-ENTMCNC: 32 G/DL (ref 33–36)
MCV RBC AUTO: 98.3 FL (ref 80–94)
MONOCYTES # BLD AUTO: 0.77 X10(3)/MCL (ref 0.1–1.3)
MONOCYTES NFR BLD AUTO: 10.8 %
NEUTROPHILS # BLD AUTO: 3.09 X10(3)/MCL (ref 2.1–9.2)
NEUTROPHILS NFR BLD AUTO: 43.6 %
NRBC BLD AUTO-RTO: 0 %
PHOSPHATE SERPL-MCNC: 1.8 MG/DL (ref 2.3–4.7)
PLATELET # BLD AUTO: 244 X10(3)/MCL (ref 130–400)
PMV BLD AUTO: 10.7 FL (ref 7.4–10.4)
POTASSIUM SERPL-SCNC: 3.6 MMOL/L (ref 3.5–5.1)
PROT SERPL-MCNC: 7.6 GM/DL (ref 6.4–8.3)
RBC # BLD AUTO: 4.04 X10(6)/MCL (ref 4.2–5.4)
SODIUM SERPL-SCNC: 144 MMOL/L (ref 136–145)
WBC # BLD AUTO: 7.11 X10(3)/MCL (ref 4.5–11.5)

## 2025-01-05 PROCEDURE — 63600175 PHARM REV CODE 636 W HCPCS

## 2025-01-05 PROCEDURE — 83735 ASSAY OF MAGNESIUM: CPT

## 2025-01-05 PROCEDURE — 94761 N-INVAS EAR/PLS OXIMETRY MLT: CPT

## 2025-01-05 PROCEDURE — 25000242 PHARM REV CODE 250 ALT 637 W/ HCPCS

## 2025-01-05 PROCEDURE — 11000001 HC ACUTE MED/SURG PRIVATE ROOM

## 2025-01-05 PROCEDURE — 85025 COMPLETE CBC W/AUTO DIFF WBC: CPT

## 2025-01-05 PROCEDURE — 63600175 PHARM REV CODE 636 W HCPCS: Performed by: INTERNAL MEDICINE

## 2025-01-05 PROCEDURE — 36415 COLL VENOUS BLD VENIPUNCTURE: CPT

## 2025-01-05 PROCEDURE — 25000003 PHARM REV CODE 250: Performed by: INTERNAL MEDICINE

## 2025-01-05 PROCEDURE — 99900035 HC TECH TIME PER 15 MIN (STAT)

## 2025-01-05 PROCEDURE — 25000003 PHARM REV CODE 250

## 2025-01-05 PROCEDURE — 80053 COMPREHEN METABOLIC PANEL: CPT

## 2025-01-05 PROCEDURE — 94640 AIRWAY INHALATION TREATMENT: CPT

## 2025-01-05 PROCEDURE — S4991 NICOTINE PATCH NONLEGEND: HCPCS

## 2025-01-05 PROCEDURE — 84100 ASSAY OF PHOSPHORUS: CPT

## 2025-01-05 RX ORDER — SODIUM,POTASSIUM PHOSPHATES 280-250MG
1 POWDER IN PACKET (EA) ORAL ONCE
Status: COMPLETED | OUTPATIENT
Start: 2025-01-05 | End: 2025-01-05

## 2025-01-05 RX ORDER — HYDROCODONE BITARTRATE AND ACETAMINOPHEN 5; 325 MG/1; MG/1
1 TABLET ORAL EVERY 6 HOURS PRN
Status: DISCONTINUED | OUTPATIENT
Start: 2025-01-05 | End: 2025-01-08

## 2025-01-05 RX ADMIN — PANTOPRAZOLE SODIUM 40 MG: 40 TABLET, DELAYED RELEASE ORAL at 08:01

## 2025-01-05 RX ADMIN — MEROPENEM 2 G: 2 INJECTION, POWDER, FOR SOLUTION INTRAVENOUS at 06:01

## 2025-01-05 RX ADMIN — POTASSIUM & SODIUM PHOSPHATES POWDER PACK 280-160-250 MG 1 PACKET: 280-160-250 PACK at 10:01

## 2025-01-05 RX ADMIN — NICOTINE 1 PATCH: 21 PATCH, EXTENDED RELEASE TRANSDERMAL at 08:01

## 2025-01-05 RX ADMIN — IPRATROPIUM BROMIDE AND ALBUTEROL SULFATE 3 ML: .5; 3 SOLUTION RESPIRATORY (INHALATION) at 07:01

## 2025-01-05 RX ADMIN — FLUTICASONE FUROATE AND VILANTEROL TRIFENATATE 1 PUFF: 200; 25 POWDER RESPIRATORY (INHALATION) at 11:01

## 2025-01-05 RX ADMIN — HYDROCODONE BITARTRATE AND ACETAMINOPHEN 1 TABLET: 5; 325 TABLET ORAL at 02:01

## 2025-01-05 RX ADMIN — MONTELUKAST SODIUM 10 MG: 5 TABLET, CHEWABLE ORAL at 08:01

## 2025-01-05 RX ADMIN — GUAIFENESIN 200 MG: 100 LIQUID ORAL at 03:01

## 2025-01-05 RX ADMIN — GUAIFENESIN 200 MG: 100 LIQUID ORAL at 08:01

## 2025-01-05 RX ADMIN — HYDROCODONE BITARTRATE AND ACETAMINOPHEN 1 TABLET: 7.5; 325 TABLET ORAL at 04:01

## 2025-01-05 RX ADMIN — MEROPENEM 2 G: 2 INJECTION, POWDER, FOR SOLUTION INTRAVENOUS at 02:01

## 2025-01-05 RX ADMIN — IPRATROPIUM BROMIDE AND ALBUTEROL SULFATE 3 ML: .5; 3 SOLUTION RESPIRATORY (INHALATION) at 03:01

## 2025-01-05 RX ADMIN — MEROPENEM 2 G: 2 INJECTION, POWDER, FOR SOLUTION INTRAVENOUS at 09:01

## 2025-01-05 RX ADMIN — MUPIROCIN: 20 OINTMENT TOPICAL at 08:01

## 2025-01-05 RX ADMIN — HYDROCODONE BITARTRATE AND ACETAMINOPHEN 1 TABLET: 5; 325 TABLET ORAL at 09:01

## 2025-01-05 RX ADMIN — PREDNISONE 40 MG: 20 TABLET ORAL at 08:01

## 2025-01-05 RX ADMIN — ENOXAPARIN SODIUM 40 MG: 40 INJECTION SUBCUTANEOUS at 06:01

## 2025-01-05 NOTE — PROGRESS NOTES
"[facility] Internal Medicine  PROGRESS NOTE    Patient's Name: Niya Moeller  : 1970  MRN: [id number]    Admission Date: 2025  Length of Stay: 3  Attending Physician: Noel Spence MD  Resident: Etienne  Intern: Christina    SUBJECTIVE     Chief Complaint   Patient presents with    Abdominal Pain     C/o "severe" abdominal pain and SOB x 1 month, hx of asthma. States that she was sent home with PICC line on Dec. 25th to do antibiotic infusions at home. States that she is unable to administer and is missing doses of the antibiotics. Wants to talk with doctor about being put in short term care facility.     History of Present Illness:  Niya Moeller is a 54 y.o. female with a PMHx of  chronic sigmoid diverticulitis with a history of perforation and abscess, hyperthyroid, asthma  who presented to [facility] on 2025 for left lower quadrant pain.  Patient lives alone, currently undergoing outpatient IV antibiotic treatment for persistent left lower quadrant diverticulitis, long history of same, indwelling PICC line in the right upper arm on outpatient self administered IV antibiotics.  Patient is having difficulty as antibiotic infusion takes 2 hours and is to be given every 8 hours, she does not have any home health nurses coming to her house.  She had missed approximately 4 doses since discharge from the facility on .  Patient did take this morning dose having completed it at about 930 she would prefer to be put in a short-term care facility (LTAC/nursing home) to facilitate antibiotic administration.  Patient reports left lower quadrant pain since 5 days and some reported chills and sweats, stool seemed to be small in caliber and latisha-colored.  Patient also reports having nausea and dysuria but no vomiting or hematuria.  Patient also reports worsening of her SOB, tried DuoNebs, breztri and rescue inhalers with minimal improvement.  Likely due to stress and " anxiety.  Denies diarrhea, melena, hematochezia, headache, chest pain, vomiting.  Previously seen by General surgery at [facility]. Patient not amicable to surgery at [facility]. Patient have an tele appointment with colorectal surgery on 1/7/25.  Patient reports smoking 6 cigarettes/day.  Denies alcohol use, IV drug abuse.     Upon ED presentation, vitals notable for BP 96/62 HR 91 RR 16 afebrile SpO2 96% at room air. Labs significant for chloride 108, CRP 33 lactate 1.4 EKG NSR. ED interventions included DuoNebs, hydromorphone, Phenergan. Patient was admitted to [facility] for further management of diverticulitis and placement for IV antibiotics    Hospital Course/Significant Events:  1/2/2025: Admitted to [facility].    Interval History:  No acute events overnight. Continuing current management with meropenem Q 8 for diverticulitis with perforation and abscess.  CBC within normal limits.  CMP also within normal limits.  Phosphorus 1.8, ordered sodium phos 1 packet.  Patient reports that breathing is much better and pain is well-controlled.  Decreased Norco from 7.5 to 5. Blood cultures show no growth to date.  We will continue to monitor.     Review of Systems:  A 12-pt ROS was conducted and was negative unless stated above.    OBJECTIVE     VITAL SIGNS: 24 HR MIN & MAX Most Recent Vitals   Temp  Min: 98.1 °F (36.7 °C)  Max: 98.5 °F (36.9 °C)  98.5 °F (36.9 °C)   BP  Min: 100/54  Max: 139/68  (!) 108/52    Pulse  Min: 66  Max: 86  81   Resp  Min: 16  Max: 19  18    SpO2  Min: 95 %  Max: 100 %  95 %    Body mass index is 28.28 kg/m².    Intake/Output:  I/O last 3 completed shifts:  In: 569.8 [P.O.:118; IV Piggyback:451.8]  Out: 601 [Urine:600; Stool:1]    Physical Exam  Vital signs and nursing notes reviewed.  Constitutional: NAD. Awake and alert.   Head: Atraumatic. Normocephalic.  Eyes: Conjunctivae nl. No scleral icterus.  ENT: Mucous membranes are moist. Oropharynx is clear.  Neck: Supple. Full ROM. No  lymphadenopathy.  Cardiovascular: Regular rate and rhythm. No murmurs, rubs, or gallops. Distal pulses are 2+ and symmetric.  Pulmonary/Chest: No respiratory distress. Clear to auscultation bilaterally.  Bilateral faint wheezing heard.  Abdominal: Soft. Non-distended.  No tenderness.  No rebound, guarding, or rigidity.   Musculoskeletal: Moves all extremities. No edema.    Skin: Warm and dry.  Neurological: Awake and alert. No acute focal neurological deficits are appreciate  Current Medications:  Scheduled:   enoxparin  40 mg Subcutaneous Daily    fluticasone furoate-vilanteroL  1 puff Inhalation Daily    meropenem IV (PEDS and ADULTS)  2 g Intravenous Q8H    methIMAzole  2.5 mg Oral Daily    montelukast  10 mg Oral Daily    mupirocin   Nasal BID    nicotine  1 patch Transdermal Daily    pantoprazole  40 mg Oral Daily    predniSONE  40 mg Oral Daily      Infusions:    PRNs:    Current Facility-Administered Medications:     acetaminophen, 650 mg, Oral, Q8H PRN    acetaminophen, 650 mg, Oral, Q4H PRN    albuterol-ipratropium, 3 mL, Nebulization, Q4H PRN    aluminum-magnesium hydroxide-simethicone, 30 mL, Oral, QID PRN    benzonatate, 100 mg, Oral, TID PRN    dextrose 50%, 12.5 g, Intravenous, PRN    dextrose 50%, 25 g, Intravenous, PRN    diphenhydrAMINE-zinc acetate 2-0.1%, , Topical (Top), TID PRN    fluticasone propionate, 1 spray, Each Nostril, Daily PRN    glucagon (human recombinant), 1 mg, Intramuscular, PRN    glucose, 16 g, Oral, PRN    glucose, 24 g, Oral, PRN    guaiFENesin 100 mg/5 ml, 200 mg, Oral, Q4H PRN    HYDROcodone-acetaminophen, 1 tablet, Oral, Q6H PRN    melatonin, 9 mg, Oral, Nightly PRN    naloxone, 0.02 mg, Intravenous, PRN    ondansetron, 4 mg, Intravenous, Q8H PRN    ondansetron, 8 mg, Intravenous, Q8H PRN    senna-docusate 8.6-50 mg, 1 tablet, Oral, BID PRN  Labs:  CBC:  Recent Labs   Lab 01/03/25  0354 01/04/25  0337 01/05/25  0449   WBC 5.79 7.20 7.11   HGB 12.4 11.4* 12.7   HCT 37.4  "34.3* 39.7    202 244   MCV 96.6* 97.4* 98.3*   RDW 12.1 12.0 12.1     BMP/CMP:  Recent Labs   Lab 01/03/25  0354 01/04/25  0337 01/05/25  0449    142 144   K 3.9 4.0 3.6    109* 105   CO2 27 26 30*   BUN 7.5* 12.9 15.6   CREATININE 0.82 0.77 0.76   GLUCOSE 172* 113* 69*   EGFRNORACEVR >60 >60 >60     RFTs/LFTs:  Recent Labs   Lab 01/03/25  0354 01/04/25  0337 01/05/25  0449   CALCIUM 9.7 9.5 9.9   LABPROT 7.5 7.1 7.6   ALBUMIN 3.2* 3.1* 3.4*   AST 18 16 13   ALT 15 14 14   ALKPHOS 96 90 97   BILITOT 0.2 0.2 0.2     Recent Labs   Lab 01/02/25  1132 01/04/25  0337 01/05/25  0449   MG 2.10 2.20 2.30   PHOS 2.6 1.9* 1.8*     Cardiac Panel:  No results for input(s): "TROPONINI", "CKTOTAL", "CKMB", "BNP" in the last 168 hours.  Interval Imaging:  X-Ray Chest 1 View for Line/Tube Placement  Narrative: EXAMINATION:  XR CHEST 1 VIEW FOR LINE/TUBE PLACEMENT    CLINICAL HISTORY:  PICC;    TECHNIQUE:  Single frontal view of the chest was performed.    COMPARISON:  None    FINDINGS:  LINES AND TUBES: Right upper extremity PICC tip projects over the SVC.  EKG/telemetry leads overlie the chest.    MEDIASTINUM AND LIDIA: The cardiac silhouette is normal.    LUNGS: No lobar consolidation. No edema.    PLEURA:No pleural effusion. No pneumothorax.    OTHER: No acute osseous abnormality.  Impression: Right upper extremity PICC tip projects over the SVC.    Electronically signed by: Genesis Iraheta  Date:    12/26/2024  Time:    14:35     Microbiology:  Microbiology Results (last 7 days)       Procedure Component Value Units Date/Time    Blood Culture (site 1) [[id number]]  (Normal) Collected: 01/02/25 1415    Order Status: Completed Specimen: Blood from Arm, Left Updated: 01/04/25 1901     Blood Culture No Growth At 48 Hours    Blood Culture (site 2) [[id number]]  (Normal) Collected: 01/02/25 1416    Order Status: Completed Specimen: Blood from Arm, Left Updated: 01/04/25 1901     Blood Culture No Growth At 48 Hours "          Antibiotics:  Antibiotics (From admission, onward)      Start     Stop Route Frequency Ordered    01/02/25 2100  mupirocin 2 % ointment         01/07/25 2059 Nasl 2 times daily 01/02/25 1359    01/02/25 1400  meropenem 2 g in 0.9% NaCl 100 mL IVPB (MB+)         -- IV Every 8 hours (non-standard times) 01/02/25 1357             ASSESSMENT AND PLAN     Chronic sigmoid diverticulitis  -was on meropenem 2g every 8 hours with OPAT.  Failed therapy due to inconvenience and worsening of pain  - considering surgery if aminicable. having an appointment with Colorectal surgery on Jan 7 2025  - no leukocytosis. ESR slightly elevated CRP is downtrending.  Lactate normal.  -continue Merrem 2 g q.8  -BC showing no growth to date at 48 hrs.  -on Tylenol 650 p.r.n. q.4 for mild pain and Norco 5 q.6 p.r.n. for moderate to severe pain.  - consulted case management placement long-term antibiotic therapy.  - consulted PT/OT.  Consulted case management for SNF.  -no suspicion of sepsis at this time.  We will continue to monitor vitals.  Though BP soft patient reports this is her baseline.  Maintain maps greater than 65.      Asthma  -O2 sat 99% at room air.  -continue DuoNebs as needed, Breo and MDI daily  -continue prednisone 40 daily  -continue montelukast     Hyperthyroid  -TSH - 0.589, free T 4 1.14  -continue at home methimazole 2.5 q.d.    DVT Prophylaxis:  Enoxaparin  GI Prophylaxis:  Pantoprazole  Abx:  Meropenem  Access:  PICC  Fluids:  None  Diet: Diet GI Soft    Code Status: Full Code    Disposition: 54 y.o. female admitted for placement for IV antibiotics for acute on chronic sigmoid diverticulitis. Disposition pending placement.       Radha Galicia MD  PGY-1 Resident  [facility] Team 3  01/05/2025

## 2025-01-06 LAB
ALBUMIN SERPL-MCNC: 3.3 G/DL (ref 3.5–5)
ALBUMIN/GLOB SERPL: 0.8 RATIO (ref 1.1–2)
ALP SERPL-CCNC: 79 UNIT/L (ref 40–150)
ALT SERPL-CCNC: 19 UNIT/L (ref 0–55)
ANION GAP SERPL CALC-SCNC: 7 MEQ/L
AST SERPL-CCNC: 20 UNIT/L (ref 5–34)
BASOPHILS # BLD AUTO: 0.01 X10(3)/MCL
BASOPHILS NFR BLD AUTO: 0.2 %
BILIRUB SERPL-MCNC: 0.3 MG/DL
BUN SERPL-MCNC: 15.6 MG/DL (ref 9.8–20.1)
CALCIUM SERPL-MCNC: 9.6 MG/DL (ref 8.4–10.2)
CHLORIDE SERPL-SCNC: 106 MMOL/L (ref 98–107)
CO2 SERPL-SCNC: 28 MMOL/L (ref 22–29)
CREAT SERPL-MCNC: 0.68 MG/DL (ref 0.55–1.02)
CREAT/UREA NIT SERPL: 23
EOSINOPHIL # BLD AUTO: 0.02 X10(3)/MCL (ref 0–0.9)
EOSINOPHIL NFR BLD AUTO: 0.3 %
ERYTHROCYTE [DISTWIDTH] IN BLOOD BY AUTOMATED COUNT: 12.1 % (ref 11.5–17)
GFR SERPLBLD CREATININE-BSD FMLA CKD-EPI: >60 ML/MIN/1.73/M2
GLOBULIN SER-MCNC: 4 GM/DL (ref 2.4–3.5)
GLUCOSE SERPL-MCNC: 68 MG/DL (ref 74–100)
HCT VFR BLD AUTO: 38.8 % (ref 37–47)
HGB BLD-MCNC: 12.8 G/DL (ref 12–16)
IMM GRANULOCYTES # BLD AUTO: 0.02 X10(3)/MCL (ref 0–0.04)
IMM GRANULOCYTES NFR BLD AUTO: 0.3 %
LYMPHOCYTES # BLD AUTO: 3.29 X10(3)/MCL (ref 0.6–4.6)
LYMPHOCYTES NFR BLD AUTO: 50.5 %
MCH RBC QN AUTO: 31.8 PG (ref 27–31)
MCHC RBC AUTO-ENTMCNC: 33 G/DL (ref 33–36)
MCV RBC AUTO: 96.5 FL (ref 80–94)
MONOCYTES # BLD AUTO: 0.72 X10(3)/MCL (ref 0.1–1.3)
MONOCYTES NFR BLD AUTO: 11 %
NEUTROPHILS # BLD AUTO: 2.46 X10(3)/MCL (ref 2.1–9.2)
NEUTROPHILS NFR BLD AUTO: 37.7 %
NRBC BLD AUTO-RTO: 0 %
PLATELET # BLD AUTO: 248 X10(3)/MCL (ref 130–400)
PMV BLD AUTO: 11.1 FL (ref 7.4–10.4)
POTASSIUM SERPL-SCNC: 4 MMOL/L (ref 3.5–5.1)
PROT SERPL-MCNC: 7.3 GM/DL (ref 6.4–8.3)
RBC # BLD AUTO: 4.02 X10(6)/MCL (ref 4.2–5.4)
SODIUM SERPL-SCNC: 141 MMOL/L (ref 136–145)
WBC # BLD AUTO: 6.52 X10(3)/MCL (ref 4.5–11.5)

## 2025-01-06 PROCEDURE — S4991 NICOTINE PATCH NONLEGEND: HCPCS

## 2025-01-06 PROCEDURE — 63600175 PHARM REV CODE 636 W HCPCS: Performed by: INTERNAL MEDICINE

## 2025-01-06 PROCEDURE — 94761 N-INVAS EAR/PLS OXIMETRY MLT: CPT

## 2025-01-06 PROCEDURE — 11000001 HC ACUTE MED/SURG PRIVATE ROOM

## 2025-01-06 PROCEDURE — 80053 COMPREHEN METABOLIC PANEL: CPT

## 2025-01-06 PROCEDURE — 94640 AIRWAY INHALATION TREATMENT: CPT

## 2025-01-06 PROCEDURE — 36415 COLL VENOUS BLD VENIPUNCTURE: CPT

## 2025-01-06 PROCEDURE — 94760 N-INVAS EAR/PLS OXIMETRY 1: CPT

## 2025-01-06 PROCEDURE — 85025 COMPLETE CBC W/AUTO DIFF WBC: CPT

## 2025-01-06 PROCEDURE — 25000003 PHARM REV CODE 250

## 2025-01-06 PROCEDURE — 99900035 HC TECH TIME PER 15 MIN (STAT)

## 2025-01-06 PROCEDURE — 25000242 PHARM REV CODE 250 ALT 637 W/ HCPCS

## 2025-01-06 PROCEDURE — 63600175 PHARM REV CODE 636 W HCPCS

## 2025-01-06 PROCEDURE — 25000003 PHARM REV CODE 250: Performed by: INTERNAL MEDICINE

## 2025-01-06 RX ADMIN — NICOTINE 1 PATCH: 21 PATCH, EXTENDED RELEASE TRANSDERMAL at 09:01

## 2025-01-06 RX ADMIN — ENOXAPARIN SODIUM 40 MG: 40 INJECTION SUBCUTANEOUS at 04:01

## 2025-01-06 RX ADMIN — FLUTICASONE PROPIONATE 50 MCG: 50 SPRAY, METERED NASAL at 04:01

## 2025-01-06 RX ADMIN — FLUTICASONE PROPIONATE 50 MCG: 50 SPRAY, METERED NASAL at 09:01

## 2025-01-06 RX ADMIN — MUPIROCIN: 20 OINTMENT TOPICAL at 09:01

## 2025-01-06 RX ADMIN — HYDROCODONE BITARTRATE AND ACETAMINOPHEN 1 TABLET: 5; 325 TABLET ORAL at 06:01

## 2025-01-06 RX ADMIN — MEROPENEM 2 G: 2 INJECTION, POWDER, FOR SOLUTION INTRAVENOUS at 02:01

## 2025-01-06 RX ADMIN — PANTOPRAZOLE SODIUM 40 MG: 40 TABLET, DELAYED RELEASE ORAL at 09:01

## 2025-01-06 RX ADMIN — PREDNISONE 40 MG: 20 TABLET ORAL at 09:01

## 2025-01-06 RX ADMIN — GUAIFENESIN 200 MG: 100 LIQUID ORAL at 04:01

## 2025-01-06 RX ADMIN — MEROPENEM 2 G: 2 INJECTION, POWDER, FOR SOLUTION INTRAVENOUS at 09:01

## 2025-01-06 RX ADMIN — HYDROCODONE BITARTRATE AND ACETAMINOPHEN 1 TABLET: 5; 325 TABLET ORAL at 03:01

## 2025-01-06 RX ADMIN — MONTELUKAST SODIUM 10 MG: 5 TABLET, CHEWABLE ORAL at 09:01

## 2025-01-06 RX ADMIN — GUAIFENESIN 200 MG: 100 LIQUID ORAL at 06:01

## 2025-01-06 RX ADMIN — GUAIFENESIN 200 MG: 100 LIQUID ORAL at 09:01

## 2025-01-06 RX ADMIN — IPRATROPIUM BROMIDE AND ALBUTEROL SULFATE 3 ML: .5; 3 SOLUTION RESPIRATORY (INHALATION) at 08:01

## 2025-01-06 RX ADMIN — FLUTICASONE FUROATE AND VILANTEROL TRIFENATATE 1 PUFF: 200; 25 POWDER RESPIRATORY (INHALATION) at 09:01

## 2025-01-06 RX ADMIN — HYDROCODONE BITARTRATE AND ACETAMINOPHEN 1 TABLET: 5; 325 TABLET ORAL at 11:01

## 2025-01-06 RX ADMIN — MEROPENEM 2 G: 2 INJECTION, POWDER, FOR SOLUTION INTRAVENOUS at 06:01

## 2025-01-06 NOTE — PROGRESS NOTES
"[facility] Internal Medicine  PROGRESS NOTE    Patient's Name: Niya Moeller  : 1970  MRN: [id number]    Admission Date: 2025  Length of Stay: 4  Attending Physician: Noel Spence MD  Resident: Yissel  Intern: Jimenez    SUBJECTIVE     Chief Complaint   Patient presents with    Abdominal Pain     C/o "severe" abdominal pain and SOB x 1 month, hx of asthma. States that she was sent home with PICC line on Dec. 25th to do antibiotic infusions at home. States that she is unable to administer and is missing doses of the antibiotics. Wants to talk with doctor about being put in short term care facility.     History of Present Illness:  Niya Moeller is a 54 y.o. female with a PMHx of  chronic sigmoid diverticulitis with a history of perforation and abscess, hyperthyroid, asthma  who presented to [facility] on 2025 for left lower quadrant pain.  Patient lives alone, currently undergoing outpatient IV antibiotic treatment for persistent left lower quadrant diverticulitis, long history of same, indwelling PICC line in the right upper arm on outpatient self administered IV antibiotics.  Patient is having difficulty as antibiotic infusion takes 2 hours and is to be given every 8 hours, she does not have any home health nurses coming to her house.  She had missed approximately 4 doses since discharge from the facility on .  Patient did take this morning dose having completed it at about 930 she would prefer to be put in a short-term care facility (LTAC/nursing home) to facilitate antibiotic administration.  Patient reports left lower quadrant pain since 5 days and some reported chills and sweats, stool seemed to be small in caliber and latisha-colored.  Patient also reports having nausea and dysuria but no vomiting or hematuria.  Patient also reports worsening of her SOB, tried DuoNebs, breztri and rescue inhalers with minimal improvement.  Likely due to stress and " anxiety.  Denies diarrhea, melena, hematochezia, headache, chest pain, vomiting.  Previously seen by General surgery at [facility]. Patient not amicable to surgery at [facility]. Patient have an tele appointment with colorectal surgery on 1/7/25.  Patient reports smoking 6 cigarettes/day.  Denies alcohol use, IV drug abuse.     Upon ED presentation, vitals notable for BP 96/62 HR 91 RR 16 afebrile SpO2 96% at room air. Labs significant for chloride 108, CRP 33 lactate 1.4 EKG NSR. ED interventions included DuoNebs, hydromorphone, Phenergan. Patient was admitted to [facility] for further management of diverticulitis and placement for IV antibiotics    Hospital Course/Significant Events:  1/2/2025: Admitted to [facility].    Interval History:  No acute events overnight. Continuing current management with meropenem Q 8 for diverticulitis with perforation and abscess. Labs stable.  Patient reports that breathing is much better and pain is well-controlled. Blood cultures show no growth to date.  Awaiting SNF placement.    Review of Systems:  A 12-pt ROS was conducted and was negative unless stated above.    OBJECTIVE     VITAL SIGNS: 24 HR MIN & MAX Most Recent Vitals   Temp  Min: 97.5 °F (36.4 °C)  Max: 98.4 °F (36.9 °C)  98 °F (36.7 °C)   BP  Min: 90/54  Max: 118/57  (!) 93/50    Pulse  Min: 69  Max: 84  84   Resp  Min: 16  Max: 19  18    SpO2  Min: 92 %  Max: 99 %  (!) 92 %    Body mass index is 28.68 kg/m².    Intake/Output:  I/O last 3 completed shifts:  In: 805 [P.O.:118; IV Piggyback:687]  Out: -     Physical Exam  Vital signs and nursing notes reviewed.  Constitutional: NAD. Awake and alert.   Head: Atraumatic. Normocephalic.  Eyes: Conjunctivae nl. No scleral icterus.  ENT: Mucous membranes are moist. Oropharynx is clear.  Neck: Supple. Full ROM. No lymphadenopathy.  Cardiovascular: Regular rate and rhythm. No murmurs, rubs, or gallops. Distal pulses are 2+ and symmetric.  Pulmonary/Chest: No respiratory  distress. Clear to auscultation bilaterally.  Bilateral faint wheezing heard.  Abdominal: Soft. Non-distended.  No tenderness.  No rebound, guarding, or rigidity.   Musculoskeletal: Moves all extremities. No edema.    Skin: Warm and dry.  Neurological: Awake and alert. No acute focal neurological deficits are appreciate  Current Medications:  Scheduled:   enoxparin  40 mg Subcutaneous Daily    fluticasone furoate-vilanteroL  1 puff Inhalation Daily    meropenem IV (PEDS and ADULTS)  2 g Intravenous Q8H    methIMAzole  2.5 mg Oral Daily    montelukast  10 mg Oral Daily    mupirocin   Nasal BID    nicotine  1 patch Transdermal Daily    pantoprazole  40 mg Oral Daily    predniSONE  40 mg Oral Daily      Infusions:    PRNs:    Current Facility-Administered Medications:     acetaminophen, 650 mg, Oral, Q8H PRN    acetaminophen, 650 mg, Oral, Q4H PRN    albuterol-ipratropium, 3 mL, Nebulization, Q4H PRN    aluminum-magnesium hydroxide-simethicone, 30 mL, Oral, QID PRN    benzonatate, 100 mg, Oral, TID PRN    dextrose 50%, 12.5 g, Intravenous, PRN    dextrose 50%, 25 g, Intravenous, PRN    diphenhydrAMINE-zinc acetate 2-0.1%, , Topical (Top), TID PRN    fluticasone propionate, 1 spray, Each Nostril, Daily PRN    glucagon (human recombinant), 1 mg, Intramuscular, PRN    glucose, 16 g, Oral, PRN    glucose, 24 g, Oral, PRN    guaiFENesin 100 mg/5 ml, 200 mg, Oral, Q4H PRN    HYDROcodone-acetaminophen, 1 tablet, Oral, Q6H PRN    melatonin, 9 mg, Oral, Nightly PRN    naloxone, 0.02 mg, Intravenous, PRN    ondansetron, 4 mg, Intravenous, Q8H PRN    ondansetron, 8 mg, Intravenous, Q8H PRN    senna-docusate 8.6-50 mg, 1 tablet, Oral, BID PRN  Labs:  CBC:  Recent Labs   Lab 01/04/25  0337 01/05/25  0449 01/06/25  0440   WBC 7.20 7.11 6.52   HGB 11.4* 12.7 12.8   HCT 34.3* 39.7 38.8    244 248   MCV 97.4* 98.3* 96.5*   RDW 12.0 12.1 12.1     BMP/CMP:  Recent Labs   Lab 01/04/25  0337 01/05/25  0449 01/06/25  0440     "144 141   K 4.0 3.6 4.0   * 105 106   CO2 26 30* 28   BUN 12.9 15.6 15.6   CREATININE 0.77 0.76 0.68   GLUCOSE 113* 69* 68*   EGFRNORACEVR >60 >60 >60     RFTs/LFTs:  Recent Labs   Lab 01/04/25  0337 01/05/25  0449 01/06/25  0440   CALCIUM 9.5 9.9 9.6   LABPROT 7.1 7.6 7.3   ALBUMIN 3.1* 3.4* 3.3*   AST 16 13 20   ALT 14 14 19   ALKPHOS 90 97 79   BILITOT 0.2 0.2 0.3     Recent Labs   Lab 01/02/25  1132 01/04/25  0337 01/05/25  0449   MG 2.10 2.20 2.30   PHOS 2.6 1.9* 1.8*     Cardiac Panel:  No results for input(s): "TROPONINI", "CKTOTAL", "CKMB", "BNP" in the last 168 hours.  Interval Imaging:  X-Ray Chest 1 View for Line/Tube Placement  Narrative: EXAMINATION:  XR CHEST 1 VIEW FOR LINE/TUBE PLACEMENT    CLINICAL HISTORY:  PICC;    TECHNIQUE:  Single frontal view of the chest was performed.    COMPARISON:  None    FINDINGS:  LINES AND TUBES: Right upper extremity PICC tip projects over the SVC.  EKG/telemetry leads overlie the chest.    MEDIASTINUM AND LIDIA: The cardiac silhouette is normal.    LUNGS: No lobar consolidation. No edema.    PLEURA:No pleural effusion. No pneumothorax.    OTHER: No acute osseous abnormality.  Impression: Right upper extremity PICC tip projects over the SVC.    Electronically signed by: Genesis Iraheta  Date:    12/26/2024  Time:    14:35     Microbiology:  Microbiology Results (last 7 days)       Procedure Component Value Units Date/Time    Blood Culture (site 1) [[id number]]  (Normal) Collected: 01/02/25 1415    Order Status: Completed Specimen: Blood from Arm, Left Updated: 01/05/25 1901     Blood Culture No Growth At 72 Hours    Blood Culture (site 2) [[id number]]  (Normal) Collected: 01/02/25 1416    Order Status: Completed Specimen: Blood from Arm, Left Updated: 01/05/25 1901     Blood Culture No Growth At 72 Hours          Antibiotics:  Antibiotics (From admission, onward)      Start     Stop Route Frequency Ordered    01/02/25 2100  mupirocin 2 % ointment         " 01/07/25 2059 Nasl 2 times daily 01/02/25 1359    01/02/25 1400  meropenem 2 g in 0.9% NaCl 100 mL IVPB (MB+)         -- IV Every 8 hours (non-standard times) 01/02/25 1357             ASSESSMENT AND PLAN     Chronic sigmoid diverticulitis  -was on meropenem 2g every 8 hours with OPAT.  Failed therapy due to inconvenience and worsening of pain  - considering surgery if aminicable. having an appointment with Colorectal surgery on Jan 7 2025  - no leukocytosis. ESR slightly elevated CRP is downtrending.  Lactate normal.  -continue Merrem 2 g q.8  -BC showing no growth to date at 48 hrs.  -on Tylenol 650 p.r.n. q.4 for mild pain and Norco 5 q.6 p.r.n. for moderate to severe pain.  - consulted case management placement long-term antibiotic therapy.  - consulted PT/OT.  Consulted case management for SNF. Awaiting acceptance  -no suspicion of sepsis at this time.  We will continue to monitor vitals.  Though BP soft patient reports this is her baseline.  Maintain maps greater than 65.      Asthma  -O2 sat 99% at room air.  -continue DuoNebs as needed, Breo and MDI daily  -continue prednisone 40 daily  -continue montelukast     Hyperthyroid  -TSH - 0.589, free T 4 1.14  -continue at home methimazole 2.5 q.d.    DVT Prophylaxis:  Enoxaparin  GI Prophylaxis:  Pantoprazole  Abx:  Meropenem  Access:  PICC  Fluids:  None  Diet: Diet GI Soft    Code Status: Full Code    Disposition: 54 y.o. female admitted for placement for IV antibiotics for acute on chronic sigmoid diverticulitis. Disposition pending placement.       Ananya Dey MD  PGY-2 Resident  [facility] Team 3  01/06/2025

## 2025-01-06 NOTE — CARE UPDATE
Received call from Yarelis Hager with [facility], they are out of network with pt's insurance and unable to accept. [facility], is in network.    Spoke with pt's sister Patricia [phone], informed her about conversation with [facility], would like [facility] in [place], if they are unable to accept, she is ok with [facility].  Referral submitted to both facilities. Acceptance pending.

## 2025-01-07 ENCOUNTER — OFFICE VISIT (OUTPATIENT)
Dept: SURGERY | Facility: CLINIC | Age: 55
End: 2025-01-07
Payer: OTHER GOVERNMENT

## 2025-01-07 DIAGNOSIS — K57.92 DIVERTICULITIS: Primary | ICD-10-CM

## 2025-01-07 LAB
ALBUMIN SERPL-MCNC: 3.1 G/DL (ref 3.5–5)
ALBUMIN/GLOB SERPL: 0.8 RATIO (ref 1.1–2)
ALP SERPL-CCNC: 89 UNIT/L (ref 40–150)
ALT SERPL-CCNC: 24 UNIT/L (ref 0–55)
ANION GAP SERPL CALC-SCNC: 5 MEQ/L
AST SERPL-CCNC: 24 UNIT/L (ref 5–34)
BACTERIA BLD CULT: NORMAL
BACTERIA BLD CULT: NORMAL
BASOPHILS # BLD AUTO: 0.01 X10(3)/MCL
BASOPHILS NFR BLD AUTO: 0.1 %
BILIRUB SERPL-MCNC: 0.3 MG/DL
BUN SERPL-MCNC: 15.5 MG/DL (ref 9.8–20.1)
CALCIUM SERPL-MCNC: 9.5 MG/DL (ref 8.4–10.2)
CHLORIDE SERPL-SCNC: 107 MMOL/L (ref 98–107)
CO2 SERPL-SCNC: 29 MMOL/L (ref 22–29)
CREAT SERPL-MCNC: 0.63 MG/DL (ref 0.55–1.02)
CREAT/UREA NIT SERPL: 25
EOSINOPHIL # BLD AUTO: 0.03 X10(3)/MCL (ref 0–0.9)
EOSINOPHIL NFR BLD AUTO: 0.3 %
ERYTHROCYTE [DISTWIDTH] IN BLOOD BY AUTOMATED COUNT: 12.1 % (ref 11.5–17)
FLUAV AG UPPER RESP QL IA.RAPID: NOT DETECTED
FLUBV AG UPPER RESP QL IA.RAPID: NOT DETECTED
GFR SERPLBLD CREATININE-BSD FMLA CKD-EPI: >60 ML/MIN/1.73/M2
GLOBULIN SER-MCNC: 3.9 GM/DL (ref 2.4–3.5)
GLUCOSE SERPL-MCNC: 82 MG/DL (ref 74–100)
HCT VFR BLD AUTO: 35.9 % (ref 37–47)
HGB BLD-MCNC: 12.1 G/DL (ref 12–16)
HOLD SPECIMEN: NORMAL
IMM GRANULOCYTES # BLD AUTO: 0.03 X10(3)/MCL (ref 0–0.04)
IMM GRANULOCYTES NFR BLD AUTO: 0.3 %
LYMPHOCYTES # BLD AUTO: 2.77 X10(3)/MCL (ref 0.6–4.6)
LYMPHOCYTES NFR BLD AUTO: 29.3 %
MCH RBC QN AUTO: 32.3 PG (ref 27–31)
MCHC RBC AUTO-ENTMCNC: 33.7 G/DL (ref 33–36)
MCV RBC AUTO: 95.7 FL (ref 80–94)
MONOCYTES # BLD AUTO: 1.14 X10(3)/MCL (ref 0.1–1.3)
MONOCYTES NFR BLD AUTO: 12.1 %
NEUTROPHILS # BLD AUTO: 5.47 X10(3)/MCL (ref 2.1–9.2)
NEUTROPHILS NFR BLD AUTO: 57.9 %
NRBC BLD AUTO-RTO: 0 %
PLATELET # BLD AUTO: 257 X10(3)/MCL (ref 130–400)
PMV BLD AUTO: 10.7 FL (ref 7.4–10.4)
POTASSIUM SERPL-SCNC: 3.8 MMOL/L (ref 3.5–5.1)
PROT SERPL-MCNC: 7 GM/DL (ref 6.4–8.3)
RBC # BLD AUTO: 3.75 X10(6)/MCL (ref 4.2–5.4)
SARS-COV-2 RNA RESP QL NAA+PROBE: NOT DETECTED
SODIUM SERPL-SCNC: 141 MMOL/L (ref 136–145)
WBC # BLD AUTO: 9.45 X10(3)/MCL (ref 4.5–11.5)

## 2025-01-07 PROCEDURE — 25000003 PHARM REV CODE 250: Performed by: INTERNAL MEDICINE

## 2025-01-07 PROCEDURE — S4991 NICOTINE PATCH NONLEGEND: HCPCS

## 2025-01-07 PROCEDURE — 11000001 HC ACUTE MED/SURG PRIVATE ROOM

## 2025-01-07 PROCEDURE — 63600175 PHARM REV CODE 636 W HCPCS: Performed by: INTERNAL MEDICINE

## 2025-01-07 PROCEDURE — 25000003 PHARM REV CODE 250

## 2025-01-07 PROCEDURE — 63600175 PHARM REV CODE 636 W HCPCS

## 2025-01-07 PROCEDURE — 36415 COLL VENOUS BLD VENIPUNCTURE: CPT

## 2025-01-07 PROCEDURE — 94640 AIRWAY INHALATION TREATMENT: CPT

## 2025-01-07 PROCEDURE — 25000242 PHARM REV CODE 250 ALT 637 W/ HCPCS

## 2025-01-07 PROCEDURE — 80053 COMPREHEN METABOLIC PANEL: CPT

## 2025-01-07 PROCEDURE — 99900035 HC TECH TIME PER 15 MIN (STAT)

## 2025-01-07 PROCEDURE — 85025 COMPLETE CBC W/AUTO DIFF WBC: CPT

## 2025-01-07 PROCEDURE — 0240U COVID/FLU A&B PCR: CPT

## 2025-01-07 PROCEDURE — 94761 N-INVAS EAR/PLS OXIMETRY MLT: CPT

## 2025-01-07 RX ADMIN — ONDANSETRON 4 MG: 2 INJECTION INTRAMUSCULAR; INTRAVENOUS at 10:01

## 2025-01-07 RX ADMIN — ENOXAPARIN SODIUM 40 MG: 40 INJECTION SUBCUTANEOUS at 05:01

## 2025-01-07 RX ADMIN — MEROPENEM 2 G: 2 INJECTION, POWDER, FOR SOLUTION INTRAVENOUS at 01:01

## 2025-01-07 RX ADMIN — GUAIFENESIN 200 MG: 100 LIQUID ORAL at 07:01

## 2025-01-07 RX ADMIN — MEROPENEM 2 G: 2 INJECTION, POWDER, FOR SOLUTION INTRAVENOUS at 10:01

## 2025-01-07 RX ADMIN — PREDNISONE 40 MG: 20 TABLET ORAL at 09:01

## 2025-01-07 RX ADMIN — MUPIROCIN: 20 OINTMENT TOPICAL at 09:01

## 2025-01-07 RX ADMIN — GUAIFENESIN 200 MG: 100 LIQUID ORAL at 03:01

## 2025-01-07 RX ADMIN — NICOTINE 1 PATCH: 21 PATCH, EXTENDED RELEASE TRANSDERMAL at 09:01

## 2025-01-07 RX ADMIN — GUAIFENESIN 200 MG: 100 LIQUID ORAL at 04:01

## 2025-01-07 RX ADMIN — MEROPENEM 2 G: 2 INJECTION, POWDER, FOR SOLUTION INTRAVENOUS at 06:01

## 2025-01-07 RX ADMIN — HYDROCODONE BITARTRATE AND ACETAMINOPHEN 1 TABLET: 5; 325 TABLET ORAL at 06:01

## 2025-01-07 RX ADMIN — IPRATROPIUM BROMIDE AND ALBUTEROL SULFATE 3 ML: .5; 3 SOLUTION RESPIRATORY (INHALATION) at 07:01

## 2025-01-07 RX ADMIN — FLUTICASONE FUROATE AND VILANTEROL TRIFENATATE 1 PUFF: 200; 25 POWDER RESPIRATORY (INHALATION) at 07:01

## 2025-01-07 RX ADMIN — MONTELUKAST SODIUM 10 MG: 5 TABLET, CHEWABLE ORAL at 09:01

## 2025-01-07 RX ADMIN — HYDROCODONE BITARTRATE AND ACETAMINOPHEN 1 TABLET: 5; 325 TABLET ORAL at 01:01

## 2025-01-07 RX ADMIN — PANTOPRAZOLE SODIUM 40 MG: 40 TABLET, DELAYED RELEASE ORAL at 09:01

## 2025-01-07 RX ADMIN — HYDROCODONE BITARTRATE AND ACETAMINOPHEN 1 TABLET: 5; 325 TABLET ORAL at 07:01

## 2025-01-07 NOTE — PROGRESS NOTES
CRS Telemedicine Virtual Visit History and Physical    Referring Md:   Self, Aaareferral  No address on file    SUBJECTIVE:     The patient location is: [place], [place]  The chief complaint leading to consultation is: diverticulitis  Visit type: Virtual visit with synchronous audio and video  Total time spent with patient: 30 minutes  Each patient to whom he or she provides medical services by telemedicine is:  (1) informed of the relationship between the physician and patient and the respective role of any other health care provider with respect to management of the patient; and (2) notified that he or she may decline to receive medical services by telemedicine and may withdraw from such care at any time.    History of Present Illness:  The patient is a new patient to this practice.   Course is as follows:  Patient is a 54 y.o. female presents with diverticulitis.  She has a 5-6 year history of severe diverticular disease resulting in intermittent hospitalizations 2-3 times per year.  Over the past year, she has had 4 episodes of micro perforation.  She has longstanding constipation and has bowel movements every other day.  No issues with fecal incontinence.  Her weight has been stable fluctuating from 130-1.  Prior heart attack or stroke.  No prior abdominal surgeries.  Smokes half a pack per day.  Does have asthma that is well-controlled.  She is functional and able to perform all of her activities of daily living.    Last Colonoscopy: 4/8/2024  Impression:            - Diverticulosis in the sigmoid colon and from 15                          to 25 cm proximal to the anus. There was narrowing                          of the colon with significant edema in association                          with the diverticular opening. Erythema was seen                          in association with the diverticular opening.                          Tattooed.                          - Diverticulosis in the descending colon and in                           the ascending colon.                          - Internal hemorrhoids.                          - No specimens collected.    - areas proximal and distal to diverticular disease were tattooed.    Review of patient's allergies indicates:   Allergen Reactions    Latex Anaphylaxis and Edema    Zosyn [piperacillin-tazobactam] Anaphylaxis       Past Medical History:   Diagnosis Date    Asthma     Diverticulitis     Diverticulosis     GERD (gastroesophageal reflux disease)     Graves disease      Past Surgical History:   Procedure Laterality Date    COLONOSCOPY, WITH DIRECTED SUBMUCOSAL INJECTION N/A 4/8/2024    Procedure: COLONOSCOPY, WITH DIRECTED SUBMUCOSAL INJECTION;  Surgeon: Heather Lance MD;  Location: [facility] ENDOSCOPY;  Service: Gastroenterology;  Laterality: N/A;     Family History   Problem Relation Name Age of Onset    Hypothyroidism Mother       Social History     Tobacco Use    Smoking status: Every Day     Current packs/day: 0.50     Average packs/day: 0.5 packs/day for 42.0 years (21.0 ttl pk-yrs)     Types: Cigarettes     Start date: 1/1/1983    Smokeless tobacco: Current    Tobacco comments:     Ambulatory referral to Smoking Cessation clinic following hospital discharge.    Substance Use Topics    Alcohol use: Not Currently    Drug use: Never        Review of Systems:  Review of Systems   Constitutional:  Negative for chills, diaphoresis, fever, malaise/fatigue and weight loss.   HENT:  Negative for congestion.    Respiratory:  Negative for shortness of breath.    Cardiovascular:  Negative for chest pain and leg swelling.   Gastrointestinal:  Positive for abdominal pain. Negative for blood in stool, constipation, nausea and vomiting.   Genitourinary:  Negative for dysuria.   Musculoskeletal:  Negative for back pain and myalgias.   Skin:  Negative for rash.   Neurological:  Negative for dizziness and weakness.   Endo/Heme/Allergies:  Does not bruise/bleed easily.    Psychiatric/Behavioral:  Negative for depression.        OBJECTIVE:     Vital Signs (Most Recent)  There were no vitals taken for this visit.  (unable to be attained, telemedicine visit)    Labs:  H&H 13 AND 39.  Albumin 3.3.  Normal renal function.    Imaging:   CT abdomen pelvis 12/21/2024: Personally reviewed:  - Numerous diverticula are seen in the hepatic flexure through to the descending. There is stable focal wall thickening in the distal sigmoid colon along with unchanged mild pericolic fat stranding   CT abdomen pelvis 10/12/2023: Distal descending and sigmoid colon irregular mural thickening narrowing the lumen with about similar appearance.       ASSESSMENT/PLAN:     Diagnoses and all orders for this visit:    Diverticulitis  -     Case Request Operating Room: XI ROBOTIC COLECTOMY, SIGMOID, ERAS low, SIGMOIDOSCOPY, FLEXIBLE, CATHETERIZATION, URETER        54 y.o. woman with longstanding diverticular disease now refractory to long-term antibiotic therapy.  She is now on IV antibiotics with some resolution.  Weight has been stable.      We discussed recurrence of diverticulitis with one episode having a 20% recurrence rate, 2 episodes having 30-40% recurrence rate, and 3+ episodes having 50-60% recurrence rate.  We discussed that the first episode of diverticulitis is usually the most complicated and that subsequent episodes are similar to this.  We discussed that the risk of recurrence increases with family history of diverticulitis, smoking, a diet containing significant red meat, and obesity.    We discussed the role of elective colectomy to reduce the rate to subsequent diverticulitis to 3-5%.  The colon is removed from the most proximal area of diverticulitis as seen on the CT scans to the top of the rectum.  The risks of surgery were covered, including bleeding, anastomotic leak (3-5%), infection, abscess, hernia formation, and the need for potential stoma if a leak did occur.  The typical post  operative course was covered with a 3-5 day hospital admission.  Following colectomy, bowel habits are often more frequent but will often adapt over time.      Resection is indicated as she has had numerous perforations impacting her quality of life and ability to function.  With her longstanding disease, I would anticipate a significant amount of inflammation in the pelvis and will therefore request bilateral ureteral catheters.  Will plan for robotic sigmoid resection and partial resection of the left colon depending on the intraoperative findings.  Tattoo previously placed by her GI.      I will see her in early February to check in and to do consents.       ARA Clay MD, FACS, FASCRS  Staff Surgeon  Colon & Rectal Surgery

## 2025-01-07 NOTE — PROGRESS NOTES
"[facility] Internal Medicine  PROGRESS NOTE    Patient's Name: Niya Moeller  : 1970  MRN: [id number]    Admission Date: 2025  Length of Stay: 5  Attending Physician: Noel Spence MD  Resident: Yissel  Intern: Jimenez    SUBJECTIVE     Chief Complaint   Patient presents with    Abdominal Pain     C/o "severe" abdominal pain and SOB x 1 month, hx of asthma. States that she was sent home with PICC line on Dec. 25th to do antibiotic infusions at home. States that she is unable to administer and is missing doses of the antibiotics. Wants to talk with doctor about being put in short term care facility.     History of Present Illness:  Niya Moeller is a 54 y.o. female with a PMHx of  chronic sigmoid diverticulitis with a history of perforation and abscess, hyperthyroid, asthma  who presented to [facility] on 2025 for left lower quadrant pain.  Patient lives alone, currently undergoing outpatient IV antibiotic treatment for persistent left lower quadrant diverticulitis, long history of same, indwelling PICC line in the right upper arm on outpatient self administered IV antibiotics.  Patient is having difficulty as antibiotic infusion takes 2 hours and is to be given every 8 hours, she does not have any home health nurses coming to her house.  She had missed approximately 4 doses since discharge from the facility on .  Patient did take this morning dose having completed it at about 930 she would prefer to be put in a short-term care facility (LTAC/nursing home) to facilitate antibiotic administration.  Patient reports left lower quadrant pain since 5 days and some reported chills and sweats, stool seemed to be small in caliber and latisha-colored.  Patient also reports having nausea and dysuria but no vomiting or hematuria.  Patient also reports worsening of her SOB, tried DuoNebs, breztri and rescue inhalers with minimal improvement.  Likely due to stress and " anxiety.  Denies diarrhea, melena, hematochezia, headache, chest pain, vomiting.  Previously seen by General surgery at [facility]. Patient not amicable to surgery at [facility]. Patient have an tele appointment with colorectal surgery on 1/7/25.  Patient reports smoking 6 cigarettes/day.  Denies alcohol use, IV drug abuse.     Upon ED presentation, vitals notable for BP 96/62 HR 91 RR 16 afebrile SpO2 96% at room air. Labs significant for chloride 108, CRP 33 lactate 1.4 EKG NSR. ED interventions included DuoNebs, hydromorphone, Phenergan. Patient was admitted to [facility] for further management of diverticulitis and placement for IV antibiotics    Hospital Course/Significant Events:  1/2/2025: Admitted to [facility].    Interval History:  No acute events overnight. Continuing current management with meropenem Q 8 for diverticulitis with perforation and abscess. Labs stable.  Patient reports that breathing is much better and pain is well-controlled. Blood cultures show no growth to date.  Awaiting SNF placement. Patient spoke with surgeon in [place] and has a plan for surgery after abx treatment in early February.     Review of Systems:  A 12-pt ROS was conducted and was negative unless stated above.    OBJECTIVE     VITAL SIGNS: 24 HR MIN & MAX Most Recent Vitals   Temp  Min: 97.8 °F (36.6 °C)  Max: 98.4 °F (36.9 °C)  98 °F (36.7 °C)   BP  Min: 85/50  Max: 125/64  (!) 95/54    Pulse  Min: 69  Max: 86  73   Resp  Min: 18  Max: 18  18    SpO2  Min: 92 %  Max: 100 %  100 %    Body mass index is 28.68 kg/m².    Intake/Output:  I/O last 3 completed shifts:  In: 338.1 [IV Piggyback:338.1]  Out: -     Physical Exam  Vital signs and nursing notes reviewed.  Constitutional: NAD. Awake and alert.   Head: Atraumatic. Normocephalic.  Eyes: Conjunctivae nl. No scleral icterus.  ENT: Mucous membranes are moist. Oropharynx is clear.  Neck: Supple. Full ROM. No lymphadenopathy.  Cardiovascular: Regular rate and rhythm. No  murmurs, rubs, or gallops. Distal pulses are 2+ and symmetric.  Pulmonary/Chest: No respiratory distress. Clear to auscultation bilaterally.  Bilateral faint wheezing heard.  Abdominal: Soft. Non-distended.  No tenderness.  No rebound, guarding, or rigidity.   Musculoskeletal: Moves all extremities. No edema.    Skin: Warm and dry.  Neurological: Awake and alert. No acute focal neurological deficits are appreciate  Current Medications:  Scheduled:   enoxparin  40 mg Subcutaneous Daily    fluticasone furoate-vilanteroL  1 puff Inhalation Daily    meropenem IV (PEDS and ADULTS)  2 g Intravenous Q8H    methIMAzole  2.5 mg Oral Daily    montelukast  10 mg Oral Daily    mupirocin   Nasal BID    nicotine  1 patch Transdermal Daily    pantoprazole  40 mg Oral Daily      Infusions:    PRNs:    Current Facility-Administered Medications:     acetaminophen, 650 mg, Oral, Q8H PRN    acetaminophen, 650 mg, Oral, Q4H PRN    albuterol-ipratropium, 3 mL, Nebulization, Q4H PRN    aluminum-magnesium hydroxide-simethicone, 30 mL, Oral, QID PRN    benzonatate, 100 mg, Oral, TID PRN    dextrose 50%, 12.5 g, Intravenous, PRN    dextrose 50%, 25 g, Intravenous, PRN    diphenhydrAMINE-zinc acetate 2-0.1%, , Topical (Top), TID PRN    fluticasone propionate, 1 spray, Each Nostril, Daily PRN    glucagon (human recombinant), 1 mg, Intramuscular, PRN    glucose, 16 g, Oral, PRN    glucose, 24 g, Oral, PRN    guaiFENesin 100 mg/5 ml, 200 mg, Oral, Q4H PRN    HYDROcodone-acetaminophen, 1 tablet, Oral, Q6H PRN    melatonin, 9 mg, Oral, Nightly PRN    naloxone, 0.02 mg, Intravenous, PRN    ondansetron, 4 mg, Intravenous, Q8H PRN    ondansetron, 8 mg, Intravenous, Q8H PRN    senna-docusate 8.6-50 mg, 1 tablet, Oral, BID PRN  Labs:  CBC:  Recent Labs   Lab 01/05/25  0449 01/06/25  0440 01/07/25  0428   WBC 7.11 6.52 9.45   HGB 12.7 12.8 12.1   HCT 39.7 38.8 35.9*    248 257   MCV 98.3* 96.5* 95.7*   RDW 12.1 12.1 12.1     BMP/CMP:  Recent Labs  "  Lab 01/05/25  0449 01/06/25  0440 01/07/25  0428    141 141   K 3.6 4.0 3.8    106 107   CO2 30* 28 29   BUN 15.6 15.6 15.5   CREATININE 0.76 0.68 0.63   GLUCOSE 69* 68* 82   EGFRNORACEVR >60 >60 >60     RFTs/LFTs:  Recent Labs   Lab 01/05/25  0449 01/06/25  0440 01/07/25  0428   CALCIUM 9.9 9.6 9.5   LABPROT 7.6 7.3 7.0   ALBUMIN 3.4* 3.3* 3.1*   AST 13 20 24   ALT 14 19 24   ALKPHOS 97 79 89   BILITOT 0.2 0.3 0.3     Recent Labs   Lab 01/02/25  1132 01/04/25  0337 01/05/25  0449   MG 2.10 2.20 2.30   PHOS 2.6 1.9* 1.8*     Cardiac Panel:  No results for input(s): "TROPONINI", "CKTOTAL", "CKMB", "BNP" in the last 168 hours.  Interval Imaging:  X-Ray Chest 1 View for Line/Tube Placement  Narrative: EXAMINATION:  XR CHEST 1 VIEW FOR LINE/TUBE PLACEMENT    CLINICAL HISTORY:  PICC;    TECHNIQUE:  Single frontal view of the chest was performed.    COMPARISON:  None    FINDINGS:  LINES AND TUBES: Right upper extremity PICC tip projects over the SVC.  EKG/telemetry leads overlie the chest.    MEDIASTINUM AND LIDIA: The cardiac silhouette is normal.    LUNGS: No lobar consolidation. No edema.    PLEURA:No pleural effusion. No pneumothorax.    OTHER: No acute osseous abnormality.  Impression: Right upper extremity PICC tip projects over the SVC.    Electronically signed by: Genesis Iraheta  Date:    12/26/2024  Time:    14:35     Microbiology:  Microbiology Results (last 7 days)       Procedure Component Value Units Date/Time    Blood Culture (site 1) [[id number]]  (Normal) Collected: 01/02/25 1415    Order Status: Completed Specimen: Blood from Arm, Left Updated: 01/06/25 1900     Blood Culture No Growth At 96 Hours    Blood Culture (site 2) [[id number]]  (Normal) Collected: 01/02/25 1416    Order Status: Completed Specimen: Blood from Arm, Left Updated: 01/06/25 1900     Blood Culture No Growth At 96 Hours          Antibiotics:  Antibiotics (From admission, onward)      Start     Stop Route Frequency Ordered "    01/02/25 2100  mupirocin 2 % ointment         01/07/25 2059 Nasl 2 times daily 01/02/25 1359    01/02/25 1400  meropenem 2 g in 0.9% NaCl 100 mL IVPB (MB+)         -- IV Every 8 hours (non-standard times) 01/02/25 1357             ASSESSMENT AND PLAN     Chronic sigmoid diverticulitis  -was on meropenem 2g every 8 hours with OPAT.  Failed therapy due to inconvenience and worsening of pain  - considering surgery if aminicable. having an appointment with Colorectal surgery on Jan 7 2025- plan for February after treatment complete  - no leukocytosis. ESR slightly elevated CRP is downtrending.  Lactate normal.  -continue Merrem 2 g q.8  -BC showing no growth to date at 48 hrs.  -on Tylenol 650 p.r.n. q.4 for mild pain and Norco 5 q.6 p.r.n. for moderate to severe pain.  - consulted case management placement long-term antibiotic therapy.  - consulted PT/OT.  Consulted case management for SNF. Awaiting acceptance  -no suspicion of sepsis at this time.  We will continue to monitor vitals.  Though BP soft patient reports this is her baseline.  Maintain maps greater than 65.      Asthma  -O2 sat 99% at room air.  -continue DuoNebs as needed, Breo and MDI daily  -continue prednisone 40 daily  -continue montelukast     Hyperthyroid  -TSH - 0.589, free T 4 1.14  -continue at home methimazole 2.5 q.d.    DVT Prophylaxis:  Enoxaparin  GI Prophylaxis:  Pantoprazole  Abx:  Meropenem  Access:  PICC  Fluids:  None  Diet: Diet GI Soft    Code Status: Full Code    Disposition: 54 y.o. female admitted for placement for IV antibiotics for acute on chronic sigmoid diverticulitis. Disposition pending placement.       Ananya Dey MD  PGY-2 Resident  [facility] Team 3  01/07/2025

## 2025-01-08 ENCOUNTER — TELEPHONE (OUTPATIENT)
Dept: SURGERY | Facility: CLINIC | Age: 55
End: 2025-01-08
Payer: OTHER GOVERNMENT

## 2025-01-08 LAB
ALBUMIN SERPL-MCNC: 3.1 G/DL (ref 3.5–5)
ALBUMIN/GLOB SERPL: 0.8 RATIO (ref 1.1–2)
ALP SERPL-CCNC: 94 UNIT/L (ref 40–150)
ALT SERPL-CCNC: 30 UNIT/L (ref 0–55)
ANION GAP SERPL CALC-SCNC: 5 MEQ/L
AST SERPL-CCNC: 22 UNIT/L (ref 5–34)
BASOPHILS # BLD AUTO: 0.01 X10(3)/MCL
BASOPHILS NFR BLD AUTO: 0.1 %
BILIRUB SERPL-MCNC: 0.2 MG/DL
BUN SERPL-MCNC: 17.2 MG/DL (ref 9.8–20.1)
CALCIUM SERPL-MCNC: 9.6 MG/DL (ref 8.4–10.2)
CHLORIDE SERPL-SCNC: 106 MMOL/L (ref 98–107)
CO2 SERPL-SCNC: 29 MMOL/L (ref 22–29)
CREAT SERPL-MCNC: 0.73 MG/DL (ref 0.55–1.02)
CREAT/UREA NIT SERPL: 24
EOSINOPHIL # BLD AUTO: 0.03 X10(3)/MCL (ref 0–0.9)
EOSINOPHIL NFR BLD AUTO: 0.3 %
ERYTHROCYTE [DISTWIDTH] IN BLOOD BY AUTOMATED COUNT: 12.1 % (ref 11.5–17)
GFR SERPLBLD CREATININE-BSD FMLA CKD-EPI: >60 ML/MIN/1.73/M2
GLOBULIN SER-MCNC: 3.9 GM/DL (ref 2.4–3.5)
GLUCOSE SERPL-MCNC: 109 MG/DL (ref 74–100)
HCT VFR BLD AUTO: 35.1 % (ref 37–47)
HGB BLD-MCNC: 11.7 G/DL (ref 12–16)
HOLD SPECIMEN: NORMAL
IMM GRANULOCYTES # BLD AUTO: 0.05 X10(3)/MCL (ref 0–0.04)
IMM GRANULOCYTES NFR BLD AUTO: 0.6 %
LYMPHOCYTES # BLD AUTO: 2.2 X10(3)/MCL (ref 0.6–4.6)
LYMPHOCYTES NFR BLD AUTO: 25.1 %
MCH RBC QN AUTO: 32.1 PG (ref 27–31)
MCHC RBC AUTO-ENTMCNC: 33.3 G/DL (ref 33–36)
MCV RBC AUTO: 96.4 FL (ref 80–94)
MONOCYTES # BLD AUTO: 0.97 X10(3)/MCL (ref 0.1–1.3)
MONOCYTES NFR BLD AUTO: 11.1 %
NEUTROPHILS # BLD AUTO: 5.5 X10(3)/MCL (ref 2.1–9.2)
NEUTROPHILS NFR BLD AUTO: 62.8 %
NRBC BLD AUTO-RTO: 0 %
PLATELET # BLD AUTO: 250 X10(3)/MCL (ref 130–400)
PMV BLD AUTO: 10.6 FL (ref 7.4–10.4)
POTASSIUM SERPL-SCNC: 3.8 MMOL/L (ref 3.5–5.1)
PROT SERPL-MCNC: 7 GM/DL (ref 6.4–8.3)
RBC # BLD AUTO: 3.64 X10(6)/MCL (ref 4.2–5.4)
SODIUM SERPL-SCNC: 140 MMOL/L (ref 136–145)
WBC # BLD AUTO: 8.76 X10(3)/MCL (ref 4.5–11.5)

## 2025-01-08 PROCEDURE — 25000003 PHARM REV CODE 250

## 2025-01-08 PROCEDURE — 99900035 HC TECH TIME PER 15 MIN (STAT)

## 2025-01-08 PROCEDURE — 25000003 PHARM REV CODE 250: Performed by: INTERNAL MEDICINE

## 2025-01-08 PROCEDURE — S4991 NICOTINE PATCH NONLEGEND: HCPCS

## 2025-01-08 PROCEDURE — 94761 N-INVAS EAR/PLS OXIMETRY MLT: CPT

## 2025-01-08 PROCEDURE — 85025 COMPLETE CBC W/AUTO DIFF WBC: CPT

## 2025-01-08 PROCEDURE — 36415 COLL VENOUS BLD VENIPUNCTURE: CPT

## 2025-01-08 PROCEDURE — 25000242 PHARM REV CODE 250 ALT 637 W/ HCPCS

## 2025-01-08 PROCEDURE — 63600175 PHARM REV CODE 636 W HCPCS

## 2025-01-08 PROCEDURE — 80053 COMPREHEN METABOLIC PANEL: CPT

## 2025-01-08 PROCEDURE — 11000001 HC ACUTE MED/SURG PRIVATE ROOM

## 2025-01-08 PROCEDURE — 94640 AIRWAY INHALATION TREATMENT: CPT

## 2025-01-08 RX ORDER — HYDROCODONE BITARTRATE AND ACETAMINOPHEN 7.5; 325 MG/1; MG/1
1 TABLET ORAL EVERY 6 HOURS PRN
Status: DISCONTINUED | OUTPATIENT
Start: 2025-01-08 | End: 2025-01-13 | Stop reason: HOSPADM

## 2025-01-08 RX ADMIN — HYDROCODONE BITARTRATE AND ACETAMINOPHEN 1 TABLET: 5; 325 TABLET ORAL at 03:01

## 2025-01-08 RX ADMIN — FLUTICASONE FUROATE AND VILANTEROL TRIFENATATE 1 PUFF: 200; 25 POWDER RESPIRATORY (INHALATION) at 07:01

## 2025-01-08 RX ADMIN — HYDROCODONE BITARTRATE AND ACETAMINOPHEN 1 TABLET: 7.5; 325 TABLET ORAL at 08:01

## 2025-01-08 RX ADMIN — MONTELUKAST SODIUM 10 MG: 5 TABLET, CHEWABLE ORAL at 08:01

## 2025-01-08 RX ADMIN — MEROPENEM 2 G: 2 INJECTION, POWDER, FOR SOLUTION INTRAVENOUS at 09:01

## 2025-01-08 RX ADMIN — ONDANSETRON 4 MG: 2 INJECTION INTRAMUSCULAR; INTRAVENOUS at 03:01

## 2025-01-08 RX ADMIN — IPRATROPIUM BROMIDE AND ALBUTEROL SULFATE 3 ML: .5; 3 SOLUTION RESPIRATORY (INHALATION) at 07:01

## 2025-01-08 RX ADMIN — SODIUM CHLORIDE, POTASSIUM CHLORIDE, SODIUM LACTATE AND CALCIUM CHLORIDE 500 ML: 600; 310; 30; 20 INJECTION, SOLUTION INTRAVENOUS at 09:01

## 2025-01-08 RX ADMIN — GUAIFENESIN 200 MG: 100 LIQUID ORAL at 06:01

## 2025-01-08 RX ADMIN — IPRATROPIUM BROMIDE AND ALBUTEROL SULFATE 3 ML: .5; 3 SOLUTION RESPIRATORY (INHALATION) at 11:01

## 2025-01-08 RX ADMIN — NICOTINE 1 PATCH: 21 PATCH, EXTENDED RELEASE TRANSDERMAL at 08:01

## 2025-01-08 RX ADMIN — MEROPENEM 2 G: 2 INJECTION, POWDER, FOR SOLUTION INTRAVENOUS at 05:01

## 2025-01-08 RX ADMIN — HYDROCODONE BITARTRATE AND ACETAMINOPHEN 1 TABLET: 5; 325 TABLET ORAL at 06:01

## 2025-01-08 RX ADMIN — ENOXAPARIN SODIUM 40 MG: 40 INJECTION SUBCUTANEOUS at 06:01

## 2025-01-08 RX ADMIN — PANTOPRAZOLE SODIUM 40 MG: 40 TABLET, DELAYED RELEASE ORAL at 08:01

## 2025-01-08 RX ADMIN — MEROPENEM 2 G: 2 INJECTION, POWDER, FOR SOLUTION INTRAVENOUS at 02:01

## 2025-01-08 NOTE — TELEPHONE ENCOUNTER
----- Message from Bridget sent at 1/8/2025  1:35 PM CST -----  Regarding: Regarding patient prodedure  Contact: personal cell [phone]  Type:  Needs Medical Advice    Who Called: Dr. Gaona called to in regard to a procedure schedule     Would the patient rather a call back or a response via [facility]? Call back   Best Call Back Number:personal cell [phone]   Additional Information: call before 3pm call second number after 3pm the first number

## 2025-01-08 NOTE — PROGRESS NOTES
Inpatient Nutrition Evaluation    Admit Date: 1/2/2025   Total duration of encounter: 6 days   Patient Age: 54 y.o.    Nutrition Recommendation/Prescription     Liberalize diet to Regular   Monitor Weight Weekly     Nutrition Assessment     Chart Review    Reason Seen: follow-up    Malnutrition Screening Tool Results   Have you recently lost weight without trying?: No  Have you been eating poorly because of a decreased appetite?: No   MST Score: 0   Diagnosis:  Chronic sigmoid diverticulitis, Asthma, Hyperthyroid    Relevant Medical History: Chronic sigmoid diverticulitis with perforation & abscess, Hyperthyroid, Asthma, Graves disease    Scheduled Medications:  enoxparin, 40 mg, Daily  fluticasone furoate-vilanteroL, 1 puff, Daily  lactated ringers, 500 mL, Once  meropenem IV (PEDS and ADULTS), 2 g, Q8H  methIMAzole, 2.5 mg, Daily  montelukast, 10 mg, Daily  nicotine, 1 patch, Daily  pantoprazole, 40 mg, Daily    Continuous Infusions:   PRN Medications:   Current Facility-Administered Medications:     acetaminophen, 650 mg, Oral, Q8H PRN    acetaminophen, 650 mg, Oral, Q4H PRN    albuterol-ipratropium, 3 mL, Nebulization, Q4H PRN    aluminum-magnesium hydroxide-simethicone, 30 mL, Oral, QID PRN    benzonatate, 100 mg, Oral, TID PRN    dextrose 50%, 12.5 g, Intravenous, PRN    dextrose 50%, 25 g, Intravenous, PRN    diphenhydrAMINE-zinc acetate 2-0.1%, , Topical (Top), TID PRN    fluticasone propionate, 1 spray, Each Nostril, Daily PRN    glucagon (human recombinant), 1 mg, Intramuscular, PRN    glucose, 16 g, Oral, PRN    glucose, 24 g, Oral, PRN    guaiFENesin 100 mg/5 ml, 200 mg, Oral, Q4H PRN    HYDROcodone-acetaminophen, 1 tablet, Oral, Q6H PRN    melatonin, 9 mg, Oral, Nightly PRN    naloxone, 0.02 mg, Intravenous, PRN    ondansetron, 4 mg, Intravenous, Q8H PRN    ondansetron, 8 mg, Intravenous, Q8H PRN    senna-docusate 8.6-50 mg, 1 tablet, Oral, BID PRN    Recent Labs   Lab 01/02/25  1132 01/03/25  0354  "01/04/25  0337 01/05/25  0449 01/06/25  0440 01/07/25  0428 01/08/25  0405    142 142 144 141 141 140   K 4.2 3.9 4.0 3.6 4.0 3.8 3.8   CALCIUM 9.9 9.7 9.5 9.9 9.6 9.5 9.6   PHOS 2.6  --  1.9* 1.8*  --   --   --    MG 2.10  --  2.20 2.30  --   --   --    CO2 26 27 26 30* 28 29 29   BUN 7.5* 7.5* 12.9 15.6 15.6 15.5 17.2   CREATININE 0.75 0.82 0.77 0.76 0.68 0.63 0.73   EGFRNORACEVR >60 >60 >60 >60 >60 >60 >60   GLUCOSE 81 172* 113* 69* 68* 82 109*   BILITOT 0.5 0.2 0.2 0.2 0.3 0.3 0.2   ALKPHOS 91 96 90 97 79 89 94   ALT 19 15 14 14 19 24 30   AST 19 18 16 13 20 24 22   ALBUMIN 3.3* 3.2* 3.1* 3.4* 3.3* 3.1* 3.1*   CRP 33.20*  --   --   --   --   --   --    WBC 7.58 5.79 7.20 7.11 6.52 9.45 8.76   HGB 13.3 12.4 11.4* 12.7 12.8 12.1 11.7*   HCT 40.2 37.4 34.3* 39.7 38.8 35.9* 35.1*     Nutrition Orders:  Diet Adult Regular      Appetite/Oral Intake: good/% of meals  Factors Affecting Nutritional Intake: abdominal pain  Social Needs Impacting Access to Food: none identified  Food/Rastafari/Cultural Preferences: none reported  Food Allergies: no known food allergies  Last Bowel Movement: 01/05/25  Wound(s):  skin intact    Comments    1/8/24 -- Pt reports good appetite with pain controlled however reports mainly ordering out food d/t dislike of hospital foods; liberalize diet to regular as tolerated, kitchen  obtaining food preferences; pt denies n/v; weight stable since admit; awaiting SNF for continuation of IV abx    1/3/24 -- Pt reports good appetite & eats well when abdominal pain is controlled; decreased po intake with intermittent abdominal pain over the last month; no nausea this am, reports managed with meds; pt declines ONS d/t dislike; Continue diet as ordered; weight stable    Anthropometrics    Height: 4' 11" (149.9 cm), Height Method: Stated  Last Weight: 65.3 kg (144 lb) (01/08/25 0549), Weight Method: Standard Scale  BMI (Calculated): 29.1  BMI Classification: overweight (BMI " 25-29.9)     Ideal Body Weight (IBW), Female: 95 lb     % Ideal Body Weight, Female (lb): 148.53 %                    Usual Body Weight (UBW), k.6 kg  % Usual Body Weight: 101.13     Usual Weight Provided By: patient and EMR weight history    Wt Readings from Last 5 Encounters:   25 65.3 kg (144 lb)   24 65.3 kg (144 lb)   24 64.9 kg (143 lb 1.3 oz)   24 63.5 kg (140 lb)   12/10/24 68 kg (150 lb)     Weight Change(s) Since Admission: stable  Wt Readings from Last 1 Encounters:   25 0549 65.3 kg (144 lb)   25 0419 64.4 kg (142 lb)   25 0506 63.5 kg (140 lb)   25 0453 65.8 kg (145 lb)   25 0507 64.2 kg (141 lb 8 oz)   25 1026 64 kg (141 lb 1.5 oz)   Admit Weight: 64 kg (141 lb 1.5 oz) (25 1026), Weight Method: Standard Scale    Patient Education     Not applicable.    Nutrition Goals & Monitoring     Dietitian will monitor: food and beverage intake, weight, glucose/endocrine profile, and gastrointestinal profile  Discharge planning: continue GI Soft diet  Nutrition Risk/Follow-Up: low (follow-up in 5-7 days)  Patients assigned 'low nutrition risk' status do not qualify for a full nutritional assessment but will be monitored and re-evaluated in a 5-7 day time period. Please consult if re-evaluation needed sooner.

## 2025-01-08 NOTE — CARE UPDATE
Called [facility] colorectal surgery and left a message for Dr. Storm Clay regarding patient's sigmoid colectomy appointment on 02/26/2025.  Awaiting call back to discuss case and antibiotic regimen.    Sanjana Gaona DO  [facility], PGY-I

## 2025-01-08 NOTE — TELEPHONE ENCOUNTER
Spoke with Dr. Gaona at [facility] regarding plan of care. Dr. Gaona would like to speak to Dr. Clay regarding surgery being moved up or to advise on plan regarding antibiotic therapy. Explained that Dr. Rodriges is in surgery and will call him this afternoon. Dr. Gaona verbalizes understanding.

## 2025-01-08 NOTE — CARE UPDATE
[facility], unable to meet pt's needs at this time. Referrals submitted to multiple skilled facilities via Epic fax. Acceptance pending. Will follow.

## 2025-01-08 NOTE — PROGRESS NOTES
[facility] INTERNAL MEDICINE  INPATIENT PROGRESS NOTE    Resident Team: [facility] Medicine List 3  Attending Physician: Linda Lopez MD    SUBJECTIVE:      HPI: Niya Moeller is a 54 y.o. female with PMH of chronic sigmoid diverticulitis with a history of perforation and abscess, hyperthyroid, asthma  who presented to [facility] on 01/02/2025 for left lower quadrant pain.  Patient lives alone, currently undergoing outpatient IV antibiotic treatment for persistent left lower quadrant diverticulitis, long history of same, indwelling PICC line in the right upper arm on outpatient self administered IV antibiotics.  Patient is having difficulty as antibiotic infusion takes 2 hours and is to be given every 8 hours, she does not have any home health nurses coming to her house.  She had missed approximately 4 doses since discharge from the facility on December 26.  Patient did take this morning dose having completed it at about 930 she would prefer to be put in a short-term care facility (LTAC/nursing home) to facilitate antibiotic administration.  Patient reports left lower quadrant pain since 5 days and some reported chills and sweats, stool seemed to be small in caliber and latisha-colored.  Patient also reports having nausea and dysuria but no vomiting or hematuria.  Patient also reports worsening of her SOB, tried DuoNebs, breztri and rescue inhalers with minimal improvement.  Likely due to stress and anxiety.  Denies diarrhea, melena, hematochezia, headache, chest pain, vomiting.  Previously seen by General surgery at [facility]. Patient not amicable to surgery at [facility]. Patient have an tele appointment with colorectal surgery on 1/7/25.  Patient reports smoking 6 cigarettes/day.  Denies alcohol use, IV drug abuse. In the ED: vitals notable for BP 96/62 HR 91 RR 16 afebrile SpO2 96% at room air. Labs significant for chloride 108, CRP 33 lactate 1.4 EKG NSR. ED interventions included DuoNebs, hydromorphone, Phenergan. Patient was admitted to [facility]  "Internal Medicine for further management of diverticulitis and placement for IV antibiotics    Today: No acute events overnight. Patient has no acute complaints at the time of examination. Labs this AM: no leukocytosis, H/H stable, CMP unremarkable. Continue IV merrem for now.    ROS:   (-) Chest pain, SOB, palpitations, fever, night sweat, chills, N/V, dizziness     OBJECTIVE:     Vital signs:   BP (!) 99/56 (BP Location: Left arm, Patient Position: Sitting)   Pulse 84   Temp 98.3 °F (36.8 °C) (Oral)   Resp 20   Ht 4' 11" (1.499 m)   Wt 65.3 kg (144 lb)   SpO2 99%   Breastfeeding No   BMI 29.08 kg/m²      Physical Examination:  General: Well nourished w/o distress  HEENT: NC/AT; PERRL; nasal and oral mucosa moist and clear  Neck: Full ROM; no lymphadenopathy  Pulm: CTA bilaterally, normal work of breathing  CV: S1, S2 w/o murmurs or gallops; no edema noted  GI: Soft with normal bowel sounds in all quadrants, no masses on palpation  MSK: Full ROM of all extremities   Derm: No rashes, abnormal bruising, or skin lesions  Neuro: AAOx3; motor/sensory function intact  Psych: Cooperative; appropriate mood and affect    Laboratory:  Lab results within past 24 hours reviewed     Imaging:  None    Current meds:    enoxparin  40 mg Subcutaneous Daily    fluticasone furoate-vilanteroL  1 puff Inhalation Daily    meropenem IV (PEDS and ADULTS)  2 g Intravenous Q8H    methIMAzole  2.5 mg Oral Daily    montelukast  10 mg Oral Daily    nicotine  1 patch Transdermal Daily    pantoprazole  40 mg Oral Daily         ASSESSMENT & PLAN:     Chronic sigmoid diverticulitis  -Blood cultures negative to date  -Continue Merrem 2g Q8H (planned end date: 1/15/2025); CM is working on finding a facility for IV ABX  -Will clarify with colorectal surgeon regarding duration of ABX   -Continue to monitor patient's blood pressure and give  mL bolus if MAP <65 mmHg    Asthma  -Continue montelukast, DuoNebs as needed, Breo and MDI daily "     Hyperthyroidism   -Continue methimazole 2.5mg daily    DVT PPx: Lovenox  GI PPx: Protonix  Diet: Regular   ABX: Merrem  O2: Room air  PCP: Oswaldo Riggs NP    Disposition (01/08/2025): Continue IV merrem for now.  is currently working to find a facility for IV ABX.     Vincenzo Cisneros,    \[facility] [place] [facility]\"" Internal Medicine, PGY-III

## 2025-01-09 ENCOUNTER — PATIENT MESSAGE (OUTPATIENT)
Dept: SURGERY | Facility: CLINIC | Age: 55
End: 2025-01-09
Payer: OTHER GOVERNMENT

## 2025-01-09 LAB
ALBUMIN SERPL-MCNC: 2.9 G/DL (ref 3.5–5)
ALBUMIN/GLOB SERPL: 0.8 RATIO (ref 1.1–2)
ALP SERPL-CCNC: 87 UNIT/L (ref 40–150)
ALT SERPL-CCNC: 46 UNIT/L (ref 0–55)
ANION GAP SERPL CALC-SCNC: 6 MEQ/L
AST SERPL-CCNC: 37 UNIT/L (ref 5–34)
BASOPHILS # BLD AUTO: 0.01 X10(3)/MCL
BASOPHILS NFR BLD AUTO: 0.1 %
BILIRUB SERPL-MCNC: 0.3 MG/DL
BUN SERPL-MCNC: 10.8 MG/DL (ref 9.8–20.1)
CALCIUM SERPL-MCNC: 9.1 MG/DL (ref 8.4–10.2)
CHLORIDE SERPL-SCNC: 109 MMOL/L (ref 98–107)
CO2 SERPL-SCNC: 29 MMOL/L (ref 22–29)
CREAT SERPL-MCNC: 0.8 MG/DL (ref 0.55–1.02)
CREAT/UREA NIT SERPL: 14
EOSINOPHIL # BLD AUTO: 0.48 X10(3)/MCL (ref 0–0.9)
EOSINOPHIL NFR BLD AUTO: 5.9 %
ERYTHROCYTE [DISTWIDTH] IN BLOOD BY AUTOMATED COUNT: 12.8 % (ref 11.5–17)
GFR SERPLBLD CREATININE-BSD FMLA CKD-EPI: >60 ML/MIN/1.73/M2
GLOBULIN SER-MCNC: 3.7 GM/DL (ref 2.4–3.5)
GLUCOSE SERPL-MCNC: 67 MG/DL (ref 74–100)
HCT VFR BLD AUTO: 38.4 % (ref 37–47)
HGB BLD-MCNC: 12.3 G/DL (ref 12–16)
IMM GRANULOCYTES # BLD AUTO: 0.02 X10(3)/MCL (ref 0–0.04)
IMM GRANULOCYTES NFR BLD AUTO: 0.2 %
LYMPHOCYTES # BLD AUTO: 3.56 X10(3)/MCL (ref 0.6–4.6)
LYMPHOCYTES NFR BLD AUTO: 43.5 %
MCH RBC QN AUTO: 31.9 PG (ref 27–31)
MCHC RBC AUTO-ENTMCNC: 32 G/DL (ref 33–36)
MCV RBC AUTO: 99.7 FL (ref 80–94)
MONOCYTES # BLD AUTO: 0.95 X10(3)/MCL (ref 0.1–1.3)
MONOCYTES NFR BLD AUTO: 11.6 %
NEUTROPHILS # BLD AUTO: 3.16 X10(3)/MCL (ref 2.1–9.2)
NEUTROPHILS NFR BLD AUTO: 38.7 %
NRBC BLD AUTO-RTO: 0 %
PLATELET # BLD AUTO: 260 X10(3)/MCL (ref 130–400)
PMV BLD AUTO: 11.2 FL (ref 7.4–10.4)
POTASSIUM SERPL-SCNC: 3.9 MMOL/L (ref 3.5–5.1)
PROT SERPL-MCNC: 6.6 GM/DL (ref 6.4–8.3)
RBC # BLD AUTO: 3.85 X10(6)/MCL (ref 4.2–5.4)
SODIUM SERPL-SCNC: 144 MMOL/L (ref 136–145)
WBC # BLD AUTO: 8.18 X10(3)/MCL (ref 4.5–11.5)

## 2025-01-09 PROCEDURE — 25000242 PHARM REV CODE 250 ALT 637 W/ HCPCS

## 2025-01-09 PROCEDURE — 36415 COLL VENOUS BLD VENIPUNCTURE: CPT

## 2025-01-09 PROCEDURE — 25000003 PHARM REV CODE 250

## 2025-01-09 PROCEDURE — S4991 NICOTINE PATCH NONLEGEND: HCPCS

## 2025-01-09 PROCEDURE — 94640 AIRWAY INHALATION TREATMENT: CPT

## 2025-01-09 PROCEDURE — 94761 N-INVAS EAR/PLS OXIMETRY MLT: CPT

## 2025-01-09 PROCEDURE — 80053 COMPREHEN METABOLIC PANEL: CPT

## 2025-01-09 PROCEDURE — 11000001 HC ACUTE MED/SURG PRIVATE ROOM

## 2025-01-09 PROCEDURE — 85025 COMPLETE CBC W/AUTO DIFF WBC: CPT

## 2025-01-09 PROCEDURE — 63600175 PHARM REV CODE 636 W HCPCS

## 2025-01-09 RX ORDER — FOLIC ACID 5 MG/ML
1 INJECTION, SOLUTION INTRAMUSCULAR; INTRAVENOUS; SUBCUTANEOUS DAILY
Status: CANCELLED | OUTPATIENT
Start: 2025-01-09

## 2025-01-09 RX ORDER — CIPROFLOXACIN 500 MG/1
500 TABLET ORAL 2 TIMES DAILY
Qty: 2 TABLET | Refills: 0 | Status: SHIPPED | OUTPATIENT
Start: 2025-01-09 | End: 2025-01-10

## 2025-01-09 RX ORDER — METRONIDAZOLE 500 MG/1
500 TABLET ORAL 2 TIMES DAILY
Qty: 2 TABLET | Refills: 0 | Status: SHIPPED | OUTPATIENT
Start: 2025-01-09 | End: 2025-01-10

## 2025-01-09 RX ORDER — ADHESIVE BANDAGE
30 BANDAGE TOPICAL DAILY PRN
Status: DISCONTINUED | OUTPATIENT
Start: 2025-01-09 | End: 2025-01-13 | Stop reason: HOSPADM

## 2025-01-09 RX ADMIN — ALUMINUM HYDROXIDE, MAGNESIUM HYDROXIDE, AND SIMETHICONE 30 ML: 1200; 120; 1200 SUSPENSION ORAL at 10:01

## 2025-01-09 RX ADMIN — MONTELUKAST SODIUM 10 MG: 5 TABLET, CHEWABLE ORAL at 08:01

## 2025-01-09 RX ADMIN — MEROPENEM 2 G: 2 INJECTION, POWDER, FOR SOLUTION INTRAVENOUS at 02:01

## 2025-01-09 RX ADMIN — ENOXAPARIN SODIUM 40 MG: 40 INJECTION SUBCUTANEOUS at 05:01

## 2025-01-09 RX ADMIN — MEROPENEM 2 G: 2 INJECTION, POWDER, FOR SOLUTION INTRAVENOUS at 09:01

## 2025-01-09 RX ADMIN — MEROPENEM 2 G: 2 INJECTION, POWDER, FOR SOLUTION INTRAVENOUS at 05:01

## 2025-01-09 RX ADMIN — FLUTICASONE FUROATE AND VILANTEROL TRIFENATATE 1 PUFF: 200; 25 POWDER RESPIRATORY (INHALATION) at 06:01

## 2025-01-09 RX ADMIN — PANTOPRAZOLE SODIUM 40 MG: 40 TABLET, DELAYED RELEASE ORAL at 08:01

## 2025-01-09 RX ADMIN — IPRATROPIUM BROMIDE AND ALBUTEROL SULFATE 3 ML: .5; 3 SOLUTION RESPIRATORY (INHALATION) at 10:01

## 2025-01-09 RX ADMIN — NICOTINE 1 PATCH: 21 PATCH, EXTENDED RELEASE TRANSDERMAL at 08:01

## 2025-01-09 RX ADMIN — HYDROCODONE BITARTRATE AND ACETAMINOPHEN 1 TABLET: 7.5; 325 TABLET ORAL at 11:01

## 2025-01-09 RX ADMIN — HYDROCODONE BITARTRATE AND ACETAMINOPHEN 1 TABLET: 7.5; 325 TABLET ORAL at 05:01

## 2025-01-09 RX ADMIN — MAGNESIUM HYDROXIDE 2400 MG: 400 SUSPENSION ORAL at 10:01

## 2025-01-09 RX ADMIN — IPRATROPIUM BROMIDE AND ALBUTEROL SULFATE 3 ML: .5; 3 SOLUTION RESPIRATORY (INHALATION) at 06:01

## 2025-01-09 RX ADMIN — GUAIFENESIN 200 MG: 100 LIQUID ORAL at 03:01

## 2025-01-09 RX ADMIN — IPRATROPIUM BROMIDE AND ALBUTEROL SULFATE 3 ML: .5; 3 SOLUTION RESPIRATORY (INHALATION) at 02:01

## 2025-01-09 RX ADMIN — IPRATROPIUM BROMIDE AND ALBUTEROL SULFATE 3 ML: .5; 3 SOLUTION RESPIRATORY (INHALATION) at 07:01

## 2025-01-09 NOTE — PLAN OF CARE
Problem: Adult Inpatient Plan of Care  Goal: Plan of Care Review  Outcome: Progressing  Flowsheets (Taken 1/9/2025 1040)  Plan of Care Reviewed With: patient  Goal: Patient-Specific Goal (Individualized)  Outcome: Progressing  Goal: Absence of Hospital-Acquired Illness or Injury  Outcome: Progressing  Goal: Optimal Comfort and Wellbeing  Outcome: Progressing  Goal: Readiness for Transition of Care  Outcome: Progressing     Problem: Infection  Goal: Absence of Infection Signs and Symptoms  Outcome: Progressing

## 2025-01-09 NOTE — PROGRESS NOTES
[facility] INTERNAL MEDICINE  INPATIENT PROGRESS NOTE    Resident Team: [facility] Medicine List 3  Attending Physician: Linda Lopez MD    SUBJECTIVE:      HPI: Niya Moeller is a 54 y.o. female with PMH of chronic sigmoid diverticulitis with a history of perforation and abscess, hyperthyroid, asthma  who presented to [facility] on 01/02/2025 for left lower quadrant pain.  Patient lives alone, currently undergoing outpatient IV antibiotic treatment for persistent left lower quadrant diverticulitis, long history of same, indwelling PICC line in the right upper arm on outpatient self administered IV antibiotics.  Patient is having difficulty as antibiotic infusion takes 2 hours and is to be given every 8 hours, she does not have any home health nurses coming to her house.  She had missed approximately 4 doses since discharge from the facility on December 26.  Patient did take this morning dose having completed it at about 930 she would prefer to be put in a short-term care facility (LTAC/nursing home) to facilitate antibiotic administration.  Patient reports left lower quadrant pain since 5 days and some reported chills and sweats, stool seemed to be small in caliber and latisha-colored.  Patient also reports having nausea and dysuria but no vomiting or hematuria.  Patient also reports worsening of her SOB, tried DuoNebs, breztri and rescue inhalers with minimal improvement.  Likely due to stress and anxiety.  Denies diarrhea, melena, hematochezia, headache, chest pain, vomiting.  Previously seen by General surgery at [facility]. Patient not amicable to surgery at [facility]. Patient have an tele appointment with colorectal surgery on 1/7/25.  Patient reports smoking 6 cigarettes/day.  Denies alcohol use, IV drug abuse. In the ED: vitals notable for BP 96/62 HR 91 RR 16 afebrile SpO2 96% at room air. Labs significant for chloride 108, CRP 33 lactate 1.4 EKG NSR. ED interventions included DuoNebs, hydromorphone, Phenergan. Patient was admitted to [facility]  "Internal Medicine for further management of diverticulitis and placement for IV antibiotics    Today: No acute events overnight. Patient has no acute complaints at the time of examination. Labs this AM: no leukocytosis, H/H stable, CMP unremarkable. Continue IV merrem for now. Will space out labs. Awaiting placement.    ROS:   (-) Chest pain, SOB, palpitations, fever, night sweat, chills, N/V, dizziness     OBJECTIVE:     Vital signs:   BP (!) 95/57   Pulse 87   Temp 98 °F (36.7 °C) (Oral)   Resp 19   Ht 4' 11" (1.499 m)   Wt 64.9 kg (143 lb)   SpO2 100%   Breastfeeding No   BMI 28.88 kg/m²      Physical Examination:  General: Well nourished w/o distress  HEENT: NC/AT; PERRL; nasal and oral mucosa moist and clear  Neck: Full ROM; no lymphadenopathy  Pulm: CTA bilaterally, normal work of breathing  CV: S1, S2 w/o murmurs or gallops; no edema noted  GI: Soft with normal bowel sounds in all quadrants, no masses on palpation  MSK: Full ROM of all extremities   Derm: No rashes, abnormal bruising, or skin lesions  Neuro: AAOx3; motor/sensory function intact  Psych: Cooperative; appropriate mood and affect    Laboratory:  Lab results within past 24 hours reviewed     Imaging:  None    Current meds:    enoxparin  40 mg Subcutaneous Daily    fluticasone furoate-vilanteroL  1 puff Inhalation Daily    meropenem IV (PEDS and ADULTS)  2 g Intravenous Q8H    methIMAzole  2.5 mg Oral Daily    montelukast  10 mg Oral Daily    nicotine  1 patch Transdermal Daily    pantoprazole  40 mg Oral Daily         ASSESSMENT & PLAN:     Chronic sigmoid diverticulitis  -Blood cultures negative to date  -Continue Merrem 2g Q8H (planned end date: 1/15/2025); CM is working on finding a facility for IV ABX  -Will clarify with colorectal surgeon regarding duration of ABX   -Continue to monitor patient's blood pressure and give  mL bolus if MAP <65 mmHg    Asthma  -Continue montelukast, DuoNebs as needed, Breo and MDI daily "     Hyperthyroidism   -Continue methimazole 2.5mg daily    DVT PPx: Lovenox  GI PPx: Protonix  Diet: Regular   ABX: Merrem  O2: Room air  PCP: Oswaldo Riggs NP    Disposition (01/09/2025): Continue IV merrem for now.  is currently working to find a facility for IV ABX.     Ananya Dey MD   \[facility] [place] [facility]\"" Internal Medicine, PGY-II

## 2025-01-10 PROCEDURE — 63600175 PHARM REV CODE 636 W HCPCS

## 2025-01-10 PROCEDURE — 25000003 PHARM REV CODE 250

## 2025-01-10 PROCEDURE — 94640 AIRWAY INHALATION TREATMENT: CPT

## 2025-01-10 PROCEDURE — 11000001 HC ACUTE MED/SURG PRIVATE ROOM

## 2025-01-10 PROCEDURE — 99900035 HC TECH TIME PER 15 MIN (STAT)

## 2025-01-10 PROCEDURE — 94761 N-INVAS EAR/PLS OXIMETRY MLT: CPT

## 2025-01-10 PROCEDURE — 25000242 PHARM REV CODE 250 ALT 637 W/ HCPCS

## 2025-01-10 PROCEDURE — S4991 NICOTINE PATCH NONLEGEND: HCPCS

## 2025-01-10 RX ADMIN — ENOXAPARIN SODIUM 40 MG: 40 INJECTION SUBCUTANEOUS at 05:01

## 2025-01-10 RX ADMIN — MEROPENEM 2 G: 2 INJECTION, POWDER, FOR SOLUTION INTRAVENOUS at 02:01

## 2025-01-10 RX ADMIN — PANTOPRAZOLE SODIUM 40 MG: 40 TABLET, DELAYED RELEASE ORAL at 08:01

## 2025-01-10 RX ADMIN — MAGNESIUM HYDROXIDE 2400 MG: 400 SUSPENSION ORAL at 06:01

## 2025-01-10 RX ADMIN — IPRATROPIUM BROMIDE AND ALBUTEROL SULFATE 3 ML: .5; 3 SOLUTION RESPIRATORY (INHALATION) at 07:01

## 2025-01-10 RX ADMIN — HYDROCODONE BITARTRATE AND ACETAMINOPHEN 1 TABLET: 7.5; 325 TABLET ORAL at 12:01

## 2025-01-10 RX ADMIN — NICOTINE 1 PATCH: 21 PATCH, EXTENDED RELEASE TRANSDERMAL at 08:01

## 2025-01-10 RX ADMIN — MEROPENEM 2 G: 2 INJECTION, POWDER, FOR SOLUTION INTRAVENOUS at 08:01

## 2025-01-10 RX ADMIN — HYDROCODONE BITARTRATE AND ACETAMINOPHEN 1 TABLET: 7.5; 325 TABLET ORAL at 06:01

## 2025-01-10 RX ADMIN — FLUTICASONE FUROATE AND VILANTEROL TRIFENATATE 1 PUFF: 200; 25 POWDER RESPIRATORY (INHALATION) at 07:01

## 2025-01-10 RX ADMIN — ONDANSETRON 4 MG: 2 INJECTION INTRAMUSCULAR; INTRAVENOUS at 06:01

## 2025-01-10 RX ADMIN — MEROPENEM 2 G: 2 INJECTION, POWDER, FOR SOLUTION INTRAVENOUS at 05:01

## 2025-01-10 RX ADMIN — MONTELUKAST SODIUM 10 MG: 5 TABLET, CHEWABLE ORAL at 08:01

## 2025-01-10 RX ADMIN — SODIUM CHLORIDE, POTASSIUM CHLORIDE, SODIUM LACTATE AND CALCIUM CHLORIDE 500 ML: 600; 310; 30; 20 INJECTION, SOLUTION INTRAVENOUS at 07:01

## 2025-01-10 RX ADMIN — IPRATROPIUM BROMIDE AND ALBUTEROL SULFATE 3 ML: .5; 3 SOLUTION RESPIRATORY (INHALATION) at 11:01

## 2025-01-10 RX ADMIN — ONDANSETRON 8 MG: 2 INJECTION INTRAMUSCULAR; INTRAVENOUS at 06:01

## 2025-01-10 NOTE — PROGRESS NOTES
[facility] INTERNAL MEDICINE  INPATIENT PROGRESS NOTE    Resident Team: [facility] Medicine List 3  Attending Physician: Linda Lopez MD    SUBJECTIVE:      HPI: Niya Moeller is a 54 y.o. female with PMH of chronic sigmoid diverticulitis with a history of perforation and abscess, hyperthyroid, asthma  who presented to [facility] on 01/02/2025 for left lower quadrant pain.  Patient lives alone, currently undergoing outpatient IV antibiotic treatment for persistent left lower quadrant diverticulitis, long history of same, indwelling PICC line in the right upper arm on outpatient self administered IV antibiotics.  Patient is having difficulty as antibiotic infusion takes 2 hours and is to be given every 8 hours, she does not have any home health nurses coming to her house.  She had missed approximately 4 doses since discharge from the facility on December 26.  Patient did take this morning dose having completed it at about 930 she would prefer to be put in a short-term care facility (LTAC/nursing home) to facilitate antibiotic administration.  Patient reports left lower quadrant pain since 5 days and some reported chills and sweats, stool seemed to be small in caliber and latisha-colored.  Patient also reports having nausea and dysuria but no vomiting or hematuria.  Patient also reports worsening of her SOB, tried DuoNebs, breztri and rescue inhalers with minimal improvement.  Likely due to stress and anxiety.  Denies diarrhea, melena, hematochezia, headache, chest pain, vomiting.  Previously seen by General surgery at [facility]. Patient not amicable to surgery at [facility]. Patient have an tele appointment with colorectal surgery on 1/7/25.  Patient reports smoking 6 cigarettes/day.  Denies alcohol use, IV drug abuse. In the ED: vitals notable for BP 96/62 HR 91 RR 16 afebrile SpO2 96% at room air. Labs significant for chloride 108, CRP 33 lactate 1.4 EKG NSR. ED interventions included DuoNebs, hydromorphone, Phenergan. Patient was admitted to [facility]  "Internal Medicine for further management of diverticulitis and placement for IV antibiotics    Today: No acute events overnight. Patient has no acute complaints at the time of examination. Labs this AM: no leukocytosis, H/H stable, CMP unremarkable. Continue IV merrem for now. Awaiting placement at LTAC. Confirmed with ID that will get merrem until day before surgery in [place] and go there after.     ROS:   (-) Chest pain, SOB, palpitations, fever, night sweat, chills, N/V, dizziness     OBJECTIVE:     Vital signs:   BP (!) 98/57   Pulse (P) 95   Temp 98.2 °F (36.8 °C) (Oral)   Resp (P) 18   Ht 4' 11" (1.499 m)   Wt 65.3 kg (144 lb)   SpO2 96%   Breastfeeding No   BMI 29.08 kg/m²      Physical Examination:  General: Well nourished w/o distress  HEENT: NC/AT; PERRL; nasal and oral mucosa moist and clear  Neck: Full ROM; no lymphadenopathy  Pulm: CTA bilaterally, normal work of breathing  CV: S1, S2 w/o murmurs or gallops; no edema noted  GI: Soft with normal bowel sounds in all quadrants, no masses on palpation  MSK: Full ROM of all extremities   Derm: No rashes, abnormal bruising, or skin lesions  Neuro: AAOx3; motor/sensory function intact  Psych: Cooperative; appropriate mood and affect    Laboratory:  Lab results within past 24 hours reviewed     Imaging:  None    Current meds:    enoxparin  40 mg Subcutaneous Daily    fluticasone furoate-vilanteroL  1 puff Inhalation Daily    meropenem IV (PEDS and ADULTS)  2 g Intravenous Q8H    methIMAzole  2.5 mg Oral Daily    montelukast  10 mg Oral Daily    nicotine  1 patch Transdermal Daily    pantoprazole  40 mg Oral Daily         ASSESSMENT & PLAN:     Chronic sigmoid diverticulitis  -Blood cultures negative to date  -Continue Merrem 2g Q8H (planned end date: 1/26/2025); CM is working on finding a facility for IV ABX  -surgery planned for 1/27/25  -Continue to monitor patient's blood pressure and give  mL bolus if MAP <65 mmHg    Asthma  -Continue " montelukast, DuoNebs as needed, Breo and MDI daily     Hyperthyroidism   -Continue methimazole 2.5mg daily    DVT PPx: Lovenox  GI PPx: Protonix  Diet: Regular   ABX: Merrem  O2: Room air  PCP: Oswaldo Riggs, NP    Disposition (01/10/2025): Continue IV merrem for now.  is currently working to find a facility for IV ABX.     Ananya Dey MD   [facility] Internal Medicine, PGY-II

## 2025-01-10 NOTE — PRE ADMISSION SCREENING
[facility]    Pre-Admission Patient Screening                    Pre-Screen type:  LTAC:  Reason for Admission:    Diverticulitis of large intestine   Asthma  Thyrotoxicosis     LTACH Admission Criteria:    Management of at least one of the following complex medical conditions:  Other IV Merrem 2g q8hr    Must meet at least three of the following concomitant treatments/intervention daily unless notes: (excludes PO meds unless notes)  IV medication per therapeutic regimen  Cardiac Monitoring  Hemodynamic monitoring  Laboratory assessment and medication adjustment(s)  Nebulizer treatments at least every 6 hours      LTACH more appropriate than other levels of care (eg, skilled nursing facility, home health care), as indicated by:    Clinical management of patient deemed too frequent and needed beyond the capabilities of alternative levels of care as evidence by: Continued Titration of IV Medications and Frequency of IV medications greater than or equal to 2 times daily  Frequent diagnostic services needed on an inpatient basis, including clinical assessments, laboratory, and imaging as evidence by: Frequent monitoring and clinical assessments performed by a licensed RN to identify current and future patient needs by incorporating the recognition of normal vs abnormal body physiology, and to prompt recognition of pertinent changes to identify and prioritize appropriate interventions that can be performed within the acute inpatient setting. , Frequent monitoring and clinical assessments performed by a licensed RT to identify current and future patient needs by incorporating the recognition of normal vs abnormal body physiology of the Respiratory System, and to prompt recognition of pertinent changes to identify and prioritize appropriate interventions that can be performed within the acute inpatient setting. , and Frequent laboratory testing and/or imaging to aide in the improvement and  effectiveness of patient's individualized treatment plan.   More intensive services, such as speciality nursing care, and/or onsite physician assessments needed that are not available at a lower level of care as evidence by: Daily physician intervention , Collaboration between consulting and attending providers still deemed a necessity to aide in the improvement and effectiveness of patient's individualized treatment plan, which can be provided at an LTACH level of care. , and Therapy Services to be included in patient's treatment plan in an effort to restore/improve patient's modality status to a safe level of functioning prior to acute illness.    Lower level of care has failed (eg, patient readmitted to acute care from a lower level of care).     Patient is stable for transfer to [facility], as indicated by ALL of the following:      Hypotension Absent     Cardiovascular status stable     Stable chest findings     Renal function accepctable   Pain adequately managed    Intake acceptable       No acute significant hepatic dysfunction (eg, new encephalopathy)   No acute severe unstable neurologic abnormalities (eg, Altered mental status that is severe or persistent, or evidence of ongoing CNS embolization or ischemia, worsening hydrocephalus)   No active bleeding or unstable disorders of hemostasis (eg, no recent need for transfusion, severe thrombocytopenia with bleeding)   No need for respiratory or other isolation, OR manageable at LTACH level of care    Long-term enteral feeding (eg, PEG) and intravenous access established, not needed, OR to be placed at LTACH level of care      Anticipated Discharge Disposition:    N/A    Facility Status: Accept     Referring Physician:  Lester Cagle MD    Admitting Physician:  Lester Cagle MD    Primary Care Physician:  Oswaldo Riggs NP    History         Patient Active Problem List    Diagnosis Date Noted    History of ESBL E. coli  infection 12/03/2024    Tobacco dependence 03/13/2024    Superficial vein thrombosis 09/15/2023    Macrocytic anemia 08/17/2023    Allergic conjunctivitis and rhinitis 08/16/2023    Diverticulitis of large intestine with perforation 05/01/2023    Diverticulitis of intestine with abscess 02/19/2023    Asthma 02/19/2023    Thrombocytopathia 02/18/2023    Hypoglycemia 02/16/2023    Tachycardia 02/14/2023    Other chest pain 02/14/2023    Diastolic dysfunction, left ventricle 02/13/2023    Hyperthyroidism 02/12/2023    Thyroiditis 02/12/2023    Moderate persistent asthma with (acute) exacerbation 02/11/2023    Diverticulitis 07/06/2022    Obesity 06/29/2022    Asthma exacerbation 06/29/2022    GERD (gastroesophageal reflux disease) 06/29/2022         Previous Specialties/Consulted physicians:      Infectious Diseases       Past and Current Medical History    Past Medical History:   Diagnosis Date    Asthma     Diverticulitis     Diverticulosis     GERD (gastroesophageal reflux disease)     Graves disease            History of Present Illness     Niya Moeller is a 54 y.o. female with PMH of chronic sigmoid diverticulitis with a history of perforation and abscess, hyperthyroid, asthma  who presented to [facility] on 01/02/2025 for left lower quadrant pain.  Patient lives alone, currently undergoing outpatient IV antibiotic treatment for persistent left lower quadrant diverticulitis, long history of same, indwelling PICC line in the right upper arm on outpatient self administered IV antibiotics.  Patient is having difficulty as antibiotic infusion takes 2 hours and is to be given every 8 hours, she does not have any home health nurses coming to her house.  She had missed approximately 4 doses since discharge from the facility on December 26.  Patient did take this morning dose having completed it at about 930 she would prefer to be put in a short-term care facility (LTAC/nursing home) to facilitate antibiotic administration.   Patient reports left lower quadrant pain since 5 days and some reported chills and sweats, stool seemed to be small in caliber and latisha-colored.  Patient also reports having nausea and dysuria but no vomiting or hematuria.  Patient also reports worsening of her SOB, tried DuoNebs, breztri and rescue inhalers with minimal improvement.  Likely due to stress and anxiety.  Denies diarrhea, melena, hematochezia, headache, chest pain, vomiting.  Previously seen by General surgery at [facility]. Patient not amicable to surgery at [facility]. Patient have an tele appointment with colorectal surgery on 1/7/25.  Patient reports smoking 6 cigarettes/day.  Denies alcohol use, IV drug abuse. In the ED: vitals notable for BP 96/62 HR 91 RR 16 afebrile SpO2 96% at room air. Labs significant for chloride 108, CRP 33 lactate 1.4 EKG NSR. ED interventions included DuoNebs, hydromorphone, Phenergan. Patient was admitted to [facility] for further management of diverticulitis and placement for IV antibiotics     Today: No acute events overnight. Patient has no acute complaints at the time of examination. Labs this AM: no leukocytosis, H/H stable, CMP unremarkable. Continue IV merrem for now. Awaiting placement at LTAC. Confirmed with ID that will get merrem until day before surgery in [place] and go there after.     ASSESSMENT & PLAN:      Chronic sigmoid diverticulitis  -Blood cultures negative to date  -Continue Merrem 2g Q8H (planned end date: 1/26/2025); CM is working on finding a facility for IV ABX  -surgery planned for 1/27/25  -Continue to monitor patient's blood pressure and give  mL bolus if MAP <65 mmHg     Asthma  -Continue montelukast, DuoNebs as needed, Breo and MDI daily      Hyperthyroidism   -Continue methimazole 2.5mg daily    Patient is requiring ltac placement for continuation of IV Merrem 2g q8hr until 01/27/25 at which point she will be transferred to [facility] ([place]) to undergo a planned robotic sigmoid  resection and partial resection of the left colon (depending on intraoperative findings) with Dr. Danna Inman. She is also requiring q4 hour nebulizer treatments, daily labs and daily physician supervision. The pt did attempt to complete the IV antibiotics at home but ultimately required readmission.       Patient Traveled outside of the [place] in the last 3 months? no     Patient discharged from this LTAC to SNF within the last 45 days? no    Patient discharged from this LTAC to Rehab within the last 27 days? no    Prior residence: home    Prior Post-Acute Services: N/A     Allergies:   Review of patient's allergies indicates:   Allergen Reactions    Latex Anaphylaxis and Edema    Zosyn [piperacillin-tazobactam] Anaphylaxis       Has patient received the current influenza vaccine (Oct 1 - March 31)? Unknown     Has patient received PPD skin test prior to admit? N/A     Code Status: Full Code    Orientation: Time, Place, and Person    Speech: normal     Vital Signs:     Date 01/10/25 01/10/25   Blood Pressure 88/54 98/57   Pulse 87 79   Respiratory Rate 17 16   O2 Saturation 96 96   Temperature 98.4        Bowel/Bladder: continent of bladder and continent of bowel  Bowel/Bladder Appliance: N/A    Dialysis: N/A    PICC Double Lumen 12/26/24 1426 right basilic (Active)   Line Necessity Review Longterm central access required 01/10/25 0815   Site Assessment No drainage;No redness;No swelling;No warmth 01/10/25 0815   Extremity Assessment Distal to IV No abnormal discoloration;No redness;No swelling;No warmth 01/10/25 0815   Line Securement Device Secured with sutureless device 01/10/25 0815   Dressing Type CHG impregnated dressing/sponge 01/10/25 0815   Dressing Status Clean;Dry;Intact 01/10/25 0815   Dressing Intervention Integrity maintained 01/10/25 0815   Date on Dressing 01/08/25 01/10/25 0815   Dressing Due to be Changed 01/15/25 01/10/25 0815   Distal Patency/Care flushed w/o difficulty;normal saline locked  01/10/25 0815   Proximal 1 Patency/Care flushed w/o difficulty;normal saline locked 01/10/25 0815   Number of days: 14       CBGs/Accuchecks: No     Precautions: Fall and Blood Pressure/Syncope    Restraints: No     Isolation Precautions: N/A       Facility-Administered Medications as of 1/10/2025   Medication Dose Route Frequency Provider Last Rate Last Admin    acetaminophen tablet 650 mg  650 mg Oral Q8H PRN Lg Clifton MD        acetaminophen tablet 650 mg  650 mg Oral Q4H PRN Lg Clifton MD        [COMPLETED] albuterol-ipratropium 2.5 mg-0.5 mg/3 mL nebulizer solution 3 mL  3 mL Nebulization Q5 Min Glen Santos MD   3 mL at 01/02/25 1143    albuterol-ipratropium 2.5 mg-0.5 mg/3 mL nebulizer solution 3 mL  3 mL Nebulization Q4H PRN Lg Clifton MD   3 mL at 01/10/25 0724    aluminum-magnesium hydroxide-simethicone 200-200-20 mg/5 mL suspension 30 mL  30 mL Oral QID PRN Lg Clifton MD   30 mL at 01/09/25 1049    benzonatate capsule 100 mg  100 mg Oral TID PRN Stephanie Mckeon DO   100 mg at 01/02/25 1854    dextrose 50% injection 12.5 g  12.5 g Intravenous PRN Lg Clifton MD        dextrose 50% injection 25 g  25 g Intravenous PRN Lg Clifton MD        diphenhydrAMINE-zinc acetate 2-0.1% cream   Topical (Top) TID PRN Lg Clifton MD        enoxaparin injection 40 mg  40 mg Subcutaneous Daily Lg Clifton MD   40 mg at 01/09/25 1754    fluticasone furoate-vilanteroL 200-25 mcg/dose diskus inhaler 1 puff  1 puff Inhalation Daily Lg Clifton MD   1 puff at 01/10/25 0724    fluticasone propionate 50 mcg/actuation nasal spray 50 mcg  1 spray Each Nostril Daily PRN Lg Clifton MD   50 mcg at 01/06/25 2155    glucagon (human recombinant) injection 1 mg  1 mg Intramuscular PRN Lg Clifton MD        glucose chewable tablet 16 g  16 g Oral PRN Lg Clifton MD        glucose chewable tablet 24 g  24 g Oral PRN Lg Clifton MD        guaiFENesin 100 mg/5 ml syrup 200 mg  200 mg Oral Q4H PRN  Lg Clifton MD   200 mg at 25 0331    HYDROcodone-acetaminophen 7.5-325 mg per tablet 1 tablet  1 tablet Oral Q6H PRN Dashawn Yu DO   1 tablet at 01/10/25 0630    [COMPLETED] HYDROmorphone injection 1 mg  1 mg Intravenous ED 1 Time Glen Santos MD   1 mg at 25 1230    [COMPLETED] lactated ringers bolus 1,000 mL  1,000 mL Intravenous Once Stephanie Mckeon DO   Stopped at 25 1849    [COMPLETED] lactated ringers bolus 500 mL  500 mL Intravenous Once Sanjana Gaona DO   Stopped at 25 1029    magnesium hydroxide 400 mg/5 ml suspension 2,400 mg  30 mL Oral Daily PRN Ananya Dey MD   2,400 mg at 01/10/25 0630    melatonin tablet 9 mg  9 mg Oral Nightly PRN Lg Clifton MD        meropenem 2 g in 0.9% NaCl 100 mL IVPB (MB+)  2 g Intravenous Q8H Lg Clifton MD   Stopped at 01/10/25 0625    methIMAzole split tablet 2.5 mg  2.5 mg Oral Daily Lg Clifton MD        [COMPLETED] methylPREDNISolone sodium succinate injection 125 mg  125 mg Intravenous Once Marquita Delong MD   125 mg at 25 2221    montelukast chewable tablet 10 mg  10 mg Oral Daily Lg Clifton MD   10 mg at 01/10/25 0814    [COMPLETED] morphine injection 2 mg  2 mg Intravenous Once Dashawn Yu DO   2 mg at 25 2222    [] mupirocin 2 % ointment   Nasal BID Glen Santos MD   Given at 25 0902    naloxone 0.4 mg/mL injection 0.02 mg  0.02 mg Intravenous PRN Lg Clifton MD        nicotine 21 mg/24 hr 1 patch  1 patch Transdermal Daily Lg Clifton MD   1 patch at 01/10/25 0814    ondansetron injection 4 mg  4 mg Intravenous Q8H PRN Lg Clifton MD   4 mg at 25 1520    ondansetron injection 8 mg  8 mg Intravenous Q8H PRN Lg Clifton MD        pantoprazole EC tablet 40 mg  40 mg Oral Daily Lg Clifton MD   40 mg at 01/10/25 0814    [COMPLETED] potassium, sodium phosphates 280-160-250 mg packet 1 packet  1 packet Oral Once Radha Lopez MD   1 packet at  "01/05/25 1002    [COMPLETED] potassium, sodium phosphates 280-160-250 mg packet 2 packet  2 packet Oral Once Lg Clifton MD   2 packet at 01/04/25 0751    [COMPLETED] predniSONE tablet 40 mg  40 mg Oral Daily Radha Lopez MD   40 mg at 01/07/25 0902    [COMPLETED] promethazine (PHENERGAN) 12.5 mg in 0.9% NaCl 50 mL IVPB  12.5 mg Intravenous ED 1 Time Glen Santos MD   Stopped at 01/02/25 1302     No current outpatient medications on file as of 1/10/2025.        Cardiovascular:    Cardiovascular Review: Within definable limits (WDL)    Telemetry: Yes    Rhythm: N/A    AICD: No      Respiratory:    Oxygen:  Room Air    CPT/Frequency: N/A    Incentive Spirometer/Frequency: N/A    CPAP/Settings: N/A    BiPAP/Settings: N/A    Oxygen Saturation: N/A    Suction Frequency: N/A          Nutrition:      Ht Readings from Last 3 Encounters:   01/02/25 4' 11" (1.499 m)   12/24/24 4' 11.02" (1.499 m)   12/24/24 4' 11" (1.499 m)     Wt Readings from Last 1 Encounters:   01/10/25 0414 65.3 kg (144 lb)   01/09/25 0351 64.9 kg (143 lb)   01/08/25 0549 65.3 kg (144 lb)   01/06/25 0419 64.4 kg (142 lb)   01/05/25 0506 63.5 kg (140 lb)   01/04/25 0453 65.8 kg (145 lb)   01/03/25 0507 64.2 kg (141 lb 8 oz)   01/02/25 1026 64 kg (141 lb 1.5 oz)       Feeding Status:   Current Diet: Regular Adult   Supplements:N/A   Tube Feeding: N/A   Flushes: N/A      Integumentary:    Integumentary: Within definable limits (WDL)              Musculoskeletal:    Transfer assist: Modified Independent    Weight Bearing Status: N/A    Comments: N/A    ADL Assist: Modified Independent    Special Equipment: N/A    Radiology:    Radiology (Last 168 hours)               12/24 0000 Cardiac monitoring strips              X-Ray Chest 1 View for Line/Tube Placement  Narrative: EXAMINATION:  XR CHEST 1 VIEW FOR LINE/TUBE PLACEMENT    CLINICAL HISTORY:  PICC;    TECHNIQUE:  Single frontal view of the chest was " performed.    COMPARISON:  None    FINDINGS:  LINES AND TUBES: Right upper extremity PICC tip projects over the SVC.  EKG/telemetry leads overlie the chest.    MEDIASTINUM AND LIDIA: The cardiac silhouette is normal.    LUNGS: No lobar consolidation. No edema.    PLEURA:No pleural effusion. No pneumothorax.    OTHER: No acute osseous abnormality.  Impression: Right upper extremity PICC tip projects over the SVC.    Electronically signed by: Genesis Iraheta  Date:    12/26/2024  Time:    14:35      Lab/Cultures:  CBC with Differential [[id number]] (Abnormal) Collected: 01/09/25 0335   Specimen: Blood Updated: 01/09/25 0701    WBC 8.18 x10(3)/mcL     RBC 3.85 Low  x10(6)/mcL     Hgb 12.3 g/dL     Hct 38.4 %     MCV 99.7 High  fL     MCH 31.9 High  pg     MCHC 32.0 Low  g/dL     RDW 12.8 %     Platelet 260 x10(3)/mcL     MPV 11.2 High  fL     Neut % 38.7 %     Lymph % 43.5 %     Mono % 11.6 %     Eos % 5.9 %     Basophil % 0.1 %     Imm Grans % 0.2 %     Neut # 3.16 x10(3)/mcL     Lymph # 3.56 x10(3)/mcL     Mono # 0.95 x10(3)/mcL     Eos # 0.48 x10(3)/mcL     Baso # 0.01 x10(3)/mcL     Imm Gran # 0.02 x10(3)/mcL     NRBC% 0.0 %      Comprehensive Metabolic Panel (CMP) [[id number]] (Abnormal) Collected: 01/09/25 0335   Specimen: Blood Updated: 01/09/25 0514    Sodium 144 mmol/L     Potassium 3.9 mmol/L     Chloride 109 High  mmol/L     CO2 29 mmol/L     Glucose 67 Low  mg/dL     Blood Urea Nitrogen 10.8 mg/dL     Creatinine 0.80 mg/dL     Calcium 9.1 mg/dL     Protein Total 6.6 gm/dL     Albumin 2.9 Low  g/dL     Globulin 3.7 High  gm/dL     Albumin/Globulin Ratio 0.8 Low  ratio     Bilirubin Total 0.3 mg/dL     ALP 87 unit/L     ALT 46 unit/L     AST 37 High  unit/L     eGFR >60 mL/min/1.73/m2     Anion Gap 6.0 mEq/L     BUN/Creatinine Ratio 14       BUN   Date Value Ref Range Status   02/19/2023 13 6 - 20 mg/dL Final   02/18/2023 11 6 - 20 mg/dL Final     Blood Urea Nitrogen   Date Value Ref Range Status   01/09/2025  "10.8 9.8 - 20.1 mg/dL Final   01/08/2025 17.2 9.8 - 20.1 mg/dL Final     Creatinine   Date Value Ref Range Status   01/09/2025 0.80 0.55 - 1.02 mg/dL Final   01/08/2025 0.73 0.55 - 1.02 mg/dL Final   02/19/2023 0.8 0.5 - 1.4 mg/dL Final   02/18/2023 0.7 0.5 - 1.4 mg/dL Final     WBC   Date Value Ref Range Status   01/09/2025 8.18 4.50 - 11.50 x10(3)/mcL Final   01/08/2025 8.76 4.50 - 11.50 x10(3)/mcL Final   03/15/2023 10.4 x10(3)/mcL Final   03/09/2023 4.6 x10(3)/mcL Final   02/19/2023 11.15 3.90 - 12.70 K/uL Final   02/18/2023 8.82 3.90 - 12.70 K/uL Final      Blood Culture   Date Value Ref Range Status   01/02/2025 No Growth at 5 days  Final     Blood Culture, Routine   Date Value Ref Range Status   02/19/2023 No growth after 5 days.  Final     Stool Culture   Date Value Ref Range Status   10/20/2023   Final    Negative for Salmonella, Shigella, Campylobacter, Vibrio, Aeromonas, Pleisiomonas,Yersinia, or Shiga Toxin 1 and 2.     Clostridium Difficile Toxin A/B   Date Value Ref Range Status   03/08/2023 Negative Negative Final     No results for input(s): "PH", "PCO2", "PO2", "HCO3", "POCSATURATED", "BE" in the last 72 hours.       "

## 2025-01-11 LAB
ABS NEUT CALC (OHS): 1.86 X10(3)/MCL (ref 2.1–9.2)
ALBUMIN SERPL-MCNC: 3.2 G/DL (ref 3.5–5)
ALBUMIN/GLOB SERPL: 0.8 RATIO (ref 1.1–2)
ALP SERPL-CCNC: 111 UNIT/L (ref 40–150)
ALT SERPL-CCNC: 78 UNIT/L (ref 0–55)
ANION GAP SERPL CALC-SCNC: 8 MEQ/L
AST SERPL-CCNC: 87 UNIT/L (ref 5–34)
BILIRUB SERPL-MCNC: 0.4 MG/DL
BUN SERPL-MCNC: 12.3 MG/DL (ref 9.8–20.1)
CALCIUM SERPL-MCNC: 9.8 MG/DL (ref 8.4–10.2)
CHLORIDE SERPL-SCNC: 107 MMOL/L (ref 98–107)
CO2 SERPL-SCNC: 26 MMOL/L (ref 22–29)
CREAT SERPL-MCNC: 0.82 MG/DL (ref 0.55–1.02)
CREAT/UREA NIT SERPL: 15
EOSINOPHIL NFR BLD MANUAL: 0.4 X10(3)/MCL (ref 0–0.9)
EOSINOPHIL NFR BLD MANUAL: 6 % (ref 0–8)
ERYTHROCYTE [DISTWIDTH] IN BLOOD BY AUTOMATED COUNT: 12.6 % (ref 11.5–17)
GFR SERPLBLD CREATININE-BSD FMLA CKD-EPI: >60 ML/MIN/1.73/M2
GLOBULIN SER-MCNC: 4.2 GM/DL (ref 2.4–3.5)
GLUCOSE SERPL-MCNC: 87 MG/DL (ref 74–100)
HCT VFR BLD AUTO: 39 % (ref 37–47)
HGB BLD-MCNC: 12.8 G/DL (ref 12–16)
LYMPH ABN # BLD MANUAL: 4 %
LYMPHOCYTES NFR BLD MANUAL: 3.18 X10(3)/MCL
LYMPHOCYTES NFR BLD MANUAL: 48 % (ref 13–40)
MCH RBC QN AUTO: 32.2 PG (ref 27–31)
MCHC RBC AUTO-ENTMCNC: 32.8 G/DL (ref 33–36)
MCV RBC AUTO: 98.2 FL (ref 80–94)
MONOCYTES NFR BLD MANUAL: 0.93 X10(3)/MCL (ref 0.1–1.3)
MONOCYTES NFR BLD MANUAL: 14 % (ref 2–11)
NEUTROPHILS NFR BLD MANUAL: 28 % (ref 47–80)
NRBC BLD AUTO-RTO: 0 %
PLATELET # BLD AUTO: 256 X10(3)/MCL (ref 130–400)
PLATELET # BLD EST: ADEQUATE 10*3/UL
PLATELETS.RETICULATED NFR BLD AUTO: 3.1 % (ref 0.9–11.2)
PMV BLD AUTO: 10.2 FL (ref 7.4–10.4)
POTASSIUM SERPL-SCNC: 4.3 MMOL/L (ref 3.5–5.1)
PROT SERPL-MCNC: 7.4 GM/DL (ref 6.4–8.3)
RBC # BLD AUTO: 3.97 X10(6)/MCL (ref 4.2–5.4)
RBC MORPH BLD: NORMAL
SODIUM SERPL-SCNC: 141 MMOL/L (ref 136–145)
WBC # BLD AUTO: 6.63 X10(3)/MCL (ref 4.5–11.5)

## 2025-01-11 PROCEDURE — 94761 N-INVAS EAR/PLS OXIMETRY MLT: CPT

## 2025-01-11 PROCEDURE — 63600175 PHARM REV CODE 636 W HCPCS

## 2025-01-11 PROCEDURE — 85025 COMPLETE CBC W/AUTO DIFF WBC: CPT

## 2025-01-11 PROCEDURE — 94640 AIRWAY INHALATION TREATMENT: CPT

## 2025-01-11 PROCEDURE — 25000003 PHARM REV CODE 250

## 2025-01-11 PROCEDURE — 99900035 HC TECH TIME PER 15 MIN (STAT)

## 2025-01-11 PROCEDURE — 80053 COMPREHEN METABOLIC PANEL: CPT

## 2025-01-11 PROCEDURE — 25000242 PHARM REV CODE 250 ALT 637 W/ HCPCS

## 2025-01-11 PROCEDURE — 36415 COLL VENOUS BLD VENIPUNCTURE: CPT

## 2025-01-11 PROCEDURE — 11000001 HC ACUTE MED/SURG PRIVATE ROOM

## 2025-01-11 PROCEDURE — S4991 NICOTINE PATCH NONLEGEND: HCPCS

## 2025-01-11 RX ORDER — MORPHINE SULFATE 2 MG/ML
0.5 INJECTION, SOLUTION INTRAMUSCULAR; INTRAVENOUS ONCE
Status: COMPLETED | OUTPATIENT
Start: 2025-01-11 | End: 2025-01-11

## 2025-01-11 RX ADMIN — NICOTINE 1 PATCH: 21 PATCH, EXTENDED RELEASE TRANSDERMAL at 09:01

## 2025-01-11 RX ADMIN — SODIUM CHLORIDE, POTASSIUM CHLORIDE, SODIUM LACTATE AND CALCIUM CHLORIDE 500 ML: 600; 310; 30; 20 INJECTION, SOLUTION INTRAVENOUS at 01:01

## 2025-01-11 RX ADMIN — PANTOPRAZOLE SODIUM 40 MG: 40 TABLET, DELAYED RELEASE ORAL at 09:01

## 2025-01-11 RX ADMIN — MEROPENEM 2 G: 2 INJECTION, POWDER, FOR SOLUTION INTRAVENOUS at 09:01

## 2025-01-11 RX ADMIN — MAGNESIUM HYDROXIDE 2400 MG: 400 SUSPENSION ORAL at 09:01

## 2025-01-11 RX ADMIN — MEROPENEM 2 G: 2 INJECTION, POWDER, FOR SOLUTION INTRAVENOUS at 02:01

## 2025-01-11 RX ADMIN — HYDROCODONE BITARTRATE AND ACETAMINOPHEN 1 TABLET: 7.5; 325 TABLET ORAL at 09:01

## 2025-01-11 RX ADMIN — MONTELUKAST SODIUM 10 MG: 5 TABLET, CHEWABLE ORAL at 09:01

## 2025-01-11 RX ADMIN — IPRATROPIUM BROMIDE AND ALBUTEROL SULFATE 3 ML: .5; 3 SOLUTION RESPIRATORY (INHALATION) at 07:01

## 2025-01-11 RX ADMIN — HYDROCODONE BITARTRATE AND ACETAMINOPHEN 1 TABLET: 7.5; 325 TABLET ORAL at 06:01

## 2025-01-11 RX ADMIN — MELATONIN TAB 3 MG 9 MG: 3 TAB at 10:01

## 2025-01-11 RX ADMIN — MORPHINE SULFATE 0.5 MG: 2 INJECTION, SOLUTION INTRAMUSCULAR; INTRAVENOUS at 10:01

## 2025-01-11 RX ADMIN — FLUTICASONE FUROATE AND VILANTEROL TRIFENATATE 1 PUFF: 200; 25 POWDER RESPIRATORY (INHALATION) at 07:01

## 2025-01-11 RX ADMIN — HYDROCODONE BITARTRATE AND ACETAMINOPHEN 1 TABLET: 7.5; 325 TABLET ORAL at 01:01

## 2025-01-11 RX ADMIN — ENOXAPARIN SODIUM 40 MG: 40 INJECTION SUBCUTANEOUS at 04:01

## 2025-01-11 RX ADMIN — METHIMAZOLE 2.5 MG: 5 TABLET ORAL at 09:01

## 2025-01-11 RX ADMIN — MEROPENEM 2 G: 2 INJECTION, POWDER, FOR SOLUTION INTRAVENOUS at 06:01

## 2025-01-11 NOTE — PROGRESS NOTES
[facility] INTERNAL MEDICINE  INPATIENT PROGRESS NOTE    Resident Team: [facility] Medicine List 3  Attending Physician: Linda Lopez MD    SUBJECTIVE:      HPI: Niya Moeller is a 54 y.o. female with PMH of chronic sigmoid diverticulitis with a history of perforation and abscess, hyperthyroid, asthma  who presented to [facility] on 01/02/2025 for left lower quadrant pain.  Patient lives alone, currently undergoing outpatient IV antibiotic treatment for persistent left lower quadrant diverticulitis, long history of same, indwelling PICC line in the right upper arm on outpatient self administered IV antibiotics.  Patient is having difficulty as antibiotic infusion takes 2 hours and is to be given every 8 hours, she does not have any home health nurses coming to her house.  She had missed approximately 4 doses since discharge from the facility on December 26.  Patient did take this morning dose having completed it at about 930 she would prefer to be put in a short-term care facility (LTAC/nursing home) to facilitate antibiotic administration.  Patient reports left lower quadrant pain since 5 days and some reported chills and sweats, stool seemed to be small in caliber and latisha-colored.  Patient also reports having nausea and dysuria but no vomiting or hematuria.  Patient also reports worsening of her SOB, tried DuoNebs, breztri and rescue inhalers with minimal improvement.  Likely due to stress and anxiety.  Denies diarrhea, melena, hematochezia, headache, chest pain, vomiting.  Previously seen by General surgery at [facility]. Patient not amicable to surgery at [facility]. Patient have an tele appointment with colorectal surgery on 1/7/25.  Patient reports smoking 6 cigarettes/day.  Denies alcohol use, IV drug abuse. In the ED: vitals notable for BP 96/62 HR 91 RR 16 afebrile SpO2 96% at room air. Labs significant for chloride 108, CRP 33 lactate 1.4 EKG NSR. ED interventions included DuoNebs, hydromorphone, Phenergan. Patient was admitted to [facility]  "[facility] for further management of diverticulitis and placement for IV antibiotics    Today: No acute events overnight. Had episode of hypotension that resolved after fluid bolus. Patient has no acute complaints at the time of examination. Labs this AM: no leukocytosis, H/H stable, CMP unremarkable. Continue IV merrem for now. Awaiting placement at LTAC. Confirmed with ID that will get merrem until day before surgery in [place] and go there after.     ROS:   (-) Chest pain, SOB, palpitations, fever, night sweat, chills, N/V, dizziness     OBJECTIVE:     Vital signs:   BP (!) 98/52   Pulse 75   Temp 97.6 °F (36.4 °C) (Oral)   Resp 18   Ht 4' 11" (1.499 m)   Wt 111.5 kg (245 lb 12.8 oz)   SpO2 100%   Breastfeeding No   BMI 49.65 kg/m²      Physical Examination:  General: Well nourished w/o distress  HEENT: NC/AT; PERRL; nasal and oral mucosa moist and clear  Neck: Full ROM; no lymphadenopathy  Pulm: CTA bilaterally, normal work of breathing  CV: S1, S2 w/o murmurs or gallops; no edema noted  GI: Soft with normal bowel sounds in all quadrants, no masses on palpation  MSK: Full ROM of all extremities   Derm: No rashes, abnormal bruising, or skin lesions  Neuro: AAOx3; motor/sensory function intact  Psych: Cooperative; appropriate mood and affect    Laboratory:  Lab results within past 24 hours reviewed     Imaging:  None    Current meds:    enoxparin  40 mg Subcutaneous Daily    fluticasone furoate-vilanteroL  1 puff Inhalation Daily    meropenem IV (PEDS and ADULTS)  2 g Intravenous Q8H    methIMAzole  2.5 mg Oral Daily    montelukast  10 mg Oral Daily    nicotine  1 patch Transdermal Daily    pantoprazole  40 mg Oral Daily         ASSESSMENT & PLAN:     Chronic sigmoid diverticulitis  -Blood cultures negative to date  -Continue Merrem 2g Q8H (planned end date: 1/26/2025); CM is working on finding a facility for IV ABX  -surgery planned for 1/27/25  -Continue to monitor patient's blood pressure and " give  mL bolus if MAP <65 mmHg    Asthma  -Continue montelukast, DuoNebs as needed, Breo and MDI daily     Hyperthyroidism   -Continue methimazole 2.5mg daily    DVT PPx: Lovenox  GI PPx: Protonix  Diet: Regular   ABX: Merrem  O2: Room air  PCP: Oswaldo Riggs NP    Disposition (01/11/2025): Continue IV merrem for now.  is currently working to find a facility for IV ABX.     Ananya Dey MD   [facility], PGY-II

## 2025-01-12 PROCEDURE — 25000003 PHARM REV CODE 250

## 2025-01-12 PROCEDURE — 99900035 HC TECH TIME PER 15 MIN (STAT)

## 2025-01-12 PROCEDURE — 63600175 PHARM REV CODE 636 W HCPCS: Mod: JZ,TB

## 2025-01-12 PROCEDURE — 94761 N-INVAS EAR/PLS OXIMETRY MLT: CPT

## 2025-01-12 PROCEDURE — 94640 AIRWAY INHALATION TREATMENT: CPT

## 2025-01-12 PROCEDURE — S4991 NICOTINE PATCH NONLEGEND: HCPCS

## 2025-01-12 PROCEDURE — 11000001 HC ACUTE MED/SURG PRIVATE ROOM

## 2025-01-12 PROCEDURE — 63600175 PHARM REV CODE 636 W HCPCS

## 2025-01-12 PROCEDURE — 25000242 PHARM REV CODE 250 ALT 637 W/ HCPCS

## 2025-01-12 RX ORDER — HYDROXYZINE PAMOATE 25 MG/1
50 CAPSULE ORAL ONCE
Status: COMPLETED | OUTPATIENT
Start: 2025-01-12 | End: 2025-01-12

## 2025-01-12 RX ADMIN — MEROPENEM 2 G: 2 INJECTION, POWDER, FOR SOLUTION INTRAVENOUS at 02:01

## 2025-01-12 RX ADMIN — PANTOPRAZOLE SODIUM 40 MG: 40 TABLET, DELAYED RELEASE ORAL at 09:01

## 2025-01-12 RX ADMIN — MEROPENEM 2 G: 2 INJECTION, POWDER, FOR SOLUTION INTRAVENOUS at 05:01

## 2025-01-12 RX ADMIN — FLUTICASONE FUROATE AND VILANTEROL TRIFENATATE 1 PUFF: 200; 25 POWDER RESPIRATORY (INHALATION) at 07:01

## 2025-01-12 RX ADMIN — MEROPENEM 2 G: 2 INJECTION, POWDER, FOR SOLUTION INTRAVENOUS at 10:01

## 2025-01-12 RX ADMIN — IPRATROPIUM BROMIDE AND ALBUTEROL SULFATE 3 ML: .5; 3 SOLUTION RESPIRATORY (INHALATION) at 07:01

## 2025-01-12 RX ADMIN — ALTEPLASE 2 MG: 2.2 INJECTION, POWDER, LYOPHILIZED, FOR SOLUTION INTRAVENOUS at 03:01

## 2025-01-12 RX ADMIN — HYDROCODONE BITARTRATE AND ACETAMINOPHEN 1 TABLET: 7.5; 325 TABLET ORAL at 02:01

## 2025-01-12 RX ADMIN — ENOXAPARIN SODIUM 40 MG: 40 INJECTION SUBCUTANEOUS at 06:01

## 2025-01-12 RX ADMIN — MELATONIN TAB 3 MG 9 MG: 3 TAB at 10:01

## 2025-01-12 RX ADMIN — HYDROCODONE BITARTRATE AND ACETAMINOPHEN 1 TABLET: 7.5; 325 TABLET ORAL at 10:01

## 2025-01-12 RX ADMIN — NICOTINE 1 PATCH: 21 PATCH, EXTENDED RELEASE TRANSDERMAL at 09:01

## 2025-01-12 RX ADMIN — HYDROXYZINE PAMOATE 50 MG: 25 CAPSULE ORAL at 07:01

## 2025-01-12 RX ADMIN — MAGNESIUM HYDROXIDE 2400 MG: 400 SUSPENSION ORAL at 06:01

## 2025-01-12 RX ADMIN — MONTELUKAST SODIUM 10 MG: 5 TABLET, CHEWABLE ORAL at 09:01

## 2025-01-12 RX ADMIN — ONDANSETRON 4 MG: 2 INJECTION INTRAMUSCULAR; INTRAVENOUS at 04:01

## 2025-01-12 NOTE — CARE UPDATE
MD to bedside, nurse called relaying patient concern or worsening abdominal pain. VSS, afebrile. Patient with concern of perforation of diverticulitis. On physical exam, tender to palpation on left lower abdomen, non peritonitic on exam. Abdomen is non distended. Reports increased pain since yesterday with mild relief from recent Norco 7.5mg. Amenable for IV pain medication.

## 2025-01-12 NOTE — PROGRESS NOTES
[facility] INTERNAL MEDICINE  INPATIENT PROGRESS NOTE    Resident Team: [facility] Medicine List 3  Attending Physician: Linda Lopez MD    SUBJECTIVE:      HPI: Niya Moeller is a 54 y.o. female with PMH of chronic sigmoid diverticulitis with a history of perforation and abscess, hyperthyroid, asthma  who presented to [facility] on 01/02/2025 for left lower quadrant pain.  Patient lives alone, currently undergoing outpatient IV antibiotic treatment for persistent left lower quadrant diverticulitis, long history of same, indwelling PICC line in the right upper arm on outpatient self administered IV antibiotics.  Patient is having difficulty as antibiotic infusion takes 2 hours and is to be given every 8 hours, she does not have any home health nurses coming to her house.  She had missed approximately 4 doses since discharge from the facility on December 26.  Patient did take this morning dose having completed it at about 930 she would prefer to be put in a short-term care facility (LTAC/nursing home) to facilitate antibiotic administration.  Patient reports left lower quadrant pain since 5 days and some reported chills and sweats, stool seemed to be small in caliber and latisha-colored.  Patient also reports having nausea and dysuria but no vomiting or hematuria.  Patient also reports worsening of her SOB, tried DuoNebs, breztri and rescue inhalers with minimal improvement.  Likely due to stress and anxiety.  Denies diarrhea, melena, hematochezia, headache, chest pain, vomiting.  Previously seen by General surgery at [facility]. Patient not amicable to surgery at [facility]. Patient have an tele appointment with colorectal surgery on 1/7/25.  Patient reports smoking 6 cigarettes/day.  Denies alcohol use, IV drug abuse. In the ED: vitals notable for BP 96/62 HR 91 RR 16 afebrile SpO2 96% at room air. Labs significant for chloride 108, CRP 33 lactate 1.4 EKG NSR. ED interventions included DuoNebs, hydromorphone, Phenergan. Patient was admitted to [facility]  "Internal Medicine for further management of diverticulitis and placement for IV antibiotics    Today: No acute events overnight.  Patient has no acute complaints at the time of examination. Continue IV merrem for now. Awaiting placement at LTAC. Confirmed with ID that will get merrem until day before surgery in [place] and go there after.     ROS:   (-) Chest pain, SOB, palpitations, fever, night sweat, chills, N/V, dizziness     OBJECTIVE:     Vital signs:   BP 90/64   Pulse 82   Temp 97.8 °F (36.6 °C) (Oral)   Resp 18   Ht 4' 11" (1.499 m)   Wt 66.1 kg (145 lb 11.6 oz)   SpO2 (!) 94%   Breastfeeding No   BMI 29.43 kg/m²      Physical Examination:  General: Well nourished w/o distress  HEENT: NC/AT; PERRL; nasal and oral mucosa moist and clear  Neck: Full ROM; no lymphadenopathy  Pulm: CTA bilaterally, normal work of breathing  CV: S1, S2 w/o murmurs or gallops; no edema noted  GI: Soft with normal bowel sounds in all quadrants, no masses on palpation  MSK: Full ROM of all extremities   Derm: No rashes, abnormal bruising, or skin lesions  Neuro: AAOx3; motor/sensory function intact  Psych: Cooperative; appropriate mood and affect    Laboratory:  Lab results within past 24 hours reviewed     Imaging:  None    Current meds:    enoxparin  40 mg Subcutaneous Daily    fluticasone furoate-vilanteroL  1 puff Inhalation Daily    meropenem IV (PEDS and ADULTS)  2 g Intravenous Q8H    methIMAzole  2.5 mg Oral Daily    montelukast  10 mg Oral Daily    nicotine  1 patch Transdermal Daily    pantoprazole  40 mg Oral Daily         ASSESSMENT & PLAN:     Chronic sigmoid diverticulitis  -Blood cultures negative to date  -Continue Merrem 2g Q8H (planned end date: 1/26/2025); CM is working on finding a facility for IV ABX  -surgery planned for 1/27/25  -Continue to monitor patient's blood pressure and give  mL bolus if MAP <65 mmHg    Asthma  -Continue montelukast, DuoNebs as needed, Breo and MDI daily "     Hyperthyroidism   -Continue methimazole 2.5mg daily    DVT PPx: Lovenox  GI PPx: Protonix  Diet: Regular   ABX: Merrem  O2: Room air  PCP: Oswaldo Riggs NP    Disposition (01/12/2025): Continue IV merrem for now.  is currently working to find a facility for IV ABX.     Ananya Dey MD   [facility] Internal Medicine, PGY-II

## 2025-01-13 VITALS
TEMPERATURE: 98 F | HEART RATE: 106 BPM | RESPIRATION RATE: 20 BRPM | WEIGHT: 145.75 LBS | BODY MASS INDEX: 29.38 KG/M2 | HEIGHT: 59 IN | SYSTOLIC BLOOD PRESSURE: 92 MMHG | OXYGEN SATURATION: 98 % | DIASTOLIC BLOOD PRESSURE: 54 MMHG

## 2025-01-13 LAB
ALBUMIN SERPL-MCNC: 3.2 G/DL (ref 3.5–5)
ALBUMIN/GLOB SERPL: 0.7 RATIO (ref 1.1–2)
ALP SERPL-CCNC: 109 UNIT/L (ref 40–150)
ALT SERPL-CCNC: 72 UNIT/L (ref 0–55)
ANION GAP SERPL CALC-SCNC: 8 MEQ/L
AST SERPL-CCNC: 43 UNIT/L (ref 5–34)
BASOPHILS # BLD AUTO: 0.02 X10(3)/MCL
BASOPHILS NFR BLD AUTO: 0.4 %
BILIRUB SERPL-MCNC: 0.4 MG/DL
BUN SERPL-MCNC: 13.3 MG/DL (ref 9.8–20.1)
CALCIUM SERPL-MCNC: 9.9 MG/DL (ref 8.4–10.2)
CHLORIDE SERPL-SCNC: 104 MMOL/L (ref 98–107)
CO2 SERPL-SCNC: 26 MMOL/L (ref 22–29)
CREAT SERPL-MCNC: 0.76 MG/DL (ref 0.55–1.02)
CREAT/UREA NIT SERPL: 18
EOSINOPHIL # BLD AUTO: 0.38 X10(3)/MCL (ref 0–0.9)
EOSINOPHIL NFR BLD AUTO: 7.8 %
ERYTHROCYTE [DISTWIDTH] IN BLOOD BY AUTOMATED COUNT: 12.6 % (ref 11.5–17)
GFR SERPLBLD CREATININE-BSD FMLA CKD-EPI: >60 ML/MIN/1.73/M2
GLOBULIN SER-MCNC: 4.3 GM/DL (ref 2.4–3.5)
GLUCOSE SERPL-MCNC: 80 MG/DL (ref 74–100)
HCT VFR BLD AUTO: 41.4 % (ref 37–47)
HGB BLD-MCNC: 13.5 G/DL (ref 12–16)
HOLD SPECIMEN: NORMAL
IMM GRANULOCYTES # BLD AUTO: 0.01 X10(3)/MCL (ref 0–0.04)
IMM GRANULOCYTES NFR BLD AUTO: 0.2 %
LYMPHOCYTES # BLD AUTO: 2.78 X10(3)/MCL (ref 0.6–4.6)
LYMPHOCYTES NFR BLD AUTO: 57 %
MCH RBC QN AUTO: 31.6 PG (ref 27–31)
MCHC RBC AUTO-ENTMCNC: 32.6 G/DL (ref 33–36)
MCV RBC AUTO: 97 FL (ref 80–94)
MONOCYTES # BLD AUTO: 0.61 X10(3)/MCL (ref 0.1–1.3)
MONOCYTES NFR BLD AUTO: 12.5 %
NEUTROPHILS # BLD AUTO: 1.08 X10(3)/MCL (ref 2.1–9.2)
NEUTROPHILS NFR BLD AUTO: 22.1 %
NRBC BLD AUTO-RTO: 0 %
PLATELET # BLD AUTO: 235 X10(3)/MCL (ref 130–400)
PMV BLD AUTO: 10.9 FL (ref 7.4–10.4)
POTASSIUM SERPL-SCNC: 4.7 MMOL/L (ref 3.5–5.1)
PROT SERPL-MCNC: 7.5 GM/DL (ref 6.4–8.3)
RBC # BLD AUTO: 4.27 X10(6)/MCL (ref 4.2–5.4)
SODIUM SERPL-SCNC: 138 MMOL/L (ref 136–145)
WBC # BLD AUTO: 4.88 X10(3)/MCL (ref 4.5–11.5)

## 2025-01-13 PROCEDURE — 94640 AIRWAY INHALATION TREATMENT: CPT

## 2025-01-13 PROCEDURE — 63600175 PHARM REV CODE 636 W HCPCS

## 2025-01-13 PROCEDURE — 80053 COMPREHEN METABOLIC PANEL: CPT

## 2025-01-13 PROCEDURE — 36415 COLL VENOUS BLD VENIPUNCTURE: CPT

## 2025-01-13 PROCEDURE — 25000242 PHARM REV CODE 250 ALT 637 W/ HCPCS

## 2025-01-13 PROCEDURE — 85025 COMPLETE CBC W/AUTO DIFF WBC: CPT

## 2025-01-13 PROCEDURE — 25000003 PHARM REV CODE 250

## 2025-01-13 PROCEDURE — 94761 N-INVAS EAR/PLS OXIMETRY MLT: CPT

## 2025-01-13 PROCEDURE — 99900035 HC TECH TIME PER 15 MIN (STAT)

## 2025-01-13 RX ORDER — BENZONATATE 100 MG/1
100 CAPSULE ORAL 3 TIMES DAILY PRN
Qty: 30 CAPSULE | Refills: 0 | Status: ON HOLD | OUTPATIENT
Start: 2025-01-13 | End: 2025-01-24 | Stop reason: HOSPADM

## 2025-01-13 RX ORDER — PANTOPRAZOLE SODIUM 40 MG/1
40 TABLET, DELAYED RELEASE ORAL DAILY
Qty: 90 TABLET | Refills: 3 | Status: SHIPPED | OUTPATIENT
Start: 2025-01-14 | End: 2026-01-14

## 2025-01-13 RX ORDER — FLUTICASONE FUROATE AND VILANTEROL 200; 25 UG/1; UG/1
1 POWDER RESPIRATORY (INHALATION) DAILY
Qty: 60 EACH | Refills: 0 | Status: SHIPPED | OUTPATIENT
Start: 2025-01-14

## 2025-01-13 RX ORDER — MONTELUKAST SODIUM 5 MG/1
10 TABLET, CHEWABLE ORAL DAILY
Qty: 60 TABLET | Refills: 0 | Status: SHIPPED | OUTPATIENT
Start: 2025-01-14 | End: 2025-02-13

## 2025-01-13 RX ORDER — HYDROCODONE BITARTRATE AND ACETAMINOPHEN 7.5; 325 MG/1; MG/1
1 TABLET ORAL EVERY 6 HOURS PRN
Qty: 15 TABLET | Refills: 0 | Status: SHIPPED | OUTPATIENT
Start: 2025-01-13

## 2025-01-13 RX ADMIN — MEROPENEM 2 G: 2 INJECTION, POWDER, FOR SOLUTION INTRAVENOUS at 02:01

## 2025-01-13 RX ADMIN — MEROPENEM 2 G: 2 INJECTION, POWDER, FOR SOLUTION INTRAVENOUS at 06:01

## 2025-01-13 RX ADMIN — HYDROCODONE BITARTRATE AND ACETAMINOPHEN 1 TABLET: 7.5; 325 TABLET ORAL at 02:01

## 2025-01-13 RX ADMIN — ENOXAPARIN SODIUM 40 MG: 40 INJECTION SUBCUTANEOUS at 05:01

## 2025-01-13 RX ADMIN — IPRATROPIUM BROMIDE AND ALBUTEROL SULFATE 3 ML: .5; 3 SOLUTION RESPIRATORY (INHALATION) at 01:01

## 2025-01-13 RX ADMIN — IPRATROPIUM BROMIDE AND ALBUTEROL SULFATE 3 ML: .5; 3 SOLUTION RESPIRATORY (INHALATION) at 07:01

## 2025-01-13 RX ADMIN — PANTOPRAZOLE SODIUM 40 MG: 40 TABLET, DELAYED RELEASE ORAL at 08:01

## 2025-01-13 RX ADMIN — FLUTICASONE FUROATE AND VILANTEROL TRIFENATATE 1 PUFF: 200; 25 POWDER RESPIRATORY (INHALATION) at 07:01

## 2025-01-13 RX ADMIN — MONTELUKAST SODIUM 10 MG: 5 TABLET, CHEWABLE ORAL at 09:01

## 2025-01-13 NOTE — DISCHARGE SUMMARY
[facility] Internal Medicine Discharge Summary    Admitting Physician: Lester Cagle MD  Attending Physician: Noel Spence MD  Date of Admit: 1/2/2025  Date of Discharge: 1/13/2025    Condition: Stable  Outcome: Patient tolerated treatment/procedure well without complication and is now ready for discharge.  DISPOSITION:  LTAC          Discharge Diagnoses     Patient Active Problem List   Diagnosis    Obesity    Asthma exacerbation    GERD (gastroesophageal reflux disease)    Diverticulitis    Moderate persistent asthma with (acute) exacerbation    Hyperthyroidism    Thyroiditis    Diastolic dysfunction, left ventricle    Tachycardia    Other chest pain    Hypoglycemia    Thrombocytopathia    Diverticulitis of intestine with abscess    Asthma    Diverticulitis of large intestine with perforation    Allergic conjunctivitis and rhinitis    Macrocytic anemia    Superficial vein thrombosis    Tobacco dependence    History of ESBL E. coli infection       Principal Problem:  Diverticulitis    Consultants and Procedures     Consultants:  IP CONSULT TO INTERNAL MEDICINE  IP CONSULT TO SOCIAL WORK/CASE MANAGEMENT  IP CONSULT TO SOCIAL WORK/CASE MANAGEMENT    Procedures:   * No surgery found *     Brief Admission History      Niya Moeller is a 54 y.o. female with PMH of chronic sigmoid diverticulitis with a history of perforation and abscess, hyperthyroid, asthma  who presented to [facility] on 01/02/2025 for left lower quadrant pain.  Patient lives alone, currently undergoing outpatient IV antibiotic treatment for persistent left lower quadrant diverticulitis, long history of same, indwelling PICC line in the right upper arm on outpatient self administered IV antibiotics.  Patient is having difficulty as antibiotic infusion takes 2 hours and is to be given every 8 hours, she does not have any home health nurses coming to her house.  She had missed approximately 4 doses since discharge from the facility on  December 26.  Patient did take this morning dose having completed it at about 930 she would prefer to be put in a short-term care facility (LTAC/nursing home) to facilitate antibiotic administration.  Patient reports left lower quadrant pain since 5 days and some reported chills and sweats, stool seemed to be small in caliber and latisha-colored.  Patient also reports having nausea and dysuria but no vomiting or hematuria.  Patient also reports worsening of her SOB, tried DuoNebs, breztri and rescue inhalers with minimal improvement.  Likely due to stress and anxiety.  Denies diarrhea, melena, hematochezia, headache, chest pain, vomiting.  Previously seen by General surgery at [facility]. Patient not amicable to surgery at [facility]. Patient have an tele appointment with colorectal surgery on 1/7/25.  Patient reports smoking 6 cigarettes/day.  Denies alcohol use, IV drug abuse. In the ED: vitals notable for BP 96/62 HR 91 RR 16 afebrile SpO2 96% at room air. Labs significant for chloride 108, CRP 33 lactate 1.4 EKG NSR. ED interventions included DuoNebs, hydromorphone, Phenergan. Patient was admitted to [facility] Internal [facility] for further management of diverticulitis and placement for IV antibiotics     Hospital Course with Pertinent Findings     Patient was continued on her Merrem q8 while inpatient. Would have small flairs with certain dietary choices but overall pain was well controlled. Patient was set up with colorectal surgeon in [place] to have surgical resection of colon with diverticulitis on 1/27/25. After further discussion with ID and surgeon, patient will remain on Merrem until day before surgery in order to have least chance of active flair and delaying the surgery until 1/26/25. Patient approved for LTAC and will finish out treatment there and go to [place]. Stable for discharge to LTAC.    Discharge physical exam:  Vitals  BP: (!) 92/54  Temp: 98 °F (36.7 °C)  Temp Source: Oral  Pulse: 68  Resp: 18  SpO2: 95  "%  Height: 4' 11" (149.9 cm)  Weight: 66.1 kg (145 lb 11.6 oz)    General:  Well developed, well nourished, no acute respiratory distress  Head: Normocephalic, atraumatic  Eyes: PERRL, EOMI, anicteric sclera  Throat: No posterior pharyngeal erythema or exudate, no tonsillar exudate  Neck: supple, normal ROM, no JVD  CVS:  RRR, S1 and S2 normal, no murmurs, no added heart sounds, rubs, gallops, regular peripheral pulses, and no peripheral edema  Resp:  Lungs clear to auscultation bilaterally, no wheezes, rales, or rhonci  GI:  Abdomen soft, minimal tenderness LLQ, non-distended, normoactive bowel sounds  MSK:  Full range of motion, no obvious deformities   Skin:  No rashes, ulcers, erythema  Neuro:  Alert and oriented x3, No focal neuro deficits, CNII-XII grossly intact  Psych:  Appropriate mood and affect     TIME SPENT ON DISCHARGE: 60 minutes    Discharge Medications        Medication List        START taking these medications      0.9% NaCl PgBk 100 mL with meropenem 2 gram SolR 2 g  Inject 2 g into the vein every 8 (eight) hours. for 13 days     benzonatate 100 MG capsule  Commonly known as: TESSALON  Take 1 capsule (100 mg total) by mouth 3 (three) times daily as needed for Cough.     fluticasone furoate-vilanteroL 200-25 mcg/dose Dsdv diskus inhaler  Commonly known as: BREO  Inhale 1 puff into the lungs once daily. Controller  Start taking on: January 14, 2025     HYDROcodone-acetaminophen 7.5-325 mg per tablet  Commonly known as: NORCO  Take 1 tablet by mouth every 6 (six) hours as needed for Pain.     montelukast 5 MG chewable tablet  Commonly known as: SINGULAIR  Take 2 tablets (10 mg total) by mouth once daily.  Start taking on: January 14, 2025  Replaces: montelukast 10 mg tablet     pantoprazole 40 MG tablet  Commonly known as: PROTONIX  Take 1 tablet (40 mg total) by mouth once daily.  Start taking on: January 14, 2025  Replaces: omeprazole 20 MG capsule            STOP taking these medications  "     montelukast 10 mg tablet  Commonly known as: SINGULAIR  Replaced by: montelukast 5 MG chewable tablet     omeprazole 20 MG capsule  Commonly known as: PRILOSEC  Replaced by: pantoprazole 40 MG tablet               Where to Get Your Medications        These medications were sent to [id number] - [place], [place] - [address] [place] [address] AT [facility]  [address] [place], [place] [address] [id number]      Hours: 24-hours Phone: [phone]   benzonatate 100 MG capsule  fluticasone furoate-vilanteroL 200-25 mcg/dose Dsdv diskus inhaler  HYDROcodone-acetaminophen 7.5-325 mg per tablet  montelukast 5 MG chewable tablet  pantoprazole 40 MG tablet       You can get these medications from any pharmacy    Bring a paper prescription for each of these medications  0.9% NaCl PgBk 100 mL with meropenem 2 gram SolR 2 g         Discharge Information:   Niya Moeller is being discharged .    No discharge procedures on file.       The above information was discussed with the patient in clear terms. Patient was able to repeat the instructions to me in their own words. All questions answered. ED precautions provided.    Ananya Dey MD  Internal Medicine Resident PGY-II

## 2025-01-13 NOTE — CARE UPDATE
Nurse called to convey that patient has anxiety and stated will like something to calm her down. Vistaril 50 mg one-time given.       Denver Ramirez MD, MBS  [facility] Resident, [facility]

## 2025-01-14 PROBLEM — K57.32 DIVERTICULITIS OF LARGE INTESTINE: Status: ACTIVE | Noted: 2023-05-01

## 2025-01-23 NOTE — H&P (VIEW-ONLY)
[facility] Followup Visit    Referring Md:   No referring provider defined for this encounter.    SUBJECTIVE:       History of Present Illness:    Course is as follows:  Patient is a 54 y.o. female presents with diverticulitis.  She has a 5-6 year history of severe diverticular disease resulting in intermittent hospitalizations 2-3 times per year.  Over the past year, she has had 4 episodes of micro perforation.  She has longstanding constipation and has bowel movements every other day.  No issues with fecal incontinence.  Her weight has been stable fluctuating from 130-1.  Prior heart attack or stroke.  No prior abdominal surgeries.  Smokes half a pack per day.  Does have asthma that is well-controlled.  She is functional and able to perform all of her activities of daily living.    1/24/25:  Remains in rehab.  Ambulatory.  Albumin is maintaining.  Ongoing left lower quadrant pain.  On meropenem.    Last Colonoscopy: 4/8/2024  Impression:            - Diverticulosis in the sigmoid colon and from 15                          to 25 cm proximal to the anus. There was narrowing                          of the colon with significant edema in association                          with the diverticular opening. Erythema was seen                          in association with the diverticular opening.                          Tattooed.                          - Diverticulosis in the descending colon and in                          the ascending colon.                          - Internal hemorrhoids.                          - No specimens collected.    - areas proximal and distal to diverticular disease were tattooed.      Review of Systems:  Review of Systems   Constitutional:  Negative for chills, diaphoresis, fever, malaise/fatigue and weight loss.   HENT:  Negative for congestion.    Respiratory:  Negative for shortness of breath.    Cardiovascular:  Negative for chest pain and leg swelling.   Gastrointestinal:  Positive for  abdominal pain. Negative for blood in stool, constipation, nausea and vomiting.   Genitourinary:  Negative for dysuria.   Musculoskeletal:  Negative for back pain and myalgias.   Skin:  Negative for rash.   Neurological:  Negative for dizziness and weakness.   Endo/Heme/Allergies:  Does not bruise/bleed easily.   Psychiatric/Behavioral:  Negative for depression.        OBJECTIVE:   Telemedicine visit.    Vital Signs (Most Recent)  There were no vitals taken for this visit.      Physical Exam:  General: Black or  female in no distress   Neuro: alert and oriented x 4.  Moves all extremities.     HEENT: no icterus.  Trachea midline  Respiratory: respirations are even and unlabored    Labs:  H&H 13 and 39.  Albumin 3.3.  Normal renal function.    Imaging:   CT abdomen pelvis 12/21/2024: Personally reviewed:  - Numerous diverticula are seen in the hepatic flexure through to the descending. There is stable focal wall thickening in the distal sigmoid colon along with unchanged mild pericolic fat stranding   CT abdomen pelvis 10/12/2023: Distal descending and sigmoid colon irregular mural thickening narrowing the lumen with about similar appearance.       ASSESSMENT/PLAN:     Diagnoses and all orders for this visit:    Diverticulitis          54 y.o. woman with longstanding diverticular disease now refractory to long-term antibiotic therapy.  She is now on IV antibiotics with some resolution.  Weight has been stable.      She is currently residing at rehab.  Will plan for bowel prep the night before.  Orders placed into the system.  She will be a 2nd case on Monday.  She should likely arrive around 10 30 year 11 for her procedure.  We discussed postoperative stay of 4-5 days.  During that time, we will assess her needs and see if she is able to be discharged safely home.    Resection is indicated as she has had numerous perforations impacting her quality of life and ability to function.  With her  longstanding disease, I would anticipate a significant amount of inflammation in the pelvis and will therefore request bilateral ureteral catheters.  Will plan for robotic sigmoid resection and partial resection of the left colon depending on the intraoperative findings.  Tattoo previously placed by her GI.      Will plan for consents the morning of.    ARA Clay MD, FACS, FASCRS  Staff Surgeon  Colon & Rectal Surgery

## 2025-01-24 ENCOUNTER — TELEPHONE (OUTPATIENT)
Dept: SURGERY | Facility: CLINIC | Age: 55
End: 2025-01-24

## 2025-01-24 ENCOUNTER — ANESTHESIA EVENT (OUTPATIENT)
Dept: SURGERY | Facility: HOSPITAL | Age: 55
DRG: 330 | End: 2025-01-24
Payer: MEDICAID

## 2025-01-24 ENCOUNTER — OFFICE VISIT (OUTPATIENT)
Dept: SURGERY | Facility: CLINIC | Age: 55
End: 2025-01-24
Payer: OTHER GOVERNMENT

## 2025-01-24 DIAGNOSIS — K57.92 DIVERTICULITIS: Primary | ICD-10-CM

## 2025-01-24 NOTE — ANESTHESIA PREPROCEDURE EVALUATION
[facility]  Anesthesia Pre-Operative Evaluation         Patient Name: Niya Moeller  YOB: 1970  MRN: [id number]    SUBJECTIVE:     Pre-operative evaluation for Procedure(s) (LRB):  XI ROBOTIC COLECTOMY, SIGMOID, ERAS low (N/A)  SIGMOIDOSCOPY, FLEXIBLE (N/A)  CATHETERIZATION, URETER (Bilateral)     01/24/2025    Niya Moeller is a 54 y.o. female w/ a significant PMHx of moderate persistent Asthma, Diverticulitis (on outpatient IV abx), Diverticulosis, GERD (gastroesophageal reflux disease), obesity, tobacco dependence, and Graves disease who initially presented to hospital with abdominal pain.    Patient now presents for the above procedure(s).    Echo Summary  Results for orders placed during the hospital encounter of 09/14/23    Echo    Interpretation Summary    Left Ventricle: There is normal systolic function with a visually estimated ejection fraction of 55 - 60%.    Aortic Valve: The aortic valve is a trileaflet valve. There is normal leaflet mobility.    Tricuspid Valve: There is trace regurgitation.    Pulmonary Artery: The estimated pulmonary artery systolic pressure is 21 mmHg.    IVC/SVC: Normal venous pressure at 3 mmHg.       Prev airway: None documented.    LDA:   PICC Double Lumen 12/26/24 1426 right basilic (Active)   Line Necessity Review Longterm central access required 01/23/25 0800   Site Assessment No drainage;No warmth;No swelling;No redness 01/23/25 0800   Extremity Assessment Distal to IV No abnormal discoloration;No redness;No swelling;No warmth 01/23/25 0800   Line Securement Device Secured with sutureless device 01/23/25 1901   Dressing Type Central line dressing 01/23/25 1901   Dressing Status Clean;Dry;Intact 01/23/25 1901   Dressing Intervention Integrity maintained 01/23/25 1901   Date on Dressing 01/22/25 01/22/25 2000   Dressing Due to be Changed 01/26/25 01/23/25 0800   Distal Patency/Care flushed w/o difficulty 01/23/25 1901   Proximal 1  Patency/Care flushed w/o difficulty 01/23/25 1901   Current Exposed Catheter (cm) 0 cm 01/21/25 1901   Waveform Not being transduced 01/13/25 1442   Number of days: 28       Drips: None documented.      Patient Active Problem List   Diagnosis    Obesity    Asthma exacerbation    GERD (gastroesophageal reflux disease)    Diverticulitis    Moderate persistent asthma with (acute) exacerbation    Hyperthyroidism    Thyroiditis    Diastolic dysfunction, left ventricle    Tachycardia    Other chest pain    Hypoglycemia    Thrombocytopathia    Diverticulitis of intestine with abscess    Asthma    Diverticulitis of large intestine    Allergic conjunctivitis and rhinitis    Macrocytic anemia    Superficial vein thrombosis    Tobacco dependence    History of ESBL E. coli infection       Review of patient's allergies indicates:   Allergen Reactions    Latex Anaphylaxis and Edema    Zosyn [piperacillin-tazobactam] Anaphylaxis       Current Inpatient Medications:      No current facility-administered medications on file prior to encounter.     Current Outpatient Medications on File Prior to Encounter   Medication Sig Dispense Refill    0.9% NaCl PgBk 100 mL with meropenem 2 gram SolR 2 g Inject 2 g into the vein every 8 (eight) hours. for 13 days 78 g 0    fluticasone furoate-vilanteroL (BREO) 200-25 mcg/dose DsDv diskus inhaler Inhale 1 puff into the lungs once daily. Controller 60 each 0    HYDROcodone-acetaminophen (NORCO) 7.5-325 mg per tablet Take 1 tablet by mouth every 6 (six) hours as needed for Pain. 15 tablet 0    montelukast (SINGULAIR) 5 MG chewable tablet Take 2 tablets (10 mg total) by mouth once daily. 60 tablet 0    pantoprazole (PROTONIX) 40 MG tablet Take 1 tablet (40 mg total) by mouth once daily. 90 tablet 3       Past Surgical History:   Procedure Laterality Date    COLONOSCOPY, WITH DIRECTED SUBMUCOSAL INJECTION N/A 4/8/2024    Procedure: COLONOSCOPY, WITH DIRECTED SUBMUCOSAL INJECTION;  Surgeon: Casi  Heather RAMÍREZ MD;  Location: [facility] ENDOSCOPY;  Service: Gastroenterology;  Laterality: N/A;       Social History:  Tobacco Use: High Risk (1/13/2025)    Patient History     Smoking Tobacco Use: Every Day     Smokeless Tobacco Use: Current     Passive Exposure: Not on file      Alcohol Use: Not At Risk (1/14/2025)    AUDIT-C     Frequency of Alcohol Consumption: Never     Average Number of Drinks: Patient does not drink     Frequency of Binge Drinking: Never        OBJECTIVE:     Vital Signs Range (Last 24H):  Temp:  [36.7 °C (98 °F)-36.8 °C (98.2 °F)]   Pulse:  []   Resp:  [16-20]   BP: ()/(48-74)   SpO2:  [90 %-98 %]       Significant Labs:  Lab Results   Component Value Date    WBC 4.40 (L) 01/20/2025    HGB 12.0 01/20/2025    HCT 35.8 (L) 01/20/2025     01/20/2025    CHOL 147 03/31/2022    TRIG 61 03/31/2022    HDL 51 03/31/2022    ALT 60 (H) 01/23/2025    AST 27 01/23/2025     01/23/2025    K 4.0 01/23/2025     01/23/2025    CREATININE 1.12 (H) 01/23/2025    BUN 14.0 01/23/2025    CO2 28 01/23/2025    TSH 0.531 01/15/2025    INR 0.9 (L) 01/23/2025    HGBA1C 5.9 (H) 11/23/2022       Diagnostic Studies: No relevant studies.    EKG:   Results for orders placed or performed during the hospital encounter of 01/02/25   EKG 12-lead (Shortness of Breath) Age > 50    Collection Time: 01/02/25 10:43 AM   Result Value Ref Range    QRS Duration 66 ms    [facility] QTC Calculation 440 ms    Narrative    Test Reason : R06.02,    Vent. Rate :  94 BPM     Atrial Rate :  94 BPM     P-R Int : 134 ms          QRS Dur :  66 ms      QT Int : 352 ms       P-R-T Axes :  86  90  80 degrees    QTcB Int : 440 ms    Normal sinus rhythm  Rightward axis  Nonspecific ST abnormality  Abnormal ECG    Confirmed by Sujit Mackay ([id number]) on 1/2/2025 4:32:08 PM    Referred By: AAAREFERRAL SELF           Confirmed By: Sujit Mackay       2D ECHO:  TTE:  Results for orders placed or performed during the hospital encounter of 09/14/23    Echo   Result Value Ref Range    BSA 1.72 m2    LVOT stroke volume 49.55 cm3    LVIDd 4.81 3.5 - 6.0 cm    LV Systolic Volume 43.23 mL    LV Systolic Volume Index 26.0 mL/m2    LVIDs 3.27 2.1 - 4.0 cm    LV Diastolic Volume 108.28 mL    LV Diastolic Volume Index 65.23 mL/m2    IVS 0.79 0.6 - 1.1 cm    LVOT diameter 1.95 cm    LVOT area 3.0 cm2    FS 32 28 - 44 %    Left Ventricle Relative Wall Thickness 0.29 cm    PW 0.70 0.6 - 1.1 cm    LV mass 116.05 g    LV Mass Index 70 g/m2    MV Peak E Sudhakar 0.72 m/s    TDI LATERAL 0.09 m/s    TDI SEPTAL 0.10 m/s    E/E' ratio 7.58 m/s    MV Peak A Sudhakar 0.62 m/s    TR Max Sudhakar 2.13 m/s    E/A ratio 1.16     E wave deceleration time 169.38 msec    LV SEPTAL E/E' RATIO 7.20 m/s    LV LATERAL E/E' RATIO 8.00 m/s    LVOT peak sudhakar 0.88 m/s    Left Ventricular Outflow Tract Mean Velocity 0.52 cm/s    Left Ventricular Outflow Tract Mean Gradient 1.34 mmHg    LA size 3.55 cm    RVDD 2.97 cm    AV mean gradient 3 mmHg    AV peak gradient 4 mmHg    Ao peak sudhakar 1.05 m/s    Ao VTI 21.40 cm    LVOT peak VTI 16.60 cm    AV valve area 2.32 cm²    AV Velocity Ratio 0.84     AV index (prosthetic) 0.78     PUMA by Velocity Ratio 2.50 cm²    MV mean gradient 1 mmHg    MV peak gradient 2 mmHg    MV stenosis pressure 1/2 time 49.12 ms    MV valve area p 1/2 method 4.48 cm2    MV valve area by continuity eq 2.41 cm2    MV VTI 20.6 cm    Triscuspid Valve Regurgitation Peak Gradient 18 mmHg    Mean e' 0.10 m/s    ZLVIDS 1.00     ZLVIDD 0.34     TV resting pulmonary artery pressure 21 mmHg    RV TB RVSP 5 mmHg    Est. RA pres 3 mmHg    Narrative      Left Ventricle: There is normal systolic function with a visually   estimated ejection fraction of 55 - 60%.    Aortic Valve: The aortic valve is a trileaflet valve. There is normal   leaflet mobility.    Tricuspid Valve: There is trace regurgitation.    Pulmonary Artery: The estimated pulmonary artery systolic pressure is   21 mmHg.    IVC/SVC: Normal  venous pressure at 3 mmHg.         BHARGAVI:  No results found for this or any previous visit.    ASSESSMENT/PLAN:       Pre-op Assessment    I have reviewed the Patient Summary Reports.     I have reviewed the Nursing Notes.    I have reviewed the Medications.     Review of Systems  Anesthesia Hx:  No problems with previous Anesthesia   Neg history of prior surgery.            Denies Personal Hx of Anesthesia complications.                    Social:  Smoker, No Alcohol Use       Hematology/Oncology:       -- Anemia:                    --  Denies Cancer in past history:                     Cardiovascular:     Denies Pacemaker. Hypertension   Denies MI.     Denies CABG/stent.    Denies Angina.  Denies CHF.                                   Pulmonary:    Asthma moderate                   Renal/:  Renal/ Normal  Denies Chronic Renal Disease.                Hepatic/GI:     GERD, well controlled                Neurological:  Neurology Normal   Denies CVA.    Denies Seizures.                                Endocrine:  Denies Diabetes.  Hyperthyroidism       Obesity / BMI > 30      Physical Exam  General: Alert, Cooperative, Well nourished and Oriented    Airway:  Mallampati: II   Mouth Opening: Normal  TM Distance: Normal  Tongue: Normal  Neck ROM: Normal ROM    Dental:  Intact        Anesthesia Plan  Type of Anesthesia, risks & benefits discussed:    Anesthesia Type: Gen ETT  Intra-op Monitoring Plan: Standard ASA Monitors  Post Op Pain Control Plan: multimodal analgesia and IV/PO Opioids PRN  Induction:  IV  Airway Plan: Video and Direct, Post-Induction  Informed Consent: Informed consent signed with the Patient and all parties understand the risks and agree with anesthesia plan.  All questions answered.   ASA Score: 3  Day of Surgery Review of History & Physical: H&P Update referred to the surgeon/provider.    Ready For Surgery From Anesthesia Perspective.     .

## 2025-01-24 NOTE — TELEPHONE ENCOUNTER
Spoke with Shanika at [facility] and confirmed surgery arrival time of 11:30 am for Monday. Pre-op Day of Surgery  information emailed to [email or web address]. Reviewed PEG prep instructions with nurse over phone. Shanika verbalizes understanding.

## 2025-01-24 NOTE — TELEPHONE ENCOUNTER
----- Message from Sergey sent at 1/24/2025 10:39 AM CST -----  Regarding: Call needed  Hi,    Who called: Nurse Shanika from [facility]      Reason:Called to request a call form the nurse to discuss the pt's surgery. [facility] call [phone]      Provider's name:      Additional Information: Thank you

## 2025-01-27 ENCOUNTER — ANESTHESIA (OUTPATIENT)
Dept: SURGERY | Facility: HOSPITAL | Age: 55
DRG: 330 | End: 2025-01-27
Payer: MEDICAID

## 2025-01-27 ENCOUNTER — HOSPITAL ENCOUNTER (INPATIENT)
Facility: HOSPITAL | Age: 55
LOS: 3 days | Discharge: HOME HEALTH | DRG: 330 | End: 2025-01-30
Attending: COLON & RECTAL SURGERY | Admitting: COLON & RECTAL SURGERY
Payer: MEDICAID

## 2025-01-27 DIAGNOSIS — E66.9 OBESITY, UNSPECIFIED CLASS, UNSPECIFIED OBESITY TYPE, UNSPECIFIED WHETHER SERIOUS COMORBIDITY PRESENT: Primary | ICD-10-CM

## 2025-01-27 DIAGNOSIS — J45.51 SEVERE PERSISTENT ASTHMA WITH EXACERBATION: ICD-10-CM

## 2025-01-27 DIAGNOSIS — K57.92 DIVERTICULITIS: ICD-10-CM

## 2025-01-27 LAB
ABO + RH BLD: NORMAL
BLD GP AB SCN CELLS X3 SERPL QL: NORMAL
SPECIMEN OUTDATE: NORMAL

## 2025-01-27 PROCEDURE — 27000221 HC OXYGEN, UP TO 24 HOURS

## 2025-01-27 PROCEDURE — 88307 TISSUE EXAM BY PATHOLOGIST: CPT | Mod: 26,,, | Performed by: PATHOLOGY

## 2025-01-27 PROCEDURE — 63600175 PHARM REV CODE 636 W HCPCS

## 2025-01-27 PROCEDURE — 99900035 HC TECH TIME PER 15 MIN (STAT)

## 2025-01-27 PROCEDURE — 86901 BLOOD TYPING SEROLOGIC RH(D): CPT | Performed by: NURSE PRACTITIONER

## 2025-01-27 PROCEDURE — C1758 CATHETER, URETERAL: HCPCS | Performed by: COLON & RECTAL SURGERY

## 2025-01-27 PROCEDURE — 71000015 HC POSTOP RECOV 1ST HR: Performed by: COLON & RECTAL SURGERY

## 2025-01-27 PROCEDURE — 63600175 PHARM REV CODE 636 W HCPCS: Performed by: STUDENT IN AN ORGANIZED HEALTH CARE EDUCATION/TRAINING PROGRAM

## 2025-01-27 PROCEDURE — 0DJD8ZZ INSPECTION OF LOWER INTESTINAL TRACT, VIA NATURAL OR ARTIFICIAL OPENING ENDOSCOPIC: ICD-10-PCS | Performed by: COLON & RECTAL SURGERY

## 2025-01-27 PROCEDURE — 63600175 PHARM REV CODE 636 W HCPCS: Mod: JZ,TB | Performed by: ANESTHESIOLOGY

## 2025-01-27 PROCEDURE — D9220A PRA ANESTHESIA: Mod: ,,, | Performed by: ANESTHESIOLOGY

## 2025-01-27 PROCEDURE — 0DTN4ZZ RESECTION OF SIGMOID COLON, PERCUTANEOUS ENDOSCOPIC APPROACH: ICD-10-PCS | Performed by: COLON & RECTAL SURGERY

## 2025-01-27 PROCEDURE — 20600001 HC STEP DOWN PRIVATE ROOM

## 2025-01-27 PROCEDURE — 25000003 PHARM REV CODE 250

## 2025-01-27 PROCEDURE — 25000003 PHARM REV CODE 250: Performed by: NURSE PRACTITIONER

## 2025-01-27 PROCEDURE — 71000033 HC RECOVERY, INTIAL HOUR: Performed by: COLON & RECTAL SURGERY

## 2025-01-27 PROCEDURE — 25000003 PHARM REV CODE 250: Performed by: STUDENT IN AN ORGANIZED HEALTH CARE EDUCATION/TRAINING PROGRAM

## 2025-01-27 PROCEDURE — 44213 LAP MOBIL SPLENIC FL ADD-ON: CPT | Mod: ,,, | Performed by: COLON & RECTAL SURGERY

## 2025-01-27 PROCEDURE — 25000242 PHARM REV CODE 250 ALT 637 W/ HCPCS: Performed by: COLON & RECTAL SURGERY

## 2025-01-27 PROCEDURE — 63600175 PHARM REV CODE 636 W HCPCS: Performed by: NURSE PRACTITIONER

## 2025-01-27 PROCEDURE — 71000016 HC POSTOP RECOV ADDL HR: Performed by: COLON & RECTAL SURGERY

## 2025-01-27 PROCEDURE — C1769 GUIDE WIRE: HCPCS | Performed by: COLON & RECTAL SURGERY

## 2025-01-27 PROCEDURE — 94761 N-INVAS EAR/PLS OXIMETRY MLT: CPT

## 2025-01-27 PROCEDURE — 27201423 OPTIME MED/SURG SUP & DEVICES STERILE SUPPLY: Performed by: COLON & RECTAL SURGERY

## 2025-01-27 PROCEDURE — 37000008 HC ANESTHESIA 1ST 15 MINUTES: Performed by: COLON & RECTAL SURGERY

## 2025-01-27 PROCEDURE — 94640 AIRWAY INHALATION TREATMENT: CPT

## 2025-01-27 PROCEDURE — 0T788DZ DILATION OF BILATERAL URETERS WITH INTRALUMINAL DEVICE, VIA NATURAL OR ARTIFICIAL OPENING ENDOSCOPIC: ICD-10-PCS | Performed by: UROLOGY

## 2025-01-27 PROCEDURE — 88305 TISSUE EXAM BY PATHOLOGIST: CPT | Mod: 26,,, | Performed by: PATHOLOGY

## 2025-01-27 PROCEDURE — 44204 LAPARO PARTIAL COLECTOMY: CPT | Mod: ,,, | Performed by: COLON & RECTAL SURGERY

## 2025-01-27 PROCEDURE — 88305 TISSUE EXAM BY PATHOLOGIST: CPT | Performed by: PATHOLOGY

## 2025-01-27 PROCEDURE — 37000009 HC ANESTHESIA EA ADD 15 MINS: Performed by: COLON & RECTAL SURGERY

## 2025-01-27 PROCEDURE — 36000710: Performed by: COLON & RECTAL SURGERY

## 2025-01-27 PROCEDURE — 36000711: Performed by: COLON & RECTAL SURGERY

## 2025-01-27 PROCEDURE — 52005 CYSTO W/URTRL CATHJ: CPT | Mod: ,,, | Performed by: UROLOGY

## 2025-01-27 PROCEDURE — 88307 TISSUE EXAM BY PATHOLOGIST: CPT | Performed by: PATHOLOGY

## 2025-01-27 RX ORDER — OXYCODONE HYDROCHLORIDE 5 MG/1
5 TABLET ORAL EVERY 4 HOURS PRN
Status: DISCONTINUED | OUTPATIENT
Start: 2025-01-27 | End: 2025-01-30 | Stop reason: HOSPADM

## 2025-01-27 RX ORDER — ONDANSETRON HYDROCHLORIDE 2 MG/ML
INJECTION, SOLUTION INTRAVENOUS
Status: DISCONTINUED | OUTPATIENT
Start: 2025-01-27 | End: 2025-01-27

## 2025-01-27 RX ORDER — MUPIROCIN 20 MG/G
1 OINTMENT TOPICAL
Status: COMPLETED | OUTPATIENT
Start: 2025-01-27 | End: 2025-01-27

## 2025-01-27 RX ORDER — ACETAMINOPHEN 650 MG/20.3ML
975 LIQUID ORAL
Status: COMPLETED | OUTPATIENT
Start: 2025-01-27 | End: 2025-01-27

## 2025-01-27 RX ORDER — ONDANSETRON HYDROCHLORIDE 2 MG/ML
4 INJECTION, SOLUTION INTRAVENOUS EVERY 8 HOURS PRN
Status: DISCONTINUED | OUTPATIENT
Start: 2025-01-27 | End: 2025-01-30 | Stop reason: HOSPADM

## 2025-01-27 RX ORDER — LIDOCAINE HYDROCHLORIDE 20 MG/ML
INJECTION, SOLUTION EPIDURAL; INFILTRATION; INTRACAUDAL; PERINEURAL
Status: DISCONTINUED | OUTPATIENT
Start: 2025-01-27 | End: 2025-01-27

## 2025-01-27 RX ORDER — DEXAMETHASONE SODIUM PHOSPHATE 4 MG/ML
INJECTION, SOLUTION INTRA-ARTICULAR; INTRALESIONAL; INTRAMUSCULAR; INTRAVENOUS; SOFT TISSUE
Status: DISCONTINUED | OUTPATIENT
Start: 2025-01-27 | End: 2025-01-27

## 2025-01-27 RX ORDER — MONTELUKAST SODIUM 5 MG/1
10 TABLET, CHEWABLE ORAL DAILY
Status: DISCONTINUED | OUTPATIENT
Start: 2025-01-28 | End: 2025-01-30 | Stop reason: HOSPADM

## 2025-01-27 RX ORDER — KETAMINE HCL IN 0.9 % NACL 50 MG/5 ML
SYRINGE (ML) INTRAVENOUS
Status: DISCONTINUED | OUTPATIENT
Start: 2025-01-27 | End: 2025-01-27

## 2025-01-27 RX ORDER — TRIPROLIDINE/PSEUDOEPHEDRINE 2.5MG-60MG
600 TABLET ORAL
Status: COMPLETED | OUTPATIENT
Start: 2025-01-27 | End: 2025-01-27

## 2025-01-27 RX ORDER — BUDESONIDE 0.5 MG/2ML
0.5 INHALANT ORAL EVERY 12 HOURS
Status: DISCONTINUED | OUTPATIENT
Start: 2025-01-27 | End: 2025-01-30 | Stop reason: HOSPADM

## 2025-01-27 RX ORDER — ACETAMINOPHEN 10 MG/ML
1000 INJECTION, SOLUTION INTRAVENOUS EVERY 8 HOURS
Status: COMPLETED | OUTPATIENT
Start: 2025-01-27 | End: 2025-01-28

## 2025-01-27 RX ORDER — FENTANYL CITRATE 50 UG/ML
INJECTION, SOLUTION INTRAMUSCULAR; INTRAVENOUS
Status: DISCONTINUED | OUTPATIENT
Start: 2025-01-27 | End: 2025-01-27

## 2025-01-27 RX ORDER — ACETAMINOPHEN 500 MG
1000 TABLET ORAL EVERY 8 HOURS
Status: DISCONTINUED | OUTPATIENT
Start: 2025-01-28 | End: 2025-01-30 | Stop reason: HOSPADM

## 2025-01-27 RX ORDER — ONDANSETRON HYDROCHLORIDE 2 MG/ML
4 INJECTION, SOLUTION INTRAVENOUS EVERY 6 HOURS PRN
Status: DISCONTINUED | OUTPATIENT
Start: 2025-01-27 | End: 2025-01-30 | Stop reason: HOSPADM

## 2025-01-27 RX ORDER — SODIUM CHLORIDE 9 MG/ML
INJECTION, SOLUTION INTRAVENOUS CONTINUOUS
Status: DISCONTINUED | OUTPATIENT
Start: 2025-01-27 | End: 2025-01-28

## 2025-01-27 RX ORDER — PROPOFOL 10 MG/ML
VIAL (ML) INTRAVENOUS
Status: DISCONTINUED | OUTPATIENT
Start: 2025-01-27 | End: 2025-01-27

## 2025-01-27 RX ORDER — GLUCAGON 1 MG
1 KIT INJECTION
Status: DISCONTINUED | OUTPATIENT
Start: 2025-01-27 | End: 2025-01-27 | Stop reason: HOSPADM

## 2025-01-27 RX ORDER — METRONIDAZOLE 500 MG/100ML
500 INJECTION, SOLUTION INTRAVENOUS
Status: COMPLETED | OUTPATIENT
Start: 2025-01-27 | End: 2025-01-27

## 2025-01-27 RX ORDER — HEPARIN SODIUM 5000 [USP'U]/ML
5000 INJECTION, SOLUTION INTRAVENOUS; SUBCUTANEOUS ONCE
Status: COMPLETED | OUTPATIENT
Start: 2025-01-27 | End: 2025-01-27

## 2025-01-27 RX ORDER — IBUPROFEN 400 MG/1
800 TABLET ORAL EVERY 8 HOURS
Status: DISCONTINUED | OUTPATIENT
Start: 2025-01-28 | End: 2025-01-30 | Stop reason: HOSPADM

## 2025-01-27 RX ORDER — SODIUM CHLORIDE 9 MG/ML
INJECTION, SOLUTION INTRAVENOUS
Status: DISCONTINUED | OUTPATIENT
Start: 2025-01-27 | End: 2025-01-27

## 2025-01-27 RX ORDER — ALVIMOPAN 12 MG/1
12 CAPSULE ORAL
Status: COMPLETED | OUTPATIENT
Start: 2025-01-27 | End: 2025-01-27

## 2025-01-27 RX ORDER — IBUPROFEN 200 MG
1 TABLET ORAL DAILY
Status: DISCONTINUED | OUTPATIENT
Start: 2025-01-28 | End: 2025-01-29

## 2025-01-27 RX ORDER — FORMOTEROL FUMARATE DIHYDRATE 20 UG/2ML
20 SOLUTION RESPIRATORY (INHALATION) 2 TIMES DAILY
Status: DISCONTINUED | OUTPATIENT
Start: 2025-01-27 | End: 2025-01-30 | Stop reason: HOSPADM

## 2025-01-27 RX ORDER — GABAPENTIN 300 MG/1
300 CAPSULE ORAL
Status: COMPLETED | OUTPATIENT
Start: 2025-01-27 | End: 2025-01-27

## 2025-01-27 RX ORDER — GABAPENTIN 300 MG/1
300 CAPSULE ORAL 3 TIMES DAILY
Status: DISCONTINUED | OUTPATIENT
Start: 2025-01-27 | End: 2025-01-30 | Stop reason: HOSPADM

## 2025-01-27 RX ORDER — HYDROMORPHONE HYDROCHLORIDE 1 MG/ML
0.2 INJECTION, SOLUTION INTRAMUSCULAR; INTRAVENOUS; SUBCUTANEOUS EVERY 5 MIN PRN
Status: DISCONTINUED | OUTPATIENT
Start: 2025-01-27 | End: 2025-01-27 | Stop reason: HOSPADM

## 2025-01-27 RX ORDER — PHENYLEPHRINE HYDROCHLORIDE 10 MG/ML
INJECTION INTRAVENOUS
Status: DISCONTINUED | OUTPATIENT
Start: 2025-01-27 | End: 2025-01-27

## 2025-01-27 RX ORDER — ROCURONIUM BROMIDE 10 MG/ML
INJECTION, SOLUTION INTRAVENOUS
Status: DISCONTINUED | OUTPATIENT
Start: 2025-01-27 | End: 2025-01-27

## 2025-01-27 RX ORDER — ALBUTEROL SULFATE 2.5 MG/.5ML
2.5 SOLUTION RESPIRATORY (INHALATION) EVERY 4 HOURS PRN
Status: DISCONTINUED | OUTPATIENT
Start: 2025-01-27 | End: 2025-01-30 | Stop reason: HOSPADM

## 2025-01-27 RX ORDER — SODIUM CHLORIDE, SODIUM LACTATE, POTASSIUM CHLORIDE, CALCIUM CHLORIDE 600; 310; 30; 20 MG/100ML; MG/100ML; MG/100ML; MG/100ML
INJECTION, SOLUTION INTRAVENOUS CONTINUOUS
Status: DISCONTINUED | OUTPATIENT
Start: 2025-01-27 | End: 2025-01-28

## 2025-01-27 RX ORDER — MIDAZOLAM HYDROCHLORIDE 1 MG/ML
INJECTION INTRAMUSCULAR; INTRAVENOUS
Status: DISCONTINUED | OUTPATIENT
Start: 2025-01-27 | End: 2025-01-27

## 2025-01-27 RX ORDER — OXYCODONE HYDROCHLORIDE 10 MG/1
10 TABLET ORAL EVERY 4 HOURS PRN
Status: DISCONTINUED | OUTPATIENT
Start: 2025-01-27 | End: 2025-01-30 | Stop reason: HOSPADM

## 2025-01-27 RX ORDER — ALBUTEROL SULFATE 2.5 MG/.5ML
2.5 SOLUTION RESPIRATORY (INHALATION) EVERY 4 HOURS
Status: DISCONTINUED | OUTPATIENT
Start: 2025-01-27 | End: 2025-01-30 | Stop reason: HOSPADM

## 2025-01-27 RX ORDER — PANTOPRAZOLE SODIUM 40 MG/1
40 TABLET, DELAYED RELEASE ORAL DAILY
Status: DISCONTINUED | OUTPATIENT
Start: 2025-01-28 | End: 2025-01-30 | Stop reason: HOSPADM

## 2025-01-27 RX ORDER — TRAMADOL HYDROCHLORIDE 50 MG/1
50 TABLET ORAL EVERY 6 HOURS PRN
Status: DISCONTINUED | OUTPATIENT
Start: 2025-01-27 | End: 2025-01-30 | Stop reason: HOSPADM

## 2025-01-27 RX ORDER — HEPARIN SODIUM 5000 [USP'U]/ML
5000 INJECTION, SOLUTION INTRAVENOUS; SUBCUTANEOUS EVERY 8 HOURS
Status: DISCONTINUED | OUTPATIENT
Start: 2025-01-27 | End: 2025-01-30 | Stop reason: HOSPADM

## 2025-01-27 RX ORDER — FLUTICASONE FUROATE AND VILANTEROL 200; 25 UG/1; UG/1
1 POWDER RESPIRATORY (INHALATION) DAILY
Status: DISCONTINUED | OUTPATIENT
Start: 2025-01-28 | End: 2025-01-30 | Stop reason: HOSPADM

## 2025-01-27 RX ORDER — ONDANSETRON HYDROCHLORIDE 2 MG/ML
4 INJECTION, SOLUTION INTRAVENOUS DAILY PRN
Status: DISCONTINUED | OUTPATIENT
Start: 2025-01-27 | End: 2025-01-27 | Stop reason: HOSPADM

## 2025-01-27 RX ADMIN — HYDROMORPHONE HYDROCHLORIDE 0.2 MG: 1 INJECTION, SOLUTION INTRAMUSCULAR; INTRAVENOUS; SUBCUTANEOUS at 06:01

## 2025-01-27 RX ADMIN — MUPIROCIN 1 G: 20 OINTMENT TOPICAL at 11:01

## 2025-01-27 RX ADMIN — ACETAMINOPHEN 1000 MG: 10 INJECTION, SOLUTION INTRAVENOUS at 10:01

## 2025-01-27 RX ADMIN — ROCURONIUM BROMIDE 50 MG: 10 INJECTION, SOLUTION INTRAVENOUS at 02:01

## 2025-01-27 RX ADMIN — GABAPENTIN 300 MG: 300 CAPSULE ORAL at 10:01

## 2025-01-27 RX ADMIN — HYDROMORPHONE HYDROCHLORIDE 0.2 MG: 1 INJECTION, SOLUTION INTRAMUSCULAR; INTRAVENOUS; SUBCUTANEOUS at 05:01

## 2025-01-27 RX ADMIN — METRONIDAZOLE 500 MG: 500 INJECTION, SOLUTION INTRAVENOUS at 02:01

## 2025-01-27 RX ADMIN — Medication 30 MG: at 02:01

## 2025-01-27 RX ADMIN — ALBUTEROL SULFATE 2.5 MG: 2.5 SOLUTION RESPIRATORY (INHALATION) at 11:01

## 2025-01-27 RX ADMIN — ALBUTEROL SULFATE 2.5 MG: 2.5 SOLUTION RESPIRATORY (INHALATION) at 08:01

## 2025-01-27 RX ADMIN — ONDANSETRON 4 MG: 2 INJECTION INTRAMUSCULAR; INTRAVENOUS at 05:01

## 2025-01-27 RX ADMIN — HYDROMORPHONE HYDROCHLORIDE 0.2 MG: 1 INJECTION, SOLUTION INTRAMUSCULAR; INTRAVENOUS; SUBCUTANEOUS at 07:01

## 2025-01-27 RX ADMIN — IBUPROFEN 800 MG: 800 INJECTION INTRAVENOUS at 10:01

## 2025-01-27 RX ADMIN — DEXAMETHASONE SODIUM PHOSPHATE 8 MG: 4 INJECTION, SOLUTION INTRAMUSCULAR; INTRAVENOUS at 02:01

## 2025-01-27 RX ADMIN — SUGAMMADEX 200 MG: 100 INJECTION, SOLUTION INTRAVENOUS at 05:01

## 2025-01-27 RX ADMIN — OXYCODONE HYDROCHLORIDE 10 MG: 10 TABLET ORAL at 10:01

## 2025-01-27 RX ADMIN — GABAPENTIN 300 MG: 300 CAPSULE ORAL at 11:01

## 2025-01-27 RX ADMIN — HEPARIN SODIUM 5000 UNITS: 5000 INJECTION INTRAVENOUS; SUBCUTANEOUS at 10:01

## 2025-01-27 RX ADMIN — HEPARIN SODIUM 5000 UNITS: 5000 INJECTION INTRAVENOUS; SUBCUTANEOUS at 11:01

## 2025-01-27 RX ADMIN — PHENYLEPHRINE HYDROCHLORIDE 200 MCG: 10 INJECTION INTRAVENOUS at 04:01

## 2025-01-27 RX ADMIN — SODIUM CHLORIDE, POTASSIUM CHLORIDE, SODIUM LACTATE AND CALCIUM CHLORIDE: 600; 310; 30; 20 INJECTION, SOLUTION INTRAVENOUS at 06:01

## 2025-01-27 RX ADMIN — ALVIMOPAN 12 MG: 12 CAPSULE ORAL at 11:01

## 2025-01-27 RX ADMIN — PROPOFOL 150 MG: 10 INJECTION, EMULSION INTRAVENOUS at 02:01

## 2025-01-27 RX ADMIN — Medication 10 MG: at 03:01

## 2025-01-27 RX ADMIN — ALBUTEROL SULFATE 2.5 MG: 2.5 SOLUTION RESPIRATORY (INHALATION) at 01:01

## 2025-01-27 RX ADMIN — SODIUM CHLORIDE, SODIUM GLUCONATE, SODIUM ACETATE, POTASSIUM CHLORIDE, MAGNESIUM CHLORIDE, SODIUM PHOSPHATE, DIBASIC, AND POTASSIUM PHOSPHATE: .53; .5; .37; .037; .03; .012; .00082 INJECTION, SOLUTION INTRAVENOUS at 04:01

## 2025-01-27 RX ADMIN — FENTANYL CITRATE 100 MCG: 50 INJECTION, SOLUTION INTRAMUSCULAR; INTRAVENOUS at 02:01

## 2025-01-27 RX ADMIN — ACETAMINOPHEN 976.6 MG: 650 SOLUTION ORAL at 11:01

## 2025-01-27 RX ADMIN — Medication 10 MG: at 04:01

## 2025-01-27 RX ADMIN — MIDAZOLAM HYDROCHLORIDE 2 MG: 2 INJECTION, SOLUTION INTRAMUSCULAR; INTRAVENOUS at 02:01

## 2025-01-27 RX ADMIN — OXYCODONE HYDROCHLORIDE 10 MG: 10 TABLET ORAL at 06:01

## 2025-01-27 RX ADMIN — PHENYLEPHRINE HYDROCHLORIDE 100 MCG: 10 INJECTION INTRAVENOUS at 03:01

## 2025-01-27 RX ADMIN — ROCURONIUM BROMIDE 20 MG: 10 INJECTION, SOLUTION INTRAVENOUS at 03:01

## 2025-01-27 RX ADMIN — GENTAMICIN SULFATE 283.5 MG: 40 INJECTION, SOLUTION INTRAMUSCULAR; INTRAVENOUS at 02:01

## 2025-01-27 RX ADMIN — IBUPROFEN 600 MG: 100 SUSPENSION ORAL at 11:01

## 2025-01-27 RX ADMIN — LIDOCAINE HYDROCHLORIDE 80 MG: 20 INJECTION, SOLUTION EPIDURAL; INFILTRATION; INTRACAUDAL; PERINEURAL at 02:01

## 2025-01-27 RX ADMIN — SODIUM CHLORIDE, SODIUM GLUCONATE, SODIUM ACETATE, POTASSIUM CHLORIDE, MAGNESIUM CHLORIDE, SODIUM PHOSPHATE, DIBASIC, AND POTASSIUM PHOSPHATE: .53; .5; .37; .037; .03; .012; .00082 INJECTION, SOLUTION INTRAVENOUS at 02:01

## 2025-01-27 RX ADMIN — ROCURONIUM BROMIDE 20 MG: 10 INJECTION, SOLUTION INTRAVENOUS at 04:01

## 2025-01-27 NOTE — TRANSFER OF CARE
"Anesthesia Transfer of Care Note    Patient: Niya Moeller    Procedure(s) Performed: Procedure(s) (LRB):  SIGMOIDOSCOPY, FLEXIBLE (N/A)  CATHETERIZATION, URETER (Bilateral)  COLECTOMY, SIGMOID, LAPAROSCOPIC  MOBILIZATION, SPLENIC FLEXURE, LAPAROSCOPIC (N/A)    Patient location: PACU    Anesthesia Type: general    Transport from OR: Transported from OR on 6-10 L/min O2 by face mask with adequate spontaneous ventilation    Post pain: adequate analgesia    Post assessment: no apparent anesthetic complications and tolerated procedure well    Post vital signs: stable    Level of consciousness: responds to stimulation    Nausea/Vomiting: no nausea/vomiting    Complications: none    Transfer of care protocol was followed      Last vitals: Visit Vitals  BP (!) 109/51 (BP Location: Right arm, Patient Position: Lying)   Pulse 67   Temp 36.4 °C (97.6 °F) (Skin)   Resp 18   Ht 4' 11" (1.499 m)   Wt 68.6 kg (151 lb 3.8 oz)   SpO2 97%   Breastfeeding No   BMI 30.55 kg/m²     "

## 2025-01-27 NOTE — BRIEF OP NOTE
[facility] - Surgery ([facility])  Brief Operative Note    SUMMARY     Surgery Date: 1/27/2025     Surgeons and Role:  Panel 1:     * ARA Clay MD - Primary     * Demetria Santos MD - Fellow  Panel 2:     * Denver Patel MD - Primary     * Osvaldo Kim MD - Resident - Assisting        Pre-op Diagnosis:  Diverticulitis [K57.92]    Post-op Diagnosis:  Post-Op Diagnosis Codes:     * Diverticulitis [K57.92]    Procedure(s) (LRB):  SIGMOIDOSCOPY, FLEXIBLE (N/A)  CATHETERIZATION, URETER (Bilateral)  COLECTOMY, SIGMOID, LAPAROSCOPIC    Anesthesia: General    Implants:  * No implants in log *    Operative Findings: sigmoid colon densely adherent to the lateral wall, splenic flexure mobilization with small splenic capsular tears hemostatic with cautery, end to end colorectal anastomosis air and water tight reinforced with suture    Estimated Blood Loss: * No values recorded between 1/27/2025  2:37 PM and 1/27/2025  5:23 PM *    Estimated Blood Loss has not been documented. EBL = 50cc.         Specimens:   Specimen (24h ago, onward)       Start     Ordered    01/27/25 1628  Specimen to Pathology, Surgery Other (CRS)  Once        Comments: Pre-op Diagnosis: Diverticulitis [K57.92]Procedure(s):SIGMOIDOSCOPY, FLEXIBLECATHETERIZATION, URETERCOLECTOMY, SIGMOID, LAPAROSCOPIC Number of specimens: 2Name of specimens: 1. Sigmoid colon - perm2. Anastomotic rings - perm     References:    Click here for ordering Quick Tip   Question Answer Comment   Procedure Type: Other CRS   Release to patient Immediate        01/27/25 9921                    [id number]

## 2025-01-27 NOTE — ANESTHESIA PROCEDURE NOTES
Intubation    Date/Time: 1/27/2025 2:28 PM    Performed by: Kasey Patel MD  Authorized by: JANIE Jack MD    Intubation:     Induction:  Intravenous    Intubated:  Postinduction    Mask Ventilation:  Easy mask    Attempts:  1    Attempted By:  Student    Method of Intubation:  Video laryngoscopy    Blade:  Camara 3    Laryngeal View Grade: Grade I - full view of cords      Difficult Airway Encountered?: No      Complications:  None    Airway Device:  Oral endotracheal tube    Airway Device Size:  7.0    Style/Cuff Inflation:  Cuffed (inflated to minimal occlusive pressure)    Secured at:  The lips    Placement Verified By:  Capnometry    Complicating Factors:  None    Findings Post-Intubation:  BS equal bilateral and atraumatic/condition of teeth unchanged

## 2025-01-27 NOTE — INTERVAL H&P NOTE
The patient has been examined and the H&P has been reviewed:    I concur with the findings and no changes have occurred since H&P was written.  Additional history added below to complete H&P reviewed with patient.    Surgery risks, benefits and alternative options discussed and understood by patient/family.    Dx: recurrent diverticulitis    -------  Past Medical History:   Diagnosis Date    Asthma     Diverticulitis     Diverticulosis     GERD (gastroesophageal reflux disease)     Graves disease        Past Surgical History:   Procedure Laterality Date    COLONOSCOPY, WITH DIRECTED SUBMUCOSAL INJECTION N/A 4/8/2024    Procedure: COLONOSCOPY, WITH DIRECTED SUBMUCOSAL INJECTION;  Surgeon: Heather Lance MD;  Location: [facility] ENDOSCOPY;  Service: Gastroenterology;  Laterality: N/A;       Family History   Problem Relation Name Age of Onset    Hypothyroidism Mother         Social History     Socioeconomic History    Marital status:    Tobacco Use    Smoking status: Every Day     Current packs/day: 0.50     Average packs/day: 0.5 packs/day for 42.1 years (21.0 ttl pk-yrs)     Types: Cigarettes     Start date: 1/1/1983    Smokeless tobacco: Current    Tobacco comments:     Ambulatory referral to Smoking Cessation clinic following hospital discharge.    Substance and Sexual Activity    Alcohol use: Not Currently    Drug use: Never    Sexual activity: Not Currently     Birth control/protection: Abstinence     Comment: States is asexual     Social Drivers of Health     Financial Resource Strain: Low Risk  (1/14/2025)    Overall Financial Resource Strain (CARDIA)     Difficulty of Paying Living Expenses: Not very hard   Food Insecurity: No Food Insecurity (1/14/2025)    Hunger Vital Sign     Worried About Running Out of Food in the Last Year: Never true     Ran Out of Food in the Last Year: Never true   Transportation Needs: No Transportation Needs (1/14/2025)    TRANSPORTATION NEEDS     Transportation : No    Physical Activity: Insufficiently Active (1/14/2025)    Exercise Vital Sign     Days of Exercise per Week: 3 days     Minutes of Exercise per Session: 30 min   Stress: No Stress Concern Present (1/14/2025)    [employer] - Occupational Stress Questionnaire     Feeling of Stress : Not at all   Housing Stability: Low Risk  (1/14/2025)    Housing Stability Vital Sign     Unable to Pay for Housing in the Last Year: No     Homeless in the Last Year: No   Recent Concern: Housing Stability - High Risk (1/6/2025)    Housing Stability Vital Sign     Unable to Pay for Housing in the Last Year: Yes     Homeless in the Last Year: No       Review of patient's allergies indicates:   Allergen Reactions    Latex Anaphylaxis and Edema    Zosyn [piperacillin-tazobactam] Anaphylaxis

## 2025-01-28 LAB
ANION GAP SERPL CALC-SCNC: 6 MMOL/L (ref 8–16)
BASOPHILS # BLD AUTO: 0.01 K/UL (ref 0–0.2)
BASOPHILS NFR BLD: 0.1 % (ref 0–1.9)
BUN SERPL-MCNC: 10 MG/DL (ref 6–20)
CALCIUM SERPL-MCNC: 9.2 MG/DL (ref 8.7–10.5)
CHLORIDE SERPL-SCNC: 107 MMOL/L (ref 95–110)
CO2 SERPL-SCNC: 27 MMOL/L (ref 23–29)
CREAT SERPL-MCNC: 0.7 MG/DL (ref 0.5–1.4)
DIFFERENTIAL METHOD BLD: ABNORMAL
EOSINOPHIL # BLD AUTO: 0 K/UL (ref 0–0.5)
EOSINOPHIL NFR BLD: 0 % (ref 0–8)
ERYTHROCYTE [DISTWIDTH] IN BLOOD BY AUTOMATED COUNT: 13.2 % (ref 11.5–14.5)
EST. GFR  (NO RACE VARIABLE): >60 ML/MIN/1.73 M^2
GLUCOSE SERPL-MCNC: 102 MG/DL (ref 70–110)
HCT VFR BLD AUTO: 31.3 % (ref 37–48.5)
HGB BLD-MCNC: 10.6 G/DL (ref 12–16)
IMM GRANULOCYTES # BLD AUTO: 0.03 K/UL (ref 0–0.04)
IMM GRANULOCYTES NFR BLD AUTO: 0.3 % (ref 0–0.5)
LYMPHOCYTES # BLD AUTO: 1.1 K/UL (ref 1–4.8)
LYMPHOCYTES NFR BLD: 10.9 % (ref 18–48)
MAGNESIUM SERPL-MCNC: 1.8 MG/DL (ref 1.6–2.6)
MCH RBC QN AUTO: 32.6 PG (ref 27–31)
MCHC RBC AUTO-ENTMCNC: 33.9 G/DL (ref 32–36)
MCV RBC AUTO: 96 FL (ref 82–98)
MONOCYTES # BLD AUTO: 0.9 K/UL (ref 0.3–1)
MONOCYTES NFR BLD: 9.3 % (ref 4–15)
NEUTROPHILS # BLD AUTO: 7.9 K/UL (ref 1.8–7.7)
NEUTROPHILS NFR BLD: 79.4 % (ref 38–73)
NRBC BLD-RTO: 0 /100 WBC
PHOSPHATE SERPL-MCNC: 2.5 MG/DL (ref 2.7–4.5)
PLATELET # BLD AUTO: 211 K/UL (ref 150–450)
PMV BLD AUTO: 11.2 FL (ref 9.2–12.9)
POTASSIUM SERPL-SCNC: 4.2 MMOL/L (ref 3.5–5.1)
RBC # BLD AUTO: 3.25 M/UL (ref 4–5.4)
SODIUM SERPL-SCNC: 140 MMOL/L (ref 136–145)
WBC # BLD AUTO: 9.9 K/UL (ref 3.9–12.7)

## 2025-01-28 PROCEDURE — 80048 BASIC METABOLIC PNL TOTAL CA: CPT | Performed by: STUDENT IN AN ORGANIZED HEALTH CARE EDUCATION/TRAINING PROGRAM

## 2025-01-28 PROCEDURE — 25000003 PHARM REV CODE 250: Performed by: NURSE PRACTITIONER

## 2025-01-28 PROCEDURE — 25000003 PHARM REV CODE 250: Performed by: STUDENT IN AN ORGANIZED HEALTH CARE EDUCATION/TRAINING PROGRAM

## 2025-01-28 PROCEDURE — 85025 COMPLETE CBC W/AUTO DIFF WBC: CPT | Performed by: STUDENT IN AN ORGANIZED HEALTH CARE EDUCATION/TRAINING PROGRAM

## 2025-01-28 PROCEDURE — 94640 AIRWAY INHALATION TREATMENT: CPT

## 2025-01-28 PROCEDURE — 84100 ASSAY OF PHOSPHORUS: CPT | Performed by: STUDENT IN AN ORGANIZED HEALTH CARE EDUCATION/TRAINING PROGRAM

## 2025-01-28 PROCEDURE — 20600001 HC STEP DOWN PRIVATE ROOM

## 2025-01-28 PROCEDURE — 27000221 HC OXYGEN, UP TO 24 HOURS

## 2025-01-28 PROCEDURE — 83735 ASSAY OF MAGNESIUM: CPT | Performed by: STUDENT IN AN ORGANIZED HEALTH CARE EDUCATION/TRAINING PROGRAM

## 2025-01-28 PROCEDURE — 25000242 PHARM REV CODE 250 ALT 637 W/ HCPCS: Performed by: STUDENT IN AN ORGANIZED HEALTH CARE EDUCATION/TRAINING PROGRAM

## 2025-01-28 PROCEDURE — 63600175 PHARM REV CODE 636 W HCPCS: Performed by: STUDENT IN AN ORGANIZED HEALTH CARE EDUCATION/TRAINING PROGRAM

## 2025-01-28 PROCEDURE — 25000242 PHARM REV CODE 250 ALT 637 W/ HCPCS: Performed by: COLON & RECTAL SURGERY

## 2025-01-28 PROCEDURE — 94761 N-INVAS EAR/PLS OXIMETRY MLT: CPT

## 2025-01-28 RX ORDER — SODIUM,POTASSIUM PHOSPHATES 280-250MG
1 POWDER IN PACKET (EA) ORAL
Status: COMPLETED | OUTPATIENT
Start: 2025-01-28 | End: 2025-01-29

## 2025-01-28 RX ADMIN — ALBUTEROL SULFATE 2.5 MG: 2.5 SOLUTION RESPIRATORY (INHALATION) at 04:01

## 2025-01-28 RX ADMIN — HEPARIN SODIUM 5000 UNITS: 5000 INJECTION INTRAVENOUS; SUBCUTANEOUS at 02:01

## 2025-01-28 RX ADMIN — ACETAMINOPHEN 1000 MG: 500 TABLET ORAL at 09:01

## 2025-01-28 RX ADMIN — OXYCODONE HYDROCHLORIDE 10 MG: 10 TABLET ORAL at 11:01

## 2025-01-28 RX ADMIN — GABAPENTIN 300 MG: 300 CAPSULE ORAL at 09:01

## 2025-01-28 RX ADMIN — ACETAMINOPHEN 1000 MG: 10 INJECTION, SOLUTION INTRAVENOUS at 05:01

## 2025-01-28 RX ADMIN — GABAPENTIN 300 MG: 300 CAPSULE ORAL at 02:01

## 2025-01-28 RX ADMIN — IBUPROFEN 800 MG: 400 TABLET ORAL at 09:01

## 2025-01-28 RX ADMIN — ALBUTEROL SULFATE 2.5 MG: 2.5 SOLUTION RESPIRATORY (INHALATION) at 05:01

## 2025-01-28 RX ADMIN — MONTELUKAST SODIUM 10 MG: 5 TABLET, CHEWABLE ORAL at 09:01

## 2025-01-28 RX ADMIN — POTASSIUM & SODIUM PHOSPHATES POWDER PACK 280-160-250 MG 1 PACKET: 280-160-250 PACK at 09:01

## 2025-01-28 RX ADMIN — IBUPROFEN 800 MG: 800 INJECTION INTRAVENOUS at 02:01

## 2025-01-28 RX ADMIN — HEPARIN SODIUM 5000 UNITS: 5000 INJECTION INTRAVENOUS; SUBCUTANEOUS at 05:01

## 2025-01-28 RX ADMIN — POTASSIUM & SODIUM PHOSPHATES POWDER PACK 280-160-250 MG 1 PACKET: 280-160-250 PACK at 04:01

## 2025-01-28 RX ADMIN — BUDESONIDE 0.5 MG: 0.5 INHALANT RESPIRATORY (INHALATION) at 08:01

## 2025-01-28 RX ADMIN — OXYCODONE HYDROCHLORIDE 10 MG: 10 TABLET ORAL at 09:01

## 2025-01-28 RX ADMIN — OXYCODONE HYDROCHLORIDE 10 MG: 10 TABLET ORAL at 02:01

## 2025-01-28 RX ADMIN — PANTOPRAZOLE SODIUM 40 MG: 40 TABLET, DELAYED RELEASE ORAL at 09:01

## 2025-01-28 RX ADMIN — OXYCODONE HYDROCHLORIDE 10 MG: 10 TABLET ORAL at 07:01

## 2025-01-28 RX ADMIN — ACETAMINOPHEN 1000 MG: 10 INJECTION, SOLUTION INTRAVENOUS at 02:01

## 2025-01-28 RX ADMIN — HEPARIN SODIUM 5000 UNITS: 5000 INJECTION INTRAVENOUS; SUBCUTANEOUS at 09:01

## 2025-01-28 RX ADMIN — ALBUTEROL SULFATE 2.5 MG: 2.5 SOLUTION RESPIRATORY (INHALATION) at 02:01

## 2025-01-28 RX ADMIN — IBUPROFEN 800 MG: 800 INJECTION INTRAVENOUS at 05:01

## 2025-01-28 RX ADMIN — ALBUTEROL SULFATE 2.5 MG: 2.5 SOLUTION RESPIRATORY (INHALATION) at 08:01

## 2025-01-28 NOTE — OP NOTE
[facility]  Operative Note    Date: 01/27/2025    Pre-Op Diagnosis: Diverticulitis   Patient Active Problem List   Diagnosis    Obesity    Asthma exacerbation    GERD (gastroesophageal reflux disease)    Diverticulitis    Moderate persistent asthma with (acute) exacerbation    Hyperthyroidism    Thyroiditis    Diastolic dysfunction, left ventricle    Tachycardia    Other chest pain    Hypoglycemia    Thrombocytopathia    Diverticulitis of intestine with abscess    Asthma    Diverticulitis of large intestine    Allergic conjunctivitis and rhinitis    Macrocytic anemia    Superficial vein thrombosis    Tobacco dependence    History of ESBL E. coli infection         Post-Op Diagnosis: same    Procedure(s) Performed:   1.  Cystoscopy with bilateral ureteral catheter placement    Specimen(s): none    Staff Surgeon: Denver Patel MD    Assistant Surgeon: Osvaldo Kim MD    Anesthesia: General endotracheal anesthesia    Indications: Niya Moeller is a 54 y.o. female with diverticulitis.  Dr. Clay has requested intra-operative ureteral catheters to allow for early intra-operative identification and repair of any injuries.      Findings:   Normal bladder mucosa. No concerning findings for a fistula.    Estimated Blood Loss: min    Drains:   1.  bilateral 5 Fr ureteral catheters  2.  16 Fr laurent catheter    Procedure in Detail: Upon entering the room the patient was under general anesthesia.  The patient was then placed in the dorsal lithotomy position and prepped and draped in the usual sterile fashion. Preoperative antibiotics were administered per the primary surgeon preference.  Timeout was performed.      A 22 Fr cystoscope was inserted into the urethra and formal cystourethroscopy was performed. The urethra was normal.  The right and left ureteral orifices were in the normal anatomic position. There were no mucosal abnormalities. A 0.38 guide wire was inserted into the right  ureteral orifice  and advanced to the level of the right renal pelvis. A 5 Fr ureteral catheter was then inserted over the guide wire and the wire was removed. The cystoscope was then removed leaving the ureteral catheter in place.     The cystoscope was then reinserted alongside the ureteral catheter and a 0.38 guidewire was inserted into the left ureteral orifice and advanced to the level of the left renal pelvis. A 5 Fr ureteral catheter was advanced over the wire and the wire was removed. The cystoscope was then removed.      A 16 Fr laurent catheter was inserted and the balloon was filled with 10mL of sterile water. The ureteral catheters were secured in the standard fashion. There were no complications with the procedure and the patient tolerated our procedure well.     The case was then turned over to the primary surgeon.     Osvaldo Kim MD

## 2025-01-28 NOTE — OP NOTE
AMBERLY MOELLER  [id number]  [id number]    OPERATIVE NOTE:    DATE OF OPERATION: 01/27/2025    PREOPERATIVE DIAGNOSIS:  Medically refractory diverticulitis    POSTOPERATIVE DIAGNOSIS:  Medically refractory diverticulitis    PROCEDURE PERFORMED:   1. Laparoscopic sigmoid colectomy  2. Laparoscopic mobilization of the splenic flexure  3. Flexible sigmoidoscopy.      ATTENDING SURGEON: ARA Clay MD    RESIDENT/FELLOW SURGEON: Demetria Santos MD    ANESTHESIA: General    ESTIMATED BLOOD LOSS:  50 mL    FINDINGS:  1. Severe diverticular disease of the mid sigmoid colon with dense adhesions to the anterior abdominal wall.  Bilateral ureters identified and protected.  Negative anastomotic leak test.    SPECIMENS:   1. Sigmoid colon.  2. Anastomotic donuts    COMPLICATIONS:  None apparent    DRAINS:  None    DISPOSITION: PACU    CONDITION: good    INDICATION FOR PROCEDURE:  Amberly Moeller is a 54 y.o. female with medically refractory diverticular disease has been in the hospital for the past month receiving meropenem.  She presented for elective sigmoid resection.       DESCRIPTION OF PROCEDURE:    After informed consent was reviewed, the patient was taken to the Operating Room and placed under general anesthesia. Preoperative antibiotics with gentamicin and Flagyl were given.  The patient was placed in lithotomy position.  Time-out was performed.  Urology then performed cystoscopy with bilateral ureteral catheter placement.  A Luna catheter was then sterilely inserted.  The arms were tucked and the anterior abdominal wall was prepped and draped in the usual sterile fashion.                The abdomen was entered through a 8 cm Pfannenstiel incision 2 fingerbreadths above the pubic symphysis.  The skin was incised sharply.  The fascia was incised with electrocautery.  Fascial flaps were elevated up to the umbilicus and down to the pubic symphysis.  The midline of the rectus muscles was then divided.  The abdomen  was entered sharply without injury to the underlying bowel.  The sleeve of the GelPort was then placed. Three 5mm trocars were inserted in the right lower quadrant, just above the umbilicus, and the left lower quadrant.   The top of the GelPort was then placed and the abdomen was insufflated.    The ligament Treitz was identified.  Next to the ligament of Treitz, the inferior mesenteric vein was identified.  The IMV was elevated and the peritoneum was incised.  The retroperitoneum was identified and swept posteriorly out to the abdominal sidewall and to the superior pole of the kidney.  Dissection continued inferiorly around the left colic artery.  The MONALISA pedicle was then elevated.  The peritoneum was incised in the medial aspect.  The retrorectal space in the hypogastric nerves were identified and swept posteriorly.  The left ureter was identified and swept posteriorly.  Distal to the takeoff of the left colic artery, the superior rectal artery was isolated and divided using the ligature.  Dissection continued up towards the junction of the descending colon and the sigmoid colon. Lateral attachments were taken down with the ligature.  The sigmoid colon was densely adherent to the anterior abdominal wall.  We mobilized all of the descending colon as well as the entire splenic flexure prior to approaching the sigmoid colon.  The omentum was taken off of the transverse colon the lesser sac was entered.  Splenic flexure was completely mobilized using the ligature.  Attachments of the splenic flexure to the inferior border of the spleen, inferior border of the pancreas, back wall the stomach, omentum, and abdominal sidewall were all divided.  The dense adhesions of the sigmoid colon to the anterior abdominal wall were then approached.  These were carefully divided using sharp and blunt dissection.  The cap of the GelPort was then removed.  The specimen was brought out.  We proceeded distally down to the top of the  rectum was identified by the splaying of the tenia coli, the absence of epiploica, and the passage of rectal sizers.  The upper rectum was circumferentially dissected.  The mesorectum was divided using the ligature.  The rectum was then divided with a contour stapler with a green load.  We then proceeded proximally to the mid descending colon based on prior imaging.  The left colic artery was left intact.  The colon was divided with a Furness clamp after a 2 0 nylon pursestring had been placed. The colon was divided and opened and appeared healthy.  A 29 EEA anvil was inserted and the pursestring was tied down and wrapped around the 2nd time for reinforcement.     The EEA stapler was placed into the anal canal and advanced to the top of the rectal stump.  The spike was deployed just posterior to the staple line.  The anvil was connected and the stapler closed.  Care was taken to avoid entrapment of the bladder or ureters.  The stapler was fired and removed.  On the back table, two circumferential anastmotic donuts were found.  Leak testing was done with a colonoscope.  The anastomosis appeared healthy.  Leak test was negative. The anastomosis was then oversewn with 3-0 vicryl sutures circumferentially.  The abdomen was reinsufflated.  The descending colon set straight in abdomen without any twisting.  The small bowel was placed on top of the descending colon and the omentum was pulled down into the pelvis.              All members of the team then changed gowns and gloves and new instruments were used for a clean closure.  The Pfannenstiel incision was closed in layers.  The posterior peritoneum and fascia were closed with a #1 Running PDS.  Simple #1 PDS sutures were used to reapproximate the muscle.  The anterior rectus fascia was closed with #1 Running PDS after hemostasis had been ensured.    All incisions were irrigated with saline and hemostasis was noted.  Skin incisions were closed with 4-0 Vicryl in  subcuticular fashion and steri-strips were applied.                The patient tolerated the procedure well.  There were no apparent intraoperative complications.  All sponge, needle, and instrument counts were correct x2.  The patient was extubated and taken to PACU in stable condition.                ARA Clay MD, FACS, FASCRS  Staff Surgeon  Colon & Rectal Surgery

## 2025-01-28 NOTE — ASSESSMENT & PLAN NOTE
OR 1/27 sigmoid resection    -await return of bowel function, lfd  -continue multi modality for pain control  -encourage ambulation and use of IS  -gabriele hose and SCD  -prophalactic lovenox and PPI   -dc laurent await void

## 2025-01-28 NOTE — ANESTHESIA RELEASE NOTE
"Anesthesia Release from PACU Note    Patient: Niya Moeller    Procedure(s) Performed: Procedure(s) (LRB):  SIGMOIDOSCOPY, FLEXIBLE (N/A)  CATHETERIZATION, URETER (Bilateral)  COLECTOMY, SIGMOID, LAPAROSCOPIC  MOBILIZATION, SPLENIC FLEXURE, LAPAROSCOPIC (N/A)    Anesthesia type: general    Post pain: Adequate analgesia. Patient still rating pain 10/10 but is holding a conversation and appears comfortable in bed.     Post assessment: no apparent anesthetic complications, tolerated procedure well, and no evidence of recall    Last Vitals: Visit Vitals  /63   Pulse 82   Temp 36.4 °C (97.5 °F) (Temporal)   Resp 18   Ht 4' 11" (1.499 m)   Wt 68.6 kg (151 lb 3.8 oz)   SpO2 100%   Breastfeeding No   BMI 30.55 kg/m²       Post vital signs: stable    Level of consciousness: awake, alert , and oriented    Nausea/Vomiting: no nausea/no vomiting    Complications: none    Airway Patency: patent    Respiratory: unassisted    Cardiovascular: stable and blood pressure at baseline    Hydration: euvolemic  "

## 2025-01-28 NOTE — PLAN OF CARE
[facility]  Initial Discharge Assessment       Primary Care Provider: No primary care provider on file.    Admission Diagnosis: Diverticulitis [K57.92]    Admission Date: 1/27/2025  Expected Discharge Date:     Transition of Care Barriers: None    Payor: MEDICAID / Plan: [facility] / Product Type: Managed Medicaid /     Extended Emergency Contact Information  Primary Emergency Contact: Dustin Viveros  Mobile Phone: [phone]  Relation: Son   needed? No    Discharge Plan A: Home, Home Health  Discharge Plan B: Home, Home Health      [id number] [address] AT NW OF [facility] & [facility]  [address] [place] [address]  Phone: [phone] Fax: [phone]    [address] [address]  Phone: [phone] Fax: [phone]      Initial Assessment (most recent)       Adult Discharge Assessment - 01/28/25 1529          Discharge Assessment    Assessment Type Discharge Planning Assessment     Confirmed/corrected address, phone number and insurance Yes     Confirmed Demographics Correct on Facesheet     Source of Information patient     If unable to respond/provide information was family/caregiver contacted? --   Reid Viveros- [phone]    Communicated ROXIE with patient/caregiver Date not available/Unable to determine     Reason For Admission Diverticulitis     People in Home alone     Facility Arrived From: [facility]     Do you expect to return to your current living situation? Yes     Do you have help at home or someone to help you manage your care at home? Yes     Who are your caregiver(s) and their phone number(s)? Reid Viveros- [phone]     Prior to hospitilization cognitive status: No Deficits;Alert/Oriented     Current cognitive status: No Deficits;Alert/Oriented     Walking or Climbing Stairs Difficulty no      Walking or Climbing Stairs ambulation difficulty, assistance 1 person     Mobility Management RW     Do you have any problems with: --   Reid Viveros- [phone]    Home Accessibility wheelchair accessible;stairs to enter home     Number of Stairs, Main Entrance three     Surface of Stairs, Main Entrance hardwood     Stair Railings, Main Entrance railings safe and in good condition     Landing, Stairs, Main Entrance railings present     Stairs Comment, Main Entrance three     Home Layout Able to live on 1st floor     Equipment Currently Used at Home walker, rolling     Readmission within 30 days? Yes     Patient currently being followed by outpatient case management? Yes     If yes, name of outpatient case management following: other (comments)     Do you currently have service(s) that help you manage your care at home? Yes     [facility]     Is the pt/caregiver preference to resume services with current agency Yes     Do you take prescription medications? Yes     Do you have prescription coverage? Yes     Coverage MEDICAID - [employer] [facility]- [place] [employer] [facility]     Do you have any problems affording any of your prescribed medications? No     Is the patient taking medications as prescribed? yes     Who is going to help you get home at discharge? Pt will need transportation     How do you get to doctors appointments? car, drives self;family or friend will provide;health plan transportation     Are you on dialysis? No     Do you take coumadin? No     Discharge Plan A Home;Home Health     Discharge Plan B Home;Home Health     DME Needed Upon Discharge  none     Discharge Plan discussed with: Patient     Name(s) and Number(s) Jessica [phone]/Reid Viveros- [phone]     Transition of Care Barriers None                   VIOLA spoke with patient in 1028 for Discharge Planning Assessment. Per patient, Pt lives alone  in a single family home on a slab foundation with three steps to porch and point of entry.  Patient was independent with ADLS and DID use DME or in-home assistive equipment. Pt is not on dialysis  or coumadin,  takes medications as prescribed / keeps refilled / has resources for all daily and prescriptive needs. Preferred pharmacy is [facility] - Agreeable to bedside delivery.  Will have help from Patricia- [phone]/Son- Dustin Viveros- [phone] and other immediate family upon discharge - Pt will require transportation to home.  All questions addressed. Unit and SW direct numbers provided. Will continue to follow for course of hospitalization    Discharge Plan A and Plan B have been determined by review of patient's clinical status, future medical and therapeutic needs, and coverage/benefits for post-acute care in coordination with multidisciplinary team members.     1/27/2025 10:11 AM  Diverticulitis [K57.92]    PCP: No primary care provider on file.    PHARMACY:   [id number] - [address] & [address] [address]  Phone: [phone] Fax: [phone]    [address] [address]  Phone: [phone] Fax: [phone]      Payor: MEDICAID / Plan: [facility] / Product Type: Managed Medicaid /       Leslie Perez LMSW  - [facility]

## 2025-01-28 NOTE — ANESTHESIA POSTPROCEDURE EVALUATION
Anesthesia Post Evaluation    Patient: Niya Moeller    Procedure(s) Performed: Procedure(s) (LRB):  SIGMOIDOSCOPY, FLEXIBLE (N/A)  CATHETERIZATION, URETER (Bilateral)  COLECTOMY, SIGMOID, LAPAROSCOPIC  MOBILIZATION, SPLENIC FLEXURE, LAPAROSCOPIC (N/A)    Final Anesthesia Type: general      Patient location during evaluation: PACU  Patient participation: Yes- Able to Participate  Level of consciousness: awake  Post-procedure vital signs: reviewed and stable  Pain management: pain needs to be addressed  Airway patency: patent    PONV status at discharge: No PONV  Anesthetic complications: no      Cardiovascular status: blood pressure returned to baseline  Respiratory status: unassisted, spontaneous ventilation and nasal cannula  Hydration status: euvolemic                Vitals Value Taken Time   /55 01/28/25 1134   Temp 36.7 °C (98.1 °F) 01/28/25 1134   Pulse 91 01/28/25 1134   Resp 18 01/28/25 1134   SpO2 98 % 01/28/25 1134         Event Time   Out of Recovery 18:15:00         Pain/Héctor Score: Pain Rating Prior to Med Admin: 8 (1/28/2025  9:42 AM)  Pain Rating Post Med Admin: 4 (1/28/2025  6:29 AM)  Héctor Score: 9 (1/27/2025  6:15 PM)

## 2025-01-28 NOTE — PROGRESS NOTES
[facility]  Colorectal Surgery  Progress Note    Patient Name: Niya Moeller  MRN: [id number]  Admission Date: 1/27/2025  Hospital Length of Stay: 1 days  Attending Physician: ARA Clay MD    Subjective:     Interval History: no acute events overnite, passing flatus, alton diet, no n.v, adequate pain control    Post-Op Info:  Procedure(s) (LRB):  SIGMOIDOSCOPY, FLEXIBLE (N/A)  CATHETERIZATION, URETER (Bilateral)  COLECTOMY, SIGMOID, LAPAROSCOPIC  MOBILIZATION, SPLENIC FLEXURE, LAPAROSCOPIC (N/A)   1 Day Post-Op      Medications:  Continuous Infusions:  Scheduled Meds:   acetaminophen  1,000 mg Intravenous Q8H    Followed by    acetaminophen  1,000 mg Oral Q8H    albuterol sulfate  2.5 mg Nebulization Q4H    budesonide  0.5 mg Nebulization Q12H    fluticasone furoate-vilanteroL  1 puff Inhalation Daily    formoterol  20 mcg Nebulization BID    gabapentin  300 mg Oral TID    heparin (porcine)  5,000 Units Subcutaneous Q8H    ibuprofen  800 mg Intravenous Q8H    Followed by    ibuprofen  800 mg Oral Q8H    montelukast  10 mg Oral Daily    nicotine  1 patch Transdermal Daily    pantoprazole  40 mg Oral Daily    potassium, sodium phosphates  1 packet Oral QID (AC & HS)     PRN Meds:   acetaminophen 1,000 mg/100 mL (10 mg/mL) injection 1,000 mg    Followed by    acetaminophen tablet 1,000 mg    albuterol sulfate nebulizer solution 2.5 mg    budesonide nebulizer solution 0.5 mg    fluticasone furoate-vilanteroL 200-25 mcg/dose diskus inhaler 1 puff    formoterol nebulizer solution 20 mcg    gabapentin capsule 300 mg    heparin (porcine) injection 5,000 Units    ibuprofen 800 mg in dextrose 5 % 250 mL (Vial-Mate)    Followed by    ibuprofen tablet 800 mg    montelukast chewable tablet 10 mg    nicotine 21 mg/24 hr 1 patch    pantoprazole EC tablet 40 mg    potassium, sodium phosphates 280-160-250 mg packet 1 packet        Objective:     Vital Signs (Most Recent):  Temp: 98.1 °F (36.7 °C) (01/28/25  1134)  Pulse: 91 (01/28/25 1134)  Resp: 18 (01/28/25 1134)  BP: (!) 115/55 (01/28/25 1134)  SpO2: 98 % (01/28/25 1134) Vital Signs (24h Range):  Temp:  [97.5 °F (36.4 °C)-99.3 °F (37.4 °C)] 98.1 °F (36.7 °C)  Pulse:  [67-93] 91  Resp:  [11-21] 18  SpO2:  [93 %-100 %] 98 %  BP: ()/(53-69) 115/55     Intake/Output - Last 3 Shifts         01/26 0700 01/27 0659 01/27 0700 01/28 0659 01/28 0700 01/29 0659    IV Piggyback  1500     Total Intake(mL/kg)  1500 (20.8)     Urine (mL/kg/hr)  1755 600 (1.6)    Stool  0     Total Output  1755 600    Net  -255 -600           Stool Occurrence  0 x              Physical Exam  Vitals and nursing note reviewed.   Constitutional:       Appearance: She is well-developed.   HENT:      Head: Normocephalic.   Eyes:      Pupils: Pupils are equal, round, and reactive to light.   Cardiovascular:      Rate and Rhythm: Normal rate and regular rhythm.      Heart sounds: Normal heart sounds.   Pulmonary:      Effort: Pulmonary effort is normal. No respiratory distress.      Breath sounds: Normal breath sounds. No wheezing or rales.   Abdominal:      Palpations: Abdomen is soft. There is no mass.      Tenderness: There is no guarding or rebound.      Comments: Abd inc line healing well   Skin:     General: Skin is warm and dry.   Neurological:      Mental Status: She is alert and oriented to person, place, and time.                Significant Labs:  BMP (Last 3 Results):   Recent Labs   Lab 01/23/25  0419 01/28/25  0425   GLU  --  102    140   K 4.0 4.2    107   CO2 28 27   BUN 14.0 10   CREATININE 1.12* 0.7   CALCIUM 9.7 9.2   MG  --  1.8     CBC (Last 3 Results):   Recent Labs   Lab 01/28/25  0425   WBC 9.90   RBC 3.25*   HGB 10.6*   HCT 31.3*      MCV 96   MCH 32.6*   MCHC 33.9       Significant Diagnostics:  None  Assessment/Plan:     * Diverticulitis  OR 1/27 sigmoid resection    -await return of bowel function, lfd  -continue multi modality for pain  control  -encourage ambulation and use of IS  -gabriele hose and SCD  -prophalactic lovenox and PPI   -dc laurent await void    Tobacco dependence  Dangers of cigarette smoking were reviewed with patient in detail. Patient was Counseled for 10 minutes or greater. Nicotine replacement options were discussed. Nicotine replacement was discussed- prescribed    Asthma  Continue home meds  Aggressive pulmonary toileting  Monitor vs          Sammie Sheets NP  Colorectal Surgery  [facility]

## 2025-01-28 NOTE — SUBJECTIVE & OBJECTIVE
Subjective:     Interval History: no acute events overnite, passing flatus, alton diet, no n.v, adequate pain control    Post-Op Info:  Procedure(s) (LRB):  SIGMOIDOSCOPY, FLEXIBLE (N/A)  CATHETERIZATION, URETER (Bilateral)  COLECTOMY, SIGMOID, LAPAROSCOPIC  MOBILIZATION, SPLENIC FLEXURE, LAPAROSCOPIC (N/A)   1 Day Post-Op      Medications:  Continuous Infusions:  Scheduled Meds:   acetaminophen  1,000 mg Intravenous Q8H    Followed by    acetaminophen  1,000 mg Oral Q8H    albuterol sulfate  2.5 mg Nebulization Q4H    budesonide  0.5 mg Nebulization Q12H    fluticasone furoate-vilanteroL  1 puff Inhalation Daily    formoterol  20 mcg Nebulization BID    gabapentin  300 mg Oral TID    heparin (porcine)  5,000 Units Subcutaneous Q8H    ibuprofen  800 mg Intravenous Q8H    Followed by    ibuprofen  800 mg Oral Q8H    montelukast  10 mg Oral Daily    nicotine  1 patch Transdermal Daily    pantoprazole  40 mg Oral Daily    potassium, sodium phosphates  1 packet Oral QID (AC & HS)     PRN Meds:   acetaminophen 1,000 mg/100 mL (10 mg/mL) injection 1,000 mg    Followed by    acetaminophen tablet 1,000 mg    albuterol sulfate nebulizer solution 2.5 mg    budesonide nebulizer solution 0.5 mg    fluticasone furoate-vilanteroL 200-25 mcg/dose diskus inhaler 1 puff    formoterol nebulizer solution 20 mcg    gabapentin capsule 300 mg    heparin (porcine) injection 5,000 Units    ibuprofen 800 mg in dextrose 5 % 250 mL (Vial-Mate)    Followed by    ibuprofen tablet 800 mg    montelukast chewable tablet 10 mg    nicotine 21 mg/24 hr 1 patch    pantoprazole EC tablet 40 mg    potassium, sodium phosphates 280-160-250 mg packet 1 packet        Objective:     Vital Signs (Most Recent):  Temp: 98.1 °F (36.7 °C) (01/28/25 1134)  Pulse: 91 (01/28/25 1134)  Resp: 18 (01/28/25 1134)  BP: (!) 115/55 (01/28/25 1134)  SpO2: 98 % (01/28/25 1134) Vital Signs (24h Range):  Temp:  [97.5 °F (36.4 °C)-99.3 °F (37.4 °C)] 98.1 °F (36.7 °C)  Pulse:   [67-93] 91  Resp:  [11-21] 18  SpO2:  [93 %-100 %] 98 %  BP: ()/(53-69) 115/55     Intake/Output - Last 3 Shifts         01/26 0700 01/27 0659 01/27 0700 01/28 0659 01/28 0700 01/29 0659    IV Piggyback  1500     Total Intake(mL/kg)  1500 (20.8)     Urine (mL/kg/hr)  1755 600 (1.6)    Stool  0     Total Output  1755 600    Net  -255 -600           Stool Occurrence  0 x              Physical Exam  Vitals and nursing note reviewed.   Constitutional:       Appearance: She is well-developed.   HENT:      Head: Normocephalic.   Eyes:      Pupils: Pupils are equal, round, and reactive to light.   Cardiovascular:      Rate and Rhythm: Normal rate and regular rhythm.      Heart sounds: Normal heart sounds.   Pulmonary:      Effort: Pulmonary effort is normal. No respiratory distress.      Breath sounds: Normal breath sounds. No wheezing or rales.   Abdominal:      Palpations: Abdomen is soft. There is no mass.      Tenderness: There is no guarding or rebound.      Comments: Abd inc line healing well   Skin:     General: Skin is warm and dry.   Neurological:      Mental Status: She is alert and oriented to person, place, and time.                Significant Labs:  BMP (Last 3 Results):   Recent Labs   Lab 01/23/25  0419 01/28/25  0425   GLU  --  102    140   K 4.0 4.2    107   CO2 28 27   BUN 14.0 10   CREATININE 1.12* 0.7   CALCIUM 9.7 9.2   MG  --  1.8     CBC (Last 3 Results):   Recent Labs   Lab 01/28/25  0425   WBC 9.90   RBC 3.25*   HGB 10.6*   HCT 31.3*      MCV 96   MCH 32.6*   MCHC 33.9       Significant Diagnostics:  None

## 2025-01-29 PROCEDURE — 27000221 HC OXYGEN, UP TO 24 HOURS

## 2025-01-29 PROCEDURE — 94640 AIRWAY INHALATION TREATMENT: CPT

## 2025-01-29 PROCEDURE — 25000003 PHARM REV CODE 250: Performed by: NURSE PRACTITIONER

## 2025-01-29 PROCEDURE — 94761 N-INVAS EAR/PLS OXIMETRY MLT: CPT

## 2025-01-29 PROCEDURE — 25000242 PHARM REV CODE 250 ALT 637 W/ HCPCS: Performed by: STUDENT IN AN ORGANIZED HEALTH CARE EDUCATION/TRAINING PROGRAM

## 2025-01-29 PROCEDURE — 99900035 HC TECH TIME PER 15 MIN (STAT)

## 2025-01-29 PROCEDURE — 63600175 PHARM REV CODE 636 W HCPCS: Performed by: STUDENT IN AN ORGANIZED HEALTH CARE EDUCATION/TRAINING PROGRAM

## 2025-01-29 PROCEDURE — 25000242 PHARM REV CODE 250 ALT 637 W/ HCPCS: Performed by: COLON & RECTAL SURGERY

## 2025-01-29 PROCEDURE — 20600001 HC STEP DOWN PRIVATE ROOM

## 2025-01-29 PROCEDURE — 25000003 PHARM REV CODE 250: Performed by: STUDENT IN AN ORGANIZED HEALTH CARE EDUCATION/TRAINING PROGRAM

## 2025-01-29 RX ADMIN — OXYCODONE HYDROCHLORIDE 10 MG: 10 TABLET ORAL at 04:01

## 2025-01-29 RX ADMIN — IBUPROFEN 800 MG: 400 TABLET ORAL at 12:01

## 2025-01-29 RX ADMIN — HEPARIN SODIUM 5000 UNITS: 5000 INJECTION INTRAVENOUS; SUBCUTANEOUS at 04:01

## 2025-01-29 RX ADMIN — TRAMADOL HYDROCHLORIDE 50 MG: 50 TABLET, COATED ORAL at 12:01

## 2025-01-29 RX ADMIN — PANTOPRAZOLE SODIUM 40 MG: 40 TABLET, DELAYED RELEASE ORAL at 08:01

## 2025-01-29 RX ADMIN — TRAMADOL HYDROCHLORIDE 50 MG: 50 TABLET, COATED ORAL at 09:01

## 2025-01-29 RX ADMIN — ACETAMINOPHEN 1000 MG: 500 TABLET ORAL at 09:01

## 2025-01-29 RX ADMIN — ALBUTEROL SULFATE 2.5 MG: 2.5 SOLUTION RESPIRATORY (INHALATION) at 12:01

## 2025-01-29 RX ADMIN — ONDANSETRON 4 MG: 2 INJECTION INTRAMUSCULAR; INTRAVENOUS at 09:01

## 2025-01-29 RX ADMIN — POTASSIUM & SODIUM PHOSPHATES POWDER PACK 280-160-250 MG 1 PACKET: 280-160-250 PACK at 04:01

## 2025-01-29 RX ADMIN — ACETAMINOPHEN 1000 MG: 500 TABLET ORAL at 04:01

## 2025-01-29 RX ADMIN — GABAPENTIN 300 MG: 300 CAPSULE ORAL at 04:01

## 2025-01-29 RX ADMIN — ACETAMINOPHEN 1000 MG: 500 TABLET ORAL at 12:01

## 2025-01-29 RX ADMIN — ALBUTEROL SULFATE 2.5 MG: 2.5 SOLUTION RESPIRATORY (INHALATION) at 08:01

## 2025-01-29 RX ADMIN — IBUPROFEN 800 MG: 400 TABLET ORAL at 09:01

## 2025-01-29 RX ADMIN — GABAPENTIN 300 MG: 300 CAPSULE ORAL at 09:01

## 2025-01-29 RX ADMIN — OXYCODONE HYDROCHLORIDE 10 MG: 10 TABLET ORAL at 08:01

## 2025-01-29 RX ADMIN — BUDESONIDE 0.5 MG: 0.5 INHALANT RESPIRATORY (INHALATION) at 08:01

## 2025-01-29 RX ADMIN — BUDESONIDE 0.5 MG: 0.5 INHALANT RESPIRATORY (INHALATION) at 12:01

## 2025-01-29 RX ADMIN — ALBUTEROL SULFATE 2.5 MG: 2.5 SOLUTION RESPIRATORY (INHALATION) at 03:01

## 2025-01-29 RX ADMIN — GABAPENTIN 300 MG: 300 CAPSULE ORAL at 08:01

## 2025-01-29 RX ADMIN — HEPARIN SODIUM 5000 UNITS: 5000 INJECTION INTRAVENOUS; SUBCUTANEOUS at 09:01

## 2025-01-29 RX ADMIN — HEPARIN SODIUM 5000 UNITS: 5000 INJECTION INTRAVENOUS; SUBCUTANEOUS at 12:01

## 2025-01-29 RX ADMIN — IBUPROFEN 800 MG: 400 TABLET ORAL at 04:01

## 2025-01-29 RX ADMIN — MONTELUKAST SODIUM 10 MG: 5 TABLET, CHEWABLE ORAL at 08:01

## 2025-01-29 NOTE — ASSESSMENT & PLAN NOTE
OR 1/27 sigmoid resection    -await return of bowel function, lfd  -continue multi modality for pain control  -encourage ambulation and use of IS  -gabriele hose and SCD  -prophalactic lovenox and PPI   -dc laurent  void

## 2025-01-29 NOTE — PROGRESS NOTES
[facility]  Colorectal Surgery  Progress Note    Patient Name: Niya Moeller  MRN: [id number]  Admission Date: 1/27/2025  Hospital Length of Stay: 2 days  Attending Physician: ARA Clay MD    Subjective:     Interval History: no acute events overnite, alton diet, pos for flatus, adequate pain control    Post-Op Info:  Procedure(s) (LRB):  SIGMOIDOSCOPY, FLEXIBLE (N/A)  CATHETERIZATION, URETER (Bilateral)  COLECTOMY, SIGMOID, LAPAROSCOPIC  MOBILIZATION, SPLENIC FLEXURE, LAPAROSCOPIC (N/A)   2 Days Post-Op      Medications:  Continuous Infusions:  Scheduled Meds:   acetaminophen  1,000 mg Oral Q8H    albuterol sulfate  2.5 mg Nebulization Q4H    budesonide  0.5 mg Nebulization Q12H    fluticasone furoate-vilanteroL  1 puff Inhalation Daily    formoterol  20 mcg Nebulization BID    gabapentin  300 mg Oral TID    heparin (porcine)  5,000 Units Subcutaneous Q8H    ibuprofen  800 mg Oral Q8H    montelukast  10 mg Oral Daily    pantoprazole  40 mg Oral Daily     PRN Meds:   acetaminophen tablet 1,000 mg    albuterol sulfate nebulizer solution 2.5 mg    budesonide nebulizer solution 0.5 mg    fluticasone furoate-vilanteroL 200-25 mcg/dose diskus inhaler 1 puff    formoterol nebulizer solution 20 mcg    gabapentin capsule 300 mg    heparin (porcine) injection 5,000 Units    ibuprofen tablet 800 mg    montelukast chewable tablet 10 mg    pantoprazole EC tablet 40 mg        Objective:     Vital Signs (Most Recent):  Temp: 98.1 °F (36.7 °C) (01/29/25 0840)  Pulse: 82 (01/29/25 0840)  Resp: 20 (01/29/25 0841)  BP: 133/66 (01/29/25 0840)  SpO2: 97 % (01/29/25 0840) Vital Signs (24h Range):  Temp:  [97.8 °F (36.6 °C)-98.6 °F (37 °C)] 98.1 °F (36.7 °C)  Pulse:  [75-98] 82  Resp:  [16-20] 20  SpO2:  [92 %-100 %] 97 %  BP: ()/(55-66) 133/66     Intake/Output - Last 3 Shifts         01/27 0700 01/28 0659 01/28 0700 01/29 0659 01/29 0700 01/30 0659    P.O.  990     IV Piggyback 1500 1037.5     Total  Intake(mL/kg) 1500 (20.8) 2027.5 (28.2)     Urine (mL/kg/hr) 1755 600 (0.3)     Stool 0      Total Output 1755 600     Net -255 +1427.5            Urine Occurrence  5 x     Stool Occurrence 0 x 2 x              Physical Exam  Vitals and nursing note reviewed.   Constitutional:       Appearance: She is well-developed.   HENT:      Head: Normocephalic.   Eyes:      Pupils: Pupils are equal, round, and reactive to light.   Cardiovascular:      Rate and Rhythm: Normal rate and regular rhythm.      Heart sounds: Normal heart sounds.   Pulmonary:      Effort: Pulmonary effort is normal. No respiratory distress.      Breath sounds: Normal breath sounds. No wheezing or rales.   Abdominal:      Palpations: Abdomen is soft. There is no mass.      Tenderness: There is no guarding or rebound.      Comments: Abd inc line healing well   Skin:     General: Skin is warm and dry.   Neurological:      Mental Status: She is alert and oriented to person, place, and time.            Significant Labs:  BMP (Last 3 Results):   Recent Labs   Lab 01/23/25  0419 01/28/25  0425   GLU  --  102    140   K 4.0 4.2    107   CO2 28 27   BUN 14.0 10   CREATININE 1.12* 0.7   CALCIUM 9.7 9.2   MG  --  1.8     CBC (Last 3 Results):   Recent Labs   Lab 01/28/25  0425   WBC 9.90   RBC 3.25*   HGB 10.6*   HCT 31.3*      MCV 96   MCH 32.6*   MCHC 33.9       Significant Diagnostics:  None  Assessment/Plan:     * Diverticulitis  OR 1/27 sigmoid resection    -await return of bowel function, lfd  -continue multi modality for pain control  -encourage ambulation and use of IS  -gabriele hose and SCD  -prophalactic lovenox and PPI   -dc laurent  void    Tobacco dependence  Dangers of cigarette smoking were reviewed with patient in detail. Patient was Counseled for 10 minutes or greater. Nicotine replacement options were discussed. Nicotine replacement was discussed- prescribed    Asthma  Continue home meds  Aggressive pulmonary toileting  Monitor  vs          Sammie Sheets NP  Colorectal Surgery  [facility]

## 2025-01-29 NOTE — PLAN OF CARE
Problem: Adult Inpatient Plan of Care  Goal: Plan of Care Review  1/29/2025 1423 by Lisette Michele RN  Outcome: Progressing  1/29/2025 1423 by Lisette Michele RN  Outcome: Progressing  Goal: Patient-Specific Goal (Individualized)  1/29/2025 1423 by Lisette Michele RN  Outcome: Progressing  1/29/2025 1423 by Lisette Mihcele RN  Outcome: Progressing  Goal: Absence of Hospital-Acquired Illness or Injury  1/29/2025 1423 by Lisette Michele RN  Outcome: Progressing  1/29/2025 1423 by Lisette Michele RN  Outcome: Progressing  Goal: Optimal Comfort and Wellbeing  1/29/2025 1423 by Lisette Michele RN  Outcome: Progressing  1/29/2025 1423 by Lisette Michele RN  Outcome: Progressing  Goal: Readiness for Transition of Care  1/29/2025 1423 by Lisette Michele RN  Outcome: Progressing  1/29/2025 1423 by Lisette Michele RN  Outcome: Progressing     Problem: Infection  Goal: Absence of Infection Signs and Symptoms  1/29/2025 1423 by Lisette Michele RN  Outcome: Progressing  1/29/2025 1423 by Lisette Michele RN  Outcome: Progressing     Problem: Wound  Goal: Optimal Coping  1/29/2025 1423 by Lisette Michele RN  Outcome: Progressing  1/29/2025 1423 by Lisette Michele RN  Outcome: Progressing  Goal: Optimal Functional Ability  1/29/2025 1423 by Lisette Michele RN  Outcome: Progressing  1/29/2025 1423 by Lisette Michele RN  Outcome: Progressing  Goal: Absence of Infection Signs and Symptoms  1/29/2025 1423 by Lisette Michele RN  Outcome: Progressing  1/29/2025 1423 by Lisette Michele RN  Outcome: Progressing  Goal: Improved Oral Intake  1/29/2025 1423 by Lisette Michele RN  Outcome: Progressing  1/29/2025 1423 by Lisette Michele RN  Outcome: Progressing  Goal: Optimal Pain Control and Function  1/29/2025 1423 by Lisette Michele, RN  Outcome: Progressing  1/29/2025 1423 by Lisette Michele, RN  Outcome: Progressing  Goal: Skin  Health and Integrity  1/29/2025 1423 by Lisette Michele RN  Outcome: Progressing  1/29/2025 1423 by Lisette Michele RN  Outcome: Progressing  Goal: Optimal Wound Healing  1/29/2025 1423 by Lisette Michele RN  Outcome: Progressing  1/29/2025 1423 by Lisette Michele RN  Outcome: Progressing     Problem: Fall Injury Risk  Goal: Absence of Fall and Fall-Related Injury  1/29/2025 1423 by Lisette Michele RN  Outcome: Progressing  1/29/2025 1423 by Lisette Michele RN  Outcome: Progressing

## 2025-01-29 NOTE — PLAN OF CARE
[facility] - [facility]      HOME HEALTH ORDERS  FACE TO FACE ENCOUNTER    Patient Name: Niya Moeller  YOB: 1970    PCP: No primary care provider on file.   PCP Address: No primary physician on file.  PCP Phone Number: None  PCP Fax: None    Encounter Date: 1/7/25    Admit to Home Health    Diagnoses:  Active Hospital Problems    Diagnosis  POA    *Diverticulitis [K57.92]  Yes     Chronic    Tobacco dependence [F17.200]  Yes    Asthma [J45.909]  Yes      Resolved Hospital Problems   No resolved problems to display.       Follow Up Appointments:  Future Appointments   Date Time Provider Department Center   1/30/2025  9:40 AM RESIDENT, [facility] INFECTIOUS DISEASE [facility]FDIS [place]   2/18/2025  1:20 PM ARA Clay MD [facility] COLON [facility]   3/11/2025 12:30 PM PROVIDERS, [facility]   4/17/2025 10:30 AM Corie Jimenez, ANP [facility] GYN [place] [facility]       Allergies:  Review of patient's allergies indicates:   Allergen Reactions    Latex Anaphylaxis and Edema    Zosyn [piperacillin-tazobactam] Anaphylaxis       Medications: Review discharge medications with patient and family and provide education.    Current Facility-Administered Medications   Medication Dose Route Frequency Provider Last Rate Last Admin    acetaminophen tablet 1,000 mg  1,000 mg Oral Q8H Demetria Santos MD   1,000 mg at 01/29/25 0430    albuterol sulfate nebulizer solution 2.5 mg  2.5 mg Nebulization Q4H ARA Clay MD   2.5 mg at 01/28/25 2039    albuterol sulfate nebulizer solution 2.5 mg  2.5 mg Nebulization Q4H PRN Demetria Santos MD        budesonide nebulizer solution 0.5 mg  0.5 mg Nebulization Q12H Demetria Santos MD   0.5 mg at 01/28/25 2039    fluticasone furoate-vilanteroL 200-25 mcg/dose diskus inhaler 1 puff  1 puff Inhalation Daily Demetria Santos MD        formoterol nebulizer solution 20 mcg  20 mcg Nebulization BID Demetria Santos MD        gabapentin capsule 300 mg  300 mg Oral  TID Sammie Sheets NP   300 mg at 01/29/25 0841    heparin (porcine) injection 5,000 Units  5,000 Units Subcutaneous Q8H Demetria Santos MD   5,000 Units at 01/29/25 0431    ibuprofen tablet 800 mg  800 mg Oral Q8H Demetria Santos MD   800 mg at 01/29/25 0430    montelukast chewable tablet 10 mg  10 mg Oral Daily Demetria Santos MD   10 mg at 01/29/25 0841    ondansetron injection 4 mg  4 mg Intravenous Q8H PRN Demetria Santos MD        ondansetron injection 4 mg  4 mg Intravenous Q6H PRN Demetria Santos MD        oxyCODONE immediate release tablet 5 mg  5 mg Oral Q4H PRN Demetria Santos MD        oxyCODONE immediate release tablet Tab 10 mg  10 mg Oral Q4H PRN Demetria Santos MD   10 mg at 01/29/25 0841    pantoprazole EC tablet 40 mg  40 mg Oral Daily Demetria Santso MD   40 mg at 01/29/25 0841    traMADoL tablet 50 mg  50 mg Oral Q6H PRN Demetria Santos MD            Medication List        ASK your doctor about these medications      acetaminophen 325 MG tablet  Commonly known as: TYLENOL  Take 2 tablets (650 mg total) by mouth every 4 (four) hours as needed for Pain or Temperature greater than (>101f).     albuterol 2.5 mg /3 mL (0.083 %) nebulizer solution  Commonly known as: PROVENTIL  Take 3 mLs (2.5 mg total) by nebulization every 6 (six) hours as needed for Wheezing or Shortness of Breath (shortness of breath). Rescue     bisacodyL 10 mg Supp  Commonly known as: DULCOLAX  Place 1 suppository (10 mg total) rectally daily as needed (Until bowel movement if patient has no bowel movement for 2 days).     budesonide 0.5 mg/2 mL nebulizer solution  Commonly known as: PULMICORT  Take 2 mLs (0.5 mg total) by nebulization every 12 (twelve) hours. Controller     diphenhydrAMINE-zinc acetate 2-0.1% cream  Commonly known as: Benadryl  Apply topically 3 (three) times daily as needed for Itching.     docusate sodium 50 MG capsule  Commonly known as: COLACE  Take 1 capsule (50 mg total) by mouth once  daily.     fluticasone furoate-vilanteroL 200-25 mcg/dose Dsdv diskus inhaler  Commonly known as: BREO  Inhale 1 puff into the lungs once daily. Controller     formoterol 20 mcg/2 mL Nebu  Commonly known as: PERFOROMIST  Take 2 mLs (20 mcg total) by nebulization 2 (two) times daily. Controller     HYDROcodone-acetaminophen 7.5-325 mg per tablet  Commonly known as: NORCO  Take 1 tablet by mouth every 6 (six) hours as needed for Pain.     montelukast 5 MG chewable tablet  Commonly known as: SINGULAIR  Take 2 tablets (10 mg total) by mouth once daily.     nicotine 21 mg/24 hr  Commonly known as: NICODERM CQ  Place 1 patch onto the skin once daily.     ondansetron 4 mg/2 mL Soln  Inject 4 mg into the vein every 8 (eight) hours as needed (nausea).     pantoprazole 40 MG tablet  Commonly known as: PROTONIX  Take 1 tablet (40 mg total) by mouth once daily.                I have seen and examined this patient within the last 30 days. My clinical findings that support the need for the home health skilled services and home bound status are the following:no   Weakness/numbness causing balance and gait disturbance due to Surgery making it taxing to leave home.     Diet:   regular diet    Labs:  na    Referrals/ Consults  Physical Therapy to evaluate and treat. Evaluate for home safety and equipment needs; Establish/upgrade home exercise program. Perform / instruct on therapeutic exercises, gait training, transfer training, and Range of Motion.  Occupational Therapy to evaluate and treat. Evaluate home environment for safety and equipment needs. Perform/Instruct on transfers, ADL training, ROM, and therapeutic exercises.   to evaluate for community resources/long-range planning.    Activities:   no strenuous exercise for 6 weeks    Nursing:   Agency to admit patient within 24 hours of hospital discharge unless specified on physician order or at patient request    SN to complete comprehensive assessment including  routine vital signs. Instruct on disease process and s/s of complications to report to MD. Review/verify medication list sent home with the patient at time of discharge  and instruct patient/caregiver as needed. Frequency may be adjusted depending on start of care date.     Skilled nurse to perform up to 3 visits PRN for symptoms related to diagnosis    Notify MD if SBP > 160 or < 90; DBP > 90 or < 50; HR > 120 or < 50; Temp > 101; O2 < 88%; Other:       Ok to schedule additional visits based on staff availability and patient request on consecutive days within the home health episode.    When multiple disciplines ordered:    Start of Care occurs on Sunday - Wednesday schedule remaining discipline evaluations as ordered on separate consecutive days following the start of care.    Thursday SOC -schedule subsequent evaluations Friday and Monday the following week.     Friday - Saturday SOC - schedule subsequent discipline evaluations on consecutive days starting Monday of the following week.    For all post-discharge communication and subsequent orders please contact patient's primary care physician. If unable to reach primary care physician or do not receive response within 30 minutes, please contact Dr. Clay for clinical staff order clarification          Nursing Three times weekly    Wound Care Orders  yes:  Surgical Wound:  Location: abd  Cleanse wound with wound cleanse and keep clean and dry  Resume prior home health orders   May shower, no tub bath        I certify that this patient is confined to her home and needs intermittent skilled nursing care.

## 2025-01-29 NOTE — SUBJECTIVE & OBJECTIVE
Subjective:     Interval History: no acute events overnite, alton diet, pos for flatus, adequate pain control    Post-Op Info:  Procedure(s) (LRB):  SIGMOIDOSCOPY, FLEXIBLE (N/A)  CATHETERIZATION, URETER (Bilateral)  COLECTOMY, SIGMOID, LAPAROSCOPIC  MOBILIZATION, SPLENIC FLEXURE, LAPAROSCOPIC (N/A)   2 Days Post-Op      Medications:  Continuous Infusions:  Scheduled Meds:   acetaminophen  1,000 mg Oral Q8H    albuterol sulfate  2.5 mg Nebulization Q4H    budesonide  0.5 mg Nebulization Q12H    fluticasone furoate-vilanteroL  1 puff Inhalation Daily    formoterol  20 mcg Nebulization BID    gabapentin  300 mg Oral TID    heparin (porcine)  5,000 Units Subcutaneous Q8H    ibuprofen  800 mg Oral Q8H    montelukast  10 mg Oral Daily    pantoprazole  40 mg Oral Daily     PRN Meds:   acetaminophen tablet 1,000 mg    albuterol sulfate nebulizer solution 2.5 mg    budesonide nebulizer solution 0.5 mg    fluticasone furoate-vilanteroL 200-25 mcg/dose diskus inhaler 1 puff    formoterol nebulizer solution 20 mcg    gabapentin capsule 300 mg    heparin (porcine) injection 5,000 Units    ibuprofen tablet 800 mg    montelukast chewable tablet 10 mg    pantoprazole EC tablet 40 mg        Objective:     Vital Signs (Most Recent):  Temp: 98.1 °F (36.7 °C) (01/29/25 0840)  Pulse: 82 (01/29/25 0840)  Resp: 20 (01/29/25 0841)  BP: 133/66 (01/29/25 0840)  SpO2: 97 % (01/29/25 0840) Vital Signs (24h Range):  Temp:  [97.8 °F (36.6 °C)-98.6 °F (37 °C)] 98.1 °F (36.7 °C)  Pulse:  [75-98] 82  Resp:  [16-20] 20  SpO2:  [92 %-100 %] 97 %  BP: ()/(55-66) 133/66     Intake/Output - Last 3 Shifts         01/27 0700  01/28 0659 01/28 0700  01/29 0659 01/29 0700  01/30 0659    P.O.  990     IV Piggyback 1500 1037.5     Total Intake(mL/kg) 1500 (20.8) 2027.5 (28.2)     Urine (mL/kg/hr) 1755 600 (0.3)     Stool 0      Total Output 1755 600     Net -255 +1427.5            Urine Occurrence  5 x     Stool Occurrence 0 x 2 x              Physical  Exam  Vitals and nursing note reviewed.   Constitutional:       Appearance: She is well-developed.   HENT:      Head: Normocephalic.   Eyes:      Pupils: Pupils are equal, round, and reactive to light.   Cardiovascular:      Rate and Rhythm: Normal rate and regular rhythm.      Heart sounds: Normal heart sounds.   Pulmonary:      Effort: Pulmonary effort is normal. No respiratory distress.      Breath sounds: Normal breath sounds. No wheezing or rales.   Abdominal:      Palpations: Abdomen is soft. There is no mass.      Tenderness: There is no guarding or rebound.      Comments: Abd inc line healing well   Skin:     General: Skin is warm and dry.   Neurological:      Mental Status: She is alert and oriented to person, place, and time.            Significant Labs:  BMP (Last 3 Results):   Recent Labs   Lab 01/23/25  0419 01/28/25  0425   GLU  --  102    140   K 4.0 4.2    107   CO2 28 27   BUN 14.0 10   CREATININE 1.12* 0.7   CALCIUM 9.7 9.2   MG  --  1.8     CBC (Last 3 Results):   Recent Labs   Lab 01/28/25  0425   WBC 9.90   RBC 3.25*   HGB 10.6*   HCT 31.3*      MCV 96   MCH 32.6*   MCHC 33.9       Significant Diagnostics:  None

## 2025-01-30 VITALS
BODY MASS INDEX: 32 KG/M2 | DIASTOLIC BLOOD PRESSURE: 66 MMHG | HEART RATE: 78 BPM | SYSTOLIC BLOOD PRESSURE: 113 MMHG | OXYGEN SATURATION: 97 % | HEIGHT: 59 IN | TEMPERATURE: 97 F | WEIGHT: 158.75 LBS | RESPIRATION RATE: 17 BRPM

## 2025-01-30 LAB
CRP SERPL-MCNC: 89.2 MG/L (ref 0–8.2)
FINAL PATHOLOGIC DIAGNOSIS: NORMAL
GROSS: NORMAL
Lab: NORMAL

## 2025-01-30 PROCEDURE — 94640 AIRWAY INHALATION TREATMENT: CPT

## 2025-01-30 PROCEDURE — 99900035 HC TECH TIME PER 15 MIN (STAT)

## 2025-01-30 PROCEDURE — 25000003 PHARM REV CODE 250: Performed by: STUDENT IN AN ORGANIZED HEALTH CARE EDUCATION/TRAINING PROGRAM

## 2025-01-30 PROCEDURE — 25000003 PHARM REV CODE 250: Performed by: NURSE PRACTITIONER

## 2025-01-30 PROCEDURE — 86140 C-REACTIVE PROTEIN: CPT | Performed by: STUDENT IN AN ORGANIZED HEALTH CARE EDUCATION/TRAINING PROGRAM

## 2025-01-30 PROCEDURE — 25000242 PHARM REV CODE 250 ALT 637 W/ HCPCS: Performed by: COLON & RECTAL SURGERY

## 2025-01-30 PROCEDURE — 94799 UNLISTED PULMONARY SVC/PX: CPT

## 2025-01-30 PROCEDURE — 94761 N-INVAS EAR/PLS OXIMETRY MLT: CPT

## 2025-01-30 PROCEDURE — 25000242 PHARM REV CODE 250 ALT 637 W/ HCPCS: Performed by: STUDENT IN AN ORGANIZED HEALTH CARE EDUCATION/TRAINING PROGRAM

## 2025-01-30 PROCEDURE — 63600175 PHARM REV CODE 636 W HCPCS: Performed by: STUDENT IN AN ORGANIZED HEALTH CARE EDUCATION/TRAINING PROGRAM

## 2025-01-30 RX ORDER — OXYCODONE HYDROCHLORIDE 5 MG/1
5 TABLET ORAL EVERY 4 HOURS PRN
Qty: 20 TABLET | Refills: 0 | Status: SHIPPED | OUTPATIENT
Start: 2025-01-30

## 2025-01-30 RX ORDER — GABAPENTIN 300 MG/1
300 CAPSULE ORAL 3 TIMES DAILY
Qty: 90 CAPSULE | Refills: 0 | Status: SHIPPED | OUTPATIENT
Start: 2025-01-30 | End: 2026-01-30

## 2025-01-30 RX ADMIN — ALBUTEROL SULFATE 2.5 MG: 2.5 SOLUTION RESPIRATORY (INHALATION) at 12:01

## 2025-01-30 RX ADMIN — PANTOPRAZOLE SODIUM 40 MG: 40 TABLET, DELAYED RELEASE ORAL at 09:01

## 2025-01-30 RX ADMIN — HEPARIN SODIUM 5000 UNITS: 5000 INJECTION INTRAVENOUS; SUBCUTANEOUS at 06:01

## 2025-01-30 RX ADMIN — GABAPENTIN 300 MG: 300 CAPSULE ORAL at 09:01

## 2025-01-30 RX ADMIN — IBUPROFEN 800 MG: 400 TABLET ORAL at 06:01

## 2025-01-30 RX ADMIN — MONTELUKAST SODIUM 10 MG: 5 TABLET, CHEWABLE ORAL at 09:01

## 2025-01-30 RX ADMIN — ACETAMINOPHEN 1000 MG: 500 TABLET ORAL at 06:01

## 2025-01-30 RX ADMIN — FLUTICASONE FUROATE AND VILANTEROL TRIFENATATE 1 PUFF: 200; 25 POWDER RESPIRATORY (INHALATION) at 07:01

## 2025-01-30 RX ADMIN — ALBUTEROL SULFATE 2.5 MG: 2.5 SOLUTION RESPIRATORY (INHALATION) at 07:01

## 2025-01-30 RX ADMIN — TRAMADOL HYDROCHLORIDE 50 MG: 50 TABLET, COATED ORAL at 04:01

## 2025-01-30 RX ADMIN — OXYCODONE HYDROCHLORIDE 10 MG: 10 TABLET ORAL at 09:01

## 2025-01-30 RX ADMIN — ALBUTEROL SULFATE 2.5 MG: 2.5 SOLUTION RESPIRATORY (INHALATION) at 04:01

## 2025-01-30 RX ADMIN — BUDESONIDE 0.5 MG: 0.5 INHALANT RESPIRATORY (INHALATION) at 07:01

## 2025-01-30 NOTE — HOSPITAL COURSE
Niya Moeller is a 54 y.o. female with medically refractory diverticular disease has been in the hospital for the past month receiving meropenem.  She presented for elective sigmoid resection.  She had a normal post op course.  Once bowel function resumed diet was advanced.  On day of discharge pt is alton regular diet, inc line healing well, positive for bm with flatus, ambulating in [place] without difficulty, adequate pain control with oral medication, VS stable and afebrile.     FU 2 weeks Dr. Clay

## 2025-01-30 NOTE — PLAN OF CARE
Evin y - [facility]  Discharge Final Note    Primary Care Provider: No primary care provider on file.  Expected Discharge Date: 1/30/2025    Patient medically ready for discharge to home. Patient wanted transport to [address] [place] [address].     Transportation by [employer].     Is family/patient aware of discharge: yes     Hospital follow up scheduled: yes       Final Discharge Note (most recent)       Final Note - 01/30/25 1427          Final Note    Assessment Type Final Discharge Note     Anticipated Discharge Disposition Home or Self Care     Hospital Resources/Appts/Education Provided Provided patient/caregiver with written discharge plan information                     Important Message from Medicare         Referral Info (most recent)       Referral Info    No documentation.                 Contact Info       ARA Clay MD   Specialty: Colon and Rectal Surgery    [facility] [facility] [address]   Phone: [phone]       Next Steps: Follow up in 2 week(s)          Future Appointments   Date Time Provider Department Center   2/18/2025  1:20 PM ARA Clay MD [facility] COLON [facility]   2/27/2025 11:00 AM RESIDENT, [facility] INFECTIOUS DISEASE [facility] INFDIS [facility] [facility]   3/11/2025 12:30 PM PROVIDERS, [facility] [place]   4/17/2025 10:30 AM Corie Jimenez, ANP [facility] GYN [place]     Hair Graves RN  Case Management  Ext: [phone]  01/30/2025

## 2025-01-30 NOTE — PLAN OF CARE
Evin In[facility]  Discharge Reassessment    Primary Care Provider: No primary care provider on file.    Expected Discharge Date: 1/30/2025    Reassessment (most recent)       Discharge Reassessment - 01/30/25 1425          Discharge Reassessment    Assessment Type Discharge Planning Reassessment     Did the patient's condition or plan change since previous assessment? No     Discharge Plan discussed with: Patient     Communicated ROXIE with patient/caregiver Yes     Discharge Plan A Home with family     Discharge Plan B Home with family     Transition of Care Barriers None     Why the patient remains in the hospital Requires continued medical care                   Spoke with patient at 10:30 AM. Notified patient we will get her transported back home. Notified patient we will get her meds before discharge as well. All patients questions and concerns answered.

## 2025-01-30 NOTE — DISCHARGE SUMMARY
"[facility]  Colorectal Surgery  Discharge Summary      Patient Name: Niya Moeller  MRN: [id number]  Admission Date: 1/27/2025  Hospital Length of Stay: 3 days  Discharge Date and Time: No discharge date for patient encounter.  Attending Physician: ARA Lorenzana MD   Discharging Provider: Nabeel Rodriguez NP  Primary Care Provider: No primary care provider on file.     HPI:  No notes on file    Procedure(s) (LRB):  SIGMOIDOSCOPY, FLEXIBLE (N/A)  CATHETERIZATION, URETER (Bilateral)  COLECTOMY, SIGMOID, LAPAROSCOPIC  MOBILIZATION, SPLENIC FLEXURE, LAPAROSCOPIC (N/A)     Hospital Course:  Niya Moeller is a 54 y.o. female with medically refractory diverticular disease has been in the hospital for the past month receiving meropenem.  She presented for elective sigmoid resection.  She had a normal post op course.  Once bowel function resumed diet was advanced.  On day of discharge pt is alton regular diet, inc line healing well, positive for bm with flatus, ambulating in [place] without difficulty, adequate pain control with oral medication, VS stable and afebrile.     FU 2 weeks Dr. Lorenzana    Goals of Care Treatment Preferences:  Code Status: Full Code      Consults (From admission, onward)          Status Ordering Provider     Inpatient consult to Social Work  Once        Provider:  (Not yet assigned)    Ordered NABEEL RODRIGUEZ     Inpatient consult to Social Work/Case Management  Once        Provider:  (Not yet assigned)    Acknowledged ARA LORENZANA            Significant Diagnostic Studies: Labs: BMP: No results for input(s): "GLU", "NA", "K", "CL", "CO2", "BUN", "CREATININE", "CALCIUM", "MG" in the last 48 hours. and CBC No results for input(s): "WBC", "HGB", "HCT", "PLT" in the last 48 hours.    Pending Diagnostic Studies:       Procedure Component Value Units Date/Time    Specimen to Pathology, Surgery Other (CRS) [[id number]] Collected: 01/27/25 1711    Order Status: Sent Lab Status: In " process Updated: 01/28/25 0947    Specimen: Tissue           Final Active Diagnoses:    Diagnosis Date Noted POA    PRINCIPAL PROBLEM:  Diverticulitis [K57.92] 07/06/2022 Yes     Chronic    Tobacco dependence [F17.200] 03/13/2024 Yes    Asthma [J45.909] 02/19/2023 Yes      Problems Resolved During this Admission:      Discharged Condition: good    [place] [facility] [place]:   Follow-up Information       ARA Clay MD Follow up in 2 week(s).    Specialty: Colon and Rectal Surgery  Contact information:  [address] [place] [id number]  [phone]                           Patient Instructions:      Diet Adult Regular   Order Comments: Low fiber, no fresh fruit or fresh vegetables, no nuts, grapes, popcorn or raisins     Lifting restrictions   Order Comments: No lifting anything greater than 5 pounds     No dressing needed   Order Comments: May shower, no tub bath     Notify your health care provider if you experience any of the following:  temperature >100.4     Notify your health care provider if you experience any of the following:  persistent nausea and vomiting or diarrhea     Notify your health care provider if you experience any of the following:  severe uncontrolled pain     Notify your health care provider if you experience any of the following:  redness, tenderness, or signs of infection (pain, swelling, redness, odor or green/yellow discharge around incision site)     Notify your health care provider if you experience any of the following:  difficulty breathing or increased cough     Notify your health care provider if you experience any of the following:  severe persistent headache     Notify your health care provider if you experience any of the following:  worsening rash     Notify your health care provider if you experience any of the following:  persistent dizziness, light-headedness, or visual disturbances     Notify your health care provider if you  experience any of the following:  increased confusion or weakness     Reason for not Ordering Smoking Cessation Referral     Order Specific Question Answer Comments   Reason for not ordering: Patient refused      Medications:  Reconciled Home Medications:      Medication List        START taking these medications      gabapentin 300 MG capsule  Commonly known as: NEURONTIN  Take 1 capsule (300 mg total) by mouth 3 (three) times daily.     oxyCODONE 5 MG immediate release tablet  Commonly known as: ROXICODONE  Take 1 tablet (5 mg total) by mouth every 4 (four) hours as needed for Pain.            CONTINUE taking these medications      acetaminophen 325 MG tablet  Commonly known as: TYLENOL  Take 2 tablets (650 mg total) by mouth every 4 (four) hours as needed for Pain or Temperature greater than (>101f).     albuterol 2.5 mg /3 mL (0.083 %) nebulizer solution  Commonly known as: PROVENTIL  Take 3 mLs (2.5 mg total) by nebulization every 6 (six) hours as needed for Wheezing or Shortness of Breath (shortness of breath). Rescue     bisacodyL 10 mg Supp  Commonly known as: DULCOLAX  Place 1 suppository (10 mg total) rectally daily as needed (Until bowel movement if patient has no bowel movement for 2 days).     diphenhydrAMINE-zinc acetate 2-0.1% cream  Commonly known as: Benadryl  Apply topically 3 (three) times daily as needed for Itching.     docusate sodium 50 MG capsule  Commonly known as: COLACE  Take 1 capsule (50 mg total) by mouth once daily.     HYDROcodone-acetaminophen 7.5-325 mg per tablet  Commonly known as: NORCO  Take 1 tablet by mouth every 6 (six) hours as needed for Pain.     nicotine 21 mg/24 hr  Commonly known as: NICODERM CQ  Place 1 patch onto the skin once daily.            STOP taking these medications      budesonide 0.5 mg/2 mL nebulizer solution  Commonly known as: PULMICORT            ASK your doctor about these medications      fluticasone furoate-vilanteroL 200-25 mcg/dose Dsdv diskus  inhaler  Commonly known as: BREO  Inhale 1 puff into the lungs once daily. Controller     formoterol 20 mcg/2 mL Nebu  Commonly known as: PERFOROMIST  Take 2 mLs (20 mcg total) by nebulization 2 (two) times daily. Controller     montelukast 5 MG chewable tablet  Commonly known as: SINGULAIR  Take 2 tablets (10 mg total) by mouth once daily.     ondansetron 4 mg/2 mL Soln  Inject 4 mg into the vein every 8 (eight) hours as needed (nausea).     pantoprazole 40 MG tablet  Commonly known as: PROTONIX  Take 1 tablet (40 mg total) by mouth once daily.              Sammie Sheets NP  Colorectal Surgery  [facility]

## 2025-02-03 RX ORDER — FLUCONAZOLE 100 MG/1
100 TABLET ORAL DAILY
Qty: 7 TABLET | Refills: 0 | Status: ON HOLD | OUTPATIENT
Start: 2025-02-03 | End: 2025-03-04 | Stop reason: HOSPADM

## 2025-02-05 ENCOUNTER — PATIENT MESSAGE (OUTPATIENT)
Dept: SURGERY | Facility: CLINIC | Age: 55
End: 2025-02-05
Payer: MEDICAID

## 2025-02-05 DIAGNOSIS — N76.0 BV (BACTERIAL VAGINOSIS): Primary | ICD-10-CM

## 2025-02-05 DIAGNOSIS — B96.89 BV (BACTERIAL VAGINOSIS): Primary | ICD-10-CM

## 2025-02-05 RX ORDER — IBUPROFEN 800 MG/1
800 TABLET ORAL 3 TIMES DAILY
Qty: 30 TABLET | Refills: 0 | Status: SHIPPED | OUTPATIENT
Start: 2025-02-05 | End: 2025-02-15

## 2025-02-10 NOTE — PLAN OF CARE
Problem: Adult Inpatient Plan of Care  Goal: Plan of Care Review  Outcome: Ongoing, Progressing  Goal: Patient-Specific Goal (Individualized)  Outcome: Ongoing, Progressing  Goal: Absence of Hospital-Acquired Illness or Injury  Outcome: Ongoing, Progressing  Goal: Optimal Comfort and Wellbeing  Outcome: Ongoing, Progressing  Goal: Readiness for Transition of Care  Outcome: Ongoing, Progressing      Hpi Title: Evaluation of Skin Lesions

## 2025-02-11 ENCOUNTER — PATIENT MESSAGE (OUTPATIENT)
Dept: SURGERY | Facility: CLINIC | Age: 55
End: 2025-02-11
Payer: MEDICAID

## 2025-02-12 ENCOUNTER — PATIENT MESSAGE (OUTPATIENT)
Dept: SURGERY | Facility: CLINIC | Age: 55
End: 2025-02-12
Payer: MEDICAID

## 2025-02-12 ENCOUNTER — PATIENT MESSAGE (OUTPATIENT)
Dept: GYNECOLOGY | Facility: CLINIC | Age: 55
End: 2025-02-12
Payer: MEDICAID

## 2025-02-18 ENCOUNTER — OFFICE VISIT (OUTPATIENT)
Dept: SURGERY | Facility: CLINIC | Age: 55
End: 2025-02-18
Payer: MEDICAID

## 2025-02-18 VITALS
SYSTOLIC BLOOD PRESSURE: 99 MMHG | BODY MASS INDEX: 31.07 KG/M2 | HEIGHT: 59 IN | DIASTOLIC BLOOD PRESSURE: 52 MMHG | WEIGHT: 154.13 LBS | HEART RATE: 70 BPM

## 2025-02-18 DIAGNOSIS — K57.92 DIVERTICULITIS: Primary | ICD-10-CM

## 2025-02-18 PROCEDURE — 99024 POSTOP FOLLOW-UP VISIT: CPT | Mod: ,,, | Performed by: COLON & RECTAL SURGERY

## 2025-02-18 PROCEDURE — 3074F SYST BP LT 130 MM HG: CPT | Mod: CPTII,,, | Performed by: COLON & RECTAL SURGERY

## 2025-02-18 PROCEDURE — 3078F DIAST BP <80 MM HG: CPT | Mod: CPTII,,, | Performed by: COLON & RECTAL SURGERY

## 2025-02-18 PROCEDURE — 1159F MED LIST DOCD IN RCRD: CPT | Mod: CPTII,,, | Performed by: COLON & RECTAL SURGERY

## 2025-02-18 PROCEDURE — 99214 OFFICE O/P EST MOD 30 MIN: CPT | Mod: PBBFAC | Performed by: COLON & RECTAL SURGERY

## 2025-02-18 PROCEDURE — 1160F RVW MEDS BY RX/DR IN RCRD: CPT | Mod: CPTII,,, | Performed by: COLON & RECTAL SURGERY

## 2025-02-18 NOTE — PROGRESS NOTES
CRS Followup Visit    Referring Md:   Delaney Morris, Np  [address] [facility] [place] [address]    SUBJECTIVE:       History of Present Illness:    Course is as follows:  Patient is a 54 y.o. female presents with diverticulitis.  She has a 5-6 year history of severe diverticular disease resulting in intermittent hospitalizations 2-3 times per year.  Over the past year, she has had 4 episodes of micro perforation.  She has longstanding constipation and has bowel movements every other day.  No issues with fecal incontinence.  Her weight has been stable fluctuating from 130-1.  Prior heart attack or stroke.  No prior abdominal surgeries.  Smokes half a pack per day.  Does have asthma that is well-controlled.  She is functional and able to perform all of her activities of daily living.    1/24/25:  Remains in rehab.  Ambulatory.  Albumin is maintaining.  Ongoing left lower quadrant pain.  On meropenem.    1/27/25: lap sigmoid colectomy for diverticulitis   - pathology consistent with diverticulitis.     2/18/25:  She presents for evaluation.  Improved asthma.  Off antibiotics.  No fevers.  Incisions healing well.  Having some pain as expected.  Intermittent constipation.  No bleeding in her bowel movements.    Last Colonoscopy: 4/8/2024  Impression:            - Diverticulosis in the sigmoid colon and from 15                          to 25 cm proximal to the anus. There was narrowing                          of the colon with significant edema in association                          with the diverticular opening. Erythema was seen                          in association with the diverticular opening.                          Tattooed.                          - Diverticulosis in the descending colon and in                          the ascending colon.                          - Internal hemorrhoids.                          - No specimens collected.    - areas proximal and distal to  "diverticular disease were tattooed.      Review of Systems:  Review of Systems   Constitutional:  Negative for chills, diaphoresis, fever, malaise/fatigue and weight loss.   HENT:  Negative for congestion.    Respiratory:  Negative for shortness of breath.    Cardiovascular:  Negative for chest pain and leg swelling.   Gastrointestinal:  Positive for abdominal pain. Negative for blood in stool, constipation, nausea and vomiting.   Genitourinary:  Negative for dysuria.   Musculoskeletal:  Negative for back pain and myalgias.   Skin:  Negative for rash.   Neurological:  Negative for dizziness and weakness.   Endo/Heme/Allergies:  Does not bruise/bleed easily.   Psychiatric/Behavioral:  Negative for depression.        OBJECTIVE:       Vital Signs (Most Recent)  BP (!) 99/52 (BP Location: Left arm, Patient Position: Sitting)   Pulse 70   Ht 4' 11" (1.499 m)   Wt 69.9 kg (154 lb 1.6 oz)   BMI 31.12 kg/m²         Physical Exam:  General: Black or  female in no distress   Neuro: alert and oriented x 4.  Moves all extremities.     HEENT: no icterus.  Trachea midline  Respiratory: respirations are even and unlabored  Cardiac: regular rate  Abdomen:  Soft, nontender, incisions healing well.  Steri-Strips removed.  Extremities: Warm dry and intact  Skin: no rashes  Anorectal:  Deferred      Labs:  H&H 13 and 39.  Albumin 3.3.  Normal renal function.    Imaging:   CT abdomen pelvis 12/21/2024: Personally reviewed:  - Numerous diverticula are seen in the hepatic flexure through to the descending. There is stable focal wall thickening in the distal sigmoid colon along with unchanged mild pericolic fat stranding   CT abdomen pelvis 10/12/2023: Distal descending and sigmoid colon irregular mural thickening narrowing the lumen with about similar appearance.       ASSESSMENT/PLAN:     Diagnoses and all orders for this visit:    Diverticulitis            54 y.o. woman with longstanding diverticular disease now " refractory to long-term antibiotic therapy.  She is now on IV antibiotics with some resolution.  Weight has been stable.      She underwent laparoscopic sigmoid resection on 1/27/25.      She is overall doing very well.  Okay to increase her activity.  Okay to take MiraLax to help with the constipation.  I will see her back in 4 weeks with a virtual visit for a final checkup.          ARA Clay MD, FACS, FASCRS  Staff Surgeon  Colon & Rectal Surgery

## 2025-02-20 ENCOUNTER — PATIENT MESSAGE (OUTPATIENT)
Dept: GYNECOLOGY | Facility: CLINIC | Age: 55
End: 2025-02-20
Payer: MEDICAID

## 2025-02-24 ENCOUNTER — OFFICE VISIT (OUTPATIENT)
Dept: GYNECOLOGY | Facility: CLINIC | Age: 55
End: 2025-02-24
Payer: MEDICAID

## 2025-02-24 VITALS
TEMPERATURE: 99 F | HEIGHT: 59 IN | WEIGHT: 154 LBS | HEART RATE: 83 BPM | OXYGEN SATURATION: 100 % | RESPIRATION RATE: 20 BRPM | SYSTOLIC BLOOD PRESSURE: 90 MMHG | DIASTOLIC BLOOD PRESSURE: 53 MMHG | BODY MASS INDEX: 31.04 KG/M2

## 2025-02-24 DIAGNOSIS — R30.0 DYSURIA: ICD-10-CM

## 2025-02-24 DIAGNOSIS — Z12.31 VISIT FOR SCREENING MAMMOGRAM: ICD-10-CM

## 2025-02-24 DIAGNOSIS — N89.8 VAGINAL DISCHARGE: ICD-10-CM

## 2025-02-24 DIAGNOSIS — Z01.419 ENCOUNTER FOR ANNUAL ROUTINE GYNECOLOGICAL EXAMINATION: Primary | ICD-10-CM

## 2025-02-24 LAB
BACTERIA #/AREA URNS AUTO: ABNORMAL /HPF
BILIRUB UR QL STRIP.AUTO: NEGATIVE
CLARITY UR: CLEAR
CLUE CELLS VAG QL WET PREP: ABNORMAL
COLOR UR AUTO: YELLOW
GLUCOSE UR QL STRIP: NORMAL
HGB UR QL STRIP: NEGATIVE
HYALINE CASTS #/AREA URNS LPF: ABNORMAL /LPF
KETONES UR QL STRIP: NEGATIVE
LEUKOCYTE ESTERASE UR QL STRIP: NEGATIVE
MUCOUS THREADS URNS QL MICRO: ABNORMAL /LPF
NITRITE UR QL STRIP: NEGATIVE
PH UR STRIP: 5 [PH]
PROT UR QL STRIP: NEGATIVE
RBC #/AREA URNS AUTO: ABNORMAL /HPF
SP GR UR STRIP.AUTO: 1.02 (ref 1–1.03)
SQUAMOUS #/AREA URNS LPF: ABNORMAL /HPF
T VAGINALIS VAG QL WET PREP: ABNORMAL
UROBILINOGEN UR STRIP-ACNC: NORMAL
WBC #/AREA URNS AUTO: ABNORMAL /HPF
WBC #/AREA VAG WET PREP: ABNORMAL
YEAST SPEC QL WET PREP: ABNORMAL

## 2025-02-24 PROCEDURE — 81001 URINALYSIS AUTO W/SCOPE: CPT | Performed by: NURSE PRACTITIONER

## 2025-02-24 PROCEDURE — 99396 PREV VISIT EST AGE 40-64: CPT | Mod: S$PBB,,, | Performed by: NURSE PRACTITIONER

## 2025-02-24 PROCEDURE — 1159F MED LIST DOCD IN RCRD: CPT | Mod: CPTII,,, | Performed by: NURSE PRACTITIONER

## 2025-02-24 PROCEDURE — 1111F DSCHRG MED/CURRENT MED MERGE: CPT | Mod: CPTII,,, | Performed by: NURSE PRACTITIONER

## 2025-02-24 PROCEDURE — 3074F SYST BP LT 130 MM HG: CPT | Mod: CPTII,,, | Performed by: NURSE PRACTITIONER

## 2025-02-24 PROCEDURE — 3078F DIAST BP <80 MM HG: CPT | Mod: CPTII,,, | Performed by: NURSE PRACTITIONER

## 2025-02-24 PROCEDURE — 87210 SMEAR WET MOUNT SALINE/INK: CPT | Performed by: NURSE PRACTITIONER

## 2025-02-24 PROCEDURE — 99215 OFFICE O/P EST HI 40 MIN: CPT | Mod: PBBFAC | Performed by: NURSE PRACTITIONER

## 2025-02-24 PROCEDURE — 3008F BODY MASS INDEX DOCD: CPT | Mod: CPTII,,, | Performed by: NURSE PRACTITIONER

## 2025-02-24 NOTE — PROGRESS NOTES
"  [facility] -  Gynecology / Women's Health Clinic      Subjective:       Patient ID: Niya Moeller is a 54 y.o. female.    Chief Complaint:  annual    History of Present Illness  The patient  here for annual exam. Pt is postmenopausal x3 years. Denies history of abnormal paps. Last pap was  in NIL and HPV neg. Pt needs MG. Denies breast or urinary complaints. Denies pelvic pain, abnormal bleeding. Pt reports no STIs in the past and no concerns. Dep. screening 0. Denies fly hx of breast, ovarian, uterine or colon cancer. Recent sigmoid resection in January. Today, pt c/o dysuria and vaginal discharge and itching following surgery, feels may be related to having catheter. She was prescribed Diflucan x5 days per surgeon. Not sexually active. Uses any type of soap.    GYN & OB History  No LMP recorded. Patient is postmenopausal.   Date of Last Pap: 2023    Review of patient's allergies indicates:   Allergen Reactions    Latex Anaphylaxis and Edema    Zosyn [piperacillin-tazobactam] Anaphylaxis     Past Medical History:   Diagnosis Date    Asthma     Diverticulitis     Diverticulosis     GERD (gastroesophageal reflux disease)     Graves disease      OB History    Para Term  AB Living   1 1       SAB IAB Ectopic Multiple Live Births             # Outcome Date GA Lbr Vickey/2nd Weight Sex Type Anes PTL Lv   1 Para                 Review of Systems  Review of Systems    Negative except for pertinent findings for positives per HPI     Objective:    Physical Exam    BP (!) 90/53 (BP Location: Left arm, Patient Position: Sitting)   Pulse 83   Temp 98.6 °F (37 °C) (Oral)   Resp 20   Ht 4' 11" (1.499 m)   Wt 69.9 kg (154 lb)   SpO2 100%   BMI 31.10 kg/m²   GENERAL: Well-developed female. No acute distress.    SKIN: Normal to inspection, warm and intact.  BREASTS: No rashes or erythema. No masses, lumps, discharge, tenderness.  VULVA: General appearance normal; external " genitalia with no lesions or erythema.  VAGINA: Mucosa/vaginal vault atrophic, no abnormal discharge or lesions.  CERVIX: atrophic, parous appearing os, no erythema or abnormal discharge.  PSYCHIATRIC: Patient is oriented to person, place, and time. Mood and affect are normal.    PT declined pelvic exam d/t recent surgery    Assessment:         ICD-10-CM ICD-9-CM   1. Encounter for annual routine gynecological examination  Z01.419 V72.31   2. Visit for screening mammogram  Z12.31 V76.12   3. Dysuria  R30.0 788.1   4. Vaginal discharge  N89.8 623.5     Plan:   Niya was seen today for possible uti.    Diagnoses and all orders for this visit:    Encounter for annual routine gynecological examination    Visit for screening mammogram  -     Mammo Digital Screening Bilat w/ Arash (XPD); Future    Dysuria  -     Urinalysis, Reflex to Urine Culture    Vaginal discharge  -     Wet Prep, Genital    Pelvic today, pap utd per ACOG  MG ordered  U/A ordered  Wet prep   Follow up in about 1 year (around 2/24/2026) for annual exam.

## 2025-02-25 ENCOUNTER — RESULTS FOLLOW-UP (OUTPATIENT)
Dept: GYNECOLOGY | Facility: CLINIC | Age: 55
End: 2025-02-25

## 2025-02-25 ENCOUNTER — TELEPHONE (OUTPATIENT)
Dept: GYNECOLOGY | Facility: CLINIC | Age: 55
End: 2025-02-25
Payer: MEDICAID

## 2025-02-25 DIAGNOSIS — B96.89 BV (BACTERIAL VAGINOSIS): Primary | ICD-10-CM

## 2025-02-25 DIAGNOSIS — N76.0 BV (BACTERIAL VAGINOSIS): Primary | ICD-10-CM

## 2025-02-25 RX ORDER — METRONIDAZOLE 500 MG/1
500 TABLET ORAL 2 TIMES DAILY
Qty: 14 TABLET | Refills: 0 | Status: SHIPPED | OUTPATIENT
Start: 2025-02-25 | End: 2025-02-25

## 2025-02-25 RX ORDER — CLINDAMYCIN PHOSPHATE 20 MG/G
CREAM VAGINAL NIGHTLY
Qty: 40 G | Refills: 0 | Status: SHIPPED | OUTPATIENT
Start: 2025-02-25 | End: 2025-03-04

## 2025-02-25 NOTE — TELEPHONE ENCOUNTER
Pt notified that her Rx is at the pharmacy. Pt voiced understanding.       ----- Message from Zabrina sent at 2/25/2025 11:14 AM CST -----  Type:  Pharmacy Calling to Clarify an RXName of Caller:[facility] Name:clindamycin (CLINDESSE) 2 % vaginal creamWhat do they need to clarify?:CLINDESSE) is a one day Best Call Back Number:[phone] Information: call back

## 2025-02-25 NOTE — TELEPHONE ENCOUNTER
Pt notified and voiced she can not take Flagyl. Pt voiced having an allergy to flagyl. Please advise, thank you.         ----- Message from GEORGI Lamb sent at 2/25/2025  8:21 AM CST -----  Swab showed clue cells indicating bacterial vaginosis. Not an STD but an infection in the vaginal area when pH is disrupted. Rx sent for Flagyl. No alcohol during and for 48-72 hours after treatment.   Use unscented soap and no scented products. More showers than baths. No douching.    ----- Message -----  From: Lab, Background User  Sent: 2/24/2025   3:59 PM CST  To: GEORGI Pena

## 2025-02-27 ENCOUNTER — OFFICE VISIT (OUTPATIENT)
Dept: INFECTIOUS DISEASES | Facility: CLINIC | Age: 55
End: 2025-02-27
Payer: MEDICAID

## 2025-02-27 VITALS
TEMPERATURE: 99 F | DIASTOLIC BLOOD PRESSURE: 60 MMHG | HEIGHT: 59 IN | BODY MASS INDEX: 30.74 KG/M2 | HEART RATE: 78 BPM | SYSTOLIC BLOOD PRESSURE: 90 MMHG | WEIGHT: 152.5 LBS

## 2025-02-27 DIAGNOSIS — K57.20 DIVERTICULITIS OF LARGE INTESTINE WITH ABSCESS WITHOUT BLEEDING: ICD-10-CM

## 2025-02-27 DIAGNOSIS — Z86.19 HISTORY OF ESBL E. COLI INFECTION: Primary | ICD-10-CM

## 2025-02-27 PROCEDURE — 99214 OFFICE O/P EST MOD 30 MIN: CPT | Mod: PBBFAC

## 2025-02-27 NOTE — PROGRESS NOTES
[facility] Outpatient INFECTIOUS DISEASES Clinic Note    CHIEF COMPLAINT: Sigmoid diverticulitis with perforated    HISTORY OF PRESENT ILLNESS:     54-year-old female with a past medical history of chronic sigmoid diverticulitis with multiple episodes of diverticular abscess in the past, hyperthyroidism, and asthma who presents to ID clinic today for her follow up visit. She was recently Hospitalized in early 12/2024 for management of sigmoid diverticulitis and microperforation with contained abscess.  General surgery was consulted who recommended sigmoidectomy however patient refused as she wishes to undergo surgery with a colorectal surgeon.  Id was consulted who recommended transitioned to ertapenem to complete a 4 week course via OPAT with planned end date of 12/16/2024. Last seen in office 12/24/2024 and was started on a 2 week course of Merrem IV 2 g q.8 hours to be completed on 01/15/2025. Since then she went to the operating room on 01/27 for resection of the sigmoid colon.  Since she has had her surgery she has been doing well.  She had some postprocedural pain although denies any fever, chills, N/V/D.  No complaints today.    REVIEW OF SYSTEMS:  Review of Systems   Constitutional:  Negative for chills and fever.   Respiratory:  Negative for cough and shortness of breath.    Cardiovascular:  Negative for chest pain and palpitations.   Gastrointestinal:  Negative for abdominal pain, diarrhea, nausea and vomiting.   Genitourinary:  Negative for flank pain and hematuria.   Skin:  Negative for itching and rash.   Neurological:  Negative for weakness and headaches.   Endo/Heme/Allergies:  Does not bruise/bleed easily.       PREVIOUS MEDICAL HISTORY:  Past Medical History:   Diagnosis Date    Asthma     Diverticulitis     Diverticulosis     GERD (gastroesophageal reflux disease)     Graves disease        PREVIOUS SURGICAL HISTORY:  Past Surgical History:   Procedure Laterality Date    CATHETERIZATION OF URETER Bilateral  1/27/2025    Procedure: CATHETERIZATION, URETER;  Surgeon: Denver Patel MD;  Location: [facility] OR 2ND FLR;  Service: Urology;  Laterality: Bilateral;    COLONOSCOPY, WITH DIRECTED SUBMUCOSAL INJECTION N/A 4/8/2024    Procedure: COLONOSCOPY, WITH DIRECTED SUBMUCOSAL INJECTION;  Surgeon: Heather Lance MD;  Location: [facility] ENDOSCOPY;  Service: Gastroenterology;  Laterality: N/A;    FLEXIBLE SIGMOIDOSCOPY N/A 1/27/2025    Procedure: SIGMOIDOSCOPY, FLEXIBLE;  Surgeon: ARA Clay MD;  Location: [facility] OR 2ND FLR;  Service: Colon and Rectal;  Laterality: N/A;    LAPAROSCOPIC SIGMOIDECTOMY  1/27/2025    Procedure: COLECTOMY, SIGMOID, LAPAROSCOPIC;  Surgeon: ARA Clay MD;  Location: [facility] OR 2ND FLR;  Service: Colon and Rectal;;    MOBILIZATION, SPLENIC FLEXURE, LAPAROSCOPIC N/A 1/27/2025    Procedure: MOBILIZATION, SPLENIC FLEXURE, LAPAROSCOPIC;  Surgeon: ARA Clay MD;  Location: [facility] OR 2ND FLR;  Service: Colon and Rectal;  Laterality: N/A;         ALLERGIES:  Review of patient's allergies indicates:   Allergen Reactions    Latex Anaphylaxis and Edema    Zosyn [piperacillin-tazobactam] Anaphylaxis    Flagyl [metronidazole] Hives         MEDICATIONS:  Current Outpatient Medications on File Prior to Visit   Medication Sig Dispense Refill    acetaminophen (TYLENOL) 325 MG tablet Take 2 tablets (650 mg total) by mouth every 4 (four) hours as needed for Pain or Temperature greater than (>101f). (Patient not taking: Reported on 2/24/2025)      albuterol (PROVENTIL) 2.5 mg /3 mL (0.083 %) nebulizer solution Take 3 mLs (2.5 mg total) by nebulization every 6 (six) hours as needed for Wheezing or Shortness of Breath (shortness of breath). Rescue      bisacodyL (DULCOLAX) 10 mg Supp Place 1 suppository (10 mg total) rectally daily as needed (Until bowel movement if patient has no bowel movement for 2 days). (Patient not taking: Reported on 2/24/2025)      clindamycin (CLINDESSE) 2 % vaginal  cream Place vaginally every evening. for 7 days 40 g 0    docusate sodium (COLACE) 50 MG capsule Take 1 capsule (50 mg total) by mouth once daily. (Patient not taking: Reported on 2/24/2025)      fluconazole (DIFLUCAN) 100 MG tablet Take 1 tablet (100 mg total) by mouth once daily. (Patient not taking: Reported on 2/24/2025) 7 tablet 0    fluticasone furoate-vilanteroL (BREO) 200-25 mcg/dose DsDv diskus inhaler Inhale 1 puff into the lungs once daily. Controller 60 each 0    formoterol (PERFOROMIST) 20 mcg/2 mL Nebu Take 2 mLs (20 mcg total) by nebulization 2 (two) times daily. Controller      gabapentin (NEURONTIN) 300 MG capsule Take 1 capsule (300 mg total) by mouth 3 (three) times daily. 90 capsule 0    HYDROcodone-acetaminophen (NORCO) 7.5-325 mg per tablet Take 1 tablet by mouth every 6 (six) hours as needed for Pain. 15 tablet 0    nicotine (NICODERM CQ) 21 mg/24 hr Place 1 patch onto the skin once daily. (Patient not taking: Reported on 2/24/2025)      ondansetron 4 mg/2 mL Soln Inject 4 mg into the vein every 8 (eight) hours as needed (nausea). (Patient not taking: Reported on 2/24/2025)      oxyCODONE (ROXICODONE) 5 MG immediate release tablet Take 1 tablet (5 mg total) by mouth every 4 (four) hours as needed for Pain. 20 tablet 0    pantoprazole (PROTONIX) 40 MG tablet Take 1 tablet (40 mg total) by mouth once daily. 90 tablet 3     No current facility-administered medications on file prior to visit.          SOCIAL HISTORY:    reports that she quit smoking about 8 weeks ago. Her smoking use included cigarettes. She started smoking about 42 years ago. She has a 21 pack-year smoking history. She has been exposed to tobacco smoke. She uses smokeless tobacco. She reports that she does not currently use alcohol. She reports that she does not use drugs.      FAMILY HISTORY:   Family History   Problem Relation Name Age of Onset    Hypothyroidism Mother         PHYSICAL EXAMINATION:  There were no vitals taken  for this visit. There is no height or weight on file to calculate BMI.    Gen: awake, alert, NAD.   HEENT: NC/AT, EOMi, anicteric sclera, no rhinorrhea, OP clear  Neck: no cervical LAD  CV: regular rate normal rhythm no murmur  Resp: easy WOB on RA CTABL no w/r/r  Abd: +BS, soft.  Nondistended.  Voluntary guarding, thorough abdominal exam not performed given post surgical incision and pain.  Ext: warm, no LE edema  Skin: no rash  Neuro: no focal deficits   Lines:  N/a      LABORATORY DATA:  Lab Results   Component Value Date    WBC 9.90 01/28/2025    WBC 4.40 (L) 01/20/2025    WBC 4.86 01/14/2025    HGB 10.6 (L) 01/28/2025    HGB 12.0 01/20/2025    HGB 13.3 01/14/2025     01/28/2025     01/20/2025     01/14/2025    CREATININE 0.7 01/28/2025    CREATININE 1.12 (H) 01/23/2025    CREATININE 0.83 01/20/2025    ALT 60 (H) 01/23/2025     (H) 01/20/2025    ALT 72 (H) 01/13/2025    AST 27 01/23/2025    AST 57 (H) 01/20/2025    AST 43 (H) 01/13/2025    ALKPHOS 122 01/23/2025    ALKPHOS 129 01/20/2025    ALKPHOS 109 01/13/2025    BILITOT 0.3 01/23/2025    BILITOT 0.3 01/20/2025    BILITOT 0.4 01/13/2025    INR 0.9 (L) 01/23/2025    INR 1.0 12/10/2024    INR 1.4 (H) 01/28/2024       MICROBIOLOGY DATA:    2/16/23 IR sigmoid colon abscess drainage cx  - Aerobic: E coli    - Anaerobic: Bacteroides thetaiotaomicron    IMAGING DATA:    11/30/24 CT abd/pelvis w/ IV   Impression:     Similar wall thickening of the sigmoid colon with surrounding inflammatory changes.  Suspected contained perforation with small adjacent collection of fluid and air, slightly increased from the prior exam.  No free intraperitoneal air or drainable fluid collection.    12/21/24 CT abd/pelvis with IV    Impression:  Impression:     1. Numerous diverticula are seen in the hepatic flexure through to the descending. There is stable focal wall thickening in the distal sigmoid colon along with unchanged mild pericolic fat  stranding(Series 2, Image 103). This could indicate stable diverticulitis, though an underlying mass lesion is not excluded. No perforation or abscess is identified. Correlate with clinical and laboratory findings as regards additional evaluation and recommend additional follow-up as indicated.  Recommend gastroenterology consultation.     2. Details and other findings as discussed above.    ASSESSMENT:    54-year-old female with a past medical history of chronic sigmoid diverticulitis with multiple episodes of diverticular abscess in the past, hyperthyroidism, and asthma who has been following with ID for management of diverticulitis with and sigmoid colon perforation with small abscess. She has a known history of multiple abscesses which were previously drained; per last cultures in 2/2023 ESBL organism was identified resistant to FQ antibiotics. She was treated with Ertapenem on now 3 separate occasions. Additionally she has been on multiple courses of PO antibiotics which sometimes result in pain control and clinical cure of diverticulitis.     She last underwent colonoscopy in 4/2024 and was found to have diverticulosis with narrowing of the colon and significant edema/erythema to the diverticular opening. Areas were tattooed for resection since patient's last visit in ID clinic, she has gotten a sigmoid bowel resection.    1) Sigmoid diverticulitis   - s/p sigmoid colon resection  2) Diverticular abscess measuring 1x 1.8cm, resolved  3) h/o ESBL E coli diverticular abscess  4) h/o asthma  5) h/o Hyperthyroidism        PLAN:    - given patient's recurrent infections were continuously localize to the sigmoid colon, she has had the sigmoid colon removed.  There is no need to continue with antibiotics for this problem.  - per patient preference we will follow up at six-month intervals if she intends to have other cosmetic procedures done on her abdomen such as a tummy tuck.  Discussed that she would need to wait  for her: Surgeon to clear her before pursuing elective cosmetic procedure.      Jasvir Briggs DO  [facility] Internal Medicine, -III

## 2025-03-02 ENCOUNTER — HOSPITAL ENCOUNTER (INPATIENT)
Facility: HOSPITAL | Age: 55
LOS: 3 days | Discharge: HOME | DRG: 644 | End: 2025-03-05
Attending: EMERGENCY MEDICINE | Admitting: STUDENT IN AN ORGANIZED HEALTH CARE EDUCATION/TRAINING PROGRAM
Payer: MEDICAID

## 2025-03-02 DIAGNOSIS — R55 SYNCOPE: Primary | ICD-10-CM

## 2025-03-02 DIAGNOSIS — R07.9 CHEST PAIN: ICD-10-CM

## 2025-03-02 DIAGNOSIS — E27.40 ADRENAL INSUFFICIENCY: ICD-10-CM

## 2025-03-02 DIAGNOSIS — A41.9 SEPSIS: ICD-10-CM

## 2025-03-02 DIAGNOSIS — I95.9 HYPOTENSION, UNSPECIFIED HYPOTENSION TYPE: ICD-10-CM

## 2025-03-02 DIAGNOSIS — M79.89 ARM SWELLING: ICD-10-CM

## 2025-03-02 DIAGNOSIS — R42 DIZZINESS: ICD-10-CM

## 2025-03-02 LAB
ALBUMIN SERPL-MCNC: 4 G/DL (ref 3.5–5)
ALBUMIN/GLOB SERPL: 1 RATIO (ref 1.1–2)
ALP SERPL-CCNC: 76 UNIT/L (ref 40–150)
ALT SERPL-CCNC: 17 UNIT/L (ref 0–55)
ANION GAP SERPL CALC-SCNC: 8 MEQ/L
APTT PPP: 35.1 SECONDS (ref 23.2–33.7)
AST SERPL-CCNC: 21 UNIT/L (ref 5–34)
BACTERIA #/AREA URNS AUTO: ABNORMAL /HPF
BASOPHILS # BLD AUTO: 0.01 X10(3)/MCL
BASOPHILS NFR BLD AUTO: 0.2 %
BILIRUB SERPL-MCNC: 0.4 MG/DL
BILIRUB UR QL STRIP.AUTO: NEGATIVE
BUN SERPL-MCNC: 11.2 MG/DL (ref 9.8–20.1)
CALCIUM SERPL-MCNC: 10.2 MG/DL (ref 8.4–10.2)
CHLORIDE SERPL-SCNC: 106 MMOL/L (ref 98–107)
CLARITY UR: CLEAR
CO2 SERPL-SCNC: 24 MMOL/L (ref 22–29)
COLOR UR AUTO: ABNORMAL
CREAT SERPL-MCNC: 0.96 MG/DL (ref 0.55–1.02)
CREAT/UREA NIT SERPL: 12
CRP SERPL-MCNC: 2.3 MG/L
EOSINOPHIL # BLD AUTO: 0.25 X10(3)/MCL (ref 0–0.9)
EOSINOPHIL NFR BLD AUTO: 5.9 %
ERYTHROCYTE [DISTWIDTH] IN BLOOD BY AUTOMATED COUNT: 13.4 % (ref 11.5–17)
ERYTHROCYTE [SEDIMENTATION RATE] IN BLOOD: 15 MM/HR (ref 0–20)
FLUAV AG UPPER RESP QL IA.RAPID: NOT DETECTED
FLUBV AG UPPER RESP QL IA.RAPID: NOT DETECTED
GFR SERPLBLD CREATININE-BSD FMLA CKD-EPI: >60 ML/MIN/1.73/M2
GLOBULIN SER-MCNC: 4.2 GM/DL (ref 2.4–3.5)
GLUCOSE SERPL-MCNC: 83 MG/DL (ref 74–100)
GLUCOSE UR QL STRIP: NORMAL
HCT VFR BLD AUTO: 37.4 % (ref 37–47)
HGB BLD-MCNC: 12.5 G/DL (ref 12–16)
HGB UR QL STRIP: NEGATIVE
HOLD SPECIMEN: NORMAL
HYALINE CASTS #/AREA URNS LPF: ABNORMAL /LPF
IMM GRANULOCYTES # BLD AUTO: 0.01 X10(3)/MCL (ref 0–0.04)
IMM GRANULOCYTES NFR BLD AUTO: 0.2 %
INR PPP: 1
KETONES UR QL STRIP: NEGATIVE
LACTATE SERPL-SCNC: 1.1 MMOL/L (ref 0.5–2.2)
LACTATE SERPL-SCNC: 2.7 MMOL/L (ref 0.5–2.2)
LACTATE SERPL-SCNC: 3.8 MMOL/L (ref 0.5–2.2)
LEUKOCYTE ESTERASE UR QL STRIP: NEGATIVE
LYMPHOCYTES # BLD AUTO: 2.09 X10(3)/MCL (ref 0.6–4.6)
LYMPHOCYTES NFR BLD AUTO: 49.1 %
MCH RBC QN AUTO: 32 PG (ref 27–31)
MCHC RBC AUTO-ENTMCNC: 33.4 G/DL (ref 33–36)
MCV RBC AUTO: 95.7 FL (ref 80–94)
MONOCYTES # BLD AUTO: 0.43 X10(3)/MCL (ref 0.1–1.3)
MONOCYTES NFR BLD AUTO: 10.1 %
MUCOUS THREADS URNS QL MICRO: ABNORMAL /LPF
NEUTROPHILS # BLD AUTO: 1.47 X10(3)/MCL (ref 2.1–9.2)
NEUTROPHILS NFR BLD AUTO: 34.5 %
NITRITE UR QL STRIP: NEGATIVE
NRBC BLD AUTO-RTO: 0 %
OHS QRS DURATION: 78 MS
OHS QRS DURATION: 78 MS
OHS QRS DURATION: 82 MS
OHS QTC CALCULATION: 388 MS
OHS QTC CALCULATION: 389 MS
OHS QTC CALCULATION: 408 MS
PH UR STRIP: 5.5 [PH]
PLATELET # BLD AUTO: 204 X10(3)/MCL (ref 130–400)
PMV BLD AUTO: 10.4 FL (ref 7.4–10.4)
POTASSIUM SERPL-SCNC: 4.2 MMOL/L (ref 3.5–5.1)
PROT SERPL-MCNC: 8.2 GM/DL (ref 6.4–8.3)
PROT UR QL STRIP: NEGATIVE
PROTHROMBIN TIME: 13.7 SECONDS (ref 11.4–14)
RBC # BLD AUTO: 3.91 X10(6)/MCL (ref 4.2–5.4)
RBC #/AREA URNS AUTO: ABNORMAL /HPF
RSV A 5' UTR RNA NPH QL NAA+PROBE: NOT DETECTED
SARS-COV-2 RNA RESP QL NAA+PROBE: NOT DETECTED
SODIUM SERPL-SCNC: 138 MMOL/L (ref 136–145)
SP GR UR STRIP.AUTO: 1.01 (ref 1–1.03)
SQUAMOUS #/AREA URNS LPF: ABNORMAL /HPF
T3FREE SERPL-MCNC: 3.47 PG/ML (ref 1.58–3.91)
T4 FREE SERPL-MCNC: 1 NG/DL (ref 0.7–1.48)
TROPONIN I SERPL-MCNC: <0.01 NG/ML (ref 0–0.04)
TSH SERPL-ACNC: 0.46 UIU/ML (ref 0.35–4.94)
UROBILINOGEN UR STRIP-ACNC: NORMAL
WBC # BLD AUTO: 4.26 X10(3)/MCL (ref 4.5–11.5)
WBC #/AREA URNS AUTO: ABNORMAL /HPF

## 2025-03-02 PROCEDURE — 85730 THROMBOPLASTIN TIME PARTIAL: CPT | Performed by: EMERGENCY MEDICINE

## 2025-03-02 PROCEDURE — 96360 HYDRATION IV INFUSION INIT: CPT

## 2025-03-02 PROCEDURE — 93005 ELECTROCARDIOGRAM TRACING: CPT

## 2025-03-02 PROCEDURE — 11000001 HC ACUTE MED/SURG PRIVATE ROOM

## 2025-03-02 PROCEDURE — 85652 RBC SED RATE AUTOMATED: CPT

## 2025-03-02 PROCEDURE — 63600175 PHARM REV CODE 636 W HCPCS: Performed by: NURSE PRACTITIONER

## 2025-03-02 PROCEDURE — 87641 MR-STAPH DNA AMP PROBE: CPT

## 2025-03-02 PROCEDURE — 63600175 PHARM REV CODE 636 W HCPCS

## 2025-03-02 PROCEDURE — 25500020 PHARM REV CODE 255

## 2025-03-02 PROCEDURE — 81001 URINALYSIS AUTO W/SCOPE: CPT | Performed by: EMERGENCY MEDICINE

## 2025-03-02 PROCEDURE — 86140 C-REACTIVE PROTEIN: CPT | Performed by: EMERGENCY MEDICINE

## 2025-03-02 PROCEDURE — 96361 HYDRATE IV INFUSION ADD-ON: CPT

## 2025-03-02 PROCEDURE — 87632 RESP VIRUS 6-11 TARGETS: CPT

## 2025-03-02 PROCEDURE — 63600175 PHARM REV CODE 636 W HCPCS: Performed by: EMERGENCY MEDICINE

## 2025-03-02 PROCEDURE — 84439 ASSAY OF FREE THYROXINE: CPT

## 2025-03-02 PROCEDURE — 83605 ASSAY OF LACTIC ACID: CPT | Performed by: EMERGENCY MEDICINE

## 2025-03-02 PROCEDURE — 85025 COMPLETE CBC W/AUTO DIFF WBC: CPT | Performed by: EMERGENCY MEDICINE

## 2025-03-02 PROCEDURE — 0241U COVID/RSV/FLU A&B PCR: CPT

## 2025-03-02 PROCEDURE — 85610 PROTHROMBIN TIME: CPT | Performed by: EMERGENCY MEDICINE

## 2025-03-02 PROCEDURE — 25000003 PHARM REV CODE 250: Performed by: NURSE PRACTITIONER

## 2025-03-02 PROCEDURE — 99285 EMERGENCY DEPT VISIT HI MDM: CPT | Mod: 25

## 2025-03-02 PROCEDURE — 84443 ASSAY THYROID STIM HORMONE: CPT

## 2025-03-02 PROCEDURE — 84481 FREE ASSAY (FT-3): CPT

## 2025-03-02 PROCEDURE — 80053 COMPREHEN METABOLIC PANEL: CPT | Performed by: EMERGENCY MEDICINE

## 2025-03-02 PROCEDURE — 83605 ASSAY OF LACTIC ACID: CPT

## 2025-03-02 PROCEDURE — 87040 BLOOD CULTURE FOR BACTERIA: CPT | Performed by: NURSE PRACTITIONER

## 2025-03-02 PROCEDURE — 84484 ASSAY OF TROPONIN QUANT: CPT | Performed by: EMERGENCY MEDICINE

## 2025-03-02 RX ORDER — IBUPROFEN 200 MG
16 TABLET ORAL
Status: DISCONTINUED | OUTPATIENT
Start: 2025-03-02 | End: 2025-03-05 | Stop reason: HOSPADM

## 2025-03-02 RX ORDER — SODIUM CHLORIDE, SODIUM LACTATE, POTASSIUM CHLORIDE, CALCIUM CHLORIDE 600; 310; 30; 20 MG/100ML; MG/100ML; MG/100ML; MG/100ML
1000 INJECTION, SOLUTION INTRAVENOUS CONTINUOUS
Status: ACTIVE | OUTPATIENT
Start: 2025-03-02 | End: 2025-03-03

## 2025-03-02 RX ORDER — PANTOPRAZOLE SODIUM 40 MG/1
40 TABLET, DELAYED RELEASE ORAL DAILY PRN
Status: DISCONTINUED | OUTPATIENT
Start: 2025-03-03 | End: 2025-03-04

## 2025-03-02 RX ORDER — MILRINONE LACTATE 0.2 MG/ML
0.25 INJECTION, SOLUTION INTRAVENOUS CONTINUOUS
Status: DISCONTINUED | OUTPATIENT
Start: 2025-03-03 | End: 2025-03-02

## 2025-03-02 RX ORDER — GLUCAGON 1 MG
1 KIT INJECTION
Status: DISCONTINUED | OUTPATIENT
Start: 2025-03-02 | End: 2025-03-05 | Stop reason: HOSPADM

## 2025-03-02 RX ORDER — IBUPROFEN 200 MG
24 TABLET ORAL
Status: DISCONTINUED | OUTPATIENT
Start: 2025-03-02 | End: 2025-03-05 | Stop reason: HOSPADM

## 2025-03-02 RX ORDER — DIATRIZOATE MEGLUMINE AND DIATRIZOATE SODIUM 660; 100 MG/ML; MG/ML
SOLUTION ORAL; RECTAL
Status: COMPLETED
Start: 2025-03-02 | End: 2025-03-02

## 2025-03-02 RX ORDER — SODIUM CHLORIDE 0.9 % (FLUSH) 0.9 %
10 SYRINGE (ML) INJECTION EVERY 12 HOURS PRN
Status: DISCONTINUED | OUTPATIENT
Start: 2025-03-02 | End: 2025-03-05 | Stop reason: HOSPADM

## 2025-03-02 RX ADMIN — SODIUM CHLORIDE, POTASSIUM CHLORIDE, SODIUM LACTATE AND CALCIUM CHLORIDE 1000 ML: 600; 310; 30; 20 INJECTION, SOLUTION INTRAVENOUS at 05:03

## 2025-03-02 RX ADMIN — SODIUM CHLORIDE, POTASSIUM CHLORIDE, SODIUM LACTATE AND CALCIUM CHLORIDE 1000 ML: 600; 310; 30; 20 INJECTION, SOLUTION INTRAVENOUS at 03:03

## 2025-03-02 RX ADMIN — SODIUM CHLORIDE, POTASSIUM CHLORIDE, SODIUM LACTATE AND CALCIUM CHLORIDE 500 ML: 600; 310; 30; 20 INJECTION, SOLUTION INTRAVENOUS at 10:03

## 2025-03-02 RX ADMIN — DIATRIZOATE MEGLUMINE AND DIATRIZOATE SODIUM 30 ML: 660; 100 LIQUID ORAL; RECTAL at 07:03

## 2025-03-02 RX ADMIN — IOHEXOL 98 ML: 350 INJECTION, SOLUTION INTRAVENOUS at 07:03

## 2025-03-02 RX ADMIN — VANCOMYCIN HYDROCHLORIDE 1500 MG: 1.5 INJECTION, POWDER, LYOPHILIZED, FOR SOLUTION INTRAVENOUS at 10:03

## 2025-03-02 RX ADMIN — SODIUM CHLORIDE, POTASSIUM CHLORIDE, SODIUM LACTATE AND CALCIUM CHLORIDE 1000 ML: 600; 310; 30; 20 INJECTION, SOLUTION INTRAVENOUS at 07:03

## 2025-03-02 NOTE — ED PROVIDER NOTES
Encounter Date: 3/2/2025       History     Chief Complaint   Patient presents with    Loss of Consciousness     C/o syncopal episodes starting feb 10th. Stated woke up today on bathroom floor. States when she stands up she gets dizzy and feels like she wants to pass out. Bp 84/55    Hypotension    Joint Pain     Also c/o joint pain     Known to me from ER evaluation about 2 months ago, at that time she was on extended IV antibiotics for recurrent diverticulitis, admitted, subsequently underwent LTAC placement and ultimate surgery in [place] for diverticulitis with good results.  She did not receive a colostomy.  She convalesced at home with family and is back living on her own.  She reports that she has been having some syncopal episodes occasionally over the last 3 weeks or so, another one today associated with standing in the bathroom.  She has a sense of dizziness and presyncope and wakes up on the floor without significant pain or injury.  Other symptoms include scattered ongoing diffuse arthralgias, she feels that she is somewhat pale and that her eyes are somewhat red, and she is concerned that she may be having some side effects from some of her medication particularly gabapentin.  She drove herself here today after the episode of syncope at home, blood pressure noted low on arrival.  Denies fever, chills, sweats, wound problems, nausea, vomiting, diarrhea, chest pain, or dyspnea.  She does have some nonspecific urinary discomfort she thinks may be related to previous Luna catheter placement.  She also believes she has some atraumatic bruising in scattered locations.  She has mild scattered areas of abdominal pain and tenderness, predominantly in the left lower quadrant that she attributes to recent surgery.    The history is provided by the patient and medical records. No  was used.     Review of patient's allergies indicates:   Allergen Reactions    Latex Anaphylaxis and Edema     Zosyn [piperacillin-tazobactam] Anaphylaxis    Flagyl [metronidazole] Hives     Past Medical History:   Diagnosis Date    Asthma     Diverticulitis     Diverticulosis     GERD (gastroesophageal reflux disease)     Graves disease      Past Surgical History:   Procedure Laterality Date    CATHETERIZATION OF URETER Bilateral 1/27/2025    Procedure: CATHETERIZATION, URETER;  Surgeon: Denver Patel MD;  Location: [address];  Service: Urology;  Laterality: Bilateral;    COLONOSCOPY, WITH DIRECTED SUBMUCOSAL INJECTION N/A 4/8/2024    Procedure: COLONOSCOPY, WITH DIRECTED SUBMUCOSAL INJECTION;  Surgeon: Heather Lance MD;  Location: [facility] ENDOSCOPY;  Service: Gastroenterology;  Laterality: N/A;    FLEXIBLE SIGMOIDOSCOPY N/A 1/27/2025    Procedure: SIGMOIDOSCOPY, FLEXIBLE;  Surgeon: ARA Clay MD;  [facility];  Service: Colon and Rectal;  Laterality: N/A;    LAPAROSCOPIC SIGMOIDECTOMY  1/27/2025    Procedure: COLECTOMY, SIGMOID, LAPAROSCOPIC;  Surgeon: ARA Clay MD;  [facility];  Service: Colon and Rectal;;    MOBILIZATION, SPLENIC FLEXURE, LAPAROSCOPIC N/A 1/27/2025    Procedure: MOBILIZATION, SPLENIC FLEXURE, LAPAROSCOPIC;  Surgeon: ARA Clay MD;  [facility];  Service: Colon and Rectal;  Laterality: N/A;     Family History   Problem Relation Name Age of Onset    Hypothyroidism Mother       Social History[1]  Review of Systems   Genitourinary:  Positive for dysuria and frequency.   Musculoskeletal:  Positive for arthralgias.   Neurological:  Positive for dizziness, syncope and light-headedness.       Physical Exam     Initial Vitals [03/02/25 1446]   BP Pulse Resp Temp SpO2   (!) 84/55 91 20 97.5 °F (36.4 °C) 99 %      MAP       --         Physical Exam    Nursing note and vitals reviewed.  Constitutional: She appears well-developed and well-nourished. No distress.   HENT:   Head: Normocephalic and atraumatic. Mouth/Throat:  Oropharynx is clear and moist.   Eyes: EOM are normal. Pupils are equal, round, and reactive to light.   Neck: Neck supple.   Normal range of motion.  Cardiovascular:  Normal rate and regular rhythm.           Pulmonary/Chest: Breath sounds normal. No respiratory distress. She has no wheezes.   Abdominal: Abdomen is soft. Bowel sounds are normal. She exhibits no distension.   Surgical wound well healed; mild scattered areas of slight tenderness to palpation, most notably in the left lower quadrant, no rebound or guarding.   Musculoskeletal:         General: No tenderness. Normal range of motion.      Cervical back: Normal range of motion and neck supple.     Neurological: She is alert and oriented to person, place, and time. She has normal strength.   Skin: Skin is warm and dry.         ED Course   Procedures  Labs Reviewed   COMPREHENSIVE METABOLIC PANEL - Abnormal       Result Value    Sodium 138      Potassium 4.2      Chloride 106      CO2 24      Glucose 83      Blood Urea Nitrogen 11.2      Creatinine 0.96      Calcium 10.2      Protein Total 8.2      Albumin 4.0      Globulin 4.2 (*)     Albumin/Globulin Ratio 1.0 (*)     Bilirubin Total 0.4      ALP 76      ALT 17      AST 21      eGFR >60      Anion Gap 8.0      BUN/Creatinine Ratio 12     URINALYSIS, REFLEX TO URINE CULTURE - Abnormal    Color, UA Light-Yellow      Appearance, UA Clear      Specific Gravity, UA 1.011      pH, UA 5.5      Protein, UA Negative      Glucose, UA Normal      Ketones, UA Negative      Blood, UA Negative      Bilirubin, UA Negative      Urobilinogen, UA Normal      Nitrites, UA Negative      Leukocyte Esterase, UA Negative      RBC, UA 0-5      WBC, UA 0-5      Bacteria, UA Trace (*)     Squamous Epithelial Cells, UA Trace (*)     Mucous, UA Trace (*)     Hyaline Casts, UA 0-2 (*)    LACTIC ACID, PLASMA - Abnormal    Lactic Acid Level 2.7 (*)    CBC WITH DIFFERENTIAL - Abnormal    WBC 4.26 (*)     RBC 3.91 (*)     Hgb 12.5       Hct 37.4      MCV 95.7 (*)     MCH 32.0 (*)     MCHC 33.4      RDW 13.4      Platelet 204      MPV 10.4      Neut % 34.5      Lymph % 49.1      Mono % 10.1      Eos % 5.9      Basophil % 0.2      Imm Grans % 0.2      Neut # 1.47 (*)     Lymph # 2.09      Mono # 0.43      Eos # 0.25      Baso # 0.01      Imm Gran # 0.01      NRBC% 0.0     APTT - Abnormal    PTT 35.1 (*)    LACTIC ACID, PLASMA - Abnormal    Lactic Acid Level 3.8 (*)    TROPONIN I - Normal    Troponin-I <0.010     C-REACTIVE PROTEIN - Normal    CRP 2.30     PROTIME-INR - Normal    PT 13.7      INR 1.0      Narrative:     Protimes are used to monitor anticoagulant agents such as warfarin. PT INR values are based on the current patient normal mean and the SUZE value for the specific instrument reagent used.  **Routine theraputic target values for the INR are 2.0-3.0**   BLOOD CULTURE OLG   BLOOD CULTURE [facility]   CBC W/ AUTO DIFFERENTIAL    Narrative:     The following orders were created for panel order CBC auto differential.  Procedure                               Abnormality         Status                     ---------                               -----------         ------                     CBC with Differential[[id number]]       Abnormal            Final result                 Please view results for these tests on the individual orders.   EXTRA TUBES    Narrative:     The following orders were created for panel order EXTRA TUBES.  Procedure                               Abnormality         Status                     ---------                               -----------         ------                     Red Top Hold[[id number]]                                    Final result               Lavender Top Hold[[id number]]                               Final result               Gold Top Hold[[id number]]                                   Final result               Pink Top Hold[[id number]]                                   Final result                  Please view results for these tests on the individual orders.   RED TOP HOLD    Extra Tube Hold for add-ons.     LAVENDER TOP HOLD    Extra Tube Hold for add-ons.     GOLD TOP HOLD    Extra Tube Hold for add-ons.     PINK TOP HOLD    Extra Tube Hold for add-ons.       EKG Readings: (Independently Interpreted)   Rhythm: Normal Sinus Rhythm. Heart Rate: 71. Ectopy: No Ectopy. Conduction: Normal. ST Segments: Normal ST Segments. T Waves: Normal. Axis: Normal. Clinical Impression: Normal Sinus Rhythm     ECG Results              EKG 12-lead (In process)        Collection Time Result Time QRS Duration OHS QTC Calculation    03/02/25 15:40:17 03/02/25 15:59:08 78 388                     In process by Interface, Lab In [facility] (03/02/25 15:59:19)                   Narrative:    Test Reason : R55,    Vent. Rate :  63 BPM     Atrial Rate :  63 BPM     P-R Int : 144 ms          QRS Dur :  78 ms      QT Int : 380 ms       P-R-T Axes :  74  78  65 degrees    QTcB Int : 388 ms    Normal sinus rhythm  Normal ECG  When compared with ECG of 02-Jan-2025 10:43,  Vent. rate has decreased by  31 bpm  QT has shortened    Referred By: AAAREFERRAL SELF           Confirmed By:                                   Imaging Results              CT Abdomen Pelvis With IV Contrast Routine Oral Contrast (Final result)  Result time 03/02/25 20:53:40      Final result by Shayne Atkins MD (03/02/25 20:53:40)                   Impression:      Fluid in the vagina.    Postoperative changes.    No acute abnormalities are seen      Electronically signed by: Shayne Atkins MD  Date:    03/02/2025  Time:    20:53               Narrative:    EXAMINATION:  CT ABDOMEN PELVIS WITH IV CONTRAST    CLINICAL HISTORY:  Abdominal abscess/infection suspected;Recent colectomy for diverticulitis;  hypotension    TECHNIQUE:  Low dose axial images, sagittal and coronal reformations were obtained from the lung bases to the pubic symphysis following the IV  administration of 98 mL of Omnipaque 350 and the oral administration of 30 mL of Omnipaque 350.    Automatic exposure control (AEC) was utilized for dose reduction.    Dose: 217 mGycm    COMPARISON:  None.    FINDINGS:  Lung bases are clear.  Liver appears normal.  Spleen appears normal.  Pancreas appears normal.  There is flow in the portal vein.  The adrenals are not enlarged.  Kidneys appear normal.  Aorta shows no evidence of an aneurysm.  There is a small fat containing umbilical hernia.  The small bowel is not dilated.  Uterus appears normal.  There is fluid in the vagina.  Patient has had a previous partial colectomy                                       Medications   lactated ringers infusion (1,000 mLs Intravenous New Bag 3/2/25 1910)   lactated ringers bolus 1,000 mL (0 mLs Intravenous Stopped 3/2/25 1651)   lactated ringers bolus 1,000 mL (0 mLs Intravenous Stopped 3/2/25 1809)   iohexoL (OMNIPAQUE 350) 350 mg iodine/mL injection (98 mLs Intravenous Given 3/2/25 1915)   diatrizoate meglumineand-diatrizoate sodium ([place]STR[place]IEW) 66-10 % solution (30 mLs Oral Given 3/2/25 1915)         5:19 PM Resting comfortably, vital signs stable, tolerating hydration well, eating well.  Proceeding with 2 L of IV hydration and repeat lactate, she is not manifesting signs of infection and I expect that lactate should cleared nicely, likely will be appropriate for continued outpatient management.      6:55 PM Lactate increased despite hydration. Clinically unchanged. Proceed with CT.    Medical Decision Making  Amount and/or Complexity of Data Reviewed  Labs: ordered.  Radiology: ordered.    Risk  Prescription drug management.  Decision regarding hospitalization.               ED Course as of 03/02/25 2111   Sun Mar 02, 2025   2107 On review of patient's diagnostic results, presentation concerning for sepsis of unknown origin.  I have consulted the internal Medicine on-call who will evaluate patient at bedside. [JA]      ED  Course User Index  [JA] Glen Alvarado Jr., FNP                           Clinical Impression:  Final diagnoses:  [R55] Syncope  [I95.9] Hypotension, unspecified hypotension type  [A41.9] Sepsis (Primary)          ED Disposition Condition    Admit Stable                    [1]   Social History  Tobacco Use    Smoking status: Former     Current packs/day: 0.00     Average packs/day: 0.5 packs/day for 42.0 years (21.0 ttl pk-yrs)     Types: Cigarettes     Start date: 1983     Quit date: 2025     Years since quittin.1     Passive exposure: Past    Smokeless tobacco: Current    Tobacco comments:     Ambulatory referral to Smoking Cessation clinic following hospital discharge.    Substance Use Topics    Alcohol use: Not Currently    Drug use: Never        Glen Alvarado Jr., FNP  25 1850

## 2025-03-03 LAB
ALBUMIN SERPL-MCNC: 3.1 G/DL (ref 3.5–5)
ALBUMIN/GLOB SERPL: 1 RATIO (ref 1.1–2)
ALP SERPL-CCNC: 71 UNIT/L (ref 40–150)
ALT SERPL-CCNC: 15 UNIT/L (ref 0–55)
ANION GAP SERPL CALC-SCNC: 6 MEQ/L
APICAL FOUR CHAMBER EJECTION FRACTION: 58 %
APICAL TWO CHAMBER EJECTION FRACTION: 58 %
AST SERPL-CCNC: 18 UNIT/L (ref 5–34)
AV INDEX (PROSTH): 0.71
AV MEAN GRADIENT: 2 MMHG
AV PEAK GRADIENT: 5 MMHG
AV VALVE AREA BY VELOCITY RATIO: 2.9 CM²
AV VALVE AREA: 2.2 CM²
AV VELOCITY RATIO: 0.91
B PERT.PT PRMT NPH QL NAA+NON-PROBE: NOT DETECTED
BASOPHILS # BLD AUTO: 0.02 X10(3)/MCL
BASOPHILS NFR BLD AUTO: 0.4 %
BILIRUB SERPL-MCNC: 0.2 MG/DL
BSA FOR ECHO PROCEDURE: 1.71 M2
BUN SERPL-MCNC: 8.8 MG/DL (ref 9.8–20.1)
C PNEUM DNA NPH QL NAA+NON-PROBE: NOT DETECTED
CALCIUM SERPL-MCNC: 9.1 MG/DL (ref 8.4–10.2)
CHLORIDE SERPL-SCNC: 110 MMOL/L (ref 98–107)
CO2 SERPL-SCNC: 27 MMOL/L (ref 22–29)
CORTIS SERPL-SCNC: 1.5 UG/DL
CREAT SERPL-MCNC: 0.81 MG/DL (ref 0.55–1.02)
CREAT/UREA NIT SERPL: 11
CV ECHO LV RWT: 0.4 CM
DOP CALC AO PEAK VEL: 1.1 M/S
DOP CALC AO VTI: 26 CM
DOP CALC LVOT AREA: 3.1 CM2
DOP CALC LVOT DIAMETER: 2 CM
DOP CALC LVOT PEAK VEL: 1 M/S
DOP CALC LVOT STROKE VOLUME: 57.8 CM3
DOP CALC MV VTI: 23.5 CM
DOP CALCLVOT PEAK VEL VTI: 18.4 CM
E WAVE DECELERATION TIME: 203 MSEC
E/A RATIO: 1.18
E/E' RATIO: 5 M/S
ECHO LV POSTERIOR WALL: 0.8 CM (ref 0.6–1.1)
EOSINOPHIL # BLD AUTO: 0.39 X10(3)/MCL (ref 0–0.9)
EOSINOPHIL NFR BLD AUTO: 8.3 %
ERYTHROCYTE [DISTWIDTH] IN BLOOD BY AUTOMATED COUNT: 13.3 % (ref 11.5–17)
FRACTIONAL SHORTENING: 27.5 % (ref 28–44)
GFR SERPLBLD CREATININE-BSD FMLA CKD-EPI: >60 ML/MIN/1.73/M2
GLOBULIN SER-MCNC: 3.1 GM/DL (ref 2.4–3.5)
GLUCOSE SERPL-MCNC: 98 MG/DL (ref 74–100)
HADV DNA NPH QL NAA+NON-PROBE: NOT DETECTED
HCOV 229E RNA NPH QL NAA+NON-PROBE: NOT DETECTED
HCOV HKU1 RNA NPH QL NAA+NON-PROBE: NOT DETECTED
HCOV NL63 RNA NPH QL NAA+NON-PROBE: NOT DETECTED
HCOV OC43 RNA NPH QL NAA+NON-PROBE: NOT DETECTED
HCT VFR BLD AUTO: 31.1 % (ref 37–47)
HGB BLD-MCNC: 10.2 G/DL (ref 12–16)
HMPV RNA NPH QL NAA+NON-PROBE: NOT DETECTED
HOLD SPECIMEN: NORMAL
HOLD SPECIMEN: NORMAL
HPIV1 RNA NPH QL NAA+NON-PROBE: NOT DETECTED
HPIV2 RNA NPH QL NAA+NON-PROBE: NOT DETECTED
HPIV3 RNA NPH QL NAA+NON-PROBE: NOT DETECTED
HPIV4 RNA NPH QL NAA+NON-PROBE: NOT DETECTED
HR MV ECHO: 72 BPM
IMM GRANULOCYTES # BLD AUTO: 0 X10(3)/MCL (ref 0–0.04)
IMM GRANULOCYTES NFR BLD AUTO: 0 %
INTERVENTRICULAR SEPTUM: 0.9 CM (ref 0.6–1.1)
LEFT ATRIUM SIZE: 3 CM
LEFT ATRIUM VOLUME INDEX MOD: 21 ML/M2
LEFT ATRIUM VOLUME MOD: 34 ML
LEFT CCA DIST DIAS: 38 CM/S
LEFT CCA DIST SYS: 106 CM/S
LEFT CCA PROX DIAS: 18 CM/S
LEFT CCA PROX SYS: 82 CM/S
LEFT ECA DIAS: 23 CM/S
LEFT ECA SYS: 112 CM/S
LEFT ICA DIST DIAS: 50 CM/S
LEFT ICA DIST SYS: 128 CM/S
LEFT ICA MID DIAS: 46 CM/S
LEFT ICA MID SYS: 87 CM/S
LEFT ICA PROX DIAS: 23 CM/S
LEFT ICA PROX SYS: 68 CM/S
LEFT INTERNAL DIMENSION IN SYSTOLE: 2.9 CM (ref 2.1–4)
LEFT VENTRICLE DIASTOLIC VOLUME INDEX: 42.42 ML/M2
LEFT VENTRICLE DIASTOLIC VOLUME: 70 ML
LEFT VENTRICLE END DIASTOLIC VOLUME APICAL 2 CHAMBER: 70.26 ML
LEFT VENTRICLE END DIASTOLIC VOLUME APICAL 4 CHAMBER: 53.74 ML
LEFT VENTRICLE MASS INDEX: 61.5 G/M2
LEFT VENTRICLE SYSTOLIC VOLUME INDEX: 19.4 ML/M2
LEFT VENTRICLE SYSTOLIC VOLUME: 32 ML
LEFT VENTRICULAR INTERNAL DIMENSION IN DIASTOLE: 4 CM (ref 3.5–6)
LEFT VENTRICULAR MASS: 101.4 G
LEFT VERTEBRAL DIAS: 23 CM/S
LEFT VERTEBRAL SYS: 67 CM/S
LV LATERAL E/E' RATIO: 5.2 M/S
LV SEPTAL E/E' RATIO: 5.2 M/S
LVED V (TEICH): 70.15 ML
LVES V (TEICH): 32.02 ML
LVOT MG: 1.53 MMHG
LVOT MV: 0.56 CM/S
LYMPHOCYTES # BLD AUTO: 2.52 X10(3)/MCL (ref 0.6–4.6)
LYMPHOCYTES NFR BLD AUTO: 53.5 %
M PNEUMO DNA NPH QL NAA+NON-PROBE: NOT DETECTED
MAGNESIUM SERPL-MCNC: 1.8 MG/DL (ref 1.6–2.6)
MCH RBC QN AUTO: 31.6 PG (ref 27–31)
MCHC RBC AUTO-ENTMCNC: 32.8 G/DL (ref 33–36)
MCV RBC AUTO: 96.3 FL (ref 80–94)
MONOCYTES # BLD AUTO: 0.65 X10(3)/MCL (ref 0.1–1.3)
MONOCYTES NFR BLD AUTO: 13.8 %
MRSA PCR SCRN (OHS): NOT DETECTED
MV MEAN GRADIENT: 1 MMHG
MV PEAK A VEL: 0.57 M/S
MV PEAK E VEL: 0.67 M/S
MV PEAK GRADIENT: 2 MMHG
MV STENOSIS PRESSURE HALF TIME: 58.98 MS
MV VALVE AREA BY CONTINUITY EQUATION: 2.46 CM2
MV VALVE AREA P 1/2 METHOD: 3.73 CM2
NEUTROPHILS # BLD AUTO: 1.13 X10(3)/MCL (ref 2.1–9.2)
NEUTROPHILS NFR BLD AUTO: 24 %
NRBC BLD AUTO-RTO: 0 %
OHS CV CAROTID RIGHT ICA EDV HIGHEST: 50
OHS CV CAROTID ULTRASOUND LEFT ICA/CCA RATIO: 1.21
OHS CV CAROTID ULTRASOUND RIGHT ICA/CCA RATIO: 1.29
OHS CV PV CAROTID LEFT HIGHEST CCA: 106
OHS CV PV CAROTID LEFT HIGHEST ICA: 128
OHS CV PV CAROTID RIGHT HIGHEST CCA: 116
OHS CV PV CAROTID RIGHT HIGHEST ICA: 116
OHS CV RV/LV RATIO: 0.8 CM
OHS CV US CAROTID LEFT HIGHEST EDV: 50
OHS LV EJECTION FRACTION SIMPSONS BIPLANE MOD: 58 %
PHOSPHATE SERPL-MCNC: 4 MG/DL (ref 2.3–4.7)
PLATELET # BLD AUTO: 185 X10(3)/MCL (ref 130–400)
PMV BLD AUTO: 10.1 FL (ref 7.4–10.4)
POTASSIUM SERPL-SCNC: 3.9 MMOL/L (ref 3.5–5.1)
PROT SERPL-MCNC: 6.2 GM/DL (ref 6.4–8.3)
RA PRESSURE ESTIMATED: 8 MMHG
RBC # BLD AUTO: 3.23 X10(6)/MCL (ref 4.2–5.4)
RIGHT CCA DIST DIAS: 34 CM/S
RIGHT CCA DIST SYS: 90 CM/S
RIGHT CCA PROX DIAS: 20 CM/S
RIGHT CCA PROX SYS: 116 CM/S
RIGHT ECA DIAS: 17 CM/S
RIGHT ECA SYS: 90 CM/S
RIGHT ICA DIST DIAS: 38 CM/S
RIGHT ICA DIST SYS: 87 CM/S
RIGHT ICA MID DIAS: 43 CM/S
RIGHT ICA MID SYS: 95 CM/S
RIGHT ICA PROX DIAS: 50 CM/S
RIGHT ICA PROX SYS: 116 CM/S
RIGHT VENTRICLE DIASTOLIC BASEL DIMENSION: 3.2 CM
RIGHT VENTRICULAR END-DIASTOLIC DIMENSION: 3.17 CM
RIGHT VERTEBRAL DIAS: 27 CM/S
RIGHT VERTEBRAL SYS: 63 CM/S
RSV RNA NPH QL NAA+NON-PROBE: NOT DETECTED
RV+EV RNA NPH QL NAA+NON-PROBE: NOT DETECTED
SINUS: 2.7 CM
SODIUM SERPL-SCNC: 143 MMOL/L (ref 136–145)
TDI LATERAL: 0.13 M/S
TDI SEPTAL: 0.13 M/S
TDI: 0.13 M/S
TRICUSPID ANNULAR PLANE SYSTOLIC EXCURSION: 2.24 CM
WBC # BLD AUTO: 4.71 X10(3)/MCL (ref 4.5–11.5)
Z-SCORE OF LEFT VENTRICULAR DIMENSION IN END DIASTOLE: -1.47
Z-SCORE OF LEFT VENTRICULAR DIMENSION IN END SYSTOLE: 0.08

## 2025-03-03 PROCEDURE — 84100 ASSAY OF PHOSPHORUS: CPT

## 2025-03-03 PROCEDURE — 99900035 HC TECH TIME PER 15 MIN (STAT)

## 2025-03-03 PROCEDURE — 36415 COLL VENOUS BLD VENIPUNCTURE: CPT

## 2025-03-03 PROCEDURE — 25000003 PHARM REV CODE 250

## 2025-03-03 PROCEDURE — 94640 AIRWAY INHALATION TREATMENT: CPT

## 2025-03-03 PROCEDURE — 85025 COMPLETE CBC W/AUTO DIFF WBC: CPT

## 2025-03-03 PROCEDURE — 25000242 PHARM REV CODE 250 ALT 637 W/ HCPCS

## 2025-03-03 PROCEDURE — 94760 N-INVAS EAR/PLS OXIMETRY 1: CPT

## 2025-03-03 PROCEDURE — 11000001 HC ACUTE MED/SURG PRIVATE ROOM

## 2025-03-03 PROCEDURE — 83735 ASSAY OF MAGNESIUM: CPT

## 2025-03-03 PROCEDURE — 82533 TOTAL CORTISOL: CPT | Performed by: INTERNAL MEDICINE

## 2025-03-03 PROCEDURE — 80053 COMPREHEN METABOLIC PANEL: CPT

## 2025-03-03 PROCEDURE — 36415 COLL VENOUS BLD VENIPUNCTURE: CPT | Performed by: INTERNAL MEDICINE

## 2025-03-03 RX ORDER — TRAMADOL HYDROCHLORIDE 50 MG/1
50 TABLET ORAL ONCE
Status: COMPLETED | OUTPATIENT
Start: 2025-03-03 | End: 2025-03-03

## 2025-03-03 RX ORDER — FORMOTEROL FUMARATE 20 UG/2ML
20 SOLUTION RESPIRATORY (INHALATION)
Status: DISCONTINUED | OUTPATIENT
Start: 2025-03-03 | End: 2025-03-03

## 2025-03-03 RX ORDER — CLINDAMYCIN HYDROCHLORIDE 150 MG/1
300 CAPSULE ORAL EVERY 12 HOURS
Status: DISCONTINUED | OUTPATIENT
Start: 2025-03-03 | End: 2025-03-05 | Stop reason: HOSPADM

## 2025-03-03 RX ORDER — BUDESONIDE 0.5 MG/2ML
0.5 INHALANT ORAL EVERY 12 HOURS
Status: DISCONTINUED | OUTPATIENT
Start: 2025-03-03 | End: 2025-03-03

## 2025-03-03 RX ORDER — IPRATROPIUM BROMIDE AND ALBUTEROL SULFATE 2.5; .5 MG/3ML; MG/3ML
3 SOLUTION RESPIRATORY (INHALATION)
Status: DISCONTINUED | OUTPATIENT
Start: 2025-03-03 | End: 2025-03-03

## 2025-03-03 RX ORDER — IPRATROPIUM BROMIDE AND ALBUTEROL SULFATE 2.5; .5 MG/3ML; MG/3ML
3 SOLUTION RESPIRATORY (INHALATION)
Status: DISCONTINUED | OUTPATIENT
Start: 2025-03-03 | End: 2025-03-05 | Stop reason: HOSPADM

## 2025-03-03 RX ORDER — CLINDAMYCIN PHOSPHATE 20 MG/G
1 CREAM VAGINAL NIGHTLY
Status: DISCONTINUED | OUTPATIENT
Start: 2025-03-03 | End: 2025-03-03

## 2025-03-03 RX ORDER — FLUTICASONE FUROATE AND VILANTEROL 100; 25 UG/1; UG/1
1 POWDER RESPIRATORY (INHALATION) DAILY
Status: DISCONTINUED | OUTPATIENT
Start: 2025-03-03 | End: 2025-03-05 | Stop reason: HOSPADM

## 2025-03-03 RX ADMIN — TRAMADOL HYDROCHLORIDE 50 MG: 50 TABLET, COATED ORAL at 09:03

## 2025-03-03 RX ADMIN — IPRATROPIUM BROMIDE AND ALBUTEROL SULFATE 3 ML: .5; 3 SOLUTION RESPIRATORY (INHALATION) at 10:03

## 2025-03-03 RX ADMIN — FLUTICASONE FUROATE AND VILANTEROL TRIFENATATE 1 PUFF: 100; 25 POWDER RESPIRATORY (INHALATION) at 02:03

## 2025-03-03 RX ADMIN — IPRATROPIUM BROMIDE AND ALBUTEROL SULFATE 3 ML: .5; 3 SOLUTION RESPIRATORY (INHALATION) at 02:03

## 2025-03-03 RX ADMIN — CLINDAMYCIN HYDROCHLORIDE 300 MG: 150 CAPSULE ORAL at 09:03

## 2025-03-03 NOTE — H&P
"[facility] History and Physical     Date of Admit: 3/2/2025    Chief Complaint     Loss of Consciousness (C/o syncopal episodes starting feb 10th. Stated woke up today on bathroom floor. States when she stands up she gets dizzy and feels like she wants to pass out. Bp 84/55), Hypotension, and Joint Pain (Also c/o joint pain)   for 3 weeks    Subjective:      History of Present Illness:  Niya Moeller is a 54 y.o. female who has a PMH of Hyperthyroidism (Grave's), Asthma, diverticulitis (recent sigmoid resection 1/27/25). The patient presented to [facility] ED on 3/2/2025 with a primary complaint of x4 episodes of syncope in the past 3 weeks that have been all preceded by lightheadedness and "echoing speech," and have had loss of consciousness for unknown periods of time. Patient reports one episode of head trauma in recent fall within past 2 days or so. Patient reports that she has woken continent of urine and stool, and does not describe any significant post-ictal like symptoms. She also has had vague, achy abdominal pain diffusely, but worse around incision, and has had dull generalized headaches, general back pain that is worse around periphery, neck pain, and some nausea. Of note, patient describes 3-4 days of loose stools that are not watery, bloody, or abnormal in color, and have occurred around 2x per day. The patient denies changes in vision, emesis, fevers, chills, palpitations, dyspnea.    In ED, patient had been hypotensive, as well as tachycardic and tachypneic. Patient with CBC/CMP, CXR, CT abd, trop wnl; lactic acid elevated at 2.7, and uptrended to 3.8, despite 2L LR. Patient will be admitted for further syncope work up and lactic acid elevated.    Past Medical History:  Past Medical History:   Diagnosis Date    Asthma     Diverticulitis     Diverticulosis     GERD (gastroesophageal reflux disease)     Graves disease        Past Surgical History:  Past Surgical History:   Procedure Laterality " Date    CATHETERIZATION OF URETER Bilateral 1/27/2025    Procedure: CATHETERIZATION, URETER;  Surgeon: Denver Patel MD;  Location: [facility] OR 2ND FLR;  Service: Urology;  Laterality: Bilateral;    COLONOSCOPY, WITH DIRECTED SUBMUCOSAL INJECTION N/A 4/8/2024    Procedure: COLONOSCOPY, WITH DIRECTED SUBMUCOSAL INJECTION;  Surgeon: Heather Lance MD;  Location: [facility] ENDOSCOPY;  Service: Gastroenterology;  Laterality: N/A;    FLEXIBLE SIGMOIDOSCOPY N/A 1/27/2025    Procedure: SIGMOIDOSCOPY, FLEXIBLE;  Surgeon: ARA Clay MD;  Location: [facility] OR 2ND FLR;  Service: Colon and Rectal;  Laterality: N/A;    LAPAROSCOPIC SIGMOIDECTOMY  1/27/2025    Procedure: COLECTOMY, SIGMOID, LAPAROSCOPIC;  Surgeon: ARA Clay MD;  Location: [facility] OR 2ND FLR;  Service: Colon and Rectal;;    MOBILIZATION, SPLENIC FLEXURE, LAPAROSCOPIC N/A 1/27/2025    Procedure: MOBILIZATION, SPLENIC FLEXURE, LAPAROSCOPIC;  Surgeon: ARA Clay MD;  Location: [facility] OR 2ND FLR;  Service: Colon and Rectal;  Laterality: N/A;       Allergies:  Review of patient's allergies indicates:   Allergen Reactions    Latex Anaphylaxis and Edema    Zosyn [piperacillin-tazobactam] Anaphylaxis    Flagyl [metronidazole] Hives       Home Medications:  Prior to Admission medications    Medication Sig Start Date End Date Taking? Authorizing Provider   acetaminophen (TYLENOL) 325 MG tablet Take 2 tablets (650 mg total) by mouth every 4 (four) hours as needed for Pain or Temperature greater than (>101f).  Patient not taking: Reported on 2/27/2025 1/24/25   Francisco Vila MD   albuterol (PROVENTIL) 2.5 mg /3 mL (0.083 %) nebulizer solution Take 3 mLs (2.5 mg total) by nebulization every 6 (six) hours as needed for Wheezing or Shortness of Breath (shortness of breath). Rescue 1/24/25 1/24/26  Francisco Vila MD   bisacodyL (DULCOLAX) 10 mg Supp Place 1 suppository (10 mg total) rectally daily as needed (Until bowel movement if patient  has no bowel movement for 2 days).  Patient not taking: Reported on 2/27/2025 1/24/25   Francisco Vila MD   budesonide-glycopyr-formoterol 160-9-4.8 mcg/actuation HFAA Inhale into the lungs 2 (two) times daily.    Provider, Historical   clindamycin (CLINDESSE) 2 % vaginal cream Place vaginally every evening. for 7 days 2/25/25 3/4/25  Corie Jimenez ANP   docusate sodium (COLACE) 50 MG capsule Take 1 capsule (50 mg total) by mouth once daily.  Patient not taking: Reported on 2/27/2025 1/25/25   Francisco Vila MD   fluconazole (DIFLUCAN) 100 MG tablet Take 1 tablet (100 mg total) by mouth once daily.  Patient not taking: Reported on 2/27/2025 2/3/25   Sammie Sheets NP   fluticasone furoate-vilanteroL (BREO) 200-25 mcg/dose DsDv diskus inhaler Inhale 1 puff into the lungs once daily. Controller  Patient not taking: Reported on 2/27/2025 1/14/25   Ananya Dey MD   formoterol (PERFOROMIST) 20 mcg/2 mL Nebu Take 2 mLs (20 mcg total) by nebulization 2 (two) times daily. Controller 1/24/25 1/24/26  Francisco Vila MD   gabapentin (NEURONTIN) 300 MG capsule Take 1 capsule (300 mg total) by mouth 3 (three) times daily. 1/30/25 1/30/26  Sammie Sheets NP   HYDROcodone-acetaminophen (NORCO) 7.5-325 mg per tablet Take 1 tablet by mouth every 6 (six) hours as needed for Pain.  Patient not taking: Reported on 2/27/2025 1/13/25   Ananya Dey MD   nicotine (NICODERM CQ) 21 mg/24 hr Place 1 patch onto the skin once daily.  Patient not taking: Reported on 2/27/2025 1/25/25   Francisco Vila MD   ondansetron 4 mg/2 mL Soln Inject 4 mg into the vein every 8 (eight) hours as needed (nausea).  Patient not taking: Reported on 2/27/2025 1/24/25   Francisco Vila MD   oxyCODONE (ROXICODONE) 5 MG immediate release tablet Take 1 tablet (5 mg total) by mouth every 4 (four) hours as needed for Pain. 1/30/25   Sammie Sheets NP   pantoprazole (PROTONIX) 40 MG tablet Take 1 tablet (40 mg total) by  "mouth once daily. 1/14/25 1/14/26  Ananya Dey MD       Family History:  Family History   Problem Relation Name Age of Onset    Hypothyroidism Mother         Social History:  Social History[1]    Review of Systems:  Review of Systems   Constitutional:  Negative for chills, fever and malaise/fatigue.   Eyes:  Negative for blurred vision, double vision and pain.   Respiratory:  Negative for cough and shortness of breath.    Cardiovascular:  Positive for chest pain. Negative for palpitations and leg swelling.   Gastrointestinal:  Positive for abdominal pain, diarrhea and nausea. Negative for blood in stool, constipation, heartburn, melena and vomiting.   Genitourinary:  Positive for flank pain. Negative for dysuria, frequency, hematuria and urgency.   Musculoskeletal:  Positive for back pain, falls, joint pain, myalgias and neck pain.   Skin:  Negative for itching and rash.   Neurological:  Positive for dizziness, loss of consciousness and headaches. Negative for focal weakness and weakness.              Objective:   Last 24 Hour Vital Signs:  Vitals  BP: 119/65  Temp: 97.6 °F (36.4 °C)  Temp Source: Oral  Pulse: 68  Resp: 16  SpO2: 100 %  Height: 4' 11" (149.9 cm)  Weight: 70.4 kg (155 lb 3.3 oz)    Physical Examination:  General: Patient resting comfortably, in no acute distress, appears anxious  Eye: pupils equal, EOMI, clear conjunctiva, eyelids normal; periorbital edema present  HENT: Head-normocephalic and mild occipital tenderness in region of reported fall, negative for deformities  Neck: full range of motion, no thyromegaly or lymphadenopathy, trachea midline, supple  Respiratory: clear to auscultation bilaterally without wheezes, rales, rhonchi  Cardiovascular: regular rate and rhythm without murmurs.  No gallops or rubs, no JVD.  Capillary refill within normal limits.  Gastrointestinal: soft, mild RUQ tenderness with +michel's sign, negative rovsig/mcburney's; normal bowel sounds; suprapubic abdominal " incision clean/dry/intact, laparoscopic incision sites intact  Genitourinary: no CVA tenderness to palpation  Musculoskeletal: full range of motion of all extremities/spine without limitation or discomfort  Integumentary: no rashes or skin lesions present  Neurologic: no signs of peripheral neurological deficit, motor/sensory function intact;   Psychiatric:  alert and oriented, cognitive function intact, cooperative with exam        Laboratory:  Most Recent Data:  Most Recent Data:  CBC:   Lab Results   Component Value Date    WBC 4.71 03/03/2025    HGB 10.2 (L) 03/03/2025    HCT 31.1 (L) 03/03/2025     03/03/2025    MCV 96.3 (H) 03/03/2025    RDW 13.3 03/03/2025     WBC Differential:   Recent Labs   Lab 03/02/25  1609 03/03/25  0449   WBC 4.26* 4.71   HGB 12.5 10.2*   HCT 37.4 31.1*    185   MCV 95.7* 96.3*     BMP:   Lab Results   Component Value Date     03/03/2025    K 3.9 03/03/2025     (H) 03/03/2025    CO2 27 03/03/2025    BUN 8.8 (L) 03/03/2025    CREATININE 0.81 03/03/2025     01/28/2025    CALCIUM 9.1 03/03/2025    MG 1.80 03/03/2025    PHOS 4.0 03/03/2025     LFTs:   Lab Results   Component Value Date    PROT 7.5 02/19/2023    ALBUMIN 3.1 (L) 03/03/2025    BILITOT 0.2 03/03/2025    AST 18 03/03/2025    ALKPHOS 71 03/03/2025    ALT 15 03/03/2025     Coags:   Lab Results   Component Value Date    INR 1.0 03/02/2025     FLP:   Lab Results   Component Value Date    CHOL 147 03/31/2022    HDL 51 03/31/2022    TRIG 61 03/31/2022     DM:   Lab Results   Component Value Date    HGBA1C 5.9 (H) 11/23/2022    HGBA1C 5.3 06/28/2022    HGBA1C 5.4 03/31/2022    CREATININE 0.81 03/03/2025     Thyroid:   Lab Results   Component Value Date    TSH 0.457 03/02/2025    FREET4 3.09 (H) 01/22/2023     Anemia:   Lab Results   Component Value Date    RZTKUITX42 426 08/17/2023    FOLATE 9.6 12/05/2024     Cardiac:   Lab Results   Component Value Date    TROPONINI <0.010 03/02/2025    BNP 28.3  12/10/2024     Urinalysis:   Lab Results   Component Value Date    COLORU Light-Yellow 03/02/2025    SPECGRAV 1.010 02/15/2023    NITRITE Negative 03/02/2025    GLUCOSEU Negative 04/07/2022    KETONESU Negative 02/15/2023    UROBILINOGEN Normal 03/02/2025    WBCUA 0-5 03/02/2025       Radiology:  Imaging Results              CT Head Without Contrast (Preliminary result)  Result time 03/02/25 23:39:56      Preliminary result by Gregor Pritchett MD (03/02/25 23:39:56)                   Narrative:    START OF REPORT:  Technique: CT of the head was performed without intravenous contrast with axial as well as coronal and sagittal images.    Comparison: None.    Dosage Information: Automated Exposure Control was utilized 908.00 mGy.cm.    Clinical history: Syncope.    Findings:  Hemorrhage: No acute intracranial hemorrhage is seen.  CSF spaces: The ventricles sulci and basal cisterns are within normal limits for age.  Brain parenchyma: There is preservation of the grey white junction throughout. No acute infarct is identified.  Cerebellum: Unremarkable.  Sella and skull base: The sella appears to be within normal limits for age.  Intracranial calcifications: Incidental note is made of bilateral choroid plexus calcification. Incidental note is made of some pineal region calcification. Incidental note is made of some calcification of the falx.  Calvarium: No acute linear or depressed skull fracture is seen.    Maxillofacial Structures:  Paranasal sinuses: The visualized paranasal sinuses appear clear with no significant mucoperiosteal thickening or air fluid levels identified.  Orbits: The orbits appear unremarkable.  Zygomatic arches: The zygomatic arches are intact and unremarkable.  Temporal bones and mastoids: The temporal bones and mastoids appear unremarkable.  TMJ: The mandibular condyles appear normally placed with respect to the mandibular fossa.      Impression:  1. No acute intracranial process identified.  Details and other findings as noted above.                                         X-Ray Chest 1 View (Final result)  Result time 03/02/25 21:29:00      Final result by Shayne Atkins MD (03/02/25 21:29:00)                   Impression:      No acute disease is seen      Electronically signed by: Shayne Atkins MD  Date:    03/02/2025  Time:    21:29               Narrative:    EXAMINATION:  XR CHEST 1 VIEW    CLINICAL HISTORY:  Sepsis, unspecified organism    TECHNIQUE:  Single frontal view of the chest was performed.    COMPARISON:  01/14/2025    FINDINGS:  Lungs are clear.  Heart size is within normal limits.  Costophrenic angles are clear.                                       CT Abdomen Pelvis With IV Contrast Routine Oral Contrast (Final result)  Result time 03/02/25 20:53:40      Final result by Shayne Atkins MD (03/02/25 20:53:40)                   Impression:      Fluid in the vagina.    Postoperative changes.    No acute abnormalities are seen      Electronically signed by: Shayne Atkins MD  Date:    03/02/2025  Time:    20:53               Narrative:    EXAMINATION:  CT ABDOMEN PELVIS WITH IV CONTRAST    CLINICAL HISTORY:  Abdominal abscess/infection suspected;Recent colectomy for diverticulitis;  hypotension    TECHNIQUE:  Low dose axial images, sagittal and coronal reformations were obtained from the lung bases to the pubic symphysis following the IV administration of 98 mL of Omnipaque 350 and the oral administration of 30 mL of Omnipaque 350.    Automatic exposure control (AEC) was utilized for dose reduction.    Dose: 217 mGycm    COMPARISON:  None.    FINDINGS:  Lung bases are clear.  Liver appears normal.  Spleen appears normal.  Pancreas appears normal.  There is flow in the portal vein.  The adrenals are not enlarged.  Kidneys appear normal.  Aorta shows no evidence of an aneurysm.  There is a small fat containing umbilical hernia.  The small bowel is not dilated.  Uterus appears normal.   There is fluid in the vagina.  Patient has had a previous partial colectomy                                           Assessment & Plan:   Syncope, reported head trauma  SIRS 2/4  Lactic acid elevated  - patient witth x4 episodes of syncope with other vague complaints  - patient had recent sigmoid colectomy and has some generalized abdominal pain  - CT abd with normal post surgical changes  - lactic acid trended upwards to 3.8, despite 2L fluid; CBC/CMP/CRP wnl  - CXR/EKG/trop wnl  - will repeat lactic acid  - will start on additional LR bolus and maintenance fluids  - will hold abx for now as patient has no obvious signs of infection  - will get CT head, echo, U/S carotid, TSH, T4/T3  - blood cultures pending  - COVID/Flu/RSV, Respiratory panel pending  - repeat lactic is 1.1 after 2.5L fluids      Vaginal Itching/Discharge  - patient with reported vaginal itching and white discharge for the past month since surgery  - told she has BV by her PCP and given vaginal cream 3 days ago, (clindamycin)  - trace bacteriuria on UA  - has flagyl allergy, will hold off abx for now; clindamycin cream not available on formulary; consider checking with pharmacy in AM.      CODE STATUS: Full Code  Access: Peripheral  Antibiotics: None  Diet: Regular  DVT Prophylaxis: Teds/SCDs  GI Prophylaxis: proton pump inhibitor per orders  Fluids: lactated Ringer's 150 ml/hr.     Dispo: Patient being worked up for syncopal episodes in the setting of recent abdominal surgery and several non-specific complaints.      Myron Hollins MD  [facility]  HO-1         [1]   Social History  Tobacco Use    Smoking status: Former     Current packs/day: 0.00     Average packs/day: 0.5 packs/day for 42.0 years (21.0 ttl pk-yrs)     Types: Cigarettes     Start date: 1983     Quit date: 2025     Years since quittin.1     Passive exposure: Past    Smokeless tobacco: Current    Tobacco comments:     Ambulatory referral to Smoking  Cessation clinic following hospital discharge.    Substance Use Topics    Alcohol use: Not Currently    Drug use: Never

## 2025-03-03 NOTE — PT/OT/SLP PROGRESS
Physical Therapy    Missed Treatment Session - patient unwilling to participate note - 03/03/2025    Patient Name:  Niya Moeller   MRN:  [id number]      Patient not seen at this time secondary to Patient unwilling to participate (PT educated on importance of mobility but pt continued to decilne.).    Will follow-up as patient is appropriate/available/agreeable to participate and as therapists' schedule allows.

## 2025-03-03 NOTE — PLAN OF CARE
03/03/25 1541   Discharge Assessment   Assessment Type Discharge Planning Assessment   Confirmed/corrected address, phone number and insurance Yes   Confirmed Demographics Correct on Facesheet   Source of Information health record   When was your last doctors appointment?   (Does not have a PCP)   Does patient/caregiver understand observation status   (Inpatient)   Communicated ROXIE with patient/caregiver Date not available/Unable to determine   Reason For Admission R55 Syncope  R07.9 Chest pain  A41.9 Sepsis  I95.9 Hypotension, unspecified hypotension type   People in Home alone   Facility Arrived From: Home   Do you expect to return to your current living situation? Yes   Do you have help at home or someone to help you manage your care at home? Yes   Who are your caregiver(s) and their phone number(s)? Dustin Viveros, son, P: [phone]   Prior to hospitilization cognitive status: Alert/Oriented;No Deficits   Current cognitive status: Alert/Oriented;No Deficits   Walking or Climbing Stairs Difficulty yes   Walking or Climbing Stairs ambulation difficulty, requires equipment   Mobility Management Rolling walker   Dressing/Bathing Difficulty no   Home Accessibility not wheelchair accessible;stairs to enter home   Number of Stairs, Main Entrance three   Surface of Stairs, Main Entrance hardwood   Stair Railings, Main Entrance railings safe and in good condition   Home Layout Able to live on 1st floor   Equipment Currently Used at Home walker, rolling   Readmission within 30 days? No   Patient currently being followed by outpatient case management? No   Do you currently have service(s) that help you manage your care at home? No   Do you take prescription medications? Yes   Do you have prescription coverage? Yes   Coverage [employer]   Do you have any problems affording any of your prescribed medications? No   Is the patient taking medications as prescribed? yes   Who is going to help you get home at  discharge? Family/Medicaid transportation; taxi   How do you get to doctors appointments? car, drives self;family or friend will provide;health plan transportation   Are you on dialysis? No   Do you take coumadin? No   Discharge Plan A Home   Discharge Plan B Home Health   DME Needed Upon Discharge  none   Transition of Care Barriers None

## 2025-03-03 NOTE — PROGRESS NOTES
VANCOMYCIN DOSING BY PHARMACY DISCONTINUATION NOTE    Niya Moeller is a 54 y.o. female who had been consulted for vancomycin dosing.    The pharmacy consult for vancomycin dosing has been discontinued.     Vancomycin Dosing by Pharmacy Consult will sign-off. Please reconsult if necessary. Thank you for allowing us to participate in this patient's care.     Pauline Haji

## 2025-03-03 NOTE — CONSULTS
[facility]  Inpatient Cardiology Consult    Reason for Consult: syncope             HPI:  Niya Moeller 54 y.o. female with Grave's disease, asthma, diverticulitis s/p recent sigmoid resection on 1/27/2025 who is admitted with 4 recent episodes of syncope over the last 3 weeks.   Pt reports most episodes occurred with change in position. None of the episodes were witnessed. She reports prodromal lightheadedness with all 4 episodes. She states after her surgery she was started on gabapentin 300mg tid and thinks it might have to do with her syncopal episodes.    On presentation to the ER, pt was hypotensive with BP 84/55 and she was tachycardiac. Lactic acid 2.7 which uptrended to 3.8. She was given 2.5 L of NS with improvement in BP and eventual normalization of lactic acid.     EKG showed NSR with no abnormalities.  Echo which I have personally reviewed was unremarkable.   Bilateral carotid US WNL                                                                                                                                                                                                                                                                                                                                                                                                                                                                             CARDIAC TESTING:  Results for orders placed during the hospital encounter of 03/02/25    Echo Saline Bubble? Yes    Interpretation Summary    Left Ventricle: The left ventricle is normal in size. Ventricular mass is normal. Normal wall thickness. Normal wall motion. There is normal systolic function. Quantitated ejection fraction is 58%. There is normal diastolic function.    Right Ventricle: The right ventricle is normal in size. Systolic function is normal.    Left Atrium: Normal left atrial size. Agitated saline study of the atrial septum is negative after vasalva  maneuver, suggesting absence of intracardiac shunt at the atrial level.    Right Atrium: Normal right atrial size.    Aortic Valve: The aortic valve is a trileaflet valve. There is no stenosis. There is no significant regurgitation.    Mitral Valve: The mitral valve is structurally normal. There is no stenosis. There is no significant regurgitation.    Tricuspid Valve: There is no significant regurgitation.    Aorta: Aortic root is normal in size measuring 2.7 cm.    IVC/SVC: Intermediate venous pressure at 8 mmHg.    Pericardium: There is no pericardial effusion.    No results found for this or any previous visit.     No results found for this or any previous visit.        Prior to Admission medications    Medication Sig Start Date End Date Taking? Authorizing Provider   acetaminophen (TYLENOL) 325 MG tablet Take 2 tablets (650 mg total) by mouth every 4 (four) hours as needed for Pain or Temperature greater than (>101f).  Patient not taking: Reported on 2/27/2025 1/24/25   Francisco Vila MD   albuterol (PROVENTIL) 2.5 mg /3 mL (0.083 %) nebulizer solution Take 3 mLs (2.5 mg total) by nebulization every 6 (six) hours as needed for Wheezing or Shortness of Breath (shortness of breath). Rescue 1/24/25 1/24/26  Francisco Vila MD   bisacodyL (DULCOLAX) 10 mg Supp Place 1 suppository (10 mg total) rectally daily as needed (Until bowel movement if patient has no bowel movement for 2 days).  Patient not taking: Reported on 2/27/2025 1/24/25   Francisco Vila MD   budesonide-glycopyr-formoterol 160-9-4.8 mcg/actuation HFAA Inhale into the lungs 2 (two) times daily.    Provider, Historical   clindamycin (CLINDESSE) 2 % vaginal cream Place vaginally every evening. for 7 days 2/25/25 3/4/25  Corie Jimenez ANP   docusate sodium (COLACE) 50 MG capsule Take 1 capsule (50 mg total) by mouth once daily.  Patient not taking: Reported on 2/27/2025 1/25/25   Francisco Vila MD   fluconazole (DIFLUCAN) 100 MG tablet  Take 1 tablet (100 mg total) by mouth once daily.  Patient not taking: Reported on 2/27/2025 2/3/25   Sammie Sheets NP   fluticasone furoate-vilanteroL (BREO) 200-25 mcg/dose DsDv diskus inhaler Inhale 1 puff into the lungs once daily. Controller  Patient not taking: Reported on 2/27/2025 1/14/25   Ananya Dey MD   formoterol (PERFOROMIST) 20 mcg/2 mL Nebu Take 2 mLs (20 mcg total) by nebulization 2 (two) times daily. Controller 1/24/25 1/24/26  Francisco Vila MD   gabapentin (NEURONTIN) 300 MG capsule Take 1 capsule (300 mg total) by mouth 3 (three) times daily. 1/30/25 1/30/26  Sammie Sheets NP   HYDROcodone-acetaminophen (NORCO) 7.5-325 mg per tablet Take 1 tablet by mouth every 6 (six) hours as needed for Pain.  Patient not taking: Reported on 2/27/2025 1/13/25   Ananya Dey MD   nicotine (NICODERM CQ) 21 mg/24 hr Place 1 patch onto the skin once daily.  Patient not taking: Reported on 2/27/2025 1/25/25   Francisco Vila MD   ondansetron 4 mg/2 mL Soln Inject 4 mg into the vein every 8 (eight) hours as needed (nausea).  Patient not taking: Reported on 2/27/2025 1/24/25   Francisco Vila MD   oxyCODONE (ROXICODONE) 5 MG immediate release tablet Take 1 tablet (5 mg total) by mouth every 4 (four) hours as needed for Pain. 1/30/25   Sammie Sheets NP   pantoprazole (PROTONIX) 40 MG tablet Take 1 tablet (40 mg total) by mouth once daily. 1/14/25 1/14/26  Ananya Dey MD       Inpatient Scheduled Medications:   albuterol-ipratropium  3 mL Nebulization Q6H WAKE    clindamycin  300 mg Oral Q12H    fluticasone furoate-vilanteroL  1 puff Inhalation Daily     Continuous Infusions:      ROS:                                                                                                                                                                             Negative except as stated in the history of present illness. See HPI for details.    PHYSICAL EXAM:  VITALS: T 98.7 °F (37.1  °C)   BP (!) 103/55   P 78   RR 18   O2 100 %    General: alert and oriented/no acute distress  Eye: EOMI/normal conjunctiva/no xanthelasma  HENT: normocephalic/moist oral mucosa  Neck: supple/nontender/no carotid bruit  Respiratory: lungs CTA/nonlabored respirations/BS equal/symmetrical expansion/no  chest wall tenderness  Cardiovascular: normal rate/normal rhythm/no murmur/normal peripheral perfusion/no  edema/no JVD  Gastrointestinal: soft/nontender  Musculoskeletal: normal ROM  Integumentary: warm/dry/pink/intact  Neurologic: alert/oriented/normal sensory/no focal deficits  Psychiatric: cooperative/appropriate mood and affect/normal judgment    All medications, laboratory studies, cardiac diagnostic imaging independently reviewed.     ASSESSMENT/PLAN:    Syncopal episodes  Appear vasovagal/orthostatic in nature given prodromal symptoms and hypotension noted on presentation.  EKG and echo WNL and etiology is unlikely cardiac  For completion of workup, can consider a 30 day monitor on discharge although yield is probably low.  - gabapentin could be contributing to pt's syncopal episodes and suggest stopping it.  -Discussed with the patient the benign nature of this condition.  -Recommend increasing fluid intake to at least 2 L a day and liberalizing salt intake. Pt states she already drinks at least 2L a day.    Will sign off.    Sarah Portillo MD  Cardiology staff

## 2025-03-03 NOTE — PROGRESS NOTES
"Pharmacokinetic Initial Assessment: IV Vancomycin    Assessment/Plan:    Initiate intravenous vancomycin with loading dose of 1500 mg once followed by a maintenance dose of vancomycin 1250mg IV every 24 hours  Desired empiric serum trough concentration is 15 to 20 mcg/mL  Draw vancomycin trough level 60 min prior to third dose on 3/4 at approximately 2100  Pharmacy will continue to follow and monitor vancomycin.      Please contact pharmacy at extension [phone] with any questions regarding this assessment.     Thank you for the consult,   Eunlorraine Polo       Patient brief summary:  Niya Moeller is a 54 y.o. female initiated on antimicrobial therapy with IV Vancomycin for treatment of suspected sepsis    Drug Allergies:   Review of patient's allergies indicates:   Allergen Reactions    Latex Anaphylaxis and Edema    Zosyn [piperacillin-tazobactam] Anaphylaxis    Flagyl [metronidazole] Hives       Actual Body Weight:   69.2 kg    Renal Function:   Estimated Creatinine Clearance: 58.9 mL/min (based on SCr of 0.96 mg/dL).,     Dialysis Method (if applicable):  N/A    CBC (last 72 hours):  Recent Labs   Lab Result Units 03/02/25  1609   WBC x10(3)/mcL 4.26*   Hgb g/dL 12.5   Hct % 37.4   Platelet x10(3)/mcL 204   Mono % % 10.1   Eos % % 5.9   Basophil % % 0.2       Metabolic Panel (last 72 hours):  Recent Labs   Lab Result Units 03/02/25  1609 03/02/25  1637   Sodium mmol/L 138  --    Potassium mmol/L 4.2  --    Chloride mmol/L 106  --    CO2 mmol/L 24  --    Glucose mg/dL 83  --    Glucose, UA   --  Normal   Blood Urea Nitrogen mg/dL 11.2  --    Creatinine mg/dL 0.96  --    Albumin g/dL 4.0  --    Bilirubin Total mg/dL 0.4  --    ALP unit/L 76  --    AST unit/L 21  --    ALT unit/L 17  --        Drug levels (last 3 results):  No results for input(s): "VANCOMYCINRA", "VANCORANDOM", "VANCOMYCINPE", "VANCOPEAK", "VANCOMYCINTR", "VANCOTROUGH" in the last 72 hours.    Microbiologic Results:  Microbiology Results (last 7 " days)       Procedure Component Value Units Date/Time    Blood Culture #2 **CANNOT BE ORDERED STAT** [[id number]] Collected: 03/02/25 1818    Order Status: Sent Specimen: Blood from Hand, Right Updated: 03/02/25 2132    Blood Culture #1 **CANNOT BE ORDERED STAT** [[id number]] Collected: 03/02/25 1609    Order Status: Sent Specimen: Blood from Hand, Left Updated: 03/02/25 2131

## 2025-03-03 NOTE — PROGRESS NOTES
03/03/25 1138   Missed Time Reason   OT Attempted Eval Date 03/03/25   OT Attempted Eval Time 1138   Missed Treatment Reason Patient unwilling to participate     Pt adamantly refused due to joint pain multiple sites. Will attempt again as schedule permits.

## 2025-03-04 PROBLEM — R55 SYNCOPE: Status: ACTIVE | Noted: 2025-03-04

## 2025-03-04 PROBLEM — E86.0 DEHYDRATION: Status: ACTIVE | Noted: 2025-03-04

## 2025-03-04 PROBLEM — R19.7 DIARRHEA: Status: ACTIVE | Noted: 2025-03-04

## 2025-03-04 PROBLEM — I95.9 HYPOTENSION: Status: ACTIVE | Noted: 2025-03-04

## 2025-03-04 LAB
ACCEPTIBLE SP GR UR QL: 1.02 (ref 1–1.03)
ALBUMIN SERPL-MCNC: 3.3 G/DL (ref 3.5–5)
ALBUMIN/GLOB SERPL: 1 RATIO (ref 1.1–2)
ALP SERPL-CCNC: 83 UNIT/L (ref 40–150)
ALT SERPL-CCNC: 24 UNIT/L (ref 0–55)
AMPHET UR QL SCN: NEGATIVE
ANION GAP SERPL CALC-SCNC: 7 MEQ/L
AST SERPL-CCNC: 27 UNIT/L (ref 5–34)
BARBITURATE SCN PRESENT UR: NEGATIVE
BASOPHILS # BLD AUTO: 0.02 X10(3)/MCL
BASOPHILS NFR BLD AUTO: 0.4 %
BENZODIAZ UR QL SCN: NEGATIVE
BILIRUB SERPL-MCNC: 0.1 MG/DL
BUN SERPL-MCNC: 9.9 MG/DL (ref 9.8–20.1)
CALCIUM SERPL-MCNC: 9 MG/DL (ref 8.4–10.2)
CANNABINOIDS UR QL SCN: NEGATIVE
CHLORIDE SERPL-SCNC: 112 MMOL/L (ref 98–107)
CO2 SERPL-SCNC: 23 MMOL/L (ref 22–29)
COCAINE UR QL SCN: NEGATIVE
CORTIS SERPL-SCNC: 2.9 UG/DL
CORTIS SERPL-SCNC: 3.5 UG/DL
CREAT SERPL-MCNC: 0.81 MG/DL (ref 0.55–1.02)
CREAT/UREA NIT SERPL: 12
EOSINOPHIL # BLD AUTO: 0.41 X10(3)/MCL (ref 0–0.9)
EOSINOPHIL NFR BLD AUTO: 9 %
ERYTHROCYTE [DISTWIDTH] IN BLOOD BY AUTOMATED COUNT: 13.6 % (ref 11.5–17)
FENTANYL UR QL SCN: NEGATIVE
GFR SERPLBLD CREATININE-BSD FMLA CKD-EPI: >60 ML/MIN/1.73/M2
GLOBULIN SER-MCNC: 3.2 GM/DL (ref 2.4–3.5)
GLUCOSE SERPL-MCNC: 85 MG/DL (ref 74–100)
HCT VFR BLD AUTO: 31.3 % (ref 37–47)
HGB BLD-MCNC: 10.1 G/DL (ref 12–16)
HOLD SPECIMEN: NORMAL
IMM GRANULOCYTES # BLD AUTO: 0.01 X10(3)/MCL (ref 0–0.04)
IMM GRANULOCYTES NFR BLD AUTO: 0.2 %
LYMPHOCYTES # BLD AUTO: 2.8 X10(3)/MCL (ref 0.6–4.6)
LYMPHOCYTES NFR BLD AUTO: 61.7 %
MAGNESIUM SERPL-MCNC: 1.7 MG/DL (ref 1.6–2.6)
MCH RBC QN AUTO: 31.4 PG (ref 27–31)
MCHC RBC AUTO-ENTMCNC: 32.3 G/DL (ref 33–36)
MCV RBC AUTO: 97.2 FL (ref 80–94)
MDMA UR QL SCN: NEGATIVE
MONOCYTES # BLD AUTO: 0.56 X10(3)/MCL (ref 0.1–1.3)
MONOCYTES NFR BLD AUTO: 12.3 %
NEUTROPHILS # BLD AUTO: 0.74 X10(3)/MCL (ref 2.1–9.2)
NEUTROPHILS NFR BLD AUTO: 16.4 %
NRBC BLD AUTO-RTO: 0 %
OPIATES UR QL SCN: NEGATIVE
PCP UR QL: NEGATIVE
PH UR: 7 [PH] (ref 3–11)
PHOSPHATE SERPL-MCNC: 4.3 MG/DL (ref 2.3–4.7)
PLATELET # BLD AUTO: 184 X10(3)/MCL (ref 130–400)
PMV BLD AUTO: 10.1 FL (ref 7.4–10.4)
POTASSIUM SERPL-SCNC: 4.2 MMOL/L (ref 3.5–5.1)
PROT SERPL-MCNC: 6.5 GM/DL (ref 6.4–8.3)
RBC # BLD AUTO: 3.22 X10(6)/MCL (ref 4.2–5.4)
SODIUM SERPL-SCNC: 142 MMOL/L (ref 136–145)
WBC # BLD AUTO: 4.54 X10(3)/MCL (ref 4.5–11.5)

## 2025-03-04 PROCEDURE — 25000003 PHARM REV CODE 250

## 2025-03-04 PROCEDURE — 36415 COLL VENOUS BLD VENIPUNCTURE: CPT | Performed by: STUDENT IN AN ORGANIZED HEALTH CARE EDUCATION/TRAINING PROGRAM

## 2025-03-04 PROCEDURE — 94761 N-INVAS EAR/PLS OXIMETRY MLT: CPT

## 2025-03-04 PROCEDURE — 36415 COLL VENOUS BLD VENIPUNCTURE: CPT

## 2025-03-04 PROCEDURE — 63600175 PHARM REV CODE 636 W HCPCS: Performed by: INTERNAL MEDICINE

## 2025-03-04 PROCEDURE — 85025 COMPLETE CBC W/AUTO DIFF WBC: CPT

## 2025-03-04 PROCEDURE — 83735 ASSAY OF MAGNESIUM: CPT

## 2025-03-04 PROCEDURE — 25000242 PHARM REV CODE 250 ALT 637 W/ HCPCS

## 2025-03-04 PROCEDURE — 93005 ELECTROCARDIOGRAM TRACING: CPT

## 2025-03-04 PROCEDURE — 36415 COLL VENOUS BLD VENIPUNCTURE: CPT | Performed by: INTERNAL MEDICINE

## 2025-03-04 PROCEDURE — 11000001 HC ACUTE MED/SURG PRIVATE ROOM

## 2025-03-04 PROCEDURE — 99900035 HC TECH TIME PER 15 MIN (STAT)

## 2025-03-04 PROCEDURE — 80053 COMPREHEN METABOLIC PANEL: CPT

## 2025-03-04 PROCEDURE — 84100 ASSAY OF PHOSPHORUS: CPT

## 2025-03-04 PROCEDURE — 94640 AIRWAY INHALATION TREATMENT: CPT

## 2025-03-04 PROCEDURE — 82533 TOTAL CORTISOL: CPT | Performed by: INTERNAL MEDICINE

## 2025-03-04 PROCEDURE — 97161 PT EVAL LOW COMPLEX 20 MIN: CPT

## 2025-03-04 PROCEDURE — 80307 DRUG TEST PRSMV CHEM ANLYZR: CPT | Performed by: INTERNAL MEDICINE

## 2025-03-04 RX ORDER — COSYNTROPIN 0.25 MG/ML
0.25 INJECTION, POWDER, FOR SOLUTION INTRAMUSCULAR; INTRAVENOUS ONCE
Status: COMPLETED | OUTPATIENT
Start: 2025-03-05 | End: 2025-03-05

## 2025-03-04 RX ORDER — TRAMADOL HYDROCHLORIDE 50 MG/1
50 TABLET ORAL EVERY 6 HOURS PRN
Status: DISCONTINUED | OUTPATIENT
Start: 2025-03-04 | End: 2025-03-05 | Stop reason: HOSPADM

## 2025-03-04 RX ORDER — KETOROLAC TROMETHAMINE 30 MG/ML
15 INJECTION, SOLUTION INTRAMUSCULAR; INTRAVENOUS ONCE
Status: DISCONTINUED | OUTPATIENT
Start: 2025-03-04 | End: 2025-03-05 | Stop reason: HOSPADM

## 2025-03-04 RX ORDER — PANTOPRAZOLE SODIUM 40 MG/1
40 TABLET, DELAYED RELEASE ORAL DAILY
Status: DISCONTINUED | OUTPATIENT
Start: 2025-03-04 | End: 2025-03-05 | Stop reason: HOSPADM

## 2025-03-04 RX ORDER — ACETAMINOPHEN 500 MG
1000 TABLET ORAL EVERY 8 HOURS PRN
Status: DISCONTINUED | OUTPATIENT
Start: 2025-03-04 | End: 2025-03-05 | Stop reason: HOSPADM

## 2025-03-04 RX ADMIN — IPRATROPIUM BROMIDE AND ALBUTEROL SULFATE 3 ML: .5; 3 SOLUTION RESPIRATORY (INHALATION) at 01:03

## 2025-03-04 RX ADMIN — SODIUM CHLORIDE 1000 ML: 9 INJECTION, SOLUTION INTRAVENOUS at 02:03

## 2025-03-04 RX ADMIN — SODIUM CHLORIDE, POTASSIUM CHLORIDE, SODIUM LACTATE AND CALCIUM CHLORIDE 1000 ML: 600; 310; 30; 20 INJECTION, SOLUTION INTRAVENOUS at 12:03

## 2025-03-04 RX ADMIN — TRAMADOL HYDROCHLORIDE 50 MG: 50 TABLET, COATED ORAL at 01:03

## 2025-03-04 RX ADMIN — IPRATROPIUM BROMIDE AND ALBUTEROL SULFATE 3 ML: .5; 3 SOLUTION RESPIRATORY (INHALATION) at 10:03

## 2025-03-04 RX ADMIN — CLINDAMYCIN HYDROCHLORIDE 300 MG: 150 CAPSULE ORAL at 12:03

## 2025-03-04 RX ADMIN — PANTOPRAZOLE SODIUM 40 MG: 40 TABLET, DELAYED RELEASE ORAL at 12:03

## 2025-03-04 RX ADMIN — IPRATROPIUM BROMIDE AND ALBUTEROL SULFATE 3 ML: .5; 3 SOLUTION RESPIRATORY (INHALATION) at 07:03

## 2025-03-04 RX ADMIN — FLUTICASONE FUROATE AND VILANTEROL TRIFENATATE 1 PUFF: 100; 25 POWDER RESPIRATORY (INHALATION) at 07:03

## 2025-03-04 RX ADMIN — TRAMADOL HYDROCHLORIDE 50 MG: 50 TABLET, COATED ORAL at 09:03

## 2025-03-04 RX ADMIN — CLINDAMYCIN HYDROCHLORIDE 300 MG: 150 CAPSULE ORAL at 09:03

## 2025-03-04 NOTE — CARE UPDATE
Got a call from the nurse that the patient has some questions regarding her tests UDS, explained to her the reason we did the test was, she has opioid analgesics listed on her med rec for pain likely ordered around the time of her surgery and not clearly mentioned taking or not taking, ordered the test as a part of workup for syncope to see if the pain meds affecting her BP and dizziness. Explained to her this is not about bias against her race,just want to rule out every potential cause of her syncope including polypharmacy. Patient showed understanding and calmed down after explaining it to her.    ROMEO JAIN MD  Internal Medicine - PGY-1

## 2025-03-04 NOTE — PT/OT/SLP EVAL
Physical Therapy Evaluation    Patient Name:  Niya Moeller   MRN:  [id number]    Recommendations:     Therapy Intensity Recommendations at Discharge: Low Intensity Therapy  Discharge Equipment Recommendations: none   Equipment to be obtained for discharge: none.  Barriers to discharge: none    Assessment:     Niya Moeller is a 54 y.o. female admitted with a medical diagnosis of Syncope.  1. Syncope    2. Hypotension, unspecified hypotension type    3. Sepsis    4. Chest pain    5. Dizziness       Problem List[1]   She presents with the following impairments/functional limitations:  weakness, impaired endurance, impaired functional mobility, gait instability, impaired balance, pain.    Rehab Prognosis: TBD pending progress.    Patient would benefit from continued skilled acute PT services to: address above listed impairments/functional limitations; receive patient/caregiver education; reduce fall risk; and maximize independency/safety with functional mobility.    Recent Surgery: * No surgery found *      Plan:     During this hospitalization, patient to be seen   to address the identified impairments/functional limitations via gait training, therapeutic activities, therapeutic exercises and progress toward the established goals.    Plan of Care Expires:  04/03/25    Subjective     Communicated with patient's nurse Henny prior to session.    Patient agreeable to participate in evaluation.     Chief Complaint: abd pain  Patient/Family Comments/goals: to return home  Pain/Comfort:  Pain Rating 1: 7/10  Location 1: abdomen  Pain Addressed 1: Nurse notified  Pain Rating Post-Intervention 1: 7/10    Patients cultural, spiritual, Temple conflicts given the current situation:      Social History  Living Environment: Patient lives alone in a first floor apartment, with 5 steps steps outside, with tub-shower combo.  Functional Level: Prior to admission patient ambulated without assistive device.  Equipment Used at Home:   none  Equipment owned (not currently used): none.  Assistance Upon Discharge: unknown.    Objective:     Patient found supine in bed with peripheral IV  upon PT entry to room.    General Precautions: Standard, fall   Orthopedic Precautions:N/A   Braces:  N/A  Respiratory Status: room air    Exams:  Orientation: Patient is oriented to person, place, time, situation  Commands: Patient follows commands consistently  RUE ROM: WFL  RUE Strength: WFL  LUE ROM: WFL  LUE Strength: WFL  RLE ROM: WFL  RLE Strength: WFL  LLE ROM: WFL  LLE Strength: WFL    Functional Mobility:    Bed Mobility:  Supine to Sit: modified independence    Transfers:  Sit to Stand: modified independence with no assistive device    Gait:  Patient ambulated 20 ft with no assistive device and stand by assistance.  Patient demonstrates :       no loss of balance       decreased tien.    Other Mobility:  N/A    Balance:  Sit  Static: NORMAL: No deviations seen in posture held statically  Dynamic: GOOD: Maintains balance through MODERATE excursions of active trunk movement  Stand  Static: GOOD: Takes MODERATE challenges from all directions  Dynamic: FAIR+: Needs CLOSE SUPERVISION during gait and is able to right self with minor LOB    Additional Treatment Session  N/A    Patient left sitting edge of bed with all lines intact, call button in reach, tray table at bedside, and patient's nurse notified.    DME Justifications:  No DME recommended requiring DME justifications    Education     Patient educated on the importance of early mobility to prevent functional decline during hospital stay.  Patient educated on and assisted with functional mobility as noted above.  Patient educated on PT Plan of Care and role of PT in acute care.  Patient was instructed to utilize staff assistance for mobility/transfers.  White board updated regarding patient's safest level of mobility with staff assistance    Goals     Multidisciplinary Problems       Physical Therapy  Goals          Problem: Physical Therapy    Goal Priority Disciplines Outcome Interventions   Physical Therapy Goal     PT, PT/OT Progressing    Description: Goals to be met by: dc     Patient will increase functional independence with mobility by performin. Gait  x 130 feet with Modified [place] using No Assistive Device.   2. Ascend/descend 5 stair with bilateral Handrails Supervision using No Assistive Device.                        History:     Past Medical History:   Diagnosis Date    Asthma     Diverticulitis     Diverticulosis     GERD (gastroesophageal reflux disease)     Graves disease      Past Surgical History:   Procedure Laterality Date    CATHETERIZATION OF URETER Bilateral 2025    Procedure: CATHETERIZATION, URETER;  Surgeon: Denver Patel MD;  [facility]: [facility];  Service: Urology;  Laterality: Bilateral;    COLONOSCOPY, WITH DIRECTED SUBMUCOSAL INJECTION N/A 2024    Procedure: COLONOSCOPY, WITH DIRECTED SUBMUCOSAL INJECTION;  Surgeon: Heather Lance MD;  Location: [facility] ENDOSCOPY;  Service: Gastroenterology;  Laterality: N/A;    FLEXIBLE SIGMOIDOSCOPY N/A 2025    Procedure: SIGMOIDOSCOPY, FLEXIBLE;  Surgeon: ARA Clay MD;  [facility];  Service: Colon and Rectal;  Laterality: N/A;    LAPAROSCOPIC SIGMOIDECTOMY  2025    Procedure: COLECTOMY, SIGMOID, LAPAROSCOPIC;  Surgeon: ARA Clay MD;  [facility];  Service: Colon and Rectal;;    MOBILIZATION, SPLENIC FLEXURE, LAPAROSCOPIC N/A 2025    Procedure: MOBILIZATION, SPLENIC FLEXURE, LAPAROSCOPIC;  Surgeon: ARA Clay MD;  [facility];  Service: Colon and Rectal;  Laterality: N/A;     Time Tracking:     PT Received On:    PT Start Time: 0750     PT Stop Time: 0810  PT Total Time (min): 20 min     Billable Minutes: Evaluation 2025         [1]   Patient Active Problem List  Diagnosis    Obesity    Asthma  exacerbation    GERD (gastroesophageal reflux disease)    Diverticulitis    Moderate persistent asthma with (acute) exacerbation    Hyperthyroidism    Thyroiditis    Diastolic dysfunction, left ventricle    Tachycardia    Other chest pain    Hypoglycemia    Thrombocytopathia    Diverticulitis of intestine with abscess    Asthma    Diverticulitis of large intestine    Allergic conjunctivitis and rhinitis    Macrocytic anemia    Superficial vein thrombosis    Tobacco dependence    History of ESBL E. coli infection    Syncope    Hypotension    Diarrhea    Dehydration

## 2025-03-04 NOTE — PROGRESS NOTES
"U Internal Medicine Progress Note    Patient Name: Niya Moeller  MRN: [id number]  Admission Date: 3/2/2025  Current Hospital Day: 3   Code Status: Full Code  Attending Provider: Dez Jones MD    Subjective:   Brief HPI:  Niya Moeller is a 54 y.o. female who has a PMH of Hyperthyroidism (Grave's), Asthma, diverticulitis (recent sigmoid resection 1/27/25). The patient presented to [facility] ED on 3/2/2025 with a primary complaint of x4 episodes of syncope in the past 3 weeks that have been all preceded by lightheadedness and "echoing speech," and have had loss of consciousness for unknown periods of time. Patient reports one episode of head trauma in recent fall within past 2 days or so. Patient reports that she has woken continent of urine and stool, and does not describe any significant post-ictal like symptoms. She also has had vague, achy abdominal pain diffusely, but worse around incision, and has had dull generalized headaches, general back pain that is worse around periphery, neck pain, and some nausea. Of note, patient describes 3-4 days of loose stools that are not watery, bloody, or abnormal in color, and have occurred around 2x per day. The patient denies changes in vision, emesis, fevers, chills, palpitations, dyspnea.     In ED, patient had been hypotensive, as well as tachycardic and tachypneic. Patient with CBC/CMP, CXR, CT abd, trop wnl; lactic acid elevated at 2.7, and uptrended to 3.8, despite 2L LR. Patient will be admitted for further syncope work up and lactic acid elevated    Interval History:   This morning she denies fevers, headache, chest pain, SOB, N/V/D and abdominal pain. Stated that she is dizzy when she was doing the PT and her heart was racing. The syncope workup done so far was negative. She is hypotensive with Map 62.     She stated that she was having dizzy spells prior to the surgery. Denied any history of seizures. She has received steroids in the past for graves, possibility " of adrenal insufficiency, pending cortisol AM.     ROS:  Pertinent positives and negatives listed in HPI. All other systems are reviewed and are negative.    Objective:   Vitals:  Vitals:    03/04/25 0358 03/04/25 0708 03/04/25 0739 03/04/25 1149   BP: (!) 103/53  (!) 90/48 (!) 81/41   Pulse: 71 72 74 83   Resp: 17 20 18 18   Temp: 97.9 °F (36.6 °C)  98.5 °F (36.9 °C) 98.5 °F (36.9 °C)   TempSrc: Oral  Oral Oral   SpO2: 98% 97% 98% 96%   Weight:       Height:           Physical Examination:  General: Awake, alert, & oriented to person, place & time. No acute distress  Psychiatric: Mood and affect normal  HEENT: Normocephalic, atraumatic. Face symmetric.  Cardiovascular: Regular rate & rhythm, Normal S1 & S2 w/out murmurs, rubs or gallops  Pulmonary: Bilateral symmetric chest rise, Non-labored, no wheezes, rhonchi or crackles are appreciated  Abdominal:  nondistended  Extremities: No clubbing, cyanosis or edema.  Skin:  Exposed skin is warm & dry.  Neuro:   Patient moves all extremities equally. Sensation intact bilateraly.    Laboratory:  CBC:   Lab Results   Component Value Date    WBC 4.54 03/04/2025    HGB 10.1 (L) 03/04/2025    HCT 31.3 (L) 03/04/2025     03/04/2025    MCV 97.2 (H) 03/04/2025    RDW 13.6 03/04/2025     BMP:   Lab Results   Component Value Date     03/04/2025    K 4.2 03/04/2025    CHLORIDE 108 (H) 12/18/2022    CO2 23 03/04/2025    BUN 9.9 03/04/2025    CREATININE 0.81 03/04/2025    GLUCOSE 85 03/04/2025    CALCIUM 9.0 03/04/2025    MAGNESIUM 2.1 10/17/2022     LFTs:   Lab Results   Component Value Date    PROT 7.5 02/19/2023    ALBUMIN 3.3 (L) 03/04/2025    BILITOT 0.1 03/04/2025    BILIDIR 0.2 05/05/2022    IBILI 0.30 05/05/2022    AST 27 03/04/2025    ALKPHOS 83 03/04/2025    ALT 24 03/04/2025     Coags:   Lab Results   Component Value Date    INR 1.0 03/02/2025     FLP:   Lab Results   Component Value Date    CHOL 147 03/31/2022    HDL 51 03/31/2022    LDL 84.00 03/31/2022     TRIG 61 03/31/2022     DM:   Lab Results   Component Value Date    HGBA1C 5.9 (H) 11/23/2022    HGBA1C 5.3 06/28/2022    HGBA1C 5.4 03/31/2022    CREATININE 0.81 03/04/2025     Thyroid:   Lab Results   Component Value Date    TSH 0.457 03/02/2025    NPMIFF1YMSN 1.00 03/02/2025     Anemia:   Lab Results   Component Value Date    JNOKSUBQ41 426 08/17/2023    FOLATE 9.6 12/05/2024     Cardiac:   Lab Results   Component Value Date    TROPONINI <0.010 03/02/2025    CPK 63 04/05/2024    CPKMB 1.0 04/05/2024    BNP 28.3 12/10/2024     Urinalysis:   Lab Results   Component Value Date    COLORU Light-Yellow 03/02/2025    SPECGRAV 1.010 02/15/2023    NITRITE Negative 03/02/2025    GLUCOSEU Negative 04/07/2022    KETONESU Negative 02/15/2023    UROBILINOGEN Normal 03/02/2025    WBCUA 0-5 03/02/2025       Trended Cardiac Data:  Recent Labs   Lab 03/02/25  1609   TROPONINI <0.010        Radiology:  No results found in the last 24 hours.     Assessment & Plan:   Syncope, reported head trauma, likely orthostatic/ vasovagal  SIRS 2/4 on admission   Lactic acid elevated, resolved   Hypotension, symptomatic   - patient witth x4 episodes of syncope with other vague complaints  - patient had recent sigmoid colectomy and has some generalized abdominal pain  - CT abd with normal post surgical changes  - CXR/EKG/trop wnl  -  lactic acid trended down  -  CT head, echo, U/S carotid, TSH, T4/T3- no significant abnormalities   - blood cultures NGTD  - COVID/Flu/RSV, Respiratory panel negative  - PT/OT  - she is hypotensive, with MAP of 62, patient complained of dizziness, palpitations. Will repeat orthostatics and giving her a litre bolus. Ordered cortisol Am pending results to rule out adrenal insufficiency.  - cardiology consulted for her syncope to rule out cardiac causes, recommended cardiac monitor on discharge   - discontinue gabapentin as this might be adding to her syncope          Vaginal Itching/Discharge  - patient with reported  vaginal itching and white discharge for the past month since surgery  - told she has BV by her PCP and given vaginal cream 3 days ago, (clindamycin)  - trace bacteriuria on UA  - has flagyl allergy, will hold off abx for now; clindamycin cream not available on formulary; statrted her on oral clindamycin.     Asthma  - continue her home meds  CODE STATUS: Full Code  Access: PIV  Antimicrobials: none  Diet: Diet Adult Regular   DVT Prophylaxis:  SCD  GI Prophylaxis: PPI  Fluids: LR 1 L bolus    Disposition: possible discharge later today/tomorrow once her Bp is stable, pending cortisol results.     ROMEO JAIN MD  Internal Medicine - PGY-1

## 2025-03-04 NOTE — PLAN OF CARE
Problem: Physical Therapy  Goal: Physical Therapy Goal  Description: Goals to be met by: dc     Patient will increase functional independence with mobility by performin. Gait  x 130 feet with Modified [place] using No Assistive Device.   2. Ascend/descend 5 stair with bilateral Handrails Supervision using No Assistive Device.     Outcome: Progressing

## 2025-03-05 VITALS
SYSTOLIC BLOOD PRESSURE: 96 MMHG | BODY MASS INDEX: 31.25 KG/M2 | WEIGHT: 155 LBS | DIASTOLIC BLOOD PRESSURE: 53 MMHG | HEART RATE: 82 BPM | RESPIRATION RATE: 18 BRPM | TEMPERATURE: 99 F | OXYGEN SATURATION: 97 % | HEIGHT: 59 IN

## 2025-03-05 LAB
ALBUMIN SERPL-MCNC: 3.3 G/DL (ref 3.5–5)
ALBUMIN/GLOB SERPL: 1 RATIO (ref 1.1–2)
ALP SERPL-CCNC: 84 UNIT/L (ref 40–150)
ALT SERPL-CCNC: 21 UNIT/L (ref 0–55)
ANION GAP SERPL CALC-SCNC: 6 MEQ/L
AST SERPL-CCNC: 19 UNIT/L (ref 5–34)
BASOPHILS # BLD AUTO: 0.02 X10(3)/MCL
BASOPHILS NFR BLD AUTO: 0.4 %
BILIRUB SERPL-MCNC: 0.3 MG/DL
BUN SERPL-MCNC: 9.9 MG/DL (ref 9.8–20.1)
CALCIUM SERPL-MCNC: 9.4 MG/DL (ref 8.4–10.2)
CHLORIDE SERPL-SCNC: 111 MMOL/L (ref 98–107)
CO2 SERPL-SCNC: 25 MMOL/L (ref 22–29)
CORTIS 1H P CHAL SERPL-SCNC: 17.7 UG/DL
CORTIS 30M P CHAL SERPL-SCNC: 15.5 UG/DL
CREAT SERPL-MCNC: 0.87 MG/DL (ref 0.55–1.02)
CREAT/UREA NIT SERPL: 11
EOSINOPHIL # BLD AUTO: 0.37 X10(3)/MCL (ref 0–0.9)
EOSINOPHIL NFR BLD AUTO: 7.8 %
ERYTHROCYTE [DISTWIDTH] IN BLOOD BY AUTOMATED COUNT: 13.6 % (ref 11.5–17)
GFR SERPLBLD CREATININE-BSD FMLA CKD-EPI: >60 ML/MIN/1.73/M2
GLOBULIN SER-MCNC: 3.2 GM/DL (ref 2.4–3.5)
GLUCOSE SERPL-MCNC: 76 MG/DL (ref 74–100)
HCT VFR BLD AUTO: 32 % (ref 37–47)
HGB BLD-MCNC: 10.3 G/DL (ref 12–16)
HOLD SPECIMEN: NORMAL
IMM GRANULOCYTES # BLD AUTO: 0.01 X10(3)/MCL (ref 0–0.04)
IMM GRANULOCYTES NFR BLD AUTO: 0.2 %
LYMPHOCYTES # BLD AUTO: 2.73 X10(3)/MCL (ref 0.6–4.6)
LYMPHOCYTES NFR BLD AUTO: 57.2 %
MAGNESIUM SERPL-MCNC: 1.8 MG/DL (ref 1.6–2.6)
MCH RBC QN AUTO: 31.4 PG (ref 27–31)
MCHC RBC AUTO-ENTMCNC: 32.2 G/DL (ref 33–36)
MCV RBC AUTO: 97.6 FL (ref 80–94)
MONOCYTES # BLD AUTO: 0.5 X10(3)/MCL (ref 0.1–1.3)
MONOCYTES NFR BLD AUTO: 10.5 %
NEUTROPHILS # BLD AUTO: 1.14 X10(3)/MCL (ref 2.1–9.2)
NEUTROPHILS NFR BLD AUTO: 23.9 %
NRBC BLD AUTO-RTO: 0 %
PHOSPHATE SERPL-MCNC: 4.4 MG/DL (ref 2.3–4.7)
PLATELET # BLD AUTO: 194 X10(3)/MCL (ref 130–400)
PMV BLD AUTO: 10.2 FL (ref 7.4–10.4)
POTASSIUM SERPL-SCNC: 3.9 MMOL/L (ref 3.5–5.1)
PROT SERPL-MCNC: 6.5 GM/DL (ref 6.4–8.3)
RBC # BLD AUTO: 3.28 X10(6)/MCL (ref 4.2–5.4)
SODIUM SERPL-SCNC: 142 MMOL/L (ref 136–145)
WBC # BLD AUTO: 4.77 X10(3)/MCL (ref 4.5–11.5)

## 2025-03-05 PROCEDURE — 82533 TOTAL CORTISOL: CPT | Performed by: INTERNAL MEDICINE

## 2025-03-05 PROCEDURE — 94640 AIRWAY INHALATION TREATMENT: CPT

## 2025-03-05 PROCEDURE — 36415 COLL VENOUS BLD VENIPUNCTURE: CPT

## 2025-03-05 PROCEDURE — 83735 ASSAY OF MAGNESIUM: CPT

## 2025-03-05 PROCEDURE — 25000003 PHARM REV CODE 250

## 2025-03-05 PROCEDURE — 94761 N-INVAS EAR/PLS OXIMETRY MLT: CPT

## 2025-03-05 PROCEDURE — 85025 COMPLETE CBC W/AUTO DIFF WBC: CPT

## 2025-03-05 PROCEDURE — 36415 COLL VENOUS BLD VENIPUNCTURE: CPT | Performed by: INTERNAL MEDICINE

## 2025-03-05 PROCEDURE — 84100 ASSAY OF PHOSPHORUS: CPT

## 2025-03-05 PROCEDURE — 80053 COMPREHEN METABOLIC PANEL: CPT

## 2025-03-05 PROCEDURE — 25000242 PHARM REV CODE 250 ALT 637 W/ HCPCS

## 2025-03-05 PROCEDURE — 36415 COLL VENOUS BLD VENIPUNCTURE: CPT | Performed by: STUDENT IN AN ORGANIZED HEALTH CARE EDUCATION/TRAINING PROGRAM

## 2025-03-05 PROCEDURE — 99900035 HC TECH TIME PER 15 MIN (STAT)

## 2025-03-05 PROCEDURE — 63600175 PHARM REV CODE 636 W HCPCS: Performed by: INTERNAL MEDICINE

## 2025-03-05 RX ORDER — HYDROCORTISONE 5 MG/1
5 TABLET ORAL DAILY
Qty: 90 TABLET | Refills: 0 | Status: SHIPPED | OUTPATIENT
Start: 2025-03-05 | End: 2025-03-05

## 2025-03-05 RX ORDER — HYDROCORTISONE 5 MG/1
5 TABLET ORAL DAILY
Qty: 90 TABLET | Refills: 0 | Status: SHIPPED | OUTPATIENT
Start: 2025-03-05 | End: 2025-06-03

## 2025-03-05 RX ORDER — HYDROCORTISONE 10 MG/1
10 TABLET ORAL DAILY
Qty: 90 TABLET | Refills: 0 | Status: SHIPPED | OUTPATIENT
Start: 2025-03-05 | End: 2025-03-05

## 2025-03-05 RX ORDER — HYDROCORTISONE 10 MG/1
10 TABLET ORAL DAILY
Qty: 90 TABLET | Refills: 0 | Status: SHIPPED | OUTPATIENT
Start: 2025-03-05 | End: 2025-06-03

## 2025-03-05 RX ADMIN — CLINDAMYCIN HYDROCHLORIDE 300 MG: 150 CAPSULE ORAL at 09:03

## 2025-03-05 RX ADMIN — FLUTICASONE FUROATE AND VILANTEROL TRIFENATATE 1 PUFF: 100; 25 POWDER RESPIRATORY (INHALATION) at 07:03

## 2025-03-05 RX ADMIN — ALUMINUM HYDROXIDE AND MAGNESIUM HYDROXIDE 15 ML: 200; 200 SUSPENSION ORAL at 12:03

## 2025-03-05 RX ADMIN — COSYNTROPIN 0.25 MG: 0.25 INJECTION, POWDER, LYOPHILIZED, FOR SOLUTION INTRAVENOUS at 08:03

## 2025-03-05 RX ADMIN — IPRATROPIUM BROMIDE AND ALBUTEROL SULFATE 3 ML: .5; 3 SOLUTION RESPIRATORY (INHALATION) at 07:03

## 2025-03-05 RX ADMIN — PANTOPRAZOLE SODIUM 40 MG: 40 TABLET, DELAYED RELEASE ORAL at 09:03

## 2025-03-05 NOTE — PT/OT/SLP PROGRESS
Physical Therapy    Missed Treatment Session - patient unwilling to participate note - 03/05/2025    Patient Name:  Niya Moeller   MRN:  [id number]      Patient not seen at this time secondary to Patient unwilling to participate. She stated that she doesn't need therapy and that she can walk.     Will follow-up as patient is appropriate/available/agreeable to participate and as therapists' schedule allows.

## 2025-03-05 NOTE — PROGRESS NOTES
"   03/05/25 1035   Missed Time Reason   Missed Treatment Reason Patient unwilling to participate     Pt refused OT eval and reported " I don't need therapy. I can do everything myself ". OT orders discontinued.  "

## 2025-03-05 NOTE — PLAN OF CARE
Problem: Adult Inpatient Plan of Care  Goal: Plan of Care Review  Outcome: Progressing  Goal: Patient-Specific Goal (Individualized)  Outcome: Progressing  Goal: Absence of Hospital-Acquired Illness or Injury  Outcome: Progressing  Goal: Optimal Comfort and Wellbeing  Outcome: Progressing  Goal: Readiness for Transition of Care  Outcome: Progressing     Problem: Infection  Goal: Absence of Infection Signs and Symptoms  Outcome: Progressing     Problem: Pain Acute  Goal: Optimal Pain Control and Function  Outcome: Progressing     Problem: Fall Injury Risk  Goal: Absence of Fall and Fall-Related Injury  Outcome: Progressing

## 2025-03-05 NOTE — DISCHARGE SUMMARY
"[facility] Internal Medicine Discharge Summary    Admitting Physician: Dez Jones MD  Attending Physician: Dez Jones MD  Date of Admit: 3/2/2025  Date of Discharge: 3/5/2025    Condition: Stable  Outcome: Patient tolerated treatment/procedure well without complication and is now ready for discharge.  DISPOSITION: Home or Self Care    Discharge Diagnoses     Problem List[1]    Principal Problem:  Syncope    Consultants and Procedures     Consultants:  Consults (From admission, onward)          Status Ordering Provider     Inpatient consult to Cardiology  Once        Provider:  Sarah Portillo MD    Completed ALBERT ENG     Inpatient consult to Internal Medicine  Once        Provider:  Linda Lopez MD    Acknowledged PHOEBE STRONG JR             Procedures:   * No surgery found *     Brief History of Present Illness    Niya Moeller is a 54 y.o. female who has a PMH of Hyperthyroidism (Grave's), Asthma, diverticulitis (recent sigmoid resection 1/27/25). The patient presented to [facility] ED on 3/2/2025 with a primary complaint of x4 episodes of syncope in the past 3 weeks that have been all preceded by lightheadedness and "echoing speech," and have had loss of consciousness for unknown periods of time. Patient reports one episode of head trauma in recent fall within past 2 days or so. Patient reports that she has woken continent of urine and stool, and does not describe any significant post-ictal like symptoms. She also has had vague, achy abdominal pain diffusely, but worse around incision, and has had dull generalized headaches, general back pain that is worse around periphery, neck pain, and some nausea. Of note, patient describes 3-4 days of loose stools that are not watery, bloody, or abnormal in color, and have occurred around 2x per day. The patient denies changes in vision, emesis, fevers, chills, palpitations, dyspnea.     In ED, patient had been hypotensive, as well as tachycardic and tachypneic. " "Patient with CBC/CMP, CXR, CT abd, trop wnl; lactic acid elevated at 2.7, and uptrended to 3.8, despite 2L LR. Patient will be admitted for further syncope work up and lactic acid elevated      Hospital Course with Pertinent Findings   During this hospital the patient is evaluated for syncope, the workup done so far was negative. ECHO, US carotids, CT head - no significant abnormalities, Orthostatics negative. Cardiology was consulted, recommended cardiac monitor.Discontinued her gabapentin and opoid analgesics, she is not taking them prior presentation. Considering her autoimmune history of graves and she received steroids in the past and hypotension- believe its adrenal insufficiency likely 2/2 autoimmune might be the cause. Cortisol test done showed low cortisol and we ordered cortisol stimulation test, pending results. Starting her on hydrocortisone 15mg with 10 AM and 5PM and referral placed for endocrine clinic for further care.     Vitals  BP: (!) 96/53  Temp: 98.6 °F (37 °C)  Temp Source: Oral  Pulse: 82  Resp: 18  SpO2: 97 %  Height: 4' 11" (149.9 cm)  Weight: 70.3 kg (155 lb)    Physical Exam  Vitals and nursing note reviewed.   Constitutional:       General: She is not in acute distress.     Appearance: She is not diaphoretic.   HENT:      Head: Normocephalic and atraumatic.      Comments: Bulging eyes      Nose: No congestion or rhinorrhea.      Mouth/Throat:      Pharynx: No oropharyngeal exudate or posterior oropharyngeal erythema.   Eyes:      General: No scleral icterus.     Conjunctiva/sclera: Conjunctivae normal.   Cardiovascular:      Rate and Rhythm: Normal rate and regular rhythm.   Pulmonary:      Effort: Pulmonary effort is normal. No respiratory distress.   Abdominal:      General: Bowel sounds are normal. There is no distension.      Comments: Laparoscopic Incision scar present lower abdominal area   Musculoskeletal:         General: No swelling or tenderness.      Right lower leg: No edema. "   Neurological:      General: No focal deficit present.      Mental Status: She is oriented to person, place, and time.   Psychiatric:         Thought Content: Thought content normal.         TIME SPENT ON DISCHARGE: 60 minutes    Discharge Medications        Medication List        START taking these medications      * hydrocortisone 10 MG Tab  Commonly known as: CORTEF  Take 1 tablet (10 mg total) by mouth once daily. Take it in the morning 7.00     * hydrocortisone 5 MG Tab  Commonly known as: CORTEF  Take 1 tablet (5 mg total) by mouth once daily. This dose needs to be taken in evening 7 pm           * This list has 2 medication(s) that are the same as other medications prescribed for you. Read the directions carefully, and ask your doctor or other care provider to review them with you.                CONTINUE taking these medications      albuterol 2.5 mg /3 mL (0.083 %) nebulizer solution  Commonly known as: PROVENTIL  Take 3 mLs (2.5 mg total) by nebulization every 6 (six) hours as needed for Wheezing or Shortness of Breath (shortness of breath). Rescue     budesonide-glycopyr-formoterol 160-9-4.8 mcg/actuation Hfaa     formoterol 20 mcg/2 mL Nebu  Commonly known as: PERFOROMIST  Take 2 mLs (20 mcg total) by nebulization 2 (two) times daily. Controller     pantoprazole 40 MG tablet  Commonly known as: PROTONIX  Take 1 tablet (40 mg total) by mouth once daily.            STOP taking these medications      acetaminophen 325 MG tablet  Commonly known as: TYLENOL     bisacodyL 10 mg Supp  Commonly known as: DULCOLAX     fluconazole 100 MG tablet  Commonly known as: DIFLUCAN     fluticasone furoate-vilanteroL 200-25 mcg/dose Dsdv diskus inhaler  Commonly known as: BREO     gabapentin 300 MG capsule  Commonly known as: NEURONTIN     HYDROcodone-acetaminophen 7.5-325 mg per tablet  Commonly known as: NORCO     nicotine 21 mg/24 hr  Commonly known as: NICODERM CQ     oxyCODONE 5 MG immediate release tablet  Commonly  known as: ROXICODONE            ASK your doctor about these medications      clindamycin 2 % vaginal cream  Commonly known as: CLINDESSE  Place vaginally every evening. for 7 days  Ask about: Should I take this medication?     docusate sodium 50 MG capsule  Commonly known as: COLACE  Take 1 capsule (50 mg total) by mouth once daily.     ondansetron 4 mg/2 mL Soln  Inject 4 mg into the vein every 8 (eight) hours as needed (nausea).               Where to Get Your Medications        These medications were sent to [address] [address]      Phone: [phone]   hydrocortisone 10 MG Tab  hydrocortisone 5 MG Tab         Discharge Information:   Niya Moeller is being discharged .    Discharge Procedure Orders   Ambulatory referral/consult to Internal Medicine   Standing Status: Future   Referral Priority: Routine Referral Type: Consultation   Referral Reason: Specialty Services Required Referral Location: [facility]   Requested Specialty: Internal Medicine   Number of Visits Requested: 1     Ambulatory referral/consult to Endocrinology   Standing Status: Future   Referral Priority: Routine Referral Type: Consultation   Requested Specialty: Endocrinology   Number of Visits Requested: 1     Cardiac event monitor   Standing Status: Future Standing Exp. Date: 03/04/26   Order Comments: 30 Holter monitor     Order Specific Question Answer Comments   Cardiac Event Monitor Auto Trigger    Release to patient Immediate         Follow-Up Appointments:   Follow-up Information       Ebony Lopez FNP. Schedule an appointment as soon as possible for a visit in 1 week(s).    Specialties: Gerontology, Family Medicine  Contact information:  [address] [facility] [address] [facility] [address]  [facility] [address] [phone]                               To address at follow-up:      At this time,  Niya Moeller is determined to have maximized benefits of IP hospitalization. she is discharged in stable condition with OP f/u recommendations and instructions. All questions answered, and patient verbalized agreement with the plan of care. They were given return precautions prior to d/c including symptoms that should prompt return to ED or to call PCP.       ROMEO JAIN MD  Internal Medicine - PGY-1                 [1]   Patient Active Problem List  Diagnosis    Obesity    Asthma exacerbation    GERD (gastroesophageal reflux disease)    Diverticulitis    Moderate persistent asthma with (acute) exacerbation    Hyperthyroidism    Thyroiditis    Diastolic dysfunction, left ventricle    Tachycardia    Other chest pain    Hypoglycemia    Thrombocytopathia    Diverticulitis of intestine with abscess    Asthma    Diverticulitis of large intestine    Allergic conjunctivitis and rhinitis    Macrocytic anemia    Superficial vein thrombosis    Tobacco dependence    History of ESBL E. coli infection    Syncope    Hypotension    Diarrhea    Dehydration

## 2025-03-05 NOTE — PT/OT/SLP DISCHARGE
Physical Therapy Discharge Summary    Name: Niya Moeller  MRN: [id number]   Principal Problem: Syncope   1. Syncope    2. Hypotension, unspecified hypotension type    3. Sepsis    4. Chest pain    5. Dizziness    6. Arm swelling    7. Adrenal insufficiency       Problem List[1]   Recommendations - per last treatment session     Therapy Intensity Recommendations at Discharge: Low Intensity Therapy  Discharge Equipment Recommendations: none     Assessment:       Patient declined further therapy services. DC from therapy on patients request.    Objective     GOALS:  Multidisciplinary Problems       Physical Therapy Goals          Problem: Physical Therapy    Goal Priority Disciplines Outcome Interventions   Physical Therapy Goal     PT, PT/OT Progressing    Description: Goals to be met by: dc     Patient will increase functional independence with mobility by performin. Gait  x 130 feet with Modified [place] using No Assistive Device.   2. Ascend/descend 5 stair with bilateral Handrails Supervision using No Assistive Device.   Not met, pt. Wants to be DC                       Plan     Patient discharged from acute PT Services on 3/5/2025.    Patient Discharged to:  still in house  .    3/5/2025       [1]   Patient Active Problem List  Diagnosis    Obesity    Asthma exacerbation    GERD (gastroesophageal reflux disease)    Diverticulitis    Moderate persistent asthma with (acute) exacerbation    Hyperthyroidism    Thyroiditis    Diastolic dysfunction, left ventricle    Tachycardia    Other chest pain    Hypoglycemia    Thrombocytopathia    Diverticulitis of intestine with abscess    Asthma    Diverticulitis of large intestine    Allergic conjunctivitis and rhinitis    Macrocytic anemia    Superficial vein thrombosis    Tobacco dependence    History of ESBL E. coli infection    Syncope    Hypotension    Diarrhea    Dehydration

## 2025-03-06 LAB
OHS QRS DURATION: 82 MS
OHS QTC CALCULATION: 417 MS

## 2025-03-07 ENCOUNTER — TELEPHONE (OUTPATIENT)
Dept: ENDOCRINOLOGY | Facility: CLINIC | Age: 55
End: 2025-03-07
Payer: OTHER GOVERNMENT

## 2025-03-07 NOTE — TELEPHONE ENCOUNTER
----- Message from Pauline sent at 3/6/2025  3:37 PM CST -----  Pt norris Jimenez called requesting a call back from nurse.Pt call back number  [phone] advise

## 2025-03-08 LAB
BACTERIA BLD CULT: NORMAL
BACTERIA BLD CULT: NORMAL

## 2025-03-13 ENCOUNTER — HOSPITAL ENCOUNTER (OUTPATIENT)
Dept: RADIOLOGY | Facility: HOSPITAL | Age: 55
Discharge: HOME | End: 2025-03-13
Attending: NURSE PRACTITIONER
Payer: OTHER GOVERNMENT

## 2025-03-13 DIAGNOSIS — Z12.31 VISIT FOR SCREENING MAMMOGRAM: ICD-10-CM

## 2025-03-13 PROCEDURE — 77067 SCR MAMMO BI INCL CAD: CPT | Mod: TC

## 2025-03-14 ENCOUNTER — TELEPHONE (OUTPATIENT)
Dept: SURGERY | Facility: CLINIC | Age: 55
End: 2025-03-14
Payer: OTHER GOVERNMENT

## 2025-03-14 NOTE — TELEPHONE ENCOUNTER
----- Message from ARA Clay MD sent at 3/14/2025  4:10 PM CDT -----  Regarding: RE: Consult Advisory  Contact: AMBERLY WASHINGTON [[id number]]  Yes, she can take that. Thank you!Storm  ----- Message -----  From: Park Uribe RN  Sent: 3/14/2025  12:53 PM CDT  To: ARA Clay MD  Subject: FW: Consult Advisory                             Please advise. This looks like it was prescribed on 3/5.  ----- Message -----  From: Kelli Patel  Sent: 3/14/2025  12:50 PM CDT  To: Danna Inman Staff  Subject: Consult Advisory                                 CONSULT/ADVISORYName of Caller: AMBERLY WASHINGTON [[id number]]Contact Preference:  [phone] of Call:  Please call pt concerning some  medications issues.  She want to know if she can take hydrocortisone (CORTEF) 5 MG Tab without risking a chance of leakage.  Pt needs to know this today.  Please call pt (it is urgent). If not she may have to go to the ER.

## 2025-03-14 NOTE — TELEPHONE ENCOUNTER
Spoke with patient and confirmed that it is okay to take Cortef, per Dr. Clay. Patient verbalizes understanding.

## 2025-03-17 ENCOUNTER — OFFICE VISIT (OUTPATIENT)
Dept: OPHTHALMOLOGY | Facility: CLINIC | Age: 55
End: 2025-03-17
Payer: OTHER GOVERNMENT

## 2025-03-17 VITALS — BODY MASS INDEX: 31.11 KG/M2 | WEIGHT: 154.31 LBS | HEIGHT: 59 IN

## 2025-03-17 DIAGNOSIS — H02.59 FLOPPY EYELID SYNDROME OF BOTH EYES: ICD-10-CM

## 2025-03-17 DIAGNOSIS — H04.123 DRY EYE SYNDROME OF BOTH EYES: ICD-10-CM

## 2025-03-17 DIAGNOSIS — E05.00 GRAVES DISEASE: Primary | ICD-10-CM

## 2025-03-17 PROCEDURE — 92133 CPTRZD OPH DX IMG PST SGM ON: CPT | Mod: PBBFAC,PN

## 2025-03-17 PROCEDURE — 99213 OFFICE O/P EST LOW 20 MIN: CPT | Mod: PBBFAC,PN

## 2025-03-17 NOTE — PROGRESS NOTES
HPI    New patient - Graves' Disease    Had appointment 3/11/25 left without being seen due to wait time  Last edited by Audelia Lanier RN on 3/17/2025  8:34 AM.            Assessment /Plan     For exam results, see Encounter Report.    Graves disease    Floppy eyelid syndrome of both eyes    Dry eye syndrome of both eyes          OCT Mac 3/17/25  RE: , NFC, no IRF/SRF  LE: , NFC, no IRF/SRF    OCT RNFL 3/17/25  RE: 107 all green/white   LE: 107 all green/white    Fundus Photo 3/17/25  RE: Media clear, Optic nerve pink and sharp, blood vessels WNL, no retinal holes, tears, or detachments  LE: Media clear, Optic nerve pink and sharp, blood vessels WNL, no retinal holes, tears, or detachments    Exophthalmometer:   3/17/25: Base 92, 23 // 23      Graves disease  Concern for thyroid eye disease  - Follows with endocrine, previously on methimazole but no longer on it and is in remission. Initially diagnosed 2-3 years prior  - She feels that eyes have gotten more proptotic recently (especially 6 months), also complaining of intermittent eye redness, foreign body sensation, and itching  - VA 20/20 OU, no RAPD, EOM full, IOP WNL. Color visoin 10/11 OD and 11/11 OS  - ROGER score 7/7  - Cover testing 3/17/25 orthotropic without phoria/tropia  - Also with end gaze nystagmus, whole-body tremor  - K's with dense PEE's OD>OS, lids flipped with trace papillary reaction upper fornix  - DFE WNL OU  - Recommend starting PFAT 6-8x/day, ointment QHS, WC/LS, given written instructions  - pt with family history of MG, recommend ordering labs as well as CT orbits without contrast. However, patient declined to continue to follow at [facility] as she did not want to wait for further workup prior to initiating treatment. Of note, patient also hard of hearing and would potentially poor candidate for Tepezza. Patient also left prior to being able to obtain visual field. Patient left prior to being asked about smoking status.  - She no  "longer wants to follow up at [facility] and will find an outside provider. Declined any assistance in arranging follow up with any other provider.  When she was told that she probably did not need Tepezza at this time, the patient immediately stated that she would get another doctor who would give it to her.  When asked if she had any hearing problems, she stated that she was hearing impaired.  The patient was then informed that there is a risk of deafness.  The patient then stated that she did not wish to hear anything else and left.      Floppy eyelid syndrome  - 1 mm lagophthamos OU  - given handout on PFAT/WC/LS, discussed ordering sleep study (endorses snoring)  - pt uses steroid eye drops intermittently for "bloodshot eyes". When mentioning possibility of glaucoma, she states she "does not care" and will continue to use them PRN  Note:  The patient uses steroid eye drops almost daily for the last year.  When she was informed of the risk of glaucoma, she went on a rant about if steroids will give her relief, then glaucoma will be an acceptable risk.    RTC PRN  "

## 2025-03-24 ENCOUNTER — TELEPHONE (OUTPATIENT)
Dept: SURGERY | Facility: CLINIC | Age: 55
End: 2025-03-24
Payer: OTHER GOVERNMENT

## 2025-03-25 ENCOUNTER — OFFICE VISIT (OUTPATIENT)
Dept: SURGERY | Facility: CLINIC | Age: 55
End: 2025-03-25
Payer: OTHER GOVERNMENT

## 2025-03-25 DIAGNOSIS — K57.92 DIVERTICULITIS: Primary | ICD-10-CM

## 2025-03-25 PROCEDURE — 99024 POSTOP FOLLOW-UP VISIT: CPT | Mod: 95,,, | Performed by: COLON & RECTAL SURGERY

## 2025-03-25 NOTE — PROGRESS NOTES
CRS Followup Visit    Referring Md:   No referring provider defined for this encounter.    SUBJECTIVE:       History of Present Illness:    Course is as follows:  Patient is a 54 y.o. female presents with diverticulitis.  She has a 5-6 year history of severe diverticular disease resulting in intermittent hospitalizations 2-3 times per year.  Over the past year, she has had 4 episodes of micro perforation.  She has longstanding constipation and has bowel movements every other day.  No issues with fecal incontinence.  Her weight has been stable fluctuating from 130-1.  Prior heart attack or stroke.  No prior abdominal surgeries.  Smokes half a pack per day.  Does have asthma that is well-controlled.  She is functional and able to perform all of her activities of daily living.    1/24/25:  Remains in rehab.  Ambulatory.  Albumin is maintaining.  Ongoing left lower quadrant pain.  On meropenem.    1/27/25: lap sigmoid colectomy for diverticulitis   - pathology consistent with diverticulitis.     2/18/25:  She presents for evaluation.  Improved asthma.  Off antibiotics.  No fevers.  Incisions healing well.  Having some pain as expected.  Intermittent constipation.  No bleeding in her bowel movements.    3/25/25:  She was admitted to the hospital for syncopal episodes for 3 days on 03/02/2025.  Treated with steroids.  She overall is doing very well.  She is having bowel movements daily to every other day.  No issues with incontinence.  Issues with her incision.  Slight left lower quadrant abdominal pain.  Otherwise feels well.  Very grateful for her improvement.    Last Colonoscopy: 4/8/2024  Impression:            - Diverticulosis in the sigmoid colon and from 15                          to 25 cm proximal to the anus. There was narrowing                          of the colon with significant edema in association                          with the diverticular opening. Erythema was seen                          in  association with the diverticular opening.                          Tattooed.                          - Diverticulosis in the descending colon and in                          the ascending colon.                          - Internal hemorrhoids.                          - No specimens collected.    - areas proximal and distal to diverticular disease were tattooed.      Review of Systems:  Review of Systems   Constitutional:  Negative for chills, diaphoresis, fever, malaise/fatigue and weight loss.   HENT:  Negative for congestion.    Respiratory:  Negative for shortness of breath.    Cardiovascular:  Negative for chest pain and leg swelling.   Gastrointestinal:  Positive for abdominal pain. Negative for blood in stool, constipation, nausea and vomiting.   Genitourinary:  Negative for dysuria.   Musculoskeletal:  Negative for back pain and myalgias.   Skin:  Negative for rash.   Neurological:  Negative for dizziness and weakness.   Endo/Heme/Allergies:  Does not bruise/bleed easily.   Psychiatric/Behavioral:  Negative for depression.        OBJECTIVE:       Vital Signs (Most Recent)  There were no vitals taken for this visit.        Physical Exam:  General: Black or  female in no distress   Neuro: alert and oriented x 4.  Moves all extremities.     HEENT: no icterus.  Trachea midline  Respiratory: respirations are even and unlabored  Cardiac: regular rate  Abdomen:  Soft, nontender, incisions healing well.  Steri-Strips removed.  Extremities: Warm dry and intact  Skin: no rashes  Anorectal:  Deferred      Labs:  H&H 13 and 39.  Albumin 3.3.  Normal renal function.    Imaging:   CT abdomen pelvis 12/21/2024: Personally reviewed:  - Numerous diverticula are seen in the hepatic flexure through to the descending. There is stable focal wall thickening in the distal sigmoid colon along with unchanged mild pericolic fat stranding   CT abdomen pelvis 10/12/2023: Distal descending and sigmoid colon irregular  mural thickening narrowing the lumen with about similar appearance.   CT abdomen pelvis 03/02/2025 personally reviewed demonstrates all right-sided abdominal colon with intact colorectal end-to-end anastomosis.      ASSESSMENT/PLAN:     Diagnoses and all orders for this visit:    Diverticulitis        54 y.o. woman with longstanding diverticular disease now refractory to long-term antibiotic therapy.  She is now on IV antibiotics with some resolution.  Weight has been stable.      She underwent laparoscopic sigmoid resection on 1/27/25.      She is overall doing very well.  Discussed that her abdominal pain will likely continue to improve as this time.  She is welcome to return to regular activity.  If she was able to get a panniculectomy, I fully support her panniculectomy.  She is welcome to follow up with me with any future concerns or issues.      ARA Clay MD, FACS, FASCRS  Staff Surgeon  Colon & Rectal Surgery

## 2025-03-26 ENCOUNTER — TELEPHONE (OUTPATIENT)
Dept: RADIOLOGY | Facility: HOSPITAL | Age: 55
End: 2025-03-26
Payer: OTHER GOVERNMENT

## 2025-04-02 NOTE — CONSULTS
Interventional Radiology Consult/Pre-Procedure Note      Chief Complaint/Reason for Consult: Abscess    History of Present Illness:  Niya Moeller is a 52 y.o. female with the PMH listed below who presents with a LLQ fluid collection c/f diverticular abscess. IR consulted for drain placement.    Admission H&P reviewed.    Past Medical History:   Diagnosis Date    Asthma     Diverticulosis     GERD (gastroesophageal reflux disease)      No past surgical history on file.    Allergies:   Review of patient's allergies indicates:   Allergen Reactions    Levofloxacin Hives and Itching    Metronidazole Hives    Latex        Scheduled Meds:    albuterol-ipratropium  3 mL Nebulization Q6H WAKE    cetirizine  10 mg Oral Daily    fluticasone furoate-vilanteroL  1 puff Inhalation Daily    montelukast  10 mg Oral Daily    nicotine  1 patch Transdermal Daily    pantoprazole  40 mg Oral Daily    piperacillin-tazobactam (ZOSYN) IVPB  4.5 g Intravenous Q8H    predniSONE  40 mg Oral Daily    prochlorperazine  2.5 mg Intravenous Once    propranoloL  60 mg Oral BID     Continuous Infusions:   PRN Meds:acetaminophen, albuterol-ipratropium, dextrose 10%, dextrose 10%, fluticasone propionate, glucagon (human recombinant), glucose, glucose, HYDROmorphone, melatonin, morphine, naloxone, ondansetron, prochlorperazine, simethicone, sodium chloride 0.9%    Anticoagulation/Antiplatelet Meds: no anticoagulation    Review of Systems:   As documented in admission H&P.    Physical Exam:  Temp: 100.1 °F (37.8 °C) (02/16/23 1130)  Pulse: 74 (02/16/23 1206)  Resp: 17 (02/16/23 1150)  BP: (!) 157/72 (02/16/23 1150)  SpO2: 96 % (02/16/23 1150)     General: NAD  HEENT: Normocephalic, sclera anicteric, oropharynx clear  Heart: RRR  Lungs: Symmetric excursions, breathing unlabored  Abd: ND, LLQ TTP, soft  Extremities: PARSONS  Neuro: Gross nonfocal    Labs:  No results for input(s): INR in the last 168 hours.    Invalid input(s):  PT,  PTT    Recent Labs  New patient visit checklist:  4/8/25 with Dr. Posey at 1830    Who referred you to our clinic? none    What are your primary symptoms you would like to be addressed at your visit? Has asthma, but in the winter he coughs a lot.  Inhalers and nebulizers well with the coughing    If patient reports concern for food allergies, instruct patient to bring in what food they may be allergic to (true food allergy symptoms)    Do not hold any medications if appointment is for hives and/or if you do not feel you can safely hold these.    Medications that may interfere with testing include the list below.  Please stop these medication 3 (THREE) full days prior to your visit (if on any of these bring container/bottle to appointment)    Hold ALL antihistamines for 3 (THREE) full days prior:  Nasal sprays (Azelastine/olopatadine/Astepro/Patanase),   Nighttime cold medicines (e.g., Nyquil)  Allergy Eye drops (olopatadine (Patanol)/ketotifen (Zaditor)/azelastine).    Allergy/nausea medication -- Examples: Allegra (Fexofenadine), Benadryl (Diphenhydramine), Claritin (Loratadine), Xyzal (Levocetirizine), Zyrtec (Cetirzine), meclizine, hydroxyzine  Trazodone (ONLY if you can)  Sleep aids (Unisom, Tylenol/Advil PM) : hold for 3 days prior to your visit.  amitriptyline, nortriptyline, Doxepin (If able, hold this 7 days prior)  You Do NOT need to hold - montelukast (singulair), steroid nasal sprays (fluticasone/Flonase, triamcinolone/Nasacort, mometasone/Nasonex)    BETABLOCKERS? (Atenolol, carvedilol, metroprolol, propanolol etc):  No.  If yes confirm name of drug, dose, directions for use and reason for this medication.  Please send to [facility] to review and advise on holding prior to visit.    Have you seen an ALLERGIST: No    Nasal steroid/antihistamine use:  None  Oral antihistamine/eye drop use:  None    Have you seen DERMATOLOGY?: No    Have you seen ENT?: Yes  Dr. Hwang  LOV:  9/25/23    You still have tonsils/adenoids? Yes  Have    Lab 02/16/23  0305   WBC 11.99   HGB 11.7*   HCT 35.4*   MCV 90         Recent Labs   Lab 02/14/23  0428 02/15/23  1237 02/16/23  0305   *   < > 59*      < > 138   K 4.4   < > 3.8   *   < > 105   CO2 22*   < > 25   BUN 13   < > 14   CREATININE 0.7   < > 0.7   CALCIUM 9.2   < > 9.0   MG 2.3  --   --    ALT  --    < > 33   AST  --    < > 20   ALBUMIN  --    < > 2.5*   BILITOT  --    < > 0.8    < > = values in this interval not displayed.       Imaging:  CT AP 2/15/23    Assessment/Plan:   Fluid collection. Size borderline for drain placement. Will attempt and if too small for drain will aspirate.    Sedation: None (not NPO)    Risks (including, but not limited to, pain, bleeding, infection, damage to nearby structures, treatment failure/recurrence, and the need for additional procedures), potential benefits, and alternatives were discussed with the patient. All questions were answered to the best of my abilities. The patient wishes to proceed. Written informed consent was obtained.      Andrew Marsala MD [facility]  Pager [phone]       you had ear tubes/ventilation tubes for frequent ear infections? Yes    Have you seen PULMONOLOGY?: No    Albuterol? If yes hold morning of appointment.  -Other inhalers/controllers:  Yes: Flovent  IF, yes hold the morning of the appointment ONLY if you can.    Have you seen gastroenterologist ?: No      Have you been seen in the Emergency Department/ Urgent care for this issue? [facility]    FAMILY HISTORY  Relevant Family history - ANY allergic disease in parents, siblings, grandparents  Allergies to environmental allergens (cat, dog, pollen, etc): no  Food allergies: mother used to be allergic to grapes, strawberries. Grandfather allergic to shellfish  Asthma: parents, other siblings, grandparents(maternal)  Eczema: patient had some when he was younger, but outgrew it.  Brother has eczema  Other relevant family history: no    SOCIAL / ENVIRONMENTAL HISTORY     --Pets (what pets and how many, including outdoor pets) No but at grandparents have 2 dogs(put under snapshot-family comments)  --Passive/secondhand smoke exposure? (does anyone smoke in home or cars) Yes  --Smoking history? No If yes: age started: , packs per day , age quit (if quit)   --Vaping history? No  --Lives in a duplex for 4 years.  --Foliage outside around home? Yes  --Occupation/School/childcare? 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 Mattress  --Type of pillow: feather/non-feather? Non feather  --Mite encasement used? No  --Humidifier use? No  --Mold in the home? No  --Heating type? (gas/electric/oil/radiator) gas  --Air Conditioning use? Yes Type of A/C: (central/wall/window unit) wall  --Recent flooding? No  --Use dehumidifier? No  --Problems with cockroaches? No  --Problems with mice? No    Pharmacy (go into the visit and put preferred pharmacy in there) [facility] in [place]    Patient informed of what to expect at visit, including possible testing and length of  visit (1-2 hours).    Patient informed to bring actual medications (prescribed and over the counter) with them to their appointment.

## 2025-04-09 ENCOUNTER — HOSPITAL ENCOUNTER (EMERGENCY)
Facility: HOSPITAL | Age: 55
Discharge: HOME | End: 2025-04-10
Attending: INTERNAL MEDICINE
Payer: OTHER GOVERNMENT

## 2025-04-09 DIAGNOSIS — D53.9 ANEMIA, MACROCYTIC: Primary | ICD-10-CM

## 2025-04-09 DIAGNOSIS — E86.0 DEHYDRATION: ICD-10-CM

## 2025-04-09 DIAGNOSIS — I80.8 SUPERFICIAL THROMBOPHLEBITIS OF RIGHT UPPER EXTREMITY: ICD-10-CM

## 2025-04-09 PROCEDURE — 84484 ASSAY OF TROPONIN QUANT: CPT

## 2025-04-09 PROCEDURE — 83735 ASSAY OF MAGNESIUM: CPT

## 2025-04-09 PROCEDURE — 93005 ELECTROCARDIOGRAM TRACING: CPT

## 2025-04-09 PROCEDURE — 80053 COMPREHEN METABOLIC PANEL: CPT

## 2025-04-09 PROCEDURE — 83880 ASSAY OF NATRIURETIC PEPTIDE: CPT

## 2025-04-09 PROCEDURE — 85730 THROMBOPLASTIN TIME PARTIAL: CPT

## 2025-04-09 PROCEDURE — 99285 EMERGENCY DEPT VISIT HI MDM: CPT | Mod: 25

## 2025-04-09 PROCEDURE — 84100 ASSAY OF PHOSPHORUS: CPT

## 2025-04-09 PROCEDURE — 85025 COMPLETE CBC W/AUTO DIFF WBC: CPT

## 2025-04-09 PROCEDURE — 85610 PROTHROMBIN TIME: CPT

## 2025-04-10 VITALS
OXYGEN SATURATION: 100 % | HEART RATE: 83 BPM | BODY MASS INDEX: 29.23 KG/M2 | HEIGHT: 60 IN | WEIGHT: 148.88 LBS | RESPIRATION RATE: 17 BRPM | DIASTOLIC BLOOD PRESSURE: 78 MMHG | SYSTOLIC BLOOD PRESSURE: 145 MMHG | TEMPERATURE: 97 F

## 2025-04-10 LAB
ALBUMIN SERPL-MCNC: 3.8 G/DL (ref 3.5–5)
ALBUMIN/GLOB SERPL: 1.1 RATIO (ref 1.1–2)
ALP SERPL-CCNC: 78 UNIT/L (ref 40–150)
ALT SERPL-CCNC: 14 UNIT/L (ref 0–55)
ANION GAP SERPL CALC-SCNC: 6 MEQ/L
APTT PPP: 34.9 SECONDS (ref 23.2–33.7)
AST SERPL-CCNC: 15 UNIT/L (ref 11–45)
BASOPHILS # BLD AUTO: 0.02 X10(3)/MCL
BASOPHILS NFR BLD AUTO: 0.4 %
BILIRUB SERPL-MCNC: 0.5 MG/DL
BNP BLD-MCNC: <10 PG/ML
BUN SERPL-MCNC: 9.5 MG/DL (ref 9.8–20.1)
CALCIUM SERPL-MCNC: 9.6 MG/DL (ref 8.4–10.2)
CHLORIDE SERPL-SCNC: 109 MMOL/L (ref 98–107)
CO2 SERPL-SCNC: 25 MMOL/L (ref 22–29)
CREAT SERPL-MCNC: 1.17 MG/DL (ref 0.55–1.02)
CREAT/UREA NIT SERPL: 8
EOSINOPHIL # BLD AUTO: 0.19 X10(3)/MCL (ref 0–0.9)
EOSINOPHIL NFR BLD AUTO: 3.9 %
ERYTHROCYTE [DISTWIDTH] IN BLOOD BY AUTOMATED COUNT: 12.6 % (ref 11.5–17)
GFR SERPLBLD CREATININE-BSD FMLA CKD-EPI: 55 ML/MIN/1.73/M2
GLOBULIN SER-MCNC: 3.6 GM/DL (ref 2.4–3.5)
GLUCOSE SERPL-MCNC: 78 MG/DL (ref 74–100)
HCT VFR BLD AUTO: 37.1 % (ref 37–47)
HGB BLD-MCNC: 11.9 G/DL (ref 12–16)
IMM GRANULOCYTES # BLD AUTO: 0.01 X10(3)/MCL (ref 0–0.04)
IMM GRANULOCYTES NFR BLD AUTO: 0.2 %
INR PPP: 1.1
LYMPHOCYTES # BLD AUTO: 2.85 X10(3)/MCL (ref 0.6–4.6)
LYMPHOCYTES NFR BLD AUTO: 58.3 %
MAGNESIUM SERPL-MCNC: 1.9 MG/DL (ref 1.6–2.6)
MCH RBC QN AUTO: 32.4 PG (ref 27–31)
MCHC RBC AUTO-ENTMCNC: 32.1 G/DL (ref 33–36)
MCV RBC AUTO: 101.1 FL (ref 80–94)
MONOCYTES # BLD AUTO: 0.61 X10(3)/MCL (ref 0.1–1.3)
MONOCYTES NFR BLD AUTO: 12.5 %
NEUTROPHILS # BLD AUTO: 1.21 X10(3)/MCL (ref 2.1–9.2)
NEUTROPHILS NFR BLD AUTO: 24.7 %
NRBC BLD AUTO-RTO: 0 %
PHOSPHATE SERPL-MCNC: 3.7 MG/DL (ref 2.3–4.7)
PLATELET # BLD AUTO: 215 X10(3)/MCL (ref 130–400)
PMV BLD AUTO: 10.3 FL (ref 7.4–10.4)
POTASSIUM SERPL-SCNC: 3.5 MMOL/L (ref 3.5–5.1)
PROT SERPL-MCNC: 7.4 GM/DL (ref 6.4–8.3)
PROTHROMBIN TIME: 13.9 SECONDS (ref 11.4–14)
RBC # BLD AUTO: 3.67 X10(6)/MCL (ref 4.2–5.4)
SODIUM SERPL-SCNC: 140 MMOL/L (ref 136–145)
TROPONIN I SERPL-MCNC: <0.01 NG/ML (ref 0–0.04)
WBC # BLD AUTO: 4.89 X10(3)/MCL (ref 4.5–11.5)

## 2025-04-10 PROCEDURE — 96360 HYDRATION IV INFUSION INIT: CPT

## 2025-04-10 PROCEDURE — 63600175 PHARM REV CODE 636 W HCPCS

## 2025-04-10 PROCEDURE — 96372 THER/PROPH/DIAG INJ SC/IM: CPT

## 2025-04-10 RX ORDER — CYANOCOBALAMIN 1000 UG/ML
1000 INJECTION, SOLUTION INTRAMUSCULAR; SUBCUTANEOUS ONCE
Status: COMPLETED | OUTPATIENT
Start: 2025-04-10 | End: 2025-04-10

## 2025-04-10 RX ADMIN — CYANOCOBALAMIN 1000 MCG: 1000 INJECTION INTRAMUSCULAR; SUBCUTANEOUS at 01:04

## 2025-04-10 RX ADMIN — SODIUM CHLORIDE, POTASSIUM CHLORIDE, SODIUM LACTATE AND CALCIUM CHLORIDE 1000 ML: 600; 310; 30; 20 INJECTION, SOLUTION INTRAVENOUS at 12:04

## 2025-04-10 NOTE — ED PROVIDER NOTES
"Encounter Date: 4/9/2025       History     Chief Complaint   Patient presents with    Multiple Complaints     Patient reports shortness of breath, syncope, chest pain, and dizziness for the past couple of days. Patient also very insistent on receiving blood thinners for her blood clots.      A 55 y.o. female patient with a history of asthma, diverticulitis, GERD, graves disease presents to the ED with generalized malaise, stating it is due to the blood clot that was found in her right upper arm. Patient states she was admitted to the hospital for diverticulitis and was on IV antibiotics. She states she had a PICC line in that arm and after they removed it, her arm swelled up. She states at that time her venous US was negative for any blood clot. She states her PCP ordered on on 4/7 and it showed superficial thrombophlebitis of the right upper extremity, no DVT. Patient states her PCP advised her to do warm compresses and that she did not need blood thinners. Patient is here requesting we prescribe blood thinners. She states she has been having chest pain, dizziness, one episode of "blacking out", and intermittent SOB (when she is anxious) since being discharged from the hospital. She attributes these symptoms to the blood clot. Currently, the patient is having dizziness, but no other symptoms at this time. Denies CP or SOB at this time. Patient also mentions her BP has been low at home, around 90/60, which is also why she came in. Her BP upon arrival to ED is 119/76.      The history is provided by the patient.     Review of patient's allergies indicates:   Allergen Reactions    Latex Anaphylaxis and Edema    Zosyn [piperacillin-tazobactam] Anaphylaxis    Flagyl [metronidazole] Hives     Past Medical History:   Diagnosis Date    Asthma     Diverticulitis     Diverticulosis     GERD (gastroesophageal reflux disease)     Graves disease      Past Surgical History:   Procedure Laterality Date    CATHETERIZATION OF " URETER Bilateral 1/27/2025    Procedure: CATHETERIZATION, URETER;  Surgeon: Denver Patel MD;  Location: [facility] OR 2ND FLR;  Service: Urology;  Laterality: Bilateral;    COLONOSCOPY, WITH DIRECTED SUBMUCOSAL INJECTION N/A 4/8/2024    Procedure: COLONOSCOPY, WITH DIRECTED SUBMUCOSAL INJECTION;  Surgeon: Heather Lance MD;  Location: [facility]COPY;  Service: Gastroenterology;  Laterality: N/A;    FLEXIBLE SIGMOIDOSCOPY N/A 1/27/2025    Procedure: SIGMOIDOSCOPY, FLEXIBLE;  Surgeon: ARA Clay MD;  Location: [facility] OR 2ND FLR;  Service: Colon and Rectal;  Laterality: N/A;    LAPAROSCOPIC SIGMOIDECTOMY  1/27/2025    Procedure: COLECTOMY, SIGMOID, LAPAROSCOPIC;  Surgeon: ARA Clay MD;  Location: [facility] 2ND FLR;  Service: Colon and Rectal;;    MOBILIZATION, SPLENIC FLEXURE, LAPAROSCOPIC N/A 1/27/2025    Procedure: MOBILIZATION, SPLENIC FLEXURE, LAPAROSCOPIC;  Surgeon: ARA Clay MD;  Location: [facility] 2ND FLR;  Service: Colon and Rectal;  Laterality: N/A;     Family History   Problem Relation Name Age of Onset    Hypothyroidism Mother       Social History[1]  Review of Systems   Constitutional:  Negative for chills and fever.   Eyes:  Negative for visual disturbance.   Gastrointestinal:  Negative for nausea and vomiting.   Genitourinary:  Negative for difficulty urinating and dysuria.   Musculoskeletal:  Negative for arthralgias.   Skin:  Negative for color change and rash.   Neurological:  Positive for dizziness and light-headedness. Negative for tremors, seizures, syncope, facial asymmetry, speech difficulty, weakness, numbness and headaches.   Hematological:  Does not bruise/bleed easily.   Psychiatric/Behavioral:  Negative for confusion.    All other systems reviewed and are negative.      Physical Exam     Initial Vitals [04/09/25 2315]   BP Pulse Resp Temp SpO2   119/76 89 18 97.3 °F (36.3 °C) 98 %      MAP       --         Physical Exam    Nursing note and vitals  reviewed.  Constitutional: She appears well-developed and well-nourished.   HENT:   Head: Normocephalic and atraumatic.   Right Ear: External ear normal.   Left Ear: External ear normal.   Nose: Nose normal. Mouth/Throat: Oropharynx is clear and moist.   Eyes: Conjunctivae and EOM are normal. Pupils are equal, round, and reactive to light.   Neck: Neck supple.   Cardiovascular:  Normal rate, regular rhythm and normal heart sounds.     Exam reveals no gallop and no friction rub.       No murmur heard.  Pulmonary/Chest: Breath sounds normal. No respiratory distress. She has no wheezes. She has no rhonchi. She has no rales.   Abdominal: She exhibits no distension.   Musculoskeletal:      Right upper arm: No swelling, edema, deformity or tenderness.      Left upper arm: No swelling, edema, deformity or tenderness.      Cervical back: Neck supple.     Neurological: She is alert and oriented to person, place, and time. She has normal strength and normal reflexes. No cranial nerve deficit or sensory deficit. GCS score is 15. GCS eye subscore is 4. GCS verbal subscore is 5. GCS motor subscore is 6.   Skin: Skin is warm and dry. Capillary refill takes less than 2 seconds.         ED Course   Procedures  Labs Reviewed   APTT - Abnormal       Result Value    PTT 34.9 (*)    COMPREHENSIVE METABOLIC PANEL - Abnormal    Sodium 140      Potassium 3.5      Chloride 109 (*)     CO2 25      Glucose 78      Blood Urea Nitrogen 9.5 (*)     Creatinine 1.17 (*)     Calcium 9.6      Protein Total 7.4      Albumin 3.8      Globulin 3.6 (*)     Albumin/Globulin Ratio 1.1      Bilirubin Total 0.5      ALP 78      ALT 14      AST 15      eGFR 55      Anion Gap 6.0      BUN/Creatinine Ratio 8     CBC WITH DIFFERENTIAL - Abnormal    WBC 4.89      RBC 3.67 (*)     Hgb 11.9 (*)     Hct 37.1      .1 (*)     MCH 32.4 (*)     MCHC 32.1 (*)     RDW 12.6      Platelet 215      MPV 10.3      Neut % 24.7      Lymph % 58.3      Mono % 12.5       Eos % 3.9      Basophil % 0.4      Imm Grans % 0.2      Neut # 1.21 (*)     Lymph # 2.85      Mono # 0.61      Eos # 0.19      Baso # 0.02      Imm Gran # 0.01      NRBC% 0.0     B-TYPE NATRIURETIC PEPTIDE - Normal    Natriuretic Peptide <10.0     MAGNESIUM - Normal    Magnesium Level 1.90     TROPONIN I - Normal    Troponin-I <0.010     PHOSPHORUS - Normal    Phosphorus Level 3.7     PROTIME-INR - Normal    PT 13.9      INR 1.1      Narrative:     Protimes are used to monitor anticoagulant agents such as warfarin. PT INR values are based on the current patient normal mean and the SUZE value for the specific instrument reagent used.  **Routine theraputic target values for the INR are 2.0-3.0**   CBC W/ AUTO DIFFERENTIAL    Narrative:     The following orders were created for panel order CBC Auto Differential.  Procedure                               Abnormality         Status                     ---------                               -----------         ------                     CBC with Differential[[id number]]       Abnormal            Final result                 Please view results for these tests on the individual orders.          Imaging Results              X-Ray Chest AP Portable (Preliminary result)  Result time 04/10/25 00:44:30      Wet Read by Millie Sharma PA (04/10/25 00:44:30, [facility], Emergency Medicine)    No acute radiographic abnormality.                                          Medications   cyanocobalamin injection 1,000 mcg (1,000 mcg Intramuscular Given 4/10/25 0110)   lactated ringers bolus 1,000 mL (0 mLs Intravenous Stopped 4/10/25 0154)     Medical Decision Making  A 55 y.o. female patient with a history of asthma, diverticulitis, GERD, graves disease presents to the ED with generalized malaise, stating it is due to the blood clot that was found in her right upper arm. Patient states she was admitted to the hospital for diverticulitis and was on IV  "antibiotics. She states she had a PICC line in that arm and after they removed it, her arm swelled up. She states at that time her venous US was negative for any blood clot. She states her PCP ordered on on 4/7 and it showed superficial thrombophlebitis of the right upper extremity, no DVT. Patient states her PCP advised her to do warm compresses and that she did not need blood thinners. Patient is here requesting we prescribe blood thinners. She states she has been having chest pain, dizziness, one episode of "blacking out", and intermittent SOB (when she is anxious) since being discharged from the hospital. She attributes these symptoms to the blood clot. Currently, the patient is having dizziness, but no other symptoms at this time. Denies CP or SOB at this time. Patient also mentions her BP has been low at home, around 90/60, which is also why she came in. Her BP upon arrival to ED is 119/76.      See ED course, patient educated that there is no indication for her to be on blood thinners due to the superficial thrombophlebitis that was found on her US. She is to follow-up with her PCP. Patient states she also has an appointment with a vascular specialist. She will keep this appointment.     Patient's VSS throughout ED stay. Patient does not have SOB or CP in ED. Dizziness improved with in ED.     Patient's condition improved with the following Medications  lactated ringers bolus 1,000 mL (1,000 mLs Intravenous New Bag 4/10/25 0054)   cyanocobalamin injection 1,000 mcg (1,000 mcg Intramuscular Given 4/10/25 0110)    Clinical impression:  Anemia, macrocytic (Primary)  Dehydration  Superficial thrombophlebitis of right upper extremity    Patient is non-toxic appearing and tolerating nutritional intake. Patient's vital signs and clinical condition are stable for DC with ED Prescriptions    None        Follow-up: PCP or Internal medicine clinic within 3 days  Referrals made: none    Strict follow-up precautions " "given. Patient verbalizes understanding of treatment plan and ED return precautions.         Amount and/or Complexity of Data Reviewed  External Data Reviewed: radiology.     Details: US from 4/7 shows "Superficial thrombophlebitis along the right basilic vein at the upper arm.  No DVT identified at the bilateral neck or right upper extremity."  Labs: ordered. Decision-making details documented in ED Course.  Radiology: ordered and independent interpretation performed. Decision-making details documented in ED Course.    Risk  Prescription drug management.  Risk Details: Given strict ED return precautions. I have spoken with the patient and/or caregivers. I have explained the patient's condition, diagnoses and treatment plan based on the information available to me at this time. I have answered the patient's and/or caregiver's questions and addressed any concerns. The patient and/or caregivers have as good an understanding of the patient's diagnosis, condition and treatment plan as can be expected at this point. The patient's condition is stable and appropriate for discharge from the emergency department.      The patient will pursue further outpatient evaluation with the primary care physician or other designated or consulting physician as outlined in the discharge instructions. The patient and/or caregivers are agreeable to this plan of care and follow-up instructions have been explained in detail. The patient and/or caregivers have received these instructions in written format and have expressed an understanding of the discharge instructions. The patient and/or caregivers are aware that any significant change in condition or worsening of symptoms should prompt an immediate return to this or the closest emergency department or a call to 911.               Additional MDM:   Differential Diagnosis:   Other: The following diagnoses were also considered and will be evaluated: ACS, costochrondritis and electrolyte " disturbance.           Attending Attestation:             Attending ED Notes:     I'm Dr. Margi Gleason   I did not spend face to face time with this patient.    The patient was seen by NP/PA practicing independently here in the emergency department.  While I was available in the emergency department for consultation, I did not personally evaluate, examine, participate in the care of, or make recommendation(s) on the of the management of said patient.  My signature is an administrative requirement of the facility and should not be construed as my active or tacit approval of the evaluation or care provided. I, therefore, am unable to determine appropriateness of management without obtaining a personal history and exam.    Discussed the case with the PA, and since patient is documented to have a superficial phlebitis advised not to prescribe anticoagulants to the patient.  Patient is worried that she will develop pulmonary embolism but we assured her that this is a superficial vein which does not usually cause pulmonary embolism.        ED Course as of 04/10/25 0445   Thu Apr 10, 2025   0007 WBC: 4.89 [AG]   0007 Hemoglobin(!): 11.9 [AG]   0040 MCV(!): 101.1  Macrocystic anemia, patient on long term pantoprazole therapy and does not take B12 or folate supplementation. Will given IM dose of B12 in ED and educate to start multivitamin.  [AG]   0042 eGFR: 55 [AG]   0042 Creatinine(!): 1.17  Slightly elevated from baseline of 0.9 [AG]   0044 X-Ray Chest AP Portable  No acute radiographic abnormality.      [AG]   0048 BNP: <10.0 [AG]      ED Course User Index  [AG] Millie Sharma PA                           Clinical Impression:  Final diagnoses:  [D53.9] Anemia, macrocytic (Primary)  [E86.0] Dehydration  [I80.8] Superficial thrombophlebitis of right upper extremity          ED Disposition Condition    Discharge Stable          ED Prescriptions    None       Follow-up Information       Follow up With Specialties  Details Why Contact Info    [facility] Emergency Medicine Go to  If symptoms worsen, As needed [address] [facility] [facility] [address] [phone]    Ebony Lopez FNP Gerontology, Family Medicine In 3 days  [address]  [address]  [facility] [id number] [phone]               Millie Sharma PA  04/10/25 0145         [1]   Social History  Tobacco Use    Smoking status: Former     Current packs/day: 0.00     Average packs/day: 0.5 packs/day for 42.0 years (21.0 ttl pk-yrs)     Types: Cigarettes     Start date: 1983     Quit date: 2025     Years since quittin.2     Passive exposure: Past    Smokeless tobacco: Current    Tobacco comments:     Ambulatory referral to Smoking Cessation clinic following hospital discharge.    Substance Use Topics    Alcohol use: Not Currently    Drug use: Never        Margi Gleason MD  04/10/25 7535

## 2025-04-11 LAB
OHS QRS DURATION: 64 MS
OHS QTC CALCULATION: 407 MS

## 2025-04-24 ENCOUNTER — PATIENT MESSAGE (OUTPATIENT)
Dept: SURGERY | Facility: CLINIC | Age: 55
End: 2025-04-24
Payer: MEDICAID

## 2025-05-12 ENCOUNTER — OFFICE VISIT (OUTPATIENT)
Dept: ENDOCRINOLOGY | Facility: CLINIC | Age: 55
End: 2025-05-12
Payer: OTHER GOVERNMENT

## 2025-05-12 VITALS
WEIGHT: 150.38 LBS | RESPIRATION RATE: 18 BRPM | BODY MASS INDEX: 29.52 KG/M2 | HEIGHT: 60 IN | HEART RATE: 101 BPM | TEMPERATURE: 99 F | SYSTOLIC BLOOD PRESSURE: 107 MMHG | DIASTOLIC BLOOD PRESSURE: 71 MMHG

## 2025-05-12 DIAGNOSIS — R79.89 LOW SERUM CORTISOL LEVEL: Primary | ICD-10-CM

## 2025-05-12 DIAGNOSIS — E05.00 GRAVES' DISEASE WITH EXOPHTHALMOS: ICD-10-CM

## 2025-05-12 PROCEDURE — 99214 OFFICE O/P EST MOD 30 MIN: CPT | Mod: S$PBB,,, | Performed by: NURSE PRACTITIONER

## 2025-05-12 PROCEDURE — 99215 OFFICE O/P EST HI 40 MIN: CPT | Mod: PBBFAC | Performed by: NURSE PRACTITIONER

## 2025-05-12 NOTE — PROGRESS NOTES
e has a Subjective     Patient ID: Niya Moeller is a 55 y.o. female.    Chief Complaint: Graves' Disease    Endocrine clinic note 08/31/2023:  53-year-old female scheduled today as new patient referral to endocrine clinic for history of Graves disease/hyperthyroidism and enlarged thyroid.  Graves disease patient states she was diagnosed with hyperthyroidism multiple years ago when she states placed on medication and she states that time medication was stopped stating she was in remission.  Patient returned back hyperthyroid at the beginning of 2023 w TSH < 0.026, Free T4 3.89 patient was placed on methimazole 10 mg twice a day.  Remain on 10 mg twice a day 5 months and started having symptoms of being lethargic, periorbital edema and swelling to her legs and severe fatigue and weight gain. Labs repeated TSH 22.988, free T4 <0.42 on 08/16/2023 MMI stopped at that time 2 weeks ago. Labs repeat today TSH 4.000, Free T4 0.90 today.  Patient reports a small amount of remaining periorbital edema fatigue is improving.  Instructed patient that will monitor her thyroid labs closely.  Patient states previously when she was hyperthyroid she had an enlarged thyroid, tremors, anxiety, insomnia, heat intolerance.  She states when she was on menses all times several months she started having cold intolerance, becoming more sleepy and symptoms of hypothyroidism with weight gain.  TraB 2.23, TSI 1.4 on 08/18/2023.  Patient states when she was hyperthyroid and thyroid was enlarged she can feel tightness around her neck and dysphagia she states she is mild symptoms of dysphagia now.      Telemedicine Notes:  Endocrine clinic note 11/06/2023:  53 year female scheduled today for endocrine clinic follow-up.  History of Graves disease/hyperthyroidism and enlarged thyroid.  Patient is currently on methimazole 2.5 mg recently hospitalized with diverticulitis abscess and COVID-19.  On admit patient's TSH 0.254 free T4 1.04 at that time  patient had missed 1-2 doses of her medication due to hospitalization.  Repeat TSH 0.420 on 10/23/2023.  Patient denies any symptoms of hyperthyroidism.  Patient is currently at home recovering she states that she is completing her amoxicillin and has a follow-up with ID clinic on 11/30/2023.  Instructed the patient that I will repeat her labs and she can complete him on her 11/30 visit if she has any symptoms of hyperthyroidism prior to that visit she can repeat her labs early.  Patient did have a positive depression screening on the visit today patient mentioned since her medical diagnosis she does have mild depression due to her recent medical visits.  Patient did not express any suicidal or homicidal ideations.      Endocrine clinic note 11/26/2024:  54 year female scheduled today for endocrine clinic follow-up.  History of Graves disease/hyperthyroidism and enlarged thyroid.  Patient is currently on methimazole 2.5 mg  previous TSH 0.187, Free T4 0.84 on 04/06/2024.  Patient has not had any recent thyroid labs.  Patient states   Enlarged thyroid patient had ultrasound 09/08/2023 hypervascular no nodules are noted.  Patient reports she stopped her medication multiple weeks back due to her eyes bulging and having itching and she states when she stopped the medication that her eyes with bulging as much.  She states she restarted the medication she started having eye symptoms again.  Patient's CP checked her labs TSH 1.540, free T4 1.21, free T3 3.0 on 10/28/2024.  Patient states at this time she stopped her MMI and has been off for multiple weeks.  I discussed with the patient will repeat labs today and also repeat her Graves markers.  Patient was states she saw her ophthalmologist who did not know anything about Graves eye disease instruct her I will refer her to [facility].     Endocrine Clinic Note 05/12/2025: 55 year old scheduled today for endocrine clinic follow-up.  History of Graves  disease/hyperthyroidism and enlarged thyroid.  Graves disease TSH 0.531 on 01/15/2025. TraB 2.23, TSI 1.4 on 08/18/2023. Pt is not on MMI at this time.She denies weight loss, tremors, palpitations. Pt was hospitalized in March with hypotension and syncopal episodes. Syncope Mitul stem test scheduled 05/14/2025, am cortisol 2.90 on 03/04/2025 and 1.50 on 03/03/2025. Mitul stem 03/04/2025 3.50 baseline, 15.5 at 30 min, 17.7 at 60 min. Pt states she does not feel like she has adrenal insuffiency She states at the time she was dehydrated leading to low blood pressure.  She denies weight loss or abnormal potassium levels.           US Thyroid  Order: [id number]  Status: Final result       Visible to patient: Yes (seen)       Next appt: 12/03/2024 at 12:30 PM in General Surgery (SURGERY CLINIC, [facility] GENERAL SURGERY)       Dx: Graves disease; Hyperthyroidism    1 Result Note       1 Patient Communication  Details        Reading PhysicianReading DateResult Priority  Pillo Stein MD  [phone]/8/2023Routine     Narrative & Impression  EXAMINATION:  US THYROID     CLINICAL HISTORY:  , Thyrotoxicosis, unspecified without thyrotoxic crisis or storm.     TECHNIQUE:  Multiple transverse and sagittal grayscale sonographic images were acquired through the thyroid gland.     COMPARISON:  With ultrasound of an outside institution dated February 12, 2023     FINDINGS:  Examination reveals the right hemithyroid to measured 4.6 x 1.9 x 1.6 cm, left zaida thyroid measures 4.4 x 1.9 x 1.9 cm isthmus measures 3.5 mm in thickness.     There is some heterogenicity of the thyroid parenchyma with no discrete focal lesions identified there is also evidence of increased vascularity throughout     Impression:     Heterogenicity of the thyroid parenchyma with a hypervascularity likely related to thyroiditis.     No focal lesions        Electronically signed by:Pillo Stein  Date:                                             09/08/2023  Time:                                           10:50        Exam Ended: 09/08/23 10:18 CDTLast Resulted: 09/08/23 10:50 CDT                 Review of Systems   Constitutional:  Negative for activity change, appetite change and fatigue.   HENT:  Negative for dental problem, hearing loss, tinnitus, trouble swallowing and goiter.    Eyes:  Negative for photophobia, pain and visual disturbance.   Respiratory:  Negative for cough, chest tightness and wheezing.    Cardiovascular:  Negative for chest pain, palpitations and leg swelling.   Gastrointestinal:  Negative for abdominal pain, constipation, diarrhea, nausea and reflux.   Endocrine: Negative for cold intolerance, heat intolerance, polydipsia and polyphagia.   Genitourinary:  Negative for difficulty urinating, flank pain, hematuria, hot flashes, menstrual irregularity, menstrual problem, nocturia and urgency.   Musculoskeletal:  Negative for back pain, gait problem, joint swelling, leg pain and joint deformity.   Integumentary:  Negative for color change, pallor, rash and breast discharge.   Allergic/Immunologic: Negative for environmental allergies, food allergies and immunocompromised state.   Neurological:  Negative for tremors, seizures, headaches, coordination difficulties and memory loss.   Psychiatric/Behavioral:  Negative for agitation, behavioral problems and sleep disturbance. The patient is not nervous/anxious.           Objective     Physical Exam  Constitutional:       General: She is not in acute distress.     Appearance: Normal appearance. She is not ill-appearing.   HENT:      Head: Normocephalic and atraumatic.      Right Ear: External ear normal.      Left Ear: External ear normal.      Nose: Nose normal. No congestion or rhinorrhea.      Mouth/Throat:      Mouth: Mucous membranes are moist.      Pharynx: Oropharynx is clear. No oropharyngeal exudate.   Eyes:      General:         Right eye: No discharge.         Left eye: No  discharge.      Conjunctiva/sclera: Conjunctivae normal.      Pupils: Pupils are equal, round, and reactive to light.   Neck:      Thyroid: No thyroid mass, thyromegaly or thyroid tenderness.   Cardiovascular:      Rate and Rhythm: Normal rate and regular rhythm.      Pulses: Normal pulses.      Heart sounds: Normal heart sounds. No murmur heard.  Pulmonary:      Effort: Pulmonary effort is normal. No respiratory distress.      Breath sounds: Normal breath sounds.   Abdominal:      General: Abdomen is flat. Bowel sounds are normal. There is no distension.      Palpations: Abdomen is soft.      Tenderness: There is no abdominal tenderness.   Musculoskeletal:         General: No swelling or tenderness. Normal range of motion.      Cervical back: Normal range of motion and neck supple. No tenderness.      Right lower leg: No edema.      Left lower leg: No edema.   Feet:      Right foot:      Skin integrity: Skin integrity normal.      Left foot:      Skin integrity: Skin integrity normal.   Lymphadenopathy:      Cervical: No cervical adenopathy.   Skin:     General: Skin is warm and dry.      Coloration: Skin is not jaundiced or pale.   Neurological:      General: No focal deficit present.      Mental Status: She is alert and oriented to person, place, and time. Mental status is at baseline.      Coordination: Coordination normal.      Gait: Gait normal.   Psychiatric:         Mood and Affect: Mood normal.         Behavior: Behavior normal.         Thought Content: Thought content normal.            Assessment and Plan     1. Low cortisol level   Mitul stem test scheduled 05/14/2025    am cortisol 2.90 on 03/04/2025 and 1.50 on 03/03/2025   Mitul stem 03/04/2025 3.50 baseline, ,15.5 at 30 min, 17.7 at 60 min   Started on cortef pt is not taking   -     Ambulatory referral/consult to Endocrinology      Component  Ref Range & Units (hover) 2 mo ago   Cortisol 60 Minutes Post ACTH 17.7            Component  Ref Range &  Units (hover) 2 mo ago   Cortisol 30 Minutes Post ACTH 15.5               Component  Ref Range & Units (hover) 2 mo ago  (3/4/25) 2 mo ago  (3/4/25) 2 mo ago  (3/3/25) 5 mo ago  (12/5/24)   Cortisol 3.50 2.90 CM 1.50 CM 3.90 CM      -     Ambulatory referral/consult to Endocrinology  -     ACTH; Future; Expected date: 05/12/2025  -     Cortisol; Future; Expected date: 05/12/2025  -     Cortisol; Future; Expected date: 05/12/2025  -     Cortisol; Future; Expected date: 05/12/2025    2. Graves disease  TSH 0.187, Free T4 0.84 on 04/06/2024  TSH 1.540, Free T4 1.21, Free T3 3.0 on 10/28/2024  TSH 0.531 on 01/15/2025   TraB 2.23, TSI 1.4 on 08/18/2023  Not on MMI at this time             Component  Ref Range & Units (hover) 3 mo ago  (1/15/25) 4 mo ago  (1/2/25) 4 mo ago  (12/24/24) 5 mo ago  (11/26/24) 1 yr ago  (4/6/24) 1 yr ago  (11/30/23) 1 yr ago  (10/23/23)   TSH 0.531 0.589 0.562 1.251 0.187 Low  0.704    -     T4, Free; Future; Expected date: 05/12/2025  -     T3, Free (OLG); Future; Expected date: 05/12/2025  -     TSH; Future; Expected date: 05/12/2025  -     Thyroid Stimulating Immunoglobulin; Future; Expected date: 05/12/2025  -     Thyrotropin Receptor Antibody; Future; Expected date: 05/12/2025  2. Enlarged thyroid           Follow up in about 3 months (around 8/12/2025).

## 2025-05-14 ENCOUNTER — TELEPHONE (OUTPATIENT)
Dept: ENDOCRINOLOGY | Facility: CLINIC | Age: 55
End: 2025-05-14
Payer: MEDICAID

## 2025-05-14 NOTE — TELEPHONE ENCOUNTER
----- Message from Pauline sent at 5/14/2025 11:12 AM CDT -----  Pt of [facility] called stating she needs to reschedule her Mitul Stem appointment Pt stated she couldn't make her appointment due to her asthma acting up.Pt requesting a call back at  [phone] advise

## 2025-05-14 NOTE — TELEPHONE ENCOUNTER
Called and spoke with patient and informed patient will follow up with provider and then will follow up with patient.

## 2025-05-15 NOTE — TELEPHONE ENCOUNTER
Called to follow up with patient on rescheduling Mitul stem, patient states she was given solumedrol a regular dose in the Hip. Informed patient will call back with a rescheduled appointment.

## 2025-05-21 ENCOUNTER — HOSPITAL ENCOUNTER (OUTPATIENT)
Dept: RADIOLOGY | Facility: HOSPITAL | Age: 55
Discharge: HOME | End: 2025-05-21
Attending: NURSE PRACTITIONER
Payer: MEDICAID

## 2025-05-21 DIAGNOSIS — R92.8 ABNORMAL MAMMOGRAM: ICD-10-CM

## 2025-05-21 PROCEDURE — 76642 ULTRASOUND BREAST LIMITED: CPT | Mod: TC,RT

## 2025-05-21 PROCEDURE — 77061 BREAST TOMOSYNTHESIS UNI: CPT | Mod: 26,RT,, | Performed by: RADIOLOGY

## 2025-05-21 PROCEDURE — 76642 ULTRASOUND BREAST LIMITED: CPT | Mod: 26,RT,, | Performed by: RADIOLOGY

## 2025-05-21 PROCEDURE — 77061 BREAST TOMOSYNTHESIS UNI: CPT | Mod: TC,RT

## 2025-05-21 PROCEDURE — 77065 DX MAMMO INCL CAD UNI: CPT | Mod: 26,RT,, | Performed by: RADIOLOGY

## 2025-05-28 ENCOUNTER — TELEPHONE (OUTPATIENT)
Dept: INFECTIOUS DISEASES | Facility: CLINIC | Age: 55
End: 2025-05-28
Payer: MEDICAID

## 2025-05-28 NOTE — TELEPHONE ENCOUNTER
Mrs. Moeller was scheduled for heriberto stem test today. She was instructed to arrive between 730-740. Pt was very disgruntled about having to come here when she need to be home taking care of her asthma. I informed pt she was able to take her asthma medicine but stated she left it at home. Pt verbalizes she was made aware she would be her for at least 1.5 hours when called to schedule appt. Mrs. Moeller stated we are interrupting her life with all these test. I explained the following process.  Pt is to arrive between 730-740 am to check in while nurse orders medications, etc. Medication has to be picked up by staff from inpatient pharmacy, once received pt will receive cosytropin injection, then baseline labs as close to 8am as possible, then labs every 30 minutes for an hour. Pt stated we are once again interrupting her life and she wanted to cancel and not reschedule appt

## 2025-05-29 ENCOUNTER — PATIENT MESSAGE (OUTPATIENT)
Dept: ENDOCRINOLOGY | Facility: CLINIC | Age: 55
End: 2025-05-29
Payer: MEDICAID

## 2025-06-01 ENCOUNTER — HOSPITAL ENCOUNTER (EMERGENCY)
Facility: HOSPITAL | Age: 55
Discharge: HOME | End: 2025-06-02
Attending: INTERNAL MEDICINE
Payer: MEDICAID

## 2025-06-01 DIAGNOSIS — J44.1 COPD EXACERBATION: Primary | ICD-10-CM

## 2025-06-01 DIAGNOSIS — J45.901 EXACERBATION OF ASTHMA, UNSPECIFIED ASTHMA SEVERITY, UNSPECIFIED WHETHER PERSISTENT: ICD-10-CM

## 2025-06-01 PROCEDURE — 94640 AIRWAY INHALATION TREATMENT: CPT

## 2025-06-01 PROCEDURE — 25000242 PHARM REV CODE 250 ALT 637 W/ HCPCS: Performed by: INTERNAL MEDICINE

## 2025-06-01 PROCEDURE — 63600175 PHARM REV CODE 636 W HCPCS: Performed by: INTERNAL MEDICINE

## 2025-06-01 PROCEDURE — 0241U COVID/RSV/FLU A&B PCR: CPT | Performed by: INTERNAL MEDICINE

## 2025-06-01 PROCEDURE — 96372 THER/PROPH/DIAG INJ SC/IM: CPT | Performed by: INTERNAL MEDICINE

## 2025-06-01 PROCEDURE — 99284 EMERGENCY DEPT VISIT MOD MDM: CPT | Mod: 25

## 2025-06-01 RX ORDER — LEVALBUTEROL INHALATION SOLUTION 1.25 MG/3ML
1.25 SOLUTION RESPIRATORY (INHALATION) ONCE
Status: COMPLETED | OUTPATIENT
Start: 2025-06-01 | End: 2025-06-01

## 2025-06-01 RX ORDER — DEXAMETHASONE SODIUM PHOSPHATE 4 MG/ML
8 INJECTION, SOLUTION INTRA-ARTICULAR; INTRALESIONAL; INTRAMUSCULAR; INTRAVENOUS; SOFT TISSUE
Status: COMPLETED | OUTPATIENT
Start: 2025-06-01 | End: 2025-06-01

## 2025-06-01 RX ORDER — IPRATROPIUM BROMIDE AND ALBUTEROL SULFATE 2.5; .5 MG/3ML; MG/3ML
3 SOLUTION RESPIRATORY (INHALATION)
Status: COMPLETED | OUTPATIENT
Start: 2025-06-02 | End: 2025-06-02

## 2025-06-01 RX ADMIN — LEVALBUTEROL HYDROCHLORIDE 1.25 MG: 1.25 SOLUTION RESPIRATORY (INHALATION) at 10:06

## 2025-06-01 RX ADMIN — DEXAMETHASONE SODIUM PHOSPHATE 8 MG: 4 INJECTION, SOLUTION INTRA-ARTICULAR; INTRALESIONAL; INTRAMUSCULAR; INTRAVENOUS; SOFT TISSUE at 10:06

## 2025-06-02 VITALS
WEIGHT: 140 LBS | HEART RATE: 88 BPM | HEIGHT: 60 IN | BODY MASS INDEX: 27.48 KG/M2 | SYSTOLIC BLOOD PRESSURE: 120 MMHG | OXYGEN SATURATION: 95 % | DIASTOLIC BLOOD PRESSURE: 60 MMHG | TEMPERATURE: 98 F | RESPIRATION RATE: 19 BRPM

## 2025-06-02 PROCEDURE — 25000242 PHARM REV CODE 250 ALT 637 W/ HCPCS: Performed by: INTERNAL MEDICINE

## 2025-06-02 PROCEDURE — 94640 AIRWAY INHALATION TREATMENT: CPT | Mod: XB

## 2025-06-02 RX ORDER — PROMETHAZINE HYDROCHLORIDE AND DEXTROMETHORPHAN HYDROBROMIDE 6.25; 15 MG/5ML; MG/5ML
5 SYRUP ORAL EVERY 4 HOURS PRN
Qty: 118 ML | Refills: 0 | Status: SHIPPED | OUTPATIENT
Start: 2025-06-02 | End: 2025-06-12

## 2025-06-02 RX ORDER — PREDNISONE 20 MG/1
40 TABLET ORAL DAILY
Qty: 10 TABLET | Refills: 0 | Status: SHIPPED | OUTPATIENT
Start: 2025-06-02 | End: 2025-06-07

## 2025-06-02 RX ORDER — BENZONATATE 200 MG/1
200 CAPSULE ORAL 3 TIMES DAILY PRN
Qty: 30 CAPSULE | Refills: 0 | Status: SHIPPED | OUTPATIENT
Start: 2025-06-02 | End: 2025-06-12

## 2025-06-02 RX ADMIN — IPRATROPIUM BROMIDE AND ALBUTEROL SULFATE 3 ML: .5; 3 SOLUTION RESPIRATORY (INHALATION) at 12:06

## 2025-06-18 ENCOUNTER — CLINICAL SUPPORT (OUTPATIENT)
Dept: ENDOCRINOLOGY | Facility: CLINIC | Age: 55
End: 2025-06-18
Payer: MEDICAID

## 2025-06-18 DIAGNOSIS — R79.89 LOW SERUM CORTISOL LEVEL: Primary | ICD-10-CM

## 2025-06-18 LAB
CORTIS SERPL-SCNC: 19.6 UG/DL
CORTIS SERPL-SCNC: 25.6 UG/DL
CORTIS SERPL-SCNC: 8.9 UG/DL

## 2025-06-18 PROCEDURE — 82024 ASSAY OF ACTH: CPT

## 2025-06-18 PROCEDURE — 82533 TOTAL CORTISOL: CPT

## 2025-06-18 PROCEDURE — 96372 THER/PROPH/DIAG INJ SC/IM: CPT | Mod: PBBFAC

## 2025-06-18 PROCEDURE — 99211 OFF/OP EST MAY X REQ PHY/QHP: CPT | Mod: PBBFAC

## 2025-06-18 RX ADMIN — COSYNTROPIN 0.25 MG: 0.25 INJECTION, POWDER, LYOPHILIZED, FOR SOLUTION INTRAMUSCULAR; INTRAVENOUS at 08:06

## 2025-06-18 NOTE — PROGRESS NOTES
Patient cam in for Mitul Stem No complaints voiced no , patient was informed of testing and timeframe and patient verbalized understanding . Patient first draw was drawn close to 8 am. injection administered a 8:29 . Patient tolerated no complaints voiced. Patient last draw was tolerated no complaints. Patient verbalized thank you and informed patient that we will call with results once received and reviewed.

## 2025-06-19 ENCOUNTER — RESULTS FOLLOW-UP (OUTPATIENT)
Dept: ENDOCRINOLOGY | Facility: CLINIC | Age: 55
End: 2025-06-19

## 2025-06-19 ENCOUNTER — PATIENT MESSAGE (OUTPATIENT)
Dept: ENDOCRINOLOGY | Facility: CLINIC | Age: 55
End: 2025-06-19
Payer: MEDICAID

## 2025-06-20 LAB — ACTH PLAS-MCNC: 7.4 PG/ML

## 2025-08-01 ENCOUNTER — PATIENT MESSAGE (OUTPATIENT)
Dept: ENDOCRINOLOGY | Facility: CLINIC | Age: 55
End: 2025-08-01
Payer: MEDICAID

## 2025-08-12 ENCOUNTER — OFFICE VISIT (OUTPATIENT)
Dept: ENDOCRINOLOGY | Facility: CLINIC | Age: 55
End: 2025-08-12
Payer: MEDICAID

## 2025-08-12 VITALS
BODY MASS INDEX: 33.55 KG/M2 | RESPIRATION RATE: 19 BRPM | DIASTOLIC BLOOD PRESSURE: 64 MMHG | TEMPERATURE: 97 F | HEART RATE: 75 BPM | WEIGHT: 170.88 LBS | SYSTOLIC BLOOD PRESSURE: 133 MMHG | HEIGHT: 60 IN

## 2025-08-12 DIAGNOSIS — E04.9 ENLARGED THYROID: ICD-10-CM

## 2025-08-12 DIAGNOSIS — E05.00 GRAVES' DISEASE WITH EXOPHTHALMOS: Primary | ICD-10-CM

## 2025-08-12 PROCEDURE — 1160F RVW MEDS BY RX/DR IN RCRD: CPT | Mod: CPTII,,, | Performed by: NURSE PRACTITIONER

## 2025-08-12 PROCEDURE — 1159F MED LIST DOCD IN RCRD: CPT | Mod: CPTII,,, | Performed by: NURSE PRACTITIONER

## 2025-08-12 PROCEDURE — 99213 OFFICE O/P EST LOW 20 MIN: CPT | Mod: PBBFAC | Performed by: NURSE PRACTITIONER

## 2025-08-12 PROCEDURE — 99214 OFFICE O/P EST MOD 30 MIN: CPT | Mod: S$PBB,,, | Performed by: NURSE PRACTITIONER

## 2025-08-12 PROCEDURE — 3008F BODY MASS INDEX DOCD: CPT | Mod: CPTII,,, | Performed by: NURSE PRACTITIONER

## (undated) DEVICE — CATH POLLACK OPEN-END FLEXI-TI

## (undated) DEVICE — COVER LIGHT HANDLE 80/CA

## (undated) DEVICE — MANIFOLD 4 PORT

## (undated) DEVICE — TRAY GENERAL SURGERY OMC

## (undated) DEVICE — WIRE GUIDE 0.038OLD

## (undated) DEVICE — R SEALER LIGASURE LAP 37CM 5MM

## (undated) DEVICE — Device

## (undated) DEVICE — PAD PINK TRENDELENBURG POS XL

## (undated) DEVICE — PASSER SUTURE DISP

## (undated) DEVICE — BLADE SURG CARBON STEEL SZ11

## (undated) DEVICE — SUT 2-0 NYLON D/A

## (undated) DEVICE — SUT VICRYL UNDYED BRAID 0 54IN

## (undated) DEVICE — ADHESIVE DERMABOND ADVANCED

## (undated) DEVICE — KIT SURGICAL COLON .25 1.1OZ

## (undated) DEVICE — MARKER ENDOSCOPIC SPOT EX

## (undated) DEVICE — SEAL UNIVERSAL 5MM-8MM XI

## (undated) DEVICE — DRAPE UINDERBUT GRAD PCH

## (undated) DEVICE — LUBRICANT SURGILUBE 2 OZ

## (undated) DEVICE — TOWEL OR DISP STRL BLUE 4/PK

## (undated) DEVICE — STAPLER CONTOUR 40MM GREEN

## (undated) DEVICE — SOL ELECTROLUBE ANTI-STIC

## (undated) DEVICE — NDL INSUF ULTRA VERESS 120MM

## (undated) DEVICE — SUT MCRYL PLUS 4-0 PS2 27IN

## (undated) DEVICE — SUT 1 36IN PDS II

## (undated) DEVICE — SUT 2-0 12-18IN SILK

## (undated) DEVICE — TUBING SUCTION STERILE

## (undated) DEVICE — DRAPE ARM DAVINCI XI

## (undated) DEVICE — TRAY SKIN SCRUB WET PREMIUM

## (undated) DEVICE — SET IRR URLGY 2LINE UNIV SPIKE

## (undated) DEVICE — DRAPE ABDOMINAL TIBURON 14X11

## (undated) DEVICE — SYS SEE SHARP SCP ANTIFG LNG

## (undated) DEVICE — ELECTRODE REM PLYHSV RETURN 9

## (undated) DEVICE — TIP YANKAUERS BULB NO VENT

## (undated) DEVICE — DRAPE COLUMN DAVINCI XI

## (undated) DEVICE — CATH IV INTROCAN 14G X 2.

## (undated) DEVICE — TRAY CATH 1-LYR URIMTR 16FR

## (undated) DEVICE — KIT GELPORT LAPAROSCOPIC ABD

## (undated) DEVICE — SOL IRR NACL .9% 3000ML

## (undated) DEVICE — DRAPE SCOPE PILLOW WARMER

## (undated) DEVICE — LEGGING CLEAR POLY 2/PACK

## (undated) DEVICE — CLIPPER BLADE MOD 4406 (CAREF)

## (undated) DEVICE — SUT VICRYL PLUS 2-0 SH 27IN

## (undated) DEVICE — OBTURATOR BLADELESS 8MM XI

## (undated) DEVICE — SALINE .45% 1000ML VITEK2

## (undated) DEVICE — STAPLER ECHELON PWR CIR 29MM

## (undated) DEVICE — COVER TIP CURVED SCISSORS XI